# Patient Record
Sex: FEMALE | Race: WHITE | NOT HISPANIC OR LATINO | Employment: OTHER | URBAN - METROPOLITAN AREA
[De-identification: names, ages, dates, MRNs, and addresses within clinical notes are randomized per-mention and may not be internally consistent; named-entity substitution may affect disease eponyms.]

---

## 2017-02-02 ENCOUNTER — ALLSCRIPTS OFFICE VISIT (OUTPATIENT)
Dept: OTHER | Facility: OTHER | Age: 77
End: 2017-02-02

## 2017-02-02 DIAGNOSIS — I87.2 VENOUS INSUFFICIENCY (CHRONIC) (PERIPHERAL): ICD-10-CM

## 2017-02-08 ENCOUNTER — GENERIC CONVERSION - ENCOUNTER (OUTPATIENT)
Dept: OTHER | Facility: OTHER | Age: 77
End: 2017-02-08

## 2017-02-14 ENCOUNTER — APPOINTMENT (OUTPATIENT)
Dept: LAB | Facility: CLINIC | Age: 77
End: 2017-02-14
Payer: MEDICARE

## 2017-02-14 ENCOUNTER — GENERIC CONVERSION - ENCOUNTER (OUTPATIENT)
Dept: OTHER | Facility: OTHER | Age: 77
End: 2017-02-14

## 2017-02-14 ENCOUNTER — TRANSCRIBE ORDERS (OUTPATIENT)
Dept: LAB | Facility: CLINIC | Age: 77
End: 2017-02-14

## 2017-02-14 ENCOUNTER — OFFICE VISIT (OUTPATIENT)
Dept: LAB | Facility: CLINIC | Age: 77
End: 2017-02-14
Payer: MEDICARE

## 2017-02-14 ENCOUNTER — ALLSCRIPTS OFFICE VISIT (OUTPATIENT)
Dept: OTHER | Facility: OTHER | Age: 77
End: 2017-02-14

## 2017-02-14 ENCOUNTER — HOSPITAL ENCOUNTER (OUTPATIENT)
Dept: RADIOLOGY | Facility: CLINIC | Age: 77
Discharge: HOME | End: 2017-02-14
Payer: MEDICARE

## 2017-02-14 DIAGNOSIS — L03.116 CELLULITIS OF LEFT FOOT: ICD-10-CM

## 2017-02-14 DIAGNOSIS — L03.116 CELLULITIS OF LEFT FOOT: Primary | ICD-10-CM

## 2017-02-14 LAB
ANION GAP SERPL CALCULATED.3IONS-SCNC: 6 MMOL/L (ref 4–13)
BASOPHILS # BLD AUTO: 0.03 THOUSANDS/ΜL (ref 0–0.1)
BASOPHILS NFR BLD AUTO: 0 % (ref 0–1)
BUN SERPL-MCNC: 37 MG/DL (ref 5–25)
CALCIUM SERPL-MCNC: 9.2 MG/DL (ref 8.3–10.1)
CHLORIDE SERPL-SCNC: 104 MMOL/L (ref 100–108)
CO2 SERPL-SCNC: 30 MMOL/L (ref 21–32)
CREAT SERPL-MCNC: 1.27 MG/DL (ref 0.6–1.3)
EOSINOPHIL # BLD AUTO: 0.17 THOUSAND/ΜL (ref 0–0.61)
EOSINOPHIL NFR BLD AUTO: 2 % (ref 0–6)
ERYTHROCYTE [DISTWIDTH] IN BLOOD BY AUTOMATED COUNT: 13.6 % (ref 11.6–15.1)
GFR SERPL CREATININE-BSD FRML MDRD: 41 ML/MIN/1.73SQ M
GLUCOSE SERPL-MCNC: 187 MG/DL (ref 65–140)
HCT VFR BLD AUTO: 36.5 % (ref 34.8–46.1)
HGB BLD-MCNC: 11.1 G/DL (ref 11.5–15.4)
INR PPP: 1.21 (ref 0.86–1.16)
LYMPHOCYTES # BLD AUTO: 2.06 THOUSANDS/ΜL (ref 0.6–4.47)
LYMPHOCYTES NFR BLD AUTO: 28 % (ref 14–44)
MCH RBC QN AUTO: 29.3 PG (ref 26.8–34.3)
MCHC RBC AUTO-ENTMCNC: 30.4 G/DL (ref 31.4–37.4)
MCV RBC AUTO: 96 FL (ref 82–98)
MONOCYTES # BLD AUTO: 0.63 THOUSAND/ΜL (ref 0.17–1.22)
MONOCYTES NFR BLD AUTO: 9 % (ref 4–12)
NEUTROPHILS # BLD AUTO: 4.41 THOUSANDS/ΜL (ref 1.85–7.62)
NEUTS SEG NFR BLD AUTO: 61 % (ref 43–75)
PLATELET # BLD AUTO: 214 THOUSANDS/UL (ref 149–390)
PMV BLD AUTO: 10.3 FL (ref 8.9–12.7)
POTASSIUM SERPL-SCNC: 4.3 MMOL/L (ref 3.5–5.3)
PROTHROMBIN TIME: 15 SECONDS (ref 12–14.3)
RBC # BLD AUTO: 3.79 MILLION/UL (ref 3.81–5.12)
SODIUM SERPL-SCNC: 140 MMOL/L (ref 136–145)
WBC # BLD AUTO: 7.3 THOUSAND/UL (ref 4.31–10.16)

## 2017-02-14 PROCEDURE — 71020 HB CHEST X-RAY 2VW FRONTAL&LATL: CPT

## 2017-02-14 PROCEDURE — 85610 PROTHROMBIN TIME: CPT

## 2017-02-14 PROCEDURE — 80048 BASIC METABOLIC PNL TOTAL CA: CPT

## 2017-02-14 PROCEDURE — 85025 COMPLETE CBC W/AUTO DIFF WBC: CPT

## 2017-02-14 PROCEDURE — 93005 ELECTROCARDIOGRAM TRACING: CPT

## 2017-02-14 PROCEDURE — 36415 COLL VENOUS BLD VENIPUNCTURE: CPT

## 2017-02-17 ENCOUNTER — HOSPITAL ENCOUNTER (OUTPATIENT)
Facility: HOSPITAL | Age: 77
Setting detail: OUTPATIENT SURGERY
Discharge: HOME | End: 2017-02-17
Attending: SURGERY | Admitting: SURGERY
Payer: MEDICARE

## 2017-02-17 ENCOUNTER — HOSPITAL ENCOUNTER (OUTPATIENT)
Dept: ULTRASOUND IMAGING | Facility: HOSPITAL | Age: 77
Discharge: HOME | End: 2017-02-17
Attending: SURGERY
Payer: MEDICARE

## 2017-02-17 ENCOUNTER — ANESTHESIA (OUTPATIENT)
Dept: PERIOP | Facility: HOSPITAL | Age: 77
End: 2017-02-17
Payer: MEDICARE

## 2017-02-17 ENCOUNTER — ANESTHESIA EVENT (OUTPATIENT)
Dept: PERIOP | Facility: HOSPITAL | Age: 77
End: 2017-02-17
Payer: MEDICARE

## 2017-02-17 VITALS
OXYGEN SATURATION: 97 % | RESPIRATION RATE: 18 BRPM | TEMPERATURE: 97.9 F | DIASTOLIC BLOOD PRESSURE: 70 MMHG | HEART RATE: 60 BPM | SYSTOLIC BLOOD PRESSURE: 157 MMHG

## 2017-02-17 DIAGNOSIS — I87.9 VEIN DISORDER: ICD-10-CM

## 2017-02-17 PROCEDURE — 93971 EXTREMITY STUDY: CPT

## 2017-02-17 PROCEDURE — C1894 INTRO/SHEATH, NON-LASER: HCPCS | Performed by: SURGERY

## 2017-02-17 RX ORDER — LIDOCAINE HYDROCHLORIDE 10 MG/ML
INJECTION, SOLUTION INFILTRATION; PERINEURAL AS NEEDED
Status: DISCONTINUED | OUTPATIENT
Start: 2017-02-17 | End: 2017-02-17 | Stop reason: SURG

## 2017-02-17 RX ORDER — PROPOFOL 10 MG/ML
INJECTION, EMULSION INTRAVENOUS AS NEEDED
Status: DISCONTINUED | OUTPATIENT
Start: 2017-02-17 | End: 2017-02-17 | Stop reason: SURG

## 2017-02-17 RX ORDER — LISINOPRIL 20 MG/1
20 TABLET ORAL DAILY
COMMUNITY
End: 2018-01-31 | Stop reason: SDUPTHER

## 2017-02-17 RX ORDER — SODIUM CHLORIDE, SODIUM LACTATE, POTASSIUM CHLORIDE, CALCIUM CHLORIDE 600; 310; 30; 20 MG/100ML; MG/100ML; MG/100ML; MG/100ML
INJECTION, SOLUTION INTRAVENOUS CONTINUOUS PRN
Status: DISCONTINUED | OUTPATIENT
Start: 2017-02-17 | End: 2017-02-17 | Stop reason: SURG

## 2017-02-17 RX ORDER — ONDANSETRON 2 MG/ML
INJECTION INTRAMUSCULAR; INTRAVENOUS AS NEEDED
Status: DISCONTINUED | OUTPATIENT
Start: 2017-02-17 | End: 2017-02-17 | Stop reason: SURG

## 2017-02-17 RX ORDER — OXYCODONE HYDROCHLORIDE AND ACETAMINOPHEN 5; 325 MG/1; MG/1
1 TABLET ORAL EVERY 4 HOURS PRN
Qty: 20 TABLET | Refills: 0 | Status: SHIPPED | OUTPATIENT
Start: 2017-02-17 | End: 2017-02-27

## 2017-02-17 RX ORDER — CLINDAMYCIN PHOSPHATE 900 MG/50ML
900 INJECTION INTRAVENOUS
Status: COMPLETED | OUTPATIENT
Start: 2017-02-17 | End: 2017-02-17

## 2017-02-17 RX ORDER — FENTANYL CITRATE 50 UG/ML
INJECTION, SOLUTION INTRAMUSCULAR; INTRAVENOUS AS NEEDED
Status: DISCONTINUED | OUTPATIENT
Start: 2017-02-17 | End: 2017-02-17 | Stop reason: SURG

## 2017-02-17 RX ORDER — ONDANSETRON 2 MG/ML
4 INJECTION INTRAMUSCULAR; INTRAVENOUS ONCE
Status: DISCONTINUED | OUTPATIENT
Start: 2017-02-17 | End: 2017-02-17 | Stop reason: HOSPADM

## 2017-02-17 RX ORDER — FENTANYL CITRATE/PF 50 MCG/ML
25 SYRINGE (ML) INJECTION
Status: DISCONTINUED | OUTPATIENT
Start: 2017-02-17 | End: 2017-02-17 | Stop reason: HOSPADM

## 2017-02-17 RX ADMIN — CLINDAMYCIN PHOSPHATE 900 MG: 18 INJECTION, SOLUTION INTRAMUSCULAR; INTRAVENOUS at 11:58

## 2017-02-17 RX ADMIN — ONDANSETRON 4 MG: 2 INJECTION INTRAMUSCULAR; INTRAVENOUS at 12:06

## 2017-02-17 RX ADMIN — PROPOFOL 150 MG: 10 INJECTION, EMULSION INTRAVENOUS at 12:00

## 2017-02-17 RX ADMIN — LIDOCAINE HYDROCHLORIDE 50 MG: 10 INJECTION, SOLUTION INFILTRATION; PERINEURAL at 12:00

## 2017-02-17 RX ADMIN — FENTANYL CITRATE 50 MCG: 50 INJECTION INTRAMUSCULAR; INTRAVENOUS at 12:10

## 2017-02-17 RX ADMIN — FENTANYL CITRATE 50 MCG: 50 INJECTION INTRAMUSCULAR; INTRAVENOUS at 12:35

## 2017-02-17 RX ADMIN — SODIUM CHLORIDE, SODIUM LACTATE, POTASSIUM CHLORIDE, AND CALCIUM CHLORIDE: .6; .31; .03; .02 INJECTION, SOLUTION INTRAVENOUS at 11:58

## 2017-02-20 LAB
ATRIAL RATE: 64 BPM
QRS AXIS: -77 DEGREES
QRSD INTERVAL: 180 MS
QT INTERVAL: 468 MS
QTC INTERVAL: 482 MS
T WAVE AXIS: 79 DEGREES
VENTRICULAR RATE: 64 BPM

## 2017-02-21 ENCOUNTER — ALLSCRIPTS OFFICE VISIT (OUTPATIENT)
Dept: OTHER | Facility: OTHER | Age: 77
End: 2017-02-21

## 2017-02-22 ENCOUNTER — HOSPITAL ENCOUNTER (OUTPATIENT)
Dept: NON INVASIVE DIAGNOSTICS | Facility: CLINIC | Age: 77
Discharge: HOME | End: 2017-02-22
Payer: MEDICARE

## 2017-02-22 DIAGNOSIS — I87.2 VENOUS INSUFFICIENCY (CHRONIC) (PERIPHERAL): ICD-10-CM

## 2017-02-22 PROCEDURE — 93971 EXTREMITY STUDY: CPT

## 2017-03-15 ENCOUNTER — GENERIC CONVERSION - ENCOUNTER (OUTPATIENT)
Dept: OTHER | Facility: OTHER | Age: 77
End: 2017-03-15

## 2017-04-11 ENCOUNTER — ALLSCRIPTS OFFICE VISIT (OUTPATIENT)
Dept: OTHER | Facility: OTHER | Age: 77
End: 2017-04-11

## 2017-05-03 ENCOUNTER — ALLSCRIPTS OFFICE VISIT (OUTPATIENT)
Dept: OTHER | Facility: OTHER | Age: 77
End: 2017-05-03

## 2017-05-03 DIAGNOSIS — R31.9 HEMATURIA: ICD-10-CM

## 2017-05-03 LAB
BILIRUB UR QL STRIP: NORMAL
CLARITY UR: NORMAL
COLOR UR: NORMAL
GLUCOSE (HISTORICAL): NORMAL
HGB UR QL STRIP.AUTO: NORMAL
KETONES UR STRIP-MCNC: NORMAL MG/DL
LEUKOCYTE ESTERASE UR QL STRIP: NORMAL
NITRITE UR QL STRIP: NORMAL
PH UR STRIP.AUTO: 5 [PH]
PROT UR STRIP-MCNC: NORMAL MG/DL
SP GR UR STRIP.AUTO: 1.01
UROBILINOGEN UR QL STRIP.AUTO: 0.2

## 2017-05-16 ENCOUNTER — ALLSCRIPTS OFFICE VISIT (OUTPATIENT)
Dept: OTHER | Facility: OTHER | Age: 77
End: 2017-05-16

## 2017-05-17 ENCOUNTER — ALLSCRIPTS OFFICE VISIT (OUTPATIENT)
Dept: OTHER | Facility: OTHER | Age: 77
End: 2017-05-17

## 2017-05-25 ENCOUNTER — GENERIC CONVERSION - ENCOUNTER (OUTPATIENT)
Dept: OTHER | Facility: OTHER | Age: 77
End: 2017-05-25

## 2017-06-01 DIAGNOSIS — N18.30 CHRONIC KIDNEY DISEASE, STAGE III (MODERATE) (HCC): ICD-10-CM

## 2017-06-05 ENCOUNTER — ALLSCRIPTS OFFICE VISIT (OUTPATIENT)
Dept: OTHER | Facility: OTHER | Age: 77
End: 2017-06-05

## 2017-06-15 ENCOUNTER — GENERIC CONVERSION - ENCOUNTER (OUTPATIENT)
Dept: OTHER | Facility: OTHER | Age: 77
End: 2017-06-15

## 2017-06-19 ENCOUNTER — APPOINTMENT (OUTPATIENT)
Dept: LAB | Facility: CLINIC | Age: 77
End: 2017-06-19
Payer: MEDICARE

## 2017-06-19 ENCOUNTER — TRANSCRIBE ORDERS (OUTPATIENT)
Dept: LAB | Facility: CLINIC | Age: 77
End: 2017-06-19

## 2017-06-19 DIAGNOSIS — N18.30 CHRONIC KIDNEY DISEASE, STAGE III (MODERATE) (HCC): ICD-10-CM

## 2017-06-19 DIAGNOSIS — N18.30 CHRONIC KIDNEY DISEASE, STAGE III (MODERATE) (HCC): Primary | ICD-10-CM

## 2017-06-19 LAB
ANION GAP SERPL CALCULATED.3IONS-SCNC: 7 MMOL/L (ref 4–13)
BUN SERPL-MCNC: 49 MG/DL (ref 5–25)
CALCIUM SERPL-MCNC: 9.2 MG/DL (ref 8.3–10.1)
CHLORIDE SERPL-SCNC: 103 MMOL/L (ref 100–108)
CO2 SERPL-SCNC: 29 MMOL/L (ref 21–32)
CREAT SERPL-MCNC: 1.37 MG/DL (ref 0.6–1.3)
CREAT UR-MCNC: 46.1 MG/DL
GFR SERPL CREATININE-BSD FRML MDRD: 37.6 ML/MIN/1.73SQ M
GLUCOSE SERPL-MCNC: 214 MG/DL (ref 65–140)
MICROALBUMIN UR-MCNC: 52.2 MG/L (ref 0–20)
MICROALBUMIN/CREAT 24H UR: 113 MG/G CREATININE (ref 0–30)
PHOSPHATE SERPL-MCNC: 3.7 MG/DL (ref 2.3–4.1)
POTASSIUM SERPL-SCNC: 4.2 MMOL/L (ref 3.5–5.3)
SODIUM SERPL-SCNC: 139 MMOL/L (ref 136–145)

## 2017-06-19 PROCEDURE — 80048 BASIC METABOLIC PNL TOTAL CA: CPT

## 2017-06-19 PROCEDURE — 82043 UR ALBUMIN QUANTITATIVE: CPT

## 2017-06-19 PROCEDURE — 82570 ASSAY OF URINE CREATININE: CPT

## 2017-06-19 PROCEDURE — 84100 ASSAY OF PHOSPHORUS: CPT

## 2017-06-19 PROCEDURE — 36415 COLL VENOUS BLD VENIPUNCTURE: CPT

## 2017-06-21 ENCOUNTER — ALLSCRIPTS OFFICE VISIT (OUTPATIENT)
Dept: OTHER | Facility: OTHER | Age: 77
End: 2017-06-21

## 2017-06-27 ENCOUNTER — GENERIC CONVERSION - ENCOUNTER (OUTPATIENT)
Dept: OTHER | Facility: OTHER | Age: 77
End: 2017-06-27

## 2017-06-29 ENCOUNTER — GENERIC CONVERSION - ENCOUNTER (OUTPATIENT)
Dept: OTHER | Facility: OTHER | Age: 77
End: 2017-06-29

## 2017-06-30 ENCOUNTER — GENERIC CONVERSION - ENCOUNTER (OUTPATIENT)
Dept: OTHER | Facility: OTHER | Age: 77
End: 2017-06-30

## 2017-07-03 ENCOUNTER — ALLSCRIPTS OFFICE VISIT (OUTPATIENT)
Dept: OTHER | Facility: OTHER | Age: 77
End: 2017-07-03

## 2017-07-03 DIAGNOSIS — D49.2 NEOPLASM OF UNSPECIFIED BEHAVIOR OF BONE, SOFT TISSUE, AND SKIN: ICD-10-CM

## 2017-07-03 PROCEDURE — 88305 TISSUE EXAM BY PATHOLOGIST: CPT | Performed by: FAMILY MEDICINE

## 2017-07-05 ENCOUNTER — LAB REQUISITION (OUTPATIENT)
Dept: LAB | Facility: HOSPITAL | Age: 77
End: 2017-07-05
Payer: MEDICARE

## 2017-07-05 DIAGNOSIS — D49.2 NEOPLASM OF UNSPECIFIED BEHAVIOR OF BONE, SOFT TISSUE, AND SKIN: ICD-10-CM

## 2017-07-06 ENCOUNTER — GENERIC CONVERSION - ENCOUNTER (OUTPATIENT)
Dept: OTHER | Facility: OTHER | Age: 77
End: 2017-07-06

## 2017-07-11 ENCOUNTER — GENERIC CONVERSION - ENCOUNTER (OUTPATIENT)
Dept: OTHER | Facility: OTHER | Age: 77
End: 2017-07-11

## 2017-07-12 ENCOUNTER — ALLSCRIPTS OFFICE VISIT (OUTPATIENT)
Dept: OTHER | Facility: OTHER | Age: 77
End: 2017-07-12

## 2017-07-17 DIAGNOSIS — E55.9 VITAMIN D DEFICIENCY: ICD-10-CM

## 2017-07-17 DIAGNOSIS — E66.9 OBESITY: ICD-10-CM

## 2017-07-17 DIAGNOSIS — E03.9 HYPOTHYROIDISM: ICD-10-CM

## 2017-07-17 DIAGNOSIS — R31.9 HEMATURIA: ICD-10-CM

## 2017-07-17 DIAGNOSIS — E11.65 TYPE 2 DIABETES MELLITUS WITH HYPERGLYCEMIA (HCC): ICD-10-CM

## 2017-07-18 ENCOUNTER — APPOINTMENT (OUTPATIENT)
Dept: LAB | Facility: CLINIC | Age: 77
End: 2017-07-18
Payer: MEDICARE

## 2017-07-18 DIAGNOSIS — E55.9 VITAMIN D DEFICIENCY: ICD-10-CM

## 2017-07-18 DIAGNOSIS — E11.65 TYPE 2 DIABETES MELLITUS WITH HYPERGLYCEMIA (HCC): ICD-10-CM

## 2017-07-18 DIAGNOSIS — E66.9 OBESITY: ICD-10-CM

## 2017-07-18 DIAGNOSIS — E03.9 HYPOTHYROIDISM: ICD-10-CM

## 2017-07-18 LAB
25(OH)D3 SERPL-MCNC: 33.5 NG/ML (ref 30–100)
ALBUMIN SERPL BCP-MCNC: 3.5 G/DL (ref 3.5–5)
ANION GAP SERPL CALCULATED.3IONS-SCNC: 10 MMOL/L (ref 4–13)
BUN SERPL-MCNC: 46 MG/DL (ref 5–25)
CALCIUM SERPL-MCNC: 9.2 MG/DL (ref 8.3–10.1)
CHLORIDE SERPL-SCNC: 105 MMOL/L (ref 100–108)
CHOLEST SERPL-MCNC: 134 MG/DL (ref 50–200)
CO2 SERPL-SCNC: 26 MMOL/L (ref 21–32)
CREAT SERPL-MCNC: 1.39 MG/DL (ref 0.6–1.3)
CREAT UR-MCNC: 81.9 MG/DL
EST. AVERAGE GLUCOSE BLD GHB EST-MCNC: 192 MG/DL
GFR SERPL CREATININE-BSD FRML MDRD: 37 ML/MIN/1.73SQ M
GLUCOSE P FAST SERPL-MCNC: 209 MG/DL (ref 65–99)
HBA1C MFR BLD: 8.3 % (ref 4.2–6.3)
HDLC SERPL-MCNC: 57 MG/DL (ref 40–60)
LDLC SERPL CALC-MCNC: 60 MG/DL (ref 0–100)
MICROALBUMIN UR-MCNC: 285 MG/L (ref 0–20)
MICROALBUMIN/CREAT 24H UR: 348 MG/G CREATININE (ref 0–30)
PHOSPHATE SERPL-MCNC: 3.4 MG/DL (ref 2.3–4.1)
POTASSIUM SERPL-SCNC: 4.4 MMOL/L (ref 3.5–5.3)
PTH-INTACT SERPL-MCNC: 77.6 PG/ML (ref 14–72)
SODIUM SERPL-SCNC: 141 MMOL/L (ref 136–145)
T4 FREE SERPL-MCNC: 1.31 NG/DL (ref 0.76–1.46)
TRIGL SERPL-MCNC: 87 MG/DL
TSH SERPL DL<=0.05 MIU/L-ACNC: 0.52 UIU/ML (ref 0.36–3.74)

## 2017-07-18 PROCEDURE — 82306 VITAMIN D 25 HYDROXY: CPT

## 2017-07-18 PROCEDURE — 83036 HEMOGLOBIN GLYCOSYLATED A1C: CPT

## 2017-07-18 PROCEDURE — 84439 ASSAY OF FREE THYROXINE: CPT

## 2017-07-18 PROCEDURE — 82043 UR ALBUMIN QUANTITATIVE: CPT

## 2017-07-18 PROCEDURE — 82570 ASSAY OF URINE CREATININE: CPT

## 2017-07-18 PROCEDURE — 84443 ASSAY THYROID STIM HORMONE: CPT

## 2017-07-18 PROCEDURE — 83970 ASSAY OF PARATHORMONE: CPT

## 2017-07-18 PROCEDURE — 36415 COLL VENOUS BLD VENIPUNCTURE: CPT

## 2017-07-18 PROCEDURE — 80069 RENAL FUNCTION PANEL: CPT

## 2017-07-18 PROCEDURE — 80061 LIPID PANEL: CPT

## 2017-07-25 ENCOUNTER — GENERIC CONVERSION - ENCOUNTER (OUTPATIENT)
Dept: OTHER | Facility: OTHER | Age: 77
End: 2017-07-25

## 2017-07-26 ENCOUNTER — ALLSCRIPTS OFFICE VISIT (OUTPATIENT)
Dept: OTHER | Facility: OTHER | Age: 77
End: 2017-07-26

## 2017-08-16 ENCOUNTER — ALLSCRIPTS OFFICE VISIT (OUTPATIENT)
Dept: OTHER | Facility: OTHER | Age: 77
End: 2017-08-16

## 2017-09-05 ENCOUNTER — ALLSCRIPTS OFFICE VISIT (OUTPATIENT)
Dept: OTHER | Facility: OTHER | Age: 77
End: 2017-09-05

## 2017-09-19 ENCOUNTER — HOSPITAL ENCOUNTER (OUTPATIENT)
Dept: CT IMAGING | Facility: HOSPITAL | Age: 77
Discharge: HOME | End: 2017-09-19
Attending: UROLOGY
Payer: MEDICARE

## 2017-09-19 DIAGNOSIS — R31.9 HEMATURIA: ICD-10-CM

## 2017-09-19 PROCEDURE — 74176 CT ABD & PELVIS W/O CONTRAST: CPT

## 2017-09-25 DIAGNOSIS — Z12.31 ENCOUNTER FOR SCREENING MAMMOGRAM FOR MALIGNANT NEOPLASM OF BREAST: ICD-10-CM

## 2017-09-26 ENCOUNTER — ALLSCRIPTS OFFICE VISIT (OUTPATIENT)
Dept: OTHER | Facility: OTHER | Age: 77
End: 2017-09-26

## 2017-10-02 ENCOUNTER — APPOINTMENT (OUTPATIENT)
Dept: LAB | Facility: CLINIC | Age: 77
End: 2017-10-02
Payer: MEDICARE

## 2017-10-02 DIAGNOSIS — I12.9 HYPERTENSIVE CHRONIC KIDNEY DISEASE WITH STAGE 1 THROUGH STAGE 4 CHRONIC KIDNEY DISEASE, OR UNSPECIFIED CHRONIC KIDNEY DISEASE: ICD-10-CM

## 2017-10-02 DIAGNOSIS — R80.9 PROTEINURIA: ICD-10-CM

## 2017-10-02 DIAGNOSIS — N39.0 URINARY TRACT INFECTION: ICD-10-CM

## 2017-10-02 DIAGNOSIS — D64.9 ANEMIA: ICD-10-CM

## 2017-10-02 DIAGNOSIS — N39.41 URGE INCONTINENCE: ICD-10-CM

## 2017-10-02 DIAGNOSIS — N18.30 CHRONIC KIDNEY DISEASE, STAGE III (MODERATE) (HCC): ICD-10-CM

## 2017-10-02 DIAGNOSIS — E11.65 TYPE 2 DIABETES MELLITUS WITH HYPERGLYCEMIA (HCC): ICD-10-CM

## 2017-10-02 DIAGNOSIS — E03.9 HYPOTHYROIDISM: ICD-10-CM

## 2017-10-02 LAB
ANION GAP SERPL CALCULATED.3IONS-SCNC: 7 MMOL/L (ref 4–13)
BACTERIA UR QL AUTO: ABNORMAL /HPF
BILIRUB UR QL STRIP: NEGATIVE
BUN SERPL-MCNC: 38 MG/DL (ref 5–25)
CALCIUM SERPL-MCNC: 9.4 MG/DL (ref 8.3–10.1)
CHLORIDE SERPL-SCNC: 106 MMOL/L (ref 100–108)
CLARITY UR: ABNORMAL
CO2 SERPL-SCNC: 28 MMOL/L (ref 21–32)
COLOR UR: ABNORMAL
CREAT SERPL-MCNC: 1.29 MG/DL (ref 0.6–1.3)
CREAT UR-MCNC: 83.7 MG/DL
ERYTHROCYTE [DISTWIDTH] IN BLOOD BY AUTOMATED COUNT: 13.8 % (ref 11.6–15.1)
FERRITIN SERPL-MCNC: 25 NG/ML (ref 8–388)
GFR SERPL CREATININE-BSD FRML MDRD: 41 ML/MIN/1.73SQ M
GLUCOSE SERPL-MCNC: 135 MG/DL (ref 65–140)
GLUCOSE UR STRIP-MCNC: NEGATIVE MG/DL
HCT VFR BLD AUTO: 38.4 % (ref 34.8–46.1)
HGB BLD-MCNC: 11.8 G/DL (ref 11.5–15.4)
HGB UR QL STRIP.AUTO: ABNORMAL
HYALINE CASTS #/AREA URNS LPF: ABNORMAL /LPF
IRON SATN MFR SERPL: 16 %
IRON SERPL-MCNC: 61 UG/DL (ref 50–170)
KETONES UR STRIP-MCNC: NEGATIVE MG/DL
LEUKOCYTE ESTERASE UR QL STRIP: ABNORMAL
MCH RBC QN AUTO: 29.5 PG (ref 26.8–34.3)
MCHC RBC AUTO-ENTMCNC: 30.7 G/DL (ref 31.4–37.4)
MCV RBC AUTO: 96 FL (ref 82–98)
NITRITE UR QL STRIP: POSITIVE
NON-SQ EPI CELLS URNS QL MICRO: ABNORMAL /HPF
PH UR STRIP.AUTO: 6 [PH] (ref 4.5–8)
PLATELET # BLD AUTO: 240 THOUSANDS/UL (ref 149–390)
PMV BLD AUTO: 10.7 FL (ref 8.9–12.7)
POTASSIUM SERPL-SCNC: 4.2 MMOL/L (ref 3.5–5.3)
PROT UR STRIP-MCNC: ABNORMAL MG/DL
PROT UR-MCNC: 52 MG/DL
PROT/CREAT UR: 0.62 MG/G{CREAT} (ref 0–0.1)
RBC # BLD AUTO: 4 MILLION/UL (ref 3.81–5.12)
RBC #/AREA URNS AUTO: ABNORMAL /HPF
SODIUM SERPL-SCNC: 141 MMOL/L (ref 136–145)
SP GR UR STRIP.AUTO: 1.02 (ref 1–1.03)
TIBC SERPL-MCNC: 390 UG/DL (ref 250–450)
UROBILINOGEN UR QL STRIP.AUTO: 0.2 E.U./DL
WBC # BLD AUTO: 8.44 THOUSAND/UL (ref 4.31–10.16)
WBC #/AREA URNS AUTO: ABNORMAL /HPF

## 2017-10-02 PROCEDURE — 82570 ASSAY OF URINE CREATININE: CPT

## 2017-10-02 PROCEDURE — 36415 COLL VENOUS BLD VENIPUNCTURE: CPT

## 2017-10-02 PROCEDURE — 84156 ASSAY OF PROTEIN URINE: CPT

## 2017-10-02 PROCEDURE — 85027 COMPLETE CBC AUTOMATED: CPT

## 2017-10-02 PROCEDURE — 82728 ASSAY OF FERRITIN: CPT

## 2017-10-02 PROCEDURE — 80048 BASIC METABOLIC PNL TOTAL CA: CPT

## 2017-10-02 PROCEDURE — 83540 ASSAY OF IRON: CPT

## 2017-10-02 PROCEDURE — 81001 URINALYSIS AUTO W/SCOPE: CPT

## 2017-10-02 PROCEDURE — 83550 IRON BINDING TEST: CPT

## 2017-10-06 ENCOUNTER — ALLSCRIPTS OFFICE VISIT (OUTPATIENT)
Dept: OTHER | Facility: OTHER | Age: 77
End: 2017-10-06

## 2017-10-06 LAB
BILIRUB UR QL STRIP: NORMAL
CLARITY UR: NORMAL
COLOR UR: YELLOW
GLUCOSE (HISTORICAL): NORMAL
HGB UR QL STRIP.AUTO: NORMAL
KETONES UR STRIP-MCNC: NORMAL MG/DL
LEUKOCYTE ESTERASE UR QL STRIP: NORMAL
NITRITE UR QL STRIP: NORMAL
PH UR STRIP.AUTO: 5 [PH]
PROT UR STRIP-MCNC: NORMAL MG/DL
SP GR UR STRIP.AUTO: 1
UROBILINOGEN UR QL STRIP.AUTO: 0.2

## 2017-10-12 ENCOUNTER — ALLSCRIPTS OFFICE VISIT (OUTPATIENT)
Dept: OTHER | Facility: OTHER | Age: 77
End: 2017-10-12

## 2017-10-13 NOTE — PROGRESS NOTES
Assessment  1  Benign hypertension with chronic kidney disease, stage III (403 10,585 3) (I12 9,N18 3)   2  Chronic kidney disease, stage 3 (585 3) (N18 3)   3  Edema (782 3) (R60 9)   4  Microalbuminuria (791 0) (R80 9)   5  Iron deficiency anemia (280 9) (D50 9)    Plan  Chronic kidney disease, stage 3    · (1) BASIC METABOLIC PROFILE; Status:Active; Requested XPO:[id number]; Perform:[facility]; SSX:47IQC1429;NJMSRNI; For:Chronic kidney disease, stage 3; Ordered By:Joseph Esparza;   · (1) CBC/ PLT (NO DIFF); Status:Active; Requested CUT:[id number]; Perform:[facility]; NSE:63ZVE2964;OJBMYLZ; For:Chronic kidney disease, stage 3; Ordered By:Joseph Esparza;   · (1) PHOSPHORUS; Status:Active; Requested GFF:[id number]; Perform:[facility]; KFS:32OGN4687;KXIMUEL; For:Chronic kidney disease, stage 3; Ordered By:Joseph Esparza;   · (1) PTH N-TERMINAL (INTACT); Status:Active; Requested [id number]; Perform:[facility]; LXK:41YCN1906;RXJVXEN; For:Chronic kidney disease, stage 3; Ordered By:Joseph Esparza;   · (1) URINE PROTEIN CREATININE RATIO; Status:Active; Requested EWI:[id number]; Perform:[facility]; GNQ:66BFV2282;MLUTXIR; For:Chronic kidney disease, stage 3; Ordered By:Joseph Esparza;  Iron deficiency anemia    · (1) FERRITIN; Status:Active; Requested MYI:[id number]; Perform:[facility]; JFD:34CCJ1986;HZRGOMT; For:Iron deficiency anemia; Ordered By:Joseph Esparza;   · (1) IRON SATURATION %, TIBC; Status:Active; Requested CZQ:[id number]; Perform:[facility]; NIV:14WZR7303;WWFEGVQ; For:Iron deficiency anemia; Ordered By:Joseph Esparza;    Discussion/Summary    CK D stage III with baseline serum creatinine 1 1-1 3Her serum creatinine 1 2 in 2017 at baseline and stable   Avoid NSAIDs or nephrotoxinsCK D likely secondary to long-term hypertensive nephrosclerosis/diabetic nephropathyCheck BMP, phosphorus, UPCR before next visit  last UPC ration 627 mg which has significantly worsened from previous value of UACR 113 mg  This last UPC ratio is from UTI specimen  Will need repeat UPC ratio before next visit  Continue lisinopril 20 mg by mouth daily  Blood pressure is controlled in the office todayContinue current antihypertensive regimen with lisinopril, metoprolol and Lasix  She does not check blood pressure at home despite having blood pressure machine  Strongly advised to check blood pressure at home on a regular basis and call back if blood pressure remains persistently greater than 150/90  She has not brought BP machine to the office today  Advised to bring BP machine during next visit  bilateral lower extremity edema likely secondary to chronic venous stasisMuch improved and resolved after endovascular laser treatment in left lower extremity  Currently she does not have any significant edema in both lower extremity  She denies any dyspnea  Continue low-salt diet  deficiency anemia, last iron saturation 16% in October 2017  Patient could not tolerate by mouth iron supplement before due to significant side effect issues with constipation  We will schedule IV Feraheme total 2 does in November 2017  Repeat iron saturation, CBC before next visit  Hemoglobin overall stable  Rule out GI bleed  The patient, patient's family was counseled regarding instructions for management,-risk factor reductions,-importance of compliance with treatment  Patient is able to Self-Care  Patient agrees and allows to involve family/caregiver in development of care plan:   Possible side effects of new medications were reviewed with the patient/guardian today  The treatment plan was reviewed with the patient/guardian   The patient/guardian understands and agrees with the treatment plan      Reason For Visit  Regular follow-up of CK D      History of Present Illness  Patient is 17-year-old female with significant past medical history of hypertension for at least 40-50 years, and diabetes for 30 years, diabetic retinopathy, stress incontinence, chronic leg swelling with chronic venous stasis, status post endovascular laser treatment in the left lower extremity, chronic kidney disease stage III with baseline serum creatinine 1 1-1 3, comes regular follow up of renal failure  Her last serum creatinine 1 2 at baseline  She remains on lisinopril 20 mg by mouth daily  Recently she was having dysuria, lower abdominal pain, cloudy urine, foul smell in the urine and was diagnosed with UTI and currently she remains on ciprofloxacin  Her symptoms are overall improving with antibiotic  She remains on 40 mg Lasix by mouth daily  She is slowly losing weight  She tries to follow low salt diet  No recent NSAID exposure  lightheadedness or dizziness  Denies any dyspnea  She also has followed with urology and being managed for overactive bladder  Review of Systems    Constitutional: recent 5 lb weight loss, but-no fever,-no chills,-no anorexia-and-no fatigue  Integumentary: no rashes  Gastrointestinal: no abdominal pain,-no nausea,-no diarrhea-and-no vomiting  Respiratory: no shortness of breath  Cardiovascular: no chest pain,-no palpitations-and-no lower extremity edema  Musculoskeletal: no joint pain  Neurological: no headache,-no lightheadedness-and-no dizziness  Genitourinary: dysuria,-hematuria-and-Stress incontinence, but-no nocturia,-no change in urinary frequency-and-no incomplete emptying of bladder  Eyes: no dryness of the eyes  ENT: no nasal discharge  Psychiatric: no anxiety-and-no depression  Active Problems  1  Abnormal electrocardiogram (794 31) (R94 31)   2  Acute diastolic heart failure (508 67) (I50 31)   3  Anemia (285 9) (D64 9)   4  Anxiety disorder (300 00) (F41 9)   5  Benign hypertension with chronic kidney disease, stage III (403 10,585 3) (I12 9,N18 3)   6  Biceps tendinitis on right (726 12) (M75 21)   7   Bladder Incontinence   8  Bradycardia (427 89) (R00 1)   9  Cellulitis of leg, left (682 6) (L03 116)   10  Chronic diastolic congestive heart failure (428 32,428 0) (I50 32)   11  Chronic kidney disease, stage 3 (585 3) (N18 3)   12  Chronic obstructive pulmonary disease (496) (J44 9)   13  Chronic Tear Of Right Rotator Cuff Tendon (726 19)   14  Chronic venous insufficiency (459 81) (I87 2)   15  Complete heart block (426 0) (I44 2)   16  Complete tear of right rotator cuff (727 61) (M75 121)   17  Cough (786 2) (R05)   18  De Quervain's tenosynovitis (727 04) (M65 4)   19  Deep vein thrombosis of left femoral vein (453 41) (I82 412)   20  Diabetic hypoglycemia (250 80) (E11 649)   21  Diabetic retinopathy (250 50,362 01) (E11 319)   22  DM type 2, not at goal (250 00) (E11 9)   23  Drug toxicity (796 0) (R89 2)   24  Eczematous dermatitis (692 9) (L30 9)   25  Edema (782 3) (R60 9)   26  Encounter for screening mammogram for malignant neoplasm of breast (V76 12)    (Z12 31)   27  Glaucoma Screening   28  Hematuria (599 70) (R31 9)   29  Herpes zoster (053 9) (B02 9)   30  History of deep venous thrombosis (V12 51) (Z86 718)   31  History of DVT of lower extremity (V12 51) (Z86 718)   32  Hypercholesterolemia (272 0) (E78 00)   33  Hypothyroidism (244 9) (E03 9)   34  Immunization due (V05 9) (Z23)   35  Impacted cerumen, unspecified laterality (380 4) (H61 20)   36  Medicare annual wellness visit, subsequent (V70 0) (Z00 00)   37  Memory Lapses Or Loss (780 93)   38  Microalbuminuria (791 0) (R80 9)   39  Need for prophylactic vaccination and inoculation against influenza (V04 81) (Z23)   40  Neoplasm of skin (239 2) (D49 2)   41  OAB (overactive bladder) (596 51) (N32 81)   42  Obesity (BMI 30-39 9) (278 00) (E66 9)   43  Osteoarthritis of knee (715 36) (M17 10)   44  Pacemaker Permanent Placement Dual-Chamber   45  PAF (paroxysmal atrial fibrillation) (427 31) (I48 0)   46   Pain in wrist, unspecified laterality (719 43) (M25 539)   47  Postoperative state (V45 89) (Z98 890)   48  Preoperative cardiovascular examination (V72 81) (Z01 810)   49  Rotator cuff tear, right (840 4) (M75 101)   50  Rotator cuff tendinitis, unspecified laterality (726 10) (M75 80)   51  Sciatica (724 3) (M54 30)   52  Screening for colorectal cancer (V76 51) (Z12 11,Z12 12)   53  Screening for genitourinary condition (V81 6) (Z13 89)   54  Screening for glaucoma (V80 1) (Z13 5)   55  Screening for neurological condition (V80 09) (Z13 89)   56  Sleep apnea (780 57) (G47 30)   57  Tenosynovitis, wrist (727 05) (M65 9)   58  Type 2 diabetes mellitus with hyperglycemia, unspecified long term insulin use status    (250 00) (E11 65)   59  Urge incontinence of urine (788 31) (N39 41)   60  UTI (urinary tract infection) (599 0) (N39 0)   61  Visit for screening mammogram (V76 12) (Z12 31)   62  Vitamin D deficiency (268 9) (E55 9)    Past Medical History  1  History of Arthritis (V13 4)   2  History of Diabetes Mellitus (250 00)   3  History of hypertension (V12 59) (Z86 79)   4  History of thyroid disease (V12 29) (Z86 39)   5  History of uterine leiomyoma (V13 29) (Z86 018)   6  Need for prophylactic vaccination and inoculation against influenza (V04 81) (Z23)    The active problems and past medical history were reviewed and updated today  Surgical History  1  History of Cataract Surgery   2  History of Eye Surgery   3  History of Hysterectomy   4  History of Knee Replacement   5  History of Pacemaker Permanent Placement   6  History of Reported Hx Of Breast Surgery For Biopsy   7  History of Total Abdominal Hysterectomy    The surgical history was reviewed and updated today  Family History  Mother    1  Family history of Coronary Artery Disease (V17 49)   2  Family history of hypertension (V17 49) (Z82 49)   3  Family history of Heart Disease (V17 49)   4  Family history of Hypertension (V17 49)  Father    5   Family history of Diabetes Mellitus (V18 0)   6  Family history of hypertension (V17 49) (Z82 49)  Brother    7  Family history of Diabetes Mellitus (V18 0)   8  Family history of Diabetes Mellitus (V18 0)  Maternal Aunt    9  Family history of Breast Cancer (V16 3)   10  Family history of Carcinoma Of The Pancreas    The family history was reviewed and updated today  Social History   · Denied: History of Alcohol Use (History)   · Daily Coffee Consumption (2  Cups/Day)   · Daily Cola Consumption (___ Cans/Day)   · Daily Tea Consumption (___ Cups/Day)   · Denied: History of Drug Use   · Former smoker (V15 82) (D73 584)   · Has 3 children   · Marital History - Currently    ·    · Non-smoker (V49 89) (Z78 9)   · Denied: History of Sexually active   · Denied: History of Tobacco Use  The social history was reviewed and updated today  The social history was reviewed and is unchanged  Current Meds   1  Atorvastatin Calcium 80 MG Oral Tablet; TAKE 1 TABLET AT BEDTIME; Therapy: 12Apr2016 to (Evaluate:13Apr2018)  Requested for: 18Apr2017; Last   Rx:18Apr2017 Ordered   2  Ciprofloxacin HCl - 250 MG Oral Tablet; Take 1 tablet twice daily; Therapy: 01YKM9716 to (Complete:16Oct2017)  Requested for: 03ANA8005; Last   Rx:06Oct2017 Ordered   3  Citalopram Hydrobromide 20 MG Oral Tablet; TAKE ONE TABLET EVERY DAY; Therapy: 59KYT1471 to (Evaluate:03Mar2018)  Requested for: 44PTB7673; Last   Rx:08Mar2017 Ordered   4  Eliquis 5 MG Oral Tablet; Take 1 tablet twice daily; Therapy: 89NZY1081 to (IMGPURNO:96HUJ0574)  Requested for: 43GWS0746; Last   Rx:16Jan2017 Ordered   5  Fish Oil 1200 MG Oral Capsule; 1 tid Recorded   6  Furosemide 40 MG Oral Tablet; Take 1 tablet daily; Therapy: 56ONI6958 to  Requested for: 30Jun2017 Recorded   7  HealthWise Short Pen Needles 31G X 8 MM Miscellaneous; Therapy: 17LVD3776 to (Last Rx:30Mar2011)  Requested for: 38WPX2424 Ordered   8   Levothyroxine Sodium 125 MCG Oral Tablet; TAKE 1 TABLET DAILY; Therapy: 36DKD5998 to (Yahaira Pa)  Requested for: 63VZL4290; Last   Rx:10Mar2017 Ordered   9  Lisinopril 20 MG Oral Tablet; Take 1 tablet daily; Therapy: 94QLH1752 to (Chris Brown)  Requested for: 37CJD3386; Last   Rx:05Qou2587 Ordered   10  Metoprolol Tartrate 25 MG Oral Tablet; TAKE 1/2 TABLET EVERY 12 HOURS; Therapy: 16VPJ4541 to (Evaluate:59Ugy2918)  Requested for: 35Xnl1288; Last    Rx:20Lkt1530 Ordered   11  NovoLOG FlexPen 100 UNIT/ML Subcutaneous Solution Pen-injector; take 10 units  with    breakfast, 10 units with lunch and with dinner; Therapy: (Harl Sinks) to  Requested for: 21Jun2017 Recorded   12  Ocuvite Extra Oral Tablet; Take 1 tablet daily Recorded   13  Osteo Bi-Flex Adv Triple St TABS; take 2 tablet daily; Therapy: (Harl Sinks) to Recorded   14  Oxybutynin Chloride ER 15 MG Oral Tablet Extended Release 24 Hour; Take 1 tablet    daily; Therapy: 11ZZS5591 to (Evaluate:34Hbb9736)  Requested for: 50CYU6410; Last    Rx:57Eno7608 Ordered   15  Toujeo SoloStar 300 UNIT/ML Subcutaneous Solution Pen-injector; Take 30 units in the    evening; Therapy: (Recorded:12Oct2017) to Recorded   16  Vitamin C 500 MG Oral Capsule; TAKE 1 CAPSULE DAILY; Last Rx:06Ayf3676 Ordered    The medication list was reviewed and updated today  Allergies  1  Ampicillin CAPS    Vitals  Vital Signs    Recorded: 25CBN1857 54:08EE   Systolic 436   Diastolic 64   Height 5 ft 1 in   Weight 228 lb 3 2 oz   BMI Calculated 43 12   BSA Calculated 2     Physical Exam    Constitutional: General appearance: No acute distress, well appearing and well nourished  ENT: External ears and nose appear normal      Eyes: Anicteric sclerae  JVD:  No JVD present  Pulmonary: Respiratory effort: No increased work of breathing or signs of respiratory distress  -Auscultation of lungs: Clear to auscultation  no rales or crackles were heard bilaterally  no rhonchi  no wheezing   no diminished breath sounds  Cardiovascular: Auscultation of heart: Normal rate and rhythm, normal S1 and S2, without murmurs  -Pacemaker present  Abdomen: Non-tender, no masses  Extremities: No cyanosis, clubbing or edema -leg edema much improved and stable  Rash: No rash present  Neurologic: Non Focal      Psychiatric: Orientation to person, place, and time: Normal  -and-Mood and affect: Normal        Results/Data  Urine Dip Non-Automated- POC 71QDZ2084 02:20PM Lexi Quintero     Test Name Result Flag Reference   Color Yellow     Clarity Cloudy     Leukocytes 3+++     Nitrite pos     Blood 2++     Bilirubin neg     Urobilinogen 0 2     Protein 1+     Ph 5 0     Specific Gravity 1 000     Ketone neg     Glucose neg       (1) CBC/ PLT (NO DIFF) 02Oct2017 12:42PM Merary [place] Order Number: [id number]     Test Name Result Flag Reference   HEMATOCRIT 38 4 %  34 8-46 1   HEMOGLOBIN 11 8 g/dL  11 5-15 4   MCHC 30 7 g/dL L 31 4-37 4   MCH 29 5 pg  26 8-34 3   MCV 96 fL  82-98   PLATELET COUNT 245 Thousands/uL  149-390   RBC COUNT 4 00 Million/uL  3 81-5 12   RDW 13 8 %  11 6-15 1   WBC COUNT 8 44 Thousand/uL  4 31-10 16   MPV 10 7 fL  8 9-12 7     (1) BASIC METABOLIC PROFILE 32ZYI1849 12:42PM Merary [place] Order Number: [id number]     Test Name Result Flag Reference   GLUCOSE,RANDM 135 mg/dL     If the patient is fasting, the ADA then defines impaired fasting glucose as > 100 mg/dL and diabetes as > or equal to 123 mg/dL  Specimen collection should occur prior to Sulfasalazine administration due to the potential for falsely depressed results  Specimen collection should occur prior to Sulfapyridine administration due to the potential for falsely elevated results     SODIUM 141 mmol/L  136-145   POTASSIUM 4 2 mmol/L  3 5-5 3   CHLORIDE 106 mmol/L  100-108   CARBON DIOXIDE 28 mmol/L  21-32   ANION GAP (CALC) 7 mmol/L  4-13   BLOOD UREA NITROGEN 38 mg/dL H 5-25   CREATININE 1 29 mg/dL  0 60-1 30   Standardized to IDMS reference method   CALCIUM 9 4 mg/dL  8 3-10 1   eGFR 41 ml/min/1 73sq m     National Kidney Disease Education Program recommendations are as follows:  GFR calculation is accurate only with a steady state creatinine  Chronic Kidney disease less than 60 ml/min/1 73 sq  meters  Kidney failure less than 15 ml/min/1 73 sq  meters  (1) URINALYSIS (will reflex a microscopy if leukocytes, occult blood, protein or nitrites are not within normal limits) 02Oct2017 12:42PM [place] [facility] Order Number: [id number]     Test Name Result Flag Reference   COLOR Dk Yellow     CLARITY Turbid     SPECIFIC GRAVITY UA 1 017  1 003-1 030   PH UA 6 0  4 5-8 0   LEUKOCYTE ESTERASE UA Large A Negative   NITRITE UA Positive A Negative   PROTEIN UA 30 (1+) mg/dl A Negative   GLUCOSE UA Negative mg/dl  Negative   KETONES UA Negative mg/dl  Negative   UROBILINOGEN UA 0 2 E U /dl  0 2, 1 0 E U /dl   BILIRUBIN UA Negative  Negative   BLOOD UA Large A Negative   BACTERIA Innumerable /hpf A None Seen, Occasional   EPITHELIAL CELLS None Seen /hpf  None Seen, Occasional   HYALINE CASTS None Seen /lpf  None Seen   RBC UA Innumerable /hpf A None Seen, 0-5   WBC UA Innumerable /hpf A None Seen, 0-5, 5-55, 5-65     (1) URINE PROTEIN CREATININE RATIO 02Oct2017 12:42PM [place] [facility] Order Number: [id number]     Test Name Result Flag Reference   CREATININE URINE 83 7 mg/dL     URINE PROTEIN:CREATININE RATIO 0 62 H 0 00-0 10   URINE PROTEIN 52 mg/dL       (1) IRON SATURATION %, TIBC 02Oct2017 12:42PM [place] [facility] Order Number: [id number]     Test Name Result Flag Reference   IRON SATURATION 16 %     TOTAL IRON BINDING CAPACITY 390 ug/dL  250-450   IRON 61 ug/dL     Patients treated with metal-binding drugs (ie  Deferoxamine) may have depressed iron values       (1) José Miguel Combe 44ROJ5963 12:42PM [place] [facility] Order Number: [id number]     Test Name Result Flag Reference   FERRITIN 25 ng/mL  8-388       Health Management  Screening for colorectal cancer   COLONOSCOPY; every 1 year; Next Due: 21Apr2016; Overdue    Future Appointments    Date/Time Provider Specialty Site   10/12/2017 03:40 PM MANJEET Dugan  Cardiology [facility] CARDIOLOGY [place]   11/20/2017 01:00 PM Cardiology, Device Remote   [address]   02/21/2018 08:30 AM Cardiology, Device Remote  [address]   12/27/2017 10:20 AM MANJEET Tomlinson  Endocrinology [facility] ENDOCRINOLOGY [place]   11/15/2017 10:30 AM Lanny Taylor MD Urology [address]   11/28/2017 09:15 AM MANJEET [place]   [address] [place] [address]     Signatures   Electronically signed by : MANJEET Raygoza ; Oct 12 2017 12:13PM EST                       (Author)

## 2017-10-14 NOTE — PROGRESS NOTES
Assessment  Assessed    1  Chronic diastolic congestive heart failure (428 32,428 0) (I50 32)   2  Complete heart block (426 0) (I44 2)   3  PAF (paroxysmal atrial fibrillation) (427 31) (I48 0)    Plan  Bradycardia    · EKG/ECG- POC; Status:Complete;   Done: 17KGB1081   Perform: In Office; Due:10Iix6126; Last Updated Gonzalez Hess; 10/12/2017 4:15:39 PM;Ordered; For:Bradycardia; Ordered By:Ta Mendoza;  PAF (paroxysmal atrial fibrillation)    · Follow-up visit in 6 months Evaluation and Treatment  Follow-up  Status: Complete   Done: 70OUZ5676   Ordered; For: PAF (paroxysmal atrial fibrillation); Ordered By: Kranthi Fox Performed:  Due: 98FTK9423; Last Updated By: Db Almonte; 10/12/2017 4:47:42 PM    Discussion/Summary  Cardiology Discussion Summary Free Text Note Form [facility]:   1  Complete heart block s/p PPM implant  Stable  Predominately in sinus rhythm on pacer checks  Overall doing well  SHe did have PAF on her prior PPM check  COntinue anticoagulation that was started for DVT  Chronic Diastolic CHF: Volume status stable  Continue lasix  Hypertension: BP stable  Check BP at home  PAF: Mostly in sinus rhythm  COntinue anticoagulation with Eliquis  Counseling Documentation With Imm: The patient was counseled regarding diagnostic results,-- instructions for management,-- risk factor reductions,-- impressions  total time of encounter was 25 minutes-- and-- 15 minutes was spent counseling  Chief Complaint  Chief Complaint Free Text Note Form: Patient is here for a follow up  Patient complaints of dizziness and edema  History of Present Illness  Cardiology HPI Free Text Note Form [facility]: Followup for CHF  okay  She has had recurrent UTIs  Edema is controlled  No palpitaitons  She has had hematuria with the UTIs  Atrial Fibrillation (Follow-Up): The patient presents with paroxysmal atrial fibrillation  She is status post cardioversion   She states her atrial fibrillation has been stable since the last visit  Symptoms: denies palpitations,-- denies chest pain,-- stable exercise intolerance,-- denies dyspnea on exertion-- and-- denies dizziness  Associated symptoms include no syncope,-- no focal neurologic deficit,-- no tendency for easy bleeding-- and-- no tendency for easy bruising  Medications: the patient is adherent with her medication regimen  Review of Systems  Cardiology Female ROS:     Cardiac: has swelling in the     Skin: No complaints of nonhealing sores or skin rash  Genitourinary: No complaints of recurrent urinary tract infections, frequent urination at night, difficult urination, blood in urine, kidney stones, loss of bladder control, kidney problems, denies any birth control or hormone replacement, is not post menopausal, not currently pregnant  Psychological: No complaints of feeling depressed, anxiety, panic attacks, or difficulty concentrating  General: No complaints of trouble sleeping, lack of energy, fatigue, appetite changes, weight changes, fever, frequent infections, or night sweats  Respiratory: shortness of breath  HEENT: No complaints of serious problems, hearing problems, nose problems, throat problems, or snoring  Gastrointestinal: No complaints of liver problems, nausea, vomiting, heartburn, constipation, bloody stools, diarrhea, problems swallowing, adbominal pain, or rectal bleeding  Hematologic: No complaints of bleeding disorders, anemia, blood clots, or excessive brusing  Neurological: No complaints of numbness, tingling, dizziness, weakness, seizures, headaches, syncope or fainting, AM fatigue, daytime sleepiness, no witnessed apnea episodes  Musculoskeletal: No complaints of arthritis, back pain, or painfull swelling  ROS Reviewed:   ROS reviewed  Active Problems  Problems    1  Abnormal electrocardiogram (794 31) (R94 31)   2  Acute diastolic heart failure (187 58) (I50 31)   3  Anemia (285 9) (D64 9)   4   Anxiety disorder (300 00) (F41 9)   5  Benign hypertension with chronic kidney disease, stage III (403 10,585 3) (I12 9,N18 3)   6  Biceps tendinitis on right (726 12) (M75 21)   7  Bladder Incontinence   8  Bradycardia (427 89) (R00 1)   9  Cellulitis of leg, left (682 6) (L03 116)   10  Chronic diastolic congestive heart failure (428 32,428 0) (I50 32)   11  Chronic kidney disease, stage 3 (585 3) (N18 3)   12  Chronic obstructive pulmonary disease (496) (J44 9)   13  Chronic Tear Of Right Rotator Cuff Tendon (726 19)   14  Chronic venous insufficiency (459 81) (I87 2)   15  Complete heart block (426 0) (I44 2)   16  Complete tear of right rotator cuff (727 61) (M75 121)   17  Cough (786 2) (R05)   18  De Quervain's tenosynovitis (727 04) (M65 4)   19  Deep vein thrombosis of left femoral vein (453 41) (I82 412)   20  Diabetic hypoglycemia (250 80) (E11 649)   21  Diabetic retinopathy (250 50,362 01) (E11 319)   22  DM type 2, not at goal (250 00) (E11 9)   23  Drug toxicity (796 0) (R89 2)   24  Eczematous dermatitis (692 9) (L30 9)   25  Edema (782 3) (R60 9)   26  Encounter for screening mammogram for malignant neoplasm of breast (V76 12)    (Z12 31)   27  Glaucoma Screening   28  Hematuria (599 70) (R31 9)   29  Herpes zoster (053 9) (B02 9)   30  History of deep venous thrombosis (V12 51) (Z86 718)   31  History of DVT of lower extremity (V12 51) (Z86 718)   32  Hypercholesterolemia (272 0) (E78 00)   33  Hypothyroidism (244 9) (E03 9)   34  Immunization due (V05 9) (Z23)   35  Impacted cerumen, unspecified laterality (380 4) (H61 20)   36  Iron deficiency anemia (280 9) (D50 9)   37  Medicare annual wellness visit, subsequent (V70 0) (Z00 00)   38  Memory Lapses Or Loss (780 93)   39  Microalbuminuria (791 0) (R80 9)   40  Need for prophylactic vaccination and inoculation against influenza (V04 81) (Z23)   41  Neoplasm of skin (239 2) (D49 2)   42  OAB (overactive bladder) (596 51) (N32 81)   43  Obesity (BMI 30-39  9) (278 00) (E66 9)   44  Osteoarthritis of knee (715 36) (M17 10)   45  Pacemaker Permanent Placement Dual-Chamber   46  PAF (paroxysmal atrial fibrillation) (427 31) (I48 0)   47  Pain in wrist, unspecified laterality (719 43) (M25 539)   48  Postoperative state (V45 89) (Z98 890)   49  Preoperative cardiovascular examination (V72 81) (Z01 810)   50  Rotator cuff tear, right (840 4) (M75 101)   51  Rotator cuff tendinitis, unspecified laterality (726 10) (M75 80)   52  Sciatica (724 3) (M54 30)   53  Screening for colorectal cancer (V76 51) (Z12 11,Z12 12)   54  Screening for genitourinary condition (V81 6) (Z13 89)   55  Screening for glaucoma (V80 1) (Z13 5)   56  Screening for neurological condition (V80 09) (Z13 89)   57  Sleep apnea (780 57) (G47 30)   58  Tenosynovitis, wrist (727 05) (M65 9)   59  Type 2 diabetes mellitus with hyperglycemia, unspecified long term insulin use status    (250 00) (E11 65)   60  Urge incontinence of urine (788 31) (N39 41)   61  UTI (urinary tract infection) (599 0) (N39 0)   62  Visit for screening mammogram (V76 12) (Z12 31)   63  Vitamin D deficiency (268 9) (E55 9)    Past Medical History  Problems    1  History of Arthritis (V13 4)   2  History of Diabetes Mellitus (250 00)   3  History of hypertension (V12 59) (Z86 79)   4  History of thyroid disease (V12 29) (Z86 39)   5  History of uterine leiomyoma (V13 29) (Z86 018)   6  Need for prophylactic vaccination and inoculation against influenza (V04 81) (Z23)  Active Problems And Past Medical History Reviewed: The active problems and past medical history were reviewed and updated today  Surgical History  Problems    1  History of Cataract Surgery   2  History of Eye Surgery   3  History of Hysterectomy   4  History of Knee Replacement   5  History of Pacemaker Permanent Placement   6  History of Reported Hx Of Breast Surgery For Biopsy   7  History of Total Abdominal Hysterectomy  Surgical History Reviewed:    The surgical history was reviewed and updated today  Family History  Mother    1  Family history of Coronary Artery Disease (V17 49)   2  Family history of hypertension (V17 49) (Z82 49)   3  Family history of Heart Disease (V17 49)   4  Family history of Hypertension (V17 49)  Father    5  Family history of Diabetes Mellitus (V18 0)   6  Family history of hypertension (V17 49) (Z82 49)  Brother    7  Family history of Diabetes Mellitus (V18 0)   8  Family history of Diabetes Mellitus (V18 0)  Maternal Aunt    9  Family history of Breast Cancer (V16 3)   10  Family history of Carcinoma Of The Pancreas  Family History Reviewed: The family history was reviewed and updated today  Social History  Problems    · Denied: History of Alcohol Use (History)   · Daily Coffee Consumption (2  Cups/Day)   · Daily Cola Consumption (___ Cans/Day)   · Daily Tea Consumption (___ Cups/Day)   · Denied: History of Drug Use   · Former smoker (V15 82) (Y89 067)   · Has 3 children   · Marital History - Currently    ·    · Non-smoker (V49 89) (Z78 9)   · Denied: History of Sexually active   · Denied: History of Tobacco Use  Social History Reviewed: The social history was reviewed and updated today  Current Meds   1  Atorvastatin Calcium 80 MG Oral Tablet; TAKE 1 TABLET AT BEDTIME; Therapy: 95Rhs5098 to (Evaluate:13Apr2018)  Requested for: 28Emi9679; Last   Rx:18Apr2017 Ordered   2  Ciprofloxacin HCl - 250 MG Oral Tablet; Take 1 tablet twice daily; Therapy: 56TZU2510 to (Complete:16Oct2017)  Requested for: 44KPX2518; Last   Rx:06Oct2017 Ordered   3  Citalopram Hydrobromide 20 MG Oral Tablet; TAKE ONE TABLET EVERY DAY; Therapy: 31DQI8138 to (Evaluate:03Mar2018)  Requested for: 73OSE6927; Last   Rx:08Mar2017 Ordered   4  Eliquis 5 MG Oral Tablet; Take 1 tablet twice daily; Therapy: 55RYG5361 to ([facility]:76EGF4503)  Requested for: 55RBK5222; Last   Rx:16Jan2017 Ordered   5   Fish Oil 1200 MG Oral Capsule; 1 tid Recorded   6  Furosemide 40 MG Oral Tablet; Take 1 tablet daily; Therapy: 54KIE4326 to  Requested for: 30Jun2017 Recorded   7  HealthWise Short Pen Needles 31G X 8 MM Miscellaneous; Therapy: 43HBZ2460 to (Last Rx:30Mar2011)  Requested for: 73GUU9436 Ordered   8  Levothyroxine Sodium 125 MCG Oral Tablet; TAKE 1 TABLET DAILY; Therapy: 65VRN3166 to (Milton Torres)  Requested for: 48CPG7095; Last   Rx:10Mar2017 Ordered   9  Lisinopril 20 MG Oral Tablet; Take 1 tablet daily; Therapy: 72SGF3207 to (Yojana Glassclyde)  Requested for: 41JSG2544; Last   Rx:30Nov2016 Ordered   10  Metoprolol Tartrate 25 MG Oral Tablet; TAKE 1/2 TABLET EVERY 12 HOURS; Therapy: 94VXD3772 to (Evaluate:08Ahl8906)  Requested for: 34Rzl0709; Last    Rx:09Tft0001 Ordered   11  NovoLOG FlexPen 100 UNIT/ML Subcutaneous Solution Pen-injector; take 10 units  with    breakfast, 10 units with lunch and with dinner; Therapy: (Alexander Betancur) to  Requested for: 21Jun2017 Recorded   12  Ocuvite Extra Oral Tablet; Take 1 tablet daily Recorded   13  Osteo Bi-Flex Adv Triple St TABS; take 2 tablet daily; Therapy: (Alexander Betancur) to Recorded   14  Oxybutynin Chloride ER 15 MG Oral Tablet Extended Release 24 Hour; Take 1 tablet    daily; Therapy: 10VGP0415 to (Evaluate:42Rck1993)  Requested for: 67SIM5277; Last    Rx:46Fpz5264 Ordered   15  Toujeo SoloStar 300 UNIT/ML Subcutaneous Solution Pen-injector; Take 30 units in the    evening; Therapy: (Recorded:12Oct2017) to Recorded   16  Vitamin C 500 MG Oral Capsule; TAKE 1 CAPSULE DAILY; Last Rx:26Yga8368 Ordered  Medication List Reviewed: The medication list was reviewed and updated today  Allergies  Medication    1   Ampicillin CAPS    Vitals  Vital Signs    Recorded: 74CKS1672 04:15PM   Heart Rate 69   Systolic 527, RUE, Sitting   Diastolic 64, RUE, Sitting   BP CUFF SIZE Large   Height 5 ft 1 in   Weight 228 lb    BMI Calculated 43 08   BSA Calculated 2 Physical Exam    Constitutional   General appearance: No acute distress, well appearing and well nourished  Eyes   Conjunctiva and Sclera examination: Conjunctiva pink, sclera anicteric  Ears, Nose, Mouth, and Throat - Oropharynx: Clear, nares are clear, mucous membranes are moist    Neck   Neck and thyroid: Normal, supple, trachea midline, no thyromegaly  Pulmonary   Respiratory effort: No increased work of breathing or signs of respiratory distress  Auscultation of lungs: Clear to auscultation, no rales, no rhonchi, no wheezing, good air movement  Cardiovascular   Auscultation of heart: Normal rate and rhythm, normal S1 and S2, no murmurs  Carotid pulses: Normal, 2+ bilaterally  Peripheral vascular exam: Normal pulses throughout, no tenderness, erythema or swelling  Pedal pulses: Normal, 2+ bilaterally  Examination of extremities for edema and/or varicosities: Normal     Abdomen   Abdomen: Non-tender and no distention  Liver and spleen: No hepatomegaly or splenomegaly  Musculoskeletal Gait and station: Normal gait  -- Digits and nails: Normal without clubbing or cyanosis  -- Inspection/palpation of joints, bones, and muscles: Normal, ROM normal     Skin - Skin and subcutaneous tissue: Normal without rashes or lesions  Skin is warm and well perfused, normal turgor  Neurologic - Cranial nerves: II - XII intact  -- Speech: Normal     Psychiatric - Orientation to person, place, and time: Normal -- Mood and affect: Normal       Results/Data  ECG Report:   Rhythm and rate:  ventricular rate is 69 beats per minute  -- normal sinus rhythm  QRS: left bundle branch block      Health Management  Screening for colorectal cancer   COLONOSCOPY; every 1 year; Next Due: 92Kki0354;  Overdue    Future Appointments    Date/Time Provider Specialty Site   11/20/2017 01:00 PM Cardiology, Device Remote  ST [address] [place] [address]   02/21/2018 08:30 AM Cardiology, Device Remote  ST [facility] [address]    12/27/2017 10:20 AM MANJEET Sales  Endocrinology [facility]   11/15/2017 10:30 AM Megan Royal MD Urology [address]   11/28/2017 09:15 AM Doris Seip, M D   [address] [facility]     Signatures   Electronically signed by : MANJEET Haile ; Oct 12 2017  5:34PM EST                       (Author)

## 2017-10-16 ENCOUNTER — GENERIC CONVERSION - ENCOUNTER (OUTPATIENT)
Dept: OTHER | Facility: OTHER | Age: 77
End: 2017-10-16

## 2017-10-18 ENCOUNTER — APPOINTMENT (OUTPATIENT)
Dept: LAB | Facility: CLINIC | Age: 77
End: 2017-10-18
Payer: MEDICARE

## 2017-10-18 DIAGNOSIS — N39.41 URGE INCONTINENCE: ICD-10-CM

## 2017-10-18 DIAGNOSIS — N39.0 URINARY TRACT INFECTION: ICD-10-CM

## 2017-10-18 LAB
BACTERIA UR QL AUTO: ABNORMAL /HPF
BILIRUB UR QL STRIP: NEGATIVE
CLARITY UR: ABNORMAL
COLOR UR: ABNORMAL
GLUCOSE UR STRIP-MCNC: NEGATIVE MG/DL
HGB UR QL STRIP.AUTO: ABNORMAL
KETONES UR STRIP-MCNC: NEGATIVE MG/DL
LEUKOCYTE ESTERASE UR QL STRIP: ABNORMAL
NITRITE UR QL STRIP: NEGATIVE
NON-SQ EPI CELLS URNS QL MICRO: ABNORMAL /HPF
PH UR STRIP.AUTO: 5.5 [PH] (ref 4.5–8)
PROT UR STRIP-MCNC: ABNORMAL MG/DL
RBC #/AREA URNS AUTO: ABNORMAL /HPF
SP GR UR STRIP.AUTO: 1.02 (ref 1–1.03)
UROBILINOGEN UR QL STRIP.AUTO: 0.2 E.U./DL
WBC #/AREA URNS AUTO: ABNORMAL /HPF

## 2017-10-18 PROCEDURE — 88112 CYTOPATH CELL ENHANCE TECH: CPT

## 2017-10-18 PROCEDURE — 81001 URINALYSIS AUTO W/SCOPE: CPT

## 2017-11-01 ENCOUNTER — APPOINTMENT (OUTPATIENT)
Dept: LAB | Facility: CLINIC | Age: 77
End: 2017-11-01
Payer: MEDICARE

## 2017-11-01 DIAGNOSIS — E11.65 TYPE 2 DIABETES MELLITUS WITH HYPERGLYCEMIA (HCC): ICD-10-CM

## 2017-11-01 DIAGNOSIS — R80.9 PROTEINURIA: ICD-10-CM

## 2017-11-01 DIAGNOSIS — I12.9 HYPERTENSIVE CHRONIC KIDNEY DISEASE WITH STAGE 1 THROUGH STAGE 4 CHRONIC KIDNEY DISEASE, OR UNSPECIFIED CHRONIC KIDNEY DISEASE: ICD-10-CM

## 2017-11-01 DIAGNOSIS — E03.9 HYPOTHYROIDISM: ICD-10-CM

## 2017-11-01 LAB
ALBUMIN SERPL BCP-MCNC: 3.7 G/DL (ref 3.5–5)
ANION GAP SERPL CALCULATED.3IONS-SCNC: 9 MMOL/L (ref 4–13)
BUN SERPL-MCNC: 43 MG/DL (ref 5–25)
CALCIUM SERPL-MCNC: 9.1 MG/DL (ref 8.3–10.1)
CHLORIDE SERPL-SCNC: 105 MMOL/L (ref 100–108)
CO2 SERPL-SCNC: 25 MMOL/L (ref 21–32)
CREAT SERPL-MCNC: 1.25 MG/DL (ref 0.6–1.3)
CREAT UR-MCNC: 27.9 MG/DL
EST. AVERAGE GLUCOSE BLD GHB EST-MCNC: 163 MG/DL
GFR SERPL CREATININE-BSD FRML MDRD: 42 ML/MIN/1.73SQ M
GLUCOSE SERPL-MCNC: 111 MG/DL (ref 65–140)
HBA1C MFR BLD: 7.3 % (ref 4.2–6.3)
MICROALBUMIN UR-MCNC: 94.6 MG/L (ref 0–20)
MICROALBUMIN/CREAT 24H UR: 339 MG/G CREATININE (ref 0–30)
PHOSPHATE SERPL-MCNC: 3.8 MG/DL (ref 2.3–4.1)
POTASSIUM SERPL-SCNC: 4.1 MMOL/L (ref 3.5–5.3)
SODIUM SERPL-SCNC: 139 MMOL/L (ref 136–145)
T4 FREE SERPL-MCNC: 1.33 NG/DL (ref 0.76–1.46)
TSH SERPL DL<=0.05 MIU/L-ACNC: 0.14 UIU/ML (ref 0.36–3.74)

## 2017-11-01 PROCEDURE — 83036 HEMOGLOBIN GLYCOSYLATED A1C: CPT

## 2017-11-01 PROCEDURE — 82570 ASSAY OF URINE CREATININE: CPT

## 2017-11-01 PROCEDURE — 84439 ASSAY OF FREE THYROXINE: CPT

## 2017-11-01 PROCEDURE — 80069 RENAL FUNCTION PANEL: CPT

## 2017-11-01 PROCEDURE — 82043 UR ALBUMIN QUANTITATIVE: CPT

## 2017-11-01 PROCEDURE — 84443 ASSAY THYROID STIM HORMONE: CPT

## 2017-11-01 PROCEDURE — 36415 COLL VENOUS BLD VENIPUNCTURE: CPT

## 2017-11-06 ENCOUNTER — GENERIC CONVERSION - ENCOUNTER (OUTPATIENT)
Dept: OTHER | Facility: OTHER | Age: 77
End: 2017-11-06

## 2017-11-06 RX ORDER — SODIUM CHLORIDE 9 MG/ML
20 INJECTION, SOLUTION INTRAVENOUS CONTINUOUS
Status: DISCONTINUED | OUTPATIENT
Start: 2017-11-07 | End: 2017-11-10 | Stop reason: HOSPADM

## 2017-11-07 ENCOUNTER — HOSPITAL ENCOUNTER (OUTPATIENT)
Dept: INFUSION CENTER | Facility: CLINIC | Age: 77
Discharge: HOME | End: 2017-11-07
Payer: MEDICARE

## 2017-11-07 VITALS
DIASTOLIC BLOOD PRESSURE: 66 MMHG | HEART RATE: 65 BPM | SYSTOLIC BLOOD PRESSURE: 150 MMHG | TEMPERATURE: 98 F | RESPIRATION RATE: 18 BRPM | OXYGEN SATURATION: 95 %

## 2017-11-07 PROCEDURE — 96365 THER/PROPH/DIAG IV INF INIT: CPT

## 2017-11-07 RX ORDER — MELATONIN
2000 DAILY
COMMUNITY

## 2017-11-07 RX ADMIN — FERUMOXYTOL 510 MG: 510 INJECTION INTRAVENOUS at 12:07

## 2017-11-07 RX ADMIN — SODIUM CHLORIDE 20 ML/HR: 0.9 INJECTION, SOLUTION INTRAVENOUS at 12:00

## 2017-11-07 NOTE — PROGRESS NOTES
Pt completed infusion  Will observe for 30 minutes as ordered and recheck vitals  Pt verbalizes understanding  No complaints at this time

## 2017-11-07 NOTE — PROGRESS NOTES
Pt with no s/s of adverse reaction  Vitals stable  IV removed  No bleeding noted to site  Aware of future apppointments    Declined AVS

## 2017-11-08 ENCOUNTER — ALLSCRIPTS OFFICE VISIT (OUTPATIENT)
Dept: OTHER | Facility: OTHER | Age: 77
End: 2017-11-08

## 2017-11-10 RX ORDER — SODIUM CHLORIDE 9 MG/ML
20 INJECTION, SOLUTION INTRAVENOUS CONTINUOUS
Status: DISCONTINUED | OUTPATIENT
Start: 2017-11-14 | End: 2017-11-17 | Stop reason: HOSPADM

## 2017-11-14 ENCOUNTER — HOSPITAL ENCOUNTER (OUTPATIENT)
Dept: INFUSION CENTER | Facility: CLINIC | Age: 77
Discharge: HOME | End: 2017-11-14
Payer: MEDICARE

## 2017-11-14 VITALS
DIASTOLIC BLOOD PRESSURE: 69 MMHG | HEART RATE: 59 BPM | RESPIRATION RATE: 16 BRPM | SYSTOLIC BLOOD PRESSURE: 147 MMHG | TEMPERATURE: 97.5 F

## 2017-11-14 PROCEDURE — 96365 THER/PROPH/DIAG IV INF INIT: CPT

## 2017-11-14 RX ADMIN — FERUMOXYTOL 510 MG: 510 INJECTION INTRAVENOUS at 15:18

## 2017-11-14 RX ADMIN — SODIUM CHLORIDE 20 ML/HR: 0.9 INJECTION, SOLUTION INTRAVENOUS at 15:00

## 2017-11-14 NOTE — PROGRESS NOTES
Patient here for feraheme and is doing well, no c/o offered  First IV attempt with blood return but site infiltrated with flush  Site d/c and dry dsg applied  2nd attempt with success  Patient marli well

## 2017-11-20 ENCOUNTER — ALLSCRIPTS OFFICE VISIT (OUTPATIENT)
Dept: OTHER | Facility: OTHER | Age: 77
End: 2017-11-20

## 2017-11-27 ENCOUNTER — HOSPITAL ENCOUNTER (OUTPATIENT)
Dept: MAMMOGRAPHY | Facility: HOSPITAL | Age: 77
Discharge: HOME | End: 2017-11-27
Payer: MEDICARE

## 2017-11-27 ENCOUNTER — GENERIC CONVERSION - ENCOUNTER (OUTPATIENT)
Dept: OTHER | Facility: OTHER | Age: 77
End: 2017-11-27

## 2017-11-27 DIAGNOSIS — Z12.31 ENCOUNTER FOR SCREENING MAMMOGRAM FOR MALIGNANT NEOPLASM OF BREAST: ICD-10-CM

## 2017-11-27 PROCEDURE — G0202 SCR MAMMO BI INCL CAD: HCPCS

## 2017-11-28 ENCOUNTER — ALLSCRIPTS OFFICE VISIT (OUTPATIENT)
Dept: OTHER | Facility: OTHER | Age: 77
End: 2017-11-28

## 2017-12-03 ENCOUNTER — GENERIC CONVERSION - ENCOUNTER (OUTPATIENT)
Dept: UROLOGY | Facility: CLINIC | Age: 77
End: 2017-12-03

## 2017-12-05 ENCOUNTER — GENERIC CONVERSION - ENCOUNTER (OUTPATIENT)
Dept: UROLOGY | Facility: CLINIC | Age: 77
End: 2017-12-05

## 2017-12-06 ENCOUNTER — GENERIC CONVERSION - ENCOUNTER (OUTPATIENT)
Dept: UROLOGY | Facility: CLINIC | Age: 77
End: 2017-12-06

## 2017-12-18 DIAGNOSIS — E03.9 HYPOTHYROIDISM: ICD-10-CM

## 2017-12-26 ENCOUNTER — APPOINTMENT (OUTPATIENT)
Dept: LAB | Facility: CLINIC | Age: 77
End: 2017-12-26
Payer: MEDICARE

## 2017-12-26 DIAGNOSIS — E03.9 HYPOTHYROIDISM: ICD-10-CM

## 2017-12-26 LAB
T4 FREE SERPL-MCNC: 1.14 NG/DL (ref 0.76–1.46)
TSH SERPL DL<=0.05 MIU/L-ACNC: 1.15 UIU/ML (ref 0.36–3.74)

## 2017-12-26 PROCEDURE — 84443 ASSAY THYROID STIM HORMONE: CPT

## 2017-12-26 PROCEDURE — 84439 ASSAY OF FREE THYROXINE: CPT

## 2017-12-26 PROCEDURE — 36415 COLL VENOUS BLD VENIPUNCTURE: CPT

## 2017-12-27 ENCOUNTER — ALLSCRIPTS OFFICE VISIT (OUTPATIENT)
Dept: OTHER | Facility: OTHER | Age: 77
End: 2017-12-27

## 2017-12-28 NOTE — PROGRESS NOTES
Assessment   1  Type 2 diabetes mellitus with hyperglycemia, unspecified long term insulin use status     (250 00) (E11 65)   2  Chronic kidney disease, stage 3 (585 3) (N18 3)   3  Hypercholesterolemia (272 0) (E78 00)   4  Microalbuminuria (791 0) (R80 9)   5  Hypothyroidism (244 9) (E03 9)    Plan   Hypothyroidism    · (1) T4, FREE; Status:Active; Requested for:27Mar2018; Perform:[facility]; Due:27Mar2019;Ordered;For:Hypothyroidism; Ordered By:Octavio Rico;   · (1) TSH; Status:Active; Requested for:27Mar2018; Perform:[facility]; Due:27Mar2019;Ordered;For:Hypothyroidism; Ordered By:Octavio Rico;   · Follow-up visit in 3 months Evaluation and Treatment  Follow-up  Status: Complete     Done: 53QKI6078   Ordered; For: Hypothyroidism; Ordered By: Casey Adam Performed:  Due: 22DQB7033; Last Updated By: Jose Lnig; 12/27/2017 11:44:03 AM  Type 2 diabetes mellitus with hyperglycemia, unspecified long term insulin use status    · From  Toujeo SoloStar 300 UNIT/ML Subcutaneous Solution Pen-injector Take    26 units SC at bedtime To Toujeo SoloStar 300 UNIT/ML Subcutaneous Solution    Pen-injector Take 30 units SC at bedtime   Rx By: Casey Adam; Dispense: 0 Days ; #:1 X 1 5 ML Pen (3 Pens); Refill: 0;For: Type 2 diabetes mellitus with hyperglycemia, unspecified long term insulin use status; RODOLFO = N; Record; Last Updated By: Tomás Mckeon; 12/27/2017 10:34:46 AM   · (1) HEMOGLOBIN A1C; Status:Active; Requested for:27Mar2018; Perform:[facility]; TSL:52SLP4457;LAWTVHX; For:Type 2 diabetes mellitus with hyperglycemia, unspecified long term insulin use status; Ordered By:Octavio Rico;   · (1) RENAL FUNCTION PANEL; Status:Active; Requested for:27Mar2018; Perform:[facility]; NEM:[id number];HOHUFJV; For:Type 2 diabetes mellitus with hyperglycemia, unspecified long term insulin use status;  Ordered By:Trisha Octavio;    Discussion/Summary   Discussion Summary:    1  Type 2 diabetes with hyperglycemia- A1c 7 3%, improved from 8 3%  stated that her blood sugars are usually in 100-160 range at home, no hypoglycemia current management for diabetes microalbumin improved, eye exam and foot exam up-to-date blood sugars with meals and at bedtime, call if blood sugar less than 70 or more than 200 more than twice a week  in 3 months   CKD stage 3-managed by Nephrology, creatinine stable  Hypercholesteremia- LDL at goal, continue statins   Microalbuminuria- improved, continue ACE-inhibitor   Hypothyroidism- TSH improved, continue levothyroxine 125 mcg from Monday through Saturday, no tablet on Sunday to take levothyroxine on empty stomach 4 hours apart from calcium and vitamin-D supplementation thyroid blood work in 3 months   in 3 months  Chief Complaint   Chief Complaint Free Text Note Form: Follow up      History of Present Illness   Diabetes: The patient is being seen for Diabetes Mellitus 2  The HbA1c was 7 3%% performed on nOV 2017   Current treatment includes Basal Insulin,-- Bolus Insulin-- and-- ACE inhibitor  See Medication List for current medication(s)  See Medication List for dosage(s)  Source of information reported by the patient and indicates that the patient checks her blood sugar four times per day  Fasting blood sugars: generally 120-150  Pre-prandial blood sugars: generally 120-150  Post-prandial blood sugars: generally 120-150  Bedtime blood sugars: generally 120-150  Small Glycemic Excursions:  By report, there is good compliance with treatment,-- good tolerance of treatment-- and-- good symptom control  Current pertinent lifestyle factors include obesity-- and-- inactivity, but-- no past or present history of a sedentary lifestyle   Symptoms reported by the patient include no extremity pain,-- no polydipsia,-- no extremity numbness,-- no polyuria,-- no extremity paresthesias,-- no fatigue,-- no frequent infections,-- no polyphagia,-- no edema,-- no dyspnea-- and-- no chest pain  Disease Course and Complications:  there have been no previous episodes of diabetic ketoacidosis  -- there have been no previous hospitalizations  Kidney Disease, Chronic (Brief): The patient is being seen for a routine clinic follow-up of chronic kidney disease  The etiology is diabetic nephropathy and hypertensive nephropathy  This is classified as stage 3  The patient is currently asymptomatic  No associated symptoms are reported  Current treatment includes ACE inhibitor  By report, there is good compliance with treatment, good tolerance of treatment and good symptom control  Hypothyroidism (Follow-Up): The patient is being seen for follow-up of hypothyroidism of undetermined etiology  The patient reports doing well  She has had no significant interval events  The patient is currently asymptomatic  Medications include levothyroxine  Medications:  the patient is adherent to her medication regimen, but-- she denies medication side effects  Disease management:  the patient is doing well with her goals  Hyperlipidemia (Follow-Up): The patient states her hyperlipidemia has been stable since the last visit  Comorbid Illnesses: diabetes mellitus-- and-- hypertension  She has no significant interval events  Symptoms: The patient is currently asymptomatic  Medications: Medication(s): a statin  The patient is doing well with her hyperlipidemia goals  the patient's LDL goal is <100 mg/dL  -- the patient's last LDL was 60 mg/dL  Review of Systems   ROS Reviewed:    ROS reviewed  Endo Adult ROS Female Established v2 Update - [facility]:      Constitutional/General: no recent weight gain,-- recent weight loss,-- no poor energy/fatigue,-- increased energy level,-- no insomnia/sleep problems,-- no fever-- and-- no feeling weak  Breasts: no nipple discharge        Heart: high blood pressure, but-- no chest pain/tightness,-- no rapid/racing heart rate-- and-- no palpitations  Genitourinary - Urinary: frequent urination-- and-- urinating during the night, but-- no excess urination  Eyes: blurred vision-- and-- gritty/scratchy eyes, but-- no double vision,-- no bulging eyes-- and-- no excessive tearing  Mouth / Throat: hoarseness, but-- no difficulty swallowing  Neck: no lumps,-- no swollen glands,-- no neck pain,-- no neck stiffness-- and-- no enlarged thyroid  Respiratory: no wheezing,-- no asthma-- and-- no persistent cough  Musculoskeletal: muscle aches/pain,-- joint aches/pain-- and-- muscle weakness  Skin & Hair: dry skin,-- no acne,-- the hair texture was not oily,-- hair loss-- and-- no excessive hair growth  Gastrointestinal: no constipation,-- no diarrhea,-- no waking at night to drink-- and-- no stomach ache  Neurological: no blackouts,-- no weakness-- and-- no tremors  Reproductive:  frequency of period is not applicable  -- duration of period is not applicable  -- Date of last menstruation is not applicable  -- regular periods are not applicable  -- discomfort with periods is not applicable  -- excessive bleeding during period is not applicable  -- mood swings are not applicable  Endocrine: no feeling hot frequently,-- no feeling cold frequently,-- shifts between feeling hot and cold,-- cold hands or feet,-- no excessive sweating,-- thyroid problems,-- blood sugar problems,-- no excessive thirst,-- no excessive hunger,-- change in shoe size,-- no nausea or vomiting-- and-- no shaky hands  Active Problems   1  Abnormal electrocardiogram (794 31) (R94 31)   2  Acute diastolic heart failure (837 80) (I50 31)   3  Anemia (285 9) (D64 9)   4  Anxiety disorder (300 00) (F41 9)   5  Benign hypertension with chronic kidney disease, stage III (403 10,585 3) (I12 9,N18 3)   6  Biceps tendinitis on right (726 12) (M75 21)   7  Bladder Incontinence   8  Bradycardia (427 89) (R00 1)   9  Cellulitis of leg, left (682 6) (L03 116)   10  Chronic diastolic congestive heart failure (428 32,428 0) (I50 32)   11  Chronic kidney disease, stage 3 (585 3) (N18 3)   12  Chronic obstructive pulmonary disease (496) (J44 9)   13  Chronic Tear Of Right Rotator Cuff Tendon (726 19)   14  Chronic venous insufficiency (459 81) (I87 2)   15  Complete heart block (426 0) (I44 2)   16  Complete tear of right rotator cuff (727 61) (M75 121)   17  Cough (786 2) (R05)   18  De Quervain's tenosynovitis (727 04) (M65 4)   19  Deep vein thrombosis of left femoral vein (453 41) (I82 412)   20  Diabetic hypoglycemia (250 80) (E11 649)   21  Diabetic retinopathy (250 50,362 01) (E11 319)   22  DM type 2, not at goal (250 00) (E11 9)   23  Drug toxicity (796 0) (R89 2)   24  Eczematous dermatitis (692 9) (L30 9)   25  Edema (782 3) (R60 9)   26  Encounter for screening mammogram for malignant neoplasm of breast (V76 12)      (Z12 31)   27  Glaucoma Screening   28  Hematuria (599 70) (R31 9)   29  Herpes zoster (053 9) (B02 9)   30  History of deep venous thrombosis (V12 51) (Z86 718)   31  History of DVT of lower extremity (V12 51) (Z86 718)   32  Hypercholesterolemia (272 0) (E78 00)   33  Hypothyroidism (244 9) (E03 9)   34  Immunization due (V05 9) (Z23)   35  Impacted cerumen, unspecified laterality (380 4) (H61 20)   36  Iron deficiency anemia (280 9) (D50 9)   37  Medicare annual wellness visit, subsequent (V70 0) (Z00 00)   38  Memory Lapses Or Loss (780 93)   39  Microalbuminuria (791 0) (R80 9)   40  Need for prophylactic vaccination and inoculation against influenza (V04 81) (Z23)   41  Neoplasm of skin (239 2) (D49 2)   42  OAB (overactive bladder) (596 51) (N32 81)   43  Obesity (BMI 30-39 9) (278 00) (E66 9)   44  Osteoarthritis of knee (715 36) (M17 10)   45  Pacemaker Permanent Placement Dual-Chamber   46  PAF (paroxysmal atrial fibrillation) (427 31) (I48 0)   47   Pain in wrist, unspecified laterality (719 43) (M25 539)   48  Postoperative state (V45 89) (Z98 890)   49  Preoperative cardiovascular examination (V72 81) (Z01 810)   50  Rotator cuff tear, right (840 4) (M75 101)   51  Rotator cuff tendinitis, unspecified laterality (726 10) (M75 80)   52  Sciatica (724 3) (M54 30)   53  Screening for colorectal cancer (V76 51) (Z12 11,Z12 12)   54  Screening for genitourinary condition (V81 6) (Z13 89)   55  Screening for glaucoma (V80 1) (Z13 5)   56  Screening for neurological condition (V80 09) (Z13 89)   57  Sleep apnea (780 57) (G47 30)   58  Tenosynovitis, wrist (727 05) (M65 9)   59  Type 2 diabetes mellitus with hyperglycemia, unspecified long term insulin use status      (250 00) (E11 65)   60  Urge incontinence of urine (788 31) (N39 41)   61  UTI (urinary tract infection) (599 0) (N39 0)   62  Visit for screening mammogram (V76 12) (Z12 31)   63  Vitamin D deficiency (268 9) (E55 9)    Past Medical History   1  History of Arthritis (V13 4)   2  History of Diabetes Mellitus (250 00)   3  History of hypertension (V12 59) (Z86 79)   4  History of thyroid disease (V12 29) (Z86 39)   5  History of uterine leiomyoma (V13 29) (Z86 018)   6  Need for prophylactic vaccination and inoculation against influenza (V04 81) (Z23)  Active Problems And Past Medical History Reviewed: The active problems and past medical history were reviewed and updated today  Surgical History   1  History of Cataract Surgery   2  History of Diagnostic Cystoscopy   3  History of Eye Surgery   4  History of Hysterectomy   5  History of Knee Replacement   6  History of Pacemaker Permanent Placement   7  History of Reported Hx Of Breast Surgery For Biopsy   8  History of Total Abdominal Hysterectomy  Surgical History Reviewed: The surgical history was reviewed and updated today  Family History   Mother    1  Family history of Coronary Artery Disease (V17 49)   2   Family history of hypertension (V17 49) (Z82 49)   3  Family history of Heart Disease (V17 49)   4  Family history of Hypertension (V17 49)  Father    5  Family history of Diabetes Mellitus (V18 0)   6  Family history of hypertension (V17 49) (Z82 49)  Brother    7  Family history of Diabetes Mellitus (V18 0)   8  Family history of Diabetes Mellitus (V18 0)  Maternal Aunt    9  Family history of Breast Cancer (V16 3)   10  Family history of Carcinoma Of The Pancreas  Family History Reviewed: The family history was reviewed and updated today  Social History    · Denied: History of Alcohol Use (History)   · Daily Coffee Consumption (2  Cups/Day)   · Daily Cola Consumption (___ Cans/Day)   · Daily Tea Consumption (___ Cups/Day)   · Denied: History of Drug Use   · Former smoker (V15 82) (W69 401)   · Has 3 children   · Marital History - Currently    ·    · Non-smoker (V49 89) (Z78 9)   · Denied: History of Sexually active   · Denied: History of Tobacco Use  Social History Reviewed: The social history was reviewed and updated today  Current Meds    1  Atorvastatin Calcium 80 MG Oral Tablet; TAKE 1 TABLET AT BEDTIME; Therapy: 12Apr2016 to (Evaluate:13Apr2018)  Requested for: 18Apr2017; Last     Rx:18Apr2017 Ordered   2  Citalopram Hydrobromide 20 MG Oral Tablet; TAKE ONE TABLET EVERY DAY; Therapy: 41HZO4028 to (Evaluate:03Mar2018)  Requested for: 89KGQ5265; Last     Rx:08Mar2017 Ordered   3  Eliquis 5 MG Oral Tablet; Take 1 tablet twice daily; Therapy: 31ULP3317 to (QLMUPOHE:86JCT2678)  Requested for: 09KNL5805; Last     Rx:16Jan2017 Ordered   4  Fish Oil 1200 MG Oral Capsule; 1 tid Recorded   5  Furosemide 40 MG Oral Tablet; Take 1 tablet daily; Therapy: 17PAM3755 to  Requested for: 30Jun2017 Recorded   6  [employer]Wise Short Pen Needles 31G X 8 MM Miscellaneous; Therapy: 61POY5169 to (Last Rx:30Mar2011)  Requested for: 96AJF4189 Ordered   7   Levothyroxine Sodium 125 MCG Oral Tablet; TAKE 1 TABLET DAILY MONDAY THRU SATURDAY; DO NOT TAKE ANY ON SUNDAYS; Therapy: 89NGL2966 to (Evaluate:85Uwz5834)  Requested for: 57QMT8498; Last     Rx:21Nov2017 Ordered   8  Lisinopril 20 MG Oral Tablet; Take 1 tablet daily; Therapy: 83GFR4990 to (Pawan Sandhu)  Requested for: 42KAW8454; Last     Rx:30Nov2016 Ordered   9  Metoprolol Tartrate 25 MG Oral Tablet; TAKE 1/2 TABLET EVERY 12 HOURS; Therapy: 12FLJ3651 to (Evaluate:88Fim0263)  Requested for: 42Akz7296; Last     Rx:31Wlw9846 Ordered   10  NovoLOG FlexPen 100 UNIT/ML Subcutaneous Solution Pen-injector; take 8 units with      breakfast, 10 units with lunch and 8 units with dinner  Requested      for: 89ZCX2560; Last Rx:16Nov2017 Ordered   11  Ocuvite Extra Oral Tablet; Take 1 tablet daily Recorded   12  Osteo Bi-Flex Adv Triple St TABS; take 2 tablet daily; Therapy: (Mathew Guadalupe) to Recorded   13  Oxybutynin Chloride ER 15 MG Oral Tablet Extended Release 24 Hour; Take 1 tablet      daily; Therapy: 80VPJ5207 to (Evaluate:26Ovh8707)  Requested for: 84BGM4441; Last      Rx:37Ezm6831 Ordered   14  Toujeo SoloStar 300 UNIT/ML Subcutaneous Solution Pen-injector; Take 26 units SC at      bedtime; Last Rx:16Nov2017 Ordered   15  Vitamin C 500 MG Oral Capsule; TAKE 1 CAPSULE DAILY; Last Rx:17Ivo3565 Ordered    Allergies   1  Ampicillin CAPS    Vitals   Vital Signs    Recorded: 74GBV1671 10:36AM   Heart Rate 68   Systolic 432   Diastolic 66   Height 5 ft 1 in   Weight 224 lb 6 oz   BMI Calculated 42 4   BSA Calculated 1 98     Results/Data   (1) TSH 50HCN2905 12:40PM Marilu NAPOLES Order Number: [id number]      Test Name Result Flag Reference   TSH 1 150 uIU/mL  0 358-3 740   Patients undergoing fluorescein dye angiography may retain small amounts of fluorescein in the body for 48-72 hours post procedure  Samples containing fluorescein can produce falsely depressed TSH values   If the patient had this procedure,a specimen should be resubmitted post fluorescein clearance  The recommended reference ranges for TSH during pregnancy are as follows:     First trimester 0 1 to 2 5 uIU/mL     Second trimester  0 2 to 3 0 uIU/mL     Third trimester 0 3 to 3 0 uIU/m      (1) T4, FREE 85Goz0384 12:40PM [employer]   TW Order Number: [id number]      Test Name Result Flag Reference   T4,FREE 1 14 ng/dL  0 76-1 46   Specimen collection should occur prior to Sulfasalazine administration due to the potential for falsely elevated results  (1) TSH 54LCN7168 12:40PM [employer]   TW Order Number: [id number]      Test Name Result Flag Reference   TSH 1 150 uIU/mL  0 358-3 740   Patients undergoing fluorescein dye angiography may retain small amounts of fluorescein in the body for 48-72 hours post procedure  Samples containing fluorescein can produce falsely depressed TSH values  If the patient had this procedure,a specimen should be resubmitted post fluorescein clearance  The recommended reference ranges for TSH during pregnancy are as follows:     First trimester 0 1 to 2 5 uIU/mL     Second trimester  0 2 to 3 0 uIU/mL     Third trimester 0 3 to 3 0 uIU/m      (1) T4, FREE 37Gsz2978 12:40PM Food Reporter   TW Order Number: [id number]      Test Name Result Flag Reference   T4,FREE 1 14 ng/dL  0 76-1 46   Specimen collection should occur prior to Sulfasalazine administration due to the potential for falsely elevated results  [place] Foot Exam 90UFW1860 12:00AM Rachel Going      Test Name Result Flag Reference   FOOT EXAM normal - see form        (1) HEMOGLOBIN A1C 57CIE5695 12:19PM Food Reporter   TW Order Number: [id number]      Test Name Result Flag Reference   HEMOGLOBIN A1C 7 3 % H 4 2-6 3   EST  AVG   GLUCOSE 163 mg/dl        (1) TSH 62PJV3682 12:19PM Food Reporter   TW Order Number: [id number]      Test Name Result Flag Reference   TSH 0 144 uIU/mL L 0 358-3 740 Patients undergoing fluorescein dye angiography may retain small amounts of fluorescein in the body for 48-72 hours post procedure  Samples containing fluorescein can produce falsely depressed TSH values  If the patient had this procedure,a specimen should be resubmitted post fluorescein clearance  The recommended reference ranges for TSH during pregnancy are as follows:     First trimester 0 1 to 2 5 uIU/mL     Second trimester  0 2 to 3 0 uIU/mL     Third trimester 0 3 to 3 0 uIU/m      (1) MICROALBUMIN CREATININE RATIO, RANDOM URINE 84GRN9695 12:19PM [facility]    Order Number: [id number]      Test Name Result Flag Reference   MICROALBUMIN/ CREAT R 339 mg/g creatinine H 0-30   MICROALBUMIN,URINE 94 6 mg/L H 0 0-20 0   CREATININE URINE 27 9 mg/dL        (1) RENAL FUNCTION PANEL 09QXG1310 12:19PM [facility]    Order Number: [id number]      Test Name Result Flag Reference   ANION GAP (CALC) 9 mmol/L  4-13   ALBUMIN 3 7 g/dL  3 5-5 0   BLOOD UREA NITROGEN 43 mg/dL H 5-25   CALCIUM 9 1 mg/dL  8 3-10 1   CHLORIDE 105 mmol/L  100-108   CARBON DIOXIDE 25 mmol/L  21-32   CREATININE 1 25 mg/dL  0 60-1 30   Standardized to IDMS reference method   GLUCOSE,RANDM 111 mg/dL     Specimen collection should occur prior to Sulfasalazine administration due to the potential for falsely depressed results  Specimen collection should occur prior to Sulfapyridine administration due to the potential for falsely elevated results  Specimen collection should occur prior to Sulfasalazine administration due to the potential for falsely depressed results  Specimen collection should occur prior to Sulfapyridine administration due to the potential for falsely elevated results     POTASSIUM 4 1 mmol/L  3 5-5 3   SODIUM 139 mmol/L  136-145   PHOSPHORUS 3 8 mg/dL  2 3-4 1   eGFR 42 ml/min/1 73sq m     National Kidney Disease Education Program recommendations are as follows:     GFR calculation is accurate only with a steady state creatinine     Chronic Kidney disease less than 60 ml/min/1 73 sq  meters     Kidney failure less than 15 ml/min/1 73 sq  meters  Health Management   Screening for colorectal cancer   COLONOSCOPY; every 1 year; Next Due: 21Apr2016; Overdue    Future Appointments      Date/Time Provider Specialty Site   02/21/2018 08:30 AM Cardiology, Device Remote   [facility]   05/16/2018 01:00 PM Hood Keller MD Urology ST [address] [place] [address]   06/05/2018 10:45 AM MANJEET Diaz   [address]     Signatures    Electronically signed by : MANJEET Heard ; Dec 27 2017 11:50AM EST                       (Author)

## 2018-01-09 NOTE — RESULT NOTES
Verified Results  (1) PT WITH INR 81HJL0596 12:30PM Beatrice Castellon     Test Name Result Flag Reference   INR 2 27 H 0 86-1 16   PT 23 2 seconds H 11 8-14 1

## 2018-01-10 NOTE — MISCELLANEOUS
Message  Pt called and she said that she got two bills from [facility] for the thrombo panel we ordered on Sept 6, 2016  The 1st invoice # is [id number] for $126 76 and the 2nd one is [id number] for $291 95  I called [facility] [phone] and s/w Yan  he said Medicare deemed it not medically necessary due to the codes that were used  We could not use "h/o " I gave them two more DVT codes to use   I82 412 and I82 409  It has to be processed again and sent to Medicare  May take a few weeks to know if it was approved  Pt should get another invoice after it is processed  I called the pt and notified her of this  I told her that if she gets a another bill to call me  Active Problems    1  Abnormal electrocardiogram (794 31) (R94 31)   2  Acute deep vein thrombosis (453 40) (I82 409)   3  Acute diastolic heart failure (580 14) (I50 31)   4  Anemia (285 9) (D64 9)   5  Anxiety disorder (300 00) (F41 9)   6  Biceps tendinitis on right (726 12) (M75 21)   7  Bradycardia (427 89) (R00 1)   8  Cellulitis of leg, left (682 6) (L03 116)   9  Chronic diastolic congestive heart failure (428 32,428 0) (I50 32)   10  Chronic kidney disease, stage 3 (585 3) (N18 3)   11  Chronic obstructive pulmonary disease (496) (J44 9)   12  Chronic Tear Of Right Rotator Cuff Tendon (726 19)   13  Chronic venous insufficiency (459 81) (I87 2)   14  Complete heart block (426 0) (I44 2)   15  Complete tear of right rotator cuff (727 61) (M75 121)   16  De Quervain's tenosynovitis (727 04) (M65 4)   17  Deep vein thrombosis of left femoral vein (453 41) (I82 412)   18  Diabetic hypoglycemia (250 80) (E11 649)   19  Diabetic retinopathy (250 50,362 01) (E11 319)   20  DM type 2, not at goal (250 00) (E11 9)   21  Drug toxicity (796 0) (R89 2)   22  Eczematous dermatitis (692 9) (L30 9)   23  Edema (782 3) (R60 9)   24  Encounter for screening mammogram for malignant neoplasm of breast (V76 12)    (Z12 31)   25  Glaucoma Screening   26   Herpes zoster (053  9) (B02 9)   27  History of deep venous thrombosis (V12 51) (Z86 718)   28  History of DVT of lower extremity (V12 51) (Z86 718)   29  Hypercholesterolemia (272 0) (E78 00)   30  Hypertension (401 9) (I10)   31  Hypothyroidism (244 9) (E03 9)   32  Immunization due (V05 9) (Z23)   33  Impacted cerumen, unspecified laterality (380 4) (H61 20)   34  Medicare annual wellness visit, subsequent (V70 0) (Z00 00)   35  Memory Lapses Or Loss (780 93)   36  Need for prophylactic vaccination and inoculation against influenza (V04 81) (Z23)   37  Neoplasm of skin (239 2) (D49 2)   38  Osteoarthritis of knee (715 36) (M17 9)   39  PAF (paroxysmal atrial fibrillation) (427 31) (I48 0)   40  Pain in wrist, unspecified laterality (719 43) (M25 539)   41  Permanent Pacemaker Type Dual-Chamber   42  Postoperative state (V45 89) (Z98 890)   43  Preoperative cardiovascular examination (V72 81) (Z01 810)   44  Rotator cuff tear, right (840 4) (M75 101)   45  Rotator cuff tendinitis, unspecified laterality (726 10) (M75 80)   46  Sciatica (724 3) (M54 30)   47  Screening for colorectal cancer (V76 51) (Z12 11,Z12 12)   48  Screening for genitourinary condition (V81 6) (Z13 89)   49  Screening for glaucoma (V80 1) (Z13 5)   50  Screening for neurological condition (V80 09) (Z13 89)   51  Sleep apnea (780 57) (G47 30)   52  Tenosynovitis, wrist (727 05) (M65 9)   53  Urge incontinence of urine (788 31) (N39 41)    Current Meds   1  Atorvastatin Calcium 80 MG Oral Tablet; TAKE 1 TABLET AT BEDTIME; Therapy: 12Apr2016 to Recorded   2  Citalopram Hydrobromide 20 MG Oral Tablet; TAKE ONE TABLET EVERY DAY; Therapy: 02JQQ7450 to (Evaluate:03Mar2018)  Requested for: 99EUP7602; Last   Rx:08Mar2017 Ordered   3  Eliquis 5 MG Oral Tablet; Take 1 tablet twice daily; Therapy: 38UFL0883 to (QFXDRYAS:05SRL8939)  Requested for: 61VLD6656; Last   Rx:16Jan2017 Ordered   4  Fish Oil 1200 MG Oral Capsule; 1 tid Recorded   5   Furosemide 40 MG Oral Tablet; Take 1 5 tablets daily  Requested for: 59Ymk6668; Last   Rx:92Yrq5765 Ordered   6  HealthWise Short Pen Needles 31G X 8 MM Miscellaneous; Therapy: 39PAA0157 to (Last Rx:30Mar2011)  Requested for: 81TZF7815 Ordered   7  Levothyroxine Sodium 125 MCG Oral Tablet; TAKE 1 TABLET DAILY; Therapy: 35TBA2901 to (Erin Fruit)  Requested for: 24ZXZ0864; Last   Rx:10Mar2017 Ordered   8  Lisinopril 20 MG Oral Tablet; Take 1 tablet daily; Therapy: 20XPE4325 to (Geraldine Ballard)  Requested for: 16EPS5745; Last   Rx:30Nov2016 Ordered   9  Metoprolol Tartrate 25 MG Oral Tablet; TAKE 1/2 TABLET EVERY 12 HOURS; Therapy: 96VDI6226 to (05 12 73 93 30)  Requested for: 82KAM3207; Last   Rx:92Noy5063 Ordered   10  NovoLOG FlexPen 100 UNIT/ML Subcutaneous Solution Pen-injector; 5u with breakfast,    8u with lunch and 8u with dinner  Requested for: 45Gow1844; Last    Rx:14Wpt7885 Ordered   11  Ocuvite Extra Oral Tablet; Take 1 tablet daily Recorded   12  Osteo Bi-Flex Adv Triple St TABS; 1 TAB BID Recorded   13  Oxybutynin Chloride 5 MG Oral Tablet; Take 1 tablet twice daily; Therapy: 15FFS1940 to (Evaluate:29Tqz7196)  Requested for: 37GOO4949; Last    Rx:94Lmt0872 Ordered   14  Toujeo SoloStar 300 UNIT/ML Subcutaneous Solution Pen-injector; 30 units in am and    20 units in pm  Requested for: 37NSI1842; Last Rx:12Jan2017 Ordered   15  Vitamin C 500 MG Oral Capsule; TAKE 1 CAPSULE DAILY; Last Rx:50Lio6474 Ordered    Allergies    1   Ampicillin CAPS    Signatures   Electronically signed by : Vijay Arzate, ; Mar 15 2017  2:06PM EST                       (Author)

## 2018-01-11 NOTE — PROGRESS NOTES
History of Present Illness  Care Coordination Encounter Information:   Type of Encounter: Telephonic   Contact: Initial Contact   Last Office Visit: 2/25/16   Spoke to Patient   Outreached patient to see how she is doing post hospitalization on 2/22/16 for deep vein thrombosis  Patient states she is doing good  Denies chest pain and shortness of breath  Visiting nurses finished with her yesterday  No questions about her medications  The visiting nurses reviewed that  Occupational and physical therapy signed off on her also  We reviewed her next appointment with Dr Roland Tapia as 4/26/16 not 3/22/16  She will call the office with any questions or concerns  Active Problems    1  Abnormal electrocardiogram (794 31) (R94 31)   2  Abnormal serum creatinine level (790 99) (R79 9)   3  Acute deep vein thrombosis (453 40) (I82 409)   4  Acute diastolic heart failure (492 46) (I50 31)   5  Acute kidney injury (584 9) (N17 9)   6  Anemia (285 9) (D64 9)   7  Anxiety disorder (300 00) (F41 9)   8  Biceps tendinitis on right (726 12) (M75 21)   9  Cellulitis of leg, left (682 6) (L03 116)   10  Chronic kidney disease, stage 3 (585 3) (N18 3)   11  Chronic obstructive pulmonary disease (496) (J44 9)   12  Chronic Tear Of Right Rotator Cuff Tendon (726 19)   13  Chronic venous insufficiency (459 81) (I87 2)   14  Complete heart block (426 0) (I44 2)   15  Complete tear of right rotator cuff (727 61) (M75 121)   16  De Quervain's tenosynovitis (727 04) (M65 4)   17  Deep vein thrombosis, left   18  Diabetic retinopathy (250 50,362 01) (E11 319)   19  DM type 2, not at goal (250 00) (E11 9)   20  Drug toxicity (796 0) (R89 2)   21  Eczematous dermatitis (692 9) (L30 9)   22  Edema (782 3) (R60 9)   23  Encounter for screening mammogram for malignant neoplasm of breast (V76 12)    (Z12 31)   24  Glaucoma Screening   25  Herpes zoster (053 9) (B02 9)   26  Hypercholesterolemia (272 0) (E78 0)   27   Hypertension (401 9) (I10)   28  Hypothyroidism (244 9) (E03 9)   29  Immunization due (V05 9) (Z23)   30  Impacted cerumen, unspecified laterality (380 4) (H61 20)   31  Medicare annual wellness visit, subsequent (V70 0) (Z00 00)   32  Memory Lapses Or Loss (780 93)   33  Need for prophylactic vaccination and inoculation against influenza (V04 81) (Z23)   34  Neoplasm of skin (239 2) (D49 2)   35  Osteoarthritis of knee (715 36) (M17 9)   36  Pain in wrist, unspecified laterality (719 43) (M25 539)   37  Permanent Pacemaker Type Dual-Chamber   38  Preoperative cardiovascular examination (V72 81) (Z01 810)   39  Rotator cuff tear, right (840 4) (M75 101)   40  Rotator cuff tendinitis, unspecified laterality (726 10) (M75 80)   41  Sciatica (724 3) (M54 30)   42  Screening for genitourinary condition (V81 6) (Z13 89)   43  Screening for glaucoma (V80 1) (Z13 5)   44  Screening for neurological condition (V80 09) (Z13 89)   45  Sleep apnea (780 57) (G47 30)   46  Tenosynovitis, wrist (727 05) (M65 9)   47  Urge incontinence of urine (788 31) (N39 41)    Past Medical History    1  History of Arthritis (V13 4)   2  History of Diabetes Mellitus (250 00)   3  History of hypertension (V12 59) (Z86 79)   4  History of thyroid disease (V12 29) (Z86 39)   5  History of uterine leiomyoma (V13 29) (Z86 018)   6  Need for prophylactic vaccination and inoculation against influenza (V04 81) (Z23)    Surgical History    1  History of Cataract Surgery   2  History of Eye Surgery   3  History of Hysterectomy   4  History of Knee Replacement   5  History of Pacemaker Permanent Placement   6  History of Reported Hx Of Breast Surgery For Biopsy   7  History of Total Abdominal Hysterectomy    Family History    1  Family history of Coronary Artery Disease (V17 49)   2  Family history of Heart Disease (V17 49)   3  Family history of Hypertension (V17 49)    4  Family history of Diabetes Mellitus (V18 0)    5  Family history of Diabetes Mellitus (V18 0)   6  Family history of Diabetes Mellitus (V18 0)    7  Family history of Breast Cancer (V16 3)   8  Family history of Carcinoma Of The Pancreas    Social History    · Denied: History of Alcohol Use (History)   · Daily Coffee Consumption (2  Cups/Day)   · Daily Cola Consumption (___ Cans/Day)   · Daily Tea Consumption (___ Cups/Day)   · Denied: History of Drug Use   · Marital History - Currently    · Never A Smoker   · Denied: History of Tobacco Use    Current Meds    1  Levemir FlexTouch 100 UNIT/ML Subcutaneous Solution Pen-injector; 30uqam, 20u qhs   USE AS DIRECTED; Therapy: 50BUD3792- Recorded    2  Citalopram Hydrobromide 20 MG Oral Tablet; TAKE ONE TABLET EVERY DAY; Therapy: 20TXI4737 to (Evaluate:43Eum1977)  Requested for: 44Sop7329; Last   Rx:22Feb2016 Ordered    3  Warfarin Sodium 2 MG Oral Tablet; take 2 tablet daily; Therapy: 73VTA2744 to (Evaluate:01Jun2016)  Requested for: 45ODM4981; Last   Rx:03Mar2016 Ordered    4  NovoLOG FlexPen 100 UNIT/ML Subcutaneous Solution Pen-injector; 8 u with breakfast,   5 u with lunch and 8 u with dinner; Therapy: (Recorded:83Hzc4593) to Recorded    5  Vitamin C 500 MG Oral Capsule; TAKE 1 CAPSULE DAILY; Last Rx:52Pdo3471 Ordered    6  Pravastatin Sodium 40 MG Oral Tablet; take 1 tablet every day; Therapy: 20LQW9148 to (Evaluate:20Gow0868)  Requested for: 64Aox5878; Last   Rx:59Qqa8597 Ordered    7  Lisinopril 20 MG Oral Tablet; Take 1 tablet daily; Therapy: 34UOJ3398 to (Last Rx:18Feb2016)  Requested for: 21ULE9325 Ordered   8  Metoprolol Tartrate 25 MG Oral Tablet; TAKE ONE-HALF TABLET BY MOUTH EVERY 12   HOURS; Therapy: 55LQB4416-(ODAUZLTX:46RYE3661)  Requested for: 08XWL4474 Ordered    9  Oxybutynin Chloride 5 MG Oral Tablet; Take 1 tablet twice daily; Therapy: 39OSE3054 to (Evaluate:45Rdx6385)  Requested for: 78WHU7461; Last   Rx:18Feb2016 Ordered    10  Acetaminophen 325 MG Oral Tablet; TAKE 1 TO 2 TABLETS EVERY 6 HOURS AS    NEEDED;     Therapy: (Recorded:01Mar2016) to Recorded   11  Aspirin 81 MG Oral Tablet Delayed Release; Take 1 daily Recorded   12  [employer] Stool Softener CAPS Recorded   13  Fish Oil 1200 MG Oral Capsule; 1 tid Recorded   14  Furosemide 40 MG Oral Tablet; 1 5 qd Recorded   15  HealthWise Short Pen Needles 31G X 8 MM Miscellaneous; Therapy: 24LRL7926 to (Last Rx:30Mar2011)  Requested for: 59GDT7457 Ordered   16  Hydrocodone-Acetaminophen 5-325 MG Oral Tablet; TAKE TABLET  PRN; Therapy: 08ESE3680 to Recorded   17  Levothyroxine Sodium 137 MCG Oral Tablet; TAKE 1 TABLET DAILY; Therapy: (Recorded:01Mar2016) to Recorded   18  Ocuvite Extra Oral Tablet; Take 1 tablet daily Recorded   19  Osteo Bi-Flex Adv Triple St TABS; 1 TAB BID Recorded   20  Warfarin Sodium 5 MG Oral Tablet; Therapy: 22Feb2016 to (Evaluate:26Mar2016) Recorded    Allergies    1  Ampicillin CAPS    End of Encounter Meds    1  Levemir FlexTouch 100 UNIT/ML Subcutaneous Solution Pen-injector; 30uqam, 20u qhs   USE AS DIRECTED; Therapy: 42YAC5507- Recorded    2  Citalopram Hydrobromide 20 MG Oral Tablet; TAKE ONE TABLET EVERY DAY; Therapy: 86QOX7699 to (Evaluate:16Feb2017)  Requested for: 10Ghj6133; Last   Rx:90Mzy7700 Ordered    3  Warfarin Sodium 2 MG Oral Tablet; take 2 tablet daily; Therapy: 22MSO4312 to (Evaluate:01Jun2016)  Requested for: 02IYW3455; Last   Rx:03Mar2016 Ordered    4  NovoLOG FlexPen 100 UNIT/ML Subcutaneous Solution Pen-injector; 8 u with breakfast,   5 u with lunch and 8 u with dinner; Therapy: (Recorded:05Hln4383) to Recorded    5  Vitamin C 500 MG Oral Capsule; TAKE 1 CAPSULE DAILY; Last Rx:66Vmr3782 Ordered    6  Pravastatin Sodium 40 MG Oral Tablet; take 1 tablet every day; Therapy: 01BZC5770 to (Evaluate:41Ues4333)  Requested for: 69Sbi5055; Last   Rx:37Ruw4367 Ordered    7  Lisinopril 20 MG Oral Tablet; Take 1 tablet daily; Therapy: 81DIJ3444 to (Last Rx:18Feb2016)  Requested for: 29ENJ4825 Ordered   8   Metoprolol Tartrate 25 MG Oral Tablet; TAKE ONE-HALF TABLET BY MOUTH EVERY 12   HOURS; Therapy: 91UWL2809-(BKPEVR:65RRG4784)  Requested for: 45POD9497 Ordered    9  Oxybutynin Chloride 5 MG Oral Tablet; Take 1 tablet twice daily; Therapy: 51XOU4725 to (Evaluate:38Ubg1209)  Requested for: 24ZHO1097; Last   Rx:06Qmb1866 Ordered    10  Acetaminophen 325 MG Oral Tablet; TAKE 1 TO 2 TABLETS EVERY 6 HOURS AS    NEEDED; Therapy: (Recorded:01Mar2016) to Recorded   11  Aspirin 81 MG Oral Tablet Delayed Release; Take 1 daily Recorded   12  [employer] Stool Softener CAPS Recorded   13  Fish Oil 1200 MG Oral Capsule; 1 tid Recorded   14  Furosemide 40 MG Oral Tablet; 1 5 qd Recorded   15  HealthWise Short Pen Needles 31G X 8 MM Miscellaneous; Therapy: 29IKB3245 to (Last Rx:30Mar2011)  Requested for: 97TAV1191 Ordered   16  Hydrocodone-Acetaminophen 5-325 MG Oral Tablet; TAKE TABLET  PRN; Therapy: 45EZB7697 to Recorded   17  Levothyroxine Sodium 137 MCG Oral Tablet; TAKE 1 TABLET DAILY; Therapy: (Recorded:01Mar2016) to Recorded   18  Ocuvite Extra Oral Tablet; Take 1 tablet daily Recorded   19  Osteo Bi-Flex Adv Triple St TABS; 1 TAB BID Recorded   20  Warfarin Sodium 5 MG Oral Tablet; Therapy: 34NAP3290 to (Evaluate:26Mar2016) Recorded    Future Appointments    Date/Time Provider Specialty Site   04/21/2016 09:40 AM MANJEET Mortensen  Cardiology [facility] CARDIOLOGY [facility]   05/10/2016 08:30 AM [facility]   08/11/2016 08:00 AM Cardiology, Device Remote   [facility]   05/24/2016 10:15 AM Danielle Fierro DO Vascular Surgery [facility]  [place]   04/26/2016 10:00 AM MANJEET Jimenez , MD, error [address] [place] [address]   04/12/2016 10:40 AM MANJEET Lubin   Nephrology [address] [place] [address]     Patient Care Team    Care Team Member Role Specialty Office Number   Romel Age MD Specialist Ophthalmology [phone]   Taylor DELGADO  Specialist Orthopedic Surgery [phone]   Thea DELGADO  Specialist Orthopedic Surgery [phone]   Katelyn Lopez MD Specialist General Surgery  G DPM Referring Podiatry [phone]   [facility]  Nurse Practitioner [id number], [facility] Specialist Vascular Surgery [phone]   [facility] Specialist Endocrinology [phone]   Elizabeth Sánchez MD Attending Family Medicine [phone]   Kimberlyn Guadarrama MD Specialist Ophthalmology [phone]   Darinel Glover MD Specialist Cardiology [phone]   [facility]    Cardiology [phone]   Electronically signed by : Brii Dee RN; Mar 10 2016  1:50PM EST                       (Author)

## 2018-01-11 NOTE — PROGRESS NOTES
Plan    1  DSMT/MNT Time Record; Status:Complete;   Done: 42QMQ7668 09:45AM    Discussion/Summary    PATIENT EDUCATION RECORD   Indication for Services: hypertension, type 2 Diabetes Mellitus, Kidney problems, Retinopathy and obesity  She is ready to learn  She has visual barriers to learning  Healthy Eating:   Discussed importance of meal timing/consistency: Method: Instruction, Handout and Demonstration  Response: Verbalizes Understanding   Discussed nutrient types ( Cho/Fat/Protein): Method: Instruction and Handout  Response: Verbalizes Understanding   Discussed portion sizes: Method: Instruction, Handout and Demonstration  Response: Verbalizes Understanding   Discussed sodium intake: Method: Instruction  Response: Verbalizes Understanding   Discussed Eating Out: Method: Instruction  Response: Verbalizes Understanding   Discussed food label reading: Method: Instruction and Handout  Response: Verbalizes Understanding  Provided food diary and instructions on use: Method: Instruction and HandoutResponse: Verbalizes Understanding   Provided meal planning: Method: Instruction, Handout and Demonstration  Response: Verbalizes Understanding Her current weight is 229 4  Her keal needs are 1285  Her CHO's per meal are 96 g/day 30% carbs 2,2,2  He/She was provided a meal plan for: fixed carbohydrates and weight loss  Discussed weight management/weight loss: Method: Instruction  Response: Verbalizes Understanding   Her current BMI 43  Discussed basic carbohydrate counting: Method: Instruction, Handout and Demonstration  Response: Verbalizes Understanding   Taking Medication:   Discussed onset, peak, and duration of insulin  Method: Instruction  Response: [facility]  Discussed basal-bolus concept  Method: Instruction  Response: [facility]  Healthy Coping Class:   Identified lifestyle behaviors that need to change: Method: Instruction   Response: Verbalizes Understanding   Discussed motivation to change: Method: Instruction  Response: Verbalizes Understanding   Identified goals for behavior change: Method: Instruction  Response: Verbalizes Understanding   Discussed strategies for change: Method: Instruction  Response: Verbalizes Understanding   She participated in goal setting  She was given the following educational materials: portion book, Personal Meal Plan 1285 calories, Planning Healthy Meals and Calorie and Carbohydrate Tracking Books/Websites/Phone Apps  Low Carb Snacks  Chief Complaint  Patient is here today for Medical Nutrition Therapy for T2DM      History of Present Illness  Patient is a 79 y/o female with uncontrolled T2DM, CKD III, CHF, HTN, Retinopathy, Morbid Obesity  A1c 8 3, BMI 44  She is taking her BG as advised  BG not at goal but improving  Patient admits to taking more bolus insulin when dietary indiscretions  Patient decided to increase bolus dose at lunch by 2 units stating that she felt she needed more  No hypoglycemia from this action  Current dosage 10-10-12  Patient also stated that she gives herself extra bolus insulin when she overeats  Gave correction scale to avoid inaccurate dosage (1U per 50 >150)  Patient stated she has reduced portion sizes and carb intake  Weight loss of 4 lbs since 7/18  She also has been walking for exercise  She was educated on carb counting/meal planning and given a low carb (30%) personal meal plan  encouraged to eat non carb foods in between meals to prevent hyperglycemia when insulin is waning  This is her initial assessment    Present at session: patient  and spouse    She has no special learning needs  Her caloric needs are 1285  Recent weight change: -4 lbs  Patient  and spouse  shops for food  Patient  cooks the food     Exercise routine:  Walks daily   She eats breakfast at  10 AM 1 Estonian toast, strawberries, whipped cream, coffee with SF creamer    She eats lunch at  1-2 PM 6" [employer]: roast chicken, cheese, veggies, water She eats dinner at  PM Shrimp in tomato sauce, 1 c  pasta, water   She snacks at at bedtime Sometimes yogurt     NUTRITION DIAGNOSES   Overweight Obesity   Overweight obesity related to: Food and nutrition related knowledge deficit  As evidenced by: BMI more than normative standard for age and sex (obesity-grade III 40+)  Medical Nutrition Therapy Intervention: Carbohydrate counting, Meal planning, Individualized meal plan, Strategies to monitor portion control, Label reading, Meal timing, Behavior modification strategies and Weight/BMI Goals  Her comprehension was good   Her motivation was good   Her compliance was good   Goals:  1  Follow meal plan/count carbs  2  Eat only non carb snacks in between meals to avoid hyperglycemia when insulin is waning      Active Problems    1  Abnormal electrocardiogram (794 31) (R94 31)   2  Acute diastolic heart failure (433 72) (I50 31)   3  Anemia (285 9) (D64 9)   4  Anxiety disorder (300 00) (F41 9)   5  Benign hypertension with chronic kidney disease, stage III (403 10,585 3) (I12 9,N18 3)   6  Biceps tendinitis on right (726 12) (M75 21)   7  Bladder Incontinence   8  Bradycardia (427 89) (R00 1)   9  Cellulitis of leg, left (682 6) (L03 116)   10  Chronic diastolic congestive heart failure (428 32,428 0) (I50 32)   11  Chronic kidney disease, stage 3 (585 3) (N18 3)   12  Chronic obstructive pulmonary disease (496) (J44 9)   13  Chronic Tear Of Right Rotator Cuff Tendon (726 19)   14  Chronic venous insufficiency (459 81) (I87 2)   15  Complete heart block (426 0) (I44 2)   16  Complete tear of right rotator cuff (727 61) (M75 121)   17  Cough (786 2) (R05)   18  De Quervain's tenosynovitis (727 04) (M65 4)   19  Deep vein thrombosis of left femoral vein (453 41) (I82 412)   20  Diabetic hypoglycemia (250 80) (E11 649)   21  Diabetic retinopathy (250 50,362 01) (E11 319)   22  DM type 2, not at goal (250 00) (E11 9)   23  Drug toxicity (796 0) (R89 2)   24  Eczematous dermatitis (692 9) (L30 9)   25  Edema (782 3) (R60 9)   26  Encounter for screening mammogram for malignant neoplasm of breast (V76 12)    (Z12 31)   27  Glaucoma Screening   28  Hematuria (599 70) (R31 9)   29  Herpes zoster (053 9) (B02 9)   30  History of deep venous thrombosis (V12 51) (Z86 718)   31  History of DVT of lower extremity (V12 51) (Z86 718)   32  Hypercholesterolemia (272 0) (E78 00)   33  Hypothyroidism (244 9) (E03 9)   34  Immunization due (V05 9) (Z23)   35  Impacted cerumen, unspecified laterality (380 4) (H61 20)   36  Medicare annual wellness visit, subsequent (V70 0) (Z00 00)   37  Memory Lapses Or Loss (780 93)   38  Microalbuminuria (791 0) (R80 9)   39  Need for prophylactic vaccination and inoculation against influenza (V04 81) (Z23)   40  Neoplasm of skin (239 2) (D49 2)   41  OAB (overactive bladder) (596 51) (N32 81)   42  Obesity (BMI 30-39 9) (278 00) (E66 9)   43  Osteoarthritis of knee (715 36) (M17 10)   44  PAF (paroxysmal atrial fibrillation) (427 31) (I48 0)   45  Pain in wrist, unspecified laterality (719 43) (M25 539)   46  Permanent Pacemaker Type Dual-Chamber   47  Postoperative state (V45 89) (Z98 890)   48  Preoperative cardiovascular examination (V72 81) (Z01 810)   49  Rotator cuff tear, right (840 4) (M75 101)   50  Rotator cuff tendinitis, unspecified laterality (726 10) (M75 80)   51  Sciatica (724 3) (M54 30)   52  Screening for colorectal cancer (V76 51) (Z12 11,Z12 12)   53  Screening for genitourinary condition (V81 6) (Z13 89)   54  Screening for glaucoma (V80 1) (Z13 5)   55  Screening for neurological condition (V80 09) (Z13 89)   56  Sleep apnea (780 57) (G47 30)   57  Tenosynovitis, wrist (727 05) (M65 9)   58  Type 2 diabetes mellitus with hyperglycemia, unspecified long term insulin use status    (250 00) (E11 65)   59  Urge incontinence of urine (788 31) (N39 41)   60  Vitamin D deficiency (268 9) (E55 9)    Past Medical History    1   History of Arthritis (V13 4)   2  History of Diabetes Mellitus (250 00)   3  History of hypertension (V12 59) (Z86 79)   4  History of thyroid disease (V12 29) (Z86 39)   5  History of uterine leiomyoma (V13 29) (Z86 018)   6  Need for prophylactic vaccination and inoculation against influenza (V04 81) (Z23)    Surgical History    1  History of Cataract Surgery   2  History of Eye Surgery   3  History of Hysterectomy   4  History of Knee Replacement   5  History of Pacemaker Permanent Placement   6  History of Reported Hx Of Breast Surgery For Biopsy   7  History of Total Abdominal Hysterectomy    Family History  Mother    1  Family history of Coronary Artery Disease (V17 49)   2  Family history of hypertension (V17 49) (Z82 49)   3  Family history of Heart Disease (V17 49)   4  Family history of Hypertension (V17 49)  Father    5  Family history of Diabetes Mellitus (V18 0)   6  Family history of hypertension (V17 49) (Z82 49)  Brother    7  Family history of Diabetes Mellitus (V18 0)   8  Family history of Diabetes Mellitus (V18 0)  Maternal Aunt    9  Family history of Breast Cancer (V16 3)   10  Family history of Carcinoma Of The Pancreas    Social History    · Denied: History of Alcohol Use (History)   · Daily Coffee Consumption (2  Cups/Day)   · Daily Cola Consumption (___ Cans/Day)   · Daily Tea Consumption (___ Cups/Day)   · Denied: History of Drug Use   · Former smoker (V15 82) (W52 800)   · Has 3 children   · Marital History - Currently    ·    · Non-smoker (V49 89) (Z78 9)   · Denied: History of Sexually active   · Denied: History of Tobacco Use    Current Meds   1  Atorvastatin Calcium 80 MG Oral Tablet; TAKE 1 TABLET AT BEDTIME; Therapy: 22Arh0451 to (Evaluate:13Apr2018)  Requested for: 18Apr2017; Last   Rx:18Apr2017 Ordered   2  Citalopram Hydrobromide 20 MG Oral Tablet; TAKE ONE TABLET EVERY DAY;    Therapy: 27LPW5431 to (Julissa Karimi)  Requested for: 67ZMO3232; Last   Rx:08Mar2017 Ordered   3  Eliquis 5 MG Oral Tablet; Take 1 tablet twice daily; Therapy: 13LSR5785 to ([facility]:84LZM0198)  Requested for: 92NPX0092; Last   Rx:16Jan2017 Ordered   4  Fish Oil 1200 MG Oral Capsule; 1 tid Recorded   5  Furosemide 40 MG Oral Tablet; Take 1 tablet daily; Therapy: 06TGA2896 to  Requested for: 30Jun2017 Recorded   6  [employer]Wise Short Pen Needles 31G X 8 MM Miscellaneous; Therapy: 37JGW3717 to (Last Rx:30Mar2011)  Requested for: 46PKM6398 Ordered   7  Levothyroxine Sodium 125 MCG Oral Tablet; TAKE 1 TABLET DAILY; Therapy: 40IGI2573 to (Surinder Mckeon)  Requested for: 33BGZ3184; Last   Rx:10Mar2017 Ordered   8  Lisinopril 20 MG Oral Tablet; Take 1 tablet daily; Therapy: 77PLE8651 to (Uzma Philip)  Requested for: 41FJU6964; Last   Rx:30Nov2016 Ordered   9  Metoprolol Tartrate 25 MG Oral Tablet; TAKE 1/2 TABLET EVERY 12 HOURS; Therapy: 66CMY3374 to (Evaluate:33Nyt0949)  Requested for: 84Xxq6466; Last   Rx:53Qpa5474 Ordered   10  NovoLOG FlexPen 100 UNIT/ML Subcutaneous Solution Pen-injector; take 10 units  with    breakfast, 10 units with lunch and with dinner; Therapy: ([facility]) to  Requested for: 21Jun2017 Recorded   11  Ocuvite Extra Oral Tablet; Take 1 tablet daily Recorded   12  Osteo Bi-Flex Adv Triple St TABS; take 2 tablet daily; Therapy: ([facility]) to Recorded   13  Oxybutynin Chloride ER 15 MG Oral Tablet Extended Release 24 Hour; Take 1 tablet    daily; Therapy: 46UJC2450 to (Evaluate:47Nax6257)  Requested for: 23BJT9473; Last    Rx:12Vcf9900 Ordered   14  Promethazine-DM 6 25-15 MG/5ML Oral Syrup; 1 tsp qhs;    Therapy: 83XIJ7548 to (Last Rx:88Ypi2228)  Requested for: 68WCQ8550; Status: ACTIVE -    Transmit to [facility] Ordered   15  Toujeo SoloStar 300 UNIT/ML Subcutaneous Solution Pen-injector; take 28 units in the    evening  Requested for: 59Egj4349; Last Rx:60Zhb9206 Ordered   16   Vitamin C 500 MG Oral Capsule; TAKE 1 CAPSULE DAILY; Last Rx:98Wau0954 Ordered    Allergies    1  Ampicillin CAPS    Vitals  Signs   Recorded: 39URQ9984 09:46AM   Weight: 229 lb 4 oz  BMI Calculated: 43 32  BSA Calculated: 2 00    Future Appointments    Date/Time Provider Specialty Site   10/12/2017 03:40 PM MANJEET Herzog  Cardiology [facility] CARDIOLOGY [place]   11/20/2017 01:00 PM Cardiology, Device Remote   [address]   02/21/2018 08:30 AM Cardiology, Device Remote   [address]   09/26/2017 09:45 AM García Alexander, [address] Endocrinology [facility] ENDOCRINOLOGY [place] [facility]   10/02/2017 09:30 AM Hilton Cantu MD Urology [address]   11/28/2017 09:15 AM MANJEET Gonzalez  [address] [place] [address]   10/12/2017 11:10 AM MANJEET Duran   Nephrology [address] [facility]     Signatures   Electronically signed by : Virginia Salomon RD; Sep  6 2017 10:05AM EST                       (Author)    Electronically signed by : MANJEET Fry ; Sep  6 2017  1:22PM EST

## 2018-01-12 VITALS
DIASTOLIC BLOOD PRESSURE: 68 MMHG | WEIGHT: 232.38 LBS | BODY MASS INDEX: 43.88 KG/M2 | SYSTOLIC BLOOD PRESSURE: 122 MMHG | HEIGHT: 61 IN | HEART RATE: 84 BPM | TEMPERATURE: 97.2 F

## 2018-01-12 VITALS
TEMPERATURE: 97.2 F | WEIGHT: 234 LBS | BODY MASS INDEX: 44.18 KG/M2 | DIASTOLIC BLOOD PRESSURE: 78 MMHG | HEART RATE: 80 BPM | HEIGHT: 61 IN | SYSTOLIC BLOOD PRESSURE: 124 MMHG

## 2018-01-12 NOTE — RESULT NOTES
Discussion/Summary   A1c 7 3 improved  TSH too suppressed, decrease Levothyroxine 125 mg by not taking a tab every Sunday, continue with 1 tab Monday thru Saturday, repeat TSH/free T4 in 6 weeks  Urine shows protein, continue Lisinopril and with improving blood sugars  Verified Results  (1) HEMOGLOBIN A1C 04TPI7134 12:19PM [employer] [facility]   TW Order Number: [id number]     Test Name Result Flag Reference   HEMOGLOBIN A1C 7 3 % H 4 2-6 3   EST  AVG  GLUCOSE 163 mg/dl       (1) T4, FREE 03WDY8369 12:19PM [employer] [facility]   TW Order Number: [id number]     Test Name Result Flag Reference   T4,FREE 1 33 ng/dL  0 76-1 46   Specimen collection should occur prior to Sulfasalazine administration due to the potential for falsely elevated results  (1) TSH 62VBW6145 12:19PM [employer] [facility]   TW Order Number: [id number]     Test Name Result Flag Reference   TSH 0 144 uIU/mL L 0 358-3 740   Patients undergoing fluorescein dye angiography may retain small amounts of fluorescein in the body for 48-72 hours post procedure  Samples containing fluorescein can produce falsely depressed TSH values  If the patient had this procedure,a specimen should be resubmitted post fluorescein clearance            The recommended reference ranges for TSH during pregnancy are as follows:  First trimester 0 1 to 2 5 uIU/mL  Second trimester  0 2 to 3 0 uIU/mL  Third trimester 0 3 to 3 0 uIU/m     (1) MICROALBUMIN CREATININE RATIO, RANDOM URINE 94RDD3837 12:19PM [employer] [facility]   TW Order Number: [id number]     Test Name Result Flag Reference   MICROALBUMIN/ CREAT R 339 mg/g creatinine H 0-30   MICROALBUMIN,URINE 94 6 mg/L H 0 0-20 0   CREATININE URINE 27 9 mg/dL       (1) RENAL FUNCTION PANEL 07GAQ8677 12:19PM [employer] [facility]   TW Order Number: [id number]     Test Name Result Flag Reference   ANION GAP (CALC) 9 mmol/L  4-13   ALBUMIN 3 7 g/dL  3 5-5 0   BLOOD UREA NITROGEN 43 mg/dL H 5-25   CALCIUM 9 1 mg/dL  8 3-10 1 CHLORIDE 105 mmol/L  100-108   CARBON DIOXIDE 25 mmol/L  21-32   CREATININE 1 25 mg/dL  0 60-1 30   Standardized to IDMS reference method   GLUCOSE,RANDM 111 mg/dL     Specimen collection should occur prior to Sulfasalazine administration due to the potential for falsely depressed results  Specimen collection should occur prior to Sulfapyridine administration due to the potential for falsely elevated results  Specimen collection should occur prior to Sulfasalazine administration due to the potential for falsely depressed results  Specimen collection should occur prior to Sulfapyridine administration due to the potential for falsely elevated results  POTASSIUM 4 1 mmol/L  3 5-5 3   SODIUM 139 mmol/L  136-145   PHOSPHORUS 3 8 mg/dL  2 3-4 1   eGFR 42 ml/min/1 73sq m     National Kidney Disease Education Program recommendations are as follows:  GFR calculation is accurate only with a steady state creatinine  Chronic Kidney disease less than 60 ml/min/1 73 sq  meters  Kidney failure less than 15 ml/min/1 73 sq  meters

## 2018-01-12 NOTE — MISCELLANEOUS
History of Present Illness  TCM Communication [facility]: The patient is being contacted for follow-up after hospitalization and NO APPOINT AT [address]  She was hospitalized [facility]  The date of admission: 1-22-16, date of discharge: PT STILL  [address]  Diagnosis: PACEMAKER PLACEMENT  She was discharged to a rehabilitation center  She did not schedule a follow up appointment  Counseling was provided to patient's family  PT'S  WILL TRY TO REMEMBER TO CALL WHEN PT IS DISCHARGED TO SCHEDULE STEVE AT THAT TIME  Communication performed and completed by MANJEET ZIMMER LPN      Active Problems    1  Abnormal electrocardiogram (794 31) (R94 31)   2  Abnormal serum creatinine level (790 99) (R79 9)   3  Acute kidney injury (584 9) (N17 9)   4  Anxiety disorder (300 00) (F41 9)   5  Biceps tendinitis on right (726 12) (M75 21)   6  Cellulitis of leg, left (682 6) (L03 116)   7  Chronic kidney disease, stage 3 (585 3) (N18 3)   8  Chronic obstructive pulmonary disease (496) (J44 9)   9  Chronic Tear Of Right Rotator Cuff Tendon (726 19)   10  Chronic venous insufficiency (459 81) (I87 2)   11  Complete tear of right rotator cuff (727 61) (M75 121)   12  De Quervain's tenosynovitis (727 04) (M65 4)   13  Diabetic retinopathy (250 50,362 01) (E11 319)   14  DM type 2, not at goal (250 00) (E11 9)   15  Drug toxicity (796 0) (R89 2)   16  Eczematous dermatitis (692 9) (L30 9)   17  Edema (782 3) (R60 9)   18  Encounter for screening mammogram for malignant neoplasm of breast (V76 12)    (Z12 31)   19  Glaucoma Screening   20  Herpes zoster (053 9) (B02 9)   21  Hypercholesterolemia (272 0) (E78 0)   22  Hypertension (401 9) (I10)   23  Hypothyroidism (244 9) (E03 9)   24  Immunization due (V05 9) (Z23)   25  Impacted cerumen, unspecified laterality (380 4) (H61 20)   26  Medicare annual wellness visit, subsequent (V70 0) (Z00 00)   27  Memory Lapses Or Loss (780 93)   28   Need for prophylactic vaccination and inoculation against influenza (V04 81) (Z23)   29  Neoplasm of skin (239 2) (D49 2)   30  Osteoarthritis of knee (715 36) (M17 9)   31  Pain in wrist, unspecified laterality (719 43) (M25 539)   32  Rotator cuff tear, right (840 4) (M75 101)   33  Rotator cuff tendinitis, unspecified laterality (726 10) (M75 80)   34  Sciatica (724 3) (M54 30)   35  Screening for genitourinary condition (V81 6) (Z13 89)   36  Screening for glaucoma (V80 1) (Z13 5)   37  Screening for neurological condition (V80 09) (Z13 89)   38  Sleep apnea (780 57) (G47 30)   39  Tenosynovitis, wrist (727 05) (M65 9)   40  Urge incontinence of urine (788 31) (N39 41)    Past Medical History    1  History of Arthritis (V13 4)   2  History of Diabetes Mellitus (250 00)   3  History of hypertension (V12 59) (Z86 79)   4  History of thyroid disease (V12 29) (Z86 39)   5  History of uterine leiomyoma (V13 29) (Z86 018)   6  Need for prophylactic vaccination and inoculation against influenza (V04 81) (Z23)    Surgical History    1  History of Cataract Surgery   2  History of Eye Surgery   3  History of Hysterectomy   4  History of Knee Replacement   5  History of Reported Hx Of Breast Surgery For Biopsy   6  History of Total Abdominal Hysterectomy    Family History    1  Family history of Coronary Artery Disease (V17 49)   2  Family history of Heart Disease (V17 49)   3  Family history of Hypertension (V17 49)    4  Family history of Diabetes Mellitus (V18 0)    5  Family history of Diabetes Mellitus (V18 0)   6  Family history of Diabetes Mellitus (V18 0)    7  Family history of Breast Cancer (V16 3)   8   Family history of Carcinoma Of The Pancreas    Social History    · Denied: History of Alcohol Use (History)   · Daily Coffee Consumption (2  Cups/Day)   · Daily Cola Consumption (___ Cans/Day)   · Daily Tea Consumption (___ Cups/Day)   · Denied: History of Drug Use   · Marital History - Currently    · Never A Smoker   · Denied: History of Tobacco Use    Current Meds   1  Aspirin 81 MG Oral Tablet Delayed Release; Take 1 daily Recorded   2  Calcium 8430-4511 MG-UNIT CHEW; 1 Tab daily Recorded   3  Centrum Silver Ultra Womens TABS Recorded   4  Citalopram Hydrobromide 20 MG Oral Tablet; 1QD - TAKE ONE TABLET BY MOUTH   EVERY DAY; Therapy: 15WJT2268 to (Evaluate:18Jan2016)  Requested for: 90UQE6099; Last   Rx:23Jan2015 Ordered   5  [employer] Stool Softener CAPS Recorded   6  Fish Oil OIL; tid; Therapy: (Recorded:93Jxd0269) to Recorded   7  Flax Seed Oil 1000 MG Oral Capsule; Therapy: (Recorded:36Pdr1945) to Recorded   8  Fluocinolone Acetonide 0 025 % External Cream; APPLY SPARINGLY TO AFFECTED   AREA(S) TWICE DAILY; Therapy: 40BQM7391 to (Last Rx:09Jun2015) Ordered   9  Furosemide 40 MG Oral Tablet; Take 1 tablet daily; Therapy: 11YHX0248 to (Evaluate:92Wib2181)  Requested for: 29Kfg2397; Last   Rx:28Dec2015 Ordered   10  GlyBURIDE 5 MG Oral Tablet; TAKE 1 TABLET TWICE DAILY BEFORE MEALS; Therapy: (Recorded:15Oct2015) to Recorded   11  [employer] Short Pen Needles 31G X 8 MM Miscellaneous; Therapy: 91QOF8924 to (Last Rx:30Mar2011)  Requested for: 18QUR6031 Ordered   12  Lantus SoloStar 100 UNIT/ML Subcutaneous Solution Pen-injector; inject 30 units    subcutaneously twice daily; Therapy: 66KAZ4005 to (Evaluate:14Oct2016)  Requested for: 87IFE0377 Recorded   13  Levothyroxine Sodium 137 MCG Oral Tablet; TAKE 1 TABLET DAILY  Requested for:    08NEE0819; Last Rx:15Jun2015 Ordered   14  Lisinopril 10 MG Oral Tablet; TAKE 1 TABLET DAILY FOR BLOOD PRESSURE; Therapy: 97CBT1338 to (Mitzy Narvaez)  Requested for: 80EXB7907; Last    Rx:06Jan2016 Ordered   15  Metoprolol Tartrate 25 MG Oral Tablet; TAKE ONE-HALF TABLET BY MOUTH EVERY 12    HOURS; Therapy: 77RRM2019 to (Evaluate:07Jun2016)  Requested for: 81JXQ2096; Last    Rx:03Nov2015 Ordered   16  Ocuvite Extra Oral Tablet; Take 1 tablet daily Recorded   17   Osteo Bi-Flex Adv Triple St TABS; 1 TAB BID Recorded   18  Oxybutynin Chloride 5 MG Oral Tablet; take 1 tablet by mouth twice a day; Therapy: 27DEI9125 to (Evaluate:04Cdt1754)  Requested for: 60EXV4392; Last    Rx:10Rvm0248 Ordered   19  Pravastatin Sodium 40 MG Oral Tablet; TAKE 1 TABLET BY MOUTH EVERY DAY; Therapy: 21IWU8725 to (eLidaalejandro Izabel)  Requested for: 49OIU8766; Last    Rx:03Mar2015 Ordered   20  Triamcinolone Acetonide 0 025 % External Ointment; APPLY 2-3 TIMES DAILY TO    AFFECTED AREA(S); Therapy: 62BPF9070 to (Last Rx:03Mar2015)  Requested for: 51VVG1296 Ordered   21  Verapamil HCl  MG Oral Capsule Extended Release 24 Hour; Take 1 capsule    twice daily; Therapy: 64SWQ0205 to (Jose Alberto Webb)  Requested for: 0343 [id number]; Last    Rx:13Apr2015 Ordered   22  Vitamin C CAPS; Therapy: (Recorded:09Jun2015) to Recorded    Allergies    1  Ampicillin CAPS    Future Appointments    Date/Time Provider Specialty Site   02/03/2016 11:00 AM Cardiology, [address]   02/03/2016 12:00 PM MANJEET Madsen   Cardiology [facility] CARDIOLOGY [facility]   04/26/2016 10:00 AM Doris Seip, M D ,  [place]     Signatures   Electronically signed by : GILBERT Palacio ; Jan 26 2016 12:53PM EST                       (Author)

## 2018-01-12 NOTE — MISCELLANEOUS
Message  pt called  Since Lasix decreased from 60 mg  to 20 mg  QD, she has gained 5 lbs  in one week and her legs and ankles are swollen  Dr  Per Dr Kalin Levine, 60 mg  QD x3days, then 40 mg  QD  Pt has repeat labs in couple of weeks thru Dr Kwasi Crabtree including a renal function panel /lr      Active Problems    1  Abnormal electrocardiogram (794 31) (R94 31)   2  Acute diastolic heart failure (366 46) (I50 31)   3  Anemia (285 9) (D64 9)   4  Anxiety disorder (300 00) (F41 9)   5  Benign hypertension with chronic kidney disease, stage III (403 10,585 3) (I12 9,N18 3)   6  Biceps tendinitis on right (726 12) (M75 21)   7  Bradycardia (427 89) (R00 1)   8  Cellulitis of leg, left (682 6) (L03 116)   9  Chronic diastolic congestive heart failure (428 32,428 0) (I50 32)   10  Chronic kidney disease, stage 3 (585 3) (N18 3)   11  Chronic obstructive pulmonary disease (496) (J44 9)   12  Chronic Tear Of Right Rotator Cuff Tendon (726 19)   13  Chronic venous insufficiency (459 81) (I87 2)   14  Complete heart block (426 0) (I44 2)   15  Complete tear of right rotator cuff (727 61) (M75 121)   16  Cough (786 2) (R05)   17  De Quervain's tenosynovitis (727 04) (M65 4)   18  Deep vein thrombosis of left femoral vein (453 41) (I82 412)   19  Diabetic hypoglycemia (250 80) (E11 649)   20  Diabetic retinopathy (250 50,362 01) (E11 319)   21  DM type 2, not at goal (250 00) (E11 9)   22  Drug toxicity (796 0) (R89 2)   23  Eczematous dermatitis (692 9) (L30 9)   24  Edema (782 3) (R60 9)   25  Encounter for screening mammogram for malignant neoplasm of breast (V76 12)    (Z12 31)   26  Glaucoma Screening   27  Hematuria (599 70) (R31 9)   28  Herpes zoster (053 9) (B02 9)   29  History of deep venous thrombosis (V12 51) (Z86 718)   30  History of DVT of lower extremity (V12 51) (Z86 718)   31  Hypercholesterolemia (272 0) (E78 00)   32  Hypothyroidism (244 9) (E03 9)   33  Immunization due (V05 9) (Z23)   34   Impacted cerumen, unspecified laterality (380 4) (H61 20)   35  Medicare annual wellness visit, subsequent (V70 0) (Z00 00)   36  Memory Lapses Or Loss (780 93)   37  Microalbuminuria (791 0) (R80 9)   38  Need for prophylactic vaccination and inoculation against influenza (V04 81) (Z23)   39  Neoplasm of skin (239 2) (D49 2)   40  Obesity (BMI 30-39 9) (278 00) (E66 9)   41  Osteoarthritis of knee (715 36) (M17 10)   42  PAF (paroxysmal atrial fibrillation) (427 31) (I48 0)   43  Pain in wrist, unspecified laterality (719 43) (M25 539)   44  Permanent Pacemaker Type Dual-Chamber   45  Postoperative state (V45 89) (Z98 890)   46  Preoperative cardiovascular examination (V72 81) (Z01 810)   47  Rotator cuff tear, right (840 4) (M75 101)   48  Rotator cuff tendinitis, unspecified laterality (726 10) (M75 80)   49  Sciatica (724 3) (M54 30)   50  Screening for colorectal cancer (V76 51) (Z12 11,Z12 12)   51  Screening for genitourinary condition (V81 6) (Z13 89)   52  Screening for glaucoma (V80 1) (Z13 5)   53  Screening for neurological condition (V80 09) (Z13 89)   54  Sleep apnea (780 57) (G47 30)   55  Tenosynovitis, wrist (727 05) (M65 9)   56  Type 2 diabetes mellitus with hyperglycemia, unspecified long term insulin use status    (250 00) (E11 65)   57  Urge incontinence of urine (788 31) (N39 41)   58  Vitamin D deficiency (268 9) (E55 9)    Current Meds   1  Atorvastatin Calcium 80 MG Oral Tablet; TAKE 1 TABLET AT BEDTIME; Therapy: 23Dbm3686 to (Evaluate:13Apr2018)  Requested for: 18Apr2017; Last   Rx:18Apr2017 Ordered   2  Citalopram Hydrobromide 20 MG Oral Tablet; TAKE ONE TABLET EVERY DAY; Therapy: 90JVB7323 to (Evaluate:03Mar2018)  Requested for: 99FNH6118; Last   Rx:08Mar2017 Ordered   3  Eliquis 5 MG Oral Tablet; Take 1 tablet twice daily; Therapy: 56DWM6380 to (YCTSWAFF:14UWH0137)  Requested for: 46LVN7605; Last   Rx:16Jan2017 Ordered   4  Fish Oil 1200 MG Oral Capsule; 1 tid Recorded   5   Furosemide 40 MG Oral Tablet; Take 1 tablet daily; Therapy: 27XXZ8459 to  Requested for: 30Jun2017 Recorded   6  HealthWise Short Pen Needles 31G X 8 MM Miscellaneous; Therapy: 23ZOM9256 to (Last Rx:30Mar2011)  Requested for: 93UPN9458 Ordered   7  Levothyroxine Sodium 125 MCG Oral Tablet; TAKE 1 TABLET DAILY; Therapy: 37KKT8399 to (05 10 54 32 82)  Requested for: 72PZK5813; Last   Rx:10Mar2017 Ordered   8  Lisinopril 20 MG Oral Tablet; Take 1 tablet daily; Therapy: 23AHX4593 to (Rogerio Quiros)  Requested for: 28VGD9192; Last   Rx:30Nov2016 Ordered   9  Metoprolol Tartrate 25 MG Oral Tablet; TAKE 1/2 TABLET EVERY 12 HOURS; Therapy: 18SOM3719 to [phone])  Requested for: 53PJW9047; Last   Rx:22Oct2016 Ordered   10  NovoLOG FlexPen 100 UNIT/ML Subcutaneous Solution Pen-injector; take 10 units  with    breakfast, 10 units with lunch and with dinner; Therapy: (Theone Cele) to  Requested for: 21Jun2017 Recorded   11  Ocuvite Extra Oral Tablet; Take 1 tablet daily Recorded   12  Osteo Bi-Flex Adv Triple St TABS; take 2 tablet daily; Therapy: (Theone Covington) to Recorded   13  Oxybutynin Chloride 5 MG Oral Tablet; Take 1 tablet twice daily; Therapy: 29HPE2605 to (Evaluate:58Gbn8567)  Requested for: 86HZY5241; Last    Rx:75Dmv9305 Ordered   14  Promethazine-DM 6 25-15 MG/5ML Oral Syrup; 1 tsp qhs;    Therapy: 20THJ6104 to (Last Rx:05Jun2017)  Requested for: 61DER8883 Ordered   15  Toujeo SoloStar 300 UNIT/ML Subcutaneous Solution Pen-injector; take 30 units in am;    Therapy: (Recorded:21Jun2017) to  Requested for: 21Jun2017 Recorded   16  Vitamin C 500 MG Oral Capsule; TAKE 1 CAPSULE DAILY; Last Rx:28Kqc9367 Ordered    Allergies    1   Ampicillin CAPS    Signatures   Electronically signed by : MANJEET Nieto ; Jul 13 2017  2:15PM EST                       (Author)

## 2018-01-13 VITALS — WEIGHT: 229.25 LBS | BODY MASS INDEX: 43.32 KG/M2

## 2018-01-13 VITALS
DIASTOLIC BLOOD PRESSURE: 68 MMHG | HEIGHT: 61 IN | BODY MASS INDEX: 42.86 KG/M2 | HEART RATE: 72 BPM | SYSTOLIC BLOOD PRESSURE: 112 MMHG | WEIGHT: 227 LBS

## 2018-01-13 VITALS
HEART RATE: 69 BPM | HEIGHT: 61 IN | BODY MASS INDEX: 43.05 KG/M2 | WEIGHT: 228 LBS | SYSTOLIC BLOOD PRESSURE: 110 MMHG | DIASTOLIC BLOOD PRESSURE: 64 MMHG

## 2018-01-13 VITALS
HEIGHT: 61 IN | BODY MASS INDEX: 44.18 KG/M2 | DIASTOLIC BLOOD PRESSURE: 72 MMHG | SYSTOLIC BLOOD PRESSURE: 140 MMHG | WEIGHT: 234 LBS | HEART RATE: 82 BPM

## 2018-01-13 VITALS — DIASTOLIC BLOOD PRESSURE: 62 MMHG | SYSTOLIC BLOOD PRESSURE: 118 MMHG

## 2018-01-13 NOTE — MISCELLANEOUS
Message  pt called, she's sched for evlt Friday  She just read pre op instructions and it told her to hold vitamins/supplements one week prior  She takes vit c, occuvite, osteobiflex and fish oil  She will begin holding today and is ? if that's ok since surgery is Friday  rev w/ Dr Tyler Calderon and he said that is ok  pt notified of same  Active Problems    1  Abnormal electrocardiogram (794 31) (R94 31)   2  Acute deep vein thrombosis (453 40) (I82 409)   3  Acute diastolic heart failure (800 04) (I50 31)   4  Anemia (285 9) (D64 9)   5  Anxiety disorder (300 00) (F41 9)   6  Biceps tendinitis on right (726 12) (M75 21)   7  Bradycardia (427 89) (R00 1)   8  Cellulitis of leg, left (682 6) (L03 116)   9  Chronic diastolic congestive heart failure (428 32,428 0) (I50 32)   10  Chronic kidney disease, stage 3 (585 3) (N18 3)   11  Chronic obstructive pulmonary disease (496) (J44 9)   12  Chronic Tear Of Right Rotator Cuff Tendon (726 19)   13  Chronic venous insufficiency (459 81) (I87 2)   14  Complete heart block (426 0) (I44 2)   15  Complete tear of right rotator cuff (727 61) (M75 121)   16  De Quervain's tenosynovitis (727 04) (M65 4)   17  Deep vein thrombosis of left femoral vein (453 41) (I82 412)   18  Diabetic hypoglycemia (250 80) (E11 649)   19  Diabetic retinopathy (250 50,362 01) (E11 319)   20  DM type 2, not at goal (250 00) (E11 9)   21  Drug toxicity (796 0) (R89 2)   22  Eczematous dermatitis (692 9) (L30 9)   23  Edema (782 3) (R60 9)   24  Encounter for screening mammogram for malignant neoplasm of breast (V76 12)    (Z12 31)   25  Glaucoma Screening   26  Herpes zoster (053 9) (B02 9)   27  History of deep venous thrombosis (V12 51) (Z86 718)   28  History of DVT of lower extremity (V12 51) (Z86 718)   29  Hypercholesterolemia (272 0) (E78 00)   30  Hypertension (401 9) (I10)   31  Hypothyroidism (244 9) (E03 9)   32  Immunization due (V05 9) (Z23)   33   Impacted cerumen, unspecified laterality (380 4) (H61 20)   34  Medicare annual wellness visit, subsequent (V70 0) (Z00 00)   35  Memory Lapses Or Loss (780 93)   36  Need for prophylactic vaccination and inoculation against influenza (V04 81) (Z23)   37  Neoplasm of skin (239 2) (D49 2)   38  Osteoarthritis of knee (715 36) (M17 9)   39  PAF (paroxysmal atrial fibrillation) (427 31) (I48 0)   40  Pain in wrist, unspecified laterality (719 43) (M25 539)   41  Permanent Pacemaker Type Dual-Chamber   42  Preoperative cardiovascular examination (V72 81) (Z01 810)   43  Rotator cuff tear, right (840 4) (M75 101)   44  Rotator cuff tendinitis, unspecified laterality (726 10) (M75 80)   45  Sciatica (724 3) (M54 30)   46  Screening for colorectal cancer (V76 51) (Z12 11,Z12 12)   47  Screening for genitourinary condition (V81 6) (Z13 89)   48  Screening for glaucoma (V80 1) (Z13 5)   49  Screening for neurological condition (V80 09) (Z13 89)   50  Sleep apnea (780 57) (G47 30)   51  Tenosynovitis, wrist (727 05) (M65 9)   52  Urge incontinence of urine (788 31) (N39 41)    Current Meds   1  Atorvastatin Calcium 80 MG Oral Tablet; TAKE 1 TABLET AT BEDTIME; Therapy: 12Apr2016 to Recorded   2  Citalopram Hydrobromide 20 MG Oral Tablet; TAKE ONE TABLET EVERY DAY; Therapy: 46GBG9464 to (Evaluate:73Mrk5812)  Requested for: 27Zos1260; Last   Rx:56Cfq6750 Ordered   3  Eliquis 5 MG Oral Tablet; Take 1 tablet twice daily; Therapy: 38FGY1626 to (SJKJJAFO:38FYW1333)  Requested for: 22CQA9204; Last   Rx:16Jan2017 Ordered   4  Fish Oil 1200 MG Oral Capsule; 1 tid Recorded   5  Furosemide 40 MG Oral Tablet; Take 1 5 tablets daily  Requested for: 12Apr2016; Last   Rx:12Apr2016 Ordered   6  HealthWise Short Pen Needles 31G X 8 MM Miscellaneous; Therapy: 81CZB0041 to (Last Rx:30Mar2011)  Requested for: 44WEW0675 Ordered   7  Levothyroxine Sodium 125 MCG Oral Tablet; TAKE 1 TABLET DAILY  Requested for:   69VKL6924; Last Rx:39Qrn8180 Ordered   8   Lisinopril 20 MG Oral Tablet; Take 1 tablet daily; Therapy: 47WMK7331 to (Cyndee Stephens)  Requested for: 25MYT6356; Last   Rx:30Nov2016 Ordered   9  Metoprolol Tartrate 25 MG Oral Tablet; TAKE 1/2 TABLET EVERY 12 HOURS; Therapy: 11IRU0896 to [phone])  Requested for: 87ZBO1163; Last   Rx:90Rkb4448 Ordered   10  NovoLOG FlexPen 100 UNIT/ML Subcutaneous Solution Pen-injector; 5u with breakfast,    8u with lunch and 8u with dinner  Requested for: 32Ego4109; Last    Rx:58Gli2705 Ordered   11  Ocuvite Extra Oral Tablet; Take 1 tablet daily Recorded   12  Osteo Bi-Flex Adv Triple St TABS; 1 TAB BID Recorded   13  Oxybutynin Chloride 5 MG Oral Tablet; Take 1 tablet twice daily; Therapy: 68CXN7424 to (Evaluate:27Trz4456)  Requested for: 98DOS8112; Last    Rx:72Rlt2211 Ordered   14  Toujeo SoloStar 300 UNIT/ML Subcutaneous Solution Pen-injector; 30 units in am and    20 units in pm  Requested for: 01FDV2634; Last Rx:12Jan2017 Ordered   15  Vitamin C 500 MG Oral Capsule; TAKE 1 CAPSULE DAILY; Last Rx:25Jre0085 Ordered    Allergies    1   Ampicillin CAPS    Signatures   Electronically signed by : Felix Guerrero, ; Feb 14 2017  5:38PM EST                       (Author)

## 2018-01-13 NOTE — MISCELLANEOUS
Signatures   Electronically signed by : Lazaro Day MD; Nov 8 2017 11:43AM EST                       (Author)

## 2018-01-13 NOTE — PROGRESS NOTES
Assessment    1  DM type 2, not at goal (250 00) (E11 9)   2  Deep vein thrombosis, left (453 40) (I82 402)    Plan  Hypertension    · Metoprolol Tartrate 25 MG Oral Tablet; TAKE ONE-HALF TABLET BY MOUTH EVERY  12 HOURS    Discussion/Summary    #1 L common femoral vein DVT - I reviewed with pt and   INR = 2 0 today  I will d/c the LMWH and cont coumadin at present dose  Recheck INR in 1 week  If stable, then change INR to monthly  Repeat venous duplex in May prior to stoppping coumadin  Pt to f/ with Vasc surgery then as well  #2 DMII - Labile BGs  It does not appear that sulfonurea is effective  I will d/c Glybuide  COnt present dse of Levemir  Increase morning and dinner Novolog to 8u - cont 5 u at lunch  Recheck 2m  #3 HM - f/u as above  I reviewed Warfarin diet with pt and   Pt to call for probems or concerns  Possible side effects of new medications were reviewed with the patient/guardian today  The treatment plan was reviewed with the patient/guardian  The patient/guardian understands and agrees with the treatment plan   The patient, patient's family was counseled regarding diagnostic results, instructions for management, risk factor reductions, prognosis, patient and family education, impressions, risks and benefits of treatment options, importance of compliance with treatment  Chief Complaint  CK UP AFTER D/C FROM HOSP      History of Present Illness  as above  - pt recently had vein study that showed L common femoral vein DVT (at near the junction with the greater saphenous vein)  Pt was hospitalized 2/19->2/22 at [facility]  Pt discharged on Coumadin and LMWH  Presently taking 5mg of coumadin a day INR done this morning - results pending   - BGs are somewhat labile at present  Pt notes that her mornings are good, but her lunchtime BGs are high  Pt may also be high after her evening meal I reviewed present meds  Pt is trying to watch diet        Review of Systems    Constitutional: as noted in HPI  Eyes: No complaints of eye pain, no red eyes, no eyesight problems, no discharge, no dry eyes, no itching of eyes  ENT: no complaints of earache, no loss of hearing, no nose bleeds, no nasal discharge, no sore throat, no hoarseness  Cardiovascular: No complaints of slow heart rate, no fast heart rate, no chest pain, no palpitations, no leg claudication, no lower extremity edema  Respiratory: No complaints of shortness of breath, no wheezing, no cough, no SOB on exertion, no orthopnea, no PND  Gastrointestinal: No complaints of abdominal pain, no constipation, no nausea or vomiting, no diarrhea, no bloody stools  Musculoskeletal: as noted in HPI  Integumentary: No complaints of skin rash or lesions, no itching, no skin wounds, no breast pain or lump  Neurological: as noted in HPI  Endocrine: No complaints of proptosis, no hot flashes, no muscle weakness, no deepening of the voice, no feelings of weakness  Hematologic/Lymphatic: No complaints of swollen glands, no swollen glands in the neck, does not bleed easily, does not bruise easily  Active Problems    1  Abnormal electrocardiogram (794 31) (R94 31)   2  Abnormal serum creatinine level (790 99) (R79 9)   3  Acute diastolic heart failure (440 49) (I50 31)   4  Acute kidney injury (584 9) (N17 9)   5  Anemia (285 9) (D64 9)   6  Anxiety disorder (300 00) (F41 9)   7  Biceps tendinitis on right (726 12) (M75 21)   8  Cellulitis of leg, left (682 6) (L03 116)   9  Chronic kidney disease, stage 3 (585 3) (N18 3)   10  Chronic obstructive pulmonary disease (496) (J44 9)   11  Chronic Tear Of Right Rotator Cuff Tendon (726 19)   12  Chronic venous insufficiency (459 81) (I87 2)   13  Complete heart block (426 0) (I44 2)   14  Complete tear of right rotator cuff (727 61) (M75 121)   15  De Quervain's tenosynovitis (727 04) (M65 4)   16  Deep vein thrombosis, left (453 40) (I82 402)   17   Diabetic retinopathy (250 50,362 01) (E11 319)   18  DM type 2, not at goal (250 00) (E11 9)   19  Drug toxicity (796 0) (R89 2)   20  Eczematous dermatitis (692 9) (L30 9)   21  Edema (782 3) (R60 9)   22  Encounter for screening mammogram for malignant neoplasm of breast (V76 12)    (Z12 31)   23  Glaucoma Screening   24  Herpes zoster (053 9) (B02 9)   25  Hypercholesterolemia (272 0) (E78 0)   26  Hypertension (401 9) (I10)   27  Hypothyroidism (244 9) (E03 9)   28  Immunization due (V05 9) (Z23)   29  Impacted cerumen, unspecified laterality (380 4) (H61 20)   30  Medicare annual wellness visit, subsequent (V70 0) (Z00 00)   31  Memory Lapses Or Loss (780 93)   32  Need for prophylactic vaccination and inoculation against influenza (V04 81) (Z23)   33  Neoplasm of skin (239 2) (D49 2)   34  Osteoarthritis of knee (715 36) (M17 9)   35  Pain in wrist, unspecified laterality (719 43) (M25 539)   36  Permanent Pacemaker Type Dual-Chamber   37  Preoperative cardiovascular examination (V72 81) (Z01 810)   38  Rotator cuff tear, right (840 4) (M75 101)   39  Rotator cuff tendinitis, unspecified laterality (726 10) (M75 80)   40  Sciatica (724 3) (M54 30)   41  Screening for genitourinary condition (V81 6) (Z13 89)   42  Screening for glaucoma (V80 1) (Z13 5)   43  Screening for neurological condition (V80 09) (Z13 89)   44  Sleep apnea (780 57) (G47 30)   45  Tenosynovitis, wrist (727 05) (M65 9)   46  Urge incontinence of urine (788 31) (N39 41)    Past Medical History    1  History of Arthritis (V13 4)   2  History of Diabetes Mellitus (250 00)   3  History of hypertension (V12 59) (Z86 79)   4  History of thyroid disease (V12 29) (Z86 39)   5  History of uterine leiomyoma (V13 29) (Z86 018)   6  Need for prophylactic vaccination and inoculation against influenza (V04 81) (Z23)    The active problems and past medical history were reviewed and updated today  Surgical History    1  History of Cataract Surgery   2  History of Eye Surgery   3  History of Hysterectomy   4  History of Knee Replacement   5  History of Pacemaker Permanent Placement   6  History of Reported Hx Of Breast Surgery For Biopsy   7  History of Total Abdominal Hysterectomy    The surgical history was reviewed and updated today  Family History    1  Family history of Coronary Artery Disease (V17 49)   2  Family history of Heart Disease (V17 49)   3  Family history of Hypertension (V17 49)    4  Family history of Diabetes Mellitus (V18 0)    5  Family history of Diabetes Mellitus (V18 0)   6  Family history of Diabetes Mellitus (V18 0)    7  Family history of Breast Cancer (V16 3)   8  Family history of Carcinoma Of The Pancreas    Social History    · Denied: History of Alcohol Use (History)   · Daily Coffee Consumption (2  Cups/Day)   · Daily Cola Consumption (___ Cans/Day)   · Daily Tea Consumption (___ Cups/Day)   · Denied: History of Drug Use   · Marital History - Currently    · Never A Smoker   · Denied: History of Tobacco Use  The social history was reviewed and updated today  Current Meds   1  Aspirin 81 MG Oral Tablet Delayed Release; Take 1 daily Recorded   2  Citalopram Hydrobromide 20 MG Oral Tablet; TAKE ONE TABLET EVERY DAY; Therapy: 21KQB4578 to (Evaluate:56Gvn6447)  Requested for: 96Qlv5888; Last   Rx:00Nrr7800 Ordered   3  [employer] Stool Softener CAPS Recorded   4  Fish Oil 1200 MG Oral Capsule; 1 tid Recorded   5  Furosemide 40 MG Oral Tablet; 1 5 qd Recorded   6  Fileforce Short Pen Needles 31G X 8 MM Miscellaneous; Therapy: 30HZE9915 to (Last Rx:30Mar2011)  Requested for: 16PVP2619 Ordered   7  Hydrocodone-Acetaminophen 5-325 MG Oral Tablet; TAKE TABLET  PRN; Therapy: 21TGC5381 to Recorded   8  Levemir FlexTouch 100 UNIT/ML Subcutaneous Solution Pen-injector; 30uqam, 20u qhs   USE AS DIRECTED; Therapy: 86DAU2629- Recorded   9  Levothyroxine Sodium 125 MCG Oral Tablet; TAKE 1 TABLET DAILY AS DIRECTED; Therapy: 60LKZ4405 to Recorded   10  Lisinopril 20 MG Oral Tablet; Take 1 tablet daily; Therapy: 65DAT0058 to (Last Rx:18Feb2016)  Requested for: 25YLD1960 Ordered   11  NovoLOG FlexPen 100 UNIT/ML Subcutaneous Solution Pen-injector; 8 u with breakfast,    5 u with lunch and 8 u with dinner; Therapy: (Recorded:76Exj2701) to Recorded   12  Ocuvite Extra Oral Tablet; Take 1 tablet daily Recorded   13  Osteo Bi-Flex Adv Triple St TABS; 1 TAB BID Recorded   14  Oxybutynin Chloride 5 MG Oral Tablet; Take 1 tablet twice daily; Therapy: 65NJF5961 to (Evaluate:48Lqz1637)  Requested for: 07DCC3657; Last    Rx:57Sxb8421 Ordered   15  Pravastatin Sodium 40 MG Oral Tablet; take 1 tablet every day; Therapy: 26XPF3045 to (Evaluate:82Seo9193)  Requested for: 77Czh5240; Last    Rx:38Brq2121 Ordered   16  Vitamin C 500 MG Oral Capsule; TAKE 1 CAPSULE DAILY; Last Rx:95Dyo8595 Ordered   17  Warfarin Sodium 5 MG Oral Tablet; Therapy: 47Ksp0801 to (Evaluate:26Mar2016) Recorded    The medication list was reviewed and updated today  Allergies    1  Ampicillin CAPS    Vitals  Vital Signs [Data Includes: Current Encounter]    Recorded: 25Feb2016 02:18PM   Temperature 97 6 F   Heart Rate 60   Systolic 216   Diastolic 68   Height 5 ft 1 in   Weight 227 lb    BMI Calculated 42 89   BSA Calculated 1 99     Physical Exam    Constitutional   General appearance: No acute distress, well appearing and well nourished  Head and Face   Head and face: Normal     Eyes   Conjunctiva and lids: No swelling, erythema or discharge  Pupils and irises: Equal, round, reactive to light  Ophthalmoscopic examination: Abnormal   fundi difficult to appreciate  Ears, Nose, Mouth, and Throat   External inspection of ears and nose: Normal     Otoscopic examination: Tympanic membranes translucent with normal light reflex  Canals patent without erythema  Nasal mucosa, septum, and turbinates: Normal without edema or erythema  Lips, teeth, and gums: Normal, good dentition  Oropharynx: Normal with no erythema, edema, exudate or lesions  Neck   Neck: Supple, symmetric, trachea midline, no masses  Thyroid: Normal, no thyromegaly  Pulmonary   Respiratory effort: No increased work of breathing or signs of respiratory distress  Auscultation of lungs: Clear to auscultation  Cardiovascular   Auscultation of heart: Normal rate and rhythm, normal S1 and S2, no murmurs  No S3    Carotid pulses: 2+ bilaterally  Abdominal aorta: Normal     Pedal pulses: 2+ bilaterally  Peripheral vascular exam: Normal     Examination of extremities for edema and/or varicosities: Abnormal   trace - 1+ edema bilat  Abdomen   Abdomen: Non-tender, no masses  Liver and spleen: No hepatomegaly or splenomegaly  Lymphatic   Palpation of lymph nodes in neck: No lymphadenopathy  Palpation of lymph nodes in other areas: No lymphadenopathy  Musculoskeletal   Gait and station: Abnormal   antalgic gait  changes  Digits and nails: Normal without clubbing or cyanosis  Joints, bones, and muscles: Abnormal   Knees with sl OA  Edema as aboe  Neg Surekha sign No calf tenderness  Skin   Skin and subcutaneous tissue: Normal without rashes or lesions  Neurologic   Cranial nerves: Cranial nerves II-XII intact  Cortical function: Normal mental status  Sensation: No sensory loss         Results/Data  Encounter Results   (1) PT WITH INR [id number] 03:01PM Alma Smiling     Test Name Result Flag Reference   INR 2 0       Coumadin Flow Sheet [id number] 03:01PM      Test Name Result Flag Reference   Diagnosis I82 402     Managing Provider INR Goal Range 2 0-3 0     Recheck INR [id number]     Current Dose 5 mg daily     New Dose same no change     Patient Notified yes per dr Salvatore Cruz    Date/Time Provider Specialty Site   05/10/2016 08:30 AM Krishna Brown    08/11/2016 08:00 AM Cardiology, Device [facility] [address]   05/24/2016 10:15 AM Danielle Fierro, DO Vascular [facility]  [place]   04/26/2016 10:00 AM MANJEET Madrid MD, [address]   03/01/2016 10:00 AM Jeannette Velasquez, [facility] C[facility]IOLOGY [facility]     Signatures   Electronically signed by : GILBERT Rick ,MD; Feb 26 2016 12:02PM EST                       (Author)

## 2018-01-14 VITALS
WEIGHT: 228.2 LBS | BODY MASS INDEX: 43.08 KG/M2 | SYSTOLIC BLOOD PRESSURE: 130 MMHG | DIASTOLIC BLOOD PRESSURE: 64 MMHG | HEIGHT: 61 IN

## 2018-01-14 VITALS
HEART RATE: 78 BPM | HEIGHT: 61 IN | WEIGHT: 223 LBS | BODY MASS INDEX: 42.1 KG/M2 | TEMPERATURE: 97.5 F | DIASTOLIC BLOOD PRESSURE: 70 MMHG | SYSTOLIC BLOOD PRESSURE: 118 MMHG

## 2018-01-14 VITALS
HEART RATE: 68 BPM | DIASTOLIC BLOOD PRESSURE: 78 MMHG | WEIGHT: 234 LBS | BODY MASS INDEX: 44.18 KG/M2 | SYSTOLIC BLOOD PRESSURE: 136 MMHG | HEIGHT: 61 IN

## 2018-01-14 VITALS
DIASTOLIC BLOOD PRESSURE: 62 MMHG | BODY MASS INDEX: 44.46 KG/M2 | HEIGHT: 61 IN | WEIGHT: 235.5 LBS | HEART RATE: 68 BPM | SYSTOLIC BLOOD PRESSURE: 128 MMHG

## 2018-01-14 VITALS
TEMPERATURE: 98 F | RESPIRATION RATE: 16 BRPM | SYSTOLIC BLOOD PRESSURE: 136 MMHG | WEIGHT: 230.25 LBS | BODY MASS INDEX: 43.47 KG/M2 | HEIGHT: 61 IN | HEART RATE: 100 BPM | DIASTOLIC BLOOD PRESSURE: 64 MMHG

## 2018-01-14 VITALS
DIASTOLIC BLOOD PRESSURE: 68 MMHG | WEIGHT: 234.44 LBS | HEIGHT: 61 IN | HEART RATE: 69 BPM | SYSTOLIC BLOOD PRESSURE: 126 MMHG | BODY MASS INDEX: 44.26 KG/M2

## 2018-01-14 NOTE — MISCELLANEOUS
Message  pt called, her evlt was cx 2/19 because at the time of the procedure she was found to have acute dvt LLE  she is on lovenox and coumadin and states her pcp is managing, she is to f/u in office (transf to [place] to sched apt)  she is ? if she is to cont to use her lymphedema pump  Per Alexis Benz PA-C she confirmed w/ Dr Nilson Alexander pt should note use w/ dx of acute dvt pt notified of same  Active Problems    1  Abnormal electrocardiogram (794 31) (R94 31)   2  Abnormal serum creatinine level (790 99) (R79 9)   3  Acute diastolic heart failure (684 73) (I50 31)   4  Acute kidney injury (584 9) (N17 9)   5  Anemia (285 9) (D64 9)   6  Anxiety disorder (300 00) (F41 9)   7  Biceps tendinitis on right (726 12) (M75 21)   8  Cellulitis of leg, left (682 6) (L03 116)   9  Chronic kidney disease, stage 3 (585 3) (N18 3)   10  Chronic obstructive pulmonary disease (496) (J44 9)   11  Chronic Tear Of Right Rotator Cuff Tendon (726 19)   12  Chronic venous insufficiency (459 81) (I87 2)   13  Complete heart block (426 0) (I44 2)   14  Complete tear of right rotator cuff (727 61) (M75 121)   15  De Quervain's tenosynovitis (727 04) (M65 4)   16  Deep vein thrombosis, left (453 40) (I82 402)   17  Diabetic retinopathy (250 50,362 01) (E11 319)   18  DM type 2, not at goal (250 00) (E11 9)   19  Drug toxicity (796 0) (R89 2)   20  Eczematous dermatitis (692 9) (L30 9)   21  Edema (782 3) (R60 9)   22  Encounter for screening mammogram for malignant neoplasm of breast (V76 12)    (Z12 31)   23  Glaucoma Screening   24  Herpes zoster (053 9) (B02 9)   25  Hypercholesterolemia (272 0) (E78 0)   26  Hypertension (401 9) (I10)   27  Hypothyroidism (244 9) (E03 9)   28  Immunization due (V05 9) (Z23)   29  Impacted cerumen, unspecified laterality (380 4) (H61 20)   30  Medicare annual wellness visit, subsequent (V70 0) (Z00 00)   31  Memory Lapses Or Loss (672 93)   32   Need for prophylactic vaccination and inoculation against influenza (V04 81) (Z23)   33  Neoplasm of skin (239 2) (D49 2)   34  Osteoarthritis of knee (715 36) (M17 9)   35  Pain in wrist, unspecified laterality (719 43) (M25 539)   36  Permanent Pacemaker Type Dual-Chamber   37  Preoperative cardiovascular examination (V72 81) (Z01 810)   38  Rotator cuff tear, right (840 4) (M75 101)   39  Rotator cuff tendinitis, unspecified laterality (726 10) (M75 80)   40  Sciatica (724 3) (M54 30)   41  Screening for genitourinary condition (V81 6) (Z13 89)   42  Screening for glaucoma (V80 1) (Z13 5)   43  Screening for neurological condition (V80 09) (Z13 89)   44  Sleep apnea (780 57) (G47 30)   45  Tenosynovitis, wrist (727 05) (M65 9)   46  Urge incontinence of urine (788 31) (N39 41)    Current Meds   1  Aspirin 81 MG Oral Tablet Delayed Release; Take 1 daily Recorded   2  Citalopram Hydrobromide 20 MG Oral Tablet; TAKE ONE TABLET EVERY DAY; Therapy: 66JMR4142 to (Evaluate:94Tgu7653)  Requested for: 05Deq0003; Last   Rx:43Jdu7442 Ordered   3  [employer] Stool Softener CAPS Recorded   4  Ferrous Sulfate 325 (65 Fe) MG Oral Tablet; TAKE 1 TABLET DAILY AS DIRECTED; Therapy: 88RYC8437 to Recorded   5  Fish Oil 1200 MG Oral Capsule; 1 tid Recorded   6  Furosemide 40 MG Oral Tablet; Take 1 tablet daily; Therapy: 11LTG2997 to (Evaluate:20Vwo0194)  Requested for: 34Uvh1978; Last   Rx:39Eik9279 Ordered   7  GlyBURIDE 5 MG Oral Tablet; TAKE 1 TABLET TWICE DAILY BEFORE MEALS; Therapy: (Recorded:15Oct2015) to Recorded   8  [employer] Short Pen Needles 31G X 8 MM Miscellaneous; Therapy: 36HNB1631 to (Last Rx:30Mar2011)  Requested for: 58CPU8980 Ordered   9  Hydrocodone-Acetaminophen 5-325 MG Oral Tablet; TAKE TABLET  PRN; Therapy: 17MAC7851 to Recorded   10  Levemir FlexTouch 100 UNIT/ML Subcutaneous Solution Pen-injector; USE AS    DIRECTED; Therapy: 93PFT7337 to Recorded   11  Levothyroxine Sodium 125 MCG Oral Tablet; TAKE 1 TABLET DAILY AS DIRECTED;     Therapy: 54LCR0304 to Recorded   12  Lisinopril 20 MG Oral Tablet; Take 1 tablet daily; Therapy: 06NVU5488 to (Last Rx:18Feb2016)  Requested for: 80FFF2816 Ordered   13  Metoprolol Tartrate 25 MG Oral Tablet; TAKE ONE-HALF TABLET BY MOUTH EVERY 12    HOURS; Therapy: 81VCM6675 to (Evaluate:07Jun2016)  Requested for: 55WAB4185; Last    Rx:03Nov2015 Ordered   14  NovoLOG FlexPen 100 UNIT/ML Subcutaneous Solution Pen-injector; INJECT 5 UNIT    Before meals and at bedtime; Therapy: 19AGT9475 to (Last Rx:05Vef6056) Ordered   15  Ocuvite Extra Oral Tablet; Take 1 tablet daily Recorded   16  Osteo Bi-Flex Adv Triple St TABS; 1 TAB BID Recorded   17  Oxybutynin Chloride 5 MG Oral Tablet; Take 1 tablet twice daily; Therapy: 68NVZ2162 to (Evaluate:34Rsu6508)  Requested for: 55WJI8815; Last    Rx:18Feb2016 Ordered   18  Pravastatin Sodium 40 MG Oral Tablet; take 1 tablet every day; Therapy: 20FVI8700 to (Evaluate:80Kgr9642)  Requested for: 14Zzy7806; Last    Rx:11Urt6431 Ordered   19  Simvastatin 40 MG Oral Tablet; take 1 tablet by mouth once daily; Therapy: 66LRH1800 to (Evaluate:28Jan2017) Recorded   20  Vitamin C 500 MG Oral Capsule; TAKE 1 CAPSULE DAILY; Last Rx:84Myw9505 Ordered    Allergies    1   Ampicillin CAPS    Signatures   Electronically signed by : Erasmo Marley, ; Feb 23 2016  4:06PM EST                       (Author)

## 2018-01-14 NOTE — RESULT NOTES
Verified Results  * MAMMO SCREENING BILATERAL W CAD [id number] 12:34PM Oren Rees Order Number: [id number]    - Patient Instructions: To schedule this appointment, please contact Central Scheduling at [phone]  Do not wear any perfume, powder, lotion or deodorant on breast or underarm area  Please bring your doctors order, referral (if needed) and insurance information with you on the day of the test  Failure to bring this information may result in this test being rescheduled  Arrive 15 minutes prior to your appointment time to register  On the day of your test, please bring any prior mammogram or breast studies with you that were not performed at a [facility]  Failure to bring prior exams may result in your test needing to be rescheduled  Test Name Result Flag Reference   MAMMO SCREENING BILATERAL W CAD (Report)     Patient History:   Patient is postmenopausal    Family history of breast cancer in maternal aunt, colorectal    cancer at age 45 in maternal cousin, breast cancer at age 35 in    paternal aunt, breast cancer in paternal cousin, breast cancer at   age 64 in paternal cousin  WB Stereo Brst Biopsy of the right breast, December 31, 2012  Patient has never smoked  Patient's BMI is 43 6  Reason for exam: screening, asymptomatic  Mammo Screening Bilateral W CAD: November 27, 2017 - Check In #:    [de-identified]   Bilateral CC and MLO view(s) were taken  Technologist: ANUPAMA Franklin (R)(M)   Prior study comparison: October 5, 2016, mammo screening    bilateral W CAD performed at [place] [address]  July 13, 2015, bilateral digital screening mammogram    performed at [facility]  May 28,    2014, bilateral WB digitl bilat yue, performed at [address] [place] [address]  May 20, 2013, bilateral WB digitl bilat    yue, performed at [address] [place] [address]   June 1, 2012, digital bilateral mammogram, performed at [facility]     April 27, 2011, digital bilateral mammogram, performed at [facility]      The breast tissue is almost entirely fat  No new dominant soft    tissue mass, architectural distortion or suspicious    calcifications are noted  The skin and nipple structures are    within normal limits  Benign appearing calcifications are noted  Stable nodule present in the left breast        No mammographic evidence of malignancy  No    significant changes when compared with prior studies  ACR BI-RADSï¾® Assessments: BiRad:2 - Benign     Recommendation:   Routine screening mammogram of both breasts in 1 year  Analyzed by CAD     The patient is scheduled in a reminder system for screening    mammography  8-10% of cancers will be missed on mammography  Management of a    palpable abnormality must be based on clinical grounds  Patients   will be notified of their results via letter from our facility  Accredited by [employer] of Radiology and FDA  Transcription Location: ANUPAMA Rocha 98: [id number]     Risk Value(s):   Tyrer-Cuzick 10 Year: 3 700%, Tyrer-Cuzick Lifetime: 3 700%,    Myriad Table: 2 6%, NARENDRA 5 Year: 1 9%, NCI Lifetime: 4 0%, MRS    : Based on personal and/or family history,    consideration of hereditary risk assessment may be warranted     Signed by:   Gurpreet Barrett MD   11/27/17

## 2018-01-15 VITALS
SYSTOLIC BLOOD PRESSURE: 130 MMHG | DIASTOLIC BLOOD PRESSURE: 74 MMHG | HEART RATE: 88 BPM | BODY MASS INDEX: 43.33 KG/M2 | HEIGHT: 61 IN | WEIGHT: 229.5 LBS

## 2018-01-15 VITALS
HEIGHT: 61 IN | DIASTOLIC BLOOD PRESSURE: 74 MMHG | HEART RATE: 78 BPM | BODY MASS INDEX: 43.99 KG/M2 | WEIGHT: 233 LBS | SYSTOLIC BLOOD PRESSURE: 136 MMHG

## 2018-01-15 NOTE — MISCELLANEOUS
Message  pt called, she is sched for evlt Friday  She just read pre op instructions and it says to hold vitamins/supplements one week prior  She takes vit c, occuvite, osteobiflex and fish oil  She will begin holding today and is ? if that's ok since surgery is Friday? emailed Dr Romeo Guillen re: above  Active Problems    1  Abnormal electrocardiogram (794 31) (R94 31)   2  Acute deep vein thrombosis (453 40) (I82 409)   3  Acute diastolic heart failure (286 40) (I50 31)   4  Anemia (285 9) (D64 9)   5  Anxiety disorder (300 00) (F41 9)   6  Biceps tendinitis on right (726 12) (M75 21)   7  Bradycardia (427 89) (R00 1)   8  Cellulitis of leg, left (682 6) (L03 116)   9  Chronic diastolic congestive heart failure (428 32,428 0) (I50 32)   10  Chronic kidney disease, stage 3 (585 3) (N18 3)   11  Chronic obstructive pulmonary disease (496) (J44 9)   12  Chronic Tear Of Right Rotator Cuff Tendon (726 19)   13  Chronic venous insufficiency (459 81) (I87 2)   14  Complete heart block (426 0) (I44 2)   15  Complete tear of right rotator cuff (727 61) (M75 121)   16  De Quervain's tenosynovitis (727 04) (M65 4)   17  Deep vein thrombosis of left femoral vein (453 41) (I82 412)   18  Diabetic hypoglycemia (250 80) (E11 649)   19  Diabetic retinopathy (250 50,362 01) (E11 319)   20  DM type 2, not at goal (250 00) (E11 9)   21  Drug toxicity (796 0) (R89 2)   22  Eczematous dermatitis (692 9) (L30 9)   23  Edema (782 3) (R60 9)   24  Encounter for screening mammogram for malignant neoplasm of breast (V76 12)    (Z12 31)   25  Glaucoma Screening   26  Herpes zoster (053 9) (B02 9)   27  History of deep venous thrombosis (V12 51) (Z86 718)   28  History of DVT of lower extremity (V12 51) (Z86 718)   29  Hypercholesterolemia (272 0) (E78 00)   30  Hypertension (401 9) (I10)   31  Hypothyroidism (244 9) (E03 9)   32  Immunization due (V05 9) (Z23)   33  Impacted cerumen, unspecified laterality (380 4) (H61 20)   34  Medicare annual wellness visit, subsequent (V70 0) (Z00 00)   35  Memory Lapses Or Loss (780 93)   36  Need for prophylactic vaccination and inoculation against influenza (V04 81) (Z23)   37  Neoplasm of skin (239 2) (D49 2)   38  Osteoarthritis of knee (715 36) (M17 9)   39  PAF (paroxysmal atrial fibrillation) (427 31) (I48 0)   40  Pain in wrist, unspecified laterality (719 43) (M25 539)   41  Permanent Pacemaker Type Dual-Chamber   42  Preoperative cardiovascular examination (V72 81) (Z01 810)   43  Rotator cuff tear, right (840 4) (M75 101)   44  Rotator cuff tendinitis, unspecified laterality (726 10) (M75 80)   45  Sciatica (724 3) (M54 30)   46  Screening for colorectal cancer (V76 51) (Z12 11,Z12 12)   47  Screening for genitourinary condition (V81 6) (Z13 89)   48  Screening for glaucoma (V80 1) (Z13 5)   49  Screening for neurological condition (V80 09) (Z13 89)   50  Sleep apnea (780 57) (G47 30)   51  Tenosynovitis, wrist (727 05) (M65 9)   52  Urge incontinence of urine (788 31) (N39 41)    Current Meds   1  Atorvastatin Calcium 80 MG Oral Tablet; TAKE 1 TABLET AT BEDTIME; Therapy: 12Apr2016 to Recorded   2  Citalopram Hydrobromide 20 MG Oral Tablet; TAKE ONE TABLET EVERY DAY; Therapy: 78TQD0636 to (Evaluate:68Chq6809)  Requested for: 26Kbe2273; Last   Rx:70Evt6006 Ordered   3  Eliquis 5 MG Oral Tablet; Take 1 tablet twice daily; Therapy: 43DSK7574 to (NXMXJTQ:61KVO5698)  Requested for: 79KHF0876; Last   Rx:16Jan2017 Ordered   4  Fish Oil 1200 MG Oral Capsule; 1 tid Recorded   5  Furosemide 40 MG Oral Tablet; Take 1 5 tablets daily  Requested for: 12Apr2016; Last   Rx:12Apr2016 Ordered   6  [employer] Short Pen Needles 31G X 8 MM Miscellaneous; Therapy: 31YBX3481 to (Last Rx:30Mar2011)  Requested for: 27SOX1130 Ordered   7  Levothyroxine Sodium 125 MCG Oral Tablet; TAKE 1 TABLET DAILY  Requested for:   84UQU0799; Last Rx:70Ysk9748 Ordered   8  Lisinopril 20 MG Oral Tablet;  Take 1 tablet daily; Therapy: 70XZL2691 to (Bi Close)  Requested for: 67AKE6885; Last   Rx:49Pwf0457 Ordered   9  Metoprolol Tartrate 25 MG Oral Tablet; TAKE 1/2 TABLET EVERY 12 HOURS; Therapy: 70ZKX5929 to (03 17 74 30 53)  Requested for: 54YED6136; Last   Rx:01Cvr0530 Ordered   10  NovoLOG FlexPen 100 UNIT/ML Subcutaneous Solution Pen-injector; 5u with breakfast,    8u with lunch and 8u with dinner  Requested for: 48Evp2949; Last    Rx:28Fnq1031 Ordered   11  Ocuvite Extra Oral Tablet; Take 1 tablet daily Recorded   12  Osteo Bi-Flex Adv Triple St TABS; 1 TAB BID Recorded   13  Oxybutynin Chloride 5 MG Oral Tablet; Take 1 tablet twice daily; Therapy: 76QHO1036 to (Evaluate:23Tfq7200)  Requested for: 25CAI1719; Last    Rx:99Wof6631 Ordered   14  Toujeo SoloStar 300 UNIT/ML Subcutaneous Solution Pen-injector; 30 units in am and    20 units in pm  Requested for: 53NLS5416; Last Rx:12Jan2017 Ordered   15  Vitamin C 500 MG Oral Capsule; TAKE 1 CAPSULE DAILY; Last Rx:78Dwq7618 Ordered    Allergies    1   Ampicillin CAPS    Signatures   Electronically signed by : Sanam Chow, ; Feb 14 2017  5:20PM EST                       (Author)

## 2018-01-16 NOTE — PROGRESS NOTES
Plan    1  DSMT/MNT Time Record; Status:Complete;   Done: 26KDL7302 04:06PM    Discussion/Summary    PATIENT EDUCATION RECORD   Indication for Services: hypertension, type 2 Diabetes Mellitus, Kidney problems, hyperlipidemia and obesity  She is ready to learn  She has no barriers to learning  Diabetes Disease Process:   She understands the pathophysiology of diabetes: Method: Instruction  Response: Verbalizes Understanding   Discussed patient's type of diabetes: Method: Instruction  Response: Verbalizes Understanding   Discussed diagnosis criteria: Method: Instruction  Response: Verbalizes Understanding   Discussed treatment goals: Method: Instruction  Response: Verbalizes Understanding   Discussed benefits of control: Method: Instruction  Response: Verbalizes Understanding   Discussed treatment options: Method: Instruction  Response: Verbalizes Understanding   Healthy Eating:   Discussed general nutrition topics: Method: Instruction and Handout  Response: Verbalizes Understanding   Discussed food label reading: Method: Instruction and Demonstration  Response: Verbalizes Understanding   Being Active:   Stated the benefits of exercise: Method: Instruction  Response: Verbalizes Understanding   Discussed importance of managing body weight: Method: Instruction  Response: Verbalizes Understanding   Her blood glucose targets are: Pre-meal target  and Post-meal target <180  Monitoring:   Discussed target blood glucose ranges: Method: Instruction and Handout  Response: Verbalizes Understanding  Discussed target hemoglobin A1c: Method: Instruction  Response: Verbalizes Understanding  Discussed reporting of readings to M D : Method: Instruction  Response: Verbalizes Understanding  She is currently using a True Metrix meter  Discussed Insulin Types  Method: Instruction  Taking Medication: Response: Verbalizes Instruction  She is taking   She is taking insulin Toujeo 30 U hs, Novolog 10-8-10     Discussed onset, peak, and duration of insulin  Method: Instruction  Discussed side effects and precautions of insulin  Method: Instruction  Response: [email or web address]  Discussed basal-bolus concept  Method: Instruction  Response: [email or web address]  She was given Fast 15 List and Hypoglycemia Tear Sheet   Problem Solving: She is on medications that cause hypoglycemia, She is able to state the symptoms, prevention, and treatment of hypoglycemia   Hypoglycemia: Stated definition and causes: Method: Instruction and Handout  Response: Verbalizes Understanding   Described signs and symptoms: Method: Instruction and Handout  Response: Verbalizes Understanding   Discussed prevention: Method: Instruction and Handout  Response: Verbalizes Instruction   Discussed treatment: Method: Instruction and Handout  Response: Verbalizes Instruction   Hyperglycemia: Stated definition and causes: Method: Instruction and Handout  Response: Verbalizes Understanding   Described signs and symptoms: Method: Instruction and Handout  Response: Verbalizes Instruction   Reducing Risk:   Discussed long term complications- prevention, assessment, and monitoring  Method: Instruction  Response: [email or web address]  Healthy Coping Class:   Identified lifestyle behaviors that need to change: Method: Instruction  Response: Verbalizes Understanding   Discussed motivation to change: Method: Instruction  Response: Verbalizes Understanding   Identified goals for behavior change: Method: Instruction  Response: Verbalizes Understanding   Discussed strategies for change: Method: Instruction  Response: Verbalizes Understanding   Education Plan/Path:  She needs an individual consultation  She needs the Living Well Class  Recommended patient see: RD   She was given the following educational materials: The 15/15 Rule for Treating Low Blood Sugar, Blood Glucose Tracker, Diabetes Guidelines and Hyperglycemia/Hypoglycemia         Chief Complaint  Patient is here today for initial assessment for diabetes self management education for T2DM      History of Present Illness  Patient is a 77 y/o female with uncontrolled T2DM, CKD III, CHF, Retinopathy, HTN, MOrbid Obesity  She is taking her medications as prescribed and testing her BG fasting, before meals and bed  Readings are highly variable () which appears due to dietary indiscretions and eating high carb foods when insulin is waning  She was educated on basal/bolus insulin therapy, action, peak and duration of insulins and effect of food intake on BG and insulin coverage  She was given an estimate of 45 grams carb per meal until his MNT appointment  Active Problems    1  Abnormal electrocardiogram (794 31) (R94 31)   2  Acute diastolic heart failure (985 94) (I50 31)   3  Anemia (285 9) (D64 9)   4  Anxiety disorder (300 00) (F41 9)   5  Benign hypertension with chronic kidney disease, stage III (403 10,585 3) (I12 9,N18 3)   6  Biceps tendinitis on right (726 12) (M75 21)   7  Bradycardia (427 89) (R00 1)   8  Cellulitis of leg, left (682 6) (L03 116)   9  Chronic diastolic congestive heart failure (428 32,428 0) (I50 32)   10  Chronic kidney disease, stage 3 (585 3) (N18 3)   11  Chronic obstructive pulmonary disease (496) (J44 9)   12  Chronic Tear Of Right Rotator Cuff Tendon (726 19)   13  Chronic venous insufficiency (459 81) (I87 2)   14  Complete heart block (426 0) (I44 2)   15  Complete tear of right rotator cuff (727 61) (M75 121)   16  Cough (786 2) (R05)   17  De Quervain's tenosynovitis (727 04) (M65 4)   18  Deep vein thrombosis of left femoral vein (453 41) (I82 412)   19  Diabetic hypoglycemia (250 80) (E11 649)   20  Diabetic retinopathy (250 50,362 01) (E11 319)   21  DM type 2, not at goal (250 00) (E11 9)   22  Drug toxicity (796 0) (R89 2)   23  Eczematous dermatitis (692 9) (L30 9)   24  Edema (782 3) (R60 9)   25   Encounter for screening mammogram for malignant neoplasm of breast (V76 12) (Z12 31)   26  Glaucoma Screening   27  Hematuria (599 70) (R31 9)   28  Herpes zoster (053 9) (B02 9)   29  History of deep venous thrombosis (V12 51) (Z86 718)   30  History of DVT of lower extremity (V12 51) (Z86 718)   31  Hypercholesterolemia (272 0) (E78 00)   32  Hypothyroidism (244 9) (E03 9)   33  Immunization due (V05 9) (Z23)   34  Impacted cerumen, unspecified laterality (380 4) (H61 20)   35  Medicare annual wellness visit, subsequent (V70 0) (Z00 00)   36  Memory Lapses Or Loss (780 93)   37  Microalbuminuria (791 0) (R80 9)   38  Need for prophylactic vaccination and inoculation against influenza (V04 81) (Z23)   39  Neoplasm of skin (239 2) (D49 2)   40  Obesity (BMI 30-39 9) (278 00) (E66 9)   41  Osteoarthritis of knee (715 36) (M17 10)   42  PAF (paroxysmal atrial fibrillation) (427 31) (I48 0)   43  Pain in wrist, unspecified laterality (719 43) (M25 539)   44  Permanent Pacemaker Type Dual-Chamber   45  Postoperative state (V45 89) (Z98 890)   46  Preoperative cardiovascular examination (V72 81) (Z01 810)   47  Rotator cuff tear, right (840 4) (M75 101)   48  Rotator cuff tendinitis, unspecified laterality (726 10) (M75 80)   49  Sciatica (724 3) (M54 30)   50  Screening for colorectal cancer (V76 51) (Z12 11,Z12 12)   51  Screening for genitourinary condition (V81 6) (Z13 89)   52  Screening for glaucoma (V80 1) (Z13 5)   53  Screening for neurological condition (V80 09) (Z13 89)   54  Sleep apnea (780 57) (G47 30)   55  Tenosynovitis, wrist (727 05) (M65 9)   56  Type 2 diabetes mellitus with hyperglycemia, unspecified long term insulin use status    (250 00) (E11 65)   57  Urge incontinence of urine (788 31) (N39 41)   58  Vitamin D deficiency (268 9) (E55 9)    Past Medical History    1  History of Arthritis (V13 4)   2  History of Diabetes Mellitus (250 00)   3  History of hypertension (V12 59) (Z86 79)   4  History of thyroid disease (V12 29) (Z86 39)   5   History of uterine leiomyoma (V13 29) (Z86 018)   6  Need for prophylactic vaccination and inoculation against influenza (V04 81) (Z23)    Surgical History    1  History of Cataract Surgery   2  History of Eye Surgery   3  History of Hysterectomy   4  History of Knee Replacement   5  History of Pacemaker Permanent Placement   6  History of Reported Hx Of Breast Surgery For Biopsy   7  History of Total Abdominal Hysterectomy    Family History  Mother    1  Family history of Coronary Artery Disease (V17 49)   2  Family history of hypertension (V17 49) (Z82 49)   3  Family history of Heart Disease (V17 49)   4  Family history of Hypertension (V17 49)  Father    5  Family history of Diabetes Mellitus (V18 0)   6  Family history of hypertension (V17 49) (Z82 49)  Brother    7  Family history of Diabetes Mellitus (V18 0)   8  Family history of Diabetes Mellitus (V18 0)  Maternal Aunt    9  Family history of Breast Cancer (V16 3)   10  Family history of Carcinoma Of The Pancreas    Social History    · Denied: History of Alcohol Use (History)   · Daily Coffee Consumption (2  Cups/Day)   · Daily Cola Consumption (___ Cans/Day)   · Daily Tea Consumption (___ Cups/Day)   · Denied: History of Drug Use   · Former smoker (V15 82) (O17 464)   · Has 3 children   · Marital History - Currently    ·    · Non-smoker (V49 89) (Z78 9)   · Denied: History of Sexually active   · Denied: History of Tobacco Use    Current Meds   1  Atorvastatin Calcium 80 MG Oral Tablet; TAKE 1 TABLET AT BEDTIME; Therapy: 12Apr2016 to (Evaluate:13Apr2018)  Requested for: 18Apr2017; Last   Rx:18Apr2017 Ordered   2  Citalopram Hydrobromide 20 MG Oral Tablet; TAKE ONE TABLET EVERY DAY; Therapy: 70ICK5489 to (Evaluate:03Mar2018)  Requested for: 66HHL0032; Last   Rx:08Mar2017 Ordered   3  Eliquis 5 MG Oral Tablet; Take 1 tablet twice daily; Therapy: 62NNB4773 to (DGDAXEEE:96GRN4693)  Requested for: 92AIX8802; Last   Rx:16Jan2017 Ordered   4   Fish Oil 1200 MG Oral Capsule; 1 tid Recorded   5  Furosemide 40 MG Oral Tablet; Take 1 tablet daily; Therapy: 89YMY5022 to  Requested for: 30Jun2017 Recorded   6  [employer] Short Pen Needles 31G X 8 MM Miscellaneous; Therapy: 91UUF9042 to (Last Rx:30Mar2011)  Requested for: 06HPB9822 Ordered   7  Levothyroxine Sodium 125 MCG Oral Tablet; TAKE 1 TABLET DAILY; Therapy: 49OBC9880 to (Lorenzo Hale)  Requested for: 30NUR9117; Last   Rx:10Mar2017 Ordered   8  Lisinopril 20 MG Oral Tablet; Take 1 tablet daily; Therapy: 30PPO1999 to (Hernan Garcia)  Requested for: 88IPK8908; Last   Rx:30Nov2016 Ordered   9  Metoprolol Tartrate 25 MG Oral Tablet; TAKE 1/2 TABLET EVERY 12 HOURS; Therapy: 76VLI8845 to (96 697062)  Requested for: 96MMT8419; Last   Rx:22Oct2016 Ordered   10  NovoLOG FlexPen 100 UNIT/ML Subcutaneous Solution Pen-injector; take 10 units  with    breakfast, 10 units with lunch and with dinner; Therapy: (Burke Jose) to  Requested for: 21Jun2017 Recorded   11  Ocuvite Extra Oral Tablet; Take 1 tablet daily Recorded   12  Osteo Bi-Flex Adv Triple St TABS; take 2 tablet daily; Therapy: (Burke Jose) to Recorded   13  Oxybutynin Chloride 5 MG Oral Tablet; Take 1 tablet twice daily; Therapy: 70OZF5101 to (Evaluate:76Hvt1783)  Requested for: 44SCD2633; Last    Rx:06Feb2017 Ordered   14  Promethazine-DM 6 25-15 MG/5ML Oral Syrup; 1 tsp qhs;    Therapy: 40UFG9812 to (Last Rx:91Bns6480)  Requested for: 40YJK0501; Status: ACTIVE -    [facility]   15  Toujeo SoloStar 300 UNIT/ML Subcutaneous Solution Pen-injector; take 30 units in am;    Therapy: (Recorded:21Jun2017) to  Requested for: 21Jun2017 Recorded   16  Vitamin C 500 MG Oral Capsule; TAKE 1 CAPSULE DAILY; Last Rx:24Fpu4217 Ordered    Allergies    1  Ampicillin CAPS    End of Encounter Meds    1  Citalopram Hydrobromide 20 MG Oral Tablet; TAKE ONE TABLET EVERY DAY;    Therapy: 40RQY9458 to (MNWQEQKQ:74BYV3807)  Requested for: 29VXI1001; Last   Rx:08Mar2017 Ordered    2  Promethazine-DM 6 25-15 MG/5ML Oral Syrup; 1 tsp qhs;   Therapy: 39OSA2694 to (Last Rx:12Zga3580)  Requested for: 10ZGK6465; Status: ACTIVE -   Transmit to Pharmacy - Awaiting Verification Ordered    3  NovoLOG FlexPen 100 UNIT/ML Subcutaneous Solution Pen-injector; take 10 units  with   breakfast, 10 units with lunch and with dinner; Therapy: (Delgado Mccoy) to  Requested for: 21Jun2017 Recorded   4  Toujeo SoloStar 300 UNIT/ML Subcutaneous Solution Pen-injector; take 30 units in am;   Therapy: (Recorded:21Jun2017) to  Requested for: 21Jun2017 Recorded    5  Furosemide 40 MG Oral Tablet; Take 1 tablet daily; Therapy: 28ZXO6555 to  Requested for: 30Jun2017 Recorded    6  Vitamin C 500 MG Oral Capsule; TAKE 1 CAPSULE DAILY; Last Rx:26Qmz4513 Ordered    7  Atorvastatin Calcium 80 MG Oral Tablet; TAKE 1 TABLET AT BEDTIME; Therapy: 66Etu3769 to (Evaluate:13Apr2018)  Requested for: 96Atf8940; Last   Rx:95Hyr7259 Ordered    8  Levothyroxine Sodium 125 MCG Oral Tablet; TAKE 1 TABLET DAILY; Therapy: 26MXS3305 to (Adore Ervin)  Requested for: 94YKG4482; Last   Rx:10Mar2017 Ordered    9  Eliquis 5 MG Oral Tablet; Take 1 tablet twice daily; Therapy: 54UYZ7930 to (MHTAAMUN:31BKB7446)  Requested for: 88HME1494; Last   Rx:16Jan2017 Ordered    10  Lisinopril 20 MG Oral Tablet; Take 1 tablet daily; Therapy: 41WFT2870 to (Kathrin Baldwin)  Requested for: 63OSW7269; Last    Rx:30Nov2016 Ordered   11  Metoprolol Tartrate 25 MG Oral Tablet; TAKE 1/2 TABLET EVERY 12 HOURS; Therapy: 78VJV2896 to (03 17 74 30 53)  Requested for: 63JJV5154; Last    Rx:67Rit5051 Ordered    12  Oxybutynin Chloride 5 MG Oral Tablet; Take 1 tablet twice daily; Therapy: 80DRW9439 to (Evaluate:36Eod8749)  Requested for: 05LFM9383; Last    Rx:25Vtf2562 Ordered    13  Fish Oil 1200 MG Oral Capsule; 1 tid Recorded   14   [facility] Short Pen Needles 31G X 8 MM Miscellaneous; Therapy: 28NHE6401 to (Last Rx:30Mar2011)  Requested for: 52MQY5951 Ordered   15  Ocuvite Extra Oral Tablet; Take 1 tablet daily Recorded   16  Osteo Bi-Flex Adv Triple St TABS; take 2 tablet daily; Therapy: (Delanna Linker) to Recorded    Future Appointments    Date/Time Provider Specialty Site   08/16/2017 01:30 PM Cardiology, Device Remote   [address]   11/20/2017 01:00 PM Cardiology, [address] [place] [address]   02/21/2018 08:30 AM Cardiology, Device Remote   [address]   09/26/2017 09:45 AM Gina Du, [address] Endocrinology [facility] ENDOCRINOLOGY [facility]   08/01/2017 01:45 PM Arti Chandler, [address] [place] [address] Diabetes Educator [facility] ENDOCRINOLOGY [facility]   07/26/2017 02:15 PM Kelsey Conteh MD Urology [address]   11/28/2017 09:15 AM MANJEET Daly   [address] [place] [address]     Signatures   Electronically signed by : Daniel Brown RD; Jul 12 2017  4:21PM EST                       (Author)    Electronically signed by : MANJEET Kerns ; Jul 13 2017  9:10AM EST

## 2018-01-16 NOTE — MISCELLANEOUS
Message  Message Free Text Note Form: i spoke with patient on 05/20/2016  recurrent nose bleeds  on Warfarin  asymptomatic at present  I recommended daily saline nasal spray  vaseline to septum at   ER evaluation if symptoms continue   pro time 05/23/2016      Signatures   Electronically signed by : MANJEET Dutta ; May 22 2016  8:36PM EST                       (Author)

## 2018-01-16 NOTE — PROCEDURES
Procedures by Crispin Jarrell MD at  2016  3:19 PM      Author:  Crispin Jarrell MD Service:  Cardiology Author Type:  Physician     Filed:  2016  3:27 PM Date of Service:  2016  3:19 PM Status:  Signed     :  Crispin Jarrell MD (Physician)         Pre-procedure Diagnoses:       1  AV block, 3rd degree [I44 2]       2  NSTEMI (non-ST elevated myocardial infarction) [I21 4]                Post-procedure Diagnoses:       1  AV block, 3rd degree [I44 2]       2  NSTEMI (non-ST elevated myocardial infarction) [I21 4]                Procedures:       1  TEMPORARY PACEMAKER SETTINGS [LZH84165]       2  CARDIAC CATHETERIZATION [CATH01 (Custom)]                   Temp pacer via R subclavian using fluoroscopic guidance  5 fr pacer  Good capture at RV apex  Cath  1  No obst CAD   LAD diffuse moderate atherosclerosis   30 prox CIRC calcified diffuse disease   RCA moderate atherosclerotic disease    2  EF 65   3  /18    Recommend: NSTEMI Type 2 likely secondary to 3rd degree aV block and syncope the previous day  DC Heperain and plavix  Check P CXR post pacer  Will need Kayla Odor M D    2016  3:28PM Lavinia Natal Standard Time

## 2018-01-16 NOTE — PROGRESS NOTES
Chief Complaint  Pt is here for a U/A dipstick due to urine frequency & Dysuria  U/A dipstick is positive for Leukocytes, Blood & Nitrate  Per Dr Lomeli rx sent to the patient's pharm for Cipro 250mg  Pt was instructed to call us if abx is not effective after 72hrs and/or if sx return upon completion of abx  Active Problems    1  Abnormal electrocardiogram (794 31) (R94 31)   2  Acute diastolic heart failure (910 08) (I50 31)   3  Anemia (285 9) (D64 9)   4  Anxiety disorder (300 00) (F41 9)   5  Benign hypertension with chronic kidney disease, stage III (403 10,585 3) (I12 9,N18 3)   6  Biceps tendinitis on right (726 12) (M75 21)   7  Bladder Incontinence   8  Bradycardia (427 89) (R00 1)   9  Cellulitis of leg, left (682 6) (L03 116)   10  Chronic diastolic congestive heart failure (428 32,428 0) (I50 32)   11  Chronic kidney disease, stage 3 (585 3) (N18 3)   12  Chronic obstructive pulmonary disease (496) (J44 9)   13  Chronic Tear Of Right Rotator Cuff Tendon (726 19)   14  Chronic venous insufficiency (459 81) (I87 2)   15  Complete heart block (426 0) (I44 2)   16  Complete tear of right rotator cuff (727 61) (M75 121)   17  Cough (786 2) (R05)   18  De Quervain's tenosynovitis (727 04) (M65 4)   19  Deep vein thrombosis of left femoral vein (453 41) (I82 412)   20  Diabetic hypoglycemia (250 80) (E11 649)   21  Diabetic retinopathy (250 50,362 01) (E11 319)   22  DM type 2, not at goal (250 00) (E11 9)   23  Drug toxicity (796 0) (R89 2)   24  Eczematous dermatitis (692 9) (L30 9)   25  Edema (782 3) (R60 9)   26  Encounter for screening mammogram for malignant neoplasm of breast (V76 12)    (Z12 31)   27  Glaucoma Screening   28  Hematuria (599 70) (R31 9)   29  Herpes zoster (053 9) (B02 9)   30  History of deep venous thrombosis (V12 51) (Z86 718)   31  History of DVT of lower extremity (V12 51) (Z86 718)   32  Hypercholesterolemia (272 0) (E78 00)   33  Hypothyroidism (244 9) (E03 9)   34  Immunization due (V05 9) (Z23)   35  Impacted cerumen, unspecified laterality (380 4) (H61 20)   36  Medicare annual wellness visit, subsequent (V70 0) (Z00 00)   37  Memory Lapses Or Loss (780 93)   38  Microalbuminuria (791 0) (R80 9)   39  Need for prophylactic vaccination and inoculation against influenza (V04 81) (Z23)   40  Neoplasm of skin (239 2) (D49 2)   41  OAB (overactive bladder) (596 51) (N32 81)   42  Obesity (BMI 30-39 9) (278 00) (E66 9)   43  Osteoarthritis of knee (715 36) (M17 10)   44  Pacemaker Permanent Placement Dual-Chamber   45  PAF (paroxysmal atrial fibrillation) (427 31) (I48 0)   46  Pain in wrist, unspecified laterality (719 43) (M25 539)   47  Postoperative state (V45 89) (Z98 890)   48  Preoperative cardiovascular examination (V72 81) (Z01 810)   49  Rotator cuff tear, right (840 4) (M75 101)   50  Rotator cuff tendinitis, unspecified laterality (726 10) (M75 80)   51  Sciatica (724 3) (M54 30)   52  Screening for colorectal cancer (V76 51) (Z12 11,Z12 12)   53  Screening for genitourinary condition (V81 6) (Z13 89)   54  Screening for glaucoma (V80 1) (Z13 5)   55  Screening for neurological condition (V80 09) (Z13 89)   56  Sleep apnea (780 57) (G47 30)   57  Tenosynovitis, wrist (727 05) (M65 9)   58  Type 2 diabetes mellitus with hyperglycemia, unspecified long term insulin use status    (250 00) (E11 65)   59  Urge incontinence of urine (788 31) (N39 41)   60  Visit for screening mammogram (V76 12) (Z12 31)   61  Vitamin D deficiency (268 9) (E55 9)    Current Meds   1  Atorvastatin Calcium 80 MG Oral Tablet; TAKE 1 TABLET AT BEDTIME; Therapy: 97Fzo8687 to (Evaluate:75Hyw4622)  Requested for: 18Apr2017; Last   Rx:18Apr2017 Ordered   2  Citalopram Hydrobromide 20 MG Oral Tablet; TAKE ONE TABLET EVERY DAY; Therapy: 89AJF5231 to (Evaluate:03Mar2018)  Requested for: 97RAO3345; Last   Rx:08Mar2017 Ordered   3  Eliquis 5 MG Oral Tablet; Take 1 tablet twice daily;    Therapy: 84MZH4668 to (VYZYKBIW:82RWE2278)  Requested for: 04ARB2221; Last   Rx:16Jan2017 Ordered   4  Fish Oil 1200 MG Oral Capsule; 1 tid Recorded   5  Furosemide 40 MG Oral Tablet; Take 1 tablet daily; Therapy: 35XRM7809 to  Requested for: 30Jun2017 Recorded   6  [employer] Short Pen Needles 31G X 8 MM Miscellaneous; Therapy: 98ESJ1611 to (Last Rx:30Mar2011)  Requested for: 02JCU6054 Ordered   7  Levothyroxine Sodium 125 MCG Oral Tablet; TAKE 1 TABLET DAILY; Therapy: 38EHJ5249 to (Kavon Dewey)  Requested for: 84GHB6678; Last   Rx:10Mar2017 Ordered   8  Lisinopril 20 MG Oral Tablet; Take 1 tablet daily; Therapy: 53TKF0081 to (Myra Purpura)  Requested for: 18SCA3817; Last   Rx:30Nov2016 Ordered   9  Metoprolol Tartrate 25 MG Oral Tablet; TAKE 1/2 TABLET EVERY 12 HOURS; Therapy: 21XFR6951 to (Evaluate:92Tzr1372)  Requested for: 54Uig8395; Last   Rx:72Aka9622 Ordered   10  NovoLOG FlexPen 100 UNIT/ML Subcutaneous Solution Pen-injector; take 10 units  with    breakfast, 10 units with lunch and with dinner; Therapy: (Sangeetha Eddy) to  Requested for: 21Jun2017 Recorded   11  Ocuvite Extra Oral Tablet; Take 1 tablet daily Recorded   12  Osteo Bi-Flex Adv Triple St TABS; take 2 tablet daily; Therapy: (Sangeetha Eddy) to Recorded   13  Oxybutynin Chloride ER 15 MG Oral Tablet Extended Release 24 Hour; Take 1 tablet    daily; Therapy: 31KOU4131 to (Evaluate:34Zmm2976)  Requested for: 95AMH6741; Last    Rx:99Bmv4867 Ordered   14  Toujeo SoloStar 300 UNIT/ML Subcutaneous Solution Pen-injector; take 28 units in the    evening  Requested for: 75Ypx4494; Last Rx:57Qwy4806 Ordered   15  Vitamin C 500 MG Oral Capsule; TAKE 1 CAPSULE DAILY; Last Rx:85Ovv1604 Ordered    Allergies    1   Ampicillin CAPS    Results/Data  Urine Dip Non-Automated- POC 94AID4309 02:20WILSON Man     Test Name Result Flag Reference   Color Yellow     Clarity Cloudy     Leukocytes 3+++     Nitrite pos     Blood 2++ Bilirubin neg     Urobilinogen 0 2     Protein 1+     Ph 5 0     Specific Gravity 1 000     Ketone neg     Glucose neg         Plan  UTI (urinary tract infection)    · Ciprofloxacin HCl - 250 MG Oral Tablet (Cipro); Take 1 tablet twice daily   · Urine Dip Non-Automated- POC; Status:Complete;   Done: 85XEA0433 02:20PM    Future Appointments    Date/Time Provider Specialty Site   10/12/2017 03:40 PM MANJEET Caceres  Cardiology [facility] CARDIOLOGY [address] [facility]   11/20/2017 01:00 PM Cardiology, Device Remote   [address]   02/21/2018 08:30 AM Cardiology, Device Remote  [address]   12/27/2017 10:20 AM MANJEET Lal  Endocrinology [facility] ENDOCRINOLOGY [facility]   11/15/2017 10:30 AM Yolande Gabriel MD Urology [address]   11/28/2017 09:15 AM MANJEET Freitas  [address] [place] [address]   10/12/2017 11:10 AM MANJEET Reyes  Nephrology  [address] [facility]     Signatures   Electronically signed by :  Gloria Austin), ; Oct  6 2017  2:32PM EST                       (Author)    Electronically signed by : Stuart Bianchi DO; Oct  6 2017  2:38PM EST                       (Author)

## 2018-01-17 NOTE — RESULT NOTES
Verified Results  (1) TISSUE EXAM [id number] 03:38PM Graciela Castellon     Test Name Result Flag Reference   LAB AP CASE REPORT (Report)     Surgical Pathology Report             Case: [id number]                   Authorizing Provider: Monique Orta,  Collected:      07/03/2017                       MD                                       Pathologist:      Peyton Waite MD        Received:      07/05/2017 0850        Specimen:  Skin, Other, Forehead   LAB AP FINAL DIAGNOSIS      A  Skin, Forehead, shave biopsy:  - Verrucoid keratosis  Interpretation performed at [facility], [address] [place] [address], [address]        Electronically signed by Peyton Waite MD on 7/10/2017 at 3:38 PM   LAB AP SURGICAL ADDITIONAL INFORMATION (Report)     These tests were developed and their performance characteristics   determined by [facility] or [facility]  They may not be cleared or approved by the [place]  Food and   Drug Administration  The FDA has determined that such clearance or   approval is not necessary  These tests are used for clinical purposes  They should not be regarded as investigational or for research  This   laboratory has been approved by [facility], designated as a high-complexity   laboratory and is qualified to perform these tests  LAB AP GROSS DESCRIPTION (Report)     A  The specimen is received in formalin, labeled with the patient's name   and hospital number, and is designated forehead shave  The specimen   consists of one tan nonhairbearing keratotic skin papule which measures   0 5 x 0 5 x 0 3 cm  The margin of resection is inked green, and the   surface tips are inked red  Entirely submitted  One cassette, bisected  Collection time given; fixation time not greater than 60 5 hours    MAS

## 2018-01-17 NOTE — PROGRESS NOTES
Assessment    1  Chronic venous insufficiency (459 81) (I87 2)   2  Postoperative state (V45 89) (Z98 890)    Plan    1  Follow-up PRN Evaluation and Treatment  Follow-up  Status: Complete  Done:   46Vaq5036    Discussion/Summary  Discussion Summary:   Ms Gbariel Gray Is status post left greater saphenous vein EV LT  She reports leg feels fine with the exception of some mild bruising along the medial Knee crease from the dressing site  She is scheduled for her post intervention surveillance tomorrow 2/22  I have encouraged her to ambulate as frequently as possible  She should wear some light compression stockings  She may resume her usual activities  She may shower  Follow-up on an as-needed basis  Chief Complaint  Chief Complaint Free Text Note Form: " I am here for my post op visit "         Active Problems    1  Abnormal electrocardiogram (794 31) (R94 31)   2  Acute deep vein thrombosis (453 40) (I82 409)   3  Acute diastolic heart failure (480 10) (I50 31)   4  Anemia (285 9) (D64 9)   5  Anxiety disorder (300 00) (F41 9)   6  Biceps tendinitis on right (726 12) (M75 21)   7  Bradycardia (427 89) (R00 1)   8  Cellulitis of leg, left (682 6) (L03 116)   9  Chronic diastolic congestive heart failure (428 32,428 0) (I50 32)   10  Chronic kidney disease, stage 3 (585 3) (N18 3)   11  Chronic obstructive pulmonary disease (496) (J44 9)   12  Chronic Tear Of Right Rotator Cuff Tendon (726 19)   13  Chronic venous insufficiency (459 81) (I87 2)   14  Complete heart block (426 0) (I44 2)   15  Complete tear of right rotator cuff (727 61) (M75 121)   16  De Quervain's tenosynovitis (727 04) (M65 4)   17  Deep vein thrombosis of left femoral vein (453 41) (I82 412)   18  Diabetic hypoglycemia (250 80) (E11 649)   19  Diabetic retinopathy (250 50,362 01) (E11 319)   20  DM type 2, not at goal (250 00) (E11 9)   21  Drug toxicity (796 0) (R89 2)   22  Eczematous dermatitis (692 9) (L30 9)   23   Edema (782 3) (R60 9)   24  Encounter for screening mammogram for malignant neoplasm of breast (V76 12)    (Z12 31)   25  Glaucoma Screening   26  Herpes zoster (053 9) (B02 9)   27  History of deep venous thrombosis (V12 51) (Z86 718)   28  History of DVT of lower extremity (V12 51) (Z86 718)   29  Hypercholesterolemia (272 0) (E78 00)   30  Hypertension (401 9) (I10)   31  Hypothyroidism (244 9) (E03 9)   32  Immunization due (V05 9) (Z23)   33  Impacted cerumen, unspecified laterality (380 4) (H61 20)   34  Medicare annual wellness visit, subsequent (V70 0) (Z00 00)   35  Memory Lapses Or Loss (780 93)   36  Need for prophylactic vaccination and inoculation against influenza (V04 81) (Z23)   37  Neoplasm of skin (239 2) (D49 2)   38  Osteoarthritis of knee (715 36) (M17 9)   39  PAF (paroxysmal atrial fibrillation) (427 31) (I48 0)   40  Pain in wrist, unspecified laterality (719 43) (M25 539)   41  Permanent Pacemaker Type Dual-Chamber   42  Preoperative cardiovascular examination (V72 81) (Z01 810)   43  Rotator cuff tear, right (840 4) (M75 101)   44  Rotator cuff tendinitis, unspecified laterality (726 10) (M75 80)   45  Sciatica (724 3) (M54 30)   46  Screening for colorectal cancer (V76 51) (Z12 11,Z12 12)   47  Screening for genitourinary condition (V81 6) (Z13 89)   48  Screening for glaucoma (V80 1) (Z13 5)   49  Screening for neurological condition (V80 09) (Z13 89)   50  Sleep apnea (780 57) (G47 30)   51  Tenosynovitis, wrist (727 05) (M65 9)   52  Urge incontinence of urine (788 31) (N39 41)    Current Meds   1  Atorvastatin Calcium 80 MG Oral Tablet; TAKE 1 TABLET AT BEDTIME; Therapy: 92Lhs8429 to Recorded   2  Citalopram Hydrobromide 20 MG Oral Tablet; TAKE ONE TABLET EVERY DAY; Therapy: 92URO4885 to (Evaluate:51Rpq7136)  Requested for: 69Hxa4480; Last   Rx:92Nji1755 Ordered   3  Eliquis 5 MG Oral Tablet; Take 1 tablet twice daily;    Therapy: 87MSV7319 to (UYPGGPDO:90GMJ1046)  Requested for: 63PAU4563; Last   Rx:16Jan2017 Ordered   4  Fish Oil 1200 MG Oral Capsule; 1 tid Recorded   5  Furosemide 40 MG Oral Tablet; Take 1 5 tablets daily  Requested for: 46Oms0060; Last   Rx:12Apr2016 Ordered   6  HealthWise Short Pen Needles 31G X 8 MM Miscellaneous; Therapy: 90WST4062 to (Last Rx:30Mar2011)  Requested for: 31TQQ5087 Ordered   7  Levothyroxine Sodium 125 MCG Oral Tablet; TAKE 1 TABLET DAILY  Requested for:   61GYA8434; Last Rx:68Dve9650 Ordered   8  Lisinopril 20 MG Oral Tablet; Take 1 tablet daily; Therapy: 48ISY3748 to (Anup Casillas)  Requested for: 33TYR4748; Last   Rx:30Nov2016 Ordered   9  Metoprolol Tartrate 25 MG Oral Tablet; TAKE 1/2 TABLET EVERY 12 HOURS; Therapy: 17BNM6513 to ([phone])  Requested for: 22ZCP3050; Last   Rx:44Mss8400 Ordered   10  NovoLOG FlexPen 100 UNIT/ML Subcutaneous Solution Pen-injector; 5u with breakfast,    8u with lunch and 8u with dinner  Requested for: 28Dgy6945; Last    Rx:02Zfe6639 Ordered   11  Ocuvite Extra Oral Tablet; Take 1 tablet daily Recorded   12  Osteo Bi-Flex Adv Triple St TABS; 1 TAB BID Recorded   13  Oxybutynin Chloride 5 MG Oral Tablet; Take 1 tablet twice daily; Therapy: 95VCB9985 to (Evaluate:87Gtq8772)  Requested for: 17ODB4866; Last    Rx:32Spa2688 Ordered   14  Toujeo SoloStar 300 UNIT/ML Subcutaneous Solution Pen-injector; 30 units in am and    20 units in pm  Requested for: 62XVR1141; Last Rx:12Jan2017 Ordered   15  Vitamin C 500 MG Oral Capsule; TAKE 1 CAPSULE DAILY; Last Rx:50Oxe1692 Ordered    Allergies    1  Ampicillin CAPS    Vitals  Signs   Recorded: 21Feb2017 09:15AM   Heart Rate: 74  Respiration: 16  Systolic: 482, RUE, Sitting  Diastolic: 78, RUE, Sitting  Height: 5 ft 1 in  Weight: 237 lb 8 oz  BMI Calculated: 44 88  BSA Calculated: 2 03    Post Op  Post Op [facility]: Ms Sameera Velasquez is here today status post L GSV EVLT on 02/17/2017 by Dr Giron Sports  Pt c/o pain in her left leg but states it is due to the bandage   Pt has very little bruising and swelling  Pt denies any pain when walking  Pt denies any fever or chills  Pt does not offer any other concerns at this time  Vascular Surgery Post-OP Vein [facility]: This patient is post-op endovenous laser ablation of the left greater saphenous vein  This surgery was performed by Dr Melinda Arias on 02/17/2017  HPI: Post-Operative Symptoms: Mild bruising along the knee crease from the dressing  Steri-Strips in place over the puncture site  PE:      Future Appointments    Date/Time Provider Specialty Site   05/16/2017 08:30 AM Cardiology [place] [id number]   08/16/2017 01:30 PM Cardiology, [address] [place] [address]   05/16/2017 10:30 AM MANJEET Fox Ra   [address] [place] [address]     Signatures   Electronically signed by : Flaquito Navarro DO; Feb 21 2017  9:22AM EST                       (Author)

## 2018-01-17 NOTE — RESULT NOTES
Discussion/Summary   A1c has increased to 8 3, recently had MNT, continue sending in logs for adjustments  Elevated PTH with normal calcium and D level, increase OTC D3 by 1000 iu daily  LDL at goal, continue statin  TSH and free T4 normal, if having symptoms of hyper let me know to decrease dose  Protein in urine, please make sure patient is taking Lisinopril and work on improving glucose readings  Ask patient to stay well hydrated  Verified Results  (1) HEMOGLOBIN A1C [id number] 09:01AM Radha Barr   TW Order Number: [id number]     Test Name Result Flag Reference   HEMOGLOBIN A1C 8 3 % H 4 2-6 3   EST  AVG   GLUCOSE 192 mg/dl

## 2018-01-18 NOTE — CONSULTS
Chief Complaint  Patient is here for a follow up  Patient complaints of dizziness and edema  History of Present Illness  Followup for CHF    Doing okay  She has had recurrent UTIs  Edema is controlled  No palpitaitons  She has had hematuria with the UTIs  The patient presents with paroxysmal atrial fibrillation  She is status post cardioversion  She states her atrial fibrillation has been stable since the last visit  Symptoms: denies palpitations, denies chest pain, stable exercise intolerance, denies dyspnea on exertion and denies dizziness  Associated symptoms include no syncope, no focal neurologic deficit, no tendency for easy bleeding and no tendency for easy bruising  Medications: the patient is adherent with her medication regimen  Review of Systems      Cardiac: has swelling in the     Skin: No complaints of nonhealing sores or skin rash  Genitourinary: No complaints of recurrent urinary tract infections, frequent urination at night, difficult urination, blood in urine, kidney stones, loss of bladder control, kidney problems, denies any birth control or hormone replacement, is not post menopausal, not currently pregnant  Psychological: No complaints of feeling depressed, anxiety, panic attacks, or difficulty concentrating  General: No complaints of trouble sleeping, lack of energy, fatigue, appetite changes, weight changes, fever, frequent infections, or night sweats  Respiratory: shortness of breath  HEENT: No complaints of serious problems, hearing problems, nose problems, throat problems, or snoring  Gastrointestinal: No complaints of liver problems, nausea, vomiting, heartburn, constipation, bloody stools, diarrhea, problems swallowing, adbominal pain, or rectal bleeding  Hematologic: No complaints of bleeding disorders, anemia, blood clots, or excessive brusing     Neurological: No complaints of numbness, tingling, dizziness, weakness, seizures, headaches, syncope or fainting, AM fatigue, daytime sleepiness, no witnessed apnea episodes  Musculoskeletal: No complaints of arthritis, back pain, or painfull swelling  ROS reviewed  Active Problems    1  Abnormal electrocardiogram (794 31) (R94 31)   2  Acute diastolic heart failure (654 53) (I50 31)   3  Anemia (285 9) (D64 9)   4  Anxiety disorder (300 00) (F41 9)   5  Benign hypertension with chronic kidney disease, stage III (403 10,585 3) (I12 9,N18 3)   6  Biceps tendinitis on right (726 12) (M75 21)   7  Bladder Incontinence   8  Bradycardia (427 89) (R00 1)   9  Cellulitis of leg, left (682 6) (L03 116)   10  Chronic diastolic congestive heart failure (428 32,428 0) (I50 32)   11  Chronic kidney disease, stage 3 (585 3) (N18 3)   12  Chronic obstructive pulmonary disease (496) (J44 9)   13  Chronic Tear Of Right Rotator Cuff Tendon (726 19)   14  Chronic venous insufficiency (459 81) (I87 2)   15  Complete heart block (426 0) (I44 2)   16  Complete tear of right rotator cuff (727 61) (M75 121)   17  Cough (786 2) (R05)   18  De Quervain's tenosynovitis (727 04) (M65 4)   19  Deep vein thrombosis of left femoral vein (453 41) (I82 412)   20  Diabetic hypoglycemia (250 80) (E11 649)   21  Diabetic retinopathy (250 50,362 01) (E11 319)   22  DM type 2, not at goal (250 00) (E11 9)   23  Drug toxicity (796 0) (R89 2)   24  Eczematous dermatitis (692 9) (L30 9)   25  Edema (782 3) (R60 9)   26  Encounter for screening mammogram for malignant neoplasm of breast (V76 12)    (Z12 31)   27  Glaucoma Screening   28  Hematuria (599 70) (R31 9)   29  Herpes zoster (053 9) (B02 9)   30  History of deep venous thrombosis (V12 51) (Z86 718)   31  History of DVT of lower extremity (V12 51) (Z86 718)   32  Hypercholesterolemia (272 0) (E78 00)   33  Hypothyroidism (244 9) (E03 9)   34  Immunization due (V05 9) (Z23)   35  Impacted cerumen, unspecified laterality (380 4) (H61 20)   36  Iron deficiency anemia (280 9) (D50 9)   37  Medicare annual wellness visit, subsequent (V70 0) (Z00 00)   38  Memory Lapses Or Loss (780 93)   39  Microalbuminuria (791 0) (R80 9)   40  Need for prophylactic vaccination and inoculation against influenza (V04 81) (Z23)   41  Neoplasm of skin (239 2) (D49 2)   42  OAB (overactive bladder) (596 51) (N32 81)   43  Obesity (BMI 30-39 9) (278 00) (E66 9)   44  Osteoarthritis of knee (715 36) (M17 10)   45  Pacemaker Permanent Placement Dual-Chamber   46  PAF (paroxysmal atrial fibrillation) (427 31) (I48 0)   47  Pain in wrist, unspecified laterality (719 43) (M25 539)   48  Postoperative state (V45 89) (Z98 890)   49  Preoperative cardiovascular examination (V72 81) (Z01 810)   50  Rotator cuff tear, right (840 4) (M75 101)   51  Rotator cuff tendinitis, unspecified laterality (726 10) (M75 80)   52  Sciatica (724 3) (M54 30)   53  Screening for colorectal cancer (V76 51) (Z12 11,Z12 12)   54  Screening for genitourinary condition (V81 6) (Z13 89)   55  Screening for glaucoma (V80 1) (Z13 5)   56  Screening for neurological condition (V80 09) (Z13 89)   57  Sleep apnea (780 57) (G47 30)   58  Tenosynovitis, wrist (727 05) (M65 9)   59  Type 2 diabetes mellitus with hyperglycemia, unspecified long term insulin use status    (250 00) (E11 65)   60  Urge incontinence of urine (788 31) (N39 41)   61  UTI (urinary tract infection) (599 0) (N39 0)   62  Visit for screening mammogram (V76 12) (Z12 31)   63  Vitamin D deficiency (268 9) (E55 9)    Past Medical History    · History of Arthritis (V13 4)   · History of Diabetes Mellitus (250 00)   · History of hypertension (V12 59) (Z86 79)   · History of thyroid disease (V12 29) (Z86 39)   · History of uterine leiomyoma (V13 29) (Z86 018)   · Need for prophylactic vaccination and inoculation against influenza (V04 81) (Z23)    The active problems and past medical history were reviewed and updated today        Surgical History    · History of Cataract Surgery   · History of Eye Surgery   · History of Hysterectomy   · History of Knee Replacement   · History of Pacemaker Permanent Placement   · History of Reported Hx Of Breast Surgery For Biopsy   · History of Total Abdominal Hysterectomy    The surgical history was reviewed and updated today  Family History    · Family history of Coronary Artery Disease (V17 49)   · Family history of hypertension (V17 49) (Z82 49)   · Family history of Heart Disease (V17 49)   · Family history of Hypertension (V17 49)    · Family history of Diabetes Mellitus (V18 0)   · Family history of hypertension (V17 49) (Z82 49)    · Family history of Diabetes Mellitus (V18 0)   · Family history of Diabetes Mellitus (V18 0)    · Family history of Breast Cancer (V16 3)   · Family history of Carcinoma Of The Pancreas    The family history was reviewed and updated today  Social History    · Denied: History of Alcohol Use (History)   · Daily Coffee Consumption (2  Cups/Day)   · Daily Cola Consumption (___ Cans/Day)   · Daily Tea Consumption (___ Cups/Day)   · Denied: History of Drug Use   · Former smoker (V15 82) (S09 180)   · Has 3 children   · Marital History - Currently    ·    · Non-smoker (V49 89) (Z78 9)   · Denied: History of Sexually active   · Denied: History of Tobacco Use  The social history was reviewed and updated today  Current Meds   1  Atorvastatin Calcium 80 MG Oral Tablet; TAKE 1 TABLET AT BEDTIME; Therapy: 46Ycv9241 to (Evaluate:13Apr2018)  Requested for: 36Dim3682; Last   Rx:18Apr2017 Ordered   2  Ciprofloxacin HCl - 250 MG Oral Tablet; Take 1 tablet twice daily; Therapy: 07LBN4221 to (Complete:16Oct2017)  Requested for: 50VSG9897; Last   Rx:06Oct2017 Ordered   3  Citalopram Hydrobromide 20 MG Oral Tablet; TAKE ONE TABLET EVERY DAY; Therapy: 11HML5547 to (Evaluate:03Mar2018)  Requested for: 48YJE0928; Last   Rx:08Mar2017 Ordered   4  Eliquis 5 MG Oral Tablet; Take 1 tablet twice daily;    Therapy: 88OQU7798 to (HOAUYZCU:01XQA5330)  Requested for: 74YEO6617; Last   Rx:16Jan2017 Ordered   5  Fish Oil 1200 MG Oral Capsule; 1 tid Recorded   6  Furosemide 40 MG Oral Tablet; Take 1 tablet daily; Therapy: 56AWR5466 to  Requested for: 30Jun2017 Recorded   7  HealthWise Short Pen Needles 31G X 8 MM Miscellaneous; Therapy: 01QJU1644 to (Last Rx:30Mar2011)  Requested for: 55PWX4862 Ordered   8  Levothyroxine Sodium 125 MCG Oral Tablet; TAKE 1 TABLET DAILY; Therapy: 81KYX0496 to (Berl Pollen)  Requested for: 43CBI7621; Last   Rx:10Mar2017 Ordered   9  Lisinopril 20 MG Oral Tablet; Take 1 tablet daily; Therapy: 45MDM3650 to (Wava Soto)  Requested for: 83BKC7286; Last   Rx:30Nov2016 Ordered   10  Metoprolol Tartrate 25 MG Oral Tablet; TAKE 1/2 TABLET EVERY 12 HOURS; Therapy: 05GPP3891 to (Evaluate:68Cgc6049)  Requested for: 79Mlh2681; Last    Rx:77Yfl0686 Ordered   11  NovoLOG FlexPen 100 UNIT/ML Subcutaneous Solution Pen-injector; take 10 units  with    breakfast, 10 units with lunch and with dinner; Therapy: (Ariel Danas) to  Requested for: 21Jun2017 Recorded   12  Ocuvite Extra Oral Tablet; Take 1 tablet daily Recorded   13  Osteo Bi-Flex Adv Triple St TABS; take 2 tablet daily; Therapy: (Ariel Danas) to Recorded   14  Oxybutynin Chloride ER 15 MG Oral Tablet Extended Release 24 Hour; Take 1 tablet    daily; Therapy: 40OVE2067 to (Evaluate:98Raz6400)  Requested for: 14YKW5987; Last    Rx:64Sls7836 Ordered   15  Toujeo SoloStar 300 UNIT/ML Subcutaneous Solution Pen-injector; Take 30 units in the    evening; Therapy: (Recorded:12Oct2017) to Recorded   16  Vitamin C 500 MG Oral Capsule; TAKE 1 CAPSULE DAILY; Last Rx:43Pug2237 Ordered    The medication list was reviewed and updated today  Allergies    1   Ampicillin CAPS    Vitals   Recorded: 95TWX2931 04:15PM   Heart Rate 69   Systolic 407, RUE, Sitting   Diastolic 64, RUE, Sitting   BP CUFF SIZE Large   Height 5 ft 1 in Weight 228 lb    BMI Calculated 43 08   BSA Calculated 2     Physical Exam    Constitutional   General appearance: No acute distress, well appearing and well nourished  Eyes   Conjunctiva and Sclera examination: Conjunctiva pink, sclera anicteric  Ears, Nose, Mouth, and Throat - Oropharynx: Clear, nares are clear, mucous membranes are moist    Neck   Neck and thyroid: Normal, supple, trachea midline, no thyromegaly  Pulmonary   Respiratory effort: No increased work of breathing or signs of respiratory distress  Auscultation of lungs: Clear to auscultation, no rales, no rhonchi, no wheezing, good air movement  Cardiovascular   Auscultation of heart: Normal rate and rhythm, normal S1 and S2, no murmurs  Carotid pulses: Normal, 2+ bilaterally  Peripheral vascular exam: Normal pulses throughout, no tenderness, erythema or swelling  Pedal pulses: Normal, 2+ bilaterally  Examination of extremities for edema and/or varicosities: Normal     Abdomen   Abdomen: Non-tender and no distention  Liver and spleen: No hepatomegaly or splenomegaly  Musculoskeletal Gait and station: Normal gait  Digits and nails: Normal without clubbing or cyanosis  Inspection/palpation of joints, bones, and muscles: Normal, ROM normal     Skin - Skin and subcutaneous tissue: Normal without rashes or lesions  Skin is warm and well perfused, normal turgor  Neurologic - Cranial nerves: II - XII intact  Speech: Normal     Psychiatric - Orientation to person, place, and time: Normal  Mood and affect: Normal       Results/Data    Rhythm and rate:  ventricular rate is 69 beats per minute  normal sinus rhythm  QRS: left bundle branch block      Assessment    1  Chronic diastolic congestive heart failure (428 32,428 0) (I50 32)   2  Complete heart block (426 0) (I44 2)   3  PAF (paroxysmal atrial fibrillation) (427 31) (I48 0)    Plan  Bradycardia    · EKG/ECG- POC; Status:Complete;   Done: 23FRJ6962   Perform: In Office; Due:32Sfg2776; Last Updated Eloina Nova; 10/12/2017 4:15:39 PM;Ordered; For:Bradycardia; Ordered By:Ta Mendoza;  PAF (paroxysmal atrial fibrillation)    · Follow-up visit in 6 months Evaluation and Treatment  Follow-up  Status: Complete   Done: 87NYV1615   Ordered; For: PAF (paroxysmal atrial fibrillation); Ordered By: Enid Arango Performed:  Due: 02EQW4666; Last Updated By: Re Dial; 10/12/2017 4:47:42 PM    Discussion/Summary    1  Complete heart block s/p PPM implant  Stable  Predominately in sinus rhythm on pacer checks  Overall doing well  SHe did have PAF on her prior PPM check  COntinue anticoagulation that was started for DVT  2  Chronic Diastolic CHF: Volume status stable  Continue lasix  3  Hypertension: BP stable  Check BP at home  4  PAF: Mostly in sinus rhythm  COntinue anticoagulation with Eliquis  The patient was counseled regarding diagnostic results, instructions for management, risk factor reductions, impressions  total time of encounter was 25 minutes and 15 minutes was spent counseling        Future Appointments    Signatures   Electronically signed by : MANJEET Quinones ; Oct 12 2017  5:34PM EST                       (Author)

## 2018-01-18 NOTE — RESULT NOTES
Verified Results  (1) PT WITH INR 23Mar2016 11:15AM Kennedy Castellon   REPORT COMMENT:  FASTING:NO     Test Name Result Flag Reference   INR 2 3 H    Reference Range                     0 9-1 1  Moderate-intensity Warfarin Therapy 2 0-3 0  Higher-intensity Warfarin Therapy   3 0-4 0   PT 23 9 sec H 9 0-11 5   For more information on this test, go to:  [email or web address]

## 2018-01-18 NOTE — PROGRESS NOTES
History of Present Illness  Care Coordination Encounter Information:   Type of Encounter: Telephonic    Spoke to  3/8/2016 Left my contact information for patient to call back  Active Problems    1  Abnormal electrocardiogram (794 31) (R94 31)   2  Abnormal serum creatinine level (790 99) (R79 9)   3  Acute deep vein thrombosis (453 40) (I82 409)   4  Acute diastolic heart failure (923 65) (I50 31)   5  Acute kidney injury (584 9) (N17 9)   6  Anemia (285 9) (D64 9)   7  Anxiety disorder (300 00) (F41 9)   8  Biceps tendinitis on right (726 12) (M75 21)   9  Cellulitis of leg, left (682 6) (L03 116)   10  Chronic kidney disease, stage 3 (585 3) (N18 3)   11  Chronic obstructive pulmonary disease (496) (J44 9)   12  Chronic Tear Of Right Rotator Cuff Tendon (726 19)   13  Chronic venous insufficiency (459 81) (I87 2)   14  Complete heart block (426 0) (I44 2)   15  Complete tear of right rotator cuff (727 61) (M75 121)   16  De Quervain's tenosynovitis (727 04) (M65 4)   17  Deep vein thrombosis, left   18  Diabetic retinopathy (250 50,362 01) (E11 319)   19  DM type 2, not at goal (250 00) (E11 9)   20  Drug toxicity (796 0) (R89 2)   21  Eczematous dermatitis (692 9) (L30 9)   22  Edema (782 3) (R60 9)   23  Encounter for screening mammogram for malignant neoplasm of breast (V76 12)    (Z12 31)   24  Glaucoma Screening   25  Herpes zoster (053 9) (B02 9)   26  Hypercholesterolemia (272 0) (E78 0)   27  Hypertension (401 9) (I10)   28  Hypothyroidism (244 9) (E03 9)   29  Immunization due (V05 9) (Z23)   30  Impacted cerumen, unspecified laterality (380 4) (H61 20)   31  Medicare annual wellness visit, subsequent (V70 0) (Z00 00)   32  Memory Lapses Or Loss (780 93)   33  Need for prophylactic vaccination and inoculation against influenza (V04 81) (Z23)   34  Neoplasm of skin (239 2) (D49 2)   35  Osteoarthritis of knee (285 36) (M17 9)   36  Pain in wrist, unspecified laterality (739 43) (Q36 329)   37  Permanent Pacemaker Type Dual-Chamber   38  Preoperative cardiovascular examination (V72 81) (Z01 810)   39  Rotator cuff tear, right (840 4) (M75 101)   40  Rotator cuff tendinitis, unspecified laterality (726 10) (M75 80)   41  Sciatica (724 3) (M54 30)   42  Screening for genitourinary condition (V81 6) (Z13 89)   43  Screening for glaucoma (V80 1) (Z13 5)   44  Screening for neurological condition (V80 09) (Z13 89)   45  Sleep apnea (780 57) (G47 30)   46  Tenosynovitis, wrist (727 05) (M65 9)   47  Urge incontinence of urine (788 31) (N39 41)    Past Medical History    1  History of Arthritis (V13 4)   2  History of Diabetes Mellitus (250 00)   3  History of hypertension (V12 59) (Z86 79)   4  History of thyroid disease (V12 29) (Z86 39)   5  History of uterine leiomyoma (V13 29) (Z86 018)   6  Need for prophylactic vaccination and inoculation against influenza (V04 81) (Z23)    Surgical History    1  History of Cataract Surgery   2  History of Eye Surgery   3  History of Hysterectomy   4  History of Knee Replacement   5  History of Pacemaker Permanent Placement   6  History of Reported Hx Of Breast Surgery For Biopsy   7  History of Total Abdominal Hysterectomy    Family History    1  Family history of Coronary Artery Disease (V17 49)   2  Family history of Heart Disease (V17 49)   3  Family history of Hypertension (V17 49)    4  Family history of Diabetes Mellitus (V18 0)    5  Family history of Diabetes Mellitus (V18 0)   6  Family history of Diabetes Mellitus (V18 0)    7  Family history of Breast Cancer (V16 3)   8  Family history of Carcinoma Of The Pancreas    Social History    · Denied: History of Alcohol Use (History)   · Daily Coffee Consumption (2  Cups/Day)   · Daily Cola Consumption (___ Cans/Day)   · Daily Tea Consumption (___ Cups/Day)   · Denied: History of Drug Use   · Marital History - Currently    · Never A Smoker   · Denied: History of Tobacco Use    Current Meds    1   Levemir FlexTouch 100 UNIT/ML Subcutaneous Solution Pen-injector; 30uqam, 20u qhs   USE AS DIRECTED; Therapy: 99KLH1308- Recorded    2  Citalopram Hydrobromide 20 MG Oral Tablet; TAKE ONE TABLET EVERY DAY; Therapy: 43BLP9077 to (Evaluate:19Jht6102)  Requested for: 19Rbx4405; Last   Rx:10Hey9387 Ordered    3  Warfarin Sodium 2 MG Oral Tablet; take 2 tablet daily; Therapy: 66RUN5220 to (Evaluate:01Jun2016)  Requested for: 32SOH7482; Last   Rx:03Mar2016 Ordered    4  NovoLOG FlexPen 100 UNIT/ML Subcutaneous Solution Pen-injector; 8 u with breakfast,   5 u with lunch and 8 u with dinner; Therapy: (Recorded:15Ehv1928) to Recorded    5  Vitamin C 500 MG Oral Capsule; TAKE 1 CAPSULE DAILY; Last Rx:23Gen3664 Ordered    6  Pravastatin Sodium 40 MG Oral Tablet; take 1 tablet every day; Therapy: 38UAI9588 to (Evaluate:82Usq9348)  Requested for: 13Fdo7569; Last   Rx:22Feb2016 Ordered    7  Lisinopril 20 MG Oral Tablet; Take 1 tablet daily; Therapy: 73MTO5371 to (Last Rx:18Feb2016)  Requested for: 12DDF7651 Ordered   8  Metoprolol Tartrate 25 MG Oral Tablet; TAKE ONE-HALF TABLET BY MOUTH EVERY 12   HOURS; Therapy: 54DVJ5274[facility]80WCD0354)  Requested for: 54RTJ7432 Ordered    9  Oxybutynin Chloride 5 MG Oral Tablet; Take 1 tablet twice daily; Therapy: 75BCU9789 to (Evaluate:12Feb2017)  Requested for: 74ROZ8850; Last   Rx:18Feb2016 Ordered    10  Acetaminophen 325 MG Oral Tablet; TAKE 1 TO 2 TABLETS EVERY 6 HOURS AS    NEEDED; Therapy: (Recorded:01Mar2016) to Recorded   11  Aspirin 81 MG Oral Tablet Delayed Release; Take 1 daily Recorded   12  [employer] Stool Softener CAPS Recorded   13  Fish Oil 1200 MG Oral Capsule; 1 tid Recorded   14  Furosemide 40 MG Oral Tablet; 1 5 qd Recorded   15  [facility] Short Pen Needles 31G X 8 MM Miscellaneous; Therapy: 78LGP2820 to (Last Rx:31Rdm7251)  Requested for: 39SGT5748 Ordered   16  Hydrocodone-Acetaminophen 5-325 MG Oral Tablet; TAKE TABLET  PRN;     Therapy: 36DVQ4678 to Recorded   17  Levothyroxine Sodium 137 MCG Oral Tablet; TAKE 1 TABLET DAILY; Therapy: (Recorded:01Mar2016) to Recorded   18  Ocuvite Extra Oral Tablet; Take 1 tablet daily Recorded   19  Osteo Bi-Flex Adv Triple St TABS; 1 TAB BID Recorded   20  Warfarin Sodium 5 MG Oral Tablet; Therapy: 56Uec2750 to (Evaluate:26Mar2016) Recorded    Allergies    1  Ampicillin CAPS    End of Encounter Meds    1  Levemir FlexTouch 100 UNIT/ML Subcutaneous Solution Pen-injector; 30uqam, 20u qhs   USE AS DIRECTED; Therapy: 30DQO9765- Recorded    2  Citalopram Hydrobromide 20 MG Oral Tablet; TAKE ONE TABLET EVERY DAY; Therapy: 24JKZ5955 to (Evaluate:16Feb2017)  Requested for: 93Znw3824; Last   Rx:98Hwx5430 Ordered    3  Warfarin Sodium 2 MG Oral Tablet; take 2 tablet daily; Therapy: 36DMN3180 to (Evaluate:01Jun2016)  Requested for: 00XYA3711; Last   Rx:03Mar2016 Ordered    4  NovoLOG FlexPen 100 UNIT/ML Subcutaneous Solution Pen-injector; 8 u with breakfast,   5 u with lunch and 8 u with dinner; Therapy: (Recorded:25Feb2016) to Recorded    5  Vitamin C 500 MG Oral Capsule; TAKE 1 CAPSULE DAILY; Last Rx:10Feb2016 Ordered    6  Pravastatin Sodium 40 MG Oral Tablet; take 1 tablet every day; Therapy: 78EUC7219 to (Evaluate:16Feb2017)  Requested for: 59Uyi8912; Last   Rx:31Nza3109 Ordered    7  Lisinopril 20 MG Oral Tablet; Take 1 tablet daily; Therapy: 60KHV8776 to (Last Rx:18Feb2016)  Requested for: 82IGY4328 Ordered   8  Metoprolol Tartrate 25 MG Oral Tablet; TAKE ONE-HALF TABLET BY MOUTH EVERY 12   HOURS; Therapy: 63UIT3020-(IJTWAVXM:42OAT6425)  Requested for: 54CKY5932 Ordered    9  Oxybutynin Chloride 5 MG Oral Tablet; Take 1 tablet twice daily; Therapy: 34NTC8744 to (Evaluate:12Feb2017)  Requested for: 92SLM4434; Last   Rx:18Feb2016 Ordered    10  Acetaminophen 325 MG Oral Tablet; TAKE 1 TO 2 TABLETS EVERY 6 HOURS AS    NEEDED; Therapy: (Recorded:01Mar2016) to Recorded   11   Aspirin 81 MG Oral Tablet Delayed Release; Take 1 daily Recorded   12  [employer] Stool Softener CAPS Recorded   13  Fish Oil 1200 MG Oral Capsule; 1 tid Recorded   14  Furosemide 40 MG Oral Tablet; 1 5 qd Recorded   15  [employer] Short Pen Needles 31G X 8 MM Miscellaneous; Therapy: 64QGJ5270 to (Last Rx:30Mar2011)  Requested for: 42ITS8866 Ordered   16  Hydrocodone-Acetaminophen 5-325 MG Oral Tablet; TAKE TABLET  PRN; Therapy: 23ZMT1518 to Recorded   17  Levothyroxine Sodium 137 MCG Oral Tablet; TAKE 1 TABLET DAILY; Therapy: (Recorded:01Mar2016) to Recorded   18  Ocuvite Extra Oral Tablet; Take 1 tablet daily Recorded   19  Osteo Bi-Flex Adv Triple St TABS; 1 TAB BID Recorded   20  Warfarin Sodium 5 MG Oral Tablet; Therapy: 09JMJ9451 to (Evaluate:26Mar2016) Recorded    Future Appointments    Date/Time Provider Specialty Site   04/21/2016 09:40 AM MANJEET Peralta Cera  Cardiology [facility]   05/10/2016 08:30 AM [facility]iology[facility]   08/11/2016 08:00 AM Cardiology, Device Remote   [address]   05/24/2016 10:15 AM Danielle Fierro DO Vascular Surgery [facility]  [place]   04/26/2016 10:00 AM Lyn Galeazzi, M D , MD, error [address] [place] [address]   04/12/2016 10:40 AM MANJEET Payne  Nephrology [address] [facility]     Patient Care Team    Care Team Member Role Specialty Office Number   Ana Luara Tobar MD Specialist Ophthalmology [phone]   Maximiliano DELGADO  Specialist Orthopedic Surgery [phone]   Armando DELGADO   Specialist Orthopedic Surgery [phone]   Naldo Ramos MD Specialist General Surgery  G DPM Referring Podiatry [phone]   [facility]  [facility]  Nurse Practitioner [phone]   Myra Liy, Matthew [facility] Specialist Vascular Surgery [phone]   [place] Specialist Endocrinology [phone]   Spring Geiger MD Attending Family Medicine [phone]   Yuli Van MD Specialist Ophthalmology [phone]   Doc Gamal CULVER Specialist Cardiology [phone]   Cardiology, Kortney DELGADO    Cardiology [phone]   Electronically signed by : Ander Carrasco RN; Mar  8 2016  1:45PM EST                       (Author)

## 2018-01-22 VITALS
DIASTOLIC BLOOD PRESSURE: 78 MMHG | BODY MASS INDEX: 44.84 KG/M2 | HEART RATE: 74 BPM | HEIGHT: 61 IN | RESPIRATION RATE: 16 BRPM | SYSTOLIC BLOOD PRESSURE: 124 MMHG | WEIGHT: 237.5 LBS

## 2018-01-22 VITALS
BODY MASS INDEX: 42.36 KG/M2 | DIASTOLIC BLOOD PRESSURE: 66 MMHG | HEIGHT: 61 IN | WEIGHT: 224.38 LBS | HEART RATE: 68 BPM | SYSTOLIC BLOOD PRESSURE: 128 MMHG

## 2018-01-23 NOTE — RESULT NOTES
Discussion/Summary   Normal results, will discuss during follow-up appointment   Normal results, will discuss during follow-up appointment     Verified Results  (1) TSH 39Cmb2920 12:40PM Marga Lugo   TW Order Number: [id number]     Test Name Result Flag Reference   TSH 1 150 uIU/mL  0 358-3 740   Patients undergoing fluorescein dye angiography may retain small amounts of fluorescein in the body for 48-72 hours post procedure  Samples containing fluorescein can produce falsely depressed TSH values  If the patient had this procedure,a specimen should be resubmitted post fluorescein clearance  The recommended reference ranges for TSH during pregnancy are as follows:  First trimester 0 1 to 2 5 uIU/mL  Second trimester  0 2 to 3 0 uIU/mL  Third trimester 0 3 to 3 0 uIU/m     (1) T4, FREE 96Pbe1490 12:40PM Marga Lugo   TW Order Number: [id number]     Test Name Result Flag Reference   T4,FREE 1 14 ng/dL  0 76-1 46   Specimen collection should occur prior to Sulfasalazine administration due to the potential for falsely elevated results

## 2018-01-31 DIAGNOSIS — I10 ESSENTIAL HYPERTENSION: Primary | ICD-10-CM

## 2018-01-31 RX ORDER — LISINOPRIL 20 MG/1
20 TABLET ORAL DAILY
Qty: 90 TABLET | Refills: 3 | Status: SHIPPED | OUTPATIENT
Start: 2018-01-31 | End: 2018-04-30 | Stop reason: SDUPTHER

## 2018-02-06 ENCOUNTER — TELEPHONE (OUTPATIENT)
Dept: CARDIOLOGY CLINIC | Facility: CLINIC | Age: 78
End: 2018-02-06

## 2018-02-06 NOTE — TELEPHONE ENCOUNTER
After being instructed to hold Elqiuis for hematuria for 2 weeks, pt restarted 12/18/18  Pt still exhibiting hematuria but only a small amount  Before pt refills bottle   she would like to clarify that she is to continue medication?

## 2018-02-07 DIAGNOSIS — I48.91 ATRIAL FIBRILLATION, UNSPECIFIED TYPE (HCC): Primary | ICD-10-CM

## 2018-02-07 NOTE — TELEPHONE ENCOUNTER
I would like her to stay on it  I would also like her to followup with the urologist Dr Jennifer Coronado about this as well  Thanks

## 2018-02-15 ENCOUNTER — TELEPHONE (OUTPATIENT)
Dept: CARDIOLOGY CLINIC | Facility: CLINIC | Age: 78
End: 2018-02-15

## 2018-02-15 NOTE — TELEPHONE ENCOUNTER
Kimberlee Dias will be having a tooth extracted on 2/22  The oral surgeon has asked for a hold on Eliquis  Ok to hold, and for how long?   Thanks

## 2018-02-16 DIAGNOSIS — N32.81 OAB (OVERACTIVE BLADDER): Primary | ICD-10-CM

## 2018-02-19 RX ORDER — OXYBUTYNIN CHLORIDE 5 MG/1
TABLET ORAL
Qty: 180 TABLET | Refills: 3 | Status: SHIPPED | OUTPATIENT
Start: 2018-02-19 | End: 2019-07-08

## 2018-02-20 DIAGNOSIS — E03.9 HYPOTHYROIDISM, UNSPECIFIED TYPE: Primary | ICD-10-CM

## 2018-02-20 RX ORDER — LEVOTHYROXINE SODIUM 125 UG/1
CAPSULE ORAL
Qty: 90 CAPSULE | Refills: 1 | Status: SHIPPED | OUTPATIENT
Start: 2018-02-20 | End: 2018-02-21 | Stop reason: SDUPTHER

## 2018-02-21 DIAGNOSIS — E03.9 HYPOTHYROIDISM, UNSPECIFIED TYPE: ICD-10-CM

## 2018-02-21 RX ORDER — LEVOTHYROXINE SODIUM 125 UG/1
CAPSULE ORAL
Qty: 90 CAPSULE | Refills: 1 | Status: SHIPPED | OUTPATIENT
Start: 2018-02-21 | End: 2018-02-22 | Stop reason: CLARIF

## 2018-02-22 ENCOUNTER — CLINICAL SUPPORT (OUTPATIENT)
Dept: CARDIOLOGY CLINIC | Facility: CLINIC | Age: 78
End: 2018-02-22
Payer: MEDICARE

## 2018-02-22 DIAGNOSIS — I48.0 PAROXYSMAL ATRIAL FIBRILLATION (HCC): ICD-10-CM

## 2018-02-22 DIAGNOSIS — I44.2 AV BLOCK, COMPLETE (HCC): Primary | ICD-10-CM

## 2018-02-22 DIAGNOSIS — E03.9 HYPOTHYROIDISM, UNSPECIFIED TYPE: Primary | ICD-10-CM

## 2018-02-22 DIAGNOSIS — Z95.0 CARDIAC PACEMAKER IN SITU: ICD-10-CM

## 2018-02-22 PROCEDURE — 93296 REM INTERROG EVL PM/IDS: CPT | Performed by: INTERNAL MEDICINE

## 2018-02-22 PROCEDURE — 93294 REM INTERROG EVL PM/LDLS PM: CPT | Performed by: INTERNAL MEDICINE

## 2018-02-22 RX ORDER — LEVOTHYROXINE SODIUM 0.12 MG/1
125 TABLET ORAL DAILY
Qty: 78 TABLET | Refills: 1 | Status: SHIPPED | OUTPATIENT
Start: 2018-02-22 | End: 2018-08-27 | Stop reason: SDUPTHER

## 2018-02-22 NOTE — PROGRESS NOTES
PM CARELINK TRANSMISSION: BATTERY VOLTAGE ADEQUATE (7 5 YRS)  AP-63%, >99% (>40% MVP OFF/CHB/DDDR @ 60 PPM)  ALL AVAILABLE LEAD PARAMETERS WITHIN NORMAL LIMITS  1 AFLUTTER EPISODE LASTING 1 25 MINS  HX: PAF & ON ELIQUIS & METOPROLOL  AF BURDEN<0 1%  NORMAL DEVICE FUNCTION   GV     Current Outpatient Prescriptions:     acetaminophen (TYLENOL) 325 mg tablet, Take 650 mg by mouth , Disp: , Rfl:     apixaban (ELIQUIS) 5 mg, Take 1 tablet (5 mg total) by mouth 2 (two) times a day, Disp: 56 tablet, Rfl: 0    Ascorbic Acid (VITAMIN C) 1000 MG tablet, Take 1,000 mg by mouth , Disp: , Rfl:     atorvastatin (LIPITOR) 40 mg tablet, Take 60 mg by mouth daily With dinner, Disp: , Rfl:     cholecalciferol (VITAMIN D3) 1,000 units tablet, Take 1,000 Units by mouth daily, Disp: , Rfl:     citalopram (CeleXA) 20 mg tablet, Take 20 mg by mouth , Disp: , Rfl:     furosemide (LASIX) 40 mg tablet, Take 60 mg by mouth 2 (two) times a day, Disp: , Rfl:     Glucosamine-Chondroitin (OSTEO BI-FLEX REGULAR STRENGTH PO), Take 1 tablet by mouth , Disp: , Rfl:     insulin aspart (NovoLOG) 100 units/mL injection, Inject 8 Units under the skin 3 (three) times a day before meals Pt states taking 8 UNITS in AM, 5 UNITS at lunch, and 8 UNITS with dinner, Disp: , Rfl:     Insulin Glargine (TOUJEO SOLOSTAR) 300 UNIT/ML SOPN, Inject 20 Units under the skin 2 (two) times a day Pt reports she takes 30 units in AM and 20 units HS, Disp: , Rfl:     Levothyroxine Sodium 125 MCG CAPS, Take 1 tablet by mouth Monday through Saturday, Disp: 90 capsule, Rfl: 1    lisinopril (ZESTRIL) 20 mg tablet, Take 1 tablet (20 mg total) by mouth daily, Disp: 90 tablet, Rfl: 3    metoprolol tartrate (LOPRESSOR) 25 mg tablet, Take 25 mg by mouth Pt reports taking 1/2 pill in am and 1/2 at dinner , Disp: , Rfl:     Multiple Vitamins-Minerals (OCUVITE ADULT FORMULA PO), Take by mouth, Disp: , Rfl:     Omega-3 Fatty Acids (FISH OIL) 1200 MG CAPS, Take 1,200 mg by mouth 3 (three) times a day  , Disp: , Rfl:     oxybutynin (DITROPAN) 5 mg tablet, TAKE 1 TABLET TWICE DAILY, Disp: 180 tablet, Rfl: 3

## 2018-02-23 ENCOUNTER — TELEPHONE (OUTPATIENT)
Dept: CARDIOLOGY CLINIC | Facility: CLINIC | Age: 78
End: 2018-02-23

## 2018-02-23 NOTE — TELEPHONE ENCOUNTER
S/w Lizzie, extraction completed yesterday  Dentist did not advise when she can restart  I advised her as long as she is not having any bleeding ok to restart but should check w/ her dentist-- verbally understood

## 2018-02-28 DIAGNOSIS — F32.A DEPRESSION, UNSPECIFIED DEPRESSION TYPE: Primary | ICD-10-CM

## 2018-02-28 DIAGNOSIS — E78.00 HYPERCHOLESTEROLEMIA: ICD-10-CM

## 2018-02-28 RX ORDER — CITALOPRAM 20 MG/1
TABLET ORAL
Qty: 90 TABLET | Refills: 3 | Status: SHIPPED | OUTPATIENT
Start: 2018-02-28 | End: 2018-03-13 | Stop reason: SDUPTHER

## 2018-02-28 RX ORDER — ATORVASTATIN CALCIUM 80 MG/1
TABLET, FILM COATED ORAL
Qty: 90 TABLET | Refills: 3 | Status: SHIPPED | OUTPATIENT
Start: 2018-02-28 | End: 2019-05-11 | Stop reason: SDUPTHER

## 2018-03-01 DIAGNOSIS — D50.9 IRON DEFICIENCY ANEMIA: ICD-10-CM

## 2018-03-01 DIAGNOSIS — N18.30 CHRONIC KIDNEY DISEASE, STAGE III (MODERATE) (HCC): ICD-10-CM

## 2018-03-01 DIAGNOSIS — E11.65 TYPE 2 DIABETES MELLITUS WITH HYPERGLYCEMIA (HCC): ICD-10-CM

## 2018-03-01 DIAGNOSIS — E03.9 HYPOTHYROIDISM: ICD-10-CM

## 2018-03-06 ENCOUNTER — APPOINTMENT (OUTPATIENT)
Dept: LAB | Facility: CLINIC | Age: 78
End: 2018-03-06
Payer: MEDICARE

## 2018-03-06 DIAGNOSIS — E11.9 TYPE 2 DIABETES MELLITUS WITHOUT COMPLICATIONS (HCC): ICD-10-CM

## 2018-03-06 DIAGNOSIS — I50.32 CHRONIC DIASTOLIC HEART FAILURE (HCC): ICD-10-CM

## 2018-03-06 DIAGNOSIS — N18.30 CHRONIC KIDNEY DISEASE, STAGE III (MODERATE) (HCC): ICD-10-CM

## 2018-03-06 DIAGNOSIS — I10 ESSENTIAL (PRIMARY) HYPERTENSION: ICD-10-CM

## 2018-03-06 DIAGNOSIS — D50.9 IRON DEFICIENCY ANEMIA: ICD-10-CM

## 2018-03-06 DIAGNOSIS — E03.9 HYPOTHYROIDISM: ICD-10-CM

## 2018-03-06 LAB
ANION GAP SERPL CALCULATED.3IONS-SCNC: 8 MMOL/L (ref 4–13)
BUN SERPL-MCNC: 37 MG/DL (ref 5–25)
CALCIUM SERPL-MCNC: 9.1 MG/DL (ref 8.3–10.1)
CHLORIDE SERPL-SCNC: 104 MMOL/L (ref 100–108)
CO2 SERPL-SCNC: 28 MMOL/L (ref 21–32)
CREAT SERPL-MCNC: 1.44 MG/DL (ref 0.6–1.3)
CREAT UR-MCNC: 65.6 MG/DL
CREAT UR-MCNC: 65.6 MG/DL
ERYTHROCYTE [DISTWIDTH] IN BLOOD BY AUTOMATED COUNT: 13.4 % (ref 11.6–15.1)
FERRITIN SERPL-MCNC: 163 NG/ML (ref 8–388)
GFR SERPL CREATININE-BSD FRML MDRD: 35 ML/MIN/1.73SQ M
GLUCOSE SERPL-MCNC: 99 MG/DL (ref 65–140)
HCT VFR BLD AUTO: 38 % (ref 34.8–46.1)
HGB BLD-MCNC: 12 G/DL (ref 11.5–15.4)
IRON SATN MFR SERPL: 22 %
IRON SERPL-MCNC: 70 UG/DL (ref 50–170)
MCH RBC QN AUTO: 30.8 PG (ref 26.8–34.3)
MCHC RBC AUTO-ENTMCNC: 31.6 G/DL (ref 31.4–37.4)
MCV RBC AUTO: 98 FL (ref 82–98)
MICROALBUMIN UR-MCNC: 62.6 MG/L (ref 0–20)
MICROALBUMIN/CREAT 24H UR: 95 MG/G CREATININE (ref 0–30)
PHOSPHATE SERPL-MCNC: 3.7 MG/DL (ref 2.3–4.1)
PLATELET # BLD AUTO: 231 THOUSANDS/UL (ref 149–390)
PMV BLD AUTO: 10.2 FL (ref 8.9–12.7)
POTASSIUM SERPL-SCNC: 4.1 MMOL/L (ref 3.5–5.3)
PROT UR-MCNC: 18 MG/DL
PROT/CREAT UR: 0.27 MG/G{CREAT} (ref 0–0.1)
PTH-INTACT SERPL-MCNC: 92.4 PG/ML (ref 14–72)
RBC # BLD AUTO: 3.89 MILLION/UL (ref 3.81–5.12)
SODIUM SERPL-SCNC: 140 MMOL/L (ref 136–145)
TIBC SERPL-MCNC: 313 UG/DL (ref 250–450)
WBC # BLD AUTO: 7.93 THOUSAND/UL (ref 4.31–10.16)

## 2018-03-06 PROCEDURE — 84156 ASSAY OF PROTEIN URINE: CPT

## 2018-03-06 PROCEDURE — 82570 ASSAY OF URINE CREATININE: CPT

## 2018-03-06 PROCEDURE — 80048 BASIC METABOLIC PNL TOTAL CA: CPT

## 2018-03-06 PROCEDURE — 85027 COMPLETE CBC AUTOMATED: CPT

## 2018-03-06 PROCEDURE — 83550 IRON BINDING TEST: CPT

## 2018-03-06 PROCEDURE — 84100 ASSAY OF PHOSPHORUS: CPT

## 2018-03-06 PROCEDURE — 83970 ASSAY OF PARATHORMONE: CPT

## 2018-03-06 PROCEDURE — 82728 ASSAY OF FERRITIN: CPT

## 2018-03-06 PROCEDURE — 36415 COLL VENOUS BLD VENIPUNCTURE: CPT

## 2018-03-06 PROCEDURE — 82043 UR ALBUMIN QUANTITATIVE: CPT

## 2018-03-06 PROCEDURE — 83540 ASSAY OF IRON: CPT

## 2018-03-07 NOTE — PROGRESS NOTES
"  Discussion/Summary  Normal device function     Please bring patient in for re-programming   Take MVP off  try DDDR with AV delay 300 ms to see if intrinsic V comes in  Results/Data  Cardiac Device Remote [id number] 05:01AM Fran Smaller     Test Name Result Flag Reference   MISCELLANEOUS COMMENT (Report)     CARELINK TRANSMISSION: BATTERY VOLTAGE ADEQUATE ( 9 5 YRS); AP = 26% ,  = 89% (>40%/MVP ON - INCREASED SINCE IMPLANT  = <1%); CONSIDER CLINIC EVAL TO EVAL  MVP ALGORITHM - SEE PRESENTING EGRM; NO SIGNIFICANT HIGH RATE EPISODES; ALL AVAILABLE LEAD PARAMETERS APPEAR WITHIN NORMAL LIMITS; CHRONIC/STABLE LOW RWAVE SENSING THRESHOLD IN BIPOLAR CONFIGURATION; NORMAL DEVICE FUNCTION AS PROGRAMMED w/MVP ON  eb   Cardiac Electrophysiology Report      [id number] Fear Valley Medical Center_[facility]_[id number]_[id number]  pdf   DEVICE TYPE Pacemaker       Cardiac Electrophysiology Report [id number] 05:01AM Fran Smaller     Test Name Result Flag Reference   Cardiac Electrophysiology Report      tinuk[id number] pdf     Signatures   Electronically signed by : Anastasiya Peters, ; Nov 15 2016  8:54AM EST                       (Author)    Electronically signed by : MANJEET Pichardo ; Nov 24 2016  1:07PM EST                       (Author)    "

## 2018-03-07 NOTE — PROGRESS NOTES
"  Discussion/Summary  Normal device function     Dependent    Brief A flutter  brief NSVT  Results/Data  Cardiac Device Remote [id number] 04:05PM Isabella Pickett     Test Name Result Flag Reference   MISCELLANEOUS COMMENT (Report)     CARELINK TRANSMISSION: BATTERY VOLTAGE ADEQUATE (8 5 YRS)  AP-60%, -100% (>40% MVP OFF/CHB/DDDR @ 60 PPM)  1 NSVT EPISODE FOR 5 BEATS, AVG CL~340MS  EF-60% (ECHO 1/17/16)  1 PAFL EPISODE LASTING 33 SEC  HX: PAF & ON ELIQUIS & METOPROLOL  AF BURDEN<0 1%  ALL AVAILABLE LEAD PARAMETERS WITHIN NORMAL LIMITS  NORMAL DEVICE FUNCTION  GV   Cardiac Electrophysiology Report      [id number] [place] HospitalDEEP_[facility]_[id number]_[id number]  pdf   DEVICE TYPE Pacemaker       Cardiac Electrophysiology Report [id number] 04:05PM Isabella Pickett     Test Name Result Flag Reference   Cardiac Electrophysiology Report      xjrrruo[id number]  pdf     Signatures   Electronically signed by : Tyler Baxter RN; Feb 14 2017  3:43PM EST                       (Author)    Electronically signed by : MANJEET Crowley ; Feb 15 2017 12:11PM EST                       (Author)    "

## 2018-03-07 NOTE — PROGRESS NOTES
"  Discussion/Summary  Normal device function     AT, AF  on coumadin  continue  Results/Data  Cardiac Device Remote 10Aug2016 06:59PM Ina Tavares     Test Name Result Flag Reference   MISCELLANEOUS COMMENT      CARELINK TRANSMISSION: BATTERY VOLTAGE ADEQUATE (10 5 YRS)  AP-18%, -72% (>40% MVP ON/ DDD @ 50 PPM)  1 AFL EPISODE LASTING 65 SEC  AF BURDEN<0 1%  HX: PAF & ON WARFARIN  ALL AVAILABLE LEAD PARAMETERS WITHIN NORMAL LIMITS  NORMAL DEVICE FUNCTION  GV   Cardiac Electrophysiology Report      [id number]}  pdf   DEVICE TYPE Pacemaker       Cardiac Electrophysiology Report 10Aug2016 06:59PM Ina Tavares     Test Name Result Flag Reference   Cardiac Electrophysiology Report      [id number]  pdf     Signatures   Electronically signed by : John Oneal RN; Aug 12 2016  2:43PM EST                       (Author)    Electronically signed by : Severiano Santee, M D ; Aug 21 2016  5:57PM EST                       (Author)    "

## 2018-03-07 NOTE — PROGRESS NOTES
"  Discussion/Summary  Normal device function     Short V-V intervals  follow NSVT, impedance  Results/Data  Results   Cardiac Device In Clinic [id number] 04:08PM Sari Leyden     Test Name Result Flag Reference   MISCELLANEOUS COMMENT (Report)     DEVICE INTERROGATED IN THE [place] OFFICE: BATTERY VOLTAGE ADEQUATE   0 9% AP 2 3%  NO SIGNIFICANT HIGH RATE EPISODES  ALL AVAILABLE LEAD PARAMETERS WITHIN NORMAL LIMITS  44 SHORT V-V INTERVALS DETECTED  NORMAL DEVICE FUNCTION  Yamilet Lees CHECK: INCISION CLEAN AND DRY WITH EDGES APPROXIMATED; WOUND CARE AND RESTRICTIONS REVIEWED WITH PATIENT  NC   Cardiac Electrophysiology Report      [id number]_[id number]_Session Report_02_03_16_1  pdf   DEVICE TYPE Pacemaker       Cardiac Electrophysiology Report [id number] 04:08PM Sari Leyden     Test Name Result Flag Reference   Cardiac Electrophysiology Report      levb[id number]  pdf     Signatures   Electronically signed by : Rebeca Barron, ; Feb 10 2016 10:56AM EST                       (Author)    Electronically signed by : MANJEET Orellana ; Feb 22 2016 10:25PM EST                       (Author)    "

## 2018-03-07 NOTE — PROGRESS NOTES
"  Discussion/Summary  Normal device function      Results/Data  Cardiac Device Remote [id number] 06:01PM Tj Gey     Test Name Result Flag Reference   MISCELLANEOUS COMMENT      CARELINK TRANSMISSION: BATTERY VOLTAGE ADEQUATE (7 5 YRS)  AP-62%, >99% (>40% MVP OFF/CHB/DDDR @ 60 PPM)  ALL AVAILABLE LEAD PARAMETERS WITHIN NORMAL LIMITS  NO SIGNIFICANT HIGH RATE EPISODES  NORMAL DEVICE FUNCTION  [facility]   Cardiac Electrophysiology Report      [id number]  pdf   DEVICE TYPE Pacemaker       Cardiac Electrophysiology Report [id number] 06:01PM Tj Gewyatt     Test Name Result Flag Reference   Cardiac Electrophysiology Report      Zulygabrielle Kirkpatricklorraine  pdf     Signatures   Electronically signed by : Tim Diaz RN; Nov 21 2017 10:31AM EST                       (Author)    Electronically signed by : MANJEET Gordon ; Nov 21 2017  2:36PM EST                       (Author)    "

## 2018-03-07 NOTE — PROGRESS NOTES
"  Discussion/Summary  Normal device function     PAF and on anticoag     Results/Data  Results   Cardiac Device Remote 15Apr2016 02:22PM Arty Cutter     Test Name Result Flag Reference   MISCELLANEOUS COMMENT (Report)     [address] 2,212 SHORT V-V INTERVALS  AP-14%, <1%  NO VHR EPISODES  LEAD IMPEDANCES STABLE  PER MDT REP (Maxine Yanez), DECREASE SENSITIVITY @ NEXT OFFICE INTERRO (5/10/16)  X0A3 AF EPISODES LONGEST 1 3 MINS  HX:PAF & ON WARFARIN  GV   Cardiac Electrophysiology Report      iu[id number]}  pdf   DEVICE TYPE Pacemaker       Cardiac Electrophysiology Report 15Apr2016 02:22PM Arty Cutter     Test Name Result Flag Reference   Cardiac Electrophysiology Report      [id number]  pdf     Signatures   Electronically signed by : Ela Ceballos RN; Apr 15 2016  2:48PM EST                       (Author)    Electronically signed by : MANJEET Licea ; Apr 17 2016  8:32AM EST                       (Author)    "

## 2018-03-07 NOTE — PROGRESS NOTES
"  Discussion/Summary  Normal device function and Abnormal Device Function     PAF and on warfarin      3429 SHORT VV INTERVALS   (RV SENSE - UNIPOLAR)  RV UNI R 10 6MV  / RV BI R 3 5 MV  PER MDT RV SENSE PROG TO 2 8MV  rest of device working well  Results/Data  Results   Cardiac Device In Clinic [id number] 11:27AM [place] [employer]     Test Name Result Flag Reference   MISCELLANEOUS COMMENT (Report)     DEVICE INTERROGATED IN THE [facility] OFFICE: BATTERY VOLTAGE ADEQUATE (11 5 YRS)  AP 15%  0 8%  ALL LEAD PARAMETERS WITHIN NORMAL LIMITS  NO SIGNIFICANT HIGH RATE EPISODES  (AT/AF EPISODES PREV ADDRESSED)  80 SHORT VV INTERVALS (RV SENSE - UNIPOLAR)  RV UNI RWVS 10 6MV  RV BI RWVS 3 5 MV  PER MDT RV SENSE PROG TO 2 8MV  DECREASE MADE TO RA AND RV AMPLITUDE TO PROMOTE DEVICE LONGEVITY WHILE MAINTAINING AN APPROPRIATE SAFETY MARGIN  PACEMAKER FUNCTIONING APPROPRIATELY  CP   Cardiac Electrophysiology Report      je[id number] Maryann_[id number]_Session [id number]_1  pdf   DEVICE TYPE Pacemaker       Cardiac Electrophysiology Report [id number] 11:27AM [place] [employer]     Test Name Result Flag Reference   Cardiac Electrophysiology Report      [id number]  pdf     Signatures   Electronically signed by : Marquita Moulton, ; May 10 2016 12:45PM EST                       (Author)    Electronically signed by : MANJEET Aquino ; May 10 2016  8:53PM EST                       (Author)    "

## 2018-03-07 NOTE — PROGRESS NOTES
"  Discussion/Summary  Normal device function      Results/Data  Cardiac Device Remote [id number] 03:07PM Evelin Seen     Test Name Result Flag Reference   MISCELLANEOUS COMMENT      CARELINK TRANSMISSION: BATTERY VOLTAGE ADEQUATE (8 YRS)  AP-61%, >99% (>40% MVP OFF/CHB/DDDR @ 60 PPM)  ALL AVAILABLE LEAD PARAMETERS WITHIN NORMAL LIMITS  NO SIGNIFICANT HIGH RATE EPISODES  NORMAL DEVICE FUNCTION  GV   Cardiac Electrophysiology Report      [id number]  pdf   DEVICE TYPE Pacemaker       Cardiac Electrophysiology Report [id number] 03:07PM Evelin Seen     Test Name Result Flag Reference   Cardiac Electrophysiology Report      [id number]  pdf     Signatures   Electronically signed by : Sarah Nuñez RN; Aug 17 2017  3:16PM EST                       (Author)    Electronically signed by : MANJEET Handy ; Aug 17 2017  4:58PM EST                       (Author)    "

## 2018-03-07 NOTE — PROGRESS NOTES
"  Discussion/Summary  Normal device function      Results/Data  Cardiac Device In Clinic [id number] 11:45AM [facility]     Test Name Result Flag Reference   MISCELLANEOUS COMMENT (Report)     DEVICE INTERROGATED IN THE [address] [place] [address] OFFICE: BATTERY VOLTAGE ADEQUATE (8 YR)  AP 60 6%  100%( >40%,DDDR 60/AVB)  1 AT/AF EPISODE WITH EGM SHOWING 38 SEC  OF AF  PT TAKES ELIQUIS AND METOPROLOL TART    LOW (3 0 MV) BIPOLAR R WAVE AMPLITUDE NOTED  UNIPOLAR R WAVE = 8 3 MV  V SENSE CHANGED FROM BIPOLAR @ 2 8 MV TO UNIPOLAR @ 4 MV  ALL OTHER LEAD PARAMETERS WITHIN NORMAL LIMITS  NO OTHER PROGRAMMING CHANGES MADE TO DEVICE PARAMETERS  PACEMAKER FUNCTIONING APPROPRIATELY  RG/EB   Cardiac Electrophysiology Report      [id number] [id number]  pdf   DEVICE TYPE Pacemaker       Cardiac Electrophysiology Report [id number] 11:45AM [email or web address]     Test Name Result Flag Reference   Cardiac Electrophysiology Report      [id number] pdf     Signatures   Electronically signed by : Christiano Banerjee, ; May 16 2017 10:28AM EST                       (Author)    Electronically signed by : MANJEET Esparza ; May 16 2017 10:48AM EST                       (Author)    "

## 2018-03-13 ENCOUNTER — OFFICE VISIT (OUTPATIENT)
Dept: NEPHROLOGY | Facility: CLINIC | Age: 78
End: 2018-03-13
Payer: MEDICARE

## 2018-03-13 VITALS
SYSTOLIC BLOOD PRESSURE: 132 MMHG | HEIGHT: 61 IN | DIASTOLIC BLOOD PRESSURE: 70 MMHG | WEIGHT: 225 LBS | BODY MASS INDEX: 42.48 KG/M2

## 2018-03-13 DIAGNOSIS — R60.0 BILATERAL LEG EDEMA: ICD-10-CM

## 2018-03-13 DIAGNOSIS — R80.9 MICROALBUMINURIA: ICD-10-CM

## 2018-03-13 DIAGNOSIS — D50.9 IRON DEFICIENCY ANEMIA, UNSPECIFIED IRON DEFICIENCY ANEMIA TYPE: ICD-10-CM

## 2018-03-13 DIAGNOSIS — I12.9 BENIGN HYPERTENSION WITH CHRONIC KIDNEY DISEASE, STAGE III (HCC): ICD-10-CM

## 2018-03-13 DIAGNOSIS — N18.30 BENIGN HYPERTENSION WITH CHRONIC KIDNEY DISEASE, STAGE III (HCC): ICD-10-CM

## 2018-03-13 DIAGNOSIS — N18.30 CHRONIC KIDNEY DISEASE, STAGE 3 (HCC): Primary | ICD-10-CM

## 2018-03-13 PROCEDURE — 99214 OFFICE O/P EST MOD 30 MIN: CPT | Performed by: INTERNAL MEDICINE

## 2018-03-13 RX ORDER — ATORVASTATIN CALCIUM 80 MG/1
1 TABLET, FILM COATED ORAL DAILY
COMMUNITY
Start: 2016-04-12 | End: 2018-03-13 | Stop reason: SDUPTHER

## 2018-03-13 RX ORDER — FUROSEMIDE 40 MG/1
0.5 TABLET ORAL DAILY
COMMUNITY
Start: 2017-05-11 | End: 2018-10-03 | Stop reason: SDUPTHER

## 2018-03-13 RX ORDER — AMOXICILLIN 500 MG
1 CAPSULE ORAL 3 TIMES DAILY
COMMUNITY
End: 2018-03-13 | Stop reason: SDUPTHER

## 2018-03-13 RX ORDER — CITALOPRAM 20 MG/1
1 TABLET ORAL DAILY
COMMUNITY
Start: 2012-01-04 | End: 2019-03-13 | Stop reason: SDUPTHER

## 2018-03-13 RX ORDER — MULTIVIT WITH MINERALS/LUTEIN
1 TABLET ORAL DAILY
COMMUNITY
Start: 2011-03-11 | End: 2018-03-13 | Stop reason: SDUPTHER

## 2018-03-13 RX ORDER — OXYBUTYNIN CHLORIDE 15 MG/1
1 TABLET, EXTENDED RELEASE ORAL DAILY
COMMUNITY
Start: 2017-07-26 | End: 2018-03-13 | Stop reason: SDUPTHER

## 2018-03-13 RX ORDER — LEVOTHYROXINE SODIUM 0.12 MG/1
TABLET ORAL
COMMUNITY
Start: 2017-03-10 | End: 2018-03-13 | Stop reason: SDUPTHER

## 2018-03-13 RX ORDER — LISINOPRIL 20 MG/1
1 TABLET ORAL DAILY
COMMUNITY
Start: 2016-01-06 | End: 2018-03-13 | Stop reason: SDUPTHER

## 2018-03-13 NOTE — PROGRESS NOTES
NEPHROLOGY OUTPATIENT PROGRESS NOTE   Jake Esteban 70 y o  female MRN: [id number]  DATE: 3/13/2018  Reason for visit:   Chief Complaint   Patient presents with    Follow-up    Chronic Kidney Disease     ASSESSMENT and PLAN:     CK D stage III with baseline serum creatinine 1 2-1 3  - Her serum creatinine 1 4 in March 2018 slightly elevated from baseline  I suspect elevated creatinine could be prerenal in nature as patient has lost weight   - Reduce Lasix from 40 mg to 20 mg p o  daily   - Avoid NSAIDs or nephrotoxins  - CKD likely secondary to long-term hypertensive nephrosclerosis/diabetic nephropathy  - Check BMP, phosphorus, UPCR before next visit     Proteinuria  - last UPC ratio 270 mg  repeat UPC ratio before next visit  Continue lisinopril 20 mg by mouth daily   - Proteinuria likely secondary to long-term diabetes and hypertension    Hypertension  - Blood pressure is controlled in the office today  - Continue current antihypertensive regimen with lisinopril, metoprolol and Lasix  Reduce Lasix as above  - Strongly advised to check blood pressure at home on a regular basis and call back if blood pressure remains persistently greater than 140/90  Advised to bring BP machine during next visit  bilateral lower extremity edema likely secondary to chronic venous stasis  - This is resolved since endovascular laser treatment in left lower extremity  Currently she does not have any edema in both lower extremity  She denies any dyspnea  - Continue low-salt diet  Iron deficiency anemia, last iron saturation 22% in March 2018 has improved from previous value 16% after IV Feraheme trial    -hemoglobin overall stable at goal   Continue to closely monitor  Diagnoses and all orders for this visit:    Chronic kidney disease, stage 3  -     Basic metabolic panel; Future  -     CBC; Future  -     Protein / creatinine ratio, urine; Future  -     Phosphorus; Future  -     PTH, intact;  Future    Benign hypertension with chronic kidney disease, stage III    Iron deficiency anemia, unspecified iron deficiency anemia type    Microalbuminuria    Bilateral leg edema    Other orders  -     Discontinue: Ascorbic Acid (VITAMIN C) 1000 MG tablet; Take 1 tablet by mouth daily  -     Discontinue: Omega-3 Fatty Acids (FISH OIL) 1200 MG CAPS; Take 1 capsule by mouth 3 (three) times a day  -     GLUCOSAMINE CHONDROITIN COMPLX PO; Take 1 tablet by mouth 2 (two) times a day  -     Discontinue: atorvastatin (LIPITOR) 80 mg tablet; Take 1 tablet by mouth daily  -     Discontinue: apixaban (ELIQUIS) 5 mg; Take 1 tablet by mouth 2 (two) times a day  -     furosemide (LASIX) 40 mg tablet; Take 0 5 tablets by mouth daily   -     Discontinue: levothyroxine 125 mcg tablet; Take by mouth  -     Discontinue: lisinopril (ZESTRIL) 20 mg tablet; Take 1 tablet by mouth daily  -     metoprolol tartrate (LOPRESSOR) 25 mg tablet; Take 0 5 tablets by mouth 2 (two) times a day  -     Discontinue: insulin aspart (NOVOLOG FLEXPEN) 100 Units/mL SOPN; Inject under the skin  -     Discontinue: oxybutynin (DITROPAN XL) 15 MG 24 hr tablet; Take 1 tablet by mouth daily  -     Discontinue: Insulin Glargine (TOUJEO SOLOSTAR) injection pen 300 units/mL; Inject under the skin  -     citalopram (CeleXA) 20 mg tablet; Take 1 tablet by mouth daily          SUBJECTIVE / HPI:  Patient is 81-year-old female with significant past medical history of hypertension for at least 40-50 years, and diabetes for 30 years, diabetic retinopathy, stress incontinence, chronic leg swelling with chronic venous stasis, status post endovascular laser treatment in the left lower extremity, chronic kidney disease stage III with baseline serum creatinine 1 2-1 3, comes regular follow up of renal failure  Her last serum creatinine 1 4 slightly elevated from baseline  She remains on lisinopril 20 mg by mouth daily and 40 mg Lasix by mouth daily  She is slowly losing weight   She tries to follow low salt diet  No recent NSAID exposure  Denies lightheadedness or dizziness  Denies any dyspnea  She also follows with Urology for overactive bladder  REVIEW OF SYSTEMS:    Review of Systems   Constitutional: Negative for chills, fatigue and fever  HENT: Negative for congestion, ear pain and postnasal drip  Eyes: Negative for visual disturbance  Respiratory: Negative for cough and shortness of breath  Cardiovascular: Negative for chest pain and leg swelling  Gastrointestinal: Negative for abdominal pain, diarrhea, nausea and vomiting  Endocrine: Negative for polyuria  Genitourinary: Negative for decreased urine volume, difficulty urinating, dysuria, flank pain, frequency, hematuria and urgency  Musculoskeletal: Negative for arthralgias and back pain  Skin: Negative for rash  Neurological: Negative for dizziness, light-headedness and headaches  Hematological: Does not bruise/bleed easily  Psychiatric/Behavioral: Negative for behavioral problems and confusion  The patient is not nervous/anxious  More than 10 point review of systems were obtained and discussed in length with the patient  Complete review of systems were negative / unremarkable except mentioned above  PHYSICAL EXAM:  Vitals:    03/13/18 1506   Weight: 102 kg (225 lb)   Height: 5' 1" (1 549 m)     Body mass index is 42 51 kg/m²  Physical Exam   Constitutional: She is oriented to person, place, and time  She appears well-developed and well-nourished  HENT:   Head: Normocephalic and atraumatic  Right Ear: External ear normal    Left Ear: External ear normal    Eyes: Conjunctivae and EOM are normal    Neck: Neck supple  No JVD present  Cardiovascular: Normal rate and normal heart sounds  Pulmonary/Chest: Effort normal and breath sounds normal  She has no wheezes  She has no rales  Abdominal: Soft  Bowel sounds are normal  She exhibits no distension  There is no tenderness     Musculoskeletal: She exhibits no edema or tenderness  Neurological: She is alert and oriented to person, place, and time  Skin: Skin is warm and dry  No rash noted  Psychiatric: She has a normal mood and affect  Her behavior is normal    Vitals reviewed        PAST MEDICAL HISTORY:  Past Medical History:   Diagnosis Date    Anxiety     Arthritis     [facility]     Chronic venous insufficiency     COPD [facility]ch[facility] obstructive pulmonary disease) [facility]     Coronary artery disease     Diabetes mellitus ([address] )     type II    Disease of thyroid gland     hypothyroidism    DVT (deep venous thrombosis) [facility])     Hyperlipidemia     Hypertension     Irregular heart beat     Paroxysmal a-fib    Pacemaker     Renal disorder     stage 3    Urinary incontinence     Uterine leiomyoma        PAST SURGICAL HISTORY:  Past Surgical History:   Procedure Laterality Date    CARDIAC PACEMAKER PLACEMENT      CARDIAC PACEMAKER PLACEMENT      CARDIAC SURGERY      CARPAL TUNNEL RELEASE Bilateral     CATARACT EXTRACTION W/ INTRAOCULAR LENS  IMPLANT, BILATERAL      ECTOPIC PREGNANCY SURGERY      EYE SURGERY      cataracts    HYSTERECTOMY      JOINT REPLACEMENT Right     TKR    KY ENDOVENOUS LASER, 1ST VEIN Left 2/17/2017    Procedure: GREATER SAPHENOUS VEIN ENDOVASCULAR LASER THERAPY ;  Surgeon: Kaylin Almanza DO;  [facility];  Service: Vascular    TONSILLECTOMY         SOCIAL HISTORY:  History   Alcohol Use    Yes     Comment: rarely     History   Drug Use No     History   Smoking Status    Never Smoker   Smokeless Tobacco    Never Used       FAMILY HISTORY:  Family History   Problem Relation Age of Onset    Hypertension Father        MEDICATIONS:    Current Outpatient Prescriptions:     citalopram (CeleXA) 20 mg tablet, Take 1 tablet by mouth daily, Disp: , Rfl:     furosemide (LASIX) 40 mg tablet, Take 0 5 tablets by mouth daily , Disp: , Rfl:     GLUCOSAMINE CHONDROITIN COMPLX PO, Take 1 tablet by mouth 2 (two) times a day, Disp: , Rfl:     metoprolol tartrate (LOPRESSOR) 25 mg tablet, Take 0 5 tablets by mouth 2 (two) times a day, Disp: , Rfl:     acetaminophen (TYLENOL) 325 mg tablet, Take 650 mg by mouth , Disp: , Rfl:     apixaban (ELIQUIS) 5 mg, Take 1 tablet (5 mg total) by mouth 2 (two) times a day, Disp: 56 tablet, Rfl: 0    Ascorbic Acid (VITAMIN C) 1000 MG tablet, Take 1,000 mg by mouth , Disp: , Rfl:     atorvastatin (LIPITOR) 80 mg tablet, TAKE 1 TABLET AT BEDTIME, Disp: 90 tablet, Rfl: 3    cholecalciferol (VITAMIN D3) 1,000 units tablet, Take 1,000 Units by mouth daily, Disp: , Rfl:     insulin aspart (NovoLOG) 100 units/mL injection, Inject 8 Units under the skin 3 (three) times a day before meals Pt states taking 8 UNITS in AM, 5 UNITS at lunch, and 8 UNITS with dinner, Disp: , Rfl:     Insulin Glargine (TOUJEO SOLOSTAR) 300 UNIT/ML SOPN, Inject 20 Units under the skin 2 (two) times a day Pt reports she takes 30 units in AM and 20 units HS, Disp: , Rfl:     levothyroxine 125 mcg tablet, Take 1 tablet (125 mcg total) by mouth daily Monday thru Saturday; do not take on Sunday  , Disp: 78 tablet, Rfl: 1    lisinopril (ZESTRIL) 20 mg tablet, Take 1 tablet (20 mg total) by mouth daily, Disp: 90 tablet, Rfl: 3    Multiple Vitamins-Minerals (OCUVITE ADULT FORMULA PO), Take by mouth, Disp: , Rfl:     Omega-3 Fatty Acids (FISH OIL) 1200 MG CAPS, Take 1,200 mg by mouth 3 (three) times a day  , Disp: , Rfl:     oxybutynin (DITROPAN) 5 mg tablet, TAKE 1 TABLET TWICE DAILY, Disp: 180 tablet, Rfl: 3    Lab Results:   Results for orders placed or performed in visit on 03/06/18   Microalbumin / creatinine urine ratio   Result Value Ref Range    Creatinine, Ur 65 6 mg/dL    Microalbum  ,U,Random 62 6 (H) 0 0 - 20 0 mg/L    Microalb Creat Ratio 95 (H) 0 - 30 mg/g creatinine   Basic metabolic panel   Result Value Ref Range    Sodium 140 136 - 145 mmol/L    Potassium 4 1 3 5 - 5 3 mmol/L    Chloride 104 100 - 108 mmol/L    CO2 28 21 - 32 mmol/L    Anion Gap 8 4 - 13 mmol/L    BUN 37 (H) 5 - 25 mg/dL    Creatinine 1 44 (H) 0 60 - 1 30 mg/dL    Glucose 99 65 - 140 mg/dL    Calcium 9 1 8 3 - 10 1 mg/dL    eGFR 35 ml/min/1 73sq m   CBC   Result Value Ref Range    WBC 7 93 4 31 - 10 16 Thousand/uL    RBC 3 89 3 81 - 5 12 Million/uL    Hemoglobin 12 0 11 5 - 15 4 g/dL    Hematocrit 38 0 34 8 - 46 1 %    MCV 98 82 - 98 fL    MCH 30 8 26 8 - 34 3 pg    MCHC 31 6 31 4 - 37 4 g/dL    RDW 13 4 11 6 - 15 1 %    Platelets 445 214 - 088 Thousands/uL    MPV 10 2 8 9 - 12 7 fL   Phosphorus   Result Value Ref Range    Phosphorus 3 7 2 3 - 4 1 mg/dL   PTH, intact   Result Value Ref Range    PTH 92 4 (H) 14 0 - 72 0 pg/mL   Protein / creatinine ratio, urine   Result Value Ref Range    Creatinine, Ur 65 6 mg/dL    Protein Urine Random 18 mg/dL    Prot/Creat Ratio, Ur 0 27 (H) 0 00 - 0 10   Iron Saturation %   Result Value Ref Range    Iron Saturation 22 %    TIBC 313 250 - 450 ug/dL    Iron 70 50 - 170 ug/dL   Ferritin   Result Value Ref Range    Ferritin 163 8 - 388 ng/mL

## 2018-03-13 NOTE — LETTER
March 13, 2018     Lorene Kennedy MD  [address]    Patient: Joseline Gonzalez   YOB: 1940   Date of Visit: 3/13/2018       Dear Dr Katarzyna Mohan:    Thank you for referring Luciana Powell to me for evaluation  Below are my notes for this consultation  If you have questions, please do not hesitate to call me  I look forward to following your patient along with you  Sincerely,        So Miranda MD        CC: No Recipients  So Miranda MD  3/13/2018  4:11 PM  Sign at close encounter  Felicia Whitten 70 y o  female MRN: [id number]  DATE: 3/13/2018  Reason for visit:   Chief Complaint   Patient presents with    Follow-up    Chronic Kidney Disease     ASSESSMENT and PLAN:     CK D stage III with baseline serum creatinine 1 2-1 3  - Her serum creatinine 1 4 in March 2018 slightly elevated from baseline  I suspect elevated creatinine could be prerenal in nature as patient has lost weight   - Reduce Lasix from 40 mg to 20 mg p o  daily   - Avoid NSAIDs or nephrotoxins  - CKD likely secondary to long-term hypertensive nephrosclerosis/diabetic nephropathy  - Check BMP, phosphorus, UPCR before next visit     Proteinuria  - last UPC ratio 270 mg  repeat UPC ratio before next visit  Continue lisinopril 20 mg by mouth daily   - Proteinuria likely secondary to long-term diabetes and hypertension    Hypertension  - Blood pressure is controlled in the office today  - Continue current antihypertensive regimen with lisinopril, metoprolol and Lasix  Reduce Lasix as above  - Strongly advised to check blood pressure at home on a regular basis and call back if blood pressure remains persistently greater than 140/90  Advised to bring BP machine during next visit       bilateral lower extremity edema likely secondary to chronic venous stasis  - This is resolved since endovascular laser treatment in left lower extremity  Currently she does not have any edema in both lower extremity  She denies any dyspnea  - Continue low-salt diet  Iron deficiency anemia, last iron saturation 22% in March 2018 has improved from previous value 16% after IV Feraheme trial    -hemoglobin overall stable at goal   Continue to closely monitor  Diagnoses and all orders for this visit:    Chronic kidney disease, stage 3  -     Basic metabolic panel; Future  -     CBC; Future  -     Protein / creatinine ratio, urine; Future  -     Phosphorus; Future  -     PTH, intact; Future    Benign hypertension with chronic kidney disease, stage III    Iron deficiency anemia, unspecified iron deficiency anemia type    Microalbuminuria    Bilateral leg edema    Other orders  -     Discontinue: Ascorbic Acid (VITAMIN C) 1000 MG tablet; Take 1 tablet by mouth daily  -     Discontinue: Omega-3 Fatty Acids (FISH OIL) 1200 MG CAPS; Take 1 capsule by mouth 3 (three) times a day  -     GLUCOSAMINE CHONDROITIN COMPLX PO; Take 1 tablet by mouth 2 (two) times a day  -     Discontinue: atorvastatin (LIPITOR) 80 mg tablet; Take 1 tablet by mouth daily  -     Discontinue: apixaban (ELIQUIS) 5 mg; Take 1 tablet by mouth 2 (two) times a day  -     furosemide (LASIX) 40 mg tablet; Take 0 5 tablets by mouth daily   -     Discontinue: levothyroxine 125 mcg tablet; Take by mouth  -     Discontinue: lisinopril (ZESTRIL) 20 mg tablet; Take 1 tablet by mouth daily  -     metoprolol tartrate (LOPRESSOR) 25 mg tablet; Take 0 5 tablets by mouth 2 (two) times a day  -     Discontinue: insulin aspart (NOVOLOG FLEXPEN) 100 Units/mL SOPN; Inject under the skin  -     Discontinue: oxybutynin (DITROPAN XL) 15 MG 24 hr tablet; Take 1 tablet by mouth daily  -     Discontinue: Insulin Glargine (TOUJEO SOLOSTAR) injection pen 300 units/mL; Inject under the skin  -     citalopram (CeleXA) 20 mg tablet;  Take 1 tablet by mouth daily          SUBJECTIVE / HPI:  Patient is 65-year-old female with significant past medical history of hypertension for at least 40-50 years, and diabetes for 30 years, diabetic retinopathy, stress incontinence, chronic leg swelling with chronic venous stasis, status post endovascular laser treatment in the left lower extremity, chronic kidney disease stage III with baseline serum creatinine 1 2-1 3, comes regular follow up of renal failure  Her last serum creatinine 1 4 slightly elevated from baseline  She remains on lisinopril 20 mg by mouth daily and 40 mg Lasix by mouth daily  She is slowly losing weight  She tries to follow low salt diet  No recent NSAID exposure  Denies lightheadedness or dizziness  Denies any dyspnea  She also follows with Urology for overactive bladder  REVIEW OF SYSTEMS:    Review of Systems   Constitutional: Negative for chills, fatigue and fever  HENT: Negative for congestion, ear pain and postnasal drip  Eyes: Negative for visual disturbance  Respiratory: Negative for cough and shortness of breath  Cardiovascular: Negative for chest pain and leg swelling  Gastrointestinal: Negative for abdominal pain, diarrhea, nausea and vomiting  Endocrine: Negative for polyuria  Genitourinary: Negative for decreased urine volume, difficulty urinating, dysuria, flank pain, frequency, hematuria and urgency  Musculoskeletal: Negative for arthralgias and back pain  Skin: Negative for rash  Neurological: Negative for dizziness, light-headedness and headaches  Hematological: Does not bruise/bleed easily  Psychiatric/Behavioral: Negative for behavioral problems and confusion  The patient is not nervous/anxious  More than 10 point review of systems were obtained and discussed in length with the patient  Complete review of systems were negative / unremarkable except mentioned above  PHYSICAL EXAM:  Vitals:    03/13/18 1506   Weight: 102 kg (225 lb)   Height: 5' 1" (1 549 m)     Body mass index is 42 51 kg/m²      Physical Exam Constitutional: She is oriented to person, place, and time  She appears well-developed and well-nourished  HENT:   Head: Normocephalic and atraumatic  Right Ear: External ear normal    Left Ear: External ear normal    Eyes: Conjunctivae and EOM are normal    Neck: Neck supple  No JVD present  Cardiovascular: Normal rate and normal heart sounds  Pulmonary/Chest: Effort normal and breath sounds normal  She has no wheezes  She has no rales  Abdominal: Soft  Bowel sounds are normal  She exhibits no distension  There is no tenderness  Musculoskeletal: She exhibits no edema or tenderness  Neurological: She is alert and oriented to person, place, and time  Skin: Skin is warm and dry  No rash noted  Psychiatric: She has a normal mood and affect  Her behavior is normal    Vitals reviewed        PAST MEDICAL HISTORY:  Past Medical History:   Diagnosis Date    Anxiety     Arthritis     [facility]     Chronic venous insufficiency     COPD [facility]chronic obstructive pulmonary disease) [facility]     Coronary artery disease     Diabetes mellitus ([address] )     type II    Disease of thyroid gland     hypothyroidism    DVT (deep venous thrombosis) ([facility])     Hyperlipidemia     Hypertension     Irregular heart beat     Paroxysmal a-fib    Pacemaker     Renal disorder     stage 3    Urinary incontinence     Uterine leiomyoma        PAST SURGICAL HISTORY:  Past Surgical History:   Procedure Laterality Date    CARDIAC PACEMAKER PLACEMENT      CARDIAC PACEMAKER PLACEMENT      CARDIAC SURGERY      CARPAL TUNNEL RELEASE Bilateral     CATARACT EXTRACTION W/ INTRAOCULAR LENS  IMPLANT, BILATERAL      ECTOPIC PREGNANCY SURGERY      EYE SURGERY      cataracts    HYSTERECTOMY      JOINT REPLACEMENT Right     TKR    IA ENDOVENOUS LASER, 1ST VEIN Left 2/17/2017    Procedure: GREATER SAPHENOUS VEIN ENDOVASCULAR LASER THERAPY ;  Surgeon: Irvin Lanier DO;  [facility];  Service: Vascular    TONSILLECTOMY         SOCIAL HISTORY:  History   Alcohol Use    Yes     Comment: rarely     History   Drug Use No     History   Smoking Status    Never Smoker   Smokeless Tobacco    Never Used       FAMILY HISTORY:  Family History   Problem Relation Age of Onset    Hypertension Father        MEDICATIONS:    Current Outpatient Prescriptions:     citalopram (CeleXA) 20 mg tablet, Take 1 tablet by mouth daily, Disp: , Rfl:     furosemide (LASIX) 40 mg tablet, Take 0 5 tablets by mouth daily , Disp: , Rfl:     GLUCOSAMINE CHONDROITIN COMPLX PO, Take 1 tablet by mouth 2 (two) times a day, Disp: , Rfl:     metoprolol tartrate (LOPRESSOR) 25 mg tablet, Take 0 5 tablets by mouth 2 (two) times a day, Disp: , Rfl:     acetaminophen (TYLENOL) 325 mg tablet, Take 650 mg by mouth , Disp: , Rfl:     apixaban (ELIQUIS) 5 mg, Take 1 tablet (5 mg total) by mouth 2 (two) times a day, Disp: 56 tablet, Rfl: 0    Ascorbic Acid (VITAMIN C) 1000 MG tablet, Take 1,000 mg by mouth , Disp: , Rfl:     atorvastatin (LIPITOR) 80 mg tablet, TAKE 1 TABLET AT BEDTIME, Disp: 90 tablet, Rfl: 3    cholecalciferol (VITAMIN D3) 1,000 units tablet, Take 1,000 Units by mouth daily, Disp: , Rfl:     insulin aspart (NovoLOG) 100 units/mL injection, Inject 8 Units under the skin 3 (three) times a day before meals Pt states taking 8 UNITS in AM, 5 UNITS at lunch, and 8 UNITS with dinner, Disp: , Rfl:     Insulin Glargine (TOUJEO SOLOSTAR) 300 UNIT/ML SOPN, Inject 20 Units under the skin 2 (two) times a day Pt reports she takes 30 units in AM and 20 units HS, Disp: , Rfl:     levothyroxine 125 mcg tablet, Take 1 tablet (125 mcg total) by mouth daily Monday thru Saturday; do not take on Sunday  , Disp: 78 tablet, Rfl: 1    lisinopril (ZESTRIL) 20 mg tablet, Take 1 tablet (20 mg total) by mouth daily, Disp: 90 tablet, Rfl: 3    Multiple Vitamins-Minerals (OCUVITE ADULT FORMULA PO), Take by mouth, Disp: , Rfl:     Omega-3 Fatty Acids (FISH OIL) 1200 MG CAPS, Take 1,200 mg by mouth 3 (three) times a day  , Disp: , Rfl:     oxybutynin (DITROPAN) 5 mg tablet, TAKE 1 TABLET TWICE DAILY, Disp: 180 tablet, Rfl: 3    Lab Results:   Results for orders placed or performed in visit on 03/06/18   Microalbumin / creatinine urine ratio   Result Value Ref Range    Creatinine, Ur 65 6 mg/dL    Microalbum  ,U,Random 62 6 (H) 0 0 - 20 0 mg/L    Microalb Creat Ratio 95 (H) 0 - 30 mg/g creatinine   Basic metabolic panel   Result Value Ref Range    Sodium 140 136 - 145 mmol/L    Potassium 4 1 3 5 - 5 3 mmol/L    Chloride 104 100 - 108 mmol/L    CO2 28 21 - 32 mmol/L    Anion Gap 8 4 - 13 mmol/L    BUN 37 (H) 5 - 25 mg/dL    Creatinine 1 44 (H) 0 60 - 1 30 mg/dL    Glucose 99 65 - 140 mg/dL    Calcium 9 1 8 3 - 10 1 mg/dL    eGFR 35 ml/min/1 73sq m   CBC   Result Value Ref Range    WBC 7 93 4 31 - 10 16 Thousand/uL    RBC 3 89 3 81 - 5 12 Million/uL    Hemoglobin 12 0 11 5 - 15 4 g/dL    Hematocrit 38 0 34 8 - 46 1 %    MCV 98 82 - 98 fL    MCH 30 8 26 8 - 34 3 pg    MCHC 31 6 31 4 - 37 4 g/dL    RDW 13 4 11 6 - 15 1 %    Platelets 122 522 - 346 Thousands/uL    MPV 10 2 8 9 - 12 7 fL   Phosphorus   Result Value Ref Range    Phosphorus 3 7 2 3 - 4 1 mg/dL   PTH, intact   Result Value Ref Range    PTH 92 4 (H) 14 0 - 72 0 pg/mL   Protein / creatinine ratio, urine   Result Value Ref Range    Creatinine, Ur 65 6 mg/dL    Protein Urine Random 18 mg/dL    Prot/Creat Ratio, Ur 0 27 (H) 0 00 - 0 10   Iron Saturation %   Result Value Ref Range    Iron Saturation 22 %    TIBC 313 250 - 450 ug/dL    Iron 70 50 - 170 ug/dL   Ferritin   Result Value Ref Range    Ferritin 163 8 - 388 ng/mL

## 2018-03-19 ENCOUNTER — TELEPHONE (OUTPATIENT)
Dept: NEPHROLOGY | Facility: CLINIC | Age: 78
End: 2018-03-19

## 2018-03-27 ENCOUNTER — LAB (OUTPATIENT)
Dept: LAB | Facility: CLINIC | Age: 78
End: 2018-03-27
Payer: MEDICARE

## 2018-03-27 DIAGNOSIS — N18.30 CHRONIC KIDNEY DISEASE, STAGE 3 (HCC): ICD-10-CM

## 2018-03-27 DIAGNOSIS — N18.30 CHRONIC KIDNEY DISEASE, STAGE III (MODERATE) (HCC): ICD-10-CM

## 2018-03-27 DIAGNOSIS — E11.65 TYPE 2 DIABETES MELLITUS WITH HYPERGLYCEMIA (HCC): ICD-10-CM

## 2018-03-27 DIAGNOSIS — E03.9 HYPOTHYROIDISM: ICD-10-CM

## 2018-03-27 LAB
ALBUMIN SERPL BCP-MCNC: 3.5 G/DL (ref 3.5–5)
ANION GAP SERPL CALCULATED.3IONS-SCNC: 7 MMOL/L (ref 4–13)
BUN SERPL-MCNC: 39 MG/DL (ref 5–25)
CALCIUM SERPL-MCNC: 9.1 MG/DL (ref 8.3–10.1)
CHLORIDE SERPL-SCNC: 107 MMOL/L (ref 100–108)
CO2 SERPL-SCNC: 28 MMOL/L (ref 21–32)
CREAT SERPL-MCNC: 1.24 MG/DL (ref 0.6–1.3)
CREAT UR-MCNC: 103 MG/DL
CREAT UR-MCNC: 105 MG/DL
ERYTHROCYTE [DISTWIDTH] IN BLOOD BY AUTOMATED COUNT: 13.4 % (ref 11.6–15.1)
EST. AVERAGE GLUCOSE BLD GHB EST-MCNC: 169 MG/DL
GFR SERPL CREATININE-BSD FRML MDRD: 42 ML/MIN/1.73SQ M
GLUCOSE P FAST SERPL-MCNC: 95 MG/DL (ref 65–99)
HBA1C MFR BLD: 7.5 % (ref 4.2–6.3)
HCT VFR BLD AUTO: 35.4 % (ref 34.8–46.1)
HGB BLD-MCNC: 11.1 G/DL (ref 11.5–15.4)
MCH RBC QN AUTO: 30.9 PG (ref 26.8–34.3)
MCHC RBC AUTO-ENTMCNC: 31.4 G/DL (ref 31.4–37.4)
MCV RBC AUTO: 99 FL (ref 82–98)
MICROALBUMIN UR-MCNC: 109 MG/L (ref 0–20)
MICROALBUMIN/CREAT 24H UR: 104 MG/G CREATININE (ref 0–30)
PHOSPHATE SERPL-MCNC: 4 MG/DL (ref 2.3–4.1)
PLATELET # BLD AUTO: 192 THOUSANDS/UL (ref 149–390)
PMV BLD AUTO: 10.3 FL (ref 8.9–12.7)
POTASSIUM SERPL-SCNC: 4.5 MMOL/L (ref 3.5–5.3)
PROT UR-MCNC: 34 MG/DL
PROT/CREAT UR: 0.33 MG/G{CREAT} (ref 0–0.1)
PTH-INTACT SERPL-MCNC: 142.2 PG/ML (ref 18.4–80.1)
RBC # BLD AUTO: 3.59 MILLION/UL (ref 3.81–5.12)
SODIUM SERPL-SCNC: 142 MMOL/L (ref 136–145)
T4 FREE SERPL-MCNC: 1.13 NG/DL (ref 0.76–1.46)
TSH SERPL DL<=0.05 MIU/L-ACNC: 2.65 UIU/ML (ref 0.36–3.74)
WBC # BLD AUTO: 7.19 THOUSAND/UL (ref 4.31–10.16)

## 2018-03-27 PROCEDURE — 82043 UR ALBUMIN QUANTITATIVE: CPT

## 2018-03-27 PROCEDURE — 83036 HEMOGLOBIN GLYCOSYLATED A1C: CPT

## 2018-03-27 PROCEDURE — 84439 ASSAY OF FREE THYROXINE: CPT

## 2018-03-27 PROCEDURE — 83970 ASSAY OF PARATHORMONE: CPT

## 2018-03-27 PROCEDURE — 84156 ASSAY OF PROTEIN URINE: CPT

## 2018-03-27 PROCEDURE — 82570 ASSAY OF URINE CREATININE: CPT

## 2018-03-27 PROCEDURE — 36415 COLL VENOUS BLD VENIPUNCTURE: CPT

## 2018-03-27 PROCEDURE — 84443 ASSAY THYROID STIM HORMONE: CPT

## 2018-03-27 PROCEDURE — 85027 COMPLETE CBC AUTOMATED: CPT

## 2018-03-27 PROCEDURE — 80069 RENAL FUNCTION PANEL: CPT

## 2018-04-03 ENCOUNTER — OFFICE VISIT (OUTPATIENT)
Dept: ENDOCRINOLOGY | Facility: CLINIC | Age: 78
End: 2018-04-03
Payer: MEDICARE

## 2018-04-03 VITALS
HEIGHT: 61 IN | DIASTOLIC BLOOD PRESSURE: 74 MMHG | SYSTOLIC BLOOD PRESSURE: 152 MMHG | WEIGHT: 226 LBS | BODY MASS INDEX: 42.67 KG/M2

## 2018-04-03 DIAGNOSIS — E55.9 VITAMIN D DEFICIENCY: ICD-10-CM

## 2018-04-03 DIAGNOSIS — Z79.4 TYPE 2 DIABETES MELLITUS WITH COMPLICATION, WITH LONG-TERM CURRENT USE OF INSULIN (HCC): Primary | ICD-10-CM

## 2018-04-03 DIAGNOSIS — E11.65 TYPE 2 DIABETES MELLITUS WITH HYPERGLYCEMIA, UNSPECIFIED LONG TERM INSULIN USE STATUS: Primary | ICD-10-CM

## 2018-04-03 DIAGNOSIS — E03.9 HYPOTHYROIDISM, UNSPECIFIED TYPE: ICD-10-CM

## 2018-04-03 DIAGNOSIS — R79.89 ELEVATED PTHRP LEVEL: ICD-10-CM

## 2018-04-03 DIAGNOSIS — E11.8 TYPE 2 DIABETES MELLITUS WITH COMPLICATION, WITH LONG-TERM CURRENT USE OF INSULIN (HCC): Primary | ICD-10-CM

## 2018-04-03 PROCEDURE — 99213 OFFICE O/P EST LOW 20 MIN: CPT | Performed by: NURSE PRACTITIONER

## 2018-04-03 NOTE — PROGRESS NOTES
Follow-up Progress Note      chief complaint on file:  Type 2 diabetes     Referring Provider  [place]issom [place]  [address] [facility], [address] [facility]     History of Present Illness:   Ilene Quintana is a 70 y o  female with a history of type 2 diabetes with long term use of insulin  Diabetes course has been stable  Reports complications of diabetic nephropathy, and hypertensive nephropathy  Denies recent illness or hospitalizations  Denies recent severe hypoglycemic or severe hyperglycemic episodes  Denies any issues with her current regimen  home glucose monitoring: are performed regularly    Home blood glucose readings:   Before breakfast:   Before lunch:   Before dinner:   Bedtime:     Current regimen: tujeo 35 units at bedtime, NovoLog sliding scale 8 units at breakfast 10 units at lunch and 8 units at dinner plus a sliding scale  compliant most of the timedenies any side effects from medications  Injects in:  Abdomen Rotates sites: Yes  Hypoglycemic episodes: Yes infrequent  H/o of hypoglycemia causing hospitalization or Intervention such as glucagon injection  or ambulance call :  No  Hypoglycemia symptoms: jitteriness and sweating  Treatment of hypoglycemia:  Glucose tablets, eating food     Medic alert tag: recommended: Yes    Diabetes education: Yes  Diet:  3 meals per day, 1 snacks per day  Timing of meals is predictable  Yes  diabetic diet compliance:  compliant most of the time  Activity: Daily activity is predictable Yes The patient engages in little, if any physical activity            further diabetic ROS: no polyuria or polydipsia, no chest pain, dyspnea or TIAs, no numbness, tingling or pain in extremities          Opthamology: sees yearly;  retinopathy, macular edema  Podiatry:  Every 6 months  Infuenza vaccine:  Yes    Has hypertension: followed by PCP; on the lisinopril 20 mg daily, metoprolol 25 mg half tablet oral 2 times per day, compliant most of the time  Has hyperlipidemia: followed by PCP; on atorvastatin - tolerating well, no myalgias  compliant most of the time  denies any side effects from medications    Thyroid disorders:  Hypothyroidism is on levothyroxine 125 mcg 6 days a week      Patient Active Problem List   Diagnosis    [facility]    Type 2 diabetes mellitus with hyperglycemia ([facility] )    Essential hypertension    Morbid obesity due to excess calories ([facility])    Anemia    S/P cardiac pacemaker procedure    Hypothyroidism    Acute on chronic diastolic CHF (congestive heart failure) ([facility])    Chronic venous insufficiency    Dvt femoral (deep venous thrombosis) ([facility])    Benign hypertension with chronic kidney disease, stage III    Chronic kidney disease, stage 3    Iron deficiency anemia    Microalbuminuria    Bilateral leg edema    Vitamin D deficiency      Past Medical History:   Diagnosis Date    Anxiety     Arthritis     CHF (congestive heart failure) ([facility])     Chronic venous insufficiency     COPD (chronic obstructive pulmonary disease) ([facility])     Coronary artery disease     Diabetes mellitus [facility] )     type II    Disease of thyroid gland     hypothyroidism    DVT (deep venous thrombosis) ([facility])     Hyperlipidemia     Hypertension     Irregular heart beat     Paroxysmal a-fib    Pacemaker     Renal disorder     stage 3    Urinary incontinence     Uterine leiomyoma       Past Surgical History:   Procedure Laterality Date    CARDIAC PACEMAKER PLACEMENT      CARDIAC PACEMAKER PLACEMENT      CARDIAC SURGERY      CARPAL TUNNEL RELEASE Bilateral     CATARACT EXTRACTION W/ INTRAOCULAR LENS  IMPLANT, BILATERAL      ECTOPIC PREGNANCY SURGERY      EYE SURGERY      cataracts    HYSTERECTOMY      JOINT REPLACEMENT Right     TKR    ME ENDOVENOUS LASER, 1ST VEIN Left 2/17/2017    Procedure: GREATER SAPHENOUS VEIN ENDOVASCULAR LASER THERAPY ;  Surgeon: Snehal Bettencourt DO [place];  [facility];  Service: Vascular    TONSILLECTOMY        Family History   Problem Relation Age of Onset    Hypertension Father      Social History   Substance Use Topics    Smoking status: Never Smoker    Smokeless tobacco: Never Used    Alcohol use Yes      Comment: rarely     Allergies   Allergen Reactions    Ampicillin Hives         Current Outpatient Prescriptions:     acetaminophen (TYLENOL) 325 mg tablet, Take 650 mg by mouth , Disp: , Rfl:     apixaban (ELIQUIS) 5 mg, Take 1 tablet (5 mg total) by mouth 2 (two) times a day, Disp: 56 tablet, Rfl: 0    Ascorbic Acid (VITAMIN C) 1000 MG tablet, Take 1,000 mg by mouth , Disp: , Rfl:     atorvastatin (LIPITOR) 80 mg tablet, TAKE 1 TABLET AT BEDTIME, Disp: 90 tablet, Rfl: 3    cholecalciferol (VITAMIN D3) 1,000 units tablet, Take 1,000 Units by mouth daily, Disp: , Rfl:     citalopram (CeleXA) 20 mg tablet, Take 1 tablet by mouth daily, Disp: , Rfl:     furosemide (LASIX) 40 mg tablet, Take 0 5 tablets by mouth daily , Disp: , Rfl:     GLUCOSAMINE CHONDROITIN COMPLX PO, Take 1 tablet by mouth 2 (two) times a day, Disp: , Rfl:     insulin aspart (NovoLOG) 100 units/mL injection, Pt states taking 8 UNITS in AM, 10 UNITS at lunch, and 8 UNITS with dinner , Disp: , Rfl:     Insulin Glargine (TOUJEO SOLOSTAR) 300 UNIT/ML SOPN, Inject 35 Units under the skin daily at bedtime  , Disp: , Rfl:     levothyroxine 125 mcg tablet, Take 1 tablet (125 mcg total) by mouth daily Monday thru Saturday; do not take on Sunday  , Disp: 78 tablet, Rfl: 1    lisinopril (ZESTRIL) 20 mg tablet, Take 1 tablet (20 mg total) by mouth daily, Disp: 90 tablet, Rfl: 3    metoprolol tartrate (LOPRESSOR) 25 mg tablet, Take 0 5 tablets by mouth 2 (two) times a day, Disp: , Rfl:     Multiple Vitamins-Minerals (OCUVITE ADULT FORMULA PO), Take by mouth, Disp: , Rfl:     Omega-3 Fatty Acids (FISH OIL) 1200 MG CAPS, Take 1,200 mg by mouth 3 (three) times a day  , Disp: , Rfl:     oxybutynin (DITROPAN) 5 mg tablet, TAKE 1 TABLET TWICE DAILY, Disp: 180 tablet, Rfl: 3  Review of Systems   Endocrine: Negative  All other systems reviewed and are negative  Physical Exam:  Body mass index is 42 7 kg/m²  /74   Ht 5' 1" (1 549 m)   Wt 103 kg (226 lb)   BMI 42 70 kg/m²    Wt Readings from Last 3 Encounters:   04/03/18 103 kg (226 lb)   03/13/18 102 kg (225 lb)   12/27/17 102 kg (224 lb 6 oz)       Physical Exam   Constitutional: She appears well-developed  HENT:   Head: Normocephalic  Eyes: Pupils are equal, round, and reactive to light  Neck: Neck supple  Cardiovascular: Normal rate  Pulmonary/Chest: Effort normal    Neurological: She is alert  Skin: Skin is warm and dry  Patient's shoes and socks were not removed        Labs:   Component      Latest Ref Rng & Units 3/27/2018 3/27/2018          10:48 AM 10:48 AM   EXT Creatinine Urine      mg/dL 103 0 105 0   MICROALBUM ,U,RANDOM      0 0 - 20 0 mg/L  109 0 (H)   MICROALBUMIN/CREATININE RATIO      0 - 30 mg/g creatinine  104 (H)   Protein Urine Random      mg/dL 34    Prot/Creat Ratio, Ur      0 00 - 0 10 0 33 (H)    Hemoglobin A1C      4 2 - 6 3 % 7 5 (H)    EAG      mg/dl 169    TSH 3RD GENERATON      0 358 - 3 740 uIU/mL 2 650    Free T4      0 76 - 1 46 ng/dL 1 13    PTH      18 4 - 80 1 pg/mL 142 2 (H)          Lab Results   Component Value Date    CREATININE 1 24 03/27/2018    CREATININE 1 44 (H) 03/06/2018    CREATININE 1 25 11/01/2017    BUN 39 (H) 03/27/2018     03/27/2018    K 4 5 03/27/2018     03/27/2018    CO2 28 03/27/2018     eGFR   Date Value Ref Range Status   03/27/2018 42 ml/min/1 73sq m Final     No components found for: [facility]    Lab Results   Component Value Date    CHOL 134 07/18/2017    HDL 57 07/18/2017    TRIG 87 07/18/2017       Lab Results   Component Value Date    ALT 21 09/06/2016    AST 18 09/06/2016    ALKPHOS 86 09/06/2016    BILITOT 0 6 09/06/2016 Lab Results   Component Value Date    2018       Impression:  1  Type 2 diabetes mellitus with complication, with long-term current use of insulin ([facility] )    2  Hypothyroidism, unspecified type    3  Vitamin D deficiency    4  Elevated PTHrP level ([facility] )           Plan:    Diagnoses and all orders for this visit:    1  Type 2 diabetes mellitus with complication, with long-term current use of insulin ([facility])  -microalbumin is improved, eye exam, foot exams are up to date  Check blood sugars with meals and at bedtime so 4 times a day and call if blood sugars less than 70 or greater than 200 more than twice a week  Please see changes to medications plus sliding scale below  Goal for Hba1c is   <7   , Hba1c at goal -no  7 5   Blood sugars are running higher around dinner time  Check blood sugars 4 Times daily, keep log, send sugar log if blood sugars >300 or < 70 mg/dl consistently ( more than twice a week)  Changes made to regimen - yes, increased her lunchtime Humalog from 10 units at lunch to 12 units at lunch plus sliding scale  Please see below for sliding scale      Call office if blood sugar < 60 or >400 mg/dl   Goal for blood sugars  mg/dl   Discussed hypoglycemia symptoms and treatment   See diabetes instructions   counseled about the long term complications of uncontrolled diabetes, including, Nephropathy, Neuropathy, CVD, Retinopathy and importance  of adherence to diet, treatment plan and life style modifications   Follow up with Opthalmology and podiatry       INSULIN DOSAGE INSTRUCTIONS      Name: Joaquin Ordonez                        : 1940  MRN #: [id number]    Your Current Insulin  and dose is: Before Breakfast Before Lunch Before Evening Meal Bedtime     Novolog Insulin   8   12 8    Regular, Apidra, Humalog orNovolog Sliding Scale:   <80              151-200 + 1 +1 1    201-250 +2 +2 +2 +   251-300 +3 +3 +3 +   301-350 +4 +4 +4 +   >350 +5 +5 +5 + tojeo    35 units                     Additional Instructions:   Please test your blood sugar:  _4_ Times per day  X_ Before Breakfast                _ Alternate Testing  X_ Before Lunch                _ 2 Hours After  Meal  X_ Before Evening Meal               _ 3 a m   x_ Before Bedtime Snack     Target Blood sugar range _70_to _140__  Call if your blood sugar is less than _60_ or greater than _400__  Today's Date: 4/3/2018    2  Hypothyroidism, unspecified type  -TSH and free T4 normal, continue levothyroxine 125 mcg 6 days a week on an empty stomach 4 hours apart from calcium and vitamin-D supplementation  Vitamin D deficiency  -     Vitamin D 25 hydroxy Lab Collect; Future    3  Elevated PTHrP level ([address] )  -of unknown etiology with a normal calcium 9 1, and vitamin-D which is on the lower end of normal 33 5  Asked patient to increase her OTC D3 from a 1000 International Units daily to 5000 International Units daily  Patient has no history of fractures, and no history of kidney stones  Ordered 24 hour urine calcium to rule out hyperparathyroidism      -24 urine calcium; Future   -PTH; Future      Discussed with the patient and all questioned fully answered  She will call me if any problems arise      Counseled patient on diagnostic results, prognosis, risk and benefit of treatment options, instruction for management, importance of treatment compliance, Risk  factor reduction and impressions      DALILA Skaggs

## 2018-04-04 ENCOUNTER — TELEPHONE (OUTPATIENT)
Dept: ENDOCRINOLOGY | Facility: CLINIC | Age: 78
End: 2018-04-04

## 2018-04-04 DIAGNOSIS — E11.65 UNCONTROLLED TYPE 2 DIABETES MELLITUS WITH COMPLICATION, UNSPECIFIED LONG TERM INSULIN USE STATUS: Primary | ICD-10-CM

## 2018-04-04 DIAGNOSIS — E11.8 UNCONTROLLED TYPE 2 DIABETES MELLITUS WITH COMPLICATION, UNSPECIFIED LONG TERM INSULIN USE STATUS: Primary | ICD-10-CM

## 2018-04-04 NOTE — TELEPHONE ENCOUNTER
Per patient, [facility]  Pt is testing 4 times day   [employer] test strips added to med list

## 2018-04-08 ENCOUNTER — TELEPHONE (OUTPATIENT)
Dept: NEPHROLOGY | Facility: CLINIC | Age: 78
End: 2018-04-08

## 2018-04-08 DIAGNOSIS — N25.81 SECONDARY HYPERPARATHYROIDISM OF RENAL ORIGIN (HCC): Primary | ICD-10-CM

## 2018-04-08 NOTE — TELEPHONE ENCOUNTER
Can you please let patient know that Cr remains stable  Her PTH level is high  Can you have her start calcitriol 0 25 mcg po three times a week? Will discuss further during next office visit

## 2018-04-10 ENCOUNTER — TELEPHONE (OUTPATIENT)
Dept: FAMILY MEDICINE CLINIC | Facility: CLINIC | Age: 78
End: 2018-04-10

## 2018-04-10 DIAGNOSIS — R05.9 COUGH: Primary | ICD-10-CM

## 2018-04-10 RX ORDER — DEXTROMETHORPHAN HYDROBROMIDE AND PROMETHAZINE HYDROCHLORIDE 15; 6.25 MG/5ML; MG/5ML
5 SYRUP ORAL 4 TIMES DAILY PRN
Qty: 118 ML | Refills: 1 | Status: SHIPPED | OUTPATIENT
Start: 2018-04-10 | End: 2018-08-16 | Stop reason: SDUPTHER

## 2018-04-10 NOTE — TELEPHONE ENCOUNTER
Pt called refill line stating she is coughing a lot and bringing up a lot of mucus, wants to know if she can get promethazine dm syrup     [facility]

## 2018-04-10 NOTE — TELEPHONE ENCOUNTER
Spoke with the patient and she is aware of her lab results and she is aware to start Calcitriol 0 25mcg three times a week  Can you please send in Calcitriol for 90 day supply to [facility] for her?

## 2018-04-11 ENCOUNTER — OFFICE VISIT (OUTPATIENT)
Dept: CARDIOLOGY CLINIC | Facility: CLINIC | Age: 78
End: 2018-04-11
Payer: MEDICARE

## 2018-04-11 VITALS
HEART RATE: 62 BPM | SYSTOLIC BLOOD PRESSURE: 110 MMHG | HEIGHT: 61 IN | DIASTOLIC BLOOD PRESSURE: 64 MMHG | OXYGEN SATURATION: 95 % | BODY MASS INDEX: 43.05 KG/M2 | WEIGHT: 228 LBS

## 2018-04-11 DIAGNOSIS — I48.0 PAROXYSMAL ATRIAL FIBRILLATION (HCC): Primary | ICD-10-CM

## 2018-04-11 PROCEDURE — 99214 OFFICE O/P EST MOD 30 MIN: CPT | Performed by: INTERNAL MEDICINE

## 2018-04-11 RX ORDER — CALCITRIOL 0.25 UG/1
0.25 CAPSULE, LIQUID FILLED ORAL 3 TIMES WEEKLY
Qty: 45 CAPSULE | Refills: 2 | Status: SHIPPED | OUTPATIENT
Start: 2018-04-11 | End: 2018-08-08

## 2018-04-11 NOTE — PROGRESS NOTES
Cardiology Follow Up    [facility]  1940  [id number]  HEART & VASCULAR [facility] [place]  [address] [place] [address] [address]    No diagnosis found  Interval History: Katerina Wahl for PAF and PPM    She has had an increase in hematuria  She has followup with Dr Jonathon Lackey  She has no dyspnea, no palpitations, no orthopnea, no PND and no edema  Problem List     NSTEMI (non-ST elevated myocardial infarction) ([facility] )    Type 2 diabetes mellitus with hyperglycemia ([place] [address] )    Essential hypertension    Morbid obesity due to excess calories ([facility])    Anemia    S/P cardiac pacemaker procedure    Hypothyroidism (Chronic)    Acute on chronic diastolic CHF (congestive heart failure) ([facility])    Chronic venous insufficiency    Dvt femoral (deep venous thrombosis) ([facility])    Benign hypertension with chronic kidney disease, stage III    Chronic kidney disease, stage 3    Iron deficiency anemia    Microalbuminuria    Bilateral leg edema    Vitamin D deficiency        Past Medical History:   Diagnosis Date    Anxiety     Arthritis     CHF (congestive heart failure) ([facility])     Chronic venous insufficiency     COPD (chronic obstructive pulmonary disease) ([place] [address] )     Coronary artery disease     Diabetes mellitus ([place] [address] )     type II    Disease of thyroid gland     hypothyroidism    DVT (deep venous thrombosis) ([place] [address] )     Hyperlipidemia     Hypertension     Irregular heart beat     Paroxysmal a-fib    Pacemaker     Renal disorder     stage 3    Urinary incontinence     Uterine leiomyoma      Social History     Social History    Marital status: /Civil Union     Spouse name: N/A    Number of children: N/A    Years of education: N/A     Occupational History    Not on file       Social History Main Topics    Smoking status: Never Smoker    Smokeless tobacco: Never Used    Alcohol use Yes      Comment: rarely    Drug use: No    Sexual activity: No     Other Topics Concern    Not on file     Social History Narrative    No narrative on file      Family History   Problem Relation Age of Onset    Hypertension Father      Past Surgical History:   Procedure Laterality Date    CARDIAC PACEMAKER PLACEMENT      CARDIAC PACEMAKER PLACEMENT      CARDIAC SURGERY      CARPAL TUNNEL RELEASE Bilateral     CATARACT EXTRACTION W/ INTRAOCULAR LENS  IMPLANT, BILATERAL      ECTOPIC PREGNANCY SURGERY      EYE SURGERY      cataracts    HYSTERECTOMY      JOINT REPLACEMENT Right     TKR    [place] ENDOVENOUS LASER, 1ST VEIN Left 2/17/2017    Procedure: GREATER SAPHENOUS VEIN ENDOVASCULAR LASER THERAPY ;  Surgeon: Ольга Limon DO;  Location: [facility];  Service: Vascular    TONSILLECTOMY         Current Outpatient Prescriptions:     acetaminophen (TYLENOL) 325 mg tablet, Take 650 mg by mouth , Disp: , Rfl:     apixaban (ELIQUIS) 5 mg, Take 1 tablet (5 mg total) by mouth 2 (two) times a day, Disp: 56 tablet, Rfl: 0    Ascorbic Acid (VITAMIN C) 1000 MG tablet, Take 1,000 mg by mouth , Disp: , Rfl:     atorvastatin (LIPITOR) 80 mg tablet, TAKE 1 TABLET AT BEDTIME, Disp: 90 tablet, Rfl: 3    cholecalciferol (VITAMIN D3) 1,000 units tablet, Take 1,000 Units by mouth daily, Disp: , Rfl:     citalopram (CeleXA) 20 mg tablet, Take 1 tablet by mouth daily, Disp: , Rfl:     furosemide (LASIX) 40 mg tablet, Take 0 5 tablets by mouth daily , Disp: , Rfl:     GLUCOSAMINE CHONDROITIN COMPLX PO, Take 1 tablet by mouth 2 (two) times a day, Disp: , Rfl:     glucose blood (TRUE METRIX BLOOD GLUCOSE TEST) test strip, Test 4 times plus daily, as instructed, Disp: 400 each, Rfl: 1    insulin aspart (NovoLOG) 100 units/mL injection, Pt states taking 8 UNITS in AM, 10 UNITS at lunch, and 8 UNITS with dinner , Disp: , Rfl:     Insulin Glargine (TOUJEO SOLOSTAR) injection pen 300 units/mL, Inject 35 Units under the skin daily at bedtime, Disp: 3 pen, Rfl: 0   levothyroxine 125 mcg tablet, Take 1 tablet (125 mcg total) by mouth daily Monday thru Saturday; do not take on Sunday  , Disp: 78 tablet, Rfl: 1    lisinopril (ZESTRIL) 20 mg tablet, Take 1 tablet (20 mg total) by mouth daily, Disp: 90 tablet, Rfl: 3    metoprolol tartrate (LOPRESSOR) 25 mg tablet, Take 0 5 tablets by mouth 2 (two) times a day, Disp: , Rfl:     Multiple Vitamins-Minerals (OCUVITE ADULT FORMULA PO), Take by mouth, Disp: , Rfl:     Omega-3 Fatty Acids (FISH OIL) 1200 MG CAPS, Take 1,200 mg by mouth 3 (three) times a day  , Disp: , Rfl:     oxybutynin (DITROPAN) 5 mg tablet, TAKE 1 TABLET TWICE DAILY, Disp: 180 tablet, Rfl: 3    promethazine-dextromethorphan (PHENERGAN-DM) 6 25-15 mg/5 mL oral syrup, Take 5 mL by mouth 4 (four) times a day as needed for cough, Disp: 118 mL, Rfl: 1  Allergies   Allergen Reactions    Ampicillin Hives       Labs:     Chemistry        Component Value Date/Time     03/27/2018 1048     12/06/2016 0939    K 4 5 03/27/2018 1048    K 4 2 12/06/2016 0939     03/27/2018 1048     12/06/2016 0939    CO2 28 03/27/2018 1048    CO2 28 12/06/2016 0939    BUN 39 (H) 03/27/2018 1048    BUN 41 (H) 12/06/2016 0939    CREATININE 1 24 03/27/2018 1048    CREATININE 1 23 (H) 12/06/2016 0939        Component Value Date/Time    CALCIUM 9 1 03/27/2018 1048    CALCIUM 9 2 12/06/2016 0939    ALKPHOS 86 09/06/2016 0907    AST 18 09/06/2016 0907    ALT 21 09/06/2016 0907    BILITOT 0 6 09/06/2016 0907            Lab Results   Component Value Date    CHOL 134 07/18/2017    CHOL 130 09/06/2016     Lab Results   Component Value Date    HDL 57 07/18/2017    HDL 56 09/06/2016     Lab Results   Component Value Date    LDLCALC 60 07/18/2017     Lab Results   Component Value Date    TRIG 87 07/18/2017    TRIG 61 09/06/2016     No components found for: CHOLHDL    Imaging: No results found  Review of Systems   Constitution: Negative  HENT: Negative  Eyes: Negative  Cardiovascular: Negative  Respiratory: Negative  Endocrine: Negative  Hematologic/Lymphatic: Negative  Skin: Negative  Musculoskeletal: Negative  Gastrointestinal: Negative  Genitourinary: Positive for hematuria  Neurological: Negative  Psychiatric/Behavioral: Negative  Allergic/Immunologic: Negative  Vitals:    04/11/18 1331   BP: 110/64   Pulse: 62   SpO2: 95%           Physical Exam   Constitutional: She is oriented to person, place, and time  No distress  HENT:   Mouth/Throat: No oropharyngeal exudate  Eyes: No scleral icterus  Neck: No JVD present  Cardiovascular: Normal rate and regular rhythm  No murmur heard  Pulmonary/Chest: Effort normal and breath sounds normal  No respiratory distress  She has no wheezes  She has no rales  Abdominal: Soft  Bowel sounds are normal  She exhibits no distension  There is no tenderness  There is no rebound  Musculoskeletal: She exhibits no edema  Neurological: She is alert and oriented to person, place, and time  Skin: Skin is warm and dry  She is not diaphoretic  Psychiatric: She has a normal mood and affect  Her behavior is normal        Discussion/Summary:    Complete Heart Block s/p PPM implant  Pacer checks look okay  One very short episode of PAF on her last pacer check  PAF: She has recurrent hematuria  Will temporarily decrease eliquis to 2 5 mg twice a day until she sees Dr Brittany Be, but ultimately I would like her back to 5mg twice a day  The patient was counseled regarding diagnostic results, instructions for management, risk factor reductions, impressions  total time of encounter was 25 minutes and 15 minutes was spent counseling

## 2018-04-30 DIAGNOSIS — I10 ESSENTIAL HYPERTENSION: ICD-10-CM

## 2018-04-30 RX ORDER — LISINOPRIL 20 MG/1
20 TABLET ORAL DAILY
Qty: 90 TABLET | Refills: 1 | Status: SHIPPED | OUTPATIENT
Start: 2018-04-30 | End: 2018-08-08 | Stop reason: SDUPTHER

## 2018-05-02 DIAGNOSIS — I10 ESSENTIAL HYPERTENSION: Primary | ICD-10-CM

## 2018-05-16 ENCOUNTER — PROCEDURE VISIT (OUTPATIENT)
Dept: UROLOGY | Facility: CLINIC | Age: 78
End: 2018-05-16
Payer: MEDICARE

## 2018-05-16 VITALS
WEIGHT: 231 LBS | HEIGHT: 61 IN | SYSTOLIC BLOOD PRESSURE: 140 MMHG | DIASTOLIC BLOOD PRESSURE: 80 MMHG | BODY MASS INDEX: 43.61 KG/M2

## 2018-05-16 DIAGNOSIS — R31.0 GROSS HEMATURIA: Primary | ICD-10-CM

## 2018-05-16 PROCEDURE — 88112 CYTOPATH CELL ENHANCE TECH: CPT | Performed by: PATHOLOGY

## 2018-05-16 NOTE — PROGRESS NOTES
Office Cystoscopy Procedure Note    Indication:    Hematuria    Informed consent   The risks, benefits, complications, treatment options, and expected outcomes were discussed with the patient  The patient concurred with the proposed plan and provided informed consent  Anesthesia  Lidocaine jelly 2%    Antibiotic prophylaxis   None    Procedure  The patient was placed in the supineposition, was prepped and draped in the usual manner using sterile technique, and 2% lidocaine jelly instilled into the urethra  A 17 F flexible cystoscope was then inserted into the urethra and the urethra and bladder carefully examined  The following findings were noted:    Findings:  Urethra:  Normal  Prostate:  Normal  Bladder:  Grade 1 trabeculations  Ureteral orifices:  Normal  Other findings:  None     Specimens: None                 Complications:    None; patient tolerated the procedure well           Disposition: To home after 30 minute observation  Condition: Stable    Plan:   - I was happy to review the results of her negative cystoscopy  -there was complete resolution of the nonspecific inflammatory features seen on prior cystoscopy    In addition her hematuria has improved significantly since her dose of Eliquis was lowered  -at this point reassurance was provided   -will notify Dr Sandy Pulido that things look stable from my perspective for continued Eliquis therapy  -we will send another baseline urinary cytology today and she will return in 6 months with a voided cytology in urinalysis prior to the visit

## 2018-05-21 ENCOUNTER — TELEPHONE (OUTPATIENT)
Dept: CARDIOLOGY CLINIC | Facility: CLINIC | Age: 78
End: 2018-05-21

## 2018-05-21 NOTE — TELEPHONE ENCOUNTER
Alan Castillo called,     Scheduled for a tooth extraction on 5/23 and wants to know if it is ok to hold Eliquis x3 days? Already started hold this morning  Saw Dr Hernan Montero- note in [facility]  Wants to know what dose of Eliquis should she be on? Out of 2 5 mg tabs and needs 5 mg tabs  Please advise     c/b # [phone]

## 2018-05-22 ENCOUNTER — DOCUMENTATION (OUTPATIENT)
Dept: CARDIOLOGY CLINIC | Facility: CLINIC | Age: 78
End: 2018-05-22

## 2018-05-22 ENCOUNTER — TELEPHONE (OUTPATIENT)
Dept: CARDIOLOGY CLINIC | Facility: CLINIC | Age: 78
End: 2018-05-22

## 2018-05-22 NOTE — TELEPHONE ENCOUNTER
s/w Lizzie advised-- verbally understood       Pt requesting samples of Nicolasa Taylor looking into cost

## 2018-05-22 NOTE — PROGRESS NOTES
Patient has concerns about what dose of Eliquis she should be on? Cathy Fisher Your last note has 2 5 BID

## 2018-05-24 ENCOUNTER — TELEPHONE (OUTPATIENT)
Dept: CARDIOLOGY CLINIC | Facility: CLINIC | Age: 78
End: 2018-05-24

## 2018-05-24 NOTE — TELEPHONE ENCOUNTER
Dr Sofia Friedman:!!!       Patient was told she has a 3,770 RX Deductible  And she is unable to afford this at this time  spoke with her again & she is ok with coumadin

## 2018-06-04 NOTE — TELEPHONE ENCOUNTER
If she is agreeable will need to do coumadin  When we would start, would do coumadin 4 mg daily after stopping eliquis for 2 days  And routine INRs with the coumadin nurses

## 2018-06-05 ENCOUNTER — OFFICE VISIT (OUTPATIENT)
Dept: FAMILY MEDICINE CLINIC | Facility: CLINIC | Age: 78
End: 2018-06-05
Payer: MEDICARE

## 2018-06-05 VITALS
TEMPERATURE: 97.9 F | HEIGHT: 61 IN | SYSTOLIC BLOOD PRESSURE: 108 MMHG | WEIGHT: 226 LBS | DIASTOLIC BLOOD PRESSURE: 70 MMHG | BODY MASS INDEX: 42.67 KG/M2 | HEART RATE: 60 BPM

## 2018-06-05 DIAGNOSIS — Z13.820 SCREENING FOR OSTEOPOROSIS: ICD-10-CM

## 2018-06-05 DIAGNOSIS — J44.9 CHRONIC OBSTRUCTIVE PULMONARY DISEASE, UNSPECIFIED COPD TYPE (HCC): ICD-10-CM

## 2018-06-05 DIAGNOSIS — I12.9 BENIGN HYPERTENSION WITH CHRONIC KIDNEY DISEASE, STAGE III (HCC): ICD-10-CM

## 2018-06-05 DIAGNOSIS — E78.2 MIXED HYPERLIPIDEMIA: ICD-10-CM

## 2018-06-05 DIAGNOSIS — N32.81 OAB (OVERACTIVE BLADDER): ICD-10-CM

## 2018-06-05 DIAGNOSIS — I10 ESSENTIAL HYPERTENSION: ICD-10-CM

## 2018-06-05 DIAGNOSIS — E11.65 TYPE 2 DIABETES MELLITUS WITH HYPERGLYCEMIA, UNSPECIFIED WHETHER LONG TERM INSULIN USE (HCC): Primary | ICD-10-CM

## 2018-06-05 DIAGNOSIS — N18.30 BENIGN HYPERTENSION WITH CHRONIC KIDNEY DISEASE, STAGE III (HCC): ICD-10-CM

## 2018-06-05 PROCEDURE — 99214 OFFICE O/P EST MOD 30 MIN: CPT | Performed by: FAMILY MEDICINE

## 2018-06-05 NOTE — PROGRESS NOTES
Assessment/Plan:    Type 2 diabetes mellitus with hyperglycemia (HCC)  Urged diet compliance  Cont present care  f/u with Endo  Recheck 6m    Chronic obstructive pulmonary disease (HCC)  Stable Cont to monitor    Benign hypertension with chronic kidney disease, stage III  Cont present care  Recheck 6m    Essential hypertension  At goal  Cont present care    Mixed hyperlipidemia  Cont to monitor  Recheck 6m    Intraocular pressure increase  Unclear if meds playing a role  Decrease ditropan to qd  Cont f/u with ophth - cont to monitor carefully  Recheck if urinary symptoms worsen    OAB (overactive bladder)  Decrease ditropan as directed due to increased eye pressures  Pt to call in 2-3 weeks with f/u       Diagnoses and all orders for this visit:    Type 2 diabetes mellitus with hyperglycemia, unspecified whether long term insulin use ([facility] )  -     Basic metabolic panel; Future  -     HEMOGLOBIN A1C W/ EAG ESTIMATION; Future    Essential hypertension    Chronic obstructive pulmonary disease, unspecified COPD type ([facility] )    Benign hypertension with chronic kidney disease, stage III    Screening for osteoporosis    Mixed hyperlipidemia    OAB (overactive bladder)    Other orders  -     Cancel: DXA bone density spine hip and pelvis; Future          Subjective:      Patient ID: Leigha Hein is a 70 y o  female  f/u multiple med issues  - having slowly worsening vision  Up to date with ophth and is seeing a retinal specialist  Can only read print with magnifying glass  Pressure has been increasing - recently >40?  - no CP  Up to date with Cardio  Feels much better since she had her pacemaker placement  - no GI  Complaints  Still with some OAB/urge incontinence issues  Not sure if ditropanis helping    - BGs have been stable  Increased a month ago when she had oral surgery  Now better  Last A1C in March was 7 5  - some bilat kneee pain - worse with changes in weather    Some occasional R shoulder surgery (has hx of RC tear - better with PT)  The following portions of the patient's history were reviewed and updated as appropriate:   She  has a past medical history of Anxiety; Arthritis; CHF (congestive heart failure) ([facility] ); Chronic venous insufficiency; COPD (chronic obstructive pulmonary disease) ([facility] ); Coronary artery disease; Diabetes mellitus ([facility] ); Disease of thyroid gland; DVT (deep venous thrombosis) ([facility] ); Hyperlipidemia; Hypertension; Irregular heart beat; Pacemaker; Renal disorder; Thyroid disease; Urinary incontinence; and Uterine leiomyoma    She   Patient Active Problem List    Diagnosis Date Noted    Intraocular pressure increase 06/06/2018    Vitamin D deficiency 04/03/2018    Bilateral leg edema 03/13/2018    Iron deficiency anemia 10/12/2017    Urinary bladder incontinence 07/26/2017    OAB (overactive bladder) 07/26/2017    Benign hypertension with chronic kidney disease, stage III 06/21/2017    Microalbuminuria 06/21/2017    PAF (paroxysmal atrial fibrillation) ([facility] ) 10/31/2016    Chronic venous insufficiency 02/19/2016    Dvt femoral (deep venous thrombosis) ([facility] ) 02/19/2016    Presence of permanent cardiac pacemaker 01/28/2016    Chronic diastolic congestive heart failure ([facility] ) 01/21/2016    Anemia 01/19/2016    S/P cardiac pacemaker procedure 01/19/2016    Hypothyroidism 01/19/2016    Complete heart block ([facility] ) 01/16/2016    NSTEMI (non-ST elevated myocardial infarction) ([facility] ) 01/15/2016    Type 2 diabetes mellitus with hyperglycemia ([facility] ) 01/15/2016    Essential hypertension 01/15/2016    Morbid obesity due to excess calories ([facility] ) 01/15/2016    Chronic kidney disease, stage 3 10/21/2015    Eczematous dermatitis 03/03/2015    Anxiety disorder 06/26/2013    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 neurological manifestations, uncontrolled(250 62) 10/02/2012    Chronic obstructive pulmonary disease ([facility] ) 09/24/2012    DM type 2, not at goal [facility]) 09/24/2012    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, uncontrolled(250 52) 09/22/2011    Mixed hyperlipidemia 03/14/2011     She  has a past surgical history that includes Hysterectomy; Joint replacement (Right); Tonsillectomy; Cataract extraction w/ intraocular lens  implant, bilateral; Ectopic pregnancy surgery; Cardiac surgery; Cardiac pacemaker placement; Carpal tunnel release (Bilateral); pr endovenous laser, 1st vein (Left, 2/17/2017); Cystoscopy; Cataract extraction; and Breast surgery  She  reports that she has never smoked  She has never used smokeless tobacco  She reports that she drinks alcohol  She reports that she does not use drugs  Current Outpatient Prescriptions   Medication Sig Dispense Refill    acetaminophen (TYLENOL) 325 mg tablet Take 650 mg by mouth   apixaban (ELIQUIS) 5 mg Take 1 tablet (5 mg total) by mouth 2 (two) times a day (Patient taking differently: Take 2 5 mg by mouth 2 (two) times a day  ) 56 tablet 0    Ascorbic Acid (VITAMIN C) 1000 MG tablet Take 1,000 mg by mouth        atorvastatin (LIPITOR) 80 mg tablet TAKE 1 TABLET AT BEDTIME 90 tablet 3    calcitriol (ROCALTROL) 0 25 mcg capsule Take 1 capsule (0 25 mcg total) by mouth 3 (three) times a week 45 capsule 2    cholecalciferol (VITAMIN D3) 1,000 units tablet Take 1,000 Units by mouth daily      citalopram (CeleXA) 20 mg tablet Take 1 tablet by mouth daily      furosemide (LASIX) 40 mg tablet Take 0 5 tablets by mouth daily       GLUCOSAMINE CHONDROITIN COMPLX PO Take 1 tablet by mouth 2 (two) times a day      glucose blood (TRUE METRIX BLOOD GLUCOSE TEST) test strip Test 4 times plus daily, as instructed 400 each 1    insulin aspart (NovoLOG) 100 units/mL injection Pt states taking 8 UNITS in AM, 10 UNITS at lunch, and 8 UNITS with dinner       Insulin Glargine (TOUJEO SOLOSTAR) injection pen 300 units/mL Inject 35 Units under the skin daily at bedtime 3 pen 0    levothyroxine 125 mcg tablet Take 1 tablet (125 mcg total) by mouth daily Monday thru Saturday; do not take on Sunday  78 tablet 1    lisinopril (ZESTRIL) 20 mg tablet Take 1 tablet (20 mg total) by mouth daily 90 tablet 1    metoprolol tartrate (LOPRESSOR) 25 mg tablet TAKE 1/2 TABLET EVERY 12 HOURS 90 tablet 3    Multiple Vitamins-Minerals (OCUVITE ADULT FORMULA PO) Take by mouth      Omega-3 Fatty Acids (FISH OIL) 1200 MG CAPS Take 1,200 mg by mouth 3 (three) times a day        oxybutynin (DITROPAN) 5 mg tablet TAKE 1 TABLET TWICE DAILY 180 tablet 3    promethazine-dextromethorphan (PHENERGAN-DM) 6 25-15 mg/5 mL oral syrup Take 5 mL by mouth 4 (four) times a day as needed for cough 118 mL 1     No current facility-administered medications for this visit  She is allergic to ampicillin       Review of Systems   Constitutional: Negative for activity change, chills, diaphoresis, fatigue and fever  HENT: Negative  Eyes: Positive for visual disturbance  Negative for pain, discharge and itching  Respiratory: Negative  Cardiovascular: Negative  Gastrointestinal: Negative  Endocrine: Negative  Genitourinary: Positive for urgency  Negative for difficulty urinating, dysuria, flank pain, frequency and hematuria  Musculoskeletal: Positive for arthralgias and gait problem  Negative for joint swelling and myalgias  Skin: Negative  Allergic/Immunologic: Negative  Neurological: Negative for dizziness, weakness, light-headedness, numbness and headaches  Hematological: Negative  Psychiatric/Behavioral: Negative for dysphoric mood and suicidal ideas  The patient is not nervous/anxious  Objective:      /70   Pulse 60   Temp 97 9 °F (36 6 °C)   Ht 5' 1" (1 549 m)   Wt 103 kg (226 lb)   BMI 42 70 kg/m²          Physical Exam   Constitutional: She is oriented to person, place, and time  She appears well-developed and well-nourished  HENT:   Head: Normocephalic and atraumatic     Right Ear: External ear normal    Left Ear: External ear normal    Nose: Nose normal    Mouth/Throat: Oropharynx is clear and moist  No oropharyngeal exudate  Eyes: Conjunctivae and EOM are normal  Pupils are equal, round, and reactive to light  Neck: Normal range of motion  Neck supple  No JVD present  No thyromegaly present  Cardiovascular: Normal rate, regular rhythm and intact distal pulses  Exam reveals no gallop  Pulmonary/Chest: Effort normal and breath sounds normal  She has no wheezes  She has no rales  Abdominal: Soft  She exhibits no distension and no mass  There is no tenderness  Musculoskeletal:   Mild OA changes of the knees  Sl TTP over the lower lumbar spine   Lymphadenopathy:     She has no cervical adenopathy  Neurological: She is alert and oriented to person, place, and time  She has normal reflexes  She exhibits normal muscle tone  Coordination normal    Skin: Skin is warm and dry  She is not diaphoretic  Psychiatric: She has a normal mood and affect     PHQ-2=0

## 2018-06-06 PROBLEM — R32 URINARY BLADDER INCONTINENCE: Status: ACTIVE | Noted: 2017-07-26

## 2018-06-06 PROBLEM — H40.059 INTRAOCULAR PRESSURE INCREASE: Status: ACTIVE | Noted: 2018-06-06

## 2018-06-06 PROBLEM — N32.81 OAB (OVERACTIVE BLADDER): Status: ACTIVE | Noted: 2017-07-26

## 2018-06-07 NOTE — ASSESSMENT & PLAN NOTE
Unclear if meds playing a role  Decrease ditropan to qd  Cont f/u with ophth - cont to monitor carefully    Recheck if urinary symptoms worsen

## 2018-06-12 ENCOUNTER — TELEPHONE (OUTPATIENT)
Dept: FAMILY MEDICINE CLINIC | Facility: CLINIC | Age: 78
End: 2018-06-12

## 2018-06-12 DIAGNOSIS — Z79.4 TYPE 2 DIABETES MELLITUS WITHOUT COMPLICATION, WITH LONG-TERM CURRENT USE OF INSULIN (HCC): Primary | ICD-10-CM

## 2018-06-12 DIAGNOSIS — E11.9 TYPE 2 DIABETES MELLITUS WITHOUT COMPLICATION, WITH LONG-TERM CURRENT USE OF INSULIN (HCC): Primary | ICD-10-CM

## 2018-06-12 NOTE — TELEPHONE ENCOUNTER
Pt states she currently takes- 10, 12, 10 (32u qd) but pt would like more units so that it can last her longer as rx is very expensive for her

## 2018-06-12 NOTE — TELEPHONE ENCOUNTER
She needs refill Novalog flexpens to [facility]" mail order, she takes 32 units daily but states you give her extra in case her dose is increased

## 2018-06-13 DIAGNOSIS — Z79.4 TYPE 2 DIABETES MELLITUS WITHOUT COMPLICATION, WITH LONG-TERM CURRENT USE OF INSULIN (HCC): Primary | ICD-10-CM

## 2018-06-13 DIAGNOSIS — E11.9 TYPE 2 DIABETES MELLITUS WITHOUT COMPLICATION, WITH LONG-TERM CURRENT USE OF INSULIN (HCC): Primary | ICD-10-CM

## 2018-06-14 ENCOUNTER — IN-CLINIC DEVICE VISIT (OUTPATIENT)
Dept: CARDIOLOGY CLINIC | Facility: CLINIC | Age: 78
End: 2018-06-14
Payer: MEDICARE

## 2018-06-14 DIAGNOSIS — I44.2 COMPLETE ATRIOVENTRICULAR BLOCK (HCC): Primary | ICD-10-CM

## 2018-06-14 DIAGNOSIS — Z95.0 PRESENCE OF PERMANENT CARDIAC PACEMAKER: ICD-10-CM

## 2018-06-14 DIAGNOSIS — I48.0 PAROXYSMAL ATRIAL FIBRILLATION (HCC): ICD-10-CM

## 2018-06-14 DIAGNOSIS — I48.91 ATRIAL FIBRILLATION, UNSPECIFIED TYPE (HCC): ICD-10-CM

## 2018-06-14 PROCEDURE — 93280 PM DEVICE PROGR EVAL DUAL: CPT | Performed by: INTERNAL MEDICINE

## 2018-06-14 NOTE — PROGRESS NOTES
DEVICE INTERROGATED IN THE [place] OFFICE  BATTERY VOLTAGE ADEQUATE  (7 5 YRS)  AP 62%  100%  ALL LEAD PARAMETERS WITHIN NORMAL LIMITS  NO SIGNIFICANT HIGH RATE EPISODES  NORMAL DEVICE FUNCTION  ---PANDA

## 2018-06-18 DIAGNOSIS — Z79.4 TYPE 2 DIABETES MELLITUS WITHOUT COMPLICATION, WITH LONG-TERM CURRENT USE OF INSULIN (HCC): ICD-10-CM

## 2018-06-18 DIAGNOSIS — E11.9 TYPE 2 DIABETES MELLITUS WITHOUT COMPLICATION, WITH LONG-TERM CURRENT USE OF INSULIN (HCC): ICD-10-CM

## 2018-07-10 ENCOUNTER — APPOINTMENT (OUTPATIENT)
Dept: LAB | Facility: CLINIC | Age: 78
End: 2018-07-10
Payer: MEDICARE

## 2018-07-10 ENCOUNTER — TRANSCRIBE ORDERS (OUTPATIENT)
Dept: LAB | Facility: CLINIC | Age: 78
End: 2018-07-10

## 2018-07-10 DIAGNOSIS — E11.65 TYPE 2 DIABETES MELLITUS WITH HYPERGLYCEMIA, UNSPECIFIED WHETHER LONG TERM INSULIN USE (HCC): ICD-10-CM

## 2018-07-10 DIAGNOSIS — N18.30 CHRONIC KIDNEY DISEASE, STAGE III (MODERATE) (HCC): Primary | ICD-10-CM

## 2018-07-10 LAB
ANION GAP SERPL CALCULATED.3IONS-SCNC: 5 MMOL/L (ref 4–13)
BUN SERPL-MCNC: 36 MG/DL (ref 5–25)
CALCIUM SERPL-MCNC: 9.2 MG/DL (ref 8.3–10.1)
CHLORIDE SERPL-SCNC: 107 MMOL/L (ref 100–108)
CO2 SERPL-SCNC: 27 MMOL/L (ref 21–32)
CREAT SERPL-MCNC: 1.2 MG/DL (ref 0.6–1.3)
CREAT UR-MCNC: 84.5 MG/DL
ERYTHROCYTE [DISTWIDTH] IN BLOOD BY AUTOMATED COUNT: 12.9 % (ref 11.6–15.1)
EST. AVERAGE GLUCOSE BLD GHB EST-MCNC: 157 MG/DL
GFR SERPL CREATININE-BSD FRML MDRD: 44 ML/MIN/1.73SQ M
GLUCOSE P FAST SERPL-MCNC: 144 MG/DL (ref 65–99)
HBA1C MFR BLD: 7.1 % (ref 4.2–6.3)
HCT VFR BLD AUTO: 39.7 % (ref 34.8–46.1)
HGB BLD-MCNC: 12 G/DL (ref 11.5–15.4)
MCH RBC QN AUTO: 29.9 PG (ref 26.8–34.3)
MCHC RBC AUTO-ENTMCNC: 30.2 G/DL (ref 31.4–37.4)
MCV RBC AUTO: 99 FL (ref 82–98)
PHOSPHATE SERPL-MCNC: 4 MG/DL (ref 2.3–4.1)
PMV BLD AUTO: 11.6 FL (ref 8.9–12.7)
POTASSIUM SERPL-SCNC: 4.3 MMOL/L (ref 3.5–5.3)
PROT UR-MCNC: 16 MG/DL
PROT/CREAT UR: 0.19 MG/G{CREAT} (ref 0–0.1)
PTH-INTACT SERPL-MCNC: 55.4 PG/ML (ref 18.4–80.1)
RBC # BLD AUTO: 4.02 MILLION/UL (ref 3.81–5.12)
SODIUM SERPL-SCNC: 139 MMOL/L (ref 136–145)
WBC # BLD AUTO: 6.29 THOUSAND/UL (ref 4.31–10.16)

## 2018-07-10 PROCEDURE — 84156 ASSAY OF PROTEIN URINE: CPT

## 2018-07-10 PROCEDURE — 85027 COMPLETE CBC AUTOMATED: CPT

## 2018-07-10 PROCEDURE — 36415 COLL VENOUS BLD VENIPUNCTURE: CPT

## 2018-07-10 PROCEDURE — 83970 ASSAY OF PARATHORMONE: CPT

## 2018-07-10 PROCEDURE — 82570 ASSAY OF URINE CREATININE: CPT

## 2018-07-10 PROCEDURE — 84100 ASSAY OF PHOSPHORUS: CPT

## 2018-07-10 PROCEDURE — 80048 BASIC METABOLIC PNL TOTAL CA: CPT

## 2018-07-10 PROCEDURE — 83036 HEMOGLOBIN GLYCOSYLATED A1C: CPT

## 2018-07-19 ENCOUNTER — TELEPHONE (OUTPATIENT)
Dept: CARDIOLOGY CLINIC | Facility: CLINIC | Age: 78
End: 2018-07-19

## 2018-07-19 DIAGNOSIS — I48.91 ATRIAL FIBRILLATION, UNSPECIFIED TYPE (HCC): ICD-10-CM

## 2018-07-19 NOTE — TELEPHONE ENCOUNTER
Pt did not want to come in, just wanted to talk with Dr Caitie Shell on the phone  I advised her that she would need to discuss her rx issues with Dr Caitie Shell during an office visit but she refused and asked if she could get some samples of Eliquis  Transferred her back to rx refill line

## 2018-07-19 NOTE — TELEPHONE ENCOUNTER
Patient is asking to  Eliquis Sample at [facility] today  Please either call patient or let me know if she can stop in for them  She is debating coumadin but would like to see him to discuss  Will have appointment set up for her

## 2018-07-31 ENCOUNTER — OFFICE VISIT (OUTPATIENT)
Dept: ENDOCRINOLOGY | Facility: CLINIC | Age: 78
End: 2018-07-31
Payer: MEDICARE

## 2018-07-31 VITALS
SYSTOLIC BLOOD PRESSURE: 162 MMHG | BODY MASS INDEX: 42.86 KG/M2 | WEIGHT: 227 LBS | HEIGHT: 61 IN | DIASTOLIC BLOOD PRESSURE: 100 MMHG

## 2018-07-31 DIAGNOSIS — Z79.4 TYPE 2 DIABETES MELLITUS WITH HYPERGLYCEMIA, WITH LONG-TERM CURRENT USE OF INSULIN (HCC): Primary | ICD-10-CM

## 2018-07-31 DIAGNOSIS — E03.9 HYPOTHYROIDISM, UNSPECIFIED TYPE: ICD-10-CM

## 2018-07-31 DIAGNOSIS — E11.65 TYPE 2 DIABETES MELLITUS WITH HYPERGLYCEMIA, WITH LONG-TERM CURRENT USE OF INSULIN (HCC): Primary | ICD-10-CM

## 2018-07-31 DIAGNOSIS — E21.3 HYPERPARATHYROIDISM (HCC): ICD-10-CM

## 2018-07-31 DIAGNOSIS — E55.9 VITAMIN D DEFICIENCY: ICD-10-CM

## 2018-07-31 PROCEDURE — 99214 OFFICE O/P EST MOD 30 MIN: CPT | Performed by: INTERNAL MEDICINE

## 2018-07-31 NOTE — PATIENT INSTRUCTIONS
INSULIN DOSAGE INSTRUCTIONS      Name: My Johnson                        : 1940  MRN #: [id number]    Your Current Insulin  and dose is: Before Breakfast Before Lunch Before Evening Meal Bedtime     Novolog Insulin  8 unit  10 units  12 units     Regular, Apidra, Humalog orNovolog Sliding Scale:   <80              151-200 + 1 +1 1    201-250 +2 +2 +2 +   251-300 +3 +3 +3 +   301-350 +4 +4 +4 +   >350 +5 +5 +5 +     tojeo    35 units     Additional Instructions:   Please test your blood sugar:  _4_ Times per day  X_ Before Breakfast                _ Alternate Testing  X_ Before Lunch                _ 2 Hours After  Meal  X_ Before Evening Meal               _ 3 a m   x_ Before Bedtime Snack     Target Blood sugar range _80- 180 mg/dl __  Call if your blood sugar is less than _70  or greater than _400__  Today's Date: 2018       Hypoglycemia instructions   Lizzie Whitten  2018  [id number]    Low Blood Sugar    Steps to treat low blood sugar  1  Test blood sugar if you have symptoms of low blood sugar:   Low Blood Sugar Symptoms:  o Sweaty  o Dizzy  o Rapid heartbeat  o Shaky    o Bad mood  o Hungry      2  Treat blood sugar less than 70 with 15 grams of fast-acting carbohydrate:   Examples of 15 grams Fast-Acting Carbohydrate:  o 4 oz juice  o 4 oz regular soda  o 3-4 glucose tablets (chew)  o 3-4 hard candies (chew)              3    Wait 15 minutes and test your blood sugar again           4   If blood sugar is less than 100, repeat steps 2-3       5  When your blood sugar is 100 or more, eat a snack if it will be longer than one hour until your next meal  The snack should be 15 grams of carbohydrate and a protein:   Examples of snacks:  o ½ sandwich  o 6 crackers with cheese  o Piece of fruit with cheese or peanut butter  o 6 crackers with peanut butter

## 2018-07-31 NOTE — PROGRESS NOTES
Follow-up Progress Note      chief complaint on file:  Type 2 diabetes     Referring Provider  Marie Joshua Md  [address] [facility], [address] [facility]     History of Present Illness:   Daniel Kennedy is a 70 y o  female with a history of type 2 diabetes with long term use of insulin for 30 yrs  Diabetes course has been stable  Reports complications of diabetic nephropathy, followed by Nephrology and retinopathy     Denies recent illness or hospitalizations  Denies recent severe hypoglycemic or severe hyperglycemic episodes  Denies any issues with her current regimen  home glucose monitoring: are performed regularly  Lab Results   Component Value Date    CREATININE 1 20 07/10/2018     Lab Results   Component Value Date    HGBA1C 7 1 (H) 07/10/2018         Home blood glucose readings:   Before breakfast:   mg/dl   Before lunch: 114-180 mg/dl   Before dinner:  mg/dl   Bedtime:  mg/dl     Current regimen: tujeo 35 units at bedtime, NovoLog sliding scale + 8 units at breakfast 10 units at lunch and 12 units at dinner plus a sliding scale  compliant most of the timedenies any side effects from medications  Injects in:  Abdomen Rotates sites: Yes  Hypoglycemic episodes: Yes infrequent  H/o of hypoglycemia causing hospitalization or Intervention such as glucagon injection  or ambulance call :  No  Hypoglycemia symptoms: jitteriness and sweating  Treatment of hypoglycemia:  Glucose tablets, orange juice     Medic alert tag: recommended: Yes    Diabetes education: Yes  Diet:  3 meals per day, 1 snacks per day  Timing of meals is predictable  Yes  diabetic diet compliance:  compliant most of the time  Activity: Daily activity is predictable Yes The patient engages in little, if any physical activity          Opthamology: sees every 6 months;  positive retinopathy, macular edema  Podiatry:  Every 6 months  Infuenza vaccine:  Yes, up-to-date    Has hypertension: followed by PCP; on the lisinopril 20 mg daily, metoprolol 25 mg half tablet oral 2 times per day, compliant most of the time  Has hyperlipidemia: followed by PCP; on atorvastatin - tolerating well, no myalgias  compliant most of the time  denies any side effects from medications  Thyroid disorders:  Hypothyroidism is on levothyroxine 125 mcg 6 days a week  Results for Michael Hobbs (MRN [id number]) as of 7/31/2018 13:47   Ref   Range 7/10/2018 09:08   PTH Latest Ref Range: 18 4 - 80 1 pg/mL 55 4     Wt Readings from Last 3 Encounters:   07/31/18 103 kg (227 lb)   06/05/18 103 kg (226 lb)   05/16/18 105 kg (231 lb)     Component      Latest Ref Rng & Units 3/27/2018 7/10/2018   Sodium      136 - 145 mmol/L  139   Potassium      3 5 - 5 3 mmol/L  4 3   Chloride      100 - 108 mmol/L  107   CO2      21 - 32 mmol/L  27   Anion Gap      4 - 13 mmol/L  5   BUN      5 - 25 mg/dL  36 (H)   Creatinine      0 60 - 1 30 mg/dL  1 20   GLUCOSE FASTING      65 - 99 mg/dL  144 (H)   Calcium      8 3 - 10 1 mg/dL  9 2   eGFR      ml/min/1 73sq m  44   WBC      4 31 - 10 16 Thousand/uL  6 29   RBC      3 81 - 5 12 Million/uL  4 02   Hemoglobin      11 5 - 15 4 g/dL  12 0   Hematocrit      34 8 - 46 1 %  39 7   MCV      82 - 98 fL  99 (H)   MCH      26 8 - 34 3 pg  29 9   MCHC      31 4 - 37 4 g/dL  30 2 (L)   RDW      11 6 - 15 1 %  12 9   Platelets      713 - 390 Thousands/uL     MPV      8 9 - 12 7 fL  11 6   EXT Creatinine Urine      mg/dL  84 5   Protein Urine Random      mg/dL  16   Prot/Creat Ratio, Ur      0 00 - 0 10  0 19 (H)   Hemoglobin A1C      4 2 - 6 3 %  7 1 (H)   EAG      mg/dl  157   TSH 3RD GENERATON      0 358 - 3 740 uIU/mL 2 650    Free T4      0 76 - 1 46 ng/dL 1 13    Phosphorus      2 3 - 4 1 mg/dL  4 0   PTH      18 4 - 80 1 pg/mL  55 4     Patient Active Problem List   Diagnosis    NSTEMI (non-ST elevated myocardial infarction) (HCC)    Type 2 diabetes mellitus with hyperglycemia (HCC)    Essential hypertension  Morbid obesity due to excess calories ([facility])    Complete heart block ([facility])    Anemia    S/P cardiac pacemaker procedure    Hypothyroidism    Chronic diastolic congestive heart failure (HCC)    Chronic venous insufficiency    Dvt femoral (deep venous thrombosis) ([facility])    Benign hypertension with chronic kidney disease, stage III    Chronic kidney disease, stage 3    Iron deficiency anemia    Microalbuminuria    Bilateral leg edema    Vitamin D deficiency    Anxiety disorder    Urinary bladder incontinence    Chronic obstructive pulmonary disease ([facility])    DM type 2, not at goal ([facility] )    Eczematous dermatitis    Mixed hyperlipidemia    OAB (overactive bladder)    Presence of permanent cardiac pacemaker    PAF (paroxysmal atrial fibrillation) ([facility])    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 neurological manifestations, uncontrolled(250 62)    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, uncontrolled(250 52)    Intraocular pressure increase      Past Medical History:   Diagnosis Date    Anxiety     Arthritis     CHF (congestive heart failure) ([facility])     Chronic venous insufficiency     [facility]     Coronary artery disease     Diabetes mellitus ([facility] )     type II    Disease of thyroid gland     hypothyroidism    DVT (deep venous thrombosis) ([facility])     Hyperlipidemia     Hypertension     Irregular heart beat     Paroxysmal a-fib    Pacemaker     Renal disorder     stage 3    Thyroid disease     Urinary incontinence     Uterine leiomyoma       Past Surgical History:   Procedure Laterality Date    BREAST SURGERY      For Biopsy    CARDIAC PACEMAKER PLACEMENT      Permanent  Last assessed: 2/19/16    CARDIAC SURGERY      CARPAL TUNNEL RELEASE Bilateral     CATARACT EXTRACTION      CATARACT EXTRACTION W/ INTRAOCULAR LENS  IMPLANT, BILATERAL      CYSTOSCOPY      Diagnostic   Last assessed: 11/8/17    ECTOPIC PREGNANCY SURGERY      HYSTERECTOMY      Total Abdomonal  Resolved: 1982    JOINT REPLACEMENT Right     TKR    AK ENDOVENOUS LASER, 1ST VEIN Left 2/17/2017    Procedure: GREATER SAPHENOUS VEIN ENDOVASCULAR LASER THERAPY ;  Surgeon: Michael Amaro DO;  [facility];  Service: Vascular    TONSILLECTOMY        Family History   Problem Relation Age of Onset    Hypertension Father     Diabetes Father         Mellitus    Coronary artery disease Mother     Hypertension Mother     Heart disease Mother     Diabetes Brother         Mellitus    Breast cancer Maternal Aunt     Pancreatic cancer Maternal Aunt      Social History   Substance Use Topics    Smoking status: Never Smoker    Smokeless tobacco: Never Used      Comment: Per [employer]: Former smoker    Alcohol use Yes      Comment: rarely   Per [employer]: Denied history of alcohol use     Allergies   Allergen Reactions    Ampicillin Hives         Current Outpatient Prescriptions:     acetaminophen (TYLENOL) 325 mg tablet, Take 650 mg by mouth , Disp: , Rfl:     apixaban (ELIQUIS) 5 mg, Take 1 tablet (5 mg total) by mouth 2 (two) times a day, Disp: 48 tablet, Rfl: 0    Ascorbic Acid (VITAMIN C) 1000 MG tablet, Take 1,000 mg by mouth , Disp: , Rfl:     atorvastatin (LIPITOR) 80 mg tablet, TAKE 1 TABLET AT BEDTIME, Disp: 90 tablet, Rfl: 3    calcitriol (ROCALTROL) 0 25 mcg capsule, Take 1 capsule (0 25 mcg total) by mouth 3 (three) times a week, Disp: 45 capsule, Rfl: 2    cholecalciferol (VITAMIN D3) 1,000 units tablet, Take 4,000 Units by mouth daily  , Disp: , Rfl:     citalopram (CeleXA) 20 mg tablet, Take 1 tablet by mouth daily, Disp: , Rfl:     furosemide (LASIX) 40 mg tablet, Take 0 5 tablets by mouth daily , Disp: , Rfl:     GLUCOSAMINE CHONDROITIN COMPLX PO, Take 1 tablet by mouth 2 (two) times a day, Disp: , Rfl:     glucose blood (TRUE METRIX BLOOD GLUCOSE TEST) test strip, Test 4 times plus daily, as instructed, Disp: 400 each, Rfl: 1    insulin aspart (NOVOLOG FLEXPEN) 100 Units/mL injection pen, 10u with breakfast, 12u with lunch, 10u with dinner, Disp: 15 pen, Rfl: 0    Insulin Glargine (TOUJEO SOLOSTAR) injection pen 300 units/mL, Inject 35 Units under the skin daily at bedtime, Disp: 3 pen, Rfl: 0    levothyroxine 125 mcg tablet, Take 1 tablet (125 mcg total) by mouth daily Monday thru Saturday; do not take on Sunday  , Disp: 78 tablet, Rfl: 1    lisinopril (ZESTRIL) 20 mg tablet, Take 1 tablet (20 mg total) by mouth daily, Disp: 90 tablet, Rfl: 1    metoprolol tartrate (LOPRESSOR) 25 mg tablet, TAKE 1/2 TABLET EVERY 12 HOURS, Disp: 90 tablet, Rfl: 3    Omega-3 Fatty Acids (FISH OIL) 1200 MG CAPS, Take 1,200 mg by mouth 3 (three) times a day  , Disp: , Rfl:     oxybutynin (DITROPAN) 5 mg tablet, TAKE 1 TABLET TWICE DAILY, Disp: 180 tablet, Rfl: 3    promethazine-dextromethorphan (PHENERGAN-DM) 6 25-15 mg/5 mL oral syrup, Take 5 mL by mouth 4 (four) times a day as needed for cough, Disp: 118 mL, Rfl: 1    Multiple Vitamins-Minerals (OCUVITE ADULT FORMULA PO), Take by mouth, Disp: , Rfl:   Review of Systems   Endocrine: Negative  All other systems reviewed and are negative  Physical Exam:  Body mass index is 42 89 kg/m²  /100   Ht 5' 1" (1 549 m)   Wt 103 kg (227 lb)   BMI 42 89 kg/m²    Wt Readings from Last 3 Encounters:   07/31/18 103 kg (227 lb)   06/05/18 103 kg (226 lb)   05/16/18 105 kg (231 lb)       Physical Exam   Constitutional: She is oriented to person, place, and time  She appears well-developed and well-nourished  Obese   HENT:   Head: Normocephalic  Eyes: Conjunctivae and EOM are normal  Pupils are equal, round, and reactive to light  Neck: Neck supple  No thyromegaly present  Cardiovascular: Normal rate and regular rhythm  Pulmonary/Chest: Effort normal and breath sounds normal  No respiratory distress  Musculoskeletal: She exhibits edema and deformity  Neurological: She is alert and oriented to person, place, and time  Skin: Skin is warm and dry  No erythema  Patient's shoes and socks were not removed  Labs:   Component      Latest Ref Rng & Units 3/27/2018 3/27/2018          10:48 AM 10:48 AM   EXT Creatinine Urine      mg/dL 103 0 105 0   MICROALBUM ,U,RANDOM      0 0 - 20 0 mg/L  109 0 (H)   MICROALBUMIN/CREATININE RATIO      0 - 30 mg/g creatinine  104 (H)   Protein Urine Random      mg/dL 34    Prot/Creat Ratio, Ur      0 00 - 0 10 0 33 (H)    Hemoglobin A1C      4 2 - 6 3 % 7 5 (H)    EAG      mg/dl 169    TSH 3RD GENERATON      0 358 - 3 740 uIU/mL 2 650    Free T4      0 76 - 1 46 ng/dL 1 13    PTH      18 4 - 80 1 pg/mL 142 2 (H)          Lab Results   Component Value Date    CREATININE 1 20 07/10/2018    CREATININE 1 24 03/27/2018    CREATININE 1 44 (H) 03/06/2018    BUN 36 (H) 07/10/2018     07/10/2018    K 4 3 07/10/2018     07/10/2018    CO2 27 07/10/2018     eGFR   Date Value Ref Range Status   07/10/2018 44 ml/min/1 73sq m Final     No components found for: [facility]    Lab Results   Component Value Date    CHOL 134 07/18/2017    HDL 57 07/18/2017    TRIG 87 07/18/2017       Lab Results   Component Value Date    ALT 21 09/06/2016    AST 18 09/06/2016    ALKPHOS 86 09/06/2016    BILITOT 0 6 09/06/2016       Lab Results   Component Value Date    FREET4 1 13 03/27/2018       Impression:  1  Type 2 diabetes mellitus with hyperglycemia, with long-term current use of insulin ([address] )    2  Hypothyroidism, unspecified type    3  Vitamin D deficiency    4  Hyperparathyroidism ([facility] )           Plan:    Diagnoses and all orders for this visit:    1   Type 2 diabetes mellitus with complication, with long-term current use of insulin ([facility])  -  Lab Results   Component Value Date    HGBA1C 7 1 (H) 07/10/2018    Blood sugars desirable with A1c at goal  Continue current regimen  Check blood sugars 4 Times daily, keep log, send sugar log if blood sugars >300 or < 70 mg/dl consistently ( more than twice a week)  Call office if blood sugar < 60 or >400 mg/dl   Goal for blood sugars  mg/dl   Discussed hypoglycemia symptoms and treatment   See diabetes instructions   Follow up with Opthalmology and podiatry       INSULIN DOSAGE INSTRUCTIONS      Name: Alysha Mercer                        : 1940  MRN #: [id number]    Your Current Insulin  and dose is: Before Breakfast Before Lunch Before Evening Meal Bedtime     Novolog Insulin 8   10  12    Regular, Apidra, Humalog orNovolog Sliding Scale:   <80              151-200 + 1 +1 1    201-250 +2 +2 +2 +   251-300 +3 +3 +3 +   301-350 +4 +4 +4 +   >350 +5 +5 +5 +     tojeo    35 units                     2  Hypothyroidism, unspecified type  -TSH and free T4 normal,   -continue levothyroxine 125 mcg 6 days a week on an empty stomach 4 hours apart from calcium and vitamin-D supplementation  3  Vitamin D deficiency  Currently  taking any vitamin-D, continue vitamin D3 4000 International Units daily  Will repeat vitamin-D now  -     Vitamin D 25 hydroxy Lab Collect; Future    3  Hyperparathyroidism  Resolved, as recent PTH is normal after correction of vitamin-D  PTH was elevated up to 142 in 2018, however she was started on vitamin-D supplementation 4000  International Units daily, repeat PTH is normal 55 4 suggestive of secondary hyperparathyroidism  Continue vitamin-D supplementation at current dose    Discussed with the patient and all questioned fully answered  She will call me if any problems arise      Counseled patient on diagnostic results, prognosis, risk and benefit of treatment options, instruction for management, importance of treatment compliance, Risk  factor reduction and impressions      Merly Deluna MD

## 2018-08-08 ENCOUNTER — OFFICE VISIT (OUTPATIENT)
Dept: NEPHROLOGY | Facility: CLINIC | Age: 78
End: 2018-08-08
Payer: MEDICARE

## 2018-08-08 VITALS
SYSTOLIC BLOOD PRESSURE: 152 MMHG | HEIGHT: 61 IN | DIASTOLIC BLOOD PRESSURE: 82 MMHG | WEIGHT: 234 LBS | BODY MASS INDEX: 44.18 KG/M2

## 2018-08-08 DIAGNOSIS — H04.129 DRY EYE: Primary | ICD-10-CM

## 2018-08-08 DIAGNOSIS — N18.30 BENIGN HYPERTENSION WITH CHRONIC KIDNEY DISEASE, STAGE III (HCC): ICD-10-CM

## 2018-08-08 DIAGNOSIS — R60.0 BILATERAL LEG EDEMA: ICD-10-CM

## 2018-08-08 DIAGNOSIS — I10 ESSENTIAL HYPERTENSION: ICD-10-CM

## 2018-08-08 DIAGNOSIS — R80.9 MICROALBUMINURIA: ICD-10-CM

## 2018-08-08 DIAGNOSIS — N18.30 CHRONIC KIDNEY DISEASE, STAGE 3 (HCC): Primary | ICD-10-CM

## 2018-08-08 DIAGNOSIS — I12.9 BENIGN HYPERTENSION WITH CHRONIC KIDNEY DISEASE, STAGE III (HCC): ICD-10-CM

## 2018-08-08 PROCEDURE — 99214 OFFICE O/P EST MOD 30 MIN: CPT | Performed by: INTERNAL MEDICINE

## 2018-08-08 RX ORDER — LISINOPRIL 20 MG/1
TABLET ORAL
Qty: 90 TABLET | Refills: 0 | Status: SHIPPED | OUTPATIENT
Start: 2018-08-08 | End: 2018-10-03 | Stop reason: SDUPTHER

## 2018-08-08 RX ORDER — TRAVOPROST OPHTHALMIC SOLUTION 0.04 MG/ML
1 SOLUTION OPHTHALMIC
Refills: 0
Start: 2018-08-08 | End: 2020-01-14

## 2018-08-08 NOTE — PATIENT INSTRUCTIONS
CHECK BLOOD PRESSURE DAILY AT HOME AND IF >140/90, PLEASE CALL BACK TO OFFICE  SALT RESTRICTED DIET    Low-Sodium Diet   WHAT YOU NEED TO KNOW:   A low-sodium diet limits foods that are high in sodium (salt)  You will need to follow a low-sodium diet if you have high blood pressure, kidney disease, or heart failure  You may also need to follow this diet if you have a condition that is causing your body to retain (hold) extra fluid  You may need to limit the amount of sodium you eat to 1,500 mg  Ask your healthcare provider how much sodium you can have each day  DISCHARGE INSTRUCTIONS:   How to use food labels to choose foods that are low in sodium:  Read food labels to find the amount of sodium they contain  The amount of sodium is listed in milligrams (mg)  The % Daily Value (DV) column tells you how much of your daily needs are met by 1 serving of the food for each nutrient listed  Choose foods that have less than 5% of the DV of sodium  These foods are considered low in sodium  Foods that have 20% or more of the DV of sodium are considered high in sodium  Some food labels may also list any of the following terms that tell you about the sodium content in the food:  · Sodium-free:  Less than 5 mg in each serving    · Very low sodium:  35 mg of sodium or less in each serving    · Low sodium:  140 mg of sodium or less in each serving    · Reduced sodium: At least 25% less sodium in each serving than the regular type    · Light in sodium:  50% less sodium in each serving    · Unsalted or no added salt:  No extra salt is added during processing (the food may still contain sodium)  Foods to avoid:  Salty foods are high in sodium   You should avoid the following:  · Processed foods:      ¨ Mixes for cornbread, biscuits, cake, and pudding     ¨ Instant foods, such as potatoes, cereals, noodles, and rice     ¨ Packaged foods, such as bread stuffing, rice and pasta mixes, snack dip mixes, and macaroni and cheese ¨ Canned foods, such as canned vegetables, soups, broths, sauces, and vegetable or tomato juice    ¨ Snack foods, such as salted chips, popcorn, pretzels, pork rinds, salted crackers, and salted nuts    ¨ Frozen foods, such as dinners, entrees, vegetables with sauces, and breaded meats    ¨ Sauerkraut, pickled vegetables, and other foods prepared in brine    · Meats and cheeses:      ¨ Smoked or cured meat, such as corned beef, blackman, ham, hot dogs, and sausage    ¨ Canned meats or spreads, such as potted meats, sardines, anchovies, and imitation seafood    ¨ Deli or lunch meats, such as bologna, ham, turkey, and roast beef    ¨ Processed cheese, such as American cheese and cheese spreads    · Condiments, sauces, and seasonings:      ¨ Salt (¼ teaspoon of salt contains 575 mg of sodium)    ¨ Seasonings made with salt, such as garlic salt, celery salt, onion salt, and seasoned salt    ¨ Regular soy sauce, barbecue sauce, teriyaki sauce, steak sauce, Worcestershire sauce, and most flavored vinegars    ¨ Canned gravy and mixes     ¨ Regular condiments, such as mustard, ketchup, and salad dressings    ¨ Pickles and olives    ¨ Meat tenderizers and monosodium glutamate (MSG)  Foods to include:  Read the food label to find the amount of sodium in each serving  · Bread and cereal:  Try to choose breads with less than 80 mg of sodium per serving  ¨ Bread, roll, grayson, tortilla, or unsalted crackers  ¨ Ready-to-eat cereals with less than 5% DV of sodium (examples include shredded wheat and puffed rice)    ¨ Pasta    · Vegetables and fruits:      ¨ Unsalted fresh, frozen, or canned vegetables    ¨ Fresh, frozen, or canned fruits    ¨ Fruit juice    · Dairy:  One serving has about 150 mg of sodium  ¨ Milk, all types    ¨ Yogurt    ¨ Hard cheese, such as cheddar, Swiss, [employer], or mozzarella    · Meat and other protein foods:  Some raw meats may have added sodium       ¨ Plain meats, fish, and poultry ¨ Egg    · Other foods:      ¨ Homemade pudding    ¨ Unsalted nuts, popcorn, or pretzels    ¨ Unsalted butter or margarine  Ways to decrease sodium:   · Add spices and herbs to foods instead of salt during cooking  Use salt-free seasonings to add flavor to foods  Examples include onion powder, garlic powder, basil, correa powder, paprika, and parsley  Try lemon or lime juice or vinegar to give foods a tart flavor  Use hot peppers, pepper, or cayenne pepper to add a spicy flavor to foods  · Do not keep a salt shaker at your kitchen table  This may help keep you from adding salt to food at the table  It may take time to get used to enjoying the natural flavor of food instead of adding salt  Talk to your healthcare provider before you use salt substitutes  Some salt substitutes have a high amount of potassium and need to be avoided if you have kidney disease  · Choose low-sodium foods at restaurants  Meals from restaurants are often high in sodium  Some restaurants have nutrition information on the menu that tells you the amount of sodium in their foods  If possible, ask for your food to be prepared with less, or no salt  · Shop for unsalted or low-sodium foods and snacks at the grocery store  Examples include unsalted or low-sodium broths, soups, and canned vegetables  Choose fresh or frozen vegetables instead  Choose unsalted nuts or seeds or fresh fruits or vegetables as snacks  Read food labels and choose salt-free, very low-sodium, or low-sodium foods  © 2017 [address]  Information is for End User's use only and may not be sold, redistributed or otherwise used for commercial purposes  All illustrations and images included in CareNotes® are the copyrighted property of [employer]  or Rafael Alcala  The above information is an  only  It is not intended as medical advice for individual conditions or treatments   Talk to your doctor, nurse or pharmacist before following any medical regimen to see if it is safe and effective for you

## 2018-08-08 NOTE — LETTER
August 8, 2018     Norma Atkins MD  [address]    Patient: John Baker   YOB: 1940   Date of Visit: 8/8/2018       Dear Dr Marquis Mccarthy:    Thank you for referring Caren Ritter to me for evaluation  Below are my notes for this consultation  If you have questions, please do not hesitate to call me  I look forward to following your patient along with you  Sincerely,        Jose Tyson MD        CC: No Recipients  Jose Tyson MD  8/8/2018  2:07 PM  Sign at close encounter  [address] [facility] 70 y o  female MRN: [id number]  DATE: 8/8/2018  Reason for visit:   Chief Complaint   Patient presents with    Follow-up    Chronic Kidney Disease     ASSESSMENT and PLAN:  CK D stage III with baseline serum creatinine 1 2-1 3  - Her serum creatinine 1 2 in July 2018 overall stable at baseline    - Avoid NSAIDs or nephrotoxins  - CKD likely secondary to long-term hypertensive nephrosclerosis/diabetic nephropathy  - Check BMP, phosphorus, UPCR before next visit     Proteinuria  - last UPC ratio 190 mg in July 2018 overall improving from previously 330 mg  repeat UPC ratio before next visit  Continue lisinopril 20 mg by mouth daily   - Proteinuria likely secondary to long-term diabetes and hypertension  -last hemoglobin A1c 7 1 in July 2018     Hypertension  - Blood pressure is above goal and uncontrolled in the office today  -increase lisinopril from 20 mg p o  daily to 20 mg in a m  and 10 mg in p m     -she has been having lot of stress at home with family members health, also her close friend recently passed away  She has been having issues with intra-ocular hypertension and closely getting followed with Ophthalmology  All this issues are giving her a lot of stress  This could be certainly contributing factor to elevated blood pressure as well  - Continue metoprolol and Lasix      currently remains on Lasix 20 mg p o  daily and continue same   - Strongly advised to check blood pressure at home on a regular basis and call back if blood pressure remains persistently greater than 140/90  Advised to bring BP machine during next visit       bilateral lower extremity edema likely secondary to chronic venous stasis  - This is resolved since endovascular laser treatment in left lower extremity  Currently she does not have any pitting edema in both lower extremity  She denies any dyspnea  - Continue low-salt diet   -continue current maintenance dose of Lasix 20 mg p o  daily     Iron deficiency anemia, last iron saturation 22% in March 2018 has improved from previous value 16% after IV Feraheme trial    -hemoglobin overall stable at goal   Continue to closely monitor  Diagnoses and all orders for this visit:    Chronic kidney disease, stage 3  -     Basic metabolic panel; Future  -     Phosphorus; Future  -     PTH, intact; Future  -     Protein / creatinine ratio, urine; Future  -     Magnesium; Future  -     CBC; Future    Benign hypertension with chronic kidney disease, stage III  -     lisinopril (ZESTRIL) 20 mg tablet; Take one tablet (20 mg) in AM and half tablet (10 mg) in PM    Microalbuminuria    Bilateral leg edema    Essential hypertension  -     lisinopril (ZESTRIL) 20 mg tablet; Take one tablet (20 mg) in AM and half tablet (10 mg) in PM          SUBJECTIVE / HPI:  Patient is 65-year-old female with significant past medical history of hypertension for at least 40-50 years, and diabetes for 30 years, diabetic retinopathy, stress incontinence, chronic leg swelling with chronic venous stasis, status post endovascular laser treatment in the left lower extremity, chronic kidney disease stage III with baseline serum creatinine 1 2-1 3, comes regular follow up of renal failure  Her last serum creatinine 1 2 overall stable at baseline    She remains on lisinopril 20 mg p o  daily and maintenance dose of Lasix 20 mg p o  daily  Denies any worsening leg edema  Denies any worsening dyspnea  She recently has been having lot of stress due to multiple family issues including health issues  Her close friend recently passed away  She has been having increased intra-ocular pressure requiring close follow-up with Ophthalmology  She is scheduled for laser treatment later this month as per patient  Brett Espinosa Her weight is overall stable at 227 lb at home  Her weight was 227 lb with doctor's office in July 2018  Her weight is significantly elevated today in our office ? Accurate  She tries to follow low salt diet  No recent NSAID exposure  Denies lightheadedness or dizziness  Denies any dyspnea  REVIEW OF SYSTEMS:  More than 10 point review of systems were obtained and discussed in length with the patient  Complete review of systems were negative / unremarkable except mentioned above  PHYSICAL EXAM:  Vitals:    08/08/18 1110 08/08/18 1138   BP: 148/78 152/82   BP Location: Right arm    Patient Position: Sitting    Cuff Size: Large    Weight: 106 kg (234 lb)    Height: 5' 1" (1 549 m)      Body mass index is 44 21 kg/m²  Physical Exam   Constitutional: She is oriented to person, place, and time  She appears well-developed and well-nourished  HENT:   Head: Normocephalic and atraumatic  Right Ear: External ear normal    Left Ear: External ear normal    Eyes: Conjunctivae and EOM are normal  Pupils are equal, round, and reactive to light  Neck: Neck supple  No JVD present  Cardiovascular: Normal rate and normal heart sounds  Pulmonary/Chest: Effort normal and breath sounds normal  She has no wheezes  She has no rales  Abdominal: Soft  Bowel sounds are normal  She exhibits no distension  There is no tenderness  Musculoskeletal: She exhibits no edema or tenderness  Neurological: She is alert and oriented to person, place, and time  Skin: Skin is warm and dry  No rash noted     Psychiatric: She has a normal mood and affect  Her behavior is normal    Vitals reviewed  PAST MEDICAL HISTORY:  Past Medical History:   Diagnosis Date    Anxiety     Arthritis     [facility]     Chronic venous insufficiency     [facility]     Coronary artery disease     Diabetes mellitus ([facility] )     type II    Disease of thyroid gland     hypothyroidism    [facility]deep venous thrombosis) ([facility])     Hyperlipidemia     Hypertension     Irregular heart beat     Paroxysmal a-fib    Pacemaker     Renal disorder     stage 3    Thyroid disease     Urinary incontinence     Uterine leiomyoma        PAST SURGICAL HISTORY:  Past Surgical History:   Procedure Laterality Date    BREAST SURGERY      For Biopsy    CARDIAC PACEMAKER PLACEMENT      Permanent  Last assessed: 2/19/16    CARDIAC SURGERY      CARPAL TUNNEL RELEASE Bilateral     CATARACT EXTRACTION      CATARACT EXTRACTION W/ INTRAOCULAR LENS  IMPLANT, BILATERAL      CYSTOSCOPY      Diagnostic  Last assessed: 11/8/17    ECTOPIC PREGNANCY SURGERY      HYSTERECTOMY      Total Abdomonal  Resolved: 1982    JOINT REPLACEMENT Right     TKR    [place] ENDOVENOUS LASER, 1ST VEIN Left 2/17/2017    Procedure: GREATER SAPHENOUS VEIN ENDOVASCULAR LASER THERAPY ;  Surgeon: Jl Mcgee DO;  [facility];  Service: Vascular    TONSILLECTOMY         SOCIAL HISTORY:  History   Alcohol Use    Yes     Comment: rarely   Per [employer]: Denied history of alcohol use     History   Drug Use No     History   Smoking Status    Never Smoker   Smokeless Tobacco    Never Used     Comment: Per [employer]: Former smoker       FAMILY HISTORY:  Family History   Problem Relation Age of Onset    Hypertension Father     Diabetes Father         Mellitus    Coronary artery disease Mother     Hypertension Mother     Heart disease Mother     Diabetes Brother         Mellitus    Breast cancer Maternal Aunt     Pancreatic cancer Maternal Aunt        MEDICATIONS:    Current Outpatient Prescriptions:     acetaminophen (TYLENOL) 325 mg tablet, Take 650 mg by mouth , Disp: , Rfl:     apixaban (ELIQUIS) 5 mg, Take 1 tablet (5 mg total) by mouth 2 (two) times a day, Disp: 48 tablet, Rfl: 0    atorvastatin (LIPITOR) 80 mg tablet, TAKE 1 TABLET AT BEDTIME, Disp: 90 tablet, Rfl: 3    cholecalciferol (VITAMIN D3) 1,000 units tablet, Take 4,000 Units by mouth daily  , Disp: , Rfl:     citalopram (CeleXA) 20 mg tablet, Take 1 tablet by mouth daily, Disp: , Rfl:     furosemide (LASIX) 40 mg tablet, Take 0 5 tablets by mouth daily , Disp: , Rfl:     GLUCOSAMINE CHONDROITIN COMPLX PO, Take 1 tablet by mouth 2 (two) times a day, Disp: , Rfl:     glucose blood (TRUE METRIX BLOOD GLUCOSE TEST) test strip, Test 4 times plus daily, as instructed, Disp: 400 each, Rfl: 1    insulin aspart (NOVOLOG FLEXPEN) 100 Units/mL injection pen, 10u with breakfast, 12u with lunch, 10u with dinner, Disp: 15 pen, Rfl: 0    Insulin Glargine (TOUJEO SOLOSTAR) injection pen 300 units/mL, Inject 35 Units under the skin daily at bedtime, Disp: 3 pen, Rfl: 0    levothyroxine 125 mcg tablet, Take 1 tablet (125 mcg total) by mouth daily Monday thru Saturday; do not take on Sunday  , Disp: 78 tablet, Rfl: 1    lisinopril (ZESTRIL) 20 mg tablet, Take one tablet (20 mg) in AM and half tablet (10 mg) in PM, Disp: 90 tablet, Rfl: 0    metoprolol tartrate (LOPRESSOR) 25 mg tablet, TAKE 1/2 TABLET EVERY 12 HOURS, Disp: 90 tablet, Rfl: 3    Multiple Vitamins-Minerals (OCUVITE ADULT FORMULA PO), Take by mouth, Disp: , Rfl:     Omega-3 Fatty Acids (FISH OIL) 1200 MG CAPS, Take 1,200 mg by mouth 3 (three) times a day  , Disp: , Rfl:     oxybutynin (DITROPAN) 5 mg tablet, TAKE 1 TABLET TWICE DAILY, Disp: 180 tablet, Rfl: 3    promethazine-dextromethorphan (PHENERGAN-DM) 6 25-15 mg/5 mL oral syrup, Take 5 mL by mouth 4 (four) times a day as needed for cough, Disp: 118 mL, Rfl: 1    Ascorbic Acid (VITAMIN C) 1000 MG tablet, Take 1,000 mg by mouth , Disp: , Rfl:     Lab Results:   Results for orders placed or performed in visit on 07/10/18   CBC   Result Value Ref Range    WBC 6 29 4 31 - 10 16 Thousand/uL    RBC 4 02 3 81 - 5 12 Million/uL    Hemoglobin 12 0 11 5 - 15 4 g/dL    Hematocrit 39 7 34 8 - 46 1 %    MCV 99 (H) 82 - 98 fL    MCH 29 9 26 8 - 34 3 pg    MCHC 30 2 (L) 31 4 - 37 4 g/dL    RDW 12 9 11 6 - 15 1 %    Platelets  820 - 352 Thousands/uL    MPV 11 6 8 9 - 12 7 fL   Protein / creatinine ratio, urine   Result Value Ref Range    Creatinine, Ur 84 5 mg/dL    Protein Urine Random 16 mg/dL    Prot/Creat Ratio, Ur 0 19 (H) 0 00 - 0 10   Phosphorus   Result Value Ref Range    Phosphorus 4 0 2 3 - 4 1 mg/dL   PTH, intact   Result Value Ref Range    PTH 55 4 18 4 - 80 1 pg/mL

## 2018-08-08 NOTE — PROGRESS NOTES
NEPHROLOGY OUTPATIENT PROGRESS NOTE   Janina Laura 70 y o  female MRN: [id number]  DATE: 8/8/2018  Reason for visit:   Chief Complaint   Patient presents with    Follow-up    Chronic Kidney Disease     ASSESSMENT and PLAN:  CK D stage III with baseline serum creatinine 1 2-1 3  - Her serum creatinine 1 2 in July 2018 overall stable at baseline    - Avoid NSAIDs or nephrotoxins  - CKD likely secondary to long-term hypertensive nephrosclerosis/diabetic nephropathy  - Check BMP, phosphorus, UPCR before next visit     Proteinuria  - last UPC ratio 190 mg in July 2018 overall improving from previously 330 mg  repeat UPC ratio before next visit  Continue lisinopril 20 mg by mouth daily   - Proteinuria likely secondary to long-term diabetes and hypertension  -last hemoglobin A1c 7 1 in July 2018     Hypertension  - Blood pressure is above goal and uncontrolled in the office today  -increase lisinopril from 20 mg p o  daily to 20 mg in a m  and 10 mg in p m     -she has been having lot of stress at home with family members health, also her close friend recently passed away  She has been having issues with intra-ocular hypertension and closely getting followed with Ophthalmology  All this issues are giving her a lot of stress  This could be certainly contributing factor to elevated blood pressure as well  - Continue metoprolol and Lasix  currently remains on Lasix 20 mg p o  daily and continue same   - Strongly advised to check blood pressure at home on a regular basis and call back if blood pressure remains persistently greater than 140/90  Advised to bring BP machine during next visit       bilateral lower extremity edema likely secondary to chronic venous stasis  - This is resolved since endovascular laser treatment in left lower extremity  Currently she does not have any pitting edema in both lower extremity  She denies any dyspnea    - Continue low-salt diet   -continue current maintenance dose of Lasix 20 mg p o  daily     Iron deficiency anemia, last iron saturation 22% in March 2018 has improved from previous value 16% after IV Feraheme trial    -hemoglobin overall stable at goal   Continue to closely monitor  Diagnoses and all orders for this visit:    Chronic kidney disease, stage 3  -     Basic metabolic panel; Future  -     Phosphorus; Future  -     PTH, intact; Future  -     Protein / creatinine ratio, urine; Future  -     Magnesium; Future  -     CBC; Future    Benign hypertension with chronic kidney disease, stage III  -     lisinopril (ZESTRIL) 20 mg tablet; Take one tablet (20 mg) in AM and half tablet (10 mg) in PM    Microalbuminuria    Bilateral leg edema    Essential hypertension  -     lisinopril (ZESTRIL) 20 mg tablet; Take one tablet (20 mg) in AM and half tablet (10 mg) in PM          SUBJECTIVE / HPI:  Patient is 79-year-old female with significant past medical history of hypertension for at least 40-50 years, and diabetes for 30 years, diabetic retinopathy, stress incontinence, chronic leg swelling with chronic venous stasis, status post endovascular laser treatment in the left lower extremity, chronic kidney disease stage III with baseline serum creatinine 1 2-1 3, comes regular follow up of renal failure  Her last serum creatinine 1 2 overall stable at baseline  She remains on lisinopril 20 mg p o  daily and maintenance dose of Lasix 20 mg p o  daily  Denies any worsening leg edema  Denies any worsening dyspnea  She recently has been having lot of stress due to multiple family issues including health issues  Her close friend recently passed away  She has been having increased intra-ocular pressure requiring close follow-up with Ophthalmology  She is scheduled for laser treatment later this month as per patient  Sherre Soulier Her weight is overall stable at 227 lb at home  Her weight was 227 lb with doctor's office in July 2018  Her weight is significantly elevated today in our office ? Accurate  She tries to follow low salt diet  No recent NSAID exposure  Denies lightheadedness or dizziness  Denies any dyspnea  REVIEW OF SYSTEMS:  More than 10 point review of systems were obtained and discussed in length with the patient  Complete review of systems were negative / unremarkable except mentioned above  PHYSICAL EXAM:  Vitals:    08/08/18 1110 08/08/18 1138   BP: 148/78 152/82   BP Location: Right arm    Patient Position: Sitting    Cuff Size: Large    Weight: 106 kg (234 lb)    Height: 5' 1" (1 549 m)      Body mass index is 44 21 kg/m²  Physical Exam   Constitutional: She is oriented to person, place, and time  She appears well-developed and well-nourished  HENT:   Head: Normocephalic and atraumatic  Right Ear: External ear normal    Left Ear: External ear normal    Eyes: Conjunctivae and EOM are normal  Pupils are equal, round, and reactive to light  Neck: Neck supple  No JVD present  Cardiovascular: Normal rate and normal heart sounds  Pulmonary/Chest: Effort normal and breath sounds normal  She has no wheezes  She has no rales  Abdominal: Soft  Bowel sounds are normal  She exhibits no distension  There is no tenderness  Musculoskeletal: She exhibits no edema or tenderness  Neurological: She is alert and oriented to person, place, and time  Skin: Skin is warm and dry  No rash noted  Psychiatric: She has a normal mood and affect  Her behavior is normal    Vitals reviewed        PAST MEDICAL HISTORY:  Past Medical History:   Diagnosis Date    Anxiety     Arthritis     [facility]     Chronic venous insufficiency     [facility] disease) [facility])     Coronary artery disease     [facility]     type II    Disease of thyroid gland     hypothyroidism    DVT (deep venous thrombosis) ([facility])     Hyperlipidemia     Hypertension     Irregular heart beat     Paroxysmal a-fib    Pacemaker     Renal disorder stage 3    Thyroid disease     Urinary incontinence     Uterine leiomyoma        PAST SURGICAL HISTORY:  Past Surgical History:   Procedure Laterality Date    BREAST SURGERY      For Biopsy    CARDIAC PACEMAKER PLACEMENT      Permanent  Last assessed: 2/19/16    CARDIAC SURGERY      CARPAL TUNNEL RELEASE Bilateral     CATARACT EXTRACTION      CATARACT EXTRACTION W/ INTRAOCULAR LENS  IMPLANT, BILATERAL      CYSTOSCOPY      Diagnostic  Last assessed: 11/8/17    ECTOPIC PREGNANCY SURGERY      HYSTERECTOMY      Total Abdomonal  Resolved: 1982    JOINT REPLACEMENT Right     TKR    [place] ENDOVENOUS LASER, 1ST VEIN Left 2/17/2017    Procedure: GREATER SAPHENOUS VEIN ENDOVASCULAR LASER THERAPY ;  Surgeon: Sher Martinez DO;  [facility];  Service: Vascular    TONSILLECTOMY         SOCIAL HISTORY:  History   Alcohol Use    Yes     Comment: rarely   Per [employer]: Denied history of alcohol use     History   Drug Use No     History   Smoking Status    Never Smoker   Smokeless Tobacco    Never Used     Comment: Per [employer]: Former smoker       FAMILY HISTORY:  Family History   Problem Relation Age of Onset    Hypertension Father     Diabetes Father         Mellitus    Coronary artery disease Mother     Hypertension Mother     Heart disease Mother     Diabetes Brother         Mellitus    Breast cancer Maternal Aunt     Pancreatic cancer Maternal Aunt        MEDICATIONS:    Current Outpatient Prescriptions:     acetaminophen (TYLENOL) 325 mg tablet, Take 650 mg by mouth , Disp: , Rfl:     apixaban (ELIQUIS) 5 mg, Take 1 tablet (5 mg total) by mouth 2 (two) times a day, Disp: 48 tablet, Rfl: 0    atorvastatin (LIPITOR) 80 mg tablet, TAKE 1 TABLET AT BEDTIME, Disp: 90 tablet, Rfl: 3    cholecalciferol (VITAMIN D3) 1,000 units tablet, Take 4,000 Units by mouth daily  , Disp: , Rfl:     citalopram (CeleXA) 20 mg tablet, Take 1 tablet by mouth daily, Disp: , Rfl:     furosemide (LASIX) 40 mg tablet, Take 0 5 tablets by mouth daily , Disp: , Rfl:     GLUCOSAMINE CHONDROITIN COMPLX PO, Take 1 tablet by mouth 2 (two) times a day, Disp: , Rfl:     glucose blood (TRUE METRIX BLOOD GLUCOSE TEST) test strip, Test 4 times plus daily, as instructed, Disp: 400 each, Rfl: 1    insulin aspart (NOVOLOG FLEXPEN) 100 Units/mL injection pen, 10u with breakfast, 12u with lunch, 10u with dinner, Disp: 15 pen, Rfl: 0    Insulin Glargine (TOUJEO SOLOSTAR) injection pen 300 units/mL, Inject 35 Units under the skin daily at bedtime, Disp: 3 pen, Rfl: 0    levothyroxine 125 mcg tablet, Take 1 tablet (125 mcg total) by mouth daily Monday thru Saturday; do not take on Sunday  , Disp: 78 tablet, Rfl: 1    lisinopril (ZESTRIL) 20 mg tablet, Take one tablet (20 mg) in AM and half tablet (10 mg) in PM, Disp: 90 tablet, Rfl: 0    metoprolol tartrate (LOPRESSOR) 25 mg tablet, TAKE 1/2 TABLET EVERY 12 HOURS, Disp: 90 tablet, Rfl: 3    Multiple Vitamins-Minerals (OCUVITE ADULT FORMULA PO), Take by mouth, Disp: , Rfl:     Omega-3 Fatty Acids (FISH OIL) 1200 MG CAPS, Take 1,200 mg by mouth 3 (three) times a day  , Disp: , Rfl:     oxybutynin (DITROPAN) 5 mg tablet, TAKE 1 TABLET TWICE DAILY, Disp: 180 tablet, Rfl: 3    promethazine-dextromethorphan (PHENERGAN-DM) 6 25-15 mg/5 mL oral syrup, Take 5 mL by mouth 4 (four) times a day as needed for cough, Disp: 118 mL, Rfl: 1    Ascorbic Acid (VITAMIN C) 1000 MG tablet, Take 1,000 mg by mouth , Disp: , Rfl:     Lab Results:   Results for orders placed or performed in visit on 07/10/18   CBC   Result Value Ref Range    WBC 6 29 4 31 - 10 16 Thousand/uL    RBC 4 02 3 81 - 5 12 Million/uL    Hemoglobin 12 0 11 5 - 15 4 g/dL    Hematocrit 39 7 34 8 - 46 1 %    MCV 99 (H) 82 - 98 fL    MCH 29 9 26 8 - 34 3 pg    MCHC 30 2 (L) 31 4 - 37 4 g/dL    RDW 12 9 11 6 - 15 1 %    Platelets  519 - 752 Thousands/uL    MPV 11 6 8 9 - 12 7 fL   Protein / creatinine ratio, urine   Result Value Ref Range    Creatinine, Ur 84 5 mg/dL    Protein Urine Random 16 mg/dL    Prot/Creat Ratio, Ur 0 19 (H) 0 00 - 0 10   Phosphorus   Result Value Ref Range    Phosphorus 4 0 2 3 - 4 1 mg/dL   PTH, intact   Result Value Ref Range    PTH 55 4 18 4 - 80 1 pg/mL

## 2018-08-09 ENCOUNTER — APPOINTMENT (OUTPATIENT)
Dept: LAB | Facility: CLINIC | Age: 78
End: 2018-08-09
Payer: MEDICARE

## 2018-08-09 DIAGNOSIS — E55.9 VITAMIN D DEFICIENCY: ICD-10-CM

## 2018-08-09 LAB — 25(OH)D3 SERPL-MCNC: 41 NG/ML (ref 30–100)

## 2018-08-09 PROCEDURE — 82306 VITAMIN D 25 HYDROXY: CPT

## 2018-08-09 PROCEDURE — 36415 COLL VENOUS BLD VENIPUNCTURE: CPT

## 2018-08-10 ENCOUNTER — TELEPHONE (OUTPATIENT)
Dept: ENDOCRINOLOGY | Facility: CLINIC | Age: 78
End: 2018-08-10

## 2018-08-10 NOTE — TELEPHONE ENCOUNTER
----- Message from Cinthya Sanchez DO sent at 8/10/2018  7:50 AM EDT -----  Vitamin-D level is on target

## 2018-08-14 ENCOUNTER — OFFICE VISIT (OUTPATIENT)
Dept: FAMILY MEDICINE CLINIC | Facility: CLINIC | Age: 78
End: 2018-08-14
Payer: MEDICARE

## 2018-08-14 VITALS
WEIGHT: 233.4 LBS | BODY MASS INDEX: 44.07 KG/M2 | TEMPERATURE: 98.2 F | DIASTOLIC BLOOD PRESSURE: 64 MMHG | HEIGHT: 61 IN | SYSTOLIC BLOOD PRESSURE: 102 MMHG | HEART RATE: 68 BPM

## 2018-08-14 DIAGNOSIS — E11.65 TYPE 2 DIABETES MELLITUS WITH HYPERGLYCEMIA, WITH LONG-TERM CURRENT USE OF INSULIN (HCC): ICD-10-CM

## 2018-08-14 DIAGNOSIS — R05.9 COUGH: ICD-10-CM

## 2018-08-14 DIAGNOSIS — Z01.818 PREOP EXAMINATION: Primary | ICD-10-CM

## 2018-08-14 DIAGNOSIS — J44.9 CHRONIC OBSTRUCTIVE PULMONARY DISEASE, UNSPECIFIED COPD TYPE (HCC): ICD-10-CM

## 2018-08-14 DIAGNOSIS — H40.9 GLAUCOMA OF RIGHT EYE, UNSPECIFIED GLAUCOMA TYPE: ICD-10-CM

## 2018-08-14 DIAGNOSIS — I44.2 COMPLETE HEART BLOCK (HCC): ICD-10-CM

## 2018-08-14 DIAGNOSIS — R06.2 WHEEZE: ICD-10-CM

## 2018-08-14 DIAGNOSIS — Z79.4 TYPE 2 DIABETES MELLITUS WITH HYPERGLYCEMIA, WITH LONG-TERM CURRENT USE OF INSULIN (HCC): ICD-10-CM

## 2018-08-14 DIAGNOSIS — I10 ESSENTIAL HYPERTENSION: ICD-10-CM

## 2018-08-14 PROCEDURE — 99214 OFFICE O/P EST MOD 30 MIN: CPT | Performed by: FAMILY MEDICINE

## 2018-08-14 NOTE — PROGRESS NOTES
Patient ID: Jamee Hernandez is a 70 y o  female  HPI: 70 y  o female is being seen for a preoperative visit R eye glaucoma surgery  Surgical Risk Assessment:    Prior anesthesia: Adverse Reaction to : Epidural n    General n   Spinal n  Family history of adverse reactions to anesthesia? no    Pertinent Past Medical History:  DMII, HTN, CKD stage III, hypothyroid, CAD, HTN, hyperlipidemia    Exercise Capacity:     Able to walk 4 blocks w/o Sx       y       Able to walk 2 flights of steps w/o Sx   y    Lifestyle Factors: Tobacco Use:  no      Pack years  Alcohol Use:  No  Illicit Drug Use:  no      Personal history of venous thromboembolic disease:    History of Steroid use for >2 weeks within last year? Family History   Problem Relation Age of Onset    Hypertension Father     Diabetes Father         Mellitus    Coronary artery disease Mother     Hypertension Mother     Heart disease Mother     Diabetes Brother         Mellitus    Breast cancer Maternal Aunt     Pancreatic cancer Maternal Aunt      Social History     Social History    Marital status: /Civil Union     Spouse name: N/A    Number of children: 3    Years of education: N/A     Occupational History    Not on file  Social History Main Topics    Smoking status: Never Smoker    Smokeless tobacco: Never Used      Comment: Per Allsc[employer]ipts: Former smoker    Alcohol use Yes      Comment: rarely   Per Allscripts: Denied history of alcohol use    Drug use: No    Sexual activity: No     Other Topics Concern    Not on file     Social History Narrative    Daily coffee consumption: 2 cups/day     Past Medical History:   Diagnosis Date    Anxiety     Arthritis     [facility]congest[facility]     Chronic venous insufficiency     COPD (chronic obstructive pulmonary disease) ([facility])     Coronary artery disease     [facility] mellitus [facility] )     type II    Disease of thyroid gland     hypothyroidism    DVT (deep venous thrombosis) ([facility]     Hyperlipidemia     Hypertension     Irregular heart beat     Paroxysmal a-fib    Pacemaker     Renal disorder     stage 3    Thyroid disease     Urinary incontinence     Uterine leiomyoma      Past Surgical History:   Procedure Laterality Date    BREAST SURGERY      For Biopsy    CARDIAC PACEMAKER PLACEMENT      Permanent  Last assessed: 2/19/16    CARDIAC SURGERY      CARPAL TUNNEL RELEASE Bilateral     CATARACT EXTRACTION      CATARACT EXTRACTION W/ INTRAOCULAR LENS  IMPLANT, BILATERAL      CYSTOSCOPY      Diagnostic   Last assessed: 11/8/17    ECTOPIC PREGNANCY SURGERY      HYSTERECTOMY      Total Abdomonal  Resolved: 1982    JOINT REPLACEMENT Right     TKR    DE ENDOVENOUS LASER, 1ST VEIN Left 2/17/2017    Procedure: GREATER SAPHENOUS VEIN ENDOVASCULAR LASER THERAPY ;  Surgeon: Sherri Foy DO;  [facility];  Service: Vascular    TONSILLECTOMY       Allergies   Allergen Reactions    Ampicillin Hives       Current Outpatient Prescriptions:     acetaminophen (TYLENOL) 325 mg tablet, Take 650 mg by mouth , Disp: , Rfl:     apixaban (ELIQUIS) 5 mg, Take 1 tablet (5 mg total) by mouth 2 (two) times a day, Disp: 48 tablet, Rfl: 0    Ascorbic Acid (VITAMIN C) 1000 MG tablet, Take 1,000 mg by mouth , Disp: , Rfl:     atorvastatin (LIPITOR) 80 mg tablet, TAKE 1 TABLET AT BEDTIME, Disp: 90 tablet, Rfl: 3    cholecalciferol (VITAMIN D3) 1,000 units tablet, Take 4,000 Units by mouth daily  , Disp: , Rfl:     citalopram (CeleXA) 20 mg tablet, Take 1 tablet by mouth daily, Disp: , Rfl:     furosemide (LASIX) 40 mg tablet, Take 0 5 tablets by mouth daily , Disp: , Rfl:     GLUCOSAMINE CHONDROITIN COMPLX PO, Take 1 tablet by mouth 2 (two) times a day, Disp: , Rfl:     glucose blood (TRUE METRIX BLOOD GLUCOSE TEST) test strip, Test 4 times plus daily, as instructed, Disp: 400 each, Rfl: 1    insulin aspart (NOVOLOG FLEXPEN) 100 Units/mL injection pen, 10u with breakfast, 12u with lunch, 10u with dinner, Disp: 15 pen, Rfl: 0    Insulin Glargine (TOUJEO SOLOSTAR) injection pen 300 units/mL, Inject 35 Units under the skin daily at bedtime, Disp: 3 pen, Rfl: 0    levothyroxine 125 mcg tablet, Take 1 tablet (125 mcg total) by mouth daily Monday thru Saturday; do not take on Sunday  , Disp: 78 tablet, Rfl: 1    lisinopril (ZESTRIL) 20 mg tablet, Take one tablet (20 mg) in AM and half tablet (10 mg) in PM, Disp: 90 tablet, Rfl: 0    metoprolol tartrate (LOPRESSOR) 25 mg tablet, TAKE 1/2 TABLET EVERY 12 HOURS, Disp: 90 tablet, Rfl: 3    Multiple Vitamins-Minerals (OCUVITE ADULT FORMULA PO), Take by mouth, Disp: , Rfl:     Omega-3 Fatty Acids (FISH OIL) 1200 MG CAPS, Take 1,200 mg by mouth 3 (three) times a day  , Disp: , Rfl:     oxybutynin (DITROPAN) 5 mg tablet, TAKE 1 TABLET TWICE DAILY (Patient taking differently: take half TABLET TWICE DAILY), Disp: 180 tablet, Rfl: 3    promethazine-dextromethorphan (PHENERGAN-DM) 6 25-15 mg/5 mL oral syrup, Take 5 mL by mouth 4 (four) times a day as needed for cough, Disp: 118 mL, Rfl: 1    travoprost (TRAVATAN-Z) 0 004 % ophthalmic solution, Administer 1 drop to both eyes daily at bedtime, Disp: , Rfl: 0    Review of Systems   Constitutional:  No fever or chills, feels well, no tiredness, no recent weight gain or loss  Eyes:  No complaints of eye pain, no red eyes, no discharge from eyes, no itchy eyes  ENT:  No complaints of earache, no hearing loss, nose bleeds, no nasal discharge, no sore throat, no hoarseness  Pulmonary:  No complaints of shortness of breath, no wheezing, no cough, no shortness of breath on exertion, no orthopnea or postnasal drip  Cardiovascular:  No complaints of slow heart rate, no fast heart rate, no chest pain, no palpitations, no leg claudication, no lower extremity swelling  Gastrointestinal:  No complaints of abdominal pain, no constipation, no nausea or vomiting, no diarrhea or bloody stools  Gentiourinary:  No complaints of dysuria, no incontinence, no hesitancy, no nocturia  Musculoskeletal:  No complaints of worsening arthralgia, myalgia, no joint swelling or stiffness, no limb pain or swelling  Integumentary:  No complaints of skin rash or skin lesions, no itching, no skin wounds, no dry skin  Neurological:  No complaints of headache, no confusion, no convulsions, no numbness or tingling, no dizziness or fainting, no limb weakness, no difficulty walking  Psychiatric:  Is not suicidal, no sleep disturbances, no anxiety or depression, no change in personality, no emotional problems  Hematologic/Lymphatic:  No complaints of swollen glands, no swollen glands in the neck, does not bleed easily, no easy bruising     Physical Exam:    /64   Pulse 68   Temp 98 2 °F (36 8 °C)   Ht 5' 1" (1 549 m)   Wt 106 kg (233 lb 6 4 oz)   BMI 44 10 kg/m²     Constitutional:   NAD, Pleasant 70year old female well appearing and well nourished in no acute distress     ENT:   Conjunctiva and lids: no injection, edema, or discharge    Pupils and iris: mildly asymmetric pupils  External inspection of ears and nose: normal without deformities or discharge  Otoscopic exam: Canals patent without erythema  Nasal mucosa, septum and turbinates: Normal or edema or discharge         Oropharynx:  Moist mucosa, normal tongue and tonsils without lesions  No erythema        Pulmonary:Respiratory effort normal rate and rhythm, no increased work of breathing  Auscultation of lungs:  Scattered faint wheeze with sl increased expiratory phase  Cardiovascular: regular rate and rhythm, S1 and S2  Trace-1+ edema   Abdomen: Soft and non-distended     Positive bowel sounds      No heptomegaly or splenomegaly      Lymphatic:  No anterior or posterior cervical lymphadenopathy         Musculoskeletal:  Gait and station: mildly antalgic gait     Digits and nails normal without clubbing or cyanosis       Inspection/palpation of joints, bones, and muscles:  No joint tenderness, swelling, full active and passive range of motion       Skin: Normal skin turgor and no rashes      Neuro:    Normal  CN 2-12      Psych:   alert and oriented to person, place and time     normal mood and affect       DATA:  Laboratory Results:     Lab Results   Component Value Date    ALT 21 09/06/2016    AST 18 09/06/2016    BUN 36 (H) 07/10/2018    CALCIUM 9 2 07/10/2018     07/10/2018    CHOL 134 07/18/2017    CO2 27 07/10/2018    CREATININE 1 20 07/10/2018    HDL 57 07/18/2017    HCT 39 7 07/10/2018    HGB 12 0 07/10/2018    HGBA1C 7 1 (H) 07/10/2018    MG 2 1 03/02/2016    PHOS 4 0 07/10/2018    PLT  07/10/2018      Comment:      Unable to perform due to clumped platelets    K 4 3 80/95/8709     07/10/2018    TRIG 87 07/18/2017    WBC 6 29 07/10/2018           Current medications which may produce withdrawal symptoms if withheld perioperatively:    Pre-op Evaluation Plan  1  Further preoperative work-up as follows: none  2  Medication Management/Recommendations: no change in meds  No meds need to be held  3  Prophylaxis for cardiac events with perioperative beta-blockers:     Clearance:  Patient is CLEARED for surgery without any additional cardiac testing      Assessment/Plan:  Diagnoses and all orders for this visit:    Preop examination  Comments:  pt cleared for surgery    Cough  Comments:  COPD exacerbation? Trial of Breo 1 inhalation qd (sample given)  Check CXR  Recheck 1 week if not improved  Orders:  -     XR chest pa & lateral; Future    Wheeze  Comments:  as above  Orders:  -     XR chest pa & lateral; Future    Glaucoma of right eye, unspecified glaucoma type    Type 2 diabetes mellitus with hyperglycemia, with long-term current use of insulin (HCC)    Chronic obstructive pulmonary disease, unspecified COPD type ([address] )  Comments:  with mild exacerbation   Treat as above    Complete heart block ([address] )    Essential hypertension

## 2018-08-15 ENCOUNTER — TRANSCRIBE ORDERS (OUTPATIENT)
Dept: LAB | Facility: CLINIC | Age: 78
End: 2018-08-15

## 2018-08-15 ENCOUNTER — APPOINTMENT (OUTPATIENT)
Dept: LAB | Facility: CLINIC | Age: 78
End: 2018-08-15
Payer: MEDICARE

## 2018-08-15 DIAGNOSIS — H40.053 BILATERAL OCULAR HYPERTENSION: ICD-10-CM

## 2018-08-15 DIAGNOSIS — H40.053 BILATERAL OCULAR HYPERTENSION: Primary | ICD-10-CM

## 2018-08-15 LAB
ANION GAP SERPL CALCULATED.3IONS-SCNC: 7 MMOL/L (ref 4–13)
BUN SERPL-MCNC: 32 MG/DL (ref 5–25)
CALCIUM SERPL-MCNC: 9 MG/DL (ref 8.3–10.1)
CHLORIDE SERPL-SCNC: 106 MMOL/L (ref 100–108)
CO2 SERPL-SCNC: 26 MMOL/L (ref 21–32)
CREAT SERPL-MCNC: 1.17 MG/DL (ref 0.6–1.3)
GFR SERPL CREATININE-BSD FRML MDRD: 45 ML/MIN/1.73SQ M
GLUCOSE P FAST SERPL-MCNC: 140 MG/DL (ref 65–99)
POTASSIUM SERPL-SCNC: 4.3 MMOL/L (ref 3.5–5.3)
SODIUM SERPL-SCNC: 139 MMOL/L (ref 136–145)

## 2018-08-15 PROCEDURE — 80048 BASIC METABOLIC PNL TOTAL CA: CPT

## 2018-08-15 PROCEDURE — 36415 COLL VENOUS BLD VENIPUNCTURE: CPT

## 2018-08-16 ENCOUNTER — HOSPITAL ENCOUNTER (OUTPATIENT)
Dept: RADIOLOGY | Facility: HOSPITAL | Age: 78
Discharge: HOME | End: 2018-08-16
Payer: MEDICARE

## 2018-08-16 DIAGNOSIS — R06.2 WHEEZE: ICD-10-CM

## 2018-08-16 DIAGNOSIS — R05.9 COUGH: ICD-10-CM

## 2018-08-16 PROCEDURE — 71046 X-RAY EXAM CHEST 2 VIEWS: CPT

## 2018-08-16 RX ORDER — DEXTROMETHORPHAN HYDROBROMIDE AND PROMETHAZINE HYDROCHLORIDE 15; 6.25 MG/5ML; MG/5ML
5 SYRUP ORAL 4 TIMES DAILY PRN
Qty: 118 ML | Refills: 0 | Status: SHIPPED | OUTPATIENT
Start: 2018-08-16 | End: 2018-11-06 | Stop reason: SDUPTHER

## 2018-08-21 DIAGNOSIS — I48.91 ATRIAL FIBRILLATION, UNSPECIFIED TYPE (HCC): ICD-10-CM

## 2018-08-22 DIAGNOSIS — I48.91 ATRIAL FIBRILLATION, UNSPECIFIED TYPE (HCC): ICD-10-CM

## 2018-08-23 ENCOUNTER — TELEPHONE (OUTPATIENT)
Dept: CARDIOLOGY CLINIC | Facility: CLINIC | Age: 78
End: 2018-08-23

## 2018-08-23 NOTE — TELEPHONE ENCOUNTER
RX plan needs a note stating patient is only able to take Tiara Gobble is unable to take other Coumadin-& Padaxa

## 2018-08-27 DIAGNOSIS — E03.9 HYPOTHYROIDISM, UNSPECIFIED TYPE: ICD-10-CM

## 2018-08-28 RX ORDER — LEVOTHYROXINE SODIUM 0.12 MG/1
TABLET ORAL
Qty: 78 TABLET | Refills: 1 | Status: SHIPPED | OUTPATIENT
Start: 2018-08-28 | End: 2019-06-05 | Stop reason: SDUPTHER

## 2018-09-14 ENCOUNTER — TELEPHONE (OUTPATIENT)
Dept: CARDIOLOGY CLINIC | Facility: CLINIC | Age: 78
End: 2018-09-14

## 2018-09-14 NOTE — TELEPHONE ENCOUNTER
Ubaldo Mars called she is scheduled to have a dental implant on 9/20 and surgeon would like Pt to hold Eliquis 3 days prior      Please advise if this is ok    C/b # [phone]

## 2018-09-17 NOTE — TELEPHONE ENCOUNTER
Pt called stating her dental surgery has been pushed back to 10/1 but she still needs to know if it will be ok to hold

## 2018-09-17 NOTE — TELEPHONE ENCOUNTER
Kendal Snyder made aware-- verbally understood  Advised to take medication since procedure rescheduled

## 2018-09-21 ENCOUNTER — TELEPHONE (OUTPATIENT)
Dept: ENDOCRINOLOGY | Facility: CLINIC | Age: 78
End: 2018-09-21

## 2018-09-21 NOTE — TELEPHONE ENCOUNTER
is calling as patient is taking Novolog and Toujeo; wants to know if she can be switched to Novolin R and Novolin N which patient can get cheaper at [facility]  Please advise

## 2018-09-28 ENCOUNTER — REMOTE DEVICE CLINIC VISIT (OUTPATIENT)
Dept: CARDIOLOGY CLINIC | Facility: CLINIC | Age: 78
End: 2018-09-28
Payer: MEDICARE

## 2018-09-28 DIAGNOSIS — Z95.0 PRESENCE OF CARDIAC PACEMAKER: ICD-10-CM

## 2018-09-28 DIAGNOSIS — I49.5 SSS (SICK SINUS SYNDROME) (HCC): ICD-10-CM

## 2018-09-28 DIAGNOSIS — I48.0 PAROXYSMAL ATRIAL FIBRILLATION (HCC): Primary | ICD-10-CM

## 2018-09-28 PROCEDURE — 93294 REM INTERROG EVL PM/LDLS PM: CPT | Performed by: INTERNAL MEDICINE

## 2018-09-28 PROCEDURE — 93296 REM INTERROG EVL PM/IDS: CPT | Performed by: INTERNAL MEDICINE

## 2018-09-28 NOTE — PROGRESS NOTES
Results for orders placed or performed in visit on 09/28/18   Cardiac EP device report    Narrative    MDT DUAL CHAMBER PM  CARELINK TRANSMISSION: BATTERY VOLTAGE ADEQUATE (6 5 YRS)  AP: 61%  : (100% >40%AVB)  ALL AVAILABLE LEAD PARAMETERS WITHIN NORMAL LIMITS  1 AT/AF EPISODE W/ EGRAM SHOWING AF, DURATION 75 SECS  PT TAKES ELIQUIS, METOPROLOL TART  EF: 60% (ECHO 01/17/16)  NO OTHER SIGNIFICANT HIGH RATE EPISODES  PACEMAKER FUNCTIONING APPROPRIATELY    [facility]

## 2018-10-03 ENCOUNTER — OFFICE VISIT (OUTPATIENT)
Dept: CARDIOLOGY CLINIC | Facility: CLINIC | Age: 78
End: 2018-10-03
Payer: MEDICARE

## 2018-10-03 VITALS
HEART RATE: 81 BPM | OXYGEN SATURATION: 94 % | SYSTOLIC BLOOD PRESSURE: 118 MMHG | WEIGHT: 232 LBS | DIASTOLIC BLOOD PRESSURE: 58 MMHG | BODY MASS INDEX: 43.8 KG/M2 | HEIGHT: 61 IN

## 2018-10-03 DIAGNOSIS — R60.0 LOCALIZED EDEMA: Primary | ICD-10-CM

## 2018-10-03 DIAGNOSIS — N18.30 BENIGN HYPERTENSION WITH CHRONIC KIDNEY DISEASE, STAGE III (HCC): ICD-10-CM

## 2018-10-03 DIAGNOSIS — I10 ESSENTIAL HYPERTENSION: ICD-10-CM

## 2018-10-03 DIAGNOSIS — I12.9 BENIGN HYPERTENSION WITH CHRONIC KIDNEY DISEASE, STAGE III (HCC): ICD-10-CM

## 2018-10-03 PROCEDURE — 99214 OFFICE O/P EST MOD 30 MIN: CPT | Performed by: INTERNAL MEDICINE

## 2018-10-03 RX ORDER — LISINOPRIL 20 MG/1
30 TABLET ORAL DAILY
Qty: 135 TABLET | Refills: 3 | Status: SHIPPED | OUTPATIENT
Start: 2018-10-03 | End: 2019-01-11 | Stop reason: SDUPTHER

## 2018-10-03 RX ORDER — FUROSEMIDE 20 MG/1
20 TABLET ORAL DAILY
Qty: 90 TABLET | Refills: 3 | Status: SHIPPED | OUTPATIENT
Start: 2018-10-03 | End: 2019-10-01 | Stop reason: SDUPTHER

## 2018-10-03 NOTE — PROGRESS NOTES
Cardiology Follow Up    Tami Prince  1940  [id number]  [address] [facility] [address] [place] [address]  [phone]    No diagnosis found  Interval History: Followup PAF and PPM    No chest pain, no dyspnea and no palpitations  Problem List     NSTEMI (non-ST elevated myocardial infarction) ([facility] )    Type 2 diabetes mellitus with hyperglycemia ([facility])    Lab Results   Component Value Date    HGBA1C 7 1 (H) 07/10/2018       No results for input(s): POCGLU in the last 72 hours  Blood Sugar Average: Last 72 hrs:          Essential hypertension    Morbid obesity due to excess calories ([facility])    Complete heart block ([facility])    Anemia    S/P cardiac pacemaker procedure    Hypothyroidism (Chronic)    Overview Signed 6/6/2018  9:04 PM by Deirdre Doyle MD     Last Assessment & Plan:   Continue  med         Chronic diastolic congestive heart failure ([facility] )    Chronic venous insufficiency    Dvt femoral (deep venous thrombosis) ([facility])    Benign hypertension with chronic kidney disease, stage III ([facility])    Chronic kidney disease, stage 3 ([facility])    Iron deficiency anemia    Microalbuminuria    Bilateral leg edema    Vitamin D deficiency    Anxiety disorder    Urinary bladder incontinence    Chronic obstructive pulmonary disease ([facility] )    DM type 2, not at goal [facility])    Lab Results   Component Value Date    HGBA1C 7 1 (H) 07/10/2018       No results for input(s): POCGLU in the last 72 hours      Blood Sugar Average: Last 72 hrs:          Eczematous dermatitis    Mixed hyperlipidemia    Overview Signed 6/6/2018  9:04 PM by Deirdre Doyle MD     Last Assessment & Plan:   Continue med         OAB (overactive bladder)    Presence of permanent cardiac pacemaker    PAF (paroxysmal atrial fibrillation) ([facility])    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     Chronic venous insufficiency     COPD (chronic obstructive pulmonary disease) ([facility] )     Coronary artery disease     Diabetes mellitus ([place] [address] )     type II    Disease of thyroid gland     hypothyroidism    DVT (deep venous thrombosis) ([facility] )     Hyperlipidemia     Hypertension     Irregular heart beat     Paroxysmal a-fib    Pacemaker     Renal disorder     stage 3    Thyroid disease     Urinary incontinence     Uterine leiomyoma      Social History     Social History    Marital status: /Civil Union     Spouse name: N/A    Number of children: 3    Years of education: N/A     Occupational History    Not on file  Social History Main Topics    Smoking status: Never Smoker    Smokeless tobacco: Never Used      Comment: Per Allscri[employer]s: Former smoker    Alcohol use Yes      Comment: rarely  Per Allscripts: Denied history of alcohol use    Drug use: No    Sexual activity: No     Other Topics Concern    Not on file     Social History Narrative    Daily coffee consumption: 2 cups/day      Family History   Problem Relation Age of Onset    Hypertension Father     Diabetes Father         Mellitus    Coronary artery disease Mother     Hypertension Mother     Heart disease Mother     Diabetes Brother         Mellitus    Breast cancer Maternal Aunt     Pancreatic cancer Maternal Aunt      Past Surgical History:   Procedure Laterality Date    BREAST SURGERY      For Biopsy    CARDIAC PACEMAKER PLACEMENT      Permanent   Last assessed: 2/19/16   Yamilet Arnold CARDIAC SURGERY      CARPAL TUNNEL RELEASE Bilateral     CATARACT EXTRACTION      CATARACT EXTRACTION W/ INTRAOCULAR LENS  IMPLANT, BILATERAL      CYSTOSCOPY      Diagnostic  Last assessed: 11/8/17    ECTOPIC PREGNANCY SURGERY      HYSTERECTOMY      Total Abdomonal  Resolved: 1982    JOINT REPLACEMENT Right     TKR    OK ENDOVENOUS LASER, 1ST VEIN Left 2/17/2017    Procedure: GREATER SAPHENOUS VEIN ENDOVASCULAR LASER THERAPY ;  Surgeon: Alfred Strong DO;  [facility];  Service: Vascular    TONSILLECTOMY         Current Outpatient Prescriptions:     acetaminophen (TYLENOL) 325 mg tablet, Take 650 mg by mouth , Disp: , Rfl:     apixaban (ELIQUIS) 5 mg, Take 1 tablet (5 mg total) by mouth 2 (two) times a day, Disp: 180 tablet, Rfl: 3    Ascorbic Acid (VITAMIN C) 1000 MG tablet, Take 1,000 mg by mouth , Disp: , Rfl:     atorvastatin (LIPITOR) 80 mg tablet, TAKE 1 TABLET AT BEDTIME, Disp: 90 tablet, Rfl: 3    cholecalciferol (VITAMIN D3) 1,000 units tablet, Take 4,000 Units by mouth daily  , Disp: , Rfl:     citalopram (CeleXA) 20 mg tablet, Take 1 tablet by mouth daily, Disp: , Rfl:     furosemide (LASIX) 40 mg tablet, Take 0 5 tablets by mouth daily , Disp: , Rfl:     glucose blood (TRUE METRIX BLOOD GLUCOSE TEST) test strip, Test 4 times plus daily, as instructed, Disp: 400 each, Rfl: 1    insulin aspart (NOVOLOG FLEXPEN) 100 Units/mL injection pen, 10u with breakfast, 12u with lunch, 10u with dinner, Disp: 15 pen, Rfl: 0    Insulin Glargine (TOUJEO SOLOSTAR) injection pen 300 units/mL, Inject 35 Units under the skin daily at bedtime, Disp: 3 pen, Rfl: 0    levothyroxine 125 mcg tablet, TAKE 1 TABLET EVERY DAY  MONDAY  THROUGH  SATURDAY   TAKE NONE ON SUNDAY, Disp: 78 tablet, Rfl: 1    lisinopril (ZESTRIL) 20 mg tablet, Take one tablet (20 mg) in AM and half tablet (10 mg) in PM, Disp: 90 tablet, Rfl: 0    metoprolol tartrate (LOPRESSOR) 25 mg tablet, TAKE 1/2 TABLET EVERY 12 HOURS, Disp: 90 tablet, Rfl: 3    Omega-3 Fatty Acids (FISH OIL) 1200 MG CAPS, Take 1,200 mg by mouth 3 (three) times a day  , Disp: , Rfl:     oxybutynin (DITROPAN) 5 mg tablet, TAKE 1 TABLET TWICE DAILY (Patient taking differently: take half TABLET TWICE DAILY), Disp: 180 tablet, Rfl: 3    promethazine-dextromethorphan (PHENERGAN-DM) 6 25-15 mg/5 mL oral syrup, Take 5 mL by mouth 4 (four) times a day as needed for cough, Disp: 118 mL, Rfl: 0    travoprost (TRAVATAN-Z) 0 004 % ophthalmic solution, Administer 1 drop to both eyes daily at bedtime, Disp: , Rfl: 0    GLUCOSAMINE CHONDROITIN COMPLX PO, Take 1 tablet by mouth 2 (two) times a day, Disp: , Rfl:     Multiple Vitamins-Minerals (OCUVITE ADULT FORMULA PO), Take by mouth, Disp: , Rfl:   Allergies   Allergen Reactions    Ampicillin Hives       Labs:     Chemistry        Component Value Date/Time     08/15/2018 1233     12/06/2016 0939    K 4 3 08/15/2018 1233    K 4 2 12/06/2016 0939     08/15/2018 1233     12/06/2016 0939    CO2 26 08/15/2018 1233    CO2 28 12/06/2016 0939    BUN 32 (H) 08/15/2018 1233    BUN 41 (H) 12/06/2016 0939    CREATININE 1 17 08/15/2018 1233    CREATININE 1 23 (H) 12/06/2016 0939        Component Value Date/Time    CALCIUM 9 0 08/15/2018 1233    CALCIUM 9 2 12/06/2016 0939    ALKPHOS 86 09/06/2016 0907    AST 18 09/06/2016 0907    ALT 21 09/06/2016 0907    BILITOT 0 6 09/06/2016 0907            Lab Results   Component Value Date    CHOL 130 09/06/2016     Lab Results   Component Value Date    HDL 57 07/18/2017    HDL 56 09/06/2016     Lab Results   Component Value Date    LDLCALC 60 07/18/2017     Lab Results   Component Value Date    TRIG 87 07/18/2017    TRIG 61 09/06/2016     No components found for: CHOLHDL    Imaging: No results found  Review of Systems   Constitution: Negative  HENT: Negative  Eyes: Negative  Cardiovascular: Negative  Respiratory: Negative  Hematologic/Lymphatic: Negative  Skin: Negative  Musculoskeletal: Negative  Gastrointestinal: Negative  Genitourinary: Negative  Neurological: Negative  Psychiatric/Behavioral: Negative  Allergic/Immunologic: Negative  Vitals:    10/03/18 1313   BP: 118/58   Pulse: 81   SpO2: 94%           Physical Exam   Constitutional: She is oriented to person, place, and time  No distress  HENT:   Mouth/Throat: No oropharyngeal exudate  Eyes: No scleral icterus  Neck: No JVD present  Cardiovascular: Normal rate and regular rhythm  No murmur heard  Pulmonary/Chest: Effort normal and breath sounds normal  No respiratory distress  She has no wheezes  She has no rales  Abdominal: Soft  Bowel sounds are normal  She exhibits no distension  There is no tenderness  There is no rebound  Musculoskeletal: She exhibits no edema  Neurological: She is alert and oriented to person, place, and time  Skin: Skin is warm  She is not diaphoretic  Psychiatric: She has a normal mood and affect  Her behavior is normal        Discussion/Summary:    Complete Heart Block s/p PPM implant  Pacer checks look okay  One very short episode of PAF on her last pacer check  Continue current medical therapy       PAF: She has remained asymptomatic  The hematuria resolved  Continue Eliquis  The patient was counseled regarding diagnostic results, instructions for management, risk factor reductions, impressions  total time of encounter was 25 minutes and 15 minutes was spent counseling

## 2018-10-08 DIAGNOSIS — I10 ESSENTIAL HYPERTENSION: ICD-10-CM

## 2018-10-23 ENCOUNTER — IMMUNIZATION (OUTPATIENT)
Dept: FAMILY MEDICINE CLINIC | Facility: CLINIC | Age: 78
End: 2018-10-23
Payer: MEDICARE

## 2018-10-23 DIAGNOSIS — Z23 ENCOUNTER FOR IMMUNIZATION: ICD-10-CM

## 2018-10-23 PROCEDURE — 90662 IIV NO PRSV INCREASED AG IM: CPT | Performed by: FAMILY MEDICINE

## 2018-10-23 PROCEDURE — G0008 ADMIN INFLUENZA VIRUS VAC: HCPCS | Performed by: FAMILY MEDICINE

## 2018-10-29 ENCOUNTER — TELEPHONE (OUTPATIENT)
Dept: FAMILY MEDICINE CLINIC | Facility: CLINIC | Age: 78
End: 2018-10-29

## 2018-10-29 ENCOUNTER — TRANSCRIBE ORDERS (OUTPATIENT)
Dept: ADMINISTRATIVE | Facility: HOSPITAL | Age: 78
End: 2018-10-29

## 2018-10-29 DIAGNOSIS — Z12.39 SCREENING BREAST EXAMINATION: Primary | ICD-10-CM

## 2018-10-30 DIAGNOSIS — Z12.39 BREAST CANCER SCREENING: Primary | ICD-10-CM

## 2018-11-06 DIAGNOSIS — R05.9 COUGH: ICD-10-CM

## 2018-11-06 RX ORDER — DEXTROMETHORPHAN HYDROBROMIDE AND PROMETHAZINE HYDROCHLORIDE 15; 6.25 MG/5ML; MG/5ML
5 SYRUP ORAL 4 TIMES DAILY PRN
Qty: 118 ML | Refills: 1 | Status: SHIPPED | OUTPATIENT
Start: 2018-11-06 | End: 2019-02-08 | Stop reason: SDUPTHER

## 2018-11-28 LAB
LEFT EYE DIABETIC RETINOPATHY: NORMAL
RIGHT EYE DIABETIC RETINOPATHY: NORMAL
SEVERITY (EYE EXAM): NORMAL

## 2018-12-06 ENCOUNTER — TELEPHONE (OUTPATIENT)
Dept: NEPHROLOGY | Facility: CLINIC | Age: 78
End: 2018-12-06

## 2018-12-07 ENCOUNTER — APPOINTMENT (OUTPATIENT)
Dept: LAB | Facility: CLINIC | Age: 78
End: 2018-12-07
Payer: MEDICARE

## 2018-12-07 DIAGNOSIS — R31.0 GROSS HEMATURIA: ICD-10-CM

## 2018-12-07 PROCEDURE — 88112 CYTOPATH CELL ENHANCE TECH: CPT | Performed by: PATHOLOGY

## 2018-12-11 ENCOUNTER — OFFICE VISIT (OUTPATIENT)
Dept: UROLOGY | Facility: CLINIC | Age: 78
End: 2018-12-11
Payer: MEDICARE

## 2018-12-11 VITALS
DIASTOLIC BLOOD PRESSURE: 80 MMHG | BODY MASS INDEX: 43.39 KG/M2 | WEIGHT: 235.8 LBS | HEART RATE: 68 BPM | SYSTOLIC BLOOD PRESSURE: 144 MMHG | HEIGHT: 62 IN

## 2018-12-11 DIAGNOSIS — N32.81 OVERACTIVE BLADDER: Primary | ICD-10-CM

## 2018-12-11 DIAGNOSIS — R31.0 GROSS HEMATURIA: ICD-10-CM

## 2018-12-11 LAB — POST-VOID RESIDUAL VOLUME, ML POC: 40 ML

## 2018-12-11 PROCEDURE — 99213 OFFICE O/P EST LOW 20 MIN: CPT | Performed by: PHYSICIAN ASSISTANT

## 2018-12-11 PROCEDURE — 51798 US URINE CAPACITY MEASURE: CPT | Performed by: PHYSICIAN ASSISTANT

## 2018-12-11 NOTE — PROGRESS NOTES
1  Overactive bladder  POCT Measure PVR   2  Gross hematuria  Cytology, urine         Assessment and plan:       1  History of gross hematuria - managed by Dr Martha Salas   - status post CT of the abdomen and pelvis and cystoscopy 11/2017, 05/2018  - urine cytology negative for abnormal cells  - follow-up 1 year urine cytology prior to visit for continued surveillance    2  Overactive bladder  - patient is not a candidate for tibial nerve stimulation given her pacemaker   - patient is not a candidate for beta 3 agonist given her relatively uncontrolled hypertension  - we discussed keeping her on oxybutynin 5 mg, half tablet twice daily  Would not recommend increasing her dose given her glaucoma  - patient will follow up in 1 year with a PVR for symptom reassessment  López Tran PA-C      Chief Complaint     Chief Complaint   Patient presents with    overactive bladder       History of Present Illness     Kaylen Garcias is a 70 y o  female patient of Dr Martha Salas with a history of hematuria, overactive bladder, urinary incontinence presenting for follow-up  Patient was having intermittent gross hematuria  A noncontrast CT was negative for any upper tract abnormalities  She is unable to undergo a contrasted imaging given her renal function was unable to undergo MRI given compatible hardware  She underwent a cystoscopy 11/08/2017 which did reveal 1 area of hyperemia within the bladder dome  Dr Martha Salas had wish to repeat a cystoscopy approximately 4 months thereafter for continued surveillance  Repeat cystoscopy was performed 05/16/2018 which was negative for any abnormality  Patient had a recent urine cytology (12/07/2018) was negative for any high-grade urothelial carcinoma  Patient's hematuria was felt to be given her anticoagulation  When patient was titrated to a lower dose, her hematuria significantly improved thereafter  Patient is on a daily diuretic    She takes oxybutynin 5 mg, half tablet twice daily  She denies any significant constipation or dry mouth with this medication  She states that she is taking the lower dose given her glaucoma  Patient has previously tried tibial nerve stimulation, however this was unable to be performed given her pacemaker  Patient feels like she has a fair stream, feels empty after urination, has nocturia 1-2 times nightly  Continues to have urinary urgency and urge urinary incontinence requiring approximately 2-3 pads daily  She denies any dysuria, gross hematuria, suprapubic pressure, flank pain, fevers, or chills  Postvoid residual in the office today reveals 40 mL    Laboratory     Lab Results   Component Value Date    CREATININE 1 17 08/15/2018       Review of Systems     Review of Systems   Constitutional: Negative for activity change, appetite change, chills, diaphoresis, fatigue, fever and unexpected weight change  Respiratory: Negative for chest tightness and shortness of breath  Cardiovascular: Negative for chest pain, palpitations and leg swelling  Gastrointestinal: Negative for abdominal distention, abdominal pain, constipation, diarrhea, nausea and vomiting  Genitourinary: Negative for decreased urine volume, difficulty urinating, dysuria, enuresis, flank pain, frequency, genital sores, hematuria and urgency  Musculoskeletal: Negative for back pain, gait problem and myalgias  Skin: Negative for color change, pallor, rash and wound  Psychiatric/Behavioral: Negative for behavioral problems  The patient is not nervous/anxious  Allergies     Allergies   Allergen Reactions    Ampicillin Hives       Physical Exam     Physical Exam   Constitutional: She is oriented to person, place, and time  She appears well-developed and well-nourished  No distress  Overweight   HENT:   Head: Normocephalic and atraumatic  Eyes: Conjunctivae are normal    Neck: Normal range of motion  No tracheal deviation present  Pulmonary/Chest: Effort normal    Musculoskeletal: She exhibits edema  She exhibits no deformity  Ambulates with cane assistance   Neurological: She is alert and oriented to person, place, and time  Skin: Skin is warm and dry  She is not diaphoretic  No erythema  No pallor  Psychiatric: She has a normal mood and affect  Her behavior is normal          Vital Signs     Vitals:    12/11/18 1414   BP: 144/80   BP Location: Left arm   Patient Position: Sitting   Cuff Size: Adult   Pulse: 68   Weight: 107 kg (235 lb 12 8 oz)   Height: 5' 1 5" (1 562 m)         Current Medications       Current Outpatient Prescriptions:     acetaminophen (TYLENOL) 325 mg tablet, Take 650 mg by mouth , Disp: , Rfl:     apixaban (ELIQUIS) 5 mg, Take 1 tablet (5 mg total) by mouth 2 (two) times a day, Disp: 180 tablet, Rfl: 3    Ascorbic Acid (VITAMIN C) 1000 MG tablet, Take 1,000 mg by mouth , Disp: , Rfl:     atorvastatin (LIPITOR) 80 mg tablet, TAKE 1 TABLET AT BEDTIME, Disp: 90 tablet, Rfl: 3    cholecalciferol (VITAMIN D3) 1,000 units tablet, Take 4,000 Units by mouth daily  , Disp: , Rfl:     citalopram (CeleXA) 20 mg tablet, Take 1 tablet by mouth daily, Disp: , Rfl:     furosemide (LASIX) 20 mg tablet, Take 1 tablet (20 mg total) by mouth daily, Disp: 90 tablet, Rfl: 3    GLUCOSAMINE CHONDROITIN COMPLX PO, Take 1 tablet by mouth 2 (two) times a day, Disp: , Rfl:     glucose blood (TRUE METRIX BLOOD GLUCOSE TEST) test strip, Test 4 times plus daily, as instructed, Disp: 400 each, Rfl: 1    insulin aspart (NOVOLOG FLEXPEN) 100 Units/mL injection pen, 10u with breakfast, 12u with lunch, 10u with dinner, Disp: 15 pen, Rfl: 0    Insulin Glargine (TOUJEO SOLOSTAR) injection pen 300 units/mL, Inject 35 Units under the skin daily at bedtime, Disp: 3 pen, Rfl: 0    levothyroxine 125 mcg tablet, TAKE 1 TABLET EVERY DAY  MONDAY  THROUGH  SATURDAY   TAKE NONE ON SUNDAY, Disp: 78 tablet, Rfl: 1    lisinopril (ZESTRIL) 20 mg tablet, Take 1 5 tablets (30 mg total) by mouth daily Take one tablet (20 mg) in AM and half tablet (10 mg) in PM, Disp: 135 tablet, Rfl: 3    metoprolol tartrate (LOPRESSOR) 25 mg tablet, Take 1 tablet (25 mg total) by mouth every 12 (twelve) hours, Disp: 180 tablet, Rfl: 2    Multiple Vitamins-Minerals (OCUVITE ADULT FORMULA PO), Take by mouth, Disp: , Rfl:     Omega-3 Fatty Acids (FISH OIL) 1200 MG CAPS, Take 1,200 mg by mouth 3 (three) times a day  , Disp: , Rfl:     oxybutynin (DITROPAN) 5 mg tablet, TAKE 1 TABLET TWICE DAILY (Patient taking differently: take half TABLET TWICE DAILY), Disp: 180 tablet, Rfl: 3    promethazine-dextromethorphan (PHENERGAN-DM) 6 25-15 mg/5 mL oral syrup, Take 5 mL by mouth 4 (four) times a day as needed for cough, Disp: 118 mL, Rfl: 1    travoprost (TRAVATAN-Z) 0 004 % ophthalmic solution, Administer 1 drop to both eyes daily at bedtime, Disp: , Rfl: 0      Active Problems     Patient Active Problem List   Diagnosis    [facility]non-[facility] elevated [facility] infarction) [facility]    Type 2 diabetes mellitus with hyperglycemia ([facility]    Essential hypertension    Morbid obesity due to excess calories ([facility])    Complete heart block ([facility])    Anemia    S/P cardiac pacemaker procedure    Hypothyroidism    Chronic diastolic congestive heart failure ([facility])    Chronic venous insufficiency    Dvt femoral (deep venous thrombosis) ([facility]    Benign hypertension with chronic kidney disease, stage III ([facility])    Chronic kidney disease, stage 3 ([facility])    Iron deficiency anemia    Microalbuminuria    Bilateral leg edema    Vitamin D deficiency    Anxiety disorder    Urinary bladder incontinence    [facility] obstructive [facility] disease [facility]    DM type 2, not at goal ([facility]    Eczematous dermatitis    Mixed hyperlipidemia    OAB (overactive bladder)    Presence of permanent cardiac pacemaker    PAF (paroxysmal atrial fibrillation) ([facility]    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 neurological manifestations, uncontrolled(250 62)    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, uncontrolled(250 52)    Intraocular pressure increase       Past Medical History     Past Medical History:   Diagnosis Date    Anxiety     Arthritis     [facility]est[facility]) [facility]     Chronic venous insufficiency     COPD (chronic obstructive pulmonary disease) ([facility]     Coronary artery disease     Diabetes mellitus ([facility]     type II    Disease of thyroid gland     hypothyroidism    DVT (deep venous thrombosis) ([facility]     Hyperlipidemia     Hypertension     Irregular heart beat     Paroxysmal a-fib    Pacemaker     Renal disorder     stage 3    Thyroid disease     Urinary incontinence     Uterine leiomyoma        Surgical History     Past Surgical History:   Procedure Laterality Date    BREAST SURGERY      For Biopsy    CARDIAC PACEMAKER PLACEMENT      Permanent  Last assessed: 2/19/16    CARDIAC SURGERY      CARPAL TUNNEL RELEASE Bilateral     CATARACT EXTRACTION      CATARACT EXTRACTION W/ INTRAOCULAR LENS  IMPLANT, BILATERAL      CYSTOSCOPY      Diagnostic   Last assessed: 11/8/17    ECTOPIC PREGNANCY SURGERY      HYSTERECTOMY      Total Abdomonal  Resolved: 1982    JOINT REPLACEMENT Right     TKR    [place] ENDOVENOUS LASER, 1ST VEIN Left 2/17/2017    Procedure: GREATER SAPHENOUS VEIN ENDOVASCULAR LASER THERAPY ;  Surgeon: DO Candice;  [facility];  Service: Vascular    TONSILLECTOMY       Family History     Family History   Problem Relation Age of Onset    Hypertension Father     Diabetes Father         Mellitus    Coronary artery disease Mother     Hypertension Mother     Heart disease Mother     Diabetes Brother         Mellitus    Breast cancer Maternal Aunt     Pancreatic cancer Maternal Aunt      Social History     Social History     Radiology

## 2018-12-19 ENCOUNTER — OFFICE VISIT (OUTPATIENT)
Dept: FAMILY MEDICINE CLINIC | Facility: CLINIC | Age: 78
End: 2018-12-19
Payer: MEDICARE

## 2018-12-19 VITALS
HEART RATE: 76 BPM | HEIGHT: 62 IN | BODY MASS INDEX: 43.39 KG/M2 | WEIGHT: 235.8 LBS | TEMPERATURE: 97.3 F | DIASTOLIC BLOOD PRESSURE: 82 MMHG | SYSTOLIC BLOOD PRESSURE: 150 MMHG

## 2018-12-19 DIAGNOSIS — Z79.4 TYPE 2 DIABETES MELLITUS WITH HYPERGLYCEMIA, WITH LONG-TERM CURRENT USE OF INSULIN (HCC): ICD-10-CM

## 2018-12-19 DIAGNOSIS — E03.9 HYPOTHYROIDISM, UNSPECIFIED TYPE: Chronic | ICD-10-CM

## 2018-12-19 DIAGNOSIS — I12.9 BENIGN HYPERTENSION WITH CHRONIC KIDNEY DISEASE, STAGE III (HCC): ICD-10-CM

## 2018-12-19 DIAGNOSIS — Z00.00 MEDICARE ANNUAL WELLNESS VISIT, SUBSEQUENT: Primary | ICD-10-CM

## 2018-12-19 DIAGNOSIS — G47.33 OBSTRUCTIVE SLEEP APNEA SYNDROME: ICD-10-CM

## 2018-12-19 DIAGNOSIS — I10 ESSENTIAL HYPERTENSION: ICD-10-CM

## 2018-12-19 DIAGNOSIS — N18.30 BENIGN HYPERTENSION WITH CHRONIC KIDNEY DISEASE, STAGE III (HCC): ICD-10-CM

## 2018-12-19 DIAGNOSIS — E11.3393 MODERATE NONPROLIFERATIVE DIABETIC RETINOPATHY OF BOTH EYES ASSOCIATED WITH TYPE 2 DIABETES MELLITUS, MACULAR EDEMA PRESENCE UNSPECIFIED (HCC): ICD-10-CM

## 2018-12-19 DIAGNOSIS — E11.65 TYPE 2 DIABETES MELLITUS WITH HYPERGLYCEMIA, WITH LONG-TERM CURRENT USE OF INSULIN (HCC): ICD-10-CM

## 2018-12-19 DIAGNOSIS — I48.0 PAF (PAROXYSMAL ATRIAL FIBRILLATION) (HCC): ICD-10-CM

## 2018-12-19 PROCEDURE — 99214 OFFICE O/P EST MOD 30 MIN: CPT | Performed by: FAMILY MEDICINE

## 2018-12-19 PROCEDURE — G0439 PPPS, SUBSEQ VISIT: HCPCS | Performed by: FAMILY MEDICINE

## 2018-12-19 NOTE — PROGRESS NOTES
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 ([facility] )     Lab Results   Component Value Date    HGBA1C 7 1 (H) 07/10/2018     BGs are improving  Eyesight uncahnged according to pt  Cont f/u with ophth  Recheck 6m         Type 2 diabetes mellitus with hyperglycemia (HCC)     Lab Results   Component Value Date    HGBA1C 7 1 (H) 07/10/2018     Improving  Cont f/u with Endo and present care  Recheck 6m            Cardiovascular and Mediastinum    Benign hypertension with chronic kidney disease, stage III ([facility] )     Check labs  BP elevated today  F/u 3 weeks for BP check          Essential hypertension     Poor control  Recheck 3 weeks  Adjust meds if still elevated         PAF (paroxysmal atrial fibrillation) (HCC)     In a-fib at present  Cont present care  F/u with Cardio  Recheck 6m           Other Visit Diagnoses     Medicare annual wellness visit, subsequent    -  Primary        Health Maintenance Due   Topic Date Due    DXA SCAN  1940    CRC Screening: Colonoscopy  1940    DTaP,Tdap,and Td Vaccines (1 - Tdap) 07/05/2008    DM Eye Exam  06/29/2018    Diabetic Foot Exam  11/28/2018         HPI:  Garcia Callejas is a 70 y o  female here for her Subsequent Wellness Visit      Patient Active Problem List   Diagnosis    NSTEMI (non-ST elevated myocardial infarction) ([facility]    Type 2 diabetes mellitus with hyperglycemia ([facility]    Essential hypertension    Morbid obesity with body mass index (BMI) of 40 0 to 44 9 in adult [facility])    Complete heart block (HCC)    Anemia    S/P cardiac pacemaker procedure    Hypothyroidism    Chronic diastolic congestive heart failure (HCC)    Chronic venous insufficiency    Dvt femoral (deep venous thrombosis) (HCC)    Benign hypertension with chronic kidney disease, stage III (HCC)    Chronic kidney disease, stage 3 (HCC)    Iron deficiency anemia    Microalbuminuria    Bilateral leg edema    Vitamin D deficiency    Anxiety disorder    Urinary bladder incontinence    Chronic obstructive pulmonary disease (HCC)    Eczematous dermatitis    Mixed hyperlipidemia    OAB (overactive bladder)    Presence of permanent cardiac pacemaker    [facility] fibrillation) [facility]    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 neurological manifestations, uncontrolled(250 62)    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     Coronary artery disease     Diabetes mellitus ([facility] )     type II    Disease of thyroid gland     hypothyroidism    DVT (deep venous thrombosis) ([facility]     Hyperlipidemia     Hypertension     Irregular heart beat     Paroxysmal a-fib    Pacemaker     Renal disorder     stage 3    Thyroid disease     Urinary incontinence     Uterine leiomyoma      Past Surgical History:   Procedure Laterality Date    BREAST SURGERY      For Biopsy    CARDIAC PACEMAKER PLACEMENT      Permanent  Last assessed: 2/19/16    CARDIAC SURGERY      CARPAL TUNNEL RELEASE Bilateral     CATARACT EXTRACTION      CATARACT EXTRACTION W/ INTRAOCULAR LENS  IMPLANT, BILATERAL      CYSTOSCOPY      Diagnostic   Last assessed: 11/8/17    ECTOPIC PREGNANCY SURGERY      HYSTERECTOMY      Total Abdomonal  Resolved: 1982    JOINT REPLACEMENT Right     TKR    [place] ENDOVENOUS LASER, 1ST VEIN Left 2/17/2017    Procedure: GREATER SAPHENOUS VEIN ENDOVASCULAR LASER THERAPY ;  Surgeon: Danielle Phan DO;  [facility];  Service: Vascular    TONSILLECTOMY       Family History   Problem Relation Age of Onset    Hypertension Father     Diabetes Father         Mellitus    Coronary artery disease Mother     Hypertension Mother     Heart disease Mother     Diabetes Brother         Mellitus    Breast cancer Maternal Aunt     Pancreatic cancer Maternal Aunt      History   Smoking Status    Never Smoker   Smokeless Tobacco    Never Used     Comment: Per [employer]: Former smoker     History   Alcohol Use    Yes     Comment: rarely  Per [employer]: Denied history of alcohol use      History   Drug Use No       Current Outpatient Prescriptions   Medication Sig Dispense Refill    acetaminophen (TYLENOL) 325 mg tablet Take 650 mg by mouth   apixaban (ELIQUIS) 5 mg Take 1 tablet (5 mg total) by mouth 2 (two) times a day 180 tablet 3    Ascorbic Acid (VITAMIN C) 1000 MG tablet Take 1,000 mg by mouth   atorvastatin (LIPITOR) 80 mg tablet TAKE 1 TABLET AT BEDTIME 90 tablet 3    cholecalciferol (VITAMIN D3) 1,000 units tablet Take 4,000 Units by mouth daily        citalopram (CeleXA) 20 mg tablet Take 1 tablet by mouth daily      furosemide (LASIX) 20 mg tablet Take 1 tablet (20 mg total) by mouth daily 90 tablet 3    GLUCOSAMINE CHONDROITIN COMPLX PO Take 1 tablet by mouth 2 (two) times a day      glucose blood (TRUE METRIX BLOOD GLUCOSE TEST) test strip Test 4 times plus daily, as instructed 400 each 1    insulin aspart (NOVOLOG FLEXPEN) 100 Units/mL injection pen 10u with breakfast, 12u with lunch, 10u with dinner 15 pen 0    Insulin Glargine (TOUJEO SOLOSTAR) injection pen 300 units/mL Inject 35 Units under the skin daily at bedtime (Patient taking differently: Inject 40 Units under the skin daily at bedtime  ) 3 pen 0    levothyroxine 125 mcg tablet TAKE 1 TABLET EVERY DAY  MONDAY  THROUGH  SATURDAY   TAKE NONE ON SUNDAY 78 tablet 1    lisinopril (ZESTRIL) 20 mg tablet Take 1 5 tablets (30 mg total) by mouth daily Take one tablet (20 mg) in AM and half tablet (10 mg) in  tablet 3    metoprolol tartrate (LOPRESSOR) 25 mg tablet Take 1 tablet (25 mg total) by mouth every 12 (twelve) hours 180 tablet 2    Multiple Vitamins-Minerals (OCUVITE ADULT FORMULA PO) Take by mouth      Omega-3 Fatty Acids (FISH OIL) 1200 MG CAPS Take 1,200 mg by mouth 3 (three) times a day        oxybutynin (DITROPAN) 5 mg tablet TAKE 1 TABLET TWICE DAILY (Patient taking differently: take half TABLET TWICE DAILY) 180 tablet 3    promethazine-dextromethorphan (PHENERGAN-DM) 6 25-15 mg/5 mL oral syrup Take 5 mL by mouth 4 (four) times a day as needed for cough 118 mL 1    travoprost (TRAVATAN-Z) 0 004 % ophthalmic solution Administer 1 drop to both eyes daily at bedtime (Patient not taking: Reported on 12/19/2018 )  0     No current facility-administered medications for this visit        Allergies   Allergen Reactions    Ampicillin Hives     Immunization History   Administered Date(s) Administered    H1N1, All Formulations 01/12/2010, 01/12/2010    Influenza Split High Dose Preservative Free IM 10/18/2012, 10/23/2013, 10/14/2014, 10/20/2015, 11/02/2016, 10/04/2017    Influenza TIV (IM) 11/12/2009, 01/12/2010    Influenza, high dose seasonal 0 5 mL 10/23/2018    Pneumococcal Conjugate 13-Valent 10/21/2015    Pneumococcal Polysaccharide PPV23 12/11/2000, 12/11/2010    Td (adult), adsorbed 07/04/2008    Tetanus, adsorbed 09/04/1998    Zoster 07/22/2016       Patient Care Team:  Kit Carranza MD as PCP - General  John Andrea, MD Erick Powell, MD Loren Dunbar, MD Danielle Phan, Kaelyn Snider, MD Angelina Hu, MD Almedia Abraham, MD Tommi Boas, MD Hale Sailor, MD Liborio Vale as Nurse Practitioner (Endocrinology)    Medicare Screening Tests and Risk Assessments:  Karlene Posey is here for her Subsequent Wellness visit  Health Risk Assessment:  Patient rates overall health as good  Patient feels that their physical health rating is Slightly worse  Eyesight was rated as Much worse  Hearing was rated as Same  Patient feels that their emotional and mental health rating is Same  Pain experienced by patient in the last 7 days has been None  Patient states that she has experienced no weight loss or gain in last 6 months  Emotional/Mental Health:  Patient has been feeling nervous/anxious  PHQ-9 Depression Screening:    Frequency of the following problems over the past two weeks:      1  Little interest or pleasure in doing things: 0 - not at all      2  Feeling down, depressed, or hopeless: 0 - not at all  PHQ-2 Score: 0          Broken Bones/Falls: Fall Risk Assessment:    In the past year, patient has experienced: No history of falling in past year          Bladder/Bowel:  Patient has leaked urine accidently in the last six months  Patient reports no loss of bowel control  Immunizations:  Patient has had a flu vaccination within the last year  Patient has received a pneumonia shot  Patient has not received a shingles shot  Patient has not received tetanus/diphtheria shot  Home Safety:  Patient does not have trouble with stairs inside or outside of their home  Patient currently reports that there are safety hazards present in home , working smoke alarms, working carbon monoxide detectors  Preventative Screenings:   Breast cancer screening performed, no colon cancer screen completed, cholesterol screen completed, glaucoma eye exam completed,     Nutrition:  Current diet: Limited junk food and Regular with servings of the following:    Medications:  Patient is currently taking over-the-counter supplements  List of OTC medications includes: fish, vitamin D & C     Lifestyle Choices:  Patient reports no tobacco use    Patient has smoked or used tobacco in the past   Patient has not stopped tobacco use  Patient reports no alcohol use  Patient does not drive a vehicle  Patient wears seat belt  Current level of exercise of physical activity described by patient as: walk   Activities of Daily Living:  Can get out of bed by his or her self, able to dress self, able to make own meals, able to do own shopping, able to bathe self, can do own laundry/housekeeping, can manage own money, pay bills and track expenses    Previous Hospitalizations:  No hospitalization or ED visit in past 12 months        Advanced Directives:  Patient has decided on a power of   Patient has spoken to designated power of   Patient has completed advanced directive  Preventative Screening/Counseling:      Cardiovascular:      General: Risks and Benefits Discussed and Screening Current      Counseling: Healthy Diet, Healthy Weight, Improve Cholesterol and Improve Blood Pressure     Due for Labs/Analytes/Optional EKG: Lipid Panel          Diabetes:      General: Screening Not Indicated      Counseling: Healthy Diet, Healthy Weight and Improve Physical Activity      Comments: Pt is diabeiic        Colorectal Cancer:      General: Risks and Benefits Discussed      Due for studies: Fecal Occult Blood          Breast Cancer:      General: Risks and Benefits Discussed and Screening Current          Cervical Cancer:      General: Patient Declines          Osteoporosis:      General: Risks and Benefits Discussed          AAA:      General: Screening Not Indicated          Glaucoma:      General: Risks and Benefits Discussed and Screening Current          HIV:      General: Screening Not Indicated          Hepatitis C:      General: Risks and Benefits Discussed        Advanced Directives:   Patient has living will for healthcare, has durable POA for healthcare, patient has an advanced directive       Immunizations:      Influenza: Risks & Benefits Discussed and Influenza UTD This Year Pneumococcal: Risks & Benefits Discussed and Lifetime Vaccine Completed      Shingrix: Patient Declines      Zostavax: Risks & Benefits Discussed and Zostavax Vaccine UTD      Other Preventative Counseling (Non-Medicare):   Fall Prevention, Nutrition Counseling and Weight reduction discussed

## 2018-12-19 NOTE — PROGRESS NOTES
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 ([address] )  Lab Results   Component Value Date    HGBA1C 7 1 (H) 07/10/2018     BGs are improving  Eyesight uncahnged according to pt  Cont f/u with ophth  Recheck 6m    Type 2 diabetes mellitus with hyperglycemia (HCC)  Lab Results   Component Value Date    HGBA1C 7 1 (H) 07/10/2018     Improving  Cont f/u with Endo and present care  Recheck 6m    Essential hypertension  Poor control  Recheck 3 weeks  Adjust meds if still elevated    PAF (paroxysmal atrial fibrillation) (HCC)  In a-fib at present  Cont present care  F/u with Cardio  Recheck 6m    Sleep apnea  Pt compliant with CPAP  Uses nightly  Needs new parts - mask/tubing worn  Referred back to her med-supply company  Recheck 6m       Diagnoses and all orders for this visit:    Medicare annual wellness visit, subsequent    Type 2 diabetes mellitus with hyperglycemia, with long-term current use of insulin (HCC)    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, macular edema presence unspecified (HCC)    Hypothyroidism, unspecified type    Essential hypertension    PAF (paroxysmal atrial fibrillation) (HCC)    Benign hypertension with chronic kidney disease, stage III (HCC)    Obstructive sleep apnea syndrome          Subjective:      Patient ID: Ana Tracey is a 70 y o  female  f/u multiple med issues and for AWV  - continues to f/u with ophth  Followed by Dr Ibeth Chakraborty  Not sure if treatments are helping  Still can only read print with magnifying glass    - pt denies CP, palpitations, lightheadedness or other CV symptoms with or without exertion  Up to date with Cardio  - no GI complaints  Does not want any further colonoscopies but may be willing to do a stool study  - still with OAB symptoms  We decreased the dose of there meds (due to elevated eye pressures) and finds that symptoms may be a little worse  Saw Uro - bladder scan was unremarkable  - BGs have been stable  Repeat A1C in July was 7 1  Due for repeat in January  Up to date with Endo  - no other complaints  - AWV done        The following portions of the patient's history were reviewed and updated as appropriate:   She  has a past medical history of Anxiety, Arthritis, CHF (congestive heart failure) ([facility] ), Chronic venous insufficiency, COPD (chronic obstructive pulmonary disease) ([place] [address] ), Coronary artery disease, Diabetes mellitus ([place] [address] ), Disease of thyroid gland, DVT (deep venous thrombosis) ([facility] ), Hyperlipidemia, Hypertension, Irregular heart beat, Pacemaker, Renal disorder, Thyroid disease, Urinary incontinence, and Uterine leiomyoma    She   Patient Active Problem List    Diagnosis Date Noted    Sleep apnea 05/17/2019    Intraocular pressure increase 06/06/2018    Vitamin D deficiency 04/03/2018    Bilateral leg edema 03/13/2018    Iron deficiency anemia 10/12/2017    Urinary bladder incontinence 07/26/2017    OAB (overactive bladder) 07/26/2017    Benign hypertension with chronic kidney disease, stage III ([place] [address] ) 06/21/2017    Microalbuminuria 06/21/2017    PAF (paroxysmal atrial fibrillation) ([place] [address] ) 10/31/2016    Chronic venous insufficiency 02/19/2016    Dvt femoral (deep venous thrombosis) ([place] [address] ) 02/19/2016    Presence of permanent cardiac pacemaker 01/28/2016    Chronic diastolic congestive heart failure ([facility] ) 01/21/2016    Anemia 01/19/2016    S/P cardiac pacemaker procedure 01/19/2016    Hypothyroidism 01/19/2016    Complete heart block ([facility] ) 01/16/2016    NSTEMI (non-ST elevated myocardial infarction) ([place] [address] ) 01/15/2016    Type 2 diabetes mellitus with hyperglycemia ([facility] ) 01/15/2016    Morbid obesity with body mass index (BMI) of 40 0 to 44 9 in adult [facility]) 01/15/2016    Chronic kidney disease, stage 3 ([facility] ) 10/21/2015    Eczematous dermatitis 03/03/2015    Anxiety disorder 06/26/2013    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 neurological manifestations, uncontrolled([address]) 10/02/2012    Chronic obstructive pulmonary disease ([place] [address] ) 09/24/2012    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 ([place] [address] ) 09/22/2011    Mixed hyperlipidemia 03/14/2011     She  has a past surgical history that includes Hysterectomy; Joint replacement (Right); Tonsillectomy; Cataract extraction w/ intraocular lens  implant, bilateral; Ectopic pregnancy surgery; Cardiac surgery; Cardiac pacemaker placement; Carpal tunnel release (Bilateral); pr endovenous laser, 1st vein (Left, 2/17/2017); Cystoscopy; Cataract extraction; Breast surgery; and Oophorectomy  She  reports that she has never smoked  She has never used smokeless tobacco  She reports that she drinks alcohol  She reports that she does not use drugs  Current Outpatient Medications   Medication Sig Dispense Refill    acetaminophen (TYLENOL) 325 mg tablet Take 650 mg by mouth   Ascorbic Acid (VITAMIN C) 1000 MG tablet Take 1,000 mg by mouth   cholecalciferol (VITAMIN D3) 1,000 units tablet Take 2,000 Units by mouth daily       furosemide (LASIX) 20 mg tablet Take 1 tablet (20 mg total) by mouth daily 90 tablet 3    GLUCOSAMINE CHONDROITIN COMPLX PO Take 1 tablet by mouth 2 (two) times a day      glucose blood (TRUE METRIX BLOOD GLUCOSE TEST) test strip Test 4 times plus daily, as instructed 400 each 1    insulin aspart (NOVOLOG FLEXPEN) 100 Units/mL injection pen 10u with breakfast, 12u with lunch, 10u with dinner 15 pen 0    levothyroxine 125 mcg tablet TAKE 1 TABLET EVERY DAY  MONDAY  THROUGH  SATURDAY   TAKE NONE ON SUNDAY 78 tablet 1    Multiple Vitamins-Minerals (OCUVITE ADULT FORMULA PO) Take by mouth      Omega-3 Fatty Acids (FISH OIL) 1200 MG CAPS Take 1,200 mg by mouth 3 (three) times a day        oxybutynin (DITROPAN) 5 mg tablet TAKE 1 TABLET TWICE DAILY (Patient taking differently: take half TABLET TWICE DAILY) 180 tablet 3    apixaban (ELIQUIS) 5 mg Take 1 tablet (5 mg total) by mouth 2 (two) times a day 180 tablet 3    atorvastatin (LIPITOR) 80 mg tablet TAKE 1 TABLET AT BEDTIME 90 tablet 3    citalopram (CeleXA) 20 mg tablet TAKE 1 TABLET EVERY DAY 90 tablet 3    Insulin Glargine (TOUJEO SOLOSTAR) 300 units/mL CONCETRATED U-300 injection pen Inject 40 Units under the skin daily at bedtime 9 pen 0    Insulin Pen Needle (PEN NEEDLES) 32G X 6 MM MISC Use 4 times a day 400 each 1    lisinopril (ZESTRIL) 20 mg tablet Take 1 tablet (20 mg total) by mouth 2 (two) times a day 180 tablet 3    metoprolol tartrate (LOPRESSOR) 50 mg tablet Take 0 5 tablets (25 mg total) by mouth every 12 (twelve) hours 90 tablet 3    promethazine-dextromethorphan (PHENERGAN-DM) 6 25-15 mg/5 mL oral syrup Take 5 mL by mouth 4 (four) times a day as needed for cough 180 mL 1    travoprost (TRAVATAN-Z) 0 004 % ophthalmic solution Administer 1 drop to both eyes daily at bedtime  0     No current facility-administered medications for this visit  She is allergic to ampicillin       Review of Systems   Constitutional: Negative for activity change, chills, diaphoresis, fatigue and fever  HENT: Negative  Eyes: Positive for visual disturbance  Negative for pain, discharge and itching  Respiratory: Negative  Cardiovascular: Negative  Gastrointestinal: Negative  Endocrine: Negative  Genitourinary: Positive for urgency  Negative for difficulty urinating, dysuria, flank pain, frequency and hematuria  Musculoskeletal: Positive for arthralgias and gait problem  Negative for joint swelling and myalgias  Skin: Negative      Allergic/Immunologic: Negative  Neurological: Negative for dizziness, weakness, light-headedness, numbness and headaches  Hematological: Negative  Psychiatric/Behavioral: Negative for dysphoric mood and suicidal ideas  The patient is not nervous/anxious  Objective:      /82   Pulse 76   Temp (!) 97 3 °F (36 3 °C)   Ht 5' 1 5" (1 562 m)   Wt 107 kg (235 lb 12 8 oz)   BMI 43 83 kg/m²          Physical Exam   Constitutional: She is oriented to person, place, and time  She appears well-developed and well-nourished  HENT:   Head: Normocephalic and atraumatic  Right Ear: External ear normal    Left Ear: External ear normal    Nose: Nose normal    Mouth/Throat: Oropharynx is clear and moist  No oropharyngeal exudate  Eyes: Pupils are equal, round, and reactive to light  Conjunctivae and EOM are normal    Neck: Normal range of motion  Neck supple  No JVD present  No thyromegaly present  Cardiovascular: Normal rate and intact distal pulses  An irregularly irregular rhythm present  Exam reveals no gallop  Pulses are no weak pulses  Pulses:       Dorsalis pedis pulses are 2+ on the right side, and 2+ on the left side  Pulmonary/Chest: Effort normal and breath sounds normal  She has no wheezes  She has no rales  Abdominal: Soft  She exhibits no distension and no mass  There is no tenderness  Musculoskeletal: She exhibits edema (trace edema)  Feet:   Right Foot:   Skin Integrity: Negative for ulcer, skin breakdown, erythema, warmth, callus or dry skin  Left Foot:   Skin Integrity: Negative for ulcer, skin breakdown, erythema, warmth, callus or dry skin  Lymphadenopathy:     She has no cervical adenopathy  Neurological: She is alert and oriented to person, place, and time  She has normal reflexes  She exhibits normal muscle tone  Coordination normal    Skin: Skin is warm and dry  She is not diaphoretic  Psychiatric: She has a normal mood and affect     PHQ-2=0       Patient's shoes and socks removed  Right Foot/Ankle   Right Foot Inspection  Skin Exam: skin normal and skin intact no dry skin, no warmth, no callus, no erythema, no maceration, no abnormal color, no pre-ulcer, no ulcer and no callus                          Toe Exam: ROM and strength within normal limits  Sensory   Vibration: intact    Monofilament testing: intact  Vascular  Capillary refills: < 3 seconds  The right DP pulse is 2+  Left Foot/Ankle  Left Foot Inspection  Skin Exam: skin normal and skin intactno dry skin, no warmth, no erythema, no maceration, normal color, no pre-ulcer, no ulcer and no callus                         Toe Exam: ROM and strength within normal limits                   Sensory   Vibration: intact    Monofilament: intact  Vascular  Capillary refills: < 3 seconds  The left DP pulse is 2+  Assign Risk Category:  No deformity present; No loss of protective sensation;  No weak pulses       Risk: 0

## 2018-12-20 NOTE — ASSESSMENT & PLAN NOTE
Lab Results   Component Value Date    HGBA1C 7 1 (H) 07/10/2018     Improving  Cont f/u with Endo and present care   Recheck 6m

## 2018-12-20 NOTE — ASSESSMENT & PLAN NOTE
Lab Results   Component Value Date    HGBA1C 7 1 (H) 07/10/2018     BGs are improving  Eyesight uncahnged according to pt  Cont f/u with ophth   Recheck 6m

## 2018-12-20 NOTE — ASSESSMENT & PLAN NOTE
Appears to be stable clinically  Check TSH  Cotn f/u with Endo   Adjust meds if TSH is not at goal  Recheck 6m

## 2019-01-04 ENCOUNTER — HOSPITAL ENCOUNTER (OUTPATIENT)
Dept: MAMMOGRAPHY | Facility: HOSPITAL | Age: 79
Discharge: HOME | End: 2019-01-04
Payer: MEDICARE

## 2019-01-04 VITALS — BODY MASS INDEX: 43.24 KG/M2 | WEIGHT: 235 LBS | HEIGHT: 62 IN

## 2019-01-04 DIAGNOSIS — Z12.39 SCREENING BREAST EXAMINATION: ICD-10-CM

## 2019-01-04 PROCEDURE — 77067 SCR MAMMO BI INCL CAD: CPT

## 2019-01-08 ENCOUNTER — LAB (OUTPATIENT)
Dept: LAB | Facility: CLINIC | Age: 79
End: 2019-01-08
Payer: MEDICARE

## 2019-01-08 DIAGNOSIS — E11.8 TYPE 2 DIABETES MELLITUS WITH COMPLICATION, WITH LONG-TERM CURRENT USE OF INSULIN (HCC): ICD-10-CM

## 2019-01-08 DIAGNOSIS — R79.89 ELEVATED PTHRP LEVEL: ICD-10-CM

## 2019-01-08 DIAGNOSIS — E55.9 VITAMIN D DEFICIENCY: ICD-10-CM

## 2019-01-08 DIAGNOSIS — E11.65 TYPE 2 DIABETES MELLITUS WITH HYPERGLYCEMIA, WITH LONG-TERM CURRENT USE OF INSULIN (HCC): ICD-10-CM

## 2019-01-08 DIAGNOSIS — Z79.4 TYPE 2 DIABETES MELLITUS WITH HYPERGLYCEMIA, WITH LONG-TERM CURRENT USE OF INSULIN (HCC): ICD-10-CM

## 2019-01-08 DIAGNOSIS — N18.30 CHRONIC KIDNEY DISEASE, STAGE 3 (HCC): ICD-10-CM

## 2019-01-08 DIAGNOSIS — E03.9 HYPOTHYROIDISM, UNSPECIFIED TYPE: ICD-10-CM

## 2019-01-08 DIAGNOSIS — Z79.4 TYPE 2 DIABETES MELLITUS WITH COMPLICATION, WITH LONG-TERM CURRENT USE OF INSULIN (HCC): ICD-10-CM

## 2019-01-08 LAB
25(OH)D3 SERPL-MCNC: 46 NG/ML (ref 30–100)
ANION GAP SERPL CALCULATED.3IONS-SCNC: 8 MMOL/L (ref 4–13)
BUN SERPL-MCNC: 32 MG/DL (ref 5–25)
CALCIUM SERPL-MCNC: 9.3 MG/DL (ref 8.3–10.1)
CHLORIDE SERPL-SCNC: 104 MMOL/L (ref 100–108)
CO2 SERPL-SCNC: 28 MMOL/L (ref 21–32)
CREAT SERPL-MCNC: 1.24 MG/DL (ref 0.6–1.3)
CREAT UR-MCNC: 138 MG/DL
CREAT UR-MCNC: 140 MG/DL
ERYTHROCYTE [DISTWIDTH] IN BLOOD BY AUTOMATED COUNT: 12.6 % (ref 11.6–15.1)
EST. AVERAGE GLUCOSE BLD GHB EST-MCNC: 180 MG/DL
GFR SERPL CREATININE-BSD FRML MDRD: 42 ML/MIN/1.73SQ M
GLUCOSE P FAST SERPL-MCNC: 114 MG/DL (ref 65–99)
HBA1C MFR BLD: 7.9 % (ref 4.2–6.3)
HCT VFR BLD AUTO: 40.4 % (ref 34.8–46.1)
HGB BLD-MCNC: 12.2 G/DL (ref 11.5–15.4)
MAGNESIUM SERPL-MCNC: 1.9 MG/DL (ref 1.6–2.6)
MCH RBC QN AUTO: 30.5 PG (ref 26.8–34.3)
MCHC RBC AUTO-ENTMCNC: 30.2 G/DL (ref 31.4–37.4)
MCV RBC AUTO: 101 FL (ref 82–98)
MICROALBUMIN UR-MCNC: 269 MG/L (ref 0–20)
MICROALBUMIN/CREAT 24H UR: 192 MG/G CREATININE (ref 0–30)
PHOSPHATE SERPL-MCNC: 4.1 MG/DL (ref 2.3–4.1)
PLATELET # BLD AUTO: 135 THOUSANDS/UL (ref 149–390)
PMV BLD AUTO: 11.1 FL (ref 8.9–12.7)
POTASSIUM SERPL-SCNC: 4.1 MMOL/L (ref 3.5–5.3)
PROT UR-MCNC: 60 MG/DL
PROT/CREAT UR: 0.43 MG/G{CREAT} (ref 0–0.1)
PTH-INTACT SERPL-MCNC: 72.3 PG/ML (ref 18.4–80.1)
RBC # BLD AUTO: 4 MILLION/UL (ref 3.81–5.12)
SODIUM SERPL-SCNC: 140 MMOL/L (ref 136–145)
T4 FREE SERPL-MCNC: 1.06 NG/DL (ref 0.76–1.46)
TSH SERPL DL<=0.05 MIU/L-ACNC: 2.41 UIU/ML (ref 0.36–3.74)
WBC # BLD AUTO: 7.2 THOUSAND/UL (ref 4.31–10.16)

## 2019-01-08 PROCEDURE — 80048 BASIC METABOLIC PNL TOTAL CA: CPT

## 2019-01-08 PROCEDURE — 83970 ASSAY OF PARATHORMONE: CPT

## 2019-01-08 PROCEDURE — 85027 COMPLETE CBC AUTOMATED: CPT

## 2019-01-08 PROCEDURE — 84156 ASSAY OF PROTEIN URINE: CPT

## 2019-01-08 PROCEDURE — 82570 ASSAY OF URINE CREATININE: CPT

## 2019-01-08 PROCEDURE — 84439 ASSAY OF FREE THYROXINE: CPT

## 2019-01-08 PROCEDURE — 83735 ASSAY OF MAGNESIUM: CPT

## 2019-01-08 PROCEDURE — 83036 HEMOGLOBIN GLYCOSYLATED A1C: CPT

## 2019-01-08 PROCEDURE — 84100 ASSAY OF PHOSPHORUS: CPT

## 2019-01-08 PROCEDURE — 82306 VITAMIN D 25 HYDROXY: CPT

## 2019-01-08 PROCEDURE — 84443 ASSAY THYROID STIM HORMONE: CPT

## 2019-01-08 PROCEDURE — 36415 COLL VENOUS BLD VENIPUNCTURE: CPT

## 2019-01-08 PROCEDURE — 82043 UR ALBUMIN QUANTITATIVE: CPT

## 2019-01-09 ENCOUNTER — TELEPHONE (OUTPATIENT)
Dept: ENDOCRINOLOGY | Facility: CLINIC | Age: 79
End: 2019-01-09

## 2019-01-09 NOTE — PROGRESS NOTES
Please inform patient that thyroid blood work is normal, continue current dose of levothyroxine  She should follow up for her appt with us, urine protein is elevated  It is important to control blood sugars and follow diet

## 2019-01-09 NOTE — TELEPHONE ENCOUNTER
----- Message from Celestine Fields MD sent at 1/9/2019  8:16 AM EST -----  Please inform patient that thyroid blood work is normal, continue current dose of levothyroxine  She should follow up for her appt with us, urine protein is elevated  It is important to control blood sugars and follow diet

## 2019-01-11 ENCOUNTER — OFFICE VISIT (OUTPATIENT)
Dept: NEPHROLOGY | Facility: CLINIC | Age: 79
End: 2019-01-11
Payer: MEDICARE

## 2019-01-11 VITALS
DIASTOLIC BLOOD PRESSURE: 80 MMHG | HEART RATE: 80 BPM | WEIGHT: 239 LBS | BODY MASS INDEX: 43.98 KG/M2 | SYSTOLIC BLOOD PRESSURE: 142 MMHG | HEIGHT: 62 IN

## 2019-01-11 DIAGNOSIS — E55.9 VITAMIN D DEFICIENCY: ICD-10-CM

## 2019-01-11 DIAGNOSIS — N18.30 CHRONIC KIDNEY DISEASE, STAGE 3 (HCC): Primary | ICD-10-CM

## 2019-01-11 DIAGNOSIS — N18.30 BENIGN HYPERTENSION WITH CHRONIC KIDNEY DISEASE, STAGE III (HCC): ICD-10-CM

## 2019-01-11 DIAGNOSIS — R60.0 BILATERAL LEG EDEMA: ICD-10-CM

## 2019-01-11 DIAGNOSIS — I10 ESSENTIAL HYPERTENSION: ICD-10-CM

## 2019-01-11 DIAGNOSIS — I12.9 BENIGN HYPERTENSION WITH CHRONIC KIDNEY DISEASE, STAGE III (HCC): ICD-10-CM

## 2019-01-11 DIAGNOSIS — R80.9 MICROALBUMINURIA: ICD-10-CM

## 2019-01-11 PROCEDURE — 99214 OFFICE O/P EST MOD 30 MIN: CPT | Performed by: INTERNAL MEDICINE

## 2019-01-11 RX ORDER — LISINOPRIL 20 MG/1
20 TABLET ORAL 2 TIMES DAILY
Qty: 180 TABLET | Refills: 3 | Status: SHIPPED | OUTPATIENT
Start: 2019-01-11 | End: 2019-06-05 | Stop reason: SDUPTHER

## 2019-01-11 NOTE — LETTER
January 11, 2019     Lorene Kennedy MD  [address]    Patient: Joseline Gonzalez   YOB: 1940   Date of Visit: 1/11/2019       Dear Dr Katarzyna Mohan:    Thank you for referring Luciana Torresr to me for evaluation  Below are my notes for this consultation  If you have questions, please do not hesitate to call me  I look forward to following your patient along with you  Sincerely,        So Miranda MD        CC: No Recipients  So Miranda MD  1/11/2019  1:47 PM  Sign at close encounter  Lashay Whitten 70 y o  female MRN: [id number]  DATE: 1/11/2019  Reason for visit:   Chief Complaint   Patient presents with    Follow-up    Chronic Kidney Disease     ASSESSMENT and PLAN:  CK D stage III with baseline serum creatinine 1 2-1 3  - Her serum creatinine 1 2 in  January 2019 overall stable at baseline    - Avoid NSAIDs or nephrotoxins  - CKD likely secondary to long-term hypertensive nephrosclerosis/diabetic nephropathy  - Check BMP, phosphorus, UPCR before next visit     Proteinuria  - last UPC ratio 430 mg in January 2019, urine microalbumin/creatinine ratio 192 mg  repeat UPC ratio before next visit  -increase lisinopril to 40 mg p o  Daily  - Proteinuria likely secondary to long-term diabetes and hypertension  -last hemoglobin A1c 7 9 in January 2019 slightly worsened      Hypertension  -Blood pressure is above goal in the office today  -increase lisinopril as above   -Continue metoprolol and Lasix    currently remains on Lasix 20 mg p o  daily and continue same   - Strongly advised to check blood pressure at home on a regular basis and call back if blood pressure remains persistently greater than 140/90  Advised to bring BP machine during next visit    She has not brought blood pressure machine this visit   -she does not check blood pressure at home   -salt restricted diet  -consider changing metoprolol to carvedilol blood pressure remains elevated  -regular exercise if possible      bilateral lower extremity edema likely secondary to chronic venous stasis  - This is resolved since endovascular laser treatment in left lower extremity  Currently she does not have any pitting edema in both lower extremity  She denies any dyspnea  - Continue low-salt diet   -continue current maintenance dose of Lasix 20 mg p o  Daily    Prior vitamin-D deficiency, last vitamin-D level improved at 46 in January 2019  Will reduce vitamin-D supplement from 4000 units to 2000 units p o  Daily      Anemia in CKD, hemoglobin overall stable at goal   Previous iron saturation 22% in March 2018  Diagnoses and all orders for this visit:    Chronic kidney disease, stage 3 ([facility] )  -     Basic metabolic panel; Future  -     CBC; Future  -     Phosphorus; Future  -     PTH, intact; Future  -     Protein / creatinine ratio, urine; Future  -     Magnesium; Future    Benign hypertension with chronic kidney disease, stage III ([facility])  -     lisinopril (ZESTRIL) 20 mg tablet; Take 1 tablet (20 mg total) by mouth 2 (two) times a day    Microalbuminuria  -     Protein / creatinine ratio, urine; Future    Bilateral leg edema    Vitamin D deficiency    Essential hypertension  -     lisinopril (ZESTRIL) 20 mg tablet; Take 1 tablet (20 mg total) by mouth 2 (two) times a day          SUBJECTIVE / HPI:  Patient is 33-year-old female with significant past medical history of hypertension for at least 40-50 years, and diabetes for 30 years, diabetic retinopathy, stress incontinence, chronic leg swelling with chronic venous stasis, status post endovascular laser treatment in the left lower extremity, chronic kidney disease stage III with baseline serum creatinine 1 2-1 3, comes regular follow up of renal failure  Her last serum creatinine 1 2 overall stable at baseline  She claims to be compliant with her medications    She does not check blood pressure at home despite having blood pressure machine and she has not brought blood pressure machine to the office visit today despite recommending last time  Denies any worsening leg edema  Denies any worsening dyspnea    She has gained weight about 5 lb since last visit which she attributes due to recent holidays  She tries to follow low salt diet  No recent NSAID exposure  Denies lightheadedness or dizziness  Denies any dyspnea  REVIEW OF SYSTEMS:  More than 10 point review of systems were obtained and discussed in length with the patient  Complete review of systems were negative / unremarkable except mentioned above  PHYSICAL EXAM:  Vitals:    01/11/19 1307   BP: 142/80   BP Location: Right arm   Patient Position: Sitting   Cuff Size: Large   Pulse: 80   Weight: 108 kg (239 lb)   Height: 5' 1 5" (1 562 m)     Body mass index is 44 43 kg/m²  Physical Exam   Constitutional: She is oriented to person, place, and time  She appears well-developed and well-nourished  HENT:   Head: Normocephalic and atraumatic  Right Ear: External ear normal    Left Ear: External ear normal    Eyes: Pupils are equal, round, and reactive to light  Conjunctivae and EOM are normal    Neck: Neck supple  No JVD present  Cardiovascular: Normal rate and normal heart sounds  Pulmonary/Chest: Effort normal and breath sounds normal  She has no wheezes  She has no rales  Abdominal: Soft  Bowel sounds are normal  She exhibits no distension  There is no tenderness  Musculoskeletal: She exhibits edema (Nonpitting edema in legs)  She exhibits no tenderness  Neurological: She is alert and oriented to person, place, and time  Skin: Skin is warm and dry  No rash noted  Psychiatric: She has a normal mood and affect  Her behavior is normal    Vitals reviewed        PAST MEDICAL HISTORY:  Past Medical History:   Diagnosis Date    Anxiety     Arthritis     CHF (congestive heart failure) (HCC)     Chronic venous insufficiency     COPD (chronic obstructive pulmonary disease) (HCC)     Coronary artery disease     Diabetes mellitus ([facility] )     type II    Disease of thyroid gland     hypothyroidism    [facility]     Hyperlipidemia     Hypertension     Irregular heart beat     Paroxysmal a-fib    Pacemaker     Renal disorder     stage 3    Thyroid disease     Urinary incontinence     Uterine leiomyoma        PAST SURGICAL HISTORY:  Past Surgical History:   Procedure Laterality Date    BREAST SURGERY      For Biopsy    CARDIAC PACEMAKER PLACEMENT      Permanent  Last assessed: 2/19/16    CARDIAC SURGERY      CARPAL TUNNEL RELEASE Bilateral     CATARACT EXTRACTION      CATARACT EXTRACTION W/ INTRAOCULAR LENS  IMPLANT, BILATERAL      CYSTOSCOPY      Diagnostic  Last assessed: 11/8/17    ECTOPIC PREGNANCY SURGERY      HYSTERECTOMY      Total Abdomonal  Resolved: 1982    JOINT REPLACEMENT Right     TKR    OOPHORECTOMY      VA ENDOVENOUS LASER, 1ST VEIN Left 2/17/2017    Procedure: GREATER SAPHENOUS VEIN ENDOVASCULAR LASER THERAPY ;  Surgeon: Anna Torres DO;  Location: [facility];  Service: Vascular    TONSILLECTOMY         SOCIAL HISTORY:  History   Alcohol Use    Yes     Comment: rarely   Per [employer]: Denied history of alcohol use     History   Drug Use No     History   Smoking Status    Never Smoker   Smokeless Tobacco    Never Used     Comment: Per [employer]: Former smoker       FAMILY HISTORY:  Family History   Problem Relation Age of Onset    Hypertension Father     Diabetes Father         Mellitus    Coronary artery disease Mother     Hypertension Mother     Heart disease Mother     Diabetes Brother         Mellitus    Breast cancer Maternal Aunt     Pancreatic cancer Maternal Aunt     Breast cancer Cousin     Breast cancer Cousin        MEDICATIONS:    Current Outpatient Prescriptions:     acetaminophen (TYLENOL) 325 mg tablet, Take 650 mg by mouth , Disp: , Rfl:     apixaban (ELIQUIS) 5 mg, Take 1 tablet (5 mg total) by mouth 2 (two) times a day, Disp: 180 tablet, Rfl: 3    Ascorbic Acid (VITAMIN C) 1000 MG tablet, Take 1,000 mg by mouth , Disp: , Rfl:     atorvastatin (LIPITOR) 80 mg tablet, TAKE 1 TABLET AT BEDTIME, Disp: 90 tablet, Rfl: 3    cholecalciferol (VITAMIN D3) 1,000 units tablet, Take 2,000 Units by mouth daily , Disp: , Rfl:     citalopram (CeleXA) 20 mg tablet, Take 1 tablet by mouth daily, Disp: , Rfl:     furosemide (LASIX) 20 mg tablet, Take 1 tablet (20 mg total) by mouth daily, Disp: 90 tablet, Rfl: 3    GLUCOSAMINE CHONDROITIN COMPLX PO, Take 1 tablet by mouth 2 (two) times a day, Disp: , Rfl:     glucose blood (TRUE METRIX BLOOD GLUCOSE TEST) test strip, Test 4 times plus daily, as instructed, Disp: 400 each, Rfl: 1    insulin aspart (NOVOLOG FLEXPEN) 100 Units/mL injection pen, 10u with breakfast, 12u with lunch, 10u with dinner, Disp: 15 pen, Rfl: 0    Insulin Glargine (TOUJEO SOLOSTAR) injection pen 300 units/mL, Inject 35 Units under the skin daily at bedtime (Patient taking differently: Inject 40 Units under the skin daily at bedtime  ), Disp: 3 pen, Rfl: 0    levothyroxine 125 mcg tablet, TAKE 1 TABLET EVERY DAY  MONDAY  THROUGH  SATURDAY   TAKE NONE ON SUNDAY, Disp: 78 tablet, Rfl: 1    lisinopril (ZESTRIL) 20 mg tablet, Take 1 tablet (20 mg total) by mouth 2 (two) times a day, Disp: 180 tablet, Rfl: 3    metoprolol tartrate (LOPRESSOR) 25 mg tablet, Take 1 tablet (25 mg total) by mouth every 12 (twelve) hours, Disp: 180 tablet, Rfl: 2    Multiple Vitamins-Minerals (OCUVITE ADULT FORMULA PO), Take by mouth, Disp: , Rfl:     Omega-3 Fatty Acids (FISH OIL) 1200 MG CAPS, Take 1,200 mg by mouth 3 (three) times a day  , Disp: , Rfl:     oxybutynin (DITROPAN) 5 mg tablet, TAKE 1 TABLET TWICE DAILY (Patient taking differently: take half TABLET TWICE DAILY), Disp: 180 tablet, Rfl: 3    promethazine-dextromethorphan (PHENERGAN-DM) 6 25-15 mg/5 mL oral syrup, Take 5 mL by mouth 4 (four) times a day as needed for cough, Disp: 118 mL, Rfl: 1    travoprost (TRAVATAN-Z) 0 004 % ophthalmic solution, Administer 1 drop to both eyes daily at bedtime (Patient not taking: Reported on 12/19/2018 ), Disp: , Rfl: 0    Lab Results:   Results for orders placed or performed in visit on 75/53/97   Basic metabolic panel Lab Collect   Result Value Ref Range    Sodium 140 136 - 145 mmol/L    Potassium 4 1 3 5 - 5 3 mmol/L    Chloride 104 100 - 108 mmol/L    CO2 28 21 - 32 mmol/L    ANION GAP 8 4 - 13 mmol/L    BUN 32 (H) 5 - 25 mg/dL    Creatinine 1 24 0 60 - 1 30 mg/dL    Glucose, Fasting 114 (H) 65 - 99 mg/dL    Calcium 9 3 8 3 - 10 1 mg/dL    eGFR 42 ml/min/1 73sq m   HEMOGLOBIN A1C W/ EAG ESTIMATION Lab Collect   Result Value Ref Range    Hemoglobin A1C 7 9 (H) 4 2 - 6 3 %     mg/dl   PTH, intact Lab Collect Lab Collect   Result Value Ref Range    PTH 72 3 18 4 - 80 1 pg/mL   Microalbumin / creatinine urine ratio Lab Collect   Result Value Ref Range    Creatinine, Ur 140 0 mg/dL    Microalbum  ,U,Random 269 0 (H) 0 0 - 20 0 mg/L    Microalb Creat Ratio 192 (H) 0 - 30 mg/g creatinine   T4, free Lab Collect   Result Value Ref Range    Free T4 1 06 0 76 - 1 46 ng/dL   TSH, 3rd generation Lab Collect   Result Value Ref Range    TSH 3RD GENERATON 2 410 0 358 - 3 740 uIU/mL   Phosphorus   Result Value Ref Range    Phosphorus 4 1 2 3 - 4 1 mg/dL   Protein / creatinine ratio, urine   Result Value Ref Range    Creatinine, Ur 138 0 mg/dL    Protein Urine Random 60 mg/dL    Prot/Creat Ratio, Ur 0 43 (H) 0 00 - 0 10   Magnesium   Result Value Ref Range    Magnesium 1 9 1 6 - 2 6 mg/dL   CBC   Result Value Ref Range    WBC 7 20 4  31 - 10 16 Thousand/uL    RBC 4 00 3 81 - 5 12 Million/uL    Hemoglobin 12 2 11 5 - 15 4 g/dL    Hematocrit 40 4 34 8 - 46 1 %     (H) 82 - 98 fL    MCH 30 5 26 8 - 34 3 pg    MCHC 30 2 (L) 31 4 - 37 4 g/dL    RDW 12 6 11 6 - 15 1 %    Platelets 032 (L) 165 - 390 Thousands/uL    MPV 11 1 8 9 - 12 7 fL   Vitamin D 25 hydroxy Lab Collect   Result Value Ref Range    Vit D, 25-Hydroxy 46 0 30 0 - 100 0 ng/mL

## 2019-01-11 NOTE — PROGRESS NOTES
NEPHROLOGY OUTPATIENT PROGRESS NOTE   Lynne Kelley 70 y o  female MRN: [id number]  DATE: 1/11/2019  Reason for visit:   Chief Complaint   Patient presents with    Follow-up    Chronic Kidney Disease     ASSESSMENT and PLAN:  CK D stage III with baseline serum creatinine 1 2-1 3  - Her serum creatinine 1 2 in January 2019 overall stable at baseline    - Avoid NSAIDs or nephrotoxins  - CKD likely secondary to long-term hypertensive nephrosclerosis/diabetic nephropathy  - Check BMP, phosphorus, UPCR before next visit     Proteinuria  - last UPC ratio 430 mg in January 2019, urine microalbumin/creatinine ratio 192 mg  repeat UPC ratio before next visit  -increase lisinopril to 40 mg p o  Daily  - Proteinuria likely secondary to long-term diabetes and hypertension  -last hemoglobin A1c 7 9 in January 2019 slightly worsened      Hypertension  -Blood pressure is above goal in the office today  -increase lisinopril as above   -Continue metoprolol and Lasix    currently remains on Lasix 20 mg p o  daily and continue same   - Strongly advised to check blood pressure at home on a regular basis and call back if blood pressure remains persistently greater than 140/90  Advised to bring BP machine during next visit  She has not brought blood pressure machine this visit   -she does not check blood pressure at home   -salt restricted diet  -consider changing metoprolol to carvedilol blood pressure remains elevated  -regular exercise if possible      bilateral lower extremity edema likely secondary to chronic venous stasis  - This is resolved since endovascular laser treatment in left lower extremity  Currently she does not have any pitting edema in both lower extremity  She denies any dyspnea  - Continue low-salt diet   -continue current maintenance dose of Lasix 20 mg p o  Daily    Prior vitamin-D deficiency, last vitamin-D level improved at 46 in January 2019    Will reduce vitamin-D supplement from 4000 units to 2000 units p o  Daily      Anemia in CKD, hemoglobin overall stable at goal   Previous iron saturation 22% in March 2018  Diagnoses and all orders for this visit:    Chronic kidney disease, stage 3 ([address] )  -     Basic metabolic panel; Future  -     CBC; Future  -     Phosphorus; Future  -     PTH, intact; Future  -     Protein / creatinine ratio, urine; Future  -     Magnesium; Future    Benign hypertension with chronic kidney disease, stage III ([facility])  -     lisinopril (ZESTRIL) 20 mg tablet; Take 1 tablet (20 mg total) by mouth 2 (two) times a day    Microalbuminuria  -     Protein / creatinine ratio, urine; Future    Bilateral leg edema    Vitamin D deficiency    Essential hypertension  -     lisinopril (ZESTRIL) 20 mg tablet; Take 1 tablet (20 mg total) by mouth 2 (two) times a day          SUBJECTIVE / HPI:  Patient is 65-year-old female with significant past medical history of hypertension for at least 40-50 years, and diabetes for 30 years, diabetic retinopathy, stress incontinence, chronic leg swelling with chronic venous stasis, status post endovascular laser treatment in the left lower extremity, chronic kidney disease stage III with baseline serum creatinine 1 2-1 3, comes regular follow up of renal failure  Her last serum creatinine 1 2 overall stable at baseline  She claims to be compliant with her medications  She does not check blood pressure at home despite having blood pressure machine and she has not brought blood pressure machine to the office visit today despite recommending last time  Denies any worsening leg edema  Denies any worsening dyspnea    She has gained weight about 5 lb since last visit which she attributes due to recent holidays  She tries to follow low salt diet  No recent NSAID exposure  Denies lightheadedness or dizziness  Denies any dyspnea  REVIEW OF SYSTEMS:  More than 10 point review of systems were obtained and discussed in length with the patient   Complete review of systems were negative / unremarkable except mentioned above  PHYSICAL EXAM:  Vitals:    01/11/19 1307   BP: 142/80   BP Location: Right arm   Patient Position: Sitting   Cuff Size: Large   Pulse: 80   Weight: 108 kg (239 lb)   Height: 5' 1 5" (1 562 m)     Body mass index is 44 43 kg/m²  Physical Exam   Constitutional: She is oriented to person, place, and time  She appears well-developed and well-nourished  HENT:   Head: Normocephalic and atraumatic  Right Ear: External ear normal    Left Ear: External ear normal    Eyes: Pupils are equal, round, and reactive to light  Conjunctivae and EOM are normal    Neck: Neck supple  No JVD present  Cardiovascular: Normal rate and normal heart sounds  Pulmonary/Chest: Effort normal and breath sounds normal  She has no wheezes  She has no rales  Abdominal: Soft  Bowel sounds are normal  She exhibits no distension  There is no tenderness  Musculoskeletal: She exhibits edema (Nonpitting edema in legs)  She exhibits no tenderness  Neurological: She is alert and oriented to person, place, and time  Skin: Skin is warm and dry  No rash noted  Psychiatric: She has a normal mood and affect  Her behavior is normal    Vitals reviewed        PAST MEDICAL HISTORY:  Past Medical History:   Diagnosis Date    Anxiety     Arthritis     [facility]congestive [facility] failure[facility]     Chronic venous insufficiency     [facility]     Coronary artery disease     Diabetes mellitus ([facility] )     type II    Disease of thyroid gland     hypothyroidism    DVT (deep venous thrombosis) ([facility])     Hyperlipidemia     Hypertension     Irregular heart beat     Paroxysmal a-fib    Pacemaker     Renal disorder     stage 3    Thyroid disease     Urinary incontinence     Uterine leiomyoma        PAST SURGICAL HISTORY:  Past Surgical History:   Procedure Laterality Date    BREAST SURGERY      For Biopsy    CARDIAC PACEMAKER PLACEMENT Permanent  Last assessed: 2/19/16    CARDIAC SURGERY      CARPAL TUNNEL RELEASE Bilateral     CATARACT EXTRACTION      CATARACT EXTRACTION W/ INTRAOCULAR LENS  IMPLANT, BILATERAL      CYSTOSCOPY      Diagnostic  Last assessed: 11/8/17    ECTOPIC PREGNANCY SURGERY      HYSTERECTOMY      Total Abdomonal  Resolved: 1982    JOINT REPLACEMENT Right     TKR    OOPHORECTOMY      AK ENDOVENOUS LASER, 1ST VEIN Left 2/17/2017    Procedure: GREATER SAPHENOUS VEIN ENDOVASCULAR LASER THERAPY ;  Surgeon: Vanesa Capellan DO;  Location: [facility];  Service: Vascular    TONSILLECTOMY         SOCIAL HISTORY:  History   Alcohol Use    Yes     Comment: rarely   Per [employer]: Denied history of alcohol use     History   Drug Use No     History   Smoking Status    Never Smoker   Smokeless Tobacco    Never Used     Comment: Per [employer]: Former smoker       FAMILY HISTORY:  Family History   Problem Relation Age of Onset    Hypertension Father     Diabetes Father         Mellitus    Coronary artery disease Mother     Hypertension Mother     Heart disease Mother     Diabetes Brother         Mellitus    Breast cancer Maternal Aunt     Pancreatic cancer Maternal Aunt     Breast cancer Cousin     Breast cancer Cousin        MEDICATIONS:    Current Outpatient Prescriptions:     acetaminophen (TYLENOL) 325 mg tablet, Take 650 mg by mouth , Disp: , Rfl:     apixaban (ELIQUIS) 5 mg, Take 1 tablet (5 mg total) by mouth 2 (two) times a day, Disp: 180 tablet, Rfl: 3    Ascorbic Acid (VITAMIN C) 1000 MG tablet, Take 1,000 mg by mouth , Disp: , Rfl:     atorvastatin (LIPITOR) 80 mg tablet, TAKE 1 TABLET AT BEDTIME, Disp: 90 tablet, Rfl: 3    cholecalciferol (VITAMIN D3) 1,000 units tablet, Take 2,000 Units by mouth daily , Disp: , Rfl:     citalopram (CeleXA) 20 mg tablet, Take 1 tablet by mouth daily, Disp: , Rfl:     furosemide (LASIX) 20 mg tablet, Take 1 tablet (20 mg total) by mouth daily, Disp: 90 tablet, Rfl: 3    GLUCOSAMINE CHONDROITIN COMPLX PO, Take 1 tablet by mouth 2 (two) times a day, Disp: , Rfl:     glucose blood (TRUE METRIX BLOOD GLUCOSE TEST) test strip, Test 4 times plus daily, as instructed, Disp: 400 each, Rfl: 1    insulin aspart (NOVOLOG FLEXPEN) 100 Units/mL injection pen, 10u with breakfast, 12u with lunch, 10u with dinner, Disp: 15 pen, Rfl: 0    Insulin Glargine (TOUJEO SOLOSTAR) injection pen 300 units/mL, Inject 35 Units under the skin daily at bedtime (Patient taking differently: Inject 40 Units under the skin daily at bedtime  ), Disp: 3 pen, Rfl: 0    levothyroxine 125 mcg tablet, TAKE 1 TABLET EVERY DAY  MONDAY  THROUGH  SATURDAY   TAKE NONE ON SUNDAY, Disp: 78 tablet, Rfl: 1    lisinopril (ZESTRIL) 20 mg tablet, Take 1 tablet (20 mg total) by mouth 2 (two) times a day, Disp: 180 tablet, Rfl: 3    metoprolol tartrate (LOPRESSOR) 25 mg tablet, Take 1 tablet (25 mg total) by mouth every 12 (twelve) hours, Disp: 180 tablet, Rfl: 2    Multiple Vitamins-Minerals (OCUVITE ADULT FORMULA PO), Take by mouth, Disp: , Rfl:     Omega-3 Fatty Acids (FISH OIL) 1200 MG CAPS, Take 1,200 mg by mouth 3 (three) times a day  , Disp: , Rfl:     oxybutynin (DITROPAN) 5 mg tablet, TAKE 1 TABLET TWICE DAILY (Patient taking differently: take half TABLET TWICE DAILY), Disp: 180 tablet, Rfl: 3    promethazine-dextromethorphan (PHENERGAN-DM) 6 25-15 mg/5 mL oral syrup, Take 5 mL by mouth 4 (four) times a day as needed for cough, Disp: 118 mL, Rfl: 1    travoprost (TRAVATAN-Z) 0 004 % ophthalmic solution, Administer 1 drop to both eyes daily at bedtime (Patient not taking: Reported on 12/19/2018 ), Disp: , Rfl: 0    Lab Results:   Results for orders placed or performed in visit on 99/22/56   Basic metabolic panel Lab Collect   Result Value Ref Range    Sodium 140 136 - 145 mmol/L    Potassium 4 1 3 5 - 5 3 mmol/L    Chloride 104 100 - 108 mmol/L    CO2 28 21 - 32 mmol/L    ANION GAP 8 4 - 13 mmol/L    BUN 32 (H) 5 - 25 mg/dL    Creatinine 1 24 0 60 - 1 30 mg/dL    Glucose, Fasting 114 (H) 65 - 99 mg/dL    Calcium 9 3 8 3 - 10 1 mg/dL    eGFR 42 ml/min/1 73sq m   HEMOGLOBIN A1C W/ EAG ESTIMATION Lab Collect   Result Value Ref Range    Hemoglobin A1C 7 9 (H) 4 2 - 6 3 %     mg/dl   PTH, intact Lab Collect Lab Collect   Result Value Ref Range    PTH 72 3 18 4 - 80 1 pg/mL   Microalbumin / creatinine urine ratio Lab Collect   Result Value Ref Range    Creatinine, Ur 140 0 mg/dL    Microalbum  ,U,Random 269 0 (H) 0 0 - 20 0 mg/L    Microalb Creat Ratio 192 (H) 0 - 30 mg/g creatinine   T4, free Lab Collect   Result Value Ref Range    Free T4 1 06 0 76 - 1 46 ng/dL   TSH, 3rd generation Lab Collect   Result Value Ref Range    TSH 3RD GENERATON 2 410 0 358 - 3 740 uIU/mL   Phosphorus   Result Value Ref Range    Phosphorus 4 1 2 3 - 4 1 mg/dL   Protein / creatinine ratio, urine   Result Value Ref Range    Creatinine, Ur 138 0 mg/dL    Protein Urine Random 60 mg/dL    Prot/Creat Ratio, Ur 0 43 (H) 0 00 - 0 10   Magnesium   Result Value Ref Range    Magnesium 1 9 1 6 - 2 6 mg/dL   CBC   Result Value Ref Range    WBC 7 20 4  31 - 10 16 Thousand/uL    RBC 4 00 3 81 - 5 12 Million/uL    Hemoglobin 12 2 11 5 - 15 4 g/dL    Hematocrit 40 4 34 8 - 46 1 %     (H) 82 - 98 fL    MCH 30 5 26 8 - 34 3 pg    MCHC 30 2 (L) 31 4 - 37 4 g/dL    RDW 12 6 11 6 - 15 1 %    Platelets 880 (L) 264 - 390 Thousands/uL    MPV 11 1 8 9 - 12 7 fL   Vitamin D 25 hydroxy Lab Collect   Result Value Ref Range    Vit D, 25-Hydroxy 46 0 30 0 - 100 0 ng/mL

## 2019-01-14 DIAGNOSIS — I10 ESSENTIAL HYPERTENSION: ICD-10-CM

## 2019-01-17 DIAGNOSIS — I10 ESSENTIAL HYPERTENSION: ICD-10-CM

## 2019-01-17 RX ORDER — METOPROLOL TARTRATE 50 MG/1
25 TABLET, FILM COATED ORAL EVERY 12 HOURS SCHEDULED
Qty: 90 TABLET | Refills: 3 | Status: SHIPPED | OUTPATIENT
Start: 2019-01-17 | End: 2020-01-14

## 2019-02-08 DIAGNOSIS — R05.9 COUGH: ICD-10-CM

## 2019-02-08 RX ORDER — DEXTROMETHORPHAN HYDROBROMIDE AND PROMETHAZINE HYDROCHLORIDE 15; 6.25 MG/5ML; MG/5ML
5 SYRUP ORAL 4 TIMES DAILY PRN
Qty: 118 ML | Refills: 1 | Status: SHIPPED | OUTPATIENT
Start: 2019-02-08 | End: 2019-03-29 | Stop reason: SDUPTHER

## 2019-02-22 ENCOUNTER — TELEPHONE (OUTPATIENT)
Dept: ENDOCRINOLOGY | Facility: CLINIC | Age: 79
End: 2019-02-22

## 2019-02-22 NOTE — TELEPHONE ENCOUNTER
Patient stopped in this afternoon stating she will be running out of her insulin  Sunday and would like a sample to hold her over until her next appt which is 3/5/19 with Erik Gao  I asked if she would like us to call in a script to the pharmacy but patient would like to talk about price of insulin  Advised her to call insurance to find out if there is a cheaper alternative for what she is talking  Can we dispense sample on Monday of Novolog and Toujeo  ?

## 2019-02-25 NOTE — TELEPHONE ENCOUNTER
Yes, we can give her samples, she should find out what is the the alternative   Thanks    Chino Montoya MD

## 2019-02-26 ENCOUNTER — DOCUMENTATION (OUTPATIENT)
Dept: ENDOCRINOLOGY | Facility: CLINIC | Age: 79
End: 2019-02-26

## 2019-03-05 ENCOUNTER — OFFICE VISIT (OUTPATIENT)
Dept: ENDOCRINOLOGY | Facility: CLINIC | Age: 79
End: 2019-03-05
Payer: MEDICARE

## 2019-03-05 VITALS
SYSTOLIC BLOOD PRESSURE: 138 MMHG | WEIGHT: 238 LBS | BODY MASS INDEX: 44.93 KG/M2 | HEIGHT: 61 IN | DIASTOLIC BLOOD PRESSURE: 78 MMHG

## 2019-03-05 DIAGNOSIS — E55.9 VITAMIN D DEFICIENCY: ICD-10-CM

## 2019-03-05 DIAGNOSIS — E11.65 TYPE 2 DIABETES MELLITUS WITH HYPERGLYCEMIA, WITH LONG-TERM CURRENT USE OF INSULIN (HCC): Primary | ICD-10-CM

## 2019-03-05 DIAGNOSIS — E11.3393 MODERATE NONPROLIFERATIVE DIABETIC RETINOPATHY OF BOTH EYES ASSOCIATED WITH TYPE 2 DIABETES MELLITUS, MACULAR EDEMA PRESENCE UNSPECIFIED (HCC): ICD-10-CM

## 2019-03-05 DIAGNOSIS — Z79.4 TYPE 2 DIABETES MELLITUS WITH HYPERGLYCEMIA, WITH LONG-TERM CURRENT USE OF INSULIN (HCC): Primary | ICD-10-CM

## 2019-03-05 DIAGNOSIS — E03.9 ACQUIRED HYPOTHYROIDISM: ICD-10-CM

## 2019-03-05 DIAGNOSIS — E78.2 MIXED HYPERLIPIDEMIA: ICD-10-CM

## 2019-03-05 PROCEDURE — 99214 OFFICE O/P EST MOD 30 MIN: CPT | Performed by: PHYSICIAN ASSISTANT

## 2019-03-05 RX ORDER — BLOOD SUGAR DIAGNOSTIC
STRIP MISCELLANEOUS
Qty: 400 EACH | Refills: 1 | Status: SHIPPED | OUTPATIENT
Start: 2019-03-05 | End: 2022-02-17 | Stop reason: SDUPTHER

## 2019-03-05 NOTE — PROGRESS NOTES
Patient Progress Note      CC: DM2, hypothyroidism      Referring Provider  No referring provider defined for this encounter  History of Present Illness:   Jonita Homans is a 70 y o  female with a history of type 2 diabetes with long term use of insulin  Diabetes course has been stable  Complications of DM:  Retinopathy, CAD, nephropathy  Denies recent illness or hospitalizations  Denies recent severe hypoglycemic or severe hyperglycemic episodes  Denies any issues with her current regimen  Home glucose monitoring: are performed regularly    Home blood glucose readings:   Before breakfast:  mg/dl  Before lunch:  mg/dl  Before dinner: 109-305 mg/dl  Bedtime:  mg/dl    Current regimen: Toujeo 40 units QHS, NovoLog 10-12-10 units TID with meals plus scale  compliant most of the time, denies any side effects from medications  Injects in: abdomen, thighs  Rotates sites: Yes  Hypoglycemic episodes: Yes, infrequent  H/o of hypoglycemia causing hospitalization or Intervention such as glucagon injection  or ambulance call :  No  Hypoglycemia symptoms: jitteriness  Treatment of hypoglycemia: orange juice    Medic alert tag: recommended: Yes    Diabetes education:  2017  Diet: 2-3 meals per day, 1 snack per day  Timing of meals is predictable  Diabetic diet compliance:  noncompliant some of the time  Activity: Daily activity is predictable: Yes  Is active around the house  Further diabetic ROS: no polyuria or polydipsia, no chest pain, dyspnea or TIAs, no numbness, tingling or pain in extremities        Ophthamology: sees routinely; retinopathy  Podiatry: sees routinely    Has hypertension: on lisinopril, metoprolol, furosemide, compliant most of the time  Has hyperlipidemia: on atorvastatin- tolerating well, no myalgias  compliant most of the time, denies any side effects from medications  Thyroid disorders:  Hypothyroidism    Patient is taking levothyroxine 125 mcg Monday through Saturday except no tablet on Sunday  Patient is taking medication on an empty stomach 1 hour before breakfast at least 4 hours apart from supplements  Patient is tolerating medication well  Most recent thyroid blood work within normal range, TSH 2 410 and free T4 1 06    History of pancreatitis: No    Vitamin-D deficiency:  Patient taking vitamin D3 2000 International Units daily    Patient Active Problem List   Diagnosis    NSTEMI (non-ST elevated myocardial infarction) ([facility] )    Type 2 diabetes mellitus with hyperglycemia ([place] [address] )    Morbid obesity with body mass index (BMI) of 40 0 to 44 9 in adult [facility])    Complete heart block (HCC)    Anemia    S/P cardiac pacemaker procedure    Hypothyroidism    Chronic diastolic congestive heart failure (HCC)    Chronic venous insufficiency    Dvt femoral [facility]deep venous thrombosis) [facility]    Benign hypertension with chronic kidney disease, stage III (HCC)    Chronic kidney disease, stage 3 (HCC)    Iron deficiency anemia    Microalbuminuria    Bilateral leg edema    Vitamin D deficiency    Anxiety disorder    Urinary bladder incontinence    Chronic obstructive pulmonary disease ([facility])    Eczematous dermatitis    Mixed hyperlipidemia    OAB (overactive bladder)    Presence of permanent cardiac pacemaker    PAF (paroxysmal atrial fibrillation) ([facility])    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 neurological manifestations, uncontrolled(250 62)    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 ([facility])    Intraocular pressure increase      Past Medical History:   Diagnosis Date    Anxiety     Arthritis     CHF (congestive heart failure) ([facility])     Chronic venous insufficiency     COPD (chronic obstructive pulmonary disease) ([facility] )     Coronary artery disease     Diabetes mellitus ([place] [address] )     type II    Disease of thyroid gland     hypothyroidism    DVT (deep venous thrombosis) ([facility] )     Hyperlipidemia     Hypertension     Irregular heart beat     Paroxysmal a-fib    Pacemaker     Renal disorder     stage 3    Thyroid disease     Urinary incontinence     Uterine leiomyoma       Past Surgical History:   Procedure Laterality Date    BREAST SURGERY      For Biopsy    CARDIAC PACEMAKER PLACEMENT      Permanent  Last assessed: 2/19/16    CARDIAC SURGERY      CARPAL TUNNEL RELEASE Bilateral     CATARACT EXTRACTION      CATARACT EXTRACTION W/ INTRAOCULAR LENS  IMPLANT, BILATERAL      CYSTOSCOPY      Diagnostic  Last assessed: 11/8/17    ECTOPIC PREGNANCY SURGERY      HYSTERECTOMY      Total Abdomonal  Resolved: 1982    JOINT REPLACEMENT Right     TKR    OOPHORECTOMY      CO ENDOVENOUS LASER, 1ST VEIN Left 2/17/2017    Procedure: GREATER SAPHENOUS VEIN ENDOVASCULAR LASER THERAPY ;  Surgeon: Carmelina Pepe DO;  [facility];  Service: Vascular    TONSILLECTOMY        Family History   Problem Relation Age of Onset    Hypertension Father     Diabetes Father         Mellitus    Coronary artery disease Mother     Hypertension Mother     Heart disease Mother     Diabetes Brother         Mellitus    Breast cancer Maternal Aunt     Pancreatic cancer Maternal Aunt     Breast cancer Cousin     Breast cancer Cousin      Social History     Tobacco Use    Smoking status: Never Smoker    Smokeless tobacco: Never Used    Tobacco comment: Per [employer]: Former smoker   Substance Use Topics    Alcohol use: Yes     Comment: rarely   Per [employer]: Denied history of alcohol use     Allergies   Allergen Reactions    Ampicillin Hives         Current Outpatient Medications:     acetaminophen (TYLENOL) 325 mg tablet, Take 650 mg by mouth , Disp: , Rfl:     Ascorbic Acid (VITAMIN C) 1000 MG tablet, Take 1,000 mg by mouth , Disp: , Rfl:     atorvastatin (LIPITOR) 80 mg tablet, TAKE 1 TABLET AT BEDTIME, Disp: 90 tablet, Rfl: 3    cholecalciferol (VITAMIN D3) 1,000 units tablet, Take 2,000 Units by mouth daily , Disp: , Rfl:     citalopram (CeleXA) 20 mg tablet, Take 1 tablet by mouth daily, Disp: , Rfl:     furosemide (LASIX) 20 mg tablet, Take 1 tablet (20 mg total) by mouth daily, Disp: 90 tablet, Rfl: 3    GLUCOSAMINE CHONDROITIN COMPLX PO, Take 1 tablet by mouth 2 (two) times a day, Disp: , Rfl:     glucose blood (TRUE METRIX BLOOD GLUCOSE TEST) test strip, Test 4 times plus daily, as instructed, Disp: 400 each, Rfl: 1    insulin aspart (NOVOLOG FLEXPEN) 100 Units/mL injection pen, 10u with breakfast, 12u with lunch, 10u with dinner, Disp: 15 pen, Rfl: 0    Insulin Glargine (TOUJEO SOLOSTAR) 300 units/mL CONCETRATED U-300 injection pen, Inject 40 Units under the skin daily at bedtime, Disp: 9 pen, Rfl: 0    levothyroxine 125 mcg tablet, TAKE 1 TABLET EVERY DAY  MONDAY  THROUGH  SATURDAY   TAKE NONE ON SUNDAY, Disp: 78 tablet, Rfl: 1    lisinopril (ZESTRIL) 20 mg tablet, Take 1 tablet (20 mg total) by mouth 2 (two) times a day, Disp: 180 tablet, Rfl: 3    metoprolol tartrate (LOPRESSOR) 50 mg tablet, Take 0 5 tablets (25 mg total) by mouth every 12 (twelve) hours, Disp: 90 tablet, Rfl: 3    Multiple Vitamins-Minerals (OCUVITE ADULT FORMULA PO), Take by mouth, Disp: , Rfl:     Omega-3 Fatty Acids (FISH OIL) 1200 MG CAPS, Take 1,200 mg by mouth 3 (three) times a day  , Disp: , Rfl:     oxybutynin (DITROPAN) 5 mg tablet, TAKE 1 TABLET TWICE DAILY (Patient taking differently: take half TABLET TWICE DAILY), Disp: 180 tablet, Rfl: 3    promethazine-dextromethorphan (PHENERGAN-DM) 6 25-15 mg/5 mL oral syrup, Take 5 mL by mouth 4 (four) times a day as needed for cough, Disp: 118 mL, Rfl: 1    travoprost (TRAVATAN-Z) 0 004 % ophthalmic solution, Administer 1 drop to both eyes daily at bedtime, Disp: , Rfl: 0    apixaban (ELIQUIS) 5 mg, Take 1 tablet (5 mg total) by mouth 2 (two) times a day, Disp: 180 tablet, Rfl: 3    Insulin Pen Needle (PEN NEEDLES) 32G X 6 MM MISC, Use 4 times a day, Disp: 400 each, Rfl: 1  Review of Systems   Constitutional: Negative for activity change, appetite change, fatigue and unexpected weight change  HENT: Negative for trouble swallowing  Eyes: Positive for visual disturbance  Respiratory: Negative for shortness of breath  Cardiovascular: Negative for chest pain and palpitations  Gastrointestinal: Negative for constipation and diarrhea  Endocrine: Positive for cold intolerance  Negative for heat intolerance, polydipsia and polyuria  Musculoskeletal: Positive for arthralgias  Skin: Negative for wound  Neurological: Negative for numbness  Psychiatric/Behavioral: Negative  Physical Exam:  Body mass index is 44 97 kg/m²  /78   Ht 5' 1" (1 549 m)   Wt 108 kg (238 lb)   BMI 44 97 kg/m²    Wt Readings from Last 3 Encounters:   03/05/19 108 kg (238 lb)   01/11/19 108 kg (239 lb)   01/04/19 107 kg (235 lb)       Physical Exam   Constitutional: She appears well-developed and well-nourished  HENT:   Head: Normocephalic  Eyes: Pupils are equal, round, and reactive to light  EOM are normal  No scleral icterus  Neck: Neck supple  No thyromegaly present  Cardiovascular: Normal rate and regular rhythm  No murmur heard  Pulses:       Radial pulses are 2+ on the right side, and 2+ on the left side  Pulmonary/Chest: Effort normal and breath sounds normal  No respiratory distress  She has no wheezes  Neurological: She is alert  She has normal reflexes  Skin: Skin is warm and dry  Psychiatric: She has a normal mood and affect  Nursing note and vitals reviewed  Diabetic Foot Exam    Labs:      Ref   Range 1/8/2019 12:41   Sodium Latest Ref Range: 136 - 145 mmol/L 140   Potassium Latest Ref Range: 3 5 - 5 3 mmol/L 4 1   Chloride Latest Ref Range: 100 - 108 mmol/L 104   CO2 Latest Ref Range: 21 - 32 mmol/L 28   Anion Gap Latest Ref Range: 4 - 13 mmol/L 8   BUN Latest Ref Range: 5 - 25 mg/dL 32 (H)   Creatinine Latest Ref Range: 0 60 - 1 30 mg/dL 1 24   GLUCOSE FASTING Latest Ref Range: 65 - 99 mg/dL 114 (H)   Calcium Latest Ref Range: 8 3 - 10 1 mg/dL 9 3   eGFR Latest Units: ml/min/1 73sq m 42   Phosphorus Latest Ref Range: 2 3 - 4 1 mg/dL 4 1   Magnesium Latest Ref Range: 1 6 - 2 6 mg/dL 1 9   Vit D, 25-Hydroxy Latest Ref Range: 30 0 - 100 0 ng/mL 46 0      Ref  Range 7/18/2017 09:01   Cholesterol Latest Ref Range: 50 - 200 mg/dL 134   Triglycerides Latest Ref Range: <=150 mg/dL 87   HDL Latest Ref Range: 40 - 60 mg/dL 57   LDL Direct Latest Ref Range: 0 - 100 mg/dL 60      Ref  Range 3/27/2018 10:48 7/10/2018 09:08 1/8/2019 12:41   Hemoglobin A1C Latest Ref Range: 4 2 - 6 3 % 7 5 (H) 7 1 (H) 7 9 (H)   EAG Latest Units: mg/dl 169 157 180      Ref  Range 3/27/2018 10:48 1/8/2019 12:41   TSH 3RD GENERATON Latest Ref Range: 0 358 - 3 740 uIU/mL 2 650 2 410   Free T4 Latest Ref Range: 0 76 - 1 46 ng/dL 1 13 1 06      Ref  Range 1/8/2019 12:41   EXT Creatinine Urine Latest Units: mg/dL 140 0   MICROALBUMIN/CREATININE RATIO Latest Ref Range: 0 - 30 mg/g creatinine 192 (H)   MICROALBUM ,U,RANDOM Latest Ref Range: 0 0 - 20 0 mg/L 269 0 (H)     Plan:    Diagnoses and all orders for this visit:    Type 2 diabetes mellitus with hyperglycemia, with long-term current use of insulin ([facility])  HGA1C 7 9%  Worsened  Morning blood sugars are well controlled however blood sugars are variable at mealtimes  Treatment regimen:  Discussed variability in blood glucose at mealtimes could be due to inconsistent carb intake  For now continue current treatment and keep carbohydrates consistent to limit blood glucose fluctuations  Discussed intensive insulin regimen does increase risk for hypoglycemia  Episodes of hypoglycemia can lead to permanent disability and death  Discussed risks/complications associated with uncontrolled diabetes  Advised to adhere to diabetic diet, and recommended staying active/exercising routinely as tolerated     Advised to call if blood sugars less than 70 mg/dl or over 300 mg/dl  Check blood glucose 4 times a day  Discussed symptoms and treatment of hypoglycemia  Recommended routine follow-up with podiatry and ophthalmology  Send log in 1-2 weeks  Ordered blood work to complete prior to next visit  -     Hemoglobin A1C; Future  -     Basic metabolic panel; Future  -     Insulin Pen Needle (PEN NEEDLES) 32G X 6 MM MISC; Use 4 times a day  -     Insulin Glargine (TOUJEO SOLOSTAR) 300 units/mL CONCETRATED U-300 injection pen; Inject 40 Units under the skin daily at bedtime    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, macular edema presence unspecified ([facility] )  Continue follow-up with ophthalmology    Acquired hypothyroidism  Thyroid function tests within normal range, TSH 2 410 and free T4 1 06  Continue current dose of levothyroxine  Repeat blood work prior to next visit  -     T4, free; Future  -     TSH, 3rd generation; Future    Vitamin D deficiency  Vitamin-D normal range at 46  Continue current supplementation of vitamin D3    Mixed hyperlipidemia  LDL previously 60, at goal  Continue statin therapy  Recheck lipid panel  -     Lipid panel; Future      Discussed with the patient diagnosis and treatment and all questions fully answered  She will call me if any problems arise  Counseled patient on diagnostic results, prognosis, risk and benefit of treatment options, instruction for management, importance of treatment compliance, risk factor reduction and impressions        Vicki Guevara PA-C

## 2019-03-06 ENCOUNTER — DOCUMENTATION (OUTPATIENT)
Dept: ENDOCRINOLOGY | Facility: CLINIC | Age: 79
End: 2019-03-06

## 2019-03-07 NOTE — PATIENT INSTRUCTIONS
Hypoglycemia instructions   Lizzie TOURE Maria Dolores  3/7/2019  [id number]    Low Blood Sugar    Steps to treat low blood sugar  1  Test blood sugar if you have symptoms of low blood sugar:   Low Blood Sugar Symptoms:  o Sweaty  o Dizzy  o Rapid heartbeat  o Shaky  o Bad mood  o Hungry      2  Treat blood sugar less than 70 with 15 grams of fast-acting carbohydrate:   Examples of 15 grams Fast-Acting Carbohydrate:  o 4 oz juice  o 4 oz regular soda  o 3-4 glucose tablets (chew)  o 3-4 hard candies (chew)          3  Wait 15 minutes and test your blood sugar again     4   If blood sugar is less than 100, repeat steps 2-3     5  When your blood sugar is 100 or more, eat a snack if it will be longer than one hour until your next meal  The snack should be 15 grams of carbohydrate and a protein:   Examples of snacks:  o ½ sandwich  o 6 crackers with cheese  o Piece of fruit with cheese or peanut butter  o 6 crackers with peanut butter

## 2019-03-11 ENCOUNTER — TELEPHONE (OUTPATIENT)
Dept: CARDIOLOGY CLINIC | Facility: CLINIC | Age: 79
End: 2019-03-11

## 2019-03-11 NOTE — TELEPHONE ENCOUNTER
Pt called stating she cannot afford elilquis  Pt states she used a program through the , which she only paid $30  Pt states when she tried to refill her script she was told the  program was discontinued and she has to pay $600  Pt states she only has 4 days left of pills  Informed pt I will check with you(russel) to see if there was any programs

## 2019-03-12 DIAGNOSIS — I48.91 ATRIAL FIBRILLATION, UNSPECIFIED TYPE (HCC): ICD-10-CM

## 2019-03-12 NOTE — TELEPHONE ENCOUNTER
S/W pt  Advised   Pt states she will come in this week for samples and will wait for assistance application

## 2019-03-12 NOTE — TELEPHONE ENCOUNTER
M-L for patient to call for sample she could  to stop in whatever  office is close to her home  Also application is mailed to her home for her 2019 assistance program

## 2019-03-13 DIAGNOSIS — F41.9 ANXIETY: Primary | ICD-10-CM

## 2019-03-13 RX ORDER — CITALOPRAM 20 MG/1
TABLET ORAL
Qty: 90 TABLET | Refills: 3 | Status: SHIPPED | OUTPATIENT
Start: 2019-03-13 | End: 2020-02-17

## 2019-03-29 DIAGNOSIS — R05.9 COUGH: ICD-10-CM

## 2019-03-29 RX ORDER — DEXTROMETHORPHAN HYDROBROMIDE AND PROMETHAZINE HYDROCHLORIDE 15; 6.25 MG/5ML; MG/5ML
5 SYRUP ORAL 4 TIMES DAILY PRN
Qty: 180 ML | Refills: 1 | Status: SHIPPED | OUTPATIENT
Start: 2019-03-29 | End: 2019-04-02 | Stop reason: SDUPTHER

## 2019-04-02 DIAGNOSIS — R05.9 COUGH: ICD-10-CM

## 2019-04-02 RX ORDER — DEXTROMETHORPHAN HYDROBROMIDE AND PROMETHAZINE HYDROCHLORIDE 15; 6.25 MG/5ML; MG/5ML
5 SYRUP ORAL 4 TIMES DAILY PRN
Qty: 180 ML | Refills: 1 | Status: SHIPPED | OUTPATIENT
Start: 2019-04-02 | End: 2019-06-17

## 2019-04-10 ENCOUNTER — REMOTE DEVICE CLINIC VISIT (OUTPATIENT)
Dept: CARDIOLOGY CLINIC | Facility: CLINIC | Age: 79
End: 2019-04-10
Payer: MEDICARE

## 2019-04-10 DIAGNOSIS — I44.2 AV BLOCK, COMPLETE (HCC): Primary | ICD-10-CM

## 2019-04-10 DIAGNOSIS — I48.0 PAROXYSMAL ATRIAL FIBRILLATION (HCC): ICD-10-CM

## 2019-04-10 DIAGNOSIS — Z95.0 CARDIAC PACEMAKER: ICD-10-CM

## 2019-04-10 PROCEDURE — 93296 REM INTERROG EVL PM/IDS: CPT | Performed by: INTERNAL MEDICINE

## 2019-04-10 PROCEDURE — 93294 REM INTERROG EVL PM/LDLS PM: CPT | Performed by: INTERNAL MEDICINE

## 2019-04-11 ENCOUNTER — TELEPHONE (OUTPATIENT)
Dept: ENDOCRINOLOGY | Facility: CLINIC | Age: 79
End: 2019-04-11

## 2019-04-12 ENCOUNTER — TELEPHONE (OUTPATIENT)
Dept: ENDOCRINOLOGY | Facility: CLINIC | Age: 79
End: 2019-04-12

## 2019-04-12 DIAGNOSIS — E11.65 TYPE 2 DIABETES MELLITUS WITH HYPERGLYCEMIA, WITH LONG-TERM CURRENT USE OF INSULIN (HCC): Primary | ICD-10-CM

## 2019-04-12 DIAGNOSIS — Z79.4 TYPE 2 DIABETES MELLITUS WITH HYPERGLYCEMIA, WITH LONG-TERM CURRENT USE OF INSULIN (HCC): Primary | ICD-10-CM

## 2019-04-18 ENCOUNTER — TELEPHONE (OUTPATIENT)
Dept: CARDIOLOGY CLINIC | Facility: CLINIC | Age: 79
End: 2019-04-18

## 2019-04-22 NOTE — PATIENT INSTRUCTIONS
-- Message is from the [facility]--    Reason for Call: Would need a referral for [employer]  to receive a breast screening. Please have it faxed over thank you. Please return call once it's done     Caller Information       Type Contact Phone    04/22/2019 08:36 AM Phone (Incoming) Kathy De La Cruz ([facility]) [phone] (H)          Alternative phone number:     Turnaround time given to caller:   \"This message will be sent to [state Provider's name]. The clinical team will fulfill your request as soon as they review your message.\"     Due to blood sugars at dinner time being on the higher and we will increase her NovoLog at lunch time from 10 units to 12 units  Blood wok in 3 months  Continue sliding scale      INSULIN DOSAGE INSTRUCTIONS      Name: Sam Nascimento                        : 1940  MRN #: [id number]    Your Current Insulin  and dose is: Before Breakfast Before Lunch Before Evening Meal Bedtime     Novolog Insulin   8   12 8    Regular, Apidra, Humalog orNovolog Sliding Scale:   <80              151-200 + 1 +1 1    201-250 +2 +2 +2 +   251-300 +3 +3 +3 +   301-350 +4 +4 +4 +   >350 +5 +5 +5 +     tojeo    35 units                     Additional Instructions:   Please test your blood sugar:  _4_ Times per day  X_ Before Breakfast                _ Alternate Testing  X_ Before Lunch                _ 2 Hours After  Meal  X_ Before Evening Meal               _ 3 a m   x_ Before Bedtime Snack     Target Blood sugar range _70_to _140__  Call if your blood sugar is less than _60_ or greater than _400__      Today's Date: 4/3/2018

## 2019-04-24 ENCOUNTER — TELEPHONE (OUTPATIENT)
Dept: ENDOCRINOLOGY | Facility: CLINIC | Age: 79
End: 2019-04-24

## 2019-04-24 ENCOUNTER — DOCUMENTATION (OUTPATIENT)
Dept: ENDOCRINOLOGY | Facility: CLINIC | Age: 79
End: 2019-04-24

## 2019-04-29 LAB
LEFT EYE DIABETIC RETINOPATHY: NORMAL
RIGHT EYE DIABETIC RETINOPATHY: NORMAL

## 2019-05-11 DIAGNOSIS — E78.00 HYPERCHOLESTEROLEMIA: ICD-10-CM

## 2019-05-12 RX ORDER — ATORVASTATIN CALCIUM 80 MG/1
TABLET, FILM COATED ORAL
Qty: 90 TABLET | Refills: 3 | Status: SHIPPED | OUTPATIENT
Start: 2019-05-12 | End: 2020-03-30

## 2019-05-15 ENCOUNTER — TELEPHONE (OUTPATIENT)
Dept: FAMILY MEDICINE CLINIC | Facility: CLINIC | Age: 79
End: 2019-05-15

## 2019-05-15 PROCEDURE — 3045F PR MOST RECENT HEMOGLOBIN A1C LEVEL 7.0-9.0%: CPT | Performed by: FAMILY MEDICINE

## 2019-05-15 PROCEDURE — 3066F NEPHROPATHY DOC TX: CPT | Performed by: FAMILY MEDICINE

## 2019-05-15 PROCEDURE — 3060F POS MICROALBUMINURIA REV: CPT | Performed by: FAMILY MEDICINE

## 2019-05-17 PROBLEM — G47.30 SLEEP APNEA: Status: ACTIVE | Noted: 2019-05-17

## 2019-05-17 NOTE — ASSESSMENT & PLAN NOTE
Pt compliant with CPAP  Uses nightly  Needs new parts - mask/tubing worn  Referred back to her med-supply company   Recheck 6m

## 2019-06-05 DIAGNOSIS — I12.9 BENIGN HYPERTENSION WITH CHRONIC KIDNEY DISEASE, STAGE III (HCC): ICD-10-CM

## 2019-06-05 DIAGNOSIS — N18.30 BENIGN HYPERTENSION WITH CHRONIC KIDNEY DISEASE, STAGE III (HCC): ICD-10-CM

## 2019-06-05 DIAGNOSIS — E03.9 HYPOTHYROIDISM, UNSPECIFIED TYPE: ICD-10-CM

## 2019-06-05 DIAGNOSIS — I10 ESSENTIAL HYPERTENSION: ICD-10-CM

## 2019-06-05 RX ORDER — LEVOTHYROXINE SODIUM 0.12 MG/1
125 TABLET ORAL DAILY
Qty: 90 TABLET | Refills: 1 | Status: SHIPPED | OUTPATIENT
Start: 2019-06-05 | End: 2019-10-22 | Stop reason: SDUPTHER

## 2019-06-05 RX ORDER — LISINOPRIL 20 MG/1
20 TABLET ORAL 2 TIMES DAILY
Qty: 180 TABLET | Refills: 3 | Status: SHIPPED | OUTPATIENT
Start: 2019-06-05 | End: 2020-03-04 | Stop reason: SDUPTHER

## 2019-06-12 ENCOUNTER — DOCUMENTATION (OUTPATIENT)
Dept: ENDOCRINOLOGY | Facility: CLINIC | Age: 79
End: 2019-06-12

## 2019-06-17 DIAGNOSIS — R05.9 COUGH: ICD-10-CM

## 2019-06-17 RX ORDER — DEXTROMETHORPHAN HYDROBROMIDE AND PROMETHAZINE HYDROCHLORIDE 15; 6.25 MG/5ML; MG/5ML
5 SYRUP ORAL 4 TIMES DAILY PRN
Qty: 180 ML | Refills: 3 | Status: SHIPPED | OUTPATIENT
Start: 2019-06-17 | End: 2020-07-27 | Stop reason: SDUPTHER

## 2019-06-18 ENCOUNTER — IN-CLINIC DEVICE VISIT (OUTPATIENT)
Dept: CARDIOLOGY CLINIC | Facility: CLINIC | Age: 79
End: 2019-06-18
Payer: MEDICARE

## 2019-06-18 ENCOUNTER — LAB (OUTPATIENT)
Dept: LAB | Facility: CLINIC | Age: 79
End: 2019-06-18
Payer: MEDICARE

## 2019-06-18 ENCOUNTER — TRANSCRIBE ORDERS (OUTPATIENT)
Dept: LAB | Facility: CLINIC | Age: 79
End: 2019-06-18

## 2019-06-18 DIAGNOSIS — N18.30 CHRONIC KIDNEY DISEASE, STAGE 3 (HCC): ICD-10-CM

## 2019-06-18 DIAGNOSIS — Z79.4 TYPE 2 DIABETES MELLITUS WITH HYPERGLYCEMIA, WITH LONG-TERM CURRENT USE OF INSULIN (HCC): ICD-10-CM

## 2019-06-18 DIAGNOSIS — E03.9 ACQUIRED HYPOTHYROIDISM: ICD-10-CM

## 2019-06-18 DIAGNOSIS — Z95.0 CARDIAC PACEMAKER IN SITU: Primary | ICD-10-CM

## 2019-06-18 DIAGNOSIS — R80.9 MICROALBUMINURIA: ICD-10-CM

## 2019-06-18 DIAGNOSIS — E78.2 MIXED HYPERLIPIDEMIA: ICD-10-CM

## 2019-06-18 DIAGNOSIS — E11.65 TYPE 2 DIABETES MELLITUS WITH HYPERGLYCEMIA, WITH LONG-TERM CURRENT USE OF INSULIN (HCC): ICD-10-CM

## 2019-06-18 LAB
ANION GAP SERPL CALCULATED.3IONS-SCNC: 4 MMOL/L (ref 4–13)
BUN SERPL-MCNC: 37 MG/DL (ref 5–25)
CALCIUM SERPL-MCNC: 9.3 MG/DL (ref 8.3–10.1)
CHLORIDE SERPL-SCNC: 104 MMOL/L (ref 100–108)
CHOLEST SERPL-MCNC: 138 MG/DL (ref 50–200)
CO2 SERPL-SCNC: 29 MMOL/L (ref 21–32)
CREAT SERPL-MCNC: 1.32 MG/DL (ref 0.6–1.3)
CREAT UR-MCNC: 94.5 MG/DL
ERYTHROCYTE [DISTWIDTH] IN BLOOD BY AUTOMATED COUNT: 12.9 % (ref 11.6–15.1)
EST. AVERAGE GLUCOSE BLD GHB EST-MCNC: 151 MG/DL
GFR SERPL CREATININE-BSD FRML MDRD: 39 ML/MIN/1.73SQ M
GLUCOSE P FAST SERPL-MCNC: 103 MG/DL (ref 65–99)
HBA1C MFR BLD: 6.9 % (ref 4.2–6.3)
HCT VFR BLD AUTO: 40.7 % (ref 34.8–46.1)
HDLC SERPL-MCNC: 60 MG/DL (ref 40–60)
HGB BLD-MCNC: 12.6 G/DL (ref 11.5–15.4)
LDLC SERPL CALC-MCNC: 65 MG/DL (ref 0–100)
MAGNESIUM SERPL-MCNC: 2.2 MG/DL (ref 1.6–2.6)
MCH RBC QN AUTO: 30.4 PG (ref 26.8–34.3)
MCHC RBC AUTO-ENTMCNC: 31 G/DL (ref 31.4–37.4)
MCV RBC AUTO: 98 FL (ref 82–98)
NONHDLC SERPL-MCNC: 78 MG/DL
PHOSPHATE SERPL-MCNC: 3.6 MG/DL (ref 2.3–4.1)
PLATELET # BLD AUTO: 161 THOUSANDS/UL (ref 149–390)
PMV BLD AUTO: 10.8 FL (ref 8.9–12.7)
POTASSIUM SERPL-SCNC: 4.4 MMOL/L (ref 3.5–5.3)
PROT UR-MCNC: 38 MG/DL
PROT/CREAT UR: 0.4 MG/G{CREAT} (ref 0–0.1)
PTH-INTACT SERPL-MCNC: 78.9 PG/ML (ref 18.4–80.1)
RBC # BLD AUTO: 4.15 MILLION/UL (ref 3.81–5.12)
SODIUM SERPL-SCNC: 137 MMOL/L (ref 136–145)
T4 FREE SERPL-MCNC: 1.23 NG/DL (ref 0.76–1.46)
TRIGL SERPL-MCNC: 66 MG/DL
TSH SERPL DL<=0.05 MIU/L-ACNC: 3.85 UIU/ML (ref 0.36–3.74)
WBC # BLD AUTO: 7.81 THOUSAND/UL (ref 4.31–10.16)

## 2019-06-18 PROCEDURE — 82570 ASSAY OF URINE CREATININE: CPT

## 2019-06-18 PROCEDURE — 36415 COLL VENOUS BLD VENIPUNCTURE: CPT

## 2019-06-18 PROCEDURE — 80048 BASIC METABOLIC PNL TOTAL CA: CPT

## 2019-06-18 PROCEDURE — 85027 COMPLETE CBC AUTOMATED: CPT

## 2019-06-18 PROCEDURE — 93280 PM DEVICE PROGR EVAL DUAL: CPT | Performed by: INTERNAL MEDICINE

## 2019-06-18 PROCEDURE — 84443 ASSAY THYROID STIM HORMONE: CPT

## 2019-06-18 PROCEDURE — 83036 HEMOGLOBIN GLYCOSYLATED A1C: CPT

## 2019-06-18 PROCEDURE — 80061 LIPID PANEL: CPT

## 2019-06-18 PROCEDURE — 84439 ASSAY OF FREE THYROXINE: CPT

## 2019-06-18 PROCEDURE — 83970 ASSAY OF PARATHORMONE: CPT

## 2019-06-18 PROCEDURE — 83735 ASSAY OF MAGNESIUM: CPT

## 2019-06-18 PROCEDURE — 84156 ASSAY OF PROTEIN URINE: CPT

## 2019-06-18 PROCEDURE — 84100 ASSAY OF PHOSPHORUS: CPT

## 2019-06-19 ENCOUNTER — OFFICE VISIT (OUTPATIENT)
Dept: ENDOCRINOLOGY | Facility: CLINIC | Age: 79
End: 2019-06-19
Payer: MEDICARE

## 2019-06-19 ENCOUNTER — DOCUMENTATION (OUTPATIENT)
Dept: ENDOCRINOLOGY | Facility: CLINIC | Age: 79
End: 2019-06-19

## 2019-06-19 VITALS
HEIGHT: 61 IN | WEIGHT: 237 LBS | BODY MASS INDEX: 44.75 KG/M2 | SYSTOLIC BLOOD PRESSURE: 138 MMHG | DIASTOLIC BLOOD PRESSURE: 88 MMHG

## 2019-06-19 DIAGNOSIS — Z79.4 TYPE 2 DIABETES MELLITUS WITH HYPERGLYCEMIA, WITH LONG-TERM CURRENT USE OF INSULIN (HCC): Primary | ICD-10-CM

## 2019-06-19 DIAGNOSIS — Z79.4 TYPE 2 DIABETES MELLITUS WITH STAGE 3 CHRONIC KIDNEY DISEASE, WITH LONG-TERM CURRENT USE OF INSULIN (HCC): ICD-10-CM

## 2019-06-19 DIAGNOSIS — E78.2 MIXED HYPERLIPIDEMIA: ICD-10-CM

## 2019-06-19 DIAGNOSIS — E03.9 ACQUIRED HYPOTHYROIDISM: Chronic | ICD-10-CM

## 2019-06-19 DIAGNOSIS — E11.65 TYPE 2 DIABETES MELLITUS WITH HYPERGLYCEMIA, WITH LONG-TERM CURRENT USE OF INSULIN (HCC): Primary | ICD-10-CM

## 2019-06-19 DIAGNOSIS — N18.30 TYPE 2 DIABETES MELLITUS WITH STAGE 3 CHRONIC KIDNEY DISEASE, WITH LONG-TERM CURRENT USE OF INSULIN (HCC): ICD-10-CM

## 2019-06-19 DIAGNOSIS — E55.9 VITAMIN D DEFICIENCY: ICD-10-CM

## 2019-06-19 DIAGNOSIS — E66.01 MORBID OBESITY WITH BODY MASS INDEX (BMI) OF 40.0 TO 44.9 IN ADULT (HCC): ICD-10-CM

## 2019-06-19 DIAGNOSIS — E11.22 TYPE 2 DIABETES MELLITUS WITH STAGE 3 CHRONIC KIDNEY DISEASE, WITH LONG-TERM CURRENT USE OF INSULIN (HCC): ICD-10-CM

## 2019-06-19 PROCEDURE — 99214 OFFICE O/P EST MOD 30 MIN: CPT | Performed by: INTERNAL MEDICINE

## 2019-06-24 ENCOUNTER — OFFICE VISIT (OUTPATIENT)
Dept: CARDIOLOGY CLINIC | Facility: CLINIC | Age: 79
End: 2019-06-24
Payer: MEDICARE

## 2019-06-24 VITALS
HEART RATE: 82 BPM | SYSTOLIC BLOOD PRESSURE: 114 MMHG | DIASTOLIC BLOOD PRESSURE: 64 MMHG | OXYGEN SATURATION: 94 % | WEIGHT: 238 LBS | HEIGHT: 61 IN | BODY MASS INDEX: 44.93 KG/M2

## 2019-06-24 DIAGNOSIS — I44.2 COMPLETE HEART BLOCK (HCC): Primary | ICD-10-CM

## 2019-06-24 DIAGNOSIS — K21.9 GASTROESOPHAGEAL REFLUX DISEASE WITHOUT ESOPHAGITIS: ICD-10-CM

## 2019-06-24 PROCEDURE — 99214 OFFICE O/P EST MOD 30 MIN: CPT | Performed by: INTERNAL MEDICINE

## 2019-06-24 PROCEDURE — 93000 ELECTROCARDIOGRAM COMPLETE: CPT | Performed by: INTERNAL MEDICINE

## 2019-06-24 RX ORDER — OMEPRAZOLE 20 MG/1
20 CAPSULE, DELAYED RELEASE ORAL DAILY
Qty: 30 CAPSULE | Refills: 2 | Status: SHIPPED | OUTPATIENT
Start: 2019-06-24 | End: 2020-01-14

## 2019-07-02 DIAGNOSIS — N32.81 OAB (OVERACTIVE BLADDER): ICD-10-CM

## 2019-07-02 RX ORDER — OXYBUTYNIN CHLORIDE 5 MG/1
5 TABLET ORAL 2 TIMES DAILY
Qty: 180 TABLET | Refills: 3 | Status: CANCELLED | OUTPATIENT
Start: 2019-07-02

## 2019-07-08 DIAGNOSIS — N32.81 OAB (OVERACTIVE BLADDER): ICD-10-CM

## 2019-07-08 RX ORDER — OXYBUTYNIN CHLORIDE 5 MG/1
5 TABLET ORAL 2 TIMES DAILY
Qty: 180 TABLET | Refills: 3
Start: 2019-07-08 | End: 2019-07-12 | Stop reason: SDUPTHER

## 2019-07-09 ENCOUNTER — OFFICE VISIT (OUTPATIENT)
Dept: FAMILY MEDICINE CLINIC | Facility: CLINIC | Age: 79
End: 2019-07-09
Payer: MEDICARE

## 2019-07-09 VITALS
SYSTOLIC BLOOD PRESSURE: 120 MMHG | BODY MASS INDEX: 45.99 KG/M2 | HEART RATE: 80 BPM | TEMPERATURE: 97.9 F | DIASTOLIC BLOOD PRESSURE: 76 MMHG | WEIGHT: 243.6 LBS | HEIGHT: 61 IN

## 2019-07-09 DIAGNOSIS — N18.30 BENIGN HYPERTENSION WITH CHRONIC KIDNEY DISEASE, STAGE III (HCC): ICD-10-CM

## 2019-07-09 DIAGNOSIS — Z79.4 TYPE 2 DIABETES MELLITUS WITH HYPERGLYCEMIA, WITH LONG-TERM CURRENT USE OF INSULIN (HCC): Primary | ICD-10-CM

## 2019-07-09 DIAGNOSIS — E11.65 TYPE 2 DIABETES MELLITUS WITH HYPERGLYCEMIA, WITH LONG-TERM CURRENT USE OF INSULIN (HCC): Primary | ICD-10-CM

## 2019-07-09 DIAGNOSIS — I12.9 BENIGN HYPERTENSION WITH CHRONIC KIDNEY DISEASE, STAGE III (HCC): ICD-10-CM

## 2019-07-09 DIAGNOSIS — E11.3393 MODERATE NONPROLIFERATIVE DIABETIC RETINOPATHY OF BOTH EYES ASSOCIATED WITH TYPE 2 DIABETES MELLITUS, MACULAR EDEMA PRESENCE UNSPECIFIED (HCC): ICD-10-CM

## 2019-07-09 DIAGNOSIS — Z12.11 SCREEN FOR COLON CANCER: ICD-10-CM

## 2019-07-09 DIAGNOSIS — E03.9 HYPOTHYROIDISM, UNSPECIFIED TYPE: Chronic | ICD-10-CM

## 2019-07-09 DIAGNOSIS — N32.81 OAB (OVERACTIVE BLADDER): ICD-10-CM

## 2019-07-09 DIAGNOSIS — G47.33 OBSTRUCTIVE SLEEP APNEA SYNDROME: ICD-10-CM

## 2019-07-09 DIAGNOSIS — I48.0 PAF (PAROXYSMAL ATRIAL FIBRILLATION) (HCC): ICD-10-CM

## 2019-07-09 PROCEDURE — 99214 OFFICE O/P EST MOD 30 MIN: CPT | Performed by: FAMILY MEDICINE

## 2019-07-09 NOTE — PROGRESS NOTES
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 ([address] )  Lab Results   Component Value Date    HGBA1C 6 9 (H) 06/18/2019     Up to date with ophth  BGs improved  Cont present care  F/u with ophth    Type 2 diabetes mellitus with hyperglycemia (HCC)  Lab Results   Component Value Date    HGBA1C 6 9 (H) 06/18/2019     Improved control  Cont present meds  Cont to monitor  Recheck 6m    Sleep apnea  Pt compliant with CPAP  Cont present care  Recheck 6m    Benign hypertension with chronic kidney disease, stage III  Creat stable  Cont present meds  Monitor microalbumen  Recheck 6m    PAF (paroxysmal atrial fibrillation) ([facility])  Rate controlled  Pt has been asymptomatic  Cont f/u with Cardio  Recheck 6m    OAB (overactive bladder)  Improved with ditropan  Pt would like to try to increase the dose a little  Dose increased to 5mg qd  She will f/u with ophth re; monitoring eye pressures  Recheck 6m       Diagnoses and all orders for this visit:    Type 2 diabetes mellitus with hyperglycemia, with long-term current use of insulin ([facility] [address] )    Obstructive sleep apnea syndrome    Benign hypertension with chronic kidney disease, stage III (HCC)    PAF (paroxysmal atrial fibrillation) (HCC)    OAB (overactive bladder)    Screen for colon cancer  -     Cologuard; Future    Hypothyroidism, unspecified type    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)          Subjective:      Patient ID: Preston Patiño is a 70 y o  female  f/u multiple med issues   - pt with increased R neck pain with radiation to R posterior scalp over the last 2 month  Some cracking in her neck  No radiation to R arm  No change in vision  - pt with some bitter taste issues over the last few months   PT was place on omeprazole by  Reji (Cardio) - pt notes symptoms have improved, as did her cough  - some persistent L mid/upper back pain, just below the scapula  Pain is intermittent and improved with "aspercream"  - continues to f/u with ophth for retinopathy  Followed by Dr Diania Nageotte  Pt missed her last injection  Able to read print better  - pt denies CP, palpitations, lightheadedness or other CV symptoms with or without exertion  Seen by Cardio a few weeks ago  - no GI complaints  Does not want any further colonoscopies but may be willing to do a stool study  - OAB symptoms better with oxybutinin   - BGs have been stable  Continues to work on diet  Weight has gone up but BGs have improved  Recent A1C  = 6 9  Does not exercise much due to vision issues and fear of falling  - no other complaints        The following portions of the patient's history were reviewed and updated as appropriate: She  has a past medical history of Anxiety, Arthritis, CHF (congestive heart failure) ([facility] ), Chronic venous insufficiency, COPD (chronic obstructive pulmonary disease) ([facility] ), Coronary artery disease, Diabetes mellitus ([facility] ), Disease of thyroid gland, DVT (deep venous thrombosis) ([facility] ), Hyperlipidemia, Hypertension, Irregular heart beat, Pacemaker, Renal disorder, Thyroid disease, Urinary incontinence, and Uterine leiomyoma    She   Patient Active Problem List    Diagnosis Date Noted    Sleep apnea 05/17/2019    Intraocular pressure increase 06/06/2018    Vitamin D deficiency 04/03/2018    Bilateral leg edema 03/13/2018    Iron deficiency anemia 10/12/2017    Urinary bladder incontinence 07/26/2017    OAB (overactive bladder) 07/26/2017    Benign hypertension with chronic kidney disease, stage III ([address] ) 06/21/2017    Microalbuminuria 06/21/2017    PAF (paroxysmal atrial fibrillation) ([facility] ) 10/31/2016    Chronic venous insufficiency 02/19/2016    Dvt femoral (deep venous thrombosis) ([facility] ) 02/19/2016    Presence of permanent cardiac pacemaker 01/28/2016    Chronic diastolic congestive heart failure ([facility] ) 01/21/2016    Anemia 01/19/2016    S/P cardiac pacemaker procedure 01/19/2016    Hypothyroidism 01/19/2016    Complete heart block ([place] [address] ) 01/16/2016    NSTEMI (non-ST elevated myocardial infarction) ([place] [address] ) 01/15/2016    Type 2 diabetes mellitus with hyperglycemia ([place] [address] ) 01/15/2016    Morbid obesity with body mass index (BMI) of 40 0 to 44 9 in adult [facility]) 01/15/2016    Chronic kidney disease, stage 3 ([place] [address] ) 10/21/2015    Eczematous dermatitis 03/03/2015    Anxiety disorder 06/26/2013    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 neurological manifestations, uncontrolled([address]) 10/02/2012    Chronic obstructive pulmonary disease ([place] [address] ) 09/24/2012    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 ([place] [address] ) 09/22/2011    Mixed hyperlipidemia 03/14/2011     She  has a past surgical history that includes Hysterectomy; Joint replacement (Right); Tonsillectomy; Cataract extraction w/ intraocular lens  implant, bilateral; Ectopic pregnancy surgery; Cardiac surgery; Cardiac pacemaker placement; Carpal tunnel release (Bilateral); pr endovenous laser, 1st vein (Left, 2/17/2017); Cystoscopy; Cataract extraction; Breast surgery; and Oophorectomy  She  reports that she has never smoked  She has never used smokeless tobacco  She reports that she drinks alcohol  She reports that she does not use drugs  Current Outpatient Medications   Medication Sig Dispense Refill    acetaminophen (TYLENOL) 325 mg tablet Take 650 mg by mouth   apixaban (ELIQUIS) 5 mg Take 1 tablet (5 mg total) by mouth 2 (two) times a day 180 tablet 3    Ascorbic Acid (VITAMIN C) 1000 MG tablet Take 1,000 mg by mouth        atorvastatin (LIPITOR) 80 mg tablet TAKE 1 TABLET AT BEDTIME 90 tablet 3    cholecalciferol (VITAMIN D3) 1,000 units tablet Take 2,000 Units by mouth daily       citalopram (CeleXA) 20 mg tablet TAKE 1 TABLET EVERY DAY 90 tablet 3    furosemide (LASIX) 20 mg tablet Take 1 tablet (20 mg total) by mouth daily 90 tablet 3    GLUCOSAMINE CHONDROITIN COMPLX PO Take 1 tablet by mouth 2 (two) times a day      glucose blood (TRUE METRIX BLOOD GLUCOSE TEST) test strip Test 4 times plus daily, as instructed 400 each 1    insulin aspart (NOVOLOG FLEXPEN) 100 Units/mL injection pen 10u with breakfast, 12u with lunch, 10u with dinner 15 pen 0    Insulin Glargine (TOUJEO SOLOSTAR) 300 units/mL CONCETRATED U-300 injection pen Inject 40 Units under the skin daily at bedtime 9 pen 0    Insulin Pen Needle (PEN NEEDLES) 32G X 6 MM MISC Use 4 times a day 400 each 1    levothyroxine 125 mcg tablet Take 1 tablet (125 mcg total) by mouth daily (Patient taking differently: Take 125 mcg by mouth daily Take Monday-Saturday, none on sunday) 90 tablet 1    lisinopril (ZESTRIL) 20 mg tablet Take 1 tablet (20 mg total) by mouth 2 (two) times a day 180 tablet 3    metoprolol tartrate (LOPRESSOR) 50 mg tablet Take 0 5 tablets (25 mg total) by mouth every 12 (twelve) hours 90 tablet 3    Omega-3 Fatty Acids (FISH OIL) 1200 MG CAPS Take 1,200 mg by mouth 3 (three) times a day        omeprazole (PriLOSEC) 20 mg delayed release capsule Take 1 capsule (20 mg total) by mouth daily 30 capsule 2    oxybutynin (DITROPAN) 5 mg tablet Take 1 tablet (5 mg total) by mouth 2 (two) times a day 180 tablet 3    promethazine-dextromethorphan (PHENERGAN-DM) 6 25-15 mg/5 mL oral syrup Take 5 mL by mouth 4 (four) times a day as needed for cough 180 mL 3    travoprost (TRAVATAN-Z) 0 004 % ophthalmic solution Administer 1 drop to both eyes daily at bedtime  0     No current facility-administered medications for this visit  She is allergic to ampicillin       Review of Systems   Constitutional: Negative for activity change, chills, diaphoresis, fatigue and fever  HENT: Negative  Eyes: Positive for visual disturbance   Negative for pain, discharge and itching  Respiratory: Negative  Cardiovascular: Negative  Gastrointestinal: Negative  Endocrine: Negative  Genitourinary: Negative for difficulty urinating, dysuria, flank pain, frequency, hematuria and urgency  Musculoskeletal: Positive for arthralgias, gait problem and neck pain  Negative for joint swelling, myalgias and neck stiffness  Skin: Negative  Allergic/Immunologic: Negative  Neurological: Positive for headaches  Negative for dizziness, weakness, light-headedness and numbness  Hematological: Negative  Psychiatric/Behavioral: Negative  Negative for dysphoric mood and suicidal ideas  The patient is not nervous/anxious  Objective:      /76 (BP Location: Right arm, Patient Position: Sitting, Cuff Size: Standard)   Pulse 80   Temp 97 9 °F (36 6 °C)   Ht 5' 1" (1 549 m)   Wt 110 kg (243 lb 9 6 oz)   BMI 46 03 kg/m²          Physical Exam   Constitutional: She is oriented to person, place, and time  She appears well-developed and well-nourished  HENT:   Head: Normocephalic and atraumatic  Right Ear: External ear normal    Left Ear: External ear normal    Nose: Nose normal    Mouth/Throat: Oropharynx is clear and moist  No oropharyngeal exudate  Eyes: Pupils are equal, round, and reactive to light  Conjunctivae and EOM are normal    Neck: Normal range of motion  Neck supple  No JVD present  No thyromegaly present  Sl TTP over the R posterior paracervical area  No spasm appreciated  Cardiovascular: Normal rate and intact distal pulses  An irregularly irregular rhythm present  Exam reveals no gallop  Pulses are no weak pulses  Pulses:       Dorsalis pedis pulses are 2+ on the right side, and 2+ on the left side  Pulmonary/Chest: Effort normal and breath sounds normal  She has no wheezes  She has no rales  Abdominal: Soft  She exhibits no distension and no mass  There is no tenderness     Musculoskeletal: She exhibits edema (trace edema)  Feet:   Right Foot:   Skin Integrity: Negative for ulcer, skin breakdown, erythema, warmth, callus or dry skin  Left Foot:   Skin Integrity: Negative for ulcer, skin breakdown, erythema, warmth, callus or dry skin  Lymphadenopathy:     She has no cervical adenopathy  Neurological: She is alert and oriented to person, place, and time  She has normal reflexes  She exhibits normal muscle tone  Coordination normal    Skin: Skin is warm and dry  She is not diaphoretic  Psychiatric: She has a normal mood and affect  PHQ-2=0   Vitals reviewed  Patient's shoes and socks removed  Right Foot/Ankle   Right Foot Inspection  Skin Exam: skin normal and skin intact no dry skin, no warmth, no callus, no erythema, no maceration, no abnormal color, no pre-ulcer, no ulcer and no callus                          Toe Exam: ROM and strength within normal limits  Sensory   Vibration: intact    Monofilament testing: intact  Vascular  Capillary refills: < 3 seconds  The right DP pulse is 2+  Left Foot/Ankle  Left Foot Inspection  Skin Exam: skin normal and skin intactno dry skin, no warmth, no erythema, no maceration, normal color, no pre-ulcer, no ulcer and no callus                         Toe Exam: ROM and strength within normal limits                   Sensory   Vibration: intact    Monofilament: intact  Vascular  Capillary refills: < 3 seconds  The left DP pulse is 2+  Assign Risk Category:  No deformity present; No loss of protective sensation;  No weak pulses       Risk: 0

## 2019-07-11 ENCOUNTER — OFFICE VISIT (OUTPATIENT)
Dept: NEPHROLOGY | Facility: CLINIC | Age: 79
End: 2019-07-11
Payer: MEDICARE

## 2019-07-11 VITALS
HEIGHT: 61 IN | DIASTOLIC BLOOD PRESSURE: 76 MMHG | WEIGHT: 242.2 LBS | BODY MASS INDEX: 45.73 KG/M2 | HEART RATE: 78 BPM | SYSTOLIC BLOOD PRESSURE: 138 MMHG

## 2019-07-11 DIAGNOSIS — N18.30 BENIGN HYPERTENSION WITH CHRONIC KIDNEY DISEASE, STAGE III (HCC): ICD-10-CM

## 2019-07-11 DIAGNOSIS — N18.30 CHRONIC KIDNEY DISEASE, STAGE 3 (HCC): Primary | ICD-10-CM

## 2019-07-11 DIAGNOSIS — N25.81 SECONDARY HYPERPARATHYROIDISM OF RENAL ORIGIN (HCC): ICD-10-CM

## 2019-07-11 DIAGNOSIS — R60.0 BILATERAL LEG EDEMA: ICD-10-CM

## 2019-07-11 DIAGNOSIS — I12.9 BENIGN HYPERTENSION WITH CHRONIC KIDNEY DISEASE, STAGE III (HCC): ICD-10-CM

## 2019-07-11 DIAGNOSIS — R80.1 PERSISTENT PROTEINURIA: ICD-10-CM

## 2019-07-11 PROCEDURE — 99214 OFFICE O/P EST MOD 30 MIN: CPT | Performed by: INTERNAL MEDICINE

## 2019-07-11 NOTE — PROGRESS NOTES
NEPHROLOGY OUTPATIENT PROGRESS NOTE   Preston Patiño 70 y o  female MRN: [id number]  DATE: 7/11/2019  Reason for visit:   Chief Complaint   Patient presents with    Chronic Kidney Disease     ASSESSMENT and PLAN:  CK D stage III with baseline serum creatinine 1 2-1 3  - Her serum creatinine 1 3 in June 2019 overall stable at baseline    - Avoid NSAIDs or nephrotoxins  - CKD likely secondary to long-term hypertensive nephrosclerosis/diabetic nephropathy  - Check BMP, phosphorus, UPCR before next visit     Proteinuria  - last UPC ratio  400 mg in June 2019  repeat UPC ratio before next visit    -continue lisinopril to 40 mg p o  Daily  - Proteinuria likely secondary to long-term diabetes and hypertension  -last hemoglobin A1c improving to 6 9 in June 2019      Hypertension  -Blood pressure is slightly above goal in the office today   -continue current lisinopril, metoprolol, Lasix  Patient does not check blood pressure at home  - Strongly advised to check blood pressure at home on a regular basis and call back if blood pressure remains persistently greater than 140/90  Advised to bring BP machine during next visit  She has not brought blood pressure machine this visit despite recommending last time   -salt restricted diet  -consider changing metoprolol to carvedilol blood pressure remains elevated although her blood pressure was better controlled couple days ago at PCP office and will not change regimen today   -regular exercise if possible      bilateral lower extremity edema likely secondary to chronic venous stasis  - This has resolved since endovascular laser treatment in left lower extremity  She denies any dyspnea  - Continue low-salt diet   -continue current maintenance dose of Lasix 20 mg p o  Daily     Prior vitamin-D deficiency, last vitamin-D level improved at 46 in January 2019  continue vitamin-D 2000 units p o   Daily      Anemia in CKD, hemoglobin overall stable at goal   Previous iron saturation 22% in March 2018  Diagnoses and all orders for this visit:    Bilateral leg edema    Chronic kidney disease, stage 3 (HCC)  -     Basic metabolic panel; Future  -     CBC; Future  -     Phosphorus; Future  -     PTH, intact; Future  -     Vitamin D 25 hydroxy; Future    Benign hypertension with chronic kidney disease, stage III (HCC)    Secondary hyperparathyroidism of renal origin ([facility] [address] )    Persistent proteinuria  -     Protein / creatinine ratio, urine; Future    Other orders  -     Tetrahydroz-Polyvinyl Al-Povid (MURINE TEARS PLUS OP); Apply to eye 3 (three) times a day        SUBJECTIVE / HPI:  Patient is 72-year-old female with significant past medical history of hypertension for at least 40-50 years, and diabetes for 30 years, diabetic retinopathy, stress incontinence, chronic leg swelling with chronic venous stasis, status post endovascular laser treatment in the left lower extremity, chronic kidney disease stage III with baseline serum creatinine 1 2-1 3, complete heart block, status post ppm, paroxysmal AFib, comes regular follow up of renal failure  Her last serum creatinine 1 3 in June 2019 overall stable at baseline  She claims to be compliant with her medications  She does not check blood pressure at home despite having blood pressure machine and she has not brought blood pressure machine to the office visit today despite recommending last time  Denies any worsening leg edema   Denies any worsening dyspnea    She has gained 3 lb since last time  She tries to follow low salt diet  No recent NSAID exposure  Denies lightheadedness or dizziness  Denies any dyspnea    REVIEW OF SYSTEMS:  More than 10 point review of systems were obtained and discussed in length with the patient  Complete review of systems were negative / unremarkable except mentioned above       PHYSICAL EXAM:  Vitals:    07/11/19 1302 07/11/19 1325   BP: 128/90 138/76   BP Location: Right arm    Patient Position: Sitting Cuff Size: Large    Pulse: 78    Weight: 110 kg (242 lb 3 2 oz)    Height: 5' 1" (1 549 m)      Body mass index is 45 76 kg/m²  Physical Exam   Constitutional: She is oriented to person, place, and time  She appears well-developed and well-nourished  HENT:   Head: Normocephalic and atraumatic  Right Ear: External ear normal    Left Ear: External ear normal    Eyes: Pupils are equal, round, and reactive to light  Conjunctivae and EOM are normal    Neck: Neck supple  No JVD present  Cardiovascular: Normal rate and normal heart sounds  Pulmonary/Chest: Effort normal and breath sounds normal  She has no wheezes  She has no rales  Abdominal: Soft  Bowel sounds are normal  She exhibits no distension  There is no tenderness  Musculoskeletal: She exhibits no edema (trace in both ankles) or tenderness  Neurological: She is alert and oriented to person, place, and time  Skin: Skin is warm and dry  No rash noted  Psychiatric: She has a normal mood and affect  Her behavior is normal    Vitals reviewed  PAST MEDICAL HISTORY:  Past Medical History:   Diagnosis Date    Anxiety     Arthritis     [facility]est[facility]) [facility]     Chronic venous insufficiency     COPD (chronic obstructive pulmonary disease) ([facility] )     Coronary artery disease     Diabetes mellitus [facility] )     type II    Disease of thyroid gland     hypothyroidism    DVT (deep venous thrombosis) ([facility])     Hyperlipidemia     Hypertension     Irregular heart beat     Paroxysmal a-fib    Pacemaker     Renal disorder     stage 3    Thyroid disease     Urinary incontinence     Uterine leiomyoma        PAST SURGICAL HISTORY:  Past Surgical History:   Procedure Laterality Date    BREAST SURGERY      For Biopsy    CARDIAC PACEMAKER PLACEMENT      Permanent   Last assessed: 2/19/16    CARDIAC SURGERY      CARPAL TUNNEL RELEASE Bilateral     CATARACT EXTRACTION      CATARACT EXTRACTION W/ INTRAOCULAR LENS  IMPLANT, BILATERAL  CYSTOSCOPY      Diagnostic  Last assessed: 11/8/17    ECTOPIC PREGNANCY SURGERY      HYSTERECTOMY      Total Abdomonal  Resolved: 1982    JOINT REPLACEMENT Right     TKR    OOPHORECTOMY      MT ENDOVENOUS LASER, 1ST VEIN Left 2/17/2017    Procedure: GREATER SAPHENOUS VEIN ENDOVASCULAR LASER THERAPY ;  Surgeon: Prince David DO;  Location: [facility];  Service: Vascular    TONSILLECTOMY         SOCIAL HISTORY:  Social History     Substance and Sexual Activity   Alcohol Use Yes    Comment: rarely   Per [employer]: Denied history of alcohol use     Social History     Substance and Sexual Activity   Drug Use No     Social History     Tobacco Use   Smoking Status Never Smoker   Smokeless Tobacco Never Used   Tobacco Comment    Per [employer]: Former smoker       FAMILY HISTORY:  Family History   Problem Relation Age of Onset    Hypertension Father     Diabetes Father         Mellitus    Coronary artery disease Mother     Hypertension Mother     Heart disease Mother     Diabetes Brother         Mellitus    Breast cancer Maternal Aunt     Pancreatic cancer Maternal Aunt     Breast cancer Cousin     Breast cancer Cousin        MEDICATIONS:    Current Outpatient Medications:     acetaminophen (TYLENOL) 325 mg tablet, Take 650 mg by mouth , Disp: , Rfl:     apixaban (ELIQUIS) 5 mg, Take 1 tablet (5 mg total) by mouth 2 (two) times a day, Disp: 180 tablet, Rfl: 3    Ascorbic Acid (VITAMIN C) 1000 MG tablet, Take 1,000 mg by mouth , Disp: , Rfl:     atorvastatin (LIPITOR) 80 mg tablet, TAKE 1 TABLET AT BEDTIME, Disp: 90 tablet, Rfl: 3    cholecalciferol (VITAMIN D3) 1,000 units tablet, Take 2,000 Units by mouth daily , Disp: , Rfl:     citalopram (CeleXA) 20 mg tablet, TAKE 1 TABLET EVERY DAY, Disp: 90 tablet, Rfl: 3    furosemide (LASIX) 20 mg tablet, Take 1 tablet (20 mg total) by mouth daily, Disp: 90 tablet, Rfl: 3    GLUCOSAMINE CHONDROITIN COMPLX PO, Take 1 tablet by mouth 2 (two) times a day, Disp: , Rfl:     glucose blood (TRUE METRIX BLOOD GLUCOSE TEST) test strip, Test 4 times plus daily, as instructed, Disp: 400 each, Rfl: 1    insulin aspart (NOVOLOG FLEXPEN) 100 Units/mL injection pen, 10u with breakfast, 12u with lunch, 10u with dinner, Disp: 15 pen, Rfl: 0    Insulin Glargine (TOUJEO SOLOSTAR) 300 units/mL CONCETRATED U-300 injection pen, Inject 40 Units under the skin daily at bedtime, Disp: 9 pen, Rfl: 0    Insulin Pen Needle (PEN NEEDLES) 32G X 6 MM MISC, Use 4 times a day, Disp: 400 each, Rfl: 1    levothyroxine 125 mcg tablet, Take 1 tablet (125 mcg total) by mouth daily (Patient taking differently: Take 125 mcg by mouth daily Take Monday-Saturday, none on sunday), Disp: 90 tablet, Rfl: 1    lisinopril (ZESTRIL) 20 mg tablet, Take 1 tablet (20 mg total) by mouth 2 (two) times a day, Disp: 180 tablet, Rfl: 3    metoprolol tartrate (LOPRESSOR) 50 mg tablet, Take 0 5 tablets (25 mg total) by mouth every 12 (twelve) hours (Patient taking differently: Take 50 mg by mouth daily ), Disp: 90 tablet, Rfl: 3    Omega-3 Fatty Acids (FISH OIL) 1200 MG CAPS, Take 1,200 mg by mouth 3 (three) times a day  , Disp: , Rfl:     omeprazole (PriLOSEC) 20 mg delayed release capsule, Take 1 capsule (20 mg total) by mouth daily, Disp: 30 capsule, Rfl: 2    oxybutynin (DITROPAN) 5 mg tablet, Take 1 tablet (5 mg total) by mouth 2 (two) times a day, Disp: 180 tablet, Rfl: 3    promethazine-dextromethorphan (PHENERGAN-DM) 6 25-15 mg/5 mL oral syrup, Take 5 mL by mouth 4 (four) times a day as needed for cough, Disp: 180 mL, Rfl: 3    Tetrahydroz-Polyvinyl Al-Povid (MURINE TEARS PLUS OP), Apply to eye 3 (three) times a day, Disp: , Rfl:     travoprost (TRAVATAN-Z) 0 004 % ophthalmic solution, Administer 1 drop to both eyes daily at bedtime (Patient not taking: Reported on 7/11/2019), Disp: , Rfl: 0    Lab Results:   Results for orders placed or performed in visit on 35/97/38   Basic metabolic panel Result Value Ref Range    Sodium 137 136 - 145 mmol/L    Potassium 4 4 3 5 - 5 3 mmol/L    Chloride 104 100 - 108 mmol/L    CO2 29 21 - 32 mmol/L    ANION GAP 4 4 - 13 mmol/L    BUN 37 (H) 5 - 25 mg/dL    Creatinine 1 32 (H) 0 60 - 1 30 mg/dL    Glucose, Fasting 103 (H) 65 - 99 mg/dL    Calcium 9 3 8 3 - 10 1 mg/dL    eGFR 39 ml/min/1 73sq m   CBC   Result Value Ref Range    WBC 7 81 4 31 - 10 16 Thousand/uL    RBC 4 15 3 81 - 5 12 Million/uL    Hemoglobin 12 6 11 5 - 15 4 g/dL    Hematocrit 40 7 34 8 - 46 1 %    MCV 98 82 - 98 fL    MCH 30 4 26 8 - 34 3 pg    MCHC 31 0 (L) 31 4 - 37 4 g/dL    RDW 12 9 11 6 - 15 1 %    Platelets 282 532 - 005 Thousands/uL    MPV 10 8 8 9 - 12 7 fL   Phosphorus   Result Value Ref Range    Phosphorus 3 6 2 3 - 4 1 mg/dL   PTH, intact   Result Value Ref Range    PTH 78 9 18 4 - 80 1 pg/mL   Protein / creatinine ratio, urine   Result Value Ref Range    Creatinine, Ur 94 5 mg/dL    Protein Urine Random 38 mg/dL    Prot/Creat Ratio, Ur 0 40 (H) 0 00 - 0 10   Magnesium   Result Value Ref Range    Magnesium 2 2 1 6 - 2 6 mg/dL   Hemoglobin A1C   Result Value Ref Range    Hemoglobin A1C 6 9 (H) 4 2 - 6 3 %     mg/dl   Lipid panel   Result Value Ref Range    Cholesterol 138 50 - 200 mg/dL    Triglycerides 66 <=150 mg/dL    HDL, Direct 60 40 - 60 mg/dL    LDL Calculated 65 0 - 100 mg/dL    Non-HDL-Chol (CHOL-HDL) 78 mg/dl   T4, free   Result Value Ref Range    Free T4 1 23 0 76 - 1 46 ng/dL   TSH, 3rd generation   Result Value Ref Range    TSH 3RD GENERATON 3 850 (H) 0 358 - 3 740 uIU/mL

## 2019-07-11 NOTE — ASSESSMENT & PLAN NOTE
Lab Results   Component Value Date    HGBA1C 6 9 (H) 06/18/2019     Improved control  Cont present meds  Cont to monitor   Recheck 6m

## 2019-07-11 NOTE — ASSESSMENT & PLAN NOTE
Lab Results   Component Value Date    HGBA1C 6 9 (H) 06/18/2019     Up to date with ophth  BGs improved  Cont present care   F/u with oph

## 2019-07-11 NOTE — PATIENT INSTRUCTIONS
-check blood pressure at home on a regular basis and call back if blood pressure remains persistently greater than 140/90    Please bring blood pressure machine during next office visit  -if you significant weight gain greater than 5 lb in 2 to 3 days, worsening leg swelling or shortness of Breath, please call back

## 2019-07-11 NOTE — ASSESSMENT & PLAN NOTE
Improved with ditropan  Pt would like to try to increase the dose a little  Dose increased to 5mg qd  She will f/u with ophth re; monitoring eye pressures   Recheck 6m

## 2019-07-12 DIAGNOSIS — N32.81 OAB (OVERACTIVE BLADDER): ICD-10-CM

## 2019-07-12 RX ORDER — OXYBUTYNIN CHLORIDE 5 MG/1
5 TABLET ORAL 2 TIMES DAILY
Qty: 20 TABLET | Refills: 0 | Status: SHIPPED | OUTPATIENT
Start: 2019-07-12 | End: 2019-12-13 | Stop reason: SDUPTHER

## 2019-07-31 ENCOUNTER — DOCUMENTATION (OUTPATIENT)
Dept: ENDOCRINOLOGY | Facility: CLINIC | Age: 79
End: 2019-07-31

## 2019-07-31 ENCOUNTER — TELEPHONE (OUTPATIENT)
Dept: ENDOCRINOLOGY | Facility: CLINIC | Age: 79
End: 2019-07-31

## 2019-07-31 ENCOUNTER — TELEPHONE (OUTPATIENT)
Dept: CARDIOLOGY CLINIC | Facility: CLINIC | Age: 79
End: 2019-07-31

## 2019-07-31 DIAGNOSIS — I48.91 ATRIAL FIBRILLATION, UNSPECIFIED TYPE (HCC): ICD-10-CM

## 2019-07-31 NOTE — TELEPHONE ENCOUNTER
Spoke with pt and gave her Boneta Cherie  # [phone]  To try and call them to help with cost  States she can not take coumadin, had to many problems with it before

## 2019-07-31 NOTE — TELEPHONE ENCOUNTER
P/C unable to get any help for the $600/ mth cost of Eliquis  Spoke with Danielle Matta and she said there is nothing else we can do  Will give samples for now  Did you want to change her medication        Please advise

## 2019-07-31 NOTE — TELEPHONE ENCOUNTER
Called patient & explained she needs to show   $[phone] 82- that she has paid out for 2019-    She will be faxing the info she explained    Drug Eliquis

## 2019-08-01 ENCOUNTER — TELEPHONE (OUTPATIENT)
Dept: CARDIOLOGY CLINIC | Facility: CLINIC | Age: 79
End: 2019-08-01

## 2019-08-02 ENCOUNTER — TELEPHONE (OUTPATIENT)
Dept: ENDOCRINOLOGY | Facility: CLINIC | Age: 79
End: 2019-08-02

## 2019-08-21 ENCOUNTER — TELEPHONE (OUTPATIENT)
Dept: CARDIOLOGY CLINIC | Facility: CLINIC | Age: 79
End: 2019-08-21

## 2019-08-27 ENCOUNTER — TELEPHONE (OUTPATIENT)
Dept: FAMILY MEDICINE CLINIC | Facility: CLINIC | Age: 79
End: 2019-08-27

## 2019-08-27 NOTE — TELEPHONE ENCOUNTER
Shanita Boston called her and told her needs to come see you before 09/13/19 about her CPAP machine or Medicare wont pay for it anymore  Can you see her before then or can Melissa?    Please advise

## 2019-08-28 NOTE — TELEPHONE ENCOUNTER
Spoke with Iraj at [facility] [place] [place] stated that face to face was done too soon  In order for insurance to cover CPAP face to face needs to be done between 7/14/19 and 9/12/19  Office note needs to state that usage of machine was explained to patient and how patient benefits from machine usage

## 2019-08-29 ENCOUNTER — DOCUMENTATION (OUTPATIENT)
Dept: ENDOCRINOLOGY | Facility: CLINIC | Age: 79
End: 2019-08-29

## 2019-08-29 NOTE — TELEPHONE ENCOUNTER
Patient called back  , patient has another appt already for 3p   Can we do earlier in the day or after 4:15p?

## 2019-08-31 DIAGNOSIS — I48.91 ATRIAL FIBRILLATION, UNSPECIFIED TYPE (HCC): ICD-10-CM

## 2019-09-03 ENCOUNTER — OFFICE VISIT (OUTPATIENT)
Dept: FAMILY MEDICINE CLINIC | Facility: CLINIC | Age: 79
End: 2019-09-03
Payer: MEDICARE

## 2019-09-03 VITALS
DIASTOLIC BLOOD PRESSURE: 84 MMHG | RESPIRATION RATE: 16 BRPM | WEIGHT: 241.8 LBS | SYSTOLIC BLOOD PRESSURE: 132 MMHG | BODY MASS INDEX: 45.65 KG/M2 | HEIGHT: 61 IN | HEART RATE: 76 BPM | TEMPERATURE: 97.7 F

## 2019-09-03 DIAGNOSIS — K21.9 GASTROESOPHAGEAL REFLUX DISEASE, ESOPHAGITIS PRESENCE NOT SPECIFIED: ICD-10-CM

## 2019-09-03 DIAGNOSIS — G47.33 OBSTRUCTIVE SLEEP APNEA SYNDROME: Primary | ICD-10-CM

## 2019-09-03 PROCEDURE — 99213 OFFICE O/P EST LOW 20 MIN: CPT | Performed by: FAMILY MEDICINE

## 2019-09-03 RX ORDER — APIXABAN 5 MG/1
TABLET, FILM COATED ORAL
Qty: 180 TABLET | Refills: 2 | Status: SHIPPED | OUTPATIENT
Start: 2019-09-03 | End: 2019-11-13 | Stop reason: SDUPTHER

## 2019-09-03 RX ORDER — FAMOTIDINE 40 MG/1
40 TABLET, FILM COATED ORAL
Qty: 30 TABLET | Refills: 0 | Status: SHIPPED | OUTPATIENT
Start: 2019-09-03 | End: 2020-01-14

## 2019-09-03 NOTE — PROGRESS NOTES
BMI Counseling: Body mass index is 45 69 kg/m²  Discussed the patient's BMI with her  The BMI is above average  BMI counseling and education was provided to the patient  Nutrition recommendations include reducing portion sizes, moderation in carbohydrate intake and increasing intake of lean protein

## 2019-09-03 NOTE — PROGRESS NOTES
Assessment/Plan:    Sleep apnea  Well controlled on CPAP  Compliant with therapy  Recheck 3-4 m       Diagnoses and all orders for this visit:    Obstructive sleep apnea syndrome    Gastroesophageal reflux disease, esophagitis presence not specified  Comments:  resolved  Pt off PPI  Cont to monitor  Recheck 3-4m - earlier if worse  Orders:  -     famotidine (PEPCID) 40 MG tablet; Take 1 tablet (40 mg total) by mouth daily at bedtime as needed for indigestion or heartburn    Other orders  -     Cancel: TDAP VACCINE GREATER THAN OR EQUAL TO 6YO IM  -     Cancel: HEPATITIS B VACCINE ADULT IM          Subjective:      Patient ID: Ruma Nagy is a 70 y o  female  Here for MITCH review  - Pt states that she uses her CPAP nightly  Pt finds that her sleep is "great" when she uses it  She feels refreshed on awakening    - pt no longer taking omeprazole  Does not note any reflux symptoms since stopping the med  - no other concerns at present      The following portions of the patient's history were reviewed and updated as appropriate: She  has a past medical history of Anxiety, Arthritis, CHF (congestive heart failure) ([facility] ), Chronic venous insufficiency, COPD (chronic obstructive pulmonary disease) ([facility] ), Coronary artery disease, Diabetes mellitus ([facility] ), Disease of thyroid gland, DVT (deep venous thrombosis) ([facility] ), Hyperlipidemia, Hypertension, Irregular heart beat, Pacemaker, Renal disorder, Thyroid disease, Urinary incontinence, and Uterine leiomyoma    She   Patient Active Problem List    Diagnosis Date Noted    Sleep apnea 05/17/2019    Intraocular pressure increase 06/06/2018    Vitamin D deficiency 04/03/2018    Bilateral leg edema 03/13/2018    Iron deficiency anemia 10/12/2017    Urinary bladder incontinence 07/26/2017    OAB (overactive bladder) 07/26/2017    Benign hypertension with chronic kidney disease, stage III ([address] ) 06/21/2017    Microalbuminuria 06/21/2017    PAF (paroxysmal atrial fibrillation) ([facility] ) 10/31/2016    Chronic venous insufficiency 02/19/2016    Dvt femoral (deep venous thrombosis) ([place] [address] ) 02/19/2016    Presence of permanent cardiac pacemaker 01/28/2016    Chronic diastolic congestive heart failure ([facility] ) 01/21/2016    Anemia 01/19/2016    S/P cardiac pacemaker procedure 01/19/2016    Hypothyroidism 01/19/2016    Complete heart block ([facility] ) 01/16/2016    NSTEMI (non-ST elevated myocardial infarction) ([place] [address] ) 01/15/2016    Type 2 diabetes mellitus with hyperglycemia ([place] [address] ) 01/15/2016    Morbid obesity with body mass index (BMI) of 40 0 to 44 9 in [facility]) 01/15/2016    Chronic kidney disease, stage 3 ([place] [address] ) 10/21/2015    Eczematous dermatitis 03/03/2015    Anxiety disorder 06/26/2013    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 neurological manifestations, uncontrolled([address] 62) 10/02/2012    Chronic obstructive pulmonary disease ([facility] ) 09/24/2012    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 ([place] [address] ) 09/22/2011    Mixed hyperlipidemia 03/14/2011     She  has a past surgical history that includes Hysterectomy; Joint replacement (Right); Tonsillectomy; Cataract extraction w/ intraocular lens  implant, bilateral; Ectopic pregnancy surgery; Cardiac surgery; Cardiac pacemaker placement; Carpal tunnel release (Bilateral); pr endovenous laser, 1st vein (Left, 2/17/2017); Cystoscopy; Cataract extraction; Breast surgery; and Oophorectomy  She  reports that she has never smoked  She has never used smokeless tobacco  She reports that she drinks alcohol  She reports that she does not use drugs  Current Outpatient Medications   Medication Sig Dispense Refill    acetaminophen (TYLENOL) 325 mg tablet Take 650 mg by mouth   Ascorbic Acid (VITAMIN C) 1000 MG tablet Take 1,000 mg by mouth        atorvastatin (LIPITOR) 80 mg tablet TAKE 1 TABLET AT BEDTIME 90 tablet 3    cholecalciferol (VITAMIN D3) 1,000 units tablet Take 2,000 Units by mouth daily       citalopram (CeleXA) 20 mg tablet TAKE 1 TABLET EVERY DAY 90 tablet 3    ELIQUIS 5 MG TAKE ONE TABLET BY MOUTH TWICE DAILY 180 tablet 2    furosemide (LASIX) 20 mg tablet Take 1 tablet (20 mg total) by mouth daily 90 tablet 3    GLUCOSAMINE CHONDROITIN COMPLX PO Take 1 tablet by mouth 2 (two) times a day      glucose blood (TRUE METRIX BLOOD GLUCOSE TEST) test strip Test 4 times plus daily, as instructed 400 each 1    insulin aspart (NOVOLOG FLEXPEN) 100 Units/mL injection pen 10u with breakfast, 12u with lunch, 10u with dinner 15 pen 0    Insulin Glargine (TOUJEO SOLOSTAR) 300 units/mL CONCETRATED U-300 injection pen Inject 40 Units under the skin daily at bedtime 9 pen 0    Insulin Pen Needle (PEN NEEDLES) 32G X 6 MM MISC Use 4 times a day 400 each 1    levothyroxine 125 mcg tablet Take 1 tablet (125 mcg total) by mouth daily (Patient taking differently: Take 125 mcg by mouth daily Take Monday-Saturday, none on sunday) 90 tablet 1    lisinopril (ZESTRIL) 20 mg tablet Take 1 tablet (20 mg total) by mouth 2 (two) times a day 180 tablet 3    metoprolol tartrate (LOPRESSOR) 50 mg tablet Take 0 5 tablets (25 mg total) by mouth every 12 (twelve) hours (Patient taking differently: Take 50 mg by mouth daily ) 90 tablet 3    Omega-3 Fatty Acids (FISH OIL) 1200 MG CAPS Take 1,200 mg by mouth 3 (three) times a day        oxybutynin (DITROPAN) 5 mg tablet Take 1 tablet (5 mg total) by mouth 2 (two) times a day 20 tablet 0    promethazine-dextromethorphan (PHENERGAN-DM) 6 25-15 mg/5 mL oral syrup Take 5 mL by mouth 4 (four) times a day as needed for cough 180 mL 3    Tetrahydroz-Polyvinyl Al-Povid (MURINE TEARS PLUS OP) Apply to eye 3 (three) times a day      travoprost (TRAVATAN-Z) 0 004 % ophthalmic solution Administer 1 drop to both eyes daily at bedtime  0    famotidine (PEPCID) 40 MG tablet Take 1 tablet (40 mg total) by mouth daily at bedtime as needed for indigestion or heartburn 30 tablet 0    omeprazole (PriLOSEC) 20 mg delayed release capsule Take 1 capsule (20 mg total) by mouth daily (Patient not taking: Reported on 9/3/2019) 30 capsule 2     No current facility-administered medications for this visit  She is allergic to ampicillin       Review of Systems   Constitutional: Negative for activity change, chills, diaphoresis, fatigue and fever  HENT: Negative  Eyes: Positive for visual disturbance (chronic)  Negative for pain, discharge and itching  Respiratory: Negative  Cardiovascular: Negative  Gastrointestinal: Negative  Endocrine: Negative  Allergic/Immunologic: Negative  Objective:      /84   Pulse 76   Temp 97 7 °F (36 5 °C)   Resp 16   Ht 5' 1" (1 549 m)   Wt 110 kg (241 lb 12 8 oz)   BMI 45 69 kg/m²          Physical Exam   Constitutional: She appears well-developed and well-nourished  HENT:   Nose: Nose normal    Mouth/Throat: Oropharynx is clear and moist    Eyes: Conjunctivae are normal    Neck: Normal range of motion  Cardiovascular: Normal rate and regular rhythm  Pulmonary/Chest: Effort normal and breath sounds normal    Lymphadenopathy:     She has no cervical adenopathy

## 2019-09-18 ENCOUNTER — REMOTE DEVICE CLINIC VISIT (OUTPATIENT)
Dept: CARDIOLOGY CLINIC | Facility: CLINIC | Age: 79
End: 2019-09-18
Payer: MEDICARE

## 2019-09-18 DIAGNOSIS — Z95.0 PACEMAKER: Primary | ICD-10-CM

## 2019-09-18 PROCEDURE — 93294 REM INTERROG EVL PM/LDLS PM: CPT | Performed by: INTERNAL MEDICINE

## 2019-09-18 PROCEDURE — 93296 REM INTERROG EVL PM/IDS: CPT | Performed by: INTERNAL MEDICINE

## 2019-09-18 NOTE — PROGRESS NOTES
Results for orders placed or performed in visit on 09/18/19   Cardiac EP device report    Narrative    MDT-DUAL CHAMBER PPM (DDDR MODE)  CARELINK TRANSMISSION: BATTERY ADEQUATE (6 YRS)  AP 63%;  100% (CHB/DDDR 60)  ALL LEAD PARAMETERS WITHIN NORMAL LIMITS  NO EPISODES  NORMAL DEVICE FUNCTION   PL

## 2019-10-01 DIAGNOSIS — I10 ESSENTIAL HYPERTENSION: ICD-10-CM

## 2019-10-01 DIAGNOSIS — R60.0 LOCALIZED EDEMA: ICD-10-CM

## 2019-10-01 RX ORDER — FUROSEMIDE 20 MG/1
20 TABLET ORAL DAILY
Qty: 90 TABLET | Refills: 3 | Status: SHIPPED | OUTPATIENT
Start: 2019-10-01 | End: 2020-07-08

## 2019-10-07 ENCOUNTER — TELEPHONE (OUTPATIENT)
Dept: FAMILY MEDICINE CLINIC | Facility: CLINIC | Age: 79
End: 2019-10-07

## 2019-10-09 ENCOUNTER — IMMUNIZATIONS (OUTPATIENT)
Dept: FAMILY MEDICINE CLINIC | Facility: CLINIC | Age: 79
End: 2019-10-09
Payer: MEDICARE

## 2019-10-09 DIAGNOSIS — Z23 ENCOUNTER FOR IMMUNIZATION: ICD-10-CM

## 2019-10-09 PROCEDURE — G0008 ADMIN INFLUENZA VIRUS VAC: HCPCS | Performed by: FAMILY MEDICINE

## 2019-10-09 PROCEDURE — 90662 IIV NO PRSV INCREASED AG IM: CPT | Performed by: FAMILY MEDICINE

## 2019-10-16 ENCOUNTER — DOCUMENTATION (OUTPATIENT)
Dept: ENDOCRINOLOGY | Facility: CLINIC | Age: 79
End: 2019-10-16

## 2019-10-18 ENCOUNTER — LAB (OUTPATIENT)
Dept: LAB | Facility: CLINIC | Age: 79
End: 2019-10-18
Payer: MEDICARE

## 2019-10-18 ENCOUNTER — TRANSCRIBE ORDERS (OUTPATIENT)
Dept: LAB | Facility: CLINIC | Age: 79
End: 2019-10-18

## 2019-10-18 DIAGNOSIS — E55.9 VITAMIN D DEFICIENCY: ICD-10-CM

## 2019-10-18 DIAGNOSIS — E03.9 ACQUIRED HYPOTHYROIDISM: Chronic | ICD-10-CM

## 2019-10-18 DIAGNOSIS — E11.65 TYPE 2 DIABETES MELLITUS WITH HYPERGLYCEMIA, WITH LONG-TERM CURRENT USE OF INSULIN (HCC): ICD-10-CM

## 2019-10-18 DIAGNOSIS — Z79.4 TYPE 2 DIABETES MELLITUS WITH HYPERGLYCEMIA, WITH LONG-TERM CURRENT USE OF INSULIN (HCC): ICD-10-CM

## 2019-10-18 LAB
25(OH)D3 SERPL-MCNC: 47.5 NG/ML (ref 30–100)
ANION GAP SERPL CALCULATED.3IONS-SCNC: 10 MMOL/L (ref 4–13)
BUN SERPL-MCNC: 39 MG/DL (ref 5–25)
CALCIUM SERPL-MCNC: 9.7 MG/DL (ref 8.3–10.1)
CHLORIDE SERPL-SCNC: 107 MMOL/L (ref 100–108)
CO2 SERPL-SCNC: 26 MMOL/L (ref 21–32)
CREAT SERPL-MCNC: 1.3 MG/DL (ref 0.6–1.3)
CREAT UR-MCNC: 54.4 MG/DL
EST. AVERAGE GLUCOSE BLD GHB EST-MCNC: 148 MG/DL
GFR SERPL CREATININE-BSD FRML MDRD: 40 ML/MIN/1.73SQ M
GLUCOSE P FAST SERPL-MCNC: 157 MG/DL (ref 65–99)
HBA1C MFR BLD: 6.8 % (ref 4.2–6.3)
MICROALBUMIN UR-MCNC: 80 MG/L (ref 0–20)
MICROALBUMIN/CREAT 24H UR: 147 MG/G CREATININE (ref 0–30)
POTASSIUM SERPL-SCNC: 4.2 MMOL/L (ref 3.5–5.3)
SODIUM SERPL-SCNC: 143 MMOL/L (ref 136–145)
T4 FREE SERPL-MCNC: 1.25 NG/DL (ref 0.76–1.46)
TSH SERPL DL<=0.05 MIU/L-ACNC: 3.44 UIU/ML (ref 0.36–3.74)

## 2019-10-18 PROCEDURE — 36415 COLL VENOUS BLD VENIPUNCTURE: CPT

## 2019-10-18 PROCEDURE — 84443 ASSAY THYROID STIM HORMONE: CPT

## 2019-10-18 PROCEDURE — 80048 BASIC METABOLIC PNL TOTAL CA: CPT

## 2019-10-18 PROCEDURE — 82306 VITAMIN D 25 HYDROXY: CPT

## 2019-10-18 PROCEDURE — 82043 UR ALBUMIN QUANTITATIVE: CPT

## 2019-10-18 PROCEDURE — 82570 ASSAY OF URINE CREATININE: CPT

## 2019-10-18 PROCEDURE — 84439 ASSAY OF FREE THYROXINE: CPT

## 2019-10-18 PROCEDURE — 83036 HEMOGLOBIN GLYCOSYLATED A1C: CPT

## 2019-10-22 ENCOUNTER — OFFICE VISIT (OUTPATIENT)
Dept: ENDOCRINOLOGY | Facility: CLINIC | Age: 79
End: 2019-10-22
Payer: MEDICARE

## 2019-10-22 VITALS
SYSTOLIC BLOOD PRESSURE: 132 MMHG | HEIGHT: 61 IN | WEIGHT: 235 LBS | BODY MASS INDEX: 44.37 KG/M2 | DIASTOLIC BLOOD PRESSURE: 80 MMHG

## 2019-10-22 DIAGNOSIS — E11.9 TYPE 2 DIABETES MELLITUS WITHOUT COMPLICATION, WITH LONG-TERM CURRENT USE OF INSULIN (HCC): ICD-10-CM

## 2019-10-22 DIAGNOSIS — E03.9 ACQUIRED HYPOTHYROIDISM: ICD-10-CM

## 2019-10-22 DIAGNOSIS — Z79.4 TYPE 2 DIABETES MELLITUS WITHOUT COMPLICATION, WITH LONG-TERM CURRENT USE OF INSULIN (HCC): ICD-10-CM

## 2019-10-22 DIAGNOSIS — E11.65 TYPE 2 DIABETES MELLITUS WITH HYPERGLYCEMIA, WITH LONG-TERM CURRENT USE OF INSULIN (HCC): Primary | ICD-10-CM

## 2019-10-22 DIAGNOSIS — Z79.4 TYPE 2 DIABETES MELLITUS WITH HYPERGLYCEMIA, WITH LONG-TERM CURRENT USE OF INSULIN (HCC): Primary | ICD-10-CM

## 2019-10-22 DIAGNOSIS — E78.2 MIXED HYPERLIPIDEMIA: ICD-10-CM

## 2019-10-22 DIAGNOSIS — E03.9 HYPOTHYROIDISM, UNSPECIFIED TYPE: ICD-10-CM

## 2019-10-22 PROCEDURE — 99214 OFFICE O/P EST MOD 30 MIN: CPT | Performed by: PHYSICIAN ASSISTANT

## 2019-10-22 RX ORDER — LEVOTHYROXINE SODIUM 0.12 MG/1
TABLET ORAL
Start: 2019-10-22 | End: 2020-01-06

## 2019-10-22 NOTE — PROGRESS NOTES
Patient Progress Note      CC: DM2, hypothyroidism      Referring Provider  Sven Gray Md  [address] [facility], [address] [facility]     History of Present Illness:   Ortega Figueroa is a 70 y o  female with a history of type 2 diabetes with long term use of insulin  Diabetes course has been stable  Complications of DM: CAD, CKD, retinopathy  Denies recent illness or hospitalizations  Denies recent severe hypoglycemic or severe hyperglycemic episodes  Denies any issues with her current regimen  Home glucose monitoring: are performed regularly    Home blood glucose readings:   Before breakfast: 98-140s mg/dl, rarely higher  Before lunch: 90-160s mg/dl, occasionally higher  Before dinner: 73-190s mg/dl, variable  Bedtime: 72-190s mg/dl, variable    Current regimen: Toujeo 40 units QHS, NovoLog 10-12-10 units (patient using 10-10-12) 3 times a day with meals  She usually take 12 units with her biggest meal which is typically dinner, but sometimes lunch  compliant most of the time, denies any side effects from medications  Injects in: abdomen, thigh  Rotates sites: Yes  Hypoglycemic episodes: No, rare  H/o of hypoglycemia causing hospitalization or Intervention such as glucagon injection  or ambulance call :  No  Hypoglycemia symptoms: jitteriness  Treatment of hypoglycemia: orange    Medic alert tag: recommended: Yes    Diet: 3 meals per day, 1-2 snacks per day  Timing of meals is predictable  Diabetic diet compliance:  noncompliant some of the time  Activity: Daily activity is predictable: Yes  The patient engages in little, if any physical activity  Further diabetic ROS: no polyuria or polydipsia, no chest pain, dyspnea, no numbness, tingling or pain in extremities        Ophthamology: eye exam UTD, November 2018  Due next month     Podiatry: foot exam UTD, September 2019    Has hypertension: on ACE inhibitor/ARB, compliant most of the time  Has hyperlipidemia: on statin - tolerating well, no myalgias  compliant most of the time, denies any side effects from medications  Thyroid disorders:  Hypothyroidism  Patient is taking levothyroxine 125 mcg 1 tablet daily Monday through Saturday and no tablet on Sunday  Patient is taking medication on an empty stomach 1 hour before breakfast and at least 4 hours apart from supplements  Patient is tolerating medication well    History of pancreatitis: No    Patient Active Problem List   Diagnosis    NSTEMI (non-ST elevated myocardial infarction) ([facility] )    Type 2 diabetes mellitus with hyperglycemia ([place] [address] )    Morbid obesity with body mass index (BMI) of 40 0 to 44 9 in adult [facility])    Complete heart block ([facility])    Anemia    S/P cardiac pacemaker procedure    Hypothyroidism    Chronic diastolic congestive heart failure ([facility])    Chronic venous insufficiency    Dvt femoral (deep venous thrombosis) ([facility])    Benign hypertension with chronic kidney disease, stage III ([facility])    Chronic kidney disease, stage 3 ([facility])    Iron deficiency anemia    Microalbuminuria    Bilateral leg edema    Vitamin D deficiency    Anxiety disorder    Urinary bladder incontinence    Chronic obstructive pulmonary disease ([facility])    Eczematous dermatitis    Mixed hyperlipidemia    OAB (overactive bladder)    Presence of permanent cardiac pacemaker    PAF (paroxysmal atrial fibrillation) ([facility])    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 neurological manifestations, uncontrolled([address])    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 ([facility] )    Intraocular pressure increase    Sleep apnea      Past Medical History:   Diagnosis Date    Anxiety     Arthritis     CHF (congestive heart failure) ([facility])     Chronic venous insufficiency     COPD (chronic obstructive pulmonary disease) ([facility] )     Coronary artery disease     Diabetes mellitus ([place] [address] )     type II    Disease of thyroid gland hypothyroidism    DVT (deep venous thrombosis) ([facility])     Hyperlipidemia     Hypertension     Irregular heart beat     Paroxysmal a-fib    Pacemaker     Renal disorder     stage 3    Thyroid disease     Urinary incontinence     Uterine leiomyoma       Past Surgical History:   Procedure Laterality Date    BREAST SURGERY      For Biopsy    CARDIAC PACEMAKER PLACEMENT      Permanent  Last assessed: 2/19/16    CARDIAC SURGERY      CARPAL TUNNEL RELEASE Bilateral     CATARACT EXTRACTION      CATARACT EXTRACTION W/ INTRAOCULAR LENS  IMPLANT, BILATERAL      CYSTOSCOPY      Diagnostic  Last assessed: 11/8/17    ECTOPIC PREGNANCY SURGERY      HYSTERECTOMY      Total Abdomonal  Resolved: 1982    JOINT REPLACEMENT Right     TKR    OOPHORECTOMY      NV ENDOVENOUS LASER, 1ST VEIN Left 2/17/2017    Procedure: GREATER SAPHENOUS VEIN ENDOVASCULAR LASER THERAPY ;  Surgeon: Fallon Granados DO;  [facility]: [facility];  Service: Vascular    TONSILLECTOMY        Family History   Problem Relation Age of Onset    Hypertension Father     Diabetes Father         Mellitus    Coronary artery disease Mother     Hypertension Mother     Heart disease Mother     Diabetes Brother         Mellitus    Breast cancer Maternal Aunt     Pancreatic cancer Maternal Aunt     Breast cancer Cousin     Breast cancer Cousin      Social History     Tobacco Use    Smoking status: Never Smoker    Smokeless tobacco: Never Used    Tobacco comment: Per [employer]: Former smoker   Substance Use Topics    Alcohol use: Yes     Comment: rarely   Per [employer]: Denied history of alcohol use     Allergies   Allergen Reactions    Ampicillin Hives         Current Outpatient Medications:     acetaminophen (TYLENOL) 325 mg tablet, Take 650 mg by mouth , Disp: , Rfl:     Ascorbic Acid (VITAMIN C) 1000 MG tablet, Take 1,000 mg by mouth , Disp: , Rfl:     atorvastatin (LIPITOR) 80 mg tablet, TAKE 1 TABLET AT BEDTIME, Disp: 90 tablet, Rfl: 3    cholecalciferol (VITAMIN D3) 1,000 units tablet, Take 2,000 Units by mouth daily , Disp: , Rfl:     citalopram (CeleXA) 20 mg tablet, TAKE 1 TABLET EVERY DAY, Disp: 90 tablet, Rfl: 3    ELIQUIS 5 MG, TAKE ONE TABLET BY MOUTH TWICE DAILY, Disp: 180 tablet, Rfl: 2    furosemide (LASIX) 20 mg tablet, Take 1 tablet (20 mg total) by mouth daily, Disp: 90 tablet, Rfl: 3    GLUCOSAMINE CHONDROITIN COMPLX PO, Take 1 tablet by mouth 2 (two) times a day, Disp: , Rfl:     glucose blood (TRUE METRIX BLOOD GLUCOSE TEST) test strip, Test 4 times plus daily, as instructed, Disp: 400 each, Rfl: 1    insulin aspart (NOVOLOG FLEXPEN) 100 Units/mL injection pen, 10u with breakfast, 10u with lunch, 10u with dinner, Disp: 15 pen, Rfl: 0    Insulin Glargine (TOUJEO SOLOSTAR) 300 units/mL CONCETRATED U-300 injection pen, Inject 40 Units under the skin daily at bedtime, Disp: 9 pen, Rfl: 0    Insulin Pen Needle (PEN NEEDLES) 32G X 6 MM MISC, Use 4 times a day, Disp: 400 each, Rfl: 1    levothyroxine 125 mcg tablet, Take 1 tablet Mon-Sat and no tablet on Sunday, Disp: , Rfl:     lisinopril (ZESTRIL) 20 mg tablet, Take 1 tablet (20 mg total) by mouth 2 (two) times a day, Disp: 180 tablet, Rfl: 3    metoprolol tartrate (LOPRESSOR) 25 mg tablet, Take 1 tablet (25 mg total) by mouth every 12 (twelve) hours, Disp: 180 tablet, Rfl: 3    Omega-3 Fatty Acids (FISH OIL) 1200 MG CAPS, Take 1,200 mg by mouth 3 (three) times a day  , Disp: , Rfl:     oxybutynin (DITROPAN) 5 mg tablet, Take 1 tablet (5 mg total) by mouth 2 (two) times a day, Disp: 20 tablet, Rfl: 0    promethazine-dextromethorphan (PHENERGAN-DM) 6 25-15 mg/5 mL oral syrup, Take 5 mL by mouth 4 (four) times a day as needed for cough, Disp: 180 mL, Rfl: 3    famotidine (PEPCID) 40 MG tablet, Take 1 tablet (40 mg total) by mouth daily at bedtime as needed for indigestion or heartburn (Patient not taking: Reported on 10/22/2019), Disp: 30 tablet, Rfl: 0   metoprolol tartrate (LOPRESSOR) 50 mg tablet, Take 0 5 tablets (25 mg total) by mouth every 12 (twelve) hours (Patient not taking: Reported on 10/22/2019), Disp: 90 tablet, Rfl: 3    omeprazole (PriLOSEC) 20 mg delayed release capsule, Take 1 capsule (20 mg total) by mouth daily (Patient not taking: Reported on 9/3/2019), Disp: 30 capsule, Rfl: 2    Tetrahydroz-Polyvinyl Al-Povid (MURINE TEARS PLUS OP), Apply to eye 3 (three) times a day, Disp: , Rfl:     travoprost (TRAVATAN-Z) 0 004 % ophthalmic solution, Administer 1 drop to both eyes daily at bedtime (Patient not taking: Reported on 10/22/2019), Disp: , Rfl: 0  Review of Systems   Constitutional: Positive for fatigue  Negative for activity change, appetite change and unexpected weight change  HENT: Negative for trouble swallowing  Eyes: Negative for visual disturbance  Respiratory: Negative for shortness of breath  Cardiovascular: Negative for chest pain and palpitations  Gastrointestinal: Negative for constipation and diarrhea  Endocrine: Negative for polydipsia and polyuria  Musculoskeletal: Positive for arthralgias  Skin: Negative for wound  Neurological: Negative for numbness  Psychiatric/Behavioral: Negative  Physical Exam:  Body mass index is 44 4 kg/m²  /80   Ht 5' 1" (1 549 m)   Wt 107 kg (235 lb)   BMI 44 40 kg/m²    Wt Readings from Last 3 Encounters:   10/22/19 107 kg (235 lb)   09/03/19 110 kg (241 lb 12 8 oz)   07/11/19 110 kg (242 lb 3 2 oz)       Physical Exam   Constitutional: She appears well-developed and well-nourished  HENT:   Head: Normocephalic  Eyes: Pupils are equal, round, and reactive to light  EOM are normal  No scleral icterus  Neck: Neck supple  No thyromegaly present  Cardiovascular: Normal rate and regular rhythm  No murmur heard  Pulses:       Radial pulses are 2+ on the right side, and 2+ on the left side     Pulmonary/Chest: Effort normal and breath sounds normal  No respiratory distress  She has no wheezes  Neurological: She is alert  Skin: Skin is warm and dry  Psychiatric: She has a normal mood and affect  Nursing note and vitals reviewed  Patient's shoes and socks were not removed  Labs:   Component      Latest Ref Rng & Units 6/18/2019 10/18/2019   Sodium      136 - 145 mmol/L  143   Potassium      3 5 - 5 3 mmol/L  4 2   Chloride      100 - 108 mmol/L  107   CO2      21 - 32 mmol/L  26   Anion Gap      4 - 13 mmol/L  10   BUN      5 - 25 mg/dL  39 (H)   Creatinine      0 60 - 1 30 mg/dL  1 30   GLUCOSE FASTING      65 - 99 mg/dL  157 (H)   Calcium      8 3 - 10 1 mg/dL  9 7   eGFR      ml/min/1 73sq m  40   Cholesterol      50 - 200 mg/dL 138    Triglycerides      <=150 mg/dL 66    HDL      40 - 60 mg/dL 60    LDL Direct      0 - 100 mg/dL 65    Non-HDL Cholesterol      mg/dl 78    EXT Creatinine Urine      mg/dL 94 5 54 4   Protein Urine Random      mg/dL 38    Prot/Creat Ratio, Ur      0 00 - 0 10 0 40 (H)    MICROALBUM ,U,RANDOM      0 0 - 20 0 mg/L  80 0 (H)   MICROALBUMIN/CREATININE RATIO      0 - 30 mg/g creatinine  147 (H)   Hemoglobin A1C      4 2 - 6 3 % 6 9 (H) 6 8 (H)   EAG      mg/dl 151 148   Free T4      0 76 - 1 46 ng/dL 1 23 1 25   TSH 3RD GENERATON      0 358 - 3 740 uIU/mL 3 850 (H) 3 440   Vit D, 25-Hydroxy      30 0 - 100 0 ng/mL  47 5     Plan:    Diagnoses and all orders for this visit:    Type 2 diabetes mellitus with hyperglycemia, with long-term current use of insulin ([facility])  HGA1C 6 8%  Improved  Treatment regimen:  Decrease NovoLog dose at dinner time to 10 units due to occasional in blood sugars in the 70s at bedtime  Discussed intensive insulin regimen does increase risk for hypoglycemia  Episodes of hypoglycemia can lead to permanent disability and death  Discussed risks/complications associated with uncontrolled diabetes  Advised to adhere to diabetic diet    Keep carbohydrates consistent to limit blood glucose fluctuations  Advised to call if blood sugars less than 70 mg/dl or over 300 mg/dl  Check blood glucose 4 times a day  Discussed symptoms and treatment of hypoglycemia  Recommended routine follow-up with podiatry and ophthalmology  Send log in 2 weeks  Ordered blood work to complete prior to next visit  -     Hemoglobin A1C; Future  -     Basic metabolic panel; Future  -     insulin aspart (NOVOLOG FLEXPEN) 100 Units/mL injection pen; 10u with breakfast, 10u with lunch, 10u with dinner    Mixed hyperlipidemia  LDL previously 65, at goal  Continue statin therapy    Hypothyroidism  Thyroid function tests within normal range, TSH 3 440 and free T4 1 25  Continue current dose of levothyroxine   -     levothyroxine 125 mcg tablet; Take 1 tablet Mon-Sat and no tablet on Sunday      Discussed with the patient diagnosis and treatment and all questions fully answered  She will call me if any problems arise  Counseled patient on diagnostic results, prognosis, risk and benefit of treatment options, instruction for management, importance of treatment compliance, risk factor reduction and impressions        Vicki Guevara PA-C

## 2019-10-22 NOTE — PATIENT INSTRUCTIONS
INSULIN DOSAGE INSTRUCTIONS      Name: Ruperto Atkinson                        : 1940  MRN #: [id number]    Your Current Insulin  and dose is: Before Breakfast Before Lunch Before Evening Meal Bedtime     Novolog Insulin      10  unit    10 units    10 units     Regular, Apidra, Humalog orNovolog Sliding Scale:   <80              151-200 + 1 +1 1    201-250 +2 +2 +2 +   251-300 +3 +3 +3 +   301-350 +4 +4 +4 +   >350 +5 +5 +5 +     Toujeo    40 units     Additional Instructions:   Please test your blood sugar:  _4_ Times per day  X_ Before Breakfast                _ Alternate Testing  X_ Before Lunch                _ 2 Hours After  Meal  X_ Before Evening Meal               _ 3 a m   x_ Before Bedtime Snack     Target Blood sugar range _80- 180 mg/dl __  Call if your blood sugar is less than _70  or greater than _400__  Today's Date: 10/22/2019       Hypoglycemia instructions   Lizzie Whitten  10/22/2019  [id number]    Low Blood Sugar    Steps to treat low blood sugar  1  Test blood sugar if you have symptoms of low blood sugar:   Low Blood Sugar Symptoms:  o Sweaty  o Dizzy  o Rapid heartbeat  o Shaky  o Bad mood  o Hungry      2  Treat blood sugar less than 70 with 15 grams of fast-acting carbohydrate:   Examples of 15 grams Fast-Acting Carbohydrate:  o 4 oz juice  o 4 oz regular soda  o 3-4 glucose tablets (chew)  o 3-4 hard candies (chew)          3  Wait 15 minutes and test your blood sugar again     4   If blood sugar is less than 100, repeat steps 2-3     5  When your blood sugar is 100 or more, eat a snack if it will be longer than one hour until your next meal  The snack should be 15 grams of carbohydrate and a protein:   Examples of snacks:  o ½ sandwich  o 6 crackers with cheese  o Piece of fruit with cheese or peanut butter  o 6 crackers with peanut butter

## 2019-10-23 ENCOUNTER — DOCUMENTATION (OUTPATIENT)
Dept: ENDOCRINOLOGY | Facility: CLINIC | Age: 79
End: 2019-10-23

## 2019-10-30 ENCOUNTER — DOCUMENTATION (OUTPATIENT)
Dept: ENDOCRINOLOGY | Facility: CLINIC | Age: 79
End: 2019-10-30

## 2019-11-13 DIAGNOSIS — I48.91 ATRIAL FIBRILLATION, UNSPECIFIED TYPE (HCC): ICD-10-CM

## 2019-12-09 ENCOUNTER — TELEPHONE (OUTPATIENT)
Dept: NEPHROLOGY | Facility: CLINIC | Age: 79
End: 2019-12-09

## 2019-12-13 ENCOUNTER — DOCUMENTATION (OUTPATIENT)
Dept: ENDOCRINOLOGY | Facility: CLINIC | Age: 79
End: 2019-12-13

## 2019-12-13 DIAGNOSIS — N32.81 OAB (OVERACTIVE BLADDER): ICD-10-CM

## 2019-12-13 DIAGNOSIS — Z79.4 TYPE 2 DIABETES MELLITUS WITH HYPERGLYCEMIA, WITH LONG-TERM CURRENT USE OF INSULIN (HCC): Primary | ICD-10-CM

## 2019-12-13 DIAGNOSIS — E11.65 TYPE 2 DIABETES MELLITUS WITH HYPERGLYCEMIA, WITH LONG-TERM CURRENT USE OF INSULIN (HCC): Primary | ICD-10-CM

## 2019-12-13 RX ORDER — OXYBUTYNIN CHLORIDE 5 MG/1
TABLET ORAL
Qty: 180 TABLET | Refills: 0 | Status: SHIPPED | OUTPATIENT
Start: 2019-12-13 | End: 2020-02-17

## 2019-12-14 RX ORDER — FLASH GLUCOSE SENSOR
KIT MISCELLANEOUS
Qty: 6 EACH | Refills: 3 | Status: SHIPPED | OUTPATIENT
Start: 2019-12-14

## 2019-12-14 RX ORDER — FLASH GLUCOSE SCANNING READER
EACH MISCELLANEOUS
Qty: 1 DEVICE | Refills: 0 | Status: SHIPPED | OUTPATIENT
Start: 2019-12-14

## 2019-12-16 ENCOUNTER — DOCUMENTATION (OUTPATIENT)
Dept: ENDOCRINOLOGY | Facility: CLINIC | Age: 79
End: 2019-12-16

## 2019-12-17 ENCOUNTER — TELEPHONE (OUTPATIENT)
Dept: UROLOGY | Facility: AMBULATORY SURGERY CENTER | Age: 79
End: 2019-12-17

## 2019-12-17 DIAGNOSIS — R31.0 GROSS HEMATURIA: Primary | ICD-10-CM

## 2019-12-17 NOTE — TELEPHONE ENCOUNTER
Patient managed by Millie Miller canceled her appointment today due to illness  Labs for cytology urine are   Needs updated script added to Epic

## 2019-12-18 ENCOUNTER — REMOTE DEVICE CLINIC VISIT (OUTPATIENT)
Dept: CARDIOLOGY CLINIC | Facility: CLINIC | Age: 79
End: 2019-12-18
Payer: MEDICARE

## 2019-12-18 DIAGNOSIS — Z95.0 CARDIAC PACEMAKER IN SITU: Primary | ICD-10-CM

## 2019-12-18 PROCEDURE — 93294 REM INTERROG EVL PM/LDLS PM: CPT | Performed by: INTERNAL MEDICINE

## 2019-12-18 PROCEDURE — 93296 REM INTERROG EVL PM/IDS: CPT | Performed by: INTERNAL MEDICINE

## 2019-12-18 NOTE — PROGRESS NOTES
Results for orders placed or performed in visit on 12/18/19   Cardiac EP device report    Narrative    MDT-DUAL CHAMBER PPM (DDDR MODE)  CARELINK TRANSMISSION: BATTERY VOLTAGE ADEQUATE (5 YRS)  AP-70%, -100% (>40% MVP OFF/CHB/DDDR @ 60 PPM)  ALL AVAILABLE LEAD PARAMETERS WITHIN NORMAL LIMITS  1 AFLUTTER EPISODE LASTING 33 SEC  HX: PAF & ON ELIQUIS & METOPROLOL  AF BURDEN<0 1%  NORMAL DEVICE FUNCTION   GV

## 2020-01-02 ENCOUNTER — DOCUMENTATION (OUTPATIENT)
Dept: ENDOCRINOLOGY | Facility: CLINIC | Age: 80
End: 2020-01-02

## 2020-01-03 DIAGNOSIS — E03.9 HYPOTHYROIDISM, UNSPECIFIED TYPE: ICD-10-CM

## 2020-01-06 ENCOUNTER — TRANSCRIBE ORDERS (OUTPATIENT)
Dept: ADMINISTRATIVE | Facility: HOSPITAL | Age: 80
End: 2020-01-06

## 2020-01-06 DIAGNOSIS — Z12.31 ENCOUNTER FOR SCREENING MAMMOGRAM FOR MALIGNANT NEOPLASM OF BREAST: Primary | ICD-10-CM

## 2020-01-06 DIAGNOSIS — E11.65 TYPE 2 DIABETES MELLITUS WITH HYPERGLYCEMIA, WITH LONG-TERM CURRENT USE OF INSULIN (HCC): ICD-10-CM

## 2020-01-06 DIAGNOSIS — Z79.4 TYPE 2 DIABETES MELLITUS WITH HYPERGLYCEMIA, WITH LONG-TERM CURRENT USE OF INSULIN (HCC): ICD-10-CM

## 2020-01-06 RX ORDER — LEVOTHYROXINE SODIUM 0.12 MG/1
TABLET ORAL
Qty: 90 TABLET | Refills: 0 | Status: SHIPPED | OUTPATIENT
Start: 2020-01-06 | End: 2020-01-27 | Stop reason: SDUPTHER

## 2020-01-14 ENCOUNTER — OFFICE VISIT (OUTPATIENT)
Dept: FAMILY MEDICINE CLINIC | Facility: CLINIC | Age: 80
End: 2020-01-14
Payer: MEDICARE

## 2020-01-14 VITALS
BODY MASS INDEX: 43.71 KG/M2 | SYSTOLIC BLOOD PRESSURE: 114 MMHG | HEART RATE: 71 BPM | DIASTOLIC BLOOD PRESSURE: 70 MMHG | TEMPERATURE: 96.9 F | HEIGHT: 61 IN | WEIGHT: 231.5 LBS | OXYGEN SATURATION: 94 %

## 2020-01-14 DIAGNOSIS — E11.65 TYPE 2 DIABETES MELLITUS WITH HYPERGLYCEMIA, WITH LONG-TERM CURRENT USE OF INSULIN (HCC): ICD-10-CM

## 2020-01-14 DIAGNOSIS — Z79.4 TYPE 2 DIABETES MELLITUS WITH HYPERGLYCEMIA, WITH LONG-TERM CURRENT USE OF INSULIN (HCC): ICD-10-CM

## 2020-01-14 DIAGNOSIS — Z78.0 POST-MENOPAUSAL: ICD-10-CM

## 2020-01-14 DIAGNOSIS — H61.23 BILATERAL HEARING LOSS DUE TO CERUMEN IMPACTION: ICD-10-CM

## 2020-01-14 DIAGNOSIS — G47.33 OBSTRUCTIVE SLEEP APNEA SYNDROME: ICD-10-CM

## 2020-01-14 DIAGNOSIS — E11.3393 MODERATE NONPROLIFERATIVE DIABETIC RETINOPATHY OF BOTH EYES ASSOCIATED WITH TYPE 2 DIABETES MELLITUS, MACULAR EDEMA PRESENCE UNSPECIFIED (HCC): ICD-10-CM

## 2020-01-14 DIAGNOSIS — Z78.0 ASYMPTOMATIC POSTMENOPAUSAL STATE: ICD-10-CM

## 2020-01-14 DIAGNOSIS — I48.0 PAF (PAROXYSMAL ATRIAL FIBRILLATION) (HCC): ICD-10-CM

## 2020-01-14 DIAGNOSIS — Z00.00 MEDICARE ANNUAL WELLNESS VISIT, SUBSEQUENT: Primary | ICD-10-CM

## 2020-01-14 DIAGNOSIS — E03.9 HYPOTHYROIDISM, UNSPECIFIED TYPE: Chronic | ICD-10-CM

## 2020-01-14 DIAGNOSIS — N18.30 CHRONIC KIDNEY DISEASE, STAGE 3 (HCC): ICD-10-CM

## 2020-01-14 DIAGNOSIS — Z13.820 OSTEOPOROSIS SCREENING: ICD-10-CM

## 2020-01-14 DIAGNOSIS — Z13.6 SCREENING FOR CARDIOVASCULAR CONDITION: ICD-10-CM

## 2020-01-14 DIAGNOSIS — Z11.59 NEED FOR HEPATITIS C SCREENING TEST: ICD-10-CM

## 2020-01-14 PROCEDURE — 99214 OFFICE O/P EST MOD 30 MIN: CPT | Performed by: FAMILY MEDICINE

## 2020-01-14 PROCEDURE — G0438 PPPS, INITIAL VISIT: HCPCS | Performed by: FAMILY MEDICINE

## 2020-01-14 PROCEDURE — 1123F ACP DISCUSS/DSCN MKR DOCD: CPT | Performed by: FAMILY MEDICINE

## 2020-01-14 PROCEDURE — 69210 REMOVE IMPACTED EAR WAX UNI: CPT | Performed by: FAMILY MEDICINE

## 2020-01-14 NOTE — ASSESSMENT & PLAN NOTE
Lab Results   Component Value Date    HGBA1C 6 8 (H) 10/18/2019     Up to date with ophth - cont present care  Monitor BGs   Recheck 6m

## 2020-01-14 NOTE — PROGRESS NOTES
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 ([address] )    Lab Results   Component Value Date    HGBA1C 6 8 (H) 10/18/2019     Up to date with ophth - cont present care  Monitor BGs  Recheck 6m    Type 2 diabetes mellitus with hyperglycemia (HCC)    Lab Results   Component Value Date    HGBA1C 6 8 (H) 10/18/2019     Improved control  Cont to work on diet  Encouraged weight loss  F/u with Endo  Recheck 6m    Sleep apnea  Compliant with CPAP  Cont present care  Recheck 6m    PAF (paroxysmal atrial fibrillation) (HCC)  NSR clinically at present  Cont to monitor    Chronic kidney disease, stage 3  Check labs  Monitor urine  Cont present care  Recheck 6m       Diagnoses and all orders for this visit:    Medicare annual wellness visit, subsequent    Osteoporosis screening  -     DXA bone density spine hip and pelvis; Future    Post-menopausal  -     DXA bone density spine hip and pelvis; Future    Screening for cardiovascular condition  -     Lipid panel; Future    Need for hepatitis C screening test  -     Hepatitis C antibody; Future    Asymptomatic postmenopausal state  -     DXA bone density spine hip and pelvis; Future    Hypothyroidism, unspecified type    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, macular edema presence unspecified (HCC)    Type 2 diabetes mellitus with hyperglycemia, with long-term current use of insulin (HCC)    Obstructive sleep apnea syndrome    PAF (paroxysmal atrial fibrillation) (HCC)    Chronic kidney disease, stage 3 (HCC)    Bilateral hearing loss due to cerumen impaction    Other orders  -     Ear cerumen removal          Subjective:      Patient ID: Kar Santana is a 70 y o  female  f/u multiple med issues  - pt with worsening L knee pain over the last 2m  Has appt with Dr Brittany Simmons (ortho) on Thurs  Using a lot of tylenol at present  - BGs labile recently (over the holidays)  Tries to be compliant with diet and meds  Fasting BGs remain around 100-135  Last A1C in Oct ws 6 8  - due to see Uro  No new urinary complaints - still struggles with incontinence  - pt denies CP, palp, lightheadedness or other CV symptoms with or without exertion  Pt due to see Dr Qi Philip this month or next  - no new Gi issues  - up to date with ophth  Was supposed to have injection yesterday  - pt with decreased hearing  Recent audiology eval suggested that she had bilat cerumen impaction  She would like it removed  - AWV done        The following portions of the patient's history were reviewed and updated as appropriate:   She  has a past medical history of Anxiety, Arthritis, CHF (congestive heart failure) ([facility] ), Chronic venous insufficiency, COPD (chronic obstructive pulmonary disease) ([facility] ), Coronary artery disease, Diabetes mellitus ([facility] ), Disease of thyroid gland, DVT (deep venous thrombosis) ([facility] ), Hyperlipidemia, Hypertension, Irregular heart beat, Pacemaker, Renal disorder, Thyroid disease, Urinary incontinence, and Uterine leiomyoma    She   Patient Active Problem List    Diagnosis Date Noted    Sleep apnea 05/17/2019    Intraocular pressure increase 06/06/2018    Vitamin D deficiency 04/03/2018    Bilateral leg edema 03/13/2018    Iron deficiency anemia 10/12/2017    Urinary bladder incontinence 07/26/2017    OAB (overactive bladder) 07/26/2017    Benign hypertension with chronic kidney disease, stage III ([address] ) 06/21/2017    Microalbuminuria 06/21/2017    PAF (paroxysmal atrial fibrillation) ([facility] ) 10/31/2016    Chronic venous insufficiency 02/19/2016    Dvt femoral [facility] thrombosis) ([facility] ) 02/19/2016    Presence of permanent cardiac pacemaker 01/28/2016    Chronic diastolic congestive heart failure ([facility] ) 01/21/2016    Anemia 01/19/2016    S/P cardiac pacemaker procedure 01/19/2016    Hypothyroidism 01/19/2016    Complete heart block ([facility] ) 01/16/2016    NSTEMI (non-ST elevated myocardial infarction) ([facility] ) 01/15/2016    Type 2 diabetes mellitus with hyperglycemia ([place] [address] ) 01/15/2016    Morbid obesity with body mass index (BMI) of 40 0 to 44 9 in adult [facility]) 01/15/2016    Chronic kidney disease, stage 3 ([place] [address] ) 10/21/2015    Eczematous dermatitis 03/03/2015    Anxiety disorder 06/26/2013    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 neurological manifestations, uncontrolled([address]) 10/02/2012    Chronic obstructive pulmonary disease ([place] [address] ) 09/24/2012    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 ([place] [address] ) 09/22/2011    Mixed hyperlipidemia 03/14/2011     She  has a past surgical history that includes Hysterectomy; Joint replacement (Right); Tonsillectomy; Cataract extraction w/ intraocular lens  implant, bilateral; Ectopic pregnancy surgery; Cardiac surgery; Cardiac pacemaker placement; Carpal tunnel release (Bilateral); pr endovenous laser, 1st vein (Left, 2/17/2017); Cystoscopy; Cataract extraction; Breast surgery; and Oophorectomy  She  reports that she has never smoked  She has never used smokeless tobacco  She reports that she drinks alcohol  She reports that she does not use drugs  Current Outpatient Medications   Medication Sig Dispense Refill    acetaminophen (TYLENOL) 325 mg tablet Take 650 mg by mouth   apixaban (ELIQUIS) 5 mg Take 1 tablet (5 mg total) by mouth 2 (two) times a day 180 tablet 3    Ascorbic Acid (VITAMIN C) 1000 MG tablet Take 1,000 mg by mouth        atorvastatin (LIPITOR) 80 mg tablet TAKE 1 TABLET AT BEDTIME 90 tablet 3    cholecalciferol (VITAMIN D3) 1,000 units tablet Take 2,000 Units by mouth daily       citalopram (CeleXA) 20 mg tablet TAKE 1 TABLET EVERY DAY 90 tablet 3    Continuous Blood Gluc  (FREESTYLE RONNI 14 DAY READER) GARRY Use to check BG levels 4+times daily and as needed 1 Device 0    Continuous Blood Gluc Sensor (FREESTYLE RONNI 14 DAY SENSOR) MISC Change sensor every 14 days to check BG levels 4+ times daily 6 each 3    furosemide (LASIX) 20 mg tablet Take 1 tablet (20 mg total) by mouth daily 90 tablet 3    GLUCOSAMINE CHONDROITIN COMPLX PO Take 1 tablet by mouth 2 (two) times a day      glucose blood (TRUE METRIX BLOOD GLUCOSE TEST) test strip Test 4 times plus daily, as instructed 400 each 1    insulin aspart (NOVOLOG FLEXPEN) 100 Units/mL injection pen 10u with breakfast, 10u with lunch, 10u with dinner 15 pen 0    insulin glargine (TOUJEO) 300 units/mL CONCETRATED U-300 injection pen (1-unit dial) Inject 40 Units under the skin daily at bedtime 9 pen 1    Insulin Pen Needle (PEN NEEDLES) 32G X 6 MM MISC Use 4 times a day 400 each 1    levothyroxine 125 mcg tablet TAKE 1 TABLET BY MOUTH ONCE DAILY 90 tablet 0    lisinopril (ZESTRIL) 20 mg tablet Take 1 tablet (20 mg total) by mouth 2 (two) times a day 180 tablet 3    metoprolol tartrate (LOPRESSOR) 25 mg tablet Take 1 tablet (25 mg total) by mouth every 12 (twelve) hours 180 tablet 3    Omega-3 Fatty Acids (FISH OIL) 1200 MG CAPS Take 1,200 mg by mouth 3 (three) times a day        oxybutynin (DITROPAN) 5 mg tablet TAKE 1 TABLET TWICE DAILY 180 tablet 0    promethazine-dextromethorphan (PHENERGAN-DM) 6 25-15 mg/5 mL oral syrup Take 5 mL by mouth 4 (four) times a day as needed for cough 180 mL 3    Tetrahydroz-Polyvinyl Al-Povid (MURINE TEARS PLUS OP) Apply to eye 3 (three) times a day       No current facility-administered medications for this visit  She is allergic to ampicillin       Review of Systems   Constitutional: Negative for diaphoresis and fever  HENT: Positive for hearing loss (decreased hearing bilat)  Eyes: Positive for visual disturbance (chronic - ? sl worse)  Negative for pain, discharge and itching  Respiratory: Negative  Cardiovascular: Negative  Gastrointestinal: Negative  Endocrine: Negative  Genitourinary: Positive for frequency and urgency  Chronic urge incontinence   Musculoskeletal: Positive for arthralgias, gait problem and joint swelling  Skin: Negative  Allergic/Immunologic: Negative  Neurological: Negative for dizziness, weakness, light-headedness and numbness  Hematological: Negative  Psychiatric/Behavioral: Negative  Objective:      /70 (BP Location: Right arm, Patient Position: Sitting, Cuff Size: Large)   Pulse 71   Temp (!) 96 9 °F (36 1 °C) (Tympanic)   Ht 5' 1" (1 549 m)   Wt 105 kg (231 lb 8 oz)   SpO2 94%   BMI 43 74 kg/m²          Physical Exam   Constitutional: She is oriented to person, place, and time  She appears well-developed and well-nourished  HENT:   Head: Normocephalic and atraumatic  Nose: Nose normal    Mouth/Throat: Oropharynx is clear and moist    bilat cerumen impaction   Eyes: Conjunctivae are normal    Mild ptosis   Neck: Normal range of motion  No thyromegaly present  Cardiovascular: Normal rate, regular rhythm and intact distal pulses  Pulses are no weak pulses  Pulses:       Dorsalis pedis pulses are 2+ on the right side, and 2+ on the left side  Pulmonary/Chest: Effort normal and breath sounds normal    Abdominal: Soft  Bowel sounds are normal  She exhibits no distension and no mass  There is no tenderness  Musculoskeletal: She exhibits edema (trace)  L knee with trace effusion   Feet:   Right Foot:   Skin Integrity: Negative for ulcer, skin breakdown, erythema, warmth, callus or dry skin  Left Foot:   Skin Integrity: Negative for ulcer, skin breakdown, erythema, warmth, callus or dry skin  Lymphadenopathy:     She has no cervical adenopathy  Neurological: She is alert and oriented to person, place, and time  A cranial nerve deficit (decreased hearing) is present  No sensory deficit  Skin: Skin is warm  Capillary refill takes less than 2 seconds  Psychiatric: She has a normal mood and affect  Her behavior is normal  Judgment and thought content normal          Patient's shoes and socks removed  Right Foot/Ankle   Right Foot Inspection  Skin Exam: skin normal and skin intact no dry skin, no warmth, no callus, no erythema, no maceration, no abnormal color, no pre-ulcer, no ulcer and no callus                          Toe Exam: ROM and strength within normal limits  Sensory   Vibration: intact    Monofilament testing: intact  Vascular  Capillary refills: < 3 seconds  The right DP pulse is 2+  Left Foot/Ankle  Left Foot Inspection  Skin Exam: skin normal and skin intactno dry skin, no warmth, no erythema, no maceration, normal color, no pre-ulcer, no ulcer and no callus                         Toe Exam: ROM and strength within normal limits                   Sensory   Vibration: intact    Monofilament: intact  Vascular  Capillary refills: < 3 seconds  The left DP pulse is 2+  Assign Risk Category:  No deformity present; No loss of protective sensation;  No weak pulses       Risk: 0    Ear cerumen removal  Date/Time: 1/14/2020 9:30 AM  Performed by: Lubna Demarco MD  Authorized by: Lubna Demarco MD     Patient location:  Clinic  Consent:     Consent obtained:  Verbal    Consent given by:  Patient    Risks discussed:  Bleeding, dizziness, incomplete removal, infection, pain and TM perforation    Alternatives discussed:  No treatment and observation  Universal protocol:     Procedure explained and questions answered to patient or proxy's satisfaction: yes      Patient identity confirmed:  Verbally with patient  Procedure details:     Local anesthetic:  None    Location:  L ear, R ear and external auditory canal    Procedure type: curette      Approach:  Natural orifice    Visualization (free text):  TMs normal after removal of cerumen    Equipment used:  R - darlyn with irrigation  L marielos with irrigation and alligator forceps  Post-procedure details:     Complication:  None    Hearing quality:  Improved    Patient tolerance of procedure:   Tolerated well, no immediate complications

## 2020-01-14 NOTE — ASSESSMENT & PLAN NOTE
Lab Results   Component Value Date    HGBA1C 6 8 (H) 10/18/2019     Improved control  Cont to work on diet  Encouraged weight loss  F/u with Endo   Recheck 6m

## 2020-01-14 NOTE — PROGRESS NOTES
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 ([address] )       Lab Results   Component Value Date    HGBA1C 6 8 (H) 10/18/2019     Up to date with ophth - cont present care  Monitor BGs  Recheck 6m         Type 2 diabetes mellitus with hyperglycemia (HCC)       Lab Results   Component Value Date    HGBA1C 6 8 (H) 10/18/2019     Improved control  Cont to work on diet  Encouraged weight loss  F/u with Endo  Recheck 6m            Respiratory    Sleep apnea     Compliant with CPAP  Cont present care  Recheck 6m            Cardiovascular and Mediastinum    PAF (paroxysmal atrial fibrillation) (HCC)     NSR clinically at present  Cont to monitor            Genitourinary    Chronic kidney disease, stage 3 (HCC)     Check labs  Monitor urine  Cont present care  Recheck 6m           Other Visit Diagnoses     Medicare annual wellness visit, subsequent    -  Primary    Osteoporosis screening        Post-menopausal        Screening for cardiovascular condition        Relevant Orders    Lipid panel    Need for hepatitis C screening test        Relevant Orders    Hepatitis C antibody    Asymptomatic postmenopausal state        Relevant Orders    DXA bone density spine hip and pelvis    Bilateral hearing loss due to cerumen impaction        Relevant Orders    Ear cerumen removal           Preventive health issues were discussed with patient, and age appropriate screening tests were ordered as noted in patient's After Visit Summary  Personalized health advice and appropriate referrals for health education or preventive services given if needed, as noted in patient's After Visit Summary       History of Present Illness:     Patient presents for Medicare Annual Wellness visit    Patient Care Team:  Grant Negron MD as PCP - MD Katie Servin MD Marcelina Riffle, MD Calogero Dimaggio, Laci Mercado, MD Fede Allison MD Eulas Katz, MD Ferdie Graff, MD Claretha Smiles, MD Elester Clifton as Nurse Practitioner (Endocrinology)  Yonathan Parish MD as Consulting Physician (Nephrology)     Problem List:     Patient Active Problem List   Diagnosis    NSTEMI (non-ST elevated myocardial infarction) [facility])    Type 2 diabetes mellitus with hyperglycemia ([place] [address] )    Morbid obesity with body mass index (BMI) of 40 0 to 44 9 in adult [facility])    Complete heart block ([place] [address] )    Anemia    S/P cardiac pacemaker procedure    Hypothyroidism    Chronic diastolic congestive heart failure ([place] [address] )    Chronic venous insufficiency    Dvt femoral (deep venous thrombosis) ([place] [address] )    Benign hypertension with chronic kidney disease, stage III ([place] [address] )    Chronic kidney disease, stage 3 ([place] [address] )    Iron deficiency anemia    Microalbuminuria    Bilateral leg edema    Vitamin D deficiency    Anxiety disorder    Urinary bladder incontinence    Chronic obstructive pulmonary disease ([facility] )    Eczematous dermatitis    Mixed hyperlipidemia    OAB (overactive bladder)    Presence of permanent cardiac pacemaker    PAF (paroxysmal atrial fibrillation) ([facility] )    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 neurological manifestations, uncontrolled([address])    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 ([facility] )    Intraocular pressure increase    Sleep apnea      Past Medical and Surgical History:     Past Medical History:   Diagnosis Date    Anxiety     Arthritis     CHF (congestive heart failure) ([facility] )     Chronic venous insufficiency     COPD (chronic obstructive pulmonary disease) ([facility] )     Coronary artery disease     Diabetes mellitus ([place] [address] )     type II  Disease of thyroid gland     hypothyroidism    DVT (deep venous thrombosis) (HCC)     Hyperlipidemia     Hypertension     Irregular heart beat     Paroxysmal a-fib    Pacemaker     Renal disorder     stage 3    Thyroid disease     Urinary incontinence     Uterine leiomyoma      Past Surgical History:   Procedure Laterality Date    BREAST SURGERY      For Biopsy    CARDIAC PACEMAKER PLACEMENT      Permanent  Last assessed: 2/19/16    CARDIAC SURGERY      CARPAL TUNNEL RELEASE Bilateral     CATARACT EXTRACTION      CATARACT EXTRACTION W/ INTRAOCULAR LENS  IMPLANT, BILATERAL      CYSTOSCOPY      Diagnostic   Last assessed: 11/8/17    ECTOPIC PREGNANCY SURGERY      HYSTERECTOMY      Total Abdomonal  Resolved: 1982    JOINT REPLACEMENT Right     TKR    OOPHORECTOMY      AR ENDOVENOUS LASER, 1ST VEIN Left 2/17/2017    Procedure: GREATER SAPHENOUS VEIN ENDOVASCULAR LASER THERAPY ;  Surgeon: Elvia Cruz DO;  [facility]: [facility];  Service: Vascular    TONSILLECTOMY        Family History:     Family History   Problem Relation Age of Onset    Hypertension Father     Diabetes Father         Mellitus    Coronary artery disease Mother     Hypertension Mother     Heart disease Mother     Diabetes Brother         Mellitus    Breast cancer Maternal Aunt     Pancreatic cancer Maternal Aunt     Breast cancer Cousin     Breast cancer Cousin       Social History:     Social History     Socioeconomic History    Marital status: /Civil Union     Spouse name: None    Number of children: 3    Years of education: None    Highest education level: None   Occupational History    None   Social Needs    Financial resource strain: None    Food insecurity:     Worry: None     Inability: None    Transportation needs:     Medical: None     Non-medical: None   Tobacco Use    Smoking status: Never Smoker    Smokeless tobacco: Never Used    Tobacco comment: Per [employer]: Former smoker Substance and Sexual Activity    Alcohol use: Yes     Comment: rarely  Per [employer]: Denied history of alcohol use    Drug use: No    Sexual activity: Never   Lifestyle    Physical activity:     Days per week: None     Minutes per session: None    Stress: None   Relationships    Social connections:     Talks on phone: None     Gets together: None     Attends Protestant service: None     Active member of club or organization: None     Attends meetings of clubs or organizations: None     Relationship status: None    Intimate partner violence:     Fear of current or ex partner: None     Emotionally abused: None     Physically abused: None     Forced sexual activity: None   Other Topics Concern    None   Social History Narrative    Daily coffee consumption: 2 cups/day       Medications and Allergies:     Current Outpatient Medications   Medication Sig Dispense Refill    acetaminophen (TYLENOL) 325 mg tablet Take 650 mg by mouth   apixaban (ELIQUIS) 5 mg Take 1 tablet (5 mg total) by mouth 2 (two) times a day 180 tablet 3    Ascorbic Acid (VITAMIN C) 1000 MG tablet Take 1,000 mg by mouth        atorvastatin (LIPITOR) 80 mg tablet TAKE 1 TABLET AT BEDTIME 90 tablet 3    cholecalciferol (VITAMIN D3) 1,000 units tablet Take 2,000 Units by mouth daily       citalopram (CeleXA) 20 mg tablet TAKE 1 TABLET EVERY DAY 90 tablet 3    Continuous Blood Gluc  (FREESTYLE RONNI 14 DAY READER) GARRY Use to check BG levels 4+times daily and as needed 1 Device 0    Continuous Blood Gluc Sensor (FREESTYLE RONNI 14 DAY SENSOR) MISC Change sensor every 14 days to check BG levels 4+ times daily 6 each 3    furosemide (LASIX) 20 mg tablet Take 1 tablet (20 mg total) by mouth daily 90 tablet 3    GLUCOSAMINE CHONDROITIN COMPLX PO Take 1 tablet by mouth 2 (two) times a day      glucose blood (TRUE METRIX BLOOD GLUCOSE TEST) test strip Test 4 times plus daily, as instructed 400 each 1    insulin aspart (NOVOLOG FLEXPEN) 100 Units/mL injection pen 10u with breakfast, 10u with lunch, 10u with dinner 15 pen 0    insulin glargine (TOUJEO) 300 units/mL CONCETRATED U-300 injection pen (1-unit dial) Inject 40 Units under the skin daily at bedtime 9 pen 1    Insulin Pen Needle (PEN NEEDLES) 32G X 6 MM MISC Use 4 times a day 400 each 1    levothyroxine 125 mcg tablet TAKE 1 TABLET BY MOUTH ONCE DAILY 90 tablet 0    lisinopril (ZESTRIL) 20 mg tablet Take 1 tablet (20 mg total) by mouth 2 (two) times a day 180 tablet 3    metoprolol tartrate (LOPRESSOR) 25 mg tablet Take 1 tablet (25 mg total) by mouth every 12 (twelve) hours 180 tablet 3    Omega-3 Fatty Acids (FISH OIL) 1200 MG CAPS Take 1,200 mg by mouth 3 (three) times a day        oxybutynin (DITROPAN) 5 mg tablet TAKE 1 TABLET TWICE DAILY 180 tablet 0    promethazine-dextromethorphan (PHENERGAN-DM) 6 25-15 mg/5 mL oral syrup Take 5 mL by mouth 4 (four) times a day as needed for cough 180 mL 3    Tetrahydroz-Polyvinyl Al-Povid (MURINE TEARS PLUS OP) Apply to eye 3 (three) times a day       No current facility-administered medications for this visit        Allergies   Allergen Reactions    Ampicillin Hives      Immunizations:     Immunization History   Administered Date(s) Administered    H1N1, All Formulations 01/12/2010, 01/12/2010    INFLUENZA 11/04/2005    Influenza Split High Dose Preservative Free IM 10/18/2012, 10/23/2013, 10/14/2014, 10/20/2015, 11/02/2016, 10/04/2017    Influenza TIV (IM) 11/12/2009, 01/12/2010    Influenza, high dose seasonal 0 5 mL 10/23/2018, 10/09/2019    Pneumococcal Conjugate 13-Valent 10/21/2015    Pneumococcal Polysaccharide PPV23 12/11/2000, 12/11/2010    Td (adult), adsorbed 07/04/2008    Tetanus, adsorbed 09/04/1998    Zoster 07/22/2016      Health Maintenance:         Topic Date Due    DXA SCAN  1940         Topic Date Due    DTaP,Tdap,and Td Vaccines (1 - Tdap) 01/24/1951      Medicare Health Risk Assessment: /70 (BP Location: Right arm, Patient Position: Sitting, Cuff Size: Large)   Pulse 71   Temp (!) 96 9 °F (36 1 °C) (Tympanic)   Ht 5' 1" (1 549 m)   Wt 105 kg (231 lb 8 oz)   SpO2 94%   BMI 43 74 kg/m²      Jessica Orosco is here for her Subsequent Wellness visit  Last Medicare Wellness visit information reviewed, patient interviewed and updates made to the record today  Health Risk Assessment:   Patient rates overall health as fair  Patient feels that their physical health rating is slightly worse  Eyesight was rated as slightly worse  Hearing was rated as slightly worse  Patient feels that their emotional and mental health rating is same  Pain experienced in the last 7 days has been a lot  Patient's pain rating has been 7/10  Patient states that she has experienced no weight loss or gain in last 6 months  Pain due to L knee issue    Depression Screening:   PHQ-2 Score: 0      Fall Risk Screening: In the past year, patient has experienced: history of falling in past year    Number of falls: 1  Injured during fall?: No    Feels unsteady when standing or walking?: Yes    Worried about falling?: No      Urinary Incontinence Screening:   Patient has leaked urine accidently in the last six months  Chronic urge incontinence - followed by [facility]    Home Safety:  Patient has trouble with stairs inside or outside of their home  Patient has working smoke alarms and has working carbon monoxide detector  Home safety hazards include: none  Nutrition:   Current diet is Diabetic, Low Carb and Limited junk food  Medications:   Patient is currently taking over-the-counter supplements  OTC medications include: see medication list  Patient is able to manage medications  Activities of Daily Living (ADLs)/Instrumental Activities of Daily Living (IADLs):   Walk and transfer into and out of bed and chair?: Yes  Dress and groom yourself?: Yes    Bathe or shower yourself?: Yes    Feed yourself?  Yes  Do your laundry/housekeeping?: Yes  Manage your money, pay your bills and track your expenses?: Yes  Make your own meals?: Yes    Do your own shopping?: Yes    Previous Hospitalizations:   Any hospitalizations or ED visits within the last 12 months?: No      Advance Care Planning:   Living will: Yes    Durable POA for healthcare: Yes    Advanced directive: Yes    Advanced directive counseling given: Yes      Cognitive Screening:   Provider or family/friend/caregiver concerned regarding cognition?: No    PREVENTIVE SCREENINGS      Cardiovascular Screening:    General: Screening Not Indicated and History Lipid Disorder      Diabetes Screening:     General: Screening Not Indicated and History Diabetes      Colorectal Cancer Screening:     General: Screening Not Indicated      Breast Cancer Screening:     General: Screening Current      Cervical Cancer Screening:    General: Screening Not Indicated      Osteoporosis Screening:    General: Risks and Benefits Discussed    Due for: DXA Appendicular      Abdominal Aortic Aneurysm (AAA) Screening:        General: Screening Not Indicated      Lung Cancer Screening:     General: Screening Not Indicated      Hepatitis C Screening:    General: Risks and Benefits Discussed    Hep C Screening Accepted: Yes      Other Counseling Topics:   Regular weightbearing exercise         Angelique Becerril MD

## 2020-01-14 NOTE — PATIENT INSTRUCTIONS
Medicare Preventive Visit Patient Instructions  Thank you for completing your Welcome to Medicare Visit or Medicare Annual Wellness Visit today  Your next wellness visit will be due in one year (1/14/2021)  The screening/preventive services that you may require over the next 5-10 years are detailed below  Some tests may not apply to you based off risk factors and/or age  Screening tests ordered at today's visit but not completed yet may show as past due  Also, please note that scanned in results may not display below  Preventive Screenings:  Service Recommendations Previous Testing/Comments   Colorectal Cancer Screening  * Colonoscopy    * Fecal Occult Blood Test (FOBT)/Fecal Immunochemical Test (FIT)  * Fecal DNA/Cologuard Test  * Flexible Sigmoidoscopy Age: 54-65 years old   Colonoscopy: every 10 years (may be performed more frequently if at higher risk)  OR  FOBT/FIT: every 1 year  OR  Cologuard: every 3 years  OR  Sigmoidoscopy: every 5 years  Screening may be recommended earlier than age 48 if at higher risk for colorectal cancer  Also, an individualized decision between you and your healthcare provider will decide whether screening between the ages of 74-80 would be appropriate  Colonoscopy: Not on file  FOBT/FIT: Not on file  Cologuard: Not on file  Sigmoidoscopy: Not on file         Breast Cancer Screening Age: 36 years old  Frequency: every 1-2 years  Not required if history of left and right mastectomy Mammogram: 01/04/2019    Screening Current   Cervical Cancer Screening Between the ages of 21-29, pap smear recommended once every 3 years  Between the ages of 33-67, can perform pap smear with HPV co-testing every 5 years     Recommendations may differ for women with a history of total hysterectomy, cervical cancer, or abnormal pap smears in past  Pap Smear: Not on file    Screening Not Indicated   Hepatitis C Screening Once for adults born between 1945 and 1965  More frequently in patients at high risk for Hepatitis C Hep C Antibody: Not on file       Diabetes Screening 1-2 times per year if you're at risk for diabetes or have pre-diabetes Fasting glucose: 157 mg/dL   A1C: 6 8 %    Screening Not Indicated  History Diabetes   Cholesterol Screening Once every 5 years if you don't have a lipid disorder  May order more often based on risk factors  Lipid panel: 06/18/2019    Screening Not Indicated  History Lipid Disorder     Other Preventive Screenings Covered by Medicare:  1  Abdominal Aortic Aneurysm (AAA) Screening: covered once if your at risk  You're considered to be at risk if you have a family history of AAA  2  Lung Cancer Screening: covers low dose CT scan once per year if you meet all of the following conditions: (1) Age 50-69; (2) No signs or symptoms of lung cancer; (3) Current smoker or have quit smoking within the last 15 years; (4) You have a tobacco smoking history of at least 30 pack years (packs per day multiplied by number of years you smoked); (5) You get a written order from a healthcare provider  3  Glaucoma Screening: covered annually if you're considered high risk: (1) You have diabetes OR (2) Family history of glaucoma OR (3)  aged 48 and older OR (3)  American aged 72 and older  3  Osteoporosis Screening: covered every 2 years if you meet one of the following conditions: (1) You're estrogen deficient and at risk for osteoporosis based off medical history and other findings; (2) Have a vertebral abnormality; (3) On glucocorticoid therapy for more than 3 months; (4) Have primary hyperparathyroidism; (5) On osteoporosis medications and need to assess response to drug therapy  · Last bone density test (DXA Scan): Not on file  5  HIV Screening: covered annually if you're between the age of 12-76  Also covered annually if you are younger than 13 and older than 72 with risk factors for HIV infection   For pregnant patients, it is covered up to 3 times per pregnancy  Immunizations:  Immunization Recommendations   Influenza Vaccine Annual influenza vaccination during flu season is recommended for all persons aged >= 6 months who do not have contraindications   Pneumococcal Vaccine (Prevnar and Pneumovax)  * Prevnar = PCV13  * Pneumovax = PPSV23   Adults 25-60 years old: 1-3 doses may be recommended based on certain risk factors  Adults 72 years old: Prevnar (PCV13) vaccine recommended followed by Pneumovax (PPSV23) vaccine  If already received PPSV23 since turning 65, then PCV13 recommended at least one year after PPSV23 dose  Hepatitis B Vaccine 3 dose series if at intermediate or high risk (ex: diabetes, end stage renal disease, liver disease)   Tetanus (Td) Vaccine - COST NOT COVERED BY MEDICARE PART B Following completion of primary series, a booster dose should be given every 10 years to maintain immunity against tetanus  Td may also be given as tetanus wound prophylaxis  Tdap Vaccine - COST NOT COVERED BY MEDICARE PART B Recommended at least once for all adults  For pregnant patients, recommended with each pregnancy  Shingles Vaccine (Shingrix) - COST NOT COVERED BY MEDICARE PART B  2 shot series recommended in those aged 48 and above     Health Maintenance Due:      Topic Date Due    DXA SCAN  1940     Immunizations Due:      Topic Date Due    DTaP,Tdap,and Td Vaccines (1 - Tdap) 01/24/1951     Advance Directives   What are advance directives? Advance directives are legal documents that state your wishes and plans for medical care  These plans are made ahead of time in case you lose your ability to make decisions for yourself  Advance directives can apply to any medical decision, such as the treatments you want, and if you want to donate organs  What are the types of advance directives? There are many types of advance directives, and each state has rules about how to use them   You may choose a combination of any of the following:  · Living will:  This is a written record of the treatment you want  You can also choose which treatments you do not want, which to limit, and which to stop at a certain time  This includes surgery, medicine, IV fluid, and tube feedings  · Durable power of  for healthcare [place] [facility]): This is a written record that states who you want to make healthcare choices for you when you are unable to make them for yourself  This person, called a proxy, is usually a family member or a friend  You may choose more than 1 proxy  · Do not resuscitate (DNR) order:  A DNR order is used in case your heart stops beating or you stop breathing  It is a request not to have certain forms of treatment, such as CPR  A DNR order may be included in other types of advance directives  · Medical directive: This covers the care that you want if you are in a coma, near death, or unable to make decisions for yourself  You can list the treatments you want for each condition  Treatment may include pain medicine, surgery, blood transfusions, dialysis, IV or tube feedings, and a ventilator (breathing machine)  · Values history: This document has questions about your views, beliefs, and how you feel and think about life  This information can help others choose the care that you would choose  Why are advance directives important? An advance directive helps you control your care  Although spoken wishes may be used, it is better to have your wishes written down  Spoken wishes can be misunderstood, or not followed  Treatments may be given even if you do not want them  An advance directive may make it easier for your family to make difficult choices about your care  Fall Prevention    Fall prevention  includes ways to make your home and other areas safer  It also includes ways you can move more carefully to prevent a fall   Health conditions that cause changes in your blood pressure, vision, or muscle strength and coordination may increase your risk for falls  Medicines may also increase your risk for falls if they make you dizzy, weak, or sleepy  Fall prevention tips:   · Stand or sit up slowly  · Use assistive devices as directed  · Wear shoes that fit well and have soles that   · Wear a personal alarm  · Stay active  · Manage your medical conditions  Home Safety Tips:  · Add items to prevent falls in the bathroom  · Keep paths clear  · Install bright lights in your home  · Keep items you use often on shelves within reach  · Paint or place reflective tape on the edges of your stairs  Urinary Incontinence   Urinary incontinence (UI)  is when you lose control of your bladder  UI develops because your bladder cannot store or empty urine properly  The 3 most common types of UI are stress incontinence, urge incontinence, or both  Medicines:   · May be given to help strengthen your bladder control  Report any side effects of medication to your healthcare provider  Do pelvic muscle exercises often:  Your pelvic muscles help you stop urinating  Squeeze these muscles tight for 5 seconds, then relax for 5 seconds  Gradually work up to squeezing for 10 seconds  Do 3 sets of 15 repetitions a day, or as directed  This will help strengthen your pelvic muscles and improve bladder control  Train your bladder:  Go to the bathroom at set times, such as every 2 hours, even if you do not feel the urge to go  You can also try to hold your urine when you feel the urge to go  For example, hold your urine for 5 minutes when you feel the urge to go  As that becomes easier, hold your urine for 10 minutes  Self-care:   · Keep a UI record  Write down how often you leak urine and how much you leak  Make a note of what you were doing when you leaked urine  · Drink liquids as directed  You may need to limit the amount of liquid you drink to help control your urine leakage  Do not drink any liquid right before you go to bed   Limit or do not have drinks that contain caffeine or alcohol  · Prevent constipation  Eat a variety of high-fiber foods  Good examples are high-fiber cereals, beans, vegetables, and whole-grain breads  Walking is the best way to trigger your intestines to have a bowel movement  · Exercise regularly and maintain a healthy weight  Weight loss and exercise will decrease pressure on your bladder and help you control your leakage  · Use a catheter as directed  to help empty your bladder  A catheter is a tiny, plastic tube that is put into your bladder to drain your urine  · Go to behavior therapy as directed  Behavior therapy may be used to help you learn to control your urge to urinate  Weight Management   Why it is important to manage your weight:  Being overweight increases your risk of health conditions such as heart disease, high blood pressure, type 2 diabetes, and certain types of cancer  It can also increase your risk for osteoarthritis, sleep apnea, and other respiratory problems  Aim for a slow, steady weight loss  Even a small amount of weight loss can lower your risk of health problems  How to lose weight safely:  A safe and healthy way to lose weight is to eat fewer calories and get regular exercise  You can lose up about 1 pound a week by decreasing the number of calories you eat by 500 calories each day  Healthy meal plan for weight management:  A healthy meal plan includes a variety of foods, contains fewer calories, and helps you stay healthy  A healthy meal plan includes the following:  · Eat whole-grain foods more often  A healthy meal plan should contain fiber  Fiber is the part of grains, fruits, and vegetables that is not broken down by your body  Whole-grain foods are healthy and provide extra fiber in your diet  Some examples of whole-grain foods are whole-wheat breads and pastas, oatmeal, brown rice, and bulgur  · Eat a variety of vegetables every day    Include dark, leafy greens such as spinach, kale, ras greens, and mustard greens  Eat yellow and orange vegetables such as carrots, sweet potatoes, and winter squash  · Eat a variety of fruits every day  Choose fresh or canned fruit (canned in its own juice or light syrup) instead of juice  Fruit juice has very little or no fiber  · Eat low-fat dairy foods  Drink fat-free (skim) milk or 1% milk  Eat fat-free yogurt and low-fat cottage cheese  Try low-fat cheeses such as mozzarella and other reduced-fat cheeses  · Choose meat and other protein foods that are low in fat  Choose beans or other legumes such as split peas or lentils  Choose fish, skinless poultry (chicken or turkey), or lean cuts of red meat (beef or pork)  Before you cook meat or poultry, cut off any visible fat  · Use less fat and oil  Try baking foods instead of frying them  Add less fat, such as margarine, sour cream, regular salad dressing and mayonnaise to foods  Eat fewer high-fat foods  Some examples of high-fat foods include french fries, doughnuts, ice cream, and cakes  · Eat fewer sweets  Limit foods and drinks that are high in sugar  This includes candy, cookies, regular soda, and sweetened drinks  Exercise:  Exercise at least 30 minutes per day on most days of the week  Some examples of exercise include walking, biking, dancing, and swimming  You can also fit in more physical activity by taking the stairs instead of the elevator or parking farther away from stores  Ask your healthcare provider about the best exercise plan for you  © Copyright [employer] 2018 Information is for End User's use only and may not be sold, redistributed or otherwise used for commercial purposes  All illustrations and images included in CareNotes® are the copyrighted property of [employer]  or [facility] Preventive Visit Patient Instructions  Thank you for completing your Welcome to Medicare Visit or Medicare Annual Wellness Visit today   Your next wellness visit will be due in one year (1/14/2021)  The screening/preventive services that you may require over the next 5-10 years are detailed below  Some tests may not apply to you based off risk factors and/or age  Screening tests ordered at today's visit but not completed yet may show as past due  Also, please note that scanned in results may not display below  Preventive Screenings:  Service Recommendations Previous Testing/Comments   Colorectal Cancer Screening  * Colonoscopy    * Fecal Occult Blood Test (FOBT)/Fecal Immunochemical Test (FIT)  * Fecal DNA/Cologuard Test  * Flexible Sigmoidoscopy Age: 54-65 years old   Colonoscopy: every 10 years (may be performed more frequently if at higher risk)  OR  FOBT/FIT: every 1 year  OR  Cologuard: every 3 years  OR  Sigmoidoscopy: every 5 years  Screening may be recommended earlier than age 48 if at higher risk for colorectal cancer  Also, an individualized decision between you and your healthcare provider will decide whether screening between the ages of 74-80 would be appropriate  Colonoscopy: Not on file  FOBT/FIT: Not on file  Cologuard: Not on file  Sigmoidoscopy: Not on file         Breast Cancer Screening Age: 36 years old  Frequency: every 1-2 years  Not required if history of left and right mastectomy Mammogram: 01/04/2019    Screening Current   Cervical Cancer Screening Between the ages of 21-29, pap smear recommended once every 3 years  Between the ages of 33-67, can perform pap smear with HPV co-testing every 5 years     Recommendations may differ for women with a history of total hysterectomy, cervical cancer, or abnormal pap smears in past  Pap Smear: Not on file    Screening Not Indicated   Hepatitis C Screening Once for adults born between [facility]  More frequently in patients at high risk for Hepatitis C Hep C Antibody: Not on file       Diabetes Screening 1-2 times per year if you're at risk for diabetes or have pre-diabetes Fasting glucose: 157 mg/dL   A1C: 6 8 %    Screening Not Indicated  History Diabetes   Cholesterol Screening Once every 5 years if you don't have a lipid disorder  May order more often based on risk factors  Lipid panel: 06/18/2019    Screening Not Indicated  History Lipid Disorder     Other Preventive Screenings Covered by Medicare:  6  Abdominal Aortic Aneurysm (AAA) Screening: covered once if your at risk  You're considered to be at risk if you have a family history of AAA  7  Lung Cancer Screening: covers low dose CT scan once per year if you meet all of the following conditions: (1) Age 50-69; (2) No signs or symptoms of lung cancer; (3) Current smoker or have quit smoking within the last 15 years; (4) You have a tobacco smoking history of at least 30 pack years (packs per day multiplied by number of years you smoked); (5) You get a written order from a healthcare provider  8  Glaucoma Screening: covered annually if you're considered high risk: (1) You have diabetes OR (2) Family history of glaucoma OR (3)  aged 48 and older OR (3)  American aged 72 and older  5  Osteoporosis Screening: covered every 2 years if you meet one of the following conditions: (1) You're estrogen deficient and at risk for osteoporosis based off medical history and other findings; (2) Have a vertebral abnormality; (3) On glucocorticoid therapy for more than 3 months; (4) Have primary hyperparathyroidism; (5) On osteoporosis medications and need to assess response to drug therapy  · Last bone density test (DXA Scan): Not on file  10  HIV Screening: covered annually if you're between the age of 12-76  Also covered annually if you are younger than 13 and older than 72 with risk factors for HIV infection  For pregnant patients, it is covered up to 3 times per pregnancy      Immunizations:  Immunization Recommendations   Influenza Vaccine Annual influenza vaccination during flu season is recommended for all persons aged >= 6 months who do not have contraindications   Pneumococcal Vaccine (Prevnar and Pneumovax)  * Prevnar = PCV13  * Pneumovax = PPSV23   Adults 25-60 years old: 1-3 doses may be recommended based on certain risk factors  Adults 72 years old: Prevnar (PCV13) vaccine recommended followed by Pneumovax (PPSV23) vaccine  If already received PPSV23 since turning 65, then PCV13 recommended at least one year after PPSV23 dose  Hepatitis B Vaccine 3 dose series if at intermediate or high risk (ex: diabetes, end stage renal disease, liver disease)   Tetanus (Td) Vaccine - COST NOT COVERED BY MEDICARE PART B Following completion of primary series, a booster dose should be given every 10 years to maintain immunity against tetanus  Td may also be given as tetanus wound prophylaxis  Tdap Vaccine - COST NOT COVERED BY MEDICARE PART B Recommended at least once for all adults  For pregnant patients, recommended with each pregnancy  Shingles Vaccine (Shingrix) - COST NOT COVERED BY MEDICARE PART B  2 shot series recommended in those aged 48 and above     Health Maintenance Due:      Topic Date Due    DXA SCAN  1940     Immunizations Due:      Topic Date Due    DTaP,Tdap,and Td Vaccines (1 - Tdap) 01/24/1951     Advance Directives   What are advance directives? Advance directives are legal documents that state your wishes and plans for medical care  These plans are made ahead of time in case you lose your ability to make decisions for yourself  Advance directives can apply to any medical decision, such as the treatments you want, and if you want to donate organs  What are the types of advance directives? There are many types of advance directives, and each state has rules about how to use them  You may choose a combination of any of the following:  · Living will: This is a written record of the treatment you want  You can also choose which treatments you do not want, which to limit, and which to stop at a certain time   This includes surgery, medicine, IV fluid, and tube feedings  · Durable power of  for healthcare [place] [facility]): This is a written record that states who you want to make healthcare choices for you when you are unable to make them for yourself  This person, called a proxy, is usually a family member or a friend  You may choose more than 1 proxy  · Do not resuscitate (DNR) order:  A DNR order is used in case your heart stops beating or you stop breathing  It is a request not to have certain forms of treatment, such as CPR  A DNR order may be included in other types of advance directives  · Medical directive: This covers the care that you want if you are in a coma, near death, or unable to make decisions for yourself  You can list the treatments you want for each condition  Treatment may include pain medicine, surgery, blood transfusions, dialysis, IV or tube feedings, and a ventilator (breathing machine)  · Values history: This document has questions about your views, beliefs, and how you feel and think about life  This information can help others choose the care that you would choose  Why are advance directives important? An advance directive helps you control your care  Although spoken wishes may be used, it is better to have your wishes written down  Spoken wishes can be misunderstood, or not followed  Treatments may be given even if you do not want them  An advance directive may make it easier for your family to make difficult choices about your care  Fall Prevention    Fall prevention  includes ways to make your home and other areas safer  It also includes ways you can move more carefully to prevent a fall  Health conditions that cause changes in your blood pressure, vision, or muscle strength and coordination may increase your risk for falls  Medicines may also increase your risk for falls if they make you dizzy, weak, or sleepy  Fall prevention tips:   · Stand or sit up slowly      · Use assistive devices as directed  · Wear shoes that fit well and have soles that   · Wear a personal alarm  · Stay active  · Manage your medical conditions  Home Safety Tips:  · Add items to prevent falls in the bathroom  · Keep paths clear  · Install bright lights in your home  · Keep items you use often on shelves within reach  · Paint or place reflective tape on the edges of your stairs  Urinary Incontinence   Urinary incontinence (UI)  is when you lose control of your bladder  UI develops because your bladder cannot store or empty urine properly  The 3 most common types of UI are stress incontinence, urge incontinence, or both  Medicines:   · May be given to help strengthen your bladder control  Report any side effects of medication to your healthcare provider  Do pelvic muscle exercises often:  Your pelvic muscles help you stop urinating  Squeeze these muscles tight for 5 seconds, then relax for 5 seconds  Gradually work up to squeezing for 10 seconds  Do 3 sets of 15 repetitions a day, or as directed  This will help strengthen your pelvic muscles and improve bladder control  Train your bladder:  Go to the bathroom at set times, such as every 2 hours, even if you do not feel the urge to go  You can also try to hold your urine when you feel the urge to go  For example, hold your urine for 5 minutes when you feel the urge to go  As that becomes easier, hold your urine for 10 minutes  Self-care:   · Keep a UI record  Write down how often you leak urine and how much you leak  Make a note of what you were doing when you leaked urine  · Drink liquids as directed  You may need to limit the amount of liquid you drink to help control your urine leakage  Do not drink any liquid right before you go to bed  Limit or do not have drinks that contain caffeine or alcohol  · Prevent constipation  Eat a variety of high-fiber foods   Good examples are high-fiber cereals, beans, vegetables, and whole-grain breads  Walking is the best way to trigger your intestines to have a bowel movement  · Exercise regularly and maintain a healthy weight  Weight loss and exercise will decrease pressure on your bladder and help you control your leakage  · Use a catheter as directed  to help empty your bladder  A catheter is a tiny, plastic tube that is put into your bladder to drain your urine  · Go to behavior therapy as directed  Behavior therapy may be used to help you learn to control your urge to urinate  Weight Management   Why it is important to manage your weight:  Being overweight increases your risk of health conditions such as heart disease, high blood pressure, type 2 diabetes, and certain types of cancer  It can also increase your risk for osteoarthritis, sleep apnea, and other respiratory problems  Aim for a slow, steady weight loss  Even a small amount of weight loss can lower your risk of health problems  How to lose weight safely:  A safe and healthy way to lose weight is to eat fewer calories and get regular exercise  You can lose up about 1 pound a week by decreasing the number of calories you eat by 500 calories each day  Healthy meal plan for weight management:  A healthy meal plan includes a variety of foods, contains fewer calories, and helps you stay healthy  A healthy meal plan includes the following:  · Eat whole-grain foods more often  A healthy meal plan should contain fiber  Fiber is the part of grains, fruits, and vegetables that is not broken down by your body  Whole-grain foods are healthy and provide extra fiber in your diet  Some examples of whole-grain foods are whole-wheat breads and pastas, oatmeal, brown rice, and bulgur  · Eat a variety of vegetables every day  Include dark, leafy greens such as spinach, kale, ras greens, and mustard greens  Eat yellow and orange vegetables such as carrots, sweet potatoes, and winter squash  · Eat a variety of fruits every day  Choose fresh or canned fruit (canned in its own juice or light syrup) instead of juice  Fruit juice has very little or no fiber  · Eat low-fat dairy foods  Drink fat-free (skim) milk or 1% milk  Eat fat-free yogurt and low-fat cottage cheese  Try low-fat cheeses such as mozzarella and other reduced-fat cheeses  · Choose meat and other protein foods that are low in fat  Choose beans or other legumes such as split peas or lentils  Choose fish, skinless poultry (chicken or turkey), or lean cuts of red meat (beef or pork)  Before you cook meat or poultry, cut off any visible fat  · Use less fat and oil  Try baking foods instead of frying them  Add less fat, such as margarine, sour cream, regular salad dressing and mayonnaise to foods  Eat fewer high-fat foods  Some examples of high-fat foods include french fries, doughnuts, ice cream, and cakes  · Eat fewer sweets  Limit foods and drinks that are high in sugar  This includes candy, cookies, regular soda, and sweetened drinks  Exercise:  Exercise at least 30 minutes per day on most days of the week  Some examples of exercise include walking, biking, dancing, and swimming  You can also fit in more physical activity by taking the stairs instead of the elevator or parking farther away from stores  Ask your healthcare provider about the best exercise plan for you  © Copyright [employer] 2018 Information is for End User's use only and may not be sold, redistributed or otherwise used for commercial purposes   All illustrations and images included in CareNotes® are the copyrighted property of [employer]  or [address] [place] [address]

## 2020-01-15 ENCOUNTER — TELEPHONE (OUTPATIENT)
Dept: CARDIOLOGY CLINIC | Facility: CLINIC | Age: 80
End: 2020-01-15

## 2020-01-15 NOTE — TELEPHONE ENCOUNTER
Patient called and states she is having oral surgery 1/20/2020 and the surgeon would like to know how long she should hold her apixaban (ELIQUIS) 5 mg    Please advise     Thank you

## 2020-01-17 ENCOUNTER — OFFICE VISIT (OUTPATIENT)
Dept: OBGYN CLINIC | Facility: CLINIC | Age: 80
End: 2020-01-17
Payer: MEDICARE

## 2020-01-17 VITALS
HEIGHT: 60 IN | BODY MASS INDEX: 45.35 KG/M2 | DIASTOLIC BLOOD PRESSURE: 78 MMHG | WEIGHT: 231 LBS | SYSTOLIC BLOOD PRESSURE: 144 MMHG | HEART RATE: 88 BPM

## 2020-01-17 DIAGNOSIS — M17.12 PRIMARY OSTEOARTHRITIS OF LEFT KNEE: Primary | ICD-10-CM

## 2020-01-17 PROCEDURE — 99203 OFFICE O/P NEW LOW 30 MIN: CPT | Performed by: ORTHOPAEDIC SURGERY

## 2020-01-17 NOTE — PROGRESS NOTES
Patient Name:  Katheran Kocher  MRN:  [id number]    Assessment & Plan     Left Knee DJD    1  Patient was provided with a cortisone injection into her left knee today  This is documented appropriately below  2  Continue use of Tylenol and ice p r n  for pain relief  3  May perform activities tolerated  Avoid painful maneuvers  4  Instructed patient that if after 4-5 days she does not experience appreciable relief to call office to schedule follow-up visit for x rays and have visco injections pre certed  5  Follow-up on an as-needed basis  Chief Complaint     Left Knee Pain    History of the Present Illness     Katheran Kocher is a 70 y o  female presents today for an initial visit for her left knee  Patient states that she has been experiencing pain about her left knee ongoing for about 2 months without any certain mechanism of injury  Patient localizes his symptoms diffusely about the knee  Her symptoms are worse with ambulating and weight bearing activities  She has been taking Tylenol p r n  for pain relief with minimal benefit  She does have a history of a right total knee replacement about 5 years ago  Denies numbness and tingling, fever or chills  Physical Exam     /78   Pulse 88   Ht 5' (1 524 m)   Wt 105 kg (231 lb)   BMI 45 11 kg/m²     Left  knee:  Soft tissue swelling: None  No erythema  Effusion:  Trace  Tenderness to palpation: Diffusely about the knee  Range of motion:  Extension: 0  Flexion: 110  Lachman test: Stable  Valgus stress: Stable  Varus stress: Stable  Posterior drawer test: Stable  Rachel's test: Negative  Patellar grind test: Negative    Eyes: No scleral icterus  Neck: Supple  Lungs: Normal respiratory effort  Cardiovascular: Capillary refill is less than 2 seconds  Skin: Intact without erythema  Neurologic: Sensation intact to light touch  Psychiatric: Mood and affect are appropriate      Past Medical History:   Diagnosis Date    Anxiety     Arthritis     CHF (congestive heart failure) (HCC)     Chronic venous insufficiency     COPD (chronic obstructive pulmonary disease) (HCC)     Coronary artery disease     Diabetes mellitus ([facility] [address] )     type II    Disease of thyroid gland     hypothyroidism    [facility]     Hyperlipidemia     Hypertension     Irregular heart beat     Paroxysmal a-fib    Pacemaker     Renal disorder     stage 3    Thyroid disease     Urinary incontinence     Uterine leiomyoma        Past Surgical History:   Procedure Laterality Date    BREAST SURGERY      For Biopsy    CARDIAC PACEMAKER PLACEMENT      Permanent  Last assessed: 2/19/16    CARDIAC SURGERY      CARPAL TUNNEL RELEASE Bilateral     CATARACT EXTRACTION      CATARACT EXTRACTION W/ INTRAOCULAR LENS  IMPLANT, BILATERAL      CYSTOSCOPY      Diagnostic  Last assessed: 11/8/17    ECTOPIC PREGNANCY SURGERY      HYSTERECTOMY      Total Abdomonal  Resolved: 1982    JOINT REPLACEMENT Right     TKR    OOPHORECTOMY      KY ENDOVENOUS LASER, 1ST VEIN Left 2/17/2017    Procedure: GREATER SAPHENOUS VEIN ENDOVASCULAR LASER THERAPY ;  Surgeon: Ponce Ontiveros DO;  [facility];  Service: Vascular    TONSILLECTOMY         Allergies   Allergen Reactions    Ampicillin Hives       Current Outpatient Medications on File Prior to Visit   Medication Sig Dispense Refill    acetaminophen (TYLENOL) 325 mg tablet Take 650 mg by mouth   apixaban (ELIQUIS) 5 mg Take 1 tablet (5 mg total) by mouth 2 (two) times a day 180 tablet 3    Ascorbic Acid (VITAMIN C) 1000 MG tablet Take 1,000 mg by mouth        atorvastatin (LIPITOR) 80 mg tablet TAKE 1 TABLET AT BEDTIME 90 tablet 3    cholecalciferol (VITAMIN D3) 1,000 units tablet Take 2,000 Units by mouth daily       citalopram (CeleXA) 20 mg tablet TAKE 1 TABLET EVERY DAY 90 tablet 3    Continuous Blood Gluc  (BehavioE 14 DAY READER) GARRY Use to check BG levels 4+times daily and as needed 1 Device 0    Continuous Blood Gluc Sensor (FREESTYLE RONNI 14 DAY SENSOR) MISC Change sensor every 14 days to check BG levels 4+ times daily 6 each 3    furosemide (LASIX) 20 mg tablet Take 1 tablet (20 mg total) by mouth daily 90 tablet 3    GLUCOSAMINE CHONDROITIN COMPLX PO Take 1 tablet by mouth 2 (two) times a day      glucose blood (TRUE METRIX BLOOD GLUCOSE TEST) test strip Test 4 times plus daily, as instructed 400 each 1    insulin aspart (NOVOLOG FLEXPEN) 100 Units/mL injection pen 10u with breakfast, 10u with lunch, 10u with dinner 15 pen 0    insulin glargine (TOUJEO) 300 units/mL CONCETRATED U-300 injection pen (1-unit dial) Inject 40 Units under the skin daily at bedtime 9 pen 1    Insulin Pen Needle (PEN NEEDLES) 32G X 6 MM MISC Use 4 times a day 400 each 1    levothyroxine 125 mcg tablet TAKE 1 TABLET BY MOUTH ONCE DAILY 90 tablet 0    lisinopril (ZESTRIL) 20 mg tablet Take 1 tablet (20 mg total) by mouth 2 (two) times a day 180 tablet 3    metoprolol tartrate (LOPRESSOR) 25 mg tablet Take 1 tablet (25 mg total) by mouth every 12 (twelve) hours 180 tablet 3    Omega-3 Fatty Acids (FISH OIL) 1200 MG CAPS Take 1,200 mg by mouth 3 (three) times a day        oxybutynin (DITROPAN) 5 mg tablet TAKE 1 TABLET TWICE DAILY 180 tablet 0    promethazine-dextromethorphan (PHENERGAN-DM) 6 25-15 mg/5 mL oral syrup Take 5 mL by mouth 4 (four) times a day as needed for cough 180 mL 3    Tetrahydroz-Polyvinyl Al-Povid (MURINE TEARS PLUS OP) Apply to eye 3 (three) times a day       No current facility-administered medications on file prior to visit  Social History     Tobacco Use    Smoking status: Never Smoker    Smokeless tobacco: Never Used    Tobacco comment: Per [employer]: Former smoker   Substance Use Topics    Alcohol use: Yes     Comment: rarely   Per [employer]: Denied history of alcohol use    Drug use: No       Family History   Problem Relation Age of Onset    Hypertension Father     Diabetes Father         Mellitus    Coronary artery disease Mother     Hypertension Mother     Heart disease Mother     Diabetes Brother         Mellitus    Breast cancer Maternal Aunt     Pancreatic cancer Maternal Aunt     Breast cancer Cousin     Breast cancer Cousin        Review of Systems     As stated in the HPI  All other systems were reviewed and are negative        Scribe Attestation    I,:   Manan Marie am acting as a scribe while in the presence of the attending physician :        I,:   Ruthie Bumpers, MD personally performed the services described in this documentation    as scribed in my presence :

## 2020-01-23 ENCOUNTER — LAB (OUTPATIENT)
Dept: LAB | Facility: CLINIC | Age: 80
End: 2020-01-23
Payer: MEDICARE

## 2020-01-23 DIAGNOSIS — Z79.4 TYPE 2 DIABETES MELLITUS WITH HYPERGLYCEMIA, WITH LONG-TERM CURRENT USE OF INSULIN (HCC): ICD-10-CM

## 2020-01-23 DIAGNOSIS — R80.1 PERSISTENT PROTEINURIA: ICD-10-CM

## 2020-01-23 DIAGNOSIS — N18.30 CHRONIC KIDNEY DISEASE, STAGE 3 (HCC): ICD-10-CM

## 2020-01-23 DIAGNOSIS — Z13.6 SCREENING FOR CARDIOVASCULAR CONDITION: ICD-10-CM

## 2020-01-23 DIAGNOSIS — R31.0 GROSS HEMATURIA: ICD-10-CM

## 2020-01-23 DIAGNOSIS — Z11.59 NEED FOR HEPATITIS C SCREENING TEST: ICD-10-CM

## 2020-01-23 DIAGNOSIS — E11.65 TYPE 2 DIABETES MELLITUS WITH HYPERGLYCEMIA, WITH LONG-TERM CURRENT USE OF INSULIN (HCC): ICD-10-CM

## 2020-01-23 LAB
25(OH)D3 SERPL-MCNC: 37.8 NG/ML (ref 30–100)
ANION GAP SERPL CALCULATED.3IONS-SCNC: 3 MMOL/L (ref 4–13)
BUN SERPL-MCNC: 33 MG/DL (ref 5–25)
CALCIUM SERPL-MCNC: 9.4 MG/DL (ref 8.3–10.1)
CHLORIDE SERPL-SCNC: 108 MMOL/L (ref 100–108)
CHOLEST SERPL-MCNC: 135 MG/DL (ref 50–200)
CO2 SERPL-SCNC: 28 MMOL/L (ref 21–32)
CREAT SERPL-MCNC: 1.18 MG/DL (ref 0.6–1.3)
CREAT UR-MCNC: 54 MG/DL
ERYTHROCYTE [DISTWIDTH] IN BLOOD BY AUTOMATED COUNT: 13 % (ref 11.6–15.1)
EST. AVERAGE GLUCOSE BLD GHB EST-MCNC: 154 MG/DL
GFR SERPL CREATININE-BSD FRML MDRD: 45 ML/MIN/1.73SQ M
GLUCOSE SERPL-MCNC: 131 MG/DL (ref 65–140)
HBA1C MFR BLD: 7 % (ref 4.2–6.3)
HCT VFR BLD AUTO: 39.2 % (ref 34.8–46.1)
HCV AB SER QL: NORMAL
HDLC SERPL-MCNC: 64 MG/DL
HGB BLD-MCNC: 11.9 G/DL (ref 11.5–15.4)
LDLC SERPL CALC-MCNC: 53 MG/DL (ref 0–100)
MCH RBC QN AUTO: 29.5 PG (ref 26.8–34.3)
MCHC RBC AUTO-ENTMCNC: 30.4 G/DL (ref 31.4–37.4)
MCV RBC AUTO: 97 FL (ref 82–98)
NONHDLC SERPL-MCNC: 71 MG/DL
PHOSPHATE SERPL-MCNC: 3.8 MG/DL (ref 2.3–4.1)
PMV BLD AUTO: 11.7 FL (ref 8.9–12.7)
POTASSIUM SERPL-SCNC: 4.5 MMOL/L (ref 3.5–5.3)
PROT UR-MCNC: 15 MG/DL
PROT/CREAT UR: 0.28 MG/G{CREAT} (ref 0–0.1)
PTH-INTACT SERPL-MCNC: 81.3 PG/ML (ref 18.4–80.1)
RBC # BLD AUTO: 4.03 MILLION/UL (ref 3.81–5.12)
SODIUM SERPL-SCNC: 139 MMOL/L (ref 136–145)
TRIGL SERPL-MCNC: 90 MG/DL
WBC # BLD AUTO: 6.35 THOUSAND/UL (ref 4.31–10.16)

## 2020-01-23 PROCEDURE — 88112 CYTOPATH CELL ENHANCE TECH: CPT | Performed by: PATHOLOGY

## 2020-01-23 PROCEDURE — 82570 ASSAY OF URINE CREATININE: CPT

## 2020-01-23 PROCEDURE — 83970 ASSAY OF PARATHORMONE: CPT

## 2020-01-23 PROCEDURE — 86803 HEPATITIS C AB TEST: CPT

## 2020-01-23 PROCEDURE — 83036 HEMOGLOBIN GLYCOSYLATED A1C: CPT

## 2020-01-23 PROCEDURE — 84156 ASSAY OF PROTEIN URINE: CPT

## 2020-01-23 PROCEDURE — 82306 VITAMIN D 25 HYDROXY: CPT

## 2020-01-23 PROCEDURE — 36415 COLL VENOUS BLD VENIPUNCTURE: CPT

## 2020-01-23 PROCEDURE — 80061 LIPID PANEL: CPT

## 2020-01-23 PROCEDURE — 80048 BASIC METABOLIC PNL TOTAL CA: CPT

## 2020-01-23 PROCEDURE — 85027 COMPLETE CBC AUTOMATED: CPT

## 2020-01-23 PROCEDURE — 84100 ASSAY OF PHOSPHORUS: CPT

## 2020-01-27 ENCOUNTER — DOCUMENTATION (OUTPATIENT)
Dept: ENDOCRINOLOGY | Facility: CLINIC | Age: 80
End: 2020-01-27

## 2020-01-27 ENCOUNTER — OFFICE VISIT (OUTPATIENT)
Dept: ENDOCRINOLOGY | Facility: CLINIC | Age: 80
End: 2020-01-27
Payer: MEDICARE

## 2020-01-27 VITALS
DIASTOLIC BLOOD PRESSURE: 82 MMHG | HEART RATE: 62 BPM | SYSTOLIC BLOOD PRESSURE: 130 MMHG | BODY MASS INDEX: 45.11 KG/M2 | HEIGHT: 60 IN

## 2020-01-27 DIAGNOSIS — E66.01 MORBID OBESITY WITH BODY MASS INDEX (BMI) OF 40.0 TO 44.9 IN ADULT (HCC): ICD-10-CM

## 2020-01-27 DIAGNOSIS — E21.3 HYPERPARATHYROIDISM (HCC): ICD-10-CM

## 2020-01-27 DIAGNOSIS — E78.2 MIXED HYPERLIPIDEMIA: ICD-10-CM

## 2020-01-27 DIAGNOSIS — Z79.4 TYPE 2 DIABETES MELLITUS WITH HYPERGLYCEMIA, WITH LONG-TERM CURRENT USE OF INSULIN (HCC): Primary | ICD-10-CM

## 2020-01-27 DIAGNOSIS — E11.65 TYPE 2 DIABETES MELLITUS WITH HYPERGLYCEMIA, WITH LONG-TERM CURRENT USE OF INSULIN (HCC): Primary | ICD-10-CM

## 2020-01-27 DIAGNOSIS — E11.3393 MODERATE NONPROLIFERATIVE DIABETIC RETINOPATHY OF BOTH EYES ASSOCIATED WITH TYPE 2 DIABETES MELLITUS, MACULAR EDEMA PRESENCE UNSPECIFIED (HCC): ICD-10-CM

## 2020-01-27 DIAGNOSIS — E03.9 HYPOTHYROIDISM, UNSPECIFIED TYPE: ICD-10-CM

## 2020-01-27 PROCEDURE — 99214 OFFICE O/P EST MOD 30 MIN: CPT | Performed by: INTERNAL MEDICINE

## 2020-01-27 RX ORDER — LEVOTHYROXINE SODIUM 0.12 MG/1
TABLET ORAL
Qty: 90 TABLET | Refills: 0
Start: 2020-01-27 | End: 2020-04-08 | Stop reason: SDUPTHER

## 2020-01-27 NOTE — PATIENT INSTRUCTIONS
Continue sliding scale      INSULIN DOSAGE INSTRUCTIONS      Name: Jessie Miranda                        : 1940  MRN #: [id number]    Your Current Insulin  and dose is: Before Breakfast Before Lunch Before Evening Meal Bedtime     Novolog Insulin 10 units  10 units  10 units     Regular, Apidra, Humalog orNovolog Sliding Scale:   <80              151-200 + 1 +1 1    201-250 +2 +2 +2 +   251-300 +3 +3 +3 +   301-350 +4 +4 +4 +   >350 +5 +5 +5 +     tojeo    40 units     Additional Instructions:   Please test your blood sugar:  _4_ Times per day  X_ Before Breakfast                _ Alternate Testing  X_ Before Lunch                _ 2 Hours After  Meal  X_ Before Evening Meal               _ 3 a m   x_ Before Bedtime Snack     Target Blood sugar range _70_to _140__  Call if your blood sugar is less than _60_ or greater than _400__  Today's Date: 2020       Hypoglycemia instructions   Jessie Miranda  2020  [id number]    Low Blood Sugar    Steps to treat low blood sugar  1  Test blood sugar if you have symptoms of low blood sugar:   Low Blood Sugar Symptoms:  o Sweaty  o Dizzy  o Rapid heartbeat  o Shaky    o Bad mood  o Hungry      2  Treat blood sugar less than 70 with 15 grams of fast-acting carbohydrate:   Examples of 15 grams Fast-Acting Carbohydrate:  o 4 oz juice  o 4 oz regular soda  o 3-4 glucose tablets (chew)  o 3-4 hard candies (chew)              3    Wait 15 minutes and test your blood sugar again           4   If blood sugar is less than 100, repeat steps 2-3       5  When your blood sugar is 100 or more, eat a snack if it will be longer than one hour until your next meal  The snack should be 15 grams of carbohydrate and a protein:   Examples of snacks:  o ½ sandwich  o 6 crackers with cheese  o Piece of fruit with cheese or peanut butter  o 6 crackers with peanut butter

## 2020-01-27 NOTE — PROGRESS NOTES
Irma Small 68 y o  female MRN: [id number]    Encounter: [id number]      Assessment/Plan     Assessment: This is a 68y o -year-old female with hypothyroidism  Plan:    Diagnoses and all orders for this visit:    Type 2 diabetes mellitus with hyperglycemia, with long-term current use of insulin ([facility] )    Lab Results   Component Value Date    HGBA1C 7 0 (H) 01/23/2020    A1c improved to 7%  Blood sugars are in optimal range most of the time  Discussed with patient to continue fingerstick monitoring 4 times daily as she is on 4 injections daily  Continue insulin glargine 40 units at bedtime and NovoLog 10 units with meals plus scale  Discussed to keep carbohydrate consistent with meals  Educated about risk of hypoglycemia with intensive insulin regimen and symptoms as well as treatment  Discussed to follow up with Podiatry and Ophthalmology    -     insulin glargine (TOUJEO) 300 units/mL CONCETRATED U-300 injection pen (1-unit dial); Inject 40 Units under the skin daily at bedtime  -     Hemoglobin A1C; Future  -     Basic metabolic panel; Future    Hypothyroidism, unspecified type  Patient is clinically and biochemically euthyroid  Continue levothyroxine 125 mcg 1 tablet daily except on Sunday no tablet  Follow-up TFT before next appointment  -     levothyroxine 125 mcg tablet; TAKE 1 TABLET BY MOUTH ONCE DAILY except on Sunday no tablet  -     TSH, 3rd generation; Future  -     T4, free; Future    Mixed hyperlipidemia  Continue statins    Morbid obesity with body mass index (BMI) of 40 0 to 44 9 in adult (HCC)    Wt Readings from Last 3 Encounters:   01/17/20 105 kg (231 lb)   01/14/20 105 kg (231 lb 8 oz)   10/22/19 107 kg (235 lb)    Educated about dietary modifications, patient is not able to ambulate much      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, macular edema presence unspecified ([facility] )  Discussed to follow up with Ophthalmology for diabetic retinopathy    Hyperparathyroidism ([address] )  Her most recent vitamin-D is within normal limits   Her calcium is always within normal range   PTH is slightly elevated  No history of kidney stones, no history of fracture bones  Continue to follow up   She is going to schedule DEXA scan scan will follow-up ( which was ordered by PCP )_     Davin Bradshaw MD      CC: Diabetes    History of Present Illness     HPI:  79-year-old woman with past medical history of type 2 diabetes, hyperlipidemia, morbid obesity, hypothyroidism is here for follow-up appointment    For diabetes she is currently taking toujeo 40 units at bedtime , NovoLog 10 units with breakfast, lunch and dinner plus scale  She tries to keep her carbohydrate consistent with meals  Diabetes is unstable over the last few months  She is checking blood sugar 4 times daily  Her blood sugars are usually in 90-1 80 mg/dL range average  Denies hypoglycemia less than 70 or more than 300 mg per dL  Denies recent hospitalizations for hyperglycemia or hypoglycemia    She has history of hyperlipidemia currently taking Lipitor 80 mg daily    For hypothyroidism she is currently taking levothyroxine 125 mcg, 1 tablet once daily except on Sunday no tablet  She denies missing any doses  She takes it on empty stomach 1 hour before breakfast    She also has vitamin-D deficiency currently taking vitamin D3, 2000 International Units daily  Her most recent vitamin-D is within normal range however PTH is elevated  No history of kidney stones, no history of fracture bones  She has slip from her primary care physician to get DEXA scan done  Component      Latest Ref Rng & Units 1/23/2020   Sodium      136 - 145 mmol/L 139   Potassium      3 5 - 5 3 mmol/L 4 5   Chloride      100 - 108 mmol/L 108   CO2      21 - 32 mmol/L 28   Anion Gap      4 - 13 mmol/L 3 (L)   BUN      5 - 25 mg/dL 33 (H)   Creatinine      0 60 - 1 30 mg/dL 1 18   Glucose, Random      65 - 140 mg/dL 131 Calcium      8 3 - 10 1 mg/dL 9 4   eGFR      ml/min/1 73sq m 45   WBC      4 31 - 10 16 Thousand/uL 6 35   Red Blood Cell Count      3 81 - 5 12 Million/uL 4 03   Hemoglobin      11 5 - 15 4 g/dL 11 9   HCT      34 8 - 46 1 % 39 2   MCV      82 - 98 fL 97   MCH      26 8 - 34 3 pg 29 5   MCHC      31 4 - 37 4 g/dL 30 4 (L)   RDW      11 6 - 15 1 % 13 0   Platelet Count          MPV      8 9 - 12 7 fL 11 7   Cholesterol      50 - 200 mg/dL 135   Triglycerides      <=150 mg/dL 90   HDL      >=40 mg/dL 64   LDL Direct      0 - 100 mg/dL 53   Non-HDL Cholesterol      mg/dl 71   EXT Creatinine Urine      mg/dL 54 0   Protein Urine Random      mg/dL 15   Prot/Creat Ratio, Ur      0 00 - 0 10 0 28 (H)   Hemoglobin A1C      4 2 - 6 3 % 7 0 (H)   EAG      mg/dl 154   Phosphorus      2 3 - 4 1 mg/dL 3 8   PARATHYROID HORMONE      18 4 - 80 1 pg/mL 81 3 (H)   Vit D, 25-Hydroxy      30 0 - 100 0 ng/mL 37 8   HEPATITIS C ANTIBODY      Non-reactive Non-reactive     Lab Results   Component Value Date    HGBA1C 7 0 (H) 01/23/2020            Review of Systems   Constitutional: Positive for activity change  Negative for diaphoresis, fatigue, fever and unexpected weight change  HENT: Negative  Eyes: Negative for visual disturbance  Respiratory: Negative for cough, chest tightness and shortness of breath  Cardiovascular: Negative for chest pain, palpitations and leg swelling  Gastrointestinal: Negative for abdominal pain, diarrhea, nausea and vomiting  Endocrine: Negative for cold intolerance, heat intolerance, polydipsia, polyphagia and polyuria  Genitourinary: Negative for dysuria, enuresis, frequency and urgency  Musculoskeletal: Positive for gait problem  Negative for arthralgias and myalgias  Skin: Negative for pallor, rash and wound  Allergic/Immunologic: Negative  Neurological: Negative for dizziness, tremors, weakness and numbness  Hematological: Negative  Psychiatric/Behavioral: Negative  Historical Information   Past Medical History:   Diagnosis Date    Anxiety     Arthritis     CHF (congestive heart failure) (HCC)     Chronic venous insufficiency     [facility]     Coronary artery disease     Diabetes mellitus ([facility] )     type II    Disease of thyroid gland     hypothyroidism    DVT (deep venous thrombosis) ([facility] )     Hyperlipidemia     Hypertension     Irregular heart beat     Paroxysmal a-fib    Pacemaker     Renal disorder     stage 3    Thyroid disease     Urinary incontinence     Uterine leiomyoma      Past Surgical History:   Procedure Laterality Date    BREAST SURGERY      For Biopsy    CARDIAC PACEMAKER PLACEMENT      Permanent  Last assessed: 2/19/16    CARDIAC SURGERY      CARPAL TUNNEL RELEASE Bilateral     CATARACT EXTRACTION      CATARACT EXTRACTION W/ INTRAOCULAR LENS  IMPLANT, BILATERAL      CYSTOSCOPY      Diagnostic  Last assessed: 11/8/17    ECTOPIC PREGNANCY SURGERY      HYSTERECTOMY      Total Abdomonal  Resolved: 1982    JOINT REPLACEMENT Right     TKR    OOPHORECTOMY      MS ENDOVENOUS LASER, 1ST VEIN Left 2/17/2017    Procedure: GREATER SAPHENOUS VEIN ENDOVASCULAR LASER THERAPY ;  Surgeon: Angel Quiroz DO;  Location: [facility];  Service: Vascular    TONSILLECTOMY       Social History   Social History     Substance and Sexual Activity   Alcohol Use Yes    Comment: rarely   Per [employer]: Denied history of alcohol use     Social History     Substance and Sexual Activity   Drug Use No     Social History     Tobacco Use   Smoking Status Never Smoker   Smokeless Tobacco Never Used   Tobacco Comment    Per [employer]: Former smoker     Family History:   Family History   Problem Relation Age of Onset    Hypertension Father     Diabetes Father         Mellitus    Coronary artery disease Mother     Hypertension Mother     Heart disease Mother     Diabetes Brother         Mellitus    Breast cancer Maternal Aunt     Pancreatic cancer Maternal Aunt     Breast cancer Cousin     Breast cancer Cousin        Meds/Allergies   Current Outpatient Medications   Medication Sig Dispense Refill    acetaminophen (TYLENOL) 325 mg tablet Take 650 mg by mouth   apixaban (ELIQUIS) 5 mg Take 1 tablet (5 mg total) by mouth 2 (two) times a day 180 tablet 3    Ascorbic Acid (VITAMIN C) 1000 MG tablet Take 1,000 mg by mouth        atorvastatin (LIPITOR) 80 mg tablet TAKE 1 TABLET AT BEDTIME 90 tablet 3    cholecalciferol (VITAMIN D3) 1,000 units tablet Take 2,000 Units by mouth daily       citalopram (CeleXA) 20 mg tablet TAKE 1 TABLET EVERY DAY 90 tablet 3    Continuous Blood Gluc  (FREESTYLE RONNI 14 DAY READER) GARRY Use to check BG levels 4+times daily and as needed 1 Device 0    Continuous Blood Gluc Sensor (FREESTYLE RONNI 14 DAY SENSOR) MISC Change sensor every 14 days to check BG levels 4+ times daily 6 each 3    furosemide (LASIX) 20 mg tablet Take 1 tablet (20 mg total) by mouth daily 90 tablet 3    GLUCOSAMINE CHONDROITIN COMPLX PO Take 1 tablet by mouth 2 (two) times a day      glucose blood (TRUE METRIX BLOOD GLUCOSE TEST) test strip Test 4 times plus daily, as instructed 400 each 1    insulin aspart (NOVOLOG FLEXPEN) 100 Units/mL injection pen 10u with breakfast, 10u with lunch, 10u with dinner 15 pen 0    insulin glargine (TOUJEO) 300 units/mL CONCETRATED U-300 injection pen (1-unit dial) Inject 40 Units under the skin daily at bedtime 9 pen 1    Insulin Pen Needle (PEN NEEDLES) 32G X 6 MM MISC Use 4 times a day 400 each 1    levothyroxine 125 mcg tablet TAKE 1 TABLET BY MOUTH ONCE DAILY except on Sunday no tablet 90 tablet 0    lisinopril (ZESTRIL) 20 mg tablet Take 1 tablet (20 mg total) by mouth 2 (two) times a day 180 tablet 3    metoprolol tartrate (LOPRESSOR) 25 mg tablet Take 1 tablet (25 mg total) by mouth every 12 (twelve) hours 180 tablet 3    Omega-3 Fatty Acids (FISH OIL) 1200 MG CAPS Take 1,200 mg by mouth 3 (three) times a day        oxybutynin (DITROPAN) 5 mg tablet TAKE 1 TABLET TWICE DAILY 180 tablet 0    promethazine-dextromethorphan (PHENERGAN-DM) 6 25-15 mg/5 mL oral syrup Take 5 mL by mouth 4 (four) times a day as needed for cough 180 mL 3    Tetrahydroz-Polyvinyl Al-Povid (MURINE TEARS PLUS OP) Apply to eye 3 (three) times a day       No current facility-administered medications for this visit  Allergies   Allergen Reactions    Ampicillin Hives       Objective   Vitals: Blood pressure 130/82, pulse 62, height 5' (1 524 m)  Physical Exam   Constitutional: She is oriented to person, place, and time  She appears well-developed and well-nourished  No distress  HENT:   Head: Normocephalic and atraumatic  Eyes: Conjunctivae and EOM are normal  Right eye exhibits no discharge  Left eye exhibits no discharge  Neck: Normal range of motion  Neck supple  No thyromegaly present  Cardiovascular: Normal rate, regular rhythm and normal heart sounds  No murmur heard  Pulmonary/Chest: Effort normal and breath sounds normal  No respiratory distress  She has no wheezes  Abdominal: Soft  Bowel sounds are normal    Musculoskeletal: Normal range of motion  She exhibits no edema, tenderness or deformity  Neurological: She is alert and oriented to person, place, and time  She has normal reflexes  She displays normal reflexes ()  Skin: Skin is warm and dry  No rash noted  She is not diaphoretic  No erythema  Psychiatric: She has a normal mood and affect  Her behavior is normal    Vitals reviewed  The history was obtained from the review of the chart, patient      Lab Results:   Lab Results   Component Value Date/Time    Hemoglobin A1C 7 0 (H) 01/23/2020 10:05 AM    Hemoglobin A1C 6 8 (H) 10/18/2019 10:05 AM    Hemoglobin A1C 6 9 (H) 06/18/2019 09:38 AM    WBC 6 35 01/23/2020 10:05 AM    WBC 7 81 06/18/2019 09:38 AM    Hemoglobin 11 9 01/23/2020 10:05 AM Hemoglobin 12 6 06/18/2019 09:38 AM    Hematocrit 39 2 01/23/2020 10:05 AM    Hematocrit 40 7 06/18/2019 09:38 AM    MCV 97 01/23/2020 10:05 AM    MCV 98 06/18/2019 09:38 AM    Platelets 904 06/70/9282 09:38 AM    BUN 33 (H) 01/23/2020 10:05 AM    BUN 39 (H) 10/18/2019 10:05 AM    BUN 37 (H) 06/18/2019 09:38 AM    Potassium 4 5 01/23/2020 10:05 AM    Potassium 4 2 10/18/2019 10:05 AM    Potassium 4 4 06/18/2019 09:38 AM    Chloride 108 01/23/2020 10:05 AM    Chloride 107 10/18/2019 10:05 AM    Chloride 104 06/18/2019 09:38 AM    CO2 28 01/23/2020 10:05 AM    CO2 26 10/18/2019 10:05 AM    CO2 29 06/18/2019 09:38 AM    Creatinine 1 18 01/23/2020 10:05 AM    Creatinine 1 30 10/18/2019 10:05 AM    Creatinine 1 32 (H) 06/18/2019 09:38 AM    HDL, Direct 64 01/23/2020 10:05 AM    HDL, Direct 60 06/18/2019 09:38 AM    Triglycerides 90 01/23/2020 10:05 AM    Triglycerides 66 06/18/2019 09:38 AM           Imaging Studies: I have personally reviewed pertinent reports  Portions of the record may have been created with voice recognition software  Occasional wrong word or "sound a like" substitutions may have occurred due to the inherent limitations of voice recognition software  Read the chart carefully and recognize, using context, where substitutions have occurred

## 2020-01-29 ENCOUNTER — OFFICE VISIT (OUTPATIENT)
Dept: NEPHROLOGY | Facility: CLINIC | Age: 80
End: 2020-01-29
Payer: MEDICARE

## 2020-01-29 VITALS
BODY MASS INDEX: 45.55 KG/M2 | DIASTOLIC BLOOD PRESSURE: 82 MMHG | WEIGHT: 232 LBS | SYSTOLIC BLOOD PRESSURE: 140 MMHG | HEIGHT: 60 IN

## 2020-01-29 DIAGNOSIS — R80.1 PERSISTENT PROTEINURIA: ICD-10-CM

## 2020-01-29 DIAGNOSIS — I12.9 BENIGN HYPERTENSION WITH CHRONIC KIDNEY DISEASE, STAGE III (HCC): Primary | ICD-10-CM

## 2020-01-29 DIAGNOSIS — N18.30 BENIGN HYPERTENSION WITH CHRONIC KIDNEY DISEASE, STAGE III (HCC): Primary | ICD-10-CM

## 2020-01-29 DIAGNOSIS — N18.30 CHRONIC KIDNEY DISEASE, STAGE 3 (HCC): ICD-10-CM

## 2020-01-29 DIAGNOSIS — N25.81 SECONDARY HYPERPARATHYROIDISM OF RENAL ORIGIN (HCC): ICD-10-CM

## 2020-01-29 DIAGNOSIS — R60.0 BILATERAL LEG EDEMA: ICD-10-CM

## 2020-01-29 DIAGNOSIS — E55.9 VITAMIN D DEFICIENCY: ICD-10-CM

## 2020-01-29 PROCEDURE — 99214 OFFICE O/P EST MOD 30 MIN: CPT | Performed by: INTERNAL MEDICINE

## 2020-01-29 NOTE — PROGRESS NOTES
NEPHROLOGY OUTPATIENT PROGRESS NOTE   Heavenly Kelley 68 y o  female MRN: [id number]  DATE: 1/29/2020  Reason for visit:   Chief Complaint   Patient presents with    Follow-up    Chronic Kidney Disease     ASSESSMENT and PLAN:  CK D stage III with baseline serum creatinine 1 2-1 3  - Her serum creatinine 1 1 in January 2020 overall stable at baseline    - Avoid NSAIDs or nephrotoxins  - CKD likely secondary to long-term hypertensive nephrosclerosis/diabetic nephropathy  - Check BMP, phosphorus, UPCR before next visit     Proteinuria  - last UPC ratio improving to 280 mg in January 2020  repeat UPC ratio before next visit    -continue lisinopril to 40 mg p o  Daily  -Proteinuria likely secondary to long-term diabetes and hypertension  -last hemoglobin A1c 7 0 in January 2020      Hypertension  -Blood pressure is  overall acceptable although slightly above goal   -continue current lisinopril, metoprolol, Lasix  She brought her home BP machine although could not be started due to batteries were dead  -recommended to monitor blood pressure after replacing batteries at home and call back if blood pressure remains persistently greater than 130/85  Advised to bring BP machine during next visit   She was explained and demonstrated how to use upper arm BP machine   -salt restricted diet  -consider changing metoprolol to carvedilol blood pressure remains elevated  -regular exercise if possible      bilateral lower extremity edema likely secondary to chronic venous stasis  - This has  much improved and stable since endovascular laser treatment in left lower extremity  She denies any dyspnea  - Continue low-salt diet   -continue current maintenance dose of Lasix 20 mg p o  Daily     Prior vitamin-D deficiency, last vitamin-D level 37 in January 2020   continue vitamin-D 2000 units p o  Daily  Secondary hyperparathyroidism, last PTH 81 3, continue to monitor    Repeat level before next visit      Anemia in CKD, hemoglobin overall stable at goal    Morbid obesity, recommended weight loss program   Also recommended to consider bariatric Medicine referral although she would like to wait for now  Diagnoses and all orders for this visit:    Benign hypertension with chronic kidney disease, stage III ([facility] )    Chronic kidney disease, stage 3 ([facility] )  -     Basic metabolic panel; Future  -     CBC; Future  -     Magnesium; Future    Bilateral leg edema    Secondary hyperparathyroidism of renal origin ([facility] )  -     Phosphorus; Future  -     PTH, intact; Future  -     Vitamin D 25 hydroxy; Future    Persistent proteinuria  -     Protein / creatinine ratio, urine; Future    Vitamin D deficiency        SUBJECTIVE / HPI:  Patient is 53-year-old female with significant past medical history of hypertension for at least 40-50 years, and diabetes for 30 years, diabetic retinopathy, stress incontinence, chronic leg swelling with chronic venous stasis, status post endovascular laser treatment in the left lower extremity, chronic kidney disease stage III with baseline serum creatinine 1 2-1 3, complete heart block, status post ppm, paroxysmal AFib, comes regular follow up of renal failure  Her last serum creatinine 1 1 in January 2020 overall stable at baseline  She claims to be compliant with her medications   She does not check blood pressure at home despite having blood pressure machine as she does not know how to operate this  She has brought BP saúl to the office although batteries are dead  Denies any worsening leg edema   Denies any worsening dyspnea    Weight seems to be overall stable  She tries to follow low salt diet  No recent NSAID exposure  Denies lightheadedness or dizziness  Denies any dyspnea    REVIEW OF SYSTEMS:  More than 10 point review of systems were obtained and discussed in length with the patient  Complete review of systems were negative / unremarkable except mentioned above       PHYSICAL EXAM:  Vitals: 01/29/20 1416 01/29/20 1438   BP:  140/82   Weight: 105 kg (232 lb)    Height: 5' (1 524 m)      Body mass index is 45 31 kg/m²  Physical Exam   Constitutional: She is oriented to person, place, and time  She appears well-developed and well-nourished  HENT:   Head: Normocephalic and atraumatic  Right Ear: External ear normal    Left Ear: External ear normal    Eyes: Pupils are equal, round, and reactive to light  Conjunctivae and EOM are normal    Neck: Neck supple  No JVD present  Cardiovascular: Normal rate and normal heart sounds  Pulmonary/Chest: Effort normal and breath sounds normal  She has no wheezes  She has no rales  Abdominal: Soft  Bowel sounds are normal  She exhibits no distension  There is no tenderness  Musculoskeletal: She exhibits edema (Trace in both legs)  She exhibits no tenderness  Neurological: She is alert and oriented to person, place, and time  Skin: Skin is warm and dry  No rash noted  Psychiatric: She has a normal mood and affect  Her behavior is normal    Vitals reviewed  PAST MEDICAL HISTORY:  Past Medical History:   Diagnosis Date    Anxiety     Arthritis     [facility]congestive [facility] failure) ([facility])     Chronic venous insufficiency     COPD [facility]ch[facility] obstructive [facility] disease) [facility]     Coronary artery disease     Diabetes mellitus [facility] )     type II    Disease of thyroid gland     hypothyroidism    DVT (deep venous thrombosis) ([facility])     Hyperlipidemia     Hypertension     Irregular heart beat     Paroxysmal a-fib    Pacemaker     Renal disorder     stage 3    Thyroid disease     Urinary incontinence     Uterine leiomyoma        PAST SURGICAL HISTORY:  Past Surgical History:   Procedure Laterality Date    BREAST SURGERY      For Biopsy    CARDIAC PACEMAKER PLACEMENT      Permanent   Last assessed: 2/19/16    CARDIAC SURGERY      CARPAL TUNNEL RELEASE Bilateral     CATARACT EXTRACTION      CATARACT EXTRACTION W/ INTRAOCULAR LENS  IMPLANT, BILATERAL      CYSTOSCOPY      Diagnostic  Last assessed: 11/8/17    ECTOPIC PREGNANCY SURGERY      HYSTERECTOMY      Total Abdomonal  Resolved: 1982    JOINT REPLACEMENT Right     TKR    OOPHORECTOMY      [place] ENDOVENOUS LASER, 1ST VEIN Left 2/17/2017    Procedure: GREATER SAPHENOUS VEIN ENDOVASCULAR LASER THERAPY ;  Surgeon: Amy Rodriguez DO;  [facility];  Service: Vascular    TONSILLECTOMY         SOCIAL HISTORY:  Social History     Substance and Sexual Activity   Alcohol Use Yes    Comment: rarely   Per [employer]: Denied history of alcohol use     Social History     Substance and Sexual Activity   Drug Use No     Social History     Tobacco Use   Smoking Status Never Smoker   Smokeless Tobacco Never Used   Tobacco Comment    Per [employer]: Former smoker       FAMILY HISTORY:  Family History   Problem Relation Age of Onset    Hypertension Father     Diabetes Father         Mellitus    Coronary artery disease Mother     Hypertension Mother     Heart disease Mother     Diabetes Brother         Mellitus    Breast cancer Maternal Aunt     Pancreatic cancer Maternal Aunt     Breast cancer Cousin     Breast cancer Cousin        MEDICATIONS:    Current Outpatient Medications:     acetaminophen (TYLENOL) 325 mg tablet, Take 650 mg by mouth , Disp: , Rfl:     apixaban (ELIQUIS) 5 mg, Take 1 tablet (5 mg total) by mouth 2 (two) times a day, Disp: 180 tablet, Rfl: 3    Ascorbic Acid (VITAMIN C) 1000 MG tablet, Take 1,000 mg by mouth , Disp: , Rfl:     atorvastatin (LIPITOR) 80 mg tablet, TAKE 1 TABLET AT BEDTIME, Disp: 90 tablet, Rfl: 3    cholecalciferol (VITAMIN D3) 1,000 units tablet, Take 2,000 Units by mouth daily , Disp: , Rfl:     citalopram (CeleXA) 20 mg tablet, TAKE 1 TABLET EVERY DAY, Disp: 90 tablet, Rfl: 3    Continuous Blood Gluc  (FREESTYLE RONNI 14 DAY READER) GARRY, Use to check BG levels 4+times daily and as needed, Disp: 1 Device, Rfl: 0    Continuous Blood Gluc Sensor (FREESTYLE RONNI 14 DAY SENSOR) MISC, Change sensor every 14 days to check BG levels 4+ times daily, Disp: 6 each, Rfl: 3    furosemide (LASIX) 20 mg tablet, Take 1 tablet (20 mg total) by mouth daily, Disp: 90 tablet, Rfl: 3    GLUCOSAMINE CHONDROITIN COMPLX PO, Take 1 tablet by mouth 2 (two) times a day, Disp: , Rfl:     glucose blood (TRUE METRIX BLOOD GLUCOSE TEST) test strip, Test 4 times plus daily, as instructed, Disp: 400 each, Rfl: 1    insulin aspart (NOVOLOG FLEXPEN) 100 Units/mL injection pen, 10u with breakfast, 10u with lunch, 10u with dinner, Disp: 15 pen, Rfl: 0    insulin glargine (TOUJEO) 300 units/mL CONCETRATED U-300 injection pen (1-unit dial), Inject 40 Units under the skin daily at bedtime, Disp: 9 pen, Rfl: 1    Insulin Pen Needle (PEN NEEDLES) 32G X 6 MM MISC, Use 4 times a day, Disp: 400 each, Rfl: 1    levothyroxine 125 mcg tablet, TAKE 1 TABLET BY MOUTH ONCE DAILY except on Sunday no tablet, Disp: 90 tablet, Rfl: 0    lisinopril (ZESTRIL) 20 mg tablet, Take 1 tablet (20 mg total) by mouth 2 (two) times a day, Disp: 180 tablet, Rfl: 3    metoprolol tartrate (LOPRESSOR) 25 mg tablet, Take 1 tablet (25 mg total) by mouth every 12 (twelve) hours, Disp: 180 tablet, Rfl: 3    Omega-3 Fatty Acids (FISH OIL) 1200 MG CAPS, Take 1,200 mg by mouth 3 (three) times a day  , Disp: , Rfl:     oxybutynin (DITROPAN) 5 mg tablet, TAKE 1 TABLET TWICE DAILY, Disp: 180 tablet, Rfl: 0    promethazine-dextromethorphan (PHENERGAN-DM) 6 25-15 mg/5 mL oral syrup, Take 5 mL by mouth 4 (four) times a day as needed for cough, Disp: 180 mL, Rfl: 3    Tetrahydroz-Polyvinyl Al-Povid (MURINE TEARS PLUS OP), Apply to eye 3 (three) times a day, Disp: , Rfl:     Lab Results:   Results for orders placed or performed in visit on 56/90/30   Basic metabolic panel   Result Value Ref Range    Sodium 139 136 - 145 mmol/L    Potassium 4 5 3 5 - 5 3 mmol/L    Chloride 108 100 - 108 mmol/L    CO2 28 21 - 32 mmol/L    ANION GAP 3 (L) 4 - 13 mmol/L    BUN 33 (H) 5 - 25 mg/dL    Creatinine 1 18 0 60 - 1 30 mg/dL    Glucose 131 65 - 140 mg/dL    Calcium 9 4 8 3 - 10 1 mg/dL    eGFR 45 ml/min/1 73sq m   CBC   Result Value Ref Range    WBC 6 35 4 31 - 10 16 Thousand/uL    RBC 4 03 3 81 - 5 12 Million/uL    Hemoglobin 11 9 11 5 - 15 4 g/dL    Hematocrit 39 2 34 8 - 46 1 %    MCV 97 82 - 98 fL    MCH 29 5 26 8 - 34 3 pg    MCHC 30 4 (L) 31 4 - 37 4 g/dL    RDW 13 0 11 6 - 15 1 %    Platelets      MPV 92 6 8 9 - 12 7 fL   Phosphorus   Result Value Ref Range    Phosphorus 3 8 2 3 - 4 1 mg/dL   PTH, intact   Result Value Ref Range    PTH 81 3 (H) 18 4 - 80 1 pg/mL   Protein / creatinine ratio, urine   Result Value Ref Range    Creatinine, Ur 54 0 mg/dL    Protein Urine Random 15 mg/dL    Prot/Creat Ratio, Ur 0 28 (H) 0 00 - 0 10   Vitamin D 25 hydroxy   Result Value Ref Range    Vit D, 25-Hydroxy 37 8 30 0 - 100 0 ng/mL   Hemoglobin A1C   Result Value Ref Range    Hemoglobin A1C 7 0 (H) 4 2 - 6 3 %     mg/dl   Lipid panel   Result Value Ref Range    Cholesterol 135 50 - 200 mg/dL    Triglycerides 90 <=150 mg/dL    HDL, Direct 64 >=40 mg/dL    LDL Calculated 53 0 - 100 mg/dL    Non-HDL-Chol (CHOL-HDL) 71 mg/dl   Hepatitis C antibody   Result Value Ref Range    Hepatitis C Ab Non-reactive Non-reactive

## 2020-02-01 NOTE — TELEPHONE ENCOUNTER
I spoke with the patient and she is aware of increase lasix to 40 mg daily for now  Once leg swelling is better or weight is stable she can reduce back to 20 mg daily  Please have her call back at weights in 1 weeks  I spoke with the patient and she is aware 
She can go back to lasix 40 mg daily for now  Once leg swelling is better or weight is stable, she can reduce back to 20 mg daily  Please have her call back with weights in 1 week 
continue current management and present  medications

## 2020-02-11 ENCOUNTER — OFFICE VISIT (OUTPATIENT)
Dept: UROLOGY | Facility: CLINIC | Age: 80
End: 2020-02-11
Payer: MEDICARE

## 2020-02-11 VITALS
SYSTOLIC BLOOD PRESSURE: 140 MMHG | WEIGHT: 232 LBS | HEART RATE: 75 BPM | DIASTOLIC BLOOD PRESSURE: 86 MMHG | BODY MASS INDEX: 45.55 KG/M2 | HEIGHT: 60 IN

## 2020-02-11 DIAGNOSIS — N32.81 OAB (OVERACTIVE BLADDER): Primary | ICD-10-CM

## 2020-02-11 LAB — POST-VOID RESIDUAL VOLUME, ML POC: 0 ML

## 2020-02-11 PROCEDURE — 1036F TOBACCO NON-USER: CPT | Performed by: PHYSICIAN ASSISTANT

## 2020-02-11 PROCEDURE — 3077F SYST BP >= 140 MM HG: CPT | Performed by: PHYSICIAN ASSISTANT

## 2020-02-11 PROCEDURE — 99213 OFFICE O/P EST LOW 20 MIN: CPT | Performed by: PHYSICIAN ASSISTANT

## 2020-02-11 PROCEDURE — 1160F RVW MEDS BY RX/DR IN RCRD: CPT | Performed by: PHYSICIAN ASSISTANT

## 2020-02-11 PROCEDURE — 3008F BODY MASS INDEX DOCD: CPT | Performed by: PHYSICIAN ASSISTANT

## 2020-02-11 PROCEDURE — 4040F PNEUMOC VAC/ADMIN/RCVD: CPT | Performed by: PHYSICIAN ASSISTANT

## 2020-02-11 PROCEDURE — 1170F FXNL STATUS ASSESSED: CPT | Performed by: PHYSICIAN ASSISTANT

## 2020-02-11 PROCEDURE — 3079F DIAST BP 80-89 MM HG: CPT | Performed by: PHYSICIAN ASSISTANT

## 2020-02-11 PROCEDURE — 3066F NEPHROPATHY DOC TX: CPT | Performed by: PHYSICIAN ASSISTANT

## 2020-02-11 PROCEDURE — 51798 US URINE CAPACITY MEASURE: CPT | Performed by: PHYSICIAN ASSISTANT

## 2020-02-11 NOTE — PROGRESS NOTES
1  OAB (overactive bladder)  POCT Measure PVR       Assessment and plan:       1  History of gross hematuria - managed by Dr Mikel Gaviria   - status post CT of the abdomen and pelvis and cystoscopy 11/2017, 05/2018  - urine cytology negative for abnormal cells    2  Overactive bladder  - patient is not a candidate for tibial nerve stimulation given her pacemaker   - patient is not a candidate for beta 3 agonist given her relatively uncontrolled hypertension  - we discussed keeping her on oxybutynin 5 mg, half tablet twice daily  Would not recommend increasing her dose given her glaucoma  Patient has been stable over the course of the past 2 years without any further issues  No further routine surveillance needed  Patient will follow with Urology on as-needed basis however current to contact us in the future should she have any recurrent episodes of hematuria or urinary concerns  Vinnie Rodrigues PA-C      Chief Complaint     Chief Complaint   Patient presents with    OAB       History of Present Illness     Zahida Hayes is a 68 y o  female patient of Dr Mikel Gaviria with a history of hematuria, overactive bladder, urinary incontinence presenting for follow-up  Patient was having intermittent gross hematuria  A noncontrast CT was negative for any upper tract abnormalities  She is unable to undergo a contrasted imaging given her renal function was unable to undergo MRI given compatible hardware  She underwent a cystoscopy 11/08/2017 which did reveal 1 area of hyperemia within the bladder dome  Dr Mikel Gaviria had wish to repeat a cystoscopy approximately 4 months thereafter for continued surveillance  Repeat cystoscopy was performed 05/16/2018 which was negative for any abnormality  Patient's hematuria was felt to be given her anticoagulation  When patient was titrated to a lower dose, her hematuria significantly improved thereafter      Patient had a recent urine cytology (1/23/2020) was negative for any high-grade urothelial carcinoma  Patient is on a daily diuretic  She takes oxybutynin 5 mg, half tablet twice daily  She denies any significant constipation or dry mouth with this medication  She states that she is taking the lower dose given her glaucoma  Patient has previously tried tibial nerve stimulation, however this was unable to be performed given her pacemaker  Patient feels like she has a fair stream, feels empty after urination, has nocturia 1-2 times nightly  Continues to have urinary urgency and urge urinary incontinence requiring approximately 2-3 pads daily  She denies any dysuria, gross hematuria, suprapubic pressure, flank pain, fevers, or chills  PVR 0mL  Urinary Incontinence Screening      Most Recent Value   Urinary Incontinence   Urinary Incontinence? Yes [1-2x]   Incomplete emptying? No   Urinary frequency? Yes [when she takes her water pill]   Urinary urgency? No   Urinary hesitancy? No   Dysuria (painful difficult urination)? No   Nocturia (waking up to use the bathroom)? No   Straining (having to push to go)? No   Weak stream?  No   Intermittent stream?  No   Post void dribbling? No          Laboratory     Lab Results   Component Value Date    CREATININE 1 18 01/23/2020       Review of Systems     Review of Systems   Constitutional: Negative for activity change, appetite change, chills, diaphoresis, fatigue, fever and unexpected weight change  Respiratory: Negative for chest tightness and shortness of breath  Cardiovascular: Negative for chest pain, palpitations and leg swelling  Gastrointestinal: Negative for abdominal distention, abdominal pain, constipation, diarrhea, nausea and vomiting  Genitourinary: Negative for decreased urine volume, difficulty urinating, dysuria, enuresis, flank pain, frequency, genital sores, hematuria and urgency  Musculoskeletal: Negative for back pain, gait problem and myalgias     Skin: Negative for color change, pallor, rash and wound  Psychiatric/Behavioral: Negative for behavioral problems  The patient is not nervous/anxious  Allergies     Allergies   Allergen Reactions    Ampicillin Hives       Physical Exam     Physical Exam   Constitutional: She is oriented to person, place, and time  She appears well-developed and well-nourished  No distress  Overweight   HENT:   Head: Normocephalic and atraumatic  Eyes: Conjunctivae are normal    Neck: Normal range of motion  No tracheal deviation present  Pulmonary/Chest: Effort normal    Abdominal:   Overweight   Musculoskeletal: She exhibits edema  She exhibits no deformity  Ambulates with cane assistance   Neurological: She is alert and oriented to person, place, and time  Skin: Skin is warm and dry  She is not diaphoretic  No erythema  No pallor  Psychiatric: She has a normal mood and affect   Her behavior is normal          Vital Signs     Vitals:    02/11/20 1356   BP: 140/86   Pulse: 75   Weight: 105 kg (232 lb)   Height: 5' (1 524 m)         Current Medications       Current Outpatient Medications:     acetaminophen (TYLENOL) 325 mg tablet, Take 650 mg by mouth , Disp: , Rfl:     apixaban (ELIQUIS) 5 mg, Take 1 tablet (5 mg total) by mouth 2 (two) times a day, Disp: 180 tablet, Rfl: 3    Ascorbic Acid (VITAMIN C) 1000 MG tablet, Take 1,000 mg by mouth , Disp: , Rfl:     atorvastatin (LIPITOR) 80 mg tablet, TAKE 1 TABLET AT BEDTIME, Disp: 90 tablet, Rfl: 3    cholecalciferol (VITAMIN D3) 1,000 units tablet, Take 2,000 Units by mouth daily , Disp: , Rfl:     citalopram (CeleXA) 20 mg tablet, TAKE 1 TABLET EVERY DAY, Disp: 90 tablet, Rfl: 3    Continuous Blood Gluc  (FREESTYLE RONNI 14 DAY READER) GARRY, Use to check BG levels 4+times daily and as needed, Disp: 1 Device, Rfl: 0    Continuous Blood Gluc Sensor (FREESTYLE RONNI 14 DAY SENSOR) MISC, Change sensor every 14 days to check BG levels 4+ times daily, Disp: 6 each, Rfl: 3    furosemide (LASIX) 20 mg tablet, Take 1 tablet (20 mg total) by mouth daily, Disp: 90 tablet, Rfl: 3    GLUCOSAMINE CHONDROITIN COMPLX PO, Take 1 tablet by mouth 2 (two) times a day, Disp: , Rfl:     glucose blood (TRUE METRIX BLOOD GLUCOSE TEST) test strip, Test 4 times plus daily, as instructed, Disp: 400 each, Rfl: 1    insulin aspart (NOVOLOG FLEXPEN) 100 Units/mL injection pen, 10u with breakfast, 10u with lunch, 10u with dinner, Disp: 15 pen, Rfl: 0    insulin glargine (TOUJEO) 300 units/mL CONCETRATED U-300 injection pen (1-unit dial), Inject 40 Units under the skin daily at bedtime, Disp: 9 pen, Rfl: 1    Insulin Pen Needle (PEN NEEDLES) 32G X 6 MM MISC, Use 4 times a day, Disp: 400 each, Rfl: 1    levothyroxine 125 mcg tablet, TAKE 1 TABLET BY MOUTH ONCE DAILY except on Sunday no tablet, Disp: 90 tablet, Rfl: 0    lisinopril (ZESTRIL) 20 mg tablet, Take 1 tablet (20 mg total) by mouth 2 (two) times a day, Disp: 180 tablet, Rfl: 3    metoprolol tartrate (LOPRESSOR) 25 mg tablet, Take 1 tablet (25 mg total) by mouth every 12 (twelve) hours, Disp: 180 tablet, Rfl: 3    Omega-3 Fatty Acids (FISH OIL) 1200 MG CAPS, Take 1,200 mg by mouth 3 (three) times a day  , Disp: , Rfl:     oxybutynin (DITROPAN) 5 mg tablet, TAKE 1 TABLET TWICE DAILY, Disp: 180 tablet, Rfl: 0    promethazine-dextromethorphan (PHENERGAN-DM) 6 25-15 mg/5 mL oral syrup, Take 5 mL by mouth 4 (four) times a day as needed for cough, Disp: 180 mL, Rfl: 3    Tetrahydroz-Polyvinyl Al-Povid (MURINE TEARS PLUS OP), Apply to eye 3 (three) times a day, Disp: , Rfl:       Active Problems     Patient Active Problem List   Diagnosis    [facility] elevated [facility]    Type 2 diabetes mellitus with hyperglycemia (HCC)    Morbid obesity with body mass index (BMI) of 40 0 to 44 9 in adult [facility])    Complete heart block (HCC)    Anemia    S/P cardiac pacemaker procedure    Hypothyroidism    Chronic diastolic congestive heart failure (HCC)    Chronic venous insufficiency    Dvt femoral (deep venous thrombosis) (HCC)    Benign hypertension with chronic kidney disease, stage III (HCC)    Chronic kidney disease, stage 3 (HCC)    Iron deficiency anemia    Microalbuminuria    Bilateral leg edema    Vitamin D deficiency    Anxiety disorder    Urinary bladder incontinence    Chronic obstructive pulmonary disease (HCC)    Eczematous dermatitis    Mixed hyperlipidemia    OAB (overactive bladder)    Presence of permanent cardiac pacemaker    [facility]oxysmal atrial fibrillation) ([facility])    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 neurological manifestations, uncontrolled([address] 62)    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 ([address] )    Intraocular pressure increase    Sleep apnea       Past Medical History     Past Medical History:   Diagnosis Date    Anxiety     Arthritis     CHF (congestive heart failure) ([facility])     Chronic venous insufficiency     COPD (chronic obstructive pulmonary disease) ([address] )     Coronary artery disease     Diabetes mellitus ([address] )     type II    Disease of thyroid gland     hypothyroidism    DVT (deep venous thrombosis) ([facility])     Hyperlipidemia     Hypertension     Irregular heart beat     Paroxysmal a-fib    Pacemaker     Renal disorder     stage 3    Thyroid disease     Urinary incontinence     Uterine leiomyoma        Surgical History     Past Surgical History:   Procedure Laterality Date    BREAST SURGERY      For Biopsy    CARDIAC PACEMAKER PLACEMENT      Permanent  Last assessed: 2/19/16    CARDIAC SURGERY      CARPAL TUNNEL RELEASE Bilateral     CATARACT EXTRACTION      CATARACT EXTRACTION W/ INTRAOCULAR LENS  IMPLANT, BILATERAL      CYSTOSCOPY      Diagnostic   Last assessed: 11/8/17    ECTOPIC PREGNANCY SURGERY      HYSTERECTOMY      Total Abdomonal  Resolved: 1982    JOINT REPLACEMENT Right     TKR    OOPHORECTOMY      [place] ENDOVENOUS LASER, 1ST VEIN Left 2/17/2017    Procedure: GREATER SAPHENOUS VEIN ENDOVASCULAR LASER THERAPY ;  Surgeon: Juan Copeland DO;  [facility];  Service: Vascular    TONSILLECTOMY       Family History     Family History   Problem Relation Age of Onset    Hypertension Father     Diabetes Father         Mellitus    Coronary artery disease Mother     Hypertension Mother     Heart disease Mother     Diabetes Brother         Mellitus    Breast cancer Maternal Aunt     Pancreatic cancer Maternal Aunt     Breast cancer Cousin     Breast cancer Cousin      Social History     Social History     Radiology

## 2020-02-17 DIAGNOSIS — N32.81 OAB (OVERACTIVE BLADDER): ICD-10-CM

## 2020-02-17 DIAGNOSIS — F41.9 ANXIETY: ICD-10-CM

## 2020-02-17 RX ORDER — CITALOPRAM 20 MG/1
TABLET ORAL
Qty: 90 TABLET | Refills: 3 | Status: SHIPPED | OUTPATIENT
Start: 2020-02-17 | End: 2020-11-28

## 2020-02-17 RX ORDER — OXYBUTYNIN CHLORIDE 5 MG/1
TABLET ORAL
Qty: 180 TABLET | Refills: 0 | Status: SHIPPED | OUTPATIENT
Start: 2020-02-17 | End: 2020-04-20

## 2020-02-18 ENCOUNTER — DOCUMENTATION (OUTPATIENT)
Dept: ENDOCRINOLOGY | Facility: CLINIC | Age: 80
End: 2020-02-18

## 2020-02-18 DIAGNOSIS — E11.9 TYPE 2 DIABETES MELLITUS WITHOUT COMPLICATION, WITH LONG-TERM CURRENT USE OF INSULIN (HCC): ICD-10-CM

## 2020-02-18 DIAGNOSIS — Z79.4 TYPE 2 DIABETES MELLITUS WITHOUT COMPLICATION, WITH LONG-TERM CURRENT USE OF INSULIN (HCC): ICD-10-CM

## 2020-03-03 ENCOUNTER — TELEPHONE (OUTPATIENT)
Dept: FAMILY MEDICINE CLINIC | Facility: CLINIC | Age: 80
End: 2020-03-03

## 2020-03-03 NOTE — TELEPHONE ENCOUNTER
Lm for patient to call back  She requested a refill of lisinopril be sent to [facility] pharmacy  [facility]  She has 1 refill remaining at [address]  She should contact [facility] to transfer the prescription  Also, she now has several pharmacies in her chart  Can we please check w/ her about which ones can be deleted?

## 2020-03-04 DIAGNOSIS — I10 ESSENTIAL HYPERTENSION: ICD-10-CM

## 2020-03-04 DIAGNOSIS — N18.30 BENIGN HYPERTENSION WITH CHRONIC KIDNEY DISEASE, STAGE III (HCC): ICD-10-CM

## 2020-03-04 DIAGNOSIS — I12.9 BENIGN HYPERTENSION WITH CHRONIC KIDNEY DISEASE, STAGE III (HCC): ICD-10-CM

## 2020-03-04 RX ORDER — LISINOPRIL 20 MG/1
20 TABLET ORAL 2 TIMES DAILY
Qty: 180 TABLET | Refills: 3 | Status: SHIPPED | OUTPATIENT
Start: 2020-03-04 | End: 2020-12-09

## 2020-03-18 ENCOUNTER — REMOTE DEVICE CLINIC VISIT (OUTPATIENT)
Dept: CARDIOLOGY CLINIC | Facility: CLINIC | Age: 80
End: 2020-03-18
Payer: MEDICARE

## 2020-03-18 DIAGNOSIS — Z95.0 CARDIAC PACEMAKER IN SITU: Primary | ICD-10-CM

## 2020-03-18 PROCEDURE — 93294 REM INTERROG EVL PM/LDLS PM: CPT | Performed by: INTERNAL MEDICINE

## 2020-03-18 PROCEDURE — 93296 REM INTERROG EVL PM/IDS: CPT | Performed by: INTERNAL MEDICINE

## 2020-03-18 NOTE — PROGRESS NOTES
Results for orders placed or performed in visit on 03/18/20   Cardiac EP device report    Narrative    MDT-DUAL CHAMBER PPM (DDDR MODE)  CARELINK TRANSMISSION: BATTERY VOLTAGE ADEQUATE (5 YRS)  AP 68 6%  100% (>40%/CHB - DEPENDENT/DDDR @ 60 PPM)  ALL AVAILABLE LEAD PARAMETERS WITHIN NORMAL LIMITS  5 AT/AF EPISODE (ALL SAME DAY) W/TOTAL DURATION ~ 39 MINS  AF BURDEN<0 1%  HX: PAF & PT ON ELIQUIS, METOPROLOL TART  NORMAL DEVICE FUNCTION     EB

## 2020-03-20 DIAGNOSIS — E11.65 TYPE 2 DIABETES MELLITUS WITH HYPERGLYCEMIA, WITH LONG-TERM CURRENT USE OF INSULIN (HCC): ICD-10-CM

## 2020-03-20 DIAGNOSIS — Z79.4 TYPE 2 DIABETES MELLITUS WITH HYPERGLYCEMIA, WITH LONG-TERM CURRENT USE OF INSULIN (HCC): ICD-10-CM

## 2020-03-20 NOTE — TELEPHONE ENCOUNTER
Pt called she needs a 90 day rx of tuojeo called in using 40 units once daily at night  Send to [facility]

## 2020-03-30 DIAGNOSIS — E78.00 HYPERCHOLESTEROLEMIA: ICD-10-CM

## 2020-03-30 RX ORDER — ATORVASTATIN CALCIUM 80 MG/1
TABLET, FILM COATED ORAL
Qty: 90 TABLET | Refills: 3 | Status: SHIPPED | OUTPATIENT
Start: 2020-03-30 | End: 2021-03-20

## 2020-03-31 ENCOUNTER — HOSPITAL ENCOUNTER (OUTPATIENT)
Dept: RADIOLOGY | Facility: MEDICAL CENTER | Age: 80
Discharge: HOME | End: 2020-03-31

## 2020-04-08 DIAGNOSIS — E03.9 HYPOTHYROIDISM, UNSPECIFIED TYPE: ICD-10-CM

## 2020-04-08 RX ORDER — LEVOTHYROXINE SODIUM 0.12 MG/1
TABLET ORAL
Qty: 90 TABLET | Refills: 0 | Status: SHIPPED | OUTPATIENT
Start: 2020-04-08 | End: 2020-06-01

## 2020-04-20 DIAGNOSIS — N32.81 OAB (OVERACTIVE BLADDER): ICD-10-CM

## 2020-04-20 RX ORDER — OXYBUTYNIN CHLORIDE 5 MG/1
TABLET ORAL
Qty: 180 TABLET | Refills: 1 | Status: SHIPPED | OUTPATIENT
Start: 2020-04-20 | End: 2020-08-26

## 2020-05-19 ENCOUNTER — TELEPHONE (OUTPATIENT)
Dept: OBGYN CLINIC | Facility: CLINIC | Age: 80
End: 2020-05-19

## 2020-05-30 DIAGNOSIS — E03.9 HYPOTHYROIDISM, UNSPECIFIED TYPE: ICD-10-CM

## 2020-06-01 RX ORDER — LEVOTHYROXINE SODIUM 0.12 MG/1
TABLET ORAL
Qty: 78 TABLET | Refills: 1 | Status: SHIPPED | OUTPATIENT
Start: 2020-06-01 | End: 2020-11-13

## 2020-06-16 ENCOUNTER — OFFICE VISIT (OUTPATIENT)
Dept: CARDIOLOGY CLINIC | Facility: CLINIC | Age: 80
End: 2020-06-16
Payer: MEDICARE

## 2020-06-16 VITALS
SYSTOLIC BLOOD PRESSURE: 142 MMHG | HEART RATE: 72 BPM | HEIGHT: 60 IN | RESPIRATION RATE: 95 BRPM | BODY MASS INDEX: 45.59 KG/M2 | DIASTOLIC BLOOD PRESSURE: 72 MMHG | WEIGHT: 232.2 LBS

## 2020-06-16 DIAGNOSIS — I48.91 ATRIAL FIBRILLATION, UNSPECIFIED TYPE (HCC): Primary | ICD-10-CM

## 2020-06-16 PROCEDURE — 93000 ELECTROCARDIOGRAM COMPLETE: CPT | Performed by: INTERNAL MEDICINE

## 2020-06-16 PROCEDURE — 3077F SYST BP >= 140 MM HG: CPT | Performed by: INTERNAL MEDICINE

## 2020-06-16 PROCEDURE — 3066F NEPHROPATHY DOC TX: CPT | Performed by: INTERNAL MEDICINE

## 2020-06-16 PROCEDURE — 1160F RVW MEDS BY RX/DR IN RCRD: CPT | Performed by: INTERNAL MEDICINE

## 2020-06-16 PROCEDURE — 3008F BODY MASS INDEX DOCD: CPT | Performed by: INTERNAL MEDICINE

## 2020-06-16 PROCEDURE — 3078F DIAST BP <80 MM HG: CPT | Performed by: INTERNAL MEDICINE

## 2020-06-16 PROCEDURE — 1036F TOBACCO NON-USER: CPT | Performed by: INTERNAL MEDICINE

## 2020-06-16 PROCEDURE — 99214 OFFICE O/P EST MOD 30 MIN: CPT | Performed by: INTERNAL MEDICINE

## 2020-06-16 PROCEDURE — 4040F PNEUMOC VAC/ADMIN/RCVD: CPT | Performed by: INTERNAL MEDICINE

## 2020-06-16 PROCEDURE — 3051F HG A1C>EQUAL 7.0%<8.0%: CPT | Performed by: INTERNAL MEDICINE

## 2020-06-24 ENCOUNTER — IN-CLINIC DEVICE VISIT (OUTPATIENT)
Dept: CARDIOLOGY CLINIC | Facility: CLINIC | Age: 80
End: 2020-06-24
Payer: MEDICARE

## 2020-06-24 DIAGNOSIS — Z95.0 CARDIAC PACEMAKER IN SITU: Primary | ICD-10-CM

## 2020-06-24 PROCEDURE — 93280 PM DEVICE PROGR EVAL DUAL: CPT | Performed by: INTERNAL MEDICINE

## 2020-06-30 DIAGNOSIS — I48.91 ATRIAL FIBRILLATION, UNSPECIFIED TYPE (HCC): ICD-10-CM

## 2020-07-02 DIAGNOSIS — Z79.4 TYPE 2 DIABETES MELLITUS WITH HYPERGLYCEMIA, WITH LONG-TERM CURRENT USE OF INSULIN (HCC): ICD-10-CM

## 2020-07-02 DIAGNOSIS — E11.65 TYPE 2 DIABETES MELLITUS WITH HYPERGLYCEMIA, WITH LONG-TERM CURRENT USE OF INSULIN (HCC): ICD-10-CM

## 2020-07-06 ENCOUNTER — TELEPHONE (OUTPATIENT)
Dept: CARDIOLOGY CLINIC | Facility: CLINIC | Age: 80
End: 2020-07-06

## 2020-07-07 ENCOUNTER — HOSPITAL ENCOUNTER (OUTPATIENT)
Dept: RADIOLOGY | Facility: MEDICAL CENTER | Age: 80
Discharge: HOME | End: 2020-07-07
Payer: MEDICARE

## 2020-07-07 VITALS — HEIGHT: 60 IN | WEIGHT: 231 LBS | BODY MASS INDEX: 45.35 KG/M2

## 2020-07-07 DIAGNOSIS — Z13.820 OSTEOPOROSIS SCREENING: ICD-10-CM

## 2020-07-07 DIAGNOSIS — Z78.0 POST-MENOPAUSAL: ICD-10-CM

## 2020-07-07 DIAGNOSIS — Z12.31 ENCOUNTER FOR SCREENING MAMMOGRAM FOR MALIGNANT NEOPLASM OF BREAST: ICD-10-CM

## 2020-07-07 PROCEDURE — 77067 SCR MAMMO BI INCL CAD: CPT

## 2020-07-07 PROCEDURE — 77063 BREAST TOMOSYNTHESIS BI: CPT

## 2020-07-07 PROCEDURE — 77080 DXA BONE DENSITY AXIAL: CPT

## 2020-07-07 NOTE — TELEPHONE ENCOUNTER
Left a message for patient regarding Dr Mariela Mcfarland stating when  Eliquis samples comes in to [place] office to call patient

## 2020-07-08 DIAGNOSIS — R60.0 LOCALIZED EDEMA: ICD-10-CM

## 2020-07-08 DIAGNOSIS — I10 ESSENTIAL HYPERTENSION: ICD-10-CM

## 2020-07-08 RX ORDER — FUROSEMIDE 20 MG/1
TABLET ORAL
Qty: 90 TABLET | Refills: 3 | Status: SHIPPED | OUTPATIENT
Start: 2020-07-08 | End: 2021-07-01

## 2020-07-20 ENCOUNTER — TELEPHONE (OUTPATIENT)
Dept: FAMILY MEDICINE CLINIC | Facility: CLINIC | Age: 80
End: 2020-07-20

## 2020-07-20 NOTE — TELEPHONE ENCOUNTER
Patient had mammo and dexa at same time  Her mammo needed extra views and her dexa results were not reviewed   Please advise

## 2020-07-22 ENCOUNTER — HOSPITAL ENCOUNTER (OUTPATIENT)
Dept: MAMMOGRAPHY | Facility: CLINIC | Age: 80
Discharge: HOME | End: 2020-07-22
Payer: MEDICARE

## 2020-07-22 ENCOUNTER — HOSPITAL ENCOUNTER (OUTPATIENT)
Dept: ULTRASOUND IMAGING | Facility: CLINIC | Age: 80
Discharge: HOME | End: 2020-07-22
Payer: MEDICARE

## 2020-07-22 VITALS — WEIGHT: 231 LBS | HEIGHT: 60 IN | BODY MASS INDEX: 45.35 KG/M2 | TEMPERATURE: 96.7 F

## 2020-07-22 DIAGNOSIS — R92.8 ABNORMAL MAMMOGRAM: ICD-10-CM

## 2020-07-22 PROCEDURE — 76642 ULTRASOUND BREAST LIMITED: CPT

## 2020-07-22 PROCEDURE — G0279 TOMOSYNTHESIS, MAMMO: HCPCS

## 2020-07-22 PROCEDURE — 77065 DX MAMMO INCL CAD UNI: CPT

## 2020-07-27 ENCOUNTER — OFFICE VISIT (OUTPATIENT)
Dept: FAMILY MEDICINE CLINIC | Facility: CLINIC | Age: 80
End: 2020-07-27
Payer: MEDICARE

## 2020-07-27 VITALS
HEIGHT: 60 IN | HEART RATE: 76 BPM | WEIGHT: 232 LBS | SYSTOLIC BLOOD PRESSURE: 124 MMHG | TEMPERATURE: 98.7 F | BODY MASS INDEX: 45.55 KG/M2 | DIASTOLIC BLOOD PRESSURE: 82 MMHG

## 2020-07-27 DIAGNOSIS — Z79.4 TYPE 2 DIABETES MELLITUS WITH HYPERGLYCEMIA, WITH LONG-TERM CURRENT USE OF INSULIN (HCC): Primary | ICD-10-CM

## 2020-07-27 DIAGNOSIS — I21.4 NSTEMI (NON-ST ELEVATED MYOCARDIAL INFARCTION) (HCC): ICD-10-CM

## 2020-07-27 DIAGNOSIS — N18.30 BENIGN HYPERTENSION WITH CHRONIC KIDNEY DISEASE, STAGE III (HCC): ICD-10-CM

## 2020-07-27 DIAGNOSIS — E78.2 MIXED HYPERLIPIDEMIA: ICD-10-CM

## 2020-07-27 DIAGNOSIS — I48.0 PAF (PAROXYSMAL ATRIAL FIBRILLATION) (HCC): ICD-10-CM

## 2020-07-27 DIAGNOSIS — R05.9 COUGH: ICD-10-CM

## 2020-07-27 DIAGNOSIS — E11.65 TYPE 2 DIABETES MELLITUS WITH HYPERGLYCEMIA, WITH LONG-TERM CURRENT USE OF INSULIN (HCC): Primary | ICD-10-CM

## 2020-07-27 DIAGNOSIS — E03.9 HYPOTHYROIDISM, UNSPECIFIED TYPE: Chronic | ICD-10-CM

## 2020-07-27 DIAGNOSIS — J44.9 CHRONIC OBSTRUCTIVE PULMONARY DISEASE, UNSPECIFIED COPD TYPE (HCC): ICD-10-CM

## 2020-07-27 DIAGNOSIS — E11.3393 MODERATE NONPROLIFERATIVE DIABETIC RETINOPATHY OF BOTH EYES ASSOCIATED WITH TYPE 2 DIABETES MELLITUS, MACULAR EDEMA PRESENCE UNSPECIFIED (HCC): ICD-10-CM

## 2020-07-27 DIAGNOSIS — I12.9 BENIGN HYPERTENSION WITH CHRONIC KIDNEY DISEASE, STAGE III (HCC): ICD-10-CM

## 2020-07-27 DIAGNOSIS — I50.32 CHRONIC DIASTOLIC CONGESTIVE HEART FAILURE (HCC): ICD-10-CM

## 2020-07-27 DIAGNOSIS — L30.9 DERMATITIS: ICD-10-CM

## 2020-07-27 LAB — SL AMB POCT HEMOGLOBIN AIC: 6.8 (ref ?–6.5)

## 2020-07-27 PROCEDURE — 1160F RVW MEDS BY RX/DR IN RCRD: CPT | Performed by: FAMILY MEDICINE

## 2020-07-27 PROCEDURE — 3066F NEPHROPATHY DOC TX: CPT | Performed by: FAMILY MEDICINE

## 2020-07-27 PROCEDURE — 3079F DIAST BP 80-89 MM HG: CPT | Performed by: FAMILY MEDICINE

## 2020-07-27 PROCEDURE — 99214 OFFICE O/P EST MOD 30 MIN: CPT | Performed by: FAMILY MEDICINE

## 2020-07-27 PROCEDURE — 3074F SYST BP LT 130 MM HG: CPT | Performed by: FAMILY MEDICINE

## 2020-07-27 PROCEDURE — 1036F TOBACCO NON-USER: CPT | Performed by: FAMILY MEDICINE

## 2020-07-27 PROCEDURE — 4040F PNEUMOC VAC/ADMIN/RCVD: CPT | Performed by: FAMILY MEDICINE

## 2020-07-27 PROCEDURE — 83036 HEMOGLOBIN GLYCOSYLATED A1C: CPT | Performed by: FAMILY MEDICINE

## 2020-07-27 PROCEDURE — 3044F HG A1C LEVEL LT 7.0%: CPT | Performed by: FAMILY MEDICINE

## 2020-07-27 PROCEDURE — 3008F BODY MASS INDEX DOCD: CPT | Performed by: FAMILY MEDICINE

## 2020-07-27 RX ORDER — DEXTROMETHORPHAN HYDROBROMIDE AND PROMETHAZINE HYDROCHLORIDE 15; 6.25 MG/5ML; MG/5ML
5 SYRUP ORAL 4 TIMES DAILY PRN
Qty: 180 ML | Refills: 3 | Status: SHIPPED | OUTPATIENT
Start: 2020-07-27 | End: 2020-09-08

## 2020-07-27 RX ORDER — TRIAMCINOLONE ACETONIDE 1 MG/ML
LOTION TOPICAL 3 TIMES DAILY
Qty: 60 ML | Refills: 1 | Status: SHIPPED | OUTPATIENT
Start: 2020-07-27 | End: 2020-08-25 | Stop reason: SDUPTHER

## 2020-07-27 RX ORDER — LORATADINE 10 MG/1
10 TABLET ORAL DAILY
Qty: 30 TABLET | Refills: 2 | Status: SHIPPED | OUTPATIENT
Start: 2020-07-27 | End: 2020-09-15 | Stop reason: SDUPTHER

## 2020-07-28 ENCOUNTER — LAB (OUTPATIENT)
Dept: LAB | Facility: CLINIC | Age: 80
End: 2020-07-28
Payer: MEDICARE

## 2020-07-28 DIAGNOSIS — Z79.4 TYPE 2 DIABETES MELLITUS WITH HYPERGLYCEMIA, WITH LONG-TERM CURRENT USE OF INSULIN (HCC): ICD-10-CM

## 2020-07-28 DIAGNOSIS — I21.4 NSTEMI (NON-ST ELEVATED MYOCARDIAL INFARCTION) (HCC): ICD-10-CM

## 2020-07-28 DIAGNOSIS — J44.9 CHRONIC OBSTRUCTIVE PULMONARY DISEASE, UNSPECIFIED COPD TYPE (HCC): ICD-10-CM

## 2020-07-28 DIAGNOSIS — N18.30 CHRONIC KIDNEY DISEASE, STAGE 3 (HCC): ICD-10-CM

## 2020-07-28 DIAGNOSIS — I48.0 PAF (PAROXYSMAL ATRIAL FIBRILLATION) (HCC): ICD-10-CM

## 2020-07-28 DIAGNOSIS — N25.81 SECONDARY HYPERPARATHYROIDISM OF RENAL ORIGIN (HCC): ICD-10-CM

## 2020-07-28 DIAGNOSIS — E11.65 TYPE 2 DIABETES MELLITUS WITH HYPERGLYCEMIA, WITH LONG-TERM CURRENT USE OF INSULIN (HCC): ICD-10-CM

## 2020-07-28 DIAGNOSIS — R80.1 PERSISTENT PROTEINURIA: ICD-10-CM

## 2020-07-28 DIAGNOSIS — E03.9 HYPOTHYROIDISM, UNSPECIFIED TYPE: ICD-10-CM

## 2020-07-28 LAB
25(OH)D3 SERPL-MCNC: 39.4 NG/ML (ref 30–100)
ALBUMIN SERPL BCP-MCNC: 3.3 G/DL (ref 3.5–5)
ALP SERPL-CCNC: 103 U/L (ref 46–116)
ALT SERPL W P-5'-P-CCNC: 28 U/L (ref 12–78)
ANION GAP SERPL CALCULATED.3IONS-SCNC: 7 MMOL/L (ref 4–13)
AST SERPL W P-5'-P-CCNC: 17 U/L (ref 5–45)
BASOPHILS # BLD AUTO: 0.05 THOUSANDS/ΜL (ref 0–0.1)
BASOPHILS NFR BLD AUTO: 1 % (ref 0–1)
BILIRUB SERPL-MCNC: 0.74 MG/DL (ref 0.2–1)
BUN SERPL-MCNC: 29 MG/DL (ref 5–25)
CALCIUM SERPL-MCNC: 9.2 MG/DL (ref 8.3–10.1)
CHLORIDE SERPL-SCNC: 106 MMOL/L (ref 100–108)
CHOLEST SERPL-MCNC: 129 MG/DL (ref 50–200)
CO2 SERPL-SCNC: 28 MMOL/L (ref 21–32)
CREAT SERPL-MCNC: 1.12 MG/DL (ref 0.6–1.3)
CREAT UR-MCNC: 77.1 MG/DL
EOSINOPHIL # BLD AUTO: 0.24 THOUSAND/ΜL (ref 0–0.61)
EOSINOPHIL NFR BLD AUTO: 4 % (ref 0–6)
ERYTHROCYTE [DISTWIDTH] IN BLOOD BY AUTOMATED COUNT: 13 % (ref 11.6–15.1)
EST. AVERAGE GLUCOSE BLD GHB EST-MCNC: 154 MG/DL
GFR SERPL CREATININE-BSD FRML MDRD: 47 ML/MIN/1.73SQ M
GLUCOSE P FAST SERPL-MCNC: 137 MG/DL (ref 65–99)
HBA1C MFR BLD: 7 %
HCT VFR BLD AUTO: 39.5 % (ref 34.8–46.1)
HDLC SERPL-MCNC: 54 MG/DL
HGB BLD-MCNC: 12.3 G/DL (ref 11.5–15.4)
IMM GRANULOCYTES # BLD AUTO: 0.02 THOUSAND/UL (ref 0–0.2)
IMM GRANULOCYTES NFR BLD AUTO: 0 % (ref 0–2)
LDLC SERPL CALC-MCNC: 61 MG/DL (ref 0–100)
LYMPHOCYTES # BLD AUTO: 1.59 THOUSANDS/ΜL (ref 0.6–4.47)
LYMPHOCYTES NFR BLD AUTO: 26 % (ref 14–44)
MAGNESIUM SERPL-MCNC: 2.2 MG/DL (ref 1.6–2.6)
MCH RBC QN AUTO: 30.1 PG (ref 26.8–34.3)
MCHC RBC AUTO-ENTMCNC: 31.1 G/DL (ref 31.4–37.4)
MCV RBC AUTO: 97 FL (ref 82–98)
MONOCYTES # BLD AUTO: 0.62 THOUSAND/ΜL (ref 0.17–1.22)
MONOCYTES NFR BLD AUTO: 10 % (ref 4–12)
NEUTROPHILS # BLD AUTO: 3.53 THOUSANDS/ΜL (ref 1.85–7.62)
NEUTS SEG NFR BLD AUTO: 59 % (ref 43–75)
NONHDLC SERPL-MCNC: 75 MG/DL
NRBC BLD AUTO-RTO: 0 /100 WBCS
PHOSPHATE SERPL-MCNC: 3.4 MG/DL (ref 2.3–4.1)
PMV BLD AUTO: 10.9 FL (ref 8.9–12.7)
POTASSIUM SERPL-SCNC: 4.5 MMOL/L (ref 3.5–5.3)
PROT SERPL-MCNC: 7.3 G/DL (ref 6.4–8.2)
PROT UR-MCNC: 35 MG/DL
PROT/CREAT UR: 0.45 MG/G{CREAT} (ref 0–0.1)
PTH-INTACT SERPL-MCNC: 75.7 PG/ML (ref 18.4–80.1)
RBC # BLD AUTO: 4.08 MILLION/UL (ref 3.81–5.12)
SODIUM SERPL-SCNC: 141 MMOL/L (ref 136–145)
T4 FREE SERPL-MCNC: 1.27 NG/DL (ref 0.76–1.46)
TRIGL SERPL-MCNC: 72 MG/DL
TSH SERPL DL<=0.05 MIU/L-ACNC: 2.6 UIU/ML (ref 0.36–3.74)
WBC # BLD AUTO: 6.05 THOUSAND/UL (ref 4.31–10.16)

## 2020-07-28 PROCEDURE — 84100 ASSAY OF PHOSPHORUS: CPT

## 2020-07-28 PROCEDURE — 83970 ASSAY OF PARATHORMONE: CPT

## 2020-07-28 PROCEDURE — 80061 LIPID PANEL: CPT

## 2020-07-28 PROCEDURE — 83735 ASSAY OF MAGNESIUM: CPT

## 2020-07-28 PROCEDURE — 84443 ASSAY THYROID STIM HORMONE: CPT

## 2020-07-28 PROCEDURE — 84439 ASSAY OF FREE THYROXINE: CPT

## 2020-07-28 PROCEDURE — 82570 ASSAY OF URINE CREATININE: CPT

## 2020-07-28 PROCEDURE — 83036 HEMOGLOBIN GLYCOSYLATED A1C: CPT

## 2020-07-28 PROCEDURE — 82306 VITAMIN D 25 HYDROXY: CPT

## 2020-07-28 PROCEDURE — 85025 COMPLETE CBC W/AUTO DIFF WBC: CPT

## 2020-07-28 PROCEDURE — 80053 COMPREHEN METABOLIC PANEL: CPT

## 2020-07-28 PROCEDURE — 84156 ASSAY OF PROTEIN URINE: CPT

## 2020-07-28 PROCEDURE — 36415 COLL VENOUS BLD VENIPUNCTURE: CPT

## 2020-07-28 NOTE — ASSESSMENT & PLAN NOTE
Patient presently without anginal symptoms  Continue present medications  Monitor lipids  Follow-up with Cardiology    Recheck 6 months

## 2020-07-28 NOTE — ASSESSMENT & PLAN NOTE
Lab Results   Component Value Date    HGBA1C 6 8 (A) 07/27/2020   Up-to-date with ophthalmology  Continue present treatment    Recheck 6 months

## 2020-07-28 NOTE — ASSESSMENT & PLAN NOTE
Lab Results   Component Value Date    HGBA1C 6 8 (A) 07/27/2020     A1c improved since starting continuous blood sugar monitor  Continue present treatment  Discussed weight loss with patient and   BMI Counseling: Body mass index is 45 31 kg/m²  The BMI is above normal  Nutrition recommendations include reducing portion sizes, consuming healthier snacks, moderation in carbohydrate intake and increasing intake of lean protein  Follow-up with endocrinology later this week    Recheck 6 months

## 2020-07-28 NOTE — ASSESSMENT & PLAN NOTE
Lungs are clear to auscultation  I refilled patient's cough medicine  Start loratadine for possible postnasal drip related cough    Recheck 2 weeks if not improved

## 2020-07-28 NOTE — ASSESSMENT & PLAN NOTE
Wt Readings from Last 3 Encounters:   07/27/20 105 kg (232 lb)   07/22/20 105 kg (231 lb)   07/07/20 105 kg (231 lb)       Weight has remained stable  Continue to monitor renal function    Recheck 6 months

## 2020-07-31 ENCOUNTER — OFFICE VISIT (OUTPATIENT)
Dept: ENDOCRINOLOGY | Facility: CLINIC | Age: 80
End: 2020-07-31
Payer: MEDICARE

## 2020-07-31 VITALS
BODY MASS INDEX: 45.16 KG/M2 | SYSTOLIC BLOOD PRESSURE: 140 MMHG | WEIGHT: 230 LBS | HEART RATE: 72 BPM | HEIGHT: 60 IN | DIASTOLIC BLOOD PRESSURE: 90 MMHG | TEMPERATURE: 98.5 F

## 2020-07-31 DIAGNOSIS — E78.2 MIXED HYPERLIPIDEMIA: ICD-10-CM

## 2020-07-31 DIAGNOSIS — E21.3 HYPERPARATHYROIDISM (HCC): ICD-10-CM

## 2020-07-31 DIAGNOSIS — E11.59 TYPE 2 DIABETES MELLITUS WITH OTHER CIRCULATORY COMPLICATION, WITH LONG-TERM CURRENT USE OF INSULIN (HCC): ICD-10-CM

## 2020-07-31 DIAGNOSIS — E03.9 ACQUIRED HYPOTHYROIDISM: ICD-10-CM

## 2020-07-31 DIAGNOSIS — E11.65 TYPE 2 DIABETES MELLITUS WITH HYPERGLYCEMIA, WITH LONG-TERM CURRENT USE OF INSULIN (HCC): Primary | ICD-10-CM

## 2020-07-31 DIAGNOSIS — Z79.4 TYPE 2 DIABETES MELLITUS WITH HYPERGLYCEMIA, WITH LONG-TERM CURRENT USE OF INSULIN (HCC): Primary | ICD-10-CM

## 2020-07-31 DIAGNOSIS — E11.3393 MODERATE NONPROLIFERATIVE DIABETIC RETINOPATHY OF BOTH EYES ASSOCIATED WITH TYPE 2 DIABETES MELLITUS, MACULAR EDEMA PRESENCE UNSPECIFIED (HCC): ICD-10-CM

## 2020-07-31 DIAGNOSIS — Z79.4 TYPE 2 DIABETES MELLITUS WITH OTHER CIRCULATORY COMPLICATION, WITH LONG-TERM CURRENT USE OF INSULIN (HCC): ICD-10-CM

## 2020-07-31 PROCEDURE — 1160F RVW MEDS BY RX/DR IN RCRD: CPT | Performed by: INTERNAL MEDICINE

## 2020-07-31 PROCEDURE — 95251 CONT GLUC MNTR ANALYSIS I&R: CPT | Performed by: INTERNAL MEDICINE

## 2020-07-31 PROCEDURE — 3080F DIAST BP >= 90 MM HG: CPT | Performed by: INTERNAL MEDICINE

## 2020-07-31 PROCEDURE — 3051F HG A1C>EQUAL 7.0%<8.0%: CPT | Performed by: INTERNAL MEDICINE

## 2020-07-31 PROCEDURE — 99214 OFFICE O/P EST MOD 30 MIN: CPT | Performed by: INTERNAL MEDICINE

## 2020-07-31 PROCEDURE — 3077F SYST BP >= 140 MM HG: CPT | Performed by: INTERNAL MEDICINE

## 2020-07-31 PROCEDURE — 3066F NEPHROPATHY DOC TX: CPT | Performed by: INTERNAL MEDICINE

## 2020-07-31 PROCEDURE — 1036F TOBACCO NON-USER: CPT | Performed by: INTERNAL MEDICINE

## 2020-07-31 PROCEDURE — 4040F PNEUMOC VAC/ADMIN/RCVD: CPT | Performed by: INTERNAL MEDICINE

## 2020-07-31 NOTE — PROGRESS NOTES
Bettina Villarreal 68 y o  female MRN: [id number]    Encounter: [id number]      Assessment/Plan     Assessment: This is a 68y o -year-old female with diabetes with hyperglycemia  Plan:    Diagnoses and all orders for this visit:    Type 2 diabetes mellitus with hyperglycemia, with long-term current use of insulin ([facility] )    Lab Results   Component Value Date    HGBA1C 7 0 (H) 07/28/2020    A1c is 7%  Improved  Blood sugars are in  mg/dl range most of the time  She has a pattern of low blood sugar from 12:00 a m  to 6:00 a m  Continue NovoLog 12 units with breakfast, 10 units with lunch and 10 units with dinner  Plus scale  Reduce Lantus 35 units at bedtime  Discussed to check blood sugar 3-4 times daily and send log 2 weeks  Follow-up in 3 months  Discussed symptoms of hypoglycemia and treatment    -     insulin glargine (TOUJEO) 300 units/mL CONCETRATED U-300 injection pen (1-unit dial); Inject 35 Units under the skin daily at bedtime  -     Basic metabolic panel; Future  -     Hemoglobin A1C; Future  -     Microalbumin / creatinine urine ratio; Future    Type 2 diabetes mellitus with other circulatory complication, with long-term current use of insulin ([facility])  -     insulin aspart (NovoLOG) 100 Units/mL injection pen; 12u with breakfast, 10u with lunch, 10u with dinner +scale (Use up to 40 units daily)    Mixed hyperlipidemia    Lab Results   Component Value Date    LDLCALC 61 07/28/2020    Continue statins    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, macular edema presence unspecified ([facility] )  Continue to follow up with Ophthalmology  Explained the importance of diabetes control to goal of 7%    Acquired hypothyroidism  Continue current dose of levothyroxine   -     T4, free; Future  -     TSH, 3rd generation;  Future    Hyperparathyroidism [facility])  Lab Results   Component Value Date    CREATININE 1 12 07/28/2020     Lab Results   Component Value Date    PTH 75 7 07/28/2020    CALCIUM 9 2 07/28/2020    PHOS 3 4 07/28/2020    Most recent PTH is normal  Previously calcium is always within normal range  No history of kidney stones or history of fracture bones  She is due for DEXA scan which was ordered by her PCP, DEXA scan showed osteopenia  Discussed importance of weight-bearing exercises, fall precautions  Continue vitamin-D supplementation and calcium as recommended        Other orders  -     Calcium Carbonate-Vit D-Min (CALCIUM 1200 PO); Take by mouth        CC: Diabetes    History of Present Illness     HPI:    Kaylen Garcias is 81-year-old woman with medical history of type 2 diabetes, hyperlipidemia, morbid obesity, hypothyroidism is here for follow-up  She is currently taking toujeo 40 units, at bedtime NovoLog 12 units with breakfast and 10 units with lunch and dinner  He checks blood sugars at home 6  times daily  Blood sugars are usually in  range    She denies any recent hospitalization for hyperglycemia or hypoglycemia/    For hypothyroidism she takes levothyroxine 125 mcg daily except on Sunday she does not take any tablet  She denies missing doses  She also has vitamin-D deficiency currently taking vitamin D3, 2000 International Units daily  She has history of slightly elevated PTH however most recent PTH is normal  No history of kidney stones or fracture bones  Reviewed DEXA scan results which showed lumbar spine BMD, T-score 0 9, left total hip T-score -0 6 and left femoral neck T-score -2 0 which is suggestive of osteopenia  Ten year risk for hip fracture is 4 3% and 10 year risk for major osteoporotic fracture is 16%  She will need monitoring of DEXA scan in 2 years      She uses continues glucose monitor sensor  Reviewed glucose does does take sent targets from July 18 to July 31, 2020  Patient is using continues glucose monitor 91% times  Average glucose 125 mg/dL  Glucose variability 40%  Target blood sugars 70-19209% times  Low blood sugar 10% times  High blood sugars 12%    She has a pattern of low blood sugar specially in between 12:00 a m  to 6:00 a m  Lab Results   Component Value Date    VOA9IAGDGPUC 2 600 07/28/2020     Lab Results   Component Value Date    CREATININE 1 12 07/28/2020       Review of Systems   Constitutional: Positive for activity change  Negative for diaphoresis, fatigue, fever and unexpected weight change  HENT: Negative  Eyes: Negative for visual disturbance  Respiratory: Negative for cough, chest tightness and shortness of breath  Cardiovascular: Negative for chest pain, palpitations and leg swelling  Gastrointestinal: Negative for abdominal pain, blood in stool, constipation, diarrhea, nausea and vomiting  Endocrine: Negative for cold intolerance, heat intolerance, polydipsia, polyphagia and polyuria  Genitourinary: Negative for dysuria, enuresis, frequency and urgency  Musculoskeletal: Negative for arthralgias and myalgias  Skin: Negative for pallor, rash and wound  Allergic/Immunologic: Negative  Neurological: Negative for dizziness, tremors, weakness and numbness  Hematological: Negative  Psychiatric/Behavioral: Negative  Historical Information   Past Medical History:   Diagnosis Date    Anxiety     Arthritis     CHF (congestive heart failure) ([facility])     Chronic venous insufficiency     COPD (chronic obstructive pulmonary disease) (formerly [facility])     Coronary artery disease     Diabetes mellitus ([facility] )     type II    Disease of thyroid gland     hypothyroidism    DVT (deep venous thrombosis) ([facility] )     Hyperlipidemia     Hypertension     Irregular heart beat     Paroxysmal a-fib    Pacemaker     Renal disorder     stage 3    Thyroid disease     Urinary incontinence     Uterine leiomyoma      Past Surgical History:   Procedure Laterality Date    BREAST SURGERY      For Biopsy    CARDIAC PACEMAKER PLACEMENT      Permanent   Last assessed: 2/19/16   Lloyd Betts CARDIAC SURGERY      CARPAL TUNNEL RELEASE Bilateral     CATARACT EXTRACTION      CATARACT EXTRACTION W/ INTRAOCULAR LENS  IMPLANT, BILATERAL      CYSTOSCOPY      Diagnostic  Last assessed: 11/8/17    ECTOPIC PREGNANCY SURGERY      HYSTERECTOMY      Total Abdomonal  Resolved: 1982    JOINT REPLACEMENT Right     TKR    OOPHORECTOMY      LA ENDOVENOUS LASER, 1ST VEIN Left 2/17/2017    Procedure: GREATER SAPHENOUS VEIN ENDOVASCULAR LASER THERAPY ;  Surgeon: Leon Gates DO;  Location: [facility];  Service: Vascular    TONSILLECTOMY       Social History   Social History     Substance and Sexual Activity   Alcohol Use Yes    Comment: rarely  Per [employer]: Denied history of alcohol use     Social History     Substance and Sexual Activity   Drug Use No     Social History     Tobacco Use   Smoking Status Never Smoker   Smokeless Tobacco Never Used   Tobacco Comment    Per [employer]: Former smoker     Family History:   Family History   Problem Relation Age of Onset    Hypertension Father     Diabetes Father         Mellitus    Coronary artery disease Mother     Hypertension Mother     Heart disease Mother     Diabetes Brother         Mellitus    Breast cancer Maternal Aunt     Pancreatic cancer Maternal Aunt     Breast cancer Cousin     Breast cancer Cousin     No Known Problems Sister     No Known Problems Daughter     No Known Problems Maternal Grandmother     No Known Problems Maternal Grandfather     No Known Problems Paternal Grandmother     No Known Problems Paternal Grandfather     No Known Problems Daughter     No Known Problems Son        Meds/Allergies   Current Outpatient Medications   Medication Sig Dispense Refill    acetaminophen (TYLENOL) 325 mg tablet Take 650 mg by mouth   apixaban (Eliquis) 5 mg Take 1 tablet (5 mg total) by mouth 2 (two) times a day Lot #[id number]  Exp 9/2022 56 tablet 0    Ascorbic Acid (VITAMIN C) 1000 MG tablet Take 1,000 mg by mouth        atorvastatin (LIPITOR) 80 mg tablet TAKE 1 TABLET AT BEDTIME 90 tablet 3    Calcium Carbonate-Vit D-Min (CALCIUM 1200 PO) Take by mouth      cholecalciferol (VITAMIN D3) 1,000 units tablet Take 2,000 Units by mouth daily       citalopram (CeleXA) 20 mg tablet TAKE 1 TABLET EVERY DAY 90 tablet 3    Continuous Blood Gluc  (FREESTYLE RONNI 14 DAY READER) GARRY Use to check BG levels 4+times daily and as needed 1 Device 0    Continuous Blood Gluc Sensor (FREESTYLE RONNI 14 DAY SENSOR) MISC Change sensor every 14 days to check BG levels 4+ times daily 6 each 3    furosemide (LASIX) 20 mg tablet TAKE 1 TABLET EVERY DAY 90 tablet 3    GLUCOSAMINE CHONDROITIN COMPLX PO Take 1 tablet by mouth 2 (two) times a day      glucose blood (TRUE METRIX BLOOD GLUCOSE TEST) test strip Test 4 times plus daily, as instructed 400 each 1    insulin aspart (NovoLOG) 100 Units/mL injection pen 12u with breakfast, 10u with lunch, 10u with dinner +scale (Use up to 40 units daily) 15 pen 1    insulin glargine (TOUJEO) 300 units/mL CONCETRATED U-300 injection pen (1-unit dial) Inject 35 Units under the skin daily at bedtime 9 pen 1    Insulin Pen Needle (PEN NEEDLES) 32G X 6 MM MISC Use 4 times a day 400 each 1    levothyroxine 125 mcg tablet TAKE 1 TABLET BY MOUTH ONCE DAILY EXCEPT ON SUNDAY NO TABLET 78 tablet 1    lisinopril (ZESTRIL) 20 mg tablet Take 1 tablet (20 mg total) by mouth 2 (two) times a day 180 tablet 3    loratadine (CLARITIN) 10 mg tablet Take 1 tablet (10 mg total) by mouth daily 30 tablet 2    metoprolol tartrate (LOPRESSOR) 25 mg tablet TAKE 1 TABLET EVERY 12 HOURS 180 tablet 3    Omega-3 Fatty Acids (FISH OIL) 1200 MG CAPS Take 1,200 mg by mouth 3 (three) times a day        oxybutynin (DITROPAN) 5 mg tablet TAKE 1 TABLET TWICE DAILY 180 tablet 1    promethazine-dextromethorphan (PHENERGAN-DM) 6 25-15 mg/5 mL oral syrup Take 5 mL by mouth 4 (four) times a day as needed for cough 180 mL 3    Tetrahydroz-Polyvinyl Al-Povid (MURINE TEARS PLUS OP) Apply to eye 3 (three) times a day      triamcinolone (KENALOG) 0 1 % lotion Apply topically 3 (three) times a day for 14 days 60 mL 1     No current facility-administered medications for this visit  Allergies   Allergen Reactions    Ampicillin Hives       Objective   Vitals: Blood pressure 140/90, pulse 72, temperature 98 5 °F (36 9 °C), height 5', weight 104 kg (230 lb)  Physical Exam   Constitutional: She is oriented to person, place, and time  She appears well-developed and well-nourished  No distress  HENT:   Head: Normocephalic and atraumatic  Eyes: EOM are normal  Right eye exhibits no discharge  Left eye exhibits no discharge  No scleral icterus  Neck: Normal range of motion  No thyromegaly present  Cardiovascular: Normal rate and regular rhythm  No murmur heard  Pulmonary/Chest: Effort normal and breath sounds normal  No respiratory distress  Musculoskeletal: She exhibits no edema or deformity  Neurological: She is alert and oriented to person, place, and time  Skin: No rash noted  Psychiatric: She has a normal mood and affect  Her behavior is normal    Vitals reviewed  The history was obtained from the review of the chart, patient      Lab Results:   Lab Results   Component Value Date/Time    Hemoglobin A1C 7 0 (H) 07/28/2020 10:51 AM    Hemoglobin A1C 6 8 (A) 07/27/2020 01:30 PM    Hemoglobin A1C 7 0 (H) 01/23/2020 10:05 AM    Hemoglobin A1C 6 8 (H) 10/18/2019 10:05 AM    WBC 6 05 07/28/2020 10:51 AM    WBC 6 35 01/23/2020 10:05 AM    Hemoglobin 12 3 07/28/2020 10:51 AM    Hemoglobin 11 9 01/23/2020 10:05 AM    Hematocrit 39 5 07/28/2020 10:51 AM    Hematocrit 39 2 01/23/2020 10:05 AM    MCV 97 07/28/2020 10:51 AM    MCV 97 01/23/2020 10:05 AM    BUN 29 (H) 07/28/2020 10:51 AM    BUN 33 (H) 01/23/2020 10:05 AM    BUN 39 (H) 10/18/2019 10:05 AM    Potassium 4 5 07/28/2020 10:51 AM    Potassium 4 5 01/23/2020 10:05 AM Potassium 4 2 10/18/2019 10:05 AM    Chloride 106 07/28/2020 10:51 AM    Chloride 108 01/23/2020 10:05 AM    Chloride 107 10/18/2019 10:05 AM    CO2 28 07/28/2020 10:51 AM    CO2 28 01/23/2020 10:05 AM    CO2 26 10/18/2019 10:05 AM    Creatinine 1 12 07/28/2020 10:51 AM    Creatinine 1 18 01/23/2020 10:05 AM    Creatinine 1 30 10/18/2019 10:05 AM    AST 17 07/28/2020 10:51 AM    ALT 28 07/28/2020 10:51 AM    Albumin 3 3 (L) 07/28/2020 10:51 AM    HDL, Direct 54 07/28/2020 10:51 AM    HDL, Direct 64 01/23/2020 10:05 AM    Triglycerides 72 07/28/2020 10:51 AM    Triglycerides 90 01/23/2020 10:05 AM           Imaging Studies: I have personally reviewed pertinent reports  Portions of the record may have been created with voice recognition software  Occasional wrong word or "sound a like" substitutions may have occurred due to the inherent limitations of voice recognition software  Read the chart carefully and recognize, using context, where substitutions have occurred

## 2020-08-06 ENCOUNTER — TELEPHONE (OUTPATIENT)
Dept: NEPHROLOGY | Facility: CLINIC | Age: 80
End: 2020-08-06

## 2020-08-06 ENCOUNTER — OFFICE VISIT (OUTPATIENT)
Dept: NEPHROLOGY | Facility: CLINIC | Age: 80
End: 2020-08-06
Payer: MEDICARE

## 2020-08-06 VITALS
WEIGHT: 233.8 LBS | DIASTOLIC BLOOD PRESSURE: 70 MMHG | BODY MASS INDEX: 45.9 KG/M2 | TEMPERATURE: 98.1 F | HEIGHT: 60 IN | SYSTOLIC BLOOD PRESSURE: 136 MMHG

## 2020-08-06 DIAGNOSIS — R80.9 MICROALBUMINURIA: ICD-10-CM

## 2020-08-06 DIAGNOSIS — N18.30 BENIGN HYPERTENSION WITH CHRONIC KIDNEY DISEASE, STAGE III (HCC): Primary | ICD-10-CM

## 2020-08-06 DIAGNOSIS — R60.0 BILATERAL LEG EDEMA: ICD-10-CM

## 2020-08-06 DIAGNOSIS — E55.9 VITAMIN D DEFICIENCY: ICD-10-CM

## 2020-08-06 DIAGNOSIS — I12.9 BENIGN HYPERTENSION WITH CHRONIC KIDNEY DISEASE, STAGE III (HCC): Primary | ICD-10-CM

## 2020-08-06 DIAGNOSIS — N18.30 CHRONIC KIDNEY DISEASE, STAGE 3 (HCC): ICD-10-CM

## 2020-08-06 PROCEDURE — 4040F PNEUMOC VAC/ADMIN/RCVD: CPT | Performed by: INTERNAL MEDICINE

## 2020-08-06 PROCEDURE — 3008F BODY MASS INDEX DOCD: CPT | Performed by: INTERNAL MEDICINE

## 2020-08-06 PROCEDURE — 1160F RVW MEDS BY RX/DR IN RCRD: CPT | Performed by: INTERNAL MEDICINE

## 2020-08-06 PROCEDURE — 3066F NEPHROPATHY DOC TX: CPT | Performed by: INTERNAL MEDICINE

## 2020-08-06 PROCEDURE — 1036F TOBACCO NON-USER: CPT | Performed by: INTERNAL MEDICINE

## 2020-08-06 PROCEDURE — 3078F DIAST BP <80 MM HG: CPT | Performed by: INTERNAL MEDICINE

## 2020-08-06 PROCEDURE — 99214 OFFICE O/P EST MOD 30 MIN: CPT | Performed by: INTERNAL MEDICINE

## 2020-08-06 PROCEDURE — 3075F SYST BP GE 130 - 139MM HG: CPT | Performed by: INTERNAL MEDICINE

## 2020-08-06 PROCEDURE — 3051F HG A1C>EQUAL 7.0%<8.0%: CPT | Performed by: INTERNAL MEDICINE

## 2020-08-06 NOTE — PROGRESS NOTES
NEPHROLOGY OUTPATIENT PROGRESS NOTE   Artie Lobato 68 y o  female MRN: [id number]  DATE: 8/6/2020  Reason for visit:   Chief Complaint   Patient presents with    Follow-up    Chronic Kidney Disease     ASSESSMENT and PLAN:  CK D stage III with baseline serum creatinine 1 2-1 3  - Her serum creatinine 1 1 in July 2020 overall stable at baseline    - Avoid NSAIDs or nephrotoxins  - CKD likely secondary to long-term hypertensive nephrosclerosis/diabetic nephropathy  - Check BMP, phosphorus, UPCR before next visit     Proteinuria  - last UPC ratio 450 mg slightly increased from prior value 280 mg  repeat UPC ratio before next visit    -continue lisinopril to 40 mg p o  Daily  -Proteinuria likely secondary to long-term diabetes and hypertension, obesity related secondary FSGS contributing?  -last hemoglobin A1c 7 0 in July 2020    -also has moderate DM retinopathy in 4/2019     Hypertension  -Blood pressure is  overall acceptable  -continue current lisinopril, metoprolol, Lasix    -she has not been checking BP at home  Recommended to bring BP machine during next office visit   -salt restricted diet  -consider changing metoprolol to carvedilol blood pressure remains elevated  -regular exercise if possible      bilateral lower extremity edema likely secondary to chronic venous stasis  - This has improved and stable since endovascular laser treatment in left lower extremity  She denies any dyspnea  - Continue low-salt diet   -continue current maintenance dose of Lasix 20 mg p o  Daily     Prior vitamin-D deficiency, improved  last vitamin-D level 39 in July 2020  continue vitamin-D 2000 units p o  Daily      Secondary hyperparathyroidism, last PTH 75  continue to monitor    Repeat level before next visit      Anemia in CKD, hemoglobin overall stable at goal    Morbid obesity, recommended weight loss program       Diagnoses and all orders for this visit:    Benign hypertension with chronic kidney disease, stage III ([facility] )    Chronic kidney disease, stage 3 (HCC)  -     CBC; Future  -     Basic metabolic panel; Future  -     Phosphorus; Future  -     PTH, intact; Future  -     Protein / creatinine ratio, urine; Future  -     Magnesium; Future  -     UA (URINE) with reflex to Scope; Future    Microalbuminuria  -     Protein / creatinine ratio, urine; Future  -     UA (URINE) with reflex to Scope; Future    Vitamin D deficiency  -     Vitamin D 25 hydroxy; Future    Bilateral leg edema        SUBJECTIVE / HPI:  Patient is 79-year-old female with significant past medical history of hypertension for at least 40-50 years, and diabetes for 30 years, diabetic retinopathy, stress incontinence, chronic leg swelling with chronic venous stasis, status post endovascular laser treatment in the left lower extremity, chronic kidney disease stage III with baseline serum creatinine 1 2-1 3, complete heart block, status post ppm, paroxysmal AFib, comes regular follow up of renal failure  Her last serum creatinine 1 1 overall stable at baseline  She claims to be compliant with her medications  She does not check blood pressure at home despite having blood pressure machine  Denies any worsening leg edema   Denies any worsening dyspnea    She tries to follow low salt diet  No recent NSAID exposure  Denies lightheadedness or dizziness  Denies any dyspnea    REVIEW OF SYSTEMS:  More than 10 point review of systems were obtained and discussed in length with the patient  Complete review of systems were negative / unremarkable except mentioned above  PHYSICAL EXAM:  Vitals:    08/06/20 1114 08/06/20 1127   BP:  136/70   Temp: 98 1 °F (36 7 °C)    TempSrc: Temporal    Weight: 106 kg (233 lb 12 8 oz)    Height: 5' (1 524 m)      Body mass index is 45 66 kg/m²  Physical Exam   Constitutional: She is oriented to person, place, and time  She appears well-developed  HENT:   Head: Normocephalic and atraumatic     Right Ear: External ear normal  Left Ear: External ear normal    Eyes: Pupils are equal, round, and reactive to light  Conjunctivae are normal    Neck: Neck supple  No JVD present  Cardiovascular: Normal rate and normal heart sounds  Pulmonary/Chest: Effort normal and breath sounds normal  She has no wheezes  She has no rales  Abdominal: Soft  Bowel sounds are normal  She exhibits no distension  There is no abdominal tenderness  Musculoskeletal:         General: No tenderness  Left lower leg: Edema (mild non pitting component) present  Neurological: She is alert and oriented to person, place, and time  Skin: Skin is warm and dry  No rash noted  Psychiatric: Her behavior is normal    Vitals reviewed  PAST MEDICAL HISTORY:  Past Medical History:   Diagnosis Date    Anxiety     Arthritis     [facility]     Chronic venous insufficiency     COPD (chronic obstructive pulmonary disease) ([facility] )     Coronary artery disease     Diabetes mellitus ([facility] )     type II    Disease of thyroid gland     hypothyroidism    DVT (deep venous thrombosis) ([facility])     Hyperlipidemia     Hypertension     Irregular heart beat     Paroxysmal a-fib    Pacemaker     Renal disorder     stage 3    Thyroid disease     Urinary incontinence     Uterine leiomyoma        PAST SURGICAL HISTORY:  Past Surgical History:   Procedure Laterality Date    BREAST SURGERY      For Biopsy    CARDIAC PACEMAKER PLACEMENT      Permanent  Last assessed: 2/19/16    CARDIAC SURGERY      CARPAL TUNNEL RELEASE Bilateral     CATARACT EXTRACTION      CATARACT EXTRACTION W/ INTRAOCULAR LENS  IMPLANT, BILATERAL      CYSTOSCOPY      Diagnostic   Last assessed: 11/8/17    ECTOPIC PREGNANCY SURGERY      HYSTERECTOMY      Total Abdomonal  Resolved: 1982    JOINT REPLACEMENT Right     TKR    OOPHORECTOMY      UT ENDOVENOUS LASER, 1ST VEIN Left 2/17/2017    Procedure: GREATER SAPHENOUS VEIN ENDOVASCULAR LASER THERAPY ;  Surgeon: Yohana Mack DO [place];  Location: [facility];  Service: Vascular    TONSILLECTOMY         SOCIAL HISTORY:  Social History     Substance and Sexual Activity   Alcohol Use Yes    Comment: rarely   Per [employer]: Denied history of alcohol use     Social History     Substance and Sexual Activity   Drug Use No     Social History     Tobacco Use   Smoking Status Never Smoker   Smokeless Tobacco Never Used   Tobacco Comment    Per [employer]: Former smoker       FAMILY HISTORY:  Family History   Problem Relation Age of Onset    Hypertension Father     Diabetes Father         Mellitus    Coronary artery disease Mother     Hypertension Mother     Heart disease Mother     Diabetes Brother         Mellitus    Breast cancer Maternal Aunt     Pancreatic cancer Maternal Aunt     Breast cancer Cousin     Breast cancer Cousin     No Known Problems Sister     No Known Problems Daughter     No Known Problems Maternal Grandmother     No Known Problems Maternal Grandfather     No Known Problems Paternal Grandmother     No Known Problems Paternal Grandfather     No Known Problems Daughter     No Known Problems Son        MEDICATIONS:    Current Outpatient Medications:     acetaminophen (TYLENOL) 325 mg tablet, Take 650 mg by mouth , Disp: , Rfl:     apixaban (Eliquis) 5 mg, Take 1 tablet (5 mg total) by mouth 2 (two) times a day Lot #[id number] Exp 9/2022, Disp: 56 tablet, Rfl: 0    Ascorbic Acid (VITAMIN C) 1000 MG tablet, Take 1,000 mg by mouth , Disp: , Rfl:     atorvastatin (LIPITOR) 80 mg tablet, TAKE 1 TABLET AT BEDTIME, Disp: 90 tablet, Rfl: 3    Calcium Carbonate-Vit D-Min (CALCIUM 1200 PO), Take by mouth, Disp: , Rfl:     cholecalciferol (VITAMIN D3) 1,000 units tablet, Take 2,000 Units by mouth daily , Disp: , Rfl:     citalopram (CeleXA) 20 mg tablet, TAKE 1 TABLET EVERY DAY, Disp: 90 tablet, Rfl: 3    Continuous Blood Gluc  (FREESTYLE RONNI 14 DAY READER) GARRY, Use to check BG levels 4+times daily and as needed, Disp: 1 Device, Rfl: 0    Continuous Blood Gluc Sensor (FREESTYLE RONNI 14 DAY SENSOR) MISC, Change sensor every 14 days to check BG levels 4+ times daily, Disp: 6 each, Rfl: 3    furosemide (LASIX) 20 mg tablet, TAKE 1 TABLET EVERY DAY, Disp: 90 tablet, Rfl: 3    GLUCOSAMINE CHONDROITIN COMPLX PO, Take 1 tablet by mouth 2 (two) times a day, Disp: , Rfl:     glucose blood (TRUE METRIX BLOOD GLUCOSE TEST) test strip, Test 4 times plus daily, as instructed, Disp: 400 each, Rfl: 1    insulin aspart (NovoLOG) 100 Units/mL injection pen, 12u with breakfast, 10u with lunch, 10u with dinner +scale (Use up to 40 units daily), Disp: 15 pen, Rfl: 1    insulin glargine (TOUJEO) 300 units/mL CONCETRATED U-300 injection pen (1-unit dial), Inject 35 Units under the skin daily at bedtime, Disp: 9 pen, Rfl: 1    Insulin Pen Needle (PEN NEEDLES) 32G X 6 MM MISC, Use 4 times a day, Disp: 400 each, Rfl: 1    levothyroxine 125 mcg tablet, TAKE 1 TABLET BY MOUTH ONCE DAILY EXCEPT ON SUNDAY NO TABLET, Disp: 78 tablet, Rfl: 1    lisinopril (ZESTRIL) 20 mg tablet, Take 1 tablet (20 mg total) by mouth 2 (two) times a day, Disp: 180 tablet, Rfl: 3    loratadine (CLARITIN) 10 mg tablet, Take 1 tablet (10 mg total) by mouth daily, Disp: 30 tablet, Rfl: 2    metoprolol tartrate (LOPRESSOR) 25 mg tablet, TAKE 1 TABLET EVERY 12 HOURS, Disp: 180 tablet, Rfl: 3    Omega-3 Fatty Acids (FISH OIL) 1200 MG CAPS, Take 1,200 mg by mouth 2 (two) times a day , Disp: , Rfl:     oxybutynin (DITROPAN) 5 mg tablet, TAKE 1 TABLET TWICE DAILY, Disp: 180 tablet, Rfl: 1    promethazine-dextromethorphan (PHENERGAN-DM) 6 25-15 mg/5 mL oral syrup, Take 5 mL by mouth 4 (four) times a day as needed for cough, Disp: 180 mL, Rfl: 3    Tetrahydroz-Polyvinyl Al-Povid (MURINE TEARS PLUS OP), Apply to eye 3 (three) times a day, Disp: , Rfl:     triamcinolone (KENALOG) 0 1 % lotion, Apply topically 3 (three) times a day for 14 days, Disp: 60 mL, Rfl: 1    Lab Results:   Results for orders placed or performed in visit on 07/28/20   Hemoglobin A1C   Result Value Ref Range    Hemoglobin A1C 7 0 (H) Normal 3 8-5 6%; PreDiabetic 5 7-6 4%;  Diabetic >=6 5%; Glycemic control for adults with diabetes <7 0% %     mg/dl   TSH, 3rd generation   Result Value Ref Range    TSH 3RD GENERATON 2 600 0 358 - 3 740 uIU/mL   T4, free   Result Value Ref Range    Free T4 1 27 0 76 - 1 46 ng/dL   Magnesium   Result Value Ref Range    Magnesium 2 2 1 6 - 2 6 mg/dL   Phosphorus   Result Value Ref Range    Phosphorus 3 4 2 3 - 4 1 mg/dL   PTH, intact   Result Value Ref Range    PTH 75 7 18 4 - 80 1 pg/mL   Protein / creatinine ratio, urine   Result Value Ref Range    Creatinine, Ur 77 1 mg/dL    Protein Urine Random 35 mg/dL    Prot/Creat Ratio, Ur 0 45 (H) 0 00 - 0 10   Vitamin D 25 hydroxy   Result Value Ref Range    Vit D, 25-Hydroxy 39 4 30 0 - 100 0 ng/mL   CBC and differential   Result Value Ref Range    WBC 6 05 4 31 - 10 16 Thousand/uL    RBC 4 08 3 81 - 5 12 Million/uL    Hemoglobin 12 3 11 5 - 15 4 g/dL    Hematocrit 39 5 34 8 - 46 1 %    MCV 97 82 - 98 fL    MCH 30 1 26 8 - 34 3 pg    MCHC 31 1 (L) 31 4 - 37 4 g/dL    RDW 13 0 11 6 - 15 1 %    MPV 10 9 8 9 - 12 7 fL    Platelets      nRBC 0 /100 WBCs    Neutrophils Relative 59 43 - 75 %    Immat GRANS % 0 0 - 2 %    Lymphocytes Relative 26 14 - 44 %    Monocytes Relative 10 4 - 12 %    Eosinophils Relative 4 0 - 6 %    Basophils Relative 1 0 - 1 %    Neutrophils Absolute 3 53 1 85 - 7 62 Thousands/µL    Immature Grans Absolute 0 02 0 00 - 0 20 Thousand/uL    Lymphocytes Absolute 1 59 0 60 - 4 47 Thousands/µL    Monocytes Absolute 0 62 0 17 - 1 22 Thousand/µL    Eosinophils Absolute 0 24 0 00 - 0 61 Thousand/µL    Basophils Absolute 0 05 0 00 - 0 10 Thousands/µL   Comprehensive metabolic panel   Result Value Ref Range    Sodium 141 136 - 145 mmol/L    Potassium 4 5 3 5 - 5 3 mmol/L    Chloride 106 100 - 108 mmol/L CO2 28 21 - 32 mmol/L    ANION GAP 7 4 - 13 mmol/L    BUN 29 (H) 5 - 25 mg/dL    Creatinine 1 12 0 60 - 1 30 mg/dL    Glucose, Fasting 137 (H) 65 - 99 mg/dL    Calcium 9 2 8 3 - 10 1 mg/dL    AST 17 5 - 45 U/L    ALT 28 12 - 78 U/L    Alkaline Phosphatase 103 46 - 116 U/L    Total Protein 7 3 6 4 - 8 2 g/dL    Albumin 3 3 (L) 3 5 - 5 0 g/dL    Total Bilirubin 0 74 0 20 - 1 00 mg/dL    eGFR 47 ml/min/1 73sq m   Lipid panel   Result Value Ref Range    Cholesterol 129 50 - 200 mg/dL    Triglycerides 72 <=150 mg/dL    HDL, Direct 54 >=40 mg/dL    LDL Calculated 61 0 - 100 mg/dL    Non-HDL-Chol (CHOL-HDL) 75 mg/dl

## 2020-08-11 ENCOUNTER — TELEPHONE (OUTPATIENT)
Dept: FAMILY MEDICINE CLINIC | Facility: CLINIC | Age: 80
End: 2020-08-11

## 2020-08-11 DIAGNOSIS — E11.59 TYPE 2 DIABETES MELLITUS WITH OTHER CIRCULATORY COMPLICATION, WITH LONG-TERM CURRENT USE OF INSULIN (HCC): ICD-10-CM

## 2020-08-11 DIAGNOSIS — Z79.4 TYPE 2 DIABETES MELLITUS WITH OTHER CIRCULATORY COMPLICATION, WITH LONG-TERM CURRENT USE OF INSULIN (HCC): ICD-10-CM

## 2020-08-11 NOTE — TELEPHONE ENCOUNTER
When patient saw you 07/27/20, you gave her triamcinolone cream for dermatitis  She was using it and ran out before the 14 days was up  She then had to wait 4 days for the pharmacy to get it in again before she could resume using it again  She states the rash is clearing up, but she feels she should continue for a few more days  She wants to know if this is ok

## 2020-08-25 DIAGNOSIS — L30.9 DERMATITIS: ICD-10-CM

## 2020-08-25 RX ORDER — TRIAMCINOLONE ACETONIDE 1 MG/ML
LOTION TOPICAL 3 TIMES DAILY
Qty: 60 ML | Refills: 0 | Status: SHIPPED | OUTPATIENT
Start: 2020-08-25 | End: 2020-09-28

## 2020-08-25 NOTE — TELEPHONE ENCOUNTER
It has not been 28 days but she is almost out of the cream,  Her leg is better but not 100%  She wants a refill on it,  Send to [facility],  She will ck with pharm if no cb

## 2020-08-26 DIAGNOSIS — N32.81 OAB (OVERACTIVE BLADDER): ICD-10-CM

## 2020-08-26 RX ORDER — OXYBUTYNIN CHLORIDE 5 MG/1
TABLET ORAL
Qty: 180 TABLET | Refills: 1 | Status: SHIPPED | OUTPATIENT
Start: 2020-08-26 | End: 2021-04-13

## 2020-09-08 ENCOUNTER — OFFICE VISIT (OUTPATIENT)
Dept: FAMILY MEDICINE CLINIC | Facility: CLINIC | Age: 80
End: 2020-09-08
Payer: MEDICARE

## 2020-09-08 VITALS
SYSTOLIC BLOOD PRESSURE: 122 MMHG | BODY MASS INDEX: 44.76 KG/M2 | WEIGHT: 228 LBS | HEIGHT: 60 IN | TEMPERATURE: 98.8 F | DIASTOLIC BLOOD PRESSURE: 72 MMHG | HEART RATE: 76 BPM

## 2020-09-08 DIAGNOSIS — L30.9 DERMATITIS: Primary | ICD-10-CM

## 2020-09-08 PROCEDURE — 99213 OFFICE O/P EST LOW 20 MIN: CPT | Performed by: FAMILY MEDICINE

## 2020-09-08 NOTE — PROGRESS NOTES
Assessment/Plan:    No problem-specific Assessment & Plan notes found for this encounter  Diagnoses and all orders for this visit:    Dermatitis  Comments:  ?insect bite vs contact  Start TAC to individual lesions bid x 2 weeks  Cover areas when outside  Recheck 1-2 weeks if not improving - earlier if worse          Subjective:      Patient ID: Petra Elliott is a 68 y o  female  69 yo female presents with a 4 or 5 day history of welt like lesions on the arms  Lesions are extremely pruritic  No new exposures are noted  No new medications  Patient was on triamcinolone for a lower leg rash  Arm lesion started approximately 2 days after she finished the triamcinolone course  Has been sitting outside      The following portions of the patient's history were reviewed and updated as appropriate: She  has a past medical history of Anxiety, Arthritis, CHF (congestive heart failure) ([place] [address] ), Chronic venous insufficiency, COPD (chronic obstructive pulmonary disease) ([place] [address] ), Coronary artery disease, Diabetes mellitus ([facility] ), Disease of thyroid gland, DVT (deep venous thrombosis) ([facility] ), Hyperlipidemia, Hypertension, Irregular heart beat, Pacemaker, Renal disorder, Thyroid disease, Urinary incontinence, and Uterine leiomyoma    She   Patient Active Problem List    Diagnosis Date Noted    Sleep apnea 05/17/2019    Intraocular pressure increase 06/06/2018    Vitamin D deficiency 04/03/2018    Bilateral leg edema 03/13/2018    Iron deficiency anemia 10/12/2017    Urinary bladder incontinence 07/26/2017    OAB (overactive bladder) 07/26/2017    Benign hypertension with chronic kidney disease, stage III ([facility] ) 06/21/2017    Microalbuminuria 06/21/2017    PAF (paroxysmal atrial fibrillation) ([facility] ) 10/31/2016    Chronic venous insufficiency 02/19/2016    Dvt femoral (deep venous thrombosis) ([place] [address] ) 02/19/2016    Presence of permanent cardiac pacemaker 01/28/2016    Chronic diastolic congestive heart failure ([place] [address] ) 01/21/2016    Anemia 01/19/2016    S/P cardiac pacemaker procedure 01/19/2016    Hypothyroidism 01/19/2016    Complete heart block ([place] [address] ) 01/16/2016    NSTEMI (non-ST elevated myocardial infarction) ([place] [address] ) 01/15/2016    Type 2 diabetes mellitus with hyperglycemia ([place] [address] ) 01/15/2016    Morbid obesity with body mass index (BMI) of 40 0 to 44 9 in adult [facility]) 01/15/2016    Chronic kidney disease, stage 3 ([place] [address] ) 10/21/2015    Eczematous dermatitis 03/03/2015    Anxiety disorder 06/26/2013    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 neurological manifestations, uncontrolled([address] 62) 10/02/2012    Chronic obstructive pulmonary disease ([place] [address] ) 09/24/2012    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 ([place] [address] ) 09/22/2011    Mixed hyperlipidemia 03/14/2011     She  has a past surgical history that includes Hysterectomy; Joint replacement (Right); Tonsillectomy; Cataract extraction w/ intraocular lens  implant, bilateral; Ectopic pregnancy surgery; Cardiac surgery; Cardiac pacemaker placement; Carpal tunnel release (Bilateral); pr endovenous laser, 1st vein (Left, 2/17/2017); Cystoscopy; Cataract extraction; Breast surgery; and Oophorectomy  She  reports that she has never smoked  She has never used smokeless tobacco  She reports current alcohol use  She reports that she does not use drugs  Current Outpatient Medications   Medication Sig Dispense Refill    acetaminophen (TYLENOL) 325 mg tablet Take 650 mg by mouth   apixaban (Eliquis) 5 mg Take 1 tablet (5 mg total) by mouth 2 (two) times a day Lot #[id number]  Exp 9/2022 56 tablet 0    Ascorbic Acid (VITAMIN C) 1000 MG tablet Take 1,000 mg by mouth        atorvastatin (LIPITOR) 80 mg tablet TAKE 1 TABLET AT BEDTIME 90 tablet 3    Calcium Carbonate-Vit D-Min (CALCIUM 1200 PO) Take by mouth      cholecalciferol (VITAMIN D3) 1,000 units tablet Take 2,000 Units by mouth daily       citalopram (CeleXA) 20 mg tablet TAKE 1 TABLET EVERY DAY 90 tablet 3    Continuous Blood Gluc  (FREESTYLE RONNI 14 DAY READER) GARRY Use to check BG levels 4+times daily and as needed 1 Device 0    Continuous Blood Gluc Sensor (FREESTYLE RONNI 14 DAY SENSOR) MISC Change sensor every 14 days to check BG levels 4+ times daily 6 each 3    furosemide (LASIX) 20 mg tablet TAKE 1 TABLET EVERY DAY 90 tablet 3    GLUCOSAMINE CHONDROITIN COMPLX PO Take 1 tablet by mouth 2 (two) times a day      glucose blood (TRUE METRIX BLOOD GLUCOSE TEST) test strip Test 4 times plus daily, as instructed 400 each 1    insulin aspart (NovoLOG) 100 Units/mL injection pen 12u with breakfast, 10u with lunch, 10u with dinner +scale (Use up to 40 units daily) 15 pen 1    insulin glargine (TOUJEO) 300 units/mL CONCETRATED U-300 injection pen (1-unit dial) Inject 35 Units under the skin daily at bedtime 9 pen 1    Insulin Pen Needle (PEN NEEDLES) 32G X 6 MM MISC Use 4 times a day 400 each 1    levothyroxine 125 mcg tablet TAKE 1 TABLET BY MOUTH ONCE DAILY EXCEPT ON SUNDAY NO TABLET 78 tablet 1    lisinopril (ZESTRIL) 20 mg tablet Take 1 tablet (20 mg total) by mouth 2 (two) times a day 180 tablet 3    loratadine (CLARITIN) 10 mg tablet Take 1 tablet (10 mg total) by mouth daily 30 tablet 2    metoprolol tartrate (LOPRESSOR) 25 mg tablet TAKE 1 TABLET EVERY 12 HOURS 180 tablet 3    Omega-3 Fatty Acids (FISH OIL) 1200 MG CAPS Take 1,200 mg by mouth 2 (two) times a day       oxybutynin (DITROPAN) 5 mg tablet TAKE 1 TABLET TWICE DAILY 180 tablet 1    Tetrahydroz-Polyvinyl Al-Povid (MURINE TEARS PLUS OP) Apply to eye 3 (three) times a day      triamcinolone (KENALOG) 0 1 % lotion Apply topically 3 (three) times a day for 14 days 60 mL 0     No current facility-administered medications for this visit  She is allergic to ampicillin       Review of Systems   Constitutional: Negative  Musculoskeletal: Negative for myalgias     Skin: Positive for rash  Neurological: Negative for weakness and numbness  Objective:      /72   Pulse 76   Temp 98 8 °F (37 1 °C)   Ht 5' (1 524 m)   Wt 103 kg (228 lb)   BMI 44 53 kg/m²          Physical Exam  Vitals signs reviewed  Constitutional:       Appearance: Normal appearance  Cardiovascular:      Pulses: Normal pulses  Skin:     Comments: Scattered erythematous sl raised lesions on lower arms and elbows bilat  ?each with central defect (bite?)  No lesions appreciated on any other surface   Neurological:      Mental Status: She is alert

## 2020-09-11 DIAGNOSIS — I48.91 ATRIAL FIBRILLATION, UNSPECIFIED TYPE (HCC): ICD-10-CM

## 2020-09-14 ENCOUNTER — TELEPHONE (OUTPATIENT)
Dept: FAMILY MEDICINE CLINIC | Facility: CLINIC | Age: 80
End: 2020-09-14

## 2020-09-14 NOTE — TELEPHONE ENCOUNTER
Patient called  She wanted you to take another look at her rash  She has more spots and they are very itchy  She doesn't think these are insect bites  I did offer Dr Jyoti Cárdenas or Rubio Rosa, but she prefers to see you if possible  Please advise

## 2020-09-15 ENCOUNTER — OFFICE VISIT (OUTPATIENT)
Dept: FAMILY MEDICINE CLINIC | Facility: CLINIC | Age: 80
End: 2020-09-15
Payer: MEDICARE

## 2020-09-15 ENCOUNTER — TELEPHONE (OUTPATIENT)
Dept: FAMILY MEDICINE CLINIC | Facility: CLINIC | Age: 80
End: 2020-09-15

## 2020-09-15 VITALS
TEMPERATURE: 98.1 F | SYSTOLIC BLOOD PRESSURE: 128 MMHG | HEART RATE: 76 BPM | HEIGHT: 64 IN | BODY MASS INDEX: 39.09 KG/M2 | WEIGHT: 229 LBS | DIASTOLIC BLOOD PRESSURE: 78 MMHG

## 2020-09-15 DIAGNOSIS — L30.9 DERMATITIS: Primary | ICD-10-CM

## 2020-09-15 DIAGNOSIS — S80.822A BLISTER OF LEFT LOWER LEG, INITIAL ENCOUNTER: ICD-10-CM

## 2020-09-15 DIAGNOSIS — R05.9 COUGH: ICD-10-CM

## 2020-09-15 PROCEDURE — 99213 OFFICE O/P EST LOW 20 MIN: CPT | Performed by: FAMILY MEDICINE

## 2020-09-15 RX ORDER — HYDROXYZINE HYDROCHLORIDE 25 MG/1
25 TABLET, FILM COATED ORAL
Qty: 30 TABLET | Refills: 0 | Status: SHIPPED | OUTPATIENT
Start: 2020-09-15 | End: 2020-10-15 | Stop reason: SDUPTHER

## 2020-09-15 RX ORDER — LORATADINE 10 MG/1
10 TABLET ORAL DAILY
Qty: 30 TABLET | Refills: 2 | Status: SHIPPED | OUTPATIENT
Start: 2020-09-15 | End: 2020-11-11

## 2020-09-15 NOTE — PROGRESS NOTES
Assessment/Plan:      Diagnoses and all orders for this visit:    Dermatitis  Comments:  Given failure of treatment, I will refer pt to derm for second opinion  Hydroxyzine 25mg qHS for itch  I reviewed side effects of meds  Recheck 2w if not improv  Orders:  -     Ambulatory referral to Dermatology; Future  -     hydrOXYzine HCL (ATARAX) 25 mg tablet; Take 1 tablet (25 mg total) by mouth daily at bedtime    Blister of left lower leg, initial encounter  Comments:  Reviewed wound care  Start compression wrap  Recheck 1 week if not improved - earlier if worse    Cough  -     loratadine (CLARITIN) 10 mg tablet; Take 1 tablet (10 mg total) by mouth daily          Subjective:     Patient ID: You Monreal is a 68 y o  female  Pt here for persistent dermatitis  - pt with 10-11 day hx of extremely pruritic lesions on the inner aspects of bilat arms, lower >upper  Pt seen 9/8 and felt to have contact vs insect bite dermatitis, primarily due to distribution  Pt was started on triamcinolone lotion  Since then, pt states that she has developed a couple of new lesions, all of which are in the same areas as previous lesions  No trunk, neck, face or leg involvement  Triamcinolone did not help with symptoms  Still with significant pruritis, especially noticeable at bedtime  Pt denies any new skin products, detergents, foods or medication exposures  She has not been outside since last visit, but still has new lesions   does not have any lesions  - pt also developed a shallow bulla on the L lower anterior leg this morning that broke   removed the superficial tissue and dressed the area  Pt would like area checked  Pt with sl increased edema but no worsening SOB, cough, CP or other symptoms  Weight stable      Review of Systems   Constitutional: Negative  HENT: Negative  Respiratory: Negative  Negative for shortness of breath and wheezing      Cardiovascular: Positive for leg swelling (sl increased)  Negative for chest pain and palpitations  Skin: Positive for rash (bilat inner arm "welt" like rash  L lower leg with shallow wound anteriorly)  Neurological: Negative for weakness and numbness  Objective:     Physical Exam  Vitals signs reviewed  Constitutional:       Appearance: Normal appearance  Cardiovascular:      Pulses: Normal pulses  Pulmonary:      Effort: Pulmonary effort is normal       Breath sounds: Normal breath sounds  No wheezing or rales  Musculoskeletal:      Right lower leg: Edema (trace) present  Left lower leg: Edema (trace - 1+) present  Skin:     General: Skin is warm  Capillary Refill: Capillary refill takes less than 2 seconds  Findings: Rash (raised, sl erythematous "welts" on the inner arms bilat  ?tiny areas of crust/scab noted on several lesions  No tracking or linearity  Neg dermatographism  ) present  Comments: L lower leg with shallow wound with scant serous drainage   Neurological:      Mental Status: She is alert

## 2020-09-15 NOTE — ASSESSMENT & PLAN NOTE
Pruritic and ?worsening  No response to topical steroid lotion  No new exposures  Given failure of treatment, I will refer pt to derm for second opinion  Hydroxyzine 25mg qHS for itch  I reviewed side effects of meds   Recheck 2w if not improved - earlier if worse

## 2020-09-24 ENCOUNTER — REMOTE DEVICE CLINIC VISIT (OUTPATIENT)
Dept: CARDIOLOGY CLINIC | Facility: CLINIC | Age: 80
End: 2020-09-24
Payer: MEDICARE

## 2020-09-24 DIAGNOSIS — Z95.0 CARDIAC PACEMAKER IN SITU: Primary | ICD-10-CM

## 2020-09-24 PROCEDURE — 93296 REM INTERROG EVL PM/IDS: CPT | Performed by: INTERNAL MEDICINE

## 2020-09-24 PROCEDURE — 93294 REM INTERROG EVL PM/LDLS PM: CPT | Performed by: INTERNAL MEDICINE

## 2020-09-24 NOTE — PROGRESS NOTES
Results for orders placed or performed in visit on 09/24/20   Cardiac EP device report    Narrative    MDT-DUAL CHAMBER PPM (DDDR MODE)/ ACTIVE SYSTEM IS MRI CONDITIONAL  CARELINK TRANSMISSION: BATTERY VOLTAGE ADEQUATE (4 5 YRS)  AP-63%, -100% (>40% MVP OFF/CHB/DDDR @ 60 PPM)  ALL AVAILABLE LEAD PARAMETERS WITHIN NORMAL LIMITS  1 AFLUTTER EPISODE LASTING 6 3 MINS  HX: PAF & ON ELIQUIS & METOPROLOL  NORMAL DEVICE FUNCTION   GV

## 2020-09-28 ENCOUNTER — CONSULT (OUTPATIENT)
Dept: DERMATOLOGY | Facility: CLINIC | Age: 80
End: 2020-09-28
Payer: MEDICARE

## 2020-09-28 VITALS — TEMPERATURE: 98.5 F | HEIGHT: 66 IN | WEIGHT: 234 LBS | BODY MASS INDEX: 37.61 KG/M2

## 2020-09-28 DIAGNOSIS — L30.9 DERMATITIS: ICD-10-CM

## 2020-09-28 DIAGNOSIS — D48.9 NEOPLASM OF UNCERTAIN BEHAVIOR: Primary | ICD-10-CM

## 2020-09-28 PROCEDURE — 99204 OFFICE O/P NEW MOD 45 MIN: CPT | Performed by: STUDENT IN AN ORGANIZED HEALTH CARE EDUCATION/TRAINING PROGRAM

## 2020-09-28 PROCEDURE — 88341 IMHCHEM/IMCYTCHM EA ADD ANTB: CPT | Performed by: STUDENT IN AN ORGANIZED HEALTH CARE EDUCATION/TRAINING PROGRAM

## 2020-09-28 PROCEDURE — 11102 TANGNTL BX SKIN SINGLE LES: CPT | Performed by: STUDENT IN AN ORGANIZED HEALTH CARE EDUCATION/TRAINING PROGRAM

## 2020-09-28 PROCEDURE — 88305 TISSUE EXAM BY PATHOLOGIST: CPT | Performed by: STUDENT IN AN ORGANIZED HEALTH CARE EDUCATION/TRAINING PROGRAM

## 2020-09-28 PROCEDURE — 88342 IMHCHEM/IMCYTCHM 1ST ANTB: CPT | Performed by: STUDENT IN AN ORGANIZED HEALTH CARE EDUCATION/TRAINING PROGRAM

## 2020-09-28 RX ORDER — PREDNISONE 10 MG/1
TABLET ORAL
Qty: 30 TABLET | Refills: 0 | OUTPATIENT
Start: 2020-09-28 | End: 2020-10-10

## 2020-09-28 NOTE — PATIENT INSTRUCTIONS
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    Assessment and Plan:  Based on a thorough discussion of this condition and the management approach to it (including a comprehensive discussion of the known risks, side effects and potential benefits of treatment), the patient (family) agrees to implement the following specific plan:   Start gentle skin care:   o Use Dove soap in shower only  o Use daily moisturizers  While flared, use plain greasy Vaseline all over  - Start Triamcinolone 0 1% ointment  Best to use this using WET WRAP INSTRUCTIONS as the medication will absorb better on the skin  See below  In between treatments, moisturize with Vaseline   You can keep the medicines in the fridge to help "cool" down the skin when using them  This often helps with itch   Continue taking Hydroxyzine 25 mg at bedtime as needed for itching   Begin taking heraclio the counter Zyrtec 10 mg twice daily    Take Prednisone as followed: Take 40 mg (4 tablets) for 3 days    Take 30 mg (3 tablets)for 3 days    Take 20 mg (2 tablets) for 3 days    Take 10 mg (1 tablet)  3 days     THEN STOP  We will CC this note to your PCP and Endocrinologist to make them aware that we are starting the prednisone  Advised of likely spiking in sugar with this medication- pt is on a sliding scale  WET WRAP THERAPY    What is wet wrap therapy? Wet wrap therapy is a useful tool in the treatment of  dermatitis  Wet wraps can help:  · Put moisture into the skin  · Increase absorbtion of topical steroids into the skin, so more medicine is delivered to the affected areas  · Act as a barrier to keep your child from scratching, which can help your child sleep better    When are they used? Wet wraps are usually for severe dermatitis flare-ups  They are most often used for only a few days at a time, but can be used for longer as instructed by your healthcare provider   Wet wraps can also be used without topical steroids to help moisturizers work better on areas that are very dry  What supplies do I need? Before you do a wet wrap, gather these supplies:  · Topical steroid ointment prescribed by your childs healthcare provider  · Moisturizer (emollient)- we often suggest using a clear, greasy ointment like Vaseline  · Two layers of a wrap of your choice: one for a wet layer and the other for a dry layer  What kind of wrap do I choose? Chose the wrap that works best for the part of the body you are covering  Here are some examples of materials that can work well:  · Gauze: This is great for any body area, but can be expensive  · Fitted cotton pajamas: This is great for more widespread rash  · 100% cotton socks: This can be helpful for stubborn eczema on the hands or feet  · 100% cotton adult socks: If you cut a small hole in the toe of an adult sock, you will have a great wrap that will fit easily over an arm or a leg that can be moved up or down as needed  How do I apply the wet wrap? It is best to apply a wet wrap after a bath  You can also wet skin with moist towels on the skin for 10-15 minutes  STEPS FOR APPLYING THE WET WRAPS:  1  Soak in tub for 10- 15 minutes  2  Apply the steroid ointment to your  inflamed skin as directed  3  Can follow this by generous layer of moisturizing ointment like VASELINE to all affected skin  4  Soak one layer of wrap in warm water  5  Wring out excess water until it is slightly damp  6  Wrap the affected area with this wet layer, making sure that it is not too tight  7  Immediately put the dry layer on over the wet layer  8  Try to stay in a warm environment or cover yourself with a blanket so they dont get cold  9  Wet wraps are sometimes left in place overnight, but you can leave it on for 1 or 2 hours  As always, follow the specific advice of your provider for frequency and duration of wet wrap therapy    10  If you have any questions or concerns, contact your provider  NEOPLASM OF UNCERTAIN BEHAVIOR OF SKIN      Assessment and Plan:   I have discussed with the patient that a sample of skin via a "skin biopsy would be potentially helpful to further make a specific diagnosis under the microscope   Based on a thorough discussion of this condition and the management approach to it (including a comprehensive discussion of the known risks, side effects and potential benefits of treatment), the patient (family) agrees to implement the following specific plan:    o Procedure:  Skin Biopsy  After a thorough discussion of treatment options and risk/benefits/alternatives (including but not limited to local pain, scarring, dyspigmentation, blistering, possible superinfection, and inability to confirm a diagnosis via histopathology), verbal and written consent were obtained and portion of the rash was biopsied for tissue sample  See below for consent that was obtained from patient and subsequent Procedure Note  PROCEDURE SHAVE BIOPSY NOTE:        INFORMED CONSENT DISCUSSION AND POST-OPERATIVE INSTRUCTIONS FOR PATIENT    I   RATIONALE FOR PROCEDURE  I understand that a skin biopsy allows the Dermatologist to test a lesion or rash under the microscope to obtain a diagnosis  It usually involves numbing the area with numbing medication and removing a small piece of skin; sometimes the area will be closed with sutures  In this specific procedure, sutures are not usually needed  If any sutures are placed, then they are usually need to be removed in 2 weeks or less  I understand that my Dermatologist recommends that a skin "shave" biopsy be performed today  A local anesthetic, similar to the kind that a dentist uses when filling a cavity, will be injected with a very small needle into the skin area to be sampled  The injected skin and tissue underneath "will go to sleep and become numb so no pain should be felt afterwards    An instrument shaped like a tiny "razor blade" (shave biopsy instrument) will be used to cut a small piece of tissue and skin from the area so that a sample of tissue can be taken and examined more closely under the microscope  A slight amount of bleeding will occur, but it will be stopped with direct pressure and a pressure bandage and any other appropriate methods  I understands that a scar will form where the wound was created  Surgical ointment will be applied to help protect the wound  Sutures are not usually needed  II   RISKS AND POTENTIAL COMPLICATIONS   I understand the risks and potential complications of a skin biopsy include but are not limited to the following:   Bleeding   Infection   Pain   Scar/keloid   Skin discoloration   Incomplete Removal   Recurrence   Nerve Damage/Numbness/Loss of Function   Allergic Reaction to Anesthesia   Biopsies are diagnostic procedures and based on findings additional treatment or evaluation may be required   Loss or destruction of specimen resulting in no additional findings    My Dermatologist has explained to me the nature of the condition, the nature of the procedure, and the benefits to be reasonably expected compared with alternative approaches  My Dermatologist has discussed the likelihood of major risks or complications of this procedure including the specific risks listed above, such as bleeding, infection, and scarring/keloid  I understand that a scar is expected after this procedure  I understand that my physician cannot predict if the scar will form a "keloid," which extends beyond the borders of the wound that is created  A keloid is a thick, painful, and bumpy scar  A keloid can be difficult to treat, as it does not always respond well to therapy, which includes injecting cortisone directly into the keloid every few weeks  While this usually reduces the pain and size of the scar, it does not eliminate it        I understand that photographs may be taken before and after the procedure  These will be maintained as part of the medical providers confidential records and may not be made available to me  I further authorize the medical provider to use the photographs for teaching purposes or to illustrate scientific papers, books, or lectures if in his/her judgment, medical research, education, or science may benefit from its use  I have had an opportunity to fully inquire about the risks and benefits of this procedure and its alternatives  I have been given ample time and opportunity to ask questions and to seek a second opinion if I wished to do so  I acknowledge that there have specifically been no guarantees as to the cosmetic results from the procedure  I am aware that with any procedure there is always the possibility of an unexpected complication  III  POST-PROCEDURAL CARE (WHAT YOU WILL NEED TO DO "AFTER THE BIOPSY" TO OPTIMIZE HEALING)     Keep the area clean and dry  Try NOT to remove the bandage or get it wet for the first 24 hours   Gently clean the area and apply surgical ointment (such as Vaseline petrolatum ointment, which is available "over the counter" and not a prescription) to the biopsy site for up to 2 weeks straight  This acts to protect the wound from the outside world   Sutures are not usually placed in this procedure  If any sutures were placed, return for suture removal as instructed (generally 1 week for the face, 2 weeks for the body)   Take Acetaminophen (Tylenol) for discomfort, if no contraindications  Ibuprofen or aspirin could make bleeding worse   Call our office immediately for signs of infection: fever, chills, increased redness, warmth, tenderness, discomfort/pain, or pus or foul smell coming from the wound  WHAT TO DO IF THERE IS ANY BLEEDING? If a small amount of bleeding is noticed, place a clean cloth over the area and apply firm pressure for ten minutes    Check the wound after 10 minutes of direct pressure  If bleeding persists, try one more time for an additional 10 minutes of direct pressure on the area  If the bleeding becomes heavier or does not stop after the second attempt, or if you have any other questions about this procedure, then please call your [facility] Dermatologist by calling [phone] (SKIN)  I hereby acknowledge that I have reviewed and verified the site with my Dermatologist and have requested and authorized my Dermatologist to proceed with the procedure

## 2020-09-28 NOTE — PROGRESS NOTES
[facility] Note     Patient Name: Bettina Villarreal  Encounter Date: 9 28 2020     Have you been cared for by a [facility] in the last 3 years and, if so, which one? No    · Have you traveled outside of the [address] [place] [address] in the past 3 months or outside of the [facility] area in the last 2 weeks? No     May we call your Preferred Phone number to discuss your specific medical information? Yes     May we leave a detailed message that includes your specific medical information? Yes      Today's Chief Concerns:   Concern #1:  rash   Concern #2:      Past Medical History:  Have you personally ever had or currently have any of the following? · Skin cancer (such as Melanoma, Basal Cell Carcinoma, Squamous Cell Carcinoma? (If Yes, please provide more detail)- No  · Eczema: No  · Psoriasis: No  · HIV/AIDS: No  · Hepatitis B or C: No  · Tuberculosis: No  · Systemic Immunosuppression such as Diabetes, Biologic or Immunotherapy, Chemotherapy, Organ Transplantation, Bone Marrow Transplantation (If YES, please provide more detail): YES, diabetic  · Radiation Treatment (If YES, please provide more detail): No  · Any other major medical conditions/concerns? (If Yes, which types)- YES, Hypertension, diabetes, venous insufficiency, pace maker, sleep apnea, blood thinners (eliquis),hyperlipidemia  hypothyroidism     Social History:     What is/was your primary occupation? retired     What are your hobbies/past-times? Housework, cooking    Family History:  Have any of your "first degree relatives" (parent, brother, sister, or child) had any of the following       · Skin cancer such as Melanoma or Merkel Cell Carcinoma or Pancreatic Cancer? No  · Eczema, Asthma, Hay Fever or Seasonal Allergies: YES, father had psoriasis,  · Psoriasis or Psoriatic Arthritis: No  · Do any other medical conditions seem to run in your family?   If Yes, what condition and which relatives?   YES, mother had unknown heart condition, father and brother diabetes    Current Medications:   (please update all dermatological medications before printing patient's AVS!)      Current Outpatient Medications:     acetaminophen (TYLENOL) 325 mg tablet, Take 650 mg by mouth , Disp: , Rfl:     apixaban (Eliquis) 5 mg, Take 1 tablet (5 mg total) by mouth 2 (two) times a day Lot #[id number] Exp 9/2022, Disp: 56 tablet, Rfl: 0    Ascorbic Acid (VITAMIN C) 1000 MG tablet, Take 1,000 mg by mouth , Disp: , Rfl:     atorvastatin (LIPITOR) 80 mg tablet, TAKE 1 TABLET AT BEDTIME, Disp: 90 tablet, Rfl: 3    cholecalciferol (VITAMIN D3) 1,000 units tablet, Take 2,000 Units by mouth daily , Disp: , Rfl:     citalopram (CeleXA) 20 mg tablet, TAKE 1 TABLET EVERY DAY, Disp: 90 tablet, Rfl: 3    Continuous Blood Gluc  (FREESTYLE RONNI 14 DAY READER) GARRY, Use to check BG levels 4+times daily and as needed, Disp: 1 Device, Rfl: 0    Continuous Blood Gluc Sensor (FREESTYLE RONNI 14 DAY SENSOR) MISC, Change sensor every 14 days to check BG levels 4+ times daily, Disp: 6 each, Rfl: 3    GLUCOSAMINE CHONDROITIN COMPLX PO, Take 1 tablet by mouth 2 (two) times a day, Disp: , Rfl:     glucose blood (TRUE METRIX BLOOD GLUCOSE TEST) test strip, Test 4 times plus daily, as instructed, Disp: 400 each, Rfl: 1    hydrOXYzine HCL (ATARAX) 25 mg tablet, Take 1 tablet (25 mg total) by mouth daily at bedtime, Disp: 30 tablet, Rfl: 0    insulin aspart (NovoLOG) 100 Units/mL injection pen, 12u with breakfast, 10u with lunch, 10u with dinner +scale (Use up to 40 units daily), Disp: 15 pen, Rfl: 1    insulin glargine (TOUJEO) 300 units/mL CONCETRATED U-300 injection pen (1-unit dial), Inject 35 Units under the skin daily at bedtime, Disp: 9 pen, Rfl: 1    Insulin Pen Needle (PEN NEEDLES) 32G X 6 MM MISC, Use 4 times a day, Disp: 400 each, Rfl: 1    levothyroxine 125 mcg tablet, TAKE 1 TABLET BY MOUTH ONCE DAILY EXCEPT ON SUNDAY NO TABLET, Disp: 78 tablet, Rfl: 1    lisinopril (ZESTRIL) 20 mg tablet, Take 1 tablet (20 mg total) by mouth 2 (two) times a day, Disp: 180 tablet, Rfl: 3    loratadine (CLARITIN) 10 mg tablet, Take 1 tablet (10 mg total) by mouth daily, Disp: 30 tablet, Rfl: 2    metoprolol tartrate (LOPRESSOR) 25 mg tablet, TAKE 1 TABLET EVERY 12 HOURS, Disp: 180 tablet, Rfl: 3    Omega-3 Fatty Acids (FISH OIL) 1200 MG CAPS, Take 1,200 mg by mouth 2 (two) times a day , Disp: , Rfl:     oxybutynin (DITROPAN) 5 mg tablet, TAKE 1 TABLET TWICE DAILY, Disp: 180 tablet, Rfl: 1    Tetrahydroz-Polyvinyl Al-Povid (MURINE TEARS PLUS OP), Apply to eye 3 (three) times a day, Disp: , Rfl:     triamcinolone (KENALOG) 0 1 % lotion, Apply topically 3 (three) times a day for 14 days, Disp: 60 mL, Rfl: 0    Calcium Carbonate-Vit D-Min (CALCIUM 1200 PO), Take by mouth, Disp: , Rfl:     furosemide (LASIX) 20 mg tablet, TAKE 1 TABLET EVERY DAY, Disp: 90 tablet, Rfl: 3      Review of Systems:  Have you recently had or currently have any of the following? If YES, what are you doing for the problem? · Fever, chills or unintended weight loss: No  · Sudden loss or change in your vision: No  · Nausea, vomiting or blood in your stool: No  · Painful or swollen joints: No  · Wheezing or cough: No  · Changing mole or non-healing wound: No  · Nosebleeds: No  · Excessive sweating: No  · Easy or prolonged bleeding? YES, prescribed eliquis  · Over the last 2 weeks, how often have you been bothered by the following problems? · Taking little interest or pleasure in doing things: 1 - Not at All  · Feeling down, depressed, or hopeless: 1 - Not at All  · Rapid heartbeat with epinephrine:  No    · FEMALES ONLY:    · Are you pregnant or planning to become pregnant? No  · Are you currently or planning to be nursing or breast feeding? No    · Any known allergies?       Allergies   Allergen Reactions    Ampicillin Hives   ·       Physical Exam:     Was a chaperone (Derm Clinical Assistant) present throughout the entire Physical Exam? Yes     Did the Dermatology Team specifically  the patient on the importance of a Full Skin Exam to be sure that nothing is missed clinically?  Yes}  o Did the patient ultimately request or accept a Full Skin Exam?  Yes  o Did the patient specifically refuse to have the areas "under-the-bra" examined by the Dermatologist? No  o Did the patient specifically refuse to have the areas "under-the-underwear" examined by the Dermatologist? No    CONSTITUTIONAL:   Vitals:    09/28/20 1307   Temp: 98 5 °F (36 9 °C)   TempSrc: Temporal   Weight: 106 kg (234 lb)   Height: 5' 6" (1 676 m)           PSYCH: Normal mood and affect  EYES: Normal conjunctiva  ENT: Normal lips and oral mucosa  CARDIOVASCULAR: No edema  RESPIRATORY: Normal respirations  HEME/LYMPH/IMMUNO:  No regional lymphadenopathy except as noted below in "ASSESSMENT AND PLAN BY DIAGNOSIS"    SKIN:  FULL ORGAN SYSTEM EXAM    Hair, Scalp, Ears, Face Normal except as noted below in Assessment   Neck, Cervical Chain Nodes Normal except as noted below in Assessment   Right Arm/Hand/Fingers Normal except as noted below in Assessment   Left Arm/Hand/Fingers Normal except as noted below in Assessment   Chest/Breasts/Axillae Viewed areas Normal except as noted below in Assessment   Abdomen, Umbilicus Normal except as noted below in Assessment   Back/Spine Normal except as noted below in Assessment   Groin/Genitalia/Buttocks Normal except as noted below in Assessment   Right Leg, Foot, Toes Normal except as noted below in Assessment   Left Leg, Foot, Toes Normal except as noted below in Assessment        Assessment and Plan by Diagnosis:    History of Present Condition:     Duration:  How long has this been an issue for you?    o  a month   Location Affected:  Where on the body is this affecting you?    o  extremitites   Quality:  Is there any bleeding, pain, itch, burning/irritation, or redness associated with the skin lesion? o  itching, inflammation/redness of skin   Severity:  Describe any bleeding, pain, itch, burning/irritation, or redness on a scale of 1 to 10 (with 10 being the worst)  o  8   Timing:  Does this condition seem to be there pretty constantly or do you notice it more at specific times throughout the day? o  constant   Context:  Have you ever noticed that this condition seems to be associated with specific activities you do?    o  denies   Modifying Factors:    o Anything that seems to make the condition worse?    -  denies  o What have you tried to do to make the condition better?    -  OTC Hydrocorisone, Triamcinolone   Associated Signs and Symptoms:  Does this skin lesion seem to be associated with any of the following:  o  SL AMB DERM SIGNS AND SYMPTOMS: Itching and Scratching         FAVOR STASIS DERMATITIS WITH "ID" REACTION    Physical Exam:   (Anatomic Location); (Size and Morphological Description); (Differential Diagnosis):  o Pink scaling plaque circumferential on left lower leg with pink edematous excoriated thin plaques on b/l arms, lower back  Sparing upper back   Pertinent Positives:   Pertinent Negatives: Additional History of Present Condition:  Patient reports a rash started a month ago on backand extremities  Symptoms include inflammation and intense itching  Has treated with over the counter hydrocortisone cream and Triamcinolone 0 1% lotion occasionally with minimal improvement  Has hx of stasis dermatitis and uses a venous pressure sock on left leg  Recently noted rashing up on this leg as well       Assessment and Plan:  Based on a thorough discussion of this condition and the management approach to it (including a comprehensive discussion of the known risks, side effects and potential benefits of treatment), the patient (family) agrees to implement the following specific plan:   Start gentle skin care:   o Use Dove soap in shower only  o Use daily moisturizers  While flared, use plain greasy Vaseline all over  - Start Triamcinolone 0 1% ointment  Best to use this using WET WRAP INSTRUCTIONS as the medication will absorb better on the skin  See below  In between treatments, moisturize with Vaseline   You can keep the medicines in the fridge to help "cool" down the skin when using them  This often helps with itch   Continue taking Hydroxyzine 25 mg at bedtime as needed for itching   Begin taking heraclio the counter Zyrtec 10 mg twice daily    Take Prednisone as followed: Take 40 mg (4 tablets) for 3 days    Take 30 mg (3 tablets)for 3 days    Take 20 mg (2 tablets) for 3 days    Take 10 mg (1 tablet)  3 days     THEN STOP    We will CC this note to your PCP and Endocrinologist to make them aware that we are starting the prednisone  Advised of likely spiking in sugar with this medication- pt is on a sliding scale  FOLLOW UP 4-5 weeks    WET WRAP THERAPY    What is wet wrap therapy? Wet wrap therapy is a useful tool in the treatment of  dermatitis  Wet wraps can help:  · Put moisture into the skin  · Increase absorbtion of topical steroids into the skin, so more medicine is delivered to the affected areas  · Act as a barrier to keep your child from scratching, which can help your child sleep better    When are they used? Wet wraps are usually for severe dermatitis flare-ups  They are most often used for only a few days at a time, but can be used for longer as instructed by your healthcare provider  Wet wraps can also be used without topical steroids to help moisturizers work better on areas that are very dry  What supplies do I need?   Before you do a wet wrap, gather these supplies:  · Topical steroid ointment prescribed by your childs healthcare provider  · Moisturizer (emollient)- we often suggest using a clear, greasy ointment like Vaseline  · Two layers of a wrap of your choice: one for a wet layer and the other for a dry layer  What kind of wrap do I choose? Chose the wrap that works best for the part of the body you are covering  Here are some examples of materials that can work well:  · Gauze: This is great for any body area, but can be expensive  · Fitted cotton pajamas: This is great for more widespread rash  · 100% cotton socks: This can be helpful for stubborn eczema on the hands or feet  · 100% cotton adult socks: If you cut a small hole in the toe of an adult sock, you will have a great wrap that will fit easily over an arm or a leg that can be moved up or down as needed  How do I apply the wet wrap? It is best to apply a wet wrap after a bath  You can also wet skin with moist towels on the skin for 10-15 minutes  STEPS FOR APPLYING THE WET WRAPS:  1  Soak in tub for 10- 15 minutes  2  Apply the steroid ointment to your  inflamed skin as directed  3  Can follow this by generous layer of moisturizing ointment like VASELINE to all affected skin  4  Soak one layer of wrap in warm water  5  Wring out excess water until it is slightly damp  6  Wrap the affected area with this wet layer, making sure that it is not too tight  7  Immediately put the dry layer on over the wet layer  8  Try to stay in a warm environment or cover yourself with a blanket so they dont get cold  9  Wet wraps are sometimes left in place overnight, but you can leave it on for 1 or 2 hours  As always, follow the specific advice of your provider for frequency and duration of wet wrap therapy  10  If you have any questions or concerns, contact your provider        NEOPLASM OF UNCERTAIN BEHAVIOR OF SKIN    Physical Exam:   (Anatomic Location); (Size and Morphological Description); (Differential Diagnosis):  o A: Right cheek; skin; shave biopsy; 68year old female with 2 8 X 3 5cm tan patch with darker patch of pigment within lesion with perifollicular pigment accentuation on dermoscopy- r/o lentigo maligna melanoma  Pertinent Positives:   Pertinent Negatives: Additional History of Present Condition:      Assessment and Plan:   I have discussed with the patient that a sample of skin via a "skin biopsy would be potentially helpful to further make a specific diagnosis under the microscope   Based on a thorough discussion of this condition and the management approach to it (including a comprehensive discussion of the known risks, side effects and potential benefits of treatment), the patient (family) agrees to implement the following specific plan:    o Procedure:  Skin Biopsy  After a thorough discussion of treatment options and risk/benefits/alternatives (including but not limited to local pain, scarring, dyspigmentation, blistering, possible superinfection, and inability to confirm a diagnosis via histopathology), verbal and written consent were obtained and portion of the rash was biopsied for tissue sample  See below for consent that was obtained from patient and subsequent Procedure Note  PROCEDURE SHAVE BIOPSY NOTE:     Performing Physician: Cheryl Rojas Anatomic Location; Clinical Description with size (cm); Pre-Op Diagnosis:   o A: Right cheek; 2 8 X 3 5cm tan patch with darker patch of pigment within lesion with perifollicular pigment accentuation on dermoscopy- r/o lentigo maligna melanoma    Post-op diagnosis: Same      Local anesthesia: 1% xylocaine with epi       Topical anesthesia: None     Hemostasis: Electrocautery       After obtaining informed consent  at which time there was a discussion about the purpose of biopsy  and low risks of infection and bleeding  The area was prepped and draped in the usual fashion  Anesthesia was obtained with 1% lidocaine with epinephrine  A shave biopsy to an appropriate sampling depth was obtained with a sterile blade (such as a 15-blade or DermaBlade)  The resulting wound was covered with surgical ointment and bandaged appropriately       The patient tolerated the procedure well without complications and was without signs of functional compromise  Specimen has been sent for review by Dermatopathology  Standard post-procedure care has been explained and has been included in written form within the patient's copy of Informed Consent  INFORMED CONSENT DISCUSSION AND POST-OPERATIVE INSTRUCTIONS FOR PATIENT    I   RATIONALE FOR PROCEDURE  I understand that a skin biopsy allows the Dermatologist to test a lesion or rash under the microscope to obtain a diagnosis  It usually involves numbing the area with numbing medication and removing a small piece of skin; sometimes the area will be closed with sutures  In this specific procedure, sutures are not usually needed  If any sutures are placed, then they are usually need to be removed in 2 weeks or less  I understand that my Dermatologist recommends that a skin "shave" biopsy be performed today  A local anesthetic, similar to the kind that a dentist uses when filling a cavity, will be injected with a very small needle into the skin area to be sampled  The injected skin and tissue underneath "will go to sleep and become numb so no pain should be felt afterwards  An instrument shaped like a tiny "razor blade" (shave biopsy instrument) will be used to cut a small piece of tissue and skin from the area so that a sample of tissue can be taken and examined more closely under the microscope  A slight amount of bleeding will occur, but it will be stopped with direct pressure and a pressure bandage and any other appropriate methods  I understands that a scar will form where the wound was created  Surgical ointment will be applied to help protect the wound  Sutures are not usually needed      II   RISKS AND POTENTIAL COMPLICATIONS   I understand the risks and potential complications of a skin biopsy include but are not limited to the following:   Bleeding   Infection   Pain   Scar/keloid   Skin discoloration   Incomplete Removal   Recurrence   Nerve Damage/Numbness/Loss of Function   Allergic Reaction to Anesthesia   Biopsies are diagnostic procedures and based on findings additional treatment or evaluation may be required   Loss or destruction of specimen resulting in no additional findings    My Dermatologist has explained to me the nature of the condition, the nature of the procedure, and the benefits to be reasonably expected compared with alternative approaches  My Dermatologist has discussed the likelihood of major risks or complications of this procedure including the specific risks listed above, such as bleeding, infection, and scarring/keloid  I understand that a scar is expected after this procedure  I understand that my physician cannot predict if the scar will form a "keloid," which extends beyond the borders of the wound that is created  A keloid is a thick, painful, and bumpy scar  A keloid can be difficult to treat, as it does not always respond well to therapy, which includes injecting cortisone directly into the keloid every few weeks  While this usually reduces the pain and size of the scar, it does not eliminate it  I understand that photographs may be taken before and after the procedure  These will be maintained as part of the medical providers confidential records and may not be made available to me  I further authorize the medical provider to use the photographs for teaching purposes or to illustrate scientific papers, books, or lectures if in his/her judgment, medical research, education, or science may benefit from its use  I have had an opportunity to fully inquire about the risks and benefits of this procedure and its alternatives  I have been given ample time and opportunity to ask questions and to seek a second opinion if I wished to do so  I acknowledge that there have specifically been no guarantees as to the cosmetic results from the procedure    I am aware that with any procedure there is always the possibility of an unexpected complication  III  POST-PROCEDURAL CARE (WHAT YOU WILL NEED TO DO "AFTER THE BIOPSY" TO OPTIMIZE HEALING)     Keep the area clean and dry  Try NOT to remove the bandage or get it wet for the first 24 hours   Gently clean the area and apply surgical ointment (such as Vaseline petrolatum ointment, which is available "over the counter" and not a prescription) to the biopsy site for up to 2 weeks straight  This acts to protect the wound from the outside world   Sutures are not usually placed in this procedure  If any sutures were placed, return for suture removal as instructed (generally 1 week for the face, 2 weeks for the body)   Take Acetaminophen (Tylenol) for discomfort, if no contraindications  Ibuprofen or aspirin could make bleeding worse   Call our office immediately for signs of infection: fever, chills, increased redness, warmth, tenderness, discomfort/pain, or pus or foul smell coming from the wound  WHAT TO DO IF THERE IS ANY BLEEDING? If a small amount of bleeding is noticed, place a clean cloth over the area and apply firm pressure for ten minutes  Check the wound after 10 minutes of direct pressure  If bleeding persists, try one more time for an additional 10 minutes of direct pressure on the area  If the bleeding becomes heavier or does not stop after the second attempt, or if you have any other questions about this procedure, then please call your [address] [place] [address]'s Dermatologist by calling [phone] (SKIN)  I hereby acknowledge that I have reviewed and verified the site with my Dermatologist and have requested and authorized my Dermatologist to proceed with the procedure        Scribe Attestation    I,:   Manjit Daly am acting as a scribe while in the presence of the attending physician :        I,:   Douglas Rinaldi MD personally performed the services described in this documentation as scribed in my presence :

## 2020-09-29 ENCOUNTER — TELEPHONE (OUTPATIENT)
Dept: DERMATOLOGY | Facility: CLINIC | Age: 80
End: 2020-09-29

## 2020-09-29 NOTE — TELEPHONE ENCOUNTER
Attempted to call back to answer any questions but unable to reach, Will attempt to call back at a later time

## 2020-09-29 NOTE — TELEPHONE ENCOUNTER
Patient called in asking for a call she has questions for Dr Naida Church about medications   She does have an appointment and will be back around 4pm

## 2020-09-30 NOTE — TELEPHONE ENCOUNTER
Patient called stating she did not get a call from the Dr Marie Best  She was told a call was made  She is asking for a call back

## 2020-09-30 NOTE — TELEPHONE ENCOUNTER
Called patient back to discuss her questions  Apologized we were unable to reach her yesterday  Discussed wet wrap instructions again at length and dose of cetirizine (10mg BID)  Also advised to keep bx site covered with Vaseline for 10-14 days until skin heals over  All questions answered  Pt to call back if any other issues

## 2020-10-02 NOTE — RESULT ENCOUNTER NOTE
Called patient and discussed benign pathology consistent with a lentigo  No further intervention needed  Will continue to watch this lesion clinically  All questions answered

## 2020-10-15 ENCOUNTER — TELEPHONE (OUTPATIENT)
Dept: FAMILY MEDICINE CLINIC | Facility: CLINIC | Age: 80
End: 2020-10-15

## 2020-10-15 DIAGNOSIS — L30.9 DERMATITIS: ICD-10-CM

## 2020-10-15 RX ORDER — HYDROXYZINE HYDROCHLORIDE 25 MG/1
25 TABLET, FILM COATED ORAL
Qty: 30 TABLET | Refills: 2 | Status: SHIPPED | OUTPATIENT
Start: 2020-10-15

## 2020-10-20 ENCOUNTER — OFFICE VISIT (OUTPATIENT)
Dept: DERMATOLOGY | Facility: CLINIC | Age: 80
End: 2020-10-20
Payer: MEDICARE

## 2020-10-20 ENCOUNTER — IMMUNIZATIONS (OUTPATIENT)
Dept: FAMILY MEDICINE CLINIC | Facility: CLINIC | Age: 80
End: 2020-10-20
Payer: MEDICARE

## 2020-10-20 VITALS — TEMPERATURE: 97.3 F | HEIGHT: 66 IN | WEIGHT: 227 LBS | BODY MASS INDEX: 36.48 KG/M2

## 2020-10-20 DIAGNOSIS — I87.2 VENOUS STASIS DERMATITIS, UNSPECIFIED LATERALITY: Primary | ICD-10-CM

## 2020-10-20 DIAGNOSIS — Z23 ENCOUNTER FOR IMMUNIZATION: ICD-10-CM

## 2020-10-20 PROCEDURE — G0008 ADMIN INFLUENZA VIRUS VAC: HCPCS | Performed by: FAMILY MEDICINE

## 2020-10-20 PROCEDURE — 99213 OFFICE O/P EST LOW 20 MIN: CPT | Performed by: DERMATOLOGY

## 2020-10-20 PROCEDURE — 90662 IIV NO PRSV INCREASED AG IM: CPT | Performed by: FAMILY MEDICINE

## 2020-10-20 RX ORDER — CLOBETASOL PROPIONATE 0.5 MG/G
CREAM TOPICAL 2 TIMES DAILY
Qty: 30 G | Refills: 0 | Status: SHIPPED | OUTPATIENT
Start: 2020-10-20 | End: 2020-10-28 | Stop reason: SDUPTHER

## 2020-10-21 DIAGNOSIS — I48.91 ATRIAL FIBRILLATION, UNSPECIFIED TYPE (HCC): ICD-10-CM

## 2020-10-27 ENCOUNTER — TELEPHONE (OUTPATIENT)
Dept: DERMATOLOGY | Facility: CLINIC | Age: 80
End: 2020-10-27

## 2020-10-28 DIAGNOSIS — I87.2 VENOUS STASIS DERMATITIS, UNSPECIFIED LATERALITY: ICD-10-CM

## 2020-10-28 RX ORDER — CLOBETASOL PROPIONATE 0.5 MG/G
CREAM TOPICAL 2 TIMES DAILY
Qty: 60 G | Refills: 5 | Status: SHIPPED | OUTPATIENT
Start: 2020-10-28

## 2020-10-28 RX ORDER — CLOBETASOL PROPIONATE 0.5 MG/G
CREAM TOPICAL 2 TIMES DAILY
Qty: 60 G | Refills: 5 | Status: SHIPPED | OUTPATIENT
Start: 2020-10-28 | End: 2020-10-28 | Stop reason: SDUPTHER

## 2020-10-30 ENCOUNTER — TELEPHONE (OUTPATIENT)
Dept: ENDOCRINOLOGY | Facility: CLINIC | Age: 80
End: 2020-10-30

## 2020-10-30 ENCOUNTER — LAB (OUTPATIENT)
Dept: LAB | Facility: CLINIC | Age: 80
End: 2020-10-30
Payer: MEDICARE

## 2020-10-30 DIAGNOSIS — Z79.4 TYPE 2 DIABETES MELLITUS WITH HYPERGLYCEMIA, WITH LONG-TERM CURRENT USE OF INSULIN (HCC): ICD-10-CM

## 2020-10-30 DIAGNOSIS — E03.9 ACQUIRED HYPOTHYROIDISM: ICD-10-CM

## 2020-10-30 DIAGNOSIS — E11.65 TYPE 2 DIABETES MELLITUS WITH HYPERGLYCEMIA, WITH LONG-TERM CURRENT USE OF INSULIN (HCC): ICD-10-CM

## 2020-10-30 LAB
ANION GAP SERPL CALCULATED.3IONS-SCNC: 7 MMOL/L (ref 4–13)
BUN SERPL-MCNC: 45 MG/DL (ref 5–25)
CALCIUM SERPL-MCNC: 9.5 MG/DL (ref 8.3–10.1)
CHLORIDE SERPL-SCNC: 103 MMOL/L (ref 100–108)
CO2 SERPL-SCNC: 31 MMOL/L (ref 21–32)
CREAT SERPL-MCNC: 1.73 MG/DL (ref 0.6–1.3)
CREAT UR-MCNC: 37.2 MG/DL
EST. AVERAGE GLUCOSE BLD GHB EST-MCNC: 146 MG/DL
GFR SERPL CREATININE-BSD FRML MDRD: 28 ML/MIN/1.73SQ M
GLUCOSE SERPL-MCNC: 94 MG/DL (ref 65–140)
HBA1C MFR BLD: 6.7 %
MICROALBUMIN UR-MCNC: 28.7 MG/L (ref 0–20)
MICROALBUMIN/CREAT 24H UR: 77 MG/G CREATININE (ref 0–30)
POTASSIUM SERPL-SCNC: 4.7 MMOL/L (ref 3.5–5.3)
SODIUM SERPL-SCNC: 141 MMOL/L (ref 136–145)
T4 FREE SERPL-MCNC: 1.22 NG/DL (ref 0.76–1.46)
TSH SERPL DL<=0.05 MIU/L-ACNC: 7.15 UIU/ML (ref 0.36–3.74)

## 2020-10-30 PROCEDURE — 80048 BASIC METABOLIC PNL TOTAL CA: CPT

## 2020-10-30 PROCEDURE — 84439 ASSAY OF FREE THYROXINE: CPT

## 2020-10-30 PROCEDURE — 82043 UR ALBUMIN QUANTITATIVE: CPT

## 2020-10-30 PROCEDURE — 82570 ASSAY OF URINE CREATININE: CPT

## 2020-10-30 PROCEDURE — 84443 ASSAY THYROID STIM HORMONE: CPT

## 2020-10-30 PROCEDURE — 83036 HEMOGLOBIN GLYCOSYLATED A1C: CPT

## 2020-10-30 PROCEDURE — 36415 COLL VENOUS BLD VENIPUNCTURE: CPT

## 2020-11-03 ENCOUNTER — OFFICE VISIT (OUTPATIENT)
Dept: ENDOCRINOLOGY | Facility: CLINIC | Age: 80
End: 2020-11-03
Payer: MEDICARE

## 2020-11-03 VITALS
SYSTOLIC BLOOD PRESSURE: 126 MMHG | TEMPERATURE: 97.1 F | WEIGHT: 225 LBS | HEART RATE: 63 BPM | BODY MASS INDEX: 36.32 KG/M2 | DIASTOLIC BLOOD PRESSURE: 76 MMHG

## 2020-11-03 DIAGNOSIS — Z79.4 TYPE 2 DIABETES MELLITUS WITH OTHER CIRCULATORY COMPLICATION, WITH LONG-TERM CURRENT USE OF INSULIN (HCC): ICD-10-CM

## 2020-11-03 DIAGNOSIS — E11.59 TYPE 2 DIABETES MELLITUS WITH OTHER CIRCULATORY COMPLICATION, WITH LONG-TERM CURRENT USE OF INSULIN (HCC): ICD-10-CM

## 2020-11-03 DIAGNOSIS — E78.2 MIXED HYPERLIPIDEMIA: ICD-10-CM

## 2020-11-03 DIAGNOSIS — E66.01 MORBID OBESITY WITH BODY MASS INDEX (BMI) OF 40.0 TO 44.9 IN ADULT (HCC): ICD-10-CM

## 2020-11-03 DIAGNOSIS — E03.9 ACQUIRED HYPOTHYROIDISM: ICD-10-CM

## 2020-11-03 DIAGNOSIS — E55.9 VITAMIN D DEFICIENCY: ICD-10-CM

## 2020-11-03 DIAGNOSIS — Z79.4 TYPE 2 DIABETES MELLITUS WITH HYPERGLYCEMIA, WITH LONG-TERM CURRENT USE OF INSULIN (HCC): Primary | ICD-10-CM

## 2020-11-03 DIAGNOSIS — E11.65 TYPE 2 DIABETES MELLITUS WITH HYPERGLYCEMIA, WITH LONG-TERM CURRENT USE OF INSULIN (HCC): Primary | ICD-10-CM

## 2020-11-03 PROCEDURE — 99214 OFFICE O/P EST MOD 30 MIN: CPT | Performed by: INTERNAL MEDICINE

## 2020-11-03 PROCEDURE — 95251 CONT GLUC MNTR ANALYSIS I&R: CPT | Performed by: INTERNAL MEDICINE

## 2020-11-03 RX ORDER — PHENOL 1.4 %
600 AEROSOL, SPRAY (ML) MUCOUS MEMBRANE 2 TIMES DAILY WITH MEALS
COMMUNITY
End: 2020-11-11

## 2020-11-11 ENCOUNTER — OFFICE VISIT (OUTPATIENT)
Dept: NEPHROLOGY | Facility: CLINIC | Age: 80
End: 2020-11-11
Payer: MEDICARE

## 2020-11-11 VITALS
WEIGHT: 230 LBS | HEART RATE: 60 BPM | DIASTOLIC BLOOD PRESSURE: 78 MMHG | TEMPERATURE: 97.1 F | BODY MASS INDEX: 36.96 KG/M2 | HEIGHT: 66 IN | SYSTOLIC BLOOD PRESSURE: 130 MMHG

## 2020-11-11 DIAGNOSIS — N17.9 ACUTE KIDNEY INJURY (HCC): ICD-10-CM

## 2020-11-11 DIAGNOSIS — N18.30 BENIGN HYPERTENSION WITH CHRONIC KIDNEY DISEASE, STAGE III (HCC): ICD-10-CM

## 2020-11-11 DIAGNOSIS — N18.31 STAGE 3A CHRONIC KIDNEY DISEASE (HCC): Primary | ICD-10-CM

## 2020-11-11 DIAGNOSIS — I12.9 BENIGN HYPERTENSION WITH CHRONIC KIDNEY DISEASE, STAGE III (HCC): ICD-10-CM

## 2020-11-11 DIAGNOSIS — R60.0 BILATERAL LEG EDEMA: ICD-10-CM

## 2020-11-11 DIAGNOSIS — R80.9 MICROALBUMINURIA: ICD-10-CM

## 2020-11-11 PROCEDURE — 99214 OFFICE O/P EST MOD 30 MIN: CPT | Performed by: PHYSICIAN ASSISTANT

## 2020-11-13 DIAGNOSIS — E03.9 HYPOTHYROIDISM, UNSPECIFIED TYPE: ICD-10-CM

## 2020-11-13 RX ORDER — LEVOTHYROXINE SODIUM 0.12 MG/1
TABLET ORAL
Qty: 78 TABLET | Refills: 1 | Status: SHIPPED | OUTPATIENT
Start: 2020-11-13 | End: 2021-03-31

## 2020-11-15 ENCOUNTER — HOSPITAL ENCOUNTER (OUTPATIENT)
Dept: ULTRASOUND IMAGING | Facility: HOSPITAL | Age: 80
Discharge: HOME | End: 2020-11-15
Payer: MEDICARE

## 2020-11-15 DIAGNOSIS — N17.9 ACUTE KIDNEY INJURY (HCC): ICD-10-CM

## 2020-11-15 PROCEDURE — 51798 US URINE CAPACITY MEASURE: CPT

## 2020-11-16 LAB
LEFT EYE DIABETIC RETINOPATHY: NORMAL
RIGHT EYE DIABETIC RETINOPATHY: NORMAL

## 2020-11-18 ENCOUNTER — TELEPHONE (OUTPATIENT)
Dept: NEPHROLOGY | Facility: CLINIC | Age: 80
End: 2020-11-18

## 2020-11-27 DIAGNOSIS — F41.9 ANXIETY: ICD-10-CM

## 2020-11-28 RX ORDER — CITALOPRAM 20 MG/1
TABLET ORAL
Qty: 90 TABLET | Refills: 3 | Status: SHIPPED | OUTPATIENT
Start: 2020-11-28 | End: 2021-09-07

## 2020-12-09 DIAGNOSIS — I10 ESSENTIAL HYPERTENSION: ICD-10-CM

## 2020-12-09 DIAGNOSIS — I12.9 BENIGN HYPERTENSION WITH CHRONIC KIDNEY DISEASE, STAGE III (HCC): ICD-10-CM

## 2020-12-09 DIAGNOSIS — N18.30 BENIGN HYPERTENSION WITH CHRONIC KIDNEY DISEASE, STAGE III (HCC): ICD-10-CM

## 2020-12-09 RX ORDER — LISINOPRIL 20 MG/1
TABLET ORAL
Qty: 180 TABLET | Refills: 3 | Status: SHIPPED | OUTPATIENT
Start: 2020-12-09 | End: 2021-09-15

## 2020-12-11 ENCOUNTER — TELEPHONE (OUTPATIENT)
Dept: FAMILY MEDICINE CLINIC | Facility: CLINIC | Age: 80
End: 2020-12-11

## 2020-12-11 DIAGNOSIS — Z79.4 TYPE 2 DIABETES MELLITUS WITH HYPERGLYCEMIA, WITH LONG-TERM CURRENT USE OF INSULIN (HCC): ICD-10-CM

## 2020-12-11 DIAGNOSIS — E11.65 TYPE 2 DIABETES MELLITUS WITH HYPERGLYCEMIA, WITH LONG-TERM CURRENT USE OF INSULIN (HCC): ICD-10-CM

## 2020-12-18 ENCOUNTER — TELEPHONE (OUTPATIENT)
Dept: ENDOCRINOLOGY | Facility: CLINIC | Age: 80
End: 2020-12-18

## 2021-01-06 ENCOUNTER — TELEPHONE (OUTPATIENT)
Dept: NEPHROLOGY | Facility: CLINIC | Age: 81
End: 2021-01-06

## 2021-01-11 ENCOUNTER — LAB (OUTPATIENT)
Dept: LAB | Facility: CLINIC | Age: 81
End: 2021-01-11
Payer: MEDICARE

## 2021-01-11 DIAGNOSIS — E55.9 VITAMIN D DEFICIENCY: ICD-10-CM

## 2021-01-11 DIAGNOSIS — N18.30 CHRONIC KIDNEY DISEASE, STAGE 3 (HCC): ICD-10-CM

## 2021-01-11 DIAGNOSIS — R80.9 MICROALBUMINURIA: ICD-10-CM

## 2021-01-11 LAB
25(OH)D3 SERPL-MCNC: 38.1 NG/ML (ref 30–100)
ANION GAP SERPL CALCULATED.3IONS-SCNC: 2 MMOL/L (ref 4–13)
BACTERIA UR QL AUTO: ABNORMAL /HPF
BILIRUB UR QL STRIP: NEGATIVE
BUN SERPL-MCNC: 29 MG/DL (ref 5–25)
CALCIUM SERPL-MCNC: 9.9 MG/DL (ref 8.3–10.1)
CHLORIDE SERPL-SCNC: 109 MMOL/L (ref 100–108)
CLARITY UR: ABNORMAL
CO2 SERPL-SCNC: 30 MMOL/L (ref 21–32)
COLOR UR: YELLOW
CREAT SERPL-MCNC: 1.09 MG/DL (ref 0.6–1.3)
CREAT UR-MCNC: 14.7 MG/DL
ERYTHROCYTE [DISTWIDTH] IN BLOOD BY AUTOMATED COUNT: 12.9 % (ref 11.6–15.1)
GFR SERPL CREATININE-BSD FRML MDRD: 49 ML/MIN/1.73SQ M
GLUCOSE SERPL-MCNC: 128 MG/DL (ref 65–140)
GLUCOSE UR STRIP-MCNC: NEGATIVE MG/DL
HCT VFR BLD AUTO: 42.1 % (ref 34.8–46.1)
HGB BLD-MCNC: 12.9 G/DL (ref 11.5–15.4)
HGB UR QL STRIP.AUTO: NEGATIVE
HYALINE CASTS #/AREA URNS LPF: ABNORMAL /LPF
KETONES UR STRIP-MCNC: NEGATIVE MG/DL
LEUKOCYTE ESTERASE UR QL STRIP: ABNORMAL
MAGNESIUM SERPL-MCNC: 1.9 MG/DL (ref 1.6–2.6)
MCH RBC QN AUTO: 30.2 PG (ref 26.8–34.3)
MCHC RBC AUTO-ENTMCNC: 30.6 G/DL (ref 31.4–37.4)
MCV RBC AUTO: 99 FL (ref 82–98)
NITRITE UR QL STRIP: NEGATIVE
NON-SQ EPI CELLS URNS QL MICRO: ABNORMAL /HPF
PH UR STRIP.AUTO: 6.5 [PH]
PHOSPHATE SERPL-MCNC: 4.1 MG/DL (ref 2.3–4.1)
PLATELET # BLD AUTO: 150 THOUSANDS/UL (ref 149–390)
PMV BLD AUTO: 10.6 FL (ref 8.9–12.7)
POTASSIUM SERPL-SCNC: 4.3 MMOL/L (ref 3.5–5.3)
PROT UR STRIP-MCNC: NEGATIVE MG/DL
PROT UR-MCNC: 10 MG/DL
PROT/CREAT UR: 0.68 MG/G{CREAT} (ref 0–0.1)
PTH-INTACT SERPL-MCNC: 65.9 PG/ML (ref 18.4–80.1)
RBC # BLD AUTO: 4.27 MILLION/UL (ref 3.81–5.12)
RBC #/AREA URNS AUTO: ABNORMAL /HPF
SODIUM SERPL-SCNC: 141 MMOL/L (ref 136–145)
SP GR UR STRIP.AUTO: 1.01 (ref 1–1.03)
UROBILINOGEN UR QL STRIP.AUTO: 0.2 E.U./DL
WBC # BLD AUTO: 7.52 THOUSAND/UL (ref 4.31–10.16)
WBC #/AREA URNS AUTO: ABNORMAL /HPF

## 2021-01-11 PROCEDURE — 36415 COLL VENOUS BLD VENIPUNCTURE: CPT

## 2021-01-11 PROCEDURE — 82306 VITAMIN D 25 HYDROXY: CPT

## 2021-01-11 PROCEDURE — 83735 ASSAY OF MAGNESIUM: CPT

## 2021-01-11 PROCEDURE — 84100 ASSAY OF PHOSPHORUS: CPT

## 2021-01-11 PROCEDURE — 81001 URINALYSIS AUTO W/SCOPE: CPT

## 2021-01-11 PROCEDURE — 80048 BASIC METABOLIC PNL TOTAL CA: CPT

## 2021-01-11 PROCEDURE — 83970 ASSAY OF PARATHORMONE: CPT

## 2021-01-11 PROCEDURE — 85027 COMPLETE CBC AUTOMATED: CPT

## 2021-01-11 PROCEDURE — 84156 ASSAY OF PROTEIN URINE: CPT

## 2021-01-11 PROCEDURE — 82570 ASSAY OF URINE CREATININE: CPT

## 2021-01-12 ENCOUNTER — OFFICE VISIT (OUTPATIENT)
Dept: NEPHROLOGY | Facility: CLINIC | Age: 81
End: 2021-01-12
Payer: MEDICARE

## 2021-01-12 VITALS
SYSTOLIC BLOOD PRESSURE: 142 MMHG | HEIGHT: 66 IN | WEIGHT: 230 LBS | BODY MASS INDEX: 36.96 KG/M2 | DIASTOLIC BLOOD PRESSURE: 76 MMHG

## 2021-01-12 DIAGNOSIS — E55.9 VITAMIN D DEFICIENCY: ICD-10-CM

## 2021-01-12 DIAGNOSIS — R80.9 MICROALBUMINURIA: ICD-10-CM

## 2021-01-12 DIAGNOSIS — I12.9 BENIGN HYPERTENSION WITH CHRONIC KIDNEY DISEASE, STAGE III (HCC): Primary | ICD-10-CM

## 2021-01-12 DIAGNOSIS — N18.31 STAGE 3A CHRONIC KIDNEY DISEASE (HCC): ICD-10-CM

## 2021-01-12 DIAGNOSIS — N18.30 BENIGN HYPERTENSION WITH CHRONIC KIDNEY DISEASE, STAGE III (HCC): Primary | ICD-10-CM

## 2021-01-12 PROCEDURE — 99214 OFFICE O/P EST MOD 30 MIN: CPT | Performed by: INTERNAL MEDICINE

## 2021-01-12 NOTE — PROGRESS NOTES
NEPHROLOGY OUTPATIENT PROGRESS NOTE   Daniela Hogan 68 y o  female MRN: [id number]  DATE: 1/12/2021  Reason for visit:   Chief Complaint   Patient presents with    Follow-up    Chronic Kidney Disease     ASSESSMENT and PLAN:  CK D stage III with baseline serum creatinine 1 2-1 3  - Serum creatinine 1 0 in January 2021 stable at baseline    - Avoid NSAIDs or nephrotoxins  - CKD likely secondary to long-term hypertensive nephrosclerosis/diabetic nephropathy  - Check BMP, phosphorus, UPCR before next visit  -  UA in January 2021 shows no RBCs, no proteinuria, 30 to 50 WBCs with innumerable bacteria  Patient denies any urinary complaint  Likely to represent asymptomatic bacteriuria  Advised her to call back if has any worsening urinary symptoms      Proteinuria  - last UPC ratio  680 mg in January 2021  repeat UPC ratio before next visit    -continue lisinopril to 40 mg p o  Daily  -Proteinuria likely secondary to long-term diabetes and hypertension, obesity related secondary FSGS contributing?  -last hemoglobin A1c 6 7    -also has moderate DM retinopathy in 4/2019     Hypertension  -Blood pressure   Acceptable but slightly above goal today  Patient brought BP machine from home although does not have batteries so could not compare   -continue current lisinopril, metoprolol, Lasix    -  Advised to bring BP machine again with batteries next time   -salt restricted diet  -consider changing metoprolol to carvedilol blood pressure remains elevated  -regular exercise if possible      bilateral lower extremity edema likely secondary to chronic venous stasis  - This has improved and stable since endovascular laser treatment in left lower extremity  She denies any dyspnea  - Continue low-salt diet   -continue current maintenance dose of Lasix 20 mg p o  Daily   She sometimes skips Lasix dose usually two to 3 times a week if does not have worsened leg swelling      Prior vitamin-D deficiency, improved    last vitamin-D level 38 in January 2021  continue vitamin-D 2000 units p o  Daily      Secondary hyperparathyroidism, last PTH 65 9  continue to monitor   Repeat level before next visit      Anemia in CKD, hemoglobin overall stable at goal    Morbid obesity, recommended weight loss program       Diagnoses and all orders for this visit:    Benign hypertension with chronic kidney disease, stage III  -     Basic metabolic panel; Future  -     CBC; Future  -     UA (URINE) with reflex to Scope; Future  -     Protein / creatinine ratio, urine; Future  -     Phosphorus; Future  -     PTH, intact; Future    Stage 3a chronic kidney disease  -     Basic metabolic panel; Future  -     CBC; Future  -     UA (URINE) with reflex to Scope; Future  -     Protein / creatinine ratio, urine; Future  -     Vitamin D 25 hydroxy; Future  -     Phosphorus; Future  -     PTH, intact; Future    Microalbuminuria    Vitamin D deficiency  -     Vitamin D 25 hydroxy; Future          SUBJECTIVE / HPI:  Patient is 63-year-old female with significant past medical history of hypertension for at least 40-50 years, and diabetes for 30 years, diabetic retinopathy, stress incontinence, chronic leg swelling with chronic venous stasis, status post endovascular laser treatment in the left lower extremity, chronic kidney disease stage III with baseline serum creatinine 1 2-1 3, complete heart block, status post ppm, paroxysmal AFib, comes regular follow up of renal failure  Her last serum creatinine overall stable at baseline  She claims to be compliant with her medications  She does not check blood pressure at home  Denies any worsening leg edema   Denies any worsening dyspnea    She tries to follow low salt diet  No recent NSAID exposure  Denies lightheadedness or dizziness  Denies any dyspnea    REVIEW OF SYSTEMS:  More than 10 point review of systems were obtained and discussed in length with the patient   Complete review of systems were negative / unremarkable except mentioned above  PHYSICAL EXAM:  Vitals:    01/12/21 1316 01/12/21 1337   BP:  142/76   Weight: 104 kg (230 lb)    Height: 5' 6" (1 676 m)      Body mass index is 37 12 kg/m²  Physical Exam  Vitals signs reviewed  Constitutional:       Appearance: She is well-developed  HENT:      Head: Normocephalic and atraumatic  Right Ear: External ear normal       Left Ear: External ear normal    Eyes:      Conjunctiva/sclera: Conjunctivae normal       Pupils: Pupils are equal, round, and reactive to light  Neck:      Musculoskeletal: Neck supple  Cardiovascular:      Comments:   S1, S2 present  Pulmonary:      Effort: Pulmonary effort is normal       Breath sounds: Normal breath sounds  No wheezing or rales  Abdominal:      General: Bowel sounds are normal  There is no distension  Palpations: Abdomen is soft  Tenderness: There is no abdominal tenderness  Musculoskeletal:         General: No tenderness  Skin:     General: Skin is warm and dry  Findings: No rash  Neurological:      Mental Status: She is alert and oriented to person, place, and time  Psychiatric:         Behavior: Behavior normal          PAST MEDICAL HISTORY:  Past Medical History:   Diagnosis Date    Anxiety     Arthritis     [facility]     Chronic venous insufficiency     COPD [facility] disease) [facility] [address] [facility]     Coronary artery disease     [facility]     type II    Disease of thyroid gland     hypothyroidism    [facility])     Hyperlipidemia     Hypertension     Irregular heart beat     Paroxysmal a-fib    Pacemaker     Renal disorder     stage 3    Thyroid disease     Urinary incontinence     Uterine leiomyoma        PAST SURGICAL HISTORY:  Past Surgical History:   Procedure Laterality Date    BREAST SURGERY      For Biopsy    CARDIAC PACEMAKER PLACEMENT      Permanent   Last assessed: 2/19/16   Alberto Patiño CARDIAC SURGERY      CARPAL TUNNEL RELEASE Bilateral     CATARACT EXTRACTION      CATARACT EXTRACTION W/ INTRAOCULAR LENS  IMPLANT, BILATERAL      CYSTOSCOPY      Diagnostic  Last assessed: 11/8/17    ECTOPIC PREGNANCY SURGERY      HYSTERECTOMY      Total Abdomonal  Resolved: 1982    JOINT REPLACEMENT Right     TKR    OOPHORECTOMY      SC ENDOVENOUS LASER, 1ST VEIN Left 2/17/2017    Procedure: GREATER SAPHENOUS VEIN ENDOVASCULAR LASER THERAPY ;  Surgeon: Mary Herring DO;  [facility];  Service: Vascular    TONSILLECTOMY         SOCIAL HISTORY:  Social History     Substance and Sexual Activity   Alcohol Use Yes    Comment: rarely   Per [employer]: Denied history of alcohol use     Social History     Substance and Sexual Activity   Drug Use No     Social History     Tobacco Use   Smoking Status Never Smoker   Smokeless Tobacco Never Used   Tobacco Comment    Per [employer]: Former smoker       FAMILY HISTORY:  Family History   Problem Relation Age of Onset    Hypertension Father     Diabetes Father         Mellitus    Coronary artery disease Mother     Hypertension Mother     Heart disease Mother     Diabetes Brother         Mellitus    Breast cancer Maternal Aunt     Pancreatic cancer Maternal Aunt     Breast cancer Cousin     Breast cancer Cousin     No Known Problems Sister     No Known Problems Daughter     No Known Problems Maternal Grandmother     No Known Problems Maternal Grandfather     No Known Problems Paternal Grandmother     No Known Problems Paternal Grandfather     No Known Problems Daughter     No Known Problems Son        MEDICATIONS:    Current Outpatient Medications:     acetaminophen (TYLENOL) 325 mg tablet, Take 650 mg by mouth , Disp: , Rfl:     apixaban (Eliquis) 5 mg, Take 1 tablet (5 mg total) by mouth 2 (two) times a day Lot #[id number] Exp 9/2022, Disp: 56 tablet, Rfl: 0    Ascorbic Acid (VITAMIN C) 1000 MG tablet, Take 1,000 mg by mouth , Disp: , Rfl:     atorvastatin (LIPITOR) 80 mg tablet, TAKE 1 TABLET AT BEDTIME, Disp: 90 tablet, Rfl: 3    Calcium Carbonate-Vit D-Min (CALCIUM 1200 PO), Take by mouth, Disp: , Rfl:     cholecalciferol (VITAMIN D3) 1,000 units tablet, Take 2,000 Units by mouth daily , Disp: , Rfl:     citalopram (CeleXA) 20 mg tablet, TAKE 1 TABLET EVERY DAY, Disp: 90 tablet, Rfl: 3    clobetasol (TEMOVATE) 0 05 % cream, Apply topically 2 (two) times a day To leg, Disp: 60 g, Rfl: 5    Continuous Blood Gluc  (FREESTYLE RONNI 14 DAY READER) GARRY, Use to check BG levels 4+times daily and as needed, Disp: 1 Device, Rfl: 0    Continuous Blood Gluc Sensor (FREESTYLE RONNI 14 DAY SENSOR) MIS, Change sensor every 14 days to check BG levels 4+ times daily, Disp: 6 each, Rfl: 3    furosemide (LASIX) 20 mg tablet, TAKE 1 TABLET EVERY DAY, Disp: 90 tablet, Rfl: 3    GLUCOSAMINE CHONDROITIN COMPLX PO, Take 1 tablet by mouth 2 (two) times a day, Disp: , Rfl:     glucose blood (TRUE METRIX BLOOD GLUCOSE TEST) test strip, Test 4 times plus daily, as instructed, Disp: 400 each, Rfl: 1    insulin aspart (NovoLOG) 100 Units/mL injection pen, 8u with breakfast, 10u with lunch, 10u with dinner +scale (Use up to 40 units daily), Disp: 15 pen, Rfl: 1    insulin glargine (TOUJEO) 300 units/mL CONCETRATED U-300 injection pen (1-unit dial), Inject 35 Units under the skin daily at bedtime, Disp: 2 pen, Rfl: 1    Insulin Pen Needle (PEN NEEDLES) 32G X 6 MM MISC, Use 4 times a day, Disp: 400 each, Rfl: 1    levothyroxine 125 mcg tablet, TAKE 1 TABLET ONE TIME DAILY EXCEPT ON SUNDAY NO TABLET, Disp: 78 tablet, Rfl: 1    lisinopril (ZESTRIL) 20 mg tablet, TAKE 1 TABLET TWICE DAILY, Disp: 180 tablet, Rfl: 3    metoprolol tartrate (LOPRESSOR) 25 mg tablet, TAKE 1 TABLET EVERY 12 HOURS, Disp: 180 tablet, Rfl: 3    Omega-3 Fatty Acids (FISH OIL) 1200 MG CAPS, Take 1,200 mg by mouth 2 (two) times a day , Disp: , Rfl:     oxybutynin (DITROPAN) 5 mg tablet, TAKE 1 TABLET TWICE DAILY, Disp: 180 tablet, Rfl: 1    Tetrahydroz-Polyvinyl Al-Povid (MURINE TEARS PLUS OP), Apply to eye 3 (three) times a day, Disp: , Rfl:     hydrOXYzine HCL (ATARAX) 25 mg tablet, Take 1 tablet (25 mg total) by mouth daily at bedtime (Patient not taking: Reported on 1/12/2021), Disp: 30 tablet, Rfl: 2    triamcinolone (KENALOG) 0 1 % ointment, Apply topically 2 (two) times a day Use as instructed with wet wrap instructions (Patient not taking: Reported on 11/3/2020), Disp: 453 6 g, Rfl: 2    Lab Results:   Results for orders placed or performed in visit on 01/11/21   CBC   Result Value Ref Range    WBC 7 52 4 31 - 10 16 Thousand/uL    RBC 4 27 3 81 - 5 12 Million/uL    Hemoglobin 12 9 11 5 - 15 4 g/dL    Hematocrit 42 1 34 8 - 46 1 %    MCV 99 (H) 82 - 98 fL    MCH 30 2 26 8 - 34 3 pg    MCHC 30 6 (L) 31 4 - 37 4 g/dL    RDW 12 9 11 6 - 15 1 %    Platelets 678 744 - 737 Thousands/uL    MPV 10 6 8 9 - 12 7 fL   Basic metabolic panel   Result Value Ref Range    Sodium 141 136 - 145 mmol/L    Potassium 4 3 3 5 - 5 3 mmol/L    Chloride 109 (H) 100 - 108 mmol/L    CO2 30 21 - 32 mmol/L    ANION GAP 2 (L) 4 - 13 mmol/L    BUN 29 (H) 5 - 25 mg/dL    Creatinine 1 09 0 60 - 1 30 mg/dL    Glucose 128 65 - 140 mg/dL    Calcium 9 9 8 3 - 10 1 mg/dL    eGFR 49 ml/min/1 73sq m   Phosphorus   Result Value Ref Range    Phosphorus 4 1 2 3 - 4 1 mg/dL   PTH, intact   Result Value Ref Range    PTH 65 9 18 4 - 80 1 pg/mL   Protein / creatinine ratio, urine   Result Value Ref Range    Creatinine, Ur 14 7 mg/dL    Protein Urine Random 10 mg/dL    Prot/Creat Ratio, Ur 0 68 (H) 0 00 - 0 10   Vitamin D 25 hydroxy   Result Value Ref Range    Vit D, 25-Hydroxy 38 1 30 0 - 100 0 ng/mL   Magnesium   Result Value Ref Range    Magnesium 1 9 1 6 - 2 6 mg/dL   UA (URINE) with reflex to Scope   Result Value Ref Range    Color, UA Yellow     Clarity, UA Cloudy     Specific Holmen, UA 1 009 1 003 - 1 030    pH, UA 6 5 4  5, 5 0, 5 5, 6 0, 6 5, 7 0, 7 5, 8 0    Leukocytes, UA Moderate (A) Negative    Nitrite, UA Negative Negative    Protein, UA Negative Negative mg/dl    Glucose, UA Negative Negative mg/dl    Ketones, UA Negative Negative mg/dl    Urobilinogen, UA 0 2 0 2, 1 0 E U /dl E U /dl    Bilirubin, UA Negative Negative    Blood, UA Negative Negative   Urine Microscopic   Result Value Ref Range    RBC, UA None Seen None Seen, 2-4 /hpf    WBC, UA 30-50 (A) None Seen, 2-4 /hpf    Epithelial Cells None Seen None Seen, Occasional /hpf    Bacteria, UA Innumerable (A) None Seen, Occasional /hpf    Hyaline Casts, UA None Seen None Seen /lpf

## 2021-01-20 ENCOUNTER — IMMUNIZATIONS (OUTPATIENT)
Dept: FAMILY MEDICINE CLINIC | Facility: HOSPITAL | Age: 81
End: 2021-01-20

## 2021-01-20 ENCOUNTER — REMOTE DEVICE CLINIC VISIT (OUTPATIENT)
Dept: CARDIOLOGY CLINIC | Facility: CLINIC | Age: 81
End: 2021-01-20
Payer: MEDICARE

## 2021-01-20 DIAGNOSIS — Z23 ENCOUNTER FOR IMMUNIZATION: Primary | ICD-10-CM

## 2021-01-20 DIAGNOSIS — Z95.0 CARDIAC PACEMAKER IN SITU: Primary | ICD-10-CM

## 2021-01-20 PROCEDURE — 93296 REM INTERROG EVL PM/IDS: CPT | Performed by: INTERNAL MEDICINE

## 2021-01-20 PROCEDURE — 93294 REM INTERROG EVL PM/LDLS PM: CPT | Performed by: INTERNAL MEDICINE

## 2021-01-20 PROCEDURE — 91300 SARS-COV-2 / COVID-19 MRNA VACCINE (PFIZER-BIONTECH) 30 MCG: CPT

## 2021-01-20 PROCEDURE — 0001A SARS-COV-2 / COVID-19 MRNA VACCINE (PFIZER-BIONTECH) 30 MCG: CPT

## 2021-01-20 NOTE — PROGRESS NOTES
MDT-DUAL CHAMBER PPM (DDDR MODE)/ ACTIVE SYSTEM IS MRI CONDITIONAL   CARELINK TRANSMISSION: BATTERY VOLTAGE ADEQUATE (4 YRS)  AP 65 1%  >99% (CHB/DDDR 60)  ALL AVAILABLE LEAD PARAMETERS WITHIN NORMAL LIMITS  9 AT/AF EPISODES, MAX EPISODE DURATION 36:56 MINS W/ALL AVAILABLE EGRMS FOR PAF/PAFL  AF BURDEN <1%  HX: PAF/PAFL & PT TAKES ELIQUIS, METOPROLOL TART   NORMAL DEVICE FUNCTION   EBS

## 2021-01-28 DIAGNOSIS — E11.65 TYPE 2 DIABETES MELLITUS WITH HYPERGLYCEMIA, WITH LONG-TERM CURRENT USE OF INSULIN (HCC): ICD-10-CM

## 2021-01-28 DIAGNOSIS — Z79.4 TYPE 2 DIABETES MELLITUS WITH HYPERGLYCEMIA, WITH LONG-TERM CURRENT USE OF INSULIN (HCC): ICD-10-CM

## 2021-01-31 DIAGNOSIS — Z79.4 TYPE 2 DIABETES MELLITUS WITH HYPERGLYCEMIA, WITH LONG-TERM CURRENT USE OF INSULIN (HCC): ICD-10-CM

## 2021-01-31 DIAGNOSIS — E11.65 TYPE 2 DIABETES MELLITUS WITH HYPERGLYCEMIA, WITH LONG-TERM CURRENT USE OF INSULIN (HCC): ICD-10-CM

## 2021-02-04 DIAGNOSIS — Z79.4 TYPE 2 DIABETES MELLITUS WITH OTHER CIRCULATORY COMPLICATION, WITH LONG-TERM CURRENT USE OF INSULIN (HCC): ICD-10-CM

## 2021-02-04 DIAGNOSIS — E11.59 TYPE 2 DIABETES MELLITUS WITH OTHER CIRCULATORY COMPLICATION, WITH LONG-TERM CURRENT USE OF INSULIN (HCC): ICD-10-CM

## 2021-02-04 RX ORDER — INSULIN ASPART 100 [IU]/ML
INJECTION, SOLUTION INTRAVENOUS; SUBCUTANEOUS
Qty: 15 ML | Refills: 0 | Status: SHIPPED | OUTPATIENT
Start: 2021-02-04 | End: 2021-05-20 | Stop reason: SDUPTHER

## 2021-02-09 ENCOUNTER — IMMUNIZATIONS (OUTPATIENT)
Dept: FAMILY MEDICINE CLINIC | Facility: HOSPITAL | Age: 81
End: 2021-02-09

## 2021-02-09 ENCOUNTER — TELEPHONE (OUTPATIENT)
Dept: ENDOCRINOLOGY | Facility: CLINIC | Age: 81
End: 2021-02-09

## 2021-02-09 DIAGNOSIS — Z23 ENCOUNTER FOR IMMUNIZATION: Primary | ICD-10-CM

## 2021-02-09 PROCEDURE — 91300 SARS-COV-2 / COVID-19 MRNA VACCINE (PFIZER-BIONTECH) 30 MCG: CPT

## 2021-02-09 PROCEDURE — 0002A SARS-COV-2 / COVID-19 MRNA VACCINE (PFIZER-BIONTECH) 30 MCG: CPT

## 2021-02-12 ENCOUNTER — LAB (OUTPATIENT)
Dept: LAB | Facility: CLINIC | Age: 81
End: 2021-02-12
Payer: MEDICARE

## 2021-02-12 DIAGNOSIS — Z79.4 TYPE 2 DIABETES MELLITUS WITH HYPERGLYCEMIA, WITH LONG-TERM CURRENT USE OF INSULIN (HCC): ICD-10-CM

## 2021-02-12 DIAGNOSIS — E11.65 TYPE 2 DIABETES MELLITUS WITH HYPERGLYCEMIA, WITH LONG-TERM CURRENT USE OF INSULIN (HCC): ICD-10-CM

## 2021-02-12 DIAGNOSIS — E55.9 VITAMIN D DEFICIENCY: ICD-10-CM

## 2021-02-12 DIAGNOSIS — E03.9 ACQUIRED HYPOTHYROIDISM: ICD-10-CM

## 2021-02-12 LAB
25(OH)D3 SERPL-MCNC: 46.1 NG/ML (ref 30–100)
ANION GAP SERPL CALCULATED.3IONS-SCNC: 4 MMOL/L (ref 4–13)
BUN SERPL-MCNC: 38 MG/DL (ref 5–25)
CALCIUM SERPL-MCNC: 9.6 MG/DL (ref 8.3–10.1)
CHLORIDE SERPL-SCNC: 106 MMOL/L (ref 100–108)
CO2 SERPL-SCNC: 31 MMOL/L (ref 21–32)
CREAT SERPL-MCNC: 1.17 MG/DL (ref 0.6–1.3)
CREAT UR-MCNC: 85.5 MG/DL
EST. AVERAGE GLUCOSE BLD GHB EST-MCNC: 148 MG/DL
GFR SERPL CREATININE-BSD FRML MDRD: 44 ML/MIN/1.73SQ M
GLUCOSE P FAST SERPL-MCNC: 119 MG/DL (ref 65–99)
HBA1C MFR BLD: 6.8 %
MICROALBUMIN UR-MCNC: 83.4 MG/L (ref 0–20)
MICROALBUMIN/CREAT 24H UR: 98 MG/G CREATININE (ref 0–30)
POTASSIUM SERPL-SCNC: 4.2 MMOL/L (ref 3.5–5.3)
SODIUM SERPL-SCNC: 141 MMOL/L (ref 136–145)
T4 FREE SERPL-MCNC: 1.17 NG/DL (ref 0.76–1.46)
TSH SERPL DL<=0.05 MIU/L-ACNC: 4.91 UIU/ML (ref 0.36–3.74)

## 2021-02-12 PROCEDURE — 84439 ASSAY OF FREE THYROXINE: CPT

## 2021-02-12 PROCEDURE — 80048 BASIC METABOLIC PNL TOTAL CA: CPT

## 2021-02-12 PROCEDURE — 84443 ASSAY THYROID STIM HORMONE: CPT

## 2021-02-12 PROCEDURE — 36415 COLL VENOUS BLD VENIPUNCTURE: CPT

## 2021-02-12 PROCEDURE — 83036 HEMOGLOBIN GLYCOSYLATED A1C: CPT

## 2021-02-12 PROCEDURE — 82306 VITAMIN D 25 HYDROXY: CPT

## 2021-02-12 PROCEDURE — 82570 ASSAY OF URINE CREATININE: CPT

## 2021-02-12 PROCEDURE — 82043 UR ALBUMIN QUANTITATIVE: CPT

## 2021-02-16 ENCOUNTER — TELEMEDICINE (OUTPATIENT)
Dept: ENDOCRINOLOGY | Facility: CLINIC | Age: 81
End: 2021-02-16
Payer: MEDICARE

## 2021-02-16 DIAGNOSIS — E11.65 TYPE 2 DIABETES MELLITUS WITH HYPERGLYCEMIA, WITH LONG-TERM CURRENT USE OF INSULIN (HCC): ICD-10-CM

## 2021-02-16 DIAGNOSIS — E03.9 ACQUIRED HYPOTHYROIDISM: Primary | ICD-10-CM

## 2021-02-16 DIAGNOSIS — E78.2 MIXED HYPERLIPIDEMIA: ICD-10-CM

## 2021-02-16 DIAGNOSIS — I10 ESSENTIAL HYPERTENSION: ICD-10-CM

## 2021-02-16 DIAGNOSIS — Z79.4 TYPE 2 DIABETES MELLITUS WITH HYPERGLYCEMIA, WITH LONG-TERM CURRENT USE OF INSULIN (HCC): ICD-10-CM

## 2021-02-16 PROCEDURE — 99214 OFFICE O/P EST MOD 30 MIN: CPT | Performed by: PHYSICIAN ASSISTANT

## 2021-02-16 NOTE — PROGRESS NOTES
Virtual Regular Visit      Assessment/Plan:    Problem List Items Addressed This Visit     None               Reason for visit is DM   Chief Complaint   Patient presents with    Virtual Regular Visit        Encounter provider Beatriz Salgado PA-C    Provider located at [address] [id number]      Recent Visits  Date Type Provider Dept   02/09/21 Telephone [facility]   Showing recent visits within past 7 days and meeting all other requirements     Future Appointments  No visits were found meeting these conditions  Showing future appointments within next 150 days and meeting all other requirements        The patient was identified by name and date of birth  Isabel Hernández was informed that this is a telemedicine visit and that the visit is being conducted through telephone  My office door was closed  No one else was in the room  She acknowledged consent and understanding of privacy and security of the video platform  The patient has agreed to participate and understands they can discontinue the visit at any time  Patient is aware this is a billable service  Subjective  Isabel Hernández is a 80 y o  female with a history of type 2 diabetes with long term use of insulin  Diabetes course has been stable  Complications of DM: CAD, CKD, retinopathy  Denies recent illness or hospitalizations  Denies recent severe hypoglycemic or severe hyperglycemic episodes  Denies any issues with her current regimen  Home glucose monitoring: are performed regularly, [address] [place] [address] is not linked; unable to download  Home blood glucose readings: most readings range between 100-200 mg/dl, occasionally over 200 mg/dl  Current regimen:  Toujeo 40 units QHS (using 35 units), NovoLog 8-10-10 units 3 times a day with meals plus scale  compliant most of the time, denies any side effects from medications  Injects in: abdomen, thigh  Rotates sites: Yes  Hypoglycemic episodes: No, rare  H/o of hypoglycemia causing hospitalization or Intervention such as glucagon injection  or ambulance call :  No  Hypoglycemia symptoms: jitteriness  Treatment of hypoglycemia: orange     Medic alert tag: recommended: Yes     Diet: 3 meals per day, 1 snack per day  Timing of meals is predictable  Diabetic diet compliance:  noncompliant some of the time  Activity: Daily activity is predictable: Yes  The patient engages in little, if any physical activity  Ophthamology: sees retina specialist every 6-8 weeks  Podiatry: foot exam UTD, December 2020      Has hypertension: on ACE inhibitor/ARB, compliant most of the time  Has hyperlipidemia: on statin - tolerating well, no myalgias  compliant most of the time, denies any side effects from medications  Thyroid disorders:  Hypothyroidism  Patient is taking levothyroxine 125 mcg 1 tablet daily Monday through Saturday and no tablet on Sunday  Patient is taking medication on an empty stomach 1 hour before breakfast and at least 4 hours apart from supplements  Patient is tolerating medication well    History of pancreatitis: No    Component      Latest Ref Rng & Units 10/30/2020 2/12/2021   Sodium      136 - 145 mmol/L 141 141   Potassium      3 5 - 5 3 mmol/L 4 7 4 2   Chloride      100 - 108 mmol/L 103 106   CO2      21 - 32 mmol/L 31 31   Anion Gap      4 - 13 mmol/L 7 4   BUN      5 - 25 mg/dL 45 (H) 38 (H)   Creatinine      0 60 - 1 30 mg/dL 1 73 (H) 1 17   Glucose, Random      65 - 140 mg/dL 94    Calcium      8 3 - 10 1 mg/dL 9 5 9 6   eGFR      ml/min/1 73sq m 28 44   GLUCOSE FASTING      65 - 99 mg/dL  119 (H)   EXT Creatinine Urine      mg/dL 37 2 85 5   MICROALBUM ,U,RANDOM      0 0 - 20 0 mg/L 28 7 (H) 83 4 (H)   MICROALBUMIN/CREATININE RATIO      0 - 30 mg/g creatinine 77 (H) 98 (H)   Hemoglobin A1C      Normal 3 8-5 6%; PreDiabetic 5 7-6 4%; Diabetic >=6 5%; Glycemic control for adults with diabetes <7 0% % 6 7 (H) 6 8 (H)   eAG, EST AVG Glucose      mg/dl 146 148   Free T4      0 76 - 1 46 ng/dL 1 22 1 17   TSH 3RD GENERATON      0 358 - 3 740 uIU/mL 7 150 (H) 4 910 (H)   Vit D, 25-Hydroxy      30 0 - 100 0 ng/mL  46 1       Past Medical History:   Diagnosis Date    Anxiety     Arthritis     [facility]     Chronic venous insufficiency     [facility]) [facility] [address] [facility]     Coronary artery disease     Diabetes [facility] )     type II    Disease of thyroid gland     hypothyroidism    DVT [facility]deep venous thrombosis) ([facility])     Hyperlipidemia     Hypertension     Irregular heart beat     Paroxysmal a-fib    Pacemaker     Renal disorder     stage 3    Thyroid disease     Urinary incontinence     Uterine leiomyoma        Past Surgical History:   Procedure Laterality Date    BREAST SURGERY      For Biopsy    CARDIAC PACEMAKER PLACEMENT      Permanent  Last assessed: 2/19/16    CARDIAC SURGERY      CARPAL TUNNEL RELEASE Bilateral     CATARACT EXTRACTION      CATARACT EXTRACTION W/ INTRAOCULAR LENS  IMPLANT, BILATERAL      CYSTOSCOPY      Diagnostic  Last assessed: 11/8/17    ECTOPIC PREGNANCY SURGERY      HYSTERECTOMY      Total Abdomonal  Resolved: 1982    JOINT REPLACEMENT Right     TKR    OOPHORECTOMY      RI ENDOVENOUS LASER, 1ST VEIN Left 2/17/2017    Procedure: GREATER SAPHENOUS VEIN ENDOVASCULAR LASER THERAPY ;  Surgeon: Trevin Daniels DO;  [facility];  Service: Vascular    TONSILLECTOMY         Current Outpatient Medications   Medication Sig Dispense Refill    acetaminophen (TYLENOL) 325 mg tablet Take 650 mg by mouth   apixaban (Eliquis) 5 mg Take 1 tablet (5 mg total) by mouth 2 (two) times a day Lot #[id number]  Exp 9/2022 56 tablet 0    Ascorbic Acid (VITAMIN C) 1000 MG tablet Take 1,000 mg by mouth        atorvastatin (LIPITOR) 80 mg tablet TAKE 1 TABLET AT BEDTIME 90 tablet 3    Calcium Carbonate-Vit D-Min (CALCIUM 1200 PO) Take by mouth      cholecalciferol (VITAMIN D3) 1,000 units tablet Take 2,000 Units by mouth daily       citalopram (CeleXA) 20 mg tablet TAKE 1 TABLET EVERY DAY 90 tablet 3    clobetasol (TEMOVATE) 0 05 % cream Apply topically 2 (two) times a day To leg 60 g 5    Continuous Blood Gluc  (FREESTYLE RONNI 14 DAY READER) GARRY Use to check BG levels 4+times daily and as needed 1 Device 0    Continuous Blood Gluc Sensor (FREESTYLE RONNI 14 DAY SENSOR) MISC Change sensor every 14 days to check BG levels 4+ times daily 6 each 3    furosemide (LASIX) 20 mg tablet TAKE 1 TABLET EVERY DAY 90 tablet 3    GLUCOSAMINE CHONDROITIN COMPLX PO Take 1 tablet by mouth 2 (two) times a day      glucose blood (TRUE METRIX BLOOD GLUCOSE TEST) test strip Test 4 times plus daily, as instructed 400 each 1    hydrOXYzine HCL (ATARAX) 25 mg tablet Take 1 tablet (25 mg total) by mouth daily at bedtime (Patient not taking: Reported on 1/12/2021) 30 tablet 2    insulin aspart (NovoLOG FlexPen) 100 UNIT/ML injection pen INJECT 8 UNITS SUBCUTANEOUSLY WITH BREAKFAST, THEN 10 UNITS WITH LUNCH, AND THEN 10 UNITS WITH DINNER 15 mL 0    insulin glargine (TOUJEO) 300 units/mL CONCETRATED U-300 injection pen (1-unit dial) Inject 40 Units under the skin daily at bedtime 10 pen 0    Insulin Pen Needle (PEN NEEDLES) 32G X 6 MM MISC Use 4 times a day 400 each 1    levothyroxine 125 mcg tablet TAKE 1 TABLET ONE TIME DAILY EXCEPT ON SUNDAY NO TABLET 78 tablet 1    lisinopril (ZESTRIL) 20 mg tablet TAKE 1 TABLET TWICE DAILY 180 tablet 3    metoprolol tartrate (LOPRESSOR) 25 mg tablet TAKE 1 TABLET EVERY 12 HOURS 180 tablet 3    Omega-3 Fatty Acids (FISH OIL) 1200 MG CAPS Take 1,200 mg by mouth 2 (two) times a day       oxybutynin (DITROPAN) 5 mg tablet TAKE 1 TABLET TWICE DAILY 180 tablet 1    Tetrahydroz-Polyvinyl Al-Povid (MURINE TEARS PLUS OP) Apply to eye 3 (three) times a day      triamcinolone (KENALOG) 0 1 % ointment Apply topically 2 (two) times a day Use as instructed with wet wrap instructions (Patient not taking: Reported on 11/3/2020) 453 6 g 2     No current facility-administered medications for this visit  Allergies   Allergen Reactions    Ampicillin Hives       Review of Systems   Constitutional: Negative for activity change, appetite change, fatigue and unexpected weight change  HENT: Negative for trouble swallowing  Eyes: Positive for visual disturbance  Respiratory: Negative for shortness of breath  Cardiovascular: Negative for chest pain and palpitations  Gastrointestinal: Negative for constipation and diarrhea  Endocrine: Negative for polydipsia and polyuria  Musculoskeletal: Positive for arthralgias  Skin: Negative for wound  Neurological: Negative for numbness  Psychiatric/Behavioral: Negative  Video Exam    There were no vitals filed for this visit  Physical Exam - no video    PLAN  Type 2 DM  HGA1C 6 8%  Worsened  Treatment regimen: continue current treatment  Patient will stop by with reader to download CGM report; will make changes at that time if needed  Discussed intensive insulin regimen does increase risk for hypoglycemia  Episodes of hypoglycemia can lead to permanent disability and death  Discussed risks/complications associated with uncontrolled diabetes  Advised to adhere to diabetic diet, and recommended staying active/exercising routinely as tolerated  Keep carbohydrates consistent to limit blood glucose fluctuations  Advised to call if blood sugars less than 70 mg/dl or over 300 mg/dl  Check blood glucose 3+ times a day  Discussed symptoms and treatment of hypoglycemia  Discussed use of CGM to collect additional blood glucose data to reveal trends and patterns that can be used to optimize treatment plan     Recommended routine follow-up with podiatry and ophthalmology  Send log in 1-2 weeks  Ordered blood work to complete prior to next visit  HTN  Continue current treatment  Hyperlipidemia  LDL 61  Continue statin therapy   Hypothyroidism  TFTs abnormal but in acceptable range considering her age, TSH 2 12 and free T4 1 17  Continue current dose of levothyroxine  Repeat labs prior to next visit       I spent 25 minutes directly with the patient during this visit   It was my intent to perform this visit via video technology but the patient was not able to do a video connection so the visit was completed via audio telephone only  Vicki Guevara PA-C        VIRTUAL VISIT DISCLAIMER    Lisa Becerra acknowledges that she has consented to an online visit or consultation  She understands that the online visit is based solely on information provided by her, and that, in the absence of a face-to-face physical evaluation by the physician, the diagnosis she receives is both limited and provisional in terms of accuracy and completeness  This is not intended to replace a full medical face-to-face evaluation by the physician  Lisa Becerra understands and accepts these terms

## 2021-02-16 NOTE — PATIENT INSTRUCTIONS
Hypoglycemia instructions   Lizzie L Maria Dolores  2/16/2021  [id number]    Low Blood Sugar    Steps to treat low blood sugar  1  Test blood sugar if you have symptoms of low blood sugar:   Low Blood Sugar Symptoms:  o Sweaty  o Dizzy  o Rapid heartbeat  o Shaky  o Bad mood  o Hungry      2  Treat blood sugar less than 70 with 15 grams of fast-acting carbohydrate:   Examples of 15 grams Fast-Acting Carbohydrate:  o 4 oz juice  o 4 oz regular soda  o 3-4 glucose tablets (chew)  o 3-4 hard candies (chew)          3  Wait 15 minutes and test your blood sugar again     4   If blood sugar is less than 100, repeat steps 2-3     5  When your blood sugar is 100 or more, eat a snack if it will be longer than one hour until your next meal  The snack should be 15 grams of carbohydrate and a protein:   Examples of snacks:  o ½ sandwich  o 6 crackers with cheese  o Piece of fruit with cheese or peanut butter  o 6 crackers with peanut butter

## 2021-02-17 NOTE — PROGRESS NOTES
BMI Counseling: Body mass index is 39 31 kg/m²  The BMI is above normal  Nutrition recommendations include 3-5 servings of fruits/vegetables daily

## 2021-02-23 ENCOUNTER — TELEPHONE (OUTPATIENT)
Dept: ENDOCRINOLOGY | Facility: CLINIC | Age: 81
End: 2021-02-23

## 2021-02-24 ENCOUNTER — TELEPHONE (OUTPATIENT)
Dept: ENDOCRINOLOGY | Facility: CLINIC | Age: 81
End: 2021-02-24

## 2021-03-01 ENCOUNTER — TELEPHONE (OUTPATIENT)
Dept: CARDIOLOGY CLINIC | Facility: CLINIC | Age: 81
End: 2021-03-01

## 2021-03-01 NOTE — TELEPHONE ENCOUNTER
Patient called picks samples up at [facility]  please call pt for   Eliquis 5mg      Patient needs to fill a [employer] application also

## 2021-03-04 ENCOUNTER — OFFICE VISIT (OUTPATIENT)
Dept: DERMATOLOGY | Facility: CLINIC | Age: 81
End: 2021-03-04
Payer: MEDICARE

## 2021-03-04 ENCOUNTER — OFFICE VISIT (OUTPATIENT)
Dept: FAMILY MEDICINE CLINIC | Facility: CLINIC | Age: 81
End: 2021-03-04
Payer: MEDICARE

## 2021-03-04 VITALS
BODY MASS INDEX: 37.61 KG/M2 | HEART RATE: 63 BPM | OXYGEN SATURATION: 93 % | TEMPERATURE: 97.7 F | WEIGHT: 234 LBS | HEIGHT: 66 IN | SYSTOLIC BLOOD PRESSURE: 132 MMHG | DIASTOLIC BLOOD PRESSURE: 70 MMHG

## 2021-03-04 VITALS — TEMPERATURE: 98.2 F | HEIGHT: 66 IN | WEIGHT: 226 LBS | BODY MASS INDEX: 36.32 KG/M2

## 2021-03-04 DIAGNOSIS — N18.30 BENIGN HYPERTENSION WITH CHRONIC KIDNEY DISEASE, STAGE III (HCC): ICD-10-CM

## 2021-03-04 DIAGNOSIS — I50.32 CHRONIC DIASTOLIC CONGESTIVE HEART FAILURE (HCC): ICD-10-CM

## 2021-03-04 DIAGNOSIS — Z00.00 MEDICARE ANNUAL WELLNESS VISIT, SUBSEQUENT: ICD-10-CM

## 2021-03-04 DIAGNOSIS — E03.9 HYPOTHYROIDISM, UNSPECIFIED TYPE: Primary | Chronic | ICD-10-CM

## 2021-03-04 DIAGNOSIS — N18.31 STAGE 3A CHRONIC KIDNEY DISEASE (HCC): ICD-10-CM

## 2021-03-04 DIAGNOSIS — I12.9 BENIGN HYPERTENSION WITH CHRONIC KIDNEY DISEASE, STAGE III (HCC): ICD-10-CM

## 2021-03-04 DIAGNOSIS — E66.01 CLASS 2 SEVERE OBESITY WITH SERIOUS COMORBIDITY AND BODY MASS INDEX (BMI) OF 37.0 TO 37.9 IN ADULT, UNSPECIFIED OBESITY TYPE (HCC): ICD-10-CM

## 2021-03-04 DIAGNOSIS — E78.2 MIXED HYPERLIPIDEMIA: ICD-10-CM

## 2021-03-04 DIAGNOSIS — Z79.4 TYPE 2 DIABETES MELLITUS WITH HYPERGLYCEMIA, WITH LONG-TERM CURRENT USE OF INSULIN (HCC): ICD-10-CM

## 2021-03-04 DIAGNOSIS — I87.2 VENOUS STASIS DERMATITIS OF BOTH LOWER EXTREMITIES: ICD-10-CM

## 2021-03-04 DIAGNOSIS — D48.9 NEOPLASM OF UNCERTAIN BEHAVIOR: ICD-10-CM

## 2021-03-04 DIAGNOSIS — I10 ESSENTIAL HYPERTENSION: ICD-10-CM

## 2021-03-04 DIAGNOSIS — E11.65 TYPE 2 DIABETES MELLITUS WITH HYPERGLYCEMIA, WITH LONG-TERM CURRENT USE OF INSULIN (HCC): ICD-10-CM

## 2021-03-04 DIAGNOSIS — F41.1 GENERALIZED ANXIETY DISORDER: ICD-10-CM

## 2021-03-04 DIAGNOSIS — L30.9 ECZEMA, UNSPECIFIED TYPE: Primary | ICD-10-CM

## 2021-03-04 PROCEDURE — 17110 DESTRUCTION B9 LES UP TO 14: CPT | Performed by: STUDENT IN AN ORGANIZED HEALTH CARE EDUCATION/TRAINING PROGRAM

## 2021-03-04 PROCEDURE — 99214 OFFICE O/P EST MOD 30 MIN: CPT | Performed by: FAMILY MEDICINE

## 2021-03-04 PROCEDURE — 1123F ACP DISCUSS/DSCN MKR DOCD: CPT | Performed by: FAMILY MEDICINE

## 2021-03-04 PROCEDURE — G0439 PPPS, SUBSEQ VISIT: HCPCS | Performed by: FAMILY MEDICINE

## 2021-03-04 PROCEDURE — 99214 OFFICE O/P EST MOD 30 MIN: CPT | Performed by: STUDENT IN AN ORGANIZED HEALTH CARE EDUCATION/TRAINING PROGRAM

## 2021-03-04 NOTE — PATIENT INSTRUCTIONS
Medicare Preventive Visit Patient Instructions  Thank you for completing your Welcome to Medicare Visit or Medicare Annual Wellness Visit today  Your next wellness visit will be due in one year (3/4/2022)  The screening/preventive services that you may require over the next 5-10 years are detailed below  Some tests may not apply to you based off risk factors and/or age  Screening tests ordered at today's visit but not completed yet may show as past due  Also, please note that scanned in results may not display below  Preventive Screenings:  Service Recommendations Previous Testing/Comments   Colorectal Cancer Screening  * Colonoscopy    * Fecal Occult Blood Test (FOBT)/Fecal Immunochemical Test (FIT)  * Fecal DNA/Cologuard Test  * Flexible Sigmoidoscopy Age: 54-65 years old   Colonoscopy: every 10 years (may be performed more frequently if at higher risk)  OR  FOBT/FIT: every 1 year  OR  Cologuard: every 3 years  OR  Sigmoidoscopy: every 5 years  Screening may be recommended earlier than age 48 if at higher risk for colorectal cancer  Also, an individualized decision between you and your healthcare provider will decide whether screening between the ages of 74-80 would be appropriate  Colonoscopy: Not on file  FOBT/FIT: Not on file  Cologuard: Not on file  Sigmoidoscopy: Not on file         Breast Cancer Screening Age: 36 years old  Frequency: every 1-2 years  Not required if history of left and right mastectomy Mammogram: 07/22/2020       Cervical Cancer Screening Between the ages of 21-29, pap smear recommended once every 3 years  Between the ages of 33-67, can perform pap smear with HPV co-testing every 5 years     Recommendations may differ for women with a history of total hysterectomy, cervical cancer, or abnormal pap smears in past  Pap Smear: Not on file       Hepatitis C Screening Once for adults born between [facility]  More frequently in patients at high risk for Hepatitis C Hep C Antibody: 01/23/2020       Diabetes Screening 1-2 times per year if you're at risk for diabetes or have pre-diabetes Fasting glucose: 119 mg/dL   A1C: 6 8 %       Cholesterol Screening Once every 5 years if you don't have a lipid disorder  May order more often based on risk factors  Lipid panel: 07/28/2020         Other Preventive Screenings Covered by Medicare:  1  Abdominal Aortic Aneurysm (AAA) Screening: covered once if your at risk  You're considered to be at risk if you have a family history of AAA  2  Lung Cancer Screening: covers low dose CT scan once per year if you meet all of the following conditions: (1) Age 50-69; (2) No signs or symptoms of lung cancer; (3) Current smoker or have quit smoking within the last 15 years; (4) You have a tobacco smoking history of at least 30 pack years (packs per day multiplied by number of years you smoked); (5) You get a written order from a healthcare provider  3  Glaucoma Screening: covered annually if you're considered high risk: (1) You have diabetes OR (2) Family history of glaucoma OR (3)  aged 48 and older OR (3)  American aged 72 and older  3  Osteoporosis Screening: covered every 2 years if you meet one of the following conditions: (1) You're estrogen deficient and at risk for osteoporosis based off medical history and other findings; (2) Have a vertebral abnormality; (3) On glucocorticoid therapy for more than 3 months; (4) Have primary hyperparathyroidism; (5) On osteoporosis medications and need to assess response to drug therapy  · Last bone density test (DXA Scan): 07/07/2020   5  HIV Screening: covered annually if you're between the age of 15-65  Also covered annually if you are younger than 13 and older than 72 with risk factors for HIV infection  For pregnant patients, it is covered up to 3 times per pregnancy      Immunizations:  Immunization Recommendations   Influenza Vaccine Annual influenza vaccination during flu season is recommended for all persons aged >= 6 months who do not have contraindications   Pneumococcal Vaccine (Prevnar and Pneumovax)  * Prevnar = PCV13  * Pneumovax = PPSV23   Adults 25-60 years old: 1-3 doses may be recommended based on certain risk factors  Adults 72 years old: Prevnar (PCV13) vaccine recommended followed by Pneumovax (PPSV23) vaccine  If already received PPSV23 since turning 65, then PCV13 recommended at least one year after PPSV23 dose  Hepatitis B Vaccine 3 dose series if at intermediate or high risk (ex: diabetes, end stage renal disease, liver disease)   Tetanus (Td) Vaccine - COST NOT COVERED BY MEDICARE PART B Following completion of primary series, a booster dose should be given every 10 years to maintain immunity against tetanus  Td may also be given as tetanus wound prophylaxis  Tdap Vaccine - COST NOT COVERED BY MEDICARE PART B Recommended at least once for all adults  For pregnant patients, recommended with each pregnancy  Shingles Vaccine (Shingrix) - COST NOT COVERED BY MEDICARE PART B  2 shot series recommended in those aged 48 and above     Health Maintenance Due:      Topic Date Due    DXA SCAN  07/07/2023    Hepatitis C Screening  Completed     Immunizations Due:      Topic Date Due    DTaP,Tdap,and Td Vaccines (1 - Tdap) 01/24/1961     Advance Directives   What are advance directives? Advance directives are legal documents that state your wishes and plans for medical care  These plans are made ahead of time in case you lose your ability to make decisions for yourself  Advance directives can apply to any medical decision, such as the treatments you want, and if you want to donate organs  What are the types of advance directives? There are many types of advance directives, and each state has rules about how to use them  You may choose a combination of any of the following:  · Living will: This is a written record of the treatment you want   You can also choose which treatments you do not want, which to limit, and which to stop at a certain time  This includes surgery, medicine, IV fluid, and tube feedings  · Durable power of  for healthcare [facility]): This is a written record that states who you want to make healthcare choices for you when you are unable to make them for yourself  This person, called a proxy, is usually a family member or a friend  You may choose more than 1 proxy  · Do not resuscitate (DNR) order:  A DNR order is used in case your heart stops beating or you stop breathing  It is a request not to have certain forms of treatment, such as CPR  A DNR order may be included in other types of advance directives  · Medical directive: This covers the care that you want if you are in a coma, near death, or unable to make decisions for yourself  You can list the treatments you want for each condition  Treatment may include pain medicine, surgery, blood transfusions, dialysis, IV or tube feedings, and a ventilator (breathing machine)  · Values history: This document has questions about your views, beliefs, and how you feel and think about life  This information can help others choose the care that you would choose  Why are advance directives important? An advance directive helps you control your care  Although spoken wishes may be used, it is better to have your wishes written down  Spoken wishes can be misunderstood, or not followed  Treatments may be given even if you do not want them  An advance directive may make it easier for your family to make difficult choices about your care  Weight Management   Why it is important to manage your weight:  Being overweight increases your risk of health conditions such as heart disease, high blood pressure, type 2 diabetes, and certain types of cancer  It can also increase your risk for osteoarthritis, sleep apnea, and other respiratory problems  Aim for a slow, steady weight loss   Even a small amount of weight loss can lower your risk of health problems  How to lose weight safely:  A safe and healthy way to lose weight is to eat fewer calories and get regular exercise  You can lose up about 1 pound a week by decreasing the number of calories you eat by 500 calories each day  Healthy meal plan for weight management:  A healthy meal plan includes a variety of foods, contains fewer calories, and helps you stay healthy  A healthy meal plan includes the following:  · Eat whole-grain foods more often  A healthy meal plan should contain fiber  Fiber is the part of grains, fruits, and vegetables that is not broken down by your body  Whole-grain foods are healthy and provide extra fiber in your diet  Some examples of whole-grain foods are whole-wheat breads and pastas, oatmeal, brown rice, and bulgur  · Eat a variety of vegetables every day  Include dark, leafy greens such as spinach, kale, ras greens, and mustard greens  Eat yellow and orange vegetables such as carrots, sweet potatoes, and winter squash  · Eat a variety of fruits every day  Choose fresh or canned fruit (canned in its own juice or light syrup) instead of juice  Fruit juice has very little or no fiber  · Eat low-fat dairy foods  Drink fat-free (skim) milk or 1% milk  Eat fat-free yogurt and low-fat cottage cheese  Try low-fat cheeses such as mozzarella and other reduced-fat cheeses  · Choose meat and other protein foods that are low in fat  Choose beans or other legumes such as split peas or lentils  Choose fish, skinless poultry (chicken or turkey), or lean cuts of red meat (beef or pork)  Before you cook meat or poultry, cut off any visible fat  · Use less fat and oil  Try baking foods instead of frying them  Add less fat, such as margarine, sour cream, regular salad dressing and mayonnaise to foods  Eat fewer high-fat foods  Some examples of high-fat foods include french fries, doughnuts, ice cream, and cakes  · Eat fewer sweets    Limit foods and drinks that are high in sugar  This includes candy, cookies, regular soda, and sweetened drinks  Exercise:  Exercise at least 30 minutes per day on most days of the week  Some examples of exercise include walking, biking, dancing, and swimming  You can also fit in more physical activity by taking the stairs instead of the elevator or parking farther away from stores  Ask your healthcare provider about the best exercise plan for you  © Copyright [employer] 2018 Information is for End User's use only and may not be sold, redistributed or otherwise used for commercial purposes  All illustrations and images included in CareNotes® are the copyrighted property of [employer]  or [facility] Preventive Visit Patient Instructions  Thank you for completing your Welcome to Medicare Visit or Medicare Annual Wellness Visit today  Your next wellness visit will be due in one year (3/6/2022)  The screening/preventive services that you may require over the next 5-10 years are detailed below  Some tests may not apply to you based off risk factors and/or age  Screening tests ordered at today's visit but not completed yet may show as past due  Also, please note that scanned in results may not display below  Preventive Screenings:  Service Recommendations Previous Testing/Comments   Colorectal Cancer Screening  * Colonoscopy    * Fecal Occult Blood Test (FOBT)/Fecal Immunochemical Test (FIT)  * Fecal DNA/Cologuard Test  * Flexible Sigmoidoscopy Age: 54-65 years old   Colonoscopy: every 10 years (may be performed more frequently if at higher risk)  OR  FOBT/FIT: every 1 year  OR  Cologuard: every 3 years  OR  Sigmoidoscopy: every 5 years  Screening may be recommended earlier than age 48 if at higher risk for colorectal cancer  Also, an individualized decision between you and your healthcare provider will decide whether screening between the ages of 74-80 would be appropriate   Colonoscopy: Not on file  FOBT/FIT: Not on file  Cologuard: Not on file  Sigmoidoscopy: Not on file    Screening Not Indicated     Breast Cancer Screening Age: 36 years old  Frequency: every 1-2 years  Not required if history of left and right mastectomy Mammogram: 07/22/2020    Screening Current   Cervical Cancer Screening Between the ages of 21-29, pap smear recommended once every 3 years  Between the ages of 33-67, can perform pap smear with HPV co-testing every 5 years  Recommendations may differ for women with a history of total hysterectomy, cervical cancer, or abnormal pap smears in past  Pap Smear: Not on file    Screening Not Indicated   Hepatitis C Screening Once for adults born between 1945 and 1965  More frequently in patients at high risk for Hepatitis C Hep C Antibody: 01/23/2020    Screening Current   Diabetes Screening 1-2 times per year if you're at risk for diabetes or have pre-diabetes Fasting glucose: 119 mg/dL   A1C: 6 8 %    Screening Not Indicated  History Diabetes   Cholesterol Screening Once every 5 years if you don't have a lipid disorder  May order more often based on risk factors  Lipid panel: 07/28/2020    Screening Not Indicated  History Lipid Disorder     Other Preventive Screenings Covered by Medicare:  6  Abdominal Aortic Aneurysm (AAA) Screening: covered once if your at risk  You're considered to be at risk if you have a family history of AAA  7  Lung Cancer Screening: covers low dose CT scan once per year if you meet all of the following conditions: (1) Age 50-69; (2) No signs or symptoms of lung cancer; (3) Current smoker or have quit smoking within the last 15 years; (4) You have a tobacco smoking history of at least 30 pack years (packs per day multiplied by number of years you smoked); (5) You get a written order from a healthcare provider    8  Glaucoma Screening: covered annually if you're considered high risk: (1) You have diabetes OR (2) Family history of glaucoma OR (3)  American aged 48 and older OR (3)  American aged 72 and older  5  Osteoporosis Screening: covered every 2 years if you meet one of the following conditions: (1) You're estrogen deficient and at risk for osteoporosis based off medical history and other findings; (2) Have a vertebral abnormality; (3) On glucocorticoid therapy for more than 3 months; (4) Have primary hyperparathyroidism; (5) On osteoporosis medications and need to assess response to drug therapy  · Last bone density test (DXA Scan): 07/07/2020  10  HIV Screening: covered annually if you're between the age of 12-76  Also covered annually if you are younger than 13 and older than 72 with risk factors for HIV infection  For pregnant patients, it is covered up to 3 times per pregnancy  Immunizations:  Immunization Recommendations   Influenza Vaccine Annual influenza vaccination during flu season is recommended for all persons aged >= 6 months who do not have contraindications   Pneumococcal Vaccine (Prevnar and Pneumovax)  * Prevnar = PCV13  * Pneumovax = PPSV23   Adults 25-60 years old: 1-3 doses may be recommended based on certain risk factors  Adults 72 years old: Prevnar (PCV13) vaccine recommended followed by Pneumovax (PPSV23) vaccine  If already received PPSV23 since turning 65, then PCV13 recommended at least one year after PPSV23 dose  Hepatitis B Vaccine 3 dose series if at intermediate or high risk (ex: diabetes, end stage renal disease, liver disease)   Tetanus (Td) Vaccine - COST NOT COVERED BY MEDICARE PART B Following completion of primary series, a booster dose should be given every 10 years to maintain immunity against tetanus  Td may also be given as tetanus wound prophylaxis  Tdap Vaccine - COST NOT COVERED BY MEDICARE PART B Recommended at least once for all adults  For pregnant patients, recommended with each pregnancy     Shingles Vaccine (Shingrix) - COST NOT COVERED BY MEDICARE PART B  2 shot series recommended in those aged 48 and above     Health Maintenance Due:      Topic Date Due    DXA SCAN  07/07/2023    Hepatitis C Screening  Completed     Immunizations Due:      Topic Date Due    DTaP,Tdap,and Td Vaccines (1 - Tdap) 01/24/1961     Advance Directives   What are advance directives? Advance directives are legal documents that state your wishes and plans for medical care  These plans are made ahead of time in case you lose your ability to make decisions for yourself  Advance directives can apply to any medical decision, such as the treatments you want, and if you want to donate organs  What are the types of advance directives? There are many types of advance directives, and each state has rules about how to use them  You may choose a combination of any of the following:  · Living will: This is a written record of the treatment you want  You can also choose which treatments you do not want, which to limit, and which to stop at a certain time  This includes surgery, medicine, IV fluid, and tube feedings  · Durable power of  for healthcare [facility]): This is a written record that states who you want to make healthcare choices for you when you are unable to make them for yourself  This person, called a proxy, is usually a family member or a friend  You may choose more than 1 proxy  · Do not resuscitate (DNR) order:  A DNR order is used in case your heart stops beating or you stop breathing  It is a request not to have certain forms of treatment, such as CPR  A DNR order may be included in other types of advance directives  · Medical directive: This covers the care that you want if you are in a coma, near death, or unable to make decisions for yourself  You can list the treatments you want for each condition  Treatment may include pain medicine, surgery, blood transfusions, dialysis, IV or tube feedings, and a ventilator (breathing machine)  · Values history:   This document has questions about your views, beliefs, and how you feel and think about life  This information can help others choose the care that you would choose  Why are advance directives important? An advance directive helps you control your care  Although spoken wishes may be used, it is better to have your wishes written down  Spoken wishes can be misunderstood, or not followed  Treatments may be given even if you do not want them  An advance directive may make it easier for your family to make difficult choices about your care  Urinary Incontinence   Urinary incontinence (UI)  is when you lose control of your bladder  UI develops because your bladder cannot store or empty urine properly  The 3 most common types of UI are stress incontinence, urge incontinence, or both  Medicines:   · May be given to help strengthen your bladder control  Report any side effects of medication to your healthcare provider  Do pelvic muscle exercises often:  Your pelvic muscles help you stop urinating  Squeeze these muscles tight for 5 seconds, then relax for 5 seconds  Gradually work up to squeezing for 10 seconds  Do 3 sets of 15 repetitions a day, or as directed  This will help strengthen your pelvic muscles and improve bladder control  Train your bladder:  Go to the bathroom at set times, such as every 2 hours, even if you do not feel the urge to go  You can also try to hold your urine when you feel the urge to go  For example, hold your urine for 5 minutes when you feel the urge to go  As that becomes easier, hold your urine for 10 minutes  Self-care:   · Keep a UI record  Write down how often you leak urine and how much you leak  Make a note of what you were doing when you leaked urine  · Drink liquids as directed  You may need to limit the amount of liquid you drink to help control your urine leakage  Do not drink any liquid right before you go to bed  Limit or do not have drinks that contain caffeine or alcohol  · Prevent constipation    Eat a variety of high-fiber foods  Good examples are high-fiber cereals, beans, vegetables, and whole-grain breads  Walking is the best way to trigger your intestines to have a bowel movement  · Exercise regularly and maintain a healthy weight  Weight loss and exercise will decrease pressure on your bladder and help you control your leakage  · Use a catheter as directed  to help empty your bladder  A catheter is a tiny, plastic tube that is put into your bladder to drain your urine  · Go to behavior therapy as directed  Behavior therapy may be used to help you learn to control your urge to urinate  Weight Management   Why it is important to manage your weight:  Being overweight increases your risk of health conditions such as heart disease, high blood pressure, type 2 diabetes, and certain types of cancer  It can also increase your risk for osteoarthritis, sleep apnea, and other respiratory problems  Aim for a slow, steady weight loss  Even a small amount of weight loss can lower your risk of health problems  How to lose weight safely:  A safe and healthy way to lose weight is to eat fewer calories and get regular exercise  You can lose up about 1 pound a week by decreasing the number of calories you eat by 500 calories each day  Healthy meal plan for weight management:  A healthy meal plan includes a variety of foods, contains fewer calories, and helps you stay healthy  A healthy meal plan includes the following:  · Eat whole-grain foods more often  A healthy meal plan should contain fiber  Fiber is the part of grains, fruits, and vegetables that is not broken down by your body  Whole-grain foods are healthy and provide extra fiber in your diet  Some examples of whole-grain foods are whole-wheat breads and pastas, oatmeal, brown rice, and bulgur  · Eat a variety of vegetables every day  Include dark, leafy greens such as spinach, kale, ras greens, and mustard greens   Eat yellow and orange vegetables such as carrots, sweet potatoes, and winter squash  · Eat a variety of fruits every day  Choose fresh or canned fruit (canned in its own juice or light syrup) instead of juice  Fruit juice has very little or no fiber  · Eat low-fat dairy foods  Drink fat-free (skim) milk or 1% milk  Eat fat-free yogurt and low-fat cottage cheese  Try low-fat cheeses such as mozzarella and other reduced-fat cheeses  · Choose meat and other protein foods that are low in fat  Choose beans or other legumes such as split peas or lentils  Choose fish, skinless poultry (chicken or turkey), or lean cuts of red meat (beef or pork)  Before you cook meat or poultry, cut off any visible fat  · Use less fat and oil  Try baking foods instead of frying them  Add less fat, such as margarine, sour cream, regular salad dressing and mayonnaise to foods  Eat fewer high-fat foods  Some examples of high-fat foods include french fries, doughnuts, ice cream, and cakes  · Eat fewer sweets  Limit foods and drinks that are high in sugar  This includes candy, cookies, regular soda, and sweetened drinks  Exercise:  Exercise at least 30 minutes per day on most days of the week  Some examples of exercise include walking, biking, dancing, and swimming  You can also fit in more physical activity by taking the stairs instead of the elevator or parking farther away from stores  Ask your healthcare provider about the best exercise plan for you  © Copyright [employer] 2018 Information is for End User's use only and may not be sold, redistributed or otherwise used for commercial purposes   All illustrations and images included in CareNotes® are the copyrighted property of [employer]  or [address] [place] [address] [employer]

## 2021-03-04 NOTE — PROGRESS NOTES
Assessment/Plan:    Hypothyroidism    Patient appears clinically stable  Monitor TSH  Follow-up with endocrinology  Recheck 6 months    Type 2 diabetes mellitus with hyperglycemia (HCC)    Lab Results   Component Value Date    HGBA1C 6 8 (H) 02/12/2021      doing much better since starting continuous blood sugar monitoring  Now at goal   Continue present treatment  Follow up with endocrinology  Recheck 6 months    Essential hypertension   Well controlled  Continue follow-up with Cardiology  Recheck 6 months    Chronic diastolic congestive heart failure (HCC)  Wt Readings from Last 3 Encounters:   03/04/21 106 kg (234 lb)   03/04/21 103 kg (226 lb)   01/12/21 104 kg (230 lb)      patient euvolemic  Continue to monitor  Continue to watch weights carefully  Follow-up with Cardiology  Recheck 6 months        Coronary artery disease involving native coronary artery of native heart   With history of NSTEMI  Patient is asymptomatic  Continue present treatment  Follow-up with Cardiology  Recheck 6 months    Stage 3a chronic kidney disease  Lab Results   Component Value Date    EGFR 44 02/12/2021    EGFR 49 01/11/2021    EGFR 28 10/30/2020    CREATININE 1 17 02/12/2021    CREATININE 1 09 01/11/2021    CREATININE 1 73 (H) 10/30/2020      creatinine has improved and is now stable  Continue to monitor  Continue present treatment  Recheck 6 months    Class 2 severe obesity with serious comorbidity in adult [facility])   Weights have improved  Patient is doing better with diet hand with her diabetic control  BMI Counseling: Body mass index is 37 77 kg/m²  The BMI is above normal  Nutrition recommendations include moderation in carbohydrate intake, increasing intake of lean protein, reducing intake of saturated fat and trans fat and reducing intake of cholesterol  Recheck 6m      Mixed hyperlipidemia    Continue to monitor  Continue present treatment  Recheck 6 months    Anxiety disorder   No signs of depression  Continue present treatment  Recheck 6 months       Diagnoses and all orders for this visit:    Hypothyroidism, unspecified type    Medicare annual wellness visit, subsequent    Type 2 diabetes mellitus with hyperglycemia, with long-term current use of insulin ([facility] [address] )    Essential hypertension    Chronic diastolic congestive heart failure (HCC)    Benign hypertension with chronic kidney disease, stage III    Stage 3a chronic kidney disease    Mixed hyperlipidemia    Class 2 severe obesity with serious comorbidity and body mass index (BMI) of 37 0 to 37 9 in adult, unspecified obesity type (HCC)    Generalized anxiety disorder          Subjective:      Patient ID: Smiley Salinas is a 80 y o  female  f/u multiple med issues and AWV  - pt is doing well  - some increased stress (family)  Denies feeling depressed  - BGs a little higher over the last 2 weeks (fell off her diet)  Recent A1C   = 6 1  Continuous BG monitor really helping her understand how her BGs respond to diet  - pt is up to date with Cardio  Pt denies CP, palp, lightheadedness or other CV symptoms with or without exertion  - still with intermittent knee pain  "I am not ready for another shot yet"  See Dr Reina Hopson  - up to date with derm  Dry pruritic skin is better but has not resolved  - vision is still poor but not worse  Still gets eye injections q6-8w      - still with some incontinence issues but was released by [facility]    - no new Gi issues   - AWV done          The following portions of the patient's history were reviewed and updated as appropriate:   She  has a past medical history of Anxiety, Arthritis, CHF (congestive heart failure) ([address] ), Chronic venous insufficiency, COPD (chronic obstructive pulmonary disease) ([facility] [address] ), Coronary artery disease, Diabetes mellitus ([facility] [address] ), Disease of thyroid gland, DVT (deep venous thrombosis) ([facility] [address] ), Hyperlipidemia, Hypertension, Irregular heart beat, Pacemaker, Renal disorder, Thyroid disease, Urinary incontinence, and Uterine leiomyoma  She   Patient Active Problem List    Diagnosis Date Noted    Coronary artery disease involving native coronary artery of native heart 03/06/2021    Dermatitis 09/15/2020    Sleep apnea 05/17/2019    Intraocular pressure increase 06/06/2018    Vitamin D deficiency 04/03/2018    Bilateral leg edema 03/13/2018    Iron deficiency anemia 10/12/2017    Urinary bladder incontinence 07/26/2017    OAB (overactive bladder) 07/26/2017    Benign hypertension with chronic kidney disease, stage III 06/21/2017    Microalbuminuria 06/21/2017    PAF (paroxysmal atrial fibrillation) ([facility] [address] ) 10/31/2016    Chronic venous insufficiency 02/19/2016    D[facility]deep [facility] thrombosis) [facility] ) 02/19/2016    Presence of permanent cardiac pacemaker 01/28/2016    Chronic diastolic congestive heart failure ([facility] ) 01/21/2016    Anemia 01/19/2016    S/P cardiac pacemaker procedure 01/19/2016    Hypothyroidism 01/19/2016    Complete heart block ([facility] ) 01/16/2016    Type 2 diabetes mellitus with hyperglycemia ([facility] ) 01/15/2016    Essential hypertension 01/15/2016    Class 2 severe obesity with serious comorbidity in adult ([facility] [address] ) 01/15/2016    Stage 3a chronic kidney disease 10/21/2015    Eczematous dermatitis 03/03/2015    Anxiety disorder 06/26/2013    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 neurological manifestations, uncontrolled([address]) 10/02/2012    Chronic obstructive pulmonary disease ([facility] [address] ) 09/24/2012    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 ([facility] ) 09/22/2011    Mixed hyperlipidemia 03/14/2011     She  has a past surgical history that includes Hysterectomy; Joint replacement (Right); Tonsillectomy;  Cataract extraction w/ intraocular lens  implant, bilateral; Ectopic pregnancy surgery; Cardiac surgery; Cardiac pacemaker placement; Carpal tunnel release (Bilateral); pr endovenous laser, 1st vein (Left, 2/17/2017); Cystoscopy; Cataract extraction; Breast surgery; and Oophorectomy  She  reports that she has never smoked  She has never used smokeless tobacco  She reports current alcohol use  She reports that she does not use drugs  Current Outpatient Medications   Medication Sig Dispense Refill    acetaminophen (TYLENOL) 325 mg tablet Take 650 mg by mouth   apixaban (Eliquis) 5 mg Take 1 tablet (5 mg total) by mouth 2 (two) times a day Lot #[id number]  Exp 9/2022 56 tablet 0    Ascorbic Acid (VITAMIN C) 1000 MG tablet Take 1,000 mg by mouth        atorvastatin (LIPITOR) 80 mg tablet TAKE 1 TABLET AT BEDTIME 90 tablet 3    Calcium Carbonate-Vit D-Min (CALCIUM 1200 PO) Take by mouth      cholecalciferol (VITAMIN D3) 1,000 units tablet Take 2,000 Units by mouth daily       citalopram (CeleXA) 20 mg tablet TAKE 1 TABLET EVERY DAY 90 tablet 3    clobetasol (TEMOVATE) 0 05 % cream Apply topically 2 (two) times a day To leg 60 g 5    Continuous Blood Gluc  (FREESTYLE RONNI 14 DAY READER) GARRY Use to check BG levels 4+times daily and as needed 1 Device 0    Continuous Blood Gluc Sensor (FREESTYLE RONNI 14 DAY SENSOR) MISC Change sensor every 14 days to check BG levels 4+ times daily 6 each 3    furosemide (LASIX) 20 mg tablet TAKE 1 TABLET EVERY DAY 90 tablet 3    GLUCOSAMINE CHONDROITIN COMPLX PO Take 1 tablet by mouth 2 (two) times a day      glucose blood (TRUE METRIX BLOOD GLUCOSE TEST) test strip Test 4 times plus daily, as instructed 400 each 1    hydrOXYzine HCL (ATARAX) 25 mg tablet Take 1 tablet (25 mg total) by mouth daily at bedtime 30 tablet 2    insulin aspart (NovoLOG FlexPen) 100 UNIT/ML injection pen INJECT 8 UNITS SUBCUTANEOUSLY WITH BREAKFAST, THEN 10 UNITS WITH LUNCH, AND THEN 10 UNITS WITH DINNER 15 mL 0    insulin glargine (TOUJEO) 300 units/mL CONCETRATED U-300 injection pen (1-unit dial) Inject 35 Units under the skin daily at bedtime 10 pen 0    Insulin Pen Needle (PEN NEEDLES) 32G X 6 MM MISC Use 4 times a day 400 each 1    levothyroxine 125 mcg tablet TAKE 1 TABLET ONE TIME DAILY EXCEPT ON SUNDAY NO TABLET 78 tablet 1    lisinopril (ZESTRIL) 20 mg tablet TAKE 1 TABLET TWICE DAILY 180 tablet 3    metoprolol tartrate (LOPRESSOR) 25 mg tablet TAKE 1 TABLET EVERY 12 HOURS 180 tablet 3    Omega-3 Fatty Acids (FISH OIL) 1200 MG CAPS Take 1,200 mg by mouth 2 (two) times a day       oxybutynin (DITROPAN) 5 mg tablet TAKE 1 TABLET TWICE DAILY 180 tablet 1    Tetrahydroz-Polyvinyl Al-Povid (MURINE TEARS PLUS OP) Apply to eye 3 (three) times a day      triamcinolone (KENALOG) 0 1 % ointment Apply 2x/day to itchy, red areas as needed  Overuse can thin skin 453 6 g 3    Promethazine-DM (PHENERGAN-DM) 6 25-15 mg/5 mL oral syrup Take 5 mL by mouth 4 (four) times a day as needed for cough 150 mL 0     No current facility-administered medications for this visit  She is allergic to ampicillin       Review of Systems   Constitutional: Negative  HENT: Negative  Eyes: Negative  Respiratory: Negative  Cardiovascular: Negative  Gastrointestinal: Negative  Endocrine: Negative  Genitourinary: Negative  Incontinence - worse at night   Musculoskeletal: Positive for arthralgias and gait problem (Ambulates with cane)  Negative for back pain and joint swelling  Skin: Positive for rash (Scattered on arms as well as left lower leg  Pruritic)  Negative for wound  Allergic/Immunologic: Negative  Neurological: Negative for dizziness, weakness, light-headedness and numbness  Hematological: Negative  Psychiatric/Behavioral: Negative  Objective:      /70   Pulse 63   Temp 97 7 °F (36 5 °C)   Ht 5' 6" (1 676 m)   Wt 106 kg (234 lb)   SpO2 93%   BMI 37 77 kg/m²          Physical Exam  Vitals signs reviewed  Constitutional:       Appearance: She is well-developed  She is not diaphoretic  HENT:      Head: Normocephalic and atraumatic  Right Ear: Tympanic membrane, ear canal and external ear normal       Left Ear: Tympanic membrane, ear canal and external ear normal    Eyes:      Conjunctiva/sclera: Conjunctivae normal       Pupils: Pupils are equal, round, and reactive to light  Neck:      Musculoskeletal: Normal range of motion and neck supple  No muscular tenderness  Thyroid: No thyromegaly  Vascular: No JVD  Cardiovascular:      Rate and Rhythm: Normal rate and regular rhythm  Heart sounds: No murmur  Pulmonary:      Effort: Pulmonary effort is normal       Breath sounds: Normal breath sounds  Abdominal:      General: There is no distension  Palpations: Abdomen is soft  There is no mass  Tenderness: There is no abdominal tenderness  Musculoskeletal: Normal range of motion  General: Tenderness present  No swelling  Right lower leg: No edema  Left lower leg: No edema  Lymphadenopathy:      Cervical: No cervical adenopathy  Skin:     General: Skin is warm and dry  Capillary Refill: Capillary refill takes less than 2 seconds  Findings: Rash (few areas of mild erythema and xerosis  ) present  Neurological:      Mental Status: She is alert and oriented to person, place, and time  Cranial Nerves: No cranial nerve deficit  Sensory: No sensory deficit  Motor: No weakness or abnormal muscle tone  Gait: Gait abnormal       Deep Tendon Reflexes: Reflexes are normal and symmetric  Reflexes normal       Comments: minicog 5/5   Psychiatric:         Mood and Affect: Mood normal          Behavior: Behavior normal          Thought Content:  Thought content normal          Judgment: Judgment normal       Comments: PHQ-9 Depression Screening    PHQ-9:   Frequency of the following problems over the past two weeks:      Little interest or pleasure in doing things: 0 - not at all  Feeling down, depressed, or hopeless: 1 - several days  PHQ-2 Score: 1

## 2021-03-04 NOTE — PROGRESS NOTES
Assessment and Plan:     Problem List Items Addressed This Visit        Endocrine    Hypothyroidism - Primary (Chronic)       Patient appears clinically stable  Monitor TSH  Follow-up with endocrinology  Recheck 6 months         Type 2 diabetes mellitus with hyperglycemia (HCC)       Lab Results   Component Value Date    HGBA1C 6 8 (H) 02/12/2021      doing much better since starting continuous blood sugar monitoring  Now at goal   Continue present treatment  Follow up with endocrinology  Recheck 6 months            Cardiovascular and Mediastinum    Benign hypertension with chronic kidney disease, stage III    Chronic diastolic congestive heart failure (HCC)     Wt Readings from Last 3 Encounters:   03/04/21 106 kg (234 lb)   03/04/21 103 kg (226 lb)   01/12/21 104 kg (230 lb)      patient euvolemic  Continue to monitor  Continue to watch weights carefully  Follow-up with Cardiology  Recheck 6 months             Essential hypertension      Well controlled  Continue follow-up with Cardiology  Recheck 6 months            Genitourinary    Stage 3a chronic kidney disease     Lab Results   Component Value Date    EGFR 44 02/12/2021    EGFR 49 01/11/2021    EGFR 28 10/30/2020    CREATININE 1 17 02/12/2021    CREATININE 1 09 01/11/2021    CREATININE 1 73 (H) 10/30/2020      creatinine has improved and is now stable  Continue to monitor  Continue present treatment  Recheck 6 months            Other    Anxiety disorder      No signs of depression  Continue present treatment  Recheck 6 months         Class 2 severe obesity with serious comorbidity in adult [facility])      Weights have improved  Patient is doing better with diet hand with her diabetic control  BMI Counseling: Body mass index is 37 77 kg/m²   The BMI is above normal  Nutrition recommendations include moderation in carbohydrate intake, increasing intake of lean protein, reducing intake of saturated fat and trans fat and reducing intake of cholesterol  Recheck 6m           Mixed hyperlipidemia       Continue to monitor  Continue present treatment  Recheck 6 months           Other Visit Diagnoses     Medicare annual wellness visit, subsequent               Preventive health issues were discussed with patient, and age appropriate screening tests were ordered as noted in patient's After Visit Summary  Personalized health advice and appropriate referrals for health education or preventive services given if needed, as noted in patient's After Visit Summary       History of Present Illness:     Patient presents for Medicare Annual Wellness visit    Patient Care Team:  Grisel Ruiz MD as PCP - Allie Shell, MD Debra Benítez MD Calogero Dimaggio, Lisseth Kenny, MD Geovanny Hawkins MD Maple Cotton, MD Nikolas Miller, MD Clair Mejia, MD Anali Deluna as Nurse Practitioner (Endocrinology)  Apoorva Hernandez MD as Consulting Physician (Nephrology)     Problem List:     Patient Active Problem List   Diagnosis    Type 2 diabetes mellitus with hyperglycemia ([facility] )    Essential hypertension    Class 2 severe obesity with serious comorbidity in adult [facility])    Complete heart block ([facility] )    Anemia    S/P cardiac pacemaker procedure    Hypothyroidism    Chronic diastolic congestive heart failure ([facility] )    Chronic venous insufficiency    Dvt femoral (deep venous thrombosis) ([facility] )    Benign hypertension with chronic kidney disease, stage III    Stage 3a chronic kidney disease    Iron deficiency anemia    Microalbuminuria    Bilateral leg edema    Vitamin D deficiency    Anxiety disorder    Urinary bladder incontinence    Chronic obstructive pulmonary disease ([facility] )    Eczematous dermatitis    Mixed hyperlipidemia    OAB (overactive bladder)    Presence of permanent cardiac pacemaker    PAF (paroxysmal atrial fibrillation) ([facility] )    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 neurological manifestations, uncontrolled([address] 62)    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 (HCC)    Intraocular pressure increase    Sleep apnea    Dermatitis    Coronary artery disease involving native coronary artery of native heart      Past Medical and Surgical History:     Past Medical History:   Diagnosis Date    Anxiety     Arthritis     CH[facility]congestive [facility] failure) [facility])     Chronic venous insufficiency     COPD (chronic obstructive pulmonary disease) ([facility] )     Coronary artery disease     Diabetes mellitus ([facility] )     type II    Disease of thyroid gland     hypothyroidism    DVT (deep venous thrombosis) ([facility])     Hyperlipidemia     Hypertension     Irregular heart beat     Paroxysmal a-fib    Pacemaker     Renal disorder     stage 3    Thyroid disease     Urinary incontinence     Uterine leiomyoma      Past Surgical History:   Procedure Laterality Date    BREAST SURGERY      For Biopsy    CARDIAC PACEMAKER PLACEMENT      Permanent  Last assessed: 2/19/16    CARDIAC SURGERY      CARPAL TUNNEL RELEASE Bilateral     CATARACT EXTRACTION      CATARACT EXTRACTION W/ INTRAOCULAR LENS  IMPLANT, BILATERAL      CYSTOSCOPY      Diagnostic   Last assessed: 11/8/17    ECTOPIC PREGNANCY SURGERY      HYSTERECTOMY      Total Abdomonal  Resolved: 1982    JOINT REPLACEMENT Right     TKR    OOPHORECTOMY      WI ENDOVENOUS LASER, 1ST VEIN Left 2/17/2017    Procedure: GREATER SAPHENOUS VEIN ENDOVASCULAR LASER THERAPY ;  Surgeon: Ponce Ontiveros DO;  [facility];  Service: Vascular    TONSILLECTOMY        Family History:     Family History   Problem Relation Age of Onset    Hypertension Father     Diabetes Father         Mellitus    Coronary artery disease Mother     Hypertension Mother     Heart disease Mother     Diabetes Brother         Mellitus    Breast cancer Maternal Aunt     Pancreatic cancer Maternal Aunt     Breast cancer Cousin     Breast cancer Cousin     No Known Problems Sister     No Known Problems Daughter     No Known Problems Maternal Grandmother     No Known Problems Maternal Grandfather     No Known Problems Paternal Grandmother     No Known Problems Paternal Grandfather     No Known Problems Daughter     No Known Problems Son       Social History:        Social History     Socioeconomic History    Marital status: /Civil Union     Spouse name: None    Number of children: 3    Years of education: None    Highest education level: None   Occupational History    Occupation: RETIRED   Social Needs    Financial resource strain: None    Food insecurity     Worry: None     Inability: None    Transportation needs     Medical: None     Non-medical: None   Tobacco Use    Smoking status: Never Smoker    Smokeless tobacco: Never Used    Tobacco comment: Per [employer]: Former smoker   Substance and Sexual Activity    Alcohol use: Yes     Comment: rarely  Per Allscripts: Denied history of alcohol use    Drug use: No    Sexual activity: Never   Lifestyle    Physical activity     Days per week: None     Minutes per session: None    Stress: None   Relationships    Social connections     Talks on phone: None     Gets together: None     Attends [employer] service: None     Active member of club or organization: None     Attends meetings of clubs or organizations: None     Relationship status: None    Intimate partner violence     Fear of current or ex partner: None     Emotionally abused: None     Physically abused: None     Forced sexual activity: None   Other Topics Concern    None   Social History Narrative    Daily coffee consumption: 2 cups/day      Medications and Allergies:     Current Outpatient Medications   Medication Sig Dispense Refill    acetaminophen (TYLENOL) 325 mg tablet Take 650 mg by mouth        apixaban (Eliquis) 5 mg Take 1 tablet (5 mg total) by mouth 2 (two) times a day Lot #[id number]  Exp 9/2022 56 tablet 0    Ascorbic Acid (VITAMIN C) 1000 MG tablet Take 1,000 mg by mouth        atorvastatin (LIPITOR) 80 mg tablet TAKE 1 TABLET AT BEDTIME 90 tablet 3    Calcium Carbonate-Vit D-Min (CALCIUM 1200 PO) Take by mouth      cholecalciferol (VITAMIN D3) 1,000 units tablet Take 2,000 Units by mouth daily       citalopram (CeleXA) 20 mg tablet TAKE 1 TABLET EVERY DAY 90 tablet 3    clobetasol (TEMOVATE) 0 05 % cream Apply topically 2 (two) times a day To leg 60 g 5    Continuous Blood Gluc  (FREESTYLE RONNI 14 DAY READER) GARRY Use to check BG levels 4+times daily and as needed 1 Device 0    Continuous Blood Gluc Sensor (FREESTYLE RONNI 14 DAY SENSOR) MISC Change sensor every 14 days to check BG levels 4+ times daily 6 each 3    furosemide (LASIX) 20 mg tablet TAKE 1 TABLET EVERY DAY 90 tablet 3    GLUCOSAMINE CHONDROITIN COMPLX PO Take 1 tablet by mouth 2 (two) times a day      glucose blood (TRUE METRIX BLOOD GLUCOSE TEST) test strip Test 4 times plus daily, as instructed 400 each 1    hydrOXYzine HCL (ATARAX) 25 mg tablet Take 1 tablet (25 mg total) by mouth daily at bedtime 30 tablet 2    insulin aspart (NovoLOG FlexPen) 100 UNIT/ML injection pen INJECT 8 UNITS SUBCUTANEOUSLY WITH BREAKFAST, THEN 10 UNITS WITH LUNCH, AND THEN 10 UNITS WITH DINNER 15 mL 0    insulin glargine (TOUJEO) 300 units/mL CONCETRATED U-300 injection pen (1-unit dial) Inject 35 Units under the skin daily at bedtime 10 pen 0    Insulin Pen Needle (PEN NEEDLES) 32G X 6 MM MISC Use 4 times a day 400 each 1    levothyroxine 125 mcg tablet TAKE 1 TABLET ONE TIME DAILY EXCEPT ON SUNDAY NO TABLET 78 tablet 1    lisinopril (ZESTRIL) 20 mg tablet TAKE 1 TABLET TWICE DAILY 180 tablet 3    metoprolol tartrate (LOPRESSOR) 25 mg tablet TAKE 1 TABLET EVERY 12 HOURS 180 tablet 3    Omega-3 Fatty Acids (FISH OIL) 1200 MG CAPS Take 1,200 mg by mouth 2 (two) times a day       oxybutynin (DITROPAN) 5 mg tablet TAKE 1 TABLET TWICE DAILY 180 tablet 1    Tetrahydroz-Polyvinyl Al-Povid (MURINE TEARS PLUS OP) Apply to eye 3 (three) times a day      triamcinolone (KENALOG) 0 1 % ointment Apply 2x/day to itchy, red areas as needed  Overuse can thin skin 453 6 g 3    Promethazine-DM (PHENERGAN-DM) 6 25-15 mg/5 mL oral syrup Take 5 mL by mouth 4 (four) times a day as needed for cough 150 mL 0     No current facility-administered medications for this visit  Allergies   Allergen Reactions    Ampicillin Hives      Immunizations:     Immunization History   Administered Date(s) Administered    H1N1, All Formulations 01/12/2010, 01/12/2010    INFLUENZA 11/04/2005    Influenza Split High Dose Preservative Free IM 10/18/2012, 10/23/2013, 10/14/2014, 10/20/2015, 11/02/2016, 10/04/2017    Influenza, high dose seasonal 0 7 mL 10/23/2018, 10/09/2019, 10/20/2020    Influenza, seasonal, injectable 11/12/2009, 01/12/2010    Pneumococcal Conjugate 13-Valent 10/21/2015    Pneumococcal Polysaccharide PPV23 12/11/2000, 12/11/2010    SARS-CoV-2 / COVID-19 mRNA IM (Pfizer-BioNTech) 01/20/2021, 02/09/2021    Td (adult), adsorbed 07/04/2008    Tetanus, adsorbed 09/04/1998    Zoster 07/22/2016      Health Maintenance:         Topic Date Due    DXA SCAN  07/07/2023    Hepatitis C Screening  Completed         Topic Date Due    DTaP,Tdap,and Td Vaccines (1 - Tdap) 01/24/1961      Medicare Health Risk Assessment:     /70   Pulse 63   Temp 97 7 °F (36 5 °C)   Ht 5' 6" (1 676 m)   Wt 106 kg (234 lb)   SpO2 93%   BMI 37 77 kg/m²          Health Risk Assessment:   Patient rates overall health as good  Patient feels that their physical health rating is same  Eyesight was rated as slightly worse  Hearing was rated as slightly worse  Patient feels that their emotional and mental health rating is same  Pain experienced in the last 7 days has been none   Patient states that she has experienced no weight loss or gain in last 6 months  Depression Screening:   PHQ-2 Score: 1      Fall Risk Screening: In the past year, patient has experienced: no history of falling in past year      Urinary Incontinence Screening:   Patient has leaked urine accidently in the last six months  Home Safety:  Patient does not have trouble with stairs inside or outside of their home  Patient has working smoke alarms and has no working carbon monoxide detector  Home safety hazards include: none  Nutrition:   Current diet is Regular  Medications:   Patient is currently taking over-the-counter supplements  OTC medications include: see medication list  Patient is able to manage medications  Activities of Daily Living (ADLs)/Instrumental Activities of Daily Living (IADLs):   Walk and transfer into and out of bed and chair?: Yes  Dress and groom yourself?: Yes    Bathe or shower yourself?: Yes    Feed yourself? Yes  Do your laundry/housekeeping?: Yes  Manage your money, pay your bills and track your expenses?: Yes  Make your own meals?: Yes    Do your own shopping?: Yes    Previous Hospitalizations:   Any hospitalizations or ED visits within the last 12 months?: No      Advance Care Planning:   Living will: Yes    Durable POA for healthcare:  Yes    Advanced directive: Yes    Five wishes given: Yes      Cognitive Screening:   Provider or family/friend/caregiver concerned regarding cognition?: No    PREVENTIVE SCREENINGS      Cardiovascular Screening:    General: Screening Not Indicated and History Lipid Disorder      Diabetes Screening:     General: Screening Not Indicated and History Diabetes      Colorectal Cancer Screening:     General: Screening Not Indicated      Breast Cancer Screening:     General: Screening Current      Cervical Cancer Screening:    General: Screening Not Indicated      Osteoporosis Screening:    General: Screening Current      Abdominal Aortic Aneurysm (AAA) Screening: General: Screening Not Indicated      Lung Cancer Screening:     General: Screening Not Indicated      Hepatitis C Screening:    General: Screening Current    Other Counseling Topics:   Calcium and vitamin D intake and regular weightbearing exercise         Sven Gray MD

## 2021-03-04 NOTE — PROGRESS NOTES
[facility] Follow Up Note    Patient Name: Isabel Hernández  Encounter Date: 03/04/2021    Today's Chief Concerns:   Concern #1:  Dermatitis       Current Medications:    Current Outpatient Medications:     acetaminophen (TYLENOL) 325 mg tablet, Take 650 mg by mouth , Disp: , Rfl:     apixaban (Eliquis) 5 mg, Take 1 tablet (5 mg total) by mouth 2 (two) times a day Lot #[id number] Exp 9/2022, Disp: 56 tablet, Rfl: 0    Ascorbic Acid (VITAMIN C) 1000 MG tablet, Take 1,000 mg by mouth , Disp: , Rfl:     atorvastatin (LIPITOR) 80 mg tablet, TAKE 1 TABLET AT BEDTIME, Disp: 90 tablet, Rfl: 3    Calcium Carbonate-Vit D-Min (CALCIUM 1200 PO), Take by mouth, Disp: , Rfl:     cholecalciferol (VITAMIN D3) 1,000 units tablet, Take 2,000 Units by mouth daily , Disp: , Rfl:     citalopram (CeleXA) 20 mg tablet, TAKE 1 TABLET EVERY DAY, Disp: 90 tablet, Rfl: 3    Continuous Blood Gluc  (FREESTYLE RONNI 14 DAY READER) GARRY, Use to check BG levels 4+times daily and as needed, Disp: 1 Device, Rfl: 0    Continuous Blood Gluc Sensor (FREESTYLE RONNI 14 DAY SENSOR) MISC, Change sensor every 14 days to check BG levels 4+ times daily, Disp: 6 each, Rfl: 3    furosemide (LASIX) 20 mg tablet, TAKE 1 TABLET EVERY DAY, Disp: 90 tablet, Rfl: 3    GLUCOSAMINE CHONDROITIN COMPLX PO, Take 1 tablet by mouth 2 (two) times a day, Disp: , Rfl:     glucose blood (TRUE METRIX BLOOD GLUCOSE TEST) test strip, Test 4 times plus daily, as instructed, Disp: 400 each, Rfl: 1    insulin aspart (NovoLOG FlexPen) 100 UNIT/ML injection pen, INJECT 8 UNITS SUBCUTANEOUSLY WITH BREAKFAST, THEN 10 UNITS WITH LUNCH, AND THEN 10 UNITS WITH DINNER, Disp: 15 mL, Rfl: 0    insulin glargine (TOUJEO) 300 units/mL CONCETRATED U-300 injection pen (1-unit dial), Inject 35 Units under the skin daily at bedtime, Disp: 10 pen, Rfl: 0    Insulin Pen Needle (PEN NEEDLES) 32G X 6 MM MISC, Use 4 times a day, Disp: 400 each, Rfl: 1   levothyroxine 125 mcg tablet, TAKE 1 TABLET ONE TIME DAILY EXCEPT ON SUNDAY NO TABLET, Disp: 78 tablet, Rfl: 1    lisinopril (ZESTRIL) 20 mg tablet, TAKE 1 TABLET TWICE DAILY, Disp: 180 tablet, Rfl: 3    metoprolol tartrate (LOPRESSOR) 25 mg tablet, TAKE 1 TABLET EVERY 12 HOURS, Disp: 180 tablet, Rfl: 3    Omega-3 Fatty Acids (FISH OIL) 1200 MG CAPS, Take 1,200 mg by mouth 2 (two) times a day , Disp: , Rfl:     oxybutynin (DITROPAN) 5 mg tablet, TAKE 1 TABLET TWICE DAILY, Disp: 180 tablet, Rfl: 1    clobetasol (TEMOVATE) 0 05 % cream, Apply topically 2 (two) times a day To leg (Patient not taking: Reported on 3/4/2021), Disp: 60 g, Rfl: 5    hydrOXYzine HCL (ATARAX) 25 mg tablet, Take 1 tablet (25 mg total) by mouth daily at bedtime (Patient not taking: Reported on 1/12/2021), Disp: 30 tablet, Rfl: 2    Tetrahydroz-Polyvinyl Al-Povid (MURINE TEARS PLUS OP), Apply to eye 3 (three) times a day, Disp: , Rfl:     triamcinolone (KENALOG) 0 1 % ointment, Apply topically 2 (two) times a day Use as instructed with wet wrap instructions (Patient not taking: Reported on 11/3/2020), Disp: 453 6 g, Rfl: 2    CONSTITUTIONAL:   Vitals:    03/04/21 1120   Temp: 98 2 °F (36 8 °C)   Weight: 103 kg (226 lb)   Height: 5' 6" (1 676 m)           Specific Alerts:    Have you been seen by a [facility]atologist in the last 3 years? YES    Are you pregnant or planning to become pregnant? No    Are you currently or planning to be nursing or breast feeding? No    Allergies   Allergen Reactions    Ampicillin Hives       May we call your Preferred Phone number to discuss your specific medical information? YES    May we leave a detailed message that includes your specific medical information? YES    Have you traveled outside of the [facility] in the past 3 months? No    Do you currently have a pacemaker or defibrillator?  YES    Do you have any artificial heart valves, joints, plates, screws, rods, stents, pins, etc? YES   - If Yes, were any placed within the last 2 years? No       Do you require any medications prior to a surgical procedure? No   - If Yes, for which procedure? n/a   - If Yes, what medications to you require? N/a     Are you taking any medications that cause you to bleed more easily ("blood thinners") No    Have you ever experienced a rapid heartbeat with epinephrine? No    Have you ever been treated with "gold" (gold sodium thiomalate) therapy? No    [facility] can help with wrinkles, "laugh lines," facial volume loss, "double chin," "love handles," age spots, and more  Are you interested in learning today about some of the skin enhancement procedures that we offer? (If Yes, please provide more detail) No    Review of Systems:  Have you recently had or currently have any of the following?     · Fever or chills: No  · Night Sweats: No  · Headaches: No  · Weight Gain: No  · Weight Loss: No  · Blurry Vision: YES  · Nausea: No  · Vomiting: No  · Diarrhea: No  · Blood in Stool: No  · Abdominal Pain: No  · Itchy Skin: YES  · Painful Joints: No  · Swollen Joints: No  · Muscle Pain: YES  · Irregular Mole: YES  · Sun Burn: No  · Dry Skin: YES  · Skin Color Changes: No  · Scar or Keloid: No  · Cold Sores/Fever Blisters: No  · Bacterial Infections/MRSA: No  · Anxiety: YES  · Depression: No  · Suicidal or Homicidal Thoughts: No      PSYCH: Normal mood and affect  EYES: Normal conjunctiva  ENT: Normal lips and oral mucosa  CARDIOVASCULAR: No edema  RESPIRATORY: Normal respirations  HEME/LYMPH/IMMUNO:  No regional lymphadenopathy except as noted below in ASSESSMENT AND PLAN BY DIAGNOSIS    FULL ORGAN SYSTEM SKIN EXAM (SKIN)    Face Normal except as noted below in Assessment   Neck, Cervical Chain Nodes    Right Arm/Hand/Fingers Normal except as noted below in Assessment   Left Arm/Hand/Fingers Normal except as noted below in Assessment   Chest/Breasts/Axillae    Abdomen, Umbilicus    Back/Spine Groin/Genitalia/Buttocks    Right Leg, Normal except as noted below in Assessment   Left Leg,  Normal except as noted below in Assessment       STATIS DERMATITIS   Physical Exam:   Anatomic Location Affected: Lower bilateral legs    Morphological Description:  Red brown plaques ( much improved not itchy anymore)   Pertinent Positives:   Pertinent Negatives: Additional History of Present Condition:  Rash much improved see previous notes     Assessment and Plan:  Based on a thorough discussion of this condition and the management approach to it (including a comprehensive discussion of the known risks, side effects and potential benefits of treatment), the patient (family) agrees to implement the following specific plan:   Continue compression boots as advised by other doctor   Use Vaseline once a day on legs    Use triamcinolone when you feel itchy on legs or red     Eczematous dermatitis    Physical Exam:   (Anatomic Location); (Size and Morphological Description); (Differential Diagnosis):  o Right forarm with red plaques    Pertinent Positives:   Pertinent Negatives: Additional History of Present Condition:  Itchy     Assessment and Plan:  Based on a thorough discussion of this condition and the management approach to it (including a comprehensive discussion of the known risks, side effects and potential benefits of treatment), the patient (family) agrees to implem nt the following specific plan:   Start Triamcinolone 0 1% ointment apply topically twice a day for 2 weeks  Overuse can thin skin    NEOPLASM OF UNCERTAIN BEHAVIOR OF SKIN    Physical Exam:   (Anatomic Location); (Size and Morphological Description); (Differential Diagnosis):  o Right cheek with 0 5 x 0 5cm scaly papule diffdx; Seborrheic keratosis versus wart    Pertinent Positives:   Pertinent Negatives: Additional History of Present Condition: Patient states she notice lesion while she looks down, gets in her way of view  Assessment and Plan:    - Cryotherapy       -Discussed risk, benefits and wound care, wash with water and dove soap   - If does reoccur come back for a shave biopsy (appt made in 6 weeks)        PROCEDURE:  DESTRUCTION OF BENIGN LESIONS  After a thorough discussion of treatment options and risk/benefits/alternatives (including but not limited to local pain, scarring, dyspigmentation, blistering, and possible superinfection), verbal and written consent were obtained and the aforementioned lesions were treated on with cryotherapy using liquid nitrogen x 1 cycle for 5-10 seconds   TOTAL NUMBER of 1 lesions were treated today on the ANATOMIC LOCATION: Right cheek  The patient tolerated the procedure well, and after-care instructions were provided        Scribe Attestation    I,:  Geni Delgado MA am acting as a scribe while in the presence of the attending physician :       I,:  Divina Dyson MD personally performed the services described in this documentation    as scribed in my presence :

## 2021-03-04 NOTE — PATIENT INSTRUCTIONS
Assessment and Plan:  Based on a thorough discussion of this condition and the management approach to it (including a comprehensive discussion of the known risks, side effects and potential benefits of treatment), the patient (family) agrees to implement the following specific plan:   Continue compression boots as advised by other doctor   Use Vaseline once a day on legs    Use triamcinolone when you feel itchy on legs or red     Assessment and Plan:  Based on a thorough discussion of this condition and the management approach to it (including a comprehensive discussion of the known risks, side effects and potential benefits of treatment), the patient (family) agrees to implem nt the following specific plan:   Start Triamcinolone 0 1% ointment apply topically twice a day for 2 weeks     Assessment and Plan:    - Cryotherapy       -Discussed risk, benefits and wound care, wash with water and dove soap   - If does reoccur come back for a shave biopsy

## 2021-03-05 ENCOUNTER — TELEPHONE (OUTPATIENT)
Dept: FAMILY MEDICINE CLINIC | Facility: CLINIC | Age: 81
End: 2021-03-05

## 2021-03-05 ENCOUNTER — TELEPHONE (OUTPATIENT)
Dept: ADMINISTRATIVE | Facility: OTHER | Age: 81
End: 2021-03-05

## 2021-03-05 DIAGNOSIS — R05.9 COUGH: Primary | ICD-10-CM

## 2021-03-05 RX ORDER — DEXTROMETHORPHAN HYDROBROMIDE AND PROMETHAZINE HYDROCHLORIDE 15; 6.25 MG/5ML; MG/5ML
5 SOLUTION ORAL 4 TIMES DAILY PRN
Qty: 150 ML | Refills: 0 | Status: SHIPPED | OUTPATIENT
Start: 2021-03-05 | End: 2021-04-27 | Stop reason: SDUPTHER

## 2021-03-05 NOTE — TELEPHONE ENCOUNTER
Upon review of the In Basket request and the patient's chart, initial outreach has been made via fax, please see Contacts section for details       Thank you  Mitzy Perez MA

## 2021-03-05 NOTE — LETTER
Diabetic Eye Exam Form    Date Requested: 21  Patient: Fannie Oliveira  Patient : 1940   Referring Provider: Matthew Kelley MD    Dilated Retinal Exam, Optomap-Iris Exam, or Fundus Photography Done         Yes (Ak Chin one above)         No     Date of Diabetic Eye Exam ______________________________  Left Eye      Exam did show retinopathy    Exam did not show retinopathy         Mild       Moderate       None       Proliferative       Severe     Right Eye     Exam did show retinopathy    Exam did not show retinopathy         Mild       Moderate       None       Proliferative       Severe     Comments __________________________________________________________    Practice Providing Exam ______________________________________________    Exam Performed By (print name) _______________________________________      Provider Signature ___________________________________________________      These reports are needed for  compliance    Please fax this completed form and a copy of the Diabetic Eye Exam report to our office located at [place] [address] as soon as possible to [phone] attention Brandie: Phone [phone]    We thank you for your assistance in treating our mutual patient     (sent to [facility])

## 2021-03-05 NOTE — TELEPHONE ENCOUNTER
----- Message from Noel Galvan sent at 3/4/2021  1:55 PM EST -----  Regarding: eye exam  03/04/21 1:56 PM    Hello, our patient Ossie Skiff has had Diabetic Eye Exam completed/performed  Please assist in updating the patient chart by making an External outreach to dr Razia Borden facility located in [place]  The date of service is every 6 weeks      Thank you,  Martin Sams MA  [facility]

## 2021-03-06 PROBLEM — I25.10 CORONARY ARTERY DISEASE INVOLVING NATIVE CORONARY ARTERY OF NATIVE HEART: Status: ACTIVE | Noted: 2021-03-06

## 2021-03-06 NOTE — ASSESSMENT & PLAN NOTE
Lab Results   Component Value Date    HGBA1C 6 8 (H) 02/12/2021      doing much better since starting continuous blood sugar monitoring  Now at goal   Continue present treatment  Follow up with endocrinology    Recheck 6 months

## 2021-03-06 NOTE — ASSESSMENT & PLAN NOTE
With history of NSTEMI  Patient is asymptomatic  Continue present treatment  Follow-up with Cardiology    Recheck 6 months

## 2021-03-06 NOTE — ASSESSMENT & PLAN NOTE
Wt Readings from Last 3 Encounters:   03/04/21 106 kg (234 lb)   03/04/21 103 kg (226 lb)   01/12/21 104 kg (230 lb)      patient euvolemic  Continue to monitor  Continue to watch weights carefully  Follow-up with Cardiology  Recheck 6 months

## 2021-03-06 NOTE — ASSESSMENT & PLAN NOTE
Weights have improved  Patient is doing better with diet hand with her diabetic control  BMI Counseling: Body mass index is 37 77 kg/m²  The BMI is above normal  Nutrition recommendations include moderation in carbohydrate intake, increasing intake of lean protein, reducing intake of saturated fat and trans fat and reducing intake of cholesterol    Recheck 6m

## 2021-03-08 NOTE — TELEPHONE ENCOUNTER
Upon review of the In Basket request we were able to locate, review, and update the patient chart as requested for Diabetic Eye Exam     Any additional questions or concerns should be emailed to the Practice Liaisons via [email or web address] email, please do not reply via In Basket      Thank you  Yue Mendes MA

## 2021-03-16 ENCOUNTER — OFFICE VISIT (OUTPATIENT)
Dept: CARDIOLOGY CLINIC | Facility: CLINIC | Age: 81
End: 2021-03-16
Payer: MEDICARE

## 2021-03-16 VITALS
OXYGEN SATURATION: 96 % | HEIGHT: 66 IN | DIASTOLIC BLOOD PRESSURE: 68 MMHG | SYSTOLIC BLOOD PRESSURE: 142 MMHG | WEIGHT: 233.2 LBS | HEART RATE: 81 BPM | BODY MASS INDEX: 37.48 KG/M2

## 2021-03-16 DIAGNOSIS — I48.0 PAF (PAROXYSMAL ATRIAL FIBRILLATION) (HCC): Primary | ICD-10-CM

## 2021-03-16 PROCEDURE — 93000 ELECTROCARDIOGRAM COMPLETE: CPT | Performed by: INTERNAL MEDICINE

## 2021-03-16 PROCEDURE — 99214 OFFICE O/P EST MOD 30 MIN: CPT | Performed by: INTERNAL MEDICINE

## 2021-03-16 NOTE — PROGRESS NOTES
Cardiology Follow Up    Ruben Whitten  1940  [id number]  [facility] [place]  [address]  [address]  [place] [id number] [phone]    1  PAF (paroxysmal atrial fibrillation) (HCC)  POCT ECG       Interval History: Followup PPM and PAF    She is doing well  She has no significant dyspnea, chest pain or palpitations       Problem List     Type 2 diabetes mellitus with hyperglycemia (HCC)      Lab Results   Component Value Date    HGBA1C 6 8 (H) 02/12/2021         Essential hypertension    Class 2 severe obesity with serious comorbidity in adult [facility])    Overview Signed 12/14/2018  3:43 PM by Heber Dickinson RN     Updated according to CMS/ICD-10 guidelines         Complete heart block ([facility] )    Anemia    S/P cardiac pacemaker procedure    Hypothyroidism (Chronic)    Overview Signed 6/6/2018  9:04 PM by Matthew Kelley MD     Last Assessment & Plan:   Continue  med         Chronic diastolic congestive heart failure ([facility] )    Wt Readings from Last 3 Encounters:   03/16/21 106 kg (233 lb 3 2 oz)   03/04/21 106 kg (234 lb)   03/04/21 103 kg (226 lb)                 Chronic venous insufficiency    Dvt femoral (deep venous thrombosis) (HCC)    Benign hypertension with chronic kidney disease, stage III    Lab Results   Component Value Date    EGFR 44 02/12/2021    EGFR 49 01/11/2021    EGFR 28 10/30/2020    CREATININE 1 17 02/12/2021    CREATININE 1 09 01/11/2021    CREATININE 1 73 (H) 10/30/2020         Stage 3a chronic kidney disease    Lab Results   Component Value Date    EGFR 44 02/12/2021    EGFR 49 01/11/2021    EGFR 28 10/30/2020    CREATININE 1 17 02/12/2021    CREATININE 1 09 01/11/2021    CREATININE 1 73 (H) 10/30/2020         Iron deficiency anemia    Microalbuminuria    Bilateral leg edema    Vitamin D deficiency    Anxiety disorder    Urinary bladder incontinence    Chronic obstructive pulmonary disease ([address] ) Eczematous dermatitis    Mixed hyperlipidemia    Overview Signed 6/6/2018  9:04 PM by Juana Sasm MD     Last Assessment & Plan:   Continue med         OAB (overactive bladder)    Presence of permanent cardiac pacemaker    PAF (paroxysmal atrial fibrillation) ([facility] )    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 neurological manifestations, uncontrolled([address])    Overview Signed 6/6/2018  9:04 PM by Juana Sams MD     Last Assessment & Plan:   [facility] will be done  Decrease PM lantus to 25  Otherwise weight down 25 pounds         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 ([facility] [address] )    Overview Signed 12/14/2018  3:41 PM by Ladonna Lakhani RN     Updated according to CMS/ICD-10 guidelines           Lab Results   Component Value Date    HGBA1C 6 8 (H) 02/12/2021         Intraocular pressure increase    Sleep apnea    Dermatitis    Coronary artery disease involving native coronary artery of native heart        Past Medical History:   Diagnosis Date    Anxiety     Arthritis     [facility]est[facility]     Chronic venous insufficiency     COPD (chronic obstructive pulmonary disease) ([facility] [address] [facility] )     Coronary artery disease     Diabetes mellitus ([address] )     type II    Disease of thyroid gland     hypothyroidism    DVT (deep venous thrombosis) ([address] )     Hyperlipidemia     Hypertension     Irregular heart beat     Paroxysmal a-fib    Pacemaker     Renal disorder     stage 3    Thyroid disease     Urinary incontinence     Uterine leiomyoma      Social History     Socioeconomic History    Marital status: /Civil Union     Spouse name: Not on file    Number of children: 3    Years of education: Not on file    Highest education level: Not on file   Occupational History    Occupation: RETIRED   Social Needs    Financial resource strain: Not on file    Food insecurity     Worry: Not on file     Inability: Not on file   [employer] needs     Medical: Not on file     Non-medical: Not on file   Tobacco Use    Smoking status: Never Smoker    Smokeless tobacco: Never Used    Tobacco comment: Per Allsc[employer]s: Former smoker   Substance and Sexual Activity    Alcohol use: Yes     Comment: rarely  Per Allscripts: Denied history of alcohol use    Drug use: No    Sexual activity: Never   Lifestyle    Physical activity     Days per week: Not on file     Minutes per session: Not on file    Stress: Not on file   Relationships    Social connections     Talks on phone: Not on file     Gets together: Not on file     Attends Taoist service: Not on file     Active member of club or organization: Not on file     Attends meetings of clubs or organizations: Not on file     Relationship status: Not on file    Intimate partner violence     Fear of current or ex partner: Not on file     Emotionally abused: Not on file     Physically abused: Not on file     Forced sexual activity: Not on file   Other Topics Concern    Not on file   Social History Narrative    Daily coffee consumption: 2 cups/day      Family History   Problem Relation Age of Onset    Hypertension Father     Diabetes Father         Mellitus    Coronary artery disease Mother     Hypertension Mother     Heart disease Mother     Diabetes Brother         Mellitus    Breast cancer Maternal Aunt     Pancreatic cancer Maternal Aunt     Breast cancer Cousin     Breast cancer Cousin     No Known Problems Sister     No Known Problems Daughter     No Known Problems Maternal Grandmother     No Known Problems Maternal Grandfather     No Known Problems Paternal Grandmother     No Known Problems Paternal Grandfather     No Known Problems Daughter     No Known Problems Son      Past Surgical History:   Procedure Laterality Date    BREAST SURGERY      For Biopsy    CARDIAC PACEMAKER PLACEMENT      Permanent   Last assessed: 2/19/16   Crisostomo CARDIAC SURGERY      CARPAL TUNNEL RELEASE Bilateral     CATARACT EXTRACTION      CATARACT EXTRACTION W/ INTRAOCULAR LENS  IMPLANT, BILATERAL      CYSTOSCOPY      Diagnostic   Last assessed: 11/8/17    ECTOPIC PREGNANCY SURGERY      HYSTERECTOMY      Total Abdomonal  Resolved: 1982    JOINT REPLACEMENT Right     TKR    OOPHORECTOMY      MD ENDOVENOUS LASER, 1ST VEIN Left 2/17/2017    Procedure: GREATER SAPHENOUS VEIN ENDOVASCULAR LASER THERAPY ;  Surgeon: Zully Paula DO;  Location: [facility];  Service: Vascular    TONSILLECTOMY         Current Outpatient Medications:     acetaminophen (TYLENOL) 325 mg tablet, Take 650 mg by mouth , Disp: , Rfl:     apixaban (Eliquis) 5 mg, Take 1 tablet (5 mg total) by mouth 2 (two) times a day Lot #[id number] Exp 9/2022, Disp: 56 tablet, Rfl: 0    Ascorbic Acid (VITAMIN C) 1000 MG tablet, Take 1,000 mg by mouth , Disp: , Rfl:     atorvastatin (LIPITOR) 80 mg tablet, TAKE 1 TABLET AT BEDTIME, Disp: 90 tablet, Rfl: 3    Calcium Carbonate-Vit D-Min (CALCIUM 1200 PO), Take by mouth, Disp: , Rfl:     cholecalciferol (VITAMIN D3) 1,000 units tablet, Take 2,000 Units by mouth daily , Disp: , Rfl:     citalopram (CeleXA) 20 mg tablet, TAKE 1 TABLET EVERY DAY, Disp: 90 tablet, Rfl: 3    clobetasol (TEMOVATE) 0 05 % cream, Apply topically 2 (two) times a day To leg, Disp: 60 g, Rfl: 5    Continuous Blood Gluc  (FREESTYLE RONNI 14 DAY READER) GARRY, Use to check BG levels 4+times daily and as needed, Disp: 1 Device, Rfl: 0    Continuous Blood Gluc Sensor (FREESTYLE RONNI 14 DAY SENSOR) MISC, Change sensor every 14 days to check BG levels 4+ times daily, Disp: 6 each, Rfl: 3    furosemide (LASIX) 20 mg tablet, TAKE 1 TABLET EVERY DAY, Disp: 90 tablet, Rfl: 3    GLUCOSAMINE CHONDROITIN COMPLX PO, Take 1 tablet by mouth 2 (two) times a day, Disp: , Rfl:     glucose blood (TRUE METRIX BLOOD GLUCOSE TEST) test strip, Test 4 times plus daily, as instructed, Disp: 400 each, Rfl: 1    insulin aspart (NovoLOG FlexPen) 100 UNIT/ML injection pen, INJECT 8 UNITS SUBCUTANEOUSLY WITH BREAKFAST, THEN 10 UNITS WITH LUNCH, AND THEN 10 UNITS WITH DINNER, Disp: 15 mL, Rfl: 0    insulin glargine (TOUJEO) 300 units/mL CONCETRATED U-300 injection pen (1-unit dial), Inject 35 Units under the skin daily at bedtime, Disp: 10 pen, Rfl: 0    Insulin Pen Needle (PEN NEEDLES) 32G X 6 MM MISC, Use 4 times a day, Disp: 400 each, Rfl: 1    levothyroxine 125 mcg tablet, TAKE 1 TABLET ONE TIME DAILY EXCEPT ON SUNDAY NO TABLET, Disp: 78 tablet, Rfl: 1    lisinopril (ZESTRIL) 20 mg tablet, TAKE 1 TABLET TWICE DAILY, Disp: 180 tablet, Rfl: 3    metoprolol tartrate (LOPRESSOR) 25 mg tablet, TAKE 1 TABLET EVERY 12 HOURS, Disp: 180 tablet, Rfl: 3    Omega-3 Fatty Acids (FISH OIL) 1200 MG CAPS, Take 1,200 mg by mouth 2 (two) times a day , Disp: , Rfl:     oxybutynin (DITROPAN) 5 mg tablet, TAKE 1 TABLET TWICE DAILY, Disp: 180 tablet, Rfl: 1    Promethazine-DM (PHENERGAN-DM) 6 25-15 mg/5 mL oral syrup, Take 5 mL by mouth 4 (four) times a day as needed for cough, Disp: 150 mL, Rfl: 0    Tetrahydroz-Polyvinyl Al-Povid (MURINE TEARS PLUS OP), Apply to eye 3 (three) times a day, Disp: , Rfl:     triamcinolone (KENALOG) 0 1 % ointment, Apply 2x/day to itchy, red areas as needed   Overuse can thin skin, Disp: 453 6 g, Rfl: 3    hydrOXYzine HCL (ATARAX) 25 mg tablet, Take 1 tablet (25 mg total) by mouth daily at bedtime, Disp: 30 tablet, Rfl: 2  Allergies   Allergen Reactions    Ampicillin Hives       Labs:     Chemistry        Component Value Date/Time     12/06/2016 0939    K 4 2 02/12/2021 1033    K 4 2 12/06/2016 0939     02/12/2021 1033     12/06/2016 0939    CO2 31 02/12/2021 1033    CO2 28 12/06/2016 0939    BUN 38 (H) 02/12/2021 1033    BUN 41 (H) 12/06/2016 0939    CREATININE 1 17 02/12/2021 1033    CREATININE 1 23 (H) 12/06/2016 0939        Component Value Date/Time    CALCIUM 9 6 02/12/2021 1033 CALCIUM 9 2 12/06/2016 0939    ALKPHOS 103 07/28/2020 1051    ALKPHOS 86 09/06/2016 0907    AST 17 07/28/2020 1051    AST 18 09/06/2016 0907    ALT 28 07/28/2020 1051    ALT 21 09/06/2016 0907    BILITOT 0 6 09/06/2016 0907            Lab Results   Component Value Date    CHOL 130 09/06/2016     Lab Results   Component Value Date    HDL 54 07/28/2020    HDL 64 01/23/2020    HDL 60 06/18/2019     Lab Results   Component Value Date    LDLCALC 61 07/28/2020    LDLCALC 53 01/23/2020    LDLCALC 65 06/18/2019     Lab Results   Component Value Date    TRIG 72 07/28/2020    TRIG 90 01/23/2020    TRIG 66 06/18/2019     No results found for: CHOLHDL    Imaging: No results found  EKG: AV seq  Pacing     Review of Systems   Constitution: Negative  Negative for malaise/fatigue  HENT: Negative  Eyes: Negative  Cardiovascular: Negative  Respiratory: Negative  Endocrine: Negative  Hematologic/Lymphatic: Negative  Skin: Negative  Musculoskeletal: Negative  Gastrointestinal: Negative  Genitourinary: Negative  Neurological: Negative  Psychiatric/Behavioral: Negative  Allergic/Immunologic: Negative  Vitals:    03/16/21 1308   BP: 142/68   Pulse: 81   SpO2: 96%           Physical Exam  Vitals signs and nursing note reviewed  Constitutional:       Appearance: Normal appearance  HENT:      Head: Normocephalic  Nose: Nose normal       Mouth/Throat:      Mouth: Mucous membranes are moist    Eyes:      General: No scleral icterus  Conjunctiva/sclera: Conjunctivae normal    Neck:      Musculoskeletal: Normal range of motion and neck supple  Cardiovascular:      Rate and Rhythm: Normal rate and regular rhythm  Heart sounds: No murmur  No gallop  Pulmonary:      Effort: Pulmonary effort is normal  No respiratory distress  Breath sounds: Normal breath sounds  No wheezing or rales  Abdominal:      General: Abdomen is flat   Bowel sounds are normal  There is no distension  Palpations: Abdomen is soft  Tenderness: There is no abdominal tenderness  There is no guarding  Musculoskeletal:      Right lower leg: No edema  Left lower leg: No edema  Skin:     General: Skin is warm and dry  Neurological:      General: No focal deficit present  Mental Status: She is alert and oriented to person, place, and time  Psychiatric:         Mood and Affect: Mood normal          Behavior: Behavior normal          Discussion/Summary:       Complete Heart Block s/p PPM implant  Pacer checks look okay  She has had PAF on prior pacer checks  Continue current medical therapy       PAF: She has remained asymptomatic  The hematuria resolved  Continue Eliquis           The patient was counseled regarding diagnostic results, instructions for management, risk factor reductions, impressions  total time of encounter was 25 minutes and 15 minutes was spent counseling

## 2021-03-20 DIAGNOSIS — E78.00 HYPERCHOLESTEROLEMIA: ICD-10-CM

## 2021-03-20 RX ORDER — ATORVASTATIN CALCIUM 80 MG/1
TABLET, FILM COATED ORAL
Qty: 90 TABLET | Refills: 3 | Status: SHIPPED | OUTPATIENT
Start: 2021-03-20 | End: 2021-12-22

## 2021-03-31 DIAGNOSIS — E03.9 HYPOTHYROIDISM, UNSPECIFIED TYPE: ICD-10-CM

## 2021-03-31 RX ORDER — LEVOTHYROXINE SODIUM 0.12 MG/1
TABLET ORAL
Qty: 78 TABLET | Refills: 1 | Status: SHIPPED | OUTPATIENT
Start: 2021-03-31 | End: 2021-08-17

## 2021-04-13 DIAGNOSIS — N32.81 OAB (OVERACTIVE BLADDER): ICD-10-CM

## 2021-04-13 RX ORDER — OXYBUTYNIN CHLORIDE 5 MG/1
TABLET ORAL
Qty: 180 TABLET | Refills: 1 | Status: SHIPPED | OUTPATIENT
Start: 2021-04-13 | End: 2021-08-17

## 2021-04-13 NOTE — PROGRESS NOTES
Assessment/Plan:    Type 2 diabetes mellitus with hyperglycemia (HCC)    Lab Results   Component Value Date    HGBA1C 6 8 (A) 07/27/2020     A1c improved since starting continuous blood sugar monitor  Continue present treatment  Discussed weight loss with patient and   BMI Counseling: Body mass index is 45 31 kg/m²  The BMI is above normal  Nutrition recommendations include reducing portion sizes, consuming healthier snacks, moderation in carbohydrate intake and increasing intake of lean protein  Follow-up with endocrinology later this week  Recheck 6 months    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 ([facility] [address] )    Lab Results   Component Value Date    HGBA1C 6 8 (A) 07/27/2020   Up-to-date with ophthalmology  Continue present treatment  Recheck 6 months    Hypothyroidism  Monitor TSH  Recheck 6 months    Chronic obstructive pulmonary disease (HCC)  Lungs are clear to auscultation  I refilled patient's cough medicine  Start loratadine for possible postnasal drip related cough  Recheck 2 weeks if not improved    PAF (paroxysmal atrial fibrillation) ([facility])  Clinically in sinus rhythm  Follow-up with Cardiology  Recheck 6 months    NSTEMI (non-ST elevated myocardial infarction)  Patient presently without anginal symptoms  Continue present medications  Monitor lipids  Follow-up with Cardiology  Recheck 6 months    Chronic diastolic congestive heart failure (HCC)  Wt Readings from Last 3 Encounters:   07/27/20 105 kg (232 lb)   07/22/20 105 kg (231 lb)   07/07/20 105 kg (231 lb)       Weight has remained stable  Continue to monitor renal function  Recheck 6 months      Benign hypertension with chronic kidney disease, stage III  Check labs  Continue present medication  Recheck 6 months    Mixed hyperlipidemia  Check labs  Continue atorvastatin    Recheck 6 months       Diagnoses and all orders for this visit:    Type 2 diabetes mellitus with hyperglycemia, with long-term current use of insulin ([facility])  -     POCT hemoglobin A1c  -     Comprehensive metabolic panel; Future  -     Lipid panel; Future    Cough  -     loratadine (CLARITIN) 10 mg tablet; Take 1 tablet (10 mg total) by mouth daily  -     promethazine-dextromethorphan (PHENERGAN-DM) 6 25-15 mg/5 mL oral syrup; Take 5 mL by mouth 4 (four) times a day as needed for cough    Dermatitis  Comments:  ?  Allergy related  Start loratadine as directed  Triamcinolone to lower leg rash  Recheck 2 weeks if not improved-earlier if worse  Orders:  -     triamcinolone (KENALOG) 0 1 % lotion; Apply topically 3 (three) times a day for 14 days    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, macular edema presence unspecified ([facility])    Hypothyroidism, unspecified type  -     TSH, 3rd generation; Future    Chronic obstructive pulmonary disease, unspecified COPD type ([facility])  -     CBC and differential; Future    PAF (paroxysmal atrial fibrillation) ([facility])  -     CBC and differential; Future    NSTEMI (non-ST elevated myocardial infarction) ([facility])  -     CBC and differential; Future    Chronic diastolic congestive heart failure ([facility])    Benign hypertension with chronic kidney disease, stage III ([facility])    Mixed hyperlipidemia          Subjective:      Patient ID: Jake Esteban is a 68 y o  female  f/u multiple med issues  - pt is doing"ok"  - pt now on a continuous BG monitor per Endo  Last 7d average = 123  Thinks that the monitor helps her with her diet  - L knee seems to have worsened over the last few days  Last injection was late last year  - pt is up to date with Cardio  Pt denies CP, palp, lightheadedness or other CV symptoms with or without exertion  - still with some incontinence issues but was released by Uro    - no new Gi issues  - still gets eye injections q6-8w    Eyes are no improving but do not seem to be worsening either  - has some issues with dry pruritic skin  - occ cough that is occasionally productive  Was given prometh DM in the past with good effect        The following portions of the patient's history were reviewed and updated as appropriate:   She  has a past medical history of Anxiety, Arthritis, CHF (congestive heart failure) ([facility] ), Chronic venous insufficiency, COPD (chronic obstructive pulmonary disease) ([facility] ), Coronary artery disease, Diabetes mellitus ([facility] ), Disease of thyroid gland, DVT (deep venous thrombosis) ([facility] ), Hyperlipidemia, Hypertension, Irregular heart beat, Pacemaker, Renal disorder, Thyroid disease, Urinary incontinence, and Uterine leiomyoma    She   Patient Active Problem List    Diagnosis Date Noted    Sleep apnea 05/17/2019    Intraocular pressure increase 06/06/2018    Vitamin D deficiency 04/03/2018    Bilateral leg edema 03/13/2018    Iron deficiency anemia 10/12/2017    Urinary bladder incontinence 07/26/2017    OAB (overactive bladder) 07/26/2017    Benign hypertension with chronic kidney disease, stage III ([facility] ) 06/21/2017    Microalbuminuria 06/21/2017    PAF (paroxysmal atrial fibrillation) ([facility] ) 10/31/2016    Chronic venous insufficiency 02/19/2016    Dvt femoral (deep venous thrombosis) ([facility] ) 02/19/2016    Presence of permanent cardiac pacemaker 01/28/2016    Chronic diastolic congestive heart failure ([facility] ) 01/21/2016    Anemia 01/19/2016    S/P cardiac pacemaker procedure 01/19/2016    Hypothyroidism 01/19/2016    Complete heart block ([facility] ) 01/16/2016    NSTEMI (non-ST elevated myocardial infarction) ([place] [address] ) 01/15/2016    Type 2 diabetes mellitus with hyperglycemia ([facility] ) 01/15/2016    Morbid obesity with body mass index (BMI) of 40 0 to 44 9 in adult [facility]) 01/15/2016    Chronic kidney disease, stage 3 ([facility] ) 10/21/2015    Eczematous dermatitis 03/03/2015    Anxiety disorder 06/26/2013    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 neurological manifestations, uncontrolled([address]) 10/02/2012    Chronic obstructive pulmonary disease ([facility] [address] ) 09/24/2012    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 ([facility] ) 09/22/2011    Mixed hyperlipidemia 03/14/2011     She  has a past surgical history that includes Hysterectomy; Joint replacement (Right); Tonsillectomy; Cataract extraction w/ intraocular lens  implant, bilateral; Ectopic pregnancy surgery; Cardiac surgery; Cardiac pacemaker placement; Carpal tunnel release (Bilateral); pr endovenous laser, 1st vein (Left, 2/17/2017); Cystoscopy; Cataract extraction; Breast surgery; and Oophorectomy  She  reports that she has never smoked  She has never used smokeless tobacco  She reports that she drinks alcohol  She reports that she does not use drugs  Current Outpatient Medications   Medication Sig Dispense Refill    acetaminophen (TYLENOL) 325 mg tablet Take 650 mg by mouth   apixaban (Eliquis) 5 mg Take 1 tablet (5 mg total) by mouth 2 (two) times a day Lot #[id number]  Exp 9/2022 56 tablet 0    Ascorbic Acid (VITAMIN C) 1000 MG tablet Take 1,000 mg by mouth        atorvastatin (LIPITOR) 80 mg tablet TAKE 1 TABLET AT BEDTIME 90 tablet 3    cholecalciferol (VITAMIN D3) 1,000 units tablet Take 2,000 Units by mouth daily       citalopram (CeleXA) 20 mg tablet TAKE 1 TABLET EVERY DAY 90 tablet 3    Continuous Blood Gluc  (FREESTYLE RONNI 14 DAY READER) GARRY Use to check BG levels 4+times daily and as needed 1 Device 0    Continuous Blood Gluc Sensor (FREESTYLE RONNI 14 DAY SENSOR) MISC Change sensor every 14 days to check BG levels 4+ times daily 6 each 3    furosemide (LASIX) 20 mg tablet TAKE 1 TABLET EVERY DAY 90 tablet 3    GLUCOSAMINE CHONDROITIN COMPLX PO Take 1 tablet by mouth 2 (two) times a day      glucose blood (TRUE METRIX BLOOD GLUCOSE TEST) test strip Test 4 times plus daily, as instructed 400 each 1    insulin aspart (NovoLOG) 100 Units/mL injection pen 10u with breakfast, 10u with lunch, 10u with dinner +scale (Use up to 40 units daily) 15 pen 1    insulin glargine (TOUJEO) 300 units/mL CONCETRATED U-300 injection pen (1-unit dial) Inject 40 Units under the skin daily at bedtime 9 pen 1    Insulin Pen Needle (PEN NEEDLES) 32G X 6 MM MISC Use 4 times a day 400 each 1    levothyroxine 125 mcg tablet TAKE 1 TABLET BY MOUTH ONCE DAILY EXCEPT ON SUNDAY NO TABLET 78 tablet 1    lisinopril (ZESTRIL) 20 mg tablet Take 1 tablet (20 mg total) by mouth 2 (two) times a day 180 tablet 3    loratadine (CLARITIN) 10 mg tablet Take 1 tablet (10 mg total) by mouth daily 30 tablet 2    metoprolol tartrate (LOPRESSOR) 25 mg tablet TAKE 1 TABLET EVERY 12 HOURS 180 tablet 3    Omega-3 Fatty Acids (FISH OIL) 1200 MG CAPS Take 1,200 mg by mouth 3 (three) times a day        oxybutynin (DITROPAN) 5 mg tablet TAKE 1 TABLET TWICE DAILY 180 tablet 1    promethazine-dextromethorphan (PHENERGAN-DM) 6 25-15 mg/5 mL oral syrup Take 5 mL by mouth 4 (four) times a day as needed for cough 180 mL 3    Tetrahydroz-Polyvinyl Al-Povid (MURINE TEARS PLUS OP) Apply to eye 3 (three) times a day      triamcinolone (KENALOG) 0 1 % lotion Apply topically 3 (three) times a day for 14 days 60 mL 1     No current facility-administered medications for this visit  She is allergic to ampicillin       Review of Systems   Constitutional: Negative  HENT: Negative  Eyes: Negative  Respiratory: Negative  Cardiovascular: Negative  Gastrointestinal: Negative  Endocrine: Negative  Genitourinary: Negative  Musculoskeletal: Positive for arthralgias and gait problem (Ambulates with cane)  Negative for back pain and joint swelling  Skin: Positive for rash (Scattered on arms as well as left lower leg  Pruritic)  Negative for wound  Allergic/Immunologic: Negative  Neurological: Negative for dizziness, weakness, light-headedness, numbness and headaches  Hematological: Negative  Psychiatric/Behavioral: Negative  Objective:      /82   Pulse 76   Temp 98 7 °F (37 1 °C)   Ht 5' (1 524 m)   Wt 105 kg (232 lb)   BMI 45 31 kg/m²          Physical Exam   Constitutional: She is oriented to person, place, and time  She appears well-developed and well-nourished  HENT:   Head: Normocephalic and atraumatic  Right Ear: External ear normal    Left Ear: External ear normal    Mouth/Throat: No oropharyngeal exudate  Patient masked   Eyes: Pupils are equal, round, and reactive to light  Conjunctivae and EOM are normal    Neck: Normal range of motion  Neck supple  Cardiovascular: Normal rate, regular rhythm, normal heart sounds and intact distal pulses  No murmur heard  Pulmonary/Chest: Effort normal and breath sounds normal  She has no wheezes  She has no rales  Abdominal: Soft  She exhibits no distension and no mass  There is no tenderness  Musculoskeletal: Normal range of motion  She exhibits deformity (Mild OA changes of the hands bilaterally)  She exhibits no edema  Lymphadenopathy:     She has no cervical adenopathy  Neurological: She is alert and oriented to person, place, and time  She has normal reflexes  No cranial nerve deficit or sensory deficit  Coordination (Mild imbalance-ambulates with cane) abnormal    Skin: Skin is warm and dry  Rash (Mild erythematous plaques without scale on forearms and left lower leg ) noted  She is not diaphoretic  Psychiatric: She has a normal mood and affect  Vitals reviewed  show

## 2021-04-15 ENCOUNTER — OFFICE VISIT (OUTPATIENT)
Dept: DERMATOLOGY | Facility: CLINIC | Age: 81
End: 2021-04-15
Payer: MEDICARE

## 2021-04-15 VITALS — HEIGHT: 61 IN | TEMPERATURE: 98 F | WEIGHT: 233 LBS | BODY MASS INDEX: 43.99 KG/M2

## 2021-04-15 DIAGNOSIS — I87.2 VENOUS STASIS DERMATITIS OF BOTH LOWER EXTREMITIES: Primary | ICD-10-CM

## 2021-04-15 DIAGNOSIS — D48.5 NEOPLASM OF UNCERTAIN BEHAVIOR OF SKIN: ICD-10-CM

## 2021-04-15 DIAGNOSIS — L30.9 ECZEMA, UNSPECIFIED TYPE: ICD-10-CM

## 2021-04-15 DIAGNOSIS — D23.9 HIDROCYSTOMA: ICD-10-CM

## 2021-04-15 PROCEDURE — 99213 OFFICE O/P EST LOW 20 MIN: CPT | Performed by: STUDENT IN AN ORGANIZED HEALTH CARE EDUCATION/TRAINING PROGRAM

## 2021-04-15 NOTE — PATIENT INSTRUCTIONS
Assessment and Plan:  Based on a thorough discussion of this condition and the management approach to it (including a comprehensive discussion of the known risks, side effects and potential benefits of treatment), the patient (family) agrees to implement the following specific plan: Shower with lukewarm water less than 10 minutes   Use Dove unscented soap to groin and armpits and neck  Pat dry after shower  Do not harshly rub  Immediately moisturize with heavy emollient   BEST - OINTMENTS, such as Vaseline, but she does not like the texture  You can use Cetaphil, Aquaphor, Cerave healing ointment      BETTER - CREAMS, such as Cerave, Cetaphil, VaniCREAM, Aveeno, Eucerin  AVOID LOTIONS, too thin, most things in pump  Moisturize twice a day   Follow as needed  ECZEMATOUS DERMATITIS       Assessment and Plan:  Based on a thorough discussion of this condition and the management approach to it (including a comprehensive discussion of the known risks, side effects and potential benefits of treatment), the patient (family) agrees to implement the following specific plan:   May use the Triamcinolone 0 1% ointment on itchy or rash areas  May use Cetaphil cream     Follow up as needed  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

## 2021-04-15 NOTE — PROGRESS NOTES
[address] [facility] Follow Up Note    Patient Name: Maeve Avery  Encounter Date: 4/15/2021    Today's Chief Concerns:   Concern #1:  Statis Dermatitis and Eczematous dermatitis, follow up       Current Medications:    Current Outpatient Medications:     acetaminophen (TYLENOL) 325 mg tablet, Take 650 mg by mouth , Disp: , Rfl:     apixaban (Eliquis) 5 mg, Take 1 tablet (5 mg total) by mouth 2 (two) times a day Lot #[id number] Exp 9/2022, Disp: 56 tablet, Rfl: 0    Ascorbic Acid (VITAMIN C) 1000 MG tablet, Take 1,000 mg by mouth , Disp: , Rfl:     atorvastatin (LIPITOR) 80 mg tablet, TAKE 1 TABLET AT BEDTIME, Disp: 90 tablet, Rfl: 3    Calcium Carbonate-Vit D-Min (CALCIUM 1200 PO), Take by mouth, Disp: , Rfl:     cholecalciferol (VITAMIN D3) 1,000 units tablet, Take 2,000 Units by mouth daily , Disp: , Rfl:     citalopram (CeleXA) 20 mg tablet, TAKE 1 TABLET EVERY DAY, Disp: 90 tablet, Rfl: 3    Continuous Blood Gluc  (FREESTYLE RONNI 14 DAY READER) GARRY, Use to check BG levels 4+times daily and as needed, Disp: 1 Device, Rfl: 0    Continuous Blood Gluc Sensor (FREESTYLE RONNI 14 DAY SENSOR) MISC, Change sensor every 14 days to check BG levels 4+ times daily, Disp: 6 each, Rfl: 3    furosemide (LASIX) 20 mg tablet, TAKE 1 TABLET EVERY DAY, Disp: 90 tablet, Rfl: 3    GLUCOSAMINE CHONDROITIN COMPLX PO, Take 1 tablet by mouth 2 (two) times a day, Disp: , Rfl:     glucose blood (TRUE METRIX BLOOD GLUCOSE TEST) test strip, Test 4 times plus daily, as instructed, Disp: 400 each, Rfl: 1    insulin aspart (NovoLOG FlexPen) 100 UNIT/ML injection pen, INJECT 8 UNITS SUBCUTANEOUSLY WITH BREAKFAST, THEN 10 UNITS WITH LUNCH, AND THEN 10 UNITS WITH DINNER, Disp: 15 mL, Rfl: 0    insulin glargine (TOUJEO) 300 units/mL CONCETRATED U-300 injection pen (1-unit dial), Inject 35 Units under the skin daily at bedtime, Disp: 10 pen, Rfl: 0    Insulin Pen Needle (PEN NEEDLES) 32G X 6 MM MISC, Use 4 times a day, Disp: 400 each, Rfl: 1    levothyroxine 125 mcg tablet, TAKE 1 TABLET ONE TIME DAILY EXCEPT ON SUNDAY NO TABLET, Disp: 78 tablet, Rfl: 1    lisinopril (ZESTRIL) 20 mg tablet, TAKE 1 TABLET TWICE DAILY, Disp: 180 tablet, Rfl: 3    metoprolol tartrate (LOPRESSOR) 25 mg tablet, TAKE 1 TABLET EVERY 12 HOURS, Disp: 180 tablet, Rfl: 3    Omega-3 Fatty Acids (FISH OIL) 1200 MG CAPS, Take 1,200 mg by mouth 2 (two) times a day , Disp: , Rfl:     oxybutynin (DITROPAN) 5 mg tablet, TAKE 1 TABLET TWICE DAILY, Disp: 180 tablet, Rfl: 1    Promethazine-DM (PHENERGAN-DM) 6 25-15 mg/5 mL oral syrup, Take 5 mL by mouth 4 (four) times a day as needed for cough, Disp: 150 mL, Rfl: 0    Tetrahydroz-Polyvinyl Al-Povid (MURINE TEARS PLUS OP), Apply to eye 3 (three) times a day, Disp: , Rfl:     triamcinolone (KENALOG) 0 1 % ointment, Apply 2x/day to itchy, red areas as needed  Overuse can thin skin, Disp: 453 6 g, Rfl: 3    clobetasol (TEMOVATE) 0 05 % cream, Apply topically 2 (two) times a day To leg (Patient not taking: Reported on 4/15/2021), Disp: 60 g, Rfl: 5    hydrOXYzine HCL (ATARAX) 25 mg tablet, Take 1 tablet (25 mg total) by mouth daily at bedtime (Patient not taking: Reported on 4/15/2021), Disp: 30 tablet, Rfl: 2    CONSTITUTIONAL:   Vitals:    04/15/21 1127   Temp: 98 °F (36 7 °C)   TempSrc: Temporal   Weight: 106 kg (233 lb)   Height: 5' 0 5" (1 537 m)       Specific Alerts:    Have you been seen by a [facility]atologist in the last 3 years? YES    Are you pregnant or planning to become pregnant? No    Are you currently or planning to be nursing or breast feeding? No    Allergies   Allergen Reactions    Ampicillin Hives       May we call your Preferred Phone number to discuss your specific medical information? YES    May we leave a detailed message that includes your specific medical information? YES    Have you traveled outside of the [facility] in the past 3 months?  No    Do you currently have a pacemaker or defibrillator? YES, pacemaker    Do you have any artificial heart valves, joints, plates, screws, rods, stents, pins, etc? YES, Left Knee replacement in 2010       Do you require any medications prior to a surgical procedure? YES, antibiotic before dental procedure  Unsure what antibiotic she needs       Are you taking any medications that cause you to bleed more easily ("blood thinners") YES, Elliquis    Have you ever experienced a rapid heartbeat with epinephrine? No    Have you ever been treated with "gold" (gold sodium thiomalate) therapy? No    [facility] can help with wrinkles, "laugh lines," facial volume loss, "double chin," "love handles," age spots, and more  Are you interested in learning today about some of the skin enhancement procedures that we offer? (If Yes, please provide more detail) No    Review of Systems:  Have you recently had or currently have any of the following?     · Fever or chills: No  · Night Sweats: No  · Headaches: No  · Weight Gain: No  · Weight Loss: No  · Blurry Vision: No  · Nausea: No  · Vomiting: No  · Diarrhea: No  · Blood in Stool: No  · Abdominal Pain: No  · Itchy Skin: YES, says not nearest as bad as how it was  · Painful Joints: YES  · Swollen Joints: No  · Muscle Pain: No  · Irregular Mole: No  · Sun Burn: No  · Dry Skin: No  · Skin Color Changes: No  · Scar or Keloid: No  · Cold Sores/Fever Blisters: No  · Bacterial Infections/MRSA: No  · Anxiety: No  · Depression: No  · Suicidal or Homicidal Thoughts: No      PSYCH: Normal mood and affect  EYES: Normal conjunctiva  ENT: Normal lips and oral mucosa  CARDIOVASCULAR: No edema  RESPIRATORY: Normal respirations  HEME/LYMPH/IMMUNO:  No ostensible subQ swelling except as noted below in ASSESSMENT AND PLAN BY DIAGNOSIS    FOCUSED ORGAN SYSTEM SKIN EXAM (SKIN)      Face Normal except as noted below in Assessment   Neck Normal except as noted below in Assessment   Right Arm Normal except as noted below in Assessment   Left Arm Normal except as noted below in Assessment                   Right Leg Normal except as noted below in Assessment   Left Leg Normal except as noted below in Assessment       STATIS DERMATITIS--> IMPROVED/ RESOLVED    Physical Exam:   Anatomic Location Affected:  Bilateral lower legs   Morphological Description:  Much improved not itchy anymore   Pertinent Positives:   Pertinent Negatives: Additional History of Present Condition:  Prescribed Triamcinolone 0 1% ointment to apply it on bilateral lower legs as needed  She feels her legs are not itching anymore  She feels the issue has resolved  Assessment and Plan:  Based on a thorough discussion of this condition and the management approach to it (including a comprehensive discussion of the known risks, side effects and potential benefits of treatment), the patient (family) agrees to implement the following specific plan: Shower with lukewarm water less than 10 minutes   Use Dove unscented soap to groin and armpits and neck  Pat dry after shower  Do not harshly rub  Immediately moisturize with heavy emollient   BEST - OINTMENTS, such as Vaseline, but she does not like the texture  You can use Cetaphil, Aquaphor, Cerave healing ointment      BETTER - CREAMS, such as Cerave, Cetaphil, VaniCREAM, Aveeno, Eucerin  AVOID LOTIONS, too thin, most things in pump  Moisturize twice a day  If rash develops, apply triamcinolone ointment    Follow as needed  ECZEMATOUS DERMATITIS--> MUCH IMPROVED    Physical Exam:   Anatomic Location Affected:  Right and left forearm   Morphological Description:  No rash today   Pertinent Positives:   Pertinent Negatives: Additional History of Present Condition:  She has been using Triamcinolone 0 1% ointment as needed       Assessment and Plan:  Based on a thorough discussion of this condition and the management approach to it (including a comprehensive discussion of the known risks, side effects and potential benefits of treatment), the patient (family) agrees to implement the following specific plan:   May use the Triamcinolone 0 1% ointment on itchy or rash areas as needed (overuse leads to thin skin)  May use Cetaphil cream daily   Follow up as needed  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    Physical Exam:   (Anatomic Location); (Size and Morphological Description); (Differential Diagnosis):  o Right lateral cantus with 0 2 x 0 2 cm brown translucent papule--> likely hidrocystoma   Pertinent Positives:   Pertinent Negatives: Additional History of Present Condition:  She noticed it in the morning when her eye is crusted  Assessment and Plan:    She will speak with her opthomologist if she needs a biopsy  Patient says the seborrheic keratosis on right cheek resolved with cryotherapy treatment from last visit       Scribe Attestation    I,:  Zeinab Banuelos am acting as a scribe while in the presence of the attending physician :       I,:  Fady Beal MD personally performed the services described in this documentation    as scribed in my presence :

## 2021-04-26 ENCOUNTER — REMOTE DEVICE CLINIC VISIT (OUTPATIENT)
Dept: CARDIOLOGY CLINIC | Facility: CLINIC | Age: 81
End: 2021-04-26
Payer: MEDICARE

## 2021-04-26 DIAGNOSIS — Z95.0 CARDIAC PACEMAKER IN SITU: Primary | ICD-10-CM

## 2021-04-26 PROCEDURE — 93296 REM INTERROG EVL PM/IDS: CPT | Performed by: INTERNAL MEDICINE

## 2021-04-26 PROCEDURE — 93294 REM INTERROG EVL PM/LDLS PM: CPT | Performed by: INTERNAL MEDICINE

## 2021-04-26 NOTE — PROGRESS NOTES
MDT-DUAL CHAMBER PPM (DDDR MODE)/ ACTIVE SYSTEM IS MRI CONDITIONAL   CARELINK TRANSMISSION: BATTERY VOLTAGE ADEQUATE (3 5 YRS)  AP 59 4%  >99% (CHB/DDDR 60)  ALL AVAILABLE LEAD PARAMETERS WITHIN NORMAL LIMITS  NO SIGNIFICANT HIGH RATE EPISODES  HX: PAF/PAFL & PT TAKES ELIQUIS, METOPROLOL TART   NORMAL DEVICE FUNCTION   EBS

## 2021-04-27 DIAGNOSIS — R05.9 COUGH: ICD-10-CM

## 2021-04-27 RX ORDER — DEXTROMETHORPHAN HYDROBROMIDE AND PROMETHAZINE HYDROCHLORIDE 15; 6.25 MG/5ML; MG/5ML
5 SOLUTION ORAL 4 TIMES DAILY PRN
Qty: 150 ML | Refills: 0 | Status: SHIPPED | OUTPATIENT
Start: 2021-04-27 | End: 2021-07-07 | Stop reason: SDUPTHER

## 2021-05-17 ENCOUNTER — TELEPHONE (OUTPATIENT)
Dept: NEPHROLOGY | Facility: CLINIC | Age: 81
End: 2021-05-17

## 2021-05-19 ENCOUNTER — TELEPHONE (OUTPATIENT)
Dept: FAMILY MEDICINE CLINIC | Facility: CLINIC | Age: 81
End: 2021-05-19

## 2021-05-19 DIAGNOSIS — E11.59 TYPE 2 DIABETES MELLITUS WITH OTHER CIRCULATORY COMPLICATION, WITH LONG-TERM CURRENT USE OF INSULIN (HCC): ICD-10-CM

## 2021-05-19 DIAGNOSIS — Z79.4 TYPE 2 DIABETES MELLITUS WITH OTHER CIRCULATORY COMPLICATION, WITH LONG-TERM CURRENT USE OF INSULIN (HCC): ICD-10-CM

## 2021-05-19 RX ORDER — INSULIN ASPART 100 [IU]/ML
INJECTION, SOLUTION INTRAVENOUS; SUBCUTANEOUS
Qty: 15 ML | Refills: 0 | Status: CANCELLED | OUTPATIENT
Start: 2021-05-19

## 2021-05-19 NOTE — TELEPHONE ENCOUNTER
She had shingles before she got the shingles vaccine 5 yrs ago,   Should she be getting another shingles Vacc?   Pl adv

## 2021-05-20 ENCOUNTER — TELEPHONE (OUTPATIENT)
Dept: ENDOCRINOLOGY | Facility: CLINIC | Age: 81
End: 2021-05-20

## 2021-05-20 DIAGNOSIS — E11.59 TYPE 2 DIABETES MELLITUS WITH OTHER CIRCULATORY COMPLICATION, WITH LONG-TERM CURRENT USE OF INSULIN (HCC): ICD-10-CM

## 2021-05-20 DIAGNOSIS — Z79.4 TYPE 2 DIABETES MELLITUS WITH OTHER CIRCULATORY COMPLICATION, WITH LONG-TERM CURRENT USE OF INSULIN (HCC): ICD-10-CM

## 2021-05-20 RX ORDER — INSULIN ASPART 100 [IU]/ML
INJECTION, SOLUTION INTRAVENOUS; SUBCUTANEOUS
Qty: 15 ML | Refills: 0 | Status: SHIPPED | OUTPATIENT
Start: 2021-05-20 | End: 2021-05-24 | Stop reason: SDUPTHER

## 2021-05-20 NOTE — TELEPHONE ENCOUNTER
Pt thought she could stretch her novolog out until her appointment next week  Only has enough maybe to get her through until tomorrow afternoon  Pt will stop by either today or tomorrow to pick some up  novolog was donated

## 2021-05-24 ENCOUNTER — LAB (OUTPATIENT)
Dept: LAB | Facility: CLINIC | Age: 81
End: 2021-05-24
Payer: MEDICARE

## 2021-05-24 DIAGNOSIS — E11.65 TYPE 2 DIABETES MELLITUS WITH HYPERGLYCEMIA, WITH LONG-TERM CURRENT USE OF INSULIN (HCC): ICD-10-CM

## 2021-05-24 DIAGNOSIS — Z79.4 TYPE 2 DIABETES MELLITUS WITH HYPERGLYCEMIA, WITH LONG-TERM CURRENT USE OF INSULIN (HCC): ICD-10-CM

## 2021-05-24 DIAGNOSIS — E11.59 TYPE 2 DIABETES MELLITUS WITH OTHER CIRCULATORY COMPLICATION, WITH LONG-TERM CURRENT USE OF INSULIN (HCC): ICD-10-CM

## 2021-05-24 DIAGNOSIS — Z79.4 TYPE 2 DIABETES MELLITUS WITH OTHER CIRCULATORY COMPLICATION, WITH LONG-TERM CURRENT USE OF INSULIN (HCC): ICD-10-CM

## 2021-05-24 DIAGNOSIS — E03.9 ACQUIRED HYPOTHYROIDISM: ICD-10-CM

## 2021-05-24 LAB
ANION GAP SERPL CALCULATED.3IONS-SCNC: 5 MMOL/L (ref 4–13)
BUN SERPL-MCNC: 32 MG/DL (ref 5–25)
CALCIUM SERPL-MCNC: 9.7 MG/DL (ref 8.3–10.1)
CHLORIDE SERPL-SCNC: 107 MMOL/L (ref 100–108)
CO2 SERPL-SCNC: 29 MMOL/L (ref 21–32)
CREAT SERPL-MCNC: 1.08 MG/DL (ref 0.6–1.3)
EST. AVERAGE GLUCOSE BLD GHB EST-MCNC: 151 MG/DL
GFR SERPL CREATININE-BSD FRML MDRD: 49 ML/MIN/1.73SQ M
GLUCOSE P FAST SERPL-MCNC: 106 MG/DL (ref 65–99)
HBA1C MFR BLD: 6.9 %
POTASSIUM SERPL-SCNC: 4.5 MMOL/L (ref 3.5–5.3)
SODIUM SERPL-SCNC: 141 MMOL/L (ref 136–145)
T4 FREE SERPL-MCNC: 1.14 NG/DL (ref 0.76–1.46)
TSH SERPL DL<=0.05 MIU/L-ACNC: 4.19 UIU/ML (ref 0.36–3.74)

## 2021-05-24 PROCEDURE — 36415 COLL VENOUS BLD VENIPUNCTURE: CPT

## 2021-05-24 PROCEDURE — 84439 ASSAY OF FREE THYROXINE: CPT

## 2021-05-24 PROCEDURE — 84443 ASSAY THYROID STIM HORMONE: CPT

## 2021-05-24 PROCEDURE — 83036 HEMOGLOBIN GLYCOSYLATED A1C: CPT

## 2021-05-24 PROCEDURE — 80048 BASIC METABOLIC PNL TOTAL CA: CPT

## 2021-05-24 RX ORDER — INSULIN ASPART 100 [IU]/ML
INJECTION, SOLUTION INTRAVENOUS; SUBCUTANEOUS
Qty: 15 ML | Refills: 3 | Status: SHIPPED | OUTPATIENT
Start: 2021-05-24 | End: 2021-05-28 | Stop reason: SDUPTHER

## 2021-05-27 ENCOUNTER — OFFICE VISIT (OUTPATIENT)
Dept: ENDOCRINOLOGY | Facility: CLINIC | Age: 81
End: 2021-05-27
Payer: MEDICARE

## 2021-05-27 VITALS
WEIGHT: 235 LBS | DIASTOLIC BLOOD PRESSURE: 90 MMHG | BODY MASS INDEX: 39.15 KG/M2 | SYSTOLIC BLOOD PRESSURE: 124 MMHG | TEMPERATURE: 98.3 F | HEIGHT: 65 IN | HEART RATE: 72 BPM

## 2021-05-27 DIAGNOSIS — E11.65 TYPE 2 DIABETES MELLITUS WITH HYPERGLYCEMIA, WITH LONG-TERM CURRENT USE OF INSULIN (HCC): Primary | ICD-10-CM

## 2021-05-27 DIAGNOSIS — Z79.4 TYPE 2 DIABETES MELLITUS WITH HYPERGLYCEMIA, WITH LONG-TERM CURRENT USE OF INSULIN (HCC): Primary | ICD-10-CM

## 2021-05-27 DIAGNOSIS — E78.2 MIXED HYPERLIPIDEMIA: ICD-10-CM

## 2021-05-27 DIAGNOSIS — I10 ESSENTIAL HYPERTENSION: ICD-10-CM

## 2021-05-27 DIAGNOSIS — E03.9 ACQUIRED HYPOTHYROIDISM: ICD-10-CM

## 2021-05-27 PROCEDURE — 99214 OFFICE O/P EST MOD 30 MIN: CPT | Performed by: PHYSICIAN ASSISTANT

## 2021-05-27 NOTE — PROGRESS NOTES
Patient Progress Note      CC: DM      Referring Provider  No referring provider defined for this encounter  History of Present Illness:   Ifeomaoscarlorena Gunn is a 80 y o  female with a history of type 2 diabetes with long term use of insulin  Diabetes course has been stable  Complications of DM: CAD, CKD, retinopathy  Denies recent illness or hospitalizations  Denies recent severe hypoglycemic or severe hyperglycemic episodes  Denies any issues with her current regimen  Home glucose monitoring: are performed regularly, Freestyle Tamia     Average glucose 150 mg/dl, glucose variability 28 4%  In target range 72%, above range 26%, below range 2%     Current regimen: Toujeo 35 units QHS, NovoLog 8-10-10 units 3 times a day with meals plus scale  compliant most of the time, denies any side effects from medications  Injects in: abdomen, thigh  Rotates sites: Yes  Hypoglycemic episodes: No, rare  H/o of hypoglycemia causing hospitalization or Intervention such as glucagon injection  or ambulance call :  No  Hypoglycemia symptoms: jitteriness  Treatment of hypoglycemia: orange juice     Medic alert tag: recommended: Yes     Diet: 3 meals per day, 1 snack per day  Timing of meals is predictable  Diabetic diet compliance:  noncompliant some of the time  Activity: Daily activity is predictable: Yes  The patient engages in little, if any physical activity  Ophthamology: sees retina specialist every 6-8 weeks  Podiatry: foot exam UTD, April 2021      Has hypertension: on ACE inhibitor/ARB, compliant most of the time  Has hyperlipidemia: on statin - tolerating well, no myalgias  compliant most of the time, denies any side effects from medications  Thyroid disorders:  Hypothyroidism  Patient is taking levothyroxine 125 mcg 1 tablet daily Monday through Saturday and no tablet on Sunday  Patient is taking medication on an empty stomach 1 hour before breakfast and at least 4 hours apart from supplements  Patient is tolerating medication well    History of pancreatitis: No    Patient Active Problem List   Diagnosis    Type 2 diabetes mellitus with hyperglycemia ([facility] [facility] )    Essential hypertension    Class 2 severe obesity with serious comorbidity in adult [facility])    Complete heart block (HCC)    Anemia    S/P cardiac pacemaker procedure    Hypothyroidism    Chronic diastolic congestive heart failure (HCC)    Chronic venous insufficiency    Dvt femoral (deep venous thrombosis) (HCC)    Benign hypertension with chronic kidney disease, stage III (HCC)    Stage 3a chronic kidney disease (HCC)    Iron deficiency anemia    Microalbuminuria    Bilateral leg edema    Vitamin D deficiency    Anxiety disorder    Urinary bladder incontinence    Chronic obstructive pulmonary disease (HCC)    Eczematous dermatitis    Mixed hyperlipidemia    OAB (overactive bladder)    Presence of permanent cardiac pacemaker    PA[facility]oxysmal atrial fibrillation) [facility]    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 neurological manifestations, uncontrolled(250 62)    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 ([facility])    Intraocular pressure increase    Sleep apnea    Dermatitis    Coronary artery disease involving native coronary artery of native heart      Past Medical History:   Diagnosis Date    Anxiety     Arthritis     CHF (congestive heart failure) ([facility])     Chronic venous insufficiency     COPD (chronic obstructive pulmonary disease) ([facility] [address] )     Coronary artery disease     [facility]     type II    Disease of thyroid gland     hypothyroidism    DVT (deep venous thrombosis) ([facility])     Hyperlipidemia     Hypertension     Irregular heart beat     Paroxysmal a-fib    Pacemaker     Renal disorder     stage 3    Thyroid disease     Urinary incontinence     Uterine leiomyoma       Past Surgical History:   Procedure Laterality Date    BREAST SURGERY      For Biopsy    CARDIAC PACEMAKER PLACEMENT      Permanent  Last assessed: 2/19/16    CARDIAC SURGERY      CARPAL TUNNEL RELEASE Bilateral     CATARACT EXTRACTION      CATARACT EXTRACTION W/ INTRAOCULAR LENS  IMPLANT, BILATERAL      CYSTOSCOPY      Diagnostic  Last assessed: 11/8/17    ECTOPIC PREGNANCY SURGERY      HYSTERECTOMY      Total Abdomonal  Resolved: 1982    JOINT REPLACEMENT Right     TKR    OOPHORECTOMY      NE ENDOVENOUS LASER, 1ST VEIN Left 2/17/2017    Procedure: GREATER SAPHENOUS VEIN ENDOVASCULAR LASER THERAPY ;  Surgeon: Mich Dempsey DO;  [facility];  Service: Vascular    TONSILLECTOMY        Family History   Problem Relation Age of Onset    Hypertension Father     Diabetes Father         Mellitus    Coronary artery disease Mother     Hypertension Mother     Heart disease Mother     Diabetes Brother         Mellitus    Breast cancer Maternal Aunt     Pancreatic cancer Maternal Aunt     Breast cancer Cousin     Breast cancer Cousin     No Known Problems Sister     No Known Problems Daughter     No Known Problems Maternal Grandmother     No Known Problems Maternal Grandfather     No Known Problems Paternal Grandmother     No Known Problems Paternal Grandfather     No Known Problems Daughter     No Known Problems Son      Social History     Tobacco Use    Smoking status: Never Smoker    Smokeless tobacco: Never Used    Tobacco comment: Per [employer]: Former smoker   Substance Use Topics    Alcohol use: Yes     Comment: rarely   Per [employer]: Denied history of alcohol use     Allergies   Allergen Reactions    Ampicillin Hives         Current Outpatient Medications:     apixaban (Eliquis) 5 mg, Take 1 tablet (5 mg total) by mouth 2 (two) times a day Lot #[id number] Exp 9/2022, Disp: 56 tablet, Rfl: 0    Ascorbic Acid (VITAMIN C) 1000 MG tablet, Take 1,000 mg by mouth , Disp: , Rfl:     atorvastatin (LIPITOR) 80 mg tablet, TAKE 1 TABLET AT BEDTIME, Disp: 90 tablet, Rfl: 3    Calcium Carbonate-Vit D-Min (CALCIUM 1200 PO), Take by mouth, Disp: , Rfl:     cholecalciferol (VITAMIN D3) 1,000 units tablet, Take 2,000 Units by mouth daily , Disp: , Rfl:     citalopram (CeleXA) 20 mg tablet, TAKE 1 TABLET EVERY DAY, Disp: 90 tablet, Rfl: 3    clobetasol (TEMOVATE) 0 05 % cream, Apply topically 2 (two) times a day To leg, Disp: 60 g, Rfl: 5    Continuous Blood Gluc  (FREESTYLE RONNI 14 DAY READER) GARRY, Use to check BG levels 4+times daily and as needed, Disp: 1 Device, Rfl: 0    Continuous Blood Gluc Sensor (FREESTYLE RONNI 14 DAY SENSOR) MISC, Change sensor every 14 days to check BG levels 4+ times daily, Disp: 6 each, Rfl: 3    furosemide (LASIX) 20 mg tablet, TAKE 1 TABLET EVERY DAY, Disp: 90 tablet, Rfl: 3    GLUCOSAMINE CHONDROITIN COMPLX PO, Take 1 tablet by mouth 2 (two) times a day, Disp: , Rfl:     glucose blood (TRUE METRIX BLOOD GLUCOSE TEST) test strip, Test 4 times plus daily, as instructed, Disp: 400 each, Rfl: 1    insulin aspart (NovoLOG FlexPen) 100 UNIT/ML injection pen, INJECT 8 UNITS SUBCUTANEOUSLY WITH BREAKFAST, THEN 10 UNITS WITH LUNCH, AND THEN 10 UNITS WITH DINNER, Disp: 15 mL, Rfl: 3    insulin glargine (TOUJEO) 300 units/mL CONCETRATED U-300 injection pen (1-unit dial), Inject 35 Units under the skin daily at bedtime, Disp: 10 pen, Rfl: 0    Insulin Pen Needle (PEN NEEDLES) 32G X 6 MM MISC, Use 4 times a day, Disp: 400 each, Rfl: 1    levothyroxine 125 mcg tablet, TAKE 1 TABLET ONE TIME DAILY EXCEPT ON SUNDAY NO TABLET, Disp: 78 tablet, Rfl: 1    lisinopril (ZESTRIL) 20 mg tablet, TAKE 1 TABLET TWICE DAILY, Disp: 180 tablet, Rfl: 3    metoprolol tartrate (LOPRESSOR) 25 mg tablet, TAKE 1 TABLET EVERY 12 HOURS, Disp: 180 tablet, Rfl: 3    Omega-3 Fatty Acids (FISH OIL) 1200 MG CAPS, Take 1,200 mg by mouth 2 (two) times a day , Disp: , Rfl:     oxybutynin (DITROPAN) 5 mg tablet, TAKE 1 TABLET TWICE DAILY, Disp: 180 tablet, Rfl: 1    Promethazine-DM (PHENERGAN-DM) 6 25-15 mg/5 mL oral syrup, Take 5 mL by mouth 4 (four) times a day as needed for cough, Disp: 150 mL, Rfl: 0    Tetrahydroz-Polyvinyl Al-Povid (MURINE TEARS PLUS OP), Apply to eye 3 (three) times a day, Disp: , Rfl:     triamcinolone (KENALOG) 0 1 % ointment, Apply 2x/day to itchy, red areas as needed  Overuse can thin skin, Disp: 453 6 g, Rfl: 3    acetaminophen (TYLENOL) 325 mg tablet, Take 650 mg by mouth , Disp: , Rfl:     hydrOXYzine HCL (ATARAX) 25 mg tablet, Take 1 tablet (25 mg total) by mouth daily at bedtime (Patient not taking: Reported on 4/15/2021), Disp: 30 tablet, Rfl: 2  Review of Systems   Constitutional: Negative for activity change, appetite change, fatigue and unexpected weight change  HENT: Positive for trouble swallowing (occasional)  Eyes: Positive for visual disturbance  Respiratory: Negative for shortness of breath  Cardiovascular: Negative for chest pain and palpitations  Gastrointestinal: Negative for constipation and diarrhea  Endocrine: Negative for polydipsia and polyuria  Musculoskeletal: Positive for arthralgias (arthritis)  Skin: Negative for wound  Neurological: Positive for numbness  Psychiatric/Behavioral: Negative  Physical Exam:  Body mass index is 39 11 kg/m²  /90   Pulse 72   Temp 98 3 °F (36 8 °C) (Tympanic)   Ht 5' 5" (1 651 m)   Wt 107 kg (235 lb)   BMI 39 11 kg/m²    Wt Readings from Last 3 Encounters:   05/27/21 107 kg (235 lb)   04/15/21 106 kg (233 lb)   03/16/21 106 kg (233 lb 3 2 oz)       Physical Exam  Vitals signs and nursing note reviewed  Constitutional:       Appearance: She is well-developed  HENT:      Head: Normocephalic  Eyes:      General: No scleral icterus  Pupils: Pupils are equal, round, and reactive to light  Neck:      Musculoskeletal: Neck supple  Thyroid: No thyromegaly     Cardiovascular:      Rate and Rhythm: Normal rate and regular rhythm  Heart sounds: No murmur  Pulmonary:      Effort: Pulmonary effort is normal  No respiratory distress  Breath sounds: Normal breath sounds  No wheezing  Skin:     General: Skin is warm and dry  Neurological:      Mental Status: She is alert  Patient's shoes and socks were not removed  Labs:   Component      Latest Ref Rng & Units 2/12/2021 5/24/2021   Sodium      136 - 145 mmol/L 141 141   Potassium      3 5 - 5 3 mmol/L 4 2 4 5   Chloride      100 - 108 mmol/L 106 107   CO2      21 - 32 mmol/L 31 29   Anion Gap      4 - 13 mmol/L 4 5   BUN      5 - 25 mg/dL 38 (H) 32 (H)   Creatinine      0 60 - 1 30 mg/dL 1 17 1 08   GLUCOSE FASTING      65 - 99 mg/dL 119 (H) 106 (H)   Calcium      8 3 - 10 1 mg/dL 9 6 9 7   eGFR      ml/min/1 73sq m 44 49   EXT Creatinine Urine      mg/dL 85 5    MICROALBUM ,U,RANDOM      0 0 - 20 0 mg/L 83 4 (H)    MICROALBUMIN/CREATININE RATIO      0 - 30 mg/g creatinine 98 (H)    Hemoglobin A1C      Normal 3 8-5 6%; PreDiabetic 5 7-6 4%; Diabetic >=6 5%; Glycemic control for adults with diabetes <7 0% % 6 8 (H) 6 9 (H)   eAG, EST AVG Glucose      mg/dl 148 151   Vit D, 25-Hydroxy      30 0 - 100 0 ng/mL 46 1    Free T4      0 76 - 1 46 ng/dL 1 17 1 14   TSH 3RD GENERATON      0 358 - 3 740 uIU/mL 4 910 (H) 4 190 (H)       Plan:    Diagnoses and all orders for this visit:    Type 2 diabetes mellitus with hyperglycemia, with long-term current use of insulin (HCC)  HGA1C 6 9%  Worsened  Treatment regimen: continue current treatment  Discussed intensive insulin regimen does increase risk for hypoglycemia  Episodes of hypoglycemia can lead to permanent disability and death  Discussed risks/complications associated with uncontrolled diabetes  Advised to adhere to diabetic diet, and recommended staying active/exercising routinely as tolerated  Keep carbohydrates consistent to limit blood glucose fluctuations    Advised to call if blood sugars less than 70 mg/dl or over 300 mg/dl  Check blood glucose 3+ times a day  Discussed symptoms and treatment of hypoglycemia  Discussed use of CGM to collect additional blood glucose data to reveal trends and patterns that can be used to optimize treatment plan  Recommended routine follow-up with podiatry and ophthalmology  Ordered blood work to complete prior to next visit  -     Hemoglobin A1C; Future  -     Basic metabolic panel; Future    Acquired hypothyroidism  TFTs abnormal but in acceptable range considering patients age, TSH 4 190 and free T4 1 14  Continue current dose of levothyroxine   Repeat TFTs prior to next visit   -     T4, free; Future  -     TSH, 3rd generation; Future    Mixed hyperlipidemia  LDL previously 61  Continue statin therapy   Managed by PCP    Essential hypertension  Blood pressure elevated  Advised to monitor Bp at home and follow-up with PCP if remaining elevated  For now continue current treatment   BP Readings from Last 3 Encounters:   05/27/21 124/90   03/16/21 142/68   03/04/21 132/70   -     Basic metabolic panel; Future      Discussed with the patient diagnosis and treatment and all questions fully answered  She will call me if any problems arise  Counseled patient on diagnostic results, prognosis, risk and benefit of treatment options, instruction for management, importance of treatment compliance, risk factor reduction and impressions        Vicki Guevara PA-C

## 2021-05-27 NOTE — PATIENT INSTRUCTIONS
Hypoglycemia instructions   Lizzie TOURE Maria Dolores  5/27/2021  [id number]    Low Blood Sugar    Steps to treat low blood sugar  1  Test blood sugar if you have symptoms of low blood sugar:   Low Blood Sugar Symptoms:  o Sweaty  o Dizzy  o Rapid heartbeat  o Shaky  o Bad mood  o Hungry      2  Treat blood sugar less than 70 with 15 grams of fast-acting carbohydrate:   Examples of 15 grams Fast-Acting Carbohydrate:  o 4 oz juice  o 4 oz regular soda  o 3-4 glucose tablets (chew)  o 3-4 hard candies (chew)          3  Wait 15 minutes and test your blood sugar again     4   If blood sugar is less than 100, repeat steps 2-3     5  When your blood sugar is 100 or more, eat a snack if it will be longer than one hour until your next meal  The snack should be 15 grams of carbohydrate and a protein:   Examples of snacks:  o ½ sandwich  o 6 crackers with cheese  o Piece of fruit with cheese or peanut butter  o 6 crackers with peanut butter

## 2021-05-28 DIAGNOSIS — E11.59 TYPE 2 DIABETES MELLITUS WITH OTHER CIRCULATORY COMPLICATION, WITH LONG-TERM CURRENT USE OF INSULIN (HCC): ICD-10-CM

## 2021-05-28 DIAGNOSIS — Z79.4 TYPE 2 DIABETES MELLITUS WITH OTHER CIRCULATORY COMPLICATION, WITH LONG-TERM CURRENT USE OF INSULIN (HCC): ICD-10-CM

## 2021-05-28 RX ORDER — INSULIN ASPART 100 [IU]/ML
INJECTION, SOLUTION INTRAVENOUS; SUBCUTANEOUS
Qty: 15 ML | Refills: 3 | Status: SHIPPED | OUTPATIENT
Start: 2021-05-28 | End: 2021-08-24 | Stop reason: SDUPTHER

## 2021-06-10 DIAGNOSIS — Z79.4 TYPE 2 DIABETES MELLITUS WITH HYPERGLYCEMIA, WITH LONG-TERM CURRENT USE OF INSULIN (HCC): ICD-10-CM

## 2021-06-10 DIAGNOSIS — E11.65 TYPE 2 DIABETES MELLITUS WITH HYPERGLYCEMIA, WITH LONG-TERM CURRENT USE OF INSULIN (HCC): ICD-10-CM

## 2021-06-11 RX ORDER — INSULIN GLARGINE 300 U/ML
INJECTION, SOLUTION SUBCUTANEOUS
Qty: 13.5 ML | Refills: 3 | Status: SHIPPED | OUTPATIENT
Start: 2021-06-11 | End: 2022-01-12 | Stop reason: SDUPTHER

## 2021-06-28 ENCOUNTER — APPOINTMENT (OUTPATIENT)
Dept: LAB | Facility: CLINIC | Age: 81
End: 2021-06-28
Payer: MEDICARE

## 2021-06-28 DIAGNOSIS — N18.30 BENIGN HYPERTENSION WITH CHRONIC KIDNEY DISEASE, STAGE III (HCC): ICD-10-CM

## 2021-06-28 DIAGNOSIS — I12.9 BENIGN HYPERTENSION WITH CHRONIC KIDNEY DISEASE, STAGE III (HCC): ICD-10-CM

## 2021-06-28 DIAGNOSIS — N18.31 STAGE 3A CHRONIC KIDNEY DISEASE (HCC): ICD-10-CM

## 2021-06-28 DIAGNOSIS — E55.9 VITAMIN D DEFICIENCY: ICD-10-CM

## 2021-06-28 LAB
25(OH)D3 SERPL-MCNC: 56.8 NG/ML (ref 30–100)
ANION GAP SERPL CALCULATED.3IONS-SCNC: 5 MMOL/L (ref 4–13)
BACTERIA UR QL AUTO: ABNORMAL /HPF
BILIRUB UR QL STRIP: NEGATIVE
BUN SERPL-MCNC: 31 MG/DL (ref 5–25)
CALCIUM SERPL-MCNC: 9.2 MG/DL (ref 8.3–10.1)
CHLORIDE SERPL-SCNC: 105 MMOL/L (ref 100–108)
CLARITY UR: ABNORMAL
CO2 SERPL-SCNC: 30 MMOL/L (ref 21–32)
COLOR UR: YELLOW
CREAT SERPL-MCNC: 1.06 MG/DL (ref 0.6–1.3)
CREAT UR-MCNC: 73.7 MG/DL
ERYTHROCYTE [DISTWIDTH] IN BLOOD BY AUTOMATED COUNT: 12.9 % (ref 11.6–15.1)
GFR SERPL CREATININE-BSD FRML MDRD: 50 ML/MIN/1.73SQ M
GLUCOSE P FAST SERPL-MCNC: 108 MG/DL (ref 65–99)
GLUCOSE UR STRIP-MCNC: NEGATIVE MG/DL
HCT VFR BLD AUTO: 40.1 % (ref 34.8–46.1)
HGB BLD-MCNC: 12.4 G/DL (ref 11.5–15.4)
HGB UR QL STRIP.AUTO: ABNORMAL
HYALINE CASTS #/AREA URNS LPF: ABNORMAL /LPF
KETONES UR STRIP-MCNC: NEGATIVE MG/DL
LEUKOCYTE ESTERASE UR QL STRIP: ABNORMAL
MCH RBC QN AUTO: 30.6 PG (ref 26.8–34.3)
MCHC RBC AUTO-ENTMCNC: 30.9 G/DL (ref 31.4–37.4)
MCV RBC AUTO: 99 FL (ref 82–98)
NITRITE UR QL STRIP: POSITIVE
NON-SQ EPI CELLS URNS QL MICRO: ABNORMAL /HPF
PH UR STRIP.AUTO: 6.5 [PH]
PHOSPHATE SERPL-MCNC: 4.1 MG/DL (ref 2.3–4.1)
PLATELET # BLD AUTO: 136 THOUSANDS/UL (ref 149–390)
PMV BLD AUTO: 10.7 FL (ref 8.9–12.7)
POTASSIUM SERPL-SCNC: 4.4 MMOL/L (ref 3.5–5.3)
PROT UR STRIP-MCNC: ABNORMAL MG/DL
PROT UR-MCNC: 28 MG/DL
PROT/CREAT UR: 0.38 MG/G{CREAT} (ref 0–0.1)
PTH-INTACT SERPL-MCNC: 44.4 PG/ML (ref 18.4–80.1)
RBC # BLD AUTO: 4.05 MILLION/UL (ref 3.81–5.12)
RBC #/AREA URNS AUTO: ABNORMAL /HPF
SODIUM SERPL-SCNC: 140 MMOL/L (ref 136–145)
SP GR UR STRIP.AUTO: 1.01 (ref 1–1.03)
UROBILINOGEN UR QL STRIP.AUTO: 0.2 E.U./DL
WBC # BLD AUTO: 5.97 THOUSAND/UL (ref 4.31–10.16)
WBC #/AREA URNS AUTO: ABNORMAL /HPF

## 2021-06-28 PROCEDURE — 82306 VITAMIN D 25 HYDROXY: CPT

## 2021-06-28 PROCEDURE — 36415 COLL VENOUS BLD VENIPUNCTURE: CPT

## 2021-06-28 PROCEDURE — 81001 URINALYSIS AUTO W/SCOPE: CPT

## 2021-06-28 PROCEDURE — 82570 ASSAY OF URINE CREATININE: CPT

## 2021-06-28 PROCEDURE — 84100 ASSAY OF PHOSPHORUS: CPT

## 2021-06-28 PROCEDURE — 83970 ASSAY OF PARATHORMONE: CPT

## 2021-06-28 PROCEDURE — 85027 COMPLETE CBC AUTOMATED: CPT

## 2021-06-28 PROCEDURE — 84156 ASSAY OF PROTEIN URINE: CPT

## 2021-06-28 PROCEDURE — 80048 BASIC METABOLIC PNL TOTAL CA: CPT

## 2021-06-29 ENCOUNTER — IN-CLINIC DEVICE VISIT (OUTPATIENT)
Dept: CARDIOLOGY CLINIC | Facility: CLINIC | Age: 81
End: 2021-06-29
Payer: MEDICARE

## 2021-06-29 DIAGNOSIS — Z95.0 PACEMAKER: Primary | ICD-10-CM

## 2021-06-29 PROCEDURE — 93280 PM DEVICE PROGR EVAL DUAL: CPT | Performed by: INTERNAL MEDICINE

## 2021-06-29 NOTE — PROGRESS NOTES
Results for orders placed or performed in visit on 06/29/21   Cardiac EP device report    Narrative    MDT-DUAL CHAMBER PPM (DDDR MODE)/ ACTIVE SYSTEM IS MRI CONDITIONAL  DEVICE INTERROGATED IN THE [place] OFFICE/YEARLY  BATTERY ADEQUATE (2 5-3 5 YRS)  AP 65%:  100% (CHB/DDDR 60)  ALL LEAD PARAMETERS WITHIN NORMAL LIMITS  2 NEW AF EPISODES (UP TO 24 MNTS)  NO VHR EPISODES  PT  TAKES ELIQUIS & METOPROLOL TART  NO PROGRAMMING CHANGES MADE TO DEVICE PARAMETERS  NORMAL DEVICE FUNCTION   PL

## 2021-07-01 ENCOUNTER — OFFICE VISIT (OUTPATIENT)
Dept: NEPHROLOGY | Facility: CLINIC | Age: 81
End: 2021-07-01
Payer: MEDICARE

## 2021-07-01 VITALS
DIASTOLIC BLOOD PRESSURE: 70 MMHG | HEART RATE: 65 BPM | BODY MASS INDEX: 39.65 KG/M2 | SYSTOLIC BLOOD PRESSURE: 136 MMHG | WEIGHT: 238 LBS | HEIGHT: 65 IN

## 2021-07-01 DIAGNOSIS — R80.9 MICROALBUMINURIA: ICD-10-CM

## 2021-07-01 DIAGNOSIS — E55.9 VITAMIN D DEFICIENCY: ICD-10-CM

## 2021-07-01 DIAGNOSIS — R60.0 LOCALIZED EDEMA: ICD-10-CM

## 2021-07-01 DIAGNOSIS — I12.9 BENIGN HYPERTENSION WITH CHRONIC KIDNEY DISEASE, STAGE III (HCC): Primary | ICD-10-CM

## 2021-07-01 DIAGNOSIS — N18.30 BENIGN HYPERTENSION WITH CHRONIC KIDNEY DISEASE, STAGE III (HCC): Primary | ICD-10-CM

## 2021-07-01 DIAGNOSIS — N18.31 STAGE 3A CHRONIC KIDNEY DISEASE (HCC): ICD-10-CM

## 2021-07-01 DIAGNOSIS — R60.0 BILATERAL LEG EDEMA: ICD-10-CM

## 2021-07-01 PROCEDURE — 99214 OFFICE O/P EST MOD 30 MIN: CPT | Performed by: INTERNAL MEDICINE

## 2021-07-01 RX ORDER — FUROSEMIDE 20 MG/1
20 TABLET ORAL AS NEEDED
Qty: 30 TABLET | Refills: 2 | Status: SHIPPED | OUTPATIENT
Start: 2021-07-01

## 2021-07-01 NOTE — PROGRESS NOTES
NEPHROLOGY OUTPATIENT PROGRESS NOTE   Jaylin Garcia 80 y o  female MRN: [id number]  DATE: 7/1/2021  Reason for visit:   Chief Complaint   Patient presents with    Follow-up    Chronic Kidney Disease     ASSESSMENT and PLAN:  CK D stage III with baseline serum creatinine 1 2-1 3  - Serum creatinine 1 0 in June 2021 stable at baseline    - Avoid NSAIDs or nephrotoxins  - CKD likely secondary to long-term hypertensive nephrosclerosis/diabetic nephropathy  - Check BMP, phosphorus, UPCR before next visit  - UA in June 2021 shows positive nitrate, 1+ proteinuria, 2 to 4 RBCs, innumerable WBCs/bacteria  Patient denies any urinary complaint  Likely to represent asymptomatic bacteriuria  Advised her to call back if has any worsening urinary symptoms      Proteinuria  - last UPC ratio improving 380 mg in June 2021  repeat UPC ratio before next visit    -continue lisinopril to 40 mg p o  Daily  -Proteinuria likely secondary to long-term diabetes and hypertension, obesity related secondary FSGS contributing?  -last hemoglobin A1c 6 9    -also has moderate DM retinopathy in November 2020     Hypertension  -repeat blood pressure improved in the office and acceptable  Her home BP machine compared with our office readings which are fairly identical    -continue current lisinopril, metoprolol, Lasix p r n      -salt restricted diet  -consider changing metoprolol to carvedilol blood pressure remains elevated  -regular exercise if possible      bilateral lower extremity edema likely secondary to chronic venous stasis  - This has improved and stable since endovascular laser treatment in left lower extremity  She denies any dyspnea  - Continue low-salt diet   -continue current maintenance dose of Lasix 20 mg p o  as needed       Prior vitamin-D deficiency, improved   last vitamin-D level normal in June 2021  Currently on vitamin-D 2000 units p o  Daily      Secondary hyperparathyroidism, last PTH 44 4 in June 2021   continue to monitor   Repeat level before next visit      Anemia in CKD, hemoglobin overall stable at goal      Diagnoses and all orders for this visit:    Benign hypertension with chronic kidney disease, stage III ([address] )  -     Basic metabolic panel; Future  -     CBC; Future  -     Phosphorus; Future  -     PTH, intact; Future  -     Vitamin D 25 hydroxy; Future  -     Magnesium; Future  -     Microalbumin / creatinine urine ratio; Future    Stage 3a chronic kidney disease (HCC)  -     Basic metabolic panel; Future  -     CBC; Future  -     Phosphorus; Future  -     PTH, intact; Future  -     Vitamin D 25 hydroxy; Future  -     Magnesium; Future  -     Microalbumin / creatinine urine ratio; Future    Microalbuminuria  -     Microalbumin / creatinine urine ratio; Future    Bilateral leg edema    Vitamin D deficiency  -     Vitamin D 25 hydroxy; Future    Localized edema  -     furosemide (LASIX) 20 mg tablet; Take 1 tablet (20 mg total) by mouth as needed (as needed for leg swelling)          SUBJECTIVE / HPI:  Patient is 81-year-old female with significant past medical history of hypertension for at least 40-50 years, and diabetes for 30 years, diabetic retinopathy, stress incontinence, chronic leg swelling with chronic venous stasis, status post endovascular laser treatment in the left lower extremity, chronic kidney disease stage III with baseline serum creatinine 1 2-1 3, complete heart block, status post ppm, paroxysmal AFib, comes regular follow up of renal failure  Her last serum creatinine overall stable at baseline  She claims to be compliant with her medications  She does not check blood pressure at home  Denies any worsening leg edema   Denies any worsening dyspnea    She tries to follow low salt diet  No recent NSAID exposure  Denies lightheadedness or dizziness  Denies any dyspnea    REVIEW OF SYSTEMS:  More than 10 point review of systems were obtained and discussed in length with the patient   Complete review of systems were negative / unremarkable except mentioned above  PHYSICAL EXAM:  Vitals:    07/01/21 1339 07/01/21 1400   BP: 142/70 136/70   BP Location: Left arm    Patient Position: Sitting    Cuff Size: Large    Pulse: 65    Weight: 108 kg (238 lb)    Height: 5' 5" (1 651 m)      Body mass index is 39 61 kg/m²  Physical Exam  Vitals reviewed  Constitutional:       Appearance: She is well-developed  HENT:      Head: Normocephalic and atraumatic  Right Ear: External ear normal       Left Ear: External ear normal    Eyes:      Conjunctiva/sclera: Conjunctivae normal    Cardiovascular:      Comments: S1, S2 present  Pulmonary:      Effort: Pulmonary effort is normal       Breath sounds: Normal breath sounds  No wheezing or rales  Abdominal:      General: Bowel sounds are normal  There is no distension  Palpations: Abdomen is soft  Tenderness: There is no abdominal tenderness  Musculoskeletal:         General: No tenderness  Right lower leg: Edema present  Left lower leg: Edema present  Comments: Trace edema in legs, stable   Lymphadenopathy:      Cervical: No cervical adenopathy  Skin:     Findings: No rash  Neurological:      Mental Status: She is alert and oriented to person, place, and time     Psychiatric:         Behavior: Behavior normal          PAST MEDICAL HISTORY:  Past Medical History:   Diagnosis Date    Anxiety     Arthritis     CH[facility]congestive heart failure) ([facility])     Chronic venous insufficiency     COPD (chronic obstructive pulmonary disease) ([facility])     Coronary artery disease     Diabetes mellitus ([facility] [address] )     type II    Disease of thyroid gland     hypothyroidism    DVT (deep venous thrombosis) ([facility])     Hyperlipidemia     Hypertension     Irregular heart beat     Paroxysmal a-fib    Pacemaker     Renal disorder     stage 3    Thyroid disease     Urinary incontinence     Uterine leiomyoma        PAST SURGICAL HISTORY:  Past Surgical History:   Procedure Laterality Date    BREAST SURGERY      For Biopsy    CARDIAC PACEMAKER PLACEMENT      Permanent  Last assessed: 2/19/16    CARDIAC SURGERY      CARPAL TUNNEL RELEASE Bilateral     CATARACT EXTRACTION      CATARACT EXTRACTION W/ INTRAOCULAR LENS  IMPLANT, BILATERAL      CYSTOSCOPY      Diagnostic  Last assessed: 11/8/17    ECTOPIC PREGNANCY SURGERY      HYSTERECTOMY      Total Abdomonal  Resolved: 1982    JOINT REPLACEMENT Right     TKR    OOPHORECTOMY      MD ENDOVENOUS LASER, 1ST VEIN Left 2/17/2017    Procedure: GREATER SAPHENOUS VEIN ENDOVASCULAR LASER THERAPY ;  Surgeon: Ponce Rivera DO;  Location: [facility];  Service: Vascular    TONSILLECTOMY         SOCIAL HISTORY:  Social History     Substance and Sexual Activity   Alcohol Use Yes    Comment: rarely   Per [employer]: Denied history of alcohol use     Social History     Substance and Sexual Activity   Drug Use No     Social History     Tobacco Use   Smoking Status Never Smoker   Smokeless Tobacco Never Used   Tobacco Comment    Per [employer]: Former smoker       FAMILY HISTORY:  Family History   Problem Relation Age of Onset    Hypertension Father     Diabetes Father         Mellitus    Coronary artery disease Mother     Hypertension Mother     Heart disease Mother     Diabetes Brother         Mellitus    Breast cancer Maternal Aunt     Pancreatic cancer Maternal Aunt     Breast cancer Cousin     Breast cancer Cousin     No Known Problems Sister     No Known Problems Daughter     No Known Problems Maternal Grandmother     No Known Problems Maternal Grandfather     No Known Problems Paternal Grandmother     No Known Problems Paternal Grandfather     No Known Problems Daughter     No Known Problems Son        MEDICATIONS:    Current Outpatient Medications:     acetaminophen (TYLENOL) 325 mg tablet, Take 650 mg by mouth , Disp: , Rfl:     apixaban (Eliquis) 5 mg, Take 1 tablet (5 mg total) by mouth 2 (two) times a day Lot #[id number] Exp 9/2022, Disp: 56 tablet, Rfl: 0    Ascorbic Acid (VITAMIN C) 1000 MG tablet, Take 1,000 mg by mouth , Disp: , Rfl:     atorvastatin (LIPITOR) 80 mg tablet, TAKE 1 TABLET AT BEDTIME, Disp: 90 tablet, Rfl: 3    Calcium Carbonate-Vit D-Min (CALCIUM 1200 PO), Take by mouth, Disp: , Rfl:     cholecalciferol (VITAMIN D3) 1,000 units tablet, Take 2,000 Units by mouth daily , Disp: , Rfl:     citalopram (CeleXA) 20 mg tablet, TAKE 1 TABLET EVERY DAY, Disp: 90 tablet, Rfl: 3    Continuous Blood Gluc  (FREESTYLE RONNI 14 DAY READER) GARRY, Use to check BG levels 4+times daily and as needed, Disp: 1 Device, Rfl: 0    Continuous Blood Gluc Sensor (FREESTYLE RONNI 14 DAY SENSOR) MISC, Change sensor every 14 days to check BG levels 4+ times daily, Disp: 6 each, Rfl: 3    furosemide (LASIX) 20 mg tablet, Take 1 tablet (20 mg total) by mouth as needed (as needed for leg swelling), Disp: 30 tablet, Rfl: 2    GLUCOSAMINE CHONDROITIN COMPLX PO, Take 1 tablet by mouth 2 (two) times a day, Disp: , Rfl:     insulin aspart (NovoLOG FlexPen) 100 UNIT/ML injection pen, INJECT 8 UNITS SUBCUTANEOUSLY WITH BREAKFAST, THEN 10 UNITS WITH LUNCH, AND THEN 10 UNITS WITH DINNER, Disp: 15 mL, Rfl: 3    Insulin Pen Needle (PEN NEEDLES) 32G X 6 MM MISC, Use 4 times a day, Disp: 400 each, Rfl: 1    levothyroxine 125 mcg tablet, TAKE 1 TABLET ONE TIME DAILY EXCEPT ON SUNDAY NO TABLET, Disp: 78 tablet, Rfl: 1    lisinopril (ZESTRIL) 20 mg tablet, TAKE 1 TABLET TWICE DAILY, Disp: 180 tablet, Rfl: 3    metoprolol tartrate (LOPRESSOR) 25 mg tablet, TAKE 1 TABLET EVERY 12 HOURS, Disp: 180 tablet, Rfl: 3    Omega-3 Fatty Acids (FISH OIL) 1200 MG CAPS, Take 1,200 mg by mouth 2 (two) times a day , Disp: , Rfl:     oxybutynin (DITROPAN) 5 mg tablet, TAKE 1 TABLET TWICE DAILY, Disp: 180 tablet, Rfl: 1    Promethazine-DM (PHENERGAN-DM) 6 25-15 mg/5 mL oral syrup, Take 5 mL by mouth 4 (four) times a day as needed for cough, Disp: 150 mL, Rfl: 0    Tetrahydroz-Polyvinyl Al-Povid (MURINE TEARS PLUS OP), Apply to eye 3 (three) times a day, Disp: , Rfl:     Toujeo SoloStar 300 units/mL CONCENTRATED U-300 injection pen (1-unit dial), INJECT 40 UNITS UNDER THE SKIN DAILY AT BEDTIME, Disp: 13 5 mL, Rfl: 3    triamcinolone (KENALOG) 0 1 % ointment, Apply 2x/day to itchy, red areas as needed   Overuse can thin skin, Disp: 453 6 g, Rfl: 3    clobetasol (TEMOVATE) 0 05 % cream, Apply topically 2 (two) times a day To leg (Patient not taking: Reported on 7/1/2021), Disp: 60 g, Rfl: 5    glucose blood (TRUE METRIX BLOOD GLUCOSE TEST) test strip, Test 4 times plus daily, as instructed (Patient not taking: Reported on 7/1/2021), Disp: 400 each, Rfl: 1    hydrOXYzine HCL (ATARAX) 25 mg tablet, Take 1 tablet (25 mg total) by mouth daily at bedtime (Patient not taking: Reported on 4/15/2021), Disp: 30 tablet, Rfl: 2    Lab Results:   Results for orders placed or performed in visit on 03/73/67   Basic metabolic panel   Result Value Ref Range    Sodium 140 136 - 145 mmol/L    Potassium 4 4 3 5 - 5 3 mmol/L    Chloride 105 100 - 108 mmol/L    CO2 30 21 - 32 mmol/L    ANION GAP 5 4 - 13 mmol/L    BUN 31 (H) 5 - 25 mg/dL    Creatinine 1 06 0 60 - 1 30 mg/dL    Glucose, Fasting 108 (H) 65 - 99 mg/dL    Calcium 9 2 8 3 - 10 1 mg/dL    eGFR 50 ml/min/1 73sq m   CBC   Result Value Ref Range    WBC 5 97 4 31 - 10 16 Thousand/uL    RBC 4 05 3 81 - 5 12 Million/uL    Hemoglobin 12 4 11 5 - 15 4 g/dL    Hematocrit 40 1 34 8 - 46 1 %    MCV 99 (H) 82 - 98 fL    MCH 30 6 26 8 - 34 3 pg    MCHC 30 9 (L) 31 4 - 37 4 g/dL    RDW 12 9 11 6 - 15 1 %    Platelets 848 (L) 928 - 390 Thousands/uL    MPV 10 7 8 9 - 12 7 fL   UA (URINE) with reflex to Scope   Result Value Ref Range    Color, UA Yellow     Clarity, UA Cloudy     Specific [place], UA 1 015 1 003 - 1 030    pH, UA 6 5 4 5, 5 0, 5 5, 6 0, 6 5, 7 0, 7 5, 8 0    Leukocytes, UA Large (A) Negative Nitrite, UA Positive (A) Negative    Protein, UA 30 (1+) (A) Negative mg/dl    Glucose, UA Negative Negative mg/dl    Ketones, UA Negative Negative mg/dl    Urobilinogen, UA 0 2 0 2, 1 0 E U /dl E U /dl    Bilirubin, UA Negative Negative    Blood, UA Trace (A) Negative   Protein / creatinine ratio, urine   Result Value Ref Range    Creatinine, Ur 73 7 mg/dL    Protein Urine Random 28 mg/dL    Prot/Creat Ratio, Ur 0 38 (H) 0 00 - 0 10   Vitamin D 25 hydroxy   Result Value Ref Range    Vit D, 25-Hydroxy 56 8 30 0 - 100 0 ng/mL   Phosphorus   Result Value Ref Range    Phosphorus 4 1 2 3 - 4 1 mg/dL   PTH, intact   Result Value Ref Range    PTH 44 4 18 4 - 80 1 pg/mL   Urine Microscopic   Result Value Ref Range    RBC, UA 2-4 None Seen, 2-4 /hpf    WBC, UA Innumerable (A) None Seen, 2-4 /hpf    Epithelial Cells None Seen None Seen, Occasional /hpf    Bacteria, UA Innumerable (A) None Seen, Occasional /hpf    Hyaline Casts, UA None Seen None Seen /lpf

## 2021-07-07 ENCOUNTER — TELEPHONE (OUTPATIENT)
Dept: FAMILY MEDICINE CLINIC | Facility: CLINIC | Age: 81
End: 2021-07-07

## 2021-07-07 NOTE — TELEPHONE ENCOUNTER
1) She is due for a mammo, she doesn't know if it is a diagnostic or screening, can you put order in  She will wait until Friday or next week to [facility]  2) She needs a refill on the cough medicine you order for her  Phenergan DM, she says you usually order it when she calls    Vaishali pope, she will ck with pharm if no cb

## 2021-07-08 DIAGNOSIS — Z12.31 ENCOUNTER FOR SCREENING MAMMOGRAM FOR BREAST CANCER: Primary | ICD-10-CM

## 2021-07-29 DIAGNOSIS — I10 ESSENTIAL HYPERTENSION: ICD-10-CM

## 2021-08-16 DIAGNOSIS — N32.81 OAB (OVERACTIVE BLADDER): ICD-10-CM

## 2021-08-16 DIAGNOSIS — E03.9 HYPOTHYROIDISM, UNSPECIFIED TYPE: ICD-10-CM

## 2021-08-17 RX ORDER — LEVOTHYROXINE SODIUM 0.12 MG/1
TABLET ORAL
Qty: 78 TABLET | Refills: 1 | Status: SHIPPED | OUTPATIENT
Start: 2021-08-17 | End: 2022-01-10

## 2021-08-17 RX ORDER — OXYBUTYNIN CHLORIDE 5 MG/1
TABLET ORAL
Qty: 180 TABLET | Refills: 1 | Status: SHIPPED | OUTPATIENT
Start: 2021-08-17 | End: 2022-01-10

## 2021-08-23 DIAGNOSIS — E11.59 TYPE 2 DIABETES MELLITUS WITH OTHER CIRCULATORY COMPLICATION, WITH LONG-TERM CURRENT USE OF INSULIN (HCC): ICD-10-CM

## 2021-08-23 DIAGNOSIS — Z79.4 TYPE 2 DIABETES MELLITUS WITH OTHER CIRCULATORY COMPLICATION, WITH LONG-TERM CURRENT USE OF INSULIN (HCC): ICD-10-CM

## 2021-08-23 NOTE — TELEPHONE ENCOUNTER
Pt wants samples of novolog  We do not have any pens but we have vials from a pt that dropped them off  Would it be ok to give her those?

## 2021-08-24 RX ORDER — INSULIN ASPART 100 [IU]/ML
INJECTION, SOLUTION INTRAVENOUS; SUBCUTANEOUS
Qty: 30 ML | Refills: 1 | Status: SHIPPED | OUTPATIENT
Start: 2021-08-24 | End: 2022-02-17 | Stop reason: SDUPTHER

## 2021-09-06 DIAGNOSIS — F41.9 ANXIETY: ICD-10-CM

## 2021-09-07 RX ORDER — CITALOPRAM 20 MG/1
TABLET ORAL
Qty: 90 TABLET | Refills: 3 | Status: SHIPPED | OUTPATIENT
Start: 2021-09-07 | End: 2022-07-18

## 2021-09-15 DIAGNOSIS — I10 ESSENTIAL HYPERTENSION: ICD-10-CM

## 2021-09-15 DIAGNOSIS — N18.30 BENIGN HYPERTENSION WITH CHRONIC KIDNEY DISEASE, STAGE III (HCC): ICD-10-CM

## 2021-09-15 DIAGNOSIS — I12.9 BENIGN HYPERTENSION WITH CHRONIC KIDNEY DISEASE, STAGE III (HCC): ICD-10-CM

## 2021-09-15 RX ORDER — LISINOPRIL 20 MG/1
TABLET ORAL
Qty: 180 TABLET | Refills: 3 | Status: SHIPPED | OUTPATIENT
Start: 2021-09-15 | End: 2022-06-06 | Stop reason: SDUPTHER

## 2021-09-28 ENCOUNTER — REMOTE DEVICE CLINIC VISIT (OUTPATIENT)
Dept: CARDIOLOGY CLINIC | Facility: CLINIC | Age: 81
End: 2021-09-28
Payer: MEDICARE

## 2021-09-28 DIAGNOSIS — Z95.0 CARDIAC PACEMAKER IN SITU: Primary | ICD-10-CM

## 2021-09-28 PROCEDURE — 93294 REM INTERROG EVL PM/LDLS PM: CPT | Performed by: INTERNAL MEDICINE

## 2021-09-28 PROCEDURE — 93296 REM INTERROG EVL PM/IDS: CPT | Performed by: INTERNAL MEDICINE

## 2021-09-28 NOTE — PROGRESS NOTES
Results for orders placed or performed in visit on 09/28/21   Cardiac EP device report    Narrative    MDT-DUAL CHAMBER PPM (DDDR MODE)/ ACTIVE SYSTEM IS MRI CONDITIONAL  CARELINK TRANSMISSION: BATTERY STATUS "3 YRS " AP 61%  100%  ALL AVAILABLE LEAD PARAMETERS WITHIN NORMAL LIMITS  1 NSVT NOTED; APPROX 7 BEATS @ 226 BPM  DR LINDSEY MADE AWARE  3 AT/AF NOTED; 0% BURDEN  PT ON ELIQUIS & METO TART  EF 60% (2016)  NORMAL DEVICE FUNCTION   NC         Current Outpatient Medications:     acetaminophen (TYLENOL) 325 mg tablet, Take 650 mg by mouth , Disp: , Rfl:     apixaban (Eliquis) 5 mg, Take 1 tablet (5 mg total) by mouth 2 (two) times a day Lot #[id number] Exp 9/2022, Disp: 56 tablet, Rfl: 0    Ascorbic Acid (VITAMIN C) 1000 MG tablet, Take 1,000 mg by mouth , Disp: , Rfl:     atorvastatin (LIPITOR) 80 mg tablet, TAKE 1 TABLET AT BEDTIME, Disp: 90 tablet, Rfl: 3    Calcium Carbonate-Vit D-Min (CALCIUM 1200 PO), Take by mouth, Disp: , Rfl:     cholecalciferol (VITAMIN D3) 1,000 units tablet, Take 2,000 Units by mouth daily , Disp: , Rfl:     citalopram (CeleXA) 20 mg tablet, TAKE 1 TABLET EVERY DAY, Disp: 90 tablet, Rfl: 3    clobetasol (TEMOVATE) 0 05 % cream, Apply topically 2 (two) times a day To leg (Patient not taking: Reported on 7/1/2021), Disp: 60 g, Rfl: 5    Continuous Blood Gluc  (FREESTYLE RONNI 14 DAY READER) GARRY, Use to check BG levels 4+times daily and as needed, Disp: 1 Device, Rfl: 0    Continuous Blood Gluc Sensor (FREESTYLE RONNI 14 DAY SENSOR) MISC, Change sensor every 14 days to check BG levels 4+ times daily, Disp: 6 each, Rfl: 3    furosemide (LASIX) 20 mg tablet, Take 1 tablet (20 mg total) by mouth as needed (as needed for leg swelling), Disp: 30 tablet, Rfl: 2    GLUCOSAMINE CHONDROITIN COMPLX PO, Take 1 tablet by mouth 2 (two) times a day, Disp: , Rfl:     glucose blood (TRUE METRIX BLOOD GLUCOSE TEST) test strip, Test 4 times plus daily, as instructed (Patient not taking: Reported on 7/1/2021), Disp: 400 each, Rfl: 1    hydrOXYzine HCL (ATARAX) 25 mg tablet, Take 1 tablet (25 mg total) by mouth daily at bedtime (Patient not taking: Reported on 4/15/2021), Disp: 30 tablet, Rfl: 2    insulin aspart (NovoLOG FlexPen) 100 UNIT/ML injection pen, INJECT 8 UNITS SUBCUTANEOUSLY WITH BREAKFAST, THEN 10 UNITS WITH LUNCH, AND THEN 10 UNITS WITH DINNER, Disp: 30 mL, Rfl: 1    Insulin Pen Needle (PEN NEEDLES) 32G X 6 MM MISC, Use 4 times a day, Disp: 400 each, Rfl: 1    levothyroxine 125 mcg tablet, TAKE 1 TABLET ONE TIME DAILY EXCEPT ON SUNDAY NO TABLET, Disp: 78 tablet, Rfl: 1    lisinopril (ZESTRIL) 20 mg tablet, TAKE 1 TABLET TWICE DAILY, Disp: 180 tablet, Rfl: 3    metoprolol tartrate (LOPRESSOR) 25 mg tablet, TAKE 1 TABLET EVERY 12 HOURS, Disp: 180 tablet, Rfl: 3    Omega-3 Fatty Acids (FISH OIL) 1200 MG CAPS, Take 1,200 mg by mouth 2 (two) times a day , Disp: , Rfl:     oxybutynin (DITROPAN) 5 mg tablet, TAKE 1 TABLET TWICE DAILY, Disp: 180 tablet, Rfl: 1    Promethazine-DM (PHENERGAN-DM) 6 25-15 mg/5 mL oral syrup, Take 5 mL by mouth 4 (four) times a day as needed for cough, Disp: 150 mL, Rfl: 0    Tetrahydroz-Polyvinyl Al-Povid (MURINE TEARS PLUS OP), Apply to eye 3 (three) times a day, Disp: , Rfl:     Toujeo SoloStar 300 units/mL CONCENTRATED U-300 injection pen (1-unit dial), INJECT 40 UNITS UNDER THE SKIN DAILY AT BEDTIME, Disp: 13 5 mL, Rfl: 3    triamcinolone (KENALOG) 0 1 % ointment, Apply 2x/day to itchy, red areas as needed   Overuse can thin skin, Disp: 453 6 g, Rfl: 3

## 2021-10-04 ENCOUNTER — LAB (OUTPATIENT)
Dept: LAB | Facility: CLINIC | Age: 81
End: 2021-10-04
Payer: MEDICARE

## 2021-10-04 DIAGNOSIS — N18.30 BENIGN HYPERTENSION WITH CHRONIC KIDNEY DISEASE, STAGE III (HCC): ICD-10-CM

## 2021-10-04 DIAGNOSIS — R80.9 MICROALBUMINURIA: ICD-10-CM

## 2021-10-04 DIAGNOSIS — E03.9 ACQUIRED HYPOTHYROIDISM: ICD-10-CM

## 2021-10-04 DIAGNOSIS — I12.9 BENIGN HYPERTENSION WITH CHRONIC KIDNEY DISEASE, STAGE III (HCC): ICD-10-CM

## 2021-10-04 DIAGNOSIS — Z79.4 TYPE 2 DIABETES MELLITUS WITH HYPERGLYCEMIA, WITH LONG-TERM CURRENT USE OF INSULIN (HCC): ICD-10-CM

## 2021-10-04 DIAGNOSIS — N18.31 STAGE 3A CHRONIC KIDNEY DISEASE (HCC): ICD-10-CM

## 2021-10-04 DIAGNOSIS — E11.65 TYPE 2 DIABETES MELLITUS WITH HYPERGLYCEMIA, WITH LONG-TERM CURRENT USE OF INSULIN (HCC): ICD-10-CM

## 2021-10-04 DIAGNOSIS — I10 ESSENTIAL HYPERTENSION: ICD-10-CM

## 2021-10-04 LAB
ANION GAP SERPL CALCULATED.3IONS-SCNC: 3 MMOL/L (ref 4–13)
BUN SERPL-MCNC: 31 MG/DL (ref 5–25)
CALCIUM SERPL-MCNC: 9.6 MG/DL (ref 8.3–10.1)
CHLORIDE SERPL-SCNC: 106 MMOL/L (ref 100–108)
CO2 SERPL-SCNC: 29 MMOL/L (ref 21–32)
CREAT SERPL-MCNC: 1 MG/DL (ref 0.6–1.3)
EST. AVERAGE GLUCOSE BLD GHB EST-MCNC: 154 MG/DL
GFR SERPL CREATININE-BSD FRML MDRD: 54 ML/MIN/1.73SQ M
GLUCOSE SERPL-MCNC: 129 MG/DL (ref 65–140)
HBA1C MFR BLD: 7 %
POTASSIUM SERPL-SCNC: 4.4 MMOL/L (ref 3.5–5.3)
SODIUM SERPL-SCNC: 138 MMOL/L (ref 136–145)
T4 FREE SERPL-MCNC: 1.2 NG/DL (ref 0.76–1.46)
TSH SERPL DL<=0.05 MIU/L-ACNC: 7.08 UIU/ML (ref 0.36–3.74)

## 2021-10-04 PROCEDURE — 84439 ASSAY OF FREE THYROXINE: CPT

## 2021-10-04 PROCEDURE — 84443 ASSAY THYROID STIM HORMONE: CPT

## 2021-10-04 PROCEDURE — 80048 BASIC METABOLIC PNL TOTAL CA: CPT

## 2021-10-04 PROCEDURE — 83036 HEMOGLOBIN GLYCOSYLATED A1C: CPT

## 2021-10-04 PROCEDURE — 36415 COLL VENOUS BLD VENIPUNCTURE: CPT

## 2021-10-07 ENCOUNTER — OFFICE VISIT (OUTPATIENT)
Dept: ENDOCRINOLOGY | Facility: CLINIC | Age: 81
End: 2021-10-07
Payer: MEDICARE

## 2021-10-07 VITALS
WEIGHT: 231 LBS | TEMPERATURE: 97.5 F | BODY MASS INDEX: 38.44 KG/M2 | DIASTOLIC BLOOD PRESSURE: 62 MMHG | SYSTOLIC BLOOD PRESSURE: 116 MMHG | HEART RATE: 63 BPM

## 2021-10-07 DIAGNOSIS — E11.65 TYPE 2 DIABETES MELLITUS WITH HYPERGLYCEMIA, WITH LONG-TERM CURRENT USE OF INSULIN (HCC): Primary | ICD-10-CM

## 2021-10-07 DIAGNOSIS — N18.31 STAGE 3A CHRONIC KIDNEY DISEASE (HCC): ICD-10-CM

## 2021-10-07 DIAGNOSIS — E66.01 MORBID OBESITY WITH BODY MASS INDEX (BMI) OF 40.0 TO 44.9 IN ADULT (HCC): ICD-10-CM

## 2021-10-07 DIAGNOSIS — E03.9 HYPOTHYROIDISM, UNSPECIFIED TYPE: Chronic | ICD-10-CM

## 2021-10-07 DIAGNOSIS — I10 ESSENTIAL HYPERTENSION: ICD-10-CM

## 2021-10-07 DIAGNOSIS — Z79.4 TYPE 2 DIABETES MELLITUS WITH HYPERGLYCEMIA, WITH LONG-TERM CURRENT USE OF INSULIN (HCC): Primary | ICD-10-CM

## 2021-10-07 PROCEDURE — 99214 OFFICE O/P EST MOD 30 MIN: CPT | Performed by: INTERNAL MEDICINE

## 2021-10-07 PROCEDURE — 95251 CONT GLUC MNTR ANALYSIS I&R: CPT | Performed by: INTERNAL MEDICINE

## 2021-10-08 ENCOUNTER — OFFICE VISIT (OUTPATIENT)
Dept: FAMILY MEDICINE CLINIC | Facility: CLINIC | Age: 81
End: 2021-10-08
Payer: MEDICARE

## 2021-10-08 VITALS
TEMPERATURE: 98.1 F | SYSTOLIC BLOOD PRESSURE: 128 MMHG | HEIGHT: 65 IN | WEIGHT: 233 LBS | DIASTOLIC BLOOD PRESSURE: 80 MMHG | BODY MASS INDEX: 38.82 KG/M2 | HEART RATE: 62 BPM

## 2021-10-08 DIAGNOSIS — I12.9 BENIGN HYPERTENSION WITH CHRONIC KIDNEY DISEASE, STAGE III (HCC): Primary | ICD-10-CM

## 2021-10-08 DIAGNOSIS — E78.2 MIXED HYPERLIPIDEMIA: ICD-10-CM

## 2021-10-08 DIAGNOSIS — Z79.4 TYPE 2 DIABETES MELLITUS WITH HYPERGLYCEMIA, WITH LONG-TERM CURRENT USE OF INSULIN (HCC): ICD-10-CM

## 2021-10-08 DIAGNOSIS — I25.10 CORONARY ARTERY DISEASE INVOLVING NATIVE CORONARY ARTERY OF NATIVE HEART WITHOUT ANGINA PECTORIS: ICD-10-CM

## 2021-10-08 DIAGNOSIS — E11.65 TYPE 2 DIABETES MELLITUS WITH HYPERGLYCEMIA, WITH LONG-TERM CURRENT USE OF INSULIN (HCC): ICD-10-CM

## 2021-10-08 DIAGNOSIS — I48.0 PAF (PAROXYSMAL ATRIAL FIBRILLATION) (HCC): ICD-10-CM

## 2021-10-08 DIAGNOSIS — R05.9 COUGH: ICD-10-CM

## 2021-10-08 DIAGNOSIS — H91.93 BILATERAL HEARING LOSS, UNSPECIFIED HEARING LOSS TYPE: ICD-10-CM

## 2021-10-08 DIAGNOSIS — N18.30 BENIGN HYPERTENSION WITH CHRONIC KIDNEY DISEASE, STAGE III (HCC): Primary | ICD-10-CM

## 2021-10-08 DIAGNOSIS — I10 ESSENTIAL HYPERTENSION: ICD-10-CM

## 2021-10-08 DIAGNOSIS — E03.9 HYPOTHYROIDISM, UNSPECIFIED TYPE: Chronic | ICD-10-CM

## 2021-10-08 PROCEDURE — 99214 OFFICE O/P EST MOD 30 MIN: CPT | Performed by: FAMILY MEDICINE

## 2021-10-08 RX ORDER — DEXTROMETHORPHAN HYDROBROMIDE AND PROMETHAZINE HYDROCHLORIDE 15; 6.25 MG/5ML; MG/5ML
5 SOLUTION ORAL 4 TIMES DAILY PRN
Qty: 300 ML | Refills: 2 | Status: SHIPPED | OUTPATIENT
Start: 2021-10-08 | End: 2022-04-01 | Stop reason: SDUPTHER

## 2021-10-28 ENCOUNTER — OFFICE VISIT (OUTPATIENT)
Dept: CARDIOLOGY CLINIC | Facility: CLINIC | Age: 81
End: 2021-10-28
Payer: MEDICARE

## 2021-10-28 VITALS
DIASTOLIC BLOOD PRESSURE: 82 MMHG | WEIGHT: 229.8 LBS | OXYGEN SATURATION: 94 % | BODY MASS INDEX: 38.29 KG/M2 | HEIGHT: 65 IN | SYSTOLIC BLOOD PRESSURE: 136 MMHG | HEART RATE: 61 BPM

## 2021-10-28 DIAGNOSIS — I48.19 PERSISTENT ATRIAL FIBRILLATION (HCC): Primary | ICD-10-CM

## 2021-10-28 PROCEDURE — 99214 OFFICE O/P EST MOD 30 MIN: CPT | Performed by: INTERNAL MEDICINE

## 2021-10-28 PROCEDURE — 93000 ELECTROCARDIOGRAM COMPLETE: CPT | Performed by: INTERNAL MEDICINE

## 2021-12-20 ENCOUNTER — HOSPITAL ENCOUNTER (OUTPATIENT)
Dept: RADIOLOGY | Age: 81
Discharge: HOME | End: 2021-12-20
Payer: MEDICARE

## 2021-12-20 VITALS — BODY MASS INDEX: 41.54 KG/M2 | HEIGHT: 61 IN | WEIGHT: 220 LBS

## 2021-12-20 DIAGNOSIS — Z12.31 ENCOUNTER FOR SCREENING MAMMOGRAM FOR BREAST CANCER: ICD-10-CM

## 2021-12-20 PROCEDURE — 77063 BREAST TOMOSYNTHESIS BI: CPT

## 2021-12-20 PROCEDURE — 77067 SCR MAMMO BI INCL CAD: CPT

## 2021-12-21 DIAGNOSIS — E78.00 HYPERCHOLESTEROLEMIA: ICD-10-CM

## 2021-12-22 RX ORDER — ATORVASTATIN CALCIUM 80 MG/1
TABLET, FILM COATED ORAL
Qty: 90 TABLET | Refills: 3 | Status: SHIPPED | OUTPATIENT
Start: 2021-12-22

## 2021-12-28 ENCOUNTER — REMOTE DEVICE CLINIC VISIT (OUTPATIENT)
Dept: CARDIOLOGY CLINIC | Facility: CLINIC | Age: 81
End: 2021-12-28
Payer: MEDICARE

## 2021-12-28 DIAGNOSIS — Z95.0 PRESENCE OF PERMANENT CARDIAC PACEMAKER: Primary | ICD-10-CM

## 2021-12-28 PROCEDURE — 93294 REM INTERROG EVL PM/LDLS PM: CPT | Performed by: INTERNAL MEDICINE

## 2021-12-28 PROCEDURE — 93296 REM INTERROG EVL PM/IDS: CPT | Performed by: INTERNAL MEDICINE

## 2022-01-10 DIAGNOSIS — E03.9 HYPOTHYROIDISM, UNSPECIFIED TYPE: ICD-10-CM

## 2022-01-10 DIAGNOSIS — N32.81 OAB (OVERACTIVE BLADDER): ICD-10-CM

## 2022-01-10 RX ORDER — LEVOTHYROXINE SODIUM 0.12 MG/1
TABLET ORAL
Qty: 78 TABLET | Refills: 1 | Status: SHIPPED | OUTPATIENT
Start: 2022-01-10 | End: 2022-06-08

## 2022-01-10 RX ORDER — OXYBUTYNIN CHLORIDE 5 MG/1
TABLET ORAL
Qty: 180 TABLET | Refills: 1 | Status: SHIPPED | OUTPATIENT
Start: 2022-01-10 | End: 2022-06-08

## 2022-01-12 DIAGNOSIS — Z79.4 TYPE 2 DIABETES MELLITUS WITH HYPERGLYCEMIA, WITH LONG-TERM CURRENT USE OF INSULIN (HCC): ICD-10-CM

## 2022-01-12 DIAGNOSIS — E11.65 TYPE 2 DIABETES MELLITUS WITH HYPERGLYCEMIA, WITH LONG-TERM CURRENT USE OF INSULIN (HCC): ICD-10-CM

## 2022-01-12 RX ORDER — INSULIN GLARGINE 300 U/ML
40 INJECTION, SOLUTION SUBCUTANEOUS
Qty: 13.5 ML | Refills: 1 | Status: SHIPPED | OUTPATIENT
Start: 2022-01-12 | End: 2022-01-13 | Stop reason: SDUPTHER

## 2022-01-13 DIAGNOSIS — Z79.4 TYPE 2 DIABETES MELLITUS WITH HYPERGLYCEMIA, WITH LONG-TERM CURRENT USE OF INSULIN (HCC): ICD-10-CM

## 2022-01-13 DIAGNOSIS — E11.65 TYPE 2 DIABETES MELLITUS WITH HYPERGLYCEMIA, WITH LONG-TERM CURRENT USE OF INSULIN (HCC): ICD-10-CM

## 2022-01-13 RX ORDER — INSULIN GLARGINE 300 U/ML
40 INJECTION, SOLUTION SUBCUTANEOUS
Qty: 4.5 ML | Refills: 0 | Status: SHIPPED | OUTPATIENT
Start: 2022-01-13 | End: 2022-02-17

## 2022-01-14 ENCOUNTER — TELEPHONE (OUTPATIENT)
Dept: CARDIOLOGY CLINIC | Facility: CLINIC | Age: 82
End: 2022-01-14

## 2022-02-16 ENCOUNTER — LAB (OUTPATIENT)
Dept: LAB | Facility: CLINIC | Age: 82
End: 2022-02-16
Payer: MEDICARE

## 2022-02-16 DIAGNOSIS — E03.9 HYPOTHYROIDISM, UNSPECIFIED TYPE: Chronic | ICD-10-CM

## 2022-02-16 DIAGNOSIS — N18.31 STAGE 3A CHRONIC KIDNEY DISEASE (HCC): ICD-10-CM

## 2022-02-16 DIAGNOSIS — N18.30 BENIGN HYPERTENSION WITH CHRONIC KIDNEY DISEASE, STAGE III (HCC): ICD-10-CM

## 2022-02-16 DIAGNOSIS — E55.9 VITAMIN D DEFICIENCY: ICD-10-CM

## 2022-02-16 DIAGNOSIS — I12.9 BENIGN HYPERTENSION WITH CHRONIC KIDNEY DISEASE, STAGE III (HCC): ICD-10-CM

## 2022-02-16 DIAGNOSIS — Z79.4 TYPE 2 DIABETES MELLITUS WITH HYPERGLYCEMIA, WITH LONG-TERM CURRENT USE OF INSULIN (HCC): ICD-10-CM

## 2022-02-16 DIAGNOSIS — E11.65 TYPE 2 DIABETES MELLITUS WITH HYPERGLYCEMIA, WITH LONG-TERM CURRENT USE OF INSULIN (HCC): ICD-10-CM

## 2022-02-16 LAB
25(OH)D3 SERPL-MCNC: 46.3 NG/ML (ref 30–100)
ANION GAP SERPL CALCULATED.3IONS-SCNC: 4 MMOL/L (ref 4–13)
BUN SERPL-MCNC: 30 MG/DL (ref 5–25)
CALCIUM SERPL-MCNC: 9.7 MG/DL (ref 8.3–10.1)
CHLORIDE SERPL-SCNC: 107 MMOL/L (ref 100–108)
CO2 SERPL-SCNC: 30 MMOL/L (ref 21–32)
CREAT SERPL-MCNC: 0.98 MG/DL (ref 0.6–1.3)
CREAT UR-MCNC: 50.8 MG/DL
ERYTHROCYTE [DISTWIDTH] IN BLOOD BY AUTOMATED COUNT: 13.1 % (ref 11.6–15.1)
EST. AVERAGE GLUCOSE BLD GHB EST-MCNC: 157 MG/DL
GFR SERPL CREATININE-BSD FRML MDRD: 54 ML/MIN/1.73SQ M
GLUCOSE P FAST SERPL-MCNC: 84 MG/DL (ref 65–99)
HBA1C MFR BLD: 7.1 %
HCT VFR BLD AUTO: 39.9 % (ref 34.8–46.1)
HGB BLD-MCNC: 12.3 G/DL (ref 11.5–15.4)
MAGNESIUM SERPL-MCNC: 1.8 MG/DL (ref 1.6–2.6)
MCH RBC QN AUTO: 31.1 PG (ref 26.8–34.3)
MCHC RBC AUTO-ENTMCNC: 30.8 G/DL (ref 31.4–37.4)
MCV RBC AUTO: 101 FL (ref 82–98)
MICROALBUMIN UR-MCNC: 396 MG/L (ref 0–20)
MICROALBUMIN/CREAT 24H UR: 780 MG/G CREATININE (ref 0–30)
PHOSPHATE SERPL-MCNC: 4.2 MG/DL (ref 2.3–4.1)
PLATELET # BLD AUTO: 145 THOUSANDS/UL (ref 149–390)
PMV BLD AUTO: 10.3 FL (ref 8.9–12.7)
POTASSIUM SERPL-SCNC: 4.3 MMOL/L (ref 3.5–5.3)
PTH-INTACT SERPL-MCNC: 56.9 PG/ML (ref 18.4–80.1)
RBC # BLD AUTO: 3.96 MILLION/UL (ref 3.81–5.12)
SODIUM SERPL-SCNC: 141 MMOL/L (ref 136–145)
T4 FREE SERPL-MCNC: 1.06 NG/DL (ref 0.76–1.46)
TSH SERPL DL<=0.05 MIU/L-ACNC: 15.1 UIU/ML (ref 0.36–3.74)
WBC # BLD AUTO: 5.19 THOUSAND/UL (ref 4.31–10.16)

## 2022-02-16 PROCEDURE — 85027 COMPLETE CBC AUTOMATED: CPT

## 2022-02-16 PROCEDURE — 84443 ASSAY THYROID STIM HORMONE: CPT

## 2022-02-16 PROCEDURE — 82043 UR ALBUMIN QUANTITATIVE: CPT

## 2022-02-16 PROCEDURE — 84100 ASSAY OF PHOSPHORUS: CPT

## 2022-02-16 PROCEDURE — 83036 HEMOGLOBIN GLYCOSYLATED A1C: CPT

## 2022-02-16 PROCEDURE — 83970 ASSAY OF PARATHORMONE: CPT

## 2022-02-16 PROCEDURE — 36415 COLL VENOUS BLD VENIPUNCTURE: CPT

## 2022-02-16 PROCEDURE — 83735 ASSAY OF MAGNESIUM: CPT

## 2022-02-16 PROCEDURE — 82306 VITAMIN D 25 HYDROXY: CPT

## 2022-02-16 PROCEDURE — 80048 BASIC METABOLIC PNL TOTAL CA: CPT

## 2022-02-16 PROCEDURE — 82570 ASSAY OF URINE CREATININE: CPT

## 2022-02-16 PROCEDURE — 84439 ASSAY OF FREE THYROXINE: CPT

## 2022-02-17 ENCOUNTER — OFFICE VISIT (OUTPATIENT)
Dept: ENDOCRINOLOGY | Facility: CLINIC | Age: 82
End: 2022-02-17
Payer: MEDICARE

## 2022-02-17 VITALS
WEIGHT: 223 LBS | HEIGHT: 61 IN | TEMPERATURE: 97.5 F | BODY MASS INDEX: 42.1 KG/M2 | HEART RATE: 64 BPM | DIASTOLIC BLOOD PRESSURE: 90 MMHG | SYSTOLIC BLOOD PRESSURE: 140 MMHG

## 2022-02-17 DIAGNOSIS — I10 ESSENTIAL HYPERTENSION: ICD-10-CM

## 2022-02-17 DIAGNOSIS — E11.65 TYPE 2 DIABETES MELLITUS WITH HYPERGLYCEMIA, WITH LONG-TERM CURRENT USE OF INSULIN (HCC): Primary | ICD-10-CM

## 2022-02-17 DIAGNOSIS — Z79.4 TYPE 2 DIABETES MELLITUS WITH HYPERGLYCEMIA, WITH LONG-TERM CURRENT USE OF INSULIN (HCC): Primary | ICD-10-CM

## 2022-02-17 DIAGNOSIS — E03.9 ACQUIRED HYPOTHYROIDISM: Chronic | ICD-10-CM

## 2022-02-17 DIAGNOSIS — E11.59 TYPE 2 DIABETES MELLITUS WITH OTHER CIRCULATORY COMPLICATION, WITH LONG-TERM CURRENT USE OF INSULIN (HCC): ICD-10-CM

## 2022-02-17 DIAGNOSIS — E78.2 MIXED HYPERLIPIDEMIA: ICD-10-CM

## 2022-02-17 DIAGNOSIS — Z79.4 TYPE 2 DIABETES MELLITUS WITH OTHER CIRCULATORY COMPLICATION, WITH LONG-TERM CURRENT USE OF INSULIN (HCC): ICD-10-CM

## 2022-02-17 PROCEDURE — 99214 OFFICE O/P EST MOD 30 MIN: CPT | Performed by: PHYSICIAN ASSISTANT

## 2022-02-17 PROCEDURE — 95251 CONT GLUC MNTR ANALYSIS I&R: CPT | Performed by: PHYSICIAN ASSISTANT

## 2022-02-17 RX ORDER — LANCETS
EACH MISCELLANEOUS
Qty: 400 EACH | Refills: 0 | Status: SHIPPED | OUTPATIENT
Start: 2022-02-17

## 2022-02-17 RX ORDER — BLOOD-GLUCOSE METER
EACH MISCELLANEOUS
Qty: 1 KIT | Refills: 0 | Status: SHIPPED | OUTPATIENT
Start: 2022-02-17

## 2022-02-17 RX ORDER — BLOOD SUGAR DIAGNOSTIC
STRIP MISCELLANEOUS
Qty: 400 EACH | Refills: 1 | Status: SHIPPED | OUTPATIENT
Start: 2022-02-17

## 2022-02-17 RX ORDER — INSULIN ASPART 100 [IU]/ML
INJECTION, SOLUTION INTRAVENOUS; SUBCUTANEOUS
Qty: 45 ML | Refills: 1 | Status: SHIPPED | OUTPATIENT
Start: 2022-02-17

## 2022-02-17 RX ORDER — BLOOD SUGAR DIAGNOSTIC
STRIP MISCELLANEOUS
Qty: 400 STRIP | Refills: 1 | Status: SHIPPED | OUTPATIENT
Start: 2022-02-17

## 2022-02-17 RX ORDER — INSULIN GLARGINE 300 U/ML
36 INJECTION, SOLUTION SUBCUTANEOUS
Qty: 12 ML | Refills: 1 | Status: SHIPPED | OUTPATIENT
Start: 2022-02-17

## 2022-02-17 RX ORDER — INSULIN GLARGINE 300 U/ML
36 INJECTION, SOLUTION SUBCUTANEOUS
Qty: 4.5 ML | Refills: 0
Start: 2022-02-17 | End: 2022-02-17 | Stop reason: SDUPTHER

## 2022-02-17 NOTE — PROGRESS NOTES
Patient Progress Note      CC; DM      Referring Provider  Nilson Ponce Md  [address] [facility], [place],  [address] [facility]     History of Present Illness:   You Monreal is a [de-identified] y o  female with a history of type 2 diabetes with long term use of insulin  Diabetes course has been stable  Complications of DM: CAD, CKD, retinopathy  Denies recent illness or hospitalizations  Denies recent severe hypoglycemic or severe hyperglycemic episodes  Denies any issues with her current regimen  Home glucose monitoring: are performed regularly, Freestyle Tamia     Average glucose 152 mg/dl, glucose variability 33 9%  In target range 68%, above range 27%, below range 5%  Lows tend to occur in the early morning hours  Current regimen: Toujeo 40 units QHS, NovoLog 8-10-10 units 3 times a day with meals plus scale  compliant most of the time, denies any side effects from medications  Injects in: abdomen, thigh  Rotates sites: Yes  Hypoglycemic episodes: No, rare  H/o of hypoglycemia causing hospitalization or Intervention such as glucagon injection  or ambulance call :  No  Hypoglycemia symptoms: jitteriness, weakness  Treatment of hypoglycemia: orange juice     Medic alert tag: recommended: Yes     Diet: 3 meals per day, 1 snack per day  Timing of meals is predictable  Diabetic diet compliance:  noncompliant some of the time  Activity: Daily activity is predictable: Yes  Is active around the house, but no other exercise  Ophthamology: sees retina specialist every 6-8 weeks  Podiatry: foot exam UTD, June 2021      Has hypertension: on ACE inhibitor/ARB, compliant most of the time  Has hyperlipidemia: on statin - tolerating well, no myalgias  compliant most of the time, denies any side effects from medications  Thyroid disorders:  Hypothyroidism  Patient is taking levothyroxine 125 mcg 1 tablet daily Monday through Saturday and no tablet on Sunday    Patient is taking medication on an empty stomach 1 hour before breakfast and at least 4 hours apart from supplements  Patient is tolerating medication well    History of pancreatitis: No       Patient Active Problem List   Diagnosis    [facility]    Essential hypertension    Class 2 severe obesity with serious comorbidity in adult [facility])    Complete heart block (HCC)    Anemia    S/P cardiac pacemaker procedure    Hypothyroidism    Chronic diastolic congestive heart failure (HCC)    Chronic venous insufficiency    Benign hypertension with chronic kidney disease, stage III (HCC)    Stage 3a chronic kidney disease ([facility])    Iron deficiency anemia    Microalbuminuria    Bilateral leg edema    Vitamin D deficiency    Anxiety disorder    Urinary bladder incontinence    Chronic obstructive pulmonary disease ([facility])    Eczematous dermatitis    Mixed hyperlipidemia    OAB (overactive bladder)    Presence of permanent cardiac pacemaker    PAF (paroxysmal atrial fibrillation) ([facility])    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 neurological manifestations, uncontrolled(250 62)    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 ([facility])    Intraocular pressure increase    Sleep apnea    Dermatitis    Coronary artery disease involving native coronary artery of native heart      Past Medical History:   Diagnosis Date    Anxiety     Arthritis     CHF (congestive heart failure) ([facility])     Chronic venous insufficiency     COPD (chronic obstructive pulmonary disease) ([facility]     Coronary artery disease     Diabetes mellitus [facility]     type II    Disease of thyroid gland     hypothyroidism    DVT (deep venous thrombosis) ([facility])     HL (hearing loss)     Hyperlipidemia     Hypertension     Irregular heart beat     Paroxysmal a-fib    Pacemaker     Renal disorder     stage 3    Sleep apnea     Sleep difficulties     Thyroid disease     Urinary incontinence     Uterine leiomyoma       Past Surgical History:   Procedure Laterality Date    BREAST SURGERY      For Biopsy    CARDIAC PACEMAKER PLACEMENT      Permanent  Last assessed: 2/19/16    CARDIAC SURGERY      CARPAL TUNNEL RELEASE Bilateral     CATARACT EXTRACTION      CATARACT EXTRACTION W/ INTRAOCULAR LENS  IMPLANT, BILATERAL      CYSTOSCOPY      Diagnostic  Last assessed: 11/8/17    ECTOPIC PREGNANCY SURGERY      HYSTERECTOMY      Total Abdomonal  Resolved: 1982    JOINT REPLACEMENT Right     TKR    OOPHORECTOMY      WY ENDOVENOUS LASER, 1ST VEIN Left 2/17/2017    Procedure: GREATER SAPHENOUS VEIN ENDOVASCULAR LASER THERAPY ;  Surgeon: Latonya Kelly DO;  [facility];  Service: Vascular    TONSILLECTOMY        Family History   Problem Relation Age of Onset    Hypertension Father     Diabetes Father         Mellitus    Coronary artery disease Mother     Hypertension Mother     Heart disease Mother     Diabetes Brother         Mellitus    Breast cancer Maternal Aunt     Pancreatic cancer Maternal Aunt     Breast cancer Cousin     Breast cancer Cousin     No Known Problems Sister     No Known Problems Daughter     No Known Problems Maternal Grandmother     No Known Problems Maternal Grandfather     No Known Problems Paternal Grandmother     No Known Problems Paternal Grandfather     No Known Problems Daughter     No Known Problems Son      Social History     Tobacco Use    Smoking status: Never Smoker    Smokeless tobacco: Never Used    Tobacco comment: Per [employer]: Former smoker   Substance Use Topics    Alcohol use: Yes     Comment: rarely   Per [employer]: Denied history of alcohol use     Allergies   Allergen Reactions    Ampicillin Hives         Current Outpatient Medications:     acetaminophen (TYLENOL) 325 mg tablet, Take 650 mg by mouth , Disp: , Rfl:     apixaban (Eliquis) 5 mg, Take 1 tablet (5 mg total) by mouth 2 (two) times a day Lot #[id number] Exp 9/2022, Disp: 56 tablet, Rfl: 0    Ascorbic Acid (VITAMIN C) 1000 MG tablet, Take 500 mg by mouth , Disp: , Rfl:     atorvastatin (LIPITOR) 80 mg tablet, TAKE 1 TABLET AT BEDTIME, Disp: 90 tablet, Rfl: 3    Calcium Carbonate-Vit D-Min (CALCIUM 1200 PO), Take by mouth, Disp: , Rfl:     cholecalciferol (VITAMIN D3) 1,000 units tablet, Take 2,000 Units by mouth daily , Disp: , Rfl:     citalopram (CeleXA) 20 mg tablet, TAKE 1 TABLET EVERY DAY, Disp: 90 tablet, Rfl: 3    Continuous Blood Gluc  (FREESTYLE RONNI 14 DAY READER) GARRY, Use to check BG levels 4+times daily and as needed, Disp: 1 Device, Rfl: 0    Continuous Blood Gluc Sensor (FREESTYLE RONNI 14 DAY SENSOR) MISC, Change sensor every 14 days to check BG levels 4+ times daily, Disp: 6 each, Rfl: 3    furosemide (LASIX) 20 mg tablet, Take 1 tablet (20 mg total) by mouth as needed (as needed for leg swelling), Disp: 30 tablet, Rfl: 2    GLUCOSAMINE CHONDROITIN COMPLX PO, Take 1 tablet by mouth 2 (two) times a day, Disp: , Rfl:     glucose blood (TRUE METRIX BLOOD GLUCOSE TEST) test strip, Test 4 times plus daily, as instructed, Disp: 400 each, Rfl: 1    insulin aspart (NovoLOG FlexPen) 100 UNIT/ML injection pen, INJECT 8 UNITS SUBCUTANEOUSLY WITH BREAKFAST, THEN 10 UNITS WITH LUNCH, AND THEN 10 UNITS WITH DINNER, Disp: 45 mL, Rfl: 1    insulin glargine (Toujeo SoloStar) 300 units/mL CONCENTRATED U-300 injection pen (1-unit dial), Inject 36 Units under the skin daily at bedtime, Disp: 12 mL, Rfl: 1    Insulin Pen Needle (Pen Needles) 32G X 6 MM MISC, Use 4 times a day, Disp: 400 each, Rfl: 1    levothyroxine 125 mcg tablet, TAKE 1 TABLET ONE TIME DAILY EXCEPT ON SUNDAY NO TABLET, Disp: 78 tablet, Rfl: 1    lisinopril (ZESTRIL) 20 mg tablet, TAKE 1 TABLET TWICE DAILY, Disp: 180 tablet, Rfl: 3    metoprolol tartrate (LOPRESSOR) 25 mg tablet, TAKE 1 TABLET EVERY 12 HOURS, Disp: 180 tablet, Rfl: 3    Omega-3 Fatty Acids (FISH OIL) 1200 MG CAPS, Take 1,200 mg by mouth 2 (two) times a day , Disp: , Rfl:     oxybutynin (DITROPAN) 5 mg tablet, TAKE 1 TABLET TWICE DAILY, Disp: 180 tablet, Rfl: 1    Tetrahydroz-Polyvinyl Al-Povid (MURINE TEARS PLUS OP), Apply to eye 3 (three) times a day, Disp: , Rfl:     triamcinolone (KENALOG) 0 1 % ointment, Apply 2x/day to itchy, red areas as needed  Overuse can thin skin, Disp: 453 6 g, Rfl: 3    clobetasol (TEMOVATE) 0 05 % cream, Apply topically 2 (two) times a day To leg (Patient not taking: Reported on 10/28/2021), Disp: 60 g, Rfl: 5    hydrOXYzine HCL (ATARAX) 25 mg tablet, Take 1 tablet (25 mg total) by mouth daily at bedtime, Disp: 30 tablet, Rfl: 2    Promethazine-DM (PHENERGAN-DM) 6 25-15 mg/5 mL oral syrup, Take 5 mL by mouth 4 (four) times a day as needed for cough, Disp: 300 mL, Rfl: 2  Review of Systems   Constitutional: Negative for activity change, appetite change, fatigue and unexpected weight change  HENT: Negative for trouble swallowing  Eyes: Positive for visual disturbance  Respiratory: Negative for shortness of breath  Cardiovascular: Negative for chest pain and palpitations  Gastrointestinal: Negative for constipation and diarrhea  Endocrine: Negative for polydipsia and polyuria  Musculoskeletal: Positive for arthralgias  Skin: Negative for wound  Neurological: Negative for numbness  Psychiatric/Behavioral: Negative  Physical Exam:  Body mass index is 42 14 kg/m²  /90   Pulse 64   Temp 97 5 °F (36 4 °C) (Tympanic)   Ht 5' 1" (1 549 m)   Wt 101 kg (223 lb)   BMI 42 14 kg/m²    Wt Readings from Last 3 Encounters:   02/17/22 101 kg (223 lb)   12/20/21 99 8 kg (220 lb)   10/28/21 104 kg (229 lb 12 8 oz)       Physical Exam  Vitals and nursing note reviewed  Constitutional:       Appearance: She is well-developed  HENT:      Head: Normocephalic  Eyes:      General: No scleral icterus       Pupils: Pupils are equal, round, and reactive to light    Neck:      Thyroid: No thyromegaly  Cardiovascular:      Rate and Rhythm: Normal rate and regular rhythm  Pulses:           Radial pulses are 2+ on the right side and 2+ on the left side  Heart sounds: No murmur heard  Pulmonary:      Effort: Pulmonary effort is normal  No respiratory distress  Breath sounds: Normal breath sounds  No wheezing  Musculoskeletal:      Cervical back: Neck supple  Skin:     General: Skin is warm and dry  Neurological:      Mental Status: She is alert  Patient's shoes and socks were not removed  Labs:   Component      Latest Ref Rng & Units 10/4/2021 2/16/2022   Sodium      136 - 145 mmol/L 138 141   Potassium      3 5 - 5 3 mmol/L 4 4 4 3   Chloride      100 - 108 mmol/L 106 107   CO2      21 - 32 mmol/L 29 30   Anion Gap      4 - 13 mmol/L 3 (L) 4   BUN      5 - 25 mg/dL 31 (H) 30 (H)   Creatinine      0 60 - 1 30 mg/dL 1 00 0 98   Glucose, Random      65 - 140 mg/dL 129    Calcium      8 3 - 10 1 mg/dL 9 6 9 7   eGFR      ml/min/1 73sq m 54 54   GLUCOSE FASTING      65 - 99 mg/dL  84   EXT Creatinine Urine      mg/dL  50 8   MICROALBUM ,U,RANDOM      0 0 - 20 0 mg/L  396 0 (H)   MICROALBUMIN/CREATININE RATIO      0 - 30 mg/g creatinine  780 (H)   Hemoglobin A1C      Normal 3 8-5 6%; PreDiabetic 5 7-6 4%; Diabetic >=6 5%; Glycemic control for adults with diabetes <7 0% % 7 0 (H) 7 1 (H)   eAG, EST AVG Glucose      mg/dl 154 157   Free T4      0 76 - 1 46 ng/dL 1 20 1 06   TSH 3RD GENERATON      0 358 - 3 740 uIU/mL 7 080 (H) 15 100 (H)     Plan:    Diagnoses and all orders for this visit:    Type 2 diabetes mellitus with hyperglycemia, with long-term current use of insulin (HCC)  HGA1C 7 1%  Worsened  Treatment regimen: decrease Toujeo to 36 units due to occasional low BG in the mornings  Discussed intensive insulin regimen does increase risk for hypoglycemia   Episodes of hypoglycemia can lead to permanent disability and death   Discussed risks/complications associated with uncontrolled diabetes  Advised to adhere to diabetic diet, and recommended staying active/exercising routinely as tolerated  Keep carbohydrates consistent to limit blood glucose fluctuations  Advised to call if blood sugars less than 70 mg/dl or over 300 mg/dl  Check blood glucose 3+ times a day  Discussed symptoms and treatment of hypoglycemia  Discussed use of CGM to collect additional blood glucose data to reveal trends and patterns that can be used to optimize treatment plan  Referred to diabetes/nutrition education  Recommended routine follow-up with podiatry and ophthalmology  Send log in 1-2 weeks  Ordered blood work to complete prior to next visit  -     Hemoglobin A1C; Future  -     Basic metabolic panel; Future  -     Discontinue: insulin glargine (Toujeo SoloStar) 300 units/mL CONCENTRATED U-300 injection pen (1-unit dial); Inject 36 Units under the skin daily at bedtime  -     insulin glargine (Toujeo SoloStar) 300 units/mL CONCENTRATED U-300 injection pen (1-unit dial); Inject 36 Units under the skin daily at bedtime  -     Insulin Pen Needle (Pen Needles) 32G X 6 MM MISC; Use 4 times a day  -     insulin aspart (NovoLOG FlexPen) 100 UNIT/ML injection pen; INJECT 8 UNITS SUBCUTANEOUSLY WITH BREAKFAST, THEN 10 UNITS WITH LUNCH, AND THEN 10 UNITS WITH DINNER    Acquired hypothyroidism  TSH elevated at 15 100 and free T4 normal at 1 06  She did miss a dose of levothyroxine  Take medication as instructed and repeat TFTs in 4 weeks  -     T4, free; Future  -     TSH, 3rd generation; Future    Essential hypertension  Blood pressure elevated  Advised to monitor BP at home and follow-up with PCP if remaining elevated  For now continue current treatment  -     Basic metabolic panel;  Future    Mixed hyperlipidemia  LDL previously 60  Continue statin therapy  Managed by PCP        Discussed with the patient diagnosis and treatment and all questions fully answered  She will call me if any problems arise  Counseled patient on diagnostic results, prognosis, risk and benefit of treatment options, instruction for management, importance of treatment compliance, risk factor reduction and impressions        Vicki Guevara PA-C

## 2022-02-17 NOTE — PATIENT INSTRUCTIONS
Hypoglycemia instructions   Lizzie TOURE Maria Dolores  2/17/2022  [id number]    Low Blood Sugar    Steps to treat low blood sugar  1  Test blood sugar if you have symptoms of low blood sugar:   Low Blood Sugar Symptoms:  o Sweaty  o Dizzy  o Rapid heartbeat  o Shaky  o Bad mood  o Hungry      2  Treat blood sugar less than 70 with 15 grams of fast-acting carbohydrate:   Examples of 15 grams Fast-Acting Carbohydrate:  o 4 oz juice  o 4 oz regular soda  o 3-4 glucose tablets (chew)  o 3-4 hard candies (chew)          3  Wait 15 minutes and test your blood sugar again     4   If blood sugar is less than 100, repeat steps 2-3     5  When your blood sugar is 100 or more, eat a snack if it will be longer than one hour until your next meal  The snack should be 15 grams of carbohydrate and a protein:   Examples of snacks:  o ½ sandwich  o 6 crackers with cheese  o Piece of fruit with cheese or peanut butter  o 6 crackers with peanut butter

## 2022-02-22 ENCOUNTER — OFFICE VISIT (OUTPATIENT)
Dept: NEPHROLOGY | Facility: CLINIC | Age: 82
End: 2022-02-22
Payer: MEDICARE

## 2022-02-22 VITALS
HEIGHT: 61 IN | SYSTOLIC BLOOD PRESSURE: 142 MMHG | HEART RATE: 76 BPM | WEIGHT: 228 LBS | DIASTOLIC BLOOD PRESSURE: 76 MMHG | BODY MASS INDEX: 43.05 KG/M2

## 2022-02-22 DIAGNOSIS — N18.30 BENIGN HYPERTENSION WITH CHRONIC KIDNEY DISEASE, STAGE III (HCC): Primary | ICD-10-CM

## 2022-02-22 DIAGNOSIS — I12.9 BENIGN HYPERTENSION WITH CHRONIC KIDNEY DISEASE, STAGE III (HCC): Primary | ICD-10-CM

## 2022-02-22 DIAGNOSIS — N18.31 STAGE 3A CHRONIC KIDNEY DISEASE (HCC): ICD-10-CM

## 2022-02-22 DIAGNOSIS — R60.0 BILATERAL LEG EDEMA: ICD-10-CM

## 2022-02-22 DIAGNOSIS — R80.9 MICROALBUMINURIA: ICD-10-CM

## 2022-02-22 DIAGNOSIS — E55.9 VITAMIN D DEFICIENCY: ICD-10-CM

## 2022-02-22 PROCEDURE — 99214 OFFICE O/P EST MOD 30 MIN: CPT | Performed by: INTERNAL MEDICINE

## 2022-02-22 NOTE — PROGRESS NOTES
NEPHROLOGY OUTPATIENT PROGRESS NOTE   Kaylen Garcias [de-identified] y o  female MRN: [id number]  DATE: 2/22/2022  Reason for visit:   Chief Complaint   Patient presents with    Follow-up     CKD 3     ASSESSMENT and PLAN:  CK D stage III with baseline serum creatinine 1 2-1 3  - Serum creatinine stable 0 9 in February 2022    - Avoid NSAIDs or nephrotoxins  - CKD likely secondary to long-term hypertensive nephrosclerosis/diabetic nephropathy  - Check BMP, phosphorus, UPCR before next visit  - UA in June 2021 shows 1+ proteinuria, 2 to 4 RBCs      Proteinuria  - last urine microalbumin/creatinine ratio worsening 780 mg in February 2022   -continue lisinopril to 40 mg p o  Daily  -Proteinuria likely secondary to long-term diabetes and hypertension, obesity related secondary FSGS contributing?  -last hemoglobin A1c 7 1   -also has moderate DM retinopathy in November 2020  -if continued to worsen further, may consider adding spironolactone      Hypertension  -BP improved on repeat check in the office today although slightly above goal   She has not been checking BP at home lately  -recommend to check BP on daily basis and call back if remains greater than 135/85  Her home BP machine compared with our office readings prior were fairly identical    -continue current lisinopril, metoprolol, Lasix p r n      -salt restricted diet  -consider changing metoprolol to carvedilol blood pressure remains elevated     bilateral lower extremity edema likely secondary to chronic venous stasis  - This has improved and stable since endovascular laser treatment in left lower extremity  She denies any dyspnea  - Continue low-salt diet   -continue current maintenance dose of Lasix 20 mg p o  as needed  she has taken 3 to 4 times in last one month      Prior vitamin-D deficiency, improved   last vitamin-D level normal in February 2022  Currently on vitamin-D 2000 units p o   Daily      Secondary hyperparathyroidism, last PTH  56 in February 2022  continue to monitor   Repeat level before next visit      Anemia in CKD, hemoglobin overall stable at goal      Diagnoses and all orders for this visit:    Benign hypertension with chronic kidney disease, stage III ([address] )  -     Basic metabolic panel; Future  -     CBC; Future  -     Microalbumin / creatinine urine ratio; Future  -     PTH, intact; Future  -     Phosphorus; Future  -     Vitamin D 25 hydroxy; Future  -     Magnesium; Future    Stage 3a chronic kidney disease (HCC)  -     Basic metabolic panel; Future  -     CBC; Future  -     Microalbumin / creatinine urine ratio; Future  -     PTH, intact; Future  -     Phosphorus; Future  -     Vitamin D 25 hydroxy; Future  -     Magnesium; Future    Vitamin D deficiency  -     Vitamin D 25 hydroxy; Future    Microalbuminuria  -     Microalbumin / creatinine urine ratio; Future    Bilateral leg edema        SUBJECTIVE / HPI:  Patient is 61-year-old female with significant past medical history of hypertension for at least 40-50 years, and diabetes for 30 years, diabetic retinopathy, stress incontinence, chronic leg swelling with chronic venous stasis, status post endovascular laser treatment in the left lower extremity, chronic kidney disease stage III with baseline serum creatinine 1 2-1 3, complete heart block, status post ppm, paroxysmal AFib, comes regular follow up of renal failure  Her last serum creatinine overall stable at baseline  She claims to be compliant with her medications  She does not check blood pressure at home lately  Denies any worsening leg edema   Denies any worsening dyspnea   She has lost about 10 lb since last visit  She tries to follow low salt diet  No recent NSAID exposure  Denies lightheadedness or dizziness  Denies any dyspnea    REVIEW OF SYSTEMS:  More than 10 point review of systems were obtained and discussed in length with the patient  Complete review of systems were negative / unremarkable except mentioned above  PHYSICAL EXAM:  Vitals:    02/22/22 1159 02/22/22 1219   BP: 162/86 142/76   BP Location: Right arm    Patient Position: Sitting    Cuff Size: Large    Pulse: 76    Weight: 103 kg (228 lb)    Height: 5' 1" (1 549 m)      Body mass index is 43 08 kg/m²  Physical Exam  Vitals reviewed  Constitutional:       Appearance: She is well-developed  HENT:      Head: Normocephalic and atraumatic  Right Ear: External ear normal       Left Ear: External ear normal    Eyes:      Conjunctiva/sclera: Conjunctivae normal    Cardiovascular:      Comments: S1, S2 present  Pulmonary:      Effort: Pulmonary effort is normal       Breath sounds: Normal breath sounds  No wheezing or rales  Abdominal:      General: Bowel sounds are normal  There is no distension  Palpations: Abdomen is soft  Tenderness: There is no abdominal tenderness  Musculoskeletal:         General: No deformity  Right lower leg: No edema  Left lower leg: No edema  Comments: Stable nonpitting component of leg edema in both legs   Lymphadenopathy:      Cervical: No cervical adenopathy  Skin:     Findings: No rash  Neurological:      Mental Status: She is alert and oriented to person, place, and time     Psychiatric:         Behavior: Behavior normal          PAST MEDICAL HISTORY:  Past Medical History:   Diagnosis Date    Anxiety     Arthritis     CH[facility]congestive [facility] failure) ([facility])     Chronic venous insufficiency     COPD (chronic obstructive pulmonary disease) ([address] )     Coronary artery disease     Diabetes mellitus ([address] )     type II    Disease of thyroid gland     hypothyroidism    DVT (deep venous thrombosis) ([facility])     HL (hearing loss)     Hyperlipidemia     Hypertension     Irregular heart beat     Paroxysmal a-fib    Pacemaker     Renal disorder     stage 3    Sleep apnea     Sleep difficulties     Thyroid disease     Urinary incontinence     Uterine leiomyoma        PAST SURGICAL HISTORY:  Past Surgical History:   Procedure Laterality Date    BREAST SURGERY      For Biopsy    CARDIAC PACEMAKER PLACEMENT      Permanent  Last assessed: 2/19/16    CARDIAC SURGERY      CARPAL TUNNEL RELEASE Bilateral     CATARACT EXTRACTION      CATARACT EXTRACTION W/ INTRAOCULAR LENS  IMPLANT, BILATERAL      CYSTOSCOPY      Diagnostic  Last assessed: 11/8/17    ECTOPIC PREGNANCY SURGERY      HYSTERECTOMY      Total Abdomonal  Resolved: 1982    JOINT REPLACEMENT Right     TKR    OOPHORECTOMY      SD ENDOVENOUS LASER, 1ST VEIN Left 2/17/2017    Procedure: GREATER SAPHENOUS VEIN ENDOVASCULAR LASER THERAPY ;  Surgeon: Latonya Kelly DO;  Location: [facility];  Service: Vascular    TONSILLECTOMY         SOCIAL HISTORY:  Social History     Substance and Sexual Activity   Alcohol Use Yes    Comment: rarely   Per [employer]: Denied history of alcohol use     Social History     Substance and Sexual Activity   Drug Use No     Social History     Tobacco Use   Smoking Status Never Smoker   Smokeless Tobacco Never Used   Tobacco Comment    Per [employer]: Former smoker       FAMILY HISTORY:  Family History   Problem Relation Age of Onset    Hypertension Father     Diabetes Father         Mellitus    Coronary artery disease Mother     Hypertension Mother     Heart disease Mother     Diabetes Brother         Mellitus    Breast cancer Maternal Aunt     Pancreatic cancer Maternal Aunt     Breast cancer Cousin     Breast cancer Cousin     No Known Problems Sister     No Known Problems Daughter     No Known Problems Maternal Grandmother     No Known Problems Maternal Grandfather     No Known Problems Paternal Grandmother     No Known Problems Paternal Grandfather     No Known Problems Daughter     No Known Problems Son        MEDICATIONS:    Current Outpatient Medications:     acetaminophen (TYLENOL) 325 mg tablet, Take 650 mg by mouth , Disp: , Rfl:     apixaban (Eliquis) 5 mg, Take 1 tablet (5 mg total) by mouth 2 (two) times a day Lot #[id number] Exp 9/2022, Disp: 56 tablet, Rfl: 0    Ascorbic Acid (VITAMIN C) 1000 MG tablet, Take 500 mg by mouth , Disp: , Rfl:     atorvastatin (LIPITOR) 80 mg tablet, TAKE 1 TABLET AT BEDTIME, Disp: 90 tablet, Rfl: 3    Blood Glucose Monitoring Suppl (OneTouch Verio) w/Device KIT, Use as directed, Disp: 1 kit, Rfl: 0    Calcium Carbonate-Vit D-Min (CALCIUM 1200 PO), Take by mouth, Disp: , Rfl:     cholecalciferol (VITAMIN D3) 1,000 units tablet, Take 2,000 Units by mouth daily , Disp: , Rfl:     citalopram (CeleXA) 20 mg tablet, TAKE 1 TABLET EVERY DAY, Disp: 90 tablet, Rfl: 3    Continuous Blood Gluc  (FREESTYLE RONNI 14 DAY READER) GARRY, Use to check BG levels 4+times daily and as needed, Disp: 1 Device, Rfl: 0    Continuous Blood Gluc Sensor (FREESTYLE RONNI 14 DAY SENSOR) MISC, Change sensor every 14 days to check BG levels 4+ times daily, Disp: 6 each, Rfl: 3    furosemide (LASIX) 20 mg tablet, Take 1 tablet (20 mg total) by mouth as needed (as needed for leg swelling), Disp: 30 tablet, Rfl: 2    GLUCOSAMINE CHONDROITIN COMPLX PO, Take 1 tablet by mouth 2 (two) times a day, Disp: , Rfl:     glucose blood (OneTouch Verio) test strip, Use 4 times a day, Disp: 400 strip, Rfl: 1    glucose blood (TRUE METRIX BLOOD GLUCOSE TEST) test strip, Test 4 times plus daily, as instructed, Disp: 400 each, Rfl: 1    hydrOXYzine HCL (ATARAX) 25 mg tablet, Take 1 tablet (25 mg total) by mouth daily at bedtime, Disp: 30 tablet, Rfl: 2    insulin aspart (NovoLOG FlexPen) 100 UNIT/ML injection pen, INJECT 8 UNITS SUBCUTANEOUSLY WITH BREAKFAST, THEN 10 UNITS WITH LUNCH, AND THEN 10 UNITS WITH DINNER, Disp: 45 mL, Rfl: 1    insulin glargine (Toujeo SoloStar) 300 units/mL CONCENTRATED U-300 injection pen (1-unit dial), Inject 36 Units under the skin daily at bedtime, Disp: 12 mL, Rfl: 1    Insulin Pen Needle (Pen Needles) 32G X 6 MM MISC, Use 4 times a day, Disp: 400 each, Rfl: 1    Lancets (onetouch ultrasoft) lancets, Use 4 times a day, Disp: 400 each, Rfl: 0    levothyroxine 125 mcg tablet, TAKE 1 TABLET ONE TIME DAILY EXCEPT ON SUNDAY NO TABLET, Disp: 78 tablet, Rfl: 1    lisinopril (ZESTRIL) 20 mg tablet, TAKE 1 TABLET TWICE DAILY, Disp: 180 tablet, Rfl: 3    metoprolol tartrate (LOPRESSOR) 25 mg tablet, TAKE 1 TABLET EVERY 12 HOURS, Disp: 180 tablet, Rfl: 3    Omega-3 Fatty Acids (FISH OIL) 1200 MG CAPS, Take 1,200 mg by mouth 2 (two) times a day , Disp: , Rfl:     oxybutynin (DITROPAN) 5 mg tablet, TAKE 1 TABLET TWICE DAILY, Disp: 180 tablet, Rfl: 1    Promethazine-DM (PHENERGAN-DM) 6 25-15 mg/5 mL oral syrup, Take 5 mL by mouth 4 (four) times a day as needed for cough, Disp: 300 mL, Rfl: 2    Tetrahydroz-Polyvinyl Al-Povid (MURINE TEARS PLUS OP), Apply to eye 3 (three) times a day, Disp: , Rfl:     triamcinolone (KENALOG) 0 1 % ointment, Apply 2x/day to itchy, red areas as needed   Overuse can thin skin, Disp: 453 6 g, Rfl: 3    clobetasol (TEMOVATE) 0 05 % cream, Apply topically 2 (two) times a day To leg (Patient not taking: Reported on 10/28/2021), Disp: 60 g, Rfl: 5    Lab Results:   Results for orders placed or performed in visit on 70/46/84   Basic metabolic panel   Result Value Ref Range    Sodium 141 136 - 145 mmol/L    Potassium 4 3 3 5 - 5 3 mmol/L    Chloride 107 100 - 108 mmol/L    CO2 30 21 - 32 mmol/L    ANION GAP 4 4 - 13 mmol/L    BUN 30 (H) 5 - 25 mg/dL    Creatinine 0 98 0 60 - 1 30 mg/dL    Glucose, Fasting 84 65 - 99 mg/dL    Calcium 9 7 8 3 - 10 1 mg/dL    eGFR 54 ml/min/1 73sq m   CBC   Result Value Ref Range    WBC 5 19 4 31 - 10 16 Thousand/uL    RBC 3 96 3 81 - 5 12 Million/uL    Hemoglobin 12 3 11 5 - 15 4 g/dL    Hematocrit 39 9 34 8 - 46 1 %     (H) 82 - 98 fL    MCH 31 1 26 8 - 34 3 pg    MCHC 30 8 (L) 31 4 - 37 4 g/dL    RDW 13 1 11 6 - 15 1 %    Platelets 483 (L) 061 - 390 Thousands/uL    MPV 10 3 8 9 - 12 7 fL   Phosphorus   Result Value Ref Range    Phosphorus 4 2 (H) 2 3 - 4 1 mg/dL   PTH, intact   Result Value Ref Range    PTH 56 9 18 4 - 80 1 pg/mL   Vitamin D 25 hydroxy   Result Value Ref Range    Vit D, 25-Hydroxy 46 3 30 0 - 100 0 ng/mL   Magnesium   Result Value Ref Range    Magnesium 1 8 1 6 - 2 6 mg/dL   T4, free   Result Value Ref Range    Free T4 1 06 0 76 - 1 46 ng/dL   TSH, 3rd generation   Result Value Ref Range    TSH 3RD GENERATON 15 100 (H) 0 358 - 3 740 uIU/mL   Hemoglobin A1C   Result Value Ref Range    Hemoglobin A1C 7 1 (H) Normal 3 8-5 6%; PreDiabetic 5 7-6 4%;  Diabetic >=6 5%; Glycemic control for adults with diabetes <7 0% %     mg/dl

## 2022-02-22 NOTE — PATIENT INSTRUCTIONS
-CONTINUE TO CHECK BLOOD PRESSURE DAILY AT HOME NEXT 7 TO 10 DAYS AND CALL BACK IF REMAINS PERSISTENTLY GREATER THAN 135/85  IF BLOOD PRESSURE REMAINS ELEVATED,  WE MAY CONSIDER ADDING SPIRONOLACTONE (ALDECTONE) IN THE FUTURE

## 2022-03-11 ENCOUNTER — APPOINTMENT (OUTPATIENT)
Dept: LAB | Facility: CLINIC | Age: 82
End: 2022-03-11
Payer: MEDICARE

## 2022-03-11 DIAGNOSIS — E03.9 ACQUIRED HYPOTHYROIDISM: Chronic | ICD-10-CM

## 2022-03-11 DIAGNOSIS — I12.9 BENIGN HYPERTENSION WITH CHRONIC KIDNEY DISEASE, STAGE III (HCC): ICD-10-CM

## 2022-03-11 DIAGNOSIS — N18.30 BENIGN HYPERTENSION WITH CHRONIC KIDNEY DISEASE, STAGE III (HCC): ICD-10-CM

## 2022-03-11 DIAGNOSIS — I10 ESSENTIAL HYPERTENSION: ICD-10-CM

## 2022-03-11 LAB
ALBUMIN SERPL BCP-MCNC: 3.5 G/DL (ref 3.5–5)
ALP SERPL-CCNC: 88 U/L (ref 46–116)
ALT SERPL W P-5'-P-CCNC: 29 U/L (ref 12–78)
ANION GAP SERPL CALCULATED.3IONS-SCNC: 4 MMOL/L (ref 4–13)
AST SERPL W P-5'-P-CCNC: 22 U/L (ref 5–45)
BASOPHILS # BLD AUTO: 0.09 THOUSANDS/ΜL (ref 0–0.1)
BASOPHILS NFR BLD AUTO: 1 % (ref 0–1)
BILIRUB SERPL-MCNC: 0.75 MG/DL (ref 0.2–1)
BUN SERPL-MCNC: 31 MG/DL (ref 5–25)
CALCIUM SERPL-MCNC: 9.4 MG/DL (ref 8.3–10.1)
CHLORIDE SERPL-SCNC: 106 MMOL/L (ref 100–108)
CHOLEST SERPL-MCNC: 140 MG/DL
CO2 SERPL-SCNC: 30 MMOL/L (ref 21–32)
CREAT SERPL-MCNC: 1.11 MG/DL (ref 0.6–1.3)
EOSINOPHIL # BLD AUTO: 0.28 THOUSAND/ΜL (ref 0–0.61)
EOSINOPHIL NFR BLD AUTO: 5 % (ref 0–6)
ERYTHROCYTE [DISTWIDTH] IN BLOOD BY AUTOMATED COUNT: 12.9 % (ref 11.6–15.1)
GFR SERPL CREATININE-BSD FRML MDRD: 47 ML/MIN/1.73SQ M
GLUCOSE P FAST SERPL-MCNC: 81 MG/DL (ref 65–99)
HCT VFR BLD AUTO: 40.5 % (ref 34.8–46.1)
HDLC SERPL-MCNC: 67 MG/DL
HGB BLD-MCNC: 12.4 G/DL (ref 11.5–15.4)
IMM GRANULOCYTES # BLD AUTO: 0.01 THOUSAND/UL (ref 0–0.2)
IMM GRANULOCYTES NFR BLD AUTO: 0 % (ref 0–2)
LDLC SERPL CALC-MCNC: 62 MG/DL (ref 0–100)
LYMPHOCYTES # BLD AUTO: 1.7 THOUSANDS/ΜL (ref 0.6–4.47)
LYMPHOCYTES NFR BLD AUTO: 27 % (ref 14–44)
MCH RBC QN AUTO: 30.8 PG (ref 26.8–34.3)
MCHC RBC AUTO-ENTMCNC: 30.6 G/DL (ref 31.4–37.4)
MCV RBC AUTO: 101 FL (ref 82–98)
MONOCYTES # BLD AUTO: 0.7 THOUSAND/ΜL (ref 0.17–1.22)
MONOCYTES NFR BLD AUTO: 11 % (ref 4–12)
NEUTROPHILS # BLD AUTO: 3.49 THOUSANDS/ΜL (ref 1.85–7.62)
NEUTS SEG NFR BLD AUTO: 56 % (ref 43–75)
NONHDLC SERPL-MCNC: 73 MG/DL
NRBC BLD AUTO-RTO: 0 /100 WBCS
PLATELET # BLD AUTO: 162 THOUSANDS/UL (ref 149–390)
PMV BLD AUTO: 10.4 FL (ref 8.9–12.7)
POTASSIUM SERPL-SCNC: 4.3 MMOL/L (ref 3.5–5.3)
PROT SERPL-MCNC: 7.3 G/DL (ref 6.4–8.2)
RBC # BLD AUTO: 4.03 MILLION/UL (ref 3.81–5.12)
SODIUM SERPL-SCNC: 140 MMOL/L (ref 136–145)
T4 FREE SERPL-MCNC: 0.97 NG/DL (ref 0.76–1.46)
TRIGL SERPL-MCNC: 56 MG/DL
TSH SERPL DL<=0.05 MIU/L-ACNC: 11.2 UIU/ML (ref 0.36–3.74)
WBC # BLD AUTO: 6.27 THOUSAND/UL (ref 4.31–10.16)

## 2022-03-11 PROCEDURE — 36415 COLL VENOUS BLD VENIPUNCTURE: CPT

## 2022-03-11 PROCEDURE — 84443 ASSAY THYROID STIM HORMONE: CPT

## 2022-03-11 PROCEDURE — 84439 ASSAY OF FREE THYROXINE: CPT

## 2022-03-11 PROCEDURE — 85025 COMPLETE CBC W/AUTO DIFF WBC: CPT

## 2022-03-11 PROCEDURE — 80053 COMPREHEN METABOLIC PANEL: CPT

## 2022-03-11 PROCEDURE — 80061 LIPID PANEL: CPT

## 2022-03-14 ENCOUNTER — TELEPHONE (OUTPATIENT)
Dept: ENDOCRINOLOGY | Facility: CLINIC | Age: 82
End: 2022-03-14

## 2022-03-14 NOTE — TELEPHONE ENCOUNTER
----- Message from Elvira Rodriguez PA-C sent at 3/14/2022 11:21 AM EDT -----  Please call the patient regarding her abnormal result  TSH remains elevated at 11 200 but improved from 15 100  Free T4 is normal    Considering her history of atrial fibrillation, will continue current dose of levothyroxine as free T4 is normal    Advised to take medication without missing doses on an empty stomach at least 1 hour before breakfast and at least 4 hours apart from supplements

## 2022-03-28 ENCOUNTER — TELEPHONE (OUTPATIENT)
Dept: CARDIOLOGY CLINIC | Facility: CLINIC | Age: 82
End: 2022-03-28

## 2022-03-29 ENCOUNTER — REMOTE DEVICE CLINIC VISIT (OUTPATIENT)
Dept: CARDIOLOGY CLINIC | Facility: CLINIC | Age: 82
End: 2022-03-29
Payer: MEDICARE

## 2022-03-29 DIAGNOSIS — Z95.0 PRESENCE OF PERMANENT CARDIAC PACEMAKER: Primary | ICD-10-CM

## 2022-03-29 PROCEDURE — 93294 REM INTERROG EVL PM/LDLS PM: CPT | Performed by: INTERNAL MEDICINE

## 2022-03-29 PROCEDURE — 93296 REM INTERROG EVL PM/IDS: CPT | Performed by: INTERNAL MEDICINE

## 2022-03-29 NOTE — PROGRESS NOTES
MDT-DUAL CHAMBER PPM (DDDR MODE)/ ACTIVE SYSTEM IS MRI CONDITIONAL   CARELINK TRANSMISSION:  BATTERY VOLTAGE ADEQUATE (2 5 YR)    AP 58 0%  100% (>40%/CHB)   ALL LEAD PARAMETERS WITHIN NORMAL LIMITS   5 AF EPISODES WITH LONGEST 48 MIN   PT TAKES ELIQUIS   NORMAL DEVICE FUNCTION   RG

## 2022-04-01 DIAGNOSIS — R05.9 COUGH: ICD-10-CM

## 2022-04-01 RX ORDER — DEXTROMETHORPHAN HYDROBROMIDE AND PROMETHAZINE HYDROCHLORIDE 15; 6.25 MG/5ML; MG/5ML
5 SOLUTION ORAL 4 TIMES DAILY PRN
Qty: 300 ML | Refills: 2 | Status: SHIPPED | OUTPATIENT
Start: 2022-04-01

## 2022-04-06 ENCOUNTER — RA CDI HCC (OUTPATIENT)
Dept: OTHER | Facility: HOSPITAL | Age: 82
End: 2022-04-06

## 2022-04-06 NOTE — PROGRESS NOTES
[facility]  coding opportunities     E11 [id number], I13 [id number] and [id number]     Chart Reviewed number of suggestions sent to Provider: 3     Patients Insurance     Medicare Insurance: Medicare

## 2022-04-12 ENCOUNTER — OFFICE VISIT (OUTPATIENT)
Dept: FAMILY MEDICINE CLINIC | Facility: CLINIC | Age: 82
End: 2022-04-12
Payer: MEDICARE

## 2022-04-12 VITALS
TEMPERATURE: 97.9 F | BODY MASS INDEX: 43.99 KG/M2 | HEART RATE: 74 BPM | DIASTOLIC BLOOD PRESSURE: 80 MMHG | HEIGHT: 61 IN | WEIGHT: 233 LBS | SYSTOLIC BLOOD PRESSURE: 122 MMHG

## 2022-04-12 DIAGNOSIS — Z00.00 MEDICARE ANNUAL WELLNESS VISIT, SUBSEQUENT: Primary | ICD-10-CM

## 2022-04-12 DIAGNOSIS — I48.0 PAF (PAROXYSMAL ATRIAL FIBRILLATION) (HCC): ICD-10-CM

## 2022-04-12 DIAGNOSIS — N18.31 STAGE 3A CHRONIC KIDNEY DISEASE (HCC): ICD-10-CM

## 2022-04-12 DIAGNOSIS — R06.2 WHEEZE: ICD-10-CM

## 2022-04-12 DIAGNOSIS — J44.9 CHRONIC OBSTRUCTIVE PULMONARY DISEASE, UNSPECIFIED COPD TYPE (HCC): ICD-10-CM

## 2022-04-12 DIAGNOSIS — I50.32 CHRONIC DIASTOLIC CONGESTIVE HEART FAILURE (HCC): ICD-10-CM

## 2022-04-12 DIAGNOSIS — E11.3393 MODERATE NONPROLIFERATIVE DIABETIC RETINOPATHY OF BOTH EYES ASSOCIATED WITH TYPE 2 DIABETES MELLITUS, MACULAR EDEMA PRESENCE UNSPECIFIED (HCC): ICD-10-CM

## 2022-04-12 PROCEDURE — 99214 OFFICE O/P EST MOD 30 MIN: CPT | Performed by: FAMILY MEDICINE

## 2022-04-12 PROCEDURE — G0439 PPPS, SUBSEQ VISIT: HCPCS | Performed by: FAMILY MEDICINE

## 2022-04-12 PROCEDURE — 1123F ACP DISCUSS/DSCN MKR DOCD: CPT | Performed by: FAMILY MEDICINE

## 2022-04-12 RX ORDER — ALBUTEROL SULFATE 90 UG/1
2 AEROSOL, METERED RESPIRATORY (INHALATION) EVERY 6 HOURS PRN
Qty: 8 G | Refills: 5 | Status: SHIPPED | OUTPATIENT
Start: 2022-04-12

## 2022-04-12 NOTE — PATIENT INSTRUCTIONS
Medicare Preventive Visit Patient Instructions  Thank you for completing your Welcome to Medicare Visit or Medicare Annual Wellness Visit today  Your next wellness visit will be due in one year (4/13/2023)  The screening/preventive services that you may require over the next 5-10 years are detailed below  Some tests may not apply to you based off risk factors and/or age  Screening tests ordered at today's visit but not completed yet may show as past due  Also, please note that scanned in results may not display below  Preventive Screenings:  Service Recommendations Previous Testing/Comments   Colorectal Cancer Screening  * Colonoscopy    * Fecal Occult Blood Test (FOBT)/Fecal Immunochemical Test (FIT)  * Fecal DNA/Cologuard Test  * Flexible Sigmoidoscopy Age: 54-65 years old   Colonoscopy: every 10 years (may be performed more frequently if at higher risk)  OR  FOBT/FIT: every 1 year  OR  Cologuard: every 3 years  OR  Sigmoidoscopy: every 5 years  Screening may be recommended earlier than age 48 if at higher risk for colorectal cancer  Also, an individualized decision between you and your healthcare provider will decide whether screening between the ages of 74-80 would be appropriate  Colonoscopy: Not on file  FOBT/FIT: Not on file  Cologuard: Not on file  Sigmoidoscopy: Not on file          Breast Cancer Screening Age: 36 years old  Frequency: every 1-2 years  Not required if history of left and right mastectomy Mammogram: 12/20/2021    Screening Current   Cervical Cancer Screening Between the ages of 21-29, pap smear recommended once every 3 years  Between the ages of 33-67, can perform pap smear with HPV co-testing every 5 years     Recommendations may differ for women with a history of total hysterectomy, cervical cancer, or abnormal pap smears in past  Pap Smear: Not on file    Screening Not Indicated   Hepatitis C Screening Once for adults born between 1945 and 1965  More frequently in patients at high risk for Hepatitis C Hep C Antibody: 01/23/2020    Screening Current   Diabetes Screening 1-2 times per year if you're at risk for diabetes or have pre-diabetes Fasting glucose: 81 mg/dL   A1C: 7 1 %    Screening Not Indicated  History Diabetes   Cholesterol Screening Once every 5 years if you don't have a lipid disorder  May order more often based on risk factors  Lipid panel: 03/11/2022    Screening Not Indicated  History Lipid Disorder     Other Preventive Screenings Covered by Medicare:  1  Abdominal Aortic Aneurysm (AAA) Screening: covered once if your at risk  You're considered to be at risk if you have a family history of AAA  2  Lung Cancer Screening: covers low dose CT scan once per year if you meet all of the following conditions: (1) Age 50-69; (2) No signs or symptoms of lung cancer; (3) Current smoker or have quit smoking within the last 15 years; (4) You have a tobacco smoking history of at least 30 pack years (packs per day multiplied by number of years you smoked); (5) You get a written order from a healthcare provider  3  Glaucoma Screening: covered annually if you're considered high risk: (1) You have diabetes OR (2) Family history of glaucoma OR (3)  aged 48 and older OR (3)  American aged 72 and older  3  Osteoporosis Screening: covered every 2 years if you meet one of the following conditions: (1) You're estrogen deficient and at risk for osteoporosis based off medical history and other findings; (2) Have a vertebral abnormality; (3) On glucocorticoid therapy for more than 3 months; (4) Have primary hyperparathyroidism; (5) On osteoporosis medications and need to assess response to drug therapy  · Last bone density test (DXA Scan): 07/07/2020   5  HIV Screening: covered annually if you're between the age of 15-65  Also covered annually if you are younger than 13 and older than 72 with risk factors for HIV infection   For pregnant patients, it is covered up to 3 times per pregnancy  Immunizations:  Immunization Recommendations   Influenza Vaccine Annual influenza vaccination during flu season is recommended for all persons aged >= 6 months who do not have contraindications   Pneumococcal Vaccine (Prevnar and Pneumovax)  * Prevnar = PCV13  * Pneumovax = PPSV23   Adults 25-60 years old: 1-3 doses may be recommended based on certain risk factors  Adults 72 years old: Prevnar (PCV13) vaccine recommended followed by Pneumovax (PPSV23) vaccine  If already received PPSV23 since turning 65, then PCV13 recommended at least one year after PPSV23 dose  Hepatitis B Vaccine 3 dose series if at intermediate or high risk (ex: diabetes, end stage renal disease, liver disease)   Tetanus (Td) Vaccine - COST NOT COVERED BY MEDICARE PART B Following completion of primary series, a booster dose should be given every 10 years to maintain immunity against tetanus  Td may also be given as tetanus wound prophylaxis  Tdap Vaccine - COST NOT COVERED BY MEDICARE PART B Recommended at least once for all adults  For pregnant patients, recommended with each pregnancy  Shingles Vaccine (Shingrix) - COST NOT COVERED BY MEDICARE PART B  2 shot series recommended in those aged 48 and above     Health Maintenance Due:      Topic Date Due    Breast Cancer Screening: Mammogram  12/20/2022    DXA SCAN  07/07/2023    Hepatitis C Screening  Completed     Immunizations Due:      Topic Date Due    DTaP,Tdap,and Td Vaccines (1 - Tdap) 07/05/2008    COVID-19 Vaccine (3 - Booster for Pfizer series) 07/09/2021    Influenza Vaccine (1) 09/01/2021     Advance Directives   What are advance directives? Advance directives are legal documents that state your wishes and plans for medical care  These plans are made ahead of time in case you lose your ability to make decisions for yourself  Advance directives can apply to any medical decision, such as the treatments you want, and if you want to donate organs     What are the types of advance directives? There are many types of advance directives, and each state has rules about how to use them  You may choose a combination of any of the following:  · Living will: This is a written record of the treatment you want  You can also choose which treatments you do not want, which to limit, and which to stop at a certain time  This includes surgery, medicine, IV fluid, and tube feedings  · Durable power of  for healthcare [facility]): This is a written record that states who you want to make healthcare choices for you when you are unable to make them for yourself  This person, called a proxy, is usually a family member or a friend  You may choose more than 1 proxy  · Do not resuscitate (DNR) order:  A DNR order is used in case your heart stops beating or you stop breathing  It is a request not to have certain forms of treatment, such as CPR  A DNR order may be included in other types of advance directives  · Medical directive: This covers the care that you want if you are in a coma, near death, or unable to make decisions for yourself  You can list the treatments you want for each condition  Treatment may include pain medicine, surgery, blood transfusions, dialysis, IV or tube feedings, and a ventilator (breathing machine)  · Values history: This document has questions about your views, beliefs, and how you feel and think about life  This information can help others choose the care that you would choose  Why are advance directives important? An advance directive helps you control your care  Although spoken wishes may be used, it is better to have your wishes written down  Spoken wishes can be misunderstood, or not followed  Treatments may be given even if you do not want them  An advance directive may make it easier for your family to make difficult choices about your care  Urinary Incontinence   Urinary incontinence (UI)  is when you lose control of your bladder   UI develops because your bladder cannot store or empty urine properly  The 3 most common types of UI are stress incontinence, urge incontinence, or both  Medicines:   · May be given to help strengthen your bladder control  Report any side effects of medication to your healthcare provider  Do pelvic muscle exercises often:  Your pelvic muscles help you stop urinating  Squeeze these muscles tight for 5 seconds, then relax for 5 seconds  Gradually work up to squeezing for 10 seconds  Do 3 sets of 15 repetitions a day, or as directed  This will help strengthen your pelvic muscles and improve bladder control  Train your bladder:  Go to the bathroom at set times, such as every 2 hours, even if you do not feel the urge to go  You can also try to hold your urine when you feel the urge to go  For example, hold your urine for 5 minutes when you feel the urge to go  As that becomes easier, hold your urine for 10 minutes  Self-care:   · Keep a UI record  Write down how often you leak urine and how much you leak  Make a note of what you were doing when you leaked urine  · Drink liquids as directed  You may need to limit the amount of liquid you drink to help control your urine leakage  Do not drink any liquid right before you go to bed  Limit or do not have drinks that contain caffeine or alcohol  · Prevent constipation  Eat a variety of high-fiber foods  Good examples are high-fiber cereals, beans, vegetables, and whole-grain breads  Walking is the best way to trigger your intestines to have a bowel movement  · Exercise regularly and maintain a healthy weight  Weight loss and exercise will decrease pressure on your bladder and help you control your leakage  · Use a catheter as directed  to help empty your bladder  A catheter is a tiny, plastic tube that is put into your bladder to drain your urine  · Go to behavior therapy as directed    Behavior therapy may be used to help you learn to control your urge to urinate  Weight Management   Why it is important to manage your weight:  Being overweight increases your risk of health conditions such as heart disease, high blood pressure, type 2 diabetes, and certain types of cancer  It can also increase your risk for osteoarthritis, sleep apnea, and other respiratory problems  Aim for a slow, steady weight loss  Even a small amount of weight loss can lower your risk of health problems  How to lose weight safely:  A safe and healthy way to lose weight is to eat fewer calories and get regular exercise  You can lose up about 1 pound a week by decreasing the number of calories you eat by 500 calories each day  Healthy meal plan for weight management:  A healthy meal plan includes a variety of foods, contains fewer calories, and helps you stay healthy  A healthy meal plan includes the following:  · Eat whole-grain foods more often  A healthy meal plan should contain fiber  Fiber is the part of grains, fruits, and vegetables that is not broken down by your body  Whole-grain foods are healthy and provide extra fiber in your diet  Some examples of whole-grain foods are whole-wheat breads and pastas, oatmeal, brown rice, and bulgur  · Eat a variety of vegetables every day  Include dark, leafy greens such as spinach, kale, ras greens, and mustard greens  Eat yellow and orange vegetables such as carrots, sweet potatoes, and winter squash  · Eat a variety of fruits every day  Choose fresh or canned fruit (canned in its own juice or light syrup) instead of juice  Fruit juice has very little or no fiber  · Eat low-fat dairy foods  Drink fat-free (skim) milk or 1% milk  Eat fat-free yogurt and low-fat cottage cheese  Try low-fat cheeses such as mozzarella and other reduced-fat cheeses  · Choose meat and other protein foods that are low in fat  Choose beans or other legumes such as split peas or lentils   Choose fish, skinless poultry (chicken or turkey), or lean cuts of red meat (beef or pork)  Before you cook meat or poultry, cut off any visible fat  · Use less fat and oil  Try baking foods instead of frying them  Add less fat, such as margarine, sour cream, regular salad dressing and mayonnaise to foods  Eat fewer high-fat foods  Some examples of high-fat foods include french fries, doughnuts, ice cream, and cakes  · Eat fewer sweets  Limit foods and drinks that are high in sugar  This includes candy, cookies, regular soda, and sweetened drinks  Exercise:  Exercise at least 30 minutes per day on most days of the week  Some examples of exercise include walking, biking, dancing, and swimming  You can also fit in more physical activity by taking the stairs instead of the elevator or parking farther away from stores  Ask your healthcare provider about the best exercise plan for you  Narcotic (Opioid) Safety    Use narcotics safely:  · Take prescribed narcotics exactly as directed  · Do not give narcotics to others or take narcotics that belong to someone else  · Do not mix narcotics without medicines or alcohol  · Do not drive or operate heavy machinery after you take the narcotic  · Monitor for side effects and notify your healthcare provider if you experienced side effects such as nausea, sleepiness, itching, or trouble thinking clearly  Manage constipation:    Constipation is the most common side effect of narcotic medicine  Constipation is when you have hard, dry bowel movements, or you go longer than usual between bowel movements  Tell your healthcare provider about all changes in your bowel movements while you are taking narcotics  He or she may recommend laxative medicine to help you have a bowel movement  He or she may also change the kind of narcotic you are taking, or change when you take it  The following are more ways you can prevent or relieve constipation:    · Drink liquids as directed  You may need to drink extra liquids to help soften and move your bowels  Ask how much liquid to drink each day and which liquids are best for you  · Eat high-fiber foods  This may help decrease constipation by adding bulk to your bowel movements  High-fiber foods include fruits, vegetables, whole-grain breads and cereals, and beans  Your healthcare provider or dietitian can help you create a high-fiber meal plan  Your provider may also recommend a fiber supplement if you cannot get enough fiber from food  · Exercise regularly  Regular physical activity can help stimulate your intestines  Walking is a good exercise to prevent or relieve constipation  Ask which exercises are best for you  · Schedule a time each day to have a bowel movement  This may help train your body to have regular bowel movements  Bend forward while you are on the toilet to help move the bowel movement out  Sit on the toilet for at least 10 minutes, even if you do not have a bowel movement  Store narcotics safely:   · Store narcotics where others cannot easily get them  Keep them in a locked cabinet or secure area  Do not  keep them in a purse or other bag you carry with you  A person may be looking for something else and find the narcotics  · Make sure narcotics are stored out of the reach of children  A child can easily overdose on narcotics  Narcotics may look like candy to a small child  The best way to dispose of narcotics: The laws vary by country and area  In the [place], the best way is to return the narcotics through a take-back program  This program is offered by the [employer]  The following are options for using the program:  · Take the narcotics to a TYRESE collection site  The site is often a law enforcement center  Call your local law enforcement center for scheduled take-back days in your area  You will be given information on where to go if the collection site is in a different location    · Take the narcotics to an approved pharmacy or hospital   A pharmacy or hospital may be set up as a collection site  You will need to ask if it is a TYRESE collection site if you were not directed there  A pharmacy or doctor's office may not be able to take back narcotics unless it is a TYRESE site  · Use a mail-back system  This means you are given containers to put the narcotics into  You will then mail them in the containers  · Use a take-back drop box  This is a place to leave the narcotics at any time  People and animals will not be able to get into the box  Your local law enforcement agency can tell you where to find a drop box in your area  Other ways to manage pain:   · Ask your healthcare provider about non-narcotic medicines to control pain  Nonprescription medicines include NSAIDs (such as ibuprofen) and acetaminophen  Prescription medicines include muscle relaxers, antidepressants, and steroids  · Pain may be managed without any medicines  Some ways to relieve pain include massage, aromatherapy, or meditation  Physical or occupational therapy may also help  For more information:   · Drug Enforcement Administration  [address] [place] [address] [address]  Phone: 8- [phone] - [phone]  Web Address: [email or web address]    · [address]  and Drug Administration  [facility] [address] [facility]  Phone: 1- 962 - [phone]  Web Address: http://[employer]/     © Copyright [employer] 2018 Information is for End User's use only and may not be sold, redistributed or otherwise used for commercial purposes  All illustrations and images included in CareNotes® are the copyrighted property of [employer]  or [facility] Preventive Visit Patient Instructions  Thank you for completing your Welcome to Medicare Visit or Medicare Annual Wellness Visit today  Your next wellness visit will be due in one year (4/13/2023)    The screening/preventive services that you may require over the next 5-10 years are detailed below  Some tests may not apply to you based off risk factors and/or age  Screening tests ordered at today's visit but not completed yet may show as past due  Also, please note that scanned in results may not display below  Preventive Screenings:  Service Recommendations Previous Testing/Comments   Colorectal Cancer Screening  * Colonoscopy    * Fecal Occult Blood Test (FOBT)/Fecal Immunochemical Test (FIT)  * Fecal DNA/Cologuard Test  * Flexible Sigmoidoscopy Age: 54-65 years old   Colonoscopy: every 10 years (may be performed more frequently if at higher risk)  OR  FOBT/FIT: every 1 year  OR  Cologuard: every 3 years  OR  Sigmoidoscopy: every 5 years  Screening may be recommended earlier than age 48 if at higher risk for colorectal cancer  Also, an individualized decision between you and your healthcare provider will decide whether screening between the ages of 74-80 would be appropriate  Colonoscopy: Not on file  FOBT/FIT: Not on file  Cologuard: Not on file  Sigmoidoscopy: Not on file    Screening Not Indicated     Breast Cancer Screening Age: 36 years old  Frequency: every 1-2 years  Not required if history of left and right mastectomy Mammogram: 12/20/2021    Screening Current   Cervical Cancer Screening Between the ages of 21-29, pap smear recommended once every 3 years  Between the ages of 33-67, can perform pap smear with HPV co-testing every 5 years     Recommendations may differ for women with a history of total hysterectomy, cervical cancer, or abnormal pap smears in past  Pap Smear: Not on file    Screening Not Indicated   Hepatitis C Screening Once for adults born between 1945 and 1965  More frequently in patients at high risk for Hepatitis C Hep C Antibody: 01/23/2020    Screening Current   Diabetes Screening 1-2 times per year if you're at risk for diabetes or have pre-diabetes Fasting glucose: 81 mg/dL   A1C: 7 1 %    Screening Not Indicated  History Diabetes   Cholesterol Screening Once every 5 years if you don't have a lipid disorder  May order more often based on risk factors  Lipid panel: 03/11/2022    Screening Not Indicated  History Lipid Disorder     Other Preventive Screenings Covered by Medicare:  6  Abdominal Aortic Aneurysm (AAA) Screening: covered once if your at risk  You're considered to be at risk if you have a family history of AAA  7  Lung Cancer Screening: covers low dose CT scan once per year if you meet all of the following conditions: (1) Age 50-69; (2) No signs or symptoms of lung cancer; (3) Current smoker or have quit smoking within the last 15 years; (4) You have a tobacco smoking history of at least 30 pack years (packs per day multiplied by number of years you smoked); (5) You get a written order from a healthcare provider  8  Glaucoma Screening: covered annually if you're considered high risk: (1) You have diabetes OR (2) Family history of glaucoma OR (3)  aged 48 and older OR (3)  American aged 72 and older  5  Osteoporosis Screening: covered every 2 years if you meet one of the following conditions: (1) You're estrogen deficient and at risk for osteoporosis based off medical history and other findings; (2) Have a vertebral abnormality; (3) On glucocorticoid therapy for more than 3 months; (4) Have primary hyperparathyroidism; (5) On osteoporosis medications and need to assess response to drug therapy  · Last bone density test (DXA Scan): 07/07/2020  10  HIV Screening: covered annually if you're between the age of 12-76  Also covered annually if you are younger than 13 and older than 72 with risk factors for HIV infection  For pregnant patients, it is covered up to 3 times per pregnancy      Immunizations:  Immunization Recommendations   Influenza Vaccine Annual influenza vaccination during flu season is recommended for all persons aged >= 6 months who do not have contraindications   Pneumococcal Vaccine (Prevnar and Pneumovax)  * Prevnar = PCV13  * Pneumovax = PPSV23   Adults 25-60 years old: 1-3 doses may be recommended based on certain risk factors  Adults 72 years old: Prevnar (PCV13) vaccine recommended followed by Pneumovax (PPSV23) vaccine  If already received PPSV23 since turning 65, then PCV13 recommended at least one year after PPSV23 dose  Hepatitis B Vaccine 3 dose series if at intermediate or high risk (ex: diabetes, end stage renal disease, liver disease)   Tetanus (Td) Vaccine - COST NOT COVERED BY MEDICARE PART B Following completion of primary series, a booster dose should be given every 10 years to maintain immunity against tetanus  Td may also be given as tetanus wound prophylaxis  Tdap Vaccine - COST NOT COVERED BY MEDICARE PART B Recommended at least once for all adults  For pregnant patients, recommended with each pregnancy  Shingles Vaccine (Shingrix) - COST NOT COVERED BY MEDICARE PART B  2 shot series recommended in those aged 48 and above     Health Maintenance Due:      Topic Date Due    Breast Cancer Screening: Mammogram  12/20/2022    DXA SCAN  07/07/2023    Hepatitis C Screening  Completed     Immunizations Due:      Topic Date Due    DTaP,Tdap,and Td Vaccines (1 - Tdap) 07/05/2008    COVID-19 Vaccine (3 - Booster for Pfizer series) 07/09/2021    Influenza Vaccine (1) 09/01/2021     Advance Directives   What are advance directives? Advance directives are legal documents that state your wishes and plans for medical care  These plans are made ahead of time in case you lose your ability to make decisions for yourself  Advance directives can apply to any medical decision, such as the treatments you want, and if you want to donate organs  What are the types of advance directives? There are many types of advance directives, and each state has rules about how to use them  You may choose a combination of any of the following:  · Living will: This is a written record of the treatment you want   You can also choose which treatments you do not want, which to limit, and which to stop at a certain time  This includes surgery, medicine, IV fluid, and tube feedings  · Durable power of  for healthcare [facility]): This is a written record that states who you want to make healthcare choices for you when you are unable to make them for yourself  This person, called a proxy, is usually a family member or a friend  You may choose more than 1 proxy  · Do not resuscitate (DNR) order:  A DNR order is used in case your heart stops beating or you stop breathing  It is a request not to have certain forms of treatment, such as CPR  A DNR order may be included in other types of advance directives  · Medical directive: This covers the care that you want if you are in a coma, near death, or unable to make decisions for yourself  You can list the treatments you want for each condition  Treatment may include pain medicine, surgery, blood transfusions, dialysis, IV or tube feedings, and a ventilator (breathing machine)  · Values history: This document has questions about your views, beliefs, and how you feel and think about life  This information can help others choose the care that you would choose  Why are advance directives important? An advance directive helps you control your care  Although spoken wishes may be used, it is better to have your wishes written down  Spoken wishes can be misunderstood, or not followed  Treatments may be given even if you do not want them  An advance directive may make it easier for your family to make difficult choices about your care  Urinary Incontinence   Urinary incontinence (UI)  is when you lose control of your bladder  UI develops because your bladder cannot store or empty urine properly  The 3 most common types of UI are stress incontinence, urge incontinence, or both  Medicines:   · May be given to help strengthen your bladder control   Report any side effects of medication to your healthcare provider  Do pelvic muscle exercises often:  Your pelvic muscles help you stop urinating  Squeeze these muscles tight for 5 seconds, then relax for 5 seconds  Gradually work up to squeezing for 10 seconds  Do 3 sets of 15 repetitions a day, or as directed  This will help strengthen your pelvic muscles and improve bladder control  Train your bladder:  Go to the bathroom at set times, such as every 2 hours, even if you do not feel the urge to go  You can also try to hold your urine when you feel the urge to go  For example, hold your urine for 5 minutes when you feel the urge to go  As that becomes easier, hold your urine for 10 minutes  Self-care:   · Keep a UI record  Write down how often you leak urine and how much you leak  Make a note of what you were doing when you leaked urine  · Drink liquids as directed  You may need to limit the amount of liquid you drink to help control your urine leakage  Do not drink any liquid right before you go to bed  Limit or do not have drinks that contain caffeine or alcohol  · Prevent constipation  Eat a variety of high-fiber foods  Good examples are high-fiber cereals, beans, vegetables, and whole-grain breads  Walking is the best way to trigger your intestines to have a bowel movement  · Exercise regularly and maintain a healthy weight  Weight loss and exercise will decrease pressure on your bladder and help you control your leakage  · Use a catheter as directed  to help empty your bladder  A catheter is a tiny, plastic tube that is put into your bladder to drain your urine  · Go to behavior therapy as directed  Behavior therapy may be used to help you learn to control your urge to urinate  Weight Management   Why it is important to manage your weight:  Being overweight increases your risk of health conditions such as heart disease, high blood pressure, type 2 diabetes, and certain types of cancer   It can also increase your risk for osteoarthritis, sleep apnea, and other respiratory problems  Aim for a slow, steady weight loss  Even a small amount of weight loss can lower your risk of health problems  How to lose weight safely:  A safe and healthy way to lose weight is to eat fewer calories and get regular exercise  You can lose up about 1 pound a week by decreasing the number of calories you eat by 500 calories each day  Healthy meal plan for weight management:  A healthy meal plan includes a variety of foods, contains fewer calories, and helps you stay healthy  A healthy meal plan includes the following:  · Eat whole-grain foods more often  A healthy meal plan should contain fiber  Fiber is the part of grains, fruits, and vegetables that is not broken down by your body  Whole-grain foods are healthy and provide extra fiber in your diet  Some examples of whole-grain foods are whole-wheat breads and pastas, oatmeal, brown rice, and bulgur  · Eat a variety of vegetables every day  Include dark, leafy greens such as spinach, kale, ras greens, and mustard greens  Eat yellow and orange vegetables such as carrots, sweet potatoes, and winter squash  · Eat a variety of fruits every day  Choose fresh or canned fruit (canned in its own juice or light syrup) instead of juice  Fruit juice has very little or no fiber  · Eat low-fat dairy foods  Drink fat-free (skim) milk or 1% milk  Eat fat-free yogurt and low-fat cottage cheese  Try low-fat cheeses such as mozzarella and other reduced-fat cheeses  · Choose meat and other protein foods that are low in fat  Choose beans or other legumes such as split peas or lentils  Choose fish, skinless poultry (chicken or turkey), or lean cuts of red meat (beef or pork)  Before you cook meat or poultry, cut off any visible fat  · Use less fat and oil  Try baking foods instead of frying them  Add less fat, such as margarine, sour cream, regular salad dressing and mayonnaise to foods  Eat fewer high-fat foods   Some examples of high-fat foods include french fries, doughnuts, ice cream, and cakes  · Eat fewer sweets  Limit foods and drinks that are high in sugar  This includes candy, cookies, regular soda, and sweetened drinks  Exercise:  Exercise at least 30 minutes per day on most days of the week  Some examples of exercise include walking, biking, dancing, and swimming  You can also fit in more physical activity by taking the stairs instead of the elevator or parking farther away from stores  Ask your healthcare provider about the best exercise plan for you  © Copyright [employer] 2018 Information is for End User's use only and may not be sold, redistributed or otherwise used for commercial purposes   All illustrations and images included in CareNotes® are the copyrighted property of A D A M , [employer]  or [address] [place], [place] [address]christie

## 2022-04-12 NOTE — PROGRESS NOTES
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 ([facility] )    Lab Results   Component Value Date    HGBA1C 7 1 (H) 02/16/2022     Patient continues to follow-up with retinal specialist patient undergoing treatment  Continue follow-up with retinal specialist   Recheck 6 months    Chronic obstructive pulmonary disease ([place] [address] )  Patient with some scattered wheezing on exam   No fever chills  Unclear if this is related to allergies and asthmatic leg response  Restart Zyrtec 10 mg q h s -samples given  Patient also given prescription for albuterol HFA to be used 2 puffs Q 4-6 hours p r n  For cough and wheeze  Recheck 2 weeks if not improved-earlier if worse    PAF (paroxysmal atrial fibrillation) ([place] [address] )  Patient with regular rhythm clinically  Follow-up with cardiology tomorrow  Continue present medications  Recheck 6 months    Coronary artery disease involving native coronary artery of native heart  Patient without anginal symptoms  Follow-up with Cardiology tomorrow  Monitor labs  Recheck 6 months    Chronic diastolic congestive heart failure (HCC)  Wt Readings from Last 3 Encounters:   04/12/22 106 kg (233 lb)   02/22/22 103 kg (228 lb)   02/17/22 101 kg (223 lb)   Patient with wheeze but appears to be euvolemic  No JVD appreciated on exam   Weight is slightly increased and patient has some wheeze but no rales     Follow-up with cardiology tomorrow    Recheck 6 months-earlier if worse          Stage 3a chronic kidney disease [facility])  Lab Results   Component Value Date    EGFR 47 03/11/2022    EGFR 54 02/16/2022    EGFR 54 10/04/2021    CREATININE 1 11 03/11/2022    CREATININE 0 98 02/16/2022    CREATININE 1 00 10/04/2021   Creatinine slightly higher  Continue present medications  Continue to monitor creatinine  Recheck 6 months       Diagnoses and all orders for this visit:    Medicare annual wellness visit, subsequent    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, macular edema presence unspecified (HCC)    Chronic obstructive pulmonary disease, unspecified COPD type ([place] [address] )    PAF (paroxysmal atrial fibrillation) ([facility])    Chronic diastolic congestive heart failure ([facility])    Stage 3a chronic kidney disease ([place] [address] )    Wheeze  -     albuterol (ProAir HFA) 90 mcg/act inhaler; Inhale 2 puffs every 6 (six) hours as needed for wheezing          Subjective:      Patient ID: Severo Miu is a [de-identified] y o  female  f/u multiple med issues and AWV  - pt is doing well  - pt has a new hearing aid which is helping  - vision is still an issue  She is due to see ophth later this month  Continues to get OD injections every 6-8w  - BGs have been a little erratic over the last week  Had a couple of low readings  She is up to date with Endo and is on a Dexcom monitor  A1C in Feb = 7 1  - pt denies CP, palp, lightheadedness or other CV symptoms with or without exertion  Pt to se Cardio tomorrow  - L knee pain persists  Also gets some finger pains  Worse with certain movements      - up to date with derm      - AWV done      The following portions of the patient's history were reviewed and updated as appropriate:   She  has a past medical history of Anxiety, Arthritis, CHF (congestive heart failure) ([facility] ), Chronic venous insufficiency, COPD (chronic obstructive pulmonary disease) ([facility] ), Coronary artery disease, Diabetes mellitus ([facility] ), Disease of thyroid gland, DVT (deep venous thrombosis) ([facility] ), HL (hearing loss), Hyperlipidemia, Hypertension, Irregular heart beat, Pacemaker, Renal disorder, Sleep apnea, Sleep difficulties, Thyroid disease, Urinary incontinence, and Uterine leiomyoma  She   Patient Active Problem List    Diagnosis Date Noted    Coronary artery disease involving native coronary artery of native heart 03/06/2021    Dermatitis 09/15/2020    Sleep apnea 05/17/2019    Intraocular pressure increase 06/06/2018    Vitamin D deficiency 04/03/2018    Bilateral leg edema 03/13/2018    Iron deficiency anemia 10/12/2017    Urinary bladder incontinence 07/26/2017    OAB (overactive bladder) 07/26/2017    Benign hypertension with chronic kidney disease, stage III ([facility] ) 06/21/2017    Microalbuminuria 06/21/2017    PAF (paroxysmal atrial fibrillation) ([facility] ) 10/31/2016    Chronic venous insufficiency 02/19/2016    Presence of permanent cardiac pacemaker 01/28/2016    Chronic diastolic congestive heart failure ([facility] ) 01/21/2016    Anemia 01/19/2016    S/P cardiac pacemaker procedure 01/19/2016    Hypothyroidism 01/19/2016    Complete heart block ([facility] ) 01/16/2016    Type 2 diabetes mellitus with hyperglycemia ([facility] ) 01/15/2016    Class 2 severe obesity with serious comorbidity in adult ([facility] ) 01/15/2016    Stage 3a chronic kidney disease ([facility] ) 10/21/2015    Eczematous dermatitis 03/03/2015    Anxiety disorder 06/26/2013    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 neurological manifestations, uncontrolled([address] 62) 10/02/2012    Chronic obstructive pulmonary disease ([facility] ) 09/24/2012    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 ([facility] ) 09/22/2011    Mixed hyperlipidemia 03/14/2011     She  has a past surgical history that includes Hysterectomy; Joint replacement (Right); Tonsillectomy; Cataract extraction w/ intraocular lens  implant, bilateral; Ectopic pregnancy surgery; Cardiac surgery; Cardiac pacemaker placement; Carpal tunnel release (Bilateral); pr endovenous laser, 1st vein (Left, 2/17/2017); Cystoscopy; Cataract extraction; Breast surgery; and Oophorectomy    She reports that she has never smoked  She has never used smokeless tobacco  She reports current alcohol use  She reports that she does not use drugs  Current Outpatient Medications   Medication Sig Dispense Refill    acetaminophen (TYLENOL) 325 mg tablet Take 650 mg by mouth        apixaban (Eliquis) 5 mg Take 1 tablet (5 mg total) by mouth 2 (two) times a day Lot #[id number]  Exp 9/2022 56 tablet 0    Ascorbic Acid (VITAMIN C) 1000 MG tablet Take 500 mg by mouth       atorvastatin (LIPITOR) 80 mg tablet TAKE 1 TABLET AT BEDTIME 90 tablet 3    Blood Glucose Monitoring Suppl (OneTouch Verio) w/Device KIT Use as directed 1 kit 0    Calcium Carbonate-Vit D-Min (CALCIUM 1200 PO) Take by mouth      cholecalciferol (VITAMIN D3) 1,000 units tablet Take 2,000 Units by mouth daily       citalopram (CeleXA) 20 mg tablet TAKE 1 TABLET EVERY DAY 90 tablet 3    clobetasol (TEMOVATE) 0 05 % cream Apply topically 2 (two) times a day To leg 60 g 5    Continuous Blood Gluc  (FREESTYLE RONNI 14 DAY READER) GARRY Use to check BG levels 4+times daily and as needed 1 Device 0    Continuous Blood Gluc Sensor (TRIAXIS MEDICAL DEVICESSTYLE RONNI 14 DAY SENSOR) MISC Change sensor every 14 days to check BG levels 4+ times daily 6 each 3    furosemide (LASIX) 20 mg tablet Take 1 tablet (20 mg total) by mouth as needed (as needed for leg swelling) 30 tablet 2    GLUCOSAMINE CHONDROITIN COMPLX PO Take 1 tablet by mouth 2 (two) times a day      glucose blood (OneTouch Verio) test strip Use 4 times a day 400 strip 1    glucose blood (TRUE METRIX BLOOD GLUCOSE TEST) test strip Test 4 times plus daily, as instructed 400 each 1    hydrOXYzine HCL (ATARAX) 25 mg tablet Take 1 tablet (25 mg total) by mouth daily at bedtime 30 tablet 2    insulin aspart (NovoLOG FlexPen) 100 UNIT/ML injection pen INJECT 8 UNITS SUBCUTANEOUSLY WITH BREAKFAST, THEN 10 UNITS WITH LUNCH, AND THEN 10 UNITS WITH DINNER 45 mL 1    insulin glargine (Toujeo SoloStar) 300 units/mL CONCENTRATED U-300 injection pen (1-unit dial) Inject 36 Units under the skin daily at bedtime 12 mL 1    Insulin Pen Needle (Pen Needles) 32G X 6 MM MISC Use 4 times a day 400 each 1    Lancets (onetouch ultrasoft) lancets Use 4 times a day 400 each 0    levothyroxine 125 mcg tablet TAKE 1 TABLET ONE TIME DAILY EXCEPT ON SUNDAY NO TABLET 78 tablet 1    lisinopril (ZESTRIL) 20 mg tablet TAKE 1 TABLET TWICE DAILY 180 tablet 3    metoprolol tartrate (LOPRESSOR) 25 mg tablet TAKE 1 TABLET EVERY 12 HOURS 180 tablet 3    Omega-3 Fatty Acids (FISH OIL) 1200 MG CAPS Take 1,200 mg by mouth 2 (two) times a day       oxybutynin (DITROPAN) 5 mg tablet TAKE 1 TABLET TWICE DAILY 180 tablet 1    Promethazine-DM (PHENERGAN-DM) 6 25-15 mg/5 mL oral syrup Take 5 mL by mouth 4 (four) times a day as needed for cough 300 mL 2    Tetrahydroz-Polyvinyl Al-Povid (MURINE TEARS PLUS OP) Apply to eye 3 (three) times a day      triamcinolone (KENALOG) 0 1 % ointment Apply 2x/day to itchy, red areas as needed  Overuse can thin skin 453 6 g 3    albuterol (ProAir HFA) 90 mcg/act inhaler Inhale 2 puffs every 6 (six) hours as needed for wheezing 8 g 5     No current facility-administered medications for this visit  She is allergic to ampicillin       Review of Systems   Constitutional: Negative  HENT: Negative  Eyes: Positive for visual disturbance (slowly worsning)  Respiratory: Positive for cough (occasional) and wheezing (recently)  Cardiovascular: Negative  Gastrointestinal: Negative  Endocrine: Negative  Genitourinary: Negative  Chronic incontinence   Musculoskeletal: Positive for arthralgias (L knee), back pain and gait problem (sl worse due to back issues  Ambulates with cane)  Negative for joint swelling  Skin: Positive for rash (Scattered on arms as well as left lower leg  Pruritic)  Negative for wound  Allergic/Immunologic: Negative      Neurological: Negative for dizziness, weakness, light-headedness and numbness  Hematological: Negative  Psychiatric/Behavioral: Negative  Objective:      /80   Pulse 74   Temp 97 9 °F (36 6 °C)   Ht 5' 1" (1 549 m)   Wt 106 kg (233 lb)   BMI 44 02 kg/m²          Physical Exam  Vitals reviewed  Constitutional:       Appearance: She is well-developed  She is not diaphoretic  HENT:      Head: Normocephalic and atraumatic  Right Ear: Tympanic membrane, ear canal and external ear normal       Left Ear: Tympanic membrane, ear canal and external ear normal       Mouth/Throat:      Mouth: Mucous membranes are moist    Eyes:      Extraocular Movements: Extraocular movements intact  Conjunctiva/sclera: Conjunctivae normal       Pupils: Pupils are equal, round, and reactive to light  Neck:      Thyroid: No thyromegaly  Vascular: No JVD  Cardiovascular:      Rate and Rhythm: Normal rate and regular rhythm  Pulses: Normal pulses  Heart sounds: No murmur heard  Pulmonary:      Effort: Pulmonary effort is normal       Breath sounds: Wheezing (Scattered bilat ) present  No rhonchi or rales  Abdominal:      General: There is no distension  Palpations: Abdomen is soft  There is no mass  Tenderness: There is no abdominal tenderness  Musculoskeletal:         General: Deformity present  No swelling  Normal range of motion  Cervical back: Normal range of motion and neck supple  No muscular tenderness  Right lower leg: Edema (trace) present  Left lower leg: Edema (trace) present  Lymphadenopathy:      Cervical: No cervical adenopathy  Skin:     General: Skin is warm and dry  Capillary Refill: Capillary refill takes less than 2 seconds  Findings: No rash  Neurological:      Mental Status: She is alert and oriented to person, place, and time  Cranial Nerves: No cranial nerve deficit  Sensory: No sensory deficit  Motor: No weakness or abnormal muscle tone  Gait: Gait abnormal (Ambulates with cane  Mildly antalgic appearing gait)  Deep Tendon Reflexes: Reflexes are normal and symmetric  Reflexes normal       Comments: minicog 5/5   Psychiatric:         Mood and Affect: Mood normal          Behavior: Behavior normal          Thought Content:  Thought content normal          Judgment: Judgment normal       Comments: PHQ-2/9 Depression Screening    Little interest or pleasure in doing things: 0 - not at all  Feeling down, depressed, or hopeless: 0 - not at all  PHQ-2 Score: 0  PHQ-2 Interpretation: Negative depression screen

## 2022-04-12 NOTE — PROGRESS NOTES
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 ([facility] )       Lab Results   Component Value Date    HGBA1C 7 1 (H) 02/16/2022     Patient continues to follow-up with retinal specialist patient undergoing treatment  Continue follow-up with retinal specialist   Recheck 6 months            Respiratory    Chronic obstructive pulmonary disease ([facility] )     Patient with some scattered wheezing on exam   No fever chills  Unclear if this is related to allergies and asthmatic leg response  Restart Zyrtec 10 mg q h s -samples given  Patient also given prescription for albuterol HFA to be used 2 puffs Q 4-6 hours p r n  For cough and wheeze  Recheck 2 weeks if not improved-earlier if worse         Relevant Medications    albuterol (ProAir HFA) 90 mcg/act inhaler       Cardiovascular and Mediastinum    Chronic diastolic congestive heart failure (HCC)     Wt Readings from Last 3 Encounters:   04/12/22 106 kg (233 lb)   02/22/22 103 kg (228 lb)   02/17/22 101 kg (223 lb)   Patient with wheeze but appears to be euvolemic  No JVD appreciated on exam   Weight is slightly increased and patient has some wheeze but no rales     Follow-up with cardiology tomorrow  Recheck 6 months-earlier if worse               PAF (paroxysmal atrial fibrillation) ([facility] )     Patient with regular rhythm clinically  Follow-up with cardiology tomorrow  Continue present medications  Recheck 6 months            Genitourinary    Stage 3a chronic kidney disease [facility])     Lab Results   Component Value Date    EGFR 47 03/11/2022    EGFR 54 02/16/2022    EGFR 54 10/04/2021    CREATININE 1 11 03/11/2022    CREATININE 0 98 02/16/2022    CREATININE 1 00 10/04/2021   Creatinine slightly higher  Continue present medications  Continue to monitor creatinine    Recheck 6 months           Other Visit Diagnoses     Medicare annual wellness visit, subsequent    -  Primary    Wheeze        Relevant Medications    albuterol (ProAir HFA) 90 mcg/act inhaler           Preventive health issues were discussed with patient, and age appropriate screening tests were ordered as noted in patient's After Visit Summary  Personalized health advice and appropriate referrals for health education or preventive services given if needed, as noted in patient's After Visit Summary       History of Present Illness:     Patient presents for Medicare Annual Wellness visit    Patient Care Team:  Patricia Neal MD as PCP - MD Klever Knott MD Chester Gunning, MD Calogero Dimaggio, Manan Andersen, MD Charlie Camacho MD Bobbye Passer, MD Reeves Hoot, MD Helayne Gentles, MD Monia Cheers as Nurse Practitioner (Endocrinology)  Gurinder Olmstead MD as Consulting Physician (Nephrology)     Problem List:     Patient Active Problem List   Diagnosis    Type 2 diabetes mellitus with hyperglycemia [facility] )    Class 2 severe obesity with serious comorbidity in adult [facility])    Complete heart block ([facility] )    Anemia    S/P cardiac pacemaker procedure    Hypothyroidism    Chronic diastolic congestive heart failure ([facility] )    Chronic venous insufficiency    Benign hypertension with chronic kidney disease, stage III ([facility] )    Stage 3a chronic kidney disease ([facility] )    Iron deficiency anemia    Microalbuminuria    Bilateral leg edema    Vitamin D deficiency    Anxiety disorder    Urinary bladder incontinence    Chronic obstructive pulmonary disease ([facility] )    Eczematous dermatitis    Mixed hyperlipidemia    OAB (overactive bladder)    Presence of permanent cardiac pacemaker    PAF (paroxysmal atrial fibrillation) ([facility] )    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 neurological manifestations, uncontrolled([address] 62)    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 ([place] [address] )    Intraocular pressure increase    Sleep apnea    Dermatitis    Coronary artery disease involving native coronary artery of native heart      Past Medical and Surgical History:     Past Medical History:   Diagnosis Date    Anxiety     Arthritis     CH[facility]congest[facility] failure) [facility]     Chronic venous insufficiency     COPD (chronic obstructive pulmonary disease) ([facility] )     Coronary artery disease     Diabetes mellitus ([place] [address] )     type II    Disease of thyroid gland     hypothyroidism    DVT (deep venous thrombosis) ([facility])     HL (hearing loss)     Hyperlipidemia     Hypertension     Irregular heart beat     Paroxysmal a-fib    Pacemaker     Renal disorder     stage 3    Sleep apnea     Sleep difficulties     Thyroid disease     Urinary incontinence     Uterine leiomyoma      Past Surgical History:   Procedure Laterality Date    BREAST SURGERY      For Biopsy    CARDIAC PACEMAKER PLACEMENT      Permanent  Last assessed: 2/19/16    CARDIAC SURGERY      CARPAL TUNNEL RELEASE Bilateral     CATARACT EXTRACTION      CATARACT EXTRACTION W/ INTRAOCULAR LENS  IMPLANT, BILATERAL      CYSTOSCOPY      Diagnostic   Last assessed: 11/8/17    ECTOPIC PREGNANCY SURGERY      HYSTERECTOMY      Total Abdomonal  Resolved: 1982    JOINT REPLACEMENT Right     TKR    OOPHORECTOMY      MN ENDOVENOUS LASER, 1ST VEIN Left 2/17/2017    Procedure: GREATER SAPHENOUS VEIN ENDOVASCULAR LASER THERAPY ;  Surgeon: Halley Araya DO;  [facility]: [facility];  Service: Vascular    TONSILLECTOMY        Family History:     Family History   Problem Relation Age of Onset    Hypertension Father     Diabetes Father         Mellitus    Coronary artery disease Mother     Hypertension Mother     Heart disease Mother     Diabetes Brother         Mellitus    Breast cancer Maternal Aunt     Pancreatic cancer Maternal Aunt     Breast cancer Cousin     Breast cancer Cousin     No Known Problems Sister     No Known Problems Daughter     No Known Problems Maternal Grandmother     No Known Problems Maternal Grandfather     No Known Problems Paternal Grandmother     No Known Problems Paternal Grandfather     No Known Problems Daughter     No Known Problems Son       Social History:     Social History     Socioeconomic History    Marital status: /Civil Union     Spouse name: None    Number of children: 3    Years of education: None    Highest education level: None   Occupational History    Occupation: RETIRED   Tobacco Use    Smoking status: Never Smoker    Smokeless tobacco: Never Used    Tobacco comment: Per [employer]: Former smoker   Vaping Use    Vaping Use: Never used   Substance and Sexual Activity    Alcohol use: Yes     Comment: rarely  Per [employer]: Denied history of alcohol use    Drug use: No    Sexual activity: Never   Other Topics Concern    None   Social History Narrative    Daily coffee consumption: 2 cups/day     Social Determinants of Health     Financial Resource Strain: Not on file   Food Insecurity: Not on file   Transportation Needs: Not on file   Physical Activity: Not on file   Stress: Not on file   Social Connections: Not on file   Intimate Partner Violence: Not on file   Housing Stability: Not on file      Medications and Allergies:     Current Outpatient Medications   Medication Sig Dispense Refill    acetaminophen (TYLENOL) 325 mg tablet Take 650 mg by mouth        apixaban (Eliquis) 5 mg Take 1 tablet (5 mg total) by mouth 2 (two) times a day Lot #[id number]  Exp 9/2022 56 tablet 0    Ascorbic Acid (VITAMIN C) 1000 MG tablet Take 500 mg by mouth       atorvastatin (LIPITOR) 80 mg tablet TAKE 1 TABLET AT BEDTIME 90 tablet 3    Blood Glucose Monitoring Suppl (OneTouch Verio) w/Device KIT Use as directed 1 kit 0    Calcium Carbonate-Vit D-Min (CALCIUM 1200 PO) Take by mouth      cholecalciferol (VITAMIN D3) 1,000 units tablet Take 2,000 Units by mouth daily       citalopram (CeleXA) 20 mg tablet TAKE 1 TABLET EVERY DAY 90 tablet 3    clobetasol (TEMOVATE) 0 05 % cream Apply topically 2 (two) times a day To leg 60 g 5    Continuous Blood Gluc  (FREESTYLE RONNI 14 DAY READER) GARRY Use to check BG levels 4+times daily and as needed 1 Device 0    Continuous Blood Gluc Sensor (FREESTYLE RONNI 14 DAY SENSOR) MISC Change sensor every 14 days to check BG levels 4+ times daily 6 each 3    furosemide (LASIX) 20 mg tablet Take 1 tablet (20 mg total) by mouth as needed (as needed for leg swelling) 30 tablet 2    GLUCOSAMINE CHONDROITIN COMPLX PO Take 1 tablet by mouth 2 (two) times a day      glucose blood (OneTouch Verio) test strip Use 4 times a day 400 strip 1    glucose blood (TRUE METRIX BLOOD GLUCOSE TEST) test strip Test 4 times plus daily, as instructed 400 each 1    hydrOXYzine HCL (ATARAX) 25 mg tablet Take 1 tablet (25 mg total) by mouth daily at bedtime 30 tablet 2    insulin aspart (NovoLOG FlexPen) 100 UNIT/ML injection pen INJECT 8 UNITS SUBCUTANEOUSLY WITH BREAKFAST, THEN 10 UNITS WITH LUNCH, AND THEN 10 UNITS WITH DINNER 45 mL 1    insulin glargine (Toujeo SoloStar) 300 units/mL CONCENTRATED U-300 injection pen (1-unit dial) Inject 36 Units under the skin daily at bedtime 12 mL 1    Insulin Pen Needle (Pen Needles) 32G X 6 MM MISC Use 4 times a day 400 each 1    Lancets (onetouch ultrasoft) lancets Use 4 times a day 400 each 0    levothyroxine 125 mcg tablet TAKE 1 TABLET ONE TIME DAILY EXCEPT ON SUNDAY NO TABLET 78 tablet 1    lisinopril (ZESTRIL) 20 mg tablet TAKE 1 TABLET TWICE DAILY 180 tablet 3    metoprolol tartrate (LOPRESSOR) 25 mg tablet TAKE 1 TABLET EVERY 12 HOURS 180 tablet 3    Omega-3 Fatty Acids (FISH OIL) 1200 MG CAPS Take 1,200 mg by mouth 2 (two) times a day       oxybutynin (DITROPAN) 5 mg tablet TAKE 1 TABLET TWICE DAILY 180 tablet 1    Promethazine-DM (PHENERGAN-DM) 6 25-15 mg/5 mL oral syrup Take 5 mL by mouth 4 (four) times a day as needed for cough 300 mL 2    Tetrahydroz-Polyvinyl Al-Povid (MURINE TEARS PLUS OP) Apply to eye 3 (three) times a day      triamcinolone (KENALOG) 0 1 % ointment Apply 2x/day to itchy, red areas as needed  Overuse can thin skin 453 6 g 3    albuterol (ProAir HFA) 90 mcg/act inhaler Inhale 2 puffs every 6 (six) hours as needed for wheezing 8 g 5     No current facility-administered medications for this visit  Allergies   Allergen Reactions    Ampicillin Hives      Immunizations:     Immunization History   Administered Date(s) Administered    COVID-19 PFIZER VACCINE 0 3 ML IM 01/20/2021, 02/09/2021    H1N1, All Formulations 01/12/2010, 01/12/2010    INFLUENZA 11/04/2005    Influenza Split High Dose Preservative Free IM 10/18/2012, 10/23/2013, 10/14/2014, 10/20/2015, 11/02/2016, 10/04/2017    Influenza, high dose seasonal 0 7 mL 10/23/2018, 10/09/2019, 10/20/2020    Influenza, seasonal, injectable 11/12/2009, 01/12/2010    Pneumococcal Conjugate 13-Valent 10/21/2015    Pneumococcal Polysaccharide PPV23 12/11/2000, 12/11/2010    Td (adult), adsorbed 07/04/2008    Tetanus, adsorbed 09/04/1998    Zoster 07/22/2016      Health Maintenance:         Topic Date Due    Breast Cancer Screening: Mammogram  12/20/2022    DXA SCAN  07/07/2023    Hepatitis C Screening  Completed         Topic Date Due    DTaP,Tdap,and Td Vaccines (1 - Tdap) 07/05/2008    COVID-19 Vaccine (3 - Booster for Pfizer series) 07/09/2021    Influenza Vaccine (1) 09/01/2021      Medicare Health Risk Assessment:     /80   Pulse 74   Temp 97 9 °F (36 6 °C)   Ht 5' 1" (1 549 m)   Wt 106 kg (233 lb)   BMI 44 02 kg/m²      Juana Canchola is here for her Subsequent Wellness visit  Health Risk Assessment:   Patient rates overall health as fair  Patient feels that their physical health rating is same  Patient is very satisfied with their life   Eyesight was rated as same  Hearing was rated as same  Patient feels that their emotional and mental health rating is same  Patients states they are never, rarely angry  Patient states they are sometimes unusually tired/fatigued  Pain experienced in the last 7 days has been none  Patient states that she has experienced no weight loss or gain in last 6 months  Depression Screening:   PHQ-2 Score: 0      Fall Risk Screening: In the past year, patient has experienced: no history of falling in past year      Urinary Incontinence Screening:   Patient has leaked urine accidently in the last six months  Home Safety:  Patient has trouble with stairs inside or outside of their home  Patient has working smoke alarms and has working carbon monoxide detector  Home safety hazards include: none  Nutrition:   Current diet is Regular  Medications:   Patient is currently taking over-the-counter supplements  OTC medications include: see medication list  Patient is able to manage medications  Activities of Daily Living (ADLs)/Instrumental Activities of Daily Living (IADLs):   Walk and transfer into and out of bed and chair?: Yes  Dress and groom yourself?: Yes    Bathe or shower yourself?: Yes    Feed yourself? Yes  Do your laundry/housekeeping?: Yes  Manage your money, pay your bills and track your expenses?: Yes  Make your own meals?: Yes    Do your own shopping?: Yes    Previous Hospitalizations:   Any hospitalizations or ED visits within the last 12 months?: No      Advance Care Planning:   Living will: Yes    Durable POA for healthcare:  Yes    Advanced directive: Yes    Advanced directive counseling given: Yes      Cognitive Screening:   Provider or family/friend/caregiver concerned regarding cognition?: No    PREVENTIVE SCREENINGS      Cardiovascular Screening:    General: Screening Not Indicated and History Lipid Disorder      Diabetes Screening:     General: Screening Not Indicated and History Diabetes      Colorectal Cancer Screening:     General: Screening Not Indicated      Breast Cancer Screening:     General: Screening Current      Cervical Cancer Screening:    General: Screening Not Indicated      Osteoporosis Screening:    General: Screening Current      Abdominal Aortic Aneurysm (AAA) Screening:        General: Screening Not Indicated      Lung Cancer Screening:     General: Screening Not Indicated      Hepatitis C Screening:    General: Screening Current    Screening, Brief Intervention, and Referral to Treatment (SBIRT)    Screening    Typical number of drinks in a week: 0    AUDIT-C Screenin) How often did you have a drink containing alcohol in the past year? never  2) How many drinks did you have on a typical day when you were drinking in the past year? 0  3) How often did you have 6 or more drinks on one occasion in the past year? never    AUDIT-C Score: 0  Interpretation: Score 0-2 (female): Negative screen for alcohol misuse    Single Item Drug Screening:  How often have you used an illegal drug (including marijuana) or a prescription medication for non-medical reasons in the past year? never    Single Item Drug Screen Score: 0  Interpretation: Negative screen for possible drug use disorder    Other Counseling Topics:   Calcium and vitamin D intake and regular weightbearing exercise  BMI Counseling: Body mass index is 44 02 kg/m²  The BMI is above normal  Nutrition recommendations include moderation in carbohydrate intake, increasing intake of lean protein, reducing intake of saturated fat and trans fat and reducing intake of cholesterol      Rocio Narayan MD

## 2022-04-13 ENCOUNTER — OFFICE VISIT (OUTPATIENT)
Dept: CARDIOLOGY CLINIC | Facility: CLINIC | Age: 82
End: 2022-04-13
Payer: MEDICARE

## 2022-04-13 VITALS
HEIGHT: 61 IN | OXYGEN SATURATION: 94 % | BODY MASS INDEX: 43.9 KG/M2 | HEART RATE: 62 BPM | WEIGHT: 232.5 LBS | DIASTOLIC BLOOD PRESSURE: 80 MMHG | SYSTOLIC BLOOD PRESSURE: 128 MMHG

## 2022-04-13 DIAGNOSIS — Z95.0 PACEMAKER: ICD-10-CM

## 2022-04-13 DIAGNOSIS — I10 PRIMARY HYPERTENSION: ICD-10-CM

## 2022-04-13 DIAGNOSIS — I48.0 PAF (PAROXYSMAL ATRIAL FIBRILLATION) (HCC): Primary | ICD-10-CM

## 2022-04-13 PROCEDURE — 99214 OFFICE O/P EST MOD 30 MIN: CPT | Performed by: INTERNAL MEDICINE

## 2022-04-13 PROCEDURE — 93000 ELECTROCARDIOGRAM COMPLETE: CPT | Performed by: INTERNAL MEDICINE

## 2022-04-13 NOTE — ASSESSMENT & PLAN NOTE
Patient with regular rhythm clinically  Follow-up with cardiology tomorrow  Continue present medications    Recheck 6 months

## 2022-04-13 NOTE — ASSESSMENT & PLAN NOTE
Lab Results   Component Value Date    HGBA1C 7 1 (H) 02/16/2022     Patient continues to follow-up with retinal specialist patient undergoing treatment    Continue follow-up with retinal specialist   Recheck 6 months

## 2022-04-13 NOTE — PROGRESS NOTES
Cardiology Follow Up    Penny Whitten  1940  [id number]  [facility] [place]  [address]  [address]  [place] [address] [phone]    1  PAF (paroxysmal atrial fibrillation) (HCC)  POCT ECG   2  Pacemaker     3  Primary hypertension         Interval History: Followup  She is doing relatively well  No bleeding on Eliquis  No significant dyspnea or LE edema       Medical Problems             Problem List     Type 2 diabetes mellitus with hyperglycemia ([facility] )      Lab Results   Component Value Date    HGBA1C 7 1 (H) 02/16/2022         Class 2 severe obesity with serious comorbidity in adult [facility])    Overview Signed 12/14/2018  3:43 PM by Dariana Mascorro RN     Updated according to CMS/ICD-10 guidelines         Complete heart block ([facility] )    Anemia    S/P cardiac pacemaker procedure    Hypothyroidism (Chronic)    Overview Signed 6/6/2018  9:04 PM by Angelique Becerril MD     Last Assessment & Plan:   Continue  med         Chronic diastolic congestive heart failure ([facility] )    Wt Readings from Last 3 Encounters:   04/13/22 105 kg (232 lb 8 oz)   04/12/22 106 kg (233 lb)   02/22/22 103 kg (228 lb)                 Chronic venous insufficiency    Benign hypertension with chronic kidney disease, stage III ([facility] )    Lab Results   Component Value Date    EGFR 47 03/11/2022    EGFR 54 02/16/2022    EGFR 54 10/04/2021    CREATININE 1 11 03/11/2022    CREATININE 0 98 02/16/2022    CREATININE 1 00 10/04/2021         Stage 3a chronic kidney disease ([facility] )    Lab Results   Component Value Date    EGFR 47 03/11/2022    EGFR 54 02/16/2022    EGFR 54 10/04/2021    CREATININE 1 11 03/11/2022    CREATININE 0 98 02/16/2022    CREATININE 1 00 10/04/2021         Iron deficiency anemia    Microalbuminuria    Bilateral leg edema    Vitamin D deficiency    Anxiety disorder    Urinary bladder incontinence    Chronic obstructive pulmonary disease ([facility] ) Eczematous dermatitis    Mixed hyperlipidemia    Overview Signed 6/6/2018  9:04 PM by Paula Reddy MD     Last Assessment & Plan:   Continue med         OAB (overactive bladder)    Presence of permanent cardiac pacemaker    PAF (paroxysmal atrial fibrillation) ([facility] )    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 neurological manifestations, uncontrolled([address] 62)    Overview Signed 6/6/2018  9:04 PM by Paula Reddy MD     Last Assessment & Plan:   formerly [facility] will be done  Decrease PM lantus to 25  Otherwise weight down 25 pounds         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 ([facility] )    Overview Signed 12/14/2018  3:41 PM by Autumn Allison RN     Updated according to CMS/ICD-10 guidelines           Lab Results   Component Value Date    HGBA1C 7 1 (H) 02/16/2022         Intraocular pressure increase    Sleep apnea    Dermatitis    Coronary artery disease involving native coronary artery of native heart              Past Medical History:   Diagnosis Date    Anxiety     Arthritis     CHF (congestive heart failure) ([facility])     Chronic venous insufficiency     COPD (chronic obstructive pulmonary disease) ([facility] )     Coronary artery disease     Diabetes mellitus ([facility] )     type II    Disease of thyroid gland     hypothyroidism    DVT (deep venous thrombosis) ([facility])     HL (hearing loss)     Hyperlipidemia     Hypertension     Irregular heart beat     Paroxysmal a-fib    Pacemaker     Renal disorder     stage 3    Sleep apnea     Sleep difficulties     Thyroid disease     Urinary incontinence     Uterine leiomyoma      Social History     Socioeconomic History    Marital status: /Civil Union     Spouse name: Not on file    Number of children: 3    Years of education: Not on file    Highest education level: Not on file   Occupational History    Occupation: RETIRED   Tobacco Use    Smoking status: Never Smoker    Smokeless tobacco: Never Used    Tobacco comment: Per [employer]: Former smoker   Vaping Use    Vaping Use: Never used   Substance and Sexual Activity    Alcohol use: Yes     Comment: rarely  Per [employer]: Denied history of alcohol use    Drug use: No    Sexual activity: Never   Other Topics Concern    Not on file   Social History Narrative    Daily coffee consumption: 2 cups/day     Social Determinants of Health     Financial Resource Strain: Not on file   Food Insecurity: Not on file   Transportation Needs: Not on file   Physical Activity: Not on file   Stress: Not on file   Social Connections: Not on file   Intimate Partner Violence: Not on file   Housing Stability: Not on file      Family History   Problem Relation Age of Onset    Hypertension Father     Diabetes Father         Mellitus    Coronary artery disease Mother     Hypertension Mother     Heart disease Mother     Diabetes Brother         Mellitus    Breast cancer Maternal Aunt     Pancreatic cancer Maternal Aunt     Breast cancer Cousin     Breast cancer Cousin     No Known Problems Sister     No Known Problems Daughter     No Known Problems Maternal Grandmother     No Known Problems Maternal Grandfather     No Known Problems Paternal Grandmother     No Known Problems Paternal Grandfather     No Known Problems Daughter     No Known Problems Son      Past Surgical History:   Procedure Laterality Date    BREAST SURGERY      For Biopsy    CARDIAC PACEMAKER PLACEMENT      Permanent  Last assessed: 2/19/16    CARDIAC SURGERY      CARPAL TUNNEL RELEASE Bilateral     CATARACT EXTRACTION      CATARACT EXTRACTION W/ INTRAOCULAR LENS  IMPLANT, BILATERAL      CYSTOSCOPY      Diagnostic   Last assessed: 11/8/17    ECTOPIC PREGNANCY SURGERY      HYSTERECTOMY      Total Abdomonal  Resolved: 1982    JOINT REPLACEMENT Right     TKR    OOPHORECTOMY      TN ENDOVENOUS LASER, 1ST VEIN Left 2/17/2017    Procedure: GREATER SAPHENOUS VEIN ENDOVASCULAR LASER THERAPY ;  Surgeon: Lori Garcia DO;  Location: [facility];  Service: Vascular    TONSILLECTOMY         Current Outpatient Medications:     albuterol (ProAir HFA) 90 mcg/act inhaler, Inhale 2 puffs every 6 (six) hours as needed for wheezing, Disp: 8 g, Rfl: 5    apixaban (Eliquis) 5 mg, Take 1 tablet (5 mg total) by mouth 2 (two) times a day Lot #[id number] Exp 9/2022, Disp: 56 tablet, Rfl: 0    Ascorbic Acid (VITAMIN C) 1000 MG tablet, Take 500 mg by mouth , Disp: , Rfl:     atorvastatin (LIPITOR) 80 mg tablet, TAKE 1 TABLET AT BEDTIME, Disp: 90 tablet, Rfl: 3    Blood Glucose Monitoring Suppl (OneTouch Verio) w/Device KIT, Use as directed, Disp: 1 kit, Rfl: 0    Calcium Carbonate-Vit D-Min (CALCIUM 1200 PO), Take by mouth, Disp: , Rfl:     cholecalciferol (VITAMIN D3) 1,000 units tablet, Take 2,000 Units by mouth daily , Disp: , Rfl:     citalopram (CeleXA) 20 mg tablet, TAKE 1 TABLET EVERY DAY, Disp: 90 tablet, Rfl: 3    Continuous Blood Gluc  (FREESTYLE RONNI 14 DAY READER) GARRY, Use to check BG levels 4+times daily and as needed, Disp: 1 Device, Rfl: 0    Continuous Blood Gluc Sensor (FREESTYLE RONNI 14 DAY SENSOR) MISC, Change sensor every 14 days to check BG levels 4+ times daily, Disp: 6 each, Rfl: 3    furosemide (LASIX) 20 mg tablet, Take 1 tablet (20 mg total) by mouth as needed (as needed for leg swelling), Disp: 30 tablet, Rfl: 2    GLUCOSAMINE CHONDROITIN COMPLX PO, Take 1 tablet by mouth 2 (two) times a day, Disp: , Rfl:     glucose blood (OneTouch Verio) test strip, Use 4 times a day, Disp: 400 strip, Rfl: 1    glucose blood (TRUE METRIX BLOOD GLUCOSE TEST) test strip, Test 4 times plus daily, as instructed, Disp: 400 each, Rfl: 1    hydrOXYzine HCL (ATARAX) 25 mg tablet, Take 1 tablet (25 mg total) by mouth daily at bedtime, Disp: 30 tablet, Rfl: 2    insulin aspart (NovoLOG FlexPen) 100 UNIT/ML injection pen, INJECT 8 UNITS SUBCUTANEOUSLY WITH BREAKFAST, THEN 10 UNITS WITH LUNCH, AND THEN 10 UNITS WITH DINNER, Disp: 45 mL, Rfl: 1    insulin glargine (Toujeo SoloStar) 300 units/mL CONCENTRATED U-300 injection pen (1-unit dial), Inject 36 Units under the skin daily at bedtime, Disp: 12 mL, Rfl: 1    Insulin Pen Needle (Pen Needles) 32G X 6 MM MISC, Use 4 times a day, Disp: 400 each, Rfl: 1    Lancets (onetouch ultrasoft) lancets, Use 4 times a day, Disp: 400 each, Rfl: 0    levothyroxine 125 mcg tablet, TAKE 1 TABLET ONE TIME DAILY EXCEPT ON SUNDAY NO TABLET, Disp: 78 tablet, Rfl: 1    lisinopril (ZESTRIL) 20 mg tablet, TAKE 1 TABLET TWICE DAILY, Disp: 180 tablet, Rfl: 3    metoprolol tartrate (LOPRESSOR) 25 mg tablet, TAKE 1 TABLET EVERY 12 HOURS, Disp: 180 tablet, Rfl: 3    Omega-3 Fatty Acids (FISH OIL) 1200 MG CAPS, Take 1,200 mg by mouth 2 (two) times a day , Disp: , Rfl:     oxybutynin (DITROPAN) 5 mg tablet, TAKE 1 TABLET TWICE DAILY, Disp: 180 tablet, Rfl: 1    Promethazine-DM (PHENERGAN-DM) 6 25-15 mg/5 mL oral syrup, Take 5 mL by mouth 4 (four) times a day as needed for cough, Disp: 300 mL, Rfl: 2    Tetrahydroz-Polyvinyl Al-Povid (MURINE TEARS PLUS OP), Apply to eye 3 (three) times a day, Disp: , Rfl:     triamcinolone (KENALOG) 0 1 % ointment, Apply 2x/day to itchy, red areas as needed  Overuse can thin skin, Disp: 453 6 g, Rfl: 3    acetaminophen (TYLENOL) 325 mg tablet, Take 650 mg by mouth   (Patient not taking: Reported on 4/13/2022 ), Disp: , Rfl:     clobetasol (TEMOVATE) 0 05 % cream, Apply topically 2 (two) times a day To leg (Patient not taking: Reported on 4/13/2022 ), Disp: 60 g, Rfl: 5  Allergies   Allergen Reactions    Ampicillin Hives       Labs:     Chemistry        Component Value Date/Time     12/06/2016 0939    K 4 3 03/11/2022 0932    K 4 2 12/06/2016 0939     03/11/2022 0932     12/06/2016 0939    CO2 30 03/11/2022 0932    CO2 28 12/06/2016 0939    BUN 31 (H) 03/11/2022 0932    BUN 41 (H) 12/06/2016 0939    CREATININE 1 11 03/11/2022 0932    CREATININE 1 23 (H) 12/06/2016 0939        Component Value Date/Time    CALCIUM 9 4 03/11/2022 0932    CALCIUM 9 2 12/06/2016 0939    ALKPHOS 88 03/11/2022 0932    ALKPHOS 86 09/06/2016 0907    AST 22 03/11/2022 0932    AST 18 09/06/2016 0907    ALT 29 03/11/2022 0932    ALT 21 09/06/2016 0907    BILITOT 0 6 09/06/2016 0907            Lab Results   Component Value Date    CHOL 130 09/06/2016     Lab Results   Component Value Date    HDL 67 03/11/2022    HDL 54 07/28/2020    HDL 64 01/23/2020     Lab Results   Component Value Date    LDLCALC 62 03/11/2022    LDLCALC 61 07/28/2020    LDLCALC 53 01/23/2020     Lab Results   Component Value Date    TRIG 56 03/11/2022    TRIG 72 07/28/2020    TRIG 90 01/23/2020     No results found for: CHOLHDL    Imaging: Cardiac EP device report    Result Date: 3/29/2022  Narrative: MDT-DUAL CHAMBER PPM (DDDR MODE)/ ACTIVE SYSTEM IS MRI CONDITIONAL CARELINK TRANSMISSION:  BATTERY VOLTAGE ADEQUATE (2 5 YR)  AP 58 0%  100% (>40%/CHB)  ALL LEAD PARAMETERS WITHIN NORMAL LIMITS   5 AF EPISODES WITH LONGEST 48 MIN  PT TAKES ELIQUIS  NORMAL DEVICE FUNCTION  RG       EKG: AV seq  Pacing with PVCs  Review of Systems   Constitutional: Negative  HENT: Negative  Eyes: Negative  Cardiovascular: Negative  Respiratory: Negative  Endocrine: Negative  Hematologic/Lymphatic: Negative  Skin: Negative  Musculoskeletal: Negative  Gastrointestinal: Negative  Genitourinary: Negative  Neurological: Negative  Psychiatric/Behavioral: Negative  Allergic/Immunologic: Negative  Vitals:    04/13/22 1156   BP: 128/80   Pulse: 62   SpO2: 94%           Physical Exam  Vitals and nursing note reviewed  Constitutional:       Appearance: Normal appearance  HENT:      Head: Normocephalic        Nose: Nose normal       Mouth/Throat:      Mouth: Mucous membranes are moist    Eyes:      General: No scleral icterus  Conjunctiva/sclera: Conjunctivae normal    Cardiovascular:      Rate and Rhythm: Normal rate and regular rhythm  Heart sounds: No murmur heard  No gallop  Pulmonary:      Effort: Pulmonary effort is normal  No respiratory distress  Breath sounds: Normal breath sounds  No wheezing or rales  Abdominal:      General: Abdomen is flat  Bowel sounds are normal  There is no distension  Palpations: Abdomen is soft  Tenderness: There is no abdominal tenderness  There is no guarding  Musculoskeletal:      Cervical back: Normal range of motion and neck supple  Right lower leg: No edema  Left lower leg: No edema  Skin:     General: Skin is warm and dry  Neurological:      General: No focal deficit present  Mental Status: She is alert and oriented to person, place, and time  Psychiatric:         Mood and Affect: Mood normal          Behavior: Behavior normal          Discussion/Summary:    Complete Heart Block s/p PPM implant  Pacer checks look okay  She has had PAF on prior pacer checks  Continue current medical therapy       Persistent AF: She has remained asymptomatic  PAF on device checks  Continue Metoprolol and Eliquis     HTN: BP is stable on lisinopril  No changes  The patient was counseled regarding diagnostic results, instructions for management, risk factor reductions, impressions  total time of encounter was 25 minutes and 15 minutes was spent counseling

## 2022-04-13 NOTE — ASSESSMENT & PLAN NOTE
Lab Results   Component Value Date    EGFR 47 03/11/2022    EGFR 54 02/16/2022    EGFR 54 10/04/2021    CREATININE 1 11 03/11/2022    CREATININE 0 98 02/16/2022    CREATININE 1 00 10/04/2021   Creatinine slightly higher  Continue present medications  Continue to monitor creatinine    Recheck 6 months

## 2022-04-13 NOTE — ASSESSMENT & PLAN NOTE
Lab Results   Component Value Date    HGBA1C 7 1 (H) 02/16/2022   A1c slightly elevated  Patient will continue work on diet  Continue present medications  Follow-up with endocrinology    Recheck 6 months

## 2022-04-13 NOTE — ASSESSMENT & PLAN NOTE
Patient with some scattered wheezing on exam   No fever chills  Unclear if this is related to allergies and asthmatic leg response  Restart Zyrtec 10 mg q h s -samples given  Patient also given prescription for albuterol HFA to be used 2 puffs Q 4-6 hours p r n  For cough and wheeze    Recheck 2 weeks if not improved-earlier if worse

## 2022-04-13 NOTE — ASSESSMENT & PLAN NOTE
Wt Readings from Last 3 Encounters:   04/12/22 106 kg (233 lb)   02/22/22 103 kg (228 lb)   02/17/22 101 kg (223 lb)   Patient with wheeze but appears to be euvolemic  No JVD appreciated on exam   Weight is slightly increased and patient has some wheeze but no rales     Follow-up with cardiology tomorrow    Recheck 6 months-earlier if worse

## 2022-04-13 NOTE — ASSESSMENT & PLAN NOTE
Patient without anginal symptoms  Follow-up with Cardiology tomorrow  Monitor labs    Recheck 6 months

## 2022-05-05 DIAGNOSIS — I10 ESSENTIAL HYPERTENSION: ICD-10-CM

## 2022-06-06 DIAGNOSIS — I10 ESSENTIAL HYPERTENSION: ICD-10-CM

## 2022-06-06 DIAGNOSIS — N18.30 BENIGN HYPERTENSION WITH CHRONIC KIDNEY DISEASE, STAGE III (HCC): ICD-10-CM

## 2022-06-06 DIAGNOSIS — I12.9 BENIGN HYPERTENSION WITH CHRONIC KIDNEY DISEASE, STAGE III (HCC): ICD-10-CM

## 2022-06-06 RX ORDER — LISINOPRIL 20 MG/1
20 TABLET ORAL 2 TIMES DAILY
Qty: 180 TABLET | Refills: 3 | Status: SHIPPED | OUTPATIENT
Start: 2022-06-06

## 2022-06-06 NOTE — TELEPHONE ENCOUNTER
Patient did not realize how low she was on her lisinopril and she has none left    She is asking if a 30 day supply could be sent to [facility] in [place] so she can get it right away    She would then like a 60 day supply sent to [facility]

## 2022-06-08 DIAGNOSIS — N32.81 OAB (OVERACTIVE BLADDER): ICD-10-CM

## 2022-06-08 DIAGNOSIS — E03.9 HYPOTHYROIDISM, UNSPECIFIED TYPE: ICD-10-CM

## 2022-06-08 RX ORDER — LEVOTHYROXINE SODIUM 0.12 MG/1
TABLET ORAL
Qty: 78 TABLET | Refills: 1 | Status: SHIPPED | OUTPATIENT
Start: 2022-06-08

## 2022-06-08 RX ORDER — OXYBUTYNIN CHLORIDE 5 MG/1
TABLET ORAL
Qty: 180 TABLET | Refills: 1 | Status: SHIPPED | OUTPATIENT
Start: 2022-06-08

## 2022-06-20 ENCOUNTER — TELEPHONE (OUTPATIENT)
Dept: FAMILY MEDICINE CLINIC | Facility: CLINIC | Age: 82
End: 2022-06-20

## 2022-06-20 NOTE — TELEPHONE ENCOUNTER
Patient's  called in because he states that their children suspect that she is showing signs of dementia    He would like her to have an appointment with you as soon as possible    Please advise where to put her on your schedule

## 2022-06-21 ENCOUNTER — TELEPHONE (OUTPATIENT)
Dept: NEPHROLOGY | Facility: CLINIC | Age: 82
End: 2022-06-21

## 2022-06-29 ENCOUNTER — IN-CLINIC DEVICE VISIT (OUTPATIENT)
Dept: CARDIOLOGY CLINIC | Facility: CLINIC | Age: 82
End: 2022-06-29
Payer: MEDICARE

## 2022-06-29 ENCOUNTER — OFFICE VISIT (OUTPATIENT)
Dept: FAMILY MEDICINE CLINIC | Facility: CLINIC | Age: 82
End: 2022-06-29
Payer: MEDICARE

## 2022-06-29 VITALS
WEIGHT: 231 LBS | BODY MASS INDEX: 43.61 KG/M2 | SYSTOLIC BLOOD PRESSURE: 122 MMHG | HEART RATE: 72 BPM | TEMPERATURE: 99.6 F | DIASTOLIC BLOOD PRESSURE: 80 MMHG | HEIGHT: 61 IN

## 2022-06-29 DIAGNOSIS — Z95.0 CARDIAC PACEMAKER IN SITU: Primary | ICD-10-CM

## 2022-06-29 DIAGNOSIS — R26.89 IMBALANCE: ICD-10-CM

## 2022-06-29 DIAGNOSIS — R41.3 MEMORY DIFFICULTIES: ICD-10-CM

## 2022-06-29 DIAGNOSIS — Z79.4 TYPE 2 DIABETES MELLITUS WITH HYPERGLYCEMIA, WITH LONG-TERM CURRENT USE OF INSULIN (HCC): Primary | ICD-10-CM

## 2022-06-29 DIAGNOSIS — J44.9 CHRONIC OBSTRUCTIVE PULMONARY DISEASE, UNSPECIFIED COPD TYPE (HCC): ICD-10-CM

## 2022-06-29 DIAGNOSIS — E11.65 TYPE 2 DIABETES MELLITUS WITH HYPERGLYCEMIA, WITH LONG-TERM CURRENT USE OF INSULIN (HCC): Primary | ICD-10-CM

## 2022-06-29 LAB — SL AMB POCT HEMOGLOBIN AIC: 6.6 (ref ?–6.5)

## 2022-06-29 PROCEDURE — 99214 OFFICE O/P EST MOD 30 MIN: CPT | Performed by: FAMILY MEDICINE

## 2022-06-29 PROCEDURE — 93280 PM DEVICE PROGR EVAL DUAL: CPT | Performed by: INTERNAL MEDICINE

## 2022-06-29 PROCEDURE — 83036 HEMOGLOBIN GLYCOSYLATED A1C: CPT | Performed by: FAMILY MEDICINE

## 2022-06-29 NOTE — PROGRESS NOTES
Assessment/Plan:    Type 2 diabetes mellitus with hyperglycemia (HCC)    Lab Results   Component Value Date    HGBA1C 6 6 (A) 06/29/2022   Improved control  Continue present care  F/u with Endo  Recheck 6m    Chronic obstructive pulmonary disease ([facility] )  Still with occ wheeze  Continue present care  Recheck 6m    Memory difficulties  Pt admits to occ, mild difficulties, but MMSE = 30/30  I did not find issues with cognitive abilities  Discussed getting a second opinion with neuro or geriatrics - pt and  decided to watch  Recheck 6m - earlier if worse    Imbalance  ? Related to vision issues and decreased body proprioception? Will refer to PT for balance training  Recheck 1m if not improved - earlier if worse       Diagnoses and all orders for this visit:    Type 2 diabetes mellitus with hyperglycemia, with long-term current use of insulin ([facility] )  -     POCT hemoglobin A1c    Imbalance  -     Ambulatory Referral to Physical Therapy; Future    Chronic obstructive pulmonary disease, unspecified COPD type ([facility] )    Memory difficulties        Subjective:      Patient ID: Jessie Miranda is a [de-identified] y o  female  f/u multiple med issues  - pt' s  has noticed some changes in his wife's cognition over the last month  He notes that she has been having trouble at times with speech, communicating clearly, recognizing time, and other issues  She is occasionally forgetful, and has been slow to get to appointments, occasionally require them to be rescheduled  - pt admits that she does get a little forgetful  She does worry about falling - she notes that her vision is bad and it is having an adverse effect on her balance  She does not believe that her cognition is worsening however, and believes that her husbands anxiety may be playing a role with his concerns  She denies worsening HA, asymmetric weakness/numbness, or other neurologic symptoms  - she is compliant with her meds    A1C in office today = 6 6  - pt denies CP, palpitations, lightheadedness or other CV symptoms with or without exertion  - pt denies any other concerns        The following portions of the patient's history were reviewed and updated as appropriate:   She  has a past medical history of Anxiety, Arthritis, CHF (congestive heart failure) ([address] ), Chronic venous insufficiency, COPD (chronic obstructive pulmonary disease) ([address] ), Coronary artery disease, Diabetes mellitus ([address] ), Disease of thyroid gland, DVT (deep venous thrombosis) ([address] ), HL (hearing loss), Hyperlipidemia, Hypertension, Irregular heart beat, Pacemaker, Renal disorder, Sleep apnea, Sleep difficulties, Thyroid disease, Urinary incontinence, and Uterine leiomyoma    She   Patient Active Problem List    Diagnosis Date Noted    Memory difficulties 06/30/2022    Imbalance 06/30/2022    Coronary artery disease involving native coronary artery of native heart 03/06/2021    Dermatitis 09/15/2020    Sleep apnea 05/17/2019    Intraocular pressure increase 06/06/2018    Vitamin D deficiency 04/03/2018    Bilateral leg edema 03/13/2018    Iron deficiency anemia 10/12/2017    Urinary bladder incontinence 07/26/2017    OAB (overactive bladder) 07/26/2017    Benign hypertension with chronic kidney disease, stage III ([address] ) 06/21/2017    Microalbuminuria 06/21/2017    PAF (paroxysmal atrial fibrillation) ([facility] ) 10/31/2016    Chronic venous insufficiency 02/19/2016    Presence of permanent cardiac pacemaker 01/28/2016    Chronic diastolic congestive heart failure ([address] ) 01/21/2016    Anemia 01/19/2016    S/P cardiac pacemaker procedure 01/19/2016    Hypothyroidism 01/19/2016    Complete heart block ([address] ) 01/16/2016    Type 2 diabetes mellitus with hyperglycemia ([facility] ) 01/15/2016    Class 2 severe obesity with serious comorbidity in adult [facility]) 01/15/2016    Stage 3a chronic kidney disease ([address] ) 10/21/2015    Eczematous dermatitis 03/03/2015    Anxiety disorder 06/26/2013    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 neurological manifestations, uncontrolled([address]) 10/02/2012    Chronic obstructive pulmonary disease ([facility] ) 09/24/2012    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 ([facility] ) 09/22/2011    Mixed hyperlipidemia 03/14/2011     She  has a past surgical history that includes Hysterectomy; Joint replacement (Right); Tonsillectomy; Cataract extraction w/ intraocular lens  implant, bilateral; Ectopic pregnancy surgery; Cardiac surgery; Cardiac pacemaker placement; Carpal tunnel release (Bilateral); pr endovenous laser, 1st vein (Left, 2/17/2017); Cystoscopy; Cataract extraction; Breast surgery; and Oophorectomy  She  reports that she has never smoked  She has never used smokeless tobacco  She reports current alcohol use  She reports that she does not use drugs    Current Outpatient Medications   Medication Sig Dispense Refill    acetaminophen (TYLENOL) 325 mg tablet Take 650 mg by mouth      albuterol (ProAir HFA) 90 mcg/act inhaler Inhale 2 puffs every 6 (six) hours as needed for wheezing 8 g 5    apixaban (Eliquis) 5 mg Take 1 tablet (5 mg total) by mouth 2 (two) times a day Lot #[id number]  Exp 9/2022 56 tablet 0    Ascorbic Acid (VITAMIN C) 1000 MG tablet Take 500 mg by mouth       atorvastatin (LIPITOR) 80 mg tablet TAKE 1 TABLET AT BEDTIME 90 tablet 3    Blood Glucose Monitoring Suppl (OneTouch Verio) w/Device KIT Use as directed 1 kit 0    Calcium Carbonate-Vit D-Min (CALCIUM 1200 PO) Take by mouth      cholecalciferol (VITAMIN D3) 1,000 units tablet Take 2,000 Units by mouth daily       citalopram (CeleXA) 20 mg tablet TAKE 1 TABLET EVERY DAY 90 tablet 3    clobetasol (TEMOVATE) 0 05 % cream Apply topically 2 (two) times a day To leg 60 g 5    Continuous Blood Gluc  ([employer] 14 DAY READER) GARRY Use to check BG levels 4+times daily and as needed 1 Device 0    Continuous Blood Gluc Sensor (FREESTYLE RONNI 14 DAY SENSOR) [facility] Change sensor every 14 days to check BG levels 4+ times daily 6 each 3    furosemide (LASIX) 20 mg tablet Take 1 tablet (20 mg total) by mouth as needed (as needed for leg swelling) 30 tablet 2    GLUCOSAMINE CHONDROITIN COMPLX PO Take 1 tablet by mouth 2 (two) times a day      glucose blood (OneTouch Verio) test strip Use 4 times a day 400 strip 1    glucose blood (TRUE METRIX BLOOD GLUCOSE TEST) test strip Test 4 times plus daily, as instructed 400 each 1    hydrOXYzine HCL (ATARAX) 25 mg tablet Take 1 tablet (25 mg total) by mouth daily at bedtime 30 tablet 2    insulin aspart (NovoLOG FlexPen) 100 UNIT/ML injection pen INJECT 8 UNITS SUBCUTANEOUSLY WITH BREAKFAST, THEN 10 UNITS WITH LUNCH, AND THEN 10 UNITS WITH DINNER 45 mL 1    insulin glargine (Toujeo SoloStar) 300 units/mL CONCENTRATED U-300 injection pen (1-unit dial) Inject 36 Units under the skin daily at bedtime 12 mL 1    Insulin Pen Needle (Pen Needles) 32G X 6 MM MISC Use 4 times a day 400 each 1    Lancets (onetouch ultrasoft) lancets Use 4 times a day 400 each 0    levothyroxine 125 mcg tablet TAKE 1 TABLET ONE TIME DAILY EXCEPT ON SUNDAY NO TABLET 78 tablet 1    lisinopril (ZESTRIL) 20 mg tablet Take 1 tablet (20 mg total) by mouth 2 (two) times a day 180 tablet 3    metoprolol tartrate (LOPRESSOR) 25 mg tablet TAKE 1 TABLET EVERY 12 HOURS 180 tablet 3    Omega-3 Fatty Acids (FISH OIL) 1200 MG CAPS Take 1,200 mg by mouth 2 (two) times a day       oxybutynin (DITROPAN) 5 mg tablet TAKE 1 TABLET TWICE DAILY 180 tablet 1    Promethazine-DM (PHENERGAN-DM) 6 25-15 mg/5 mL oral syrup Take 5 mL by mouth 4 (four) times a day as needed for cough 300 mL 2    Tetrahydroz-Polyvinyl Al-Povid (MURINE TEARS PLUS OP) Apply to eye 3 (three) times a day      triamcinolone (KENALOG) 0 1 % ointment Apply 2x/day to itchy, red areas as needed   Overuse can thin skin 453 6 g 3     No current facility-administered medications for this visit  She is allergic to ampicillin       Review of Systems   Constitutional: Positive for activity change  Negative for appetite change, chills, diaphoresis, fatigue and fever  HENT: Negative  Eyes: Positive for visual disturbance (chronic, worsening)  Negative for pain, discharge, redness and itching  Respiratory: Negative  Cardiovascular: Negative  Gastrointestinal: Negative  Genitourinary: Negative  Musculoskeletal: Positive for arthralgias, back pain, gait problem and myalgias  Skin: Negative  Neurological: Negative for dizziness, tremors, syncope, speech difficulty, weakness, light-headedness, numbness and headaches  Psychiatric/Behavioral: Negative  Objective:      /80   Pulse 72   Temp 99 6 °F (37 6 °C)   Ht 5' 1" (1 549 m)   Wt 105 kg (231 lb)   BMI 43 65 kg/m²          Physical Exam  Vitals reviewed  Constitutional:       Appearance: She is well-developed  She is not diaphoretic  HENT:      Head: Normocephalic and atraumatic  Right Ear: Tympanic membrane, ear canal and external ear normal       Left Ear: Tympanic membrane, ear canal and external ear normal       Ears:      Comments: Sl decreased hearing     Mouth/Throat:      Mouth: Mucous membranes are moist    Eyes:      Extraocular Movements: Extraocular movements intact  Conjunctiva/sclera: Conjunctivae normal       Pupils: Pupils are equal, round, and reactive to light  Comments: Unable to appreciate fundi clearly   Neck:      Thyroid: No thyromegaly  Vascular: No JVD  Cardiovascular:      Rate and Rhythm: Normal rate and regular rhythm  Pulses: Normal pulses  Heart sounds: No murmur heard  Pulmonary:      Effort: Pulmonary effort is normal       Breath sounds: Wheezing (rare, scattered) present  No rhonchi or rales  Abdominal:      General: There is no distension  Palpations: Abdomen is soft  There is no mass  Tenderness:  There is no abdominal tenderness  Musculoskeletal:         General: No swelling  Normal range of motion  Cervical back: Normal range of motion and neck supple  No tenderness  No muscular tenderness  Right lower leg: Edema (trace) present  Left lower leg: Edema (trace) present  Lymphadenopathy:      Cervical: No cervical adenopathy  Skin:     General: Skin is warm and dry  Capillary Refill: Capillary refill takes less than 2 seconds  Findings: No rash  Neurological:      Mental Status: She is alert and oriented to person, place, and time  Cranial Nerves: No cranial nerve deficit  Sensory: No sensory deficit  Motor: No weakness or abnormal muscle tone  Gait: Gait abnormal (Ambulates with cane  mild imbalance and mild antalgic appearance)  Deep Tendon Reflexes: Reflexes are normal and symmetric  Comments: MMSE 30/30   Psychiatric:         Behavior: Behavior normal          Thought Content:  Thought content normal          Judgment: Judgment normal       Comments: PHQ-2/9 Depression Screening    Little interest or pleasure in doing things: 0 - not at all  Feeling down, depressed, or hopeless: 0 - not at all  PHQ-2 Score: 0  PHQ-2 Interpretation: Negative depression screen

## 2022-06-29 NOTE — PROGRESS NOTES
Results for orders placed or performed in visit on 06/29/22   Cardiac EP device report    Narrative    MDT-DUAL CHAMBER PPM (DDDR MODE)/ ACTIVE SYSTEM IS MRI CONDITIONAL  DEVICE INTERROGATED IN THE [place] OFFICE: BATTERY VOLTAGE ADEQUATE-2 5 YRS  AP 59%  100%  ALL AVAILABLE LEAD PARAMETERS WITHIN NORMAL LIMITS  1 AT/AF NOTED; 4:22 HRS; PT ON ELIQUIS  NO PROGRAMMING CHANGES MADE TO DEVICE PARAMETERS  NORMAL DEVICE FUNCTION   NC

## 2022-06-30 PROBLEM — R26.89 IMBALANCE: Status: ACTIVE | Noted: 2022-06-30

## 2022-06-30 PROBLEM — R41.3 MEMORY DIFFICULTIES: Status: ACTIVE | Noted: 2022-06-30

## 2022-06-30 NOTE — ASSESSMENT & PLAN NOTE
? Related to vision issues and decreased body proprioception? Will refer to PT for balance training   Recheck 1m if not improved - earlier if worse

## 2022-06-30 NOTE — ASSESSMENT & PLAN NOTE
Lab Results   Component Value Date    HGBA1C 6 6 (A) 06/29/2022   Improved control  Continue present care  F/u with Endo   Recheck 6m

## 2022-06-30 NOTE — ASSESSMENT & PLAN NOTE
Pt admits to occ, mild difficulties, but MMSE = 30/30  I did not find issues with cognitive abilities  Discussed getting a second opinion with neuro or geriatrics - pt and  decided to watch   Recheck 6m - earlier if worse

## 2022-07-12 ENCOUNTER — EVALUATION (OUTPATIENT)
Dept: PHYSICAL THERAPY | Facility: CLINIC | Age: 82
End: 2022-07-12
Payer: MEDICARE

## 2022-07-12 DIAGNOSIS — R26.89 IMBALANCE: Primary | ICD-10-CM

## 2022-07-12 PROCEDURE — 97110 THERAPEUTIC EXERCISES: CPT | Performed by: PHYSICAL THERAPIST

## 2022-07-12 PROCEDURE — 97161 PT EVAL LOW COMPLEX 20 MIN: CPT | Performed by: PHYSICAL THERAPIST

## 2022-07-12 NOTE — PROGRESS NOTES
PT EVALUATION    Today's date: 22  Patient name: Priyank Steele  : 1940  MRN: [id number]  Referring provider: Dante Jennings  Dx:   1  Jaylon Artist is a [de-identified] y o  female who presents with imbalance with history of 'a few falls', two this year  She was unable to complete CTSIB balance testing due to use of hands and timed up can go was 28 seconds (>14 increased fall risk)  Treatment will include general conditioning, gait training, and balance activities  This patient would benefit from skilled physical therapy to address their listed impairments and functional limitations to maximize functional outcome  Impairments:    pain with function   activity intolerance   weight bearing intolerance   abnormal gait   imbalance     Prognosis:  Good  Positive and negative prognostic indicator(s):  none    Goals:    STG Patient is independent with HEP   STG Tolerance to weight bearing activities is improved by 25% in 2 weeks  LTG Balance & endurance & gait & locomotion are improved by 50% in 4 weeks  LTG Weight bearing tolerance improved 50% in 4 weeks    Planned interventions:  home exercise program, patient education, graded activity, functional range of motion exercises, strengthening, balance and weight bearing training and gait training    Duration in visits:  4-8  Frequency: 1-2 visits per week  Duration in weeks:  4     History of Current Injury: patient reports gradual progression of imbalance for a long time  She has had 1-2 falls in   She uses a cane full-time unless she can rely on a grocery cart  She denies dizziness  The last time she fell, she was holding on to laundry and knew she was losing her balance but couldn't hold on  She usually lands backwards  She has pain in the knees but has not noticed weakness in the legs      Pain location: balance   Pain descriptors:  n/a     Aggravating factors: weight bearing activities, especially without UE assist  Easing factors: holding on to carts, using cane    Patient goals:  independence with ADLs, increased mobility and increased strength    Objective     Strength/Myotome Testing     Left Hip   Planes of Motion   Flexion: 4  Extension: 4  Abduction: 3    Right Hip   Planes of Motion   Flexion: 4  Extension: 4  Abduction: 3    Left Knee   Flexion: 4  Extension: 4+    Right Knee   Flexion: 4  Extension: 4    Left Ankle/Foot   Dorsiflexion: 5    Right Ankle/Foot   Dorsiflexion: 5    Functional Assessment        Comments  Time up and go:  28 seconds (fall risk increases > 14s)  2 minute walk test:  160 feet  Sit to stand: unable to perform sit to stand without UEs  Modified CTSIB: unable to complete due to use of hands  EO Firm:  0 89  EC Firm: DNC  EO Foam:  DNC  EC Foam:  DNC  Composite:   NA    Balance foams: Biodex foam normal stance, eyes open: 1-3 seconds            Precautions: COPD, CAD, DM      Manuals 7/12                                                   Nu step warm up?              Neuro Re-Ed             Side stepping             EO foam             EC foam             Head turns/tilts EO             Head turns/tilts EC             Hurdles, forward/lateral                                                                 Ther Ex             marching             Sit to stands             Heel raises                                                                              Ther Activity                                       Gait Training                                       Modalities

## 2022-07-13 DIAGNOSIS — I48.91 ATRIAL FIBRILLATION, UNSPECIFIED TYPE (HCC): ICD-10-CM

## 2022-07-18 ENCOUNTER — OFFICE VISIT (OUTPATIENT)
Dept: PHYSICAL THERAPY | Facility: CLINIC | Age: 82
End: 2022-07-18
Payer: MEDICARE

## 2022-07-18 DIAGNOSIS — R26.89 IMBALANCE: Primary | ICD-10-CM

## 2022-07-18 DIAGNOSIS — F41.9 ANXIETY: ICD-10-CM

## 2022-07-18 PROCEDURE — 97112 NEUROMUSCULAR REEDUCATION: CPT | Performed by: PHYSICAL THERAPIST

## 2022-07-18 PROCEDURE — 97110 THERAPEUTIC EXERCISES: CPT | Performed by: PHYSICAL THERAPIST

## 2022-07-18 RX ORDER — CITALOPRAM 20 MG/1
TABLET ORAL
Qty: 90 TABLET | Refills: 3 | Status: SHIPPED | OUTPATIENT
Start: 2022-07-18

## 2022-07-18 NOTE — PROGRESS NOTES
Daily Note     Today's date: 2022  Patient name: Barbara Banda  : 1940  MRN: [id number]  Referring provider: Brandie Armstrong*  Dx:   Encounter Diagnosis     ICD-10-CM    1  Imbalance  [id number]                   Subjective: patient reports she had a very hectic week and didn't get to the exercises much at home      Objective: See treatment diary below      Assessment: Tolerated treatment well  Patient exhibited good technique with therapeutic exercises and would benefit from continued PT  Initiated static and dynamic balance activities  She was challenged by head turns/tilts and eyes closed activities, using hands frequently to assist       Plan: Progress treatment as tolerated         Precautions: COPD, CAD, DM      Manuals                                                   Nu step warm up  7'           Neuro Re-Ed             Side stepping  2'           EO foam  :10x5           EC foam  No foam :10x5           Head turns/tilts EO  2x10           Head turns/tilts EC  2x10           Hurdles, forward/lateral  3' fwd, 2' lateral                                                               Ther Ex             marching             Sit to stands  10 use hands on way up only           Heel raises  20                                                                            Ther Activity                                       Gait Training                                       Modalities

## 2022-07-25 ENCOUNTER — APPOINTMENT (OUTPATIENT)
Dept: PHYSICAL THERAPY | Facility: CLINIC | Age: 82
End: 2022-07-25
Payer: MEDICARE

## 2022-07-25 ENCOUNTER — TELEPHONE (OUTPATIENT)
Dept: FAMILY MEDICINE CLINIC | Facility: CLINIC | Age: 82
End: 2022-07-25

## 2022-07-25 NOTE — TELEPHONE ENCOUNTER
Patient called requesting an appoint   Patient aware you are full today      She has little red pimples on arms/armpits and back that are very itchy and dry  She said she's seen derm for it in the past but she doesn't remember what they treated her with     Please advise

## 2022-07-26 ENCOUNTER — OFFICE VISIT (OUTPATIENT)
Dept: FAMILY MEDICINE CLINIC | Facility: CLINIC | Age: 82
End: 2022-07-26
Payer: MEDICARE

## 2022-07-26 VITALS
DIASTOLIC BLOOD PRESSURE: 82 MMHG | WEIGHT: 231 LBS | HEART RATE: 70 BPM | TEMPERATURE: 98 F | SYSTOLIC BLOOD PRESSURE: 122 MMHG | BODY MASS INDEX: 43.61 KG/M2 | HEIGHT: 61 IN

## 2022-07-26 DIAGNOSIS — Z23 IMMUNIZATION DUE: ICD-10-CM

## 2022-07-26 DIAGNOSIS — L30.9 DERMATITIS: Primary | ICD-10-CM

## 2022-07-26 PROCEDURE — 99213 OFFICE O/P EST LOW 20 MIN: CPT | Performed by: FAMILY MEDICINE

## 2022-07-26 NOTE — PROGRESS NOTES
Assessment/Plan:    Dermatitis  ? Exposure/contact? I reviewed with pt  Start cetirizine 10mg qHS for pruritis and continue clobetasol 1-2x a day for 7-10d  Recheck 1 week if not improved  - earlier if worse       Diagnoses and all orders for this visit:    Dermatitis    Immunization due  -     Cancel: Pneumococcal Conjugate Vaccine 20-valent (Pcv20)          Subjective:      Patient ID: Joseline Gonzalez is a [de-identified] y o  female  82-year-old female presents with a 2 week history of dry pruritic patches in the bilateral axillary areas as well as the low lumbar spine  She also had "welt-like lesions" on the flexor surfaces of her forearms bilaterally  These came up at approximately the same time as well  Patient did changed her detergent approximately 1 month ago, and recently changed back to oral detergent  She denies any rashes on her abdomen, chest, groin or legs  Patient found an old steroid cream at home when used on her forearms with good effect last night  The following portions of the patient's history were reviewed and updated as appropriate:   She  has a past medical history of Anxiety, Arthritis, CHF (congestive heart failure) ([address] ), Chronic venous insufficiency, COPD (chronic obstructive pulmonary disease) ([address] ), Coronary artery disease, Diabetes mellitus ([address] ), Disease of thyroid gland, DVT (deep venous thrombosis) ([address] ), HL (hearing loss), Hyperlipidemia, Hypertension, Irregular heart beat, Pacemaker, Renal disorder, Sleep apnea, Sleep difficulties, Thyroid disease, Urinary incontinence, and Uterine leiomyoma    She   Patient Active Problem List    Diagnosis Date Noted    Memory difficulties 06/30/2022    Imbalance 06/30/2022    Coronary artery disease involving native coronary artery of native heart 03/06/2021    Dermatitis 09/15/2020    Sleep apnea 05/17/2019    Intraocular pressure increase 06/06/2018    Vitamin D deficiency 04/03/2018    Bilateral leg edema 03/13/2018    Iron deficiency anemia 10/12/2017    Urinary bladder incontinence 07/26/2017    OAB (overactive bladder) 07/26/2017    Benign hypertension with chronic kidney disease, stage III ([facility] ) 06/21/2017    Microalbuminuria 06/21/2017    PAF (paroxysmal atrial fibrillation) ([facility] ) 10/31/2016    Chronic venous insufficiency 02/19/2016    Presence of permanent cardiac pacemaker 01/28/2016    Chronic diastolic congestive heart failure ([facility] ) 01/21/2016    Anemia 01/19/2016    S/P cardiac pacemaker procedure 01/19/2016    Hypothyroidism 01/19/2016    Complete heart block ([facility] ) 01/16/2016    Type 2 diabetes mellitus with hyperglycemia ([place] [address] ) 01/15/2016    Class 2 severe obesity with serious comorbidity in adult [facility]) 01/15/2016    Stage 3a chronic kidney disease ([facility] ) 10/21/2015    Eczematous dermatitis 03/03/2015    Anxiety disorder 06/26/2013    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 neurological manifestations, uncontrolled([address]) 10/02/2012    Chronic obstructive pulmonary disease ([facility] ) 09/24/2012    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 ([facility] ) 09/22/2011    Mixed hyperlipidemia 03/14/2011     She  has a past surgical history that includes Hysterectomy; Joint replacement (Right); Tonsillectomy; Cataract extraction w/ intraocular lens  implant, bilateral; Ectopic pregnancy surgery; Cardiac surgery; Cardiac pacemaker placement; Carpal tunnel release (Bilateral); pr endovenous laser, 1st vein (Left, 2/17/2017); Cystoscopy; Cataract extraction; Breast surgery; and Oophorectomy  She  reports that she has never smoked  She has never used smokeless tobacco  She reports current alcohol use  She reports that she does not use drugs    Current Outpatient Medications   Medication Sig Dispense Refill    acetaminophen (TYLENOL) 325 mg tablet Take 650 mg by mouth      albuterol (ProAir HFA) 90 mcg/act inhaler Inhale 2 puffs every 6 (six) hours as needed for wheezing 8 g 5    apixaban (Eliquis) 5 mg Take 1 tablet (5 mg total) by mouth 2 (two) times a day 56 tablet 0    Ascorbic Acid (VITAMIN C) 1000 MG tablet Take 500 mg by mouth       atorvastatin (LIPITOR) 80 mg tablet TAKE 1 TABLET AT BEDTIME 90 tablet 3    Blood Glucose Monitoring Suppl (OneTouch Verio) w/Device KIT Use as directed 1 kit 0    Calcium Carbonate-Vit D-Min (CALCIUM 1200 PO) Take by mouth      cholecalciferol (VITAMIN D3) 1,000 units tablet Take 2,000 Units by mouth daily       citalopram (CeleXA) 20 mg tablet TAKE 1 TABLET EVERY DAY 90 tablet 3    clobetasol (TEMOVATE) 0 05 % cream Apply topically 2 (two) times a day To leg 60 g 5    Continuous Blood Gluc  (FREESTYLE RONNI 14 DAY READER) GARRY Use to check BG levels 4+times daily and as needed 1 Device 0    Continuous Blood Gluc Sensor (FREESTYLE RONNI 14 DAY SENSOR) MISC Change sensor every 14 days to check BG levels 4+ times daily 6 each 3    furosemide (LASIX) 20 mg tablet Take 1 tablet (20 mg total) by mouth as needed (as needed for leg swelling) 30 tablet 2    GLUCOSAMINE CHONDROITIN COMPLX PO Take 1 tablet by mouth 2 (two) times a day      glucose blood (OneTouch Verio) test strip Use 4 times a day 400 strip 1    glucose blood (TRUE METRIX BLOOD GLUCOSE TEST) test strip Test 4 times plus daily, as instructed 400 each 1    hydrOXYzine HCL (ATARAX) 25 mg tablet Take 1 tablet (25 mg total) by mouth daily at bedtime 30 tablet 2    insulin aspart (NovoLOG FlexPen) 100 UNIT/ML injection pen INJECT 8 UNITS SUBCUTANEOUSLY WITH BREAKFAST, THEN 10 UNITS WITH LUNCH, AND THEN 10 UNITS WITH DINNER 45 mL 1    insulin glargine (Toujeo SoloStar) 300 units/mL CONCENTRATED U-300 injection pen (1-unit dial) Inject 36 Units under the skin daily at bedtime 12 mL 1    Insulin Pen Needle (Pen Needles) 32G X 6 MM MISC Use 4 times a day 400 each 1    Lancets (onetouch ultrasoft) lancets Use 4 times a day 400 each 0    levothyroxine 125 mcg tablet TAKE 1 TABLET ONE TIME DAILY EXCEPT ON SUNDAY NO TABLET 78 tablet 1    lisinopril (ZESTRIL) 20 mg tablet Take 1 tablet (20 mg total) by mouth 2 (two) times a day 180 tablet 3    metoprolol tartrate (LOPRESSOR) 25 mg tablet TAKE 1 TABLET EVERY 12 HOURS 180 tablet 3    Omega-3 Fatty Acids (FISH OIL) 1200 MG CAPS Take 1,200 mg by mouth 2 (two) times a day       oxybutynin (DITROPAN) 5 mg tablet TAKE 1 TABLET TWICE DAILY 180 tablet 1    Promethazine-DM (PHENERGAN-DM) 6 25-15 mg/5 mL oral syrup Take 5 mL by mouth 4 (four) times a day as needed for cough 300 mL 2    Tetrahydroz-Polyvinyl Al-Povid (MURINE TEARS PLUS OP) Apply to eye 3 (three) times a day      triamcinolone (KENALOG) 0 1 % ointment Apply 2x/day to itchy, red areas as needed  Overuse can thin skin 453 6 g 3     No current facility-administered medications for this visit  She is allergic to ampicillin       Review of Systems   Constitutional: Negative  Skin: Positive for rash  Negative for color change, pallor and wound  Objective:      /82   Pulse 70   Temp 98 °F (36 7 °C)   Ht 5' 1" (1 549 m)   Wt 105 kg (231 lb)   BMI 43 65 kg/m²          Physical Exam  Skin:     General: Skin is warm  Capillary Refill: Capillary refill takes less than 2 seconds  Findings: Rash (scattered small urticarial like lesions on the volar aspect of the forearms bilat  Scattered dry, papular areas in the upper arms  No dermatographism  , cellulitis, or other findings) present  Neurological:      Mental Status: She is alert  Sensory: No sensory deficit  Motor: No weakness

## 2022-07-27 NOTE — ASSESSMENT & PLAN NOTE
? Exposure/contact? I reviewed with pt  Start cetirizine 10mg qHS for pruritis and continue clobetasol 1-2x a day for 7-10d     Recheck 1 week if not improved  - earlier if worse

## 2022-08-01 ENCOUNTER — OFFICE VISIT (OUTPATIENT)
Dept: PHYSICAL THERAPY | Facility: CLINIC | Age: 82
End: 2022-08-01
Payer: MEDICARE

## 2022-08-01 DIAGNOSIS — R26.89 IMBALANCE: Primary | ICD-10-CM

## 2022-08-01 PROCEDURE — 97110 THERAPEUTIC EXERCISES: CPT

## 2022-08-01 PROCEDURE — 97140 MANUAL THERAPY 1/> REGIONS: CPT

## 2022-08-01 NOTE — PROGRESS NOTES
Daily Note     Today's date: 2022  Patient name: Lynne Kelley  : 1940  MRN: [id number]  Referring provider: Camille Crawford*  Dx:   Encounter Diagnosis     ICD-10-CM    1  Imbalance  R26 89                   Subjective: Patient reports no new complaints this visit  Denies any losses of balance; states legs are feeling "okay" today  Objective: See treatment diary below      Assessment: Tolerated treatment well  Patient demonstrated difficulty with dynamic balance exercise this visit, notably stepping over hurdles  HHA required from [address]  Patient did demonstrate some difficulty with balancing while completing head turns and tilts as well  Session modified this visit due to time constraints  Patient exhibited good technique with therapeutic exercises and would benefit from continued PT      Plan: Continue per plan of care        Precautions: COPD, CAD, DM      Manuals                                                  Nu step warm up  7' 7'          Neuro Re-Ed             Side stepping  2'           EO foam  :10x5 10s x 5          EC foam  No foam :10x5 No foam 10s x 5          Head turns/tilts EO  2x10 2x10          Head turns/tilts EC  2x10 2x10          Hurdles, forward/lateral  3' fwd, 2' lateral 3' fwd                                                              Ther Ex             marching             Sit to stands  10 use hands on way up only 10          Heel raises  20 20x                                                                           Ther Activity                                       Gait Training                                       Modalities

## 2022-08-08 ENCOUNTER — OFFICE VISIT (OUTPATIENT)
Dept: PHYSICAL THERAPY | Facility: CLINIC | Age: 82
End: 2022-08-08
Payer: MEDICARE

## 2022-08-08 DIAGNOSIS — R26.89 IMBALANCE: Primary | ICD-10-CM

## 2022-08-08 PROCEDURE — 97112 NEUROMUSCULAR REEDUCATION: CPT | Performed by: PHYSICAL THERAPIST

## 2022-08-08 PROCEDURE — 97110 THERAPEUTIC EXERCISES: CPT | Performed by: PHYSICAL THERAPIST

## 2022-08-08 NOTE — PROGRESS NOTES
Daily Note     Today's date: 2022  Patient name: Joseline Gonzalez  : 1940  MRN: [id number]  Referring provider: Wilma Cochran*  Dx:   Encounter Diagnosis     ICD-10-CM    1  Imbalance  R26 89                   Subjective: patient reports no issues after her last visit  She states feeling okay this morning  Objective: See treatment diary below      Assessment: Patient tolerated treatment well  Able to complete and progress program as noted below  She continues to be challenged with head turns/tilts  She requires cuing to keep toes straight during lateral stepping over hurdles  She would benefit from continuing physical therapy to improve balance and gait  Plan: Continue per plan of care        Precautions: COPD, CAD, DM      Manuals                                                 Nu step warm up  7' 7' 7'         Neuro Re-Ed             Side stepping  2'           EO foam  :10x5 10s x 5 10"x5         EC foam  No foam :10x5 No foam 10s x 5 No Foam 10"x5         Head turns/tilts EO  2x10 2x10 2x10         Head turns/tilts EC  2x10 2x10 2x10         Hurdles, forward/lateral  3' fwd, 2' lateral 3' fwd 3' each                                                             Ther Ex             marching             Sit to stands  10 use hands on way up only 10 12x         Heel raises  20 20x 20x                                                                          Ther Activity                                       Gait Training                                       Modalities

## 2022-08-15 ENCOUNTER — OFFICE VISIT (OUTPATIENT)
Dept: PHYSICAL THERAPY | Facility: CLINIC | Age: 82
End: 2022-08-15
Payer: MEDICARE

## 2022-08-15 ENCOUNTER — TELEPHONE (OUTPATIENT)
Dept: NEPHROLOGY | Facility: CLINIC | Age: 82
End: 2022-08-15

## 2022-08-15 DIAGNOSIS — R26.89 IMBALANCE: Primary | ICD-10-CM

## 2022-08-15 PROCEDURE — 97112 NEUROMUSCULAR REEDUCATION: CPT

## 2022-08-15 PROCEDURE — 97110 THERAPEUTIC EXERCISES: CPT

## 2022-08-15 NOTE — TELEPHONE ENCOUNTER
Called and spoke with Patient to complete their bloodwork prior to their appointment on 08/25/22 with Dr Mary Hoff at the [facility] [place]

## 2022-08-15 NOTE — PROGRESS NOTES
Daily Note     Today's date: 8/15/2022  Patient name: Carlota Carranza  : 1940  MRN: [id number]  Referring provider: Tom Booker*  Dx:   Encounter Diagnosis     ICD-10-CM    1  Imbalance  R26 89                   Subjective: Pt reports feeling "so-so" today  Objective: See treatment diary below      Assessment: Tolerated treatment well  Slight apprehension with foam stance and EC balance  Good effort noted throughout session, seated rest breaks taken as needed  She reports lateral hurdles are more challenging than forwards  Patient demonstrated fatigue post treatment and would benefit from continued PT      Plan: Continue per plan of care        Precautions: COPD, CAD, DM      Manuals 7/12 7/18 8/1 8/8 8/15                                               Nu step warm up  7' 7' 7' 7'        Neuro Re-Ed             Side stepping  2'   2'        EO foam  :10x5 10s x 5 10"x5 10"x5 green        EC foam  No foam :10x5 No foam 10s x 5 No Foam 10"x5 No foam 10"x5        Head turns/tilts EO  2x10 2x10 2x10 2x10        Head turns/tilts EC  2x10 2x10 2x10 2x10        Hurdles, forward/lateral  3' fwd, 2' lateral 3' fwd 3' each 3' each                                                            Ther Ex             marching     20x ea        Sit to stands  10 use hands on way up only 10 12x 12x        Heel raises  20 20x 20x 20x                                                                         Ther Activity                                       Gait Training                                       Modalities

## 2022-08-22 ENCOUNTER — OFFICE VISIT (OUTPATIENT)
Dept: PHYSICAL THERAPY | Facility: CLINIC | Age: 82
End: 2022-08-22
Payer: MEDICARE

## 2022-08-22 DIAGNOSIS — R26.89 IMBALANCE: Primary | ICD-10-CM

## 2022-08-22 PROCEDURE — 97110 THERAPEUTIC EXERCISES: CPT | Performed by: PHYSICAL THERAPIST

## 2022-08-22 PROCEDURE — 97112 NEUROMUSCULAR REEDUCATION: CPT | Performed by: PHYSICAL THERAPIST

## 2022-08-22 NOTE — PROGRESS NOTES
Daily Note     Today's date: 2022  Patient name: Artie Lobato  : 1940  MRN: [id number]  Referring provider: Estella Villanueva*  Dx:   Encounter Diagnosis     ICD-10-CM    1  Imbalance  R26 89                   Subjective: patient reports she is able to walk a block without much difficulty as long as she has her cane      Objective: See treatment diary below      Assessment: Tolerated treatment well  Patient exhibited good technique with therapeutic exercises and would benefit from continued PT  Progressed standing exercises with the Nu Step being unavailable    Plan: Progress treatment as tolerated         Precautions: COPD, CAD, DM      Manuals 7/12 7/18 8/1 8/8 8/15 8/22                                              Nu step warm up  7' 7' 7' 7'        Neuro Re-Ed             Side stepping  2'   2' 2'       EO foam  :10x5 10s x 5 10"x5 10"x5 green With 10 head turns/tilts       EC foam  No foam :10x5 No foam 10s x 5 No Foam 10"x5 No foam 10"x5 :10x5       Head turns/tilts EO  2x10 2x10 2x10 2x10 2x10       Head turns/tilts EC  2x10 2x10 2x10 2x10 2x10       Hurdles, forward/lateral  3' fwd, 2' lateral 3' fwd 3' each 3' each 2'ea                                                           Ther Ex             marching     20x ea        Sit to stands  10 use hands on way up only 10 12x 12x        Heel raises  20 20x 20x 20x Green foam 20 + toe raises       Step ups      #4 10ea       Toe taps to step      2" 10ea                                              Ther Activity                                       Gait Training                                       Modalities

## 2022-08-23 ENCOUNTER — APPOINTMENT (OUTPATIENT)
Dept: LAB | Facility: CLINIC | Age: 82
End: 2022-08-23
Payer: MEDICARE

## 2022-08-23 DIAGNOSIS — N18.31 STAGE 3A CHRONIC KIDNEY DISEASE (HCC): ICD-10-CM

## 2022-08-23 DIAGNOSIS — E55.9 VITAMIN D DEFICIENCY: ICD-10-CM

## 2022-08-23 DIAGNOSIS — N18.30 BENIGN HYPERTENSION WITH CHRONIC KIDNEY DISEASE, STAGE III (HCC): ICD-10-CM

## 2022-08-23 DIAGNOSIS — I12.9 BENIGN HYPERTENSION WITH CHRONIC KIDNEY DISEASE, STAGE III (HCC): ICD-10-CM

## 2022-08-23 LAB
25(OH)D3 SERPL-MCNC: 52.7 NG/ML (ref 30–100)
ANION GAP SERPL CALCULATED.3IONS-SCNC: 3 MMOL/L (ref 4–13)
BUN SERPL-MCNC: 25 MG/DL (ref 5–25)
CALCIUM SERPL-MCNC: 9.5 MG/DL (ref 8.3–10.1)
CHLORIDE SERPL-SCNC: 109 MMOL/L (ref 96–108)
CO2 SERPL-SCNC: 28 MMOL/L (ref 21–32)
CREAT SERPL-MCNC: 1.15 MG/DL (ref 0.6–1.3)
ERYTHROCYTE [DISTWIDTH] IN BLOOD BY AUTOMATED COUNT: 13.3 % (ref 11.6–15.1)
GFR SERPL CREATININE-BSD FRML MDRD: 45 ML/MIN/1.73SQ M
GLUCOSE SERPL-MCNC: 140 MG/DL (ref 65–140)
HCT VFR BLD AUTO: 38 % (ref 34.8–46.1)
HGB BLD-MCNC: 11.5 G/DL (ref 11.5–15.4)
MAGNESIUM SERPL-MCNC: 1.8 MG/DL (ref 1.6–2.6)
MCH RBC QN AUTO: 29.9 PG (ref 26.8–34.3)
MCHC RBC AUTO-ENTMCNC: 30.3 G/DL (ref 31.4–37.4)
MCV RBC AUTO: 99 FL (ref 82–98)
PHOSPHATE SERPL-MCNC: 3.8 MG/DL (ref 2.3–4.1)
PLATELET # BLD AUTO: 171 THOUSANDS/UL (ref 149–390)
PMV BLD AUTO: 10.7 FL (ref 8.9–12.7)
POTASSIUM SERPL-SCNC: 4.2 MMOL/L (ref 3.5–5.3)
PTH-INTACT SERPL-MCNC: 46.4 PG/ML (ref 18.4–80.1)
RBC # BLD AUTO: 3.84 MILLION/UL (ref 3.81–5.12)
SODIUM SERPL-SCNC: 140 MMOL/L (ref 135–147)
WBC # BLD AUTO: 7.11 THOUSAND/UL (ref 4.31–10.16)

## 2022-08-23 PROCEDURE — 84100 ASSAY OF PHOSPHORUS: CPT

## 2022-08-23 PROCEDURE — 83735 ASSAY OF MAGNESIUM: CPT

## 2022-08-23 PROCEDURE — 80048 BASIC METABOLIC PNL TOTAL CA: CPT

## 2022-08-23 PROCEDURE — 85027 COMPLETE CBC AUTOMATED: CPT

## 2022-08-23 PROCEDURE — 83970 ASSAY OF PARATHORMONE: CPT

## 2022-08-23 PROCEDURE — 82306 VITAMIN D 25 HYDROXY: CPT

## 2022-08-23 PROCEDURE — 36415 COLL VENOUS BLD VENIPUNCTURE: CPT

## 2022-08-24 ENCOUNTER — APPOINTMENT (OUTPATIENT)
Dept: LAB | Facility: CLINIC | Age: 82
End: 2022-08-24
Payer: MEDICARE

## 2022-08-24 DIAGNOSIS — I12.9 BENIGN HYPERTENSION WITH CHRONIC KIDNEY DISEASE, STAGE III (HCC): ICD-10-CM

## 2022-08-24 DIAGNOSIS — N18.31 STAGE 3A CHRONIC KIDNEY DISEASE (HCC): ICD-10-CM

## 2022-08-24 DIAGNOSIS — R80.9 MICROALBUMINURIA: ICD-10-CM

## 2022-08-24 DIAGNOSIS — N18.30 BENIGN HYPERTENSION WITH CHRONIC KIDNEY DISEASE, STAGE III (HCC): ICD-10-CM

## 2022-08-24 LAB
CREAT UR-MCNC: 23.8 MG/DL
MICROALBUMIN UR-MCNC: 209 MG/L (ref 0–20)
MICROALBUMIN/CREAT 24H UR: 878 MG/G CREATININE (ref 0–30)

## 2022-08-24 PROCEDURE — 82570 ASSAY OF URINE CREATININE: CPT

## 2022-08-24 PROCEDURE — 82043 UR ALBUMIN QUANTITATIVE: CPT

## 2022-08-25 ENCOUNTER — OFFICE VISIT (OUTPATIENT)
Dept: NEPHROLOGY | Facility: CLINIC | Age: 82
End: 2022-08-25
Payer: MEDICARE

## 2022-08-25 VITALS
SYSTOLIC BLOOD PRESSURE: 146 MMHG | DIASTOLIC BLOOD PRESSURE: 76 MMHG | HEIGHT: 61 IN | WEIGHT: 233.2 LBS | BODY MASS INDEX: 44.03 KG/M2 | HEART RATE: 73 BPM

## 2022-08-25 DIAGNOSIS — E66.01 MORBID OBESITY WITH BODY MASS INDEX (BMI) OF 40.0 TO 44.9 IN ADULT (HCC): ICD-10-CM

## 2022-08-25 DIAGNOSIS — E55.9 VITAMIN D DEFICIENCY: ICD-10-CM

## 2022-08-25 DIAGNOSIS — N18.30 BENIGN HYPERTENSION WITH CHRONIC KIDNEY DISEASE, STAGE III (HCC): Primary | ICD-10-CM

## 2022-08-25 DIAGNOSIS — N25.81 SECONDARY HYPERPARATHYROIDISM OF RENAL ORIGIN (HCC): ICD-10-CM

## 2022-08-25 DIAGNOSIS — I12.9 BENIGN HYPERTENSION WITH CHRONIC KIDNEY DISEASE, STAGE III (HCC): Primary | ICD-10-CM

## 2022-08-25 DIAGNOSIS — N18.31 STAGE 3A CHRONIC KIDNEY DISEASE (HCC): ICD-10-CM

## 2022-08-25 DIAGNOSIS — R80.9 MICROALBUMINURIA: ICD-10-CM

## 2022-08-25 PROCEDURE — 99214 OFFICE O/P EST MOD 30 MIN: CPT | Performed by: INTERNAL MEDICINE

## 2022-08-25 RX ORDER — SPIRONOLACTONE 25 MG/1
25 TABLET ORAL DAILY
Qty: 30 TABLET | Refills: 5 | Status: SHIPPED | OUTPATIENT
Start: 2022-08-25

## 2022-08-25 NOTE — PATIENT INSTRUCTIONS
Potassium Content of Foods List   WHAT YOU NEED TO KNOW:   What is potassium? Potassium is a mineral that is found in most foods  Potassium helps to balance fluids and minerals in your body  It also helps your body maintain a normal blood pressure  Potassium helps your muscles contract and your nerves function normally  Why do I need to change the amount of potassium I eat? You may need more potassium  if you have hypokalemia (low potassium levels) or high blood pressure  You may also need more potassium if you are taking diuretics  Diuretics and certain medicines cause your body to lose potassium  You may need less potassium  in your diet if you have hyperkalemia (high potassium levels) or kidney disease  How much potassium does fruit contain? The amount of potassium in milligrams (mg) contained in each fruit or serving of fruit is listed beside the item  High-potassium foods (more than 200 mg per serving):      1 medium banana (425)    ½ of a papaya (390)    ½ cup of prune juice (370)    ¼ cup of raisins (270)    1 medium yudi (325) or kiwi (240)    1 small orange (240) or ½ cup of orange juice (235)    ½ cup of cubed cantaloupe (215) or diced honeydew melon (200)    1 medium pear (200)    Medium-potassium foods (50 to 200 mg per serving):      1 medium peach (185)    1 small apple or ½ cup of apple juice (150)    ½ cup of peaches canned in juice (120)    ½ cup of canned pineapple (100)    ½ cup of fresh, sliced strawberries (604)    ½ cup of watermelon (85)    Low-potassium foods (less than 50 mg per serving):      ½ cup of cranberries (45) or cranberry juice cocktail (20)    ½ cup of nectar of papaya, yudi, or pear (35)    How much potassium do vegetables contain?    High-potassium foods (more than 200 mg per serving):      1 medium baked potato, with skin (925)    1 baked medium sweet potato, with skin (450)    ½ cup of tomato or vegetable juice (275), or 1 medium raw tomato (290)    ½ cup of mushrooms (280)    ½ cup of fresh brussels sprouts (250)    ½ cup of cooked zucchini (220) or winter squash (250)    ¼ of a medium avocado (245)    ½ cup of broccoli (230)    Medium-potassium foods (50 to 200 mg per serving):      ½ cup of corn (195)    ½ cup of fresh or cooked carrots (180)    ½ cup of fresh cauliflower (150)    ½ cup of asparagus (155)    ½ cup of canned peas (90)     1 cup of lettuce, all types (100)    ½ cup of fresh green beans (90)    ½ cup of frozen green beans (85)    ½ cup of cucumber (80)    How much potassium do protein foods contain? High-potassium foods (more than 200 mg per serving):      ½ cup of cooked pagan beans (400) or lentils (365)    1 cup of soy milk (300)    3 ounces of baked or broiled salmon (319)    3 ounces of roasted turkey, dark meat (250)    ¼ cup of sunflower seeds (241)    3 ounces of cooked lean beef (224)    2 tablespoons of smooth peanut butter (210)    Medium-potassium foods (50 to 200 mg per serving):      1 ounce of salted peanuts, almonds, or cashews (200)    1 large egg (60)    How much potassium do dairy foods contain? High-potassium foods (more than 200 mg per serving):      6 ounces of yogurt (260 to 435)    1 cup of nonfat, low-fat, or whole milk (350 to 380)    Medium-potassium foods (50 to 200 mg per serving):      ½ cup of ricotta cheese (154)    ½ cup of vanilla ice cream (131)    ½ cup of low-fat (2%) cottage cheese (110)    Low-potassium foods (less than 50 mg per serving):      1 ounce of cheese (20 to 30)      How much potassium do grains contain? 1 slice of white bread (30)    ½ cup of white or brown rice (50)    ½ cup of spaghetti or macaroni (30)    1 flour or corn tortilla (50)    1 four-inch waffle (50)    What other foods contain potassium? 1 tablespoon of molasses (295)    1½ ounces of chocolate (165)    Some salt substitutes may contain a high amount of potassium   Check the food label to find the amount of potassium it contains  CARE AGREEMENT:   You have the right to help plan your care  Discuss treatment options with your healthcare provider to decide what care you want to receive  You always have the right to refuse treatment  The above information is an  only  It is not intended as medical advice for individual conditions or treatments  Talk to your doctor, nurse or pharmacist before following any medical regimen to see if it is safe and effective for you  © Copyright [employer] 2022 Information is for End User's use only and may not be sold, redistributed or otherwise used for commercial purposes   All illustrations and images included in CareNotes® are the copyrighted property of [employer]  or [address] [place] [address]

## 2022-08-25 NOTE — PROGRESS NOTES
NEPHROLOGY OUTPATIENT PROGRESS NOTE   Charlee Burrell [de-identified] y o  female MRN: [id number]  DATE: 8/25/2022  Reason for visit:   Chief Complaint   Patient presents with    Follow-up    Chronic Kidney Disease     ASSESSMENT and PLAN:  CK D stage III with baseline serum creatinine 1 2-1 3  - Serum creatinine stable 1 1 in August 2022    - Avoid NSAIDs or nephrotoxins  - CKD likely secondary to long-term hypertensive nephrosclerosis/diabetic nephropathy  - Check BMP, phosphorus, UPCR before next visit  -Daisy Bursn in June 2021 shows 1+ proteinuria, 2 to 4 RBCs      Proteinuria  - last urine microalbumin/creatinine ratio 878 mg in August 2022   -continue lisinopril to 40 mg p o  Daily  will start Aldactone 25 mg daily  Repeat BMP in one month  -Proteinuria likely secondary to long-term diabetes and hypertension, obesity related secondary FSGS contributing?  -last hemoglobin A1c 6 6  -also has moderate DM retinopathy in November 2020     Hypertension  -BP slightly above goal in the office today  She is been going through stress due to family issues lately   -she has not been checking BP at home   -start Aldactone as above   -recommend to check BP on daily basis and call back if remains greater than 135/85  Her home BP machine compared with our office readings prior were fairly identical    -continue current lisinopril, metoprolol, Lasix p r n      -salt restricted diet     bilateral lower extremity edema likely secondary to chronic venous stasis  - This has improved and stable since endovascular laser treatment in left lower extremity  She denies any dyspnea  - Continue low-salt diet   -continue Lasix p r n        Prior vitamin-D deficiency, improved   last vitamin-D level normal in  August 2022   Currently on vitamin-D 2000 units p o  Daily      Secondary hyperparathyroidism, last PTH stable 46 in August 2022    continue to monitor   Repeat level before next visit      Anemia in CKD, hemoglobin overall stable at goal     Diagnoses and all orders for this visit:    Benign hypertension with chronic kidney disease, stage III (HCC)  -     spironolactone (ALDACTONE) 25 mg tablet; Take 1 tablet (25 mg total) by mouth daily  -     Basic metabolic panel; Future  -     Basic metabolic panel; Future  -     CBC; Future  -     Magnesium; Future  -     Phosphorus; Future  -     PTH, intact; Future  -     Microalbumin / creatinine urine ratio; Future  -     Vitamin D 25 hydroxy; Future    Stage 3a chronic kidney disease (HCC)  -     Basic metabolic panel; Future  -     Basic metabolic panel; Future  -     CBC; Future  -     Magnesium; Future  -     Phosphorus; Future  -     PTH, intact; Future  -     Microalbumin / creatinine urine ratio; Future  -     Vitamin D 25 hydroxy; Future    Vitamin D deficiency  -     Vitamin D 25 hydroxy; Future    Microalbuminuria  -     spironolactone (ALDACTONE) 25 mg tablet; Take 1 tablet (25 mg total) by mouth daily  -     Microalbumin / creatinine urine ratio; Future    Morbid obesity with body mass index (BMI) of 40 0 to 44 9 in [facility])    Secondary hyperparathyroidism of renal origin ([facility] [address] )  -     Phosphorus; Future  -     PTH, intact; Future          SUBJECTIVE / HPI:  Patient is 72-year-old female with significant past medical history of hypertension for at least 40-50 years, and diabetes for 30 years, diabetic retinopathy, stress incontinence, chronic leg swelling with chronic venous stasis, status post endovascular laser treatment in the left lower extremity, chronic kidney disease stage III with baseline serum creatinine 1 2-1 3, complete heart block, status post ppm, paroxysmal AFib, comes for regular follow up of CKD  Her last serum creatinine overall stable at baseline   Recently her close family member was diagnosed with cancer and has been going through lot of stress due to same  She is not checking BP lately    Denies any worsening leg edema   Denies any worsening dyspnea    She tries to follow low salt diet  No recent NSAID exposure  Denies lightheadedness or dizziness  Denies any dyspnea    REVIEW OF SYSTEMS:  More than 10 point review of systems were obtained and discussed in length with the patient  Complete review of systems were negative / unremarkable except mentioned above  PHYSICAL EXAM:  Vitals:    08/25/22 1331 08/25/22 1346   BP: 144/86 146/76   BP Location: Right arm    Patient Position: Sitting    Cuff Size: Large    Pulse: 73    Weight: 106 kg (233 lb 3 2 oz)    Height: 5' 1" (1 549 m)      Body mass index is 44 06 kg/m²  Physical Exam  Vitals reviewed  Constitutional:       Appearance: She is well-developed  HENT:      Head: Normocephalic and atraumatic  Right Ear: External ear normal       Left Ear: External ear normal    Eyes:      Conjunctiva/sclera: Conjunctivae normal    Cardiovascular:      Comments: S1, S2 present  Pulmonary:      Effort: Pulmonary effort is normal       Breath sounds: Normal breath sounds  No wheezing or rales  Abdominal:      General: Bowel sounds are normal  There is no distension  Palpations: Abdomen is soft  Tenderness: There is no abdominal tenderness  Musculoskeletal:         General: No deformity  Lymphadenopathy:      Cervical: No cervical adenopathy  Skin:     Findings: No rash  Neurological:      Mental Status: She is alert and oriented to person, place, and time     Psychiatric:         Behavior: Behavior normal          PAST MEDICAL HISTORY:  Past Medical History:   Diagnosis Date    Anxiety     Arthritis     [facility]est[facility]     Chronic venous insufficiency     COPD [facility]chronic obstructive pulmonary disease) [facility]     Coronary artery disease     Diabetes mellitus ([facility] [address] )     type II    Disease of thyroid gland     hypothyroidism    DVT (deep venous thrombosis) ([facility]     HL (hearing loss)     Hyperlipidemia     Hypertension     Irregular heart beat     Paroxysmal a-fib    Pacemaker  Renal disorder     stage 3    Sleep apnea     Sleep difficulties     Thyroid disease     Urinary incontinence     Uterine leiomyoma        PAST SURGICAL HISTORY:  Past Surgical History:   Procedure Laterality Date    BREAST SURGERY      For Biopsy    CARDIAC PACEMAKER PLACEMENT      Permanent  Last assessed: 2/19/16    CARDIAC SURGERY      CARPAL TUNNEL RELEASE Bilateral     CATARACT EXTRACTION      CATARACT EXTRACTION W/ INTRAOCULAR LENS  IMPLANT, BILATERAL      CYSTOSCOPY      Diagnostic  Last assessed: 11/8/17    ECTOPIC PREGNANCY SURGERY      HYSTERECTOMY      Total Abdomonal  Resolved: 1982    JOINT REPLACEMENT Right     TKR    OOPHORECTOMY      NV ENDOVENOUS LASER, 1ST VEIN Left 2/17/2017    Procedure: GREATER SAPHENOUS VEIN ENDOVASCULAR LASER THERAPY ;  Surgeon: Sowmya Farah DO;  [facility];  Service: Vascular    TONSILLECTOMY         SOCIAL HISTORY:  Social History     Substance and Sexual Activity   Alcohol Use Yes    Comment: rarely   Per [employer]: Denied history of alcohol use     Social History     Substance and Sexual Activity   Drug Use No     Social History     Tobacco Use   Smoking Status Never Smoker   Smokeless Tobacco Never Used   Tobacco Comment    Per [employer]: Former smoker       FAMILY HISTORY:  Family History   Problem Relation Age of Onset    Hypertension Father     Diabetes Father         Mellitus    Coronary artery disease Mother     Hypertension Mother     Heart disease Mother     Diabetes Brother         Mellitus    Breast cancer Maternal Aunt     Pancreatic cancer Maternal Aunt     Breast cancer Cousin     Breast cancer Cousin     No Known Problems Sister     No Known Problems Daughter     No Known Problems Maternal Grandmother     No Known Problems Maternal Grandfather     No Known Problems Paternal Grandmother     No Known Problems Paternal Grandfather     No Known Problems Daughter     No Known Problems Son MEDICATIONS:    Current Outpatient Medications:     acetaminophen (TYLENOL) 325 mg tablet, Take 650 mg by mouth, Disp: , Rfl:     albuterol (ProAir HFA) 90 mcg/act inhaler, Inhale 2 puffs every 6 (six) hours as needed for wheezing, Disp: 8 g, Rfl: 5    apixaban (Eliquis) 5 mg, Take 1 tablet (5 mg total) by mouth 2 (two) times a day, Disp: 56 tablet, Rfl: 0    Ascorbic Acid (VITAMIN C) 1000 MG tablet, Take 500 mg by mouth , Disp: , Rfl:     atorvastatin (LIPITOR) 80 mg tablet, TAKE 1 TABLET AT BEDTIME, Disp: 90 tablet, Rfl: 3    Blood Glucose Monitoring Suppl (OneTouch Verio) w/Device KIT, Use as directed, Disp: 1 kit, Rfl: 0    Calcium Carbonate-Vit D-Min (CALCIUM 1200 PO), Take by mouth, Disp: , Rfl:     cholecalciferol (VITAMIN D3) 1,000 units tablet, Take 2,000 Units by mouth daily , Disp: , Rfl:     citalopram (CeleXA) 20 mg tablet, TAKE 1 TABLET EVERY DAY, Disp: 90 tablet, Rfl: 3    Continuous Blood Gluc  (FREESTYLE RONNI 14 DAY READER) GARRY, Use to check BG levels 4+times daily and as needed, Disp: 1 Device, Rfl: 0    Continuous Blood Gluc Sensor (FREESTYLE RONNI 14 DAY SENSOR) MISC, Change sensor every 14 days to check BG levels 4+ times daily, Disp: 6 each, Rfl: 3    furosemide (LASIX) 20 mg tablet, Take 1 tablet (20 mg total) by mouth as needed (as needed for leg swelling), Disp: 30 tablet, Rfl: 2    GLUCOSAMINE CHONDROITIN COMPLX PO, Take 1 tablet by mouth 2 (two) times a day, Disp: , Rfl:     glucose blood (OneTouch Verio) test strip, Use 4 times a day, Disp: 400 strip, Rfl: 1    glucose blood (TRUE METRIX BLOOD GLUCOSE TEST) test strip, Test 4 times plus daily, as instructed, Disp: 400 each, Rfl: 1    insulin aspart (NovoLOG FlexPen) 100 UNIT/ML injection pen, INJECT 8 UNITS SUBCUTANEOUSLY WITH BREAKFAST, THEN 10 UNITS WITH LUNCH, AND THEN 10 UNITS WITH DINNER, Disp: 45 mL, Rfl: 1    insulin glargine (Toujeo SoloStar) 300 units/mL CONCENTRATED U-300 injection pen (1-unit dial), Inject 36 Units under the skin daily at bedtime, Disp: 12 mL, Rfl: 1    Insulin Pen Needle (Pen Needles) 32G X 6 MM MISC, Use 4 times a day, Disp: 400 each, Rfl: 1    Lancets (onetouch ultrasoft) lancets, Use 4 times a day, Disp: 400 each, Rfl: 0    levothyroxine 125 mcg tablet, TAKE 1 TABLET ONE TIME DAILY EXCEPT ON SUNDAY NO TABLET, Disp: 78 tablet, Rfl: 1    lisinopril (ZESTRIL) 20 mg tablet, Take 1 tablet (20 mg total) by mouth 2 (two) times a day, Disp: 180 tablet, Rfl: 3    metoprolol tartrate (LOPRESSOR) 25 mg tablet, TAKE 1 TABLET EVERY 12 HOURS, Disp: 180 tablet, Rfl: 3    Omega-3 Fatty Acids (FISH OIL) 1200 MG CAPS, Take 1,200 mg by mouth 2 (two) times a day , Disp: , Rfl:     oxybutynin (DITROPAN) 5 mg tablet, TAKE 1 TABLET TWICE DAILY, Disp: 180 tablet, Rfl: 1    Promethazine-DM (PHENERGAN-DM) 6 25-15 mg/5 mL oral syrup, Take 5 mL by mouth 4 (four) times a day as needed for cough, Disp: 300 mL, Rfl: 2    spironolactone (ALDACTONE) 25 mg tablet, Take 1 tablet (25 mg total) by mouth daily, Disp: 30 tablet, Rfl: 5    Tetrahydroz-Polyvinyl Al-Povid (MURINE TEARS PLUS OP), Apply to eye 3 (three) times a day, Disp: , Rfl:     clobetasol (TEMOVATE) 0 05 % cream, Apply topically 2 (two) times a day To leg (Patient not taking: Reported on 8/25/2022), Disp: 60 g, Rfl: 5    hydrOXYzine HCL (ATARAX) 25 mg tablet, Take 1 tablet (25 mg total) by mouth daily at bedtime (Patient not taking: Reported on 8/25/2022), Disp: 30 tablet, Rfl: 2    triamcinolone (KENALOG) 0 1 % ointment, Apply 2x/day to itchy, red areas as needed  Overuse can thin skin (Patient not taking: Reported on 8/25/2022), Disp: 453 6 g, Rfl: 3    Lab Results:   Results for orders placed or performed in visit on 08/24/22   Microalbumin / creatinine urine ratio   Result Value Ref Range    Creatinine, Ur 23 8 mg/dL    Microalbum  ,U,Random 209 0 (H) 0 0 - 20 0 mg/L    Microalb Creat Ratio 878 (H) 0 - 30 mg/g creatinine

## 2022-08-30 ENCOUNTER — OFFICE VISIT (OUTPATIENT)
Dept: PHYSICAL THERAPY | Facility: CLINIC | Age: 82
End: 2022-08-30
Payer: MEDICARE

## 2022-08-30 DIAGNOSIS — R26.89 IMBALANCE: Primary | ICD-10-CM

## 2022-08-30 PROCEDURE — 97112 NEUROMUSCULAR REEDUCATION: CPT | Performed by: PHYSICAL THERAPIST

## 2022-08-30 PROCEDURE — 97110 THERAPEUTIC EXERCISES: CPT | Performed by: PHYSICAL THERAPIST

## 2022-08-30 NOTE — PROGRESS NOTES
Daily Note     Today's date: 2022  Patient name: Juju Perez  : 1940  MRN: [id number]  Referring provider: Radha Foster  Dx:   Encounter Diagnosis     ICD-10-CM    1  Imbalance  R26 89               Subjective: patient reports she fell onto her buttocks on Saturday morning when trying to untangle cords  She had family members help get her up and was able to go to a party afterwards  No injuries reported      Objective: See treatment diary below      Assessment: Tolerated treatment well  Patient exhibited good technique with therapeutic exercises and would benefit from continued PT  Able to complete most exercises without significant issue  No dizziness       Plan: Progress treatment as tolerated         Precautions: COPD, CAD, DM      Manuals 7/12 7/18 8/1 8/8 8/15 8/22 8/30                                             Nu step warm up  7' 7' 7' 7'  7' lvl 3      Neuro Re-Ed             Side stepping  2'   2' 2' 2'      EO foam  :10x5 10s x 5 10"x5 10"x5 green With 10 head turns/tilts With 10 head turns/tilts      EC foam  No foam :10x5 No foam 10s x 5 No Foam 10"x5 No foam 10"x5 :10x5 :10x5      Head turns/tilts EO  2x10 2x10 2x10 2x10 2x10 2x10      Head turns/tilts EC  2x10 2x10 2x10 2x10 2x10 2x10      Hurdles, forward/lateral  3' fwd, 2' lateral 3' fwd 3' each 3' each 2'ea 2'ea                                Ther Ex             marching     20x ea  20      Sit to stands  10 use hands on way up only 10 12x 12x  10x      Heel raises  20 20x 20x 20x Green foam 20 + toe raises Green foam 20 + toe raises      Step ups      #4 10ea held      Toe taps to step      2" 10ea 4" 2x10                                             Ther Activity                                       Gait Training                                       Modalities

## 2022-09-02 ENCOUNTER — APPOINTMENT (OUTPATIENT)
Dept: RADIOLOGY | Facility: HOSPITAL | Age: 82
DRG: 392 | End: 2022-09-02
Payer: MEDICARE

## 2022-09-02 ENCOUNTER — APPOINTMENT (EMERGENCY)
Dept: CT IMAGING | Facility: HOSPITAL | Age: 82
DRG: 392 | End: 2022-09-02
Payer: MEDICARE

## 2022-09-02 ENCOUNTER — APPOINTMENT (OUTPATIENT)
Dept: ULTRASOUND IMAGING | Facility: HOSPITAL | Age: 82
DRG: 392 | End: 2022-09-02
Payer: MEDICARE

## 2022-09-02 ENCOUNTER — HOSPITAL ENCOUNTER (INPATIENT)
Facility: HOSPITAL | Age: 82
LOS: 1 days | Discharge: HOME HEALTH | DRG: 392 | End: 2022-09-04
Attending: EMERGENCY MEDICINE | Admitting: INTERNAL MEDICINE
Payer: MEDICARE

## 2022-09-02 DIAGNOSIS — M54.9 UPPER BACK PAIN ON LEFT SIDE: Primary | ICD-10-CM

## 2022-09-02 DIAGNOSIS — R11.2 NAUSEA AND VOMITING: ICD-10-CM

## 2022-09-02 DIAGNOSIS — K44.9 HIATAL HERNIA: ICD-10-CM

## 2022-09-02 DIAGNOSIS — R53.1 WEAKNESS: ICD-10-CM

## 2022-09-02 DIAGNOSIS — R26.89 IMBALANCE: ICD-10-CM
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
## 2022-09-02 PROBLEM — M25.519 SHOULDER PAIN: Status: ACTIVE | Noted: 2022-09-02

## 2022-09-02 PROBLEM — Z78.9 ON SUPPLEMENTAL OXYGEN BY NASAL CANNULA: Status: ACTIVE | Noted: 2022-09-02

## 2022-09-02 PROBLEM — R42 DIZZINESS: Status: ACTIVE | Noted: 2022-09-02

## 2022-09-02 LAB
2HR DELTA HS TROPONIN: 4 NG/L
4HR DELTA HS TROPONIN: 6 NG/L
ALBUMIN SERPL BCP-MCNC: 3.9 G/DL (ref 3.5–5)
ALP SERPL-CCNC: 82 U/L (ref 34–104)
ALT SERPL W P-5'-P-CCNC: 13 U/L (ref 7–52)
ANION GAP SERPL CALCULATED.3IONS-SCNC: 12 MMOL/L (ref 4–13)
AST SERPL W P-5'-P-CCNC: 16 U/L (ref 13–39)
ATRIAL RATE: 62 BPM
BASOPHILS # BLD AUTO: 0.03 THOUSANDS/ΜL (ref 0–0.1)
BASOPHILS NFR BLD AUTO: 0 % (ref 0–1)
BILIRUB SERPL-MCNC: 0.82 MG/DL (ref 0.2–1)
BUN SERPL-MCNC: 29 MG/DL (ref 5–25)
CALCIUM SERPL-MCNC: 9.5 MG/DL (ref 8.4–10.2)
CARDIAC TROPONIN I PNL SERPL HS: 11 NG/L
CARDIAC TROPONIN I PNL SERPL HS: 13 NG/L
CARDIAC TROPONIN I PNL SERPL HS: 7 NG/L
CHLORIDE SERPL-SCNC: 102 MMOL/L (ref 96–108)
CO2 SERPL-SCNC: 25 MMOL/L (ref 21–32)
CREAT SERPL-MCNC: 1.14 MG/DL (ref 0.6–1.3)
D DIMER PPP FEU-MCNC: 0.9 UG/ML FEU
EOSINOPHIL # BLD AUTO: 0.02 THOUSAND/ΜL (ref 0–0.61)
EOSINOPHIL NFR BLD AUTO: 0 % (ref 0–6)
ERYTHROCYTE [DISTWIDTH] IN BLOOD BY AUTOMATED COUNT: 13.1 % (ref 11.6–15.1)
GFR SERPL CREATININE-BSD FRML MDRD: 45 ML/MIN/1.73SQ M
GLUCOSE SERPL-MCNC: 164 MG/DL (ref 65–140)
GLUCOSE SERPL-MCNC: 189 MG/DL (ref 65–140)
GLUCOSE SERPL-MCNC: 197 MG/DL (ref 65–140)
GLUCOSE SERPL-MCNC: 206 MG/DL (ref 65–140)
HCT VFR BLD AUTO: 38.1 % (ref 34.8–46.1)
HGB BLD-MCNC: 11.8 G/DL (ref 11.5–15.4)
IMM GRANULOCYTES # BLD AUTO: 0.02 THOUSAND/UL (ref 0–0.2)
IMM GRANULOCYTES NFR BLD AUTO: 0 % (ref 0–2)
LIPASE SERPL-CCNC: <6 U/L (ref 11–82)
LYMPHOCYTES # BLD AUTO: 1.39 THOUSANDS/ΜL (ref 0.6–4.47)
LYMPHOCYTES NFR BLD AUTO: 20 % (ref 14–44)
MCH RBC QN AUTO: 29.6 PG (ref 26.8–34.3)
MCHC RBC AUTO-ENTMCNC: 31 G/DL (ref 31.4–37.4)
MCV RBC AUTO: 96 FL (ref 82–98)
MONOCYTES # BLD AUTO: 0.37 THOUSAND/ΜL (ref 0.17–1.22)
MONOCYTES NFR BLD AUTO: 5 % (ref 4–12)
NEUTROPHILS # BLD AUTO: 5.1 THOUSANDS/ΜL (ref 1.85–7.62)
NEUTS SEG NFR BLD AUTO: 75 % (ref 43–75)
NRBC BLD AUTO-RTO: 0 /100 WBCS
PLATELET # BLD AUTO: 166 THOUSANDS/UL (ref 149–390)
PMV BLD AUTO: 10.1 FL (ref 8.9–12.7)
POTASSIUM SERPL-SCNC: 4.2 MMOL/L (ref 3.5–5.3)
PROT SERPL-MCNC: 6.8 G/DL (ref 6.4–8.4)
QRS AXIS: -89 DEGREES
QRSD INTERVAL: 182 MS
QT INTERVAL: 492 MS
QTC INTERVAL: 492 MS
RBC # BLD AUTO: 3.98 MILLION/UL (ref 3.81–5.12)
SODIUM SERPL-SCNC: 139 MMOL/L (ref 135–147)
T WAVE AXIS: 87 DEGREES
VENTRICULAR RATE: 60 BPM
WBC # BLD AUTO: 6.93 THOUSAND/UL (ref 4.31–10.16)

## 2022-09-02 PROCEDURE — 83690 ASSAY OF LIPASE: CPT | Performed by: EMERGENCY MEDICINE

## 2022-09-02 PROCEDURE — 84484 ASSAY OF TROPONIN QUANT: CPT | Performed by: EMERGENCY MEDICINE

## 2022-09-02 PROCEDURE — 99285 EMERGENCY DEPT VISIT HI MDM: CPT

## 2022-09-02 PROCEDURE — 82948 REAGENT STRIP/BLOOD GLUCOSE: CPT

## 2022-09-02 PROCEDURE — G1004 CDSM NDSC: HCPCS

## 2022-09-02 PROCEDURE — 96375 TX/PRO/DX INJ NEW DRUG ADDON: CPT

## 2022-09-02 PROCEDURE — 74177 CT ABD & PELVIS W/CONTRAST: CPT

## 2022-09-02 PROCEDURE — 85379 FIBRIN DEGRADATION QUANT: CPT | Performed by: INTERNAL MEDICINE

## 2022-09-02 PROCEDURE — C9113 INJ PANTOPRAZOLE SODIUM, VIA: HCPCS | Performed by: INTERNAL MEDICINE

## 2022-09-02 PROCEDURE — 71260 CT THORAX DX C+: CPT

## 2022-09-02 PROCEDURE — 70450 CT HEAD/BRAIN W/O DYE: CPT

## 2022-09-02 PROCEDURE — 73030 X-RAY EXAM OF SHOULDER: CPT

## 2022-09-02 PROCEDURE — 93005 ELECTROCARDIOGRAM TRACING: CPT

## 2022-09-02 PROCEDURE — 85025 COMPLETE CBC W/AUTO DIFF WBC: CPT | Performed by: EMERGENCY MEDICINE

## 2022-09-02 PROCEDURE — 94660 CPAP INITIATION&MGMT: CPT

## 2022-09-02 PROCEDURE — 36415 COLL VENOUS BLD VENIPUNCTURE: CPT | Performed by: EMERGENCY MEDICINE

## 2022-09-02 PROCEDURE — 99285 EMERGENCY DEPT VISIT HI MDM: CPT | Performed by: EMERGENCY MEDICINE

## 2022-09-02 PROCEDURE — 80053 COMPREHEN METABOLIC PANEL: CPT | Performed by: EMERGENCY MEDICINE

## 2022-09-02 PROCEDURE — 93010 ELECTROCARDIOGRAM REPORT: CPT | Performed by: INTERNAL MEDICINE

## 2022-09-02 PROCEDURE — 73010 X-RAY EXAM OF SHOULDER BLADE: CPT

## 2022-09-02 PROCEDURE — 99220 PR INITIAL OBSERVATION CARE/DAY 70 MINUTES: CPT | Performed by: INTERNAL MEDICINE

## 2022-09-02 PROCEDURE — 76700 US EXAM ABDOM COMPLETE: CPT

## 2022-09-02 PROCEDURE — 96374 THER/PROPH/DIAG INJ IV PUSH: CPT

## 2022-09-02 RX ORDER — CITALOPRAM 20 MG/1
20 TABLET ORAL DAILY
Status: DISCONTINUED | OUTPATIENT
Start: 2022-09-02 | End: 2022-09-04 | Stop reason: HOSPADM

## 2022-09-02 RX ORDER — ATORVASTATIN CALCIUM 40 MG/1
80 TABLET, FILM COATED ORAL
Status: DISCONTINUED | OUTPATIENT
Start: 2022-09-02 | End: 2022-09-04 | Stop reason: HOSPADM

## 2022-09-02 RX ORDER — LISINOPRIL 10 MG/1
20 TABLET ORAL ONCE
Status: COMPLETED | OUTPATIENT
Start: 2022-09-02 | End: 2022-09-02

## 2022-09-02 RX ORDER — INSULIN GLARGINE 100 [IU]/ML
20 INJECTION, SOLUTION SUBCUTANEOUS
Refills: 1 | Status: DISCONTINUED | OUTPATIENT
Start: 2022-09-02 | End: 2022-09-04 | Stop reason: HOSPADM

## 2022-09-02 RX ORDER — ONDANSETRON 2 MG/ML
1 INJECTION INTRAMUSCULAR; INTRAVENOUS ONCE
Status: COMPLETED | OUTPATIENT
Start: 2022-09-02 | End: 2022-09-02

## 2022-09-02 RX ORDER — LEVOTHYROXINE SODIUM 0.12 MG/1
125 TABLET ORAL
Status: DISCONTINUED | OUTPATIENT
Start: 2022-09-03 | End: 2022-09-04 | Stop reason: HOSPADM

## 2022-09-02 RX ORDER — MELATONIN
2000 DAILY
Status: DISCONTINUED | OUTPATIENT
Start: 2022-09-02 | End: 2022-09-04 | Stop reason: HOSPADM

## 2022-09-02 RX ORDER — INSULIN LISPRO 100 [IU]/ML
1-5 INJECTION, SOLUTION INTRAVENOUS; SUBCUTANEOUS
Status: DISCONTINUED | OUTPATIENT
Start: 2022-09-02 | End: 2022-09-04 | Stop reason: HOSPADM

## 2022-09-02 RX ORDER — LIDOCAINE 50 MG/G
1 PATCH TOPICAL ONCE
Status: COMPLETED | OUTPATIENT
Start: 2022-09-02 | End: 2022-09-02

## 2022-09-02 RX ORDER — FENTANYL CITRATE 50 UG/ML
50 INJECTION, SOLUTION INTRAMUSCULAR; INTRAVENOUS ONCE
Status: COMPLETED | OUTPATIENT
Start: 2022-09-02 | End: 2022-09-02

## 2022-09-02 RX ORDER — ACETAMINOPHEN 325 MG/1
650 TABLET ORAL EVERY 6 HOURS SCHEDULED
Status: DISCONTINUED | OUTPATIENT
Start: 2022-09-02 | End: 2022-09-04 | Stop reason: HOSPADM

## 2022-09-02 RX ORDER — SPIRONOLACTONE 25 MG/1
25 TABLET ORAL DAILY
Status: DISCONTINUED | OUTPATIENT
Start: 2022-09-02 | End: 2022-09-04 | Stop reason: HOSPADM

## 2022-09-02 RX ORDER — ONDANSETRON 2 MG/ML
4 INJECTION INTRAMUSCULAR; INTRAVENOUS ONCE
Status: COMPLETED | OUTPATIENT
Start: 2022-09-02 | End: 2022-09-02

## 2022-09-02 RX ORDER — HYDRALAZINE HYDROCHLORIDE 20 MG/ML
5 INJECTION INTRAMUSCULAR; INTRAVENOUS EVERY 6 HOURS PRN
Status: DISCONTINUED | OUTPATIENT
Start: 2022-09-02 | End: 2022-09-04 | Stop reason: HOSPADM

## 2022-09-02 RX ORDER — FENTANYL CITRATE 50 UG/ML
25 INJECTION, SOLUTION INTRAMUSCULAR; INTRAVENOUS ONCE
Status: COMPLETED | OUTPATIENT
Start: 2022-09-02 | End: 2022-09-02

## 2022-09-02 RX ORDER — HEPARIN SODIUM 5000 [USP'U]/ML
5000 INJECTION, SOLUTION INTRAVENOUS; SUBCUTANEOUS EVERY 8 HOURS SCHEDULED
Status: DISCONTINUED | OUTPATIENT
Start: 2022-09-02 | End: 2022-09-02

## 2022-09-02 RX ORDER — ALBUTEROL SULFATE 90 UG/1
2 AEROSOL, METERED RESPIRATORY (INHALATION) EVERY 6 HOURS PRN
Status: DISCONTINUED | OUTPATIENT
Start: 2022-09-02 | End: 2022-09-04 | Stop reason: HOSPADM

## 2022-09-02 RX ORDER — LISINOPRIL 20 MG/1
20 TABLET ORAL 2 TIMES DAILY
Status: DISCONTINUED | OUTPATIENT
Start: 2022-09-02 | End: 2022-09-04 | Stop reason: HOSPADM

## 2022-09-02 RX ORDER — LABETALOL HYDROCHLORIDE 5 MG/ML
10 INJECTION, SOLUTION INTRAVENOUS EVERY 6 HOURS PRN
Status: DISCONTINUED | OUTPATIENT
Start: 2022-09-02 | End: 2022-09-02

## 2022-09-02 RX ORDER — ACETAMINOPHEN 325 MG/1
650 TABLET ORAL ONCE
Status: COMPLETED | OUTPATIENT
Start: 2022-09-02 | End: 2022-09-02

## 2022-09-02 RX ORDER — PANTOPRAZOLE SODIUM 40 MG/10ML
40 INJECTION, POWDER, LYOPHILIZED, FOR SOLUTION INTRAVENOUS EVERY 12 HOURS SCHEDULED
Status: DISCONTINUED | OUTPATIENT
Start: 2022-09-02 | End: 2022-09-04 | Stop reason: HOSPADM

## 2022-09-02 RX ADMIN — LISINOPRIL 20 MG: 10 TABLET ORAL at 12:42

## 2022-09-02 RX ADMIN — ACETAMINOPHEN 650 MG: 325 TABLET, FILM COATED ORAL at 12:12

## 2022-09-02 RX ADMIN — HYDRALAZINE HYDROCHLORIDE 5 MG: 20 INJECTION, SOLUTION INTRAMUSCULAR; INTRAVENOUS at 14:18

## 2022-09-02 RX ADMIN — LIDOCAINE 5% 1 PATCH: 700 PATCH TOPICAL at 09:51

## 2022-09-02 RX ADMIN — LISINOPRIL 20 MG: 20 TABLET ORAL at 17:35

## 2022-09-02 RX ADMIN — INSULIN LISPRO 1 UNITS: 100 INJECTION, SOLUTION INTRAVENOUS; SUBCUTANEOUS at 21:30

## 2022-09-02 RX ADMIN — ATORVASTATIN CALCIUM 80 MG: 40 TABLET, FILM COATED ORAL at 21:30

## 2022-09-02 RX ADMIN — CITALOPRAM HYDROBROMIDE 20 MG: 20 TABLET ORAL at 16:14

## 2022-09-02 RX ADMIN — PANTOPRAZOLE SODIUM 40 MG: 40 INJECTION, POWDER, FOR SOLUTION INTRAVENOUS at 21:29

## 2022-09-02 RX ADMIN — IOHEXOL 70 ML: 350 INJECTION, SOLUTION INTRAVENOUS at 11:45

## 2022-09-02 RX ADMIN — APIXABAN 5 MG: 5 TABLET, FILM COATED ORAL at 17:36

## 2022-09-02 RX ADMIN — ACETAMINOPHEN 650 MG: 325 TABLET ORAL at 17:35

## 2022-09-02 RX ADMIN — FENTANYL CITRATE 50 MCG: 50 INJECTION INTRAMUSCULAR; INTRAVENOUS at 09:51

## 2022-09-02 RX ADMIN — INSULIN GLARGINE 20 UNITS: 100 INJECTION, SOLUTION SUBCUTANEOUS at 21:29

## 2022-09-02 RX ADMIN — METOPROLOL TARTRATE 25 MG: 25 TABLET, FILM COATED ORAL at 16:14

## 2022-09-02 RX ADMIN — ACETAMINOPHEN 650 MG: 325 TABLET ORAL at 23:49

## 2022-09-02 RX ADMIN — Medication 2000 UNITS: at 16:14

## 2022-09-02 RX ADMIN — SPIRONOLACTONE 25 MG: 25 TABLET ORAL at 16:14

## 2022-09-02 RX ADMIN — FENTANYL CITRATE 25 MCG: 50 INJECTION INTRAMUSCULAR; INTRAVENOUS at 13:25

## 2022-09-02 RX ADMIN — ONDANSETRON 4 MG: 2 INJECTION INTRAMUSCULAR; INTRAVENOUS at 09:51

## 2022-09-02 NOTE — ED PROVIDER NOTES
History  Chief Complaint   Patient presents with    Dizziness     Pt had recent change in BP meds, started feeling N/V/dizzy with HTN last night  4mg zofran PTA     [de-identified]year old female presents to the ED for evaluation of nausea and vomiting since last pm   N/V is associated with severe pain in her left inferior scapula region, 9/10  No substernal chest pain  No shortness of breath  Patient's vomiting is nonbloody, nonbilious and is now mostly just dry heaving  Patient is also reporting dizziness described as lightheadedness  She has been having labile blood pressures  She denies associated fever or chills  She is having occasional epigastric pain  She reports normal bowel movements  Pt  Had a fall 6 days ago - was not evaluated  Does not recall if she had any injury directly to the area of severe pain in her left scapula  Patient's daughter notes that they used a blanket underneath the patient's axilla to help place her into a standing position  No reported head injury the patient does take Eliquis  Patient's  also notes the patient has been having recurrent episodes of hypoglycemia  1 week ago was started on spironolatone, no other recent medication changes  She reports improve nausea after receiving Zofran by EMS  Patient states that she is unsure why she had a fall last week but has been feeling generally weak      History provided by:  Patient, relative and spouse   used: No    Dizziness  Quality:  Lightheadedness  Severity:  Moderate  Onset quality:  Gradual  Timing:  Intermittent  Progression:  Worsening  Chronicity:  New  Context: physical activity and standing up    Relieved by:  Nothing  Worsened by:   Movement  Ineffective treatments:  None tried  Associated symptoms: nausea, vomiting and weakness    Associated symptoms: no diarrhea, no headaches and no shortness of breath    Risk factors: new medications        Prior to Admission Medications   Prescriptions Last Dose Informant Patient Reported? Taking?    Ascorbic Acid (VITAMIN C) 1000 MG tablet  Self Yes No   Sig: Take 500 mg by mouth    Blood Glucose Monitoring Suppl (OneTouch Verio) w/Device KIT  Self No No   Sig: Use as directed   Calcium Carbonate-Vit D-Min (CALCIUM 1200 PO)  Self Yes No   Sig: Take by mouth   Continuous Blood Gluc  (FREESTYLE RONNI 14 DAY READER) GARRY  Self No No   Sig: Use to check BG levels 4+times daily and as needed   Continuous Blood Gluc Sensor (FREESTYLE RONNI 14 DAY SENSOR) MISC  Self No No   Sig: Change sensor every 14 days to check BG levels 4+ times daily   GLUCOSAMINE CHONDROITIN COMPLX PO  Self Yes No   Sig: Take 1 tablet by mouth 2 (two) times a day   Insulin Pen Needle (Pen Needles) 32G X 6 MM MISC  Self No No   Sig: Use 4 times a day   Lancets (onetouch ultrasoft) lancets  Self No No   Sig: Use 4 times a day   Omega-3 Fatty Acids (FISH OIL) 1200 MG CAPS  Self Yes No   Sig: Take 1,200 mg by mouth 2 (two) times a day    Promethazine-DM (PHENERGAN-DM) 6 25-15 mg/5 mL oral syrup  Self No No   Sig: Take 5 mL by mouth 4 (four) times a day as needed for cough   Tetrahydroz-Polyvinyl Al-Povid (MURINE TEARS PLUS OP)  Self Yes No   Sig: Apply to eye 3 (three) times a day   acetaminophen (TYLENOL) 325 mg tablet  Self Yes No   Sig: Take 650 mg by mouth   albuterol (ProAir HFA) 90 mcg/act inhaler  Self No No   Sig: Inhale 2 puffs every 6 (six) hours as needed for wheezing   apixaban (Eliquis) 5 mg  Self No No   Sig: Take 1 tablet (5 mg total) by mouth 2 (two) times a day   atorvastatin (LIPITOR) 80 mg tablet  Self No No   Sig: TAKE 1 TABLET AT BEDTIME   cholecalciferol (VITAMIN D3) 1,000 units tablet  Self Yes No   Sig: Take 2,000 Units by mouth daily    citalopram (CeleXA) 20 mg tablet  Self No No   Sig: TAKE 1 TABLET EVERY DAY   clobetasol (TEMOVATE) 0 05 % cream  Self No No   Sig: Apply topically 2 (two) times a day To leg   Patient not taking: Reported on 8/25/2022   furosemide (LASIX) 20 mg tablet  Self No No   Sig: Take 1 tablet (20 mg total) by mouth as needed (as needed for leg swelling)   glucose blood (OneTouch Verio) test strip  Self No No   Sig: Use 4 times a day   glucose blood (TRUE METRIX BLOOD GLUCOSE TEST) test strip  Self No No   Sig: Test 4 times plus daily, as instructed   hydrOXYzine HCL (ATARAX) 25 mg tablet  Self No No   Sig: Take 1 tablet (25 mg total) by mouth daily at bedtime   Patient not taking: Reported on 8/25/2022   insulin aspart (NovoLOG FlexPen) 100 UNIT/ML injection pen  Self No No   Sig: INJECT 8 UNITS SUBCUTANEOUSLY WITH BREAKFAST, THEN 10 UNITS WITH LUNCH, AND THEN 10 UNITS WITH DINNER   insulin glargine (Toujeo SoloStar) 300 units/mL CONCENTRATED U-300 injection pen (1-unit dial)  Self No No   Sig: Inject 36 Units under the skin daily at bedtime   levothyroxine 125 mcg tablet  Self No No   Sig: TAKE 1 TABLET ONE TIME DAILY EXCEPT ON SUNDAY NO TABLET   lisinopril (ZESTRIL) 20 mg tablet  Self No No   Sig: Take 1 tablet (20 mg total) by mouth 2 (two) times a day   metoprolol tartrate (LOPRESSOR) 25 mg tablet  Self No No   Sig: TAKE 1 TABLET EVERY 12 HOURS   oxybutynin (DITROPAN) 5 mg tablet  Self No No   Sig: TAKE 1 TABLET TWICE DAILY   spironolactone (ALDACTONE) 25 mg tablet   No No   Sig: Take 1 tablet (25 mg total) by mouth daily   triamcinolone (KENALOG) 0 1 % ointment  Self No No   Sig: Apply 2x/day to itchy, red areas as needed   Overuse can thin skin   Patient not taking: Reported on 8/25/2022      Facility-Administered Medications: None       Past Medical History:   Diagnosis Date    Anxiety     Arthritis     [facility]est[facility]     Chronic venous insufficiency     [facility]     Coronary artery disease     Diabetes mellitus ([facility] )     type II    Disease of thyroid gland     hypothyroidism    DVT (deep venous thrombosis) ([facility])     HL (hearing loss)     Hyperlipidemia     Hypertension     Irregular heart beat     Paroxysmal a-fib    Pacemaker     Renal disorder     stage 3    Sleep apnea     Sleep difficulties     Thyroid disease     Urinary incontinence     Uterine leiomyoma        Past Surgical History:   Procedure Laterality Date    BREAST SURGERY      For Biopsy    CARDIAC PACEMAKER PLACEMENT      Permanent  Last assessed: 2/19/16    CARDIAC SURGERY      CARPAL TUNNEL RELEASE Bilateral     CATARACT EXTRACTION      CATARACT EXTRACTION W/ INTRAOCULAR LENS  IMPLANT, BILATERAL      CYSTOSCOPY      Diagnostic  Last assessed: 11/8/17    ECTOPIC PREGNANCY SURGERY      HYSTERECTOMY      Total Abdomonal  Resolved: 1982    JOINT REPLACEMENT Right     TKR    OOPHORECTOMY      NV ENDOVENOUS LASER, 1ST VEIN Left 2/17/2017    Procedure: GREATER SAPHENOUS VEIN ENDOVASCULAR LASER THERAPY ;  Surgeon: Jarrod Hi DO;  [facility];  Service: Vascular    TONSILLECTOMY         Family History   Problem Relation Age of Onset    Hypertension Father     Diabetes Father         Mellitus    Coronary artery disease Mother     Hypertension Mother     Heart disease Mother     Diabetes Brother         Mellitus    Breast cancer Maternal Aunt     Pancreatic cancer Maternal Aunt     Breast cancer Cousin     Breast cancer Cousin     No Known Problems Sister     No Known Problems Daughter     No Known Problems Maternal Grandmother     No Known Problems Maternal Grandfather     No Known Problems Paternal Grandmother     No Known Problems Paternal Grandfather     No Known Problems Daughter     No Known Problems Son      I have reviewed and agree with the history as documented      E-Cigarette/Vaping    E-Cigarette Use Never User      E-Cigarette/Vaping Substances    Nicotine No     THC No     CBD No     Flavoring No     Other No     Unknown No      Social History     Tobacco Use    Smoking status: Never Smoker    Smokeless tobacco: Never Used    Tobacco comment: Per [employer]: Former smoker   Vaping Use    Vaping Use: Never used   Substance Use Topics    Alcohol use: Yes     Comment: rarely  Per [employer]: Denied history of alcohol use    Drug use: No       Review of Systems   Constitutional: Positive for fatigue  Respiratory: Negative for choking and shortness of breath  Gastrointestinal: Positive for nausea and vomiting  Negative for abdominal pain and diarrhea  Musculoskeletal: Positive for back pain and gait problem  Skin: Negative for rash  Neurological: Positive for dizziness and weakness  Negative for numbness and headaches  All other systems reviewed and are negative  Physical Exam  Physical Exam  Vitals reviewed  Constitutional:       General: She is in acute distress  Appearance: She is well-developed  She is ill-appearing  HENT:      Head: Normocephalic  Right Ear: External ear normal       Left Ear: External ear normal       Nose: Nose normal       Mouth/Throat:      Pharynx: No oropharyngeal exudate  Eyes:      Conjunctiva/sclera: Conjunctivae normal       Pupils: Pupils are equal, round, and reactive to light  Cardiovascular:      Rate and Rhythm: Normal rate and regular rhythm  Heart sounds: Normal heart sounds  Pulmonary:      Effort: Pulmonary effort is normal       Breath sounds: Normal breath sounds  Abdominal:      General: Bowel sounds are normal  There is no distension  Palpations: Abdomen is soft  Tenderness: There is no abdominal tenderness  There is no guarding or rebound  Musculoskeletal:         General: No tenderness or deformity  Normal range of motion  Cervical back: Normal range of motion and neck supple  Back:       Right lower leg: Edema present  Left lower leg: Edema present  Comments: Chronic venous stasis changes bilateral lower extremities   Lymphadenopathy:      Cervical: No cervical adenopathy  Skin:     General: Skin is warm and dry        Findings: No rash    Neurological:      Mental Status: She is alert and oriented to person, place, and time  Cranial Nerves: No cranial nerve deficit  Sensory: No sensory deficit  Motor: No abnormal muscle tone  Coordination: Coordination normal       Gait: Gait normal       Deep Tendon Reflexes: Reflexes are normal and symmetric  Psychiatric:         Behavior: Behavior normal          Thought Content:  Thought content normal          Judgment: Judgment normal          Vital Signs  ED Triage Vitals   Temperature Pulse Respirations Blood Pressure SpO2   09/02/22 0830 09/02/22 0830 09/02/22 0830 09/02/22 0834 09/02/22 0830   98 2 °F (36 8 °C) 74 18 (!) 194/83 95 %      Temp Source Heart Rate Source Patient Position - Orthostatic VS BP Location FiO2 (%)   09/02/22 0830 09/02/22 0830 09/02/22 1201 09/02/22 0834 --   Oral Monitor Sitting Right arm       Pain Score       09/02/22 0830       9           Vitals:    09/03/22 1459 09/03/22 1926 09/03/22 1926 09/03/22 2229   BP: 160/70  151/69 136/68   Pulse: 60 62 60 60   Patient Position - Orthostatic VS:             Visual Acuity      ED Medications  Medications   hydrALAZINE (APRESOLINE) injection 5 mg (5 mg Intravenous Not Given 9/2/22 1423)   apixaban (ELIQUIS) tablet 5 mg (5 mg Oral Given 9/3/22 1801)   cholecalciferol (VITAMIN D3) tablet 2,000 Units (2,000 Units Oral Given 9/3/22 0812)   insulin glargine (LANTUS) subcutaneous injection 20 Units 0 2 mL (20 Units Subcutaneous Given 9/3/22 2139)   lisinopril (ZESTRIL) tablet 20 mg (20 mg Oral Given 9/3/22 1800)   spironolactone (ALDACTONE) tablet 25 mg (25 mg Oral Given 9/3/22 0812)   albuterol (PROVENTIL HFA,VENTOLIN HFA) inhaler 2 puff (has no administration in time range)   acetaminophen (TYLENOL) tablet 650 mg (650 mg Oral Given 9/4/22 0142)   atorvastatin (LIPITOR) tablet 80 mg (80 mg Oral Given 9/3/22 2139)   citalopram (CeleXA) tablet 20 mg (20 mg Oral Given 9/3/22 0812)   levothyroxine tablet 125 mcg (125 mcg Oral Given 9/3/22 0533)   metoprolol tartrate (LOPRESSOR) tablet 25 mg (25 mg Oral Given 9/3/22 1801)   morphine injection 2 mg (2 mg Intravenous Given 9/3/22 0418)   pantoprazole (PROTONIX) injection 40 mg (40 mg Intravenous Given 9/3/22 2139)   insulin lispro (HumaLOG) 100 units/mL subcutaneous injection 1-5 Units (1 Units Subcutaneous Given 9/3/22 1742)   insulin lispro (HumaLOG) 100 units/mL subcutaneous injection 1-5 Units (1 Units Subcutaneous Given 9/3/22 2139)   furosemide (LASIX) tablet 20 mg (has no administration in time range)   ondansetron (FOR EMS ONLY) (ZOFRAN) 4 mg/2 mL injection 4 mg (0 mg Does not apply Given to EMS 9/2/22 0921)   fentanyl citrate (PF) 100 MCG/2ML 50 mcg (50 mcg Intravenous Given 9/2/22 0951)   ondansetron (ZOFRAN) injection 4 mg (4 mg Intravenous Given 9/2/22 0951)   lidocaine (LIDODERM) 5 % patch 1 patch (1 patch Topical Patch Removed 9/2/22 2133)   acetaminophen (TYLENOL) tablet 650 mg (650 mg Oral Given 9/2/22 1212)   iohexol (OMNIPAQUE) 350 MG/ML injection (MULTI-DOSE) 70 mL (70 mL Intravenous Given 9/2/22 1145)   lisinopril (ZESTRIL) tablet 20 mg (20 mg Oral Given 9/2/22 1242)   fentanyl citrate (PF) 100 MCG/2ML 25 mcg (25 mcg Intravenous Given 9/2/22 1325)       Diagnostic Studies  Results Reviewed     Procedure Component Value Units Date/Time    HS Troponin I 4hr [[id number]]  (Normal) Collected: 09/02/22 1330    Lab Status: Final result Specimen: Blood from Arm, Left Updated: 09/02/22 1407     hs TnI 4hr 13 ng/L      Delta 4hr hsTnI 6 ng/L     HS Troponin I 2hr [[id number]]  (Normal) Collected: 09/02/22 1129    Lab Status: Final result Specimen: Blood Updated: 09/02/22 1240     hs TnI 2hr 11 ng/L      Delta 2hr hsTnI 4 ng/L     Comprehensive metabolic panel [[id number]]  (Abnormal) Collected: 09/02/22 0920    Lab Status: Final result Specimen: Blood from Arm, Left Updated: 09/02/22 1129     Sodium 139 mmol/L      Potassium 4 2 mmol/L      Chloride 102 mmol/L      CO2 25 mmol/L      ANION GAP 12 mmol/L      BUN 29 mg/dL      Creatinine 1 14 mg/dL      Glucose 189 mg/dL      Calcium 9 5 mg/dL      AST 16 U/L      ALT 13 U/L      Alkaline Phosphatase 82 U/L      Total Protein 6 8 g/dL      Albumin 3 9 g/dL      Total Bilirubin 0 82 mg/dL      eGFR 45 ml/min/1 73sq m     Narrative:      National Kid[employer] Disease Foundation guidelines for Chronic Kidney Disease (CKD):     Stage 1 with normal or high GFR (GFR > 90 mL/min/1 73 square meters)    Stage 2 Mild CKD (GFR = 60-89 mL/min/1 73 square meters)    Stage 3A Moderate CKD (GFR = 45-59 mL/min/1 73 square meters)    Stage 3B Moderate CKD (GFR = 30-44 mL/min/1 73 square meters)    Stage 4 Severe CKD (GFR = 15-29 mL/min/1 73 square meters)    Stage 5 End Stage CKD (GFR <15 mL/min/1 73 square meters)  Note: GFR calculation is accurate only with a steady state creatinine    HS Troponin 0hr (reflex protocol) [[id number]]  (Normal) Collected: 09/02/22 0920    Lab Status: Final result Specimen: Blood from Arm, Left Updated: 09/02/22 0949     hs TnI 0hr 7 ng/L     Lipase [[id number]]  (Abnormal) Collected: 09/02/22 0920    Lab Status: Final result Specimen: Blood from Arm, Left Updated: 09/02/22 0948     Lipase <6 u/L     CBC and differential [[id number]]  (Abnormal) Collected: 09/02/22 0920    Lab Status: Final result Specimen: Blood from Arm, Left Updated: 09/02/22 0925     WBC 6 93 Thousand/uL      RBC 3 98 Million/uL      Hemoglobin 11 8 g/dL      Hematocrit 38 1 %      MCV 96 fL      MCH 29 6 pg      MCHC 31 0 g/dL      RDW 13 1 %      MPV 10 1 fL      Platelets 925 Thousands/uL      nRBC 0 /100 WBCs      Neutrophils Relative 75 %      Immat GRANS % 0 %      Lymphocytes Relative 20 %      Monocytes Relative 5 %      Eosinophils Relative 0 %      Basophils Relative 0 %      Neutrophils Absolute 5 10 Thousands/µL      Immature Grans Absolute 0 02 Thousand/uL      Lymphocytes Absolute 1 39 Thousands/µL      Monocytes Absolute 0 37 Thousand/µL Eosinophils Absolute 0 02 Thousand/µL      Basophils Absolute 0 03 Thousands/µL     Fingerstick Glucose (POCT) [[id number]]  (Abnormal) Collected: 09/02/22 0831    Lab Status: Final result Updated: 09/02/22 0832     POC Glucose 206 mg/dl                  US abdomen complete   Final Result by Elgin George MD (09/02 2130)      Cholelithiasis without acute cholecystitis  Workstation performed: [id number]         XR scapula left   Final Result by Lanie Felton MD (09/02 1642)      No acute osseous abnormality  Degenerative changes as described  Workstation performed: [id number]         XR shoulder 2+ vw left   Final Result by Lanie Felton MD (09/02 1642)      No acute osseous abnormality  Degenerative changes as described  Workstation performed: [id number]         CT head without contrast   Final Result by Tim Edward MD (09/02 1201)      No acute intracranial abnormality  Extensive right-sided paranasal sinus disease  Workstation performed: [id number]         CT chest abdomen pelvis w contrast   Final Result by Tim Edward MD (09/02 1211)      No acute posttraumatic abnormality in the chest, abdomen or pelvis  Moderate hiatal hernia, with mottled debris diffusely in the esophagus which may be related to reflux or esophageal dysmotility  Cholelithiasis  Colonic diverticulosis        Workstation performed: [id number]                    Procedures  ECG 12 Lead Documentation Only    Date/Time: 9/2/2022 11:20 AM  Performed by: Valencia Louis DO  Authorized by: Valencia Louis DO     Indications / Diagnosis:  Vomiting  ECG reviewed by me, the ED Provider: yes    Patient location:  ED  Previous ECG:     Previous ECG:  Compared to current    Comparison ECG info:  2/14/2017    Similarity:  No change  Interpretation:     Interpretation: abnormal    Quality:     Tracing quality:  Limited by artifact  Rate:     ECG rate:  60    ECG rate assessment: normal    Rhythm:     Rhythm: paced    Pacing:     Capture:  Complete  Ectopy:     Ectopy: none    QRS:     QRS axis:  Left  Conduction:     Conduction: abnormal      Abnormal conduction: non-specific intraventricular conduction delay    ST segments:     ST segments:  Normal             ED Course                            Initial Sepsis Screening     Row Name 09/02/22 4436                Is the patient's history suggestive of a new or worsening infection? No  -EP        Suspected source of infection --        Are two or more of the following signs & symptoms of infection both present and new to the patient? No  -EP        Indicate SIRS criteria --        If the answer is yes to both questions, suspicion of sepsis is present --        If severe sepsis is present AND tissue hypoperfusion perists in the hour after fluid resuscitation or lactate > 4, the patient meets criteria for SEPTIC SHOCK --        Are any of the following organ dysfunction criteria present within 6 hours of suspected infection and SIRS criteria that are NOT considered to be chronic conditions?  --        Organ dysfunction --        Date of presentation of severe sepsis --        Time of presentation of severe sepsis --        Tissue hypoperfusion persists in the hour after crystalloid fluid administration, evidenced, by either: --        Was hypotension present within one hour of the conclusion of crystalloid fluid administration? --        Date of presentation of septic shock --        Time of presentation of septic shock --              User Key  (r) = Recorded By, (t) = Taken By, (c) = Cosigned By    [address] Name Provider Brian Chávez MD Physician                              MDM  Number of Diagnoses or Management Options  Nausea and vomiting: new and requires workup  Upper back pain on left side: new and requires workup  Weakness: new and requires workup     Amount and/or Complexity of Data Reviewed  Clinical lab tests: ordered and reviewed  Tests in the radiology section of CPT®: ordered and reviewed  Tests in the medicine section of CPT®: reviewed and ordered  Decide to obtain previous medical records or to obtain history from someone other than the patient: yes  Discuss the patient with other providers: yes  Independent visualization of images, tracings, or specimens: yes    Patient Progress  Patient progress: stable      Disposition  Final diagnoses:   Upper back pain on left side   Nausea and vomiting   Weakness     Time reflects when diagnosis was documented in both MDM as applicable and the Disposition within this note     Time User Action Codes Description Comment    9/2/2022  1:17 PM Precious Stacy [M54 9] Upper back pain on left side     9/2/2022  1:17 PM Precious Stacy [R11 2] Nausea and vomiting     9/2/2022  1:17 PM Precious Stacy [R53 1] Weakness       ED Disposition     ED Disposition   Admit    Condition   Stable    Date/Time   Fri Sep 2, 2022  1:16 PM    Comment   Case was discussed with Dr Lauro Sexton and the patient's admission status was agreed to be Admission Status: observation status to the service of Dr Lauro Sexton   Follow-up Information    None         Current Discharge Medication List      CONTINUE these medications which have NOT CHANGED    Details   acetaminophen (TYLENOL) 325 mg tablet Take 650 mg by mouth      albuterol (ProAir HFA) 90 mcg/act inhaler Inhale 2 puffs every 6 (six) hours as needed for wheezing  Qty: 8 g, Refills: 5    Comments: Substitution to a formulary equivalent within the same pharmaceutical class is authorized    Associated Diagnoses: Wheeze      apixaban (Eliquis) 5 mg Take 1 tablet (5 mg total) by mouth 2 (two) times a day  Qty: 56 tablet, Refills: 0    Associated Diagnoses: Atrial fibrillation, unspecified type (HCC)      Ascorbic Acid (VITAMIN C) 1000 MG tablet Take 500 mg by mouth       atorvastatin (LIPITOR) 80 mg tablet TAKE 1 TABLET AT BEDTIME  Qty: 90 tablet, Refills: 3    Associated Diagnoses: Hypercholesterolemia      Blood Glucose Monitoring Suppl (OneTouch Verio) w/Device KIT Use as directed  Qty: 1 kit, Refills: 0    Associated Diagnoses: Type 2 diabetes mellitus with hyperglycemia, with long-term current use of insulin ([facility])      Calcium Carbonate-Vit D-Min (CALCIUM 1200 PO) Take by mouth      cholecalciferol (VITAMIN D3) 1,000 units tablet Take 2,000 Units by mouth daily       citalopram (CeleXA) 20 mg tablet TAKE 1 TABLET EVERY DAY  Qty: 90 tablet, Refills: 3    Associated Diagnoses: Anxiety      clobetasol (TEMOVATE) 0 05 % cream Apply topically 2 (two) times a day To leg  Qty: 60 g, Refills: 5    Associated Diagnoses: Venous stasis dermatitis, unspecified laterality      Continuous Blood Gluc  (FREESTYLE RONNI 14 DAY READER) GARRY Use to check BG levels 4+times daily and as needed  Qty: 1 Device, Refills: 0    Comments: Dx: E11 65  Associated Diagnoses: Type 2 diabetes mellitus with hyperglycemia, with long-term current use of insulin ([facility])      Continuous Blood Gluc Sensor (FREESTYLE RONNI 14 DAY SENSOR) MISC Change sensor every 14 days to check BG levels 4+ times daily  Qty: 6 each, Refills: 3    Comments: Dx: E11 65  Associated Diagnoses: Type 2 diabetes mellitus with hyperglycemia, with long-term current use of insulin ([facility])      furosemide (LASIX) 20 mg tablet Take 1 tablet (20 mg total) by mouth as needed (as needed for leg swelling)  Qty: 30 tablet, Refills: 2    Associated Diagnoses: Localized edema      GLUCOSAMINE CHONDROITIN COMPLX PO Take 1 tablet by mouth 2 (two) times a day      !! glucose blood (OneTouch Verio) test strip Use 4 times a day  Qty: 400 strip, Refills: 1    Associated Diagnoses: Type 2 diabetes mellitus with hyperglycemia, with long-term current use of insulin ([facility])      ! ! glucose blood (TRUE METRIX BLOOD GLUCOSE TEST) test strip Test 4 times plus daily, as instructed  Qty: 400 each, Refills: 1    Associated Diagnoses: Uncontrolled type 2 diabetes mellitus with complication, unspecified long term insulin use status      hydrOXYzine HCL (ATARAX) 25 mg tablet Take 1 tablet (25 mg total) by mouth daily at bedtime  Qty: 30 tablet, Refills: 2    Associated Diagnoses: Dermatitis      insulin aspart (NovoLOG FlexPen) 100 UNIT/ML injection pen INJECT 8 UNITS SUBCUTANEOUSLY WITH BREAKFAST, THEN 10 UNITS WITH LUNCH, AND THEN 10 UNITS WITH DINNER  Qty: 45 mL, Refills: 1    Associated Diagnoses: Type 2 diabetes mellitus with other circulatory complication, with long-term current use of insulin ([facility])      insulin glargine (Toujeo [employer]) 300 units/mL CONCENTRATED U-300 injection pen (1-unit dial) Inject 36 Units under the skin daily at bedtime  Qty: 12 mL, Refills: 1    Associated Diagnoses: Type 2 diabetes mellitus with hyperglycemia, with long-term current use of insulin ([facility])      Insulin Pen Needle (Pen Needles) 32G X 6 MM MISC Use 4 times a day  Qty: 400 each, Refills: 1    Associated Diagnoses: Type 2 diabetes mellitus with hyperglycemia, with long-term current use of insulin ([facility])      Lancets (onetouch ultrasoft) lancets Use 4 times a day  Qty: 400 each, Refills: 0    Associated Diagnoses: Type 2 diabetes mellitus with hyperglycemia, with long-term current use of insulin ([facility])      levothyroxine 125 mcg tablet TAKE 1 TABLET ONE TIME DAILY EXCEPT ON SUNDAY NO TABLET  Qty: 78 tablet, Refills: 1    Associated Diagnoses: Hypothyroidism, unspecified type      lisinopril (ZESTRIL) 20 mg tablet Take 1 tablet (20 mg total) by mouth 2 (two) times a day  Qty: 180 tablet, Refills: 3    Associated Diagnoses: Essential hypertension; Benign hypertension with chronic kidney disease, stage III ([facility])      metoprolol tartrate (LOPRESSOR) 25 mg tablet TAKE 1 TABLET EVERY 12 HOURS  Qty: 180 tablet, Refills: 3    Associated Diagnoses: Essential hypertension      Omega-3 Fatty Acids (FISH OIL) 1200 MG CAPS Take 1,200 mg by mouth 2 (two) times a day oxybutynin (DITROPAN) 5 mg tablet TAKE 1 TABLET TWICE DAILY  Qty: 180 tablet, Refills: 1    Associated Diagnoses: OAB (overactive bladder)      Promethazine-DM (PHENERGAN-DM) 6 25-15 mg/5 mL oral syrup Take 5 mL by mouth 4 (four) times a day as needed for cough  Qty: 300 mL, Refills: 2    Associated Diagnoses: Cough      spironolactone (ALDACTONE) 25 mg tablet Take 1 tablet (25 mg total) by mouth daily  Qty: 30 tablet, Refills: 5    Associated Diagnoses: Benign hypertension with chronic kidney disease, stage III ([facility] [address] ); Microalbuminuria      Tetrahydroz-Polyvinyl Al-Povid (MURINE TEARS PLUS OP) Apply to eye 3 (three) times a day      triamcinolone (KENALOG) 0 1 % ointment Apply 2x/day to itchy, red areas as needed  Overuse can thin skin  Qty: 453 6 g, Refills: 3    Associated Diagnoses: Venous stasis dermatitis of both lower extremities       ! ! - Potential duplicate medications found  Please discuss with provider  No discharge procedures on file      PDMP Review     None          ED Provider  Electronically Signed by           Jacques Shepherd DO  09/04/22 5406

## 2022-09-02 NOTE — MEDICAL STUDENT
History     Chief Complaint   Patient presents with    Dizziness     Pt had recent change in BP meds, started feeling N/V/dizzy with HTN last night  4mg zofran PTA            Holly Lara is a [de-identified] yo female with PMH hypothyroidism, T2DM on insulin, CKD, paroxysmal afib on eliquis, and complete heart block s/p ppm presenting to the ED with nausea and vomiting  She said this N/V started suddenly at 8pm last night along with left infra-scapular shoulder pain  The shoulder pain has not relented since, feels like stabbing and pressure, and is a 9/10  Her  applied a topical pain cream to the shoulder last night with mild improvement in symptoms  She feels the shoulder pain may be due to muscle strain  This morning, the N/V and shoulder pain persisted and family decided to call EMS  Zofran was given en route which improved her symptoms  Lizzie's husbands states that Megan Neil has been complaining of dizziness over the past 2-3 days  Megan Neil denies any dizziness, though she says she typically struggles with balance 2/2 poor vision and falls frequently  Her most recent fall was this past Saturday when she lost balance and fell backwards unto her bottom  She did not hit her head or experience LOC  She denies any recent changes in vision  Megan Neil also states that her nephrologist started her on a new medication a week ago (likely spironolactone per chart review) and was told to avoid foods high in potassium  She did not notice any N/V or change in her baseline health after starting this medication  She denies any CP, SOB, weakness, diaphoresis, diarrhea, or fever  MDM  Number of Diagnoses or Management Options  Diagnosis management comments:   Consider ACS given N/V and L shoulder pain may be indicative of ACS  EKG negative for NSTEMI  Will order troponin and CT chest    L shoulder pain: May be related to ACS, pancreatitis, musculoskeletal pain, rib fracture   Unlikely to be dissection as patient denies CP, SOB, is not diaphoretic, and is not impressively hypertensive  Will order CT chest, troponin, BMP  N/V may be due to pancreatitis, viral gastroenteritis, food poisoning  Less likely colitis or appendicitis  Lipase and CMP to evaluate  Will consider CT AP  Given unclear history of dizziness, recent fall, and patient on blood thinners, will consider stroke  CT head  Amount and/or Complexity of Data Reviewed  Clinical lab tests: ordered  Tests in the radiology section of CPT®: ordered  Tests in the medicine section of CPT®: ordered  Discussion of test results with the performing providers: yes  Decide to obtain previous medical records or to obtain history from someone other than the patient: no  Obtain history from someone other than the patient: yes  Review and summarize past medical records: yes  Discuss the patient with other providers: yes  Independent visualization of images, tracings, or specimens: yes    Risk of Complications, Morbidity, and/or Mortality  Presenting problems: moderate  Diagnostic procedures: low  Management options: low      Past Medical History:   Diagnosis Date    Anxiety     Arthritis     [facility]     Chronic venous insufficiency     COPD [facility]chronic obstructive pulmonary disease) [facility]     Coronary artery disease     Diabetes mellitus [facility]     type II    Disease of thyroid gland     hypothyroidism    DVT (deep venous thrombosis) [facility]     HL (hearing loss)     Hyperlipidemia     Hypertension     Irregular heart beat     Paroxysmal a-fib    Pacemaker     Renal disorder     stage 3    Sleep apnea     Sleep difficulties     Thyroid disease     Urinary incontinence     Uterine leiomyoma        Past Surgical History:   Procedure Laterality Date    BREAST SURGERY      For Biopsy    CARDIAC PACEMAKER PLACEMENT      Permanent   Last assessed: 2/19/16    CARDIAC SURGERY      CARPAL TUNNEL RELEASE Bilateral     CATARACT EXTRACTION  CATARACT EXTRACTION W/ INTRAOCULAR LENS  IMPLANT, BILATERAL      CYSTOSCOPY      Diagnostic  Last assessed: 11/8/17    ECTOPIC PREGNANCY SURGERY      HYSTERECTOMY      Total Abdomonal  Resolved: 1982    JOINT REPLACEMENT Right     TKR    OOPHORECTOMY      [place] ENDOVENOUS LASER, 1ST VEIN Left 2/17/2017    Procedure: GREATER SAPHENOUS VEIN ENDOVASCULAR LASER THERAPY ;  Surgeon: Mich Dempsey DO;  [facility]: [facility];  Service: Vascular    TONSILLECTOMY         Family History   Problem Relation Age of Onset    Hypertension Father     Diabetes Father         Mellitus    Coronary artery disease Mother     Hypertension Mother     Heart disease Mother     Diabetes Brother         Mellitus    Breast cancer Maternal Aunt     Pancreatic cancer Maternal Aunt     Breast cancer Cousin     Breast cancer Cousin     No Known Problems Sister     No Known Problems Daughter     No Known Problems Maternal Grandmother     No Known Problems Maternal Grandfather     No Known Problems Paternal Grandmother     No Known Problems Paternal Grandfather     No Known Problems Daughter     No Known Problems Son        Social History     Tobacco Use    Smoking status: Never Smoker    Smokeless tobacco: Never Used    Tobacco comment: Per [employer]: Former smoker   Vaping Use    Vaping Use: Never used   Substance Use Topics    Alcohol use: Yes     Comment: rarely  Per [employer]: Denied history of alcohol use    Drug use: No       Review of Systems   Constitutional: Negative for activity change, appetite change, diaphoresis, fatigue and fever  HENT: Negative for congestion  Respiratory: Negative for apnea, cough, chest tightness and shortness of breath  Cardiovascular: Negative for chest pain and palpitations  Gastrointestinal: Positive for nausea and vomiting  Negative for abdominal distention, abdominal pain, constipation and diarrhea     Genitourinary: Negative for difficulty urinating, dysuria, flank pain, frequency and urgency  Musculoskeletal: Positive for arthralgias and back pain  Negative for joint swelling, neck pain and neck stiffness  Neurological: Negative for dizziness, syncope, weakness, light-headedness and numbness  Psychiatric/Behavioral: Negative for confusion  Physical Exam     ED Triage Vitals   Temperature Pulse Respirations Blood Pressure SpO2   09/02/22 0830 09/02/22 0830 09/02/22 0830 09/02/22 0834 09/02/22 0830   98 2 °F (36 8 °C) 74 18 (!) 194/83 95 %      Temp Source Heart Rate Source Patient Position - Orthostatic VS BP Location FiO2 (%)   09/02/22 0830 09/02/22 0830 -- 09/02/22 0834 --   Oral Monitor  Right arm       Pain Score       09/02/22 0830       9           Physical Exam  Constitutional:       General: She is not in acute distress  Appearance: Normal appearance  She is not toxic-appearing  HENT:      Head: Normocephalic and atraumatic  Nose: Nose normal  No congestion or rhinorrhea  Mouth/Throat:      Mouth: Mucous membranes are moist       Pharynx: Oropharynx is clear  Eyes:      Extraocular Movements: Extraocular movements intact  Conjunctiva/sclera: Conjunctivae normal       Pupils: Pupils are equal, round, and reactive to light  Cardiovascular:      Rate and Rhythm: Normal rate and regular rhythm  Pulses: Normal pulses  Heart sounds: Normal heart sounds  No murmur heard  No friction rub  No gallop  Pulmonary:      Effort: Pulmonary effort is normal  No respiratory distress  Breath sounds: Normal breath sounds  No wheezing or rales  Abdominal:      General: Abdomen is flat  There is no distension  Palpations: Abdomen is soft  Tenderness: There is no guarding  Comments: Mild tenderness throughout  Alyse sign negative  No epigastric tenderness  Musculoskeletal:      Cervical back: Normal range of motion  Right lower leg: No edema  Left lower leg: No edema     Skin:     General: Skin is warm and dry  Comments: Bruising along inferior aspect of left shoulder blade  Stable venous statis dermatitis bilateral LE; unchanged from baseline   Neurological:      General: No focal deficit present  Mental Status: She is alert and oriented to person, place, and time  Cranial Nerves: No cranial nerve deficit  Motor: No weakness             ED Course

## 2022-09-02 NOTE — ED NOTES
Pt transported to [address] on tele, receiving RN at bedside, call bell within reach     [facility]  09/02/22 [id number]

## 2022-09-02 NOTE — ASSESSMENT & PLAN NOTE
Mechanical fall on Saturday  Fell on to her backside  Did not strike her shoulder     Will get PT OT

## 2022-09-02 NOTE — ASSESSMENT & PLAN NOTE
No hypoxia documented   Unclear why   Will check dimer  Will try to wean off   Will follow up on procal and echo

## 2022-09-02 NOTE — ASSESSMENT & PLAN NOTE
Left sided scapular pain   Initially 10/10   Some relief with pain medication   Differential includes GI etiology referred pain - hiatal hernia seen on CT scan   Patient also complaining of nause      CT scan shows coronary calcifications however troponins are negative   Will get echo   Will get EKG and repeat in AM  If any concerning findings should consult cardiology

## 2022-09-02 NOTE — H&P
[address] [address], [de-identified] y o  female MRN: [id number]  Unit/Bed#: S -01 Encounter: [id number]  Primary Care Provider: Ana Paula Sears MD   Date and time admitted to hospital: 9/2/2022  8:25 AM    On supplemental oxygen by nasal cannula  Assessment & Plan  No hypoxia documented   Unclear why   Will check dimer  Will try to wean off   Will follow up on procal and echo    Fall  Assessment & Plan  Mechanical fall on Saturday  Fell on to her backside  Did not strike her shoulder   Will get PT OT    Dizziness  Assessment & Plan  Patient complaining of dizziness upon arrival   Currently not complaining of this CTA in ED not showing any clear cause of this   Will check orthostatics  Will get device interrogation  Shoulder pain  Assessment & Plan  Left sided scapular pain   Initially 10/10   Some relief with pain medication   Differential includes GI etiology referred pain - hiatal hernia seen on CT scan   Patient also complaining of nause      CT scan shows coronary calcifications however troponins are negative   Will get echo   Will get EKG and repeat in AM  If any concerning findings should consult cardiology    VTE Pharmacologic Prophylaxis: VTE Score: 5 Moderate Risk (Score 3-4) - Pharmacological DVT Prophylaxis Ordered: heparin  Code Status: Level 1 - Full Code   Discussion with family: Updated  ( and daughter) at bedside  Anticipated Length of Stay: Patient will be admitted on an observation basis with an anticipated length of stay of less than 2 midnights secondary to shoulder pain   Total Time for Visit, including Counseling / Coordination of Care: 45 minutes Greater than 50% of this total time spent on direct patient counseling and coordination of care      Chief Complaint: shoulder pain and nausea    History of Present Illness:  Jonita Homans is a [de-identified] y o  female with a PMH of CKD stage 3 recently started on Aldactone by her nephrologist   who presents with shoulder pain which started yesterday  She quantifies is at 10/10 and it is associated with nausea and not tolerating any food throughout the day  Patient had a fall on Saturday which is 6 days ago now and has not followed since  That fall was a mechanical fall and she fell onto her backside and did not injure her shoulder during that fall  In the ED the patient was complaining of dizziness over this is resolve during my encounter with the patient  CT scan was done in the ED with contrast chest abdomen pelvis which showed no aortic abnormalities  It did show some coronary vessel vacation and also some abnormalities in the pancreas but a lipase was negative  There was a moderate hiatal hernia with debris in the soft with gas to suggest reflux  Given that the patient is having shoulder pain with some nausea we will consult GI  Of note serial troponins were negative, but the patient has been placed on telemetry and we have ordered an     Review of Systems:  Review of Systems   Constitutional: Negative for activity change, appetite change, chills, diaphoresis, fatigue, fever and unexpected weight change  HENT: Negative  Eyes: Negative for photophobia, pain, discharge, redness, itching and visual disturbance  Respiratory: Negative for apnea, cough, choking, chest tightness, shortness of breath, wheezing and stridor  Cardiovascular: Negative  Gastrointestinal: Positive for nausea and vomiting  Negative for abdominal distention, abdominal pain, blood in stool, diarrhea and rectal pain  Endocrine: Negative  Genitourinary: Negative  Musculoskeletal: Positive for back pain (shoulder pain )  Skin: Negative  Allergic/Immunologic: Negative  Neurological: Positive for dizziness  Hematological: Negative  Psychiatric/Behavioral: Negative          Past Medical and Surgical History:   Past Medical History:   Diagnosis Date    Anxiety     Arthritis     [facility]est[facility]     Chronic venous insufficiency     COPD [facility]chronic obstructive pulmonary disease) [facility] [address] [facility]     Coronary artery disease     Diabetes mellitus ([facility] )     type II    Disease of thyroid gland     hypothyroidism    DVT (deep venous thrombosis) ([facility])     HL (hearing loss)     Hyperlipidemia     Hypertension     Irregular heart beat     Paroxysmal a-fib    Pacemaker     Renal disorder     stage 3    Sleep apnea     Sleep difficulties     Thyroid disease     Urinary incontinence     Uterine leiomyoma        Past Surgical History:   Procedure Laterality Date    BREAST SURGERY      For Biopsy    CARDIAC PACEMAKER PLACEMENT      Permanent  Last assessed: 2/19/16    CARDIAC SURGERY      CARPAL TUNNEL RELEASE Bilateral     CATARACT EXTRACTION      CATARACT EXTRACTION W/ INTRAOCULAR LENS  IMPLANT, BILATERAL      CYSTOSCOPY      Diagnostic  Last assessed: 11/8/17    ECTOPIC PREGNANCY SURGERY      HYSTERECTOMY      Total Abdomonal  Resolved: 1982    JOINT REPLACEMENT Right     TKR    OOPHORECTOMY      CA ENDOVENOUS LASER, 1ST VEIN Left 2/17/2017    Procedure: GREATER SAPHENOUS VEIN ENDOVASCULAR LASER THERAPY ;  Surgeon: Betti Essex, DO;  Location: [facility];  Service: Vascular    TONSILLECTOMY         Meds/Allergies:  Prior to Admission medications    Medication Sig Start Date End Date Taking?  Authorizing Provider   acetaminophen (TYLENOL) 325 mg tablet Take 650 mg by mouth 1/22/16   Historical Provider, MD   albuterol (ProAir HFA) 90 mcg/act inhaler Inhale 2 puffs every 6 (six) hours as needed for wheezing 4/12/22   Marcial Sánchez MD   apixaban (Eliquis) 5 mg Take 1 tablet (5 mg total) by mouth 2 (two) times a day 7/13/22   Josey Tapia MD   Ascorbic Acid (VITAMIN C) 1000 MG tablet Take 500 mg by mouth     Historical Provider, MD   atorvastatin (LIPITOR) 80 mg tablet TAKE 1 TABLET AT BEDTIME 12/22/21   Isabel Jain Zari Borjas MD   Blood Glucose Monitoring Suppl (OneTouch Verio) w/Device KIT Use as directed 2/17/22   Vicki Guevara PA-C   Calcium Carbonate-Vit D-Min (CALCIUM 1200 PO) Take by mouth    Historical Provider, MD   cholecalciferol (VITAMIN D3) 1,000 units tablet Take 2,000 Units by mouth daily     Historical Provider, MD   citalopram (CeleXA) 20 mg tablet TAKE 1 TABLET EVERY DAY 7/18/22   Ashlyn Herr MD   clobetasol (TEMOVATE) 0 05 % cream Apply topically 2 (two) times a day To leg  Patient not taking: Reported on 8/25/2022 10/28/20   Becca Giraldo MD   Continuous Blood Gluc  (FREESTYLE RONNI 14 DAY READER) GARRY Use to check BG levels 4+times daily and as needed 12/14/19   Jake Dixon MD   Continuous Blood Gluc Sensor (FREESTYLE RONNI 14 DAY SENSOR) MISC Change sensor every 14 days to check BG levels 4+ times daily 12/14/19   Jake Dixon MD   furosemide (LASIX) 20 mg tablet Take 1 tablet (20 mg total) by mouth as needed (as needed for leg swelling) 7/1/21   Leticia Ferreira MD   GLUCOSAMINE CHONDROITIN COMPLX PO Take 1 tablet by mouth 2 (two) times a day 3/11/11   Historical Provider, MD   glucose blood (OneTouch Verio) test strip Use 4 times a day 2/17/22   Vicki Guevara PA-C   glucose blood (TRUE METRIX BLOOD GLUCOSE TEST) test strip Test 4 times plus daily, as instructed 4/4/18   DALILA Webb   hydrOXYzine HCL (ATARAX) 25 mg tablet Take 1 tablet (25 mg total) by mouth daily at bedtime  Patient not taking: Reported on 8/25/2022 10/15/20   Ashlyn Herr MD   insulin aspart (NovoLOG FlexPen) 100 UNIT/ML injection pen INJECT 8 UNITS SUBCUTANEOUSLY WITH BREAKFAST, THEN 10 UNITS WITH LUNCH, AND THEN 10 UNITS WITH DINNER 2/17/22   Vicki Guevara PA-C   insulin glargine (Toujeo SoloStar) 300 units/mL CONCENTRATED U-300 injection pen (1-unit dial) Inject 36 Units under the skin daily at bedtime 2/17/22   Vicki Guevara PA-C   Insulin Pen Needle (Pen [place]) 32G X 6 MM MISC Use 4 times a day 2/17/22   Vicki Guevara PA-C   Lancets (onetouch ultrasoft) lancets Use 4 times a day 2/17/22   Vicki Guevara PA-C   levothyroxine 125 mcg tablet TAKE 1 TABLET ONE TIME DAILY EXCEPT ON SUNDAY NO TABLET 6/8/22   Jonny Dunham MD   lisinopril (ZESTRIL) 20 mg tablet Take 1 tablet (20 mg total) by mouth 2 (two) times a day 6/6/22   Jonny Dunham MD   metoprolol tartrate (LOPRESSOR) 25 mg tablet TAKE 1 TABLET EVERY 12 HOURS 5/5/22   Jonny Dunham MD   Omega-3 Fatty Acids (FISH OIL) 1200 MG CAPS Take 1,200 mg by mouth 2 (two) times a day     Historical Provider, MD   oxybutynin (DITROPAN) 5 mg tablet TAKE 1 TABLET TWICE DAILY 6/8/22   Jonny Dunham MD   Promethazine-[facility]) 6 25-15 mg/5 mL oral syrup Take 5 mL by mouth 4 (four) times a day as needed for cough 4/1/22   Jonny Dunham MD   spironolactone (ALDACTONE) 25 mg tablet Take 1 tablet (25 mg total) by mouth daily 8/25/22   Ki Hugheso, MD   Tetrahydroz-Polyvinyl Al-Povid (MURINE TEARS PLUS OP) Apply to eye 3 (three) times a day    Historical Provider, MD   triamcinolone (KENALOG) 0 1 % ointment Apply 2x/day to itchy, red areas as needed  Overuse can thin skin  Patient not taking: Reported on 8/25/2022 3/4/21   Rosina Duke MD     I have reviewed home medications with patient personally  Allergies: Allergies   Allergen Reactions    Ampicillin Hives       Social History:  Marital Status: /Civil Union   Occupation:  Retired   Patient Pre-hospital Living Situation: Home  Patient Pre-hospital Level of Mobility: walks with walker  Patient Pre-hospital Diet Restrictions:  None  Substance Use History:   Social History     Substance and Sexual Activity   Alcohol Use Yes    Comment: rarely   Per [employer]: Denied history of alcohol use     Social History     Tobacco Use   Smoking Status Never Smoker   Smokeless Tobacco Never Used   Tobacco Comment Per [employer]: Former smoker     Social History     Substance and Sexual Activity   Drug Use No       Family History:  Family History   Problem Relation Age of Onset    Hypertension Father     Diabetes Father         Mellitus    Coronary artery disease Mother     Hypertension Mother     Heart disease Mother     Diabetes Brother         Mellitus    Breast cancer Maternal Aunt     Pancreatic cancer Maternal Aunt     Breast cancer Cousin     Breast cancer Cousin     No Known Problems Sister     No Known Problems Daughter     No Known Problems Maternal Grandmother     No Known Problems Maternal Grandfather     No Known Problems Paternal Grandmother     No Known Problems Paternal Grandfather     No Known Problems Daughter     No Known Problems Son        Physical Exam:     Vitals:   Blood Pressure: 148/90 (09/02/22 1738)  Pulse: 60 (09/02/22 1503)  Temperature: 98 3 °F (36 8 °C) (09/02/22 1503)  Temp Source: Oral (09/02/22 0830)  Respirations: 16 (09/02/22 1503)  Height: 5' 1" (154 9 cm) (09/02/22 1500)  Weight - Scale: 104 kg (228 lb 13 4 oz) (09/02/22 1500)  SpO2: 95 % (09/02/22 1503)    Physical Exam  Constitutional:       General: She is not in acute distress  Appearance: She is not ill-appearing, toxic-appearing or diaphoretic  HENT:      Head: Normocephalic  Eyes:      General: No scleral icterus  Right eye: No discharge  Left eye: No discharge  Pupils: Pupils are equal, round, and reactive to light  Cardiovascular:      Rate and Rhythm: Normal rate  Heart sounds: No murmur heard  No friction rub  No gallop  Pulmonary:      Effort: No respiratory distress  Breath sounds: No stridor  No wheezing, rhonchi or rales  Chest:      Chest wall: No tenderness  Abdominal:      General: There is no distension  Palpations: There is no mass  Tenderness: There is no right CVA tenderness, guarding or rebound  Hernia: No hernia is present  Skin:     Capillary Refill: Capillary refill takes less than 2 seconds  Coloration: Skin is not jaundiced or pale  Findings: No bruising, erythema, lesion or rash  Neurological:      Mental Status: She is alert  Cranial Nerves: No cranial nerve deficit  Sensory: No sensory deficit  Motor: No weakness  Coordination: Coordination normal       Gait: Gait normal       Deep Tendon Reflexes: Reflexes normal    Psychiatric:         Mood and Affect: Mood normal           Additional Data:     Lab Results:  Results from last 7 days   Lab Units 09/02/22  0920   WBC Thousand/uL 6 93   HEMOGLOBIN g/dL 11 8   HEMATOCRIT % 38 1   PLATELETS Thousands/uL 166   NEUTROS PCT % 75   LYMPHS PCT % 20   MONOS PCT % 5   EOS PCT % 0     Results from last 7 days   Lab Units 09/02/22  0920   SODIUM mmol/L 139   POTASSIUM mmol/L 4 2   CHLORIDE mmol/L 102   CO2 mmol/L 25   BUN mg/dL 29*   CREATININE mg/dL 1 14   ANION GAP mmol/L 12   CALCIUM mg/dL 9 5   ALBUMIN g/dL 3 9   TOTAL BILIRUBIN mg/dL 0 82   ALK PHOS U/L 82   ALT U/L 13   AST U/L 16   GLUCOSE RANDOM mg/dL 189*         Results from last 7 days   Lab Units 09/02/22  1649 09/02/22  0831   POC GLUCOSE mg/dl 197* 206*               Imaging: No pertinent imaging reviewed  XR scapula left   Final Result by Demetrius Lawton MD (09/02 1642)      No acute osseous abnormality  Degenerative changes as described  Workstation performed: [id number]         XR shoulder 2+ vw left   Final Result by Demetrius Lawton MD (09/02 1642)      No acute osseous abnormality  Degenerative changes as described  Workstation performed: [id number]         CT head without contrast   Final Result by Mike Coy MD (09/02 1201)      No acute intracranial abnormality  Extensive right-sided paranasal sinus disease                    Workstation performed: [id number]         CT chest abdomen pelvis w contrast   Final Result by Mike Coy MD (09/02 1211) No acute posttraumatic abnormality in the chest, abdomen or pelvis  Moderate hiatal hernia, with mottled debris diffusely in the esophagus which may be related to reflux or esophageal dysmotility  Cholelithiasis  Colonic diverticulosis  Workstation performed: [id number]             EKG and Other Studies Reviewed on Admission:   · EKG: Paced       ** Please Note: This note has been constructed using a voice recognition system   **

## 2022-09-02 NOTE — PLAN OF CARE
Problem: PAIN - ADULT  Goal: Verbalizes/displays adequate comfort level or baseline comfort level  Description: Interventions:  - Encourage patient to monitor pain and request assistance  - Assess pain using appropriate pain scale  - Administer analgesics based on type and severity of pain and evaluate response  - Implement non-pharmacological measures as appropriate and evaluate response  - Consider cultural and social influences on pain and pain management  - Notify physician/advanced practitioner if interventions unsuccessful or patient reports new pain  Outcome: Progressing     Problem: SAFETY ADULT  Goal: Patient will remain free of falls  Description: INTERVENTIONS:  - Educate patient/family on patient safety including physical limitations  - Instruct patient to call for assistance with activity   - Consult OT/PT to assist with strengthening/mobility   - Keep Call bell within reach  - Keep bed low and locked with side rails adjusted as appropriate  - Keep care items and personal belongings within reach  - Initiate and maintain comfort rounds  - Make Fall Risk Sign visible to staff  - Apply yellow socks and bracelet for high fall risk patients  - Consider moving patient to room near nurses station  Outcome: Progressing  Goal: Maintain or return to baseline ADL function  Description: INTERVENTIONS:  -  Assess patient's ability to carry out ADLs; assess patient's baseline for ADL function and identify physical deficits which impact ability to perform ADLs (bathing, care of mouth/teeth, toileting, grooming, dressing, etc )  - Assess/evaluate cause of self-care deficits   - Assess range of motion  - Assess patient's mobility; develop plan if impaired  - Assess patient's need for assistive devices and provide as appropriate  - Encourage maximum independence but intervene and supervise when necessary  - Involve family in performance of ADLs  - Assess for home care needs following discharge   - Consider OT consult to assist with ADL evaluation and planning for discharge  - Provide patient education as appropriate  Outcome: Progressing  Goal: Maintains/Returns to pre admission functional level  Description: INTERVENTIONS:  - Perform BMAT or MOVE assessment daily    - Set and communicate daily mobility goal to care team and patient/family/caregiver     - Collaborate with rehabilitation services on mobility goals if consulted  - Out of bed for toileting  - Record patient progress and toleration of activity level   Outcome: Progressing     Problem: DISCHARGE PLANNING  Goal: Discharge to home or other facility with appropriate resources  Description: INTERVENTIONS:  - Identify barriers to discharge w/patient and caregiver  - Arrange for needed discharge resources and transportation as appropriate  - Identify discharge learning needs (meds, wound care, etc )  - Arrange for interpretive services to assist at discharge as needed  - Refer to Case Management Department for coordinating discharge planning if the patient needs post-hospital services based on physician/advanced practitioner order or complex needs related to functional status, cognitive ability, or social support system  Outcome: Progressing

## 2022-09-02 NOTE — ASSESSMENT & PLAN NOTE
Patient complaining of dizziness upon arrival   Currently not complaining of this CTA in ED not showing any clear cause of this   Will check orthostatics  Will get device interrogation

## 2022-09-03 ENCOUNTER — APPOINTMENT (OUTPATIENT)
Dept: NON INVASIVE DIAGNOSTICS | Facility: HOSPITAL | Age: 82
DRG: 392 | End: 2022-09-03
Payer: MEDICARE

## 2022-09-03 PROBLEM — I10 HYPERTENSION: Status: ACTIVE | Noted: 2022-09-03

## 2022-09-03 LAB
AORTIC ROOT: 3.2 CM
APICAL FOUR CHAMBER EJECTION FRACTION: 71 %
ASCENDING AORTA: 3.8 CM
CARDIAC TROPONIN I PNL SERPL HS: 15 NG/L
E WAVE DECELERATION TIME: 175 MS
FRACTIONAL SHORTENING: 42 % (ref 28–44)
GLUCOSE SERPL-MCNC: 154 MG/DL (ref 65–140)
GLUCOSE SERPL-MCNC: 163 MG/DL (ref 65–140)
GLUCOSE SERPL-MCNC: 203 MG/DL (ref 65–140)
GLUCOSE SERPL-MCNC: 223 MG/DL (ref 65–140)
INTERVENTRICULAR SEPTUM IN DIASTOLE (PARASTERNAL SHORT AXIS VIEW): 1.5 CM
INTERVENTRICULAR SEPTUM: 1.5 CM (ref 0.6–1.1)
LAAS-AP2: 18.7 CM2
LAAS-AP4: 19.7 CM2
LEFT ATRIUM SIZE: 4.5 CM
LEFT INTERNAL DIMENSION IN SYSTOLE: 2.5 CM (ref 2.1–4)
LEFT VENTRICULAR INTERNAL DIMENSION IN DIASTOLE: 4.3 CM (ref 3.5–6)
LEFT VENTRICULAR POSTERIOR WALL IN END DIASTOLE: 1.4 CM
LEFT VENTRICULAR STROKE VOLUME: 62 ML
LVSV (TEICH): 62 ML
MV E'TISSUE VEL-SEP: 6 CM/S
MV PEAK A VEL: 0.88 M/S
MV PEAK E VEL: 95 CM/S
MV STENOSIS PRESSURE HALF TIME: 51 MS
MV VALVE AREA P 1/2 METHOD: 4.31 CM2
RA PRESSURE ESTIMATED: 7 MMHG
RIGHT ATRIAL 2D VOLUME: 28 ML
RIGHT ATRIUM AREA SYSTOLE A4C: 13 CM2
RIGHT VENTRICLE ID DIMENSION: 3.7 CM
RV PSP: 50 MMHG
SL CV LEFT ATRIUM LENGTH A2C: 5.3 CM
SL CV LV EF: 65
SL CV PED ECHO LEFT VENTRICLE DIASTOLIC VOLUME (MOD BIPLANE) 2D: 84 ML
SL CV PED ECHO LEFT VENTRICLE SYSTOLIC VOLUME (MOD BIPLANE) 2D: 22 ML
TR MAX PG: 43 MMHG
TR PEAK VELOCITY: 3.4 M/S
TRICUSPID VALVE PEAK REGURGITATION VELOCITY: 3.43 M/S

## 2022-09-03 PROCEDURE — 99232 SBSQ HOSP IP/OBS MODERATE 35: CPT | Performed by: INTERNAL MEDICINE

## 2022-09-03 PROCEDURE — C9113 INJ PANTOPRAZOLE SODIUM, VIA: HCPCS | Performed by: INTERNAL MEDICINE

## 2022-09-03 PROCEDURE — 82948 REAGENT STRIP/BLOOD GLUCOSE: CPT

## 2022-09-03 PROCEDURE — 84484 ASSAY OF TROPONIN QUANT: CPT | Performed by: INTERNAL MEDICINE

## 2022-09-03 PROCEDURE — 93306 TTE W/DOPPLER COMPLETE: CPT | Performed by: INTERNAL MEDICINE

## 2022-09-03 PROCEDURE — 93306 TTE W/DOPPLER COMPLETE: CPT

## 2022-09-03 PROCEDURE — 99222 1ST HOSP IP/OBS MODERATE 55: CPT | Performed by: INTERNAL MEDICINE

## 2022-09-03 RX ORDER — FUROSEMIDE 20 MG/1
20 TABLET ORAL ONCE
Status: COMPLETED | OUTPATIENT
Start: 2022-09-04 | End: 2022-09-04

## 2022-09-03 RX ADMIN — Medication 2000 UNITS: at 08:12

## 2022-09-03 RX ADMIN — ATORVASTATIN CALCIUM 80 MG: 40 TABLET, FILM COATED ORAL at 21:39

## 2022-09-03 RX ADMIN — INSULIN LISPRO 1 UNITS: 100 INJECTION, SOLUTION INTRAVENOUS; SUBCUTANEOUS at 12:43

## 2022-09-03 RX ADMIN — CITALOPRAM HYDROBROMIDE 20 MG: 20 TABLET ORAL at 08:12

## 2022-09-03 RX ADMIN — LISINOPRIL 20 MG: 20 TABLET ORAL at 18:00

## 2022-09-03 RX ADMIN — ACETAMINOPHEN 650 MG: 325 TABLET ORAL at 12:43

## 2022-09-03 RX ADMIN — INSULIN LISPRO 1 UNITS: 100 INJECTION, SOLUTION INTRAVENOUS; SUBCUTANEOUS at 08:14

## 2022-09-03 RX ADMIN — ACETAMINOPHEN 650 MG: 325 TABLET ORAL at 18:00

## 2022-09-03 RX ADMIN — MORPHINE SULFATE 2 MG: 2 INJECTION, SOLUTION INTRAMUSCULAR; INTRAVENOUS at 04:18

## 2022-09-03 RX ADMIN — PANTOPRAZOLE SODIUM 40 MG: 40 INJECTION, POWDER, FOR SOLUTION INTRAVENOUS at 08:12

## 2022-09-03 RX ADMIN — LEVOTHYROXINE SODIUM 125 MCG: 125 TABLET ORAL at 05:33

## 2022-09-03 RX ADMIN — SPIRONOLACTONE 25 MG: 25 TABLET ORAL at 08:12

## 2022-09-03 RX ADMIN — INSULIN GLARGINE 20 UNITS: 100 INJECTION, SOLUTION SUBCUTANEOUS at 21:39

## 2022-09-03 RX ADMIN — INSULIN LISPRO 1 UNITS: 100 INJECTION, SOLUTION INTRAVENOUS; SUBCUTANEOUS at 17:42

## 2022-09-03 RX ADMIN — PANTOPRAZOLE SODIUM 40 MG: 40 INJECTION, POWDER, FOR SOLUTION INTRAVENOUS at 21:39

## 2022-09-03 RX ADMIN — LISINOPRIL 20 MG: 20 TABLET ORAL at 08:12

## 2022-09-03 RX ADMIN — METOPROLOL TARTRATE 25 MG: 25 TABLET, FILM COATED ORAL at 18:01

## 2022-09-03 RX ADMIN — APIXABAN 5 MG: 5 TABLET, FILM COATED ORAL at 18:01

## 2022-09-03 RX ADMIN — APIXABAN 5 MG: 5 TABLET, FILM COATED ORAL at 08:12

## 2022-09-03 RX ADMIN — METOPROLOL TARTRATE 25 MG: 25 TABLET, FILM COATED ORAL at 03:31

## 2022-09-03 RX ADMIN — INSULIN LISPRO 1 UNITS: 100 INJECTION, SOLUTION INTRAVENOUS; SUBCUTANEOUS at 21:39

## 2022-09-03 NOTE — ASSESSMENT & PLAN NOTE
Patient has a history of diastolic heart failure    Echo was obtained during this admission showing 60% EF with RV pressure of 50  On home regimen of Lasix 20 mg as needed and spironolactone 25  Given that patient is requiring oxygen of 1 L, can likely wean patient off  Will also continue patient's Lasix 20 mg tomorrow morning

## 2022-09-03 NOTE — ASSESSMENT & PLAN NOTE
Continue home medications lisinopril 20 mg b i d  Metoprolol 25 mg q 12  Lasix 20 mg p r n   Leg swelling

## 2022-09-03 NOTE — PROGRESS NOTES
[facility]  Progress Note - Garcia Callejas 1940, [de-identified] y o  female MRN: [id number]  Unit/Bed#: S -01 Encounter: [id number]  Primary Care Provider: Jacobo Puri MD   Date and time admitted to hospital: 9/2/2022  8:25 AM    * Fall  Assessment & Plan  Mechanical fall on Saturday  Fell on to her backside  Did not strike her shoulder   Will get PT OT    Shoulder pain  Assessment & Plan  Left sided scapular pain   Initially 10/10   Some relief with pain medication   Differential includes GI etiology referred pain - hiatal hernia seen on CT scan   Patient also complaining of nause      CT scan shows coronary calcifications however troponins are negative   Will get echo   Will get EKG and repeat in AM  If any concerning findings should consult cardiology    Chronic diastolic congestive heart failure [facility])  Assessment & Plan  Patient has a history of diastolic heart failure  Echo was obtained during this admission showing 60% EF with RV pressure of 50  On home regimen of Lasix 20 mg as needed and spironolactone 25  Given that patient is requiring oxygen of 1 L, can likely wean patient off  Will also continue patient's Lasix 20 mg tomorrow morning    Dizziness  Assessment & Plan  Patient complaining of dizziness upon arrival   Currently not complaining of this CTA in ED not showing any clear cause of this   Will check orthostatics  Will get device interrogation  On supplemental oxygen by nasal cannula  Assessment & Plan  Patient is on 1 L  Patient likely had episode of hypoxia while sleeping, has history of MITCH and COPD  Continue to monitor     Hypertension  Assessment & Plan  Continue home medications lisinopril 20 mg b i d  Metoprolol 25 mg q 12  Lasix 20 mg p r n  Leg swelling    Chronic obstructive pulmonary disease (HCC)  Assessment & Plan  Albuterol p r n          VTE Pharmacologic Prophylaxis:   VTE Score: 5 Continue Eliquis    Mechanical VTE Prophylaxis in Place: Yes    Patient Centered Rounds: I have performed bedside rounds with nursing staff today  Discussions with Specialists or Other Care Team Provider:     Education and Discussions with Family / Patient: Updated  (daughter) via phone  Current Length of Stay: 0 day(s)    Current Patient Status: Inpatient     Discharge Plan / Estimated Discharge Date: Anticipate discharge tomorrow to discharge location to be determined pending rehab evaluations  Code Status: Level 1 - Full Code      Subjective:   No acute overnight events  Patient's pain has resolved  Patient any chest pain, chest pressure, palpitations or difficulty breathing  Objective:     Vitals:   Temp (24hrs), Av 3 °F (36 8 °C), Min:98 °F (36 7 °C), Max:98 5 °F (36 9 °C)    Temp:  [98 °F (36 7 °C)-98 5 °F (36 9 °C)] 98 3 °F (36 8 °C)  HR:  [52-62] 60  Resp:  [16-18] 18  BP: (148-197)/(66-95) 160/70  SpO2:  [90 %-96 %] 91 %  Body mass index is 43 08 kg/m²  Input and Output Summary (last 24 hours): Intake/Output Summary (Last 24 hours) at 9/3/2022 1606  Last data filed at 2022 1915  Gross per 24 hour   Intake --   Output 300 ml   Net -300 ml       Physical Exam:     Physical Exam  Vitals and nursing note reviewed  Constitutional:       General: She is not in acute distress  Appearance: She is well-developed  HENT:      Head: Normocephalic and atraumatic  Mouth/Throat:      Mouth: Mucous membranes are moist       Pharynx: No oropharyngeal exudate  Eyes:      Extraocular Movements: Extraocular movements intact  Conjunctiva/sclera: Conjunctivae normal       Pupils: Pupils are equal, round, and reactive to light  Cardiovascular:      Rate and Rhythm: Normal rate and regular rhythm  Pulses: Normal pulses  Heart sounds: No murmur heard  Pulmonary:      Effort: Pulmonary effort is normal  No respiratory distress  Breath sounds: Normal breath sounds     Abdominal:      Palpations: Abdomen is soft       Tenderness: There is no abdominal tenderness  Musculoskeletal:         General: Normal range of motion  Cervical back: Normal range of motion and neck supple  Right lower leg: No edema  Left lower leg: No edema  Skin:     General: Skin is warm and dry  Neurological:      General: No focal deficit present  Mental Status: She is alert and oriented to person, place, and time     Psychiatric:         Mood and Affect: Mood normal          Behavior: Behavior normal           Additional Data:     Labs:  Results from last 7 days   Lab Units 09/02/22  0920   WBC Thousand/uL 6 93   HEMOGLOBIN g/dL 11 8   HEMATOCRIT % 38 1   PLATELETS Thousands/uL 166   NEUTROS PCT % 75   LYMPHS PCT % 20   MONOS PCT % 5   EOS PCT % 0     Results from last 7 days   Lab Units 09/02/22  0920   SODIUM mmol/L 139   POTASSIUM mmol/L 4 2   CHLORIDE mmol/L 102   CO2 mmol/L 25   BUN mg/dL 29*   CREATININE mg/dL 1 14   ANION GAP mmol/L 12   CALCIUM mg/dL 9 5   ALBUMIN g/dL 3 9   TOTAL BILIRUBIN mg/dL 0 82   ALK PHOS U/L 82   ALT U/L 13   AST U/L 16   GLUCOSE RANDOM mg/dL 189*         Results from last 7 days   Lab Units 09/03/22  1525 09/03/22  1020 09/03/22  0646 09/02/22  2120 09/02/22  1649 09/02/22  0831   POC GLUCOSE mg/dl 223* 163* 154* 164* 197* 206*               Imaging: Reviewed radiology reports from this admission including: chest xray, abdominal/pelvic CT scan, chest CT scan, CT head and xray(s)    Recent Cultures (last 7 days):           Lines/Drains:  Invasive Devices  Report    Peripheral Intravenous Line  Duration           Peripheral IV 09/02/22 Left Antecubital 1 day                Telemetry:   Telemetry Orders (From admission, onward)             48 Hour Telemetry Monitoring  Continuous x 48 hours        References:    Telemetry Guidelines   Question:  Reason for 48 Hour Telemetry  Answer:  Acute MI, chest pain - R/O MI, or unstable angina                    Last 24 Hours Medication List:   Current Facility-Administered Medications   Medication Dose Route Frequency Provider Last Rate    acetaminophen  650 mg Oral Q6H [address] Sarah Moore MD      albuterol  2 puff Inhalation Q6H PRN Sarah Moore MD      apixaban  5 mg Oral BID Sarah Moore MD      atorvastatin  80 mg Oral HS Sarah Moore MD      cholecalciferol  2,000 Units Oral Daily Sarah Moore MD      citalopram  20 mg Oral Daily Sarah Moore MD      [START ON 9/4/2022] furosemide  20 mg Oral Once Akilah Salazar MD      hydrALAZINE  5 mg Intravenous Q6H PRN Sarah Moore MD      insulin glargine  20 Units Subcutaneous HS Sarah Moore MD      insulin lispro  1-5 Units Subcutaneous TID Jennifer Holman MD      insulin lispro  1-5 Units Subcutaneous HS Sarah Moore MD      levothyroxine  125 mcg Oral Early Morning Sarah Moore MD      lisinopril  20 mg Oral BID Sarah Moore MD      metoprolol tartrate  25 mg Oral Q12H Sarah Moore MD      morphine injection  2 mg Intravenous Q4H PRN Sarah Moore MD      pantoprazole  40 mg Intravenous Q12H Abraham Olmstead MD      spironolactone  25 mg Oral Daily Sarah Moore MD          Today, Patient Was Seen By: Akilah Salazar MD    ** Please Note: This note has been constructed using a voice recognition system   **

## 2022-09-03 NOTE — ASSESSMENT & PLAN NOTE
Patient is on 1 L   Patient likely had episode of hypoxia while sleeping, has history of MITCH and COPD  Continue to monitor

## 2022-09-03 NOTE — CONSULTS
Consultation - [facility]  Juju Perez [de-identified] y o  female MRN: [id number]  Unit/Bed#: S -01 Encounter: [id number]        Consults    Reason for Consult / Principal Problem:  Hiatal hernia, nausea/vomiting    HPI: Juju Perez is a [de-identified]y o  year old female with history of CHF, COPD, DVT anticoagulated with Eliquis who presented to the hospital yesterday, she said that she had had a fall about 6 days earlier and had been experiencing pain in her left shoulder since that time, however she came to the hospital because yesterday she did start fairly suddenly with nausea and upper abdominal pain  She said earlier in the day she had eaten some lunch meat, salad, pork chops and corn  She denies any sensation of food stuck in the throat, she says that she had occasionally noted transient sensations of food going down the throat slowly recently but not happening with every meal or consistently  She says today she has not had any more vomiting, does not feel nauseous and denies any abdominal pain currently  She denies any history of problems with acid reflux or heartburn and does not take a PPI or H2 blocker  She says she has been able to swallow liquids today without regurgitation or vomiting  She had CT scan of the chest abdomen and pelvis done here showing evidence of a moderate-sized hiatal hernia and debris throughout her esophagus, there also appeared to be nonobstructing gallstones and pancreatic atrophy  She does not think she has ever had an EGD, she thinks she had colonoscopy sometime between 5 and 10 years ago which was normal, and she says she used to have colonoscopy is regularly  She denies any recent sick contacts or recent travel  REVIEW OF SYSTEMS:    CONSTITUTIONAL: Denies any fever, chills, or rigors  Good appetite, and no recent weight loss  HEENT: No earache or tinnitus  Denies hearing loss or visual disturbances    CARDIOVASCULAR: No chest pain or palpitations  RESPIRATORY: Denies any cough, hemoptysis, shortness of breath or dyspnea on exertion  GASTROINTESTINAL: As noted in the History of Present Illness  GENITOURINARY: No problems with urination  Denies any hematuria or dysuria  NEUROLOGIC: No dizziness or vertigo, denies headaches  MUSCULOSKELETAL: Denies any muscle or joint pain  SKIN: Denies skin rashes or itching  ENDOCRINE: Denies excessive thirst  Denies intolerance to heat or cold  PSYCHOSOCIAL: Denies depression or anxiety  Denies any recent memory loss  Historical Information   Past Medical History:   Diagnosis Date    Anxiety     Arthritis     CH[facility]est[facility]     Chronic venous insufficiency     COPD (chronic obstructive pulmonary disease) ([facility]     Coronary artery disease     Diabetes mellitus ([facility] [address] )     type II    Disease of thyroid gland     hypothyroidism    DVT (deep venous thrombosis) ([facility])     HL (hearing loss)     Hyperlipidemia     Hypertension     Irregular heart beat     Paroxysmal a-fib    Pacemaker     Renal disorder     stage 3    Sleep apnea     Sleep difficulties     Thyroid disease     Urinary incontinence     Uterine leiomyoma      Past Surgical History:   Procedure Laterality Date    BREAST SURGERY      For Biopsy    CARDIAC PACEMAKER PLACEMENT      Permanent  Last assessed: 2/19/16    CARDIAC SURGERY      CARPAL TUNNEL RELEASE Bilateral     CATARACT EXTRACTION      CATARACT EXTRACTION W/ INTRAOCULAR LENS  IMPLANT, BILATERAL      CYSTOSCOPY      Diagnostic   Last assessed: 11/8/17    ECTOPIC PREGNANCY SURGERY      HYSTERECTOMY      Total Abdomonal  Resolved: 1982    JOINT REPLACEMENT Right     TKR    OOPHORECTOMY      AL ENDOVENOUS LASER, 1ST VEIN Left 2/17/2017    Procedure: GREATER SAPHENOUS VEIN ENDOVASCULAR LASER THERAPY ;  Surgeon: Alfie Garza DO;  Location: [facility];  Service: Vascular    TONSILLECTOMY       Social History   Social History Substance and Sexual Activity   Alcohol Use Yes    Comment: rarely   Per [employer]: Denied history of alcohol use     Social History     Substance and Sexual Activity   Drug Use No     Social History     Tobacco Use   Smoking Status Never Smoker   Smokeless Tobacco Never Used   Tobacco Comment    Per [employer]: Former smoker     Family History   Problem Relation Age of Onset    Hypertension Father     Diabetes Father         Mellitus    Coronary artery disease Mother     Hypertension Mother     Heart disease Mother     Diabetes Brother         Mellitus    Breast cancer Maternal Aunt     Pancreatic cancer Maternal Aunt     Breast cancer Cousin     Breast cancer Cousin     No Known Problems Sister     No Known Problems Daughter     No Known Problems Maternal Grandmother     No Known Problems Maternal Grandfather     No Known Problems Paternal Grandmother     No Known Problems Paternal Grandfather     No Known Problems Daughter     No Known Problems Son        Meds/Allergies     Medications Prior to Admission   Medication    acetaminophen (TYLENOL) 325 mg tablet    albuterol (ProAir HFA) 90 mcg/act inhaler    apixaban (Eliquis) 5 mg    Ascorbic Acid (VITAMIN C) 1000 MG tablet    atorvastatin (LIPITOR) 80 mg tablet    Blood Glucose Monitoring Suppl (OneTouch Verio) w/Device KIT    Calcium Carbonate-Vit D-Min (CALCIUM 1200 PO)    cholecalciferol (VITAMIN D3) 1,000 units tablet    citalopram (CeleXA) 20 mg tablet    clobetasol (TEMOVATE) 0 05 % cream    Continuous Blood Gluc  (FREESTYLE RONNI 14 DAY READER) GARRY    Continuous Blood Gluc Sensor (DNN CorpSTYLE RONNI 14 DAY SENSOR) MISC    furosemide (LASIX) 20 mg tablet    GLUCOSAMINE CHONDROITIN COMPLX PO    glucose blood (OneTouch Verio) test strip    glucose blood (TRUE METRIX BLOOD GLUCOSE TEST) test strip    hydrOXYzine HCL (ATARAX) 25 mg tablet    insulin aspart (NovoLOG FlexPen) 100 UNIT/ML injection pen    insulin glargine (Toujeo SoloStar) 300 units/mL CONCENTRATED U-300 injection pen (1-unit dial)    Insulin Pen Needle (Pen Needles) 32G X 6 MM MISC    Lancets (onetouch ultrasoft) lancets    levothyroxine 125 mcg tablet    lisinopril (ZESTRIL) 20 mg tablet    metoprolol tartrate (LOPRESSOR) 25 mg tablet    Omega-3 Fatty Acids (FISH OIL) 1200 MG CAPS    oxybutynin (DITROPAN) 5 mg tablet    Promethazine-DM (PHENERGAN-DM) 6 25-15 mg/5 mL oral syrup    spironolactone (ALDACTONE) 25 mg tablet    Tetrahydroz-Polyvinyl Al-Povid (MURINE TEARS PLUS OP)    triamcinolone (KENALOG) 0 1 % ointment     Current Facility-Administered Medications   Medication Dose Route Frequency    acetaminophen (TYLENOL) tablet 650 mg  650 mg Oral Q6H [address]    albuterol (PROVENTIL HFA,VENTOLIN HFA) inhaler 2 puff  2 puff Inhalation Q6H PRN    apixaban (ELIQUIS) tablet 5 mg  5 mg Oral BID    atorvastatin (LIPITOR) tablet 80 mg  80 mg Oral HS    cholecalciferol (VITAMIN D3) tablet 2,000 Units  2,000 Units Oral Daily    citalopram (CeleXA) tablet 20 mg  20 mg Oral Daily    hydrALAZINE (APRESOLINE) injection 5 mg  5 mg Intravenous Q6H PRN    insulin glargine (LANTUS) subcutaneous injection 20 Units 0 2 mL  20 Units Subcutaneous HS    insulin lispro (HumaLOG) 100 units/mL subcutaneous injection 1-5 Units  1-5 Units Subcutaneous TID AC    insulin lispro (HumaLOG) 100 units/mL subcutaneous injection 1-5 Units  1-5 Units Subcutaneous HS    levothyroxine tablet 125 mcg  125 mcg Oral Early Morning    lisinopril (ZESTRIL) tablet 20 mg  20 mg Oral BID    metoprolol tartrate (LOPRESSOR) tablet 25 mg  25 mg Oral Q12H    morphine injection 2 mg  2 mg Intravenous Q4H PRN    pantoprazole (PROTONIX) injection 40 mg  40 mg Intravenous Q12H PRASHANT    spironolactone (ALDACTONE) tablet 25 mg  25 mg Oral Daily       Allergies   Allergen Reactions    Ampicillin Hives           Objective     Blood pressure 156/69, pulse 60, temperature 98 4 °F (36 9 °C), resp  rate 18, height 5' 1" (1 549 m), weight 103 kg (228 lb), SpO2 91 %, not currently breastfeeding  Intake/Output Summary (Last 24 hours) at 9/3/2022 1300  Last data filed at 9/2/2022 1915  Gross per 24 hour   Intake --   Output 300 ml   Net -300 ml         PHYSICAL EXAM     General Appearance:   Alert, cooperative, no distress, appears stated age    HEENT:   Normocephalic, atraumatic, anicteric      Neck:  Supple, symmetrical, trachea midline, no adenopathy;    thyroid: no enlargement/tenderness/nodules; no carotid  bruit or JVD    Lungs:   Clear to auscultation bilaterally; no rales, rhonchi or wheezing; respirations unlabored    Heart[de-identified]   S1 and S2 normal; regular rate and rhythm; no murmur, rub, or gallop     Abdomen:   Soft, non-tender, non-distended; normal bowel sounds; no masses, no organomegaly    Genitalia:   Deferred    Rectal:   Deferred    Extremities:  No cyanosis, clubbing or edema    Pulses:  2+ and symmetric all extremities    Skin:  Skin color, texture, turgor normal, no rashes or lesions    Lymph nodes:  No palpable cervical, axillary or inguinal lymphadenopathy        Lab Results:   Admission on 09/02/2022   Component Date Value    POC Glucose 09/02/2022 206 (A)    Ventricular Rate 09/02/2022 60     Atrial Rate 09/02/2022 62     QRSD Interval 09/02/2022 182     QT Interval 09/02/2022 492     QTC Interval 09/02/2022 492     QRS Axis 09/02/2022 -89     T Wave Axis 09/02/2022 87     WBC 09/02/2022 6 93     RBC 09/02/2022 3 98     Hemoglobin 09/02/2022 11 8     Hematocrit 09/02/2022 38 1     MCV 09/02/2022 96     MCH 09/02/2022 29 6     MCHC 09/02/2022 31 0 (A)    RDW 09/02/2022 13 1     MPV 09/02/2022 10 1     Platelets 33/40/5689 166     nRBC 09/02/2022 0     Neutrophils Relative 09/02/2022 75     Immat GRANS % 09/02/2022 0     Lymphocytes Relative 09/02/2022 20     Monocytes Relative 09/02/2022 5     Eosinophils Relative 09/02/2022 0     Basophils Relative 09/02/2022 0     Neutrophils Absolute 09/02/2022 5 10     Immature Grans Absolute 09/02/2022 0 02     Lymphocytes Absolute 09/02/2022 1 39     Monocytes Absolute 09/02/2022 0 37     Eosinophils Absolute 09/02/2022 0 02     Basophils Absolute 09/02/2022 0 03     Sodium 09/02/2022 139     Potassium 09/02/2022 4 2     Chloride 09/02/2022 102     CO2 09/02/2022 25     ANION GAP 09/02/2022 12     BUN 09/02/2022 29 (A)    Creatinine 09/02/2022 1 14     Glucose 09/02/2022 189 (A)    Calcium 09/02/2022 9 5     AST 09/02/2022 16     ALT 09/02/2022 13     Alkaline Phosphatase 09/02/2022 82     Total Protein 09/02/2022 6 8     Albumin 09/02/2022 3 9     Total Bilirubin 09/02/2022 0 82     eGFR 09/02/2022 45     hs TnI 0hr 09/02/2022 7     Lipase 09/02/2022 <6 (A)    hs TnI 2hr 09/02/2022 11     Delta 2hr hsTnI 09/02/2022 4     hs TnI 4hr 09/02/2022 13     Delta 4hr hsTnI 09/02/2022 6     LA size 09/03/2022 4 5     Triscuspid Valve Regurgi* 09/03/2022 47 0     Tricuspid valve peak reg* 09/03/2022 3 43     LVPWd 09/03/2022 1 40     Left Atrium Area-systoli* 09/03/2022 18 7     Left Atrium Area-systoli* 09/03/2022 19 7     MV E' Tissue Velocity Se* 09/03/2022 6     RA 2D Volume 09/03/2022 28 0     TR Peak Steve 09/03/2022 3 4     IVSd 09/03/2022 6 60     LV DIASTOLIC VOLUME (MOD* 50/78/9972 84     LEFT VENTRICLE SYSTOLIC * 12/82/1516 22     Left ventricular stroke * 09/03/2022 62 00     A4C EF 09/03/2022 71     LA length (A2C) 09/03/2022 5 30     LVIDd 09/03/2022 4 30     IVS 09/03/2022 1 5     LVIDS 09/03/2022 2 50     FS 09/03/2022 42     Asc Ao 09/03/2022 3 8     Ao root 09/03/2022 3 20     RVID d 09/03/2022 3 7     MV valve area p 1/2 meth* 09/03/2022 4 31     E wave deceleration time 09/03/2022 175     MV Peak E Steve 09/03/2022 95     MV Peak A Steve 09/03/2022 0 88     RAA A4C 09/03/2022 13     MV stenosis pressure 1/2* 09/03/2022 51     LVSV, 2D 09/03/2022 62     POC Glucose 09/02/2022 197 (A)    D-Dimer, Quant 09/02/2022 0 90 (A)    POC Glucose 09/02/2022 164 (A)    hs TnI 0hr 09/03/2022 15     POC Glucose 09/03/2022 154 (A)    POC Glucose 09/03/2022 163 (A)         Imaging Studies: I have personally reviewed pertinent reports  ABDOMEN ULTRASOUND, COMPLETE      INDICATION:   pain      COMPARISON:  Renal ultrasound 11/15/2020      TECHNIQUE:   Real-time ultrasound of the abdomen was performed with a curvilinear transducer with both volumetric sweeps and still imaging techniques      FINDINGS:     PANCREAS:  The pancreas is obscured by bowel gas      AORTA AND IVC:  Obscured by bowel gas      LIVER:  Size:  Within normal range  The liver measures 16 4 cm in the midclavicular line  Contour:  Surface contour is smooth  Parenchyma:  Echogenicity and echotexture are within normal limits  No liver mass identified, though areas of the liver are not visualized  Limited imaging of the main portal vein shows it to be patent and flowing in the normal direction      BILIARY:  The gallbladder is normal in caliber  No wall thickening or pericholecystic fluid  Wall measures 2 mm thickness  Shadowing gallstone(s) identified  No sonographic Castillo's sign  No intrahepatic biliary dilatation  CBD measures 4 0 mm  No choledocholithiasis      KIDNEY:   Right kidney measures 10 6 x 6 3 x 6 2  cm  Volume 216 4 mL  Kidney within normal limits      Left kidney measures 9 3 x 6 1 x 5 2 cm  Volume 152 4 mL  Kidney within normal limits      SPLEEN:   Measures 9 9 x 10 0 x 3 3 cm  Volume 168 9 mL  Within normal limits      ASCITES:  None      IMPRESSION:     Cholelithiasis without acute cholecystitis  ASSESSMENT/PLAN:     1  Acute onset of abdominal pain nausea and vomiting which clinically appears to be improving, question for acute viral gastroenteritis or food-borne illness, however her imaging shows evidence of a large hiatal hernia and debris-filled esophagus    Clinically shows no signs of esophageal obstruction at this time  Rule out underlying gastroesophageal malignancy, stricture, achalasia; likely also has some element of presbyesophagus    - recommend EGD for further evaluation; can be pursued nonurgently in the outpatient setting if patient is cleared for discharge medically and continues to showed no clinical signs of esophageal obstruction/impaction (i e  she continues to tolerate intake of liquids and foods)  Eliquis should be held for 48 hours prior to such procedure, she would need medical clearance for this    - advised patient to chew her food thoroughly, take small bites, pace herself with eating, exercise particular caution with breads and meats    -antiemetics as needed, symptomatic management, may continue with diet as tolerated otherwise    -continue Protonix    The patient was seen and examined by Dr Chrystal Armendariz, all key medical decisions were made with Dr Chrystal Armendariz  Thank you for allowing us to participate in the care of this pleasant patient  We will follow up with you closely

## 2022-09-04 VITALS
BODY MASS INDEX: 43.05 KG/M2 | HEART RATE: 62 BPM | TEMPERATURE: 97.3 F | SYSTOLIC BLOOD PRESSURE: 163 MMHG | OXYGEN SATURATION: 93 % | WEIGHT: 228 LBS | HEIGHT: 61 IN | RESPIRATION RATE: 18 BRPM | DIASTOLIC BLOOD PRESSURE: 79 MMHG

## 2022-09-04 PROBLEM — M25.519 SHOULDER PAIN: Status: RESOLVED | Noted: 2022-09-02 | Resolved: 2022-09-04

## 2022-09-04 PROBLEM — R42 DIZZINESS: Status: RESOLVED | Noted: 2022-09-02 | Resolved: 2022-09-04

## 2022-09-04 PROBLEM — Z78.9 ON SUPPLEMENTAL OXYGEN BY NASAL CANNULA: Status: RESOLVED | Noted: 2022-09-02 | Resolved: 2022-09-04

## 2022-09-04 LAB
ANION GAP SERPL CALCULATED.3IONS-SCNC: 3 MMOL/L (ref 4–13)
BUN SERPL-MCNC: 28 MG/DL (ref 5–25)
CALCIUM SERPL-MCNC: 8.8 MG/DL (ref 8.4–10.2)
CHLORIDE SERPL-SCNC: 104 MMOL/L (ref 96–108)
CO2 SERPL-SCNC: 32 MMOL/L (ref 21–32)
CREAT SERPL-MCNC: 1.16 MG/DL (ref 0.6–1.3)
GFR SERPL CREATININE-BSD FRML MDRD: 44 ML/MIN/1.73SQ M
GLUCOSE SERPL-MCNC: 182 MG/DL (ref 65–140)
GLUCOSE SERPL-MCNC: 80 MG/DL (ref 65–140)
GLUCOSE SERPL-MCNC: 95 MG/DL (ref 65–140)
POTASSIUM SERPL-SCNC: 4.3 MMOL/L (ref 3.5–5.3)
SODIUM SERPL-SCNC: 139 MMOL/L (ref 135–147)

## 2022-09-04 PROCEDURE — 82948 REAGENT STRIP/BLOOD GLUCOSE: CPT

## 2022-09-04 PROCEDURE — 97163 PT EVAL HIGH COMPLEX 45 MIN: CPT

## 2022-09-04 PROCEDURE — C9113 INJ PANTOPRAZOLE SODIUM, VIA: HCPCS | Performed by: INTERNAL MEDICINE

## 2022-09-04 PROCEDURE — 99232 SBSQ HOSP IP/OBS MODERATE 35: CPT | Performed by: PHYSICIAN ASSISTANT

## 2022-09-04 PROCEDURE — 80048 BASIC METABOLIC PNL TOTAL CA: CPT | Performed by: INTERNAL MEDICINE

## 2022-09-04 PROCEDURE — 99239 HOSP IP/OBS DSCHRG MGMT >30: CPT | Performed by: INTERNAL MEDICINE

## 2022-09-04 RX ORDER — OMEPRAZOLE 20 MG/1
20 TABLET, DELAYED RELEASE ORAL 2 TIMES DAILY
Qty: 60 TABLET | Refills: 0 | Status: CANCELLED | OUTPATIENT
Start: 2022-09-04 | End: 2022-10-04

## 2022-09-04 RX ORDER — OMEPRAZOLE 20 MG/1
20 TABLET, DELAYED RELEASE ORAL 2 TIMES DAILY
Qty: 60 TABLET | Refills: 0 | Status: SHIPPED | OUTPATIENT
Start: 2022-09-04 | End: 2022-10-03 | Stop reason: SDUPTHER

## 2022-09-04 RX ADMIN — ACETAMINOPHEN 650 MG: 325 TABLET ORAL at 01:42

## 2022-09-04 RX ADMIN — LEVOTHYROXINE SODIUM 125 MCG: 125 TABLET ORAL at 09:11

## 2022-09-04 RX ADMIN — Medication 2000 UNITS: at 09:10

## 2022-09-04 RX ADMIN — SPIRONOLACTONE 25 MG: 25 TABLET ORAL at 09:10

## 2022-09-04 RX ADMIN — APIXABAN 5 MG: 5 TABLET, FILM COATED ORAL at 09:10

## 2022-09-04 RX ADMIN — LISINOPRIL 20 MG: 20 TABLET ORAL at 09:10

## 2022-09-04 RX ADMIN — PANTOPRAZOLE SODIUM 40 MG: 40 INJECTION, POWDER, FOR SOLUTION INTRAVENOUS at 09:10

## 2022-09-04 RX ADMIN — FUROSEMIDE 20 MG: 20 TABLET ORAL at 09:10

## 2022-09-04 RX ADMIN — CITALOPRAM HYDROBROMIDE 20 MG: 20 TABLET ORAL at 09:10

## 2022-09-04 RX ADMIN — ACETAMINOPHEN 650 MG: 325 TABLET ORAL at 09:09

## 2022-09-04 NOTE — ASSESSMENT & PLAN NOTE
Mechanical fall on Saturday  Fell on to her backside  Did not strike her shoulder   PT consulted, input apprecicated    Mechanical fall 6 days prior to presenting  Denies LOC or Head strike  Shakira Snyder on to her backside reporting sustained left shoulder pain    - PT Consulted  - XR Shoulder - unremarkable  - XR Scapula - unremarkable  - Patient reports pain resolved upon and no longer present on day of discharge

## 2022-09-04 NOTE — DISCHARGE INSTR - AVS FIRST PAGE
Dear Kaylen Garcias,     It was our pleasure to care for you here at [facility], [place]  It is our hope that we were always able to exceed the expected standards for your care during your stay  You were hospitalized due to fall, nausea, vomiting  You were cared for on the 4th floor by Shelbie Moore DO under the service of Mely* with the [facility] who covers for your primary care physician (PCP), Neo Martinez MD, while you were hospitalized  If you have any questions or concerns related to this hospitalization, you may contact us at [phone]  For follow up as well as any medication refills, we recommend that you follow up with your primary care physician  A registered nurse will reach out to you by phone within a few days after your discharge to answer any additional questions that you may have after going home  However, at this time we provide for you here, the most important instructions / recommendations at discharge:     Notable Medication Adjustments -   Omeprazole 20 mg BID  Testing Required after Discharge -   Possible EGD   Important follow up information -   Follow up with PCP within 1 week  Follow up with Gastroenterologist for further evaluation of Hiatal Hernia and possible EGD    Please review this entire after visit summary as additional general instructions including medication list, appointments, activity, diet, any pertinent wound care, and other additional recommendations from your care team that may be provided for you        Sincerely,     Shelbie Moore DO

## 2022-09-04 NOTE — PROGRESS NOTES
Progress Note - Catrachito Bassett [de-identified] y o  female MRN: [id number]    Unit/Bed#: S -01 Encounter: [id number]        Subjective:   Patient reports she ate solid food for breakfast this morning including some fruit and an omelet, without any dysphagia or regurgitation, denies any nausea or vomiting  Denies any abdominal pain    Objective:     Vitals: Blood pressure 163/79, pulse 62, temperature (!) 97 3 °F (36 3 °C), resp  rate 18, height 5' 1" (1 549 m), weight 103 kg (228 lb), SpO2 93 %, not currently breastfeeding  ,Body mass index is 43 08 kg/m²  Intake/Output Summary (Last 24 hours) at 9/4/2022 1310  Last data filed at [address]  Gross per 24 hour   Intake 180 ml   Output 250 ml   Net -70 ml       Physical Exam:   General appearance: alert, appears stated age and cooperative  Lungs: clear to auscultation bilaterally, no labored breathing/accessory muscle use  Heart: regular rate and rhythm, S1, S2 normal, no murmur, click, rub or gallop  Abdomen: soft, non-tender; bowel sounds normal; no masses,  no organomegaly  Extremities: no edema    Invasive Devices  Report    Peripheral Intravenous Line  Duration           Peripheral IV 09/02/22 Left Antecubital 2 days                Lab, Imaging and other studies: I have personally reviewed pertinent reports      Admission on 09/02/2022   Component Date Value    POC Glucose 09/02/2022 206 (A)    Ventricular Rate 09/02/2022 60     Atrial Rate 09/02/2022 62     QRSD Interval 09/02/2022 182     QT Interval 09/02/2022 492     QTC Interval 09/02/2022 492     QRS Axis 09/02/2022 -89     T Wave Axis 09/02/2022 87     WBC 09/02/2022 6 93     RBC 09/02/2022 3 98     Hemoglobin 09/02/2022 11 8     Hematocrit 09/02/2022 38 1     MCV 09/02/2022 96     MCH 09/02/2022 29 6     MCHC 09/02/2022 31 0 (A)    RDW 09/02/2022 13 1     MPV 09/02/2022 10 1     Platelets 18/87/4230 166     nRBC 09/02/2022 0     Neutrophils Relative 09/02/2022 75     Immat GRANS % 09/02/2022 0     Lymphocytes Relative 09/02/2022 20     Monocytes Relative 09/02/2022 5     Eosinophils Relative 09/02/2022 0     Basophils Relative 09/02/2022 0     Neutrophils Absolute 09/02/2022 5 10     Immature Grans Absolute 09/02/2022 0 02     Lymphocytes Absolute 09/02/2022 1 39     Monocytes Absolute 09/02/2022 0 37     Eosinophils Absolute 09/02/2022 0 02     Basophils Absolute 09/02/2022 0 03     Sodium 09/02/2022 139     Potassium 09/02/2022 4 2     Chloride 09/02/2022 102     CO2 09/02/2022 25     ANION GAP 09/02/2022 12     BUN 09/02/2022 29 (A)    Creatinine 09/02/2022 1 14     Glucose 09/02/2022 189 (A)    Calcium 09/02/2022 9 5     AST 09/02/2022 16     ALT 09/02/2022 13     Alkaline Phosphatase 09/02/2022 82     Total Protein 09/02/2022 6 8     Albumin 09/02/2022 3 9     Total Bilirubin 09/02/2022 0 82     eGFR 09/02/2022 45     hs TnI 0hr 09/02/2022 7     Lipase 09/02/2022 <6 (A)    hs TnI 2hr 09/02/2022 11     Delta 2hr hsTnI 09/02/2022 4     hs TnI 4hr 09/02/2022 13     Delta 4hr hsTnI 09/02/2022 6     LA size 09/03/2022 4 5     Triscuspid Valve Regurgi* 09/03/2022 43 0     Tricuspid valve peak reg* 09/03/2022 3 43     LVPWd 09/03/2022 1 40     Left Atrium Area-systoli* 09/03/2022 18 7     Left Atrium Area-systoli* 09/03/2022 19 7     MV E' Tissue Velocity Se* 09/03/2022 6     RA 2D Volume 09/03/2022 28 0     TR Peak Steve 09/03/2022 3 4     IVSd 09/03/2022 2 45     LV DIASTOLIC VOLUME (MOD* 19/79/5095 84     LEFT VENTRICLE SYSTOLIC * 76/63/6758 22     Left ventricular stroke * 09/03/2022 62 00     A4C EF 09/03/2022 71     LA length (A2C) 09/03/2022 5 30     LVIDd 09/03/2022 4 30     IVS 09/03/2022 1 5     LVIDS 09/03/2022 2 50     FS 09/03/2022 42     Asc Ao 09/03/2022 3 8     Ao root 09/03/2022 3 20     RVID d 09/03/2022 3 7     MV valve area p 1/2 meth* 09/03/2022 4 31     E wave deceleration time 09/03/2022 175     MV Peak E Steve 09/03/2022 95     MV Peak A Steve 09/03/2022 0 88     RAA A4C 09/03/2022 13     MV stenosis pressure 1/2* 09/03/2022 51     LVSV, 2D 09/03/2022 62     LV EF 09/03/2022 65     Est  RA pres 09/03/2022 7 0     Right Ventricular Peak S* 09/03/2022 50 00     POC Glucose 09/02/2022 197 (A)    D-Dimer, Quant 09/02/2022 0 90 (A)    POC Glucose 09/02/2022 164 (A)    hs TnI 0hr 09/03/2022 15     POC Glucose 09/03/2022 154 (A)    POC Glucose 09/03/2022 163 (A)    POC Glucose 09/03/2022 223 (A)    POC Glucose 09/03/2022 203 (A)    Sodium 09/04/2022 139     Potassium 09/04/2022 4 3     Chloride 09/04/2022 104     CO2 09/04/2022 32     ANION GAP 09/04/2022 3 (A)    BUN 09/04/2022 28 (A)    Creatinine 09/04/2022 1 16     Glucose 09/04/2022 95     Calcium 09/04/2022 8 8     eGFR 09/04/2022 44     POC Glucose 09/04/2022 [address] Name 09/04/2022 [email or web address] [facility] Supplier Phone Number 09/04/2022 [phone]     Order Status 09/04/2022 Pending Delivery Ticket     Delivery Request Date 09/04/2022 09/04/2022     Item Description 09/04/2022 Dorothy Adams, Adult     Supplier Name 09/04/2022 [email or web address] [facility] [employer] Supp[employer] Phone Number 09/04/2022 [phone]     Order Status 09/04/2022 Confirmation In Progress     Delivery Request Date 09/04/2022 09/06/2022     Item Description 09/04/2022 [employer]   POC Glucose 09/04/2022 182 (A)             Assessment/Plan:    1  Nausea vomiting which appear resolved, CT scan showed debris filled esophagus and hiatal hernia; patient has demonstrated no evidence of esophageal impaction or obstruction at this time, having been tolerating liquids and solid foods yesterday and today without regurgitation or vomiting    Has not had EGD     - plan for outpatient EGD, can be done in the next several weeks    -continue PPI therapy in the meantime    -advised patient to chew her food thoroughly, take small bites, pace herself with eating, exercise particular caution with breads and meats; otherwise may have diet as tolerated for now    -also advised patient that she will need to get clearance from her cardiologist to hold Eliquis for 48 hours prior to her EGD

## 2022-09-04 NOTE — PLAN OF CARE
Problem: PHYSICAL THERAPY ADULT  Goal: Performs mobility at highest level of function for planned discharge setting  See evaluation for individualized goals  Description: Treatment/Interventions: Functional transfer training, LE strengthening/ROM, Therapeutic exercise, Cognitive reorientation, Bed mobility, Gait training, Equipment eval/education, Patient/family training, Spoke to nursing, Spoke to MD, Spoke to case management  Equipment Recommended: Carmella Ford       See flowsheet documentation for full assessment, interventions and recommendations  Outcome: Progressing  Note: Prognosis: Fair  Problem List: Decreased strength, Decreased endurance, Impaired balance, Decreased mobility, Decreased safety awareness, Impaired judgement, Impaired vision, Obesity, Decreased skin integrity  Assessment: Pt is a [de-identified] y o  female seen for PT evaluation s/p admit to [facility] on 9/2/2022  Pt was admitted with a primary dx of: dizziness, weakness, nasuea and vomiting, upper back pain on left side     PT now consulted for assessment of mobility and d/c needs  Pt with Up with assistance orders  Pts current comorbidities and personal factors effecting treatment include: BMI, DM II, CAD, COPD, CHF, HTN, HLD  Personal factors include use of AD at baseline, history of falls  Pts current clinical presentation is Unstable/Unpredictable (high complexity) due to Ongoing medical management for primary dx, Increased reliance on more restrictive AD compared to baseline, Decreased activity tolerance compared to baseline, Fall risk, Increased assistance needed from caregiver at current time  Prior to admission, pt was independent with use of SC  Upon evaluation, pt currently is requiring Supervision for transfers and Supervision for ambulation 50 ft w/ RW   Pt presents at PT eval functioning below baseline and currently w/ overall mobility deficits 2* to: BLE weakness, decreased ROM, impaired balance, decreased endurance, impaired coordination, gait deviations, decreased activity tolerance compared to baseline, decreased functional mobility tolerance compared to baseline, decreased safety awareness, impaired judgement, fall risk  Pt currently at a fall risk 2* to impairments listed above  Pt will continue to benefit from skilled acute PT interventions to address stated impairments; to maximize functional mobility; for ongoing pt/ family training; and DME needs  At conclusion of PT session all needs in reach, RN notified of session findings/recommendations and pt returned back in recliner chair with phone and call bell within reach  Pt denies any further questions at this time  Recommend home with HHPT upon hospital D/C  PT Discharge Recommendation: Home with home health rehabilitation    See flowsheet documentation for full assessment

## 2022-09-04 NOTE — DISCHARGE SUMMARY
[facility]  Discharge- Carlota Carranza 1940, [de-identified] y o  female MRN: [id number]  Unit/Bed#: S -[place]onel Encounter: [id number]  Primary Care Provider: Ashlyn Herr MD   Date and time admitted to hospital: 9/2/2022  8:25 AM    * Fall  Assessment & Plan  Mechanical fall on Saturday  Fell on to her backside  Did not strike her shoulder   PT consulted, input apprecicated    Mechanical fall 6 days prior to presenting  Denies LOC or Head strike  Kashif Brandt on to her backside reporting sustained left shoulder pain  - PT Consulted  - XR Shoulder - unremarkable  - XR Scapula - unremarkable  - Patient reports pain resolved upon and no longer present on day of discharge      Chronic obstructive pulmonary disease (HCC)  Assessment & Plan  Albuterol p r n  Chronic diastolic congestive heart failure [facility])  Assessment & Plan  Patient has a history of diastolic heart failure    Echo was obtained during this admission showing 60% EF with RV pressure of 50  On home regimen of Lasix 20 mg as needed and spironolactone 25    Type 2 diabetes mellitus with hyperglycemia, with long-term current use of insulin [facility])  Assessment & Plan  Lab Results   Component Value Date    HGBA1C 6 6 (A) 06/29/2022       Recent Labs     09/03/22  1525 09/03/22  2108 09/04/22  0724 09/04/22  1105   POCGLU 223* 203* 80 182*       Blood Sugar Average: Last 72 hrs:  (P) 600 [id number]  Discharging Resident Physician: Delgado Jackson DO  Attending: No att  providers found  PCP: Ashlyn Herr MD  Admission Date: 9/2/2022  Discharge Date: 09/04/22    Disposition:     Home    Reason for Admission: Dizziness, Weakness, Nausea and Vomiting     Consultations During Hospital Stay:  · Gastroenterology Consulted - stated nausea and vomiting patient experienced is likely food induced gastroenteritis as symptoms resolved, reccommended PPI therapy, and outpatient follow with GI for further evaluation of Hiatal Hernia and possible EGD given findings on CT Scan  · Physical Therapy Consulted - recommenced home discharge with HHPT     Procedures Performed:   · N/A    Significant Findings / Test Results:   · US Abdomen: Cholelithiasis without acute cholecystitis  · XR Scapula Left: No acute osseous abnormality, Degenerative changes  · XR Shoulder Left: No acute osseous abnormality, Degenerative changes  · CT Chest Abdomen Pelvis with Contrast: Moderate hiatal hernia with mottled debris diffusely in the esophagus which may be related to reflux or esophageal dysmotility  Cholelithiasis, Colonic diverticulosis  · CT Head Brain without Contrast: No acute intracranial abnormality  · ECHO: LVEF 65%, mildly dilated left atrium, ascending aorta is mildly dilated     Outpatient Tests Requested:  · Possible EGD considering CT findings, follow up with GI    Complications:  Elevated blood pressure on arrival that improved on patients home hypertensive medication regimen consisitng of Lisinopril 20 mg BID, Metoprolol 25 mg Q12, and Lasix 20 mg prn for leg swelling  Hospital Course:     Ana Tracey is a [de-identified] y o  female patient who originally presented to the hospital on 9/2/2022 due to nausea, vomiting, and abdominal pain  Symptoms began acutely on the day of presentation  Patient also complained of dizziness  CTA Head/Neck in the ED did not reveal any acute intracranial pathology  Dizziniess resolved during hospital stay  Elevated D-Dimer at admission, no presence of SOB or tachycardia, therefore low suspicion of PE  On POA patient was found to hypertensive with a blood pressure of 194/83  CT Chest, abdomen, and pelvis was performed revealing Hiatal Hernia, Cholelithiasis, and diverticulosis  GI was consulted, reported that symptoms likely due to food induced gastroenteritis  Reccommended outpatient f/u to evaluate CT findings and possible EGD, as well as PPI therapy in the interim       Patient reported fall that occurred 6 days prior to presenting in the ED  Reports since fall she has been experiencing left shoulder pain, initially 10/10 but not since resolved  Patient reported fall was mechanical and fell on her backside  Pt denied LOC, head strike, or additional injuries sustained from fall  XR of shoulder and scapula were obtained which were unremarkable  Patient was evaluated by PT on day of discharge, recommened dc home with [facility]  Condition at Discharge: good     Discharge Day Visit / Exam:     Subjective:  Patient seen and examined at bedside  No acute events over night  Reports that all symptoms are resolved  Pt denies nausea and vomiting and reports she is tolerating diet  Pt states shoulder pain has improved and is no longer present  Review of Systems   Constitutional: Negative for activity change, chills and fever  HENT: Negative for ear pain and sore throat  Eyes: Negative for pain and visual disturbance  Respiratory: Negative for cough and shortness of breath  Cardiovascular: Negative for chest pain and palpitations  Gastrointestinal: Negative for abdominal pain and vomiting  Genitourinary: Negative for dysuria and hematuria  Musculoskeletal: Negative for arthralgias, back pain and neck pain  Skin: Negative for color change and rash  Neurological: Negative for dizziness, seizures and syncope  All other systems reviewed and are negative  Vitals: Blood Pressure: 163/79 (09/04/22 0721)  Pulse: 62 (09/04/22 0721)  Temperature: (!) 97 3 °F (36 3 °C) (09/04/22 0721)  Temp Source: Oral (09/02/22 2158)  Respirations: 18 (09/04/22 0721)  Height: 5' 1" (154 9 cm) (09/03/22 0805)  Weight - Scale: 103 kg (228 lb) (09/03/22 0805)  SpO2: 93 % (09/04/22 0721)  Exam:   Physical Exam  Vitals reviewed  Constitutional:       Appearance: She is obese  HENT:      Head: Normocephalic  Eyes:      General: No scleral icterus  Cardiovascular:      Rate and Rhythm: Normal rate and regular rhythm  Heart sounds: Normal heart sounds  Pulmonary:      Effort: Pulmonary effort is normal       Breath sounds: Normal breath sounds  Abdominal:      General: Bowel sounds are normal       Palpations: Abdomen is soft  Musculoskeletal:         General: Normal range of motion  Cervical back: Normal range of motion and neck supple  Right lower leg: No edema  Left lower leg: No edema  Skin:     General: Skin is warm and dry  Capillary Refill: Capillary refill takes less than 2 seconds  Neurological:      Mental Status: She is alert and oriented to person, place, and time  Mental status is at baseline  Psychiatric:         Mood and Affect: Mood normal           Discussion with Family:  at bedside on day of discharge    Discharge instructions/Information to patient and family:   See after visit summary for information provided to patient and family  Provisions for Follow-Up Care:  See after visit summary for information related to follow-up care and any pertinent home health orders  Planned Readmission: N/A     Discharge Medications:  See after visit summary for reconciled discharge medications provided to patient and family        ** Please Note: This note has been constructed using a voice recognition system **

## 2022-09-04 NOTE — PHYSICAL THERAPY NOTE
Physical Therapy Evaluation    Patient's Name: Cristin Valles    Admitting Diagnosis  Dizziness [R42]  Weakness [R53 1]  Nausea and vomiting [R11 2]  Upper back pain on left side [M54 9]    Problem List  Patient Active Problem List   Diagnosis    Type 2 diabetes mellitus with hyperglycemia, with long-term current use of insulin ([facility])    Class 2 severe obesity with serious comorbidity in adult [facility])    Complete heart block (HCC)    Anemia    S/P cardiac pacemaker procedure    Hypothyroidism    Chronic diastolic congestive heart failure ([facility])    Chronic venous insufficiency    Benign hypertension with chronic kidney disease, stage III ([facility])    Stage 3a chronic kidney disease ([facility])    Iron deficiency anemia    Microalbuminuria    Bilateral leg edema    Vitamin D deficiency    Anxiety disorder    Urinary bladder incontinence    Chronic obstructive pulmonary disease ([facility])    Eczematous dermatitis    Mixed hyperlipidemia    OAB (overactive bladder)    Presence of permanent cardiac pacemaker    PAF (paroxysmal atrial fibrillation) ([facility])    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 neurological manifestations, uncontrolled([address])    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 ([address] )    Intraocular pressure increase    Sleep apnea    Dermatitis    Coronary artery disease involving native coronary artery of native heart    Memory difficulties    Imbalance    Secondary hyperparathyroidism of renal origin ([address] )    Shoulder pain    Dizziness    Fall    On supplemental oxygen by nasal cannula    Hypertension       Past Medical History  Past Medical History:   Diagnosis Date    Anxiety     Arthritis     CHF (congestive heart failure) ([facility])     Chronic venous insufficiency     COPD (chronic obstructive pulmonary disease) ([address] )     Coronary artery disease     Diabetes mellitus ([address] )     type II    Disease of thyroid gland     hypothyroidism    DVT (deep venous thrombosis) (HCC)     HL (hearing loss)     Hyperlipidemia     Hypertension     Irregular heart beat     Paroxysmal a-fib    Pacemaker     Renal disorder     stage 3    Sleep apnea     Sleep difficulties     Thyroid disease     Urinary incontinence     Uterine leiomyoma        Past Surgical History  Past Surgical History:   Procedure Laterality Date    BREAST SURGERY      For Biopsy    CARDIAC PACEMAKER PLACEMENT      Permanent  Last assessed: 2/19/16    CARDIAC SURGERY      CARPAL TUNNEL RELEASE Bilateral     CATARACT EXTRACTION      CATARACT EXTRACTION W/ INTRAOCULAR LENS  IMPLANT, BILATERAL      CYSTOSCOPY      Diagnostic  Last assessed: 11/8/17    ECTOPIC PREGNANCY SURGERY      HYSTERECTOMY      Total Abdomonal  Resolved: 1982    JOINT REPLACEMENT Right     TKR    OOPHORECTOMY      [place] ENDOVENOUS LASER, 1ST VEIN Left 2/17/2017    Procedure: GREATER SAPHENOUS VEIN ENDOVASCULAR LASER THERAPY ;  Surgeon: Kit Gray DO;  [facility];  Service: Vascular    TONSILLECTOMY          09/04/22 0955   PT Last Visit   PT Visit Date 09/04/22   Note Type   Note type Evaluation   Pain Assessment   Pain Assessment Tool 0-10   Pain Score No Pain   Restrictions/Precautions   Weight Bearing Precautions Per Order No   Other Precautions Chair Alarm; Bed Alarm; Fall Risk;Visual impairment  (josé miguel)   Home Living   Type of Home Apartment   Home Layout Able to live on main level with bedroom/bathroom   Bathroom Shower/Tub Tub/shower unit   Bathroom Toilet Standard   Bathroom Equipment Grab bars in shower; Shower chair;Grab bars around toilet   [facility]   Prior Function   Level of [place] Independent with ADLs and functional mobility   Lives With [place] [facility] From Family  (dtr lives and works near by)   Falls in the last 6 months (S)  1 to 4  ("just one that i remember" per chart review pt admits to 1-2 prior to this admission)   Vocational Retired   Comments pt  drives   General   Family/Caregiver Present No   Cognition   Overall Cognitive Status WFL   Arousal/Participation Cooperative   Orientation Level Oriented to person;Oriented to place;Oriented to time;Oriented to situation   Following Commands Follows one step commands with increased time or repetition   Comments pt ID by wrist band, name and    Subjective   Subjective pt standing, pericare being performed by RODGER harper and PCA as PT enters room, pt agreeable to skilled PT eval   RLE Assessment   RLE Assessment WFL  (4-/5 HF, Abd, KF/KE, 3+/5 DF/PF)   LLE Assessment   LLE Assessment WFL  (4-/5 HF, Abd, KF/KE, 3+/5 DF/PF)   Vision-Basic Assessment   Current Vision Wears glasses all the time   Five Times Sit To Stand   Time (Seconds) 70 Seconds   Bed Mobility   Additional Comments unable to assess, pt up and standing for pericare when PT enters room, in bed side recliner at end of session   Transfers   Sit to Stand 5  Supervision   Additional items Assist x 1; Increased time required   Stand to Sit 5  Supervision   Additional items Verbal cues; Assist x 1  (cues for controlled lowering)   Ambulation/Elevation   Gait pattern Narrow MARKOS; Short stride; Excessively slow   Gait Assistance 5  Supervision   Additional items Assist x 1   Assistive Device Rolling walker   Distance 50' x2  (pt declines further 2/2 fatigue)   Stair Management Assistance Not tested   Balance   Static Sitting Fair +   Dynamic Sitting Fair +   Static Standing Fair   Ambulatory Fair  (RW)   Endurance Deficit   Endurance Deficit Yes   Endurance Deficit Description reported fatigue, limits ambulatory distance   Activity Tolerance   Activity Tolerance Patient limited by fatigue   Jesica Kirkland, BARBARA [facility]   Nurse Made Aware RODGER Green pre/post   Assessment   Prognosis Fair   Problem List Decreased strength;Decreased endurance; Impaired balance;Decreased mobility; Decreased safety awareness; Impaired judgement; Impaired vision;Obesity; Decreased skin integrity   Assessment Pt is a [de-identified] y o  female seen for PT evaluation s/p admit to [facility] on 9/2/2022  Pt was admitted with a primary dx of: dizziness, weakness, nasuea and vomiting, upper back pain on left side     PT now consulted for assessment of mobility and d/c needs  Pt with Up with assistance orders  Pts current comorbidities and personal factors effecting treatment include: BMI, DM II, CAD, COPD, CHF, HTN, HLD  Personal factors include use of AD at baseline, history of falls  Pts current clinical presentation is Unstable/Unpredictable (high complexity) due to Ongoing medical management for primary dx, Increased reliance on more restrictive AD compared to baseline, Decreased activity tolerance compared to baseline, Fall risk, Increased assistance needed from caregiver at current time  Prior to admission, pt was independent with use of SC  Upon evaluation, pt currently is requiring Supervision for transfers and Supervision for ambulation 50 ft w/ RW  Pt presents at PT eval functioning below baseline and currently w/ overall mobility deficits 2* to: BLE weakness, decreased ROM, impaired balance, decreased endurance, impaired coordination, gait deviations, decreased activity tolerance compared to baseline, decreased functional mobility tolerance compared to baseline, decreased safety awareness, impaired judgement, fall risk  Pt currently at a fall risk 2* to impairments listed above  Pt will continue to benefit from skilled acute PT interventions to address stated impairments; to maximize functional mobility; for ongoing pt/ family training; and DME needs  At conclusion of PT session all needs in reach, RN notified of session findings/recommendations and pt returned back in recliner chair with phone and call bell within reach  Pt denies any further questions at this time   Recommend home with HHPT upon hospital D/C  Goals   Patient Goals to go home   STG Expiration Date 09/14/22   Short Term Goal #1 In 10 days pt will be able to: 1  Demonstrate ability to perform all aspects of bed mobility with LRAD independently to improve functional safety  2  Perform functional transfers with LRAD independently to facilitate safe return to previous living environment  3   Ambulate 150 ft with LRAD independently with stable vitals to improve safety with household distances and reduce fall risk  4  Improve LE strength grades by 1 to increase ease of functional mobility with transfers and gait  5  Pt will demonstrate improved balance by one grade in order to decrease risk of falls  6  Improve barthel index to score of tallest 70 for improved functional independence and decreased care giver burden  PT Treatment Day 0   Plan   Treatment/Interventions Functional transfer training;LE strengthening/ROM; Therapeutic exercise;Cognitive reorientation; Bed mobility;Gait training;Equipment eval/education;Patient/family training;Spoke to nursing;Spoke to MD;Spoke to case management   PT Frequency 3-5x/wk   Recommendation   PT Discharge Recommendation Home with home health rehabilitation   Equipment Recommended [place] walker   Change/add to [employer]?  No   AM-PAC Basic Mobility Inpatient   Turning in Bed Without Bedrails 2   Lying on Back to Sitting on Edge of Flat Bed 2   Moving Bed to Chair 3   Standing Up From Chair 3   Walk in Room 3   Climb 3-5 Stairs 2   Basic Mobility Inpatient Raw Score 15   Basic Mobility Standardized Score 36 97   Highest Level Of Mobility   -[facility] Goal 4: Move to chair/commode   -HL Achieved 7: Walk 25 feet or more   Barthel Index   Feeding 10   Bathing 0   Grooming Score 0   Dressing Score 5   Bladder Score 5   Bowels Score 5   Toilet Use Score 5   Transfers (Bed/Chair) Score 10   Mobility (Level Surface) Score 0   Stairs Score 5   Barthel Index Score 45   End of Consult   Patient Position at End of Consult All needs within reach; Bedside chair  (PCA in room)   The patient's AM-PAC Basic Mobility Inpatient Short Form Raw Score is 15  A Raw score of less than or equal to 16 suggests the patient may benefit from discharge to post-acute rehabilitation services  HOWEVER, Please also refer to the recommendation of the Physical Therapist for safe discharge planning              Yahir Street, PT

## 2022-09-04 NOTE — ASSESSMENT & PLAN NOTE
Lab Results   Component Value Date    HGBA1C 6 6 (A) 06/29/2022       Recent Labs     09/03/22  1525 09/03/22  2108 09/04/22  0724 09/04/22  1105   POCGLU 223* 203* 80 182*       Blood Sugar Average: Last 72 hrs:  (P) 502 [id number]

## 2022-09-04 NOTE — CASE MANAGEMENT
Case Management Discharge Planning Note    Patient name Carlota Carranza  Location S /S -01 MRN [id number]  : 1940 Date 2022       Current Admission Date: 2022  Current Admission Diagnosis:Fall   Patient Active Problem List    Diagnosis Date Noted    Hypertension 2022    Shoulder pain 2022    Dizziness 2022    Fall 2022    On supplemental oxygen by nasal cannula 2022    Secondary hyperparathyroidism of renal origin ([facility] ) 2022    Memory difficulties 2022    Imbalance 2022    Coronary artery disease involving native coronary artery of native heart 2021    Dermatitis 09/15/2020    Sleep apnea 2019    Intraocular pressure increase 2018    Vitamin D deficiency 2018    Bilateral leg edema 2018    Iron deficiency anemia 10/12/2017    Urinary bladder incontinence 2017    OAB (overactive bladder) 2017    Benign hypertension with chronic kidney disease, stage III ([facility] ) 2017    Microalbuminuria 2017    PAF (paroxysmal atrial fibrillation) ([facility] ) 10/31/2016    Chronic venous insufficiency 2016    Presence of permanent cardiac pacemaker 2016    Chronic diastolic congestive heart failure ([facility] ) 2016    Anemia 2016    S/P cardiac pacemaker procedure 2016    Hypothyroidism 2016    Complete heart block ([facility] ) 2016    Type 2 diabetes mellitus with hyperglycemia, with long-term current use of insulin ([facility] ) 01/15/2016    Class 2 severe obesity with serious comorbidity in adult ([facility] ) 01/15/2016    Stage 3a chronic kidney disease ([facility] ) 10/21/2015    Eczematous dermatitis 2015    Anxiety disorder 2013    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 neurological manifestations, uncontrolled([address]) 10/02/2012    Chronic obstructive pulmonary disease ([address] ) 2012    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 ([facility] ) 09/22/2011    Mixed hyperlipidemia 03/14/2011      LOS (days): 1  Geometric Mean LOS (GMLOS) (days):   Days to GMLOS:     OBJECTIVE:  Risk of Unplanned Readmission Score: 16 21      Current admission status: Inpatient   Preferred Pharmacy:   [facility], [address] [facility]  [place] [place] [address]  Phone: [phone] Fax: [phone]    [address] [address] [facility] (Now [address] [facility]) - [place] [address]  [address] [facility] [address]  Phone: [phone] Fax: [phone]    [address], [facility]  [address] [place] [address]  Phone: [phone] Fax: [phone]    [id number] [place], [address] [place] [address]  [address] [facility],  [address]  [address] [facility] [address]  Phone: [phone] Fax: [address], [place] - [address]  [address] [facility]  [address]  [facility]  Phone: [phone] Fax: [phone]    Primary Care Provider: Daniele Braun MD    Primary Insurance: MEDICARE  Secondary Insurance: AARP    DISCHARGE DETAILS:    Discharge planning discussed with[de-identified] patient, spouse and daughter  Freedom of Choice: Yes     CM contacted family/caregiver?: Yes  Were Treatment Team discharge recommendations reviewed with patient/caregiver?: Yes  Did patient/caregiver verbalize understanding of patient care needs?: Yes  Were patient/caregiver advised of the risks associated with not following Treatment Team discharge recommendations?: Yes    Contacts  Patient Contacts: Spouse and daughter  Relationship to Patient[de-identified] Family  Contact Method:  In Person  Reason/Outcome: Continuity of Care, Emergency Contact, Discharge [address]         Is the patient interested in Fac[place]ena [place] at discharge?: Yes  [address] requested[de-identified] Occupational Therapy, Physical [address] Name[de-identified] Other  [address] Provider[de-identified] PCP  Home Health Services Needed[de-identified] Gait/ADL Training, Evaluate Functional Status and Safety, Strengthening/Theraputic Exercises to Improve Function  Homebound Criteria Met[de-identified] Requires Medical Transportation, Requires the Assistance of Another Person for Safe Ambulation or to Leave the Home  Supporting Clincal Findings[de-identified] Limited Endurance    DME Referral Provided  Referral made for DME?: Yes  DME referral completed for the following items[de-identified] Vu Barron  DME Supplier Name[de-identified] [employer]    Other Referral/Resources/Interventions Provided:  Interventions: HHC, DME  Referral Comments: CM sent referral to [address] for RW-approved and delivered, [place] referrals sent for VNA Home PT/OT-Family accepted [facility]    Treatment Team Recommendation: [address]  Discharge Destination Plan[de-identified] Home with [place] at Discharge : Family     ETA of Transport (Date): 09/04/22     CM spoke with patient and family at the bedside  CM reviewed with patient and family per PT, the recommendation at discharge is Home PT/OT  CM discussed [place] of Choice  CM provided list of available VNA agencies to daughter at bedside to review  Patient and family requesting [facility] at discharge for Home PT/OT  Venita Slim date Wednesday 9/7  CM delivered RW to the bedside  CM sent referral for RW to [employer] via parachute  Patient's RW approved for delivery to bedside  CM provided instruction how to adjust RW and open/close  All questions/concerns answered at this time  CM faxed AVS and F2F to [facility] at [phone]

## 2022-09-04 NOTE — ASSESSMENT & PLAN NOTE
Patient has a history of diastolic heart failure    Echo was obtained during this admission showing 60% EF with RV pressure of 50  On home regimen of Lasix 20 mg as needed and spironolactone 25

## 2022-09-05 ENCOUNTER — TELEPHONE (OUTPATIENT)
Dept: OTHER | Facility: OTHER | Age: 82
End: 2022-09-05

## 2022-09-05 NOTE — TELEPHONE ENCOUNTER
Pt called in stating she was admitted to the hospital for 4 days and had to stop taking her spironolactone (ALDACTONE) 25 mg tablet [[id number]]  She was also put on a diet and she would like a call back so she'll know how to start the meds and diet again

## 2022-09-06 ENCOUNTER — TELEPHONE (OUTPATIENT)
Dept: NEPHROLOGY | Facility: CLINIC | Age: 82
End: 2022-09-06

## 2022-09-06 ENCOUNTER — TELEPHONE (OUTPATIENT)
Dept: FAMILY MEDICINE CLINIC | Facility: CLINIC | Age: 82
End: 2022-09-06

## 2022-09-06 ENCOUNTER — TRANSITIONAL CARE MANAGEMENT (OUTPATIENT)
Dept: FAMILY MEDICINE CLINIC | Facility: CLINIC | Age: 82
End: 2022-09-06

## 2022-09-06 DIAGNOSIS — I10 ESSENTIAL HYPERTENSION: ICD-10-CM

## 2022-09-06 DIAGNOSIS — I12.9 BENIGN HYPERTENSION WITH CHRONIC KIDNEY DISEASE, STAGE III (HCC): ICD-10-CM

## 2022-09-06 DIAGNOSIS — N18.30 BENIGN HYPERTENSION WITH CHRONIC KIDNEY DISEASE, STAGE III (HCC): ICD-10-CM

## 2022-09-06 LAB
DME PARACHUTE DELIVERY DATE ACTUAL: NORMAL
DME PARACHUTE DELIVERY DATE REQUESTED: NORMAL
DME PARACHUTE ITEM DESCRIPTION: NORMAL
DME PARACHUTE ORDER STATUS: NORMAL
DME PARACHUTE SUPPLIER NAME: NORMAL
DME PARACHUTE SUPPLIER PHONE: NORMAL

## 2022-09-06 RX ORDER — LISINOPRIL 20 MG/1
20 TABLET ORAL 2 TIMES DAILY
Qty: 180 TABLET | Refills: 3 | Status: SHIPPED | OUTPATIENT
Start: 2022-09-06

## 2022-09-06 NOTE — TELEPHONE ENCOUNTER
Patient discharged from [facility] 09 03 22     LUZ GREEN scheduled for 09/13     Please call for LUZ GREEN

## 2022-09-06 NOTE — TELEPHONE ENCOUNTER
Spoke to patient regarding her concern for taking Aldactone  Advised that she should continue Aldactone daily as discussed during office visit  All her other questions were answered regarding concern for diet

## 2022-09-06 NOTE — TELEPHONE ENCOUNTER
Patient was admitted on the hospital and discharged on 9/4, she would like Santana Corado to review her chart and advise on any changes that may have to be made on medications or diet  Please call back at [phone] to discuss

## 2022-09-07 ENCOUNTER — RA CDI HCC (OUTPATIENT)
Dept: OTHER | Facility: HOSPITAL | Age: 82
End: 2022-09-07

## 2022-09-07 NOTE — PROGRESS NOTES
[address]  coding opportunities        I13 0 and E11 59  Chart Reviewed number of suggestions sent to Provider: 2     Patients Insurance     Medicare Insurance: Medicare

## 2022-09-08 NOTE — PHYSICIAN ADVISOR
Current patient class: Inpatient  The patient is currently on Hospital Day: 3 at [facility]      The patient was admitted to the hospital at  3:04 PM on 9/3/22 for the following diagnosis:  Dizziness [R42]  Weakness [R53 1]  Nausea and vomiting [R11 2]  Upper back pain on left side [M54 9]       There was documentation in the medical record of an expected length of stay of at least 2 midnights  The patient was therefore expected to satisfy the 2 midnight benchmark and given the 2 midnight presumption was appropriate for INPATIENT ADMISSION  Given this expectation of a satisfying stay, CMS instructs us that the patient is most often appropriate for inpatient admission under part A provided medical necessity is documented in the chart  After review of the relevant documentation, labs, vital signs and test results, the patient is appropriate for INPATIENT ADMISSION  Admission to the hospital as an inpatient is a complex decision making process which requires the practitioner to consider the patients presenting complaint, history and physical examination and all relevant testing  With this in mind, in this case, the patient was deemed appropriate for INPATIENT ADMISSION  After review of the documentation and testing available at the time of the admission, I concur with this clinical determination of medical necessity  For the reason noted, the patient was discharged before reaching 2 midnights as an inpatient  Rationale is as follows: The patient is a [de-identified] yrs old Female who presented to the ED at 9/2/2022  8:25 AM with a chief complaint of Dizziness (Pt had recent change in BP meds, started feeling N/V/dizzy with HTN last night  4mg zofran PTA)   The patient was admitted for above reasons  On day 2 admission the patient was still being monitored as far as the blood pressure goes with medication adjustments to be considered    Patient also had the nausea and vomiting likely due to food induced gastroenteritis for which they can consider further outpatient evaluation  Patient also has a history of a fall and was awaiting skilled inpatient PT OT evaluations  Given the above the patient did cross the 2 midnight benchmark as set by Medicare is inpatient status appropriate  The patients vitals on arrival were   ED Triage Vitals   Temperature Pulse Respirations Blood Pressure SpO2   09/02/22 0830 09/02/22 0830 09/02/22 0830 09/02/22 0834 09/02/22 0830   98 2 °F (36 8 °C) 74 18 (!) 194/83 95 %      Temp Source Heart Rate Source Patient Position - Orthostatic VS BP Location FiO2 (%)   09/02/22 0830 09/02/22 0830 09/02/22 1201 09/02/22 0834 --   Oral Monitor Sitting Right arm       Pain Score       09/02/22 0830       9           Past Medical History:   Diagnosis Date    Anxiety     Arthritis     [facility]     Chronic venous insufficiency     COPD [facility]chronic obstructive pulmonary disease) [facility]     Coronary artery disease     Diabetes mellitus ([address] )     type II    Disease of thyroid gland     hypothyroidism    DVT [facility]deep venous thrombosis) [facility]     HL (hearing loss)     Hyperlipidemia     Hypertension     Irregular heart beat     Paroxysmal a-fib    Pacemaker     Renal disorder     stage 3    Sleep apnea     Sleep difficulties     Thyroid disease     Urinary incontinence     Uterine leiomyoma      Past Surgical History:   Procedure Laterality Date    BREAST SURGERY      For Biopsy    CARDIAC PACEMAKER PLACEMENT      Permanent  Last assessed: 2/19/16    CARDIAC SURGERY      CARPAL TUNNEL RELEASE Bilateral     CATARACT EXTRACTION      CATARACT EXTRACTION W/ INTRAOCULAR LENS  IMPLANT, BILATERAL      CYSTOSCOPY      Diagnostic   Last assessed: 11/8/17    ECTOPIC PREGNANCY SURGERY      HYSTERECTOMY      Total Abdomonal  Resolved: 1982    JOINT REPLACEMENT Right     TKR    OOPHORECTOMY      [place] ENDOVENOUS LASER, 1ST VEIN Left 2/17/2017 Procedure: GREATER SAPHENOUS VEIN ENDOVASCULAR LASER THERAPY ;  Surgeon: Yojana Dahl DO;  [facility];  Service: Vascular    TONSILLECTOMY             Consults have been placed to:   IP CONSULT TO GASTROENTEROLOGY    Vitals:    09/03/22 2229 09/04/22 0100 09/04/22 0431 09/04/22 0721   BP: 136/68   163/79   BP Location:       Pulse: 60  (!) 50 62   Resp: 16   18   Temp: 98 °F (36 7 °C)   (!) 97 3 °F (36 3 °C)   TempSrc:       SpO2: 91% 92%  93%   Weight:       Height:           Most recent labs:    No results for input(s): WBC, HGB, HCT, PLT, K, NA, CALCIUM, BUN, CREATININE, LIPASE, AMYLASE, INR, TROPONINI, CKTOTAL, AST, ALT, ALKPHOS, BILITOT in the last 72 hours  Scheduled Meds:  Continuous Infusions:No current facility-administered medications for this encounter  PRN Meds:      Surgical procedures (if appropriate):

## 2022-09-13 ENCOUNTER — OFFICE VISIT (OUTPATIENT)
Dept: FAMILY MEDICINE CLINIC | Facility: CLINIC | Age: 82
End: 2022-09-13
Payer: MEDICARE

## 2022-09-13 VITALS
SYSTOLIC BLOOD PRESSURE: 128 MMHG | TEMPERATURE: 97.9 F | HEART RATE: 68 BPM | WEIGHT: 223 LBS | DIASTOLIC BLOOD PRESSURE: 80 MMHG | BODY MASS INDEX: 42.1 KG/M2 | HEIGHT: 61 IN

## 2022-09-13 DIAGNOSIS — E11.65 TYPE 2 DIABETES MELLITUS WITH HYPERGLYCEMIA, WITH LONG-TERM CURRENT USE OF INSULIN (HCC): ICD-10-CM

## 2022-09-13 DIAGNOSIS — Z76.89 ENCOUNTER FOR SUPPORT AND COORDINATION OF TRANSITION OF CARE: Primary | ICD-10-CM

## 2022-09-13 DIAGNOSIS — K52.9 ACUTE GASTROENTERITIS: ICD-10-CM

## 2022-09-13 DIAGNOSIS — I10 ESSENTIAL HYPERTENSION: ICD-10-CM

## 2022-09-13 DIAGNOSIS — M25.512 ACUTE PAIN OF LEFT SHOULDER: ICD-10-CM

## 2022-09-13 DIAGNOSIS — Z79.4 TYPE 2 DIABETES MELLITUS WITH HYPERGLYCEMIA, WITH LONG-TERM CURRENT USE OF INSULIN (HCC): ICD-10-CM

## 2022-09-13 PROCEDURE — 1111F DSCHRG MED/CURRENT MED MERGE: CPT | Performed by: FAMILY MEDICINE

## 2022-09-13 PROCEDURE — 99495 TRANSJ CARE MGMT MOD F2F 14D: CPT | Performed by: FAMILY MEDICINE

## 2022-09-13 NOTE — PROGRESS NOTES
Assessment & Plan     1  Encounter for support and coordination of transition of care    2  Acute gastroenteritis    3  Essential hypertension  -     CBC (Includes Diff/Plt) (Refl); Future  -     Comprehensive metabolic panel; Future    4  Type 2 diabetes mellitus with hyperglycemia, with long-term current use of insulin (HCC)    5  Acute pain of left shoulder  Discussion:  I reviewed with patient and    -regards to abdominal complaints, agree with GI  Appears to have been acute food related gastroenteritis, now resolved  Patient appears to be euvolemic and blood pressure appears to be stable  Will monitor for now  Because of CT findings, patient should continue omeprazole for 1 month and follow-up with Gastroenterology as advised  -regards to hypertension, blood pressure was elevated in the hospital but is now back to baseline  She continues to follow-up with Nephrology who has or on a low-potassium diet  Continue to monitor  Recheck 2 months  -regards to diabetes type 2, fingerstick blood sugars were labile while in the hospital, though A1c was 6 6 back in June  Continue to monitor  Follow-up with endocrinology  Recheck 2-3 months  -in regards to left shoulder discomfort, appears to be improving  Discussed physical therapy  Monitor for now  Recheck if symptoms should worsen  Patient will follow-up as above  Patient call for problems or concerns in the interim     Subjective     Transitional Care Management Review:   Gustabo Bauer is a [de-identified] y o  female here for TCM follow up       During the TCM phone call patient stated:  TCM Call     Date and time call was made  9/6/2022  3:51 PM    Hospital care reviewed  Discussed with Inpatient Physician    Patient was hospitialized at  [address] [place] [address]    Date of Admission  09/02/22    Date of discharge  09/04/22    Diagnosis  Fall    Disposition  Home    Were the patients medications reviewed and updated  Yes    Current Symptoms  Headache Headache pain severity  Moderate    Headache pain onset  Gradual    Headache location / laterality  --  back of head    Headache Radition / Laterality  Not radiating    Parietal Area  Bilateral    Episode pattern  Intermittent    Headache pain level  5    Headache cause / trigger  No known event    Cause certainty  Probably      TCM Call     Modifying factors-feels better  Acetaminophen    Modifying factors-feels worse  Movement    Clinical progress  Unchanged    Headache risk factors  --  none    Headache-pertinent history  --  none    Headache- Family history  --  unknown    Post hospital issues  None    Should patient be enrolled in anticoag monitoring? No    Scheduled for follow up? Yes    Patients specialists  Other (comment)    Other specialists names  Gastroenterology    Referrals needed  none    Did you obtain your prescribed medications  Yes    Do you need help managing your prescriptions or medications  No    Is transportation to your appointment needed  No    I have advised the patient to call PCP with any new or worsening symptoms  Marc Malone, [address] or [facility] other    Support System  None    Are you recieving any outpatient services  No    Are you recieving home care services  Yes    Types of home care services  Home PT    Are you using any community resources  No    Current waiver services  No    Have you fallen in the last 12 months  Yes    How many times  1    Interperter language line needed  No    Counseling  Patient    Counseling topics  Activities of daily living        Pt here for TCM visit  - pt developed acute nausea and vomiting after eating dinner on 9/1  Pt continued to vomit throughout the night  By the next morning, pt felt weak, so 911 was called and pt was taken to [facility] ED  Pt did not have diarrhea  In the ED, pt had unremarkable CT of the head (due to lightheadedness) which revealed sinus disease but no other issues   CT of the chest, abd and pelvis revealed a moderately sized hiatal hernia, with signs of reflux or esophageal dysmotility, cholelithiasis and colonic diverticulosis without diverticulitis  Initial labs were unremarkable except fort elevated BG and sl elevated d-dimer  Pt denies CP, SOB or worsening leg swelling and CT of chest with contrast was unremarkable    Pt was admitted and started on IV fluids  GI was consulted who believed that pt experienced food induced gastroenteritis  Repeat CT and possible EGD recommended as an out patient  PPI therapy was initiated  Pt slowly improved  Pt did complain of L shoulder pain since suffernig a fall 6d prior to admission (no preceding CP, palpitations, or lightheadedness, and no LOC/head trauma)  XR of shoulder and scapula was unremarkable  PT consulted who recommended out patient f/u re: shoulder pain  By 9/4/22, pt was felt to be stable  Pt here for TCM visit  - since discharge, pt has been on a low K+ diet per nephro  She has been eating a lot of fruits and vegetables and has been having multiple soft BMs a day  Pt denies any abd pains  She gets occ posterior HA  CT in the hospital was unremarkable  She denies any other new concerns  Elevations in BP experienced in the hospital has resolved  - I reviewed available hospital records, lab reports and study results with pt and family           Review of Systems   Constitutional: Negative  Negative for activity change, appetite change, chills, diaphoresis, fatigue and fever  HENT: Negative  Eyes: Positive for visual disturbance (chronic)  Negative for pain, discharge, redness and itching  Respiratory: Negative  Cardiovascular: Negative  Gastrointestinal: Negative  Soft BMs, no diarrhea   Genitourinary: Negative  Musculoskeletal: Positive for arthralgias, back pain, gait problem and myalgias  Skin: Positive for rash  Negative for color change, pallor and wound     Neurological: Negative for dizziness, tremors, syncope, speech difficulty, weakness, light-headedness, numbness and headaches  Psychiatric/Behavioral: Negative  Objective     /80   Pulse 68   Temp 97 9 °F (36 6 °C)   Ht 5' 1" (1 549 m)   Wt 101 kg (223 lb)   BMI 42 14 kg/m²      Physical Exam  Constitutional:       Appearance: She is well-developed  She is not diaphoretic  HENT:      Head: Normocephalic and atraumatic  Right Ear: Tympanic membrane, ear canal and external ear normal       Left Ear: Tympanic membrane, ear canal and external ear normal       Mouth/Throat:      Mouth: Mucous membranes are moist       Pharynx: No oropharyngeal exudate  Eyes:      Extraocular Movements: Extraocular movements intact  Conjunctiva/sclera: Conjunctivae normal       Pupils: Pupils are equal, round, and reactive to light  Comments: ?sl pallor? Neck:      Thyroid: No thyromegaly  Vascular: No JVD  Cardiovascular:      Rate and Rhythm: Normal rate and regular rhythm  Pulses: Normal pulses  Heart sounds:     No gallop  Pulmonary:      Effort: Pulmonary effort is normal       Breath sounds: Normal breath sounds  Abdominal:      General: There is no distension  Palpations: Abdomen is soft  There is no mass  Tenderness: There is no abdominal tenderness  Musculoskeletal:         General: Deformity (mild OA changes in hands) present  No tenderness  Cervical back: Normal range of motion and neck supple  No tenderness  Right lower leg: Edema (trace) present  Left lower leg: Edema (trace) present  Comments: Mild OA changes of the knees  Sl TTP over the lower lumbar spine   Lymphadenopathy:      Cervical: No cervical adenopathy  Skin:     General: Skin is warm and dry  Neurological:      Mental Status: She is alert and oriented to person, place, and time  Sensory: No sensory deficit  Motor: Weakness present  No abnormal muscle tone        Coordination: Coordination normal  Gait: Gait abnormal (mildly antalgic appearing gait  Ambulates with cane)  Deep Tendon Reflexes: Reflexes are normal and symmetric         Dinorah Haque MD

## 2022-09-14 ENCOUNTER — PATIENT OUTREACH (OUTPATIENT)
Dept: FAMILY MEDICINE CLINIC | Facility: CLINIC | Age: 82
End: 2022-09-14

## 2022-09-14 ENCOUNTER — APPOINTMENT (OUTPATIENT)
Dept: LAB | Facility: CLINIC | Age: 82
End: 2022-09-14
Payer: MEDICARE

## 2022-09-14 DIAGNOSIS — Z59.9 FINANCIAL DIFFICULTIES: Primary | ICD-10-CM

## 2022-09-14 DIAGNOSIS — I10 ESSENTIAL HYPERTENSION: ICD-10-CM

## 2022-09-14 LAB
ALBUMIN SERPL BCP-MCNC: 3.2 G/DL (ref 3.5–5)
ALP SERPL-CCNC: 86 U/L (ref 46–116)
ALT SERPL W P-5'-P-CCNC: 26 U/L (ref 12–78)
ANION GAP SERPL CALCULATED.3IONS-SCNC: 3 MMOL/L (ref 4–13)
AST SERPL W P-5'-P-CCNC: 18 U/L (ref 5–45)
BASOPHILS # BLD AUTO: 0.07 THOUSANDS/ΜL (ref 0–0.1)
BASOPHILS NFR BLD AUTO: 1 % (ref 0–1)
BILIRUB SERPL-MCNC: 0.97 MG/DL (ref 0.2–1)
BUN SERPL-MCNC: 25 MG/DL (ref 5–25)
CALCIUM ALBUM COR SERPL-MCNC: 9.7 MG/DL (ref 8.3–10.1)
CALCIUM SERPL-MCNC: 9.1 MG/DL (ref 8.3–10.1)
CHLORIDE SERPL-SCNC: 108 MMOL/L (ref 96–108)
CO2 SERPL-SCNC: 26 MMOL/L (ref 21–32)
CREAT SERPL-MCNC: 1.48 MG/DL (ref 0.6–1.3)
EOSINOPHIL # BLD AUTO: 0.2 THOUSAND/ΜL (ref 0–0.61)
EOSINOPHIL NFR BLD AUTO: 3 % (ref 0–6)
ERYTHROCYTE [DISTWIDTH] IN BLOOD BY AUTOMATED COUNT: 13.3 % (ref 11.6–15.1)
GFR SERPL CREATININE-BSD FRML MDRD: 33 ML/MIN/1.73SQ M
GLUCOSE SERPL-MCNC: 219 MG/DL (ref 65–140)
HCT VFR BLD AUTO: 39.9 % (ref 34.8–46.1)
HGB BLD-MCNC: 12 G/DL (ref 11.5–15.4)
IMM GRANULOCYTES # BLD AUTO: 0.02 THOUSAND/UL (ref 0–0.2)
IMM GRANULOCYTES NFR BLD AUTO: 0 % (ref 0–2)
LYMPHOCYTES # BLD AUTO: 1.7 THOUSANDS/ΜL (ref 0.6–4.47)
LYMPHOCYTES NFR BLD AUTO: 25 % (ref 14–44)
MCH RBC QN AUTO: 30.2 PG (ref 26.8–34.3)
MCHC RBC AUTO-ENTMCNC: 30.1 G/DL (ref 31.4–37.4)
MCV RBC AUTO: 100 FL (ref 82–98)
MONOCYTES # BLD AUTO: 0.64 THOUSAND/ΜL (ref 0.17–1.22)
MONOCYTES NFR BLD AUTO: 9 % (ref 4–12)
NEUTROPHILS # BLD AUTO: 4.15 THOUSANDS/ΜL (ref 1.85–7.62)
NEUTS SEG NFR BLD AUTO: 62 % (ref 43–75)
NRBC BLD AUTO-RTO: 0 /100 WBCS
PLATELET # BLD AUTO: 148 THOUSANDS/UL (ref 149–390)
PMV BLD AUTO: 10.7 FL (ref 8.9–12.7)
POTASSIUM SERPL-SCNC: 4.6 MMOL/L (ref 3.5–5.3)
PROT SERPL-MCNC: 6.8 G/DL (ref 6.4–8.4)
RBC # BLD AUTO: 3.98 MILLION/UL (ref 3.81–5.12)
SODIUM SERPL-SCNC: 137 MMOL/L (ref 135–147)
WBC # BLD AUTO: 6.78 THOUSAND/UL (ref 4.31–10.16)

## 2022-09-14 PROCEDURE — 80053 COMPREHEN METABOLIC PANEL: CPT

## 2022-09-14 PROCEDURE — 36415 COLL VENOUS BLD VENIPUNCTURE: CPT

## 2022-09-14 PROCEDURE — 85025 COMPLETE CBC W/AUTO DIFF WBC: CPT

## 2022-09-14 SDOH — ECONOMIC STABILITY - INCOME SECURITY: PROBLEM RELATED TO HOUSING AND ECONOMIC CIRCUMSTANCES, UNSPECIFIED: Z59.9

## 2022-09-14 NOTE — PROGRESS NOTES
Applications and med list printed to office, notes routed with instructions to complete  Patient called and message left to bring [phone] and rx card for copying and to sign applications

## 2022-09-14 NOTE — PROGRESS NOTES
OPCM SW received referral to assist with payment of Novolog and Toujeo  Chart reviewed and phone call placed to patient  Patient reports that the medications cost her $300 a month  Patient reports that there are 2 people in the household and the total income is less just less than $60,000  Novolog and Toujeo PAP programs both have income limits of $73,240 for household of 2  Patient qualifies for both programs  Patient notified that she will need to provide her [phone] and prescription insurance cards for copying to attach to the applications  Applications and med list to be printed to the office for completion  Patient will stop with documents for copying and to complete her portions of the applications  MD will then need to complete the prescription sections and fax the applications  Note routed to Dr Darrick Akins and clerical staff

## 2022-09-15 ENCOUNTER — TELEPHONE (OUTPATIENT)
Dept: VASCULAR SURGERY | Facility: CLINIC | Age: 82
End: 2022-09-15

## 2022-09-15 ENCOUNTER — TELEPHONE (OUTPATIENT)
Dept: NEPHROLOGY | Facility: CLINIC | Age: 82
End: 2022-09-15

## 2022-09-15 ENCOUNTER — OFFICE VISIT (OUTPATIENT)
Dept: VASCULAR SURGERY | Facility: CLINIC | Age: 82
End: 2022-09-15
Payer: MEDICARE

## 2022-09-15 VITALS
WEIGHT: 222 LBS | DIASTOLIC BLOOD PRESSURE: 68 MMHG | HEART RATE: 62 BPM | HEIGHT: 61 IN | BODY MASS INDEX: 41.91 KG/M2 | SYSTOLIC BLOOD PRESSURE: 124 MMHG

## 2022-09-15 DIAGNOSIS — I89.0 LYMPHEDEMA OF BOTH LOWER EXTREMITIES: Primary | ICD-10-CM

## 2022-09-15 PROCEDURE — 99203 OFFICE O/P NEW LOW 30 MIN: CPT | Performed by: NURSE PRACTITIONER

## 2022-09-15 NOTE — TELEPHONE ENCOUNTER
Left voice mail to schedule appointment with Dr Dayne Urias in the [facility] location  This is for their Feb 2023 appointment  This is the 1st attempt

## 2022-09-15 NOTE — TELEPHONE ENCOUNTER
Received call from [facility] at Currently supplier  He states pt's pumps are almost 6years old  He will f/u w/ pt, if unable to repair they will submit to insurance for new pump  Today's ov note and demographics faxed to him at [phone]  S/w pt and advised of same, request if she does not hear from [facility] by end of next week to contact us, she states she will do so

## 2022-09-15 NOTE — PROGRESS NOTES
Assessment/Plan:    Lymphedema of both lower extremities  73-year-old female with HLD, chronic heart failure, AFib on Eliquis, pacemaker, COPD, DM, CKD3, obesity, chronic venous insufficiency with bilateral lower extremity advanced venous stasis changes, L>R, history of left common femoral vein DVT, secondary lymphedema  Patient presents to the office for evaluation of bilateral lower extremity swelling and discoloration   -Stage II/III bilateral lower extremity lymphedema  -Patient has been using pneumatic compression pumps since 2017 with significant improvement   -Previously had several bouts of cellulitis and ulceration which have all been well controlled with use of compression pumps   -Compression pumps broken, nonfunctioning for the past week   -Attempted to call the medical supplier  Will see if pumps can be repaired and if not replaced  -Will order new pneumatic compression pumps   -Use compression pumps b i d  for 1 hour each  -Moisturizers to maintain skin integrity   -Follow up in the office in 1 month       Diagnoses and all orders for this visit:    Lymphedema of both lower extremities  -     Pneumatic compression pumps          Subjective:      Patient ID: Cristin Valles is a [de-identified] y o  female  Pt is new and is a self ref for lymph pumps  Pt c/o margarita calf swelling but denies heaviness and pain  Pt says she needs new lymph pumps because her old one is broken  Pt does not wear compression but elevates her legs  Pt is taking Eliquis and Atorvastatin           Patient Information  MRN: [id number]   Cristin Valles  1940  [address]     Insurance Information  Payor: MEDICARE / Plan: MEDICARE A AND B / Product Type: Medicare A & B Fee for Service /      [id number] - (Medicare )     Patient Height and Weight 5' 1" (1 549 m)    Wt Readings from Last 1 Encounters:   09/15/22 101 kg (222 lb)       Compression Lymphedema Pump Prescription Form      [x] Patient utilized Compression Garment ?  30 mmHg distally OR Gradient Wrap System    Appliance:     Legs:    [x] Right    [x] Left   Arms:    [] Right    [] Left     []Chest garment    []Abdominal garment     Length of need: 99 months    Protocol:  [x] Std: 40 mmHg, TID/ BID,  60 minutes  [] Other:   Pressures: __ mmHg    Frequency: __ / Day   Minutes/ Session: __ minutes    Patient History and Prognosis:  [x] Patient was diagnosed for the chronic disorder with reported on-set __2017  [x] Attempts at elevation and compression have not improved patients' condition and is now at risk of lymphatic disorder progressing to the next stage/ grade  [x] Patient's physical condition/ range of motion limited for exercise  [x] Patient/ Caregiver experienced difficulty applying 30 mmHg distal compression garments  [x] Patient compression stocking/ wrap tolerance limited due to pain/ reduced circulation  [x] Patient advised to reduce salt intake and adhere to a daily regiment of elevation, compression, and lymphatic exercises  [x] Patient's severe condition will not improve without further treatment interventions  [x] Patient has noted skin changes such as marked hyperkeratosis with hyperplasia and hyperpigmentation, papillomatosis cutis lymphostatica, elephantiasis, and/ or skin breakdown with persisting lymphorrhea    Symptoms/ Observation/ Evaluation/ Plan of Care for Lymphedema / Venous Compression Pumps     Conservative Care ? 4 weeks for severe lymphedema (check all that apply):  Patient instructions for DAILY use of conservative therapies;  [x] Elevate extremities daily and nightly to reduce swelling  [x] Exercise and ambulate / range of motion (ROM) daily to increase fluid flow and reduce swelling  [] Wear 30-mmHg distal compression garments / wraps daily to reduce / control swelling  [] Manual lymph drainage (MLD)  [x] On-set date of lymphedema / ulcers: 2017    Initial Measurements      Body Part Right Left   Ankle / Forearm 26 cm 28 cm   Calf / Elbow  43 cm 43 cm   Knee / Bicep  44 cm 46 cm   Mid-Thigh / Axilla  50 cm 52 cm         HPI  82-year-old female with HLD, chronic heart failure, AFib on Eliquis, pacemaker, COPD, DM, CKD3, obesity, chronic venous insufficiency with bilateral lower extremity advanced venous stasis changes, L>R, history of left common femoral vein DVT, secondary lymphedema  Patient presents to the office for evaluation of bilateral lower extremity swelling and discoloration   Patient has been utilizing pneumatic compression pumps for the past 5 years with excellent response in bilateral lower extremity lymphedema and without advancement to the next stage of lymphedema  She has not had any recurrent cellulitis or ulcerations since initiation of pump therapy  She has difficulty with with donning prescription compression  She uses pneumatic pumps for 45 minutes in the evening  Last week pumps broke and are no longer working  Tubing broke and lost suction  The following portions of the patient's history were reviewed and updated as appropriate: allergies, current medications, past family history, past medical history, past social history, past surgical history and problem list   ROS reviewed     Review of Systems   Constitutional: Negative  HENT: Negative  Eyes: Negative  Respiratory: Negative  Cardiovascular: Positive for leg swelling  Gastrointestinal: Negative  Endocrine: Negative  Genitourinary: Negative  Musculoskeletal: Negative  Skin: Negative  Allergic/Immunologic: Negative  Neurological: Negative  Hematological: Negative  Psychiatric/Behavioral: Negative  Objective:  I have reviewed and made appropriate changes to the review of systems input by the medical assistant      Vitals:    09/15/22 0930   BP: 124/68   BP Location: Right arm   Patient Position: Sitting   Cuff Size: Large   Pulse: 62   Weight: 101 kg (222 lb)   Height: 5' 1" (1 549 m) Patient Active Problem List   Diagnosis    Type 2 diabetes mellitus with hyperglycemia, with long-term current use of insulin (HCC)    Class 2 severe obesity with serious comorbidity in adult [facility])    Complete heart block (HCC)    Anemia    S/P cardiac pacemaker procedure    Hypothyroidism    Chronic diastolic congestive heart failure (HCC)    Chronic venous insufficiency    Benign hypertension with chronic kidney disease, stage III (HCC)    Stage 3a chronic kidney disease ([facility])    Iron deficiency anemia    Microalbuminuria    Lymphedema of both lower extremities    Vitamin D deficiency    Anxiety disorder    Urinary bladder incontinence    Chronic obstructive pulmonary disease ([facility])    Eczematous dermatitis    Mixed hyperlipidemia    OAB (overactive bladder)    Presence of permanent cardiac pacemaker    PAF (paroxysmal atrial fibrillation) ([facility])    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 neurological manifestations, uncontrolled([address])    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 ([address] )    Intraocular pressure increase    Sleep apnea    Dermatitis    Coronary artery disease involving native coronary artery of native heart    Memory difficulties    Imbalance    Secondary hyperparathyroidism of renal origin ([address] )    Fall    Hypertension       Past Surgical History:   Procedure Laterality Date    BREAST SURGERY      For Biopsy    CARDIAC PACEMAKER PLACEMENT      Permanent  Last assessed: 2/19/16    CARDIAC SURGERY      CARPAL TUNNEL RELEASE Bilateral     CATARACT EXTRACTION      CATARACT EXTRACTION W/ INTRAOCULAR LENS  IMPLANT, BILATERAL      CYSTOSCOPY      Diagnostic   Last assessed: 11/8/17    ECTOPIC PREGNANCY SURGERY      HYSTERECTOMY      Total Abdomonal  Resolved: 1982    JOINT REPLACEMENT Right     TKR    OOPHORECTOMY      ND ENDOVENOUS LASER, 1ST VEIN Left 2/17/2017    Procedure: GREATER SAPHENOUS VEIN ENDOVASCULAR LASER THERAPY ;  Surgeon: Gianluca Duque DO;  [facility];  Service: Vascular    TONSILLECTOMY         Family History   Problem Relation Age of Onset    Hypertension Father     Diabetes Father         Mellitus    Coronary artery disease Mother     Hypertension Mother     Heart disease Mother     Diabetes Brother         Mellitus    Breast cancer Maternal Aunt     Pancreatic cancer Maternal Aunt     Breast cancer Cousin     Breast cancer Cousin     No Known Problems Sister     No Known Problems Daughter     No Known Problems Maternal Grandmother     No Known Problems Maternal Grandfather     No Known Problems Paternal Grandmother     No Known Problems Paternal Grandfather     No Known Problems Daughter     No Known Problems Son        Social History     Socioeconomic History    Marital status: /Civil Union     Spouse name: Not on file    Number of children: 3    Years of education: Not on file    Highest education level: Not on file   Occupational History    Occupation: RETIRED   Tobacco Use    Smoking status: Never Smoker    Smokeless tobacco: Never Used    Tobacco comment: Per [employer]: Former smoker   Vaping Use    Vaping Use: Never used   Substance and Sexual Activity    Alcohol use: Yes     Comment: rarely   Per [employer]: Denied history of alcohol use    Drug use: No    Sexual activity: Never   Other Topics Concern    Not on file   Social History Narrative    Daily coffee consumption: 2 cups/day     Social Determinants of Health     Financial Resource Strain: Not on file   Food Insecurity: Not on file   Transportation Needs: Not on file   Physical Activity: Not on file   Stress: Not on file   Social Connections: Not on file   Intimate Partner Violence: Not on file   Housing Stability: Not on file       Allergies   Allergen Reactions    Ampicillin Hives         Current Outpatient Medications:     apixaban (Eliquis) 5 mg, Take 1 tablet (5 mg total) by mouth 2 (two) times a day, Disp: 56 tablet, Rfl: 0    Ascorbic Acid (VITAMIN C) 1000 MG tablet, Take 500 mg by mouth , Disp: , Rfl:     atorvastatin (LIPITOR) 80 mg tablet, TAKE 1 TABLET AT BEDTIME, Disp: 90 tablet, Rfl: 3    Calcium Carbonate-Vit D-Min (CALCIUM 1200 PO), Take by mouth, Disp: , Rfl:     cholecalciferol (VITAMIN D3) 1,000 units tablet, Take 2,000 Units by mouth daily , Disp: , Rfl:     citalopram (CeleXA) 20 mg tablet, TAKE 1 TABLET EVERY DAY, Disp: 90 tablet, Rfl: 3    Continuous Blood Gluc  (FREESTYLE RONNI 14 DAY READER) GARRY, Use to check BG levels 4+times daily and as needed, Disp: 1 Device, Rfl: 0    Continuous Blood Gluc Sensor (FREESTYLE RONNI 14 DAY SENSOR) MISC, Change sensor every 14 days to check BG levels 4+ times daily, Disp: 6 each, Rfl: 3    glucose blood (TRUE METRIX BLOOD GLUCOSE TEST) test strip, Test 4 times plus daily, as instructed, Disp: 400 each, Rfl: 1    insulin aspart (NovoLOG FlexPen) 100 UNIT/ML injection pen, INJECT 8 UNITS SUBCUTANEOUSLY WITH BREAKFAST, THEN 10 UNITS WITH LUNCH, AND THEN 10 UNITS WITH DINNER, Disp: 45 mL, Rfl: 1    insulin glargine (Toujeo SoloStar) 300 units/mL CONCENTRATED U-300 injection pen (1-unit dial), Inject 36 Units under the skin daily at bedtime, Disp: 12 mL, Rfl: 1    Insulin Pen Needle (Pen Needles) 32G X 6 MM MISC, Use 4 times a day, Disp: 400 each, Rfl: 1    Lancets (onetouch ultrasoft) lancets, Use 4 times a day, Disp: 400 each, Rfl: 0    levothyroxine 125 mcg tablet, TAKE 1 TABLET ONE TIME DAILY EXCEPT ON SUNDAY NO TABLET, Disp: 78 tablet, Rfl: 1    lisinopril (ZESTRIL) 20 mg tablet, Take 1 tablet (20 mg total) by mouth 2 (two) times a day, Disp: 180 tablet, Rfl: 3    metoprolol tartrate (LOPRESSOR) 25 mg tablet, TAKE 1 TABLET EVERY 12 HOURS, Disp: 180 tablet, Rfl: 3    Omega-3 Fatty Acids (FISH OIL) 1200 MG CAPS, Take 1,200 mg by mouth 2 (two) times a day , Disp: , Rfl:     omeprazole (PriLOSEC OTC) 20 MG tablet, Take 1 tablet (20 mg total) by mouth 2 (two) times a day, Disp: 60 tablet, Rfl: 0    oxybutynin (DITROPAN) 5 mg tablet, TAKE 1 TABLET TWICE DAILY, Disp: 180 tablet, Rfl: 1    spironolactone (ALDACTONE) 25 mg tablet, Take 1 tablet (25 mg total) by mouth daily, Disp: 30 tablet, Rfl: 5    acetaminophen (TYLENOL) 325 mg tablet, Take 650 mg by mouth (Patient not taking: Reported on 9/15/2022), Disp: , Rfl:     furosemide (LASIX) 20 mg tablet, Take 1 tablet (20 mg total) by mouth as needed (as needed for leg swelling) (Patient not taking: Reported on 9/15/2022), Disp: 30 tablet, Rfl: 2    GLUCOSAMINE CHONDROITIN COMPLX PO, Take 1 tablet by mouth 2 (two) times a day (Patient not taking: Reported on 9/15/2022), Disp: , Rfl:     Promethazine-DM (PHENERGAN-DM) 6 25-15 mg/5 mL oral syrup, Take 5 mL by mouth 4 (four) times a day as needed for cough (Patient not taking: Reported on 9/15/2022), Disp: 300 mL, Rfl: 2    Tetrahydroz-Polyvinyl Al-Povid (MURINE TEARS PLUS OP), Apply to eye 3 (three) times a day (Patient not taking: Reported on 9/15/2022), Disp: , Rfl:       /68 (BP Location: Right arm, Patient Position: Sitting, Cuff Size: Large)   Pulse 62   Ht 5' 1" (1 549 m)   Wt 101 kg (222 lb)   BMI 41 95 kg/m²          Physical Exam  Vitals and nursing note reviewed  Constitutional:       Appearance: She is well-developed  HENT:      Head: Normocephalic and atraumatic  Eyes:      Extraocular Movements: Extraocular movements intact  Cardiovascular:      Pulses:           Dorsalis pedis pulses are 0 on the right side and 0 on the left side  Posterior tibial pulses are 0 on the right side and 0 on the left side  Heart sounds: Normal heart sounds  Pulmonary:      Effort: Pulmonary effort is normal       Breath sounds: Normal breath sounds  Abdominal:      General: Bowel sounds are normal       Palpations: Abdomen is soft     Musculoskeletal:         General: Swelling present  Normal range of motion  Cervical back: Neck supple  Comments: Left greater than right lower extremity hyperpigmentation, skin fibrosis lipodermatosclerosis   Skin:     General: Skin is warm  Neurological:      Mental Status: She is alert and oriented to person, place, and time  Psychiatric:         Behavior: Behavior normal          Thought Content:  Thought content normal

## 2022-09-15 NOTE — LETTER
September 15, 2022     Referral Self    Patient: Maite Merrill   YOB: 1940   Date of Visit: 9/15/2022       Dear Dr Darryle Kicks:    Thank you for referring Phyllis Shook to me for evaluation  Below are my notes for this consultation  If you have questions, please do not hesitate to call me  I look forward to following your patient along with you           Sincerely,        DALILA Martínez        CC: [facility] Lymphodema

## 2022-09-15 NOTE — ASSESSMENT & PLAN NOTE
31-year-old female with HLD, chronic heart failure, AFib on Eliquis, pacemaker, COPD, DM, CKD3, obesity, chronic venous insufficiency with bilateral lower extremity advanced venous stasis changes, L>R, history of left common femoral vein DVT, secondary lymphedema  Patient presents to the office for evaluation of bilateral lower extremity swelling and discoloration   -Stage II/III bilateral lower extremity lymphedema  -Patient has been using pneumatic compression pumps since 2017 with significant improvement   -Previously had several bouts of cellulitis and ulceration which have all been well controlled with use of compression pumps   -Compression pumps broken, nonfunctioning for the past week   -Attempted to call the medical supplier    Will see if pumps can be repaired and if not replaced  -Will order new pneumatic compression pumps   -Use compression pumps b i d  for 1 hour each  -Moisturizers to maintain skin integrity   -Follow up in the office in 1 month

## 2022-09-15 NOTE — PATIENT INSTRUCTIONS
Lymphedema   AMBULATORY CARE:   The lymphatic system  contains fluid, vessels, tissue, and organs  This system removes and carries fluid throughout the body  It also helps the body fight infection  Lymphedema is the buildup of lymph fluid in fat tissue under your skin  The buildup causes the area to swell  Lymphedema can happen any time that lymphatic vessels are blocked or damaged  Damage to lymphatic vessels may be caused by surgery, infection, injury, cancer, radiation, or scar tissue     Common signs and symptoms include the following:  Signs and symptoms may happen where lymph nodes were removed, or in the arm, leg, chest, or underarm  Swelling or itching    Pain, burning, or aching    Tight, hard, or red skin    Hair loss    Heaviness or fullness    Numbness or tingling    Stiffness    Contact your healthcare provider or lymphedema specialist if:   You have a fever or chills  You have an open area of skin that looks red or swollen, or drains pus  Your symptoms, such as swelling or pain, get worse  Your arms or legs feel heavy, or you cannot move them  Your skin becomes hard, thick, or rough  You have a skin wound that will not heal      Your shoes, clothes, or jewelry feel tighter  You have questions or concerns about your condition or care  Treatment for lymphedema  can relieve symptoms and prevent lymphedema from getting worse  You may need therapeutic massage, physical therapy, or compression devices to help decrease swelling and pain  Surgery may be needed if other treatments do not work  Self-care:   Elevate  your arm or leg above the level of your heart as often as you can  This will help decrease swelling and pain  Prop your arm or leg on pillows or blankets to keep it elevated comfortably  Wear compression socks, sleeves, or bandages  as directed  Compression devices must be fitted by a healthcare provider   Compression devices may need to be replaced every 3 to 6 months  Exercise  can help you maintain or regain function of your arm or leg  Ask your healthcare provider what type of exercise to do and how often to do it  Start slow, take breaks, and gradually do more each day  Do not do vigorous, repeated exercises  Watch for changes in your arm or leg during exercise  Stop and rest if you have swelling, increased pain, or heaviness  Elevate your arm or leg above the level of your heart  Change your position often  to help move lymphatic fluid through your body  Do not sit or  one position for more than 30 minutes  Do not cross your legs when you sit  These actions can cause lymphatic fluid to buildup  Maintain a healthy weight  Ask your healthcare provider what you should weigh  Weight loss may improve your symptoms  If you need to lose weight, your healthcare provider can help you create a weight loss program     Prevent infection with proper skin care:  A skin infection can make lymphedema worse  Do the following to decrease your risk for a skin infection in your arm or leg with lymphedema:  Wash your skin gently and dry it well  Use a mild soap to wash your skin  Gently pat your skin dry after you bathe  Apply a mild cream or lotion to moisturize your skin and prevent dryness or cracking  Keep your feet clean and dry  Protect your skin from injury  Wear gloves when you garden and wash dishes  Cut your nails straight across to prevent injury to your fingers and toes  Use sunscreen and insect repellant to avoid burns and punctures  Wear slippers in the house  Wear shoes when you go outside  Check your skin every day for signs of infection  Signs of infection include redness, swelling, increased heat, or pus  You may also have a fever or chills  Care for cuts, scratches or burns  Apply antibiotic ointment to cuts and other small breaks in the skin  Apply a cold pack or cold water to a burn for 15 minutes  Then wash it with soap and water  Cover scratches, cuts, or burns with a clean, dry gauze or bandage as directed  Keep the area clean and dry  Tell healthcare providers that you have lymphedema  Tell them not to do, IVs, blood draws, and blood pressure readings in the arm or leg with lymphedema  If there is lymphedema in both arms, ask them to take your blood pressure on your leg  Do not allow flu shots or vaccinations in your arm with lymphedema  Follow up with your healthcare provider or lymphedema specialist as directed: You will need regular visits so healthcare providers can examine the affected areas  You may also be referred to a clinic that specializes in lymphedema treatment  Write down your questions so you remember to ask them during your visits  © Copyright [employer] 2022 Information is for End User's use only and may not be sold, redistributed or otherwise used for commercial purposes  All illustrations and images included in CareNotes® are the copyrighted property of A D A M , Inc  or [facility] Gurvinder Fountain   The above information is an  only  It is not intended as medical advice for individual conditions or treatments  Talk to your doctor, nurse or pharmacist before following any medical regimen to see if it is safe and effective for you

## 2022-09-15 NOTE — LETTER
September 15, 2022     Nathan Eubanks MD  [address]  [address] [place] [address]    Patient: Heaven Suazo   YOB: 1940   Date of Visit: 9/15/2022       Dear Dr Edvin Barron:    Thank you for referring Chino Santana to me for evaluation  Below are my notes for this consultation  If you have questions, please do not hesitate to call me  I look forward to following your patient along with you  Sincerely,        DALILA Sherwood        CC: No Recipients  DALILA Sherwood  9/15/2022 10:22 AM  Sign when Signing Visit  Assessment/Plan:    Lymphedema of both lower extremities  37-year-old female with HLD, chronic heart failure, AFib on Eliquis, pacemaker, COPD, DM, CKD3, obesity, chronic venous insufficiency with bilateral lower extremity advanced venous stasis changes, L>R, history of left common femoral vein DVT, secondary lymphedema  Patient presents to the office for evaluation of bilateral lower extremity swelling and discoloration   -Stage II/III bilateral lower extremity lymphedema  -Patient has been using pneumatic compression pumps since 2017 with significant improvement   -Previously had several bouts of cellulitis and ulceration which have all been well controlled with use of compression pumps   -Compression pumps broken, nonfunctioning for the past week   -Attempted to call the medical supplier  Will see if pumps can be repaired and if not replaced  -Will order new pneumatic compression pumps   -Use compression pumps b i d  for 1 hour each  -Moisturizers to maintain skin integrity   -Follow up in the office in 1 month       Diagnoses and all orders for this visit:    Lymphedema of both lower extremities  -     Pneumatic compression pumps          Subjective:      Patient ID: Heaven Suazo is a [de-identified] y o  female  Pt is new and is a self ref for lymph pumps  Pt c/o margarita calf swelling but denies heaviness and pain   Pt says she needs new lymph pumps because her old one is broken  Pt does not wear compression but elevates her legs  Pt is taking Eliquis and Atorvastatin  Patient Information  MRN: [id number]   Cristin Valles  1940  [address]     Insurance Information  Payor: MEDICARE / Plan: MEDICARE A AND B / Product Type: Medicare A & B Fee for Service /      [id number] - (Medicare )     Patient Height and Weight 5' 1" (1 549 m)    Wt Readings from Last 1 Encounters:   09/15/22 101 kg (222 lb)       Compression Lymphedema Pump Prescription Form      [x] Patient utilized Compression Garment ?  30 mmHg distally OR Gradient Wrap System    Appliance:     Legs:    [x] Right    [x] Left   Arms:    [] Right    [] Left     []Chest garment    []Abdominal garment     Length of need: 99 months    Protocol:  [x] Std: 40 mmHg, TID/ BID,  60 minutes  [] Other:   Pressures: __ mmHg    Frequency: __ / Day   Minutes/ Session: __ minutes    Patient History and Prognosis:  [x] Patient was diagnosed for the chronic disorder with reported on-set __2017  [x] Attempts at elevation and compression have not improved patients' condition and is now at risk of lymphatic disorder progressing to the next stage/ grade  [x] Patient's physical condition/ range of motion limited for exercise  [x] Patient/ Caregiver experienced difficulty applying 30 mmHg distal compression garments  [x] Patient compression stocking/ wrap tolerance limited due to pain/ reduced circulation  [x] Patient advised to reduce salt intake and adhere to a daily regiment of elevation, compression, and lymphatic exercises  [x] Patient's severe condition will not improve without further treatment interventions  [x] Patient has noted skin changes such as marked hyperkeratosis with hyperplasia and hyperpigmentation, papillomatosis cutis lymphostatica, elephantiasis, and/ or skin breakdown with persisting lymphorrhea    Symptoms/ Observation/ Evaluation/ Plan of Care for Lymphedema / Venous Compression Pumps     Conservative Care ? 4 weeks for severe lymphedema (check all that apply):  Patient instructions for DAILY use of conservative therapies;  [x] Elevate extremities daily and nightly to reduce swelling  [x] Exercise and ambulate / range of motion (ROM) daily to increase fluid flow and reduce swelling  [] Wear 30-mmHg distal compression garments / wraps daily to reduce / control swelling  [] Manual lymph drainage (MLD)  [x] On-set date of lymphedema / ulcers: 2017    Initial Measurements      Body Part Right Left   Ankle / Forearm 26 cm 28 cm   Calf / Elbow  43 cm 43 cm   Knee / Bicep  44 cm 46 cm   Mid-Thigh / Axilla  50 cm 52 cm         HPI  28-year-old female with HLD, chronic heart failure, AFib on Eliquis, pacemaker, COPD, DM, CKD3, obesity, chronic venous insufficiency with bilateral lower extremity advanced venous stasis changes, L>R, history of left common femoral vein DVT, secondary lymphedema  Patient presents to the office for evaluation of bilateral lower extremity swelling and discoloration   Patient has been utilizing pneumatic compression pumps for the past 5 years with excellent response in bilateral lower extremity lymphedema and without advancement to the next stage of lymphedema  She has not had any recurrent cellulitis or ulcerations since initiation of pump therapy  She has difficulty with with donning prescription compression  She uses pneumatic pumps for 45 minutes in the evening  Last week pumps broke and are no longer working  Tubing broke and lost suction  The following portions of the patient's history were reviewed and updated as appropriate: allergies, current medications, past family history, past medical history, past social history, past surgical history and problem list   ROS reviewed     Review of Systems   Constitutional: Negative  HENT: Negative  Eyes: Negative  Respiratory: Negative      Cardiovascular: Positive for leg swelling  Gastrointestinal: Negative  Endocrine: Negative  Genitourinary: Negative  Musculoskeletal: Negative  Skin: Negative  Allergic/Immunologic: Negative  Neurological: Negative  Hematological: Negative  Psychiatric/Behavioral: Negative  Objective:  I have reviewed and made appropriate changes to the review of systems input by the medical assistant      Vitals:    09/15/22 0930   BP: 124/68   BP Location: Right arm   Patient Position: Sitting   Cuff Size: Large   Pulse: 62   Weight: 101 kg (222 lb)   Height: 5' 1" (1 549 m)       Patient Active Problem List   Diagnosis    Type 2 diabetes mellitus with hyperglycemia, with long-term current use of insulin ([facility])    Class 2 severe obesity with serious comorbidity in adult [facility])    Complete heart block ([facility])    Anemia    S/P cardiac pacemaker procedure    Hypothyroidism    Chronic diastolic congestive heart failure ([facility])    Chronic venous insufficiency    Benign hypertension with chronic kidney disease, stage III ([facility])    Stage 3a chronic kidney disease ([facility])    Iron deficiency anemia    Microalbuminuria    Lymphedema of both lower extremities    Vitamin D deficiency    Anxiety disorder    Urinary bladder incontinence    Chronic obstructive pulmonary disease ([facility])    Eczematous dermatitis    Mixed hyperlipidemia    OAB (overactive bladder)    Presence of permanent cardiac pacemaker    PAF (paroxysmal atrial fibrillation) ([facility])    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 neurological manifestations, uncontrolled([address])    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 ([facility])    Intraocular pressure increase    Sleep apnea    Dermatitis    Coronary artery disease involving native coronary artery of native heart    Memory difficulties    Imbalance    Secondary hyperparathyroidism of renal origin ([address] )    Fall    Hypertension       Past Surgical History:   Procedure Laterality Date    BREAST SURGERY      For Biopsy    CARDIAC PACEMAKER PLACEMENT      Permanent  Last assessed: 2/19/16    CARDIAC SURGERY      CARPAL TUNNEL RELEASE Bilateral     CATARACT EXTRACTION      CATARACT EXTRACTION W/ INTRAOCULAR LENS  IMPLANT, BILATERAL      CYSTOSCOPY      Diagnostic  Last assessed: 11/8/17    ECTOPIC PREGNANCY SURGERY      HYSTERECTOMY      Total Abdomonal  Resolved: 1982    JOINT REPLACEMENT Right     TKR    OOPHORECTOMY      VA ENDOVENOUS LASER, 1ST VEIN Left 2/17/2017    Procedure: GREATER SAPHENOUS VEIN ENDOVASCULAR LASER THERAPY ;  Surgeon: DO Candice;  [facility];  Service: Vascular    TONSILLECTOMY         Family History   Problem Relation Age of Onset    Hypertension Father     Diabetes Father         Mellitus    Coronary artery disease Mother     Hypertension Mother     Heart disease Mother     Diabetes Brother         Mellitus    Breast cancer Maternal Aunt     Pancreatic cancer Maternal Aunt     Breast cancer Cousin     Breast cancer Cousin     No Known Problems Sister     No Known Problems Daughter     No Known Problems Maternal Grandmother     No Known Problems Maternal Grandfather     No Known Problems Paternal Grandmother     No Known Problems Paternal Grandfather     No Known Problems Daughter     No Known Problems Son        Social History     Socioeconomic History    Marital status: /Civil Union     Spouse name: Not on file    Number of children: 3    Years of education: Not on file    Highest education level: Not on file   Occupational History    Occupation: RETIRED   Tobacco Use    Smoking status: Never Smoker    Smokeless tobacco: Never Used    Tobacco comment: Per [employer]: Former smoker   Vaping Use    Vaping Use: Never used   Substance and Sexual Activity    Alcohol use: Yes     Comment: rarely   Per [employer]: Denied history of alcohol use    Drug use: No    Sexual activity: Never   Other Topics Concern    Not on file   Social History Narrative    Daily coffee consumption: 2 cups/day     Social Determinants of Health     Financial Resource Strain: Not on file   Food Insecurity: Not on file   Transportation Needs: Not on file   Physical Activity: Not on file   Stress: Not on file   Social Connections: Not on file   Intimate Partner Violence: Not on file   Housing Stability: Not on file       Allergies   Allergen Reactions    Ampicillin Hives         Current Outpatient Medications:     apixaban (Eliquis) 5 mg, Take 1 tablet (5 mg total) by mouth 2 (two) times a day, Disp: 56 tablet, Rfl: 0    Ascorbic Acid (VITAMIN C) 1000 MG tablet, Take 500 mg by mouth , Disp: , Rfl:     atorvastatin (LIPITOR) 80 mg tablet, TAKE 1 TABLET AT BEDTIME, Disp: 90 tablet, Rfl: 3    Calcium Carbonate-Vit D-Min (CALCIUM 1200 PO), Take by mouth, Disp: , Rfl:     cholecalciferol (VITAMIN D3) 1,000 units tablet, Take 2,000 Units by mouth daily , Disp: , Rfl:     citalopram (CeleXA) 20 mg tablet, TAKE 1 TABLET EVERY DAY, Disp: 90 tablet, Rfl: 3    Continuous Blood Gluc  (FREESTYLE RONNI 14 DAY READER) GARRY, Use to check BG levels 4+times daily and as needed, Disp: 1 Device, Rfl: 0    Continuous Blood Gluc Sensor (FREESTYLE RONNI 14 DAY SENSOR) MISC, Change sensor every 14 days to check BG levels 4+ times daily, Disp: 6 each, Rfl: 3    glucose blood (TRUE METRIX BLOOD GLUCOSE TEST) test strip, Test 4 times plus daily, as instructed, Disp: 400 each, Rfl: 1    insulin aspart (NovoLOG FlexPen) 100 UNIT/ML injection pen, INJECT 8 UNITS SUBCUTANEOUSLY WITH BREAKFAST, THEN 10 UNITS WITH LUNCH, AND THEN 10 UNITS WITH DINNER, Disp: 45 mL, Rfl: 1    insulin glargine (Toujeo SoloStar) 300 units/mL CONCENTRATED U-300 injection pen (1-unit dial), Inject 36 Units under the skin daily at bedtime, Disp: 12 mL, Rfl: 1    Insulin Pen Needle (Pen Needles) 32G X 6 MM MISC, Use 4 times a day, Disp: 400 each, Rfl: 1    Lancets (onetouch ultrasoft) lancets, Use 4 times a day, Disp: 400 each, Rfl: 0    levothyroxine 125 mcg tablet, TAKE 1 TABLET ONE TIME DAILY EXCEPT ON SUNDAY NO TABLET, Disp: 78 tablet, Rfl: 1    lisinopril (ZESTRIL) 20 mg tablet, Take 1 tablet (20 mg total) by mouth 2 (two) times a day, Disp: 180 tablet, Rfl: 3    metoprolol tartrate (LOPRESSOR) 25 mg tablet, TAKE 1 TABLET EVERY 12 HOURS, Disp: 180 tablet, Rfl: 3    Omega-3 Fatty Acids (FISH OIL) 1200 MG CAPS, Take 1,200 mg by mouth 2 (two) times a day , Disp: , Rfl:     omeprazole (PriLOSEC OTC) 20 MG tablet, Take 1 tablet (20 mg total) by mouth 2 (two) times a day, Disp: 60 tablet, Rfl: 0    oxybutynin (DITROPAN) 5 mg tablet, TAKE 1 TABLET TWICE DAILY, Disp: 180 tablet, Rfl: 1    spironolactone (ALDACTONE) 25 mg tablet, Take 1 tablet (25 mg total) by mouth daily, Disp: 30 tablet, Rfl: 5    acetaminophen (TYLENOL) 325 mg tablet, Take 650 mg by mouth (Patient not taking: Reported on 9/15/2022), Disp: , Rfl:     furosemide (LASIX) 20 mg tablet, Take 1 tablet (20 mg total) by mouth as needed (as needed for leg swelling) (Patient not taking: Reported on 9/15/2022), Disp: 30 tablet, Rfl: 2    GLUCOSAMINE CHONDROITIN COMPLX PO, Take 1 tablet by mouth 2 (two) times a day (Patient not taking: Reported on 9/15/2022), Disp: , Rfl:     Promethazine-DM (PHENERGAN-DM) 6 25-15 mg/5 mL oral syrup, Take 5 mL by mouth 4 (four) times a day as needed for cough (Patient not taking: Reported on 9/15/2022), Disp: 300 mL, Rfl: 2    Tetrahydroz-Polyvinyl Al-Povid (MURINE TEARS PLUS OP), Apply to eye 3 (three) times a day (Patient not taking: Reported on 9/15/2022), Disp: , Rfl:       /68 (BP Location: Right arm, Patient Position: Sitting, Cuff Size: Large)   Pulse 62   Ht 5' 1" (1 549 m)   Wt 101 kg (222 lb)   BMI 41 95 kg/m²          Physical Exam  Vitals and nursing note reviewed     Constitutional:       Appearance: She is well-developed  HENT:      Head: Normocephalic and atraumatic  Eyes:      Extraocular Movements: Extraocular movements intact  Cardiovascular:      Pulses:           Dorsalis pedis pulses are 0 on the right side and 0 on the left side  Posterior tibial pulses are 0 on the right side and 0 on the left side  Heart sounds: Normal heart sounds  Pulmonary:      Effort: Pulmonary effort is normal       Breath sounds: Normal breath sounds  Abdominal:      General: Bowel sounds are normal       Palpations: Abdomen is soft  Musculoskeletal:         General: Swelling present  Normal range of motion  Cervical back: Neck supple  Comments: Left greater than right lower extremity hyperpigmentation, skin fibrosis lipodermatosclerosis   Skin:     General: Skin is warm  Neurological:      Mental Status: She is alert and oriented to person, place, and time  Psychiatric:         Behavior: Behavior normal          Thought Content:  Thought content normal

## 2022-09-15 NOTE — TELEPHONE ENCOUNTER
Pt had ov today w/ MB Michael CONNER  Her lymphedema pumps provided by medical [employer] supplier are not working  Neida Drivers reached out to supplier but did not have any luck  Matthew Wright ask that we reach out to them  Pt has had pumps approx 5 years  If they are unable to help we can then contact [facility] and begin process of obtaining new pump for pt  Called Charan Minor (she is person to contact for repairs of pump) and left vm w/ pt info and request she return our call  Pt was notified of same and advised we would f/u w/ her re: outcome

## 2022-09-15 NOTE — TELEPHONE ENCOUNTER
Spoke with Patient and schedule appointment for 2/16 with Dr Barbara Choe in the [place] location  Pt called with concerns to her potassium diet  Pt is to go for bloodwork the week of the 19th after eating a low potassium diet  Pt was in the hospital and did not eat anything for a few days  She is trying to follow a diabetic diet as well  Please advise pt

## 2022-09-16 NOTE — TELEPHONE ENCOUNTER
Attempted to call patient in regards to her previous phone call and dietary questions  Left message for patient to return call at her earliest convenience to discuss       Thank you

## 2022-09-16 NOTE — TELEPHONE ENCOUNTER
Patient returned call and is requesting a call back  Patient stated she had lab work done on Wednesday for PCP and PCP recommended patient reach out to nephrology due to her creatinine

## 2022-09-19 ENCOUNTER — TELEPHONE (OUTPATIENT)
Dept: FAMILY MEDICINE CLINIC | Facility: CLINIC | Age: 82
End: 2022-09-19

## 2022-09-19 ENCOUNTER — TELEPHONE (OUTPATIENT)
Dept: OTHER | Facility: HOSPITAL | Age: 82
End: 2022-09-19

## 2022-09-19 ENCOUNTER — APPOINTMENT (OUTPATIENT)
Dept: LAB | Facility: CLINIC | Age: 82
End: 2022-09-19
Payer: MEDICARE

## 2022-09-19 DIAGNOSIS — I12.9 BENIGN HYPERTENSION WITH CHRONIC KIDNEY DISEASE, STAGE III (HCC): ICD-10-CM

## 2022-09-19 DIAGNOSIS — N18.30 BENIGN HYPERTENSION WITH CHRONIC KIDNEY DISEASE, STAGE III (HCC): ICD-10-CM

## 2022-09-19 DIAGNOSIS — N18.31 STAGE 3A CHRONIC KIDNEY DISEASE (HCC): ICD-10-CM

## 2022-09-19 LAB
ANION GAP SERPL CALCULATED.3IONS-SCNC: 4 MMOL/L (ref 4–13)
BUN SERPL-MCNC: 25 MG/DL (ref 5–25)
CALCIUM SERPL-MCNC: 9.3 MG/DL (ref 8.3–10.1)
CHLORIDE SERPL-SCNC: 110 MMOL/L (ref 96–108)
CO2 SERPL-SCNC: 26 MMOL/L (ref 21–32)
CREAT SERPL-MCNC: 1.23 MG/DL (ref 0.6–1.3)
GFR SERPL CREATININE-BSD FRML MDRD: 41 ML/MIN/1.73SQ M
GLUCOSE SERPL-MCNC: 98 MG/DL (ref 65–140)
POTASSIUM SERPL-SCNC: 4.3 MMOL/L (ref 3.5–5.3)
SODIUM SERPL-SCNC: 140 MMOL/L (ref 135–147)

## 2022-09-19 PROCEDURE — 80048 BASIC METABOLIC PNL TOTAL CA: CPT

## 2022-09-19 PROCEDURE — 36415 COLL VENOUS BLD VENIPUNCTURE: CPT

## 2022-09-19 NOTE — TELEPHONE ENCOUNTER
Shelbie Schwartz home health nurse called to let you know that patient has been having an issue with some loose stools with some urgency   It's been going on for a few days    She is asking if it would be okay for the patient to take some Imodium to help the issue     Please advise

## 2022-09-19 NOTE — TELEPHONE ENCOUNTER
Second attempt made to contact patient in regards to previous on-call and most recent labs  Her creatinine is elevated  Per previous documentation, patient was experiencing some diarrhea  She had repeat labs from today currently pending  Per medication review, patient is taking Lasix 20 mg as needed, lisinopril 20 mg daily, Aldactone 25 mg daily  If she is still experiencing diarrhea, she should not take her Lasix and hold her Aldactone  She should be avoiding any NSAIDs which included Motrin, ibuprofen, Advil, and Aleve  She should hydrate with water  We will await lab work from today to assess where her creatinine is currently at  Thank you

## 2022-09-19 NOTE — TELEPHONE ENCOUNTER
Called patient to review labs  She has CMP pending from today  Discussed with patient await these results before making any changes to her medications or diet  Will call again tomorrow once her labs are resulted

## 2022-09-20 ENCOUNTER — TELEPHONE (OUTPATIENT)
Dept: OTHER | Facility: HOSPITAL | Age: 82
End: 2022-09-20

## 2022-09-20 NOTE — TELEPHONE ENCOUNTER
Called patient to review lab results  Her potassium is 4 3 with improved creatinine to 1 2  She should continue aldactone and her other medications  We discussed it is okay for her to incorporate some potassium into her foods  She had concerns over following low potassium diet as previously instructed       Thank you

## 2022-09-21 ENCOUNTER — PATIENT OUTREACH (OUTPATIENT)
Dept: FAMILY MEDICINE CLINIC | Facility: CLINIC | Age: 82
End: 2022-09-21

## 2022-09-21 NOTE — PROGRESS NOTES
[facility] SW received phone call from patient who confirmed dropping income verification information at office  Patient did not leave copy of rx card as office stated they had insurance cards  Patient will stop by office tomorrow 9/22 while this SW is in office to provide copy of Rx card  SW will confirm that application is completed and fax same  Patient reports that she has 2 insulin pens left and is wondering if she should refill same  Patient reports that 1 pen lasts about 5 days  Request sent to office to see if there would be a sample to provide to patient until PAP application is approved and shipment sent  [facility] SW will speak with patient tomorrow to provide more information

## 2022-09-23 ENCOUNTER — PATIENT OUTREACH (OUTPATIENT)
Dept: FAMILY MEDICINE CLINIC | Facility: CLINIC | Age: 82
End: 2022-09-23

## 2022-09-23 NOTE — PROGRESS NOTES
I will check Problem: Pain:  Goal: Pain level will decrease  Description: Pain level will decrease  Outcome: Ongoing  Goal: Control of acute pain  Description: Control of acute pain  Outcome: Ongoing  Goal: Control of chronic pain  Description: Control of chronic pain  Outcome: Ongoing     Problem: Falls - Risk of:  Goal: Will remain free from falls  Description: Will remain free from falls  Outcome: Ongoing  Goal: Absence of physical injury  Description: Absence of physical injury  Outcome: Ongoing

## 2022-09-23 NOTE — PROGRESS NOTES
Never received her card yet  As soon as I  do I will fax it   All the paperwork has been filled out already

## 2022-09-23 NOTE — PROGRESS NOTES
Phone call to patient who reports daughter dropped of RX card for copying yesterday  Pepe Pena in office notified of same and will fax application when copy is attached  Patient asked for a smaller prescription order for the short acting insulin  Patient advised to call office to have them place this prescription

## 2022-09-26 ENCOUNTER — TELEPHONE (OUTPATIENT)
Dept: ENDOCRINOLOGY | Facility: CLINIC | Age: 82
End: 2022-09-26

## 2022-09-26 DIAGNOSIS — Z79.4 TYPE 2 DIABETES MELLITUS WITH OTHER CIRCULATORY COMPLICATION, WITH LONG-TERM CURRENT USE OF INSULIN (HCC): ICD-10-CM

## 2022-09-26 DIAGNOSIS — E11.59 TYPE 2 DIABETES MELLITUS WITH OTHER CIRCULATORY COMPLICATION, WITH LONG-TERM CURRENT USE OF INSULIN (HCC): ICD-10-CM

## 2022-09-26 RX ORDER — INSULIN ASPART 100 [IU]/ML
INJECTION, SOLUTION INTRAVENOUS; SUBCUTANEOUS
Qty: 15 ML | Refills: 1 | Status: SHIPPED | OUTPATIENT
Start: 2022-09-26 | End: 2023-06-12 | Stop reason: SDUPTHER

## 2022-09-26 NOTE — TELEPHONE ENCOUNTER
Pt called she is waiting for her mail order to come in of novolog pen   She is using 8-10-10 asking for samples to get her through next week  Pt was told she can have samples   Pt to come in tuesday

## 2022-09-26 NOTE — TELEPHONE ENCOUNTER
Received fax from [employer] informing us that pt's pump has been approved and is in the shipping process  Placed copy of fax in the scan bin

## 2022-09-27 ENCOUNTER — PATIENT OUTREACH (OUTPATIENT)
Dept: FAMILY MEDICINE CLINIC | Facility: CLINIC | Age: 82
End: 2022-09-27

## 2022-09-27 NOTE — PROGRESS NOTES
RAVINDER HOLLEY received completed applications for [facility] for [facility] for [facility]  [id number] copied and added to applications  Rx card copy attached to applications  Applications faxed from office  Patient notified that applications have been faxed and income verification documents are ready to be picked up at the front office desk  RAVINDER HOLLEY will follow up with pharmaceutical companies in 2 days for status of applications

## 2022-09-28 ENCOUNTER — CONSULT (OUTPATIENT)
Dept: GASTROENTEROLOGY | Facility: AMBULARY SURGERY CENTER | Age: 82
End: 2022-09-28
Payer: MEDICARE

## 2022-09-28 ENCOUNTER — TELEPHONE (OUTPATIENT)
Dept: GASTROENTEROLOGY | Facility: AMBULARY SURGERY CENTER | Age: 82
End: 2022-09-28

## 2022-09-28 VITALS
HEIGHT: 61 IN | SYSTOLIC BLOOD PRESSURE: 132 MMHG | HEART RATE: 64 BPM | BODY MASS INDEX: 42.71 KG/M2 | OXYGEN SATURATION: 98 % | DIASTOLIC BLOOD PRESSURE: 74 MMHG | WEIGHT: 226.2 LBS

## 2022-09-28 DIAGNOSIS — K44.9 HIATAL HERNIA: ICD-10-CM

## 2022-09-28 DIAGNOSIS — R93.3 ABNORMAL CT SCAN, ESOPHAGUS: Primary | ICD-10-CM

## 2022-09-28 DIAGNOSIS — R13.19 ESOPHAGEAL DYSPHAGIA: ICD-10-CM

## 2022-09-28 PROCEDURE — 99214 OFFICE O/P EST MOD 30 MIN: CPT | Performed by: PHYSICIAN ASSISTANT

## 2022-09-28 NOTE — TELEPHONE ENCOUNTER
Patient is scheduled for EGD on January 16 , 2023 at [facility] with Gema De Paz MD  Patient is aware of pre-procedure prep of Nothing to eat of drink after midnight and they will be called the day prior between 2 and 6 pm for time to report for procedure  Pre-procedure prep has been given to the patient  in person  on September 28 , 2022

## 2022-09-28 NOTE — TELEPHONE ENCOUNTER
Our mutual patient is scheduled for procedure: EGD    On: January 16 , 2023     With: Dr Alan Brewster MD    He/She is taking the following blood thinner: Eliquis (Apixaban)    Can this be stopped  2 days prior to the procedure    Physician Approving clearance:  Dr Aura Pratt

## 2022-09-28 NOTE — PATIENT INSTRUCTIONS
Scheduled date of EGD(as of today): 1/16/2022  Physician performing EGD: Dr Carlos Ray  Location of EGD: [facility]  Instructions reviewed with patient by: Jose Antonio Mancilla  Clearances:  [facility]

## 2022-09-28 NOTE — ASSESSMENT & PLAN NOTE
Patient noted on CT scan during recent hospitalization with moderate-sized hiatal hernia, debris-filled esophagus, also reports longstanding symptoms of intermittent dysphagia to solid foods; possibly related to hiatal hernia itself but rule out underlying precancerous or malignant changes in the esophagus, she has never had EGD    -plan for EGD, schedule to be done in the hospital    -procedure was explained in detail to the patient at this time including associated risks and benefits, risks including but not limited to infection, perforation bleeding    -advised patient to chew her food thoroughly, take small bites, pace herself with eating    -advised patient to discuss with the cardiologist to obtain clearance to hold Eliquis for 48 hours prior to procedures, patient says she has gotten clearance to hold Eliquis prior to other procedures in the past

## 2022-09-28 NOTE — PROGRESS NOTES
Follow-up Note -  [facility]  Heaven Venkateshraffy 1940 [de-identified] y o  female         Reason:  Follow-up; recent hospitalization with nausea and vomiting, abnormal esophagus CT scan, dysphagia, hiatal hernia    HPI:  53-year-old female with history of CHF, COPD, DVT anticoagulated with Eliquis, BMI 42 who presents for follow-up, she was hospitalized earlier this month after sustaining a fall about a week earlier, and had also developed symptoms of nausea and upper abdominal pain the day before she came to the hospital, which was felt to be possibly infectious gastroenteritis or food-borne illness  CT scan of the chest abdomen and pelvis showed evidence of a moderate-sized hiatal hernia and debris throughout her esophagus  She had said that she had never had an EGD, and that her last colonoscopy was between 5 and 10 years ago and was normal   Her imaging also showed gallstones and pancreatic atrophy  Patient tells me at this point she is feeling well, she is no longer having nausea and vomiting and her stomach feels good  She says that for a long time she has occasionally noticed food sticking in the throat with trying to swallow, requiring regurgitation  She says this happens maybe a couple times a week, she otherwise swallows normally between such episodes  She denies any odynophagia  Denies any unintentional weight loss, denies any disturbances to her bowel habits  She says she uses CPAP, denies any supplemental oxygen dependence  Again she takes Eliquis which is managed by her cardiologist     REVIEW OF SYSTEMS:      CONSTITUTIONAL: Denies any fever, chills, or rigors  Good appetite, and no recent weight loss  HEENT: No earache or tinnitus  Denies hearing loss or visual disturbances  CARDIOVASCULAR: No chest pain or palpitations  RESPIRATORY: Denies any cough, hemoptysis, shortness of breath or dyspnea on exertion  GASTROINTESTINAL: As noted in the History of Present Illness  GENITOURINARY: No problems with urination  Denies any hematuria or dysuria  NEUROLOGIC: No dizziness or vertigo, denies headaches  MUSCULOSKELETAL: Denies any muscle or joint pain  SKIN: Denies skin rashes or itching  ENDOCRINE: Denies excessive thirst  Denies intolerance to heat or cold  PSYCHOSOCIAL: Denies depression or anxiety  Denies any recent memory loss  Past Medical History:   Diagnosis Date    Anxiety     Arthritis     [facility]     Chronic venous insufficiency     COPD [facility]chronic obstructive pulmonary disease) [facility]     Coronary artery disease     Diabetes mellitus ([facility] [address] )     type II    Disease of thyroid gland     hypothyroidism    DVT (deep venous thrombosis) ([facility])     HL (hearing loss)     Hyperlipidemia     Hypertension     Irregular heart beat     Paroxysmal a-fib    Pacemaker     Renal disorder     stage 3    Sleep apnea     Sleep difficulties     Thyroid disease     Urinary incontinence     Uterine leiomyoma       Past Surgical History:   Procedure Laterality Date    BREAST SURGERY      For Biopsy    CARDIAC PACEMAKER PLACEMENT      Permanent  Last assessed: 2/19/16    CARDIAC SURGERY      CARPAL TUNNEL RELEASE Bilateral     CATARACT EXTRACTION      CATARACT EXTRACTION W/ INTRAOCULAR LENS  IMPLANT, BILATERAL      CYSTOSCOPY      Diagnostic   Last assessed: 11/8/17    ECTOPIC PREGNANCY SURGERY      HYSTERECTOMY      Total Abdomonal  Resolved: 1982    JOINT REPLACEMENT Right     TKR    OOPHORECTOMY      [place] ENDOVENOUS LASER, 1ST VEIN Left 2/17/2017    Procedure: GREATER SAPHENOUS VEIN ENDOVASCULAR LASER THERAPY ;  Surgeon: Maximus Boucher DO;  Location: [facility];  Service: Vascular    TONSILLECTOMY       Social History     Socioeconomic History    Marital status: /Civil Union     Spouse name: Not on file    Number of children: 3    Years of education: Not on file    Highest education level: Not on file   Occupational History    Occupation: RETIRED   Tobacco Use    Smoking status: Never Smoker    Smokeless tobacco: Never Used    Tobacco comment: Per Allscri[employer]s: Former smoker   Vaping Use    Vaping Use: Never used   Substance and Sexual Activity    Alcohol use: Yes     Comment: rarely   Per Allscripts: Denied history of alcohol use    Drug use: No    Sexual activity: Never   Other Topics Concern    Not on file   Social History Narrative    Daily coffee consumption: 2 cups/day     Social Determinants of Health     Financial Resource Strain: Not on file   Food Insecurity: Not on file   Transportation Needs: Not on file   Physical Activity: Not on file   Stress: Not on file   Social Connections: Not on file   Intimate Partner Violence: Not on file   Housing Stability: Not on file     Family History   Problem Relation Age of Onset    Hypertension Father     Diabetes Father         Mellitus    Coronary artery disease Mother     Hypertension Mother     Heart disease Mother     Diabetes Brother         Mellitus    Breast cancer Maternal Aunt     Pancreatic cancer Maternal Aunt     Breast cancer Cousin     Breast cancer Cousin     No Known Problems Sister     No Known Problems Daughter     No Known Problems Maternal Grandmother     No Known Problems Maternal Grandfather     No Known Problems Paternal Grandmother     No Known Problems Paternal Grandfather     No Known Problems Daughter     No Known Problems Son      Ampicillin  Current Outpatient Medications   Medication Sig Dispense Refill    apixaban (Eliquis) 5 mg Take 1 tablet (5 mg total) by mouth 2 (two) times a day 56 tablet 0    Ascorbic Acid (VITAMIN C) 1000 MG tablet Take 500 mg by mouth       atorvastatin (LIPITOR) 80 mg tablet TAKE 1 TABLET AT BEDTIME 90 tablet 3    Calcium Carbonate-Vit D-Min (CALCIUM 1200 PO) Take by mouth      cholecalciferol (VITAMIN D3) 1,000 units tablet Take 2,000 Units by mouth daily       citalopram (CeleXA) 20 mg tablet TAKE 1 TABLET EVERY DAY 90 tablet 3    Continuous Blood Gluc  (FREESTYLE RONNI 14 DAY READER) GARRY Use to check BG levels 4+times daily and as needed 1 Device 0    Continuous Blood Gluc Sensor (FREESTYLE RONNI 14 DAY SENSOR) MISC Change sensor every 14 days to check BG levels 4+ times daily 6 each 3    glucose blood (TRUE METRIX BLOOD GLUCOSE TEST) test strip Test 4 times plus daily, as instructed 400 each 1    insulin aspart (NovoLOG FlexPen) 100 UNIT/ML injection pen INJECT 8 UNITS SUBCUTANEOUSLY WITH BREAKFAST, THEN 10 UNITS WITH LUNCH, AND THEN 10 UNITS WITH DINNER 15 mL 1    insulin glargine (Toujeo SoloStar) 300 units/mL CONCENTRATED U-300 injection pen (1-unit dial) Inject 36 Units under the skin daily at bedtime 12 mL 1    Insulin Pen Needle (Pen Needles) 32G X 6 MM MISC Use 4 times a day 400 each 1    Lancets (onetouch ultrasoft) lancets Use 4 times a day 400 each 0    levothyroxine 125 mcg tablet TAKE 1 TABLET ONE TIME DAILY EXCEPT ON SUNDAY NO TABLET 78 tablet 1    lisinopril (ZESTRIL) 20 mg tablet Take 1 tablet (20 mg total) by mouth 2 (two) times a day 180 tablet 3    metoprolol tartrate (LOPRESSOR) 25 mg tablet TAKE 1 TABLET EVERY 12 HOURS 180 tablet 3    Omega-3 Fatty Acids (FISH OIL) 1200 MG CAPS Take 1,200 mg by mouth 2 (two) times a day       omeprazole (PriLOSEC OTC) 20 MG tablet Take 1 tablet (20 mg total) by mouth 2 (two) times a day 60 tablet 0    oxybutynin (DITROPAN) 5 mg tablet TAKE 1 TABLET TWICE DAILY 180 tablet 1    Tetrahydroz-Polyvinyl Al-Povid (MURINE TEARS PLUS OP) Apply to eye 3 (three) times a day      acetaminophen (TYLENOL) 325 mg tablet Take 650 mg by mouth (Patient not taking: No sig reported)      furosemide (LASIX) 20 mg tablet Take 1 tablet (20 mg total) by mouth as needed (as needed for leg swelling) (Patient not taking: No sig reported) 30 tablet 2    GLUCOSAMINE CHONDROITIN COMPLX PO Take 1 tablet by mouth 2 (two) times a day (Patient not taking: No sig reported)      Promethazine-DM (PHENERGAN-DM) 6 25-15 mg/5 mL oral syrup Take 5 mL by mouth 4 (four) times a day as needed for cough (Patient not taking: No sig reported) 300 mL 2    spironolactone (ALDACTONE) 25 mg tablet Take 1 tablet (25 mg total) by mouth daily (Patient not taking: Reported on 9/28/2022) 30 tablet 5     No current facility-administered medications for this visit  Blood pressure 132/74, pulse 64, height 5' 1" (1 549 m), weight 103 kg (226 lb 3 2 oz), SpO2 98 %, not currently breastfeeding  PHYSICAL EXAM:      General Appearance:   Alert, cooperative, no distress, appears stated age    HEENT:   Normocephalic, atraumatic, anicteric      Neck:  Supple, symmetrical, trachea midline, no adenopathy;    thyroid: no enlargement/tenderness/nodules; no carotid  bruit or JVD    Lungs:   Clear to auscultation bilaterally; no rales, rhonchi or wheezing; respirations unlabored    Heart[de-identified]   S1 and S2 normal; regular rate and rhythm; no murmur, rub, or gallop     Abdomen:   Soft, non-tender, non-distended; normal bowel sounds; no masses, no organomegaly    Extremities: No edema, erythema, wounds, rashes   Rectal:   Deferred                      Lab Results   Component Value Date    WBC 6 78 09/14/2022    HGB 12 0 09/14/2022    HCT 39 9 09/14/2022     (H) 09/14/2022     (L) 09/14/2022     Lab Results   Component Value Date    GLUCOSE 220 (H) 10/23/2015    CALCIUM 9 3 09/19/2022     12/06/2016    K 4 3 09/19/2022    CO2 26 09/19/2022     (H) 09/19/2022    BUN 25 09/19/2022    CREATININE 1 23 09/19/2022     Lab Results   Component Value Date    ALT 26 09/14/2022    AST 18 09/14/2022    ALKPHOS 86 09/14/2022    BILITOT 0 6 09/06/2016     Lab Results   Component Value Date    INR 1 21 (H) 02/14/2017    INR 1 1 12/06/2016    INR 2 1 (H) 06/27/2016    PROTIME 15 0 (H) 02/14/2017    PROTIME 11 3 12/06/2016    PROTIME 21 4 (H) 06/27/2016       XR scapula left    Result Date: 9/2/2022  Impression: No acute osseous abnormality  Degenerative changes as described  Workstation performed: [id number]     XR shoulder 2+ vw left    Result Date: 9/2/2022  Impression: No acute osseous abnormality  Degenerative changes as described  Workstation performed: [id number]     CT head without contrast    Result Date: 9/2/2022  Impression: No acute intracranial abnormality  Extensive right-sided paranasal sinus disease  Workstation performed: [id number]     US abdomen complete    Result Date: 9/2/2022  Impression: Cholelithiasis without acute cholecystitis  Workstation performed: [id number]     CT chest abdomen pelvis w contrast    Result Date: 9/2/2022  Impression: No acute posttraumatic abnormality in the chest, abdomen or pelvis  Moderate hiatal hernia, with mottled debris diffusely in the esophagus which may be related to reflux or esophageal dysmotility  Cholelithiasis  Colonic diverticulosis   Workstation performed: [id number]       ASSESSMENT & PLAN:    Abnormal CT scan, esophagus  Patient noted on CT scan during recent hospitalization with moderate-sized hiatal hernia, debris-filled esophagus, also reports longstanding symptoms of intermittent dysphagia to solid foods; possibly related to hiatal hernia itself but rule out underlying precancerous or malignant changes in the esophagus, she has never had EGD    -plan for EGD, schedule to be done in the hospital    -procedure was explained in detail to the patient at this time including associated risks and benefits, risks including but not limited to infection, perforation bleeding    -advised patient to chew her food thoroughly, take small bites, pace herself with eating    -advised patient to discuss with the cardiologist to obtain clearance to hold Eliquis for 48 hours prior to procedures, patient says she has gotten clearance to hold Eliquis prior to other procedures in the past

## 2022-10-03 ENCOUNTER — TELEPHONE (OUTPATIENT)
Dept: FAMILY MEDICINE CLINIC | Facility: CLINIC | Age: 82
End: 2022-10-03

## 2022-10-03 DIAGNOSIS — K44.9 HIATAL HERNIA: ICD-10-CM

## 2022-10-03 RX ORDER — OMEPRAZOLE 20 MG/1
20 TABLET, DELAYED RELEASE ORAL 2 TIMES DAILY
Qty: 60 TABLET | Refills: 0 | Status: SHIPPED | OUTPATIENT
Start: 2022-10-03 | End: 2022-10-04 | Stop reason: SDUPTHER

## 2022-10-03 NOTE — TELEPHONE ENCOUNTER
Pt is supposed to have an EGD in the future  If it is OK, then we can try to get her off of the omeprazole    I called in a 1m supply to [facility] - whether it will need to be taken longer will be based on the EGD result

## 2022-10-03 NOTE — TELEPHONE ENCOUNTER
Patient called asking if she should continue taking medication prilosec  She states that if she is supposed to take it daily, then she would like a 90 day supply sent to [facility]  Otherwise she is asking for a refill sent to [facility]

## 2022-10-04 ENCOUNTER — PATIENT OUTREACH (OUTPATIENT)
Dept: FAMILY MEDICINE CLINIC | Facility: CLINIC | Age: 82
End: 2022-10-04

## 2022-10-04 ENCOUNTER — REMOTE DEVICE CLINIC VISIT (OUTPATIENT)
Dept: CARDIOLOGY CLINIC | Facility: CLINIC | Age: 82
End: 2022-10-04
Payer: MEDICARE

## 2022-10-04 DIAGNOSIS — Z95.0 CARDIAC PACEMAKER IN SITU: Primary | ICD-10-CM

## 2022-10-04 DIAGNOSIS — K44.9 HIATAL HERNIA: ICD-10-CM

## 2022-10-04 PROCEDURE — 93296 REM INTERROG EVL PM/IDS: CPT | Performed by: INTERNAL MEDICINE

## 2022-10-04 PROCEDURE — 93294 REM INTERROG EVL PM/LDLS PM: CPT | Performed by: INTERNAL MEDICINE

## 2022-10-04 RX ORDER — OMEPRAZOLE 20 MG/1
20 TABLET, DELAYED RELEASE ORAL 2 TIMES DAILY
Qty: 180 TABLET | Refills: 1 | Status: SHIPPED | OUTPATIENT
Start: 2022-10-04 | End: 2023-04-26

## 2022-10-04 NOTE — PROGRESS NOTES
[facility] SW placed phone call to [employer] to check status of PAP application for Toujeo  Application was approved on 9/29/22 through 12/31/22  Medication to be shipped to doctor's office in 5-7 business days  Phone call placed to [employer] for [facility], application has been received  and has been approved for the Novolog flex pen through 11/30/22 and is being shipped to doctor's office within 10-14 business days  Patient advised of same by telephone    Note routed to MD and office clinical team

## 2022-10-04 NOTE — PROGRESS NOTES
Results for orders placed or performed in visit on 10/04/22   Cardiac EP device report    Narrative    MDT-DUAL CHAMBER PPM (DDDR MODE)/ ACTIVE SYSTEM IS MRI CONDITIONAL  CARELINK TRANSMISSION: BATTERY VOLTAGE ADEQUATE (2 YRS)  AP-70%, -100% (>40% MVP OFF/CHB/DDDR @ 60 PPM)  ALL AVAILABLE LEAD PARAMETERS WITHIN NORMAL LIMITS  1 AFLUTTER EPISODE LASTING 32 SEC  HX: PAF & ON ELIQUIS & METOPROLOL  NORMAL DEVICE FUNCTION   GV

## 2022-10-04 NOTE — TELEPHONE ENCOUNTER
I would recommend that she continue this med until we get the EGD results   I called in a 90d supply to [facility] to cover her until this test is done

## 2022-10-05 ENCOUNTER — TELEPHONE (OUTPATIENT)
Dept: ENDOCRINOLOGY | Facility: CLINIC | Age: 82
End: 2022-10-05

## 2022-10-11 ENCOUNTER — LAB (OUTPATIENT)
Dept: LAB | Facility: CLINIC | Age: 82
End: 2022-10-11
Payer: MEDICARE

## 2022-10-11 DIAGNOSIS — Z79.4 TYPE 2 DIABETES MELLITUS WITH HYPERGLYCEMIA, WITH LONG-TERM CURRENT USE OF INSULIN (HCC): ICD-10-CM

## 2022-10-11 DIAGNOSIS — E11.65 TYPE 2 DIABETES MELLITUS WITH HYPERGLYCEMIA, WITH LONG-TERM CURRENT USE OF INSULIN (HCC): ICD-10-CM

## 2022-10-11 DIAGNOSIS — I10 ESSENTIAL HYPERTENSION: ICD-10-CM

## 2022-10-11 LAB
ANION GAP SERPL CALCULATED.3IONS-SCNC: 3 MMOL/L (ref 4–13)
BUN SERPL-MCNC: 28 MG/DL (ref 5–25)
CALCIUM SERPL-MCNC: 9.5 MG/DL (ref 8.3–10.1)
CHLORIDE SERPL-SCNC: 107 MMOL/L (ref 96–108)
CO2 SERPL-SCNC: 30 MMOL/L (ref 21–32)
CREAT SERPL-MCNC: 1.19 MG/DL (ref 0.6–1.3)
EST. AVERAGE GLUCOSE BLD GHB EST-MCNC: 154 MG/DL
GFR SERPL CREATININE-BSD FRML MDRD: 43 ML/MIN/1.73SQ M
GLUCOSE P FAST SERPL-MCNC: 104 MG/DL (ref 65–99)
HBA1C MFR BLD: 7 %
POTASSIUM SERPL-SCNC: 4.2 MMOL/L (ref 3.5–5.3)
SODIUM SERPL-SCNC: 140 MMOL/L (ref 135–147)

## 2022-10-11 PROCEDURE — 83036 HEMOGLOBIN GLYCOSYLATED A1C: CPT

## 2022-10-11 PROCEDURE — 36415 COLL VENOUS BLD VENIPUNCTURE: CPT

## 2022-10-11 PROCEDURE — 80048 BASIC METABOLIC PNL TOTAL CA: CPT

## 2022-10-12 ENCOUNTER — OFFICE VISIT (OUTPATIENT)
Dept: ENDOCRINOLOGY | Facility: CLINIC | Age: 82
End: 2022-10-12
Payer: MEDICARE

## 2022-10-12 VITALS
SYSTOLIC BLOOD PRESSURE: 140 MMHG | HEART RATE: 64 BPM | TEMPERATURE: 97.7 F | BODY MASS INDEX: 42.29 KG/M2 | DIASTOLIC BLOOD PRESSURE: 84 MMHG | WEIGHT: 224 LBS | HEIGHT: 61 IN

## 2022-10-12 DIAGNOSIS — I10 PRIMARY HYPERTENSION: ICD-10-CM

## 2022-10-12 DIAGNOSIS — E78.2 MIXED HYPERLIPIDEMIA: ICD-10-CM

## 2022-10-12 DIAGNOSIS — E11.65 TYPE 2 DIABETES MELLITUS WITH HYPERGLYCEMIA, WITH LONG-TERM CURRENT USE OF INSULIN (HCC): Primary | ICD-10-CM

## 2022-10-12 DIAGNOSIS — E03.9 ACQUIRED HYPOTHYROIDISM: Chronic | ICD-10-CM

## 2022-10-12 DIAGNOSIS — Z79.4 TYPE 2 DIABETES MELLITUS WITH HYPERGLYCEMIA, WITH LONG-TERM CURRENT USE OF INSULIN (HCC): Primary | ICD-10-CM

## 2022-10-12 PROCEDURE — 99214 OFFICE O/P EST MOD 30 MIN: CPT | Performed by: PHYSICIAN ASSISTANT

## 2022-10-12 PROCEDURE — 95251 CONT GLUC MNTR ANALYSIS I&R: CPT | Performed by: PHYSICIAN ASSISTANT

## 2022-10-12 RX ORDER — PEN NEEDLE, DIABETIC 32GX 5/32"
NEEDLE, DISPOSABLE MISCELLANEOUS
Qty: 400 EACH | Refills: 1 | Status: SHIPPED | OUTPATIENT
Start: 2022-10-12

## 2022-10-12 NOTE — PATIENT INSTRUCTIONS
Hypoglycemia instructions   Lizzie L Maria Dolores  10/12/2022  [id number]    Low Blood Sugar    Steps to treat low blood sugar  1  Test blood sugar if you have symptoms of low blood sugar:   Low Blood Sugar Symptoms:  o Sweaty  o Dizzy  o Rapid heartbeat  o Shaky  o Bad mood  o Hungry      2  Treat blood sugar less than 70 with 15 grams of fast-acting carbohydrate:   Examples of 15 grams Fast-Acting Carbohydrate:  o 4 oz juice  o 4 oz regular soda  o 3-4 glucose tablets (chew)  o 3-4 hard candies (chew)          3  Wait 15 minutes and test your blood sugar again     4   If blood sugar is less than 100, repeat steps 2-3     5  When your blood sugar is 100 or more, eat a snack if it will be longer than one hour until your next meal  The snack should be 15 grams of carbohydrate and a protein:   Examples of snacks:  o ½ sandwich  o 6 crackers with cheese  o Piece of fruit with cheese or peanut butter  o 6 crackers with peanut butter

## 2022-10-12 NOTE — PROGRESS NOTES
Patient Progress Note      CC: Dm      Referring Provider  No referring provider defined for this encounter  History of Present Illness:   Jens Lara is a [de-identified] y o  female with a history of type 2 diabetes with long term use of insulin  Diabetes course has been stable  Complications of DM: CAD, CKD, retinopathy  Patient was hospitalized in September 2022 for weakness and  nausea/vomiting  Denies recent severe hypoglycemic or severe hyperglycemic episodes  Denies any issues with her current regimen  Home glucose monitoring: are performed regularly, Freestyle Tamia     Average glucose 158 mg/dl  In target range 68%, above range 31%, below range 1%     Current regimen: Toujeo 36 units QHS, NovoLog 8-10-10 units 3 times a day with meals plus scale  compliant most of the time, denies any side effects from medications  Injects in: abdomen, thigh  Rotates sites: Yes  Hypoglycemic episodes: No, rare  H/o of hypoglycemia causing hospitalization or Intervention such as glucagon injection  or ambulance call :  No  Hypoglycemia symptoms: jitteriness, weakness  Treatment of hypoglycemia: orange juice     Medic alert tag: recommended: Yes     Diet: 3 meals per day, 2-3 snacks per day  Timing of meals is predictable  Diabetic diet compliance:  noncompliant some of the time  Activity: Daily activity is predictable: Yes  She has PT/OT up to twice a week     Ophthamology: sees retina specialist every 6-8 weeks  Podiatry: foot exam UTD, Dr Pia Tubbs (has appointment next week)     Has hypertension: on ACE inhibitor/ARB, compliant most of the time  Has hyperlipidemia: on statin - tolerating well, no myalgias  compliant most of the time, denies any side effects from medications  Thyroid disorders:  Hypothyroidism  Patient is taking levothyroxine 125 mcg 1 tablet daily Monday through Saturday and no tablet on Sunday    Patient is taking medication on an empty stomach 1 hour before breakfast and at least 4 hours apart from supplements  Patient is tolerating medication well    History of pancreatitis: No    Patient Active Problem List   Diagnosis   • Type 2 diabetes mellitus with hyperglycemia, with long-term current use of insulin ([facility])   • Class 2 severe obesity with serious comorbidity in adult [facility])   • Complete heart block ([facility])   • Anemia   • S/P cardiac pacemaker procedure   • Hypothyroidism   • Chronic diastolic congestive heart failure ([facility])   • Chronic venous insufficiency   • Benign hypertension with chronic kidney disease, stage III ([facility])   • Stage 3a chronic kidney disease ([facility])   • Iron deficiency anemia   • Microalbuminuria   • Lymphedema of both lower extremities   • Vitamin D deficiency   • Anxiety disorder   • Urinary bladder incontinence   • Chronic obstructive pulmonary disease ([facility])   • Eczematous dermatitis   • Mixed hyperlipidemia   • OAB (overactive bladder)   • Presence of permanent cardiac pacemaker   • PAF (paroxysmal atrial fibrillation) ([facility])   • 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 neurological manifestations, uncontrolled(250 62)   • 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 ([facility])   • Intraocular pressure increase   • Sleep apnea   • Dermatitis   • Coronary artery disease involving native coronary artery of native heart   • Memory difficulties   • Imbalance   • Secondary hyperparathyroidism of renal origin ([facility] [address] )   • Fall   • Hypertension   • Abnormal CT scan, esophagus   • Hiatal hernia   • Esophageal dysphagia      Past Medical History:   Diagnosis Date   • Anxiety    • Arthritis    • CHF (congestive heart failure) ([facility])    • Chronic venous insufficiency    • COPD (chronic obstructive pulmonary disease) ([facility])    • Coronary artery disease    • Diabetes mellitus ([facility] [address] )     type II   • Disease of thyroid gland     hypothyroidism   • DVT (deep venous thrombosis) ([facility])    • HL (hearing loss)    • Hyperlipidemia    • Hypertension    • Irregular heart beat     Paroxysmal a-fib   • Pacemaker    • Renal disorder     stage 3   • Sleep apnea    • Sleep difficulties    • Thyroid disease    • Urinary incontinence    • Uterine leiomyoma       Past Surgical History:   Procedure Laterality Date   • BREAST SURGERY      For Biopsy   • CARDIAC PACEMAKER PLACEMENT      Permanent  Last assessed: 2/19/16   • CARDIAC SURGERY     • CARPAL TUNNEL RELEASE Bilateral    • CATARACT EXTRACTION     • CATARACT EXTRACTION W/ INTRAOCULAR LENS  IMPLANT, BILATERAL     • CYSTOSCOPY      Diagnostic  Last assessed: 11/8/17   • ECTOPIC PREGNANCY SURGERY     • HYSTERECTOMY      Total Abdomonal  Resolved: 1982   • JOINT REPLACEMENT Right     TKR   • OOPHORECTOMY     • DE ENDOVENOUS LASER, 1ST VEIN Left 2/17/2017    Procedure: GREATER SAPHENOUS VEIN ENDOVASCULAR LASER THERAPY ;  Surgeon: Maximus Boucher DO;  Location: [facility];  Service: Vascular   • TONSILLECTOMY        Family History   Problem Relation Age of Onset   • Hypertension Father    • Diabetes Father         Mellitus   • Coronary artery disease Mother    • Hypertension Mother    • Heart disease Mother    • Diabetes Brother         Mellitus   • Breast cancer Maternal Aunt    • Pancreatic cancer Maternal Aunt    • Breast cancer Cousin    • Breast cancer Cousin    • No Known Problems Sister    • No Known Problems Daughter    • No Known Problems Maternal Grandmother    • No Known Problems Maternal Grandfather    • No Known Problems Paternal Grandmother    • No Known Problems Paternal Grandfather    • No Known Problems Daughter    • No Known Problems Son      Social History     Tobacco Use   • Smoking status: Never Smoker   • Smokeless tobacco: Never Used   • Tobacco comment: Per [employer]: Former smoker   Substance Use Topics   • Alcohol use: Yes     Comment: rarely   Per [employer]: Denied history of alcohol use     Allergies   Allergen Reactions   • Ampicillin Hives         Current Outpatient Medications:   • acetaminophen (TYLENOL) 325 mg tablet, Take 650 mg by mouth, Disp: , Rfl:   •  apixaban (Eliquis) 5 mg, Take 1 tablet (5 mg total) by mouth 2 (two) times a day, Disp: 56 tablet, Rfl: 0  •  Ascorbic Acid (VITAMIN C) 1000 MG tablet, Take 500 mg by mouth , Disp: , Rfl:   •  atorvastatin (LIPITOR) 80 mg tablet, TAKE 1 TABLET AT BEDTIME, Disp: 90 tablet, Rfl: 3  •  Calcium Carbonate-Vit D-Min (CALCIUM 1200 PO), Take by mouth, Disp: , Rfl:   •  cholecalciferol (VITAMIN D3) 1,000 units tablet, Take 2,000 Units by mouth daily , Disp: , Rfl:   •  citalopram (CeleXA) 20 mg tablet, TAKE 1 TABLET EVERY DAY, Disp: 90 tablet, Rfl: 3  •  Continuous Blood Gluc  (FREESTYLE RONNI 14 DAY READER) GARRY, Use to check BG levels 4+times daily and as needed, Disp: 1 Device, Rfl: 0  •  Continuous Blood Gluc Sensor (ButlrSTYLE RONNI 14 DAY SENSOR) MISC, Change sensor every 14 days to check BG levels 4+ times daily, Disp: 6 each, Rfl: 3  •  GLUCOSAMINE CHONDROITIN COMPLX PO, Take 1 tablet by mouth 2 (two) times a day, Disp: , Rfl:   •  glucose blood (TRUE METRIX BLOOD GLUCOSE TEST) test strip, Test 4 times plus daily, as instructed, Disp: 400 each, Rfl: 1  •  insulin aspart (NovoLOG FlexPen) 100 UNIT/ML injection pen, INJECT 8 UNITS SUBCUTANEOUSLY WITH BREAKFAST, THEN 10 UNITS WITH LUNCH, AND THEN 10 UNITS WITH DINNER, Disp: 15 mL, Rfl: 1  •  insulin glargine (Toujeo SoloStar) 300 units/mL CONCENTRATED U-300 injection pen (1-unit dial), Inject 36 Units under the skin daily at bedtime, Disp: 12 mL, Rfl: 1  •  Insulin Pen Needle (BD Pen Needle Sasha U/F) 32G X 4 MM MISC, Inject 4 times a day, Disp: 400 each, Rfl: 1  •  Lancets (onetouch ultrasoft) lancets, Use 4 times a day, Disp: 400 each, Rfl: 0  •  levothyroxine 125 mcg tablet, TAKE 1 TABLET ONE TIME DAILY EXCEPT ON SUNDAY NO TABLET, Disp: 78 tablet, Rfl: 1  •  lisinopril (ZESTRIL) 20 mg tablet, Take 1 tablet (20 mg total) by mouth 2 (two) times a day, Disp: 180 tablet, Rfl: 3  • metoprolol tartrate (LOPRESSOR) 25 mg tablet, TAKE 1 TABLET EVERY 12 HOURS, Disp: 180 tablet, Rfl: 3  •  Omega-3 Fatty Acids (FISH OIL) 1200 MG CAPS, Take 1,200 mg by mouth 2 (two) times a day , Disp: , Rfl:   •  omeprazole (PriLOSEC OTC) 20 MG tablet, Take 1 tablet (20 mg total) by mouth 2 (two) times a day, Disp: 180 tablet, Rfl: 1  •  oxybutynin (DITROPAN) 5 mg tablet, TAKE 1 TABLET TWICE DAILY, Disp: 180 tablet, Rfl: 1  •  Tetrahydroz-Polyvinyl Al-Povid (MURINE TEARS PLUS OP), Apply to eye 3 (three) times a day, Disp: , Rfl:   •  furosemide (LASIX) 20 mg tablet, Take 1 tablet (20 mg total) by mouth as needed (as needed for leg swelling) (Patient not taking: No sig reported), Disp: 30 tablet, Rfl: 2  •  Promethazine-DM (PHENERGAN-DM) 6 25-15 mg/5 mL oral syrup, Take 5 mL by mouth 4 (four) times a day as needed for cough (Patient not taking: No sig reported), Disp: 300 mL, Rfl: 2  •  spironolactone (ALDACTONE) 25 mg tablet, Take 1 tablet (25 mg total) by mouth daily (Patient not taking: No sig reported), Disp: 30 tablet, Rfl: 5  Review of Systems   Constitutional: Negative for activity change, appetite change, fatigue and unexpected weight change  HENT: Negative for trouble swallowing  Eyes: Positive for visual disturbance  Respiratory: Negative for shortness of breath  Cardiovascular: Negative for chest pain and palpitations  Gastrointestinal: Negative for constipation and diarrhea  Endocrine: Negative for polydipsia and polyuria  Musculoskeletal: Positive for arthralgias (age-related)  Skin: Negative for wound  Neurological: Negative for numbness  Psychiatric/Behavioral: Negative  Physical Exam:  Body mass index is 42 32 kg/m²    /84   Pulse 64   Temp 97 7 °F (36 5 °C) (Skin)   Ht 5' 1" (1 549 m)   Wt 102 kg (224 lb)   BMI 42 32 kg/m²    Wt Readings from Last 3 Encounters:   10/12/22 102 kg (224 lb)   09/28/22 103 kg (226 lb 3 2 oz)   09/15/22 101 kg (222 lb)       Physical Exam  Vitals and nursing note reviewed  Constitutional:       Appearance: She is well-developed  HENT:      Head: Normocephalic  Eyes:      General: No scleral icterus  Pupils: Pupils are equal, round, and reactive to light  Neck:      Thyroid: No thyromegaly  Cardiovascular:      Rate and Rhythm: Normal rate and regular rhythm  Pulses:           Radial pulses are 2+ on the right side and 2+ on the left side  Heart sounds: No murmur heard  Pulmonary:      Effort: Pulmonary effort is normal  No respiratory distress  Breath sounds: Normal breath sounds  No wheezing  Musculoskeletal:      Cervical back: Neck supple  Skin:     General: Skin is warm and dry  Neurological:      Mental Status: She is alert  Patient's shoes and socks were not removed  Labs:   Component      Latest Ref Rng & Units 2/16/2022 3/11/2022 6/29/2022   Sodium      135 - 147 mmol/L      Potassium      3 5 - 5 3 mmol/L      Chloride      96 - 108 mmol/L      CO2      21 - 32 mmol/L      Anion Gap      4 - 13 mmol/L      BUN      5 - 25 mg/dL      Creatinine      0 60 - 1 30 mg/dL      Glucose, Random      65 - 140 mg/dL      Calcium      8 3 - 10 1 mg/dL      eGFR      ml/min/1 73sq m      GLUCOSE FASTING      65 - 99 mg/dL      Cholesterol      See Comment mg/dL  140    Triglycerides      See Comment mg/dL  56    HDL      >=50 mg/dL  67    LDL Calculated      0 - 100 mg/dL  62    Non-HDL Cholesterol      mg/dl  73    EXT Creatinine Urine      mg/dL 50 8     MICROALBUM ,U,RANDOM      0 0 - 20 0 mg/L 396 0 (H)     MICROALBUMIN/CREATININE RATIO      0 - 30 mg/g creatinine 780 (H)     Hemoglobin A1C      Normal 3 8-5 6%; PreDiabetic 5 7-6 4%;  Diabetic >=6 5%; Glycemic control for adults with diabetes <7 0% % 7 1 (H)  6 6 (A)   eAG, EST AVG Glucose      mg/dl 157     Free T4      0 76 - 1 46 ng/dL 1 06 0 97    TSH 3RD GENERATON      0 358 - 3 740 uIU/mL 15 100 (H) 11 200 (H)      Component      Latest Ref Rng & Units 8/23/2022 8/24/2022 10/11/2022   Sodium      135 - 147 mmol/L 140  140   Potassium      3 5 - 5 3 mmol/L 4 2  4 2   Chloride      96 - 108 mmol/L 109 (H)  107   CO2      21 - 32 mmol/L 28  30   Anion Gap      4 - 13 mmol/L 3 (L)  3 (L)   BUN      5 - 25 mg/dL 25  28 (H)   Creatinine      0 60 - 1 30 mg/dL 1 15  1 19   Glucose, Random      65 - 140 mg/dL 140     Calcium      8 3 - 10 1 mg/dL 9 5  9 5   eGFR      ml/min/1 73sq m 45  43   GLUCOSE FASTING      65 - 99 mg/dL   104 (H)   Cholesterol      See Comment mg/dL      Triglycerides      See Comment mg/dL      HDL      >=50 mg/dL      LDL Calculated      0 - 100 mg/dL      Non-HDL Cholesterol      mg/dl      EXT Creatinine Urine      mg/dL  23 8    MICROALBUM ,U,RANDOM      0 0 - 20 0 mg/L  209 0 (H)    MICROALBUMIN/CREATININE RATIO      0 - 30 mg/g creatinine  878 (H)    Hemoglobin A1C      Normal 3 8-5 6%; PreDiabetic 5 7-6 4%; Diabetic >=6 5%; Glycemic control for adults with diabetes <7 0% %   7 0 (H)   eAG, EST AVG Glucose      mg/dl   154   Free T4      0 76 - 1 46 ng/dL      TSH 3RD GENERATON      0 358 - 3 740 uIU/mL          Plan:    Diagnoses and all orders for this visit:    Type 2 diabetes mellitus with hyperglycemia, with long-term current use of insulin (HCC)  HGA1C 7 0%  Worsened  Treatment regimen: continue current treatment  Discussed intensive insulin regimen does increase risk for hypoglycemia  Episodes of hypoglycemia can lead to permanent disability and death  Discussed risks/complications associated with uncontrolled diabetes  Advised to adhere to diabetic diet, and recommended staying active/exercising routinely as tolerated  Keep carbohydrates consistent to limit blood glucose fluctuations  Advised to call if blood sugars less than 70 mg/dl or over 300 mg/dl  Check blood glucose 3+ times a day  Discussed symptoms and treatment of hypoglycemia     Discussed use of CGM to collect additional blood glucose data to reveal trends and patterns that can be used to optimize treatment plan  Recommended routine follow-up with podiatry and ophthalmology  Ordered blood work to complete prior to next visit  -     Hemoglobin A1C; Future  -     Basic metabolic panel; Future  -     Insulin Pen Needle (BD Pen Needle Sasha U/F) 32G X 4 MM MISC; Inject 4 times a day    Acquired hypothyroidism  TSH previously 11 200 and free T4 0 97  Dose of levothyroxine not previously changed as free T4 is normal and patient has history of afib  Monitor TFTs  -     T4, free; Future  -     TSH, 3rd generation; Future    Primary hypertension  Blood pressure adequately controlled, continue current treatment  -     Basic metabolic panel; Future    Mixed hyperlipidemia  LDL previously 62  Continue statin therapy         Discussed with the patient diagnosis and treatment and all questions fully answered  She will call me if any problems arise  Counseled patient on diagnostic results, prognosis, risk and benefit of treatment options, instruction for management, importance of treatment compliance, risk factor reduction and impressions        Vicki Guevara PA-C

## 2022-10-27 ENCOUNTER — TELEPHONE (OUTPATIENT)
Dept: FAMILY MEDICINE CLINIC | Facility: CLINIC | Age: 82
End: 2022-10-27

## 2022-10-27 NOTE — TELEPHONE ENCOUNTER
Patient was discharged from [facility] today   But she wanted to inform you that her BP was a little heena today at 152/78   Patient stated she is upset because something is going on with her daughter

## 2022-10-28 ENCOUNTER — OFFICE VISIT (OUTPATIENT)
Dept: FAMILY MEDICINE CLINIC | Facility: CLINIC | Age: 82
End: 2022-10-28

## 2022-10-28 VITALS
HEIGHT: 61 IN | SYSTOLIC BLOOD PRESSURE: 128 MMHG | DIASTOLIC BLOOD PRESSURE: 60 MMHG | WEIGHT: 226.6 LBS | BODY MASS INDEX: 42.78 KG/M2 | TEMPERATURE: 97.2 F | HEART RATE: 71 BPM

## 2022-10-28 DIAGNOSIS — Z79.4 TYPE 2 DIABETES MELLITUS WITH HYPERGLYCEMIA, WITH LONG-TERM CURRENT USE OF INSULIN (HCC): ICD-10-CM

## 2022-10-28 DIAGNOSIS — E03.9 ACQUIRED HYPOTHYROIDISM: Primary | Chronic | ICD-10-CM

## 2022-10-28 DIAGNOSIS — D69.6 PLATELETS DECREASED (HCC): ICD-10-CM

## 2022-10-28 DIAGNOSIS — E11.65 TYPE 2 DIABETES MELLITUS WITH HYPERGLYCEMIA, WITH LONG-TERM CURRENT USE OF INSULIN (HCC): ICD-10-CM

## 2022-10-28 DIAGNOSIS — J44.9 CHRONIC OBSTRUCTIVE PULMONARY DISEASE, UNSPECIFIED COPD TYPE (HCC): ICD-10-CM

## 2022-10-28 DIAGNOSIS — R41.3 MEMORY DYSFUNCTION: ICD-10-CM

## 2022-10-28 DIAGNOSIS — L30.9 ECZEMA, UNSPECIFIED TYPE: ICD-10-CM

## 2022-10-28 DIAGNOSIS — Z23 IMMUNIZATION DUE: ICD-10-CM

## 2022-10-28 DIAGNOSIS — I10 PRIMARY HYPERTENSION: ICD-10-CM

## 2022-10-28 DIAGNOSIS — I25.10 CORONARY ARTERY DISEASE INVOLVING NATIVE CORONARY ARTERY OF NATIVE HEART WITHOUT ANGINA PECTORIS: ICD-10-CM

## 2022-10-28 RX ORDER — TRIAMCINOLONE ACETONIDE 1 MG/G
CREAM TOPICAL 2 TIMES DAILY
Qty: 30 G | Refills: 4 | Status: SHIPPED | OUTPATIENT
Start: 2022-10-28 | End: 2022-11-01 | Stop reason: SDUPTHER

## 2022-10-28 NOTE — PROGRESS NOTES
Name: Diana Musa      : 1940      MRN: [id number]  Encounter Provider: Lit Alarcon MD  Encounter Date: 10/28/2022   Encounter department: [address] [place] [address] Road     1  Acquired hypothyroidism  Assessment & Plan:  Appears to be stable clinically  Check TSH  Adjust meds if TSH is not at goal  Recheck 6m        2  Type 2 diabetes mellitus with hyperglycemia, with long-term current use of insulin [facility])  Assessment & Plan:    Lab Results   Component Value Date    HGBA1C 7 0 (H) 10/11/2022   I reviewed with pt  A1C at goal  Continue present care  F/u with Endo Recheck 6m      3  Coronary artery disease involving native coronary artery of native heart without angina pectoris  Assessment & Plan:  Pt with out symptoms  Continue present care  Monitor labs  Recheck 6m      Orders:  -     Lipid panel; Future  -     CBC and differential; Future; Expected date: 10/28/2022    4  Primary hypertension  Assessment & Plan:  Well controlled  Cont present treatment  Monitor labs  Recheck 6m      Orders:  -     Lipid panel; Future  -     CBC and differential; Future; Expected date: 10/28/2022    5  Eczema, unspecified type  Assessment & Plan:  I reviewed with pt and   Start triamcinolone cream as directed bid  Moisturize  Recheck 2-3w if not improved    Orders:  -     triamcinolone (KENALOG) 0 1 % cream; Apply topically 2 (two) times a day    6  Platelets decreased ([address] )  Assessment & Plan:  No signs of bleeding  Recheck labs  7  Memory dysfunction  -     Ambulatory Referral to [facility] Care; Future    8  Chronic obstructive pulmonary disease, unspecified COPD type ([address] )  Assessment & Plan:  Breathing stable  Continue present care  Recheck 6m      9   Immunization due  -     influenza vaccine, high-dose, PF 0 7 mL (FLUZONE HIGH-DOSE)  -     Pneumococcal Conjugate Vaccine 20-valent (Pcv20)           Subjective     f/u multiple med issues  - pt states that she is doing well  - pt was recently released by the [facility]  She states that her BP was a little elevated yesterday due to stress but is better today  - pt is compliant with medications  Recent A1C was a little higher at 7 0  pt does not think that she has many low BG episodes, but  thinks that she has a couple a week  No LOC  - pt has been using her walker regularly due to imbalance  She feels much more comfortable and confident since using this more often  Pt states that family has noticed an improvement as well  - pt denies CP, palpitations, lightheadedness or other CV symptoms with or without exertion  - pt denies any new GI or  complaints  AGE symptoms have not resolved  - still with scattered body achess  - pt with pruritic areas on her back  Uses OTC moisturizers  - memory issues continue  Previous evaluation was unremarkable  Review of Systems   Constitutional: Negative  Negative for activity change, appetite change, chills, diaphoresis, fatigue and fever  HENT: Negative  Eyes: Positive for visual disturbance (chronic - unchanged)  Negative for pain, discharge, redness and itching  Respiratory: Negative  Cardiovascular: Negative  Gastrointestinal: Negative  Genitourinary: Negative  Musculoskeletal: Positive for arthralgias (chronic, unchanged  ), back pain (unchanged), gait problem (improved with walker) and myalgias  Skin: Positive for rash  Negative for color change, pallor and wound  Neurological: Negative for dizziness, tremors, syncope, speech difficulty, weakness, light-headedness, numbness and headaches  Psychiatric/Behavioral: Negative          Past Medical History:   Diagnosis Date   • Anxiety    • Arthritis    • CHF (congestive heart failure) ([facility])    • Chronic venous insufficiency    • COPD (chronic obstructive pulmonary disease) ([facility])    • Coronary artery disease    • Diabetes mellitus ([facility] )     type II   • Disease of thyroid gland     hypothyroidism   • DVT (deep venous thrombosis) (HCC)    • HL (hearing loss)    • Hyperlipidemia    • Hypertension    • Irregular heart beat     Paroxysmal a-fib   • Pacemaker    • Renal disorder     stage 3   • Sleep apnea    • Sleep difficulties    • Thyroid disease    • Urinary incontinence    • Uterine leiomyoma      Past Surgical History:   Procedure Laterality Date   • BREAST SURGERY      For Biopsy   • CARDIAC PACEMAKER PLACEMENT      Permanent  Last assessed: 2/19/16   • CARDIAC SURGERY     • CARPAL TUNNEL RELEASE Bilateral    • CATARACT EXTRACTION     • CATARACT EXTRACTION W/ INTRAOCULAR LENS  IMPLANT, BILATERAL     • CYSTOSCOPY      Diagnostic   Last assessed: 11/8/17   • ECTOPIC PREGNANCY SURGERY     • HYSTERECTOMY      Total Abdomonal  Resolved: 1982   • JOINT REPLACEMENT Right     TKR   • OOPHORECTOMY     • CA ENDOVENOUS LASER, 1ST VEIN Left 2/17/2017    Procedure: GREATER SAPHENOUS VEIN ENDOVASCULAR LASER THERAPY ;  Surgeon: Yoana Sanchez DO;  Location: [facility];  Service: Vascular   • TONSILLECTOMY       Family History   Problem Relation Age of Onset   • Hypertension Father    • Diabetes Father         Mellitus   • Coronary artery disease Mother    • Hypertension Mother    • Heart disease Mother    • Diabetes Brother         Mellitus   • Breast cancer Maternal Aunt    • Pancreatic cancer Maternal Aunt    • Breast cancer Cousin    • Breast cancer Cousin    • No Known Problems Sister    • No Known Problems Daughter    • No Known Problems Maternal Grandmother    • No Known Problems Maternal Grandfather    • No Known Problems Paternal Grandmother    • No Known Problems Paternal Grandfather    • No Known Problems Daughter    • No Known Problems Son      Social History     Socioeconomic History   • Marital status: /Civil Union     Spouse name: None   • Number of children: 3   • Years of education: None   • Highest education level: None   Occupational History   • Occupation: RETIRED   Tobacco Use   • Smoking status: Never Smoker   • Smokeless tobacco: Never Used   • Tobacco comment: Per [employer]: Former smoker   Vaping Use   • Vaping Use: Never used   Substance and Sexual Activity   • Alcohol use: Yes     Comment: rarely   Per [employer]: Denied history of alcohol use   • Drug use: No   • Sexual activity: Never   Other Topics Concern   • None   Social History Narrative    Daily coffee consumption: 2 cups/day     Social Determinants of Health     Financial Resource Strain: Not on file   Food Insecurity: Not on file   Transportation Needs: Not on file   Physical Activity: Not on file   Stress: Not on file   Social Connections: Not on file   Intimate Partner Violence: Not on file   Housing Stability: Not on file     Current Outpatient Medications on File Prior to Visit   Medication Sig   • acetaminophen (TYLENOL) 325 mg tablet Take 650 mg by mouth   • apixaban (Eliquis) 5 mg Take 1 tablet (5 mg total) by mouth 2 (two) times a day   • Ascorbic Acid (VITAMIN C) 1000 MG tablet Take 500 mg by mouth    • atorvastatin (LIPITOR) 80 mg tablet TAKE 1 TABLET AT BEDTIME   • Calcium Carbonate-Vit D-Min (CALCIUM 1200 PO) Take by mouth   • cholecalciferol (VITAMIN D3) 1,000 units tablet Take 2,000 Units by mouth daily    • citalopram (CeleXA) 20 mg tablet TAKE 1 TABLET EVERY DAY   • Continuous Blood Gluc  (FREESTYLE RONNI 14 DAY READER) GARRY Use to check BG levels 4+times daily and as needed   • Continuous Blood Gluc Sensor (FREESTYLE RONNI 14 DAY SENSOR) MISC Change sensor every 14 days to check BG levels 4+ times daily   • furosemide (LASIX) 20 mg tablet Take 1 tablet (20 mg total) by mouth as needed (as needed for leg swelling)   • GLUCOSAMINE CHONDROITIN COMPLX PO Take 1 tablet by mouth 2 (two) times a day   • glucose blood (TRUE METRIX BLOOD GLUCOSE TEST) test strip Test 4 times plus daily, as instructed   • insulin aspart (NovoLOG FlexPen) 100 UNIT/ML injection pen INJECT 8 UNITS SUBCUTANEOUSLY WITH BREAKFAST, THEN 10 UNITS WITH LUNCH, AND THEN 10 UNITS WITH DINNER   • insulin glargine (Toujeo SoloStar) 300 units/mL CONCENTRATED U-300 injection pen (1-unit dial) Inject 36 Units under the skin daily at bedtime   • Insulin Pen Needle (BD Pen Needle Sasha U/F) 32G X 4 MM MISC Inject 4 times a day   • Lancets (onetouch ultrasoft) lancets Use 4 times a day   • levothyroxine 125 mcg tablet TAKE 1 TABLET ONE TIME DAILY EXCEPT ON SUNDAY NO TABLET   • lisinopril (ZESTRIL) 20 mg tablet Take 1 tablet (20 mg total) by mouth 2 (two) times a day   • metoprolol tartrate (LOPRESSOR) 25 mg tablet TAKE 1 TABLET EVERY 12 HOURS   • Omega-3 Fatty Acids (FISH OIL) 1200 MG CAPS Take 1,200 mg by mouth 2 (two) times a day    • omeprazole (PriLOSEC OTC) 20 MG tablet Take 1 tablet (20 mg total) by mouth 2 (two) times a day   • oxybutynin (DITROPAN) 5 mg tablet TAKE 1 TABLET TWICE DAILY   • Tetrahydroz-Polyvinyl Al-Povid (MURINE TEARS PLUS OP) Apply to eye 3 (three) times a day   • Promethazine-DM (PHENERGAN-DM) 6 25-15 mg/5 mL oral syrup Take 5 mL by mouth 4 (four) times a day as needed for cough (Patient not taking: No sig reported)   • spironolactone (ALDACTONE) 25 mg tablet Take 1 tablet (25 mg total) by mouth daily (Patient not taking: No sig reported)     Allergies   Allergen Reactions   • Ampicillin Hives     Immunization History   Administered Date(s) Administered   • COVID-19 PFIZER VACCINE 0 3 ML IM 01/20/2021, 02/09/2021, 04/11/2022   • H1N1, All Formulations 01/12/2010, 01/12/2010   • INFLUENZA 11/04/2005   • Influenza Split High Dose Preservative Free IM 10/18/2012, 10/23/2013, 10/14/2014, 10/20/2015, 11/02/2016, 10/04/2017   • Influenza, high dose seasonal 0 7 mL 10/23/2018, 10/09/2019, 10/20/2020, 10/28/2022   • Influenza, seasonal, injectable 11/12/2009, 01/12/2010   • Pneumococcal Conjugate 13-Valent 10/21/2015   • Pneumococcal Conjugate Vaccine 20-valent (Pcv20), Polysace 10/28/2022   • Pneumococcal Polysaccharide PPV23 12/11/2000, 12/11/2010   • Td (adult), adsorbed 07/04/2008   • Tetanus, adsorbed 09/04/1998   • Zoster 07/22/2016       Objective     /60 (BP Location: Left arm)   Pulse 71   Temp (!) 97 2 °F (36 2 °C)   Ht 5' 1" (1 549 m)   Wt 103 kg (226 lb 9 6 oz)   BMI 42 82 kg/m²     Physical Exam  Vitals reviewed  Constitutional:       Appearance: She is well-developed  She is not diaphoretic  HENT:      Head: Normocephalic and atraumatic  Right Ear: Tympanic membrane, ear canal and external ear normal       Left Ear: Tympanic membrane, ear canal and external ear normal       Ears:      Comments: Sl decreased hearing     Mouth/Throat:      Mouth: Mucous membranes are moist    Eyes:      Extraocular Movements: Extraocular movements intact  Conjunctiva/sclera: Conjunctivae normal       Pupils: Pupils are equal, round, and reactive to light  Neck:      Thyroid: No thyromegaly  Vascular: No JVD  Cardiovascular:      Rate and Rhythm: Normal rate and regular rhythm  Pulses: Pulses are weak  Dorsalis pedis pulses are 0 on the right side and 0 on the left side  Posterior tibial pulses are 0 on the right side and 0 on the left side  Heart sounds: No murmur heard  Pulmonary:      Effort: Pulmonary effort is normal       Breath sounds: No wheezing, rhonchi or rales  Abdominal:      General: There is no distension  Palpations: Abdomen is soft  There is no mass  Tenderness: There is no abdominal tenderness  Musculoskeletal:         General: No swelling or deformity  Normal range of motion  Cervical back: Normal range of motion and neck supple  No tenderness  No muscular tenderness  Right lower leg: Edema (trace) present  Left lower leg: Edema (trace) present  Feet:      Right foot:      Skin integrity: No ulcer, skin breakdown, erythema, warmth, callus or dry skin  Left foot:      Skin integrity: No ulcer, skin breakdown, erythema, warmth, callus or dry skin  Lymphadenopathy:      Cervical: No cervical adenopathy  Skin:     General: Skin is warm and dry  Capillary Refill: Capillary refill takes less than 2 seconds  Findings: Rash present  Comments: Scattered eczematous patches over the back   Neurological:      Mental Status: She is alert and oriented to person, place, and time  Cranial Nerves: No cranial nerve deficit  Sensory: No sensory deficit  Motor: No weakness or abnormal muscle tone  Gait: Gait abnormal (Ambulates with walker)  Deep Tendon Reflexes: Reflexes are normal and symmetric  Psychiatric:         Behavior: Behavior normal          Thought Content: Thought content normal          Judgment: Judgment normal        Patient's shoes and socks removed  Right Foot/Ankle   Right Foot Inspection  Skin Exam: skin normal and skin intact  No dry skin, no warmth, no callus, no erythema, no maceration, no abnormal color, no pre-ulcer, no ulcer and no callus  Toe Exam: ROM and strength within normal limits  Sensory   Vibration: intact  Monofilament testing: intact    Vascular  Capillary refills: < 3 seconds  The right DP pulse is 0  The right PT pulse is 0  Left Foot/Ankle  Left Foot Inspection  Skin Exam: skin normal and skin intact  No dry skin, no warmth, no erythema, no maceration, normal color, no pre-ulcer, no ulcer and no callus  Toe Exam: ROM and strength within normal limits  Sensory   Vibration: intact  Monofilament testing: intact    Vascular  Capillary refills: < 3 seconds  The left DP pulse is 0  The left PT pulse is 0       Assign Risk Category  No deformity present  No loss of protective sensation  Weak pulses  Risk: 0    Tracey Greenfield MD

## 2022-10-31 NOTE — ASSESSMENT & PLAN NOTE
Lab Results   Component Value Date    HGBA1C 7 0 (H) 10/11/2022   I reviewed with pt  A1C at goal  Continue present care   F/u with Endo Recheck 6m

## 2022-10-31 NOTE — ASSESSMENT & PLAN NOTE
I reviewed with pt and   Start triamcinolone cream as directed bid  Moisturize    Recheck 2-3w if not improved

## 2022-10-31 NOTE — ASSESSMENT & PLAN NOTE
Previous evaluation without significant findings however patient continues to have difficulties  No cognitive issues noted    Refer to Geriatrics for further testing and evaluation

## 2022-11-01 DIAGNOSIS — E11.65 TYPE 2 DIABETES MELLITUS WITH HYPERGLYCEMIA, WITH LONG-TERM CURRENT USE OF INSULIN (HCC): ICD-10-CM

## 2022-11-01 DIAGNOSIS — Z79.4 TYPE 2 DIABETES MELLITUS WITH HYPERGLYCEMIA, WITH LONG-TERM CURRENT USE OF INSULIN (HCC): ICD-10-CM

## 2022-11-01 DIAGNOSIS — L30.9 ECZEMA, UNSPECIFIED TYPE: ICD-10-CM

## 2022-11-01 NOTE — TELEPHONE ENCOUNTER
Medication Refill Request     Name triamcinolone (KENALOG) 0 1 % cream  Dose/Frequency apply 2 times a day  Quantity 30 g  Verified pharmacy   [x]  Verified ordering Provider   [x]  Does patient have enough for the next 3 days? Yes [x] No []    Patient stated she is almost out of the medication  Patient stated she was prescribed the same medication through dermatology but that the cream is  and would like to know if she can still use it

## 2022-11-02 RX ORDER — TRIAMCINOLONE ACETONIDE 1 MG/G
CREAM TOPICAL 2 TIMES DAILY
Qty: 30 G | Refills: 0 | Status: SHIPPED | OUTPATIENT
Start: 2022-11-02

## 2022-11-14 ENCOUNTER — TELEPHONE (OUTPATIENT)
Dept: FAMILY MEDICINE CLINIC | Facility: CLINIC | Age: 82
End: 2022-11-14

## 2022-11-14 NOTE — TELEPHONE ENCOUNTER
Jacinta Marvin from [facility] called re: charge coding  Asking if osteoarthritis of the knees can be used for the ruma abnormality      Would like a phone call back at [phone]

## 2022-11-17 ENCOUNTER — PATIENT OUTREACH (OUTPATIENT)
Dept: FAMILY MEDICINE CLINIC | Facility: CLINIC | Age: 82
End: 2022-11-17

## 2022-11-17 NOTE — PROGRESS NOTES
OPCM SW received letter from [facility] to PCP office that Toujeo PAP will  22  New PAP application prepared and left at   Phone call to patient and message left advising that application has been left for patient to sign and patient to provide either SS statement or [phone] tax return copy to be attached to application  SW left contact information for any questions  When patient has signed application and provided proof of income, doctor to complete prescription portion and fax to [facility] at [phone]

## 2022-11-22 ENCOUNTER — TELEPHONE (OUTPATIENT)
Dept: ENDOCRINOLOGY | Facility: CLINIC | Age: 82
End: 2022-11-22

## 2022-11-30 ENCOUNTER — PATIENT OUTREACH (OUTPATIENT)
Dept: FAMILY MEDICINE CLINIC | Facility: CLINIC | Age: 82
End: 2022-11-30

## 2022-11-30 NOTE — PROGRESS NOTES
OPCM SW received voicemail from patient regarding PAP application  Return call placed to patient  Patient questioned what income verification documents are needed  SW advised that the 2021 tax return will be sufficient as there has been no filing for 2022 at this time  Patient will try to stop by office tomorrow 04/8 to complete application and provide copies

## 2022-12-05 DIAGNOSIS — Z12.31 SCREENING MAMMOGRAM FOR BREAST CANCER: Primary | ICD-10-CM

## 2022-12-05 DIAGNOSIS — R05.9 COUGH: ICD-10-CM

## 2022-12-05 RX ORDER — DEXTROMETHORPHAN HYDROBROMIDE AND PROMETHAZINE HYDROCHLORIDE 15; 6.25 MG/5ML; MG/5ML
5 SOLUTION ORAL 4 TIMES DAILY PRN
Qty: 300 ML | Refills: 2 | Status: SHIPPED | OUTPATIENT
Start: 2022-12-05

## 2022-12-06 ENCOUNTER — PATIENT OUTREACH (OUTPATIENT)
Dept: FAMILY MEDICINE CLINIC | Facility: CLINIC | Age: 82
End: 2022-12-06

## 2022-12-06 NOTE — PROGRESS NOTES
Patient presented to office to sign PAP application for [employer] for her Toujeo  Application reviewed and left for Dr Tatyana Chaudhry to complete prescription part of application  MA informed that when completed, application to be faxed with fax cover provided

## 2022-12-12 DIAGNOSIS — L30.9 ECZEMA, UNSPECIFIED TYPE: ICD-10-CM

## 2022-12-13 RX ORDER — TRIAMCINOLONE ACETONIDE 1 MG/G
CREAM TOPICAL 2 TIMES DAILY
Qty: 30 G | Refills: 0 | Status: SHIPPED | OUTPATIENT
Start: 2022-12-13 | End: 2022-12-19 | Stop reason: SDUPTHER

## 2022-12-14 ENCOUNTER — CLINICAL SUPPORT (OUTPATIENT)
Dept: FAMILY MEDICINE CLINIC | Facility: CLINIC | Age: 82
End: 2022-12-14

## 2022-12-14 ENCOUNTER — TELEPHONE (OUTPATIENT)
Dept: CARDIOLOGY CLINIC | Facility: CLINIC | Age: 82
End: 2022-12-14

## 2022-12-14 DIAGNOSIS — Z11.52 ENCOUNTER FOR SCREENING FOR COVID-19: Primary | ICD-10-CM

## 2022-12-14 DIAGNOSIS — U07.1 COVID-19: Primary | ICD-10-CM

## 2022-12-14 LAB
SARS-COV-2 AG UPPER RESP QL IA: POSITIVE
VALID CONTROL: ABNORMAL

## 2022-12-14 RX ORDER — NIRMATRELVIR AND RITONAVIR 150-100 MG
2 KIT ORAL 2 TIMES DAILY
Qty: 20 TABLET | Refills: 0 | Status: SHIPPED | OUTPATIENT
Start: 2022-12-14 | End: 2022-12-19

## 2022-12-14 NOTE — TELEPHONE ENCOUNTER
Patient is scheduled for an EGD on 1/16/23  Dr Yeyo Lay is requesting a 2 day hold on Eliquis prior to the procedure  Please advise if ok to hold

## 2022-12-15 NOTE — PROGRESS NOTES
COVID-19 Outpatient Progress Note    Assessment/Plan:    Problem List Items Addressed This Visit    None  Visit Diagnoses     COVID-19    -  Primary    Relevant Medications    nirmatrelvir & ritonavir (Paxlovid, 150/100,) tablet therapy pack         Disposition:     Patient has asymptomatic or mild COVID-19 infection  Based off CDC guidelines, they were recommended to isolate for 5 days  If they are asymptomatic or symptoms are improving with no fevers in the past 24 hours, isolation may be ended followed by 5 days of wearing a mask when around othes to minimize risk of infecting others  If still have a fever or other symptoms have not improved, continue to isolate until they improve  Regardless of when they end isolation, avoid being around people who are more likely to get very sick from COVID-19 until at least day 11  I have spent 15 minutes directly with the patient  Greater than 50% of this time was spent in counseling/coordination of care regarding: diagnostic results, prognosis, risks and benefits of treatment options, instructions for management, patient and family education, importance of treatment compliance, risk factor reductions and impressions  Encounter provider: Raehl Chan DO     Provider located at: [address] [place] [phone]     Recent Visits  No visits were found meeting these conditions  Showing recent visits within past 7 days and meeting all other requirements  Future Appointments  No visits were found meeting these conditions  Showing future appointments within next 150 days and meeting all other requirements     This virtual check-in was done via Pt seen out in her car and patient was informed that this is not a secure, HIPAA-compliant platform  She agrees to proceed      Patient agrees to participate in a virtual check in via telephone or video visit instead of presenting to the office to address urgent/immediate medical needs  Patient is aware this is a billable service  She acknowledged consent and understanding of privacy and security of the video platform  The patient has agreed to participate and understands they can discontinue the visit at any time  After connecting through [facility], the patient was identified by name and date of birth  Ronald Stevenson was informed that this was a telemedicine visit and that the exam was being conducted confidentially over secure lines  My office door was closed  No one else was in the room  Ronald Stevenson acknowledged consent and understanding of privacy and security of the telemedicine visit  I informed the patient that I have reviewed her record in Epic and presented the opportunity for her to ask any questions regarding the visit today  The patient agreed to participate  Verification of patient location:  Patient is located in the following state in which I hold an active license: [place]    Subjective:   Ronald Stevenson is a [de-identified] y o  female who has been screened for COVID-19  Symptom change since last report: unchanged  Patient's symptoms include fever, malaise, nasal congestion, sore throat and cough  Patient denies chills, fatigue, rhinorrhea, anosmia, loss of taste, shortness of breath, chest tightness, abdominal pain, nausea, vomiting, diarrhea, myalgias and headaches  - Date of symptom onset: 12/14/2022  - Date of positive COVID-19 test: 12/14/2022  COVID-19 vaccination status: Fully vaccinated with booster    Conrado Pino has been staying home and has isolated themselves in her home  She is taking care to not share personal items and is cleaning all surfaces that are touched often, like counters, tabletops, and doorknobs using household cleaning sprays or wipes  She is wearing a mask when she leaves her room       Lab Results   Component Value Date    SARSCOVAG Positive (A) 12/14/2022       Review of Systems   Constitutional: Positive for fever  Negative for chills and fatigue  HENT: Positive for congestion and sore throat  Negative for rhinorrhea  Respiratory: Positive for cough  Negative for chest tightness and shortness of breath  Gastrointestinal: Negative for abdominal pain, diarrhea, nausea and vomiting  Musculoskeletal: Negative for myalgias  Neurological: Negative for headaches  All other systems reviewed and are negative      Current Outpatient Medications on File Prior to Visit   Medication Sig   • acetaminophen (TYLENOL) 325 mg tablet Take 650 mg by mouth   • apixaban (Eliquis) 5 mg Take 1 tablet (5 mg total) by mouth 2 (two) times a day   • Ascorbic Acid (VITAMIN C) 1000 MG tablet Take 500 mg by mouth    • atorvastatin (LIPITOR) 80 mg tablet TAKE 1 TABLET AT BEDTIME   • Calcium Carbonate-Vit D-Min (CALCIUM 1200 PO) Take by mouth   • cholecalciferol (VITAMIN D3) 1,000 units tablet Take 2,000 Units by mouth daily    • citalopram (CeleXA) 20 mg tablet TAKE 1 TABLET EVERY DAY   • Continuous Blood Gluc  (FREESTYLE RONNI 14 DAY READER) GARRY Use to check BG levels 4+times daily and as needed   • Continuous Blood Gluc Sensor (FREESTYLE RONNI 14 DAY SENSOR) MISC Change sensor every 14 days to check BG levels 4+ times daily   • furosemide (LASIX) 20 mg tablet Take 1 tablet (20 mg total) by mouth as needed (as needed for leg swelling)   • GLUCOSAMINE CHONDROITIN COMPLX PO Take 1 tablet by mouth 2 (two) times a day   • glucose blood (TRUE METRIX BLOOD GLUCOSE TEST) test strip Test 4 times plus daily, as instructed   • insulin aspart (NovoLOG FlexPen) 100 UNIT/ML injection pen INJECT 8 UNITS SUBCUTANEOUSLY WITH BREAKFAST, THEN 10 UNITS WITH LUNCH, AND THEN 10 UNITS WITH DINNER   • insulin glargine (Toujeo SoloStar) 300 units/mL CONCENTRATED U-300 injection pen (1-unit dial) Inject 36 Units under the skin daily at bedtime   • Insulin Pen Needle (BD Pen Needle Sasha U/F) 32G X 4 MM MISC Inject 4 times a day   • Lancets (onetouch ultrasoft) lancets Use 4 times a day   • levothyroxine 125 mcg tablet TAKE 1 TABLET ONE TIME DAILY EXCEPT ON SUNDAY NO TABLET   • lisinopril (ZESTRIL) 20 mg tablet Take 1 tablet (20 mg total) by mouth 2 (two) times a day   • metoprolol tartrate (LOPRESSOR) 25 mg tablet TAKE 1 TABLET EVERY 12 HOURS   • Omega-3 Fatty Acids (FISH OIL) 1200 MG CAPS Take 1,200 mg by mouth 2 (two) times a day    • omeprazole (PriLOSEC OTC) 20 MG tablet Take 1 tablet (20 mg total) by mouth 2 (two) times a day   • oxybutynin (DITROPAN) 5 mg tablet TAKE 1 TABLET TWICE DAILY   • Promethazine-DM (PHENERGAN-DM) 6 25-15 mg/5 mL oral syrup Take 5 mL by mouth 4 (four) times a day as needed for cough   • spironolactone (ALDACTONE) 25 mg tablet Take 1 tablet (25 mg total) by mouth daily (Patient not taking: No sig reported)   • Tetrahydroz-Polyvinyl Al-Povid (MURINE TEARS PLUS OP) Apply to eye 3 (three) times a day   • triamcinolone (KENALOG) 0 1 % cream Apply topically 2 (two) times a day       Objective: There were no vitals taken for this visit  Physical Exam  Vitals reviewed  Constitutional:       Appearance: Normal appearance  She is not ill-appearing  Eyes:      General:         Right eye: No discharge  Left eye: No discharge  Pulmonary:      Effort: No respiratory distress  Neurological:      General: No focal deficit present  Mental Status: She is alert and oriented to person, place, and time  Psychiatric:         Mood and Affect: Mood normal          Behavior: Behavior normal          Thought Content:  Thought content normal          Judgment: Judgment normal        Lenore Smith DO

## 2022-12-17 ENCOUNTER — NURSE TRIAGE (OUTPATIENT)
Dept: OTHER | Facility: OTHER | Age: 82
End: 2022-12-17

## 2022-12-17 NOTE — TELEPHONE ENCOUNTER
Reason for Disposition  • [1] [id number] diagnosed by positive lab test (e g , PCR, rapid self-test kit) AND [2] mild symptoms (e g , cough, fever, others) AND [1] no complications or SOB    Answer Assessment - Initial Assessment Questions  1  Were you within 6 feet or less, for up to 15 minutes or more with a person that has a confirmed COVID-19 test?     Unknown    2  What was the date of your exposure? Unknown    3  Are you experiencing any symptoms attributed to the virus?  (Assess for SOB, cough, fever, difficulty breathing)      Main concerning symptom pt is calling about today- she is coughing up green colored sputum  Denies chest pain  Denies SOB  Denies fever  Currently on day 3/5 of Paxlovid  Thinks she is having some reasonable improvement  4   HIGH RISK: Do you have any history heart or lung conditions, weakened immune system, diabetes, Asthma, CHF, HIV, COPD, Chemo, renal failure, sickle cell, etc?      Yes- see hx    Protocols used: CORONAVIRUS (COVID-19) DIAGNOSED OR SUSPECTED-ADULT-

## 2022-12-17 NOTE — TELEPHONE ENCOUNTER
Regarding: Covid positive, green mucus  ----- Message from Louann Zurita sent at 12/17/2022 11:50 AM EST -----  " I tested positive for Covid on Wednesday, now my mucus has a greenish color   What should I do?"

## 2022-12-19 DIAGNOSIS — L30.9 ECZEMA, UNSPECIFIED TYPE: ICD-10-CM

## 2022-12-19 RX ORDER — TRIAMCINOLONE ACETONIDE 1 MG/G
CREAM TOPICAL 2 TIMES DAILY
Qty: 160 G | Refills: 1 | Status: SHIPPED | OUTPATIENT
Start: 2022-12-19 | End: 2023-02-23

## 2023-01-03 ENCOUNTER — REMOTE DEVICE CLINIC VISIT (OUTPATIENT)
Dept: CARDIOLOGY CLINIC | Facility: CLINIC | Age: 83
End: 2023-01-03

## 2023-01-03 DIAGNOSIS — Z95.0 PRESENCE OF CARDIAC PACEMAKER: Primary | ICD-10-CM

## 2023-01-03 NOTE — PROGRESS NOTES
Results for orders placed or performed in visit on 01/03/23   Cardiac EP device report    Narrative    MDT-DUAL CHAMBER PPM (DDDR MODE)/ ACTIVE SYSTEM IS MRI CONDITIONAL  CARELINK TRANSMISSION: BATTERY VOLTAGE ADEQUATE (2 YRS)  AP: 58 3%  : 100% (>40%~MVP-OFF~CHB)  ALL AVAILABLE LEAD PARAMETERS WITHIN NORMAL LIMITS  NO SIGNIFICANT HIGH RATE EPISODES  PACEMAKER FUNCTIONING APPROPRIATELY    [address] [place] [address]

## 2023-01-06 DIAGNOSIS — E03.9 HYPOTHYROIDISM, UNSPECIFIED TYPE: ICD-10-CM

## 2023-01-06 RX ORDER — LEVOTHYROXINE SODIUM 0.12 MG/1
TABLET ORAL
Qty: 78 TABLET | Refills: 1 | Status: SHIPPED | OUTPATIENT
Start: 2023-01-06

## 2023-01-09 LAB
LEFT EYE DIABETIC RETINOPATHY: POSITIVE
RIGHT EYE DIABETIC RETINOPATHY: POSITIVE

## 2023-01-16 ENCOUNTER — ANESTHESIA EVENT (OUTPATIENT)
Dept: GASTROENTEROLOGY | Facility: HOSPITAL | Age: 83
End: 2023-01-16

## 2023-01-16 ENCOUNTER — HOSPITAL ENCOUNTER (OUTPATIENT)
Dept: GASTROENTEROLOGY | Facility: HOSPITAL | Age: 83
Setting detail: OUTPATIENT SURGERY
Discharge: HOME | End: 2023-01-16

## 2023-01-16 ENCOUNTER — ANESTHESIA (OUTPATIENT)
Dept: GASTROENTEROLOGY | Facility: HOSPITAL | Age: 83
End: 2023-01-16

## 2023-01-16 VITALS
HEIGHT: 61 IN | DIASTOLIC BLOOD PRESSURE: 68 MMHG | HEART RATE: 59 BPM | SYSTOLIC BLOOD PRESSURE: 158 MMHG | OXYGEN SATURATION: 96 % | TEMPERATURE: 97.6 F | RESPIRATION RATE: 20 BRPM | WEIGHT: 218 LBS | BODY MASS INDEX: 41.16 KG/M2

## 2023-01-16 DIAGNOSIS — R93.3 ABNORMAL CT SCAN, ESOPHAGUS: ICD-10-CM

## 2023-01-16 DIAGNOSIS — R13.19 ESOPHAGEAL DYSPHAGIA: ICD-10-CM

## 2023-01-16 DIAGNOSIS — K44.9 HIATAL HERNIA: ICD-10-CM

## 2023-01-16 LAB — GLUCOSE SERPL-MCNC: 171 MG/DL (ref 65–140)

## 2023-01-16 RX ORDER — SODIUM CHLORIDE, SODIUM LACTATE, POTASSIUM CHLORIDE, CALCIUM CHLORIDE 600; 310; 30; 20 MG/100ML; MG/100ML; MG/100ML; MG/100ML
INJECTION, SOLUTION INTRAVENOUS CONTINUOUS PRN
Status: DISCONTINUED | OUTPATIENT
Start: 2023-01-16 | End: 2023-01-16

## 2023-01-16 RX ORDER — LIDOCAINE HYDROCHLORIDE 20 MG/ML
INJECTION, SOLUTION EPIDURAL; INFILTRATION; INTRACAUDAL; PERINEURAL AS NEEDED
Status: DISCONTINUED | OUTPATIENT
Start: 2023-01-16 | End: 2023-01-16

## 2023-01-16 RX ORDER — PROPOFOL 10 MG/ML
INJECTION, EMULSION INTRAVENOUS AS NEEDED
Status: DISCONTINUED | OUTPATIENT
Start: 2023-01-16 | End: 2023-01-16

## 2023-01-16 RX ADMIN — SODIUM CHLORIDE, SODIUM LACTATE, POTASSIUM CHLORIDE, AND CALCIUM CHLORIDE: .6; .31; .03; .02 INJECTION, SOLUTION INTRAVENOUS at 10:58

## 2023-01-16 RX ADMIN — PROPOFOL 20 MG: 10 INJECTION, EMULSION INTRAVENOUS at 11:18

## 2023-01-16 RX ADMIN — PROPOFOL 20 MG: 10 INJECTION, EMULSION INTRAVENOUS at 11:15

## 2023-01-16 RX ADMIN — LIDOCAINE HYDROCHLORIDE 100 MG: 20 INJECTION, SOLUTION EPIDURAL; INFILTRATION; INTRACAUDAL; PERINEURAL at 11:11

## 2023-01-16 RX ADMIN — PROPOFOL 60 MG: 10 INJECTION, EMULSION INTRAVENOUS at 11:13

## 2023-01-16 NOTE — H&P
History and Physical -  [facility]  Pooja Mccallum [de-identified] y o  female MRN: [id number]    HPI: Pooja Mccallum is a [de-identified]y o  year old female who presents with dysphagia, abnormal ct scan  Review of Systems    Historical Information   Past Medical History:   Diagnosis Date   • Anxiety    • Arthritis    • CHF (congestive heart failure) ([facility] )    • Chronic venous insufficiency    • COPD (chronic obstructive pulmonary disease) ([facility])    • Coronary artery disease    • Diabetes mellitus ([facility] )     type II   • Disease of thyroid gland     hypothyroidism   • DVT (deep venous thrombosis) ([facility])    • HL (hearing loss)    • Hyperlipidemia    • Hypertension    • Irregular heart beat     Paroxysmal a-fib   • Pacemaker    • Renal disorder     stage 3   • Sleep apnea    • Sleep difficulties    • Thyroid disease    • Urinary incontinence    • Uterine leiomyoma      Past Surgical History:   Procedure Laterality Date   • BREAST SURGERY      For Biopsy   • CARDIAC PACEMAKER PLACEMENT      Permanent  Last assessed: 2/19/16   • CARDIAC SURGERY     • CARPAL TUNNEL RELEASE Bilateral    • CATARACT EXTRACTION     • CATARACT EXTRACTION W/ INTRAOCULAR LENS  IMPLANT, BILATERAL     • CYSTOSCOPY      Diagnostic  Last assessed: 11/8/17   • ECTOPIC PREGNANCY SURGERY     • HYSTERECTOMY      Total Abdomonal  Resolved: 1982   • JOINT REPLACEMENT Right     TKR   • OOPHORECTOMY     • SD ENDOVEN ABLTJ INCMPTNT VEIN XTR LASER 1ST VEIN Left 2/17/2017    Procedure: GREATER SAPHENOUS VEIN ENDOVASCULAR LASER THERAPY ;  Surgeon: Brenda Murphy DO;  [facility];  Service: Vascular   • TONSILLECTOMY       Social History   Social History     Substance and Sexual Activity   Alcohol Use Yes    Comment: rarely   Per Allscripts: Denied history of alcohol use     Social History     Substance and Sexual Activity   Drug Use No     Social History     Tobacco Use   Smoking Status Never   Smokeless Tobacco Never   Tobacco Comments Per Allscriipts: Former smoker     Family History   Problem Relation Age of Onset   • Hypertension Father    • Diabetes Father         Mellitus   • Coronary artery disease Mother    • Hypertension Mother    • Heart disease Mother    • Diabetes Brother         Mellitus   • Breast cancer Maternal Aunt    • Pancreatic cancer Maternal Aunt    • Breast cancer Cousin    • Breast cancer Cousin    • No Known Problems Sister    • No Known Problems Daughter    • No Known Problems Maternal Grandmother    • No Known Problems Maternal Grandfather    • No Known Problems Paternal Grandmother    • No Known Problems Paternal Grandfather    • No Known Problems Daughter    • No Known Problems Son        Meds/Allergies     (Not in a hospital admission)      Allergies   Allergen Reactions   • Ampicillin Hives       Objective     There were no vitals taken for this visit  PHYSICAL EXAM    Gen: NAD  CV: RRR  CHEST: Clear  ABD: soft, NT/ND  EXT: no edema  Neuro: AAO      ASSESSMENT/PLAN:  This is a [de-identified]y o  year old female here for dysphagia, abnormal ct scan      PLAN:   Procedure: Dinora Chung

## 2023-01-16 NOTE — ANESTHESIA PREPROCEDURE EVALUATION
Procedure:  EGD    Relevant Problems   CARDIO   (+) Chronic diastolic congestive heart failure (HCC)   (+) Complete heart block (HCC)   (+) Coronary artery disease involving native coronary artery of native heart   (+) Hypertension   (+) Mixed hyperlipidemia   (+) PAF (paroxysmal atrial fibrillation) (HCC)      ENDO   (+) Hypothyroidism   (+) Secondary hyperparathyroidism of renal origin (HCC)   (+) Type 2 diabetes mellitus with hyperglycemia, with long-term current use of insulin (HCC)      GI/HEPATIC   (+) Esophageal dysphagia   (+) Hiatal hernia      /RENAL   (+) Benign hypertension with chronic kidney disease, stage III (HCC)   (+) Stage 3a chronic kidney disease (HCC)      HEMATOLOGY   (+) Anemia   (+) Iron deficiency anemia      NEURO/PSYCH   (+) Anxiety disorder   (+) S/P cardiac pacemaker procedure      PULMONARY   (+) Chronic obstructive pulmonary disease (HCC)   (+) Sleep apnea        Physical Exam    Airway    Mallampati score: II    Neck ROM: full     Dental       Cardiovascular      Pulmonary      Other Findings        Anesthesia Plan  ASA Score- 3     Anesthesia Type- IV sedation with anesthesia with ASA Monitors  Additional Monitors:   Airway Plan:           Plan Factors-    Chart reviewed  Existing labs reviewed  Patient summary reviewed  Patient is not a current smoker  Obstructive sleep apnea risk education given perioperatively  Induction- intravenous  Postoperative Plan-     Informed Consent- Anesthetic plan and risks discussed with patient  I personally reviewed this patient with the CRNA  Discussed and agreed on the Anesthesia Plan with the CRNA  Jonathan Sommer

## 2023-01-16 NOTE — ANESTHESIA POSTPROCEDURE EVALUATION
Post-Op Assessment Note    CV Status:  Stable    Pain management: satisfactory to patient     Mental Status:  Sleepy and arousable   Hydration Status:  Euvolemic   PONV Controlled:  Controlled   Airway Patency:  Patent      Post Op Vitals Reviewed: Yes      Staff: CRNA         No notable events documented      /68 (01/16/23 1127)    Temp 97 6 °F (36 4 °C) (01/16/23 1127)    Pulse 63 (01/16/23 1127)   Resp (!) 24 (01/16/23 1127)    SpO2 96 % (01/16/23 1127)

## 2023-01-24 DIAGNOSIS — R13.19 ESOPHAGEAL DYSPHAGIA: Primary | ICD-10-CM

## 2023-01-28 DIAGNOSIS — N32.81 OAB (OVERACTIVE BLADDER): ICD-10-CM

## 2023-01-30 RX ORDER — OXYBUTYNIN CHLORIDE 5 MG/1
TABLET ORAL
Qty: 180 TABLET | Refills: 1 | Status: SHIPPED | OUTPATIENT
Start: 2023-01-30

## 2023-02-02 ENCOUNTER — OFFICE VISIT (OUTPATIENT)
Dept: FAMILY MEDICINE CLINIC | Facility: CLINIC | Age: 83
End: 2023-02-02

## 2023-02-02 VITALS
HEART RATE: 61 BPM | TEMPERATURE: 97.8 F | OXYGEN SATURATION: 99 % | DIASTOLIC BLOOD PRESSURE: 72 MMHG | WEIGHT: 223.8 LBS | HEIGHT: 61 IN | SYSTOLIC BLOOD PRESSURE: 120 MMHG | BODY MASS INDEX: 42.25 KG/M2

## 2023-02-02 DIAGNOSIS — I50.32 CHRONIC DIASTOLIC CONGESTIVE HEART FAILURE (HCC): ICD-10-CM

## 2023-02-02 DIAGNOSIS — Z79.4 TYPE 2 DIABETES MELLITUS WITH HYPERGLYCEMIA, WITH LONG-TERM CURRENT USE OF INSULIN (HCC): Primary | ICD-10-CM

## 2023-02-02 DIAGNOSIS — E11.3393 MODERATE NONPROLIFERATIVE DIABETIC RETINOPATHY OF BOTH EYES ASSOCIATED WITH TYPE 2 DIABETES MELLITUS, MACULAR EDEMA PRESENCE UNSPECIFIED (HCC): ICD-10-CM

## 2023-02-02 DIAGNOSIS — I10 PRIMARY HYPERTENSION: ICD-10-CM

## 2023-02-02 DIAGNOSIS — G47.33 OBSTRUCTIVE SLEEP APNEA SYNDROME: ICD-10-CM

## 2023-02-02 DIAGNOSIS — E11.65 TYPE 2 DIABETES MELLITUS WITH HYPERGLYCEMIA, WITH LONG-TERM CURRENT USE OF INSULIN (HCC): Primary | ICD-10-CM

## 2023-02-02 DIAGNOSIS — N18.30 BENIGN HYPERTENSION WITH CHRONIC KIDNEY DISEASE, STAGE III (HCC): ICD-10-CM

## 2023-02-02 DIAGNOSIS — E03.9 ACQUIRED HYPOTHYROIDISM: Chronic | ICD-10-CM

## 2023-02-02 DIAGNOSIS — I12.9 BENIGN HYPERTENSION WITH CHRONIC KIDNEY DISEASE, STAGE III (HCC): ICD-10-CM

## 2023-02-02 LAB — SL AMB POCT HEMOGLOBIN AIC: 6.9 (ref ?–6.5)

## 2023-02-02 NOTE — PROGRESS NOTES
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, macular edema presence unspecified ([address] )  Assessment & Plan:  Central vision remains poor by history  Follow-up with ophthalmology  Continue present treatment of diabetes  Recheck 3 months  Lab Results   Component Value Date    HGBA1C 6 9 (A) 2023         3  Obstructive sleep apnea syndrome  Assessment & Plan:  Previous diagnosis  Patient does not have a sleep specialist at present  She has not been using her CPAP for several weeks and feels that she is sleeping very well without it  Patient would like to stop treatment  I explained the risks if she should still have sleep apnea, and if it is not treated  Home sleep study ordered  If negative, we can hold her CPAP  However if positive would recommend restarting and possibly seeing a sleep specialist   Kenisha Fox 3 months    Orders:  -     Home Study; Future    4  Acquired hypothyroidism  Assessment & Plan:  Appears to be stable clinically  Check TSH  Adjust meds if TSH is not at goal  Recheck 6m        5   Benign hypertension with chronic kidney disease, stage III [facility])  Assessment & Plan:  Lab Results   Component Value Date    EGFR 43 10/11/2022    EGFR 41 2022    EGFR 33 2022    CREATININE 1 19 10/11/2022    CREATININE 1 23 2022    CREATININE 1 48 (H) 2022   Creatinine had improved prior to COVID  Continue present treatment  Monitor labs  Recheck 3 months      6  Chronic diastolic congestive heart failure (HCC)  Assessment & Plan:  Wt Readings from Last 3 Encounters:   02/02/23 102 kg (223 lb 12 8 oz)   01/16/23 98 9 kg (218 lb)   10/28/22 103 kg (226 lb 9 6 oz)   Weight slightly higher though patient may have been a little dehydrated after her episode of COVID  Continue to monitor diet  Recheck 3 months            7  Primary hypertension  Assessment & Plan:  Blood pressure well controlled  Continue present treatment  Recheck 3 months             Subjective     f/u multiple med issues  - pt states that she is doing well  - pt had COVID in Dec  She has improved without sequelae  - pt has not been using her CPAP for several weeks  Pt states that she is sleeping well without it  Pt does not want to restart it  - pt does not exercise regularly  Pt denies CP, palpitations, lightheadedness or other CV symptoms with or without exertion  - pt continues to monitor diet  Pt struggling with changes in taste since recovering from [place]  A1C in the office today = 6 9  Pt admits to occasional low BG episode (1-2x a week)  - pt continues to use her walker for balance while ambulating    - pt thinks that her pruritic rash is a little better, but has not resolved  - pt denies any new GI or  complaints  AGE symptoms have not resolved  - still with scattered body aches  - central vision remains poor  Pt continues to f/u with ophth    Review of Systems   Constitutional: Negative  Negative for chills, fatigue and fever  HENT: Negative  Eyes: Negative  Respiratory: Negative  Cardiovascular: Negative  Gastrointestinal: Negative for abdominal pain, diarrhea, nausea and vomiting  Genitourinary:        Some urinary leakage   Musculoskeletal: Positive for arthralgias (mild, scattered), back pain and gait problem  Negative for myalgias     Skin: Positive for rash (improved but not resolved)  Neurological: Negative for dizziness, weakness, light-headedness, numbness and headaches  Psychiatric/Behavioral: Negative  All other systems reviewed and are negative  Past Medical History:   Diagnosis Date   • Anxiety    • Arthritis    • CHF (congestive heart failure) ([facility])    • Chronic venous insufficiency    • COPD (chronic obstructive pulmonary disease) ([facility])    • Coronary artery disease    • Diabetes mellitus ([facility] )     type II   • Disease of thyroid gland     hypothyroidism   • DVT (deep venous thrombosis) ([facility])    • HL (hearing loss)    • Hyperlipidemia    • Hypertension    • Irregular heart beat     Paroxysmal a-fib   • Pacemaker    • Renal disorder     stage 3   • Sleep apnea    • Sleep difficulties    • Thyroid disease    • Urinary incontinence    • Uterine leiomyoma      Past Surgical History:   Procedure Laterality Date   • BREAST SURGERY      For Biopsy   • CARDIAC PACEMAKER PLACEMENT      Permanent  Last assessed: 2/19/16   • CARDIAC SURGERY     • CARPAL TUNNEL RELEASE Bilateral    • CATARACT EXTRACTION     • CATARACT EXTRACTION W/ INTRAOCULAR LENS  IMPLANT, BILATERAL     • CYSTOSCOPY      Diagnostic   Last assessed: 11/8/17   • ECTOPIC PREGNANCY SURGERY     • HYSTERECTOMY      Total Abdomonal  Resolved: 1982   • JOINT REPLACEMENT Right     TKR   • OOPHORECTOMY     • NE ENDOVEN ABLTJ INCMPTNT VEIN XTR LASER 1ST VEIN Left 2/17/2017    Procedure: GREATER SAPHENOUS VEIN ENDOVASCULAR LASER THERAPY ;  Surgeon: Ricarda Pagan DO;  [facility];  Service: Vascular   • TONSILLECTOMY       Family History   Problem Relation Age of Onset   • Hypertension Father    • Diabetes Father         Mellitus   • Coronary artery disease Mother    • Hypertension Mother    • Heart disease Mother    • Diabetes Brother         Mellitus   • Breast cancer Maternal Aunt    • Pancreatic cancer Maternal Aunt    • Breast cancer Cousin    • Breast cancer Cousin    • No Known Problems Sister    • No Known Problems Daughter    • No Known Problems Maternal Grandmother    • No Known Problems Maternal Grandfather    • No Known Problems Paternal Grandmother    • No Known Problems Paternal Grandfather    • No Known Problems Daughter    • No Known Problems Son      Social History     Socioeconomic History   • Marital status: /Civil Union     Spouse name: None   • Number of children: 3   • Years of education: None   • Highest education level: None   Occupational History   • Occupation: RETIRED   Tobacco Use   • Smoking status: Never   • Smokeless tobacco: Never   • Tobacco comments:     Per Allscriipts: Former smoker   Vaping Use   • Vaping Use: Never used   Substance and Sexual Activity   • Alcohol use: Yes     Comment: rarely   Per Allscripts: Denied history of alcohol use   • Drug use: No   • Sexual activity: Never   Other Topics Concern   • None   Social History Narrative    Daily coffee consumption: 2 cups/day     Social Determinants of Health     Financial Resource Strain: Not on file   Food Insecurity: Not on file   Transportation Needs: Not on file   Physical Activity: Not on file   Stress: Not on file   Social Connections: Not on file   Intimate Partner Violence: Not on file   Housing Stability: Not on file     Current Outpatient Medications on File Prior to Visit   Medication Sig   • acetaminophen (TYLENOL) 325 mg tablet Take 650 mg by mouth   • apixaban (Eliquis) 5 mg Take 1 tablet (5 mg total) by mouth 2 (two) times a day   • Ascorbic Acid (VITAMIN C) 1000 MG tablet Take 500 mg by mouth    • atorvastatin (LIPITOR) 80 mg tablet TAKE 1 TABLET AT BEDTIME   • Calcium Carbonate-Vit D-Min (CALCIUM 1200 PO) Take by mouth   • cholecalciferol (VITAMIN D3) 1,000 units tablet Take 2,000 Units by mouth daily    • citalopram (CeleXA) 20 mg tablet TAKE 1 TABLET EVERY DAY   • Continuous Blood Gluc  ([employer] [place]YLE RONNI 14 DAY READER) GARRY Use to check BG levels 4+times daily and as needed   • Continuous Blood Gluc Sensor (FREESTYLE RONNI 14 DAY SENSOR) MISC Change sensor every 14 days to check BG levels 4+ times daily   • GLUCOSAMINE CHONDROITIN COMPLX PO Take 1 tablet by mouth 2 (two) times a day   • glucose blood (TRUE METRIX BLOOD GLUCOSE TEST) test strip Test 4 times plus daily, as instructed   • insulin aspart (NovoLOG FlexPen) 100 UNIT/ML injection pen INJECT 8 UNITS SUBCUTANEOUSLY WITH BREAKFAST, THEN 10 UNITS WITH LUNCH, AND THEN 10 UNITS WITH DINNER   • insulin glargine (Toujeo SoloStar) 300 units/mL CONCENTRATED U-300 injection pen (1-unit dial) Inject 36 Units under the skin daily at bedtime   • Insulin Pen Needle (BD Pen Needle Sasha U/F) 32G X 4 MM MISC Inject 4 times a day   • Lancets (onetouch ultrasoft) lancets Use 4 times a day   • levothyroxine 125 mcg tablet TAKE 1 TABLET ONE TIME DAILY EXCEPT ON SUNDAY NO TABLET   • lisinopril (ZESTRIL) 20 mg tablet Take 1 tablet (20 mg total) by mouth 2 (two) times a day   • metoprolol tartrate (LOPRESSOR) 25 mg tablet TAKE 1 TABLET EVERY 12 HOURS   • Omega-3 Fatty Acids (FISH OIL) 1200 MG CAPS Take 1,200 mg by mouth 2 (two) times a day    • omeprazole (PriLOSEC OTC) 20 MG tablet Take 1 tablet (20 mg total) by mouth 2 (two) times a day   • oxybutynin (DITROPAN) 5 mg tablet TAKE 1 TABLET TWICE DAILY   • Tetrahydroz-Polyvinyl Al-Povid (MURINE TEARS PLUS OP) Apply to eye 3 (three) times a day   • triamcinolone (KENALOG) 0 1 % cream Apply topically 2 (two) times a day   • spironolactone (ALDACTONE) 25 mg tablet Take 1 tablet (25 mg total) by mouth daily (Patient not taking: Reported on 9/28/2022)     Allergies   Allergen Reactions   • Ampicillin Hives     Immunization History   Administered Date(s) Administered   • COVID-19 PFIZER VACCINE 0 3 ML IM 01/20/2021, 02/09/2021, 04/11/2022   • H1N1, All Formulations 01/12/2010, 01/12/2010   • INFLUENZA 11/04/2005   • Influenza Split High Dose Preservative Free IM 10/18/2012, 10/23/2013, 10/14/2014, 10/20/2015, 11/02/2016, 10/04/2017   • Influenza, high dose seasonal 0 7 mL 10/23/2018, 10/09/2019, 10/20/2020, 10/28/2022   • Influenza, seasonal, injectable 11/12/2009, 01/12/2010   • Pneumococcal Conjugate 13-Valent 10/21/2015   • Pneumococcal Conjugate Vaccine 20-valent (Pcv20), Polysace 10/28/2022   • Pneumococcal Polysaccharide PPV23 12/11/2000, 12/11/2010   • Td (adult), adsorbed 07/04/2008   • Tetanus, adsorbed 09/04/1998   • Zoster 07/22/2016       Objective     /72   Pulse 61   Temp 97 8 °F (36 6 °C)   Ht 5' 1" (1 549 m)   Wt 102 kg (223 lb 12 8 oz)   SpO2 99%   BMI 42 29 kg/m²     Physical Exam  Vitals reviewed  Constitutional:       Appearance: She is well-developed  She is not diaphoretic  HENT:      Head: Normocephalic and atraumatic  Right Ear: Tympanic membrane, ear canal and external ear normal       Left Ear: Tympanic membrane, ear canal and external ear normal       Ears:      Comments: Sl decreased hearing     Mouth/Throat:      Mouth: Mucous membranes are moist    Eyes:      Extraocular Movements: Extraocular movements intact  Conjunctiva/sclera: Conjunctivae normal       Pupils: Pupils are equal, round, and reactive to light  Neck:      Thyroid: No thyromegaly  Vascular: No JVD  Cardiovascular:      Rate and Rhythm: Normal rate and regular rhythm  Pulses:           Dorsalis pedis pulses are 0 on the right side and 0 on the left side  Posterior tibial pulses are 0 on the right side and 0 on the left side  Heart sounds: No murmur heard  Pulmonary:      Effort: Pulmonary effort is normal       Breath sounds: No wheezing or rales  Abdominal:      General: There is no distension  Palpations: Abdomen is soft  There is no mass  Tenderness: There is no abdominal tenderness  Musculoskeletal:         General: Deformity (mild diffuse OA changes) present  No swelling  Normal range of motion  Cervical back: Normal range of motion and neck supple   No tenderness  No muscular tenderness  Right lower leg: Edema (trace) present  Left lower leg: Edema (trace) present  Feet:      Right foot:      Skin integrity: No ulcer, skin breakdown, erythema, warmth, callus or dry skin  Left foot:      Skin integrity: No ulcer, skin breakdown, erythema, warmth, callus or dry skin  Lymphadenopathy:      Cervical: No cervical adenopathy  Skin:     General: Skin is warm and dry  Capillary Refill: Capillary refill takes less than 2 seconds  Findings: Rash (mild xerosis on arms  Back greatly improved) present  Comments: Scattered eczematous patches over the back   Neurological:      Mental Status: She is alert and oriented to person, place, and time  Cranial Nerves: No cranial nerve deficit  Sensory: No sensory deficit  Motor: No weakness or abnormal muscle tone  Gait: Gait abnormal (Ambulates with walker  Mildly antalgic appearing gait)  Deep Tendon Reflexes: Reflexes are normal and symmetric  Psychiatric:         Mood and Affect: Mood normal          Behavior: Behavior normal          Thought Content:  Thought content normal          Judgment: Judgment normal       Comments:             Papito Cruz MD

## 2023-02-03 NOTE — ASSESSMENT & PLAN NOTE
Lab Results   Component Value Date    HGBA1C 6 9 (A) 02/02/2023   A1c remains well controlled  Continue present treatment  Follow-up with endocrinology    Recheck in 3 months

## 2023-02-03 NOTE — ASSESSMENT & PLAN NOTE
Lab Results   Component Value Date    EGFR 43 10/11/2022    EGFR 41 09/19/2022    EGFR 33 09/14/2022    CREATININE 1 19 10/11/2022    CREATININE 1 23 09/19/2022    CREATININE 1 48 (H) 09/14/2022   Creatinine had improved prior to COVID  Continue present treatment  Monitor labs    Recheck 3 months

## 2023-02-03 NOTE — ASSESSMENT & PLAN NOTE
Central vision remains poor by history  Follow-up with ophthalmology  Continue present treatment of diabetes    Recheck 3 months  Lab Results   Component Value Date    HGBA1C 6 9 (A) 02/02/2023

## 2023-02-03 NOTE — ASSESSMENT & PLAN NOTE
Wt Readings from Last 3 Encounters:   02/02/23 102 kg (223 lb 12 8 oz)   01/16/23 98 9 kg (218 lb)   10/28/22 103 kg (226 lb 9 6 oz)   Weight slightly higher though patient may have been a little dehydrated after her episode of COVID  Continue to monitor diet    Recheck 3 months

## 2023-02-03 NOTE — ASSESSMENT & PLAN NOTE
Previous diagnosis  Patient does not have a sleep specialist at present  She has not been using her CPAP for several weeks and feels that she is sleeping very well without it  Patient would like to stop treatment  I explained the risks if she should still have sleep apnea, and if it is not treated  Home sleep study ordered  If negative, we can hold her CPAP    However if positive would recommend restarting and possibly seeing a sleep specialist   Recheck 3 months

## 2023-02-06 ENCOUNTER — TELEPHONE (OUTPATIENT)
Dept: NEPHROLOGY | Facility: CLINIC | Age: 83
End: 2023-02-06

## 2023-02-06 NOTE — TELEPHONE ENCOUNTER
Called and spoke with [employer] to complete their bloodwork prior to their appointment on 02/16/23 with Dr Antonina Sanchez at the [facility] location

## 2023-02-06 NOTE — TELEPHONE ENCOUNTER
Patient called back as she missed our call  Advised we were just reminding her to have blood work done before her appointment as per below

## 2023-02-09 ENCOUNTER — TELEPHONE (OUTPATIENT)
Dept: SLEEP CENTER | Facility: CLINIC | Age: 83
End: 2023-02-09

## 2023-02-09 NOTE — TELEPHONE ENCOUNTER
----- Message from John Barraza MD sent at 2/8/2023  2:35 PM EST -----  Approved    ----- Message -----  From: Reji Centeno  Sent: 2/6/2023   8:42 AM EST  To: Sleep Medicine [facility] Provider    This Home sleep study needs approval      If approved please sign and return to clerical pool  If denied please include reasons why  Also provide alternative testing if warranted  Please sign and return to clerical pool

## 2023-02-15 ENCOUNTER — APPOINTMENT (OUTPATIENT)
Dept: LAB | Facility: CLINIC | Age: 83
End: 2023-02-15

## 2023-02-15 DIAGNOSIS — N25.81 SECONDARY HYPERPARATHYROIDISM OF RENAL ORIGIN (HCC): ICD-10-CM

## 2023-02-15 DIAGNOSIS — E55.9 VITAMIN D DEFICIENCY: ICD-10-CM

## 2023-02-15 DIAGNOSIS — N18.31 STAGE 3A CHRONIC KIDNEY DISEASE (HCC): ICD-10-CM

## 2023-02-15 DIAGNOSIS — R80.9 MICROALBUMINURIA: ICD-10-CM

## 2023-02-15 DIAGNOSIS — N18.30 BENIGN HYPERTENSION WITH CHRONIC KIDNEY DISEASE, STAGE III (HCC): ICD-10-CM

## 2023-02-15 DIAGNOSIS — I12.9 BENIGN HYPERTENSION WITH CHRONIC KIDNEY DISEASE, STAGE III (HCC): ICD-10-CM

## 2023-02-15 LAB
25(OH)D3 SERPL-MCNC: 47.2 NG/ML (ref 30–100)
ANION GAP SERPL CALCULATED.3IONS-SCNC: 3 MMOL/L (ref 4–13)
BUN SERPL-MCNC: 26 MG/DL (ref 5–25)
CALCIUM SERPL-MCNC: 9.4 MG/DL (ref 8.3–10.1)
CHLORIDE SERPL-SCNC: 106 MMOL/L (ref 96–108)
CO2 SERPL-SCNC: 32 MMOL/L (ref 21–32)
CREAT SERPL-MCNC: 0.99 MG/DL (ref 0.6–1.3)
CREAT UR-MCNC: 70.8 MG/DL
ERYTHROCYTE [DISTWIDTH] IN BLOOD BY AUTOMATED COUNT: 13.6 % (ref 11.6–15.1)
GFR SERPL CREATININE-BSD FRML MDRD: 53 ML/MIN/1.73SQ M
GLUCOSE P FAST SERPL-MCNC: 81 MG/DL (ref 65–99)
HCT VFR BLD AUTO: 38.4 % (ref 34.8–46.1)
HGB BLD-MCNC: 11.6 G/DL (ref 11.5–15.4)
MAGNESIUM SERPL-MCNC: 1.7 MG/DL (ref 1.6–2.6)
MCH RBC QN AUTO: 30.1 PG (ref 26.8–34.3)
MCHC RBC AUTO-ENTMCNC: 30.2 G/DL (ref 31.4–37.4)
MCV RBC AUTO: 100 FL (ref 82–98)
MICROALBUMIN UR-MCNC: 963 MG/L (ref 0–20)
MICROALBUMIN/CREAT 24H UR: 1360 MG/G CREATININE (ref 0–30)
PHOSPHATE SERPL-MCNC: 3.8 MG/DL (ref 2.3–4.1)
PLATELET # BLD AUTO: 168 THOUSANDS/UL (ref 149–390)
PMV BLD AUTO: 10.5 FL (ref 8.9–12.7)
POTASSIUM SERPL-SCNC: 4.2 MMOL/L (ref 3.5–5.3)
PTH-INTACT SERPL-MCNC: 58.5 PG/ML (ref 18.4–80.1)
RBC # BLD AUTO: 3.86 MILLION/UL (ref 3.81–5.12)
SODIUM SERPL-SCNC: 141 MMOL/L (ref 135–147)
WBC # BLD AUTO: 5.02 THOUSAND/UL (ref 4.31–10.16)

## 2023-02-16 ENCOUNTER — TELEPHONE (OUTPATIENT)
Dept: NEPHROLOGY | Facility: CLINIC | Age: 83
End: 2023-02-16

## 2023-02-16 DIAGNOSIS — R80.9 MICROALBUMINURIA: ICD-10-CM

## 2023-02-16 DIAGNOSIS — I12.9 BENIGN HYPERTENSION WITH CHRONIC KIDNEY DISEASE, STAGE III (HCC): ICD-10-CM

## 2023-02-16 DIAGNOSIS — N18.30 BENIGN HYPERTENSION WITH CHRONIC KIDNEY DISEASE, STAGE III (HCC): ICD-10-CM

## 2023-02-16 NOTE — TELEPHONE ENCOUNTER
Received phone call from women's care at [facility] stating patient showed up there and was lost for her 230 appointment for today thinking we were on the [facility]  Patient usually sees Dr Carmelina Ellison in our EO and wanted to continue with him in the [facility]  Dr Carmelina Ellison did not have any openings soon - offered to see one of the APs in EO for next visit and then follow with Dr Carmelina Ellison after that in [facility]  Patient scheduled for 2/21 with [facility] in [place]

## 2023-02-19 RX ORDER — SPIRONOLACTONE 25 MG/1
25 TABLET ORAL DAILY
Qty: 30 TABLET | Refills: 5 | Status: SHIPPED | OUTPATIENT
Start: 2023-02-19 | End: 2023-02-21 | Stop reason: SDUPTHER

## 2023-02-20 NOTE — PROGRESS NOTES
Assessment and Plan:    Saray Esquivel was seen today for follow-up  Diagnoses and all orders for this visit:    Stage 3a chronic kidney disease ([facility] [address] )  -     Basic metabolic panel; Future  -     Basic metabolic panel; Future  -     Magnesium; Future  -     Phosphorus; Future  -     Protein / creatinine ratio, urine; Future  -     CBC; Future  -     PTH, intact; Future    Benign hypertension with chronic kidney disease, stage III (HCC)  -     spironolactone (ALDACTONE) 25 mg tablet; Take 1 tablet (25 mg total) by mouth daily    Microalbuminuria  -     spironolactone (ALDACTONE) 25 mg tablet; Take 1 tablet (25 mg total) by mouth daily  -     Protein / creatinine ratio, urine; Future    Secondary hyperparathyroidism of renal origin [facility])    Urinary incontinence, unspecified type  -     Ambulatory Referral to Urology; Future      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  Creatinine is actually better than baseline  Electrolytes stable and within normal limits  Continue to monitor  Proteinuria- Microalbumin creatinine ratio is 1 36  She takes lisinopril and aldactone (will start)  Bone Mineral disorder- PTH and vitamin D levels acceptable and at goal     Hypertension- Avoid salt  Avoid NSAIDs  Stay active  Antihypertensive regimen includes metoprolol 25mg twice a day & lisinopril 20mg twice a day  Please start spironolactone 25mg daily  Incontinence- follow up with urology  Referral sent  Diabetes mellitus with retinopathy- continue management per endocrinology  She states she gets low blood sugars twice a day  Follow up with Dr Rochelle Strong in 6 months  Please call the office with any questions or concerns  Blood work in 2 weeks and 6 months        Reason for Visit: Follow-up (CKD 3/)    HPI: Pavannazario Homans is a 82 y o  female who is here for follow up of chronic kidney disease and proteinuria  She was last seen in the office in August 2022  ROS: A complete review of systems was performed and was negative unless otherwise noted in the history of present illness      Allergies:   Ampicillin    Medications:     Current Outpatient Medications:   •  acetaminophen (TYLENOL) 325 mg tablet, Take 650 mg by mouth, Disp: , Rfl:   •  apixaban (Eliquis) 5 mg, Take 1 tablet (5 mg total) by mouth 2 (two) times a day, Disp: 56 tablet, Rfl: 0  •  Ascorbic Acid (VITAMIN C) 1000 MG tablet, Take 500 mg by mouth , Disp: , Rfl:   •  atorvastatin (LIPITOR) 80 mg tablet, TAKE 1 TABLET AT BEDTIME, Disp: 90 tablet, Rfl: 3  •  Calcium Carbonate-Vit D-Min (CALCIUM 1200 PO), Take by mouth, Disp: , Rfl:   •  cholecalciferol (VITAMIN D3) 1,000 units tablet, Take 2,000 Units by mouth daily , Disp: , Rfl:   •  citalopram (CeleXA) 20 mg tablet, TAKE 1 TABLET EVERY DAY, Disp: 90 tablet, Rfl: 3  •  GLUCOSAMINE CHONDROITIN COMPLX PO, Take 1 tablet by mouth 2 (two) times a day, Disp: , Rfl:   •  insulin aspart (NovoLOG FlexPen) 100 UNIT/ML injection pen, INJECT 8 UNITS SUBCUTANEOUSLY WITH BREAKFAST, THEN 10 UNITS WITH LUNCH, AND THEN 10 UNITS WITH DINNER, Disp: 15 mL, Rfl: 1  •  insulin glargine (Toujeo SoloStar) 300 units/mL CONCENTRATED U-300 injection pen (1-unit dial), Inject 36 Units under the skin daily at bedtime, Disp: 12 mL, Rfl: 1  •  levothyroxine 125 mcg tablet, TAKE 1 TABLET ONE TIME DAILY EXCEPT ON SUNDAY NO TABLET, Disp: 78 tablet, Rfl: 1  •  lisinopril (ZESTRIL) 20 mg tablet, Take 1 tablet (20 mg total) by mouth 2 (two) times a day, Disp: 180 tablet, Rfl: 3  •  metoprolol tartrate (LOPRESSOR) 25 mg tablet, TAKE 1 TABLET EVERY 12 HOURS, Disp: 180 tablet, Rfl: 3  •  Omega-3 Fatty Acids (FISH OIL) 1200 MG CAPS, Take 1,200 mg by mouth 2 (two) times a day , Disp: , Rfl:   •  omeprazole (PriLOSEC OTC) 20 MG tablet, Take 1 tablet (20 mg total) by mouth 2 (two) times a day, Disp: 180 tablet, Rfl: 1  •  oxybutynin (DITROPAN) 5 mg tablet, TAKE 1 TABLET TWICE DAILY, Disp: 180 tablet, Rfl: 1  •  spironolactone (ALDACTONE) 25 mg tablet, Take 1 tablet (25 mg total) by mouth daily, Disp: 90 tablet, Rfl: 3  •  Continuous Blood Gluc  (FREESTYLE RONNI 14 DAY READER) GARRY, Use to check BG levels 4+times daily and as needed, Disp: 1 Device, Rfl: 0  •  Continuous Blood Gluc Sensor (FREESTYLE RONNI 14 DAY SENSOR) MISC, Change sensor every 14 days to check BG levels 4+ times daily, Disp: 6 each, Rfl: 3  •  glucose blood (TRUE METRIX BLOOD GLUCOSE TEST) test strip, Test 4 times plus daily, as instructed, Disp: 400 each, Rfl: 1  •  Insulin Pen Needle (BD Pen Needle Sasha U/F) 32G X 4 MM MISC, Inject 4 times a day, Disp: 400 each, Rfl: 1  •  Lancets (onetouch ultrasoft) lancets, Use 4 times a day, Disp: 400 each, Rfl: 0  •  Tetrahydroz-Polyvinyl Al-Povid (MURINE TEARS PLUS OP), Apply to eye 3 (three) times a day, Disp: , Rfl:   •  triamcinolone (KENALOG) 0 1 % cream, Apply topically 2 (two) times a day, Disp: 160 g, Rfl: 1    Past Medical History:   Diagnosis Date   • Anxiety    • Arthritis    • CHF (congestive heart failure) ([facility])    • Chronic venous insufficiency    • COPD (chronic obstructive pulmonary disease) ([facility])    • Coronary artery disease    • Diabetes mellitus ([facility])     type II   • Disease of thyroid gland     hypothyroidism   • DVT (deep venous thrombosis) ([facility])    • HL (hearing loss)    • Hyperlipidemia    • Hypertension    • Irregular heart beat     Paroxysmal a-fib   • Pacemaker    • Renal disorder     stage 3   • Sleep apnea    • Sleep difficulties    • Thyroid disease    • Urinary incontinence    • Uterine leiomyoma      Past Surgical History:   Procedure Laterality Date   • BREAST SURGERY      For Biopsy   • CARDIAC PACEMAKER PLACEMENT      Permanent   Last assessed: 2/19/16   • CARDIAC SURGERY     • CARPAL TUNNEL RELEASE Bilateral    • CATARACT EXTRACTION     • CATARACT EXTRACTION W/ INTRAOCULAR LENS  IMPLANT, BILATERAL     • CYSTOSCOPY      Diagnostic  Last assessed: 11/8/17   • ECTOPIC PREGNANCY SURGERY     • HYSTERECTOMY      Total Abdomonal  Resolved: 1982   • JOINT REPLACEMENT Right     TKR   • OOPHORECTOMY     • NH ENDOVEN ABLTJ INCMPTNT VEIN XTR LASER 1ST VEIN Left 2/17/2017    Procedure: GREATER SAPHENOUS VEIN ENDOVASCULAR LASER THERAPY ;  Surgeon: Hailey Reyes DO;  [facility];  Service: Vascular   • TONSILLECTOMY       Family History   Problem Relation Age of Onset   • Hypertension Father    • Diabetes Father         Mellitus   • Coronary artery disease Mother    • Hypertension Mother    • Heart disease Mother    • Diabetes Brother         Mellitus   • Breast cancer Maternal Aunt    • Pancreatic cancer Maternal Aunt    • Breast cancer Cousin    • Breast cancer Cousin    • No Known Problems Sister    • No Known Problems Daughter    • No Known Problems Maternal Grandmother    • No Known Problems Maternal Grandfather    • No Known Problems Paternal Grandmother    • No Known Problems Paternal Grandfather    • No Known Problems Daughter    • No Known Problems Son       reports that she has never smoked  She has never used smokeless tobacco  She reports current alcohol use  She reports that she does not use drugs  Physical Exam:   Vitals:    02/21/23 1054 02/21/23 1118   BP:  134/60   BP Location:  Left arm   Patient Position:  Sitting   Cuff Size:  Large   Pulse:  60   Weight: 101 kg (223 lb)    Height: 5' 1" (1 549 m)      Body mass index is 42 14 kg/m²  General: NAD  Neuro: AAO  Skin: no rash  Eyes: anicteric  ENMT: mm moist  Neck: no masses  Respiratory: CTAB  Cardiovascular: RRR  Extremities: trace LE edema  Gastrointestinal: soft nt nd    Procedure:  No results found for this or any previous visit      Lab Results   Component Value Date    GLUCOSE 220 (H) 10/23/2015    CALCIUM 9 4 02/15/2023     12/06/2016    K 4 2 02/15/2023    CO2 32 02/15/2023     02/15/2023    BUN 26 (H) 02/15/2023    CREATININE 0 99 02/15/2023       Results from last 7 days   Lab Units 02/15/23  0928   WBC Thousand/uL 5 02   HEMOGLOBIN g/dL 11 6   HEMATOCRIT % 38 4   PLATELETS Thousands/uL 168   POTASSIUM mmol/L 4 2   CHLORIDE mmol/L 106   CO2 mmol/L 32   BUN mg/dL 26*   CREATININE mg/dL 0 99   CALCIUM mg/dL 9 4   MAGNESIUM mg/dL 1 7   PHOSPHORUS mg/dL 3 8     I have personally reviewed the blood work as stated above and in my note  I have personally reviewed Dr Emily Hogan notes

## 2023-02-20 NOTE — TELEPHONE ENCOUNTER
Creatinine remained stable  Proteinuria worsening  Please confirm that she is taking lisinopril 20 mg twice daily and continue same  I had started her on spironolactone 25 mg daily although her med list she states she is not taking this  Please confirm this  If she is not taking spironolactone, she needs to start spironolactone 25 mg daily  If she is already taking this, she needs to increase this to 50 mg daily  Please let me know if she needs refill and please ensure her med list is updated 
Left message with patient advising:Creatinine remained stable  Proteinuria worsening  Please confirm that she is taking lisinopril 20 mg twice daily and continue same  I had started her on spironolactone 25 mg daily although her med list she states she is not taking this  Please confirm this  If she is not taking spironolactone, she needs to start spironolactone 25 mg daily  If she is already taking this, she needs to increase this to 50 mg daily  Please let me know if she needs refill and please ensure her med list is updated  Advised patient to call back to confirm that she is taking these medications 
Not sure the reason why she is not taking Aldectone which was prescribed in the past      Regardless, she should start taking aldectone 25 mg one tab daily  I have resent this to her pharmacy 
Patient was advised  No further questions at this time 
Received a call from patient said she is taking lisinopril 20 mg twice a daily and spironolactone 25 mg patient said never take this medication 
No significant past surgical history

## 2023-02-21 ENCOUNTER — OFFICE VISIT (OUTPATIENT)
Dept: NEPHROLOGY | Facility: CLINIC | Age: 83
End: 2023-02-21

## 2023-02-21 VITALS
BODY MASS INDEX: 42.1 KG/M2 | DIASTOLIC BLOOD PRESSURE: 60 MMHG | SYSTOLIC BLOOD PRESSURE: 134 MMHG | HEIGHT: 61 IN | WEIGHT: 223 LBS | HEART RATE: 60 BPM

## 2023-02-21 DIAGNOSIS — N18.30 BENIGN HYPERTENSION WITH CHRONIC KIDNEY DISEASE, STAGE III (HCC): ICD-10-CM

## 2023-02-21 DIAGNOSIS — R80.9 MICROALBUMINURIA: ICD-10-CM

## 2023-02-21 DIAGNOSIS — N18.31 STAGE 3A CHRONIC KIDNEY DISEASE (HCC): Primary | ICD-10-CM

## 2023-02-21 DIAGNOSIS — R32 URINARY INCONTINENCE, UNSPECIFIED TYPE: ICD-10-CM

## 2023-02-21 DIAGNOSIS — I12.9 BENIGN HYPERTENSION WITH CHRONIC KIDNEY DISEASE, STAGE III (HCC): ICD-10-CM

## 2023-02-21 DIAGNOSIS — N25.81 SECONDARY HYPERPARATHYROIDISM OF RENAL ORIGIN (HCC): ICD-10-CM

## 2023-02-21 RX ORDER — SPIRONOLACTONE 25 MG/1
25 TABLET ORAL DAILY
Qty: 90 TABLET | Refills: 3 | Status: SHIPPED | OUTPATIENT
Start: 2023-02-21

## 2023-02-21 NOTE — PATIENT INSTRUCTIONS
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  Creatinine is actually better than baseline  Electrolytes stable and within normal limits  Continue to monitor  Proteinuria- Microalbumin creatinine ratio is 1 36  She takes lisinopril and aldactone (will start)  Bone Mineral disorder- PTH and vitamin D levels acceptable and at goal     Hypertension- Avoid salt  Avoid NSAIDs  Stay active  Antihypertensive regimen includes metoprolol 25mg twice a day & lisinopril 20mg twice a day  Please start spironolactone 25mg daily  Incontinence- follow up with urology  Referral sent  Diabetes mellitus with retinopathy- continue management per endocrinology  She states she gets low blood sugars twice a day  Follow up with Dr Tico Collier in 6 months  Please call the office with any questions or concerns

## 2023-02-23 ENCOUNTER — TELEPHONE (OUTPATIENT)
Dept: CARDIOLOGY CLINIC | Facility: CLINIC | Age: 83
End: 2023-02-23

## 2023-02-23 ENCOUNTER — TELEPHONE (OUTPATIENT)
Dept: NEPHROLOGY | Facility: CLINIC | Age: 83
End: 2023-02-23

## 2023-02-23 DIAGNOSIS — L30.9 ECZEMA, UNSPECIFIED TYPE: ICD-10-CM

## 2023-02-23 RX ORDER — TRIAMCINOLONE ACETONIDE 1 MG/G
CREAM TOPICAL
Qty: 160 G | Refills: 1 | Status: SHIPPED | OUTPATIENT
Start: 2023-02-23

## 2023-02-23 NOTE — TELEPHONE ENCOUNTER
Pt called to see how she would make an appt with Urology  I gave her the number for the doctor she wants to see and also told her that there's a referral on file and to let them know that  Pt understood

## 2023-02-28 ENCOUNTER — TELEPHONE (OUTPATIENT)
Dept: FAMILY MEDICINE CLINIC | Facility: CLINIC | Age: 83
End: 2023-02-28

## 2023-02-28 ENCOUNTER — TELEPHONE (OUTPATIENT)
Dept: OTHER | Facility: OTHER | Age: 83
End: 2023-02-28

## 2023-02-28 DIAGNOSIS — R53.83 FATIGUE, UNSPECIFIED TYPE: Primary | ICD-10-CM

## 2023-02-28 NOTE — TELEPHONE ENCOUNTER
Patient's  called very upset  Within the last week patient has been extremely fatigued and is falling asleep randomly  She has been passing out at the dinner table  He denies SOB on exertion, denies any other symptoms   Blood sugars have been occasionally low between 50's and 60s, but this has never caused this level of fatigue in the past  He does state she seems slightly more pale than normal

## 2023-02-28 NOTE — TELEPHONE ENCOUNTER
called back and is willing to bring Pt to  see Dr Scot Miller on Friday 11:15 as the voicemail from 2/28/23 indicated  He will also get her blood work done

## 2023-03-01 ENCOUNTER — APPOINTMENT (OUTPATIENT)
Dept: LAB | Facility: CLINIC | Age: 83
End: 2023-03-01

## 2023-03-01 DIAGNOSIS — E03.9 ACQUIRED HYPOTHYROIDISM: ICD-10-CM

## 2023-03-01 DIAGNOSIS — I10 PRIMARY HYPERTENSION: ICD-10-CM

## 2023-03-01 DIAGNOSIS — N18.31 STAGE 3A CHRONIC KIDNEY DISEASE (HCC): ICD-10-CM

## 2023-03-01 DIAGNOSIS — I25.10 CORONARY ARTERY DISEASE INVOLVING NATIVE CORONARY ARTERY OF NATIVE HEART WITHOUT ANGINA PECTORIS: ICD-10-CM

## 2023-03-01 DIAGNOSIS — R53.83 FATIGUE, UNSPECIFIED TYPE: ICD-10-CM

## 2023-03-01 LAB
ALBUMIN SERPL BCP-MCNC: 3.4 G/DL (ref 3.5–5)
ALP SERPL-CCNC: 83 U/L (ref 46–116)
ALT SERPL W P-5'-P-CCNC: 19 U/L (ref 12–78)
ANION GAP SERPL CALCULATED.3IONS-SCNC: 2 MMOL/L (ref 4–13)
AST SERPL W P-5'-P-CCNC: 17 U/L (ref 5–45)
BACTERIA UR QL AUTO: ABNORMAL /HPF
BASOPHILS # BLD AUTO: 0.06 THOUSANDS/ÂΜL (ref 0–0.1)
BASOPHILS NFR BLD AUTO: 1 % (ref 0–1)
BILIRUB SERPL-MCNC: 0.84 MG/DL (ref 0.2–1)
BILIRUB UR QL STRIP: NEGATIVE
BUDDING YEAST: PRESENT
BUN SERPL-MCNC: 32 MG/DL (ref 5–25)
CALCIUM ALBUM COR SERPL-MCNC: 10.2 MG/DL (ref 8.3–10.1)
CALCIUM SERPL-MCNC: 9.7 MG/DL (ref 8.3–10.1)
CHLORIDE SERPL-SCNC: 106 MMOL/L (ref 96–108)
CHOLEST SERPL-MCNC: 122 MG/DL
CLARITY UR: ABNORMAL
CO2 SERPL-SCNC: 31 MMOL/L (ref 21–32)
COLOR UR: COLORLESS
CREAT SERPL-MCNC: 1.13 MG/DL (ref 0.6–1.3)
EOSINOPHIL # BLD AUTO: 0.22 THOUSAND/ÂΜL (ref 0–0.61)
EOSINOPHIL NFR BLD AUTO: 4 % (ref 0–6)
ERYTHROCYTE [DISTWIDTH] IN BLOOD BY AUTOMATED COUNT: 13.2 % (ref 11.6–15.1)
GFR SERPL CREATININE-BSD FRML MDRD: 45 ML/MIN/1.73SQ M
GLUCOSE P FAST SERPL-MCNC: 106 MG/DL (ref 65–99)
GLUCOSE UR STRIP-MCNC: NEGATIVE MG/DL
HCT VFR BLD AUTO: 41.3 % (ref 34.8–46.1)
HDLC SERPL-MCNC: 63 MG/DL
HGB BLD-MCNC: 12.5 G/DL (ref 11.5–15.4)
HGB UR QL STRIP.AUTO: ABNORMAL
IMM GRANULOCYTES # BLD AUTO: 0.01 THOUSAND/UL (ref 0–0.2)
IMM GRANULOCYTES NFR BLD AUTO: 0 % (ref 0–2)
KETONES UR STRIP-MCNC: NEGATIVE MG/DL
LDLC SERPL CALC-MCNC: 46 MG/DL (ref 0–100)
LEUKOCYTE ESTERASE UR QL STRIP: ABNORMAL
LYMPHOCYTES # BLD AUTO: 1.25 THOUSANDS/ÂΜL (ref 0.6–4.47)
LYMPHOCYTES NFR BLD AUTO: 24 % (ref 14–44)
MCH RBC QN AUTO: 30.4 PG (ref 26.8–34.3)
MCHC RBC AUTO-ENTMCNC: 30.3 G/DL (ref 31.4–37.4)
MCV RBC AUTO: 101 FL (ref 82–98)
MONOCYTES # BLD AUTO: 0.54 THOUSAND/ÂΜL (ref 0.17–1.22)
MONOCYTES NFR BLD AUTO: 10 % (ref 4–12)
NEUTROPHILS # BLD AUTO: 3.22 THOUSANDS/ÂΜL (ref 1.85–7.62)
NEUTS SEG NFR BLD AUTO: 61 % (ref 43–75)
NITRITE UR QL STRIP: NEGATIVE
NON-SQ EPI CELLS URNS QL MICRO: ABNORMAL /HPF
NONHDLC SERPL-MCNC: 59 MG/DL
NRBC BLD AUTO-RTO: 0 /100 WBCS
PH UR STRIP.AUTO: 7.5 [PH]
PLATELET # BLD AUTO: 160 THOUSANDS/UL (ref 149–390)
PMV BLD AUTO: 10.9 FL (ref 8.9–12.7)
POTASSIUM SERPL-SCNC: 4.4 MMOL/L (ref 3.5–5.3)
PROT SERPL-MCNC: 6.9 G/DL (ref 6.4–8.4)
PROT UR STRIP-MCNC: ABNORMAL MG/DL
RBC # BLD AUTO: 4.11 MILLION/UL (ref 3.81–5.12)
RBC #/AREA URNS AUTO: ABNORMAL /HPF
SODIUM SERPL-SCNC: 139 MMOL/L (ref 135–147)
SP GR UR STRIP.AUTO: 1.01 (ref 1–1.03)
T4 FREE SERPL-MCNC: 1.22 NG/DL (ref 0.76–1.46)
TRIGL SERPL-MCNC: 67 MG/DL
TSH SERPL DL<=0.05 MIU/L-ACNC: 16 UIU/ML (ref 0.45–4.5)
UROBILINOGEN UR STRIP-ACNC: <2 MG/DL
WBC # BLD AUTO: 5.3 THOUSAND/UL (ref 4.31–10.16)
WBC #/AREA URNS AUTO: ABNORMAL /HPF
WBC CLUMPS # UR AUTO: PRESENT /UL

## 2023-03-03 ENCOUNTER — TELEPHONE (OUTPATIENT)
Dept: ADMINISTRATIVE | Facility: OTHER | Age: 83
End: 2023-03-03

## 2023-03-03 ENCOUNTER — OFFICE VISIT (OUTPATIENT)
Dept: FAMILY MEDICINE CLINIC | Facility: CLINIC | Age: 83
End: 2023-03-03

## 2023-03-03 VITALS
TEMPERATURE: 97.7 F | WEIGHT: 223 LBS | SYSTOLIC BLOOD PRESSURE: 118 MMHG | HEIGHT: 61 IN | BODY MASS INDEX: 42.1 KG/M2 | DIASTOLIC BLOOD PRESSURE: 80 MMHG | RESPIRATION RATE: 16 BRPM | OXYGEN SATURATION: 95 % | HEART RATE: 69 BPM

## 2023-03-03 DIAGNOSIS — E03.9 ACQUIRED HYPOTHYROIDISM: Chronic | ICD-10-CM

## 2023-03-03 DIAGNOSIS — N30.00 ACUTE CYSTITIS WITHOUT HEMATURIA: ICD-10-CM

## 2023-03-03 DIAGNOSIS — R53.83 FATIGUE, UNSPECIFIED TYPE: Primary | ICD-10-CM

## 2023-03-03 LAB
BACTERIA UR CULT: ABNORMAL
BACTERIA UR CULT: ABNORMAL
T3FREE SERPL-MCNC: 1.73 PG/ML (ref 2.3–4.2)

## 2023-03-03 RX ORDER — CIPROFLOXACIN 250 MG/1
250 TABLET, FILM COATED ORAL EVERY 12 HOURS SCHEDULED
Qty: 12 TABLET | Refills: 0 | Status: SHIPPED | OUTPATIENT
Start: 2023-03-03 | End: 2023-03-09

## 2023-03-03 NOTE — TELEPHONE ENCOUNTER
----- Message from Noel Cheatham sent at 3/3/2023 11:31 AM EST -----  Regarding: care gap request  03/03/23 11:31 AM    Hello, our patient attached above has had Diabetic Eye Exam completed/performed  Please assist in updating the patient chart by making an External outreach to [facility] facility located in [facility]  The date of service is 2021and 2022      Thank you,  Scar Villalta PG [facility] MED Bart Sullivan

## 2023-03-03 NOTE — PROGRESS NOTES
Name: Magi Presley      : 1940      MRN: [id number]  Encounter Provider: Odilia Damico MD  Encounter Date: 3/3/2023   Encounter department: [address] [place] [address]     1  Fatigue, unspecified type    2  Acute cystitis without hematuria  -     ciprofloxacin (CIPRO) 250 mg tablet; Take 1 tablet (250 mg total) by mouth every 12 (twelve) hours for 6 days    3  Acquired hypothyroidism  Discussion:  I reviewed with pt and   Unclear cause of fatigue - consider UTI, MITCH, thyroid or other cause  Start Cipro for UTI (adjust if sensitivities show poor response to this antibiotic)  Await home sleep study result Will discuss TSH results with Endo  Continue all other meds for now  Recheck 1-2 if not improved - earlier if worse  Pt or  to call for any problems or concerns in the interim         Subjective     Pt here for evaluation  - pt's  has noticed pt with increased fatigue/somnolence intermittently over the last 2 weeks  She admits to getting tired though  is concerned and that she seems to fall into a deep sleep at times such as when she is sitting at the kitchen table etc   Patient denies any fever or chills, dysuria, or URI symptoms  Patient does have some urinary frequency, as well as a dry cough  Recent labs were significant for a elevated TSH (greater than 16) with a normal T4  Urinalysis was also positive with a urine culture (preliminary) positive for greater than 100,000 cultures of gram-negative rods-ID and sensitivities pending  Pt has not been using her CPAP  Pt is scheduled to have a repeat sleep study  - pt denies CP, palp, lightheadedness or other CV/resp complaints  - no new GI or  complaints  - I reviewed recent labs with pt and     Review of Systems   Constitutional: Positive for fatigue  Negative for activity change, appetite change, chills and fever  HENT: Negative  Eyes: Negative      Respiratory: Negative  Cardiovascular: Negative  Gastrointestinal: Negative  Genitourinary: Positive for frequency  Musculoskeletal: Positive for arthralgias and gait problem  Negative for myalgias  Neurological: Negative for dizziness, weakness, light-headedness, numbness and headaches  Past Medical History:   Diagnosis Date   • Anxiety    • Arthritis    • CHF (congestive heart failure) ([facility])    • Chronic venous insufficiency    • COPD (chronic obstructive pulmonary disease) ([facility])    • Coronary artery disease    • Diabetes mellitus ([facility] )     type II   • Disease of thyroid gland     hypothyroidism   • DVT (deep venous thrombosis) ([facility])    • HL (hearing loss)    • Hyperlipidemia    • Hypertension    • Irregular heart beat     Paroxysmal a-fib   • Pacemaker    • Renal disorder     stage 3   • Sleep apnea    • Sleep difficulties    • Thyroid disease    • Urinary incontinence    • Uterine leiomyoma      Past Surgical History:   Procedure Laterality Date   • BREAST SURGERY      For Biopsy   • CARDIAC PACEMAKER PLACEMENT      Permanent  Last assessed: 2/19/16   • CARDIAC SURGERY     • CARPAL TUNNEL RELEASE Bilateral    • CATARACT EXTRACTION     • CATARACT EXTRACTION W/ INTRAOCULAR LENS  IMPLANT, BILATERAL     • CYSTOSCOPY      Diagnostic   Last assessed: 11/8/17   • ECTOPIC PREGNANCY SURGERY     • HYSTERECTOMY      Total Abdomonal  Resolved: 1982   • JOINT REPLACEMENT Right     TKR   • OOPHORECTOMY     • SC ENDOVEN ABLTJ INCMPTNT VEIN XTR LASER 1ST VEIN Left 2/17/2017    Procedure: GREATER SAPHENOUS VEIN ENDOVASCULAR LASER THERAPY ;  Surgeon: Sushila Ghotra DO;  [facility];  Service: Vascular   • TONSILLECTOMY       Family History   Problem Relation Age of Onset   • Hypertension Father    • Diabetes Father         Mellitus   • Coronary artery disease Mother    • Hypertension Mother    • Heart disease Mother    • Diabetes Brother         Mellitus   • Breast cancer Maternal Aunt    • Pancreatic cancer Maternal Aunt    • Breast cancer Cousin    • Breast cancer Cousin    • No Known Problems Sister    • No Known Problems Daughter    • No Known Problems Maternal Grandmother    • No Known Problems Maternal Grandfather    • No Known Problems Paternal Grandmother    • No Known Problems Paternal Grandfather    • No Known Problems Daughter    • No Known Problems Son      Social History     Socioeconomic History   • Marital status: /Civil Union     Spouse name: None   • Number of children: 3   • Years of education: None   • Highest education level: None   Occupational History   • Occupation: RETIRED   Tobacco Use   • Smoking status: Never   • Smokeless tobacco: Never   • Tobacco comments:     Per [employer]: Former smoker   Vaping Use   • Vaping Use: Never used   Substance and Sexual Activity   • Alcohol use: Yes     Comment: rarely   Per [employer]: Denied history of alcohol use   • Drug use: No   • Sexual activity: Never   Other Topics Concern   • None   Social History Narrative    Daily coffee consumption: 2 cups/day     Social Determinants of Health     Financial Resource Strain: Not on file   Food Insecurity: Not on file   Transportation Needs: Not on file   Physical Activity: Not on file   Stress: Not on file   Social Connections: Not on file   Intimate Partner Violence: Not on file   Housing Stability: Not on file     Current Outpatient Medications on File Prior to Visit   Medication Sig   • acetaminophen (TYLENOL) 325 mg tablet Take 650 mg by mouth   • apixaban (Eliquis) 5 mg Take 1 tablet (5 mg total) by mouth 2 (two) times a day   • Ascorbic Acid (VITAMIN C) 1000 MG tablet Take 500 mg by mouth    • atorvastatin (LIPITOR) 80 mg tablet TAKE 1 TABLET AT BEDTIME   • Calcium Carbonate-Vit D-Min (CALCIUM 1200 PO) Take by mouth   • cholecalciferol (VITAMIN D3) 1,000 units tablet Take 2,000 Units by mouth daily    • citalopram (CeleXA) 20 mg tablet TAKE 1 TABLET EVERY DAY   • Continuous Blood Gluc  (FREESTYLE RONNI 14 DAY READER) GARRY Use to check BG levels 4+times daily and as needed   • Continuous Blood Gluc Sensor (FREESTYLE RONNI 14 DAY SENSOR) MISC Change sensor every 14 days to check BG levels 4+ times daily   • GLUCOSAMINE CHONDROITIN COMPLX PO Take 1 tablet by mouth 2 (two) times a day   • glucose blood (TRUE METRIX BLOOD GLUCOSE TEST) test strip Test 4 times plus daily, as instructed   • insulin aspart (NovoLOG FlexPen) 100 UNIT/ML injection pen INJECT 8 UNITS SUBCUTANEOUSLY WITH BREAKFAST, THEN 10 UNITS WITH LUNCH, AND THEN 10 UNITS WITH DINNER   • insulin glargine (Toujeo SoloStar) 300 units/mL CONCENTRATED U-300 injection pen (1-unit dial) Inject 36 Units under the skin daily at bedtime   • Insulin Pen Needle (BD Pen Needle Sasha U/F) 32G X 4 MM MISC Inject 4 times a day   • Lancets (onetouch ultrasoft) lancets Use 4 times a day   • levothyroxine 125 mcg tablet TAKE 1 TABLET ONE TIME DAILY EXCEPT ON SUNDAY NO TABLET   • lisinopril (ZESTRIL) 20 mg tablet Take 1 tablet (20 mg total) by mouth 2 (two) times a day   • metoprolol tartrate (LOPRESSOR) 25 mg tablet TAKE 1 TABLET EVERY 12 HOURS   • Omega-3 Fatty Acids (FISH OIL) 1200 MG CAPS Take 1,200 mg by mouth 2 (two) times a day    • oxybutynin (DITROPAN) 5 mg tablet TAKE 1 TABLET TWICE DAILY   • spironolactone (ALDACTONE) 25 mg tablet Take 1 tablet (25 mg total) by mouth daily   • Tetrahydroz-Polyvinyl Al-Povid (MURINE TEARS PLUS OP) Apply to eye 3 (three) times a day   • triamcinolone (KENALOG) 0 1 % cream APPLY TOPCICALLY TWO TIMES A DAY   • omeprazole (PriLOSEC OTC) 20 MG tablet Take 1 tablet (20 mg total) by mouth 2 (two) times a day     Allergies   Allergen Reactions   • Ampicillin Hives     Immunization History   Administered Date(s) Administered   • COVID-19 PFIZER VACCINE 0 3 ML IM 01/20/2021, 02/09/2021, 04/11/2022   • H1N1, All Formulations 01/12/2010, 01/12/2010   • INFLUENZA 11/04/2005   • Influenza Split High Dose Preservative Free IM 10/18/2012, 10/23/2013, 10/14/2014, 10/20/2015, 11/02/2016, 10/04/2017   • Influenza, high dose seasonal 0 7 mL 10/23/2018, 10/09/2019, 10/20/2020, 10/28/2022   • Influenza, seasonal, injectable 11/12/2009, 01/12/2010   • Pneumococcal Conjugate 13-Valent 10/21/2015   • Pneumococcal Conjugate Vaccine 20-valent (Pcv20), Polysace 10/28/2022   • Pneumococcal Polysaccharide PPV23 12/11/2000, 12/11/2010   • Td (adult), adsorbed 07/04/2008   • Tetanus, adsorbed 09/04/1998   • Zoster 07/22/2016       Objective     /80 (BP Location: Left arm, Patient Position: Sitting, Cuff Size: Adult)   Pulse 69   Temp 97 7 °F (36 5 °C)   Resp 16   Ht 5' 1" (1 549 m)   Wt 101 kg (223 lb)   SpO2 95%   BMI 42 14 kg/m²     Physical Exam  Vitals reviewed  Constitutional:       Appearance: She is well-developed  She is not diaphoretic  HENT:      Head: Normocephalic and atraumatic  Right Ear: Tympanic membrane, ear canal and external ear normal       Left Ear: Tympanic membrane, ear canal and external ear normal       Ears:      Comments: Sl decreased hearing     Mouth/Throat:      Mouth: Mucous membranes are moist    Eyes:      Extraocular Movements: Extraocular movements intact  Conjunctiva/sclera: Conjunctivae normal       Pupils: Pupils are equal, round, and reactive to light  Neck:      Thyroid: No thyromegaly  Vascular: No JVD  Cardiovascular:      Rate and Rhythm: Normal rate and regular rhythm  Pulses:           Dorsalis pedis pulses are 0 on the right side and 0 on the left side  Posterior tibial pulses are 0 on the right side and 0 on the left side  Heart sounds: No murmur heard  Pulmonary:      Effort: Pulmonary effort is normal       Breath sounds: No wheezing or rales  Abdominal:      General: There is no distension  Palpations: Abdomen is soft  There is no mass  Tenderness: There is no abdominal tenderness  There is no right CVA tenderness or left CVA tenderness  Musculoskeletal:         General: Deformity (mild diffuse OA changes) present  No swelling  Normal range of motion  Cervical back: Normal range of motion and neck supple  No tenderness  No muscular tenderness  Right lower leg: No edema (trace)  Left lower leg: No edema (trace)  Feet:      Right foot:      Skin integrity: No ulcer, skin breakdown, erythema, warmth, callus or dry skin  Left foot:      Skin integrity: No ulcer, skin breakdown, erythema, warmth, callus or dry skin  Lymphadenopathy:      Cervical: No cervical adenopathy  Skin:     General: Skin is warm and dry  Capillary Refill: Capillary refill takes less than 2 seconds  Findings: Rash (mild xerosis on arms  Back greatly improved) present  Comments: Scattered eczematous patches over the back   Neurological:      Mental Status: She is alert and oriented to person, place, and time  Cranial Nerves: No cranial nerve deficit  Sensory: No sensory deficit  Motor: No weakness or abnormal muscle tone  Gait: Gait abnormal (mildly antalgic appearing gait  )  Deep Tendon Reflexes: Reflexes are normal and symmetric  Psychiatric:         Mood and Affect: Mood normal          Behavior: Behavior normal          Thought Content:  Thought content normal          Judgment: Judgment normal        Roxi Rodriguez MD

## 2023-03-06 ENCOUNTER — TELEPHONE (OUTPATIENT)
Dept: ENDOCRINOLOGY | Facility: CLINIC | Age: 83
End: 2023-03-06

## 2023-03-06 DIAGNOSIS — Z79.4 TYPE 2 DIABETES MELLITUS WITH HYPERGLYCEMIA, WITH LONG-TERM CURRENT USE OF INSULIN (HCC): Primary | ICD-10-CM

## 2023-03-06 DIAGNOSIS — E03.9 ACQUIRED HYPOTHYROIDISM: ICD-10-CM

## 2023-03-06 DIAGNOSIS — E11.65 TYPE 2 DIABETES MELLITUS WITH HYPERGLYCEMIA, WITH LONG-TERM CURRENT USE OF INSULIN (HCC): Primary | ICD-10-CM

## 2023-03-06 NOTE — LETTER
03/09/23    Dear Eryn Connell,    Please inform pt that I have reviewed her blood work   her TSH is high, but free t4 is normal,  we do not recommend to increase the dose based on her age, and other medical problems     She should repeat TSH and free t4 in 6 weeks ( please order)     Lora Peabody        Blood work has been ordered

## 2023-03-07 NOTE — TELEPHONE ENCOUNTER
Upon review of the In Basket request and the patient's chart, initial outreach has been made via fax to facility  Please see Contacts section for details       Thank you  Courtney Neil

## 2023-03-07 NOTE — TELEPHONE ENCOUNTER
Pt states she saw Dr Linda Rucker and he was suppose to send a message to provider about levothyroxine  Per pt- he was concerned about her bw and pt only taking levothyroxine 6 days a week   Advised pt we will call tomorrow

## 2023-03-08 NOTE — TELEPHONE ENCOUNTER
Upon review of the In Basket request we were able to locate, review, and update the patient chart as requested for Diabetic Eye Exam     Any additional questions or concerns should be emailed to the Practice Liaisons via the appropriate education email address, please do not reply via In Basket      Thank you  Angela Ibarra

## 2023-03-08 NOTE — TELEPHONE ENCOUNTER
Please inform pt that I have reviewed her blood work   her TSH is high, but free t4 is normal,  we do not recommend to increase the dose based on her age, and other medical problems     She should repeat TSH and free t4 in 6 weeks ( please order)     Konrad Martinez

## 2023-03-09 ENCOUNTER — TELEPHONE (OUTPATIENT)
Dept: FAMILY MEDICINE CLINIC | Facility: CLINIC | Age: 83
End: 2023-03-09

## 2023-03-09 NOTE — TELEPHONE ENCOUNTER
Patient's  called asking if you had been in touch with Dr Bia Chung yet and if any adjustments need to be made to her thyroid medication     Please advise

## 2023-03-29 ENCOUNTER — OFFICE VISIT (OUTPATIENT)
Dept: UROLOGY | Facility: CLINIC | Age: 83
End: 2023-03-29

## 2023-03-29 ENCOUNTER — TELEPHONE (OUTPATIENT)
Dept: OTHER | Facility: OTHER | Age: 83
End: 2023-03-29

## 2023-03-29 VITALS
BODY MASS INDEX: 42.1 KG/M2 | DIASTOLIC BLOOD PRESSURE: 80 MMHG | SYSTOLIC BLOOD PRESSURE: 120 MMHG | HEIGHT: 61 IN | OXYGEN SATURATION: 95 % | HEART RATE: 81 BPM | WEIGHT: 223 LBS

## 2023-03-29 DIAGNOSIS — R32 URINARY INCONTINENCE, UNSPECIFIED TYPE: Primary | ICD-10-CM

## 2023-03-29 LAB — POST-VOID RESIDUAL VOLUME, ML POC: 143 ML

## 2023-03-29 NOTE — PROGRESS NOTES
1  Urinary incontinence, unspecified type  Ambulatory Referral to Urology          Assessment and plan:       1  Urinary incontinence  -Patient's primary bother is her nighttime incontinence  I reviewed with her that this may be contributory from her lower extremity edema as well as diagnosis of sleep apnea  She is currently not using a CPAP however she does report that she has an upcoming visit with sleep medicine for evaluation  We discussed compression stockings and lower extremity elevation   -We will discontinue patient's oxybutynin  Trial of Myrbetriq   -Return to clinic in 3 months with urine studies prior for repeat evaluation       Hernan Cui PA-C      Chief Complaint     Urinary incontinence    History of Present Illness     Rajiv Robertson is a 82 y o  female presenting today for consultation  Previously seen and 2020 with history of hematuria, overactive bladder, and urinary incontinence  A  noncontrast CT was negative for any upper tract abnormalities  She is unable to undergo a contrasted imaging given her renal function was unable to undergo MRI given compatible hardware  She underwent a cystoscopy 11/08/2017 which did reveal 1 area of hyperemia within the bladder dome  Dr Chase Curry had wish to repeat a cystoscopy approximately 4 months thereafter for continued surveillance  Repeat cystoscopy was performed 05/16/2018 which was negative for any abnormality  Patient's hematuria was felt to be given her anticoagulation  When patient was titrated to a lower dose, her hematuria significantly improved thereafter  Patient is on a daily diuretic  Previously had been prescribed oxybutynin 5 mg twice daily  He is unable to receive PTNS due to pacemaker  Recent + urine culture 3/1/23 treated with antibiotics by PCP  Patient does report both daytime and nighttime urinary frequency  Approximately every 2 hours during the daytime  Wearing on average 1 pad per day  At nighttime she has a large volume urinary incontinence  She reports that she sleeps quite soundly during this timeframe and does not wake up to void  PVR 143mL    Laboratory     Lab Results   Component Value Date    CREATININE 1 13 03/01/2023       Review of Systems     Review of Systems   Constitutional: Negative for activity change, appetite change, chills, diaphoresis, fatigue, fever and unexpected weight change  Respiratory: Negative for chest tightness and shortness of breath  Cardiovascular: Negative for chest pain, palpitations and leg swelling  Gastrointestinal: Negative for abdominal distention, abdominal pain, constipation, diarrhea, nausea and vomiting  Genitourinary: Positive for frequency and urgency  Negative for decreased urine volume, difficulty urinating, dysuria, enuresis, flank pain, genital sores and hematuria  Musculoskeletal: Negative for back pain, gait problem and myalgias  Skin: Negative for color change, pallor, rash and wound  Psychiatric/Behavioral: Negative for behavioral problems  The patient is not nervous/anxious  Allergies     Allergies   Allergen Reactions   • Ampicillin Hives       Physical Exam     Physical Exam  Constitutional:       General: She is not in acute distress  Appearance: Normal appearance  She is normal weight  She is not ill-appearing, toxic-appearing or diaphoretic  HENT:      Head: Normocephalic and atraumatic  Eyes:      General:         Right eye: No discharge  Left eye: No discharge  Conjunctiva/sclera: Conjunctivae normal    Pulmonary:      Effort: Pulmonary effort is normal  No respiratory distress  Musculoskeletal:         General: No tenderness  Right lower leg: Edema present  Left lower leg: Edema present  Comments: Ambulates with walker assistance   Skin:     General: Skin is warm and dry  Coloration: Skin is not jaundiced or pale     Neurological:      General: No focal deficit present  Mental Status: She is alert and oriented to person, place, and time  Psychiatric:         Mood and Affect: Mood normal          Behavior: Behavior normal          Thought Content: Thought content normal        Vital Signs     There were no vitals filed for this visit        Current Medications       Current Outpatient Medications:   •  acetaminophen (TYLENOL) 325 mg tablet, Take 650 mg by mouth, Disp: , Rfl:   •  apixaban (Eliquis) 5 mg, Take 1 tablet (5 mg total) by mouth 2 (two) times a day, Disp: 56 tablet, Rfl: 0  •  Ascorbic Acid (VITAMIN C) 1000 MG tablet, Take 500 mg by mouth , Disp: , Rfl:   •  atorvastatin (LIPITOR) 80 mg tablet, TAKE 1 TABLET AT BEDTIME, Disp: 90 tablet, Rfl: 3  •  Calcium Carbonate-Vit D-Min (CALCIUM 1200 PO), Take by mouth, Disp: , Rfl:   •  cholecalciferol (VITAMIN D3) 1,000 units tablet, Take 2,000 Units by mouth daily , Disp: , Rfl:   •  citalopram (CeleXA) 20 mg tablet, TAKE 1 TABLET EVERY DAY, Disp: 90 tablet, Rfl: 3  •  Continuous Blood Gluc  (FREESTYLE RONNI 14 DAY READER) GARRY, Use to check BG levels 4+times daily and as needed, Disp: 1 Device, Rfl: 0  •  Continuous Blood Gluc Sensor (FREESTYLE RONNI 14 DAY SENSOR) MISC, Change sensor every 14 days to check BG levels 4+ times daily, Disp: 6 each, Rfl: 3  •  GLUCOSAMINE CHONDROITIN COMPLX PO, Take 1 tablet by mouth 2 (two) times a day, Disp: , Rfl:   •  glucose blood (TRUE METRIX BLOOD GLUCOSE TEST) test strip, Test 4 times plus daily, as instructed, Disp: 400 each, Rfl: 1  •  insulin aspart (NovoLOG FlexPen) 100 UNIT/ML injection pen, INJECT 8 UNITS SUBCUTANEOUSLY WITH BREAKFAST, THEN 10 UNITS WITH LUNCH, AND THEN 10 UNITS WITH DINNER, Disp: 15 mL, Rfl: 1  •  insulin glargine (Toujeo SoloStar) 300 units/mL CONCENTRATED U-300 injection pen (1-unit dial), Inject 36 Units under the skin daily at bedtime, Disp: 12 mL, Rfl: 1  •  Insulin Pen Needle (BD Pen Needle Sasha U/F) 32G X 4 MM MISC, Inject 4 times a day, Disp: 400 each, Rfl: 1  •  Lancets (onetouch ultrasoft) lancets, Use 4 times a day, Disp: 400 each, Rfl: 0  •  levothyroxine 125 mcg tablet, TAKE 1 TABLET ONE TIME DAILY EXCEPT ON SUNDAY NO TABLET, Disp: 78 tablet, Rfl: 1  •  lisinopril (ZESTRIL) 20 mg tablet, Take 1 tablet (20 mg total) by mouth 2 (two) times a day, Disp: 180 tablet, Rfl: 3  •  metoprolol tartrate (LOPRESSOR) 25 mg tablet, TAKE 1 TABLET EVERY 12 HOURS, Disp: 180 tablet, Rfl: 3  •  Omega-3 Fatty Acids (FISH OIL) 1200 MG CAPS, Take 1,200 mg by mouth 2 (two) times a day , Disp: , Rfl:   •  omeprazole (PriLOSEC OTC) 20 MG tablet, Take 1 tablet (20 mg total) by mouth 2 (two) times a day, Disp: 180 tablet, Rfl: 1  •  oxybutynin (DITROPAN) 5 mg tablet, TAKE 1 TABLET TWICE DAILY, Disp: 180 tablet, Rfl: 1  •  spironolactone (ALDACTONE) 25 mg tablet, Take 1 tablet (25 mg total) by mouth daily, Disp: 90 tablet, Rfl: 3  •  Tetrahydroz-Polyvinyl Al-Povid (MURINE TEARS PLUS OP), Apply to eye 3 (three) times a day, Disp: , Rfl:   •  triamcinolone (KENALOG) 0 1 % cream, APPLY TOPCICALLY TWO TIMES A DAY, Disp: 160 g, Rfl: 1      Active Problems     Patient Active Problem List   Diagnosis   • Type 2 diabetes mellitus with hyperglycemia, with long-term current use of insulin ([facility])   • Class 2 severe obesity with serious comorbidity in [facility])   • Complete heart block (HCC)   • Anemia   • S/P cardiac pacemaker procedure   • Hypothyroidism   • Chronic diastolic congestive heart failure (HCC)   • Chronic venous insufficiency   • Benign hypertension with chronic kidney disease, stage III (HCC)   • Stage 3a chronic kidney disease (HCC)   • Iron deficiency anemia   • Microalbuminuria   • Lymphedema of both lower extremities   • Vitamin D deficiency   • Anxiety disorder   • Urinary bladder incontinence   • Chronic obstructive pulmonary disease (HCC)   • Eczematous dermatitis   • Mixed hyperlipidemia   • OAB (overactive bladder)   • Presence of permanent cardiac pacemaker   • PAF (paroxysmal atrial fibrillation) ([facility])   • 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 neurological manifestations, uncontrolled([address])   • 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 [address] )   • Intraocular pressure increase   • Sleep apnea   • Dermatitis   • Coronary artery disease involving native coronary artery of native heart   • Memory difficulties   • Imbalance   • Secondary hyperparathyroidism of renal origin [facility])   • Fall   • Hypertension   • Abnormal CT scan, esophagus   • Hiatal hernia   • Esophageal dysphagia   • Platelets decreased ([facility] [address] )         Past Medical History     Past Medical History:   Diagnosis Date   • Anxiety    • Arthritis    • CHF (congestive heart failure) ([facility])    • Chronic venous insufficiency    • COPD (chronic obstructive pulmonary disease) ([facility])    • Coronary artery disease    • Diabetes mellitus ([facility] [address] )     type II   • Disease of thyroid gland     hypothyroidism   • DVT (deep venous thrombosis) ([facility])    • HL (hearing loss)    • Hyperlipidemia    • Hypertension    • Irregular heart beat     Paroxysmal a-fib   • Pacemaker    • Renal disorder     stage 3   • Sleep apnea    • Sleep difficulties    • Thyroid disease    • Urinary incontinence    • Uterine leiomyoma          Surgical History     Past Surgical History:   Procedure Laterality Date   • BREAST SURGERY      For Biopsy   • CARDIAC PACEMAKER PLACEMENT      Permanent  Last assessed: 2/19/16   • CARDIAC SURGERY     • CARPAL TUNNEL RELEASE Bilateral    • CATARACT EXTRACTION     • CATARACT EXTRACTION W/ INTRAOCULAR LENS  IMPLANT, BILATERAL     • CYSTOSCOPY      Diagnostic   Last assessed: 11/8/17   • ECTOPIC PREGNANCY SURGERY     • HYSTERECTOMY      Total Abdomonal  Resolved: 1982   • JOINT REPLACEMENT Right     TKR   • OOPHORECTOMY     • IL ENDOVEN ABLTJ INCMPTNT VEIN XTR LASER 1ST VEIN Left 2/17/2017    Procedure: GREATER SAPHENOUS VEIN ENDOVASCULAR LASER THERAPY ;  Surgeon: Tashia Mayfield DO;  Location: [facility];  Service: Vascular   • TONSILLECTOMY           Family History     Family History   Problem Relation Age of Onset   • Hypertension Father    • Diabetes Father         Mellitus   • Coronary artery disease Mother    • Hypertension Mother    • Heart disease Mother    • Diabetes Brother         Mellitus   • Breast cancer Maternal Aunt    • Pancreatic cancer Maternal Aunt    • Breast cancer Cousin    • Breast cancer Cousin    • No Known Problems Sister    • No Known Problems Daughter    • No Known Problems Maternal Grandmother    • No Known Problems Maternal Grandfather    • No Known Problems Paternal Grandmother    • No Known Problems Paternal Grandfather    • No Known Problems Daughter    • No Known Problems Son          Social History     Social History       Radiology

## 2023-03-30 ENCOUNTER — TELEPHONE (OUTPATIENT)
Dept: OTHER | Facility: OTHER | Age: 83
End: 2023-03-30

## 2023-03-30 DIAGNOSIS — N32.81 OAB (OVERACTIVE BLADDER): Primary | ICD-10-CM

## 2023-03-30 DIAGNOSIS — R32 URINARY INCONTINENCE, UNSPECIFIED TYPE: Primary | ICD-10-CM

## 2023-03-30 RX ORDER — OXYBUTYNIN CHLORIDE 5 MG/1
5 TABLET ORAL 2 TIMES DAILY
Qty: 60 TABLET | Refills: 6 | Status: SHIPPED | OUTPATIENT
Start: 2023-03-30 | End: 2023-06-16 | Stop reason: ALTCHOICE

## 2023-03-30 NOTE — TELEPHONE ENCOUNTER
Spoke with patient and she cannot afford Myrbetriq  Asked her if the oxybutynin was effective for her, but she was unable to answer if she thought it was helping her or not    She is asking for an alternative to be sent to pharmacy

## 2023-03-30 NOTE — TELEPHONE ENCOUNTER
Spoke with patient and informed her oxybutynin was sent to her pharmacy as it's the only one covered by insurance  Patient verbalized understanding

## 2023-04-04 ENCOUNTER — REMOTE DEVICE CLINIC VISIT (OUTPATIENT)
Dept: CARDIOLOGY CLINIC | Facility: CLINIC | Age: 83
End: 2023-04-04

## 2023-04-04 DIAGNOSIS — Z95.0 CARDIAC PACEMAKER IN SITU: Primary | ICD-10-CM

## 2023-04-04 NOTE — PROGRESS NOTES
"Results for orders placed or performed in visit on 04/04/23   Cardiac EP device report    Narrative    MDT-DUAL CHAMBER PPM (DDDR MODE)/ ACTIVE SYSTEM IS MRI CONDITIONAL  CARELINK TRANSMISSION: BATTERY STATUS \"2 YRS  \" AP 47%  100%  ALL AVAILABLE LEAD PARAMETERS WITHIN NORMAL LIMITS  NO SIGNIFICANT HIGH RATE EPISODES  NORMAL DEVICE FUNCTION   NC         "

## 2023-04-26 ENCOUNTER — TELEPHONE (OUTPATIENT)
Dept: UROLOGY | Facility: MEDICAL CENTER | Age: 83
End: 2023-04-26

## 2023-04-26 DIAGNOSIS — K44.9 HIATAL HERNIA: ICD-10-CM

## 2023-04-26 RX ORDER — OMEPRAZOLE 20 MG/1
CAPSULE, DELAYED RELEASE ORAL
Qty: 180 CAPSULE | Refills: 3 | Status: SHIPPED | OUTPATIENT
Start: 2023-04-26

## 2023-04-26 NOTE — TELEPHONE ENCOUNTER
Pt  calling to request that pt follow up on 7/10/23 be changed to be seen with Dr Johnny Galvan or  wants to cancel and no longer be seen at the practice  Him and his wife are scheduled together and they both want to be scheduled together to see Dr Johnny Galvan      Please call at [phone]    Encounter also send on spouse

## 2023-04-27 NOTE — TELEPHONE ENCOUNTER
Attempted to call twice  The first time it seemed as though someone picked up but there was no answer  The second time I left a message asking for a call back, [phone], press option 1 and ask for [facility]   (same note left in husbands chart)

## 2023-04-28 NOTE — TELEPHONE ENCOUNTER
Called and spoke with patient's  at this time  Reviewed Dr Aimee Plummer is very booked and does not have availability for follow ups sooner than July  Offered sooner MD appts for him and his wife in the [facility] office  He was very grateful for this  Appts scheduled for 5/25  Patient's  requested I mail him directions and appt cards  Everything mailed to address on file, which was confirmed

## 2023-05-01 ENCOUNTER — HOSPITAL ENCOUNTER (OUTPATIENT)
Dept: RADIOLOGY | Facility: IMAGING CENTER | Age: 83
Discharge: HOME | End: 2023-05-01

## 2023-05-01 VITALS — WEIGHT: 194 LBS | HEIGHT: 61 IN | BODY MASS INDEX: 36.63 KG/M2

## 2023-05-01 DIAGNOSIS — Z12.31 SCREENING MAMMOGRAM FOR BREAST CANCER: ICD-10-CM

## 2023-05-04 ENCOUNTER — OFFICE VISIT (OUTPATIENT)
Dept: FAMILY MEDICINE CLINIC | Facility: CLINIC | Age: 83
End: 2023-05-04

## 2023-05-04 VITALS
DIASTOLIC BLOOD PRESSURE: 82 MMHG | TEMPERATURE: 98.2 F | WEIGHT: 219 LBS | OXYGEN SATURATION: 98 % | SYSTOLIC BLOOD PRESSURE: 120 MMHG | BODY MASS INDEX: 41.35 KG/M2 | HEIGHT: 61 IN | HEART RATE: 80 BPM

## 2023-05-04 DIAGNOSIS — J44.9 CHRONIC OBSTRUCTIVE PULMONARY DISEASE, UNSPECIFIED COPD TYPE (HCC): ICD-10-CM

## 2023-05-04 DIAGNOSIS — I25.10 CORONARY ARTERY DISEASE INVOLVING NATIVE CORONARY ARTERY OF NATIVE HEART WITHOUT ANGINA PECTORIS: ICD-10-CM

## 2023-05-04 DIAGNOSIS — E03.9 ACQUIRED HYPOTHYROIDISM: Chronic | ICD-10-CM

## 2023-05-04 DIAGNOSIS — Z00.00 MEDICARE ANNUAL WELLNESS VISIT, SUBSEQUENT: Primary | ICD-10-CM

## 2023-05-04 DIAGNOSIS — E11.3393 MODERATE NONPROLIFERATIVE DIABETIC RETINOPATHY OF BOTH EYES ASSOCIATED WITH TYPE 2 DIABETES MELLITUS, MACULAR EDEMA PRESENCE UNSPECIFIED (HCC): ICD-10-CM

## 2023-05-04 DIAGNOSIS — Z74.8 ASSISTANCE NEEDED WITH TRANSPORTATION: ICD-10-CM

## 2023-05-04 DIAGNOSIS — L30.9 ECZEMA, UNSPECIFIED TYPE: ICD-10-CM

## 2023-05-04 DIAGNOSIS — E11.65 TYPE 2 DIABETES MELLITUS WITH HYPERGLYCEMIA, WITH LONG-TERM CURRENT USE OF INSULIN (HCC): ICD-10-CM

## 2023-05-04 DIAGNOSIS — Z79.4 TYPE 2 DIABETES MELLITUS WITH HYPERGLYCEMIA, WITH LONG-TERM CURRENT USE OF INSULIN (HCC): ICD-10-CM

## 2023-05-04 DIAGNOSIS — I10 PRIMARY HYPERTENSION: ICD-10-CM

## 2023-05-04 DIAGNOSIS — I48.0 PAF (PAROXYSMAL ATRIAL FIBRILLATION) (HCC): ICD-10-CM

## 2023-05-04 LAB — SL AMB POCT HEMOGLOBIN AIC: 6.8 (ref ?–6.5)

## 2023-05-04 NOTE — PROGRESS NOTES
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 ([address] )       Lab Results   Component Value Date    HGBA1C 6 8 (A) 05/04/2023   Vision slowly worsening  F/u with ophth  Continue present care  Recheck 6m         Type 2 diabetes mellitus with hyperglycemia, with long-term current use of insulin (HCC)       Lab Results   Component Value Date    HGBA1C 6 8 (A) 05/04/2023   Stable  Continue present care  F/u with Endo  Recheck 6m         Relevant Orders    POCT hemoglobin A1c (Completed)       Respiratory    Chronic obstructive pulmonary disease (HCC)     Breathing stable  Continue present care  Cardiovascular and Mediastinum    Coronary artery disease involving native coronary artery of native heart     Asymptomatic  Continue present care  F/u with Cardio  Recheck 6m         Hypertension     Well controlled  Cont present treatment  Monitor labs  Recheck 6m           PAF (paroxysmal atrial fibrillation) ([facility])     Rate controlled  Continue present care  F/u with Cardio  Recheck 6m            Musculoskeletal and Integument    Eczematous dermatitis     Appears to be better on my exam, but not resolved  Will have pt continue moisturizing  Refer to derm  Recheck 3m         Relevant Orders    Ambulatory Referral to Dermatology       Other    Assistance needed with transportation     Pt no longer drives, and her  has difficulty getting her to various appointments  I discussed moving to assisted living with pt and  - both are open to investigating   MSW referral done to discuss help with transportation and to help investigating assisted living options         Relevant Orders    Ambulatory Referral to Social Work Care Management Program   Other Visit Diagnoses     Medicare annual wellness visit, subsequent    -  Primary           Preventive health issues were discussed with patient, and age appropriate screening tests were ordered as noted in patient's After Visit Summary  Personalized health advice and appropriate referrals for health education or preventive services given if needed, as noted in patient's After Visit Summary  History of Present Illness:     Patient presents for a Medicare Wellness Visit    f/u multiple med issues and AWV  - pt states that she is doing well  - pt is up to date with Endo  A1C in the office today = 6 8  Vision is slowly worsening  Pt is up to date with ophth  - pt does not exercise regularly  Pt denies CP, palpitations, lightheadedness or other CV symptoms with or without exertion  -  states that her rash is unchanged despite moisturizing  - still struggling with taste since COVID infection  Pt denies any other new GI or  complaints  - pt continues to use her walker for balance while ambulating    - AWV done     Patient Care Team:  Brooke Gutierres MD as PCP - MD Saleem Burgos MD Melita Dine, MD Harvey, Wilma Bagley MD Hilda End, MD Robinette Coventry, MD Jackqueline Guy, MD Marline Pipes, MD Elena Quiroga as Nurse Practitioner (Endocrinology)  Janett Radford MD as Consulting Physician (Nephrology)  Maite Cook LCSW as  Care Manager (Care Coordination)     Review of Systems:     Review of Systems   Constitutional: Negative  Negative for chills, fatigue and fever  HENT: Negative  Eyes: Positive for visual disturbance  Respiratory: Negative  Cardiovascular: Negative  Gastrointestinal: Negative for abdominal distention, abdominal pain, diarrhea, nausea and vomiting     Genitourinary:        Some urinary leakage - unchanged   Musculoskeletal: Positive for arthralgias (mild, scattered), back pain and gait problem  Negative for myalgias  Skin: Positive for rash (improved)  Neurological: Negative for dizziness, weakness, light-headedness, numbness and headaches  Psychiatric/Behavioral: Negative  All other systems reviewed and are negative         Problem List:     Patient Active Problem List   Diagnosis   • Type 2 diabetes mellitus with hyperglycemia, with long-term current use of insulin ([facility])   • Class 2 severe obesity with serious comorbidity in adult [facility])   • Complete heart block ([facility])   • Anemia   • S/P cardiac pacemaker procedure   • Hypothyroidism   • Chronic diastolic congestive heart failure ([facility])   • Chronic venous insufficiency   • Benign hypertension with chronic kidney disease, stage III ([facility])   • Stage 3a chronic kidney disease ([facility])   • Iron deficiency anemia   • Microalbuminuria   • Lymphedema of both lower extremities   • Vitamin D deficiency   • Anxiety disorder   • Urinary bladder incontinence   • Chronic obstructive pulmonary disease ([facility])   • Eczematous dermatitis   • Mixed hyperlipidemia   • OAB (overactive bladder)   • Presence of permanent cardiac pacemaker   • PAF (paroxysmal atrial fibrillation) (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)   • Intraocular pressure increase   • Sleep apnea   • Dermatitis   • Coronary artery disease involving native coronary artery of native heart   • Memory difficulties   • Imbalance   • Secondary hyperparathyroidism of renal origin [facility])   • Fall   • Hypertension   • Abnormal CT scan, esophagus   • Hiatal hernia   • Esophageal dysphagia   • Platelets decreased ([address] )   • Assistance needed with transportation      Past Medical and Surgical History:     Past Medical History:   Diagnosis Date   • Anxiety    • Arthritis    • CHF (congestive heart failure) ([facility])    • Chronic venous insufficiency    • COPD (chronic obstructive pulmonary disease) ([facility])    • Coronary artery disease    • Diabetes mellitus ([facility] )     type II   • Disease of thyroid gland     hypothyroidism   • DVT (deep venous thrombosis) ([facility])    • HL (hearing loss)    • Hyperlipidemia    • Hypertension    • Irregular heart beat     Paroxysmal a-fib   • Pacemaker    • Renal disorder     stage 3   • Sleep apnea    • Sleep difficulties    • Thyroid disease    • Urinary incontinence    • Uterine leiomyoma      Past Surgical History:   Procedure Laterality Date   • BREAST SURGERY      For Biopsy   • CARDIAC PACEMAKER PLACEMENT      Permanent  Last assessed: 2/19/16   • CARDIAC SURGERY     • CARPAL TUNNEL RELEASE Bilateral    • CATARACT EXTRACTION     • CATARACT EXTRACTION W/ INTRAOCULAR LENS  IMPLANT, BILATERAL     • CYSTOSCOPY      Diagnostic   Last assessed: 11/8/17   • ECTOPIC PREGNANCY SURGERY     • HYSTERECTOMY      Total Abdomonal  Resolved: 1982   • JOINT REPLACEMENT Right     TKR   • OOPHORECTOMY     • MS ENDOVEN ABLTJ INCMPTNT VEIN XTR LASER 1ST VEIN Left 2/17/2017    Procedure: GREATER SAPHENOUS VEIN ENDOVASCULAR LASER THERAPY ;  Surgeon: Manuelito Alfaro DO;  Location: [facility];  Service: Vascular   • TONSILLECTOMY        Family History:     Family History   Problem Relation Age of Onset   • Coronary artery disease Mother    • Hypertension Mother    • Heart disease Mother    • Hypertension Father    • Diabetes Father         Mellitus   • No Known Problems Sister    • No Known Problems Daughter    • No Known Problems Daughter    • No Known Problems Maternal Grandmother    • No Known Problems Maternal Grandfather    • No Known Problems Paternal Grandmother    • No Known Problems Paternal Grandfather    • Diabetes Brother         Mellitus   • No Known Problems Son    • Breast cancer Maternal Aunt    • Pancreatic cancer Maternal Aunt    • Breast cancer Cousin    • Breast cancer Cousin    • Breast cancer Paternal Aunt    • No Known Problems Paternal Aunt    • No Known Problems Paternal Aunt       Social History:     Social History     Socioeconomic History   • Marital status: /Civil Union     Spouse name: None   • Number of children: 3   • Years of education: None   • Highest education level: None   Occupational History   • Occupation: RETIRED   Tobacco Use   • Smoking status: Never   • Smokeless tobacco: Never   • Tobacco comments:     Per [employer]: Former smoker   Vaping Use   • Vaping Use: Never used   Substance and Sexual Activity   • Alcohol use: Yes     Comment: rarely  Per [employer]: Denied history of alcohol use   • Drug use: No   • Sexual activity: Never   Other Topics Concern   • None   Social History Narrative    Daily coffee consumption: 2 cups/day     Social Determinants of Health     Financial Resource Strain: Low Risk    • Difficulty of Paying Living Expenses: Not hard at all   Food Insecurity: Not on file   Transportation Needs: No Transportation Needs   • Lack of Transportation (Medical): No   • Lack of Transportation (Non-Medical):  No   Physical Activity: Not on file   Stress: Not on file   Social Connections: Not on file   Intimate Partner Violence: Not on file   Housing Stability: Not on file      Medications and Allergies:     Current Outpatient Medications   Medication Sig Dispense Refill   • acetaminophen (TYLENOL) 325 mg tablet Take 650 mg by mouth     • apixaban (Eliquis) 5 mg Take 1 tablet (5 mg total) by mouth 2 (two) times a day 56 tablet 0   • Ascorbic Acid (VITAMIN C) 1000 MG tablet Take 500 mg by mouth      • atorvastatin (LIPITOR) 80 mg tablet TAKE 1 TABLET AT BEDTIME 90 tablet 3   • Calcium Carbonate-Vit D-Min (CALCIUM 1200 PO) Take by mouth     • citalopram (CeleXA) 20 mg tablet TAKE 1 TABLET EVERY DAY 90 tablet 3   • Continuous Blood Gluc  (FREESTYLE RONNI 14 DAY READER) GARRY Use to check BG levels 4+times daily and as needed 1 Device 0   • Continuous Blood Gluc Sensor (FREESTYLE RONNI 14 DAY SENSOR) MISC Change sensor every 14 days to check BG levels 4+ times daily 6 each 3   • GLUCOSAMINE CHONDROITIN COMPLX PO Take 1 tablet by mouth 2 (two) times a day     • glucose blood (TRUE METRIX BLOOD GLUCOSE TEST) test strip Test 4 times plus daily, as instructed 400 each 1   • insulin aspart (NovoLOG FlexPen) 100 UNIT/ML injection pen INJECT 8 UNITS SUBCUTANEOUSLY WITH BREAKFAST, THEN 10 UNITS WITH LUNCH, AND THEN 10 UNITS WITH DINNER 15 mL 1   • insulin glargine (Toujeo SoloStar) 300 units/mL CONCENTRATED U-300 injection pen (1-unit dial) Inject 36 Units under the skin daily at bedtime 12 mL 1   • Insulin Pen Needle (BD Pen Needle Sasha U/F) 32G X 4 MM MISC Inject 4 times a day 400 each 1   • Lancets (onetouch ultrasoft) lancets Use 4 times a day 400 each 0   • levothyroxine 125 mcg tablet TAKE 1 TABLET ONE TIME DAILY EXCEPT ON SUNDAY NO TABLET 78 tablet 1   • lisinopril (ZESTRIL) 20 mg tablet Take 1 tablet (20 mg total) by mouth 2 (two) times a day 180 tablet 3   • metoprolol tartrate (LOPRESSOR) 25 mg tablet TAKE 1 TABLET EVERY 12 HOURS 180 tablet 3   • Omega-3 Fatty Acids (FISH OIL) 1200 MG CAPS Take 1,200 mg by mouth 2 (two) times a day      • omeprazole (PriLOSEC) 20 mg delayed release capsule TAKE 1 CAPSULE TWICE DAILY 180 capsule 3   • oxybutynin (DITROPAN) 5 mg tablet Take 1 tablet (5 mg total) by mouth 2 (two) times a day 60 tablet 6   • spironolactone (ALDACTONE) 25 mg tablet Take 1 tablet (25 mg total) by mouth daily 90 tablet 3   • Tetrahydroz-Polyvinyl Al-Povid (MURINE TEARS PLUS OP) Apply to eye 3 (three) times a day     • triamcinolone (KENALOG) 0 1 % cream Apply topically 2 (two) times a day 160 g 1   • Promethazine-DM (PHENERGAN-DM) 6 25-15 mg/5 mL oral syrup Take 5 mL by mouth 4 (four) times a day as needed for cough 150 mL 0     No current facility-administered medications for this visit       Allergies   Allergen Reactions   • Ampicillin Hives      Immunizations:     Immunization History   Administered Date(s) Administered   • COVID-19 PFIZER VACCINE 0 3 ML IM 01/20/2021, 02/09/2021, 04/11/2022   • H1N1, All Formulations 01/12/2010, 01/12/2010   • INFLUENZA 11/04/2005   • Influenza Split High Dose Preservative Free IM 10/18/2012, 10/23/2013, 10/14/2014, 10/20/2015, 11/02/2016, 10/04/2017   • Influenza, high dose seasonal 0 7 mL 10/23/2018, 10/09/2019, 10/20/2020, 10/28/2022   • Influenza, seasonal, injectable 11/12/2009, 01/12/2010   • Pneumococcal Conjugate 13-Valent 10/21/2015   • Pneumococcal Conjugate Vaccine 20-valent (Pcv20), Polysace 10/28/2022   • Pneumococcal Polysaccharide PPV23 12/11/2000, 12/11/2010   • Td (adult), adsorbed 07/04/2008   • Tetanus, adsorbed 09/04/1998   • Zoster 07/22/2016      Health Maintenance:         Topic Date Due   • DXA SCAN  07/07/2023   • Breast Cancer Screening: Mammogram  05/01/2024   • Hepatitis C Screening  Completed         Topic Date Due   • COVID-19 Vaccine (4 - Booster for [place] series) 06/06/2022      Medicare Screening Tests and Risk Assessments:     Roshni Limon is here for her Subsequent Wellness visit  Health Risk Assessment:   Patient rates overall health as good  Patient feels that their physical health rating is same  Patient is satisfied with their life  Eyesight was rated as slightly worse  Hearing was rated as same  Patient feels that their emotional and mental health rating is same  Patients states they are never, rarely angry  Patient states they are sometimes unusually tired/fatigued  Pain experienced in the last 7 days has been none  Patient states that she has experienced no weight loss or gain in last 6 months  Depression Screening:   PHQ-2 Score: 1      Fall Risk Screening: In the past year, patient has experienced: no history of falling in past year      Urinary Incontinence Screening:   Patient has leaked urine accidently in the last six months       Home Safety:  Patient has trouble with stairs inside or outside of their home  Patient has working smoke alarms and has working carbon monoxide detector  Home safety hazards include: household clutter  Nutrition:   Current diet is Regular  Medications:   Patient is currently taking over-the-counter supplements  OTC medications include: see medication list  Patient is able to manage medications  Activities of Daily Living (ADLs)/Instrumental Activities of Daily Living (IADLs):   Walk and transfer into and out of bed and chair?: Yes  Dress and groom yourself?: Yes    Bathe or shower yourself?: Yes    Feed yourself? Yes  Do your laundry/housekeeping?: Yes  Manage your money, pay your bills and track your expenses?: Yes  Make your own meals?: Yes    Do your own shopping?: Yes    Previous Hospitalizations:   Any hospitalizations or ED visits within the last 12 months?: No      Advance Care Planning:   Living will: Yes    Durable POA for healthcare:  Yes    Advanced directive: Yes    Advanced directive counseling given: Yes      Cognitive Screening:   Provider or family/friend/caregiver concerned regarding cognition?: No    PREVENTIVE SCREENINGS      Cardiovascular Screening:    General: Screening Not Indicated and History Lipid Disorder      Diabetes Screening:     General: Screening Not Indicated and History Diabetes      Colorectal Cancer Screening:     General: Screening Not Indicated      Breast Cancer Screening:     General: Screening Current      Cervical Cancer Screening:    General: Screening Not Indicated      Osteoporosis Screening:    General: Screening Current      Abdominal Aortic Aneurysm (AAA) Screening:        General: Screening Not Indicated      Lung Cancer Screening:     General: Screening Not Indicated      Hepatitis C Screening:    General: Screening Current    Screening, Brief Intervention, and Referral to Treatment (SBIRT)    Screening      AUDIT-C Screenin) How often did you have a "drink containing alcohol in the past year? never  2) How many drinks did you have on a typical day when you were drinking in the past year? 0  3) How often did you have 6 or more drinks on one occasion in the past year? never    AUDIT-C Score: 0  Interpretation: Score 0-2 (female): Negative screen for alcohol misuse    Single Item Drug Screening:  How often have you used an illegal drug (including marijuana) or a prescription medication for non-medical reasons in the past year? never    Single Item Drug Screen Score: 0  Interpretation: Negative screen for possible drug use disorder    Annual Depression Screening  Time spent screening and evaluating the patient for depression during today's encounter was 5 minutes  Other Counseling Topics:   Calcium and vitamin D intake and regular weightbearing exercise  No results found  Physical Exam:     /82 (BP Location: Left arm, Patient Position: Sitting, Cuff Size: Adult)   Pulse 80   Temp 98 2 °F (36 8 °C)   Ht 5' 1\" (1 549 m)   Wt 99 3 kg (219 lb)   SpO2 98%   BMI 41 38 kg/m²     Physical Exam  Vitals reviewed  Constitutional:       Appearance: She is well-developed  She is not diaphoretic  HENT:      Head: Normocephalic and atraumatic  Right Ear: Tympanic membrane, ear canal and external ear normal       Left Ear: Tympanic membrane, ear canal and external ear normal       Nose: Nose normal       Mouth/Throat:      Mouth: Mucous membranes are moist       Pharynx: No oropharyngeal exudate  Eyes:      Extraocular Movements: Extraocular movements intact  Conjunctiva/sclera: Conjunctivae normal       Pupils: Pupils are equal, round, and reactive to light  Neck:      Thyroid: No thyromegaly  Vascular: No JVD  Cardiovascular:      Rate and Rhythm: Normal rate  Rhythm irregularly irregular  Pulses: Normal pulses  Dorsalis pedis pulses are 2+ on the right side and 2+ on the left side  Heart sounds:     No gallop   " Pulmonary:      Effort: Pulmonary effort is normal       Breath sounds: Normal breath sounds  No wheezing or rales  Abdominal:      General: Abdomen is flat  There is no distension  Palpations: There is no mass  Tenderness: There is no abdominal tenderness  Musculoskeletal:         General: No swelling or tenderness  Cervical back: Normal range of motion and neck supple  No tenderness  Right lower leg: No edema  Left lower leg: No edema  Feet:      Right foot:      Skin integrity: No ulcer, skin breakdown, erythema, warmth, callus or dry skin  Left foot:      Skin integrity: No ulcer, skin breakdown, erythema, warmth, callus or dry skin  Lymphadenopathy:      Cervical: No cervical adenopathy  Skin:     General: Skin is warm and dry  Capillary Refill: Capillary refill takes less than 2 seconds  Neurological:      General: No focal deficit present  Mental Status: She is alert and oriented to person, place, and time  Cranial Nerves: No cranial nerve deficit  Sensory: No sensory deficit  Motor: No weakness or abnormal muscle tone  Gait: Gait abnormal (ambulating with walker)  Deep Tendon Reflexes: Reflexes are normal and symmetric  Psychiatric:         Mood and Affect: Mood normal          Behavior: Behavior normal          Thought Content:  Thought content normal          Judgment: Judgment normal       Comments: PHQ-2/9 Depression Screening    Little interest or pleasure in doing things: 0 - not at all  Feeling down, depressed, or hopeless: 1 - several days  PHQ-2 Score: 1  PHQ-2 Interpretation: Negative depression screen          Blanka Lewis MD

## 2023-05-05 ENCOUNTER — PATIENT OUTREACH (OUTPATIENT)
Dept: FAMILY MEDICINE CLINIC | Facility: CLINIC | Age: 83
End: 2023-05-05

## 2023-05-05 ENCOUNTER — TELEPHONE (OUTPATIENT)
Dept: FAMILY MEDICINE CLINIC | Facility: CLINIC | Age: 83
End: 2023-05-05

## 2023-05-05 DIAGNOSIS — Z74.8 ASSISTANCE NEEDED WITH TRANSPORTATION: Primary | ICD-10-CM

## 2023-05-05 DIAGNOSIS — R05.3 CHRONIC COUGH: Primary | ICD-10-CM

## 2023-05-05 NOTE — TELEPHONE ENCOUNTER
I called and spoke to patient and let her know we will call and arrange transportation for her and her  on 5/18  They both have appointments in [facility] on 5/25 so please check his appointment time because his is earlier than hers

## 2023-05-05 NOTE — TELEPHONE ENCOUNTER
PT called her and her  ( Magig Tomlinson [birth year]) are both scheduled in [facility] with Dr Riaz Strong on 5/25  Pt stated that they do not have transport  They are requesting transport  Please advise if we can arrange transport for visit      Pt can be reached at   [phone]

## 2023-05-05 NOTE — TELEPHONE ENCOUNTER
Patient is requesting a refill on the cough medicine you usually prescribe her    Please send to [facility] in [place]

## 2023-05-05 NOTE — PROGRESS NOTES
LEYDI was advised by MD that patient will need assistance with transportation to 2 medical appointments in the next 2 weeks  One is a [facility] care provider, the other is at [facility]  Patient and  would also like to discuss assisted living  They are not sure where to begin with making this decision  LEYDI owens spoke with patient and  and will contact patient on 5/5/23 to review further

## 2023-05-05 NOTE — PROGRESS NOTES
SW placed phone call to patient   Adrian Dempsey answered and was able to also have patient on the line as well  Patient reports that she call the urology office and they will be setting up transportation to that appointment for patient  Patient has an appointment on 5/24/23 with Dr Poppy Barber at [address] [place] [address]  Patient will need transportation for this appointment  Patient and  are considering [facility] as an option for their LTC moving forward  Patient reports that factors of finances and location play a role in if they are interested  Patient is very adamant that she will not consider [facility]  Patient reports that they have a lease for their apartment until October  LEYDI discussed [employer] with patient and  and they are agreeable to meet with  Júnior Martinez or Jennifer Evans to further discuss options  Referral placed by email

## 2023-05-08 ENCOUNTER — PATIENT OUTREACH (OUTPATIENT)
Dept: FAMILY MEDICINE CLINIC | Facility: CLINIC | Age: 83
End: 2023-05-08

## 2023-05-08 PROBLEM — Z74.8 ASSISTANCE NEEDED WITH TRANSPORTATION: Status: ACTIVE | Noted: 2023-05-08

## 2023-05-08 NOTE — ASSESSMENT & PLAN NOTE
Pt no longer drives, and her  has difficulty getting her to various appointments  I discussed moving to assisted living with pt and  - both are open to investigating   MSW referral done to discuss help with transportation and to help investigating assisted living options

## 2023-05-08 NOTE — ASSESSMENT & PLAN NOTE
Appears to be better on my exam, but not resolved  Will have pt continue moisturizing  Refer to derm   Recheck 3m

## 2023-05-08 NOTE — ASSESSMENT & PLAN NOTE
Lab Results   Component Value Date    HGBA1C 6 8 (A) 05/04/2023   Vision slowly worsening  F/u with ophth  Continue present care   Recheck 6m

## 2023-05-08 NOTE — PROGRESS NOTES
SW received email from [place] at [facility]  Bharati Hoffman and Trae Larkin reports that they would like to put their thoughts together and speak with their children prior to discussing assisted living any further  Patient will reach out to SW when ready to discuss further  SW placed email to [place] regarding same  SW continues to follow for transportation needs

## 2023-05-08 NOTE — ASSESSMENT & PLAN NOTE
Lab Results   Component Value Date    HGBA1C 6 8 (A) 05/04/2023   Stable  Continue present care  F/u with Endo   Recheck 6m

## 2023-05-16 ENCOUNTER — HOSPITAL ENCOUNTER (OUTPATIENT)
Dept: SLEEP CENTER | Facility: CLINIC | Age: 83
Discharge: HOME | End: 2023-05-16

## 2023-05-16 DIAGNOSIS — G47.33 OBSTRUCTIVE SLEEP APNEA SYNDROME: ICD-10-CM

## 2023-05-18 NOTE — PROGRESS NOTES
Home Sleep Study Documentation    HOME STUDY DEVICE: Noxturnal no                                           Sylwia G3 yes      Pre-Sleep Home Study:    Set-up and instructions performed by: Terence French performed demonstration for Patient: yes    Return demonstration performed by Patient: yes    Written instructions provided to Patient: yes    Patient signed consent form: yes        Post-Sleep Home Study:    Additional comments by Patient: none    Home Sleep Study Failed:no:    Failure reason: N/A    Reported or Detected: N/A    Scored by: MILAGROS Ibarra RPSGT

## 2023-06-01 ENCOUNTER — TELEPHONE (OUTPATIENT)
Dept: SLEEP CENTER | Facility: CLINIC | Age: 83
End: 2023-06-01

## 2023-06-01 NOTE — TELEPHONE ENCOUNTER
Per Dr Sherryle Ego sleep study shows severe MITCH   LA[address] 32 4    Patient needs to be scheduled for a consult

## 2023-06-05 DIAGNOSIS — E78.00 HYPERCHOLESTEROLEMIA: ICD-10-CM

## 2023-06-05 NOTE — TELEPHONE ENCOUNTER
Pt called asking for RX for atorvastatin to be renewed  She also asked for her sleep study results  It looks like sleep medicine also reached out and left a message do you want me to just advise that she call them to discuss? See message for  also

## 2023-06-06 ENCOUNTER — TELEPHONE (OUTPATIENT)
Dept: SLEEP CENTER | Facility: CLINIC | Age: 83
End: 2023-06-06

## 2023-06-06 RX ORDER — ATORVASTATIN CALCIUM 80 MG/1
80 TABLET, FILM COATED ORAL
Qty: 90 TABLET | Refills: 3 | Status: SHIPPED | OUTPATIENT
Start: 2023-06-06

## 2023-06-08 ENCOUNTER — TELEPHONE (OUTPATIENT)
Dept: SLEEP CENTER | Facility: CLINIC | Age: 83
End: 2023-06-08

## 2023-06-08 NOTE — TELEPHONE ENCOUNTER
Returned the patient's call she left message regarding her home sleep study testing  Patient reports that she and her  were scheduled for 1:00 and 1:30  Patient states that the person who set them up brought them in the room together  She reports that the person spent less then a half an hour with the two of them  She reports that her and her  both said the were not sure how to set up the equipment but that their concerns went un addressed  The are certain that the results of there sleep studies are not valid because of this  Advised her that I would contact sleep  with their concerns  Also scheduled them for consult with Dr Yeimi oFntana   I had to find two appointments together

## 2023-06-09 ENCOUNTER — LAB (OUTPATIENT)
Dept: LAB | Facility: CLINIC | Age: 83
End: 2023-06-09
Payer: MEDICARE

## 2023-06-09 DIAGNOSIS — E03.9 MYXEDEMA HEART DISEASE: Primary | ICD-10-CM

## 2023-06-09 DIAGNOSIS — I51.9 MYXEDEMA HEART DISEASE: Primary | ICD-10-CM

## 2023-06-09 DIAGNOSIS — E03.9 ACQUIRED HYPOTHYROIDISM: ICD-10-CM

## 2023-06-09 LAB
T4 FREE SERPL-MCNC: 1.12 NG/DL (ref 0.61–1.12)
TSH SERPL DL<=0.05 MIU/L-ACNC: 14.25 UIU/ML (ref 0.45–4.5)

## 2023-06-09 PROCEDURE — 36415 COLL VENOUS BLD VENIPUNCTURE: CPT

## 2023-06-09 PROCEDURE — 84439 ASSAY OF FREE THYROXINE: CPT

## 2023-06-09 PROCEDURE — 84443 ASSAY THYROID STIM HORMONE: CPT

## 2023-06-12 ENCOUNTER — OFFICE VISIT (OUTPATIENT)
Dept: ENDOCRINOLOGY | Facility: CLINIC | Age: 83
End: 2023-06-12
Payer: MEDICARE

## 2023-06-12 VITALS
SYSTOLIC BLOOD PRESSURE: 128 MMHG | HEART RATE: 60 BPM | DIASTOLIC BLOOD PRESSURE: 60 MMHG | HEIGHT: 61 IN | OXYGEN SATURATION: 92 % | BODY MASS INDEX: 40.33 KG/M2 | WEIGHT: 213.6 LBS

## 2023-06-12 DIAGNOSIS — E66.01 MORBID OBESITY WITH BODY MASS INDEX (BMI) OF 40.0 TO 44.9 IN ADULT (HCC): ICD-10-CM

## 2023-06-12 DIAGNOSIS — E11.59 TYPE 2 DIABETES MELLITUS WITH OTHER CIRCULATORY COMPLICATION, WITH LONG-TERM CURRENT USE OF INSULIN (HCC): ICD-10-CM

## 2023-06-12 DIAGNOSIS — E11.65 TYPE 2 DIABETES MELLITUS WITH HYPERGLYCEMIA, WITH LONG-TERM CURRENT USE OF INSULIN (HCC): Primary | ICD-10-CM

## 2023-06-12 DIAGNOSIS — E03.9 ACQUIRED HYPOTHYROIDISM: ICD-10-CM

## 2023-06-12 DIAGNOSIS — Z79.4 TYPE 2 DIABETES MELLITUS WITH OTHER CIRCULATORY COMPLICATION, WITH LONG-TERM CURRENT USE OF INSULIN (HCC): ICD-10-CM

## 2023-06-12 DIAGNOSIS — Z79.4 TYPE 2 DIABETES MELLITUS WITH HYPERGLYCEMIA, WITH LONG-TERM CURRENT USE OF INSULIN (HCC): Primary | ICD-10-CM

## 2023-06-12 DIAGNOSIS — I10 PRIMARY HYPERTENSION: ICD-10-CM

## 2023-06-12 DIAGNOSIS — N18.31 STAGE 3A CHRONIC KIDNEY DISEASE (HCC): ICD-10-CM

## 2023-06-12 DIAGNOSIS — E03.9 HYPOTHYROIDISM, UNSPECIFIED TYPE: ICD-10-CM

## 2023-06-12 LAB
SL AMB POCT GLUCOSE BLD: 107
SL AMB POCT GLUCOSE BLD: 107
SL AMB POCT GLUCOSE BLD: 112

## 2023-06-12 PROCEDURE — 99214 OFFICE O/P EST MOD 30 MIN: CPT | Performed by: INTERNAL MEDICINE

## 2023-06-12 PROCEDURE — 82948 REAGENT STRIP/BLOOD GLUCOSE: CPT | Performed by: INTERNAL MEDICINE

## 2023-06-12 PROCEDURE — 95251 CONT GLUC MNTR ANALYSIS I&R: CPT | Performed by: INTERNAL MEDICINE

## 2023-06-12 RX ORDER — INSULIN ASPART 100 [IU]/ML
INJECTION, SOLUTION INTRAVENOUS; SUBCUTANEOUS
Qty: 15 ML | Refills: 1 | Status: SHIPPED | OUTPATIENT
Start: 2023-06-12

## 2023-06-12 RX ORDER — LEVOTHYROXINE SODIUM 0.12 MG/1
TABLET ORAL
Qty: 90 TABLET | Refills: 1 | Status: SHIPPED | OUTPATIENT
Start: 2023-06-12

## 2023-06-12 RX ORDER — INSULIN GLARGINE 300 U/ML
INJECTION, SOLUTION SUBCUTANEOUS
Qty: 12 ML | Refills: 1
Start: 2023-06-12

## 2023-06-12 RX ORDER — INSULIN GLARGINE 300 U/ML
INJECTION, SOLUTION SUBCUTANEOUS
Qty: 12 ML | Refills: 1
Start: 2023-06-12 | End: 2023-06-12 | Stop reason: SDUPTHER

## 2023-06-16 ENCOUNTER — OFFICE VISIT (OUTPATIENT)
Dept: UROLOGY | Facility: MEDICAL CENTER | Age: 83
End: 2023-06-16
Payer: MEDICARE

## 2023-06-16 VITALS
OXYGEN SATURATION: 96 % | SYSTOLIC BLOOD PRESSURE: 132 MMHG | HEART RATE: 68 BPM | DIASTOLIC BLOOD PRESSURE: 84 MMHG | HEIGHT: 61 IN | WEIGHT: 211.2 LBS | BODY MASS INDEX: 39.88 KG/M2

## 2023-06-16 DIAGNOSIS — N32.81 OAB (OVERACTIVE BLADDER): Primary | ICD-10-CM

## 2023-06-16 PROCEDURE — 99214 OFFICE O/P EST MOD 30 MIN: CPT | Performed by: STUDENT IN AN ORGANIZED HEALTH CARE EDUCATION/TRAINING PROGRAM

## 2023-06-16 RX ORDER — TROSPIUM CHLORIDE 20 MG/1
20 TABLET, FILM COATED ORAL 2 TIMES DAILY
Qty: 180 TABLET | Refills: 0 | Status: SHIPPED | OUTPATIENT
Start: 2023-06-16 | End: 2023-09-14

## 2023-06-16 NOTE — PATIENT INSTRUCTIONS
"Some patients are able to save money beta 3 agonist used to treat overactive bladder  To enroll in this program you can either call the company or enroll online  For Myrbetriq: [employer] Program  Go to [employer]  Click \" Savings eligibility\"  Enroll online or by Text    Call [phone] for more information    For Gemtesa: Simple Savings Card  Go to [employer]  Click \"Get savings & support info\"  Click \"Simple Savings Program\"  Enroll online or by Text     Call [phone] for more information          "

## 2023-06-16 NOTE — PROGRESS NOTES
Urology Ambulatory Progress Note  6/16/2023    Burgess Vaz  1940  [id number]      Assessment/Plan:  Problem List Items Addressed This Visit        Genitourinary    OAB (overactive bladder) - Primary     Assessment:  We discussed the second line options for overactive bladder which include anticholinergics and beta 3 agonist   I discussed the difference in side effect profile as well as the concern for an association of dementia with anticholinergics  Beta 3 agonist have a more favorable side effect profile and are safer in the elderly population  I have provided them with information on a savings program in hopes that they will be able to obtain the medication at a reasonable cost   In the meantime I have prescribed a prescription for trospium which does not cross the blood-brain barrier and therefore there is less concern about developing cognitive decline  I advised that typically we give these medications at least 6 weeks to take effect prior to evaluating their efficacy  Plan:  • Discontinue oxybutynin  • Rx for trospium sent to [address] [place] [address]  • Information for savings program provided for Gemtesa and Myrbetriq  • Turn to clinic in 2 months for symptom check           Relevant Medications    trospium chloride (SANCTURA) 20 mg tablet         Chief Complaint: Follow-up for overactive bladder    History of Present Illness  Burgess Vaz is a 82 y o  female presenting for evaluation of overactive bladder  The patient has been managed on oxybutynin 5 mg twice a day for years  The patient is unsure if she has had improvement in her symptoms while on this medication  She complains of urgency, urgency urinary incontinence, and incontinence at night  She denies other urinary symptoms  At night she does not wake up to urinate but she wakes up wet  All of her urinary symptoms are bothersome but the fact that she wakes up what is the most bothersome    She was prescribed Myrbetriq and "Gemtesa but these were both cost prohibitive  She is not eligible for PTNS due to having a pacemaker  She is unaware but does not think that she has tried alternative medications  Past Medical History  Past Medical History:   Diagnosis Date   • Anxiety    • Arthritis    • CHF (congestive heart failure) ([facility] )    • Chronic venous insufficiency    • COPD (chronic obstructive pulmonary disease) ([facility])    • Coronary artery disease    • Diabetes mellitus ([facility] )     type II   • Disease of thyroid gland     hypothyroidism   • DVT (deep venous thrombosis) ([facility])    • HL (hearing loss)    • Hyperlipidemia    • Hypertension    • Irregular heart beat     Paroxysmal a-fib   • Pacemaker    • Renal disorder     stage 3   • Sleep apnea    • Sleep difficulties    • Thyroid disease    • Urinary incontinence    • Uterine leiomyoma    • Walker as ambulation aid        Past Surgical History  Past Surgical History:   Procedure Laterality Date   • BREAST SURGERY      For Biopsy   • CARDIAC PACEMAKER PLACEMENT      Permanent  Last assessed: 2/19/16   • CARDIAC SURGERY     • CARPAL TUNNEL RELEASE Bilateral    • CATARACT EXTRACTION     • CATARACT EXTRACTION W/ INTRAOCULAR LENS  IMPLANT, BILATERAL     • CYSTOSCOPY      Diagnostic  Last assessed: 11/8/17   • ECTOPIC PREGNANCY SURGERY     • HYSTERECTOMY      Total Abdomonal  Resolved: 1982   • JOINT REPLACEMENT Right     TKR   • OOPHORECTOMY     • [place] ENDOVEN ABLTJ INCMPTNT VEIN XTR LASER 1ST VEIN Left 2/17/2017    Procedure: GREATER SAPHENOUS VEIN ENDOVASCULAR LASER THERAPY ;  Surgeon: Manasa Cabrera DO;  [facility]: [facility];  Service: Vascular   • TONSILLECTOMY         Physical Exam  /84 (BP Location: Right arm, Patient Position: Sitting, Cuff Size: Adult)   Pulse 68   Ht 5' 1\" (1 549 m)   Wt 95 8 kg (211 lb 3 2 oz)   SpO2 96%   BMI 39 91 kg/m²     General: Patient appears stated age and is in no acute distress  Head:  Normocephalic, atraumatic     Cardiovascular:  " "Regular rate  Respiratory:  Patient has unlabored respirations  Sara North MD  [facility]    Portions of the above record have been created with voice recognition software  Occasional wrong word or \"sound alike\" substitution may have occurred due to the inherent limitations of voice recognition software  Please read the chart carefully and recognize, using context, where substitution may have occurred  For further clarification, please contact me directly     "

## 2023-06-16 NOTE — ASSESSMENT & PLAN NOTE
Assessment:  We discussed the second line options for overactive bladder which include anticholinergics and beta 3 agonist   I discussed the difference in side effect profile as well as the concern for an association of dementia with anticholinergics  Beta 3 agonist have a more favorable side effect profile and are safer in the elderly population  I have provided them with information on a savings program in hopes that they will be able to obtain the medication at a reasonable cost   In the meantime I have prescribed a prescription for trospium which does not cross the blood-brain barrier and therefore there is less concern about developing cognitive decline  I advised that typically we give these medications at least 6 weeks to take effect prior to evaluating their efficacy      Plan:  • Discontinue oxybutynin  • Rx for trospium sent to [address] [place] [address]  • Information for savings program provided for Gemtesa and Myrbetriq  • Turn to clinic in 2 months for symptom check

## 2023-06-19 ENCOUNTER — TELEPHONE (OUTPATIENT)
Dept: FAMILY MEDICINE CLINIC | Facility: CLINIC | Age: 83
End: 2023-06-19

## 2023-06-19 NOTE — TELEPHONE ENCOUNTER
My name is Clive Perea and I have a question for Doctor Prema Lopez I he had prescribed cough medicine for me  And I'm totally out of it and I just tried to call the pharmacy to get it and it needs to be authorized again, but the cost of it is $84 and  Some sense  And I just wondered if Doctor Mitchell Torres could knows of any other cough medicine that might be cheaper, but otherwise I'm just going to have to get it  But I need it and I do not take it  The I would like it to say to take four times a day, but I usually don't take it four times a day  I need it especially at night time because otherwise I cough all night long  So if he could just either prescribe something that would be cheaper to use or get this prescription refilled for me  OK  Thank you very much  Goodbye  Right

## 2023-06-19 NOTE — TELEPHONE ENCOUNTER
Left message for pt to give a call back  Tendon Sheath: R long MCP, L long MCP  Date/Time: 10/22/2018 1:53 PM  Performed by: Pérez Hernández MD  Authorized by: Pérez Hernández MD     Consent Done?: Yes (Verbal)  Timeout: prior to procedure the correct patient, procedure, and site was verified   Indications:  Pain  Site marked: the procedure site was marked    Timeout: prior to procedure the correct patient, procedure, and site was verified    Location:  Long finger  Site:  R long MCP and L long MCP  Prep: patient was prepped and draped in usual sterile fashion    Ultrasonic guidance for needle placement?: No    Needle size:  22 G  Approach:  Volar  Medications:  40 mg triamcinolone acetonide 40 mg/mL; 40 mg triamcinolone acetonide 40 mg/mL  Patient tolerance:  Patient tolerated the procedure well with no immediate complications

## 2023-06-26 ENCOUNTER — TELEPHONE (OUTPATIENT)
Dept: UROLOGY | Facility: AMBULATORY SURGERY CENTER | Age: 83
End: 2023-06-26

## 2023-06-26 DIAGNOSIS — N32.81 OAB (OVERACTIVE BLADDER): ICD-10-CM

## 2023-06-26 NOTE — TELEPHONE ENCOUNTER
Patient last seen on 6/16/23 with Dr Adria Urias in [facility]  Patient received a VM from [address] [place] [address] requesting to resend script for trospium chloride 20 mg       Patient requesting a call back at [phone] once the prescription is resent

## 2023-06-27 ENCOUNTER — PATIENT OUTREACH (OUTPATIENT)
Dept: FAMILY MEDICINE CLINIC | Facility: CLINIC | Age: 83
End: 2023-06-27

## 2023-06-27 RX ORDER — TROSPIUM CHLORIDE 20 MG/1
20 TABLET, FILM COATED ORAL 2 TIMES DAILY
Qty: 180 TABLET | Refills: 0 | Status: SHIPPED | OUTPATIENT
Start: 2023-06-27 | End: 2023-09-25

## 2023-06-27 NOTE — PROGRESS NOTES
SWCM placed follow up phone call with patient  Phone call was picked up however, no one answered  Phone call placed to patient's dtr, Cathy Kee, who is listed as emergency contact and on medical consent  Message left for dtr

## 2023-06-28 ENCOUNTER — TELEPHONE (OUTPATIENT)
Dept: FAMILY MEDICINE CLINIC | Facility: CLINIC | Age: 83
End: 2023-06-28

## 2023-06-28 NOTE — TELEPHONE ENCOUNTER
Spoke with pt, states its mostly throat  The food doesn't come back up  It's hard to swallow and doesn't allow her to eat very much  She coming in for the 2 pm appointment tomorrow to further discuss  Please advise

## 2023-06-28 NOTE — TELEPHONE ENCOUNTER
Patient's spouse came into the office stating he is starting to become concerned about Meghana Moses  For the past couple weeks she has been having trouble keeping her food down  When speaking with the patient, she states that she gets a stuck feeling, and she feels like she has to cough to clear the food from her throat  She is asking if she can go for an endoscopy  Patient and spouse are aware that PCP is out of the office and will respond as soon as he can

## 2023-06-29 ENCOUNTER — OFFICE VISIT (OUTPATIENT)
Dept: FAMILY MEDICINE CLINIC | Facility: CLINIC | Age: 83
End: 2023-06-29
Payer: MEDICARE

## 2023-06-29 VITALS
WEIGHT: 213 LBS | HEIGHT: 61 IN | TEMPERATURE: 97.5 F | BODY MASS INDEX: 40.22 KG/M2 | OXYGEN SATURATION: 96 % | DIASTOLIC BLOOD PRESSURE: 70 MMHG | SYSTOLIC BLOOD PRESSURE: 110 MMHG | HEART RATE: 68 BPM

## 2023-06-29 DIAGNOSIS — D69.6 PLATELETS DECREASED (HCC): ICD-10-CM

## 2023-06-29 DIAGNOSIS — R13.13 PHARYNGEAL DYSPHAGIA: Primary | ICD-10-CM

## 2023-06-29 DIAGNOSIS — M19.049 HAND ARTHRITIS: ICD-10-CM

## 2023-06-29 DIAGNOSIS — R05.3 CHRONIC COUGH: ICD-10-CM

## 2023-06-29 PROCEDURE — 99214 OFFICE O/P EST MOD 30 MIN: CPT | Performed by: FAMILY MEDICINE

## 2023-06-29 RX ORDER — DEXTROMETHORPHAN HYDROBROMIDE AND PROMETHAZINE HYDROCHLORIDE 15; 6.25 MG/5ML; MG/5ML
5 SOLUTION ORAL
Qty: 450 ML | Refills: 0 | Status: SHIPPED | OUTPATIENT
Start: 2023-06-29

## 2023-06-29 RX ORDER — SODIUM CHLORIDE 5 %
1 OINTMENT (GRAM) OPHTHALMIC (EYE) 2 TIMES DAILY
COMMUNITY

## 2023-06-29 NOTE — PROGRESS NOTES
Name: Abbe Shetty      : 1940      MRN: [id number]  Encounter Provider: Argelia Terrell MD  Encounter Date: 2023   Encounter department: [address] [place] [address]     1. Pharyngeal dysphagia  Assessment & Plan:  I reviewed with pt and family. No neck masses or other abnormalities appreciated. Refer to speech therapy and for modified video swallow. Recheck 6w    Orders:  -     FL barium swallow video w speech; Future; Expected date: 2023  -     Ambulatory Referral to Speech Therapy; Future    2. Chronic cough  Assessment & Plan:  ?related to swallowing? Check modified video swallow as above. Refilled promethazine as directed. Orders:  -     Promethazine-DM (PHENERGAN-DM) 6.25-15 mg/5 mL oral syrup; Take 5 mL by mouth daily at bedtime    3. Platelets decreased ([address])  Assessment & Plan:  Last few tests have been normal. Will resolve this issue for now      4. Hand arthritis  Assessment & Plan:  Pt on Eliquis, and cannot take NSAIDs, Tylenol not effective. Will try OTC Voltaren Gel as directed. recheck 6w    Orders:  -     Diclofenac Sodium (VOLTAREN) 1 %; Apply 2 g topically 4 (four) times a day           Subjective     Pt here with  and daughters for several concerns  - pt states that she has noticed decreased taste since suffering from [facility] in December. Over the last month or so, pt notes some difficulty with swallowing. Pills and food can get caught in her low throat. No problems with liquids and pt has not noticed any issues with aspiration or SOB. Weight down 6lb since May. Has a chronic cough - pt not sure if it is related to her swallowing  - pt continues to have scattered body aches, particularly in her hands. - no other GI or  complaints  - pt denies CP, palpitations, lightheadedness or other CV symptoms with or without exertion      Review of Systems   Constitutional: Positive for fatigue (mild).  Negative for chills and fever.   HENT: Positive for trouble swallowing. Negative for congestion, sinus pressure and sinus pain. Eyes: Negative. Respiratory: Positive for cough. Negative for shortness of breath and wheezing. Cardiovascular: Negative. Gastrointestinal: Negative. Genitourinary: Negative. Musculoskeletal: Positive for arthralgias, back pain, gait problem and myalgias. Skin: Negative. Negative for rash (resolved). Neurological: Negative for dizziness, weakness, light-headedness and headaches. Past Medical History:   Diagnosis Date   • Anxiety    • Arthritis    • CHF (congestive heart failure) ([facility])    • Chronic venous insufficiency    • COPD (chronic obstructive pulmonary disease) ([facility])    • Coronary artery disease    • Diabetes mellitus ([address])     type II   • Disease of thyroid gland     hypothyroidism   • DVT (deep venous thrombosis) ([facility])    • HL (hearing loss)    • Hyperlipidemia    • Hypertension    • Irregular heart beat     Paroxysmal a-fib   • Pacemaker    • Renal disorder     stage 3   • Sleep apnea    • Sleep difficulties    • Thyroid disease    • Urinary incontinence    • Uterine leiomyoma    • Walker as ambulation aid      Past Surgical History:   Procedure Laterality Date   • BREAST SURGERY      For Biopsy   • CARDIAC PACEMAKER PLACEMENT      Permanent. Last assessed: 2/19/16   • CARDIAC SURGERY     • CARPAL TUNNEL RELEASE Bilateral    • CATARACT EXTRACTION     • CATARACT EXTRACTION W/ INTRAOCULAR LENS  IMPLANT, BILATERAL     • CYSTOSCOPY      Diagnostic.  Last assessed: 11/8/17   • ECTOPIC PREGNANCY SURGERY     • HYSTERECTOMY      Total Abdomonal. Resolved: 1982   • JOINT REPLACEMENT Right     TKR   • OOPHORECTOMY     • [place] ENDOVEN ABLTJ INCMPTNT VEIN XTR LASER 1ST VEIN Left 2/17/2017    Procedure: GREATER SAPHENOUS VEIN ENDOVASCULAR LASER THERAPY ;  Surgeon: Montse Noel DO;  [facility];  Service: Vascular   • TONSILLECTOMY       Family History   Problem Relation Age of Onset   • Coronary artery disease Mother    • Hypertension Mother    • Heart disease Mother    • Hypertension Father    • Diabetes Father         Mellitus   • No Known Problems Sister    • No Known Problems Daughter    • No Known Problems Daughter    • No Known Problems Maternal Grandmother    • No Known Problems Maternal Grandfather    • No Known Problems Paternal Grandmother    • No Known Problems Paternal Grandfather    • Diabetes Brother         Mellitus   • No Known Problems Son    • Breast cancer Maternal Aunt    • Pancreatic cancer Maternal Aunt    • Breast cancer Cousin    • Breast cancer Cousin    • Breast cancer Paternal Aunt    • No Known Problems Paternal Aunt    • No Known Problems Paternal Aunt      Social History     Socioeconomic History   • Marital status: /Civil Union     Spouse name: None   • Number of children: 3   • Years of education: None   • Highest education level: None   Occupational History   • Occupation: RETIRED   Tobacco Use   • Smoking status: Never   • Smokeless tobacco: Never   • Tobacco comments:     Per [employer]: Former smoker   Vaping Use   • Vaping Use: Never used   Substance and Sexual Activity   • Alcohol use: Yes     Comment: rarely. Per Allscripts: Denied history of alcohol use   • Drug use: No   • Sexual activity: Never   Other Topics Concern   • None   Social History Narrative    Daily coffee consumption: 2 cups/day     Social Determinants of Health     Financial Resource Strain: Low Risk  (5/4/2023)    Overall Financial Resource Strain (CARDIA)    • Difficulty of Paying Living Expenses: Not hard at all   Food Insecurity: Not on file   Transportation Needs: No Transportation Needs (5/4/2023)    PRAPARE - Transportation    • Lack of Transportation (Medical): No    • Lack of Transportation (Non-Medical):  No   Physical Activity: Not on file   Stress: Not on file   Social Connections: Not on file   Intimate Partner Violence: Not on file   Housing Stability: Not on file     Current Outpatient Medications on File Prior to Visit   Medication Sig   • apixaban (Eliquis) 5 mg Take 1 tablet (5 mg total) by mouth 2 (two) times a day   • Ascorbic Acid (VITAMIN C) 1000 MG tablet Take 500 mg by mouth    • atorvastatin (LIPITOR) 80 mg tablet Take 1 tablet (80 mg total) by mouth daily at bedtime   • Calcium Carbonate-Vit D-Min (CALCIUM 1200 PO) Take by mouth   • citalopram (CeleXA) 20 mg tablet TAKE 1 TABLET EVERY DAY   • Continuous Blood Gluc  (FREESTYLE RONNI 14 DAY READER) GARRY Use to check BG levels 4+times daily and as needed   • Continuous Blood Gluc Sensor (FREESTYLE RONNI 14 DAY SENSOR) MISC Change sensor every 14 days to check BG levels 4+ times daily   • GLUCOSAMINE CHONDROITIN COMPLX PO Take 1 tablet by mouth 2 (two) times a day   • insulin aspart (NovoLOG FlexPen) 100 UNIT/ML injection pen INJECT 6 UNITS SUBCUTANEOUSLY WITH BREAKFAST, THEN 6 UNITS WITH LUNCH, AND THEN 6 UNITS WITH DINNER   • insulin glargine (Toujeo SoloStar) 300 units/mL CONCENTRATED U-300 injection pen (1-unit dial) Inject 25  units at bedtime   • Insulin Pen Needle (BD Pen Needle Sasha U/F) 32G X 4 MM MISC Inject 4 times a day   • levothyroxine 125 mcg tablet Take 1 tablet from mon to sat and 1/2 tablet on Sunday   • lisinopril (ZESTRIL) 20 mg tablet Take 1 tablet (20 mg total) by mouth 2 (two) times a day   • metoprolol tartrate (LOPRESSOR) 25 mg tablet TAKE 1 TABLET EVERY 12 HOURS   • Omega-3 Fatty Acids (FISH OIL) 1200 MG CAPS Take 1,200 mg by mouth 2 (two) times a day    • omeprazole (PriLOSEC) 20 mg delayed release capsule TAKE 1 CAPSULE TWICE DAILY   • sodium chloride (VANNESSA 128) 5 % hypertonic ophthalmic ointment Administer 1 drop to both eyes 2 (two) times a day   • spironolactone (ALDACTONE) 25 mg tablet Take 1 tablet (25 mg total) by mouth daily   • Tetrahydroz-Polyvinyl Al-Povid (MURINE TEARS PLUS OP) Apply to eye 3 (three) times a day   • trospium chloride (SANCTURA) 20 mg tablet Take 1 tablet (20 mg total) by mouth 2 (two) times a day   • acetaminophen (TYLENOL) 325 mg tablet Take 650 mg by mouth (Patient not taking: Reported on 6/29/2023)   • glucose blood (TRUE METRIX BLOOD GLUCOSE TEST) test strip Test 4 times plus daily, as instructed (Patient not taking: Reported on 6/12/2023)   • Lancets (onetouch ultrasoft) lancets Use 4 times a day (Patient not taking: Reported on 6/29/2023)     Allergies   Allergen Reactions   • Ampicillin Hives     Immunization History   Administered Date(s) Administered   • COVID-19 PFIZER VACCINE 0.3 ML IM 01/20/2021, 02/09/2021, 04/11/2022   • H1N1, All Formulations 01/12/2010, 01/12/2010   • INFLUENZA 11/04/2005   • Influenza Split High Dose Preservative Free IM 10/18/2012, 10/23/2013, 10/14/2014, 10/20/2015, 11/02/2016, 10/04/2017   • Influenza, high dose seasonal 0.7 mL 10/23/2018, 10/09/2019, 10/20/2020, 10/28/2022   • Influenza, seasonal, injectable 11/12/2009, 01/12/2010   • Pneumococcal Conjugate 13-Valent 10/21/2015   • Pneumococcal Conjugate Vaccine 20-valent (Pcv20), Polysace 10/28/2022   • Pneumococcal Polysaccharide PPV23 12/11/2000, 12/11/2010   • Td (adult), adsorbed 07/04/2008   • Tetanus, adsorbed 09/04/1998   • Zoster 07/22/2016       Objective     /70 (BP Location: Left arm, Patient Position: Sitting, Cuff Size: Large)   Pulse 68   Temp 97.5 °F (36.4 °C)   Ht 5' 1" (1.549 m)   Wt 96.6 kg (213 lb)   SpO2 96%   BMI 40.25 kg/m²     Physical Exam  Vitals reviewed. Constitutional:       Appearance: She is well-developed. She is not diaphoretic. HENT:      Head: Normocephalic and atraumatic. Right Ear: Tympanic membrane, ear canal and external ear normal.      Left Ear: Tympanic membrane, ear canal and external ear normal.      Ears:      Comments: Sl decreased hearing     Mouth/Throat:      Mouth: Mucous membranes are moist.   Eyes:      Extraocular Movements: Extraocular movements intact.       Conjunctiva/sclera: Conjunctivae normal. Pupils: Pupils are equal, round, and reactive to light. Neck:      Thyroid: No thyromegaly. Vascular: No JVD. Cardiovascular:      Rate and Rhythm: Normal rate and regular rhythm. Pulses:           Dorsalis pedis pulses are 0 on the right side and 0 on the left side. Posterior tibial pulses are 0 on the right side and 0 on the left side. Heart sounds: No murmur heard. Pulmonary:      Effort: Pulmonary effort is normal.   Abdominal:      General: There is no distension. Palpations: Abdomen is soft. Musculoskeletal:         General: Deformity (mild diffuse OA changes, especially in hands) present. No swelling. Normal range of motion. Cervical back: Normal range of motion and neck supple. No tenderness. No muscular tenderness. Right lower leg: Edema (trace) present. Left lower leg: Edema (trace) present. Feet:      Right foot:      Skin integrity: No ulcer, skin breakdown, erythema, warmth, callus or dry skin. Left foot:      Skin integrity: No ulcer, skin breakdown, erythema, warmth, callus or dry skin. Lymphadenopathy:      Cervical: No cervical adenopathy. Skin:     General: Skin is warm and dry. Capillary Refill: Capillary refill takes less than 2 seconds. Findings: No rash. Comments: Scattered eczematous patches over the back   Neurological:      Mental Status: She is alert and oriented to person, place, and time. Cranial Nerves: No cranial nerve deficit. Sensory: No sensory deficit. Motor: No weakness or abnormal muscle tone. Gait: Gait abnormal (mildly antalgic appearing gait. ). Deep Tendon Reflexes: Reflexes are normal and symmetric.        Yadi Ryder MD

## 2023-07-03 DIAGNOSIS — I10 ESSENTIAL HYPERTENSION: ICD-10-CM

## 2023-07-03 PROBLEM — R05.3 CHRONIC COUGH: Status: ACTIVE | Noted: 2023-07-03

## 2023-07-03 PROBLEM — M19.049 HAND ARTHRITIS: Status: ACTIVE | Noted: 2023-07-03

## 2023-07-03 PROBLEM — R13.13 PHARYNGEAL DYSPHAGIA: Status: ACTIVE | Noted: 2023-07-03

## 2023-07-03 NOTE — ASSESSMENT & PLAN NOTE
I reviewed with pt and family. No neck masses or other abnormalities appreciated. Refer to speech therapy and for modified video swallow.  Recheck 6w

## 2023-07-03 NOTE — ASSESSMENT & PLAN NOTE
Pt on Eliquis, and cannot take NSAIDs, Tylenol not effective. Will try OTC Voltaren Gel as directed.   recheck 6w

## 2023-07-05 ENCOUNTER — IN-CLINIC DEVICE VISIT (OUTPATIENT)
Dept: CARDIOLOGY CLINIC | Facility: CLINIC | Age: 83
End: 2023-07-05
Payer: MEDICARE

## 2023-07-05 DIAGNOSIS — Z95.0 CARDIAC PACEMAKER IN SITU: Primary | ICD-10-CM

## 2023-07-05 PROCEDURE — 93280 PM DEVICE PROGR EVAL DUAL: CPT | Performed by: INTERNAL MEDICINE

## 2023-07-05 NOTE — PROGRESS NOTES
Results for orders placed or performed in visit on 07/05/23   Cardiac EP device report    Narrative    MDT-DUAL CHAMBER PPM (DDDR MODE)/ ACTIVE SYSTEM IS MRI CONDITIONAL  DEVICE INTERROGATED IN THE [facility] OFFICE. BATTERY VOLTAGE ADEQUATE (1.5 YRS). AP-56%, -100% (>40% MVP [email or web address]). ALL LEAD PARAMETERS WITHIN NORMAL LIMITS. 5 AF/ AFLUTTER EPISODES MAX DURATION 3 HRS. AF BURDEN<0.1%. HX: PAF & ON ELIQUIS & METOPROLOL. NORMAL DEVICE FUNCTION.  GV

## 2023-07-13 ENCOUNTER — HOSPITAL ENCOUNTER (OUTPATIENT)
Dept: RADIOLOGY | Facility: HOSPITAL | Age: 83
Discharge: HOME | End: 2023-07-13
Attending: FAMILY MEDICINE
Payer: MEDICARE

## 2023-07-13 DIAGNOSIS — R13.13 PHARYNGEAL DYSPHAGIA: ICD-10-CM

## 2023-07-13 PROCEDURE — 92611 MOTION FLUOROSCOPY/SWALLOW: CPT

## 2023-07-13 PROCEDURE — 74230 X-RAY XM SWLNG FUNCJ C+: CPT

## 2023-07-13 NOTE — PROCEDURES
Video Swallow Study      Patient Name: Scott Sears Date: 7/13/2023        Past Medical History  Past Medical History:   Diagnosis Date   • Anxiety    • Arthritis    • CHF (congestive heart failure) ([address])    • Chronic venous insufficiency    • COPD (chronic obstructive pulmonary disease) ([address])    • Coronary artery disease    • Diabetes mellitus ([address])     type II   • Disease of thyroid gland     hypothyroidism   • DVT (deep venous thrombosis) ([facility])    • HL (hearing loss)    • Hyperlipidemia    • Hypertension    • Irregular heart beat     Paroxysmal a-fib   • Pacemaker    • Renal disorder     stage 3   • Sleep apnea    • Sleep difficulties    • Thyroid disease    • Urinary incontinence    • Uterine leiomyoma    • Walker as ambulation aid         Past Surgical History  Past Surgical History:   Procedure Laterality Date   • BREAST SURGERY      For Biopsy   • CARDIAC PACEMAKER PLACEMENT      Permanent. Last assessed: 2/19/16   • CARDIAC SURGERY     • CARPAL TUNNEL RELEASE Bilateral    • CATARACT EXTRACTION     • CATARACT EXTRACTION W/ INTRAOCULAR LENS  IMPLANT, BILATERAL     • CYSTOSCOPY      Diagnostic. Last assessed: 11/8/17   • ECTOPIC PREGNANCY SURGERY     • HYSTERECTOMY      Total Abdomonal. Resolved: 1982   • JOINT REPLACEMENT Right     TKR   • OOPHORECTOMY     • VT ENDOVEN ABLTJ INCMPTNT VEIN XTR LASER 1ST VEIN Left 2/17/2017    Procedure: GREATER SAPHENOUS VEIN ENDOVASCULAR LASER THERAPY ;  Surgeon: Mona Melendrez DO;  Location: [facility];  Service: Vascular   • TONSILLECTOMY           Modified (Video) Barium Swallow Study    Summary:  Images are on PACS for review. Pt presents with mild oropharyngeal dysphagia, characterized by mildly prolonged and inefficient mastication (pt missing molars), reduced bolus control and reduced BOT strength, resulting in premature spill of bolus, especially thin liquids into the pharynx.  Bolus spills into vallecula and pyriforms with thin liquids prior to the swallow. There is reduced hyolaryngeal movement, and mistiming of laryngeal vestibule closure as well, resulting in inconsistent deep laryngeal penetration of thin liquids, to the vocal folds. Trace aspiration cannot be ruled out. There was no response to the penetration, but cued cough was fairly effective at clearing the material from the vocal folds. There was a strong cough immediately after swallowing the barium tablet with water, ?if pt may have aspirated the water. Chin tuck, prep set hold, head turn right were not effective at preventing penetration of thin liquids, but head turn left was effective . Pt denies any recent pneumonias or respiratory issues. At this time recommendation is to continue current diet, but with aspiration precautions and strategies below. Reviewed results and recommendations with pt,  and daughter, and provided written information. All verbalized understanding. Per gross esophageal screen:  Brief scan of the esophagus did not reveal any obvious dysmotility or stasis. Recommendations:  Diet: Regular   Liquids: Thin  Meds: Whole with applesauce/yogurt/pudding or crush  Strategies: Eat slowly, small bites/sips, cut food into small pieces, turn head to left with liquids, occasional throat clear/cough, alternate bites/sips if having globus sensation  Frequent oral care  Upright position  F/u ST tx: Not at this time  Intermittent Supervision  Aspiration Precautions  Reflux Precautions  Consider consult with: GI if pt continues to have globus sensation  Results reviewed with: pt, family, sent to physician through [facility]  Repeat MBS as necessary  If a dedicated assessment of the esophagus is desired:  Consider esophagram/barium swallow w/ barium tablet administration   Consider EGD           Goals:  Pt will tolerate least restrictive diet w/out s/s aspiration or oral/pharyngeal difficulties.       Patient's goal:    Pt is an 82 y/o female seen today for OP VBS. Pt reports globus sensation with solids and chronic cough. She had a coughing episode while eating spaghetti recently. EGD was done in January 2023 (see below). Pt also reports some difficulty swallowing large pills. H&P/pertinent provider notes: (PMH noted above)  Copied from physician notes:  Pt here with  and daughters for several concerns  - pt states that she has noticed decreased taste since suffering from [facility] in December. Over the last month or so, pt notes some difficulty with swallowing. Pills and food can get caught in her low throat. No problems with liquids and pt has not noticed any issues with aspiration or SOB. Weight down 6lb since May. Has a chronic cough - pt not sure if it is related to her swallowing  - pt continues to have scattered body aches, particularly in her hands. - no other GI or  complaints  - pt denies CP, palpitations, lightheadedness or other CV symptoms with or without exertion      Special Studies:  EGD 1/16/23  IMPRESSION:  Upon insertion of the scope into the esophagus there was some retained capsule contents in the distal esophagus this was easily advanced into the stomach. There may be some underlying and esophageal dysmotility  Minimal esophagitis likely cause of recent thickening on CT scan  Mild antral gastritis biopsy taken for H. pylori  Normal duodenum  Normal retroflexion, no evidence of a hiatal hernia on today's endoscopy     RECOMMENDATION:    Await pathology results      Discharge home  resume regular diet  Resume home medications  Follow-up biopsy results  Consider barium esophagram for further evaluation  Call with any abdominal pain, bleeding, fevers       Previous MBS:  No        Does the pt have pain? If yes, was nursing made aware/was it addressed?       Food allergies: No   Current diet: Regular and thin liquids   Premorbid diet: Regular and thin liquids   Dentition: Anterior only, missing molars   O2 requirement: RA   Oral mech: Grossly intact   Vocal quality/speech: WNL   Cognitive status: Able to follow commands and answer questions appropriately. Precautions:    Consistencies administered: Puree, soft solid, hard solid,  barium pill w/ water thin, nectar, honey thick liquids. Liquids given via teaspoon, cup, straw. Pt was viewed seated laterally at 90 degrees. Oral stage:  Mild impairment  Lip closure: Adequate  Mastication: Mildly prolonged but functional, pt used anterior dentition due to missing molars  Bolus formation: cohesive  Bolus control: reduced, with premature spill into pharynx  Transfer: some tongue pumping observed  Residue: min to none    Pharyngeal stage:  Mild impairment  Swallow initiation: Min delay  Velar elevation: WNL  Laryngeal elevation: Reduced anterior motion and reduced elevation  Epiglottic inversion: WNL  Laryngeal vestibular closure: Mistiming of closure results in penetration of thin liquids  Pharyngeal stripping wave/constriction: WNL  UES/PES opening: WNL  Tongue base retraction: Somewhat reduced  Vallecular retention: No  Pyriform retention: No  PPW coating: No  Osteophytes: ?C3-4  CP prominence: No  Transient penetration: Thin liquids intermittently  Epiglottic undercoat: Yes, intermittently  Penetration: Thin liquids, intermittently, to the vocal folds  Aspiration: ?trace aspiration with thin liquids   Strategies:  -prep set hold, chin tuck, head turn right were not effective at preventing deep penetration to the vocal folds with thin liquids. Head turn left appeared effective at preventing penetration  Response to aspiration: No response to deep penetration of thin liquids to vocal folds. Cued cough was fairly effective at ejecting the barium. The barium did appear to briefly dip below the vocal folds, cannot r/o trace aspiration. There was a strong cough after swallowing the barium tablet with water.      Screening of Esophageal stage:  Brief scan of the esophagus showed the barium tablet quickly moving into the stomach. No retention or stasis observed.

## 2023-07-14 ENCOUNTER — PATIENT OUTREACH (OUTPATIENT)
Dept: FAMILY MEDICINE CLINIC | Facility: CLINIC | Age: 83
End: 2023-07-14

## 2023-07-14 NOTE — PROGRESS NOTES
[facility] placed phone call to patient to follow up on transportation and ADAN. Patient was not available at time of phone call and message left. Phone call placed to dtr, Cliff Boalnd, listed as emergency contact and on medical communication consent. Dtr reports that they have an appointment with [facility] [address] on Tuesday. Patient's son has been assisting with needs as well. Referral closed at this time as no further needs identified.

## 2023-07-24 ENCOUNTER — TELEPHONE (OUTPATIENT)
Dept: CARDIOLOGY CLINIC | Facility: CLINIC | Age: 83
End: 2023-07-24

## 2023-07-24 NOTE — TELEPHONE ENCOUNTER
Patient is asking for a return call regarding Eliquis samples and Eliquis patient assistance.   [phone]

## 2023-07-24 NOTE — TELEPHONE ENCOUNTER
Spoke with the patient . patients daughter will be picking up samples and bringing over patient assistance form.

## 2023-07-26 ENCOUNTER — OFFICE VISIT (OUTPATIENT)
Dept: UROLOGY | Facility: MEDICAL CENTER | Age: 83
End: 2023-07-26
Payer: MEDICARE

## 2023-07-26 VITALS
OXYGEN SATURATION: 94 % | SYSTOLIC BLOOD PRESSURE: 126 MMHG | BODY MASS INDEX: 40.22 KG/M2 | WEIGHT: 213 LBS | HEIGHT: 61 IN | HEART RATE: 68 BPM | DIASTOLIC BLOOD PRESSURE: 82 MMHG

## 2023-07-26 DIAGNOSIS — N32.81 OAB (OVERACTIVE BLADDER): Primary | ICD-10-CM

## 2023-07-26 PROCEDURE — 99213 OFFICE O/P EST LOW 20 MIN: CPT | Performed by: STUDENT IN AN ORGANIZED HEALTH CARE EDUCATION/TRAINING PROGRAM

## 2023-07-26 NOTE — PROGRESS NOTES
Urology Ambulatory Progress Note  7/26/2023    Caroline Records  1940  [id number]      Assessment/Plan:  Overactive bladder/urgency urinary incontinence doing well off medicines. We will manage expectantly and she may follow-up as needed. Chief Complaint: Follow-up for OAB    History of Present Illness    This is an 53-year-old female presenting in follow-up for overactive bladder. She was previously taking oxybutynin 5 mg twice daily. We attempted to get Myrbetriq and Ambrosio Mast but these were cost prohibitive. She is not eligible for PTNS due to having a pacemaker. I sent in a prescription for [place] but she is unable to pick it up but actually feels like she is able to hold her urine longer and is doing better even without the medicine. Past Medical History  Past Medical History:   Diagnosis Date   • Anxiety    • Arthritis    • CHF (congestive heart failure) ([address])    • Chronic venous insufficiency    • COPD (chronic obstructive pulmonary disease) ([facility])    • Coronary artery disease    • Diabetes mellitus ([address])     type II   • Disease of thyroid gland     hypothyroidism   • DVT (deep venous thrombosis) ([facility])    • HL (hearing loss)    • Hyperlipidemia    • Hypertension    • Irregular heart beat     Paroxysmal a-fib   • Pacemaker    • Renal disorder     stage 3   • Sleep apnea    • Sleep difficulties    • Thyroid disease    • Urinary incontinence    • Uterine leiomyoma    • Walker as ambulation aid        Past Surgical History  Past Surgical History:   Procedure Laterality Date   • BREAST SURGERY      For Biopsy   • CARDIAC PACEMAKER PLACEMENT      Permanent. Last assessed: 2/19/16   • CARDIAC SURGERY     • CARPAL TUNNEL RELEASE Bilateral    • CATARACT EXTRACTION     • CATARACT EXTRACTION W/ INTRAOCULAR LENS  IMPLANT, BILATERAL     • CYSTOSCOPY      Diagnostic.  Last assessed: 11/8/17   • ECTOPIC PREGNANCY SURGERY     • HYSTERECTOMY      Total Abdomonal. Resolved: 1982   • JOINT REPLACEMENT Right TKR   • OOPHORECTOMY     • MO ENDOVEN ABLTJ INCMPTNT VEIN XTR LASER 1ST VEIN Left 2/17/2017    Procedure: GREATER SAPHENOUS VEIN ENDOVASCULAR LASER THERAPY ;  Surgeon: Brigido Mosqueda DO;  [facility]: [facility];  Service: Vascular   • TONSILLECTOMY         Physical Exam  /82   Pulse 68   Ht 5' 1" (1.549 m)   Wt 96.6 kg (213 lb)   SpO2 94%   BMI 40.25 kg/m²     General:  Healthy appearing female in no acute distress. Head:  Normocephalic, atraumatic. Cardiovascular:  Regular rate  Respiratory:  Patient has unlabored respirations. Travis Esquivel MD  [facility]    Portions of the above record have been created with voice recognition software. Occasional wrong word or "sound alike" substitution may have occurred due to the inherent limitations of voice recognition software. Please read the chart carefully and recognize, using context, where substitution may have occurred. For further clarification, please contact me directly.

## 2023-08-04 DIAGNOSIS — I10 ESSENTIAL HYPERTENSION: ICD-10-CM

## 2023-08-04 DIAGNOSIS — I12.9 BENIGN HYPERTENSION WITH CHRONIC KIDNEY DISEASE, STAGE III (HCC): ICD-10-CM

## 2023-08-04 DIAGNOSIS — N18.30 BENIGN HYPERTENSION WITH CHRONIC KIDNEY DISEASE, STAGE III (HCC): ICD-10-CM

## 2023-08-04 RX ORDER — LISINOPRIL 20 MG/1
20 TABLET ORAL 2 TIMES DAILY
Qty: 180 TABLET | Refills: 3 | Status: SHIPPED | OUTPATIENT
Start: 2023-08-04

## 2023-08-10 ENCOUNTER — OFFICE VISIT (OUTPATIENT)
Dept: FAMILY MEDICINE CLINIC | Facility: CLINIC | Age: 83
End: 2023-08-10
Payer: MEDICARE

## 2023-08-10 VITALS
OXYGEN SATURATION: 98 % | WEIGHT: 209 LBS | TEMPERATURE: 97.2 F | HEIGHT: 61 IN | BODY MASS INDEX: 39.46 KG/M2 | DIASTOLIC BLOOD PRESSURE: 72 MMHG | SYSTOLIC BLOOD PRESSURE: 112 MMHG | HEART RATE: 79 BPM

## 2023-08-10 DIAGNOSIS — Z79.4 TYPE 2 DIABETES MELLITUS WITH HYPERGLYCEMIA, WITH LONG-TERM CURRENT USE OF INSULIN (HCC): ICD-10-CM

## 2023-08-10 DIAGNOSIS — I10 PRIMARY HYPERTENSION: ICD-10-CM

## 2023-08-10 DIAGNOSIS — R13.13 PHARYNGEAL DYSPHAGIA: Primary | ICD-10-CM

## 2023-08-10 DIAGNOSIS — I50.32 CHRONIC DIASTOLIC CONGESTIVE HEART FAILURE (HCC): ICD-10-CM

## 2023-08-10 DIAGNOSIS — E03.9 ACQUIRED HYPOTHYROIDISM: Chronic | ICD-10-CM

## 2023-08-10 DIAGNOSIS — E11.65 TYPE 2 DIABETES MELLITUS WITH HYPERGLYCEMIA, WITH LONG-TERM CURRENT USE OF INSULIN (HCC): ICD-10-CM

## 2023-08-10 PROCEDURE — 99214 OFFICE O/P EST MOD 30 MIN: CPT | Performed by: FAMILY MEDICINE

## 2023-08-10 NOTE — PROGRESS NOTES
Name: Shira Martinez      : 1940      MRN: [id number]  Encounter Provider: Anel Washington MD  Encounter Date: 8/10/2023   Encounter department: [address] [place] [address]     1. Pharyngeal dysphagia  Assessment & Plan:  Improved. Pt compliant with recommendations. Continue to monitor      2. Acquired hypothyroidism  Assessment & Plan:  Appears to be stable clinically. Monitor TSH. Adjust meds if TSH is not at goal. Recheck 6m        3. Chronic diastolic congestive heart failure [facility])  Assessment & Plan:  Wt Readings from Last 3 Encounters:   08/10/23 94.8 kg (209 lb)   23 96.6 kg (213 lb)   23 96.6 kg (213 lb)     Weight down 4 lbs. Continue to monitor diet and weight. F/u with Cardio. Recheck 3m          4. Primary hypertension  Assessment & Plan:  Well controlled. Cont present treatment. Monitor labs. Recheck 6m        5. Type 2 diabetes mellitus with hyperglycemia, with long-term current use of insulin (Formerly [facility])  Assessment & Plan:    Lab Results   Component Value Date    HGBA1C 6.8 (A) 2023   A1C stable. Continue present care. F/u with Endo. Recheck 3m             Subjective     f/u multiple med issues  - pt states she is doing well with her diet and is compliant with her meds/insulin. She is up to date with Endo. Last A1C in May = 6.8. Pt is up to date with Ophth  - pt has swallowing study in July which did show some dysfunction (mild). Pt has been following the recommendations and states she is swallowing better. Taste/smell does seem to have improved. - up to date with Cardio. She denies CP, palpitations, lightheadedness or other CV symptoms with or without exertion  - pt notes that she does not each as much as before. She is down 3-4lb since last visit 6w ago or so. No change in BMs. - pt still struggling with vision. Up to date with ophth    Review of Systems   Constitutional: Positive for fatigue (chronic, mild).  Negative for chills and fever.   HENT: Positive for trouble swallowing (improved but not fully resolved). Negative for congestion, sinus pressure and sinus pain. Eyes: Negative. Respiratory: Negative. Negative for shortness of breath and wheezing. Cardiovascular: Negative. Gastrointestinal: Negative. Genitourinary: Negative. Musculoskeletal: Positive for arthralgias, back pain, gait problem and myalgias. Skin: Negative. Negative for rash (resolved). Neurological: Negative for dizziness, weakness, light-headedness and headaches. Psychiatric/Behavioral: Negative. Past Medical History:   Diagnosis Date   • Anxiety    • Arthritis    • CHF (congestive heart failure) (Formerly [facility])    • Chronic venous insufficiency    • COPD (chronic obstructive pulmonary disease) (Formerly [facility])    • Coronary artery disease    • Diabetes mellitus ([address] )     type II   • Disease of thyroid gland     hypothyroidism   • DVT (deep venous thrombosis) (Formerly [facility])    • HL (hearing loss)    • Hyperlipidemia    • Hypertension    • Irregular heart beat     Paroxysmal a-fib   • Pacemaker    • Renal disorder     stage 3   • Sleep apnea    • Sleep difficulties    • Thyroid disease    • Urinary incontinence    • Uterine leiomyoma    • Walker as ambulation aid      Past Surgical History:   Procedure Laterality Date   • BREAST SURGERY      For Biopsy   • CARDIAC PACEMAKER PLACEMENT      Permanent. Last assessed: 2/19/16   • CARDIAC SURGERY     • CARPAL TUNNEL RELEASE Bilateral    • CATARACT EXTRACTION     • CATARACT EXTRACTION W/ INTRAOCULAR LENS  IMPLANT, BILATERAL     • CYSTOSCOPY      Diagnostic.  Last assessed: 11/8/17   • ECTOPIC PREGNANCY SURGERY     • HYSTERECTOMY      Total Abdomonal. Resolved: 1982   • JOINT REPLACEMENT Right     TKR   • OOPHORECTOMY     • ND ENDOVEN ABLTJ INCMPTNT VEIN XTR LASER 1ST VEIN Left 2/17/2017    Procedure: GREATER SAPHENOUS VEIN ENDOVASCULAR LASER THERAPY ;  Surgeon: Johan Lin DO;  Location: [facility];  Service: Vascular   • TONSILLECTOMY       Family History   Problem Relation Age of Onset   • Coronary artery disease Mother    • Hypertension Mother    • Heart disease Mother    • Hypertension Father    • Diabetes Father         Mellitus   • No Known Problems Sister    • No Known Problems Daughter    • No Known Problems Daughter    • No Known Problems Maternal Grandmother    • No Known Problems Maternal Grandfather    • No Known Problems Paternal Grandmother    • No Known Problems Paternal Grandfather    • Diabetes Brother         Mellitus   • No Known Problems Son    • Breast cancer Maternal Aunt    • Pancreatic cancer Maternal Aunt    • Breast cancer Cousin    • Breast cancer Cousin    • Breast cancer Paternal Aunt    • No Known Problems Paternal Aunt    • No Known Problems Paternal Aunt      Social History     Socioeconomic History   • Marital status: /Civil Union     Spouse name: None   • Number of children: 3   • Years of education: None   • Highest education level: None   Occupational History   • Occupation: RETIRED   Tobacco Use   • Smoking status: Never   • Smokeless tobacco: Never   • Tobacco comments:     Per [employer]: Former smoker   Vaping Use   • Vaping Use: Never used   Substance and Sexual Activity   • Alcohol use: Yes     Comment: rarely. Per [employer]: Denied history of alcohol use   • Drug use: No   • Sexual activity: Never   Other Topics Concern   • None   Social History Narrative    Daily coffee consumption: 2 cups/day     Social Determinants of Health     Financial Resource Strain: Low Risk  (5/4/2023)    Overall Financial Resource Strain (CARDIA)    • Difficulty of Paying Living Expenses: Not hard at all   Food Insecurity: Not on file   Transportation Needs: No Transportation Needs (5/4/2023)    PRAPARE - Transportation    • Lack of Transportation (Medical): No    • Lack of Transportation (Non-Medical):  No   Physical Activity: Not on file   Stress: Not on file   Social Connections: Not on file   Intimate Partner Violence: Not on file   Housing Stability: Not on file     Current Outpatient Medications on File Prior to Visit   Medication Sig   • acetaminophen (TYLENOL) 325 mg tablet Take 650 mg by mouth   • apixaban (Eliquis) 5 mg Take 1 tablet (5 mg total) by mouth 2 (two) times a day   • Ascorbic Acid (VITAMIN C) 1000 MG tablet Take 500 mg by mouth    • atorvastatin (LIPITOR) 80 mg tablet Take 1 tablet (80 mg total) by mouth daily at bedtime   • Calcium Carbonate-Vit D-Min (CALCIUM 1200 PO) Take by mouth   • citalopram (CeleXA) 20 mg tablet TAKE 1 TABLET EVERY DAY   • Continuous Blood Gluc  (FREESTYLE RONNI 14 DAY READER) GARRY Use to check BG levels 4+times daily and as needed   • Continuous Blood Gluc Sensor (FREESTYLE RONNI 14 DAY SENSOR) MISC Change sensor every 14 days to check BG levels 4+ times daily   • GLUCOSAMINE CHONDROITIN COMPLX PO Take 1 tablet by mouth 2 (two) times a day   • insulin aspart (NovoLOG FlexPen) 100 UNIT/ML injection pen INJECT 6 UNITS SUBCUTANEOUSLY WITH BREAKFAST, THEN 6 UNITS WITH LUNCH, AND THEN 6 UNITS WITH DINNER   • insulin glargine (Toujeo SoloStar) 300 units/mL CONCENTRATED U-300 injection pen (1-unit dial) Inject 25  units at bedtime   • Insulin Pen Needle (BD Pen Needle Sasha U/F) 32G X 4 MM MISC Inject 4 times a day   • Lancets (onetouch ultrasoft) lancets Use 4 times a day   • levothyroxine 125 mcg tablet Take 1 tablet from mon to sat and 1/2 tablet on Sunday   • lisinopril (ZESTRIL) 20 mg tablet TAKE 1 TABLET TWICE DAILY   • metoprolol tartrate (LOPRESSOR) 25 mg tablet TAKE 1 TABLET EVERY 12 HOURS   • Omega-3 Fatty Acids (FISH OIL) 1200 MG CAPS Take 1,200 mg by mouth 2 (two) times a day    • omeprazole (PriLOSEC) 20 mg delayed release capsule TAKE 1 CAPSULE TWICE DAILY   • Promethazine-DM (PHENERGAN-DM) 6.25-15 mg/5 mL oral syrup Take 5 mL by mouth daily at bedtime   • sodium chloride (VANNESSA 128) 5 % hypertonic ophthalmic ointment Administer 1 drop to both eyes 2 (two) times a day   • spironolactone (ALDACTONE) 25 mg tablet Take 1 tablet (25 mg total) by mouth daily   • Tetrahydroz-Polyvinyl Al-Povid (MURINE TEARS PLUS OP) Apply to eye 3 (three) times a day   • trospium chloride (SANCTURA) 20 mg tablet Take 1 tablet (20 mg total) by mouth 2 (two) times a day   • Diclofenac Sodium (VOLTAREN) 1 % Apply 2 g topically 4 (four) times a day (Patient not taking: Reported on 7/26/2023)   • glucose blood (TRUE METRIX BLOOD GLUCOSE TEST) test strip Test 4 times plus daily, as instructed (Patient not taking: Reported on 6/12/2023)     Allergies   Allergen Reactions   • Ampicillin Hives     Immunization History   Administered Date(s) Administered   • COVID-19 PFIZER VACCINE 0.3 ML IM 01/20/2021, 02/09/2021, 04/11/2022   • H1N1, All Formulations 01/12/2010, 01/12/2010   • INFLUENZA 11/04/2005   • Influenza Split High Dose Preservative Free IM 10/18/2012, 10/23/2013, 10/14/2014, 10/20/2015, 11/02/2016, 10/04/2017   • Influenza, high dose seasonal 0.7 mL 10/23/2018, 10/09/2019, 10/20/2020, 10/28/2022   • Influenza, seasonal, injectable 11/12/2009, 01/12/2010   • Pneumococcal Conjugate 13-Valent 10/21/2015   • Pneumococcal Conjugate Vaccine 20-valent (Pcv20), Polysace 10/28/2022   • Pneumococcal Polysaccharide PPV23 12/11/2000, 12/11/2010   • Td (adult), adsorbed 07/04/2008   • Tetanus, adsorbed 09/04/1998   • Zoster 07/22/2016       Objective     /72 (BP Location: Left arm, Patient Position: Sitting, Cuff Size: Large)   Pulse 79   Temp (!) 97.2 °F (36.2 °C)   Ht 5' 1" (1.549 m)   Wt 94.8 kg (209 lb)   SpO2 98%   BMI 39.49 kg/m²     Physical Exam  Vitals reviewed. Constitutional:       Appearance: She is well-developed. She is not diaphoretic. HENT:      Head: Normocephalic and atraumatic.       Right Ear: Tympanic membrane, ear canal and external ear normal.      Left Ear: Tympanic membrane, ear canal and external ear normal.   Eyes:      Extraocular Movements: Extraocular movements intact. Conjunctiva/sclera: Conjunctivae normal.      Pupils: Pupils are equal, round, and reactive to light. Comments: Sl decreased vision on confrontation   Neck:      Thyroid: No thyromegaly. Vascular: No carotid bruit or JVD. Cardiovascular:      Rate and Rhythm: Normal rate and regular rhythm. Pulses: no weak pulses          Dorsalis pedis pulses are 0 on the right side and 1+ on the left side. Posterior tibial pulses are 0 on the right side. Heart sounds: No murmur heard. Pulmonary:      Effort: Pulmonary effort is normal.      Breath sounds: Normal breath sounds. Abdominal:      General: There is no distension. Palpations: Abdomen is soft. There is no mass. Tenderness: There is no abdominal tenderness. Musculoskeletal:         General: No swelling. Normal range of motion. Cervical back: Normal range of motion and neck supple. No tenderness. No muscular tenderness. Right lower leg: Edema (trace) present. Left lower leg: Edema (trace) present. Feet:      Right foot:      Skin integrity: No ulcer, skin breakdown, erythema, warmth, callus or dry skin. Left foot:      Skin integrity: No ulcer, skin breakdown, erythema, warmth, callus or dry skin. Lymphadenopathy:      Cervical: No cervical adenopathy. Skin:     General: Skin is warm and dry. Capillary Refill: Capillary refill takes less than 2 seconds. Neurological:      Mental Status: She is alert and oriented to person, place, and time. Cranial Nerves: No cranial nerve deficit. Sensory: No sensory deficit. Motor: No weakness or abnormal muscle tone. Gait: Gait abnormal (pt uses cane). Deep Tendon Reflexes: Reflexes are normal and symmetric. Psychiatric:         Mood and Affect: Mood normal.         Behavior: Behavior normal.         Thought Content:  Thought content normal.         Judgment: Judgment normal.          Patient's shoes and socks removed. Right Foot/Ankle   Right Foot Inspection  Skin Exam: skin normal and skin intact. No dry skin, no warmth, no callus, no erythema, no maceration, no abnormal color, no pre-ulcer, no ulcer and no callus. Toe Exam: ROM and strength within normal limits. Sensory   Vibration: intact  Monofilament testing: intact    Vascular  Capillary refills: < 3 seconds  The right DP pulse is 0. The right PT pulse is 0. Left Foot/Ankle  Left Foot Inspection  Skin Exam: skin normal and skin intact. No dry skin, no warmth, no erythema, no maceration, normal color, no pre-ulcer, no ulcer and no callus. Toe Exam: ROM and strength within normal limits. Sensory   Vibration: intact  Monofilament testing: intact    Vascular  Capillary refills: < 3 seconds  The left DP pulse is 1+.      Assign Risk Category  No deformity present  No loss of protective sensation  No weak pulses  Risk: 0      Joana Thacker MD

## 2023-08-15 NOTE — ASSESSMENT & PLAN NOTE
Lab Results   Component Value Date    HGBA1C 6.8 (A) 05/04/2023   A1C stable. Continue present care. F/u with Endo.  Recheck 3m

## 2023-08-15 NOTE — ASSESSMENT & PLAN NOTE
Wt Readings from Last 3 Encounters:   08/10/23 94.8 kg (209 lb)   07/26/23 96.6 kg (213 lb)   06/29/23 96.6 kg (213 lb)     Weight down 4 lbs. Continue to monitor diet and weight. F/u with Cardio.  Recheck 3m

## 2023-09-12 ENCOUNTER — OFFICE VISIT (OUTPATIENT)
Dept: FAMILY MEDICINE CLINIC | Facility: CLINIC | Age: 83
End: 2023-09-12
Payer: MEDICARE

## 2023-09-12 ENCOUNTER — TELEPHONE (OUTPATIENT)
Dept: FAMILY MEDICINE CLINIC | Facility: CLINIC | Age: 83
End: 2023-09-12

## 2023-09-12 VITALS
HEIGHT: 61 IN | BODY MASS INDEX: 37.76 KG/M2 | SYSTOLIC BLOOD PRESSURE: 120 MMHG | WEIGHT: 200 LBS | TEMPERATURE: 97.2 F | OXYGEN SATURATION: 98 % | HEART RATE: 68 BPM | DIASTOLIC BLOOD PRESSURE: 78 MMHG

## 2023-09-12 DIAGNOSIS — S09.90XA TRAUMATIC INJURY OF HEAD, INITIAL ENCOUNTER: Primary | ICD-10-CM

## 2023-09-12 DIAGNOSIS — S00.83XA TRAUMATIC HEMATOMA OF FOREHEAD, INITIAL ENCOUNTER: ICD-10-CM

## 2023-09-12 DIAGNOSIS — L97.921 TRAUMATIC ULCER OF LEFT LOWER LEG, LIMITED TO BREAKDOWN OF SKIN (HCC): ICD-10-CM

## 2023-09-12 PROCEDURE — 99214 OFFICE O/P EST MOD 30 MIN: CPT | Performed by: FAMILY MEDICINE

## 2023-09-12 PROCEDURE — 97597 DBRDMT OPN WND 1ST 20 CM/<: CPT | Performed by: FAMILY MEDICINE

## 2023-09-12 NOTE — TELEPHONE ENCOUNTER
Dr. Jevon Esposito is asking for you to look at the document under the media tab, regarding her Eliquis assistance program. Is there anything we can do for this patient to help her with her Eliquis?

## 2023-09-12 NOTE — PROGRESS NOTES
Name: Sydni Silva      : 1940      MRN: [id number]  Encounter Provider: Nura Rogers MD  Encounter Date: 2023   Encounter department: [facility]     1. Traumatic injury of head, initial encounter  Assessment & Plan: With forehead hematoma. No signs of skull fracture or neck injury. No signs of concussion or intracranial injury. Continue ice and conservative measures. Recheck Monday      2. Traumatic hematoma of forehead, initial encounter  Assessment & Plan:  No signs of skull fracture or neck injury. No signs of concussion or intracranial injury. Continue ice and conservative measures. Recheck Monday      3. Traumatic ulcer of left lower leg, limited to breakdown of skin [facility])  Assessment & Plan:  S/p debridement of exudative material and devitalized material. Good granulation tissue exposed. I reviewed wound care with pt. Recheck Monday - earlier if signs of infection occurs             Subjective     Pt here for 2 concerns  - 83 yo female with hx of paroxysmal a-fib on Dee awoke on her couch at 1am on  and fell when she attempted to get up. Pt states that it happened so fast, she was unable to brace herself, and hit her R forehead. She denies LOC but did have some pain. She was able to push herself back onto the cough, and called her  who applied ice to the are. Pt denies HA, change in vision, N/V, change in cognition or other symptoms. Pt continues to ice area, but decided to seek medical care today when her upper eyelid swelled. She denies any new neurologic complaints, neck pain or other concerns associated with this fall  - pt also with several day hx of L pretibial wound. Pt suffered wound when she hit it on a shower chair getting into the tub. Pt and  cleaned it with wound wash and applied sterile dressing, but daughter notes some swelling and redness around the wound.  Pt here for eval    Review of Systems Constitutional: Negative for activity change, appetite change, fatigue and fever. HENT: Negative. Negative for hearing loss, sinus pressure and sinus pain. Eyes: Positive for visual disturbance (chronic - no worsening). R upper lid with bruising and swelling   Respiratory: Negative. Cardiovascular: Negative. Musculoskeletal: Negative for neck pain and neck stiffness. Skin: Positive for wound. R forehead bruising   Neurological: Positive for weakness (mild, legs, chronic). Negative for dizziness, light-headedness, numbness and headaches. Past Medical History:   Diagnosis Date   • Anxiety    • Arthritis    • CHF (congestive heart failure) ([facility])    • Chronic venous insufficiency    • COPD (chronic obstructive pulmonary disease) ([facility])    • Coronary artery disease    • Diabetes mellitus ([address])     type II   • Disease of thyroid gland     hypothyroidism   • DVT (deep venous thrombosis) ([facility])    • HL (hearing loss)    • Hyperlipidemia    • Hypertension    • Irregular heart beat     Paroxysmal a-fib   • Pacemaker    • Renal disorder     stage 3   • Sleep apnea    • Sleep difficulties    • Thyroid disease    • Urinary incontinence    • Uterine leiomyoma    • Walker as ambulation aid      Past Surgical History:   Procedure Laterality Date   • BREAST SURGERY      For Biopsy   • CARDIAC PACEMAKER PLACEMENT      Permanent. Last assessed: 2/19/16   • CARDIAC SURGERY     • CARPAL TUNNEL RELEASE Bilateral    • CATARACT EXTRACTION     • CATARACT EXTRACTION W/ INTRAOCULAR LENS  IMPLANT, BILATERAL     • CYSTOSCOPY      Diagnostic.  Last assessed: 11/8/17   • ECTOPIC PREGNANCY SURGERY     • HYSTERECTOMY      Total Abdomonal. Resolved: 1982   • JOINT REPLACEMENT Right     TKR   • OOPHORECTOMY     • NE ENDOVEN ABLTJ INCMPTNT VEIN XTR LASER 1ST VEIN Left 2/17/2017    Procedure: GREATER SAPHENOUS VEIN ENDOVASCULAR LASER THERAPY ;  Surgeon: Marcus Oliva DO;  [facility]: [facility];  Service: Vascular • TONSILLECTOMY       Family History   Problem Relation Age of Onset   • Coronary artery disease Mother    • Hypertension Mother    • Heart disease Mother    • Hypertension Father    • Diabetes Father         Mellitus   • No Known Problems Sister    • No Known Problems Daughter    • No Known Problems Daughter    • No Known Problems Maternal Grandmother    • No Known Problems Maternal Grandfather    • No Known Problems Paternal Grandmother    • No Known Problems Paternal Grandfather    • Diabetes Brother         Mellitus   • No Known Problems Son    • Breast cancer Maternal Aunt    • Pancreatic cancer Maternal Aunt    • Breast cancer Cousin    • Breast cancer Cousin    • Breast cancer Paternal Aunt    • No Known Problems Paternal Aunt    • No Known Problems Paternal Aunt      Social History     Socioeconomic History   • Marital status: /Civil Union     Spouse name: None   • Number of children: 3   • Years of education: None   • Highest education level: None   Occupational History   • Occupation: RETIRED   Tobacco Use   • Smoking status: Never   • Smokeless tobacco: Never   • Tobacco comments:     Per Allsc[employer]ipt[employer]: Former smoker   Vaping Use   • Vaping Use: Never used   Substance and Sexual Activity   • Alcohol use: Yes     Comment: rarely. Per Allscripts: Denied history of alcohol use   • Drug use: No   • Sexual activity: Never   Other Topics Concern   • None   Social History Narrative    Daily coffee consumption: 2 cups/day     Social Determinants of Health     Financial Resource Strain: Low Risk  (5/4/2023)    Overall Financial Resource Strain (CARDIA)    • Difficulty of Paying Living Expenses: Not hard at all   Food Insecurity: Not on file   Transportation Needs: No Transportation Needs (5/4/2023)    PRAPARE - Transportation    • Lack of Transportation (Medical): No    • Lack of Transportation (Non-Medical):  No   Physical Activity: Not on file   Stress: Not on file   Social Connections: Not on file Intimate Partner Violence: Not on file   Housing Stability: Not on file     Current Outpatient Medications on File Prior to Visit   Medication Sig   • acetaminophen (TYLENOL) 325 mg tablet Take 650 mg by mouth   • apixaban (Eliquis) 5 mg Take 1 tablet (5 mg total) by mouth 2 (two) times a day   • Ascorbic Acid (VITAMIN C) 1000 MG tablet Take 500 mg by mouth    • atorvastatin (LIPITOR) 80 mg tablet Take 1 tablet (80 mg total) by mouth daily at bedtime   • Calcium Carbonate-Vit D-Min (CALCIUM 1200 PO) Take by mouth   • citalopram (CeleXA) 20 mg tablet TAKE 1 TABLET EVERY DAY   • Continuous Blood Gluc  (FREESTYLE RONNI 14 DAY READER) GARRY Use to check BG levels 4+times daily and as needed   • Continuous Blood Gluc Sensor (FREESTYLE RONNI 14 DAY SENSOR) MISC Change sensor every 14 days to check BG levels 4+ times daily   • Diclofenac Sodium (VOLTAREN) 1 % Apply 2 g topically 4 (four) times a day   • GLUCOSAMINE CHONDROITIN COMPLX PO Take 1 tablet by mouth 2 (two) times a day   • insulin aspart (NovoLOG FlexPen) 100 UNIT/ML injection pen INJECT 6 UNITS SUBCUTANEOUSLY WITH BREAKFAST, THEN 6 UNITS WITH LUNCH, AND THEN 6 UNITS WITH DINNER   • insulin glargine (Toujeo SoloStar) 300 units/mL CONCENTRATED U-300 injection pen (1-unit dial) Inject 25  units at bedtime   • Insulin Pen Needle (BD Pen Needle Sasha U/F) 32G X 4 MM MISC Inject 4 times a day   • Lancets (onetouch ultrasoft) lancets Use 4 times a day   • levothyroxine 125 mcg tablet Take 1 tablet from mon to sat and 1/2 tablet on Sunday   • lisinopril (ZESTRIL) 20 mg tablet TAKE 1 TABLET TWICE DAILY   • metoprolol tartrate (LOPRESSOR) 25 mg tablet TAKE 1 TABLET EVERY 12 HOURS   • Omega-3 Fatty Acids (FISH OIL) 1200 MG CAPS Take 1,200 mg by mouth 2 (two) times a day    • omeprazole (PriLOSEC) 20 mg delayed release capsule TAKE 1 CAPSULE TWICE DAILY   • Promethazine-DM (PHENERGAN-DM) 6.25-15 mg/5 mL oral syrup Take 5 mL by mouth daily at bedtime   • sodium chloride (VANNESSA 128) 5 % hypertonic ophthalmic ointment Administer 1 drop to both eyes 2 (two) times a day   • spironolactone (ALDACTONE) 25 mg tablet Take 1 tablet (25 mg total) by mouth daily   • Tetrahydroz-Polyvinyl Al-Povid (MURINE TEARS PLUS OP) Apply to eye 3 (three) times a day   • glucose blood (TRUE METRIX BLOOD GLUCOSE TEST) test strip Test 4 times plus daily, as instructed (Patient not taking: Reported on 6/12/2023)   • trospium chloride (SANCTURA) 20 mg tablet Take 1 tablet (20 mg total) by mouth 2 (two) times a day (Patient not taking: Reported on 9/12/2023)     Allergies   Allergen Reactions   • Ampicillin Hives     Immunization History   Administered Date(s) Administered   • COVID-19 PFIZER VACCINE 0.3 ML IM 01/20/2021, 02/09/2021, 04/11/2022   • H1N1, All Formulations 01/12/2010, 01/12/2010   • INFLUENZA 11/04/2005   • Influenza Split High Dose Preservative Free IM 10/18/2012, 10/23/2013, 10/14/2014, 10/20/2015, 11/02/2016, 10/04/2017   • Influenza, high dose seasonal 0.7 mL 10/23/2018, 10/09/2019, 10/20/2020, 10/28/2022   • Influenza, seasonal, injectable 11/12/2009, 01/12/2010   • Pneumococcal Conjugate 13-Valent 10/21/2015   • Pneumococcal Conjugate Vaccine 20-valent (Pcv20), Polysace 10/28/2022   • Pneumococcal Polysaccharide PPV23 12/11/2000, 12/11/2010   • Td (adult), adsorbed 07/04/2008   • Tetanus, adsorbed 09/04/1998   • Zoster 07/22/2016       Objective     /78 (BP Location: Left arm, Patient Position: Sitting, Cuff Size: Large)   Pulse 68   Temp (!) 97.2 °F (36.2 °C)   Ht 5' 1" (1.549 m)   Wt 90.7 kg (200 lb)   SpO2 98%   BMI 37.79 kg/m²     Physical Exam  Vitals reviewed. HENT:      Head:      Comments: R forehead with sl fluctuant, sl tender hematoma with inferior spread of blood with swelling to upper lid and lateral orbit     Right Ear: Tympanic membrane, ear canal and external ear normal.      Nose: No congestion.       Mouth/Throat:      Mouth: Mucous membranes are moist.   Eyes:      Extraocular Movements: Extraocular movements intact. Conjunctiva/sclera: Conjunctivae normal.      Pupils: Pupils are equal, round, and reactive to light. Cardiovascular:      Rate and Rhythm: Normal rate. Pulmonary:      Effort: Pulmonary effort is normal.   Musculoskeletal:      Cervical back: Normal range of motion and neck supple. No tenderness. Right lower leg: Edema (trace) present. Left lower leg: Edema (trace) present. Lymphadenopathy:      Cervical: No cervical adenopathy. Skin:     General: Skin is warm. Findings: Bruising (R forehead as directed) present. Comments: L pretibial area with superficial exudate. Mild erythema around wound without warmth or fluctuance. Neurological:      Mental Status: She is alert. Cranial Nerves: No cranial nerve deficit (chronic visual changes - rest of CNs are WNL). Sensory: No sensory deficit. Motor: No weakness. Gait: Gait abnormal (mildly antalgic appearing gait - unchanged). Comments: DLROW - 5/5. Recall intact     PROCEDURE:  Verbal consent obtained. Exudative material removed fully from wound base with iris scissors and forceps, revealing good granulation tissue. Wound cleaned with sterile saline wash, and sterile dressing applied. Pt tolerated procedure well.    Johnnie Hernández MD

## 2023-09-13 PROBLEM — L97.921 TRAUMATIC ULCER OF LEFT LOWER LEG, LIMITED TO BREAKDOWN OF SKIN (HCC): Status: ACTIVE | Noted: 2023-09-13

## 2023-09-13 PROBLEM — S00.83XA TRAUMATIC HEMATOMA OF FOREHEAD: Status: ACTIVE | Noted: 2023-09-13

## 2023-09-13 PROBLEM — S09.90XA HEAD TRAUMA: Status: ACTIVE | Noted: 2023-09-13

## 2023-09-14 ENCOUNTER — PATIENT OUTREACH (OUTPATIENT)
Dept: FAMILY MEDICINE CLINIC | Facility: CLINIC | Age: 83
End: 2023-09-14

## 2023-09-14 DIAGNOSIS — Z59.6 PATIENT CANNOT AFFORD MEDICATIONS: Primary | ICD-10-CM

## 2023-09-14 SDOH — ECONOMIC STABILITY - INCOME SECURITY: LOW INCOME: Z59.6

## 2023-09-14 NOTE — PROGRESS NOTES
Request received from PCP to check status of Eliquis PAP. Chart review shows that patient was denied as she is did meet 3% OOP for medications that is required as she has Medicare Part D. Spoke with Marysol Gómez through [facility] PAP for Eliquis who states soft credit check shows patient's income is $46,441. OOP for medications would need to be met at $1,042.63 unless soft credit check has inaccurate income amount. Patient did not submit proof of OOP for medications with application. Patient is over income for [employer]. Attempted to reach patient to discuss and inform she would need to have OOP medication cost information sent to PAP for review to determine if eligible for PAP. No answer, voicemail left, and awaiting return call.

## 2023-09-18 ENCOUNTER — OFFICE VISIT (OUTPATIENT)
Dept: FAMILY MEDICINE CLINIC | Facility: CLINIC | Age: 83
End: 2023-09-18
Payer: MEDICARE

## 2023-09-18 VITALS
HEIGHT: 61 IN | TEMPERATURE: 97.9 F | SYSTOLIC BLOOD PRESSURE: 128 MMHG | HEART RATE: 60 BPM | WEIGHT: 200.2 LBS | OXYGEN SATURATION: 96 % | BODY MASS INDEX: 37.8 KG/M2 | DIASTOLIC BLOOD PRESSURE: 80 MMHG

## 2023-09-18 DIAGNOSIS — S00.83XD TRAUMATIC HEMATOMA OF FOREHEAD, SUBSEQUENT ENCOUNTER: ICD-10-CM

## 2023-09-18 DIAGNOSIS — Z79.4 TYPE 2 DIABETES MELLITUS WITH HYPERGLYCEMIA, WITH LONG-TERM CURRENT USE OF INSULIN (HCC): ICD-10-CM

## 2023-09-18 DIAGNOSIS — S09.90XD TRAUMATIC INJURY OF HEAD, SUBSEQUENT ENCOUNTER: Primary | ICD-10-CM

## 2023-09-18 DIAGNOSIS — E11.65 TYPE 2 DIABETES MELLITUS WITH HYPERGLYCEMIA, WITH LONG-TERM CURRENT USE OF INSULIN (HCC): ICD-10-CM

## 2023-09-18 DIAGNOSIS — L97.921 TRAUMATIC ULCER OF LEFT LOWER LEG, LIMITED TO BREAKDOWN OF SKIN (HCC): ICD-10-CM

## 2023-09-18 LAB — SL AMB POCT HEMOGLOBIN AIC: 6.8 (ref ?–6.5)

## 2023-09-18 PROCEDURE — 99214 OFFICE O/P EST MOD 30 MIN: CPT | Performed by: FAMILY MEDICINE

## 2023-09-18 PROCEDURE — 83036 HEMOGLOBIN GLYCOSYLATED A1C: CPT | Performed by: FAMILY MEDICINE

## 2023-09-20 ENCOUNTER — OFFICE VISIT (OUTPATIENT)
Dept: FAMILY MEDICINE CLINIC | Facility: CLINIC | Age: 83
End: 2023-09-20
Payer: MEDICARE

## 2023-09-20 VITALS
HEIGHT: 61 IN | SYSTOLIC BLOOD PRESSURE: 110 MMHG | OXYGEN SATURATION: 95 % | WEIGHT: 198.2 LBS | BODY MASS INDEX: 37.42 KG/M2 | TEMPERATURE: 98 F | HEART RATE: 75 BPM | DIASTOLIC BLOOD PRESSURE: 78 MMHG

## 2023-09-20 DIAGNOSIS — E11.3393 MODERATE NONPROLIFERATIVE DIABETIC RETINOPATHY OF BOTH EYES ASSOCIATED WITH TYPE 2 DIABETES MELLITUS, MACULAR EDEMA PRESENCE UNSPECIFIED (HCC): ICD-10-CM

## 2023-09-20 DIAGNOSIS — L97.921 TRAUMATIC ULCER OF LEFT LOWER LEG, LIMITED TO BREAKDOWN OF SKIN (HCC): ICD-10-CM

## 2023-09-20 DIAGNOSIS — S00.83XD TRAUMATIC HEMATOMA OF FOREHEAD, SUBSEQUENT ENCOUNTER: ICD-10-CM

## 2023-09-20 DIAGNOSIS — N18.30 BENIGN HYPERTENSION WITH CHRONIC KIDNEY DISEASE, STAGE III (HCC): ICD-10-CM

## 2023-09-20 DIAGNOSIS — E03.9 ACQUIRED HYPOTHYROIDISM: Chronic | ICD-10-CM

## 2023-09-20 DIAGNOSIS — Z79.4 TYPE 2 DIABETES MELLITUS WITH HYPERGLYCEMIA, WITH LONG-TERM CURRENT USE OF INSULIN (HCC): Primary | ICD-10-CM

## 2023-09-20 DIAGNOSIS — E11.65 TYPE 2 DIABETES MELLITUS WITH HYPERGLYCEMIA, WITH LONG-TERM CURRENT USE OF INSULIN (HCC): Primary | ICD-10-CM

## 2023-09-20 DIAGNOSIS — I12.9 BENIGN HYPERTENSION WITH CHRONIC KIDNEY DISEASE, STAGE III (HCC): ICD-10-CM

## 2023-09-20 DIAGNOSIS — I10 PRIMARY HYPERTENSION: ICD-10-CM

## 2023-09-20 PROCEDURE — 99214 OFFICE O/P EST MOD 30 MIN: CPT | Performed by: FAMILY MEDICINE

## 2023-09-20 NOTE — PROGRESS NOTES
Name: Kassidy Collazo      : 1940      MRN: [id number]  Encounter Provider: Nilay Cortez MD  Encounter Date: 2023   Encounter department: [address] [facility] [address]     1. Traumatic injury of head, subsequent encounter    2. Traumatic hematoma of forehead, subsequent encounter    3. Traumatic ulcer of left lower leg, limited to breakdown of skin ([address])    4. Type 2 diabetes mellitus with hyperglycemia, with long-term current use of insulin ([facility])  -     POCT hemoglobin A1c    Discussion:  R facial bruising improving. No new neurologic symptoms. Hematoma is smaller but has become firm. She has not tolerating warm compresses and has been icing instead. Continue conservative care. Restart warm compresses once tenderness resolves. - in re: to L pretib ulcer, lesions is healing well. Continue to moisturize, but can hold dressing the area at this point. Recheck Wed  - in re: to DMII, pt doing well. A1C at goal. Pt scheduled to f/u Wed for further eval.        Subjective     Pt here for f/u  - L lower leg wound seems to be doing better. Decreased pain. Some scant serous discharge. No fever/chills  - facial bruise resolving. Still with R forehead hematoma that has decreased in size. No HA, worsening balance, confusion or other symptoms. - no other new concerns    Review of Systems   Constitutional: Negative. HENT: Negative. Eyes: Positive for visual disturbance (chronic). R upper lid /periorbital bruising resolving   Respiratory: Negative. Cardiovascular: Negative. Musculoskeletal: Negative for neck pain and neck stiffness. Skin: Positive for wound. R forehead hematoma sl smaller? Neurological: Positive for weakness (unchanged). Negative for dizziness, light-headedness, numbness and headaches.        Past Medical History:   Diagnosis Date   • Anxiety    • Arthritis    • CHF (congestive heart failure) ([facility])    • Chronic venous insufficiency    • COPD (chronic obstructive pulmonary disease) ([facility])    • Coronary artery disease    • Diabetes mellitus ([address])     type II   • Disease of thyroid gland     hypothyroidism   • DVT (deep venous thrombosis) ([facility])    • HL (hearing loss)    • Hyperlipidemia    • Hypertension    • Irregular heart beat     Paroxysmal a-fib   • Pacemaker    • Renal disorder     stage 3   • Sleep apnea    • Sleep difficulties    • Thyroid disease    • Urinary incontinence    • Uterine leiomyoma    • Walker as ambulation aid      Past Surgical History:   Procedure Laterality Date   • BREAST SURGERY      For Biopsy   • CARDIAC PACEMAKER PLACEMENT      Permanent. Last assessed: 2/19/16   • CARDIAC SURGERY     • CARPAL TUNNEL RELEASE Bilateral    • CATARACT EXTRACTION     • CATARACT EXTRACTION W/ INTRAOCULAR LENS  IMPLANT, BILATERAL     • CYSTOSCOPY      Diagnostic.  Last assessed: 11/8/17   • ECTOPIC PREGNANCY SURGERY     • HYSTERECTOMY      Total Abdomonal. Resolved: 1982   • JOINT REPLACEMENT Right     TKR   • OOPHORECTOMY     • AL ENDOVEN ABLTJ INCMPTNT VEIN XTR LASER 1ST VEIN Left 2/17/2017    Procedure: GREATER SAPHENOUS VEIN ENDOVASCULAR LASER THERAPY ;  Surgeon: Alexander Martin DO;  Location: [facility];  Service: Vascular   • TONSILLECTOMY       Family History   Problem Relation Age of Onset   • Coronary artery disease Mother    • Hypertension Mother    • Heart disease Mother    • Hypertension Father    • Diabetes Father         Mellitus   • No Known Problems Sister    • No Known Problems Daughter    • No Known Problems Daughter    • No Known Problems Maternal Grandmother    • No Known Problems Maternal Grandfather    • No Known Problems Paternal Grandmother    • No Known Problems Paternal Grandfather    • Diabetes Brother         Mellitus   • No Known Problems Son    • Breast cancer Maternal Aunt    • Pancreatic cancer Maternal Aunt    • Breast cancer Cousin    • Breast cancer Cousin    • Breast cancer Paternal Aunt • No Known Problems Paternal Aunt    • No Known Problems Paternal Aunt      Social History     Socioeconomic History   • Marital status: /Civil Union     Spouse name: None   • Number of children: 3   • Years of education: None   • Highest education level: None   Occupational History   • Occupation: RETIRED   Tobacco Use   • Smoking status: Never   • Smokeless tobacco: Never   • Tobacco comments:     Per Allscriipts: Former smoker   Vaping Use   • Vaping Use: Never used   Substance and Sexual Activity   • Alcohol use: Yes     Comment: rarely. Per Allscripts: Denied history of alcohol use   • Drug use: No   • Sexual activity: Never   Other Topics Concern   • None   Social History Narrative    Daily coffee consumption: 2 cups/day     Social Determinants of Health     Financial Resource Strain: Low Risk  (5/4/2023)    Overall Financial Resource Strain (CARDIA)    • Difficulty of Paying Living Expenses: Not hard at all   Food Insecurity: Not on file   Transportation Needs: No Transportation Needs (5/4/2023)    PRAPARE - Transportation    • Lack of Transportation (Medical): No    • Lack of Transportation (Non-Medical):  No   Physical Activity: Not on file   Stress: Not on file   Social Connections: Not on file   Intimate Partner Violence: Not on file   Housing Stability: Not on file     Current Outpatient Medications on File Prior to Visit   Medication Sig   • acetaminophen (TYLENOL) 325 mg tablet Take 650 mg by mouth   • apixaban (Eliquis) 5 mg Take 1 tablet (5 mg total) by mouth 2 (two) times a day   • Ascorbic Acid (VITAMIN C) 1000 MG tablet Take 500 mg by mouth    • atorvastatin (LIPITOR) 80 mg tablet Take 1 tablet (80 mg total) by mouth daily at bedtime   • Calcium Carbonate-Vit D-Min (CALCIUM 1200 PO) Take by mouth   • citalopram (CeleXA) 20 mg tablet TAKE 1 TABLET EVERY DAY   • Continuous Blood Gluc  (FREESTYLE RONNI 14 DAY READER) GARRY Use to check BG levels 4+times daily and as needed   • Continuous Blood Gluc Sensor (FREESTYLE RONNI 14 DAY SENSOR) MISC Change sensor every 14 days to check BG levels 4+ times daily   • Diclofenac Sodium (VOLTAREN) 1 % Apply 2 g topically 4 (four) times a day   • GLUCOSAMINE CHONDROITIN COMPLX PO Take 1 tablet by mouth 2 (two) times a day   • insulin aspart (NovoLOG FlexPen) 100 UNIT/ML injection pen INJECT 6 UNITS SUBCUTANEOUSLY WITH BREAKFAST, THEN 6 UNITS WITH LUNCH, AND THEN 6 UNITS WITH DINNER   • insulin glargine (Toujeo SoloStar) 300 units/mL CONCENTRATED U-300 injection pen (1-unit dial) Inject 25  units at bedtime   • Insulin Pen Needle (BD Pen Needle Sasha U/F) 32G X 4 MM MISC Inject 4 times a day   • Lancets (onetouch ultrasoft) lancets Use 4 times a day   • levothyroxine 125 mcg tablet Take 1 tablet from mon to sat and 1/2 tablet on Sunday   • lisinopril (ZESTRIL) 20 mg tablet TAKE 1 TABLET TWICE DAILY   • metoprolol tartrate (LOPRESSOR) 25 mg tablet TAKE 1 TABLET EVERY 12 HOURS   • Omega-3 Fatty Acids (FISH OIL) 1200 MG CAPS Take 1,200 mg by mouth 2 (two) times a day    • omeprazole (PriLOSEC) 20 mg delayed release capsule TAKE 1 CAPSULE TWICE DAILY   • sodium chloride (VANNESSA 128) 5 % hypertonic ophthalmic ointment Administer 1 drop to both eyes 2 (two) times a day   • spironolactone (ALDACTONE) 25 mg tablet Take 1 tablet (25 mg total) by mouth daily   • glucose blood (TRUE METRIX BLOOD GLUCOSE TEST) test strip Test 4 times plus daily, as instructed (Patient not taking: Reported on 6/12/2023)   • Promethazine-DM (PHENERGAN-DM) 6.25-15 mg/5 mL oral syrup Take 5 mL by mouth daily at bedtime (Patient not taking: Reported on 9/18/2023)   • Tetrahydroz-Polyvinyl Al-Povid (MURINE TEARS PLUS OP) Apply to eye 3 (three) times a day (Patient not taking: Reported on 9/18/2023)   • trospium chloride (SANCTURA) 20 mg tablet Take 1 tablet (20 mg total) by mouth 2 (two) times a day (Patient not taking: Reported on 9/12/2023)     Allergies   Allergen Reactions   • Ampicillin Hives     Immunization History   Administered Date(s) Administered   • COVID-19 PFIZER VACCINE 0.3 ML IM 01/20/2021, 02/09/2021, 04/11/2022   • H1N1, All Formulations 01/12/2010, 01/12/2010   • INFLUENZA 11/04/2005   • Influenza Split High Dose Preservative Free IM 10/18/2012, 10/23/2013, 10/14/2014, 10/20/2015, 11/02/2016, 10/04/2017   • Influenza, high dose seasonal 0.7 mL 10/23/2018, 10/09/2019, 10/20/2020, 10/28/2022   • Influenza, seasonal, injectable 11/12/2009, 01/12/2010   • Pneumococcal Conjugate 13-Valent 10/21/2015   • Pneumococcal Conjugate Vaccine 20-valent (Pcv20), Polysace 10/28/2022   • Pneumococcal Polysaccharide PPV23 12/11/2000, 12/11/2010   • Td (adult), adsorbed 07/04/2008   • Tetanus, adsorbed 09/04/1998   • Zoster 07/22/2016       Objective     /80   Pulse 60   Temp 97.9 °F (36.6 °C)   Ht 5' 1" (1.549 m)   Wt 90.8 kg (200 lb 3.2 oz)   SpO2 96%   BMI 37.83 kg/m²     Physical Exam  Vitals reviewed. HENT:      Head:      Comments: R forehead hematoma decreased in size. (+) firm     Right Ear: Tympanic membrane, ear canal and external ear normal.      Left Ear: Tympanic membrane, ear canal and external ear normal.      Mouth/Throat:      Mouth: Mucous membranes are moist.   Eyes:      Extraocular Movements: Extraocular movements intact. Conjunctiva/sclera: Conjunctivae normal.      Pupils: Pupils are equal, round, and reactive to light. Comments: Periorbital swelling/bruising resolving   Cardiovascular:      Rate and Rhythm: Normal rate. Pulses: Normal pulses. Musculoskeletal:      Cervical back: No tenderness. Lymphadenopathy:      Cervical: No cervical adenopathy. Skin:     Findings: Bruising (R face - resolving) and lesion (L pretibial surface improving. No cellulitis) present. Neurological:      Mental Status: She is alert.        Radha Palomino MD

## 2023-09-20 NOTE — PROGRESS NOTES
Name: Makenna Shetty      : 1940      MRN: [id number]  Encounter Provider: Zachary Evangelista MD  Encounter Date: 2023   Encounter department: [address] [place] [address]     1. Type 2 diabetes mellitus with hyperglycemia, with long-term current use of insulin ([facility])  Assessment & Plan:    Lab Results   Component Value Date    HGBA1C 7.6 (H) 2023   A1c slightly higher. I reviewed with patient. Urged diet control. Continue present treatment. Follow-up with endocrinology. Recheck 6 months    Orders:  -     Lipid panel; Future    2. Acquired hypothyroidism  Assessment & Plan:  TSH stable. Continue present treatment. Recheck 3 months      3. 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 Up-to-date with ophthalmology. Continue present treatment. Recheck 3 months      4. Benign hypertension with chronic kidney disease, stage III [facility])  Assessment & Plan:  Lab Results   Component Value Date    EGFR 37 2023    EGFR 45 2023    EGFR 53 02/15/2023    CREATININE 1.33 (H) 2023    CREATININE 1.13 2023    CREATININE 0.99 02/15/2023   Creatinine slightly higher. 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 Continue present treatment. Recheck 6 months      5. Primary hypertension  Assessment & Plan:  Blood pressure well controlled. Continue present treatment. Recheck 6 months      6. Traumatic ulcer of left lower leg, limited to breakdown of skin [facility])  Assessment & Plan:  Slowly improving. Continue conservative care. I reviewed with patient. Recheck 1 to 2 weeks if not resolved      7. Traumatic hematoma of forehead, subsequent encounter  Assessment & Plan:  Slowly improving. Continue conservative care.   Recheck 2 to 3 weeks if not resolved             Subjective     f/u multiple med issues  - pt and  still waiting to get into [address] (had an apartment but the current resident decided to not move out). - pt is doing in with diet. Recent A1C = 6.8. Pt denies any low BG episodes. She is up to date with Endo. - still has some voice changes, though swallowing is much better. - up to date with Cardio. She denies CP, palpitations, lightheadedness or other CV symptoms with or without exertion  - pt is up to date with ophth. Vision is still poor but overall unchanged  - L lower leg wound is healing. No increased pain or drainage  - R facial bruise is slowly resolving. R forehead hematoma sl smaller? Review of Systems   Constitutional: Negative. HENT: Negative. Eyes: Positive for visual disturbance (chronic - grossly unchanged). R upper lid /periorbital bruising improved   Respiratory: Negative. Cardiovascular: Negative. Gastrointestinal: Negative. Negative for abdominal distention and abdominal pain. Musculoskeletal: Positive for arthralgias and gait problem. Negative for neck pain and neck stiffness. Skin: Positive for wound. R forehead hematoma sl smaller? Neurological: Positive for weakness (unchanged). Negative for dizziness, light-headedness, numbness and headaches. Psychiatric/Behavioral: Positive for dysphoric mood.        Past Medical History:   Diagnosis Date   • Anxiety    • Arthritis    • CHF (congestive heart failure) ([facility])    • Chronic venous insufficiency    • COPD (chronic obstructive pulmonary disease) ([facility])    • Coronary artery disease    • Diabetes mellitus ([address])     type II   • Disease of thyroid gland     hypothyroidism   • DVT (deep venous thrombosis) ([facility])    • HL (hearing loss)    • Hyperlipidemia    • Hypertension    • Irregular heart beat     Paroxysmal a-fib   • Pacemaker    • Renal disorder     stage 3   • Sleep apnea    • Sleep difficulties    • Thyroid disease • Urinary incontinence    • Uterine leiomyoma    • Walker as ambulation aid      Past Surgical History:   Procedure Laterality Date   • BREAST SURGERY      For Biopsy   • CARDIAC PACEMAKER PLACEMENT      Permanent. Last assessed: 2/19/16   • CARDIAC SURGERY     • CARPAL TUNNEL RELEASE Bilateral    • CATARACT EXTRACTION     • CATARACT EXTRACTION W/ INTRAOCULAR LENS  IMPLANT, BILATERAL     • CYSTOSCOPY      Diagnostic.  Last assessed: 11/8/17   • ECTOPIC PREGNANCY SURGERY     • HYSTERECTOMY      Total Abdomonal. Resolved: 1982   • JOINT REPLACEMENT Right     TKR   • OOPHORECTOMY     • MI ENDOVEN ABLTJ INCMPTNT VEIN XTR LASER 1ST VEIN Left 2/17/2017    Procedure: GREATER SAPHENOUS VEIN ENDOVASCULAR LASER THERAPY ;  Surgeon: Bigg Friedman DO;  Location: [facility];  Service: Vascular   • TONSILLECTOMY       Family History   Problem Relation Age of Onset   • Coronary artery disease Mother    • Hypertension Mother    • Heart disease Mother    • Hypertension Father    • Diabetes Father         Mellitus   • No Known Problems Sister    • No Known Problems Daughter    • No Known Problems Daughter    • No Known Problems Maternal Grandmother    • No Known Problems Maternal Grandfather    • No Known Problems Paternal Grandmother    • No Known Problems Paternal Grandfather    • Diabetes Brother         Mellitus   • No Known Problems Son    • Breast cancer Maternal Aunt    • Pancreatic cancer Maternal Aunt    • Breast cancer Cousin    • Breast cancer Cousin    • Breast cancer Paternal Aunt    • No Known Problems Paternal Aunt    • No Known Problems Paternal Aunt      Social History     Socioeconomic History   • Marital status: /Civil Union     Spouse name: None   • Number of children: 3   • Years of education: None   • Highest education level: None   Occupational History   • Occupation: RETIRED   Tobacco Use   • Smoking status: Never   • Smokeless tobacco: Never   • Tobacco comments:     Per [employer]: Former smoker   Vaping Use   • Vaping Use: Never used   Substance and Sexual Activity   • Alcohol use: Yes     Comment: rarely. Per [employer]: Denied history of alcohol use   • Drug use: No   • Sexual activity: Never   Other Topics Concern   • None   Social History Narrative    Daily coffee consumption: 2 cups/day     Social Determinants of Health     Financial Resource Strain: Low Risk  (5/4/2023)    Overall Financial Resource Strain (CARDIA)    • Difficulty of Paying Living Expenses: Not hard at all   Food Insecurity: Not on file   Transportation Needs: No Transportation Needs (5/4/2023)    PRAPARE - Transportation    • Lack of Transportation (Medical): No    • Lack of Transportation (Non-Medical):  No   Physical Activity: Not on file   Stress: Not on file   Social Connections: Not on file   Intimate Partner Violence: Not on file   Housing Stability: Not on file     Current Outpatient Medications on File Prior to Visit   Medication Sig   • acetaminophen (TYLENOL) 325 mg tablet Take 650 mg by mouth   • apixaban (Eliquis) 5 mg Take 1 tablet (5 mg total) by mouth 2 (two) times a day   • Ascorbic Acid (VITAMIN C) 1000 MG tablet Take 500 mg by mouth    • atorvastatin (LIPITOR) 80 mg tablet Take 1 tablet (80 mg total) by mouth daily at bedtime   • Calcium Carbonate-Vit D-Min (CALCIUM 1200 PO) Take by mouth   • citalopram (CeleXA) 20 mg tablet TAKE 1 TABLET EVERY DAY   • Continuous Blood Gluc  (FREESTYLE RONNI 14 DAY READER) GARRY Use to check BG levels 4+times daily and as needed   • Continuous Blood Gluc Sensor (FREESTYLE RONNI 14 DAY SENSOR) MISC Change sensor every 14 days to check BG levels 4+ times daily   • Diclofenac Sodium (VOLTAREN) 1 % Apply 2 g topically 4 (four) times a day   • GLUCOSAMINE CHONDROITIN COMPLX PO Take 1 tablet by mouth 2 (two) times a day   • insulin aspart (NovoLOG FlexPen) 100 UNIT/ML injection pen INJECT 6 UNITS SUBCUTANEOUSLY WITH BREAKFAST, THEN 6 UNITS WITH LUNCH, AND THEN 6 UNITS WITH DINNER   • insulin glargine (Toujeo SoloStar) 300 units/mL CONCENTRATED U-300 injection pen (1-unit dial) Inject 25  units at bedtime   • Insulin Pen Needle (BD Pen Needle Sasha U/F) 32G X 4 MM MISC Inject 4 times a day   • Lancets (onetouch ultrasoft) lancets Use 4 times a day   • levothyroxine 125 mcg tablet Take 1 tablet from mon to sat and 1/2 tablet on Sunday   • lisinopril (ZESTRIL) 20 mg tablet TAKE 1 TABLET TWICE DAILY   • metoprolol tartrate (LOPRESSOR) 25 mg tablet TAKE 1 TABLET EVERY 12 HOURS   • Omega-3 Fatty Acids (FISH OIL) 1200 MG CAPS Take 1,200 mg by mouth 2 (two) times a day    • omeprazole (PriLOSEC) 20 mg delayed release capsule TAKE 1 CAPSULE TWICE DAILY   • sodium chloride (VANNESSA 128) 5 % hypertonic ophthalmic ointment Administer 1 drop to both eyes 2 (two) times a day   • spironolactone (ALDACTONE) 25 mg tablet Take 1 tablet (25 mg total) by mouth daily   • glucose blood (TRUE METRIX BLOOD GLUCOSE TEST) test strip Test 4 times plus daily, as instructed (Patient not taking: Reported on 6/12/2023)   • Tetrahydroz-Polyvinyl Al-Povid (MURINE TEARS PLUS OP) Apply to eye 3 (three) times a day (Patient not taking: Reported on 9/18/2023)   • trospium chloride (SANCTURA) 20 mg tablet Take 1 tablet (20 mg total) by mouth 2 (two) times a day (Patient not taking: Reported on 9/12/2023)     Allergies   Allergen Reactions   • Ampicillin Hives     Immunization History   Administered Date(s) Administered   • COVID-19 PFIZER VACCINE 0.3 ML IM 01/20/2021, 02/09/2021, 04/11/2022   • H1N1, All Formulations 01/12/2010, 01/12/2010   • INFLUENZA 11/04/2005   • Influenza Split High Dose Preservative Free IM 10/18/2012, 10/23/2013, 10/14/2014, 10/20/2015, 11/02/2016, 10/04/2017   • Influenza, high dose seasonal 0.7 mL 10/23/2018, 10/09/2019, 10/20/2020, 10/28/2022   • Influenza, seasonal, injectable 11/12/2009, 01/12/2010   • Pneumococcal Conjugate 13-Valent 10/21/2015   • Pneumococcal Conjugate Vaccine 20-valent (Pcv20), Polysace 10/28/2022   • Pneumococcal Polysaccharide PPV23 12/11/2000, 12/11/2010   • Td (adult), adsorbed 07/04/2008   • Tetanus, adsorbed 09/04/1998   • Zoster 07/22/2016       Objective     /78 (BP Location: Left arm, Patient Position: Sitting, Cuff Size: Adult)   Pulse 75   Temp 98 °F (36.7 °C)   Ht 5' 1" (1.549 m)   Wt 89.9 kg (198 lb 3.2 oz)   SpO2 95%   BMI 37.45 kg/m²     Physical Exam  Vitals reviewed. HENT:      Head:      Comments: R forehead hematoma firm. ?size unchanged from previous exam. No fluctuance      Right Ear: Tympanic membrane, ear canal and external ear normal.      Left Ear: Tympanic membrane, ear canal and external ear normal.      Mouth/Throat:      Mouth: Mucous membranes are moist.   Eyes:      Extraocular Movements: Extraocular movements intact. Conjunctiva/sclera: Conjunctivae normal.      Pupils: Pupils are equal, round, and reactive to light. Comments: Periorbital swelling/bruising resolving   Cardiovascular:      Rate and Rhythm: Normal rate. Pulses: Normal pulses. Pulmonary:      Effort: Pulmonary effort is normal.      Breath sounds: No wheezing. Abdominal:      General: There is no distension. Palpations: There is no mass. Tenderness: There is no abdominal tenderness. Musculoskeletal:         General: Tenderness (mild low lumbar spine) and signs of injury (L pretibial wound healing) present. Cervical back: No tenderness. Right lower leg: No edema. Left lower leg: No edema. Lymphadenopathy:      Cervical: No cervical adenopathy. Skin:     Findings: Bruising (R face - resolving) and lesion (L pretibial wound continues to improve. No cellulitis) present. Neurological:      Mental Status: She is alert and oriented to person, place, and time. Cranial Nerves: Cranial nerve deficit (decreased vision) present. Motor: Weakness (mild legs bilat) present. Gait: Gait abnormal (ambulates with walker). Summer Baxter MD

## 2023-09-21 ENCOUNTER — LAB (OUTPATIENT)
Dept: LAB | Facility: CLINIC | Age: 83
End: 2023-09-21
Payer: MEDICARE

## 2023-09-21 ENCOUNTER — PATIENT OUTREACH (OUTPATIENT)
Dept: FAMILY MEDICINE CLINIC | Facility: CLINIC | Age: 83
End: 2023-09-21

## 2023-09-21 DIAGNOSIS — N18.31 STAGE 3A CHRONIC KIDNEY DISEASE (HCC): ICD-10-CM

## 2023-09-21 DIAGNOSIS — E03.9 HYPOTHYROIDISM, UNSPECIFIED TYPE: ICD-10-CM

## 2023-09-21 DIAGNOSIS — R32 URINARY INCONTINENCE, UNSPECIFIED TYPE: ICD-10-CM

## 2023-09-21 DIAGNOSIS — E11.65 TYPE 2 DIABETES MELLITUS WITH HYPERGLYCEMIA, WITH LONG-TERM CURRENT USE OF INSULIN (HCC): ICD-10-CM

## 2023-09-21 DIAGNOSIS — Z79.4 TYPE 2 DIABETES MELLITUS WITH HYPERGLYCEMIA, WITH LONG-TERM CURRENT USE OF INSULIN (HCC): ICD-10-CM

## 2023-09-21 DIAGNOSIS — R80.9 MICROALBUMINURIA: ICD-10-CM

## 2023-09-21 LAB
ANION GAP SERPL CALCULATED.3IONS-SCNC: 7 MMOL/L
BUN SERPL-MCNC: 32 MG/DL (ref 5–25)
CALCIUM SERPL-MCNC: 9 MG/DL (ref 8.4–10.2)
CHLORIDE SERPL-SCNC: 106 MMOL/L (ref 96–108)
CHOLEST SERPL-MCNC: 99 MG/DL
CO2 SERPL-SCNC: 28 MMOL/L (ref 21–32)
CREAT SERPL-MCNC: 1.33 MG/DL (ref 0.6–1.3)
EST. AVERAGE GLUCOSE BLD GHB EST-MCNC: 171 MG/DL
GFR SERPL CREATININE-BSD FRML MDRD: 37 ML/MIN/1.73SQ M
GLUCOSE P FAST SERPL-MCNC: 59 MG/DL (ref 65–99)
HBA1C MFR BLD: 7.6 %
HDLC SERPL-MCNC: 41 MG/DL
LDLC SERPL CALC-MCNC: 46 MG/DL (ref 0–100)
NONHDLC SERPL-MCNC: 58 MG/DL
POTASSIUM SERPL-SCNC: 5.5 MMOL/L (ref 3.5–5.3)
SODIUM SERPL-SCNC: 141 MMOL/L (ref 135–147)
T4 FREE SERPL-MCNC: 1.19 NG/DL (ref 0.61–1.12)
TRIGL SERPL-MCNC: 62 MG/DL
TSH SERPL DL<=0.05 MIU/L-ACNC: 3.12 UIU/ML (ref 0.45–4.5)

## 2023-09-21 PROCEDURE — 83036 HEMOGLOBIN GLYCOSYLATED A1C: CPT

## 2023-09-21 PROCEDURE — 80061 LIPID PANEL: CPT

## 2023-09-21 PROCEDURE — 84443 ASSAY THYROID STIM HORMONE: CPT

## 2023-09-21 PROCEDURE — 36415 COLL VENOUS BLD VENIPUNCTURE: CPT

## 2023-09-21 PROCEDURE — 84439 ASSAY OF FREE THYROXINE: CPT

## 2023-09-21 PROCEDURE — 80048 BASIC METABOLIC PNL TOTAL CA: CPT

## 2023-09-21 NOTE — PROGRESS NOTES
2nd outreach attempt to patient to discuss Eliquis PAP and reason for denial (OOP for medications would need to be met at $1,042.63 unless soft credit check has inaccurate income amount of $49,757. Patient did not submit proof of OOP for medications with application). No answer, voicemail left, and awaiting return call. Call placed to patient's dtr Kerrie Farah (on consent form). No answer, voicemail left, and awaiting return call. If no return call received, will mail unable to reach letter.

## 2023-09-22 DIAGNOSIS — R05.3 CHRONIC COUGH: ICD-10-CM

## 2023-09-22 RX ORDER — DEXTROMETHORPHAN HYDROBROMIDE AND PROMETHAZINE HYDROCHLORIDE 15; 6.25 MG/5ML; MG/5ML
SYRUP ORAL
Qty: 450 ML | Refills: 0 | Status: SHIPPED | OUTPATIENT
Start: 2023-09-22

## 2023-09-24 NOTE — ASSESSMENT & PLAN NOTE
Slowly improving. Continue conservative care. I reviewed with patient.   Recheck 1 to 2 weeks if not resolved

## 2023-09-24 NOTE — ASSESSMENT & PLAN NOTE
Lab Results   Component Value Date    HGBA1C 7.6 (H) 09/21/2023   A1c slightly higher. Up-to-date with ophthalmology. Continue present treatment.   Recheck 3 months

## 2023-09-24 NOTE — ASSESSMENT & PLAN NOTE
Lab Results   Component Value Date    HGBA1C 7.6 (H) 09/21/2023   A1c slightly higher. I reviewed with patient. Urged diet control. Continue present treatment. Follow-up with endocrinology.   Recheck 6 months

## 2023-09-24 NOTE — ASSESSMENT & PLAN NOTE
Lab Results   Component Value Date    EGFR 37 09/21/2023    EGFR 45 03/01/2023    EGFR 53 02/15/2023    CREATININE 1.33 (H) 09/21/2023    CREATININE 1.13 03/01/2023    CREATININE 0.99 02/15/2023   Creatinine slightly higher. 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 Continue present treatment.   Recheck 6 months

## 2023-09-25 ENCOUNTER — PATIENT OUTREACH (OUTPATIENT)
Dept: FAMILY MEDICINE CLINIC | Facility: CLINIC | Age: 83
End: 2023-09-25

## 2023-09-25 NOTE — PROGRESS NOTES
No return call received from patient or patient's dtr. Will mail Unable to Reach letter and close case at this time. Included information in letter informing that patient would need to provide out of pocket expense history from pharmacy to show medication expenses for the calendar year. If patient is interested in submitting OOP medication information, it can be faxed to [address] [place] [address] at [phone]. Will assist in submitting OOP information pending return call. Routed to PCP.

## 2023-09-25 NOTE — LETTER
[facility] [phone]    Re: Angela Caitie to Reach   9/25/2023       Dear Kit cervantes [address] [facility]  and wanted to be certain you had information to contact me should you desire assistance with or have questions about non-medical aspects of your care such as [but not limited to] medical insurance, housing, transportation, material needs, or emergency needs, as I was unable to reach you. If I do not have an answer I will assist you in finding the appropriate agency or individual who can help. Unfortunately, you were denied for the [employer] Patient Assistance Program due to out of pocket medication expenses not being submitted. If you would like to provide this information to the assistance program to see if you qualify, please obtain from your pharmacy and fax to [address] [place] [address] at [phone]. Please feel free to contact me at [phone]. Thank You.     Sincerely,         Carson Aguirre

## 2023-10-02 ENCOUNTER — OFFICE VISIT (OUTPATIENT)
Dept: URGENT CARE | Facility: MEDICAL CENTER | Age: 83
End: 2023-10-02
Payer: MEDICARE

## 2023-10-02 ENCOUNTER — APPOINTMENT (OUTPATIENT)
Dept: RADIOLOGY | Facility: MEDICAL CENTER | Age: 83
End: 2023-10-02
Payer: MEDICARE

## 2023-10-02 ENCOUNTER — TELEPHONE (OUTPATIENT)
Age: 83
End: 2023-10-02

## 2023-10-02 ENCOUNTER — TELEPHONE (OUTPATIENT)
Dept: FAMILY MEDICINE CLINIC | Facility: CLINIC | Age: 83
End: 2023-10-02

## 2023-10-02 VITALS
TEMPERATURE: 97.6 F | DIASTOLIC BLOOD PRESSURE: 82 MMHG | BODY MASS INDEX: 37.57 KG/M2 | WEIGHT: 199 LBS | HEART RATE: 78 BPM | HEIGHT: 61 IN | SYSTOLIC BLOOD PRESSURE: 158 MMHG | RESPIRATION RATE: 18 BRPM

## 2023-10-02 DIAGNOSIS — S46.911A STRAIN OF RIGHT SHOULDER, INITIAL ENCOUNTER: ICD-10-CM

## 2023-10-02 DIAGNOSIS — S50.01XA CONTUSION OF RIGHT ELBOW, INITIAL ENCOUNTER: Primary | ICD-10-CM
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
## 2023-10-02 DIAGNOSIS — S59.901A INJURY OF RIGHT ELBOW, INITIAL ENCOUNTER: ICD-10-CM

## 2023-10-02 DIAGNOSIS — S49.91XA INJURY OF RIGHT SHOULDER, INITIAL ENCOUNTER: ICD-10-CM

## 2023-10-02 PROCEDURE — 73080 X-RAY EXAM OF ELBOW: CPT

## 2023-10-02 PROCEDURE — 99214 OFFICE O/P EST MOD 30 MIN: CPT | Performed by: PHYSICIAN ASSISTANT

## 2023-10-02 PROCEDURE — G0463 HOSPITAL OUTPT CLINIC VISIT: HCPCS | Performed by: PHYSICIAN ASSISTANT

## 2023-10-02 PROCEDURE — 73030 X-RAY EXAM OF SHOULDER: CPT

## 2023-10-02 NOTE — TELEPHONE ENCOUNTER
Patient call back will like Dr. Katie Shaffer to call her back. I asked patient if she hit her head she states no she hit only her right arm, her right arm, shoulder and elbow hurts.     [phone]

## 2023-10-02 NOTE — TELEPHONE ENCOUNTER
Per my conversation with Dr. Idelia Bamberger, patient needs to be evaluated at ED. She could have multiple issues and needs to be checked rather than just him blindly ordering x-ray. Patient states she has no way to get to ED. Called patient's daughter, please transfer her to office.

## 2023-10-02 NOTE — PATIENT INSTRUCTIONS
Tylenol as needed for pain  Follow up with PCP in 3-5 days. Proceed to  ER if symptoms worsen. Rotator Cuff Injury   AMBULATORY CARE:   A rotator cuff injury  is damage to the muscles or tendons of your rotator cuff. The rotator cuff is a group of muscles and tendons that hold the shoulder joint in place. The damage may include muscle stretching, tendon tears, or bursa inflammation. The bursa is a fluid sac around the joint. Common signs and symptoms:   Pain that may be constant or come and go (such as only when you lie on the injured shoulder)    Pain or stiffness in your shoulder that travels down your arm    Trouble lifting your arm or placing it behind your back    Trouble moving or using your shoulder    A swollen shoulder that may be painful to the touch    Numbness in part or all of your arm    A popping noise along with pain when you lift your arm    Call your doctor or orthopedist if:   You suddenly cannot move your arm. The pain in your shoulder or arm is not improving, or is worse than before you started treatment. You have new pain in your neck. You have questions or concerns about your condition or care. Treatment  may include any of the following:  Medicines  may be needed for pain or inflammation. A physical therapist  can teach you exercises to help improve shoulder movement and strength, and decrease pain. You may learn changes to daily activities that will help decrease stress on your tendons. Surgery  may be needed if your injury is severe or your symptoms do not improve. Surgery may be used to clean away damaged tissue or fix a tear. A piece of another tissue or muscle may be used to fix a badly torn tendon. The bone of your shoulder joint may be reshaped so it stays in place. An artificial joint made of metal and plastic may be put into your shoulder. Care for your rotator cuff injury at home:   Rest your shoulder as directed.   Overuse of your shoulder can make your injury worse. Avoid heavy lifting, putting your arms over your head, or sports that need an overhead or throwing motion. Any of these movements can cause or worsen a rotator cuff injury. Put ice on your shoulder for 15 minutes every hour, or as directed. Ice helps decrease pain and swelling. Use an ice pack, or put crushed ice in a plastic bag. Wrap a towel around the bag before you put it on your shoulder. Put heat on your shoulder when directed. After the first several days, heat may help relax the muscles in your shoulder. Use a heat pack or heating pad. Apply heat for 20 minutes every hour, or as directed. Follow up with your doctor or orthopedist as directed:  Write down your questions so you remember to ask them during your visits. © Copyright Hilary Russo 2023 Information is for End User's use only and may not be sold, redistributed or otherwise used for commercial purposes. The above information is an  only. It is not intended as medical advice for individual conditions or treatments. Talk to your doctor, nurse or pharmacist before following any medical regimen to see if it is safe and effective for you. Contusion in Adults   AMBULATORY CARE:   A contusion  is a bruise that appears on your skin after an injury. A bruise happens when small blood vessels tear but skin does not. Blood leaks into nearby tissue, such as soft tissue or muscle. Other signs and symptoms you may have with a contusion:   Pain that increases when you touch the bruise, walk, or use the area around the bruise    Swelling or a lump at the site of the bruise or near it    Red, blue, or black skin that may change to green or yellow after a few days    Stiffness or problems moving the bruised area of your body    Seek care immediately if:   You have new trouble moving the injured area. You have tingling or numbness in or near the injured area.     Your hand or foot below the bruise gets cold or turns pale.    Call your doctor if:   You find a new lump in the injured area. Your symptoms do not improve with treatment after 4 to 5 days. You have questions or concerns about your condition or care. Treatment  may not be needed. The following may be needed if you have a serious injury:  NSAIDs , such as ibuprofen, help decrease swelling, pain, and fever. This medicine is available with or without a doctor's order. NSAIDs can cause stomach bleeding or kidney problems in certain people. If you take blood thinner medicine, always ask your healthcare provider if NSAIDs are safe for you. Always read the medicine label and follow directions. Prescription pain medicine  may be given. Ask your healthcare provider how to take this medicine safely. Some prescription pain medicines contain acetaminophen. Do not take other medicines that contain acetaminophen without talking to your healthcare provider. Too much acetaminophen may cause liver damage. Prescription pain medicine may cause constipation. Ask your healthcare provider how to prevent or treat constipation. Aspiration  is a procedure to drain pooled blood in your muscle. This may help prevent increased pressure in the muscle. Surgery  may be done to repair a tear in the muscle or relieve pressure in the muscle caused by swelling. Help a contusion heal:   Rest the injured area  or use it less than usual. If you bruised your leg or foot, you may need crutches or a cane to help you walk. This will help you keep weight off your injured body part. Apply ice  to decrease swelling and pain. Ice may also help prevent tissue damage. Use an ice pack, or put crushed ice in a plastic bag. Cover it with a towel and place it on your bruise for 15 to 20 minutes every hour or as directed. Use compression  to support the area and decrease swelling. Wrap an elastic bandage around the area over the bruised muscle. Make sure the bandage is not too tight.  You should be able to fit 1 finger between the bandage and your skin. Elevate (raise) your injured body part  above the level of your heart to help decrease pain and swelling. Use pillows, blankets, or rolled towels to elevate the area as often as you can. Do not drink alcohol  as directed. Alcohol may slow healing. Do not stretch injured muscles  right after your injury. Ask your healthcare provider when and how you may safely stretch after your injury. Gentle stretches can help increase your flexibility. Do not massage the area or put heating pads  on the bruise right after your injury. Heat and massage may slow healing. Your healthcare provider may tell you to apply heat after several days. At that time, heat will start to help the injury heal.    Follow up with your doctor as directed:  Write down your questions so you remember to ask them during your visits. © Copyright Sarah Mar 2023 Information is for End User's use only and may not be sold, redistributed or otherwise used for commercial purposes. The above information is an  only. It is not intended as medical advice for individual conditions or treatments. Talk to your doctor, nurse or pharmacist before following any medical regimen to see if it is safe and effective for you. Fall Prevention for Older Adults   WHAT YOU NEED TO KNOW:   As you age, your muscles weaken and your risk for falls increases. Your risk also increases if you take medicines that make you sleepy or dizzy. You may also be at risk if you have vision or joint problems, have low blood pressure, or are not active. DISCHARGE INSTRUCTIONS:   Arrange to have someone call your local emergency number (911 in the [address]) if:   You have fallen and are found unconscious. You have fallen and cannot move part of your body. Call your doctor if:   You have fallen and have pain or a headache. You have questions or concerns about your condition or care.     Fall prevention tips:   Stay active. Exercise can help strengthen your muscles and improve your balance. Your healthcare provider may recommend water aerobics, walking, or [place]. He or she may also recommend physical therapy to improve your coordination. Never start an exercise program without asking your healthcare provider first.            Wear shoes that fit well and have soles that . Wear shoes both inside and outside. Use slippers with good . Avoid shoes with high heels. Use assistive devices as directed. Your healthcare provider may suggest that you use a cane or walker to help you keep your balance. You may need to have grab bars put in your bathroom near the toilet or in the shower. Stand or sit up slowly. This may help you keep your balance and prevent falls. Manage your medical conditions. Keep all appointments with your healthcare providers. Visit your eye doctor as directed. Home safety tips: Add items to prevent falls in the bathroom. Put nonslip strips on your bath or shower floor to prevent you from slipping. Use a bath mat if you do not have carpet in the bathroom. This will prevent you from falling when you step out of the bath or shower. Use a shower seat so you do not need to stand while you shower. Sit on the toilet or a chair in your bathroom to dry yourself and put on clothing. This will prevent you from losing your balance from drying or dressing yourself while you are standing. Keep paths clear. Remove books, shoes, and other objects from walkways and stairs. Place cords for telephones and lamps out of the way so that you do not need to walk over them. Tape them down if you cannot move them. Remove small rugs. If you cannot remove a rug, secure it with double-sided tape. This will prevent you from tripping. Install bright lights in your home. Use night lights to help light paths to the bathroom or kitchen.  Always turn on the light before you start walking. Keep items you use often on shelves within reach. Do not use a step stool to help you reach an item. Paint or place reflective tape on the edges of your stairs. This will help you see the stairs better. Plan ahead in case you do fall:  Talk with family members, friends, and neighbors to create a fall plan. Someone will need to call for emergency help if you are injured or found unconscious. If possible, keep a mobile phone with you at all times, or wear an emergency alert device. You can contact emergency services by pressing a button on the device. Ask your healthcare provider for more information. Follow up with your doctor as directed:  Write down your questions so you remember to ask them during your visits. © Copyright [facility] 2023 Information is for End User's use only and may not be sold, redistributed or otherwise used for commercial purposes. The above information is an  only. It is not intended as medical advice for individual conditions or treatments. Talk to your doctor, nurse or pharmacist before following any medical regimen to see if it is safe and effective for you.

## 2023-10-02 NOTE — PROGRESS NOTES
[facility]        NAME: Lisseth Crowder is a 82 y.o. female  : 1940    MRN: [id number]  DATE: October 3, 2023  TIME: 12:56 PM    Assessment and Plan   Contusion of right elbow, initial encounter [S50.01XA]  1. Contusion of right elbow, initial encounter  XR elbow 3+ vw right      2. Strain of right shoulder, initial encounter  XR shoulder 2+ vw right      3. Fall, initial encounter              Patient Instructions     Tylenol as needed for pain  Follow up with PCP in 3-5 days. Proceed to  ER if symptoms worsen. Chief Complaint     Chief Complaint   Patient presents with   • Elbow Pain     Pt. With pain in her right arm from her elbow to her shoulder that began after a fall yesterday. States she landed on the elbow. Denies hitting her head or LOC. History of Present Illness       72-year-old female presents with daughter for fall injury that occurred yesterday. Daughter reports that they were getting out of her car to go into her community house when she started to feel little weak and dizzy causing her to fall to the ground. Patient's daughter reports that she fell onto her right arm. Since then been complaining of some right elbow and shoulder discomfort. Denied hitting head at all. No loss of consciousness reported. Denies any chest pain or shortness of breath. No vomiting reported. Fall  The accident occurred 12 to 24 hours ago. The fall occurred while standing. She fell from a height of 1 to 2 ft. She landed on concrete. There was no blood loss. The point of impact was the right elbow. The pain is present in the right elbow and right shoulder. The pain is moderate. The symptoms are aggravated by movement. Pertinent negatives include no headaches, loss of consciousness, numbness or vomiting. She has tried nothing for the symptoms. The treatment provided no relief. Review of Systems   Review of Systems   Constitutional: Negative. Respiratory: Negative. Cardiovascular: Negative. Gastrointestinal: Negative. Negative for vomiting. Musculoskeletal: Positive for arthralgias. Skin: Negative. Neurological: Negative. Negative for loss of consciousness, numbness and headaches.          Current Medications       Current Outpatient Medications:   •  acetaminophen (TYLENOL) 325 mg tablet, Take 650 mg by mouth, Disp: , Rfl:   •  apixaban (Eliquis) 5 mg, Take 1 tablet (5 mg total) by mouth 2 (two) times a day, Disp: 56 tablet, Rfl: 0  •  Ascorbic Acid (VITAMIN C) 1000 MG tablet, Take 500 mg by mouth , Disp: , Rfl:   •  atorvastatin (LIPITOR) 80 mg tablet, Take 1 tablet (80 mg total) by mouth daily at bedtime, Disp: 90 tablet, Rfl: 3  •  Calcium Carbonate-Vit D-Min (CALCIUM 1200 PO), Take by mouth, Disp: , Rfl:   •  citalopram (CeleXA) 20 mg tablet, TAKE 1 TABLET EVERY DAY, Disp: 90 tablet, Rfl: 3  •  Continuous Blood Gluc  (FREESTYLE RONNI 14 DAY READER) GARRY, Use to check BG levels 4+times daily and as needed, Disp: 1 Device, Rfl: 0  •  Continuous Blood Gluc Sensor (FREESTYLE RONNI 14 DAY SENSOR) MISC, Change sensor every 14 days to check BG levels 4+ times daily, Disp: 6 each, Rfl: 3  •  Diclofenac Sodium (VOLTAREN) 1 %, Apply 2 g topically 4 (four) times a day, Disp: 50 g, Rfl: 1  •  GLUCOSAMINE CHONDROITIN COMPLX PO, Take 1 tablet by mouth 2 (two) times a day, Disp: , Rfl:   •  insulin aspart (NovoLOG FlexPen) 100 UNIT/ML injection pen, INJECT 6 UNITS SUBCUTANEOUSLY WITH BREAKFAST, THEN 6 UNITS WITH LUNCH, AND THEN 6 UNITS WITH DINNER, Disp: 15 mL, Rfl: 1  •  insulin glargine (Toujeo SoloStar) 300 units/mL CONCENTRATED U-300 injection pen (1-unit dial), Inject 25  units at bedtime, Disp: 12 mL, Rfl: 1  •  Insulin Pen Needle (BD Pen Needle Sasha U/F) 32G X 4 MM MISC, Inject 4 times a day, Disp: 400 each, Rfl: 1  •  Lancets (onetouch ultrasoft) lancets, Use 4 times a day, Disp: 400 each, Rfl: 0  •  levothyroxine 125 mcg tablet, Take 1 tablet from mon to sat and 1/2 tablet on Sunday, Disp: 90 tablet, Rfl: 1  •  lisinopril (ZESTRIL) 20 mg tablet, TAKE 1 TABLET TWICE DAILY, Disp: 180 tablet, Rfl: 3  •  metoprolol tartrate (LOPRESSOR) 25 mg tablet, TAKE 1 TABLET EVERY 12 HOURS, Disp: 180 tablet, Rfl: 3  •  Omega-3 Fatty Acids (FISH OIL) 1200 MG CAPS, Take 1,200 mg by mouth 2 (two) times a day , Disp: , Rfl:   •  omeprazole (PriLOSEC) 20 mg delayed release capsule, TAKE 1 CAPSULE TWICE DAILY, Disp: 180 capsule, Rfl: 3  •  sodium chloride (VANNESSA 128) 5 % hypertonic ophthalmic ointment, Administer 1 drop to both eyes 2 (two) times a day, Disp: , Rfl:   •  spironolactone (ALDACTONE) 25 mg tablet, Take 1 tablet (25 mg total) by mouth daily, Disp: 90 tablet, Rfl: 3  •  glucose blood (TRUE METRIX BLOOD GLUCOSE TEST) test strip, Test 4 times plus daily, as instructed (Patient not taking: Reported on 6/12/2023), Disp: 400 each, Rfl: 1  •  promethazine-dextromethorphan (PHENERGAN-DM) 6.25-15 mg/5 mL oral syrup, TAKE 5ML DAILY AT BEDTIME (Patient not taking: Reported on 10/2/2023), Disp: 450 mL, Rfl: 0  •  Tetrahydroz-Polyvinyl Al-Povid (MURINE TEARS PLUS OP), Apply to eye 3 (three) times a day (Patient not taking: Reported on 9/18/2023), Disp: , Rfl:   •  trospium chloride (SANCTURA) 20 mg tablet, Take 1 tablet (20 mg total) by mouth 2 (two) times a day (Patient not taking: Reported on 9/12/2023), Disp: 180 tablet, Rfl: 0    Current Allergies     Allergies as of 10/02/2023 - Reviewed 10/02/2023   Allergen Reaction Noted   • Ampicillin Hives 01/15/2016            The following portions of the patient's history were reviewed and updated as appropriate: allergies, current medications, past family history, past medical history, past social history, past surgical history and problem list.     Past Medical History:   Diagnosis Date   • Anxiety    • Arthritis    • CHF (congestive heart failure) (HCC)    • Chronic venous insufficiency    • COPD (chronic obstructive pulmonary disease) [facility])    • Coronary artery disease    • Diabetes mellitus ([facility])     type II   • Disease of thyroid gland     hypothyroidism   • DVT (deep venous thrombosis) ([facility])    • HL (hearing loss)    • Hyperlipidemia    • Hypertension    • Irregular heart beat     Paroxysmal a-fib   • Pacemaker    • Renal disorder     stage 3   • Sleep apnea    • Sleep difficulties    • Thyroid disease    • Urinary incontinence    • Uterine leiomyoma    • Walker as ambulation aid        Past Surgical History:   Procedure Laterality Date   • BREAST SURGERY      For Biopsy   • CARDIAC PACEMAKER PLACEMENT      Permanent. Last assessed: 2/19/16   • CARDIAC SURGERY     • CARPAL TUNNEL RELEASE Bilateral    • CATARACT EXTRACTION     • CATARACT EXTRACTION W/ INTRAOCULAR LENS  IMPLANT, BILATERAL     • CYSTOSCOPY      Diagnostic.  Last assessed: 11/8/17   • ECTOPIC PREGNANCY SURGERY     • HYSTERECTOMY      Total Abdomonal. Resolved: 1982   • JOINT REPLACEMENT Right     TKR   • OOPHORECTOMY     • KS ENDOVEN ABLTJ INCMPTNT VEIN XTR LASER 1ST VEIN Left 2/17/2017    Procedure: GREATER SAPHENOUS VEIN ENDOVASCULAR LASER THERAPY ;  Surgeon: Doug Wu DO;  Location: [facility];  Service: Vascular   • TONSILLECTOMY         Family History   Problem Relation Age of Onset   • Coronary artery disease Mother    • Hypertension Mother    • Heart disease Mother    • Hypertension Father    • Diabetes Father         Mellitus   • No Known Problems Sister    • No Known Problems Daughter    • No Known Problems Daughter    • No Known Problems Maternal Grandmother    • No Known Problems Maternal Grandfather    • No Known Problems Paternal Grandmother    • No Known Problems Paternal Grandfather    • Diabetes Brother         Mellitus   • No Known Problems Son    • Breast cancer Maternal Aunt    • Pancreatic cancer Maternal Aunt    • Breast cancer Cousin    • Breast cancer Cousin    • Breast cancer Paternal Aunt    • No Known Problems Paternal Aunt    • No Known Problems Paternal Aunt          Medications have been verified. Objective   /82   Pulse 78   Temp 97.6 °F (36.4 °C)   Resp 18   Ht 5' 0.5" (1.537 m)   Wt 90.3 kg (199 lb)   BMI 38.22 kg/m²   No LMP recorded. Patient has had a hysterectomy. Physical Exam     Physical Exam  Vitals and nursing note reviewed. Constitutional:       General: She is not in acute distress. Appearance: She is well-developed. HENT:      Head: Normocephalic and atraumatic. Right Ear: External ear normal.      Left Ear: External ear normal.      Nose: Nose normal.      Mouth/Throat:      Pharynx: No oropharyngeal exudate. Eyes:      General:         Right eye: No discharge. Left eye: No discharge. Conjunctiva/sclera: Conjunctivae normal.   Pulmonary:      Effort: Pulmonary effort is normal. No respiratory distress. Musculoskeletal:         General: Normal range of motion. Right shoulder: Tenderness and bony tenderness present. No swelling, deformity, effusion, laceration or crepitus. Normal range of motion. Normal strength. Normal pulse. Right elbow: No swelling, deformity, effusion or lacerations. Normal range of motion. Tenderness present in olecranon process. Cervical back: Normal range of motion and neck supple. Skin:     General: Skin is warm and dry. Findings: Ecchymosis (Right olecranon area) present. Neurological:      Mental Status: She is alert and oriented to person, place, and time. Right elbow x-ray: Reviewed results and no fractures or abnormalities noted. Pending radiologist interpretation  Right shoulder x-ray: I reviewed results and images. No fractures or abnormalities noted. Pending radiologist interpretation. MDM: Reviewed imaging personally and no fractures are noted. Most likely contusion to right elbow and strain of right shoulder. Recommended some Tylenol and follow-up with PCP as needed.

## 2023-10-02 NOTE — TELEPHONE ENCOUNTER
Patient states that she was with her daughter yesterday and when they got home, her daughter helped her out of the car and then while walking towards the house, patient became very dizzy and fell, injuring her R shoulder and elbow    Patient did not go get checked out and is now stating that she can move her shoulder and elbow, but it is painful     She denies hitting her head or losing consciousness    She is asking if you would like her to come in or if you would like to send her for imaging     Please advise

## 2023-10-02 NOTE — TELEPHONE ENCOUNTER
Patient called and stated she was getting out of her daughter's car yesterday, felt dizzy and fell. She has pain in her shoulder and called to schedule an appointment. Warm transferred to practice to assist as there were no available appointments on the schedule.

## 2023-10-05 ENCOUNTER — TELEPHONE (OUTPATIENT)
Dept: CARDIOLOGY CLINIC | Facility: CLINIC | Age: 83
End: 2023-10-05

## 2023-10-05 NOTE — TELEPHONE ENCOUNTER
Patient is asking if she would be able to  Eliquis samples.   She is in the process of applying for assistance and is out of meds

## 2023-10-06 ENCOUNTER — PATIENT OUTREACH (OUTPATIENT)
Dept: FAMILY MEDICINE CLINIC | Facility: CLINIC | Age: 83
End: 2023-10-06

## 2023-10-06 NOTE — PROGRESS NOTES
Return call received from patient. Stated her dtr Alisson Law was helping with Eliquis PAP application. She feels it would be best for OP SWCM to provide update to Alisson Law regarding denial reason. Voicemail left for Alisson Law and awaiting return call.

## 2023-10-06 NOTE — PROGRESS NOTES
Message received from patient stating she received letter from [facility]. She would like to know what she needs to do regarding denial and submitting OOP expense history for medications. Attempted to return call. No answer, voicemail left, and awaiting return call. Detailed provided in voicemail.

## 2023-10-09 ENCOUNTER — REMOTE DEVICE CLINIC VISIT (OUTPATIENT)
Dept: CARDIOLOGY CLINIC | Facility: CLINIC | Age: 83
End: 2023-10-09
Payer: MEDICARE

## 2023-10-09 DIAGNOSIS — Z95.0 CARDIAC PACEMAKER IN SITU: Primary | ICD-10-CM

## 2023-10-09 PROCEDURE — 93296 REM INTERROG EVL PM/IDS: CPT | Performed by: INTERNAL MEDICINE

## 2023-10-09 PROCEDURE — 93294 REM INTERROG EVL PM/LDLS PM: CPT | Performed by: INTERNAL MEDICINE

## 2023-10-09 NOTE — PROGRESS NOTES
Results for orders placed or performed in visit on 10/09/23   Cardiac EP device report    Narrative    MDT-DUAL CHAMBER PPM (DDDR MODE)/ ACTIVE SYSTEM IS MRI CONDITIONAL  CARELINK TRANSMISSION: BATTERY VOLTAGE ADEQUATE (1.5 YRS). AP-54%, >99% (>40% MVP Opal@[employer]). ALL AVAILABLE LEAD PARAMETERS WITHIN NORMAL LIMITS. 16 AF/ AFLUTTER EPISODES MAX DURATION 6 HRS. AF BURDEN-1.1%. HX: PAF & ON ELIQUIS & METOPROLOL. NORMAL DEVICE FUNCTION.  GV

## 2023-11-01 DIAGNOSIS — L30.9 ECZEMA, UNSPECIFIED TYPE: ICD-10-CM

## 2023-11-01 RX ORDER — TRIAMCINOLONE ACETONIDE 1 MG/G
CREAM TOPICAL
Qty: 160 G | Refills: 1 | Status: SHIPPED | OUTPATIENT
Start: 2023-11-01

## 2023-11-03 ENCOUNTER — TELEPHONE (OUTPATIENT)
Age: 83
End: 2023-11-03

## 2023-11-03 NOTE — TELEPHONE ENCOUNTER
Patient's daughter, Prema, called and stated that her parents will be moving into a senior living facility on Sunday and they are requesting a history and physical, and information on their specialists.  She will stop by the office to complete a medical records release form for the information needed as she is unable to print out a form at home.

## 2023-11-07 ENCOUNTER — HOSPITAL ENCOUNTER (EMERGENCY)
Facility: HOSPITAL | Age: 83
Discharge: HOME | End: 2023-11-07
Attending: EMERGENCY MEDICINE
Payer: MEDICARE

## 2023-11-07 ENCOUNTER — TELEPHONE (OUTPATIENT)
Age: 83
End: 2023-11-07

## 2023-11-07 VITALS
SYSTOLIC BLOOD PRESSURE: 142 MMHG | OXYGEN SATURATION: 95 % | RESPIRATION RATE: 18 BRPM | TEMPERATURE: 98.2 F | HEART RATE: 62 BPM | DIASTOLIC BLOOD PRESSURE: 65 MMHG

## 2023-11-07 DIAGNOSIS — Z79.4 TYPE 2 DIABETES MELLITUS WITH HYPERGLYCEMIA, WITH LONG-TERM CURRENT USE OF INSULIN (HCC): ICD-10-CM

## 2023-11-07 DIAGNOSIS — E11.9 DIABETES (HCC): Primary | ICD-10-CM

## 2023-11-07 DIAGNOSIS — E11.65 TYPE 2 DIABETES MELLITUS WITH HYPERGLYCEMIA, WITH LONG-TERM CURRENT USE OF INSULIN (HCC): ICD-10-CM

## 2023-11-07 DIAGNOSIS — R73.9 HYPERGLYCEMIA: ICD-10-CM

## 2023-11-07 LAB — GLUCOSE SERPL-MCNC: 174 MG/DL (ref 65–140)

## 2023-11-07 PROCEDURE — 99284 EMERGENCY DEPT VISIT MOD MDM: CPT

## 2023-11-07 PROCEDURE — 99282 EMERGENCY DEPT VISIT SF MDM: CPT

## 2023-11-07 PROCEDURE — 82948 REAGENT STRIP/BLOOD GLUCOSE: CPT

## 2023-11-07 RX ORDER — FLASH GLUCOSE SENSOR
KIT MISCELLANEOUS
Qty: 6 EACH | Refills: 3 | Status: SHIPPED | OUTPATIENT
Start: 2023-11-07

## 2023-11-07 NOTE — TELEPHONE ENCOUNTER
Patient's daughter Andreia Courtney called in to report loss of glucose sensor during move into [facility] independent living over the past weekend. No current check on glucose levels. Please send emergency supply order to [facility] pharmacy. Please follow up with Andreia Courtney.

## 2023-11-07 NOTE — DISCHARGE INSTRUCTIONS
Your blood sugar was 174. Please follow up with your family doctor for refills on diabetic supplies.

## 2023-11-08 NOTE — ED PROVIDER NOTES
History  Chief Complaint   Patient presents with    Personal Problem     Pt is visiting in ER and wanted her blood sugar checked. Denies any complaints. 82-year-old female presenting today wanting a blood glucose test as she is requiring refill of her diabetic medication from her PCP was concerned that it might be high. Patient denies any symptoms at this time including polyuria, polydipsia, nausea, vomiting, abdominal pain, dizziness, flulike symptoms, or any other symptoms this time. Prior to Admission Medications   Prescriptions Last Dose Informant Patient Reported? Taking?    Ascorbic Acid (VITAMIN C) 1000 MG tablet  Self Yes No   Sig: Take 500 mg by mouth    Calcium Carbonate-Vit D-Min (CALCIUM 1200 PO)  Self Yes No   Sig: Take by mouth   Continuous Blood Gluc  (FREESTYLE RONNI 14 DAY READER) GARRY  Self No No   Sig: Use to check BG levels 4+times daily and as needed   Continuous Blood Gluc Sensor (FREESTYLE RONNI 14 DAY SENSOR) MISC  Self No No   Sig: Change sensor every 14 days to check BG levels 4+ times daily   Diclofenac Sodium (VOLTAREN) 1 %  Self No No   Sig: Apply 2 g topically 4 (four) times a day   GLUCOSAMINE CHONDROITIN COMPLX PO  Self Yes No   Sig: Take 1 tablet by mouth 2 (two) times a day   Insulin Pen Needle (BD Pen Needle Sasha U/F) 32G X 4 MM MISC  Self No No   Sig: Inject 4 times a day   Lancets (onetouch ultrasoft) lancets  Self No No   Sig: Use 4 times a day   Omega-3 Fatty Acids (FISH OIL) 1200 MG CAPS  Self Yes No   Sig: Take 1,200 mg by mouth 2 (two) times a day    Tetrahydroz-Polyvinyl Al-Povid (MURINE TEARS PLUS OP)  Self Yes No   Sig: Apply to eye 3 (three) times a day   Patient not taking: Reported on 9/18/2023   acetaminophen (TYLENOL) 325 mg tablet  Self Yes No   Sig: Take 650 mg by mouth   apixaban (Eliquis) 5 mg  Self No No   Sig: Take 1 tablet (5 mg total) by mouth 2 (two) times a day   atorvastatin (LIPITOR) 80 mg tablet  Self No No   Sig: Take 1 tablet (80 mg total) by mouth daily at bedtime   citalopram (CeleXA) 20 mg tablet  Self No No   Sig: TAKE 1 TABLET EVERY DAY   glucose blood (TRUE METRIX BLOOD GLUCOSE TEST) test strip  Self No No   Sig: Test 4 times plus daily, as instructed   Patient not taking: Reported on 6/12/2023   insulin aspart (NovoLOG FlexPen) 100 UNIT/ML injection pen  Self No No   Sig: INJECT 6 UNITS SUBCUTANEOUSLY WITH BREAKFAST, THEN 6 UNITS WITH LUNCH, AND THEN 6 UNITS WITH DINNER   insulin glargine (Toujeo SoloStar) 300 units/mL CONCENTRATED U-300 injection pen (1-unit dial)  Self No No   Sig: Inject 25  units at bedtime   levothyroxine 125 mcg tablet  Self No No   Sig: Take 1 tablet from mon to sat and 1/2 tablet on Sunday   lisinopril (ZESTRIL) 20 mg tablet  Self No No   Sig: TAKE 1 TABLET TWICE DAILY   metoprolol tartrate (LOPRESSOR) 25 mg tablet  Self No No   Sig: TAKE 1 TABLET EVERY 12 HOURS   omeprazole (PriLOSEC) 20 mg delayed release capsule  Self No No   Sig: TAKE 1 CAPSULE TWICE DAILY   promethazine-dextromethorphan (PHENERGAN-DM) 6.25-15 mg/5 mL oral syrup   No No   Sig: TAKE 5ML DAILY AT BEDTIME   Patient not taking: Reported on 10/2/2023   sodium chloride (VANNESSA 128) 5 % hypertonic ophthalmic ointment  Self Yes No   Sig: Administer 1 drop to both eyes 2 (two) times a day   spironolactone (ALDACTONE) 25 mg tablet  Self No No   Sig: Take 1 tablet (25 mg total) by mouth daily   triamcinolone (KENALOG) 0.1 % cream   No No   Sig: APPLY TOPCICALLY TWO TIMES A DAY   trospium chloride (SANCTURA) 20 mg tablet  Self No No   Sig: Take 1 tablet (20 mg total) by mouth 2 (two) times a day   Patient not taking: Reported on 9/12/2023      Facility-Administered Medications: None       Past Medical History:   Diagnosis Date    Anxiety     Arthritis     CHF (congestive heart failure) ([facility])     Chronic venous insufficiency     COPD (chronic obstructive pulmonary disease) ([facility])     Coronary artery disease     Diabetes mellitus ([facility] [address] [facility])     type II    Disease of thyroid gland     hypothyroidism    DVT (deep venous thrombosis) (HCC)     HL (hearing loss)     Hyperlipidemia     Hypertension     Irregular heart beat     Paroxysmal a-fib    Pacemaker     Renal disorder     stage 3    Sleep apnea     Sleep difficulties     Thyroid disease     Urinary incontinence     Uterine leiomyoma     Walker as ambulation aid        Past Surgical History:   Procedure Laterality Date    BREAST SURGERY      For Biopsy    CARDIAC PACEMAKER PLACEMENT      Permanent. Last assessed: 2/19/16    CARDIAC SURGERY      CARPAL TUNNEL RELEASE Bilateral     CATARACT EXTRACTION      CATARACT EXTRACTION W/ INTRAOCULAR LENS  IMPLANT, BILATERAL      CYSTOSCOPY      Diagnostic. Last assessed: 11/8/17    ECTOPIC PREGNANCY SURGERY      HYSTERECTOMY      Total Abdomonal. Resolved: 1982    JOINT REPLACEMENT Right     TKR    OOPHORECTOMY      MA ENDOVEN ABLTJ INCMPTNT VEIN XTR LASER 1ST VEIN Left 2/17/2017    Procedure: GREATER SAPHENOUS VEIN ENDOVASCULAR LASER THERAPY ;  Surgeon: Thea Philip DO;  Location: [facility];  Service: Vascular    TONSILLECTOMY         Family History   Problem Relation Age of Onset    Coronary artery disease Mother     Hypertension Mother     Heart disease Mother     Hypertension Father     Diabetes Father         Mellitus    No Known Problems Sister     No Known Problems Daughter     No Known Problems Daughter     No Known Problems Maternal Grandmother     No Known Problems Maternal Grandfather     No Known Problems Paternal Grandmother     No Known Problems Paternal Grandfather     Diabetes Brother         Mellitus    No Known Problems Son     Breast cancer Maternal Aunt     Pancreatic cancer Maternal Aunt     Breast cancer Cousin     Breast cancer Cousin     Breast cancer Paternal Aunt     No Known Problems Paternal Aunt     No Known Problems Paternal Aunt      I have reviewed and agree with the history as documented.     E-Cigarette/Vaping    E-Cigarette Use Never User E-Cigarette/Vaping Substances    Nicotine No     THC No     CBD No     Flavoring No     Other No     Unknown No      Social History     Tobacco Use    Smoking status: Never    Smokeless tobacco: Never    Tobacco comments:     Per [employer]: Former smoker   Vaping Use    Vaping Use: Never used   Substance Use Topics    Alcohol use: Yes     Comment: rarely. Per Allscripts: Denied history of alcohol use    Drug use: No       Review of Systems   Constitutional:  Negative for chills and fever. HENT:  Negative for ear pain and sore throat. Eyes:  Negative for pain and visual disturbance. Respiratory:  Negative for cough and shortness of breath. Cardiovascular:  Negative for chest pain and palpitations. Gastrointestinal:  Negative for abdominal pain and vomiting. Genitourinary:  Negative for dysuria and hematuria. Musculoskeletal:  Negative for arthralgias and back pain. Skin:  Negative for color change and rash. Neurological:  Negative for seizures and syncope. All other systems reviewed and are negative. Physical Exam  Physical Exam  Vitals and nursing note reviewed. Constitutional:       General: She is not in acute distress. Appearance: She is well-developed. HENT:      Head: Normocephalic and atraumatic. Eyes:      Conjunctiva/sclera: Conjunctivae normal.   Cardiovascular:      Rate and Rhythm: Normal rate and regular rhythm. Heart sounds: No murmur heard. Pulmonary:      Effort: Pulmonary effort is normal. No respiratory distress. Breath sounds: Normal breath sounds. Abdominal:      Palpations: Abdomen is soft. Tenderness: There is no abdominal tenderness. Musculoskeletal:         General: No swelling. Cervical back: Neck supple. Skin:     General: Skin is warm and dry. Capillary Refill: Capillary refill takes less than 2 seconds. Neurological:      Mental Status: She is alert.    Psychiatric:         Mood and Affect: Mood normal. Vital Signs  ED Triage Vitals [11/07/23 1623]   Temperature Pulse Respirations Blood Pressure SpO2   98.2 °F (36.8 °C) 62 18 142/65 95 %      Temp Source Heart Rate Source Patient Position - Orthostatic VS BP Location FiO2 (%)   Oral Monitor Sitting Right arm --      Pain Score       No Pain           Vitals:    11/07/23 1623   BP: 142/65   Pulse: 62   Patient Position - Orthostatic VS: Sitting         Visual Acuity      ED Medications  Medications - No data to display    Diagnostic Studies  Results Reviewed       Procedure Component Value Units Date/Time    Fingerstick Glucose (POCT) [[id number]]  (Abnormal) Collected: 11/07/23 1616    Lab Status: Final result Updated: 11/07/23 1618     POC Glucose 174 mg/dl                    No orders to display              Procedures  Procedures         ED Course                               SBIRT 22yo+      Flowsheet Row Most Recent Value   Initial Alcohol Screen: US AUDIT-C     1. How often do you have a drink containing alcohol? 0 Filed at: 11/07/2023 1623   2. How many drinks containing alcohol do you have on a typical day you are drinking? 0 Filed at: 11/07/2023 1623   3a. Male UNDER 65: How often do you have five or more drinks on one occasion? 0 Filed at: 11/07/2023 1623   3b. FEMALE Any Age, or MALE 65+: How often do you have 4 or more drinks on one occassion? 0 Filed at: 11/07/2023 1623   Audit-C Score 0 Filed at: 11/07/2023 1623   KRISTIE: How many times in the past year have you. .. Used an illegal drug or used a prescription medication for non-medical reasons? Never Filed at: 11/07/2023 1623                      Medical Decision Making  82-year-old female presents today for blood glucose check. Glucose was 174, again patient denies any current symptoms. We will have patient follow-up with family doctor, for further evaluation treatment and prescriptions of her diabetic medications. Strict return precautions discussed with patient.     Patient at time of discharge well-appearing in no acute distress, all questions answered. Patient agreeable to plan. Patient's vitals, lab/imaging results, diagnosis, and treatment plan were discussed with the patient. All new/changed medications were discussed with patient, specifically, route of administration, how often and when to take, and where they can be picked up. Strict return precautions as well as close follow up with PCP was discussed with the patient and the patient was agreeable to my recommendations. Patient verbally acknowledged understanding of the above communications. All labs reviewed and utilized in the medical decision making process (if labs were ordered). Portions of the record may have been created with voice recognition software. Occasional wrong word or "sound a like" substitutions may have occurred due to the inherent limitations of voice recognition software. Read the chart carefully and recognize, using context, where substitutions have occurred. Amount and/or Complexity of Data Reviewed  Labs: ordered. Disposition  Final diagnoses:   Diabetes ([address])   Hyperglycemia     Time reflects when diagnosis was documented in both MDM as applicable and the Disposition within this note       Time User Action Codes Description Comment    11/7/2023  4:19 PM Jf Hardin Add [E11.9] Diabetes ([address])     11/7/2023  4:20 PM Jf Hardin Add [R73.9] Hyperglycemia           ED Disposition       ED Disposition   Discharge    Condition   Stable    Date/Time   Tue Nov 7, 2023 [address] discharge to home/self care.                    Follow-up Information       Follow up With Specialties Details Why Contact Info Additional Information    [address] Emergency Department Emergency Medicine Go to  If symptoms worsen [address] [facility], [facility] [place], [address]    Zachary Evangelista MD Family Medicine Schedule an appointment as soon as possible for a visit  As needed [address] [facility].   [address] [place] [address]  [phone]               Discharge Medication List as of 11/7/2023  4:21 PM        CONTINUE these medications which have NOT CHANGED    Details   acetaminophen (TYLENOL) 325 mg tablet Take 650 mg by mouth, Starting Fri 1/22/2016, Historical Med      apixaban (Eliquis) 5 mg Take 1 tablet (5 mg total) by mouth 2 (two) times a day, Starting Wed 7/13/2022, Sample      Ascorbic Acid (VITAMIN C) 1000 MG tablet Take 500 mg by mouth , Historical Med      atorvastatin (LIPITOR) 80 mg tablet Take 1 tablet (80 mg total) by mouth daily at bedtime, Starting Tue 6/6/2023, Normal      Calcium Carbonate-Vit D-Min (CALCIUM 1200 PO) Take by mouth, Historical Med      citalopram (CeleXA) 20 mg tablet TAKE 1 TABLET EVERY DAY, Normal      Continuous Blood Gluc  (FREESTYLE RONNI 14 DAY READER) GARRY Use to check BG levels 4+times daily and as needed, Normal      Diclofenac Sodium (VOLTAREN) 1 % Apply 2 g topically 4 (four) times a day, Starting Thu 6/29/2023, Normal      GLUCOSAMINE CHONDROITIN COMPLX PO Take 1 tablet by mouth 2 (two) times a day, Starting Fri 3/11/2011, Historical Med      glucose blood (TRUE METRIX BLOOD GLUCOSE TEST) test strip Test 4 times plus daily, as instructed, No Print      insulin aspart (NovoLOG FlexPen) 100 UNIT/ML injection pen INJECT 6 UNITS SUBCUTANEOUSLY WITH BREAKFAST, THEN 6 UNITS WITH LUNCH, AND THEN 6 UNITS WITH DINNER, Normal      insulin glargine (Toujeo SoloStar) 300 units/mL CONCENTRATED U-300 injection pen (1-unit dial) Inject 25  units at bedtime, No Print      Insulin Pen Needle (BD Pen Needle Sasha U/F) 32G X 4 MM MISC Inject 4 times a day, Normal      Lancets (onetouch ultrasoft) lancets Use 4 times a day, Normal      levothyroxine 125 mcg tablet Take 1 tablet from mon to sat and 1/2 tablet on Sunday, Normal      lisinopril (ZESTRIL) 20 mg tablet TAKE 1 TABLET TWICE DAILY, Starting Fri 8/4/2023, Normal      metoprolol tartrate (LOPRESSOR) 25 mg tablet TAKE 1 TABLET EVERY 12 HOURS, Normal      Omega-3 Fatty Acids (FISH OIL) 1200 MG CAPS Take 1,200 mg by mouth 2 (two) times a day , Historical Med      omeprazole (PriLOSEC) 20 mg delayed release capsule TAKE 1 CAPSULE TWICE DAILY, Normal      promethazine-dextromethorphan (PHENERGAN-DM) 6.25-15 mg/5 mL oral syrup TAKE 5ML DAILY AT BEDTIME, Normal      sodium chloride (VANNESSA 128) 5 % hypertonic ophthalmic ointment Administer 1 drop to both eyes 2 (two) times a day, Historical Med      spironolactone (ALDACTONE) 25 mg tablet Take 1 tablet (25 mg total) by mouth daily, Starting Tue 2/21/2023, Normal      Tetrahydroz-Polyvinyl Al-Povid (MURINE TEARS PLUS OP) Apply to eye 3 (three) times a day, Historical Med      triamcinolone (KENALOG) 0.1 % cream APPLY TOPCICALLY TWO TIMES A DAY, Normal      trospium chloride (SANCTURA) 20 mg tablet Take 1 tablet (20 mg total) by mouth 2 (two) times a day, Starting Tue 6/27/2023, Until Mon 9/25/2023, Normal      Continuous Blood Gluc Sensor (FREESTYLE RONNI 14 DAY SENSOR) MISC Change sensor every 14 days to check BG levels 4+ times daily, Normal             No discharge procedures on file.     PDMP Review         Value Time User    PDMP Reviewed  Yes 9/4/2022  2:50 PM Harjinder Del Castillo MD            ED Provider  Electronically Signed by             Rosa Koroma PA-C  11/08/23 2970

## 2023-11-16 ENCOUNTER — PATIENT OUTREACH (OUTPATIENT)
Dept: FAMILY MEDICINE CLINIC | Facility: CLINIC | Age: 83
End: 2023-11-16

## 2023-11-16 DIAGNOSIS — Z59.6 PATIENT CANNOT AFFORD MEDICATIONS: Primary | ICD-10-CM

## 2023-11-16 SDOH — ECONOMIC STABILITY - INCOME SECURITY: LOW INCOME: Z59.6

## 2023-11-16 NOTE — PROGRESS NOTES
Informed by PCP office that Toujeo PAP application through [employer] needs to be renewed by 12/31/2023. Spoke with patient's dtr Halley Villagomez regarding this. She states Eliquis PAP application needs to be submitted again; she is aware these was outstanding information when previously submitted (OOP medication expense information was not provided to PAP). Informed pharmacy can provide this. Prepped both applications for Toujeo and Eliquis PAPs. Mailed to patient's address; Halley Villagomez will assist patient in completing. Office is aware Halley Villagomez will return to office for PCP/clinical staff to complete. Once completed and income documents provided, PCP office can fax to PAPs for review. Will follow.

## 2023-11-24 ENCOUNTER — NURSE TRIAGE (OUTPATIENT)
Age: 83
End: 2023-11-24

## 2023-11-24 NOTE — TELEPHONE ENCOUNTER
Reason for Disposition   Colds with no complications    Answer Assessment - Initial Assessment Questions  1. ONSET: "When did the nasal discharge start?"       Yesterday   2. AMOUNT: "How much discharge is there?"       Moderate   3. COUGH: "Do you have a cough?" If yes, ask: "Describe the color of your sputum" (clear, white, yellow, green)      Yes ,light green   4. RESPIRATORY DISTRESS: "Describe your breathing."       no  5. FEVER: "Do you have a fever?" If Yes, ask: "What is your temperature, how was it measured, and when did it start?  no      6. SEVERITY: "Overall, how bad are you feeling right now?" (e.g., doesn't interfere with normal activities, staying home from school/work, staying in bed)       No   7. OTHER SYMPTOMS: "Do you have any other symptoms?" (e.g., sore throat, earache, wheezing, vomiting)      no    Protocols used: Common Cold-ADULT-[place]  Patient called with cold symptoms,nasal congestion and cough. Patient denies sob and chest pain. Patient afebrile, Spoke to patients daughter to have patient tested for Covid. Daughter stated she is going to [facility] to  purchase a test and will polo back with the results. Care advise given to patient for common cold symptoms. Troy Speaker

## 2023-11-24 NOTE — TELEPHONE ENCOUNTER
----- Message from Betsy Cm sent at 11/24/2023  2:46 PM EST -----  Spoke with patient, patient has been experiencing a runny nose and a cough since last night. Patient is currently not taking anything for the this. A good call back number for the patient is [phone]. Please advise.

## 2023-11-27 ENCOUNTER — PATIENT OUTREACH (OUTPATIENT)
Dept: FAMILY MEDICINE CLINIC | Facility: CLINIC | Age: 83
End: 2023-11-27

## 2023-11-27 NOTE — TELEPHONE ENCOUNTER
Attempted to call and message states " call can not be completed at this time.  Please try again later"

## 2023-11-27 NOTE — PROGRESS NOTES
Message left for patient's dtr Ellen Bowels in attempt to check status of Eliquis and Toujeo PAP applications that were mailed to patient to complete with [employer]'s assistance. Awaiting return call.

## 2023-11-28 NOTE — TELEPHONE ENCOUNTER
Pt's  states that patient is feeling slightly better. Current symptoms are runny/congested nose, productive cough, white and light green sputum. Denies fever, wheezing. COVID negative x 2. Has been using Robitussin but is concerned it is to much with her current meds, asking if you agree. See 's message too.

## 2023-11-30 ENCOUNTER — TELEPHONE (OUTPATIENT)
Age: 83
End: 2023-11-30

## 2023-11-30 DIAGNOSIS — J34.89 PURULENT RHINORRHEA: Primary | ICD-10-CM

## 2023-11-30 RX ORDER — AZITHROMYCIN 250 MG/1
TABLET, FILM COATED ORAL
Qty: 6 TABLET | Refills: 0 | Status: SHIPPED | OUTPATIENT
Start: 2023-11-30 | End: 2023-12-04

## 2023-11-30 RX ORDER — AZITHROMYCIN 250 MG/1
TABLET, FILM COATED ORAL
Qty: 6 TABLET | Refills: 0 | Status: SHIPPED | OUTPATIENT
Start: 2023-11-30 | End: 2023-11-30 | Stop reason: SDUPTHER

## 2023-11-30 NOTE — TELEPHONE ENCOUNTER
Pt calling back with pharmacy information it is the [facility] in [facility] . Offered an appt tomorrow with Dr. Dominique Laughter - pt declined and stated her  has PT appt tomorrow and they have to arrange for transportation.

## 2023-11-30 NOTE — TELEPHONE ENCOUNTER
FYI: Patient stated it's a struggle to travel right now. She has head congestion, runny nose, green mucus , no fever, tested negative for covid 11/26/23. Pt requested an antibiotic to treat symptoms to  be sent to [facility]. Patient will call back with pharmacy information.

## 2023-12-04 ENCOUNTER — PATIENT OUTREACH (OUTPATIENT)
Dept: FAMILY MEDICINE CLINIC | Facility: CLINIC | Age: 83
End: 2023-12-04

## 2023-12-04 ENCOUNTER — TELEPHONE (OUTPATIENT)
Age: 83
End: 2023-12-04

## 2023-12-04 NOTE — PROGRESS NOTES
Spoke with patient's dtr who is aware patient needs to request expense history for medications from her pharmacy in order to submit with PAP applications. Patient provided application to PCP office, signed forms, but income information and medication OOP information was outstanding. Patient's dtr aware of this and will assist patient in getting this information. Will follow.

## 2023-12-04 NOTE — TELEPHONE ENCOUNTER
Pts daughter Jose G Larsonjulitawyatt called say [facility] requires a nurse to come out and review medications with them and [facility] which does PT and OT in order for them to get their med in a pill pack or blister packs from [facility]. Is this something that can be ordered by Dr Angelina Ortega. Please advise Jose G Peterson. Pts  needs this as well. Message sent for him.

## 2023-12-05 ENCOUNTER — PATIENT OUTREACH (OUTPATIENT)
Dept: FAMILY MEDICINE CLINIC | Facility: CLINIC | Age: 83
End: 2023-12-05

## 2023-12-05 NOTE — PROGRESS NOTES
Spoke with SofGenie from [employer]. She is asking for home health order to [facility] for medication assessment. Nurse will come out and go over medications and can assist patient then with pill packing thru [address]. Fax number [phone].

## 2023-12-05 NOTE — PROGRESS NOTES
Spoke with nimco spain daughter. [facility] is asking for medication review with her parents. This needs to be done by home care nurse she believe thru [address]. Once this is completed they can work with [address] [place] [address] to do pill packing. Kerrie Farah spoke with Hortensia Morales at [facility] about this. I will reach out to Hortensia Morales for details on home nurse.        Left message with my contact information for Hortensia Morales at [facility] to return my call

## 2023-12-06 ENCOUNTER — TELEPHONE (OUTPATIENT)
Age: 83
End: 2023-12-06

## 2023-12-06 NOTE — TELEPHONE ENCOUNTER
It was an order to be signed, not forms, so I placed it in Dr. Fausto hudson. He should have it.  However, just in case I contacted [facility] and asked them to resend it

## 2023-12-06 NOTE — TELEPHONE ENCOUNTER
[facility] OT forms for signature from Dr ANA Barrett, dropped off by team member Yvette Best on 11/29/2023.  Would like to know the status can be reached at [phone] Extension [address] and can be faxed to [phone]

## 2023-12-06 NOTE — TELEPHONE ENCOUNTER
Pt was calling to inform Dr. Constanza Gonzalez that the antibiotics that he prescribed her helped her out a little but she still not feeling 100%. Pt has finished the medication and is wondering what she should be doing now since she still isn't feeling better. Pt was also inquiring about an authorization that her occupational therapist called in about to get therapy for right hand. I didn't see anything in her chart in regards to this matter please advise with the pt, thank you.

## 2023-12-07 ENCOUNTER — TELEPHONE (OUTPATIENT)
Age: 83
End: 2023-12-07

## 2023-12-07 NOTE — TELEPHONE ENCOUNTER
Severino Valles from [facility] called in, she dropped of forms last Wednesday at the office with [facility] to have Dr. Michelle Stern fill out for the patient. If they don't have the forms done, Severino Valles said the doctor can also write out a referral for her for occupational therapy, she's been having right hand pain from arthritis and it's effecting her ability to squeeze her muscles, she's not able to open her pill bottles, etc.  That can be faxed to [phone]    Thank you!

## 2023-12-07 NOTE — TELEPHONE ENCOUNTER
Can you please write a RX for OT. I can fax it. These were dropped off per message, however I do not have them.  A written RX is ok, please advise

## 2023-12-08 ENCOUNTER — TELEPHONE (OUTPATIENT)
Age: 83
End: 2023-12-08

## 2023-12-08 ENCOUNTER — TELEPHONE (OUTPATIENT)
Dept: FAMILY MEDICINE CLINIC | Facility: CLINIC | Age: 83
End: 2023-12-08

## 2023-12-08 ENCOUNTER — OFFICE VISIT (OUTPATIENT)
Dept: FAMILY MEDICINE CLINIC | Facility: CLINIC | Age: 83
End: 2023-12-08
Payer: MEDICARE

## 2023-12-08 VITALS
DIASTOLIC BLOOD PRESSURE: 78 MMHG | OXYGEN SATURATION: 95 % | HEIGHT: 61 IN | WEIGHT: 185.5 LBS | BODY MASS INDEX: 35.02 KG/M2 | SYSTOLIC BLOOD PRESSURE: 112 MMHG | TEMPERATURE: 97.1 F | HEART RATE: 67 BPM

## 2023-12-08 DIAGNOSIS — J06.9 ACUTE URI: Primary | ICD-10-CM

## 2023-12-08 DIAGNOSIS — Z23 ENCOUNTER FOR IMMUNIZATION: ICD-10-CM

## 2023-12-08 PROCEDURE — G0008 ADMIN INFLUENZA VIRUS VAC: HCPCS | Performed by: NURSE PRACTITIONER

## 2023-12-08 PROCEDURE — 99214 OFFICE O/P EST MOD 30 MIN: CPT | Performed by: NURSE PRACTITIONER

## 2023-12-08 PROCEDURE — 90662 IIV NO PRSV INCREASED AG IM: CPT | Performed by: NURSE PRACTITIONER

## 2023-12-08 RX ORDER — AZITHROMYCIN 250 MG/1
TABLET, FILM COATED ORAL
Qty: 6 TABLET | Refills: 0 | Status: SHIPPED | OUTPATIENT
Start: 2023-12-08 | End: 2023-12-12

## 2023-12-08 NOTE — TELEPHONE ENCOUNTER
Is there any way you could add an addendum to your last office note stating why home care is needed. The home care office needs this info to proceed. See other message for  as well.

## 2023-12-08 NOTE — PROGRESS NOTES
Assessment/Plan:       Diagnoses and all orders for this visit:    Acute URI  -     azithromycin (ZITHROMAX) 250 mg tablet; Take 2 tabs on Day 1 than 1 tab Days 2-5    Encounter for immunization  -     influenza vaccine, high-dose, PF 0.7 mL (FLUZONE HIGH-DOSE)        #1 Acute URI  Discussed with patient plan to treat with another course of azithromycin and instructed to continue using the cough medication along with Mucinex  #2 Encounter for immunization  Patient given annual influenza vaccine  Patient instructed to call if no improvement in 72 hours or symptoms worsen      Subjective:      Patient ID: Marshall Henning is a 82 y.o. female. 82 y. o.female presenting with a reoccurring cough and congestion. She reports that she has been dealing with the chronic cough for months. Her PCP last saw her on 11/30/2023 and prescribed promethazine DM at bedtime and a course of azithromycin. The congestion improved for awhile than returned by the cough has not subsided at all. The following portions of the patient's history were reviewed and updated as appropriate: allergies, current medications, past family history, past medical history, past social history, past surgical history, and problem list.    Review of Systems   Constitutional:  Positive for fatigue. Negative for chills and fever. HENT:  Positive for congestion, postnasal drip, rhinorrhea and sinus pressure. Negative for ear pain and sore throat. Respiratory:  Positive for cough. Negative for chest tightness, shortness of breath and wheezing. Cardiovascular: Negative. Gastrointestinal: Negative. Musculoskeletal:  Negative for myalgias. Neurological:  Negative for dizziness, light-headedness and headaches. Psychiatric/Behavioral: Negative.            Objective:    /78 (BP Location: Right arm, Patient Position: Sitting, Cuff Size: Large)   Pulse 67   Temp (!) 97.1 °F (36.2 °C)   Ht 5' 0.5" (1.537 m)   Wt 84.1 kg (185 lb 8 oz) SpO2 95%   BMI 35.63 kg/m² (Reviewed)       Physical Exam  Vitals reviewed. Constitutional:       General: She is not in acute distress. Appearance: She is well-developed and well-groomed. She is not ill-appearing. HENT:      Head: Normocephalic and atraumatic. Right Ear: Tympanic membrane, ear canal and external ear normal.      Left Ear: Tympanic membrane, ear canal and external ear normal.      Nose: Congestion present. Mouth/Throat:      Mouth: Mucous membranes are moist.      Pharynx: Oropharynx is clear. Eyes:      General: Lids are normal.      Extraocular Movements: Extraocular movements intact. Conjunctiva/sclera: Conjunctivae normal.      Pupils: Pupils are equal, round, and reactive to light. Neck:      Trachea: Trachea and phonation normal.   Cardiovascular:      Rate and Rhythm: Normal rate and regular rhythm. Heart sounds: Normal heart sounds. Pulmonary:      Effort: Pulmonary effort is normal. No respiratory distress. Breath sounds: Examination of the right-middle field reveals rhonchi. Examination of the left-middle field reveals rhonchi. Examination of the right-lower field reveals rhonchi. Examination of the left-lower field reveals rhonchi. Rhonchi present. No decreased breath sounds, wheezing or rales. Musculoskeletal:      Cervical back: Neck supple. Lymphadenopathy:      Cervical: No cervical adenopathy. Skin:     General: Skin is warm and dry. Capillary Refill: Capillary refill takes less than 2 seconds. Neurological:      Mental Status: She is alert and oriented to person, place, and time. Psychiatric:         Mood and Affect: Mood normal.         Behavior: Behavior normal. Behavior is cooperative.

## 2023-12-08 NOTE — TELEPHONE ENCOUNTER
Lizzie's daughter Tennille osman says her mom was recently prescribed med for her cough. Daughter states her cough has not improved and has become worse; it sounds rattling. Daughter states her dad is also in the hospital and Tj Vásquezbestyradha wants to visit him, but she would prefer not to take her with the cough. I offered to schedule an appt, but daughter states she wouldn't want to leave work again today.     Please advise

## 2023-12-08 NOTE — TELEPHONE ENCOUNTER
Daughter returned call, message in chart relayed. No opening for her provider for today, please advise.

## 2023-12-15 ENCOUNTER — NURSE TRIAGE (OUTPATIENT)
Age: 83
End: 2023-12-15

## 2023-12-15 NOTE — TELEPHONE ENCOUNTER
Reason for Disposition  • Wheezing is present    Answer Assessment - Initial Assessment Questions  1. ONSET: "When did the cough begin?"           Several weeks ago      2. SEVERITY: "How bad is the cough today?"           Patient has a severe cough       3. SPUTUM: "Describe the color of your sputum" (none, dry cough; clear, white, yellow, green)        Denies     4. HEMOPTYSIS: "Are you coughing up any blood?" If so ask: "How much?" (flecks, streaks, tablespoons, etc.)          Denies      5. DIFFICULTY BREATHING: "Are you having difficulty breathing?" If Yes, ask: "How bad is it?" (e.g., mild, moderate, severe)     - MILD: No SOB at rest, mild SOB with walking, speaks normally in sentences, can lay down, no retractions, pulse < 100.     - MODERATE: SOB at rest, SOB with minimal exertion and prefers to sit, cannot lie down flat, speaks in phrases, mild retractions, audible wheezing, pulse 100-120.     - SEVERE: Very SOB at rest, speaks in single words, struggling to breathe, sitting hunched forward, retractions, pulse > 120        Denies SOB    6. FEVER: "Do you have a fever?" If Yes, ask: "What is your temperature, how was it measured, and when did it start?"         Denies      7. CARDIAC HISTORY: "Do you have any history of heart disease?" (e.g., heart attack, congestive heart failure)         Denies      8. LUNG HISTORY: "Do you have any history of lung disease?"  (e.g., pulmonary embolus, asthma, emphysema)          Denies      9. PE RISK FACTORS: "Do you have a history of blood clots?" (or: recent major surgery, recent prolonged travel, bedridden)        Denies      10.  OTHER SYMPTOMS: "Do you have any other symptoms?" (e.g., runny nose, wheezing, chest pain)         Denies    Protocols used: Cough-ADULT-[place]

## 2023-12-15 NOTE — TELEPHONE ENCOUNTER
Patient calls in concerned her cough continues and is severe. Patient started with a deep cough 6 weeks ago. She completed 2 courses of Z francia. Patient denies SOB, chest  pain , fever, chills, weakness. Patients cough via phone sounds deep , moist, wet cough. Patient sounds like her chest is filled with mucous. Patient sounds slightly wheezy on phone but denies feeling this. No appointments available in office today. I recommended patient get evaluated today at the . Patient agreed and will head there soon.

## 2023-12-16 ENCOUNTER — APPOINTMENT (OUTPATIENT)
Dept: RADIOLOGY | Age: 83
End: 2023-12-16
Attending: NURSE PRACTITIONER
Payer: MEDICARE

## 2023-12-16 ENCOUNTER — OFFICE VISIT (OUTPATIENT)
Dept: URGENT CARE | Age: 83
End: 2023-12-16
Payer: MEDICARE

## 2023-12-16 VITALS
SYSTOLIC BLOOD PRESSURE: 150 MMHG | TEMPERATURE: 98.1 F | HEART RATE: 84 BPM | OXYGEN SATURATION: 92 % | RESPIRATION RATE: 20 BRPM | DIASTOLIC BLOOD PRESSURE: 68 MMHG

## 2023-12-16 DIAGNOSIS — J40 BRONCHITIS: Primary | ICD-10-CM

## 2023-12-16 DIAGNOSIS — R05.2 SUBACUTE COUGH: ICD-10-CM

## 2023-12-16 DIAGNOSIS — Z87.09 HISTORY OF COPD: ICD-10-CM

## 2023-12-16 PROCEDURE — 71046 X-RAY EXAM CHEST 2 VIEWS: CPT

## 2023-12-16 PROCEDURE — 99213 OFFICE O/P EST LOW 20 MIN: CPT | Performed by: NURSE PRACTITIONER

## 2023-12-16 PROCEDURE — G0463 HOSPITAL OUTPT CLINIC VISIT: HCPCS | Performed by: NURSE PRACTITIONER

## 2023-12-16 RX ORDER — METHYLPREDNISOLONE 4 MG/1
TABLET ORAL
Qty: 1 EACH | Refills: 0 | Status: SHIPPED | OUTPATIENT
Start: 2023-12-16

## 2023-12-16 NOTE — PROGRESS NOTES
[facility]        NAME: Makenna Shetty is a 82 y.o. female  : 1940    MRN: [id number]  DATE: 2023  TIME: 2:19 PM    Assessment and Plan   Bronchitis [J40]  1. Bronchitis  methylPREDNISolone 4 MG tablet therapy pack      2. Subacute cough  XR chest pa & lateral    methylPREDNISolone 4 MG tablet therapy pack      3. History of COPD  methylPREDNISolone 4 MG tablet therapy pack            Patient Instructions       Follow up with PCP in 3-5 days. Proceed to  ER if symptoms worsen. Your xray was preliminarily read by your provider. A radiologist will read the xray and you will be notified if it is abnormal.    You are to stop the promethazine DM. You are to take plain robitussin and you must drink a lot of water. You are to take the methylprednisolone as prescribed. You are to follow up with your PCP this week. You are to go to the ED if symptoms worsen; trouble breathing  Your chart states that you have COPD. Chief Complaint     Chief Complaint   Patient presents with    Cough     Patient been dealing with a cough for the last 3 weeks- per family member states she was treated 2 times with zpack which is not helping her cough- again family member states no chest xray was ordered         History of Present Illness       This is an 82year old female who resides at an independent living facility but sits with other people at meals and gatherings who daughter brings to care now with c/o 3 weeks of cough. Daughter states she had productive coughing which is now dry and is "all the time". Pt has been on 2 Zpacks and taking promethazine DM w/o relief. She covid tested after  and was negative. She denies any other symptoms, fevers, chills, n/v/d. She denies having any lung disease. She saw here PCP recently and no CXR was ordered. EMR states that she does have COPD         Review of Systems   Review of Systems   Constitutional: Negative.     HENT: Negative. Eyes: Negative. Respiratory:  Positive for cough. Cardiovascular: Negative. Gastrointestinal: Negative. Endocrine: Negative. Genitourinary: Negative. Musculoskeletal: Negative. Skin: Negative. Allergic/Immunologic: Negative. Neurological: Negative. Hematological: Negative. Psychiatric/Behavioral: Negative.            Current Medications       Current Outpatient Medications:     methylPREDNISolone 4 MG tablet therapy pack, Use as directed on package, Disp: 1 each, Rfl: 0    acetaminophen (TYLENOL) 325 mg tablet, Take 650 mg by mouth, Disp: , Rfl:     apixaban (Eliquis) 5 mg, Take 1 tablet (5 mg total) by mouth 2 (two) times a day, Disp: 56 tablet, Rfl: 0    Ascorbic Acid (VITAMIN C) 1000 MG tablet, Take 500 mg by mouth , Disp: , Rfl:     atorvastatin (LIPITOR) 80 mg tablet, Take 1 tablet (80 mg total) by mouth daily at bedtime, Disp: 90 tablet, Rfl: 3    Calcium Carbonate-Vit D-Min (CALCIUM 1200 PO), Take by mouth, Disp: , Rfl:     citalopram (CeleXA) 20 mg tablet, TAKE 1 TABLET EVERY DAY, Disp: 90 tablet, Rfl: 3    Continuous Blood Gluc  (FREESTYLE RONNI 14 DAY READER) GARRY, Use to check BG levels 4+times daily and as needed, Disp: 1 Device, Rfl: 0    Continuous Blood Gluc Sensor (FreeStyle Ronni 14 Day Sensor) MIS, Change sensor every 14 days to check BG levels 4+ times daily, Disp: 6 each, Rfl: 3    Diclofenac Sodium (VOLTAREN) 1 %, Apply 2 g topically 4 (four) times a day, Disp: 50 g, Rfl: 1    GLUCOSAMINE CHONDROITIN COMPLX PO, Take 1 tablet by mouth 2 (two) times a day, Disp: , Rfl:     glucose blood (TRUE METRIX BLOOD GLUCOSE TEST) test strip, Test 4 times plus daily, as instructed (Patient not taking: Reported on 6/12/2023), Disp: 400 each, Rfl: 1    insulin aspart (NovoLOG FlexPen) 100 UNIT/ML injection pen, INJECT 6 UNITS SUBCUTANEOUSLY WITH BREAKFAST, THEN 6 UNITS WITH LUNCH, AND THEN 6 UNITS WITH DINNER, Disp: 15 mL, Rfl: 1    insulin glargine (Toujeo SoloStar) 300 units/mL CONCENTRATED U-300 injection pen (1-unit dial), Inject 25  units at bedtime, Disp: 12 mL, Rfl: 1    Insulin Pen Needle (BD Pen Needle Sasha U/F) 32G X 4 MM MISC, Inject 4 times a day, Disp: 400 each, Rfl: 1    Lancets (onetouch ultrasoft) lancets, Use 4 times a day, Disp: 400 each, Rfl: 0    levothyroxine 125 mcg tablet, Take 1 tablet from mon to sat and 1/2 tablet on Sunday, Disp: 90 tablet, Rfl: 1    lisinopril (ZESTRIL) 20 mg tablet, TAKE 1 TABLET TWICE DAILY, Disp: 180 tablet, Rfl: 3    metoprolol tartrate (LOPRESSOR) 25 mg tablet, TAKE 1 TABLET EVERY 12 HOURS, Disp: 180 tablet, Rfl: 3    Omega-3 Fatty Acids (FISH OIL) 1200 MG CAPS, Take 1,200 mg by mouth 2 (two) times a day , Disp: , Rfl:     omeprazole (PriLOSEC) 20 mg delayed release capsule, TAKE 1 CAPSULE TWICE DAILY, Disp: 180 capsule, Rfl: 3    promethazine-dextromethorphan (PHENERGAN-DM) 6.25-15 mg/5 mL oral syrup, TAKE 5ML DAILY AT BEDTIME, Disp: 450 mL, Rfl: 0    sodium chloride (VANNESSA 128) 5 % hypertonic ophthalmic ointment, Administer 1 drop to both eyes 2 (two) times a day, Disp: , Rfl:     spironolactone (ALDACTONE) 25 mg tablet, Take 1 tablet (25 mg total) by mouth daily, Disp: 90 tablet, Rfl: 3    triamcinolone (KENALOG) 0.1 % cream, APPLY TOPCICALLY TWO TIMES A DAY, Disp: 160 g, Rfl: 1    Current Allergies     Allergies as of 12/16/2023 - Reviewed 12/16/2023   Allergen Reaction Noted    Ampicillin Hives 01/15/2016            The following portions of the patient's history were reviewed and updated as appropriate: allergies, current medications, past family history, past medical history, past social history, past surgical history and problem list.     Past Medical History:   Diagnosis Date    Anxiety     Arthritis     CHF (congestive heart failure) ([facility])     Chronic venous insufficiency     COPD (chronic obstructive pulmonary disease) ([facility])     Coronary artery disease     Diabetes mellitus ([address])     type II    Disease of thyroid gland     hypothyroidism    DVT (deep venous thrombosis) ([facility])     HL (hearing loss)     Hyperlipidemia     Hypertension     Irregular heart beat     Paroxysmal a-fib    Pacemaker     Renal disorder     stage 3    Sleep apnea     Sleep difficulties     Thyroid disease     Urinary incontinence     Uterine leiomyoma     Walker as ambulation aid        Past Surgical History:   Procedure Laterality Date    BREAST SURGERY      For Biopsy    CARDIAC PACEMAKER PLACEMENT      Permanent. Last assessed: 2/19/16    CARDIAC SURGERY      CARPAL TUNNEL RELEASE Bilateral     CATARACT EXTRACTION      CATARACT EXTRACTION W/ INTRAOCULAR LENS  IMPLANT, BILATERAL      CYSTOSCOPY      Diagnostic. Last assessed: 11/8/17    ECTOPIC PREGNANCY SURGERY      HYSTERECTOMY      Total Abdomonal. Resolved: 1982    JOINT REPLACEMENT Right     TKR    OOPHORECTOMY      MD ENDOVEN ABLTJ INCMPTNT VEIN XTR LASER 1ST VEIN Left 2/17/2017    Procedure: GREATER SAPHENOUS VEIN ENDOVASCULAR LASER THERAPY ;  Surgeon: Alexander Martin DO;  Location: [facility];  Service: Vascular    TONSILLECTOMY         Family History   Problem Relation Age of Onset    Coronary artery disease Mother     Hypertension Mother     Heart disease Mother     Hypertension Father     Diabetes Father         Mellitus    No Known Problems Sister     No Known Problems Daughter     No Known Problems Daughter     No Known Problems Maternal Grandmother     No Known Problems Maternal Grandfather     No Known Problems Paternal Grandmother     No Known Problems Paternal Grandfather     Diabetes Brother         Mellitus    No Known Problems Son     Breast cancer Maternal Aunt     Pancreatic cancer Maternal Aunt     Breast cancer Cousin     Breast cancer Cousin     Breast cancer Paternal Aunt     No Known Problems Paternal Aunt     No Known Problems Paternal Aunt          Medications have been verified.         Objective   /68 (BP Location: Right arm, Patient Position: Sitting, Cuff Size: Standard)   Pulse 84   Temp 98.1 °F (36.7 °C)   Resp 20   SpO2 92%   No LMP recorded. Patient has had a hysterectomy. Physical Exam     Physical Exam  Vitals and nursing note reviewed. Constitutional:       General: She is not in acute distress. Appearance: Normal appearance. She is normal weight. She is not ill-appearing, toxic-appearing or diaphoretic. HENT:      Head: Normocephalic and atraumatic. Right Ear: Tympanic membrane and ear canal normal.      Left Ear: Tympanic membrane and ear canal normal.      Nose: Congestion present. No rhinorrhea. Mouth/Throat:      Mouth: Mucous membranes are moist.      Pharynx: Oropharynx is clear. No oropharyngeal exudate or posterior oropharyngeal erythema. Eyes:      Extraocular Movements: Extraocular movements intact. Cardiovascular:      Rate and Rhythm: Normal rate and regular rhythm. Pulses: Normal pulses. Heart sounds: Normal heart sounds. No murmur heard. Pulmonary:      Effort: No tachypnea or respiratory distress. Breath sounds: Normal air entry. Examination of the right-lower field reveals rhonchi. Examination of the left-lower field reveals rhonchi. Rhonchi present. No decreased breath sounds, wheezing or rales. Comments: Wet upper airway cough   Musculoskeletal:      Cervical back: Normal range of motion and neck supple. Neurological:      Mental Status: She is alert.

## 2023-12-16 NOTE — PATIENT INSTRUCTIONS
Your xray was preliminarily read by your provider. A radiologist will read the xray and you will be notified if it is abnormal.    You are to stop the promethazine DM. You are to take plain robitussin and you must drink a lot of water. You are to take the methylprednisolone as prescribed. You are to follow up with your PCP this week. You are to go to the ED if symptoms worsen; trouble breathing  Your chart states that you have COPD.

## 2023-12-18 ENCOUNTER — TELEPHONE (OUTPATIENT)
Age: 83
End: 2023-12-18

## 2023-12-18 ENCOUNTER — TELEPHONE (OUTPATIENT)
Dept: ENDOCRINOLOGY | Facility: CLINIC | Age: 83
End: 2023-12-18

## 2023-12-18 NOTE — TELEPHONE ENCOUNTER
Patient called regarding UC visit on 12/16. Patient thought  she needled to follow up with provider. Advised patient per discharge instructions to follow up with pcp if symptoms worsen. When asked if symptoms worsened patient stated no.

## 2023-12-26 ENCOUNTER — PATIENT OUTREACH (OUTPATIENT)
Dept: FAMILY MEDICINE CLINIC | Facility: CLINIC | Age: 83
End: 2023-12-26

## 2023-12-26 NOTE — PROGRESS NOTES
Call placed to patient's dtr Gorge to check status of needed income and medication OOP expense history documents for Toujeo and Eliquis PAP applications.  No answer, voicemail left, and awaiting return call.

## 2024-01-08 ENCOUNTER — PATIENT OUTREACH (OUTPATIENT)
Dept: FAMILY MEDICINE CLINIC | Facility: CLINIC | Age: 84
End: 2024-01-08

## 2024-01-08 NOTE — PROGRESS NOTES
Additional call placed to patient's dtr Gorge to check status of needed income and medication OOP expense history documents for Toujeo and Eliquis PAP applications.  No answer, voicemail left, and awaiting return call.  Due to outreach attempts and no return call from patient's dtr, will close case at this time but encouraged Gorge to call OP SWCM if further assistance is needed.

## 2024-01-10 ENCOUNTER — REMOTE DEVICE CLINIC VISIT (OUTPATIENT)
Dept: CARDIOLOGY CLINIC | Facility: CLINIC | Age: 84
End: 2024-01-10
Payer: MEDICARE

## 2024-01-10 DIAGNOSIS — Z95.0 CARDIAC PACEMAKER IN SITU: Primary | ICD-10-CM

## 2024-01-10 PROCEDURE — 93296 REM INTERROG EVL PM/IDS: CPT | Performed by: INTERNAL MEDICINE

## 2024-01-10 PROCEDURE — 93294 REM INTERROG EVL PM/LDLS PM: CPT | Performed by: INTERNAL MEDICINE

## 2024-01-10 NOTE — PROGRESS NOTES
"Results for orders placed or performed in visit on 01/10/24   Cardiac EP device report    Narrative    MDT-DUAL CHAMBER PPM (DDDR MODE)/ ACTIVE SYSTEM IS MRI CONDITIONAL  CARELINK TRANSMISSION: BATTERY STATUS \"1.5 YRS.\" AP 47%  100%. ALL AVAILABLE LEAD PARAMETERS WITHIN NORMAL LIMITS. 1 AT/AF NOTED; 0% BURDEN; 4.06 MINS LONG-PT ON ELIQUIS. NORMAL DEVICE FUNCTION. NC         "

## 2024-01-17 ENCOUNTER — RA CDI HCC (OUTPATIENT)
Dept: OTHER | Facility: HOSPITAL | Age: 84
End: 2024-01-17

## 2024-01-17 NOTE — PROGRESS NOTES
HCC coding opportunities          Chart Reviewed number of suggestions sent to Provider: 5     Patients Insurance   I13.0, E11.59, E11.22, E11.3393 and I73.9  Medicare Insurance: Medicare

## 2024-01-23 ENCOUNTER — APPOINTMENT (OUTPATIENT)
Dept: LAB | Facility: CLINIC | Age: 84
End: 2024-01-23
Payer: MEDICARE

## 2024-01-23 ENCOUNTER — OFFICE VISIT (OUTPATIENT)
Dept: FAMILY MEDICINE CLINIC | Facility: CLINIC | Age: 84
End: 2024-01-23
Payer: MEDICARE

## 2024-01-23 VITALS
BODY MASS INDEX: 35.61 KG/M2 | SYSTOLIC BLOOD PRESSURE: 110 MMHG | TEMPERATURE: 97.3 F | HEIGHT: 61 IN | OXYGEN SATURATION: 98 % | DIASTOLIC BLOOD PRESSURE: 70 MMHG | WEIGHT: 188.6 LBS | HEART RATE: 70 BPM

## 2024-01-23 DIAGNOSIS — Z79.4 TYPE 2 DIABETES MELLITUS WITH HYPERGLYCEMIA, WITH LONG-TERM CURRENT USE OF INSULIN (HCC): ICD-10-CM

## 2024-01-23 DIAGNOSIS — N18.30 BENIGN HYPERTENSION WITH CHRONIC KIDNEY DISEASE, STAGE III (HCC): ICD-10-CM

## 2024-01-23 DIAGNOSIS — Z79.4 TYPE 2 DIABETES MELLITUS WITH HYPERGLYCEMIA, WITH LONG-TERM CURRENT USE OF INSULIN (HCC): Primary | ICD-10-CM

## 2024-01-23 DIAGNOSIS — E11.65 TYPE 2 DIABETES MELLITUS WITH HYPERGLYCEMIA, WITH LONG-TERM CURRENT USE OF INSULIN (HCC): Primary | ICD-10-CM

## 2024-01-23 DIAGNOSIS — E11.3393 MODERATE NONPROLIFERATIVE DIABETIC RETINOPATHY OF BOTH EYES ASSOCIATED WITH TYPE 2 DIABETES MELLITUS, MACULAR EDEMA PRESENCE UNSPECIFIED (HCC): ICD-10-CM

## 2024-01-23 DIAGNOSIS — D69.6 PLATELETS DECREASED (HCC): ICD-10-CM

## 2024-01-23 DIAGNOSIS — I50.32 CHRONIC DIASTOLIC CONGESTIVE HEART FAILURE (HCC): ICD-10-CM

## 2024-01-23 DIAGNOSIS — N25.81 SECONDARY HYPERPARATHYROIDISM OF RENAL ORIGIN (HCC): ICD-10-CM

## 2024-01-23 DIAGNOSIS — E11.65 TYPE 2 DIABETES MELLITUS WITH HYPERGLYCEMIA, WITH LONG-TERM CURRENT USE OF INSULIN (HCC): ICD-10-CM

## 2024-01-23 DIAGNOSIS — I48.0 PAF (PAROXYSMAL ATRIAL FIBRILLATION) (HCC): ICD-10-CM

## 2024-01-23 DIAGNOSIS — J44.9 CHRONIC OBSTRUCTIVE PULMONARY DISEASE, UNSPECIFIED COPD TYPE (HCC): ICD-10-CM

## 2024-01-23 DIAGNOSIS — I12.9 BENIGN HYPERTENSION WITH CHRONIC KIDNEY DISEASE, STAGE III (HCC): ICD-10-CM

## 2024-01-23 LAB
ALBUMIN SERPL BCP-MCNC: 3.9 G/DL (ref 3.5–5)
ALP SERPL-CCNC: 90 U/L (ref 34–104)
ALT SERPL W P-5'-P-CCNC: 16 U/L (ref 7–52)
ANION GAP SERPL CALCULATED.3IONS-SCNC: 9 MMOL/L
AST SERPL W P-5'-P-CCNC: 17 U/L (ref 13–39)
BASOPHILS # BLD AUTO: 0.1 THOUSANDS/ÂΜL (ref 0–0.1)
BASOPHILS NFR BLD AUTO: 1 % (ref 0–1)
BILIRUB SERPL-MCNC: 0.68 MG/DL (ref 0.2–1)
BUN SERPL-MCNC: 41 MG/DL (ref 5–25)
CALCIUM SERPL-MCNC: 9.4 MG/DL (ref 8.4–10.2)
CHLORIDE SERPL-SCNC: 104 MMOL/L (ref 96–108)
CHOLEST SERPL-MCNC: 127 MG/DL
CO2 SERPL-SCNC: 30 MMOL/L (ref 21–32)
CREAT SERPL-MCNC: 1.54 MG/DL (ref 0.6–1.3)
EOSINOPHIL # BLD AUTO: 0.27 THOUSAND/ÂΜL (ref 0–0.61)
EOSINOPHIL NFR BLD AUTO: 3 % (ref 0–6)
ERYTHROCYTE [DISTWIDTH] IN BLOOD BY AUTOMATED COUNT: 13.4 % (ref 11.6–15.1)
EST. AVERAGE GLUCOSE BLD GHB EST-MCNC: 189 MG/DL
GFR SERPL CREATININE-BSD FRML MDRD: 31 ML/MIN/1.73SQ M
GLUCOSE P FAST SERPL-MCNC: 49 MG/DL (ref 65–99)
HBA1C MFR BLD: 8.2 %
HCT VFR BLD AUTO: 39 % (ref 34.8–46.1)
HDLC SERPL-MCNC: 48 MG/DL
HGB BLD-MCNC: 11.7 G/DL (ref 11.5–15.4)
IMM GRANULOCYTES # BLD AUTO: 0.04 THOUSAND/UL (ref 0–0.2)
IMM GRANULOCYTES NFR BLD AUTO: 1 % (ref 0–2)
LDLC SERPL CALC-MCNC: 65 MG/DL (ref 0–100)
LYMPHOCYTES # BLD AUTO: 2.49 THOUSANDS/ÂΜL (ref 0.6–4.47)
LYMPHOCYTES NFR BLD AUTO: 29 % (ref 14–44)
MCH RBC QN AUTO: 30.4 PG (ref 26.8–34.3)
MCHC RBC AUTO-ENTMCNC: 30 G/DL (ref 31.4–37.4)
MCV RBC AUTO: 101 FL (ref 82–98)
MONOCYTES # BLD AUTO: 0.95 THOUSAND/ÂΜL (ref 0.17–1.22)
MONOCYTES NFR BLD AUTO: 11 % (ref 4–12)
NEUTROPHILS # BLD AUTO: 4.66 THOUSANDS/ÂΜL (ref 1.85–7.62)
NEUTS SEG NFR BLD AUTO: 55 % (ref 43–75)
NONHDLC SERPL-MCNC: 79 MG/DL
NRBC BLD AUTO-RTO: 0 /100 WBCS
PLATELET # BLD AUTO: 181 THOUSANDS/UL (ref 149–390)
PMV BLD AUTO: 10.6 FL (ref 8.9–12.7)
POTASSIUM SERPL-SCNC: 5.1 MMOL/L (ref 3.5–5.3)
PROT SERPL-MCNC: 6.7 G/DL (ref 6.4–8.4)
RBC # BLD AUTO: 3.85 MILLION/UL (ref 3.81–5.12)
SODIUM SERPL-SCNC: 143 MMOL/L (ref 135–147)
TRIGL SERPL-MCNC: 68 MG/DL
TSH SERPL DL<=0.05 MIU/L-ACNC: 8.29 UIU/ML (ref 0.45–4.5)
WBC # BLD AUTO: 8.51 THOUSAND/UL (ref 4.31–10.16)

## 2024-01-23 PROCEDURE — 84443 ASSAY THYROID STIM HORMONE: CPT

## 2024-01-23 PROCEDURE — 83036 HEMOGLOBIN GLYCOSYLATED A1C: CPT

## 2024-01-23 PROCEDURE — 36415 COLL VENOUS BLD VENIPUNCTURE: CPT

## 2024-01-23 PROCEDURE — 80061 LIPID PANEL: CPT

## 2024-01-23 PROCEDURE — 99214 OFFICE O/P EST MOD 30 MIN: CPT | Performed by: FAMILY MEDICINE

## 2024-01-23 PROCEDURE — 80053 COMPREHEN METABOLIC PANEL: CPT

## 2024-01-23 PROCEDURE — 85025 COMPLETE CBC W/AUTO DIFF WBC: CPT

## 2024-01-23 NOTE — PROGRESS NOTES
Name: Lizzie Whitten      : 1940      MRN: [id number]  Encounter Provider: Kennedy Castellon MD  Encounter Date: 2024   Encounter department: [facility]    Assessment & Plan     1. Type 2 diabetes mellitus with hyperglycemia, with long-term current use of insulin ([facility])  Assessment & Plan:    Lab Results   Component Value Date    HGBA1C 8.2 (H) 2024   Labile BG. Elevated A1C related to prednisone given for bronchitis?  Urged diet compliance. Refer back to Endo. Recheck 3m    Orders:  -     Comprehensive metabolic panel; Future  -     Hemoglobin A1C; Future  -     CBC and differential; Future; Expected date: 2024  -     Lipid panel; Future  -     TSH, 3rd generation; Future    2. Chronic obstructive pulmonary disease, unspecified COPD type ([facility])  Assessment & Plan:  Breathing stable. Continue present care. Monitor. Recheck 3m      3. Chronic diastolic congestive heart failure ([facility])  Assessment & Plan:  Wt Readings from Last 3 Encounters:   24 85.5 kg (188 lb 9.6 oz)   23 84.1 kg (185 lb 8 oz)   10/02/23 90.3 kg (199 lb)     Weight stable. No signs of worsening symptoms. Recheck 3m            4. PAF (paroxysmal atrial fibrillation) ([facility])  Assessment & Plan:  Pt asymptomatic. Continue present care. F/u with Cardio. Recheck 3m      5. Secondary hyperparathyroidism of renal origin ([facility])  Assessment & Plan:  Overdue for labs. Pt to have labs done and f/u with Endo. Recheck 3m      6. Platelets decreased ([facility])  Assessment & Plan:  No signs of increased bruising/bleeding. Recent platelet counts have returned to normal. Will resolve this issue      7. 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 Continue present care - f/u with ophth/retinal  specialist. Recheck 3m      8. Benign hypertension with chronic kidney disease, stage III ([facility])  Assessment & Plan:  Lab Results   Component Value Date    EGFR 31 01/23/2024    EGFR 37 09/21/2023    EGFR 45 03/01/2023    CREATININE 1.54 (H) 01/23/2024    CREATININE 1.33 (H) 09/21/2023    CREATININE 1.13 03/01/2023   Recheck labs. F/u 3m             Subjective     f/u multiple med issues  - pt doing well   - pt notes that her BG have been labile. Pt was started on a medrol dose pack in December for bronchitis.  She notes that her fasting Bgs can often be low, but increases after dinner. She is overdue to see Endo  - pt continues to go to OT for her L hand and shoulder. Has some persistent hand tingling with weakness and coordination issues.    - pt denies CP, palpitations, lightheadedness or other CV symptoms with or without exertion. Pt is due to see Cardio (Dr Mendoza) in March  - pt notes that vision is unchanged. She is up to date with ophth. She does not need to get the injections as frequently.   - pt denies any new Gi or  complaints      Review of Systems   Constitutional: Negative.    HENT:  Positive for congestion (mild, improving). Negative for sinus pressure and sinus pain.    Eyes:  Positive for visual disturbance (chronic - grossly unchanged).   Respiratory: Negative.     Cardiovascular: Negative.    Gastrointestinal: Negative.  Negative for abdominal distention, abdominal pain, constipation, diarrhea, nausea and vomiting.   Genitourinary:  Positive for difficulty urinating.   Musculoskeletal:  Positive for arthralgias and gait problem. Negative for neck pain and neck stiffness.   Skin:  Negative for wound.   Neurological:  Positive for weakness (unchanged). Negative for dizziness, light-headedness, numbness and headaches.   Psychiatric/Behavioral:  Positive for dysphoric mood.        Past Medical History:   Diagnosis Date   • Anxiety    • Arthritis    • CHF (congestive heart failure) ([facility])    • Chronic  venous insufficiency    • COPD (chronic obstructive pulmonary disease) ([facility])    • Coronary artery disease    • Diabetes mellitus ([facility])     type II   • Disease of thyroid gland     hypothyroidism   • DVT (deep venous thrombosis) ([facility])    • HL (hearing loss)    • Hyperlipidemia    • Hypertension    • Irregular heart beat     Paroxysmal a-fib   • Pacemaker    • Renal disorder     stage 3   • Sleep apnea    • Sleep difficulties    • Thyroid disease    • Urinary incontinence    • Uterine leiomyoma    • Walker as ambulation aid      Past Surgical History:   Procedure Laterality Date   • BREAST SURGERY      For Biopsy   • CARDIAC PACEMAKER PLACEMENT      Permanent. Last assessed: 2/19/16   • CARDIAC SURGERY     • CARPAL TUNNEL RELEASE Bilateral    • CATARACT EXTRACTION     • CATARACT EXTRACTION W/ INTRAOCULAR LENS  IMPLANT, BILATERAL     • CYSTOSCOPY      Diagnostic. Last assessed: 11/8/17   • ECTOPIC PREGNANCY SURGERY     • HYSTERECTOMY      Total Abdomonal. Resolved: 1982   • JOINT REPLACEMENT Right     TKR   • OOPHORECTOMY     • ID ENDOVEN ABLTJ INCMPTNT VEIN XTR LASER 1ST VEIN Left 2/17/2017    Procedure: GREATER SAPHENOUS VEIN ENDOVASCULAR LASER THERAPY ;  Surgeon: Danielle Phan DO;  Location: [facility];  Service: Vascular   • TONSILLECTOMY       Family History   Problem Relation Age of Onset   • Coronary artery disease Mother    • Hypertension Mother    • Heart disease Mother    • Hypertension Father    • Diabetes Father         Mellitus   • No Known Problems Sister    • No Known Problems Daughter    • No Known Problems Daughter    • No Known Problems Maternal Grandmother    • No Known Problems Maternal Grandfather    • No Known Problems Paternal Grandmother    • No Known Problems Paternal Grandfather    • Diabetes Brother         Mellitus   • No Known Problems Son    • Breast cancer Maternal Aunt    • Pancreatic cancer Maternal Aunt    • Breast cancer Cousin    • Breast cancer Cousin    • Breast cancer  Paternal Aunt    • No Known Problems Paternal Aunt    • No Known Problems Paternal Aunt      Social History     Socioeconomic History   • Marital status: /Civil Union     Spouse name: None   • Number of children: 3   • Years of education: None   • Highest education level: None   Occupational History   • Occupation: RETIRED   Tobacco Use   • Smoking status: Never     Passive exposure: Never   • Smokeless tobacco: Never   • Tobacco comments:     Per [employer]: Former smoker   Vaping Use   • Vaping status: Never Used   Substance and Sexual Activity   • Alcohol use: Yes     Comment: rarely. Per [employer]: Denied history of alcohol use   • Drug use: No   • Sexual activity: Never   Other Topics Concern   • None   Social History Narrative    Daily coffee consumption: 2 cups/day     Social Determinants of Health     Financial Resource Strain: Low Risk  (5/4/2023)    Overall Financial Resource Strain (CARDIA)    • Difficulty of Paying Living Expenses: Not hard at all   Food Insecurity: Not on file   Transportation Needs: No Transportation Needs (5/4/2023)    PRAPARE - Transportation    • Lack of Transportation (Medical): No    • Lack of Transportation (Non-Medical): No   Physical Activity: Not on file   Stress: Not on file   Social Connections: Not on file   Intimate Partner Violence: Not on file   Housing Stability: Not on file     Current Outpatient Medications on File Prior to Visit   Medication Sig   • acetaminophen (TYLENOL) 325 mg tablet Take 650 mg by mouth   • apixaban (Eliquis) 5 mg Take 1 tablet (5 mg total) by mouth 2 (two) times a day   • Ascorbic Acid (VITAMIN C) 1000 MG tablet Take 500 mg by mouth    • atorvastatin (LIPITOR) 80 mg tablet Take 1 tablet (80 mg total) by mouth daily at bedtime   • Calcium Carbonate-Vit D-Min (CALCIUM 1200 PO) Take by mouth   • citalopram (CeleXA) 20 mg tablet TAKE 1 TABLET EVERY DAY   • Continuous Blood Gluc  (FREESTYLE RONNI 14 DAY READER) GARRY Use to check BG  levels 4+times daily and as needed   • Continuous Blood Gluc Sensor (FreeStyle Tamia 14 Day Sensor) MISC Change sensor every 14 days to check BG levels 4+ times daily   • Diclofenac Sodium (VOLTAREN) 1 % Apply 2 g topically 4 (four) times a day   • GLUCOSAMINE CHONDROITIN COMPLX PO Take 1 tablet by mouth 2 (two) times a day   • insulin aspart (NovoLOG FlexPen) 100 UNIT/ML injection pen INJECT 6 UNITS SUBCUTANEOUSLY WITH BREAKFAST, THEN 6 UNITS WITH LUNCH, AND THEN 6 UNITS WITH DINNER   • insulin glargine (Toujeo SoloStar) 300 units/mL CONCENTRATED U-300 injection pen (1-unit dial) Inject 25  units at bedtime   • Insulin Pen Needle (BD Pen Needle Sasha U/F) 32G X 4 MM MISC Inject 4 times a day   • Lancets (onetouch ultrasoft) lancets Use 4 times a day   • levothyroxine 125 mcg tablet Take 1 tablet from mon to sat and 1/2 tablet on Sunday   • metoprolol tartrate (LOPRESSOR) 25 mg tablet TAKE 1 TABLET EVERY 12 HOURS   • Omega-3 Fatty Acids (FISH OIL) 1200 MG CAPS Take 1,200 mg by mouth 2 (two) times a day    • omeprazole (PriLOSEC) 20 mg delayed release capsule TAKE 1 CAPSULE TWICE DAILY   • promethazine-dextromethorphan (PHENERGAN-DM) 6.25-15 mg/5 mL oral syrup TAKE 5ML DAILY AT BEDTIME   • sodium chloride (VANNESSA 128) 5 % hypertonic ophthalmic ointment Administer 1 drop to both eyes 2 (two) times a day   • spironolactone (ALDACTONE) 25 mg tablet Take 1 tablet (25 mg total) by mouth daily   • triamcinolone (KENALOG) 0.1 % cream APPLY TOPCICALLY TWO TIMES A DAY   • glucose blood (TRUE METRIX BLOOD GLUCOSE TEST) test strip Test 4 times plus daily, as instructed (Patient not taking: Reported on 6/12/2023)   • lisinopril (ZESTRIL) 20 mg tablet TAKE 1 TABLET TWICE DAILY     Allergies   Allergen Reactions   • Ampicillin Hives     Immunization History   Administered Date(s) Administered   • COVID-19 PFIZER VACCINE 0.3 ML IM 01/20/2021, 02/09/2021, 04/11/2022   • H1N1, All Formulations 01/12/2010, 01/12/2010   • INFLUENZA  "11/04/2005   • Influenza Split High Dose Preservative Free IM 10/18/2012, 10/23/2013, 10/14/2014, 10/20/2015, 11/02/2016, 10/04/2017   • Influenza, high dose seasonal 0.7 mL 10/23/2018, 10/09/2019, 10/20/2020, 10/28/2022, 12/08/2023   • Influenza, seasonal, injectable 11/12/2009, 01/12/2010   • Pneumococcal Conjugate 13-Valent 10/21/2015   • Pneumococcal Conjugate Vaccine 20-valent (Pcv20), Polysace 10/28/2022   • Pneumococcal Polysaccharide PPV23 12/11/2000, 12/11/2010   • Td (adult), adsorbed 07/04/2008   • Tetanus, adsorbed 09/04/1998   • Zoster 07/22/2016       Objective     /70 (BP Location: Left arm, Patient Position: Sitting, Cuff Size: Large)   Pulse 70   Temp (!) 97.3 °F (36.3 °C)   Ht 5' 0.59\" (1.539 m)   Wt 85.5 kg (188 lb 9.6 oz)   SpO2 98%   BMI 36.12 kg/m²     Physical Exam  Vitals reviewed.   HENT:      Head: Normocephalic.      Nose: Nose normal.      Mouth/Throat:      Mouth: Mucous membranes are moist.   Eyes:      General: No scleral icterus.     Extraocular Movements: Extraocular movements intact.      Conjunctiva/sclera: Conjunctivae normal.      Pupils: Pupils are equal, round, and reactive to light.   Cardiovascular:      Rate and Rhythm: Normal rate and regular rhythm.   Pulmonary:      Effort: Pulmonary effort is normal.      Breath sounds: No wheezing or rales.   Abdominal:      General: There is no distension.      Palpations: There is no mass.      Tenderness: There is no abdominal tenderness.   Musculoskeletal:         General: Deformity present. No tenderness.      Cervical back: No tenderness.      Right lower leg: Edema (trace) present.      Left lower leg: Edema (trace) present.   Lymphadenopathy:      Cervical: No cervical adenopathy.   Skin:     General: Skin is warm.      Capillary Refill: Capillary refill takes less than 2 seconds.   Neurological:      Mental Status: She is alert and oriented to person, place, and time.      Cranial Nerves: Cranial nerve deficit " (decreased vision) present.   Psychiatric:         Mood and Affect: Mood normal.         Behavior: Behavior normal.         Thought Content: Thought content normal.         Judgment: Judgment normal.       Kennedy Castellon MD

## 2024-01-24 ENCOUNTER — TELEPHONE (OUTPATIENT)
Age: 84
End: 2024-01-24

## 2024-01-24 DIAGNOSIS — N18.32 STAGE 3B CHRONIC KIDNEY DISEASE (CKD) (HCC): Primary | ICD-10-CM

## 2024-01-24 NOTE — TELEPHONE ENCOUNTER
Called patient back, made aware of results. Also advised patient that she needs imaging and further lab testing. Patient states she needs transportation, is the office able to help with this?

## 2024-01-24 NOTE — TELEPHONE ENCOUNTER
Patient received a call earlier  this morning and given her lab results. She wasn't feeling well and was half asleep and she would like a call back to go over them again

## 2024-01-25 PROBLEM — D69.6 PLATELETS DECREASED (HCC): Status: RESOLVED | Noted: 2022-10-28 | Resolved: 2024-01-25

## 2024-01-25 NOTE — ASSESSMENT & PLAN NOTE
Lab Results   Component Value Date    HGBA1C 8.2 (H) 01/23/2024   Labile BG. Elevated A1C related to prednisone given for bronchitis?  Urged diet compliance. Refer back to Endo. Recheck 3m

## 2024-01-25 NOTE — ASSESSMENT & PLAN NOTE
No signs of increased bruising/bleeding. Recent platelet counts have returned to normal. Will resolve this issue

## 2024-01-25 NOTE — TELEPHONE ENCOUNTER
Advised patient we can help with transportation to our office.  She will ask [facility] where she lives to help her arrange for transportation to other offices and procedures

## 2024-01-25 NOTE — ASSESSMENT & PLAN NOTE
Wt Readings from Last 3 Encounters:   01/23/24 85.5 kg (188 lb 9.6 oz)   12/08/23 84.1 kg (185 lb 8 oz)   10/02/23 90.3 kg (199 lb)     Weight stable. No signs of worsening symptoms. Recheck 3m

## 2024-01-25 NOTE — ASSESSMENT & PLAN NOTE
Lab Results   Component Value Date    HGBA1C 8.2 (H) 01/23/2024   Vision unchanged. Continue present care - f/u with ophth/retinal specialist. Recheck 3m

## 2024-01-25 NOTE — ASSESSMENT & PLAN NOTE
Lab Results   Component Value Date    EGFR 31 01/23/2024    EGFR 37 09/21/2023    EGFR 45 03/01/2023    CREATININE 1.54 (H) 01/23/2024    CREATININE 1.33 (H) 09/21/2023    CREATININE 1.13 03/01/2023   Recheck labs. F/u 3m

## 2024-01-26 ENCOUNTER — PATIENT OUTREACH (OUTPATIENT)
Dept: FAMILY MEDICINE CLINIC | Facility: CLINIC | Age: 84
End: 2024-01-26

## 2024-01-26 NOTE — PROGRESS NOTES
Informed by PCP that patient/patient's dtr is having difficult time getting OOP medication expense history from pharmacies.    Spoke with patient's dtr Prema who states patient was able to get expense history from 1 pharmacy but was having difficulty with others.  OP SWCM unable to request this on patient's behalf.  Informed Prema what needs to be requested (out of pocket medication expense history for the year to date).  She will discuss with patient.  Encouraged her to call OP SWCM if additional assistance is needed from OP SWCM.

## 2024-01-30 ENCOUNTER — HOSPITAL ENCOUNTER (OUTPATIENT)
Dept: ULTRASOUND IMAGING | Facility: HOSPITAL | Age: 84
Discharge: HOME | End: 2024-01-30
Attending: FAMILY MEDICINE
Payer: MEDICARE

## 2024-01-30 ENCOUNTER — APPOINTMENT (OUTPATIENT)
Dept: LAB | Age: 84
End: 2024-01-30
Payer: MEDICARE

## 2024-01-30 DIAGNOSIS — N18.32 STAGE 3B CHRONIC KIDNEY DISEASE (CKD) (HCC): ICD-10-CM

## 2024-01-30 LAB

## 2024-01-30 PROCEDURE — 81001 URINALYSIS AUTO W/SCOPE: CPT

## 2024-01-30 PROCEDURE — 76770 US EXAM ABDO BACK WALL COMP: CPT

## 2024-01-31 ENCOUNTER — PATIENT OUTREACH (OUTPATIENT)
Dept: FAMILY MEDICINE CLINIC | Facility: CLINIC | Age: 84
End: 2024-01-31

## 2024-01-31 DIAGNOSIS — E03.9 HYPOTHYROIDISM, UNSPECIFIED TYPE: ICD-10-CM

## 2024-01-31 RX ORDER — LEVOTHYROXINE SODIUM 0.12 MG/1
TABLET ORAL
Qty: 90 TABLET | Refills: 1 | Status: SHIPPED | OUTPATIENT
Start: 2024-01-31

## 2024-01-31 NOTE — TELEPHONE ENCOUNTER
Patient is calling and she did not realize that her levothyroxine was down to 1 pill.   She is wondering if Dr. Salazar would be able to call this in for her to the [facility] pharmacy.  She was told that if it was called in to mention she would like it delivered to her.  I advised he is not the prescribing doctor but she wanted to ask . Please review

## 2024-01-31 NOTE — PROGRESS NOTES
Update received from PCP office that patient's dtr dropped off pharmacy expense history forms.  Forms will need to be faxed to both [employer] [facility] for Toujeo and [facility] for Eliquis.  Fax numbers listed below and provided to PCP office staff.    Toujeo P[facility] Fax # [phone]  Eliquis PAP Fax # [phone]    Will remain available to assist as needed.

## 2024-02-02 ENCOUNTER — TELEPHONE (OUTPATIENT)
Dept: FAMILY MEDICINE CLINIC | Facility: CLINIC | Age: 84
End: 2024-02-02

## 2024-02-02 NOTE — TELEPHONE ENCOUNTER
----- Message from Kennedy Castellon MD sent at 1/31/2024  6:50 AM EST -----  Pt with large blood in urine. Is she having UTI symptoms?  Any abd or back pain?

## 2024-02-06 DIAGNOSIS — R31.0 GROSS HEMATURIA: Primary | ICD-10-CM

## 2024-02-06 NOTE — TELEPHONE ENCOUNTER
Patient denies any symptoms. No back pain, no abd pain, no frequency, urgency, burning, or foul odor

## 2024-02-08 ENCOUNTER — TELEPHONE (OUTPATIENT)
Dept: LAB | Facility: HOSPITAL | Age: 84
End: 2024-02-08

## 2024-02-08 NOTE — TELEPHONE ENCOUNTER
Spoke to [facility] they will reach out to the patient directly to obtain urine sample. Hold for results.

## 2024-02-08 NOTE — TELEPHONE ENCOUNTER
Spoke to patient and she can not get transportation. I will call [facility] to see if they can assist

## 2024-02-21 ENCOUNTER — TELEPHONE (OUTPATIENT)
Dept: CARDIOLOGY CLINIC | Facility: CLINIC | Age: 84
End: 2024-02-21

## 2024-02-21 DIAGNOSIS — I48.91 ATRIAL FIBRILLATION, UNSPECIFIED TYPE (HCC): ICD-10-CM

## 2024-02-21 NOTE — TELEPHONE ENCOUNTER
Spoke wit pt- she has not gotten a refill since 07/2022. She has a f/u with Dr. Mendoza 03/2024. Refill sent in to [facility] pharmacy. If it is too expensive for pt advised her to call to see about an alternative.    Little

## 2024-02-22 ENCOUNTER — TELEPHONE (OUTPATIENT)
Dept: CARDIOLOGY CLINIC | Facility: CLINIC | Age: 84
End: 2024-02-22

## 2024-02-22 NOTE — TELEPHONE ENCOUNTER
Spoke with pt- informed her we have been trying to get ahold of her- refill for Eliquis submitted earlier today

## 2024-02-22 NOTE — TELEPHONE ENCOUNTER
My name is Prudence Whitten, . My YOB: 1940 and my call is in regards to Eliquis. I have run out of it and I I've been trying to to get a hold of someone or somewhere where I can get it and I just keep getting a run around message and I don't know where I stand and I haven't taken it for about a week now because I do not have any. So if you can help me out, I would appreciate it very much. My phone number is [phone]. Thank you. Herminia.

## 2024-02-22 NOTE — TELEPHONE ENCOUNTER
Lizzie called [facility]. In regards to her Eliquis being sent to the pharmacy. I do not see this medication was sent to [facility].   Advised Lizzie to reach out to cardiology office.

## 2024-02-22 NOTE — TELEPHONE ENCOUNTER
My name is Prudence Whitten.   My phone number is [phone].   I have been trying to reach somebody to find out how to get either samples or a prescription of Eliquis, which I've been out of for the last week  So I'm really desperate to find out how I can get some samples because I do need to take it.   Please call me back. Goodbye.

## 2024-02-27 ENCOUNTER — PATIENT OUTREACH (OUTPATIENT)
Dept: FAMILY MEDICINE CLINIC | Facility: CLINIC | Age: 84
End: 2024-02-27

## 2024-02-27 NOTE — PROGRESS NOTES
[employer] was contacted and did not have record of an active application for the patient. Patient's daughter was contacted and states she will have her mother bring financial records to her next appointment on 3/12 to start the application process.

## 2024-02-27 NOTE — PROGRESS NOTES
Message received from patient's dtr Prema requesting status update on Toujeo and Eliquis PAP applications since sending in updated information.  Per chart review, PCP office faxed updated forms to PAP[facility] on 1/31.  Prema states patient was without Eliquis and recently did get through pharmacy again; cost was roughly $300.    Call placed to [facility] for Toujeo ([phone]).  Spoke with Rep Ashley yang states application was processed on 2/16.  Patient has been approved through 12/31.  Toujeo was shipped and delivered to PCP office on 2/21 (tracking # [id number]); confirmed with PCP office that Toujeo shipment was received.  Updated patient's address with [employer] to reflect address listed on patient's chart.      Call placed to [employer] for Eliquis ([phone]).  Rep unable to provide update to OP SWCM on application as overseeing provider is with Cardiology office.  Will relay to patient's dtr and Cardiology office for further follow-up.    Detailed voicemail left for Prema with above information.  Advised Prema that Toujeo medication is at PCP office for her/patient to .  Also advised OP SWCM was unable to check status of Eliquis PAP application.  Provided phone number for Eliquis PAP for her to check status.  Will also route to Cardiology office to check status with [employer].  Will remain available to assist as needed.

## 2024-02-27 NOTE — TELEPHONE ENCOUNTER
Patient is in the supine position. Patient's daughter was contacted and made aware that samples cannot be distributed in maintenance doses and only for bridging patients that need a small supply. Patient's daughter was advised to have her mother reach out with their financial information in order to apply for medical assistance through [employer] or pursue alternate therapy after discussion with her cardiologist. She expressed understanding of the above and was amenable to this plan.

## 2024-02-27 NOTE — TELEPHONE ENCOUNTER
Prema Mahajan. I'm calling about a patient of yours, Lizzie Whitten. She is my mother. We are trying to get Doctor Reji to give her some free samples of Eliquis, which she was able to get in the past, but we're getting a lot of argument about getting that and she just had to pay $271 for a month supply, which is ridiculous. And she went for days without the Eliquis because she couldn't even get the prescription. So if somebody could give me a call back Phone number [phone]. Thank you. I would greatly appreciate hearing back

## 2024-03-05 ENCOUNTER — TELEPHONE (OUTPATIENT)
Age: 84
End: 2024-03-05

## 2024-03-05 ENCOUNTER — TELEPHONE (OUTPATIENT)
Dept: CARDIOLOGY CLINIC | Facility: CLINIC | Age: 84
End: 2024-03-05

## 2024-03-05 NOTE — TELEPHONE ENCOUNTER
Sierra from [facility] called to check status of fax from 2/22/2024, plan of care would like to know if received, when will be faxed back, would like a call back at [phone] option 2

## 2024-03-05 NOTE — TELEPHONE ENCOUNTER
Hold for fax. I explained to them before that we have centralized faxing. It takes a few days to arrive. Will recheck daily until arrives

## 2024-03-05 NOTE — TELEPHONE ENCOUNTER
"Patient reports that when she went to the pharmacy to  her recently sent Eliquis script she only asked the staff there for a 2 week supply due to the price of $220 for a 1 month supply. Patient will run out of Eliquis by 3/8 before appointment with Dr. Mendoza on 3/12. Patient did also mention that she \"owes a friend Vaishalitru because he lent me some when I needed it.\" Patient was advised not to share her medication and that any medication provided by this office in the form of scripts or samples is solely intended for her. Patient was informed she would be called back on 3/6 with additional clarification on her AC therapy.  "

## 2024-03-06 NOTE — TELEPHONE ENCOUNTER
Hi Dr. Mendoza   Is there an alternative medication that Lizzie can be prescribed in place of Eliquis.  We can no longer provide her samples.

## 2024-03-07 NOTE — TELEPHONE ENCOUNTER
What strengths would you recommend of pradaxa and xarelto?  I am going to run a price check through her insurance.

## 2024-03-10 ENCOUNTER — TELEPHONE (OUTPATIENT)
Dept: OTHER | Facility: OTHER | Age: 84
End: 2024-03-10

## 2024-03-12 ENCOUNTER — OFFICE VISIT (OUTPATIENT)
Dept: CARDIOLOGY CLINIC | Facility: CLINIC | Age: 84
End: 2024-03-12
Payer: MEDICARE

## 2024-03-12 VITALS
HEIGHT: 61 IN | DIASTOLIC BLOOD PRESSURE: 78 MMHG | HEART RATE: 62 BPM | BODY MASS INDEX: 36.02 KG/M2 | SYSTOLIC BLOOD PRESSURE: 122 MMHG | OXYGEN SATURATION: 93 % | WEIGHT: 190.8 LBS

## 2024-03-12 DIAGNOSIS — I50.32 CHRONIC DIASTOLIC CONGESTIVE HEART FAILURE (HCC): ICD-10-CM

## 2024-03-12 DIAGNOSIS — I48.0 PAF (PAROXYSMAL ATRIAL FIBRILLATION) (HCC): Primary | ICD-10-CM

## 2024-03-12 PROCEDURE — 99214 OFFICE O/P EST MOD 30 MIN: CPT | Performed by: INTERNAL MEDICINE

## 2024-03-12 NOTE — PROGRESS NOTES
Cardiology Follow Up    Lizzie Whitten  1940  [id number]  [facility]  [address]  [address]  [place] [address] [phone]    1. PAF (paroxysmal atrial fibrillation) (HCC)  rivaroxaban (Xarelto) 20 mg tablet      2. Chronic diastolic congestive heart failure (HCC)            Interval History: Followup for PAF and Diastolic CHF.    She has no chest pain, dyspnea or palpitations.     Medical Problems       Problem List       Type 2 diabetes mellitus with hyperglycemia, with long-term current use of insulin (HCC)      Lab Results   Component Value Date    HGBA1C 8.2 (H) 01/23/2024         Complete heart block (HCC)    Anemia    S/P cardiac pacemaker procedure    Hypothyroidism (Chronic)    Overview Signed 6/6/2018  9:04 PM by Kennedy Castellon MD     Last Assessment & Plan:   Continue  med         Chronic diastolic congestive heart failure (HCC)    Wt Readings from Last 3 Encounters:   03/12/24 86.5 kg (190 lb 12.8 oz)   01/23/24 85.5 kg (188 lb 9.6 oz)   12/08/23 84.1 kg (185 lb 8 oz)                 Chronic venous insufficiency    Benign hypertension with chronic kidney disease, stage III (HCC)    Lab Results   Component Value Date    EGFR 31 01/23/2024    EGFR 37 09/21/2023    EGFR 45 03/01/2023    CREATININE 1.54 (H) 01/23/2024    CREATININE 1.33 (H) 09/21/2023    CREATININE 1.13 03/01/2023         Stage 3a chronic kidney disease (HCC)    Lab Results   Component Value Date    EGFR 31 01/23/2024    EGFR 37 09/21/2023    EGFR 45 03/01/2023    CREATININE 1.54 (H) 01/23/2024    CREATININE 1.33 (H) 09/21/2023    CREATININE 1.13 03/01/2023         Iron deficiency anemia    Microalbuminuria    Lymphedema of both lower extremities    Vitamin D deficiency    Anxiety disorder    Urinary bladder incontinence    Chronic obstructive pulmonary disease (HCC)    Eczematous dermatitis    Mixed hyperlipidemia    Overview Signed  6/6/2018  9:04 PM by Kennedy Castellon MD     Last Assessment & Plan:   Continue med         OAB (overactive bladder)    Presence of permanent cardiac pacemaker    PAF (paroxysmal atrial fibrillation) ([facility])    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 neurological manifestations, uncontrolled(250.62)    Overview Signed 6/6/2018  9:04 PM by Kennedy Castellon MD     Last Assessment & Plan:   AIC will be done  Decrease PM lantus to 25  Otherwise weight down 25 pounds         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 ([facility])    Overview Signed 12/14/2018  3:41 PM by Mally Mina RN     Updated according to CMS/ICD-10 guidelines           Lab Results   Component Value Date    HGBA1C 8.2 (H) 01/23/2024         Intraocular pressure increase    Sleep apnea    Dermatitis    Coronary artery disease involving native coronary artery of native heart    Memory difficulties    Imbalance    Secondary hyperparathyroidism of renal origin ([facility])    Fall    Hypertension    Abnormal CT scan, esophagus    Hiatal hernia    Esophageal dysphagia    Assistance needed with transportation    Pharyngeal dysphagia    Chronic cough    Hand arthritis    Head trauma    Traumatic hematoma of forehead    Traumatic ulcer of left lower leg, limited to breakdown of skin ([facility])        Past Medical History:   Diagnosis Date    Anxiety     Arthritis     CHF (congestive heart failure) ([facility])     Chronic venous insufficiency     COPD (chronic obstructive pulmonary disease) ([facility])     Coronary artery disease     Diabetes mellitus ([facility])     type II    Disease of thyroid gland     hypothyroidism    DVT (deep venous thrombosis) ([facility])     HL (hearing loss)     Hyperlipidemia     Hypertension     Irregular heart beat     Paroxysmal a-fib    Pacemaker     Renal disorder     stage 3    Sleep apnea     Sleep difficulties     Thyroid disease     Urinary incontinence     Uterine leiomyoma      Walker as ambulation aid      Social History     Socioeconomic History    Marital status: /Civil Union     Spouse name: Not on file    Number of children: 3    Years of education: Not on file    Highest education level: Not on file   Occupational History    Occupation: RETIRED   Tobacco Use    Smoking status: Never     Passive exposure: Never    Smokeless tobacco: Never    Tobacco comments:     Per [employer]: Former smoker   Vaping Use    Vaping status: Never Used   Substance and Sexual Activity    Alcohol use: Not Currently     Comment: rarely. Per [employer]: Denied history of alcohol use    Drug use: No    Sexual activity: Never   Other Topics Concern    Not on file   Social History Narrative    Daily coffee consumption: 2 cups/day     Social Determinants of Health     Financial Resource Strain: Low Risk  (5/4/2023)    Overall Financial Resource Strain (CARDIA)     Difficulty of Paying Living Expenses: Not hard at all   Food Insecurity: Not on file   Transportation Needs: No Transportation Needs (5/4/2023)    PRAPARE - Transportation     Lack of Transportation (Medical): No     Lack of Transportation (Non-Medical): No   Physical Activity: Not on file   Stress: Not on file   Social Connections: Not on file   Intimate Partner Violence: Not on file   Housing Stability: Not on file      Family History   Problem Relation Age of Onset    Coronary artery disease Mother     Hypertension Mother     Heart disease Mother     Hypertension Father     Diabetes Father         Mellitus    No Known Problems Sister     No Known Problems Daughter     No Known Problems Daughter     No Known Problems Maternal Grandmother     No Known Problems Maternal Grandfather     No Known Problems Paternal Grandmother     No Known Problems Paternal Grandfather     Diabetes Brother         Mellitus    No Known Problems Son     Breast cancer Maternal Aunt     Pancreatic cancer Maternal Aunt     Breast cancer Cousin     Breast cancer  Cousin     Breast cancer Paternal Aunt     No Known Problems Paternal Aunt     No Known Problems Paternal Aunt      Past Surgical History:   Procedure Laterality Date    BREAST SURGERY      For Biopsy    CARDIAC PACEMAKER PLACEMENT      Permanent. Last assessed: 2/19/16    CARDIAC SURGERY      CARPAL TUNNEL RELEASE Bilateral     CATARACT EXTRACTION      CATARACT EXTRACTION W/ INTRAOCULAR LENS  IMPLANT, BILATERAL      CYSTOSCOPY      Diagnostic. Last assessed: 11/8/17    ECTOPIC PREGNANCY SURGERY      HYSTERECTOMY      Total Abdomonal. Resolved: 1982    JOINT REPLACEMENT Right     TKR    OOPHORECTOMY      NE ENDOVEN ABLTJ INCMPTNT VEIN XTR LASER 1ST VEIN Left 2/17/2017    Procedure: GREATER SAPHENOUS VEIN ENDOVASCULAR LASER THERAPY ;  Surgeon: Danielle Phan DO;  Location: [facility];  Service: Vascular    TONSILLECTOMY         Current Outpatient Medications:     acetaminophen (TYLENOL) 325 mg tablet, Take 650 mg by mouth, Disp: , Rfl:     Ascorbic Acid (VITAMIN C) 1000 MG tablet, Take 500 mg by mouth , Disp: , Rfl:     atorvastatin (LIPITOR) 80 mg tablet, Take 1 tablet (80 mg total) by mouth daily at bedtime, Disp: 90 tablet, Rfl: 3    Calcium Carbonate-Vit D-Min (CALCIUM 1200 PO), Take by mouth, Disp: , Rfl:     citalopram (CeleXA) 20 mg tablet, TAKE 1 TABLET EVERY DAY, Disp: 90 tablet, Rfl: 3    Continuous Blood Gluc  (FREESTYLE RONNI 14 DAY READER) GARRY, Use to check BG levels 4+times daily and as needed, Disp: 1 Device, Rfl: 0    Continuous Blood Gluc Sensor (FreeStyle Ronni 14 Day Sensor) MISC, Change sensor every 14 days to check BG levels 4+ times daily, Disp: 6 each, Rfl: 3    Diclofenac Sodium (VOLTAREN) 1 %, Apply 2 g topically 4 (four) times a day, Disp: 50 g, Rfl: 1    insulin aspart (NovoLOG FlexPen) 100 UNIT/ML injection pen, INJECT 6 UNITS SUBCUTANEOUSLY WITH BREAKFAST, THEN 6 UNITS WITH LUNCH, AND THEN 6 UNITS WITH DINNER, Disp: 15 mL, Rfl: 1    insulin glargine (Toujeo SoloStar) 300  units/mL CONCENTRATED U-300 injection pen (1-unit dial), Inject 25  units at bedtime, Disp: 12 mL, Rfl: 1    Insulin Pen Needle (BD Pen Needle Sasha U/F) 32G X 4 MM MISC, Inject 4 times a day, Disp: 400 each, Rfl: 1    Lancets (onetouch ultrasoft) lancets, Use 4 times a day, Disp: 400 each, Rfl: 0    levothyroxine 125 mcg tablet, Take 1 tablet from mon to sat and 1/2 tablet on Sunday, Disp: 90 tablet, Rfl: 1    lisinopril (ZESTRIL) 20 mg tablet, TAKE 1 TABLET TWICE DAILY, Disp: 180 tablet, Rfl: 3    metoprolol tartrate (LOPRESSOR) 25 mg tablet, TAKE 1 TABLET EVERY 12 HOURS, Disp: 180 tablet, Rfl: 3    Omega-3 Fatty Acids (FISH OIL) 1200 MG CAPS, Take 1,200 mg by mouth 2 (two) times a day , Disp: , Rfl:     omeprazole (PriLOSEC) 20 mg delayed release capsule, TAKE 1 CAPSULE TWICE DAILY, Disp: 180 capsule, Rfl: 3    promethazine-dextromethorphan (PHENERGAN-DM) 6.25-15 mg/5 mL oral syrup, TAKE 5ML DAILY AT BEDTIME, Disp: 450 mL, Rfl: 0    rivaroxaban (Xarelto) 20 mg tablet, Take 1 tablet (20 mg total) by mouth daily with breakfast, Disp: 90 tablet, Rfl: 3    sodium chloride (VANNESSA 128) 5 % hypertonic ophthalmic ointment, Administer 1 drop to both eyes 2 (two) times a day, Disp: , Rfl:     GLUCOSAMINE CHONDROITIN COMPLX PO, Take 1 tablet by mouth 2 (two) times a day (Patient not taking: Reported on 3/12/2024), Disp: , Rfl:     glucose blood (TRUE METRIX BLOOD GLUCOSE TEST) test strip, Test 4 times plus daily, as instructed (Patient not taking: Reported on 6/12/2023), Disp: 400 each, Rfl: 1    spironolactone (ALDACTONE) 25 mg tablet, Take 1 tablet (25 mg total) by mouth daily (Patient not taking: Reported on 3/12/2024), Disp: 90 tablet, Rfl: 3    triamcinolone (KENALOG) 0.1 % cream, APPLY TOPCICALLY TWO TIMES A DAY (Patient not taking: Reported on 3/12/2024), Disp: 160 g, Rfl: 1  Allergies   Allergen Reactions    Ampicillin Hives       Labs:     Chemistry        Component Value Date/Time     12/06/2016 0939    K 5.1  "01/23/2024 1016    K 4.2 12/06/2016 0939     01/23/2024 1016     12/06/2016 0939    CO2 30 01/23/2024 1016    CO2 28 12/06/2016 0939    BUN 41 (H) 01/23/2024 1016    BUN 41 (H) 12/06/2016 0939    CREATININE 1.54 (H) 01/23/2024 1016    CREATININE 1.23 (H) 12/06/2016 0939        Component Value Date/Time    CALCIUM 9.4 01/23/2024 1016    CALCIUM 9.2 12/06/2016 0939    ALKPHOS 90 01/23/2024 1016    ALKPHOS 86 09/06/2016 0907    AST 17 01/23/2024 1016    AST 18 09/06/2016 0907    ALT 16 01/23/2024 1016    ALT 21 09/06/2016 0907    BILITOT 0.6 09/06/2016 0907            Lab Results   Component Value Date    CHOL 130 09/06/2016     Lab Results   Component Value Date    HDL 48 (L) 01/23/2024    HDL 41 (L) 09/21/2023    HDL 63 03/01/2023     Lab Results   Component Value Date    LDLCALC 65 01/23/2024    LDLCALC 46 09/21/2023    LDLCALC 46 03/01/2023     Lab Results   Component Value Date    TRIG 68 01/23/2024    TRIG 62 09/21/2023    TRIG 67 03/01/2023     No results found for: \"CHOLHDL\"    Imaging: No results found.    EKG: .    Review of Systems   Constitutional: Negative.   HENT: Negative.     Eyes: Negative.    Cardiovascular: Negative.    Respiratory: Negative.     Endocrine: Negative.    Hematologic/Lymphatic: Negative.    Skin: Negative.    Musculoskeletal: Negative.    Gastrointestinal: Negative.    Genitourinary: Negative.    Neurological: Negative.    Psychiatric/Behavioral: Negative.     Allergic/Immunologic: Negative.        Vitals:    03/12/24 0839   BP: 122/78   Pulse: 62   SpO2: 93%           Physical Exam  Vitals and nursing note reviewed.   Constitutional:       Appearance: Normal appearance.   HENT:      Head: Normocephalic.      Nose: Nose normal.      Mouth/Throat:      Mouth: Mucous membranes are moist.   Eyes:      General: No scleral icterus.     Conjunctiva/sclera: Conjunctivae normal.   Cardiovascular:      Rate and Rhythm: Normal rate and regular rhythm.      Heart sounds: No murmur " heard.     No gallop.   Pulmonary:      Effort: Pulmonary effort is normal. No respiratory distress.      Breath sounds: Normal breath sounds. No wheezing or rales.   Abdominal:      General: Abdomen is flat. Bowel sounds are normal. There is no distension.      Palpations: Abdomen is soft.      Tenderness: There is no abdominal tenderness. There is no guarding.   Musculoskeletal:      Cervical back: Normal range of motion and neck supple.      Right lower leg: No edema.      Left lower leg: No edema.   Skin:     General: Skin is warm and dry.   Neurological:      General: No focal deficit present.      Mental Status: She is alert and oriented to person, place, and time.   Psychiatric:         Mood and Affect: Mood normal.         Behavior: Behavior normal.         Discussion/Summary:    Complete Heart Block s/p PPM implant. Continue with device checks 100% V paced. 1 AF noted on last check.      Persistent AF: She has remained asymptomatic. We will try her on Xarelto and see how she does re: copay.     HTN: BP is stable on lisinopril. No changes.      The patient was counseled regarding diagnostic results, instructions for management, risk factor reductions, impressions. total time of encounter was 25 minutes and 15 minutes was spent counseling.

## 2024-03-12 NOTE — TELEPHONE ENCOUNTER
1 box sample given.  This is the last sample the pt is allowed to have.  Pt given assistance application for Xarelto and [employer] to fill out.  Coumadin is the most cost effective medication, but currently pt is refusing this as an option.  If unable to qualify for assistance, coumadin will be the only option as samples will no longer be provided to her.

## 2024-03-28 DIAGNOSIS — I48.0 PAF (PAROXYSMAL ATRIAL FIBRILLATION) (HCC): ICD-10-CM

## 2024-04-01 ENCOUNTER — TELEPHONE (OUTPATIENT)
Dept: CARDIOLOGY CLINIC | Facility: CLINIC | Age: 84
End: 2024-04-01

## 2024-04-01 NOTE — TELEPHONE ENCOUNTER
Hello, my name is Prema. I'm calling for my mother, Lizzie Whitten. [birth year]  We're trying to get her prescription for Eliquis resolved.   If you could please call back as soon as possible, we would really appreciate it.   My phone number is [phone].

## 2024-04-02 ENCOUNTER — DOCUMENTATION (OUTPATIENT)
Dept: CARDIOLOGY CLINIC | Facility: CLINIC | Age: 84
End: 2024-04-02

## 2024-04-02 DIAGNOSIS — I48.0 PAF (PAROXYSMAL ATRIAL FIBRILLATION) (HCC): ICD-10-CM

## 2024-04-02 DIAGNOSIS — I48.0 PAF (PAROXYSMAL ATRIAL FIBRILLATION) (HCC): Primary | ICD-10-CM

## 2024-04-02 NOTE — TELEPHONE ENCOUNTER
Spoke with Daughter Prema.  Unfortunately, Eliquis is being denied a tier exception through [employer].  The price for a 3 month supply is $376.  Checked pradaxa and xarelto and they are also denied for tier exception.  Per daughter's request will reach out to pt about patient assistance.  Daughter believes paperwork was submitted to PCP's office.     [employer] ID: [id number]  Rx Bin: [id number]   Rx Grp:     Tier exception department: [phone]

## 2024-04-02 NOTE — TELEPHONE ENCOUNTER
Spoke with pt's daughter.  Reports that pt has another insurance plan which was not reported and Eliquis would be covered.  Can we please have Eliquis reordered?

## 2024-04-08 ENCOUNTER — APPOINTMENT (EMERGENCY)
Dept: RADIOLOGY | Facility: HOSPITAL | Age: 84
DRG: 291 | End: 2024-04-08
Payer: MEDICARE

## 2024-04-08 ENCOUNTER — HOSPITAL ENCOUNTER (INPATIENT)
Facility: HOSPITAL | Age: 84
LOS: 4 days | Discharge: HOME | DRG: 291 | End: 2024-04-12
Attending: EMERGENCY MEDICINE | Admitting: INTERNAL MEDICINE
Payer: MEDICARE

## 2024-04-08 ENCOUNTER — TELEPHONE (OUTPATIENT)
Dept: CARDIOLOGY CLINIC | Facility: CLINIC | Age: 84
End: 2024-04-08

## 2024-04-08 DIAGNOSIS — I48.0 PAF (PAROXYSMAL ATRIAL FIBRILLATION) (HCC): ICD-10-CM

## 2024-04-08 DIAGNOSIS — E03.9 ACQUIRED HYPOTHYROIDISM: Chronic | ICD-10-CM

## 2024-04-08 DIAGNOSIS — Z79.4 TYPE 2 DIABETES MELLITUS WITH HYPERGLYCEMIA, WITH LONG-TERM CURRENT USE OF INSULIN (HCC): ICD-10-CM

## 2024-04-08 DIAGNOSIS — R09.02 HYPOXIA: Primary | ICD-10-CM

## 2024-04-08 DIAGNOSIS — R63.0 APPETITE LOSS: ICD-10-CM

## 2024-04-08 DIAGNOSIS — I50.33 ACUTE ON CHRONIC DIASTOLIC CONGESTIVE HEART FAILURE (HCC): ICD-10-CM

## 2024-04-08 DIAGNOSIS — E11.65 TYPE 2 DIABETES MELLITUS WITH HYPERGLYCEMIA, WITH LONG-TERM CURRENT USE OF INSULIN (HCC): ICD-10-CM

## 2024-04-08 PROBLEM — I50.9 CHF (CONGESTIVE HEART FAILURE) (HCC): Status: ACTIVE | Noted: 2024-04-08

## 2024-04-08 LAB
2HR DELTA HS TROPONIN: 7 NG/L
4HR DELTA HS TROPONIN: 12 NG/L
ANION GAP SERPL CALCULATED.3IONS-SCNC: 4 MMOL/L (ref 4–13)
ATRIAL RATE: 59 BPM
ATRIAL RATE: 62 BPM
BASOPHILS # BLD AUTO: 0.07 THOUSANDS/ÂΜL (ref 0–0.1)
BASOPHILS NFR BLD AUTO: 1 % (ref 0–1)
BUN SERPL-MCNC: 34 MG/DL (ref 5–25)
CALCIUM SERPL-MCNC: 8.7 MG/DL (ref 8.4–10.2)
CARDIAC TROPONIN I PNL SERPL HS: 12 NG/L
CARDIAC TROPONIN I PNL SERPL HS: 19 NG/L
CARDIAC TROPONIN I PNL SERPL HS: 24 NG/L
CHLORIDE SERPL-SCNC: 103 MMOL/L (ref 96–108)
CO2 SERPL-SCNC: 32 MMOL/L (ref 21–32)
CREAT SERPL-MCNC: 1.3 MG/DL (ref 0.6–1.3)
EOSINOPHIL # BLD AUTO: 0.32 THOUSAND/ÂΜL (ref 0–0.61)
EOSINOPHIL NFR BLD AUTO: 5 % (ref 0–6)
ERYTHROCYTE [DISTWIDTH] IN BLOOD BY AUTOMATED COUNT: 12.8 % (ref 11.6–15.1)
FLUAV RNA RESP QL NAA+PROBE: NEGATIVE
FLUBV RNA RESP QL NAA+PROBE: NEGATIVE
GFR SERPL CREATININE-BSD FRML MDRD: 38 ML/MIN/1.73SQ M
GLUCOSE SERPL-MCNC: 155 MG/DL (ref 65–140)
GLUCOSE SERPL-MCNC: 166 MG/DL (ref 65–140)
GLUCOSE SERPL-MCNC: 79 MG/DL (ref 65–140)
HCT VFR BLD AUTO: 37 % (ref 34.8–46.1)
HGB BLD-MCNC: 11.2 G/DL (ref 11.5–15.4)
IMM GRANULOCYTES # BLD AUTO: 0.02 THOUSAND/UL (ref 0–0.2)
IMM GRANULOCYTES NFR BLD AUTO: 0 % (ref 0–2)
LYMPHOCYTES # BLD AUTO: 1.38 THOUSANDS/ÂΜL (ref 0.6–4.47)
LYMPHOCYTES NFR BLD AUTO: 23 % (ref 14–44)
MCH RBC QN AUTO: 30.3 PG (ref 26.8–34.3)
MCHC RBC AUTO-ENTMCNC: 30.3 G/DL (ref 31.4–37.4)
MCV RBC AUTO: 100 FL (ref 82–98)
MONOCYTES # BLD AUTO: 0.58 THOUSAND/ÂΜL (ref 0.17–1.22)
MONOCYTES NFR BLD AUTO: 10 % (ref 4–12)
NEUTROPHILS # BLD AUTO: 3.63 THOUSANDS/ÂΜL (ref 1.85–7.62)
NEUTS SEG NFR BLD AUTO: 61 % (ref 43–75)
NRBC BLD AUTO-RTO: 0 /100 WBCS
P AXIS: -50 DEGREES
P AXIS: 194 DEGREES
PLATELET # BLD AUTO: 181 THOUSANDS/UL (ref 149–390)
PMV BLD AUTO: 10.5 FL (ref 8.9–12.7)
POTASSIUM SERPL-SCNC: 5 MMOL/L (ref 3.5–5.3)
PR INTERVAL: 184 MS
PR INTERVAL: 196 MS
QRS AXIS: 261 DEGREES
QRS AXIS: 267 DEGREES
QRSD INTERVAL: 170 MS
QRSD INTERVAL: 180 MS
QT INTERVAL: 454 MS
QT INTERVAL: 474 MS
QTC INTERVAL: 460 MS
QTC INTERVAL: 469 MS
RBC # BLD AUTO: 3.7 MILLION/UL (ref 3.81–5.12)
RSV RNA RESP QL NAA+PROBE: NEGATIVE
SARS-COV-2 RNA RESP QL NAA+PROBE: NEGATIVE
SODIUM SERPL-SCNC: 139 MMOL/L (ref 135–147)
T WAVE AXIS: 64 DEGREES
T WAVE AXIS: 77 DEGREES
VENTRICULAR RATE: 59 BPM
VENTRICULAR RATE: 62 BPM
WBC # BLD AUTO: 6 THOUSAND/UL (ref 4.31–10.16)

## 2024-04-08 PROCEDURE — 84484 ASSAY OF TROPONIN QUANT: CPT

## 2024-04-08 PROCEDURE — 93010 ELECTROCARDIOGRAM REPORT: CPT | Performed by: INTERNAL MEDICINE

## 2024-04-08 PROCEDURE — 80048 BASIC METABOLIC PNL TOTAL CA: CPT

## 2024-04-08 PROCEDURE — 36415 COLL VENOUS BLD VENIPUNCTURE: CPT

## 2024-04-08 PROCEDURE — 85025 COMPLETE CBC W/AUTO DIFF WBC: CPT

## 2024-04-08 PROCEDURE — 99285 EMERGENCY DEPT VISIT HI MDM: CPT | Performed by: EMERGENCY MEDICINE

## 2024-04-08 PROCEDURE — 93005 ELECTROCARDIOGRAM TRACING: CPT

## 2024-04-08 PROCEDURE — 99285 EMERGENCY DEPT VISIT HI MDM: CPT

## 2024-04-08 PROCEDURE — 82948 REAGENT STRIP/BLOOD GLUCOSE: CPT

## 2024-04-08 PROCEDURE — 71275 CT ANGIOGRAPHY CHEST: CPT

## 2024-04-08 PROCEDURE — 99223 1ST HOSP IP/OBS HIGH 75: CPT | Performed by: INTERNAL MEDICINE

## 2024-04-08 PROCEDURE — 0241U HB NFCT DS VIR RESP RNA 4 TRGT: CPT | Performed by: INTERNAL MEDICINE

## 2024-04-08 RX ORDER — INSULIN LISPRO 100 [IU]/ML
6 INJECTION, SOLUTION INTRAVENOUS; SUBCUTANEOUS
Status: DISCONTINUED | OUTPATIENT
Start: 2024-04-08 | End: 2024-04-12 | Stop reason: HOSPADM

## 2024-04-08 RX ORDER — SODIUM CHLORIDE 9 MG/ML
3 INJECTION INTRAVENOUS
Status: DISCONTINUED | OUTPATIENT
Start: 2024-04-08 | End: 2024-04-12 | Stop reason: HOSPADM

## 2024-04-08 RX ORDER — ACETAMINOPHEN 325 MG/1
650 TABLET ORAL EVERY 6 HOURS PRN
Status: DISCONTINUED | OUTPATIENT
Start: 2024-04-08 | End: 2024-04-12 | Stop reason: HOSPADM

## 2024-04-08 RX ORDER — FUROSEMIDE 10 MG/ML
20 INJECTION INTRAMUSCULAR; INTRAVENOUS ONCE
Status: COMPLETED | OUTPATIENT
Start: 2024-04-08 | End: 2024-04-08

## 2024-04-08 RX ORDER — ATORVASTATIN CALCIUM 80 MG/1
80 TABLET, FILM COATED ORAL
Status: DISCONTINUED | OUTPATIENT
Start: 2024-04-08 | End: 2024-04-12 | Stop reason: HOSPADM

## 2024-04-08 RX ORDER — LEVOTHYROXINE SODIUM 0.12 MG/1
125 TABLET ORAL
Status: DISCONTINUED | OUTPATIENT
Start: 2024-04-09 | End: 2024-04-10

## 2024-04-08 RX ORDER — PANTOPRAZOLE SODIUM 40 MG/1
40 TABLET, DELAYED RELEASE ORAL
Status: DISCONTINUED | OUTPATIENT
Start: 2024-04-09 | End: 2024-04-12 | Stop reason: HOSPADM

## 2024-04-08 RX ORDER — CITALOPRAM 20 MG/1
20 TABLET ORAL DAILY
Status: DISCONTINUED | OUTPATIENT
Start: 2024-04-08 | End: 2024-04-12 | Stop reason: HOSPADM

## 2024-04-08 RX ORDER — INSULIN GLARGINE 100 [IU]/ML
25 INJECTION, SOLUTION SUBCUTANEOUS
Status: DISCONTINUED | OUTPATIENT
Start: 2024-04-08 | End: 2024-04-12 | Stop reason: HOSPADM

## 2024-04-08 RX ORDER — LISINOPRIL 20 MG/1
20 TABLET ORAL 2 TIMES DAILY
Status: DISCONTINUED | OUTPATIENT
Start: 2024-04-08 | End: 2024-04-12 | Stop reason: HOSPADM

## 2024-04-08 RX ADMIN — IOHEXOL 100 ML: 350 INJECTION, SOLUTION INTRAVENOUS at 11:15

## 2024-04-08 RX ADMIN — FUROSEMIDE 20 MG: 10 INJECTION, SOLUTION INTRAMUSCULAR; INTRAVENOUS at 15:40

## 2024-04-08 RX ADMIN — LISINOPRIL 20 MG: 20 TABLET ORAL at 17:59

## 2024-04-08 RX ADMIN — INSULIN LISPRO 6 UNITS: 100 INJECTION, SOLUTION INTRAVENOUS; SUBCUTANEOUS at 18:00

## 2024-04-08 RX ADMIN — METOPROLOL TARTRATE 25 MG: 25 TABLET, FILM COATED ORAL at 17:11

## 2024-04-08 RX ADMIN — APIXABAN 5 MG: 5 TABLET, FILM COATED ORAL at 17:58

## 2024-04-08 RX ADMIN — ATORVASTATIN CALCIUM 80 MG: 80 TABLET, FILM COATED ORAL at 23:22

## 2024-04-08 RX ADMIN — CITALOPRAM HYDROBROMIDE 20 MG: 20 TABLET ORAL at 17:58

## 2024-04-08 RX ADMIN — INSULIN GLARGINE 25 UNITS: 100 INJECTION, SOLUTION SUBCUTANEOUS at 23:22

## 2024-04-08 NOTE — PROGRESS NOTES
Pastoral Care Progress Note    2024  Patient: Lizzie Whitten : 1940  Admission Date & Time: 2024 0759  MRN: [id number] CSN: [id number]         while rounding check on pt and family at nurse's suggestion.Nurse had noticed that  was a little emotional earlier. Daughter, Prema, and pt's  were present and welcomed prayer.  remains available.                24 1000   Clinical Encounter Type   Visited With Patient and family together   Routine Visit Introduction   Referral From Nurse   Referral To    [facility] Encounters   [facility] Needs Prayer

## 2024-04-08 NOTE — TELEPHONE ENCOUNTER
Daughter Prema called stating they are trying to get the Eliquis straightened out. Stated her mother is currently in the hospital and asking for a return call  [phone]

## 2024-04-08 NOTE — ED PROVIDER NOTES
History  Chief Complaint   Patient presents with    Medical Problem     C/o of falling this morning witnessed by  G[facility] 15, denies HS and denies recent doses of elliquis. EMS brought pt in for O2 sat of 85 on RA and right arm swelling, hx of COPD per pt. Denies any symptoms.      HPI    Patient is an 84-year-old female with a history of CHF, COPD, CAD, DM, DVT, and A-fib who presents after a fall.  Patient reports that she was in bed this morning when she slid out of bed and landed on her buttocks.  She did not hit her head. She did not lose consciousness. She is not complaining of any pain.  When EMS arrived, the patient's O2 was 85% on room air.  She denies shortness of breath at this time.  She has not taken her Eliquis in over a week due to inability to obtain this medication.  She denies fever, chest pain, cough, and congestion.    Prior to Admission Medications   Prescriptions Last Dose Informant Patient Reported? Taking?   Ascorbic Acid (VITAMIN C) 1000 MG tablet More than a month Self Yes No   Sig: Take 500 mg by mouth    Calcium Carbonate-Vit D-Min (CALCIUM 1200 PO) 4/7/2024 Self Yes Yes   Sig: Take by mouth   Continuous Blood Gluc  (FREESTYLE RONNI 14 DAY READER) GARRY 4/8/2024 Self No Yes   Sig: Use to check BG levels 4+times daily and as needed   Continuous Blood Gluc Sensor (FreeStyle Ronni 14 Day Sensor) MISC 4/8/2024 Self No Yes   Sig: Change sensor every 14 days to check BG levels 4+ times daily   Diclofenac Sodium (VOLTAREN) 1 % 4/7/2024 Self No Yes   Sig: Apply 2 g topically 4 (four) times a day   GLUCOSAMINE CHONDROITIN COMPLX PO Not Taking Self Yes No   Sig: Take 1 tablet by mouth 2 (two) times a day   Patient not taking: Reported on 3/12/2024   Insulin Pen Needle (BD Pen Needle Sasha U/F) 32G X 4 MM MISC 4/7/2024 Self No Yes   Sig: Inject 4 times a day   Lancets (onetouch ultrasoft) lancets Not Taking Self No No   Sig: Use 4 times a day   Patient not taking: Reported on 4/8/2024    Omega-3 Fatty Acids (FISH OIL) 1200 MG CAPS 4/7/2024 Self Yes Yes   Sig: Take 1,200 mg by mouth 2 (two) times a day    acetaminophen (TYLENOL) 325 mg tablet Past Week Self Yes Yes   Sig: Take 650 mg by mouth   apixaban (Eliquis) 5 mg 4/8/2024  No Yes   Sig: Take 1 tablet (5 mg total) by mouth 2 (two) times a day   apixaban (Eliquis) 5 mg   No No   Sig: Take 1 tablet (5 mg total) by mouth 2 (two) times a day   atorvastatin (LIPITOR) 80 mg tablet 4/8/2024 Self No Yes   Sig: Take 1 tablet (80 mg total) by mouth daily at bedtime   citalopram (CeleXA) 20 mg tablet 4/7/2024 Self No Yes   Sig: TAKE 1 TABLET EVERY DAY   glucose blood (TRUE METRIX BLOOD GLUCOSE TEST) test strip Not Taking Self No No   Sig: Test 4 times plus daily, as instructed   Patient not taking: Reported on 6/12/2023   insulin aspart (NovoLOG FlexPen) 100 UNIT/ML injection pen 4/7/2024 Self No Yes   Sig: INJECT 6 UNITS SUBCUTANEOUSLY WITH BREAKFAST, THEN 6 UNITS WITH LUNCH, AND THEN 6 UNITS WITH DINNER   insulin glargine (Toujeo SoloStar) 300 units/mL CONCENTRATED U-300 injection pen (1-unit dial) 4/7/2024 Self No Yes   Sig: Inject 25  units at bedtime   levothyroxine 125 mcg tablet 4/8/2024 Self No Yes   Sig: Take 1 tablet from mon to sat and 1/2 tablet on Sunday   lisinopril (ZESTRIL) 20 mg tablet 4/7/2024 Self No Yes   Sig: TAKE 1 TABLET TWICE DAILY   metoprolol tartrate (LOPRESSOR) 25 mg tablet 4/7/2024 Self No Yes   Sig: TAKE 1 TABLET EVERY 12 HOURS   omeprazole (PriLOSEC) 20 mg delayed release capsule 4/7/2024 Self No Yes   Sig: TAKE 1 CAPSULE TWICE DAILY   promethazine-dextromethorphan (PHENERGAN-DM) 6.25-15 mg/5 mL oral syrup 4/7/2024 Self No Yes   Sig: TAKE 5ML DAILY AT BEDTIME   sodium chloride (VANNESSA 128) 5 % hypertonic ophthalmic ointment 4/7/2024 Self Yes Yes   Sig: Administer 1 drop to both eyes 2 (two) times a day   spironolactone (ALDACTONE) 25 mg tablet Not Taking Self No No   Sig: Take 1 tablet (25 mg total) by mouth daily   Patient  not taking: Reported on 3/12/2024   triamcinolone (KENALOG) 0.1 % cream 4/7/2024 Self No Yes   Sig: APPLY TOPCICALLY TWO TIMES A DAY      Facility-Administered Medications: None       Past Medical History:   Diagnosis Date    Anxiety     Arthritis     CHF (congestive heart failure) (HCC)     Chronic venous insufficiency     COPD (chronic obstructive pulmonary disease) (HCC)     Coronary artery disease     Diabetes mellitus (HCC)     type II    Disease of thyroid gland     hypothyroidism    DVT (deep venous thrombosis) ([facility])     HL (hearing loss)     Hyperlipidemia     Hypertension     Irregular heart beat     Paroxysmal a-fib    Pacemaker     Renal disorder     stage 3    Sleep apnea     Sleep difficulties     Thyroid disease     Urinary incontinence     Uterine leiomyoma     Walker as ambulation aid        Past Surgical History:   Procedure Laterality Date    BREAST SURGERY      For Biopsy    CARDIAC PACEMAKER PLACEMENT      Permanent. Last assessed: 2/19/16    CARDIAC SURGERY      CARPAL TUNNEL RELEASE Bilateral     CATARACT EXTRACTION      CATARACT EXTRACTION W/ INTRAOCULAR LENS  IMPLANT, BILATERAL      CYSTOSCOPY      Diagnostic. Last assessed: 11/8/17    ECTOPIC PREGNANCY SURGERY      HYSTERECTOMY      Total Abdomonal. Resolved: 1982    JOINT REPLACEMENT Right     TKR    OOPHORECTOMY      NC ENDOVEN ABLTJ INCMPTNT VEIN XTR LASER 1ST VEIN Left 2/17/2017    Procedure: GREATER SAPHENOUS VEIN ENDOVASCULAR LASER THERAPY ;  Surgeon: Danielle Phan DO;  Location: [facility];  Service: Vascular    TONSILLECTOMY         Family History   Problem Relation Age of Onset    Coronary artery disease Mother     Hypertension Mother     Heart disease Mother     Hypertension Father     Diabetes Father         Mellitus    No Known Problems Sister     No Known Problems Daughter     No Known Problems Daughter     No Known Problems Maternal Grandmother     No Known Problems Maternal Grandfather     No Known Problems Paternal  Grandmother     No Known Problems Paternal Grandfather     Diabetes Brother         Mellitus    No Known Problems Son     Breast cancer Maternal Aunt     Pancreatic cancer Maternal Aunt     Breast cancer Cousin     Breast cancer Cousin     Breast cancer Paternal Aunt     No Known Problems Paternal Aunt     No Known Problems Paternal Aunt      I have reviewed and agree with the history as documented.    E-Cigarette/Vaping    E-Cigarette Use Never User      E-Cigarette/Vaping Substances    Nicotine No     THC No     CBD No     Flavoring No     Other No     Unknown No      Social History     Tobacco Use    Smoking status: Never     Passive exposure: Never    Smokeless tobacco: Never    Tobacco comments:     Per [employer]: Former smoker   Vaping Use    Vaping status: Never Used   Substance Use Topics    Alcohol use: Not Currently     Comment: rarely. Per [employer]: Denied history of alcohol use    Drug use: No        Review of Systems   Constitutional:  Negative for chills and fever.   HENT:  Negative for congestion and sore throat.    Respiratory:  Negative for cough and shortness of breath.    Cardiovascular:  Negative for chest pain and palpitations.   Gastrointestinal:  Negative for abdominal pain and vomiting.   Genitourinary:  Negative for dysuria and hematuria.   Musculoskeletal:  Negative for back pain and neck pain.   Skin:  Negative for rash and wound.   Neurological:  Negative for syncope and headaches.   All other systems reviewed and are negative.      Physical Exam  ED Triage Vitals   Temperature Pulse Respirations Blood Pressure SpO2   04/08/24 0803 04/08/24 0803 04/08/24 0803 04/08/24 0803 04/08/24 0803   98.1 °F (36.7 °C) 72 20 151/67 (S) (!) 85 %      Temp Source Heart Rate Source Patient Position - Orthostatic VS BP Location FiO2 (%)   04/08/24 0803 04/08/24 0803 04/08/24 1500 -- --   Oral Monitor Lying        Pain Score       04/08/24 2038       No Pain             Orthostatic Vital Signs  Vitals:     04/09/24 2127 04/10/24 0708 04/10/24 1459 04/10/24 2116   BP: 141/65 146/65 147/80 143/63   Pulse: 60 61 60 60   Patient Position - Orthostatic VS:           Physical Exam  Vitals and nursing note reviewed.   Constitutional:       General: She is not in acute distress.     Appearance: She is well-developed.   HENT:      Head: Normocephalic and atraumatic.      Nose: No congestion or rhinorrhea.      Mouth/Throat:      Mouth: Mucous membranes are moist.   Eyes:      Extraocular Movements: Extraocular movements intact.      Conjunctiva/sclera: Conjunctivae normal.   Cardiovascular:      Rate and Rhythm: Normal rate and regular rhythm.      Heart sounds: No murmur heard.  Pulmonary:      Effort: Pulmonary effort is normal. No respiratory distress.      Breath sounds: Normal breath sounds.   Abdominal:      Palpations: Abdomen is soft.      Tenderness: There is no abdominal tenderness.   Musculoskeletal:      Cervical back: Neck supple.      Right lower leg: No edema.      Left lower leg: No edema.   Skin:     General: Skin is warm and dry.      Capillary Refill: Capillary refill takes less than 2 seconds.   Neurological:      Mental Status: She is alert.      Sensory: No sensory deficit.      Motor: No weakness.   Psychiatric:         Mood and Affect: Mood normal.         ED Medications  Medications   sodium chloride (PF) 0.9 % injection 3 mL (has no administration in time range)   acetaminophen (TYLENOL) tablet 650 mg (has no administration in time range)   apixaban (ELIQUIS) tablet 5 mg (5 mg Oral Given 4/10/24 1710)   atorvastatin (LIPITOR) tablet 80 mg (80 mg Oral Given 4/10/24 2117)   citalopram (CeleXA) tablet 20 mg (20 mg Oral Given 4/10/24 0824)   insulin lispro (HumALOG/ADMELOG) 100 units/mL subcutaneous injection 6 Units (6 Units Subcutaneous Given 4/10/24 1712)   insulin glargine (LANTUS) subcutaneous injection 25 Units 0.25 mL (25 Units Subcutaneous Given 4/10/24 2117)   lisinopril (ZESTRIL) tablet 20 mg  (20 mg Oral Given 4/10/24 1710)   metoprolol tartrate (LOPRESSOR) tablet 25 mg (25 mg Oral Given 4/10/24 1710)   pantoprazole (PROTONIX) EC tablet 40 mg (40 mg Oral Given 4/10/24 0618)   furosemide (LASIX) injection 40 mg (40 mg Intravenous Given 4/10/24 1710)   levothyroxine tablet 150 mcg (has no administration in time range)   iohexol (OMNIPAQUE) 350 MG/ML injection (MULTI-DOSE) 100 mL (100 mL Intravenous Given 4/8/24 1115)   furosemide (LASIX) injection 20 mg (20 mg Intravenous Given 4/8/24 1540)       Diagnostic Studies  Results Reviewed       Procedure Component Value Units Date/Time    COVID/FLU/RSV [[id number]]  (Normal) Collected: 04/08/24 1543    Lab Status: Final result Specimen: Nares from Nose Updated: 04/08/24 1705     SARS-CoV-2 Negative     INFLUENZA A PCR Negative     INFLUENZA B PCR Negative     RSV PCR Negative    Narrative:      FOR PEDIATRIC PATIENTS - copy/paste COVID Guidelines URL to browser: [email or web address]    SARS-CoV-2 assay is a Nucleic Acid Amplification assay intended for the  qualitative detection of nucleic acid from SARS-CoV-2 in nasopharyngeal  swabs. Results are for the presumptive identification of SARS-CoV-2 RNA.    Positive results are indicative of infection with SARS-CoV-2, the virus  causing COVID-19, but do not rule out bacterial infection or co-infection  with other viruses. Laboratories within the [place] and its  territories are required to report all positive results to the appropriate  public health authorities. Negative results do not preclude SARS-CoV-2  infection and should not be used as the sole basis for treatment or other  patient management decisions. Negative results must be combined with  clinical observations, patient history, and epidemiological information.  This test has not been FDA cleared or approved.    This test has been authorized by FDA under an Emergency Use Authorization  (EUA). This  test is only authorized for the duration of time the  declaration that circumstances exist justifying the authorization of the  emergency use of an in vitro diagnostic tests for detection of SARS-CoV-2  virus and/or diagnosis of COVID-19 infection under section 564(b)(1) of  the Act, 21 U.S.C. 360bbb-3(b)(1), unless the authorization is terminated  or revoked sooner. The test has been validated but independent review by FDA  and CLIA is pending.    Test performed using [employer]: This RT-PCR assay targets N2,  a region unique to SARS-CoV-2. A conserved region in the E-gene was chosen  for pan-Sarbecovirus detection which includes SARS-CoV-2.    According to CMS-2020-01-R, this platform meets the definition of high-throughput technology.    Fingerstick Glucose (POCT) [[id number]]  (Abnormal) Collected: 04/08/24 1558    Lab Status: Final result Specimen: Blood Updated: 04/08/24 1559     POC Glucose 155 mg/dl     HS Troponin I 4hr [[id number]]  (Normal) Collected: 04/08/24 1249    Lab Status: Final result Specimen: Blood from Arm, Left Updated: 04/08/24 1319     hs TnI 4hr 24 ng/L      Delta 4hr hsTnI 12 ng/L     HS Troponin I 2hr [[id number]]  (Normal) Collected: 04/08/24 1105    Lab Status: Final result Specimen: Blood from Arm, Left Updated: 04/08/24 1135     hs TnI 2hr 19 ng/L      Delta 2hr hsTnI 7 ng/L     Basic metabolic panel [[id number]]  (Abnormal) Collected: 04/08/24 0846    Lab Status: Final result Specimen: Blood from Arm, Left Updated: 04/08/24 1054     Sodium 139 mmol/L      Potassium 5.0 mmol/L      Chloride 103 mmol/L      CO2 32 mmol/L      ANION GAP 4 mmol/L      BUN 34 mg/dL      Creatinine 1.30 mg/dL      Glucose 79 mg/dL      Calcium 8.7 mg/dL      eGFR 38 ml/min/1.73sq m     Narrative:      National Kidney Disease Foundation guidelines for Chronic Kidney Disease (CKD):     Stage 1 with normal or high GFR (GFR > 90 mL/min/1.73 square meters)    Stage 2 Mild CKD (GFR = 60-89 mL/min/1.73 square  meters)    Stage 3A Moderate CKD (GFR = 45-59 mL/min/1.73 square meters)    Stage 3B Moderate CKD (GFR = 30-44 mL/min/1.73 square meters)    Stage 4 Severe CKD (GFR = 15-29 mL/min/1.73 square meters)    Stage 5 End Stage CKD (GFR <15 mL/min/1.73 square meters)  Note: GFR calculation is accurate only with a steady state creatinine    HS Troponin 0hr (reflex protocol) [[id number]]  (Normal) Collected: 04/08/24 0846    Lab Status: Final result Specimen: Blood from Arm, Left Updated: 04/08/24 0920     hs TnI 0hr 12 ng/L     CBC and differential [[id number]]  (Abnormal) Collected: 04/08/24 0846    Lab Status: Final result Specimen: Blood from Arm, Left Updated: 04/08/24 0856     WBC 6.00 Thousand/uL      RBC 3.70 Million/uL      Hemoglobin 11.2 g/dL      Hematocrit 37.0 %       fL      MCH 30.3 pg      MCHC 30.3 g/dL      RDW 12.8 %      MPV 10.5 fL      Platelets 181 Thousands/uL      nRBC 0 /100 WBCs      Segmented % 61 %      Immature Grans % 0 %      Lymphocytes % 23 %      Monocytes % 10 %      Eosinophils Relative 5 %      Basophils Relative 1 %      Absolute Neutrophils 3.63 Thousands/µL      Absolute Immature Grans 0.02 Thousand/uL      Absolute Lymphocytes 1.38 Thousands/µL      Absolute Monocytes 0.58 Thousand/µL      Eosinophils Absolute 0.32 Thousand/µL      Basophils Absolute 0.07 Thousands/µL                    CTA ED chest PE Study   Final Result by Sonja Desai MD (04/08 1236)      No pulmonary embolism identified.      However unable to fully evaluate distal right basilar pulmonary arteries secondary to adjacent atelectasis      Cardiomegaly with enlarged main pulmonary artery and reflux of injected contrast into the central hepatic veins. Correlate with tricuspid regurgitation      4 mm left lower lung nodule. Based on current Fleischner Society 2017 Guidelines on incidental pulmonary nodule, no routine follow-up is needed if the patient is low risk. If the patient is high risk, optional  follow-up chest CT at 12 months can be    considered.  .      The study was marked in EPIC for immediate notification.      Workstation performed: [id number]               Procedures  ECG 12 Lead Documentation Only    Date/Time: 4/8/2024 9:15 AM    Performed by: Ana Laura Pedraza MD  Authorized by: Ana Laura Pedraza MD    Patient location:  ED  Previous ECG:     Previous ECG:  Compared to current    Similarity:  No change  Interpretation:     Interpretation: abnormal    Rate:     ECG rate:  62    ECG rate assessment: normal    Rhythm:     Rhythm: paced    Pacing:     Capture:  Complete    Type of pacing:  Ventricular  Ectopy:     Ectopy: none    QRS:     QRS axis:  Right    QRS intervals:  Wide (170)  Conduction:     Conduction: abnormal      Abnormal conduction: non-specific intraventricular conduction delay    ST segments:     ST segments:  Normal  T waves:     T waves: normal          ED Course  ED Course as of 04/10/24 2118   Mon Apr 08, 2024   0907 Hemoglobin(!): 11.2  Stable     0920 hs TnI 0hr: 12   1203 Delta 2hr hsTnI: 7   1314 Creatinine: 1.30  Baseline                                 Wells' Criteria for PE      Flowsheet Row Most Recent Value   Wells' Criteria for PE    Clinical signs and symptoms of DVT 3 Filed at: 04/08/2024 0823   PE is primary diagnosis or equally likely 3 Filed at: 04/08/2024 0823   HR >100 0 Filed at: 04/08/2024 0823   Immobilization at least 3 days or Surgery in the previous 4 weeks 0 Filed at: 04/08/2024 0823   Previous, objectively diagnosed PE or DVT 1.5 Filed at: 04/08/2024 0823   Hemoptysis 0 Filed at: 04/08/2024 0823   Malignancy with treatment within 6 months or palliative 0 Filed at: 04/08/2024 0823   Wells' Criteria Total 7.5 Filed at: 04/08/2024 0823              Medical Decision Making  Patient is an 84-year-old female with a history of CHF, COPD, CAD, DM, DVT, and A-fib who presents after a fall.     Differential includes COPD exacerbation, CHF, ACS, PE, pneumonia, viral  illness.  Will order EKG, CBC, BMP, troponin, CTA PE scan, viral swab.  Patient has no signs of trauma.  She is currently requiring 3L of O2.  EKG is nonischemic.  Hemoglobin stable.  Delta troponin is 7.  Viral swab was negative.  CTA PE is negative for PE.  She is still requiring 3L of O2 to maintain oxygenation.  Will admit patient for hypoxia of unclear etiology.    Amount and/or Complexity of Data Reviewed  External Data Reviewed: labs and ECG.  Labs: ordered. Decision-making details documented in ED Course.  Radiology: ordered.  ECG/medicine tests: ordered.  Discussion of management or test interpretation with external provider(s): Internal medicine     Risk  Prescription drug management.  Decision regarding hospitalization.    Differential includes      Disposition  Final diagnoses:   Hypoxia     Time reflects when diagnosis was documented in both MDM as applicable and the Disposition within this note       Time User Action Codes Description Comment    4/8/2024  3:25 PM Ana Laura Pedraza Add [R09.02] Hypoxia     4/9/2024 11:20 AM Jon Pearson Add [R63.0] Appetite loss           ED Disposition       ED Disposition   Admit    Condition   Stable    Date/Time   Mon Apr 8, 2024 1311    Comment   --             Follow-up Information       Follow up With Specialties Details Why Contact [facility]  Follow up  phone [phone]            Current Discharge Medication List        START taking these medications    Details   !! apixaban (Eliquis) 5 mg Take 1 tablet (5 mg total) by mouth 2 (two) times a day  Qty: 60 tablet, Refills: 0    Associated Diagnoses: PAF (paroxysmal atrial fibrillation) (HCC)       !! - Potential duplicate medications found. Please discuss with provider.        CONTINUE these medications which have NOT CHANGED    Details   acetaminophen (TYLENOL) 325 mg tablet Take 650 mg by mouth      !! apixaban (Eliquis) 5 mg Take 1 tablet (5 mg total) by mouth 2 (two) times a day  Qty: 180  tablet, Refills: 3    Associated Diagnoses: PAF (paroxysmal atrial fibrillation) ([facility])      atorvastatin (LIPITOR) 80 mg tablet Take 1 tablet (80 mg total) by mouth daily at bedtime  Qty: 90 tablet, Refills: 3    Associated Diagnoses: Hypercholesterolemia      Calcium Carbonate-Vit D-Min (CALCIUM 1200 PO) Take by mouth      citalopram (CeleXA) 20 mg tablet TAKE 1 TABLET EVERY DAY  Qty: 90 tablet, Refills: 3    Associated Diagnoses: Anxiety      Continuous Blood Gluc  (FREESTYLE RONNI 14 DAY READER) GARRY Use to check BG levels 4+times daily and as needed  Qty: 1 Device, Refills: 0    Comments: Dx: E11.65  Associated Diagnoses: Type 2 diabetes mellitus with hyperglycemia, with long-term current use of insulin ([facility])      Continuous Blood Gluc Sensor (FreeStyle Ronni 14 Day Sensor) MISC Change sensor every 14 days to check BG levels 4+ times daily  Qty: 6 each, Refills: 3    Comments: Dx: E11.65  Associated Diagnoses: Type 2 diabetes mellitus with hyperglycemia, with long-term current use of insulin ([facility])      Diclofenac Sodium (VOLTAREN) 1 % Apply 2 g topically 4 (four) times a day  Qty: 50 g, Refills: 1    Associated Diagnoses: Hand arthritis      insulin aspart (NovoLOG FlexPen) 100 UNIT/ML injection pen INJECT 6 UNITS SUBCUTANEOUSLY WITH BREAKFAST, THEN 6 UNITS WITH LUNCH, AND THEN 6 UNITS WITH DINNER  Qty: 15 mL, Refills: 1    Associated Diagnoses: Type 2 diabetes mellitus with other circulatory complication, with long-term current use of insulin ([facility])      insulin glargine (Toujeo SoloStar) 300 units/mL CONCENTRATED U-300 injection pen (1-unit dial) Inject 25  units at bedtime  Qty: 12 mL, Refills: 1    Associated Diagnoses: Type 2 diabetes mellitus with hyperglycemia, with long-term current use of insulin ([facility])      Insulin Pen Needle (BD Pen Needle Sasha U/F) 32G X 4 MM MISC Inject 4 times a day  Qty: 400 each, Refills: 1    Associated Diagnoses: Type 2 diabetes mellitus with hyperglycemia, with long-term  current use of insulin ([facility])      levothyroxine 125 mcg tablet Take 1 tablet from mon to sat and 1/2 tablet on Sunday  Qty: 90 tablet, Refills: 1    Associated Diagnoses: Hypothyroidism, unspecified type      lisinopril (ZESTRIL) 20 mg tablet TAKE 1 TABLET TWICE DAILY  Qty: 180 tablet, Refills: 3    Associated Diagnoses: Essential hypertension; Benign hypertension with chronic kidney disease, stage III ([facility])      metoprolol tartrate (LOPRESSOR) 25 mg tablet TAKE 1 TABLET EVERY 12 HOURS  Qty: 180 tablet, Refills: 3    Associated Diagnoses: Essential hypertension      Omega-3 Fatty Acids (FISH OIL) 1200 MG CAPS Take 1,200 mg by mouth 2 (two) times a day       omeprazole (PriLOSEC) 20 mg delayed release capsule TAKE 1 CAPSULE TWICE DAILY  Qty: 180 capsule, Refills: 3    Associated Diagnoses: Hiatal hernia      promethazine-dextromethorphan (PHENERGAN-DM) 6.25-15 mg/5 mL oral syrup TAKE 5ML DAILY AT BEDTIME  Qty: 450 mL, Refills: 0    Associated Diagnoses: Chronic cough      sodium chloride (VANNESSA 128) 5 % hypertonic ophthalmic ointment Administer 1 drop to both eyes 2 (two) times a day      triamcinolone (KENALOG) 0.1 % cream APPLY TOPCICALLY TWO TIMES A DAY  Qty: 160 g, Refills: 1    Associated Diagnoses: Eczema, unspecified type      Ascorbic Acid (VITAMIN C) 1000 MG tablet Take 500 mg by mouth       GLUCOSAMINE CHONDROITIN COMPLX PO Take 1 tablet by mouth 2 (two) times a day      glucose blood (TRUE METRIX BLOOD GLUCOSE TEST) test strip Test 4 times plus daily, as instructed  Qty: 400 each, Refills: 1    Associated Diagnoses: Uncontrolled type 2 diabetes mellitus with complication, unspecified long term insulin use status      Lancets (onetouch ultrasoft) lancets Use 4 times a day  Qty: 400 each, Refills: 0    Associated Diagnoses: Type 2 diabetes mellitus with hyperglycemia, with long-term current use of insulin ([facility])      spironolactone (ALDACTONE) 25 mg tablet Take 1 tablet (25 mg total) by mouth daily  Qty: 90  tablet, Refills: 3    Associated Diagnoses: Benign hypertension with chronic kidney disease, stage III (HCC); Microalbuminuria       !! - Potential duplicate medications found. Please discuss with provider.        No discharge procedures on file.    PDMP Review         Value Time User    PDMP Reviewed  Yes 9/4/2022  2:50 PM Carlie Del Castillo MD             ED Provider  Attending physically available and evaluated Lizzie Whitten. I managed the patient along with the ED Attending.    Electronically Signed by           Ana Laura Pedraza MD  04/10/24 8166

## 2024-04-08 NOTE — ED ATTENDING ATTESTATION
4/8/2024  I, Ailyn Baires MD, saw and evaluated the patient. I have discussed the patient with the resident/non-physician practitioner and agree with the resident's/non-physician practitioner's findings, Plan of Care, and MDM as documented in the resident's/non-physician practitioner's note, except where noted. All available labs and Radiology studies were reviewed.  I was present for key portions of any procedure(s) performed by the resident/non-physician practitioner and I was immediately available to provide assistance.       At this point I agree with the current assessment done in the Emergency Department.  I have conducted an independent evaluation of this patient a history and physical is as follows:    ED Course         Critical Care Time  Procedures    85 yo female here after disorientation from waking up, fell to ground, no headstrike, no loc. Pt noted to be hypoxic when ems arrived. No sob, no cp, no abdominal pain, no fever. Pt with no symptoms currently, no current copd meds, on asa. Pt able to ambulate.  Pt has not taken eliquis last few weeks, hx of dvt, dm, chf, copd, htn, hld.  Vss, afebrile, sat 95% on ra, lungs mild wheezes, rrr, abodmen soft nontender, bilateral pedal edema, erythema. Cardiac workup, bnp, ct pe

## 2024-04-08 NOTE — ASSESSMENT & PLAN NOTE
Wt Readings from Last 3 Encounters:   03/12/24 86.5 kg (190 lb 12.8 oz)   01/23/24 85.5 kg (188 lb 9.6 oz)   12/08/23 84.1 kg (185 lb 8 oz)           Patient followed as outpatient with Dr. Mendoza.  Consult cardiology  Check echocardiogram  One-time dose of Lasix

## 2024-04-08 NOTE — TELEPHONE ENCOUNTER
"Patient's daughter was contacted and she reports patient was brought to ED due to oxygen saturation of 85% with concern for pulmonary embolism due to patient being off of her anticoagulation due to concerns over price. She states that work-up in the hospital does not indicate a pulmonary embolism. Patient and her daughter have previously indicated paperwork for financial assistance with Eliquis was dropped off at PCP's office. Patient's daughter states this happened 2 months ago and they have heard nothing back. She states she is reluctant to have paperwork compeletd again as it took \"months to receive information from [employer] for the paperwork.\"     Patient's daughter states her  called a [facility] at [facility] over the weekend and he told the patient's relative that the medication would cost $11 a month with a prior authorization. Patient's daughter was informed our office did submit a prior authorization to [employer] however it was denied. Patient's family expressed concern over this denial and repeated previous request to assist patient in acquiring Eliquis at a more reasonable price point.    Patient's daughter states that she is aware our office has been trying to contact her mother. She states the patient often misses calls at her facility due to being at lunch or being away from her room. Patient's daughter states she had meant to call us back but did not have the time with a similarly busy schedule.    Patient's daughter was informed that patient has been reluctant to try other alternatives due to cost and patient's daughter reiterated her mother's concern with trying coumadin due to dietary restrictions.    Patient's family was informed that we would be reaching out to her PCP's office to inquire on the status of patient assistance paperwork.  "

## 2024-04-08 NOTE — H&P
[facility]  H&P  Name: Lizzie Whitten 84 y.o. female I MRN: [id number]  Unit/Bed#: ED 17 I Date of Admission: 4/8/2024   Date of Service: 4/8/2024 I Hospital Day: 0      Assessment/Plan   * Fall  Assessment & Plan  Patient presents with increasing fatigue and weakness  Patient reportedly slid out of bed to the floor.  Presenting with increased weakness.  PT/OT  Telemetry.  Noted history of pacer    CHF (congestive heart failure) (HCC)  Assessment & Plan  Wt Readings from Last 3 Encounters:   03/12/24 86.5 kg (190 lb 12.8 oz)   01/23/24 85.5 kg (188 lb 9.6 oz)   12/08/23 84.1 kg (185 lb 8 oz)           Patient followed as outpatient with Dr. Mendoza.  Consult cardiology  Check echocardiogram  One-time dose of Lasix    Mixed hyperlipidemia  Assessment & Plan  Lipitor 80      Hypothyroidism  Assessment & Plan  Levothyroxine      Complete heart block (HCC)  Assessment & Plan  History of pacemaker, Eliquis metoprolol    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 neurological manifestations, uncontrolled(250.62)-resolved as of 4/8/2024  Assessment & Plan  Sliding-scale insulin.  She has been on Lantus 25 units at bedtime         VTE Prophylaxis: Apixaban (Eliquis)  / sequential compression device   Code Status: fc  POLST: There is no POLST form on file for this patient (pre-hospital)      Anticipated Length of Stay:  Patient will be admitted on an Inpatient basis with an anticipated length of stay of  > 2 midnights.   Justification for Hospital Stay: Need to monitor symptoms    Total Time for Visit, including Counseling / Coordination of Care:  85 .  Greater than 50% of this total time spent on direct patient counseling and coordination of care.    Chief Complaint:   Weakness    History of Present Illness:    Lizzie Whitten is a 84 y.o. female who presents with symptoms of increasing fatigue/weakness.  Patient reports sliding out of the bed onto the floor and unable to  get off the floor.  Patient presents profoundly fatigued in the ED.  Patient's daughter reports that yesterday she noticed that she was a little bit more lethargic than baseline.  She was brought to the ED for further assessment.  She presents to the ED with lower extremity edema and decreased oxygen saturation on presentation.    Review of Systems:    Review of Systems   Constitutional:  Negative for chills, diaphoresis and fatigue.   Respiratory:  Negative for chest tightness and shortness of breath.    Gastrointestinal:  Negative for abdominal pain.   Musculoskeletal:  Negative for arthralgias and back pain.   Neurological:  Negative for dizziness and headaches.   Psychiatric/Behavioral:  Negative for agitation.    All other systems reviewed and are negative.      Past Medical and Surgical History:     Past Medical History:   Diagnosis Date    Anxiety     Arthritis     CHF (congestive heart failure) ([facility])     Chronic venous insufficiency     COPD (chronic obstructive pulmonary disease) ([facility])     Coronary artery disease     Diabetes mellitus ([facility])     type II    Disease of thyroid gland     hypothyroidism    DVT (deep venous thrombosis) ([facility])     HL (hearing loss)     Hyperlipidemia     Hypertension     Irregular heart beat     Paroxysmal a-fib    Pacemaker     Renal disorder     stage 3    Sleep apnea     Sleep difficulties     Thyroid disease     Urinary incontinence     Uterine leiomyoma     Walker as ambulation aid        Past Surgical History:   Procedure Laterality Date    BREAST SURGERY      For Biopsy    CARDIAC PACEMAKER PLACEMENT      Permanent. Last assessed: 2/19/16    CARDIAC SURGERY      CARPAL TUNNEL RELEASE Bilateral     CATARACT EXTRACTION      CATARACT EXTRACTION W/ INTRAOCULAR LENS  IMPLANT, BILATERAL      CYSTOSCOPY      Diagnostic. Last assessed: 11/8/17    ECTOPIC PREGNANCY SURGERY      HYSTERECTOMY      Total Abdomonal. Resolved: 1982    JOINT REPLACEMENT Right     TKR    OOPHORECTOMY       FL ENDOVEN ABLTJ INCMPTNT VEIN XTR LASER 1ST VEIN Left 2/17/2017    Procedure: GREATER SAPHENOUS VEIN ENDOVASCULAR LASER THERAPY ;  Surgeon: Danielle Phan DO;  Location: [facility];  Service: Vascular    TONSILLECTOMY         Meds/Allergies:    Prior to Admission medications    Medication Sig Start Date End Date Taking? Authorizing Provider   apixaban (Eliquis) 5 mg Take 1 tablet (5 mg total) by mouth 2 (two) times a day 4/2/24  Yes Ta Mendoza MD   acetaminophen (TYLENOL) 325 mg tablet Take 650 mg by mouth 1/22/16   Historical Provider, MD   Ascorbic Acid (VITAMIN C) 1000 MG tablet Take 500 mg by mouth     Historical Provider, MD   atorvastatin (LIPITOR) 80 mg tablet Take 1 tablet (80 mg total) by mouth daily at bedtime 6/6/23   Kennedy Castellon MD   Calcium Carbonate-Vit D-Min (CALCIUM 1200 PO) Take by mouth    Historical Provider, MD   citalopram (CeleXA) 20 mg tablet TAKE 1 TABLET EVERY DAY 7/18/22   Kennedy Castellon MD   Continuous Blood Gluc  (SnowGateYLE TAMIA 14 DAY READER) GARRY Use to check BG levels 4+times daily and as needed 12/14/19   Octavio Rico MD   Continuous Blood Gluc Sensor (FreeStyle Tamia 14 Day Sensor) MISC Change sensor every 14 days to check BG levels 4+ times daily 11/7/23   Kennedy Castellon MD   Diclofenac Sodium (VOLTAREN) 1 % Apply 2 g topically 4 (four) times a day 6/29/23   Kennedy Castellon MD   GLUCOSAMINE CHONDROITIN COMPLX PO Take 1 tablet by mouth 2 (two) times a day  Patient not taking: Reported on 3/12/2024 3/11/11   Historical Provider, MD   glucose blood (TRUE METRIX BLOOD GLUCOSE TEST) test strip Test 4 times plus daily, as instructed  Patient not taking: Reported on 6/12/2023 4/4/18   DALILA Nuñez   insulin aspart (NovoLOG FlexPen) 100 UNIT/ML injection pen INJECT 6 UNITS SUBCUTANEOUSLY WITH BREAKFAST, THEN 6 UNITS WITH LUNCH, AND THEN 6 UNITS WITH DINNER 6/12/23   Octavio Rico MD   insulin glargine  (Toujeo SoloStar) 300 units/mL CONCENTRATED U-300 injection pen (1-unit dial) Inject 25  units at bedtime 6/12/23   Octavio Rico MD   Insulin Pen Needle (BD Pen Needle Sasha U/F) 32G X 4 MM MISC Inject 4 times a day 10/12/22   Vicki Guevara PA-C   Lancets (onetouch ultrasoft) lancets Use 4 times a day 2/17/22   Vicki Guevara PA-C   levothyroxine 125 mcg tablet Take 1 tablet from mon to sat and 1/2 tablet on Sunday 1/31/24   Octavio Rico MD   lisinopril (ZESTRIL) 20 mg tablet TAKE 1 TABLET TWICE DAILY 8/4/23   Kennedy Castellon MD   metoprolol tartrate (LOPRESSOR) 25 mg tablet TAKE 1 TABLET EVERY 12 HOURS 7/3/23   Kennedy Castellon MD   Omega-3 Fatty Acids (FISH OIL) 1200 MG CAPS Take 1,200 mg by mouth 2 (two) times a day     Historical Provider, MD   omeprazole (PriLOSEC) 20 mg delayed release capsule TAKE 1 CAPSULE TWICE DAILY 4/26/23   Kennedy Castellon MD   promethazine-dextromethorphan (PHENERGAN-DM) 6.25-15 mg/5 mL oral syrup TAKE 5ML DAILY AT BEDTIME 9/22/23   Kennedy Castellon MD   sodium chloride (VANNESSA 128) 5 % hypertonic ophthalmic ointment Administer 1 drop to both eyes 2 (two) times a day    Historical Provider, MD   spironolactone (ALDACTONE) 25 mg tablet Take 1 tablet (25 mg total) by mouth daily  Patient not taking: Reported on 3/12/2024 2/21/23   Cami Peck PA-C   triamcinolone (KENALOG) 0.1 % cream APPLY TOPCICALLY TWO TIMES A DAY  Patient not taking: Reported on 3/12/2024 11/1/23   Kennedy Castellon MD     I have reviewed home medications with patient personally.    Allergies:   Allergies   Allergen Reactions    Ampicillin Hives       Social History:     Marital Status: /Civil Union   Occupation: Retired  Patient Pre-hospital Living Situation: Home  Patient Pre-hospital Level of Mobility: Ambulatory  Patient Pre-hospital Diet Restrictions: Denies  Substance Use History:   Social History     Substance and Sexual Activity   Alcohol Use Not  Currently    Comment: rarely. Per [employer]: Denied history of alcohol use     Social History     Tobacco Use   Smoking Status Never    Passive exposure: Never   Smokeless Tobacco Never   Tobacco Comments    Per [employer]ipts: Former smoker     Social History     Substance and Sexual Activity   Drug Use No       Family History:    Family History   Problem Relation Age of Onset    Coronary artery disease Mother     Hypertension Mother     Heart disease Mother     Hypertension Father     Diabetes Father         Mellitus    No Known Problems Sister     No Known Problems Daughter     No Known Problems Daughter     No Known Problems Maternal Grandmother     No Known Problems Maternal Grandfather     No Known Problems Paternal Grandmother     No Known Problems Paternal Grandfather     Diabetes Brother         Mellitus    No Known Problems Son     Breast cancer Maternal Aunt     Pancreatic cancer Maternal Aunt     Breast cancer Cousin     Breast cancer Cousin     Breast cancer Paternal Aunt     No Known Problems Paternal Aunt     No Known Problems Paternal Aunt        Physical Exam:     Vitals:   Blood Pressure: 161/72 (04/08/24 1800)  Pulse: 62 (04/08/24 1800)  Temperature: 98.1 °F (36.7 °C) (04/08/24 0803)  Temp Source: Oral (04/08/24 0803)  Respirations: 19 (04/08/24 1800)  SpO2: 97 % (04/08/24 1800)    Physical Exam  Constitutional:       Appearance: Normal appearance.   HENT:      Head: Normocephalic and atraumatic.   Cardiovascular:      Rate and Rhythm: Normal rate and regular rhythm.   Pulmonary:      Effort: No respiratory distress.      Breath sounds: No stridor.   Abdominal:      General: Abdomen is flat.      Palpations: Abdomen is soft.   Musculoskeletal:         General: No swelling.      Cervical back: Normal range of motion and neck supple.   Skin:     General: Skin is warm and dry.   Neurological:      General: No focal deficit present.      Mental Status: She is alert.   Psychiatric:         Mood and  Affect: Mood normal.         Behavior: Behavior normal.             Additional Data:     Lab Results: I have personally reviewed pertinent reports.      Results from last 7 days   Lab Units 04/08/24  0846   WBC Thousand/uL 6.00   HEMOGLOBIN g/dL 11.2*   HEMATOCRIT % 37.0   PLATELETS Thousands/uL 181   NEUTROS PCT % 61   LYMPHS PCT % 23   MONOS PCT % 10   EOS PCT % 5     Results from last 7 days   Lab Units 04/08/24  0846   SODIUM mmol/L 139   POTASSIUM mmol/L 5.0   CHLORIDE mmol/L 103   CO2 mmol/L 32   BUN mg/dL 34*   CREATININE mg/dL 1.30   ANION GAP mmol/L 4   CALCIUM mg/dL 8.7   GLUCOSE RANDOM mg/dL 79         Results from last 7 days   Lab Units 04/08/24  1558   POC GLUCOSE mg/dl 155*               Imaging: I have personally reviewed pertinent reports.      CTA ED chest PE Study   Final Result by Sonja Desai MD (04/08 1236)      No pulmonary embolism identified.      However unable to fully evaluate distal right basilar pulmonary arteries secondary to adjacent atelectasis      Cardiomegaly with enlarged main pulmonary artery and reflux of injected contrast into the central hepatic veins. Correlate with tricuspid regurgitation      4 mm left lower lung nodule. Based on current Fleischner Society 2017 Guidelines on incidental pulmonary nodule, no routine follow-up is needed if the patient is low risk. If the patient is high risk, optional follow-up chest CT at 12 months can be    considered.  .      The study was marked in EPIC for immediate notification.      Workstation performed: [id number]                 ** Please Note: This note has been constructed using a voice recognition system. **

## 2024-04-08 NOTE — TELEPHONE ENCOUNTER
Clinical staff at patient's PCP office was contacted. They report that an itemized list of outpatient medications and additional financial information is needed to complete the forms dropped off by the patient's daughter. They report several attempts to contact the patient and her daughter were made to acquire this information however they were unable to reach them. The paperwork can be viewable in patient's Media from 1/31/24. Patient's daughter will be contacted to reinitiate financial assistance efforts.

## 2024-04-08 NOTE — ASSESSMENT & PLAN NOTE
Patient presents with increasing fatigue and weakness  Patient reportedly slid out of bed to the floor.  Presenting with increased weakness.  PT/OT  Telemetry.  Noted history of pacer

## 2024-04-09 ENCOUNTER — APPOINTMENT (INPATIENT)
Dept: NON INVASIVE DIAGNOSTICS | Facility: HOSPITAL | Age: 84
DRG: 291 | End: 2024-04-09
Payer: MEDICARE

## 2024-04-09 PROBLEM — R91.1 PULMONARY NODULE: Status: ACTIVE | Noted: 2024-04-09

## 2024-04-09 LAB
AORTIC ROOT: 3.8 CM
AORTIC VALVE ANNULUS: 2.1 CM
APICAL FOUR CHAMBER EJECTION FRACTION: 67 %
ASCENDING AORTA: 4.1 CM
BSA FOR ECHO PROCEDURE: 1.84 M2
E WAVE DECELERATION TIME: 195 MS
E/A RATIO: 1.03
FRACTIONAL SHORTENING: 44 (ref 28–44)
GLUCOSE SERPL-MCNC: 173 MG/DL (ref 65–140)
GLUCOSE SERPL-MCNC: 173 MG/DL (ref 65–140)
GLUCOSE SERPL-MCNC: 175 MG/DL (ref 65–140)
INTERVENTRICULAR SEPTUM IN DIASTOLE (PARASTERNAL SHORT AXIS VIEW): 1.4 CM
INTERVENTRICULAR SEPTUM: 1.4 CM (ref 0.6–1.1)
IVC: 20 MM
LAAS-AP2: 17.9 CM2
LAAS-AP4: 17.3 CM2
LEFT ATRIUM SIZE: 4.8 CM
LEFT ATRIUM VOLUME (MOD BIPLANE): 50 ML
LEFT ATRIUM VOLUME INDEX (MOD BIPLANE): 27.2 ML/M2
LEFT INTERNAL DIMENSION IN SYSTOLE: 2.5 CM (ref 2.1–4)
LEFT VENTRICULAR INTERNAL DIMENSION IN DIASTOLE: 4.5 CM (ref 3.5–6)
LEFT VENTRICULAR POSTERIOR WALL IN END DIASTOLE: 1.2 CM
LEFT VENTRICULAR STROKE VOLUME: 68 ML
LVSV (TEICH): 68 ML
MV E'TISSUE VEL-LAT: 7 CM/S
MV E'TISSUE VEL-SEP: 7 CM/S
MV PEAK A VEL: 0.89 M/S
MV PEAK E VEL: 92 CM/S
MV STENOSIS PRESSURE HALF TIME: 56 MS
MV VALVE AREA P 1/2 METHOD: 3.93
RA PRESSURE ESTIMATED: 3 MMHG
RIGHT ATRIUM AREA SYSTOLE A4C: 13.1 CM2
RIGHT VENTRICLE ID DIMENSION: 4.1 CM
RV PSP: 52 MMHG
SINOTUBULAR JUNCTION: 3.1 CM
SL CV LEFT ATRIUM LENGTH A2C: 5.5 CM
SL CV LV EF: 65
SL CV PED ECHO LEFT VENTRICLE DIASTOLIC VOLUME (MOD BIPLANE) 2D: 91 ML
SL CV PED ECHO LEFT VENTRICLE SYSTOLIC VOLUME (MOD BIPLANE) 2D: 23 ML
SL CV SINUS OF VALSALVA 2D: 3.6 CM
STJ: 3.1 CM
T4 FREE SERPL-MCNC: 0.83 NG/DL (ref 0.61–1.12)
TR MAX PG: 49 MMHG
TR PEAK VELOCITY: 3.5 M/S
TRICUSPID ANNULAR PLANE SYSTOLIC EXCURSION: 2.3 CM
TRICUSPID VALVE PEAK REGURGITATION VELOCITY: 3.5 M/S
TSH SERPL DL<=0.05 MIU/L-ACNC: 10.46 UIU/ML (ref 0.45–4.5)

## 2024-04-09 PROCEDURE — 99232 SBSQ HOSP IP/OBS MODERATE 35: CPT | Performed by: FAMILY MEDICINE

## 2024-04-09 PROCEDURE — 84439 ASSAY OF FREE THYROXINE: CPT | Performed by: FAMILY MEDICINE

## 2024-04-09 PROCEDURE — 99223 1ST HOSP IP/OBS HIGH 75: CPT | Performed by: INTERNAL MEDICINE

## 2024-04-09 PROCEDURE — 93306 TTE W/DOPPLER COMPLETE: CPT | Performed by: INTERNAL MEDICINE

## 2024-04-09 PROCEDURE — 97167 OT EVAL HIGH COMPLEX 60 MIN: CPT

## 2024-04-09 PROCEDURE — 97163 PT EVAL HIGH COMPLEX 45 MIN: CPT

## 2024-04-09 PROCEDURE — 99222 1ST HOSP IP/OBS MODERATE 55: CPT | Performed by: INTERNAL MEDICINE

## 2024-04-09 PROCEDURE — 82948 REAGENT STRIP/BLOOD GLUCOSE: CPT

## 2024-04-09 PROCEDURE — 93306 TTE W/DOPPLER COMPLETE: CPT

## 2024-04-09 PROCEDURE — 84443 ASSAY THYROID STIM HORMONE: CPT | Performed by: FAMILY MEDICINE

## 2024-04-09 RX ORDER — FUROSEMIDE 10 MG/ML
40 INJECTION INTRAMUSCULAR; INTRAVENOUS
Status: DISCONTINUED | OUTPATIENT
Start: 2024-04-09 | End: 2024-04-11

## 2024-04-09 RX ADMIN — LISINOPRIL 20 MG: 20 TABLET ORAL at 17:18

## 2024-04-09 RX ADMIN — FUROSEMIDE 40 MG: 10 INJECTION, SOLUTION INTRAMUSCULAR; INTRAVENOUS at 17:17

## 2024-04-09 RX ADMIN — ATORVASTATIN CALCIUM 80 MG: 80 TABLET, FILM COATED ORAL at 21:39

## 2024-04-09 RX ADMIN — PANTOPRAZOLE SODIUM 40 MG: 40 TABLET, DELAYED RELEASE ORAL at 06:22

## 2024-04-09 RX ADMIN — INSULIN GLARGINE 25 UNITS: 100 INJECTION, SOLUTION SUBCUTANEOUS at 21:38

## 2024-04-09 RX ADMIN — INSULIN LISPRO 6 UNITS: 100 INJECTION, SOLUTION INTRAVENOUS; SUBCUTANEOUS at 11:54

## 2024-04-09 RX ADMIN — METOPROLOL TARTRATE 25 MG: 25 TABLET, FILM COATED ORAL at 04:42

## 2024-04-09 RX ADMIN — APIXABAN 5 MG: 5 TABLET, FILM COATED ORAL at 17:18

## 2024-04-09 RX ADMIN — CITALOPRAM HYDROBROMIDE 20 MG: 20 TABLET ORAL at 08:13

## 2024-04-09 RX ADMIN — METOPROLOL TARTRATE 25 MG: 25 TABLET, FILM COATED ORAL at 17:18

## 2024-04-09 RX ADMIN — INSULIN LISPRO 6 UNITS: 100 INJECTION, SOLUTION INTRAVENOUS; SUBCUTANEOUS at 17:20

## 2024-04-09 RX ADMIN — LISINOPRIL 20 MG: 20 TABLET ORAL at 08:13

## 2024-04-09 RX ADMIN — LEVOTHYROXINE SODIUM 125 MCG: 125 TABLET ORAL at 06:22

## 2024-04-09 RX ADMIN — APIXABAN 5 MG: 5 TABLET, FILM COATED ORAL at 08:13

## 2024-04-09 NOTE — PHYSICAL THERAPY NOTE
Physical Therapy Evaluation     Patient's Name: Lizzie Whitten    Admitting Diagnosis  Edema [R60.9]  Hypoxia [R09.02]    Problem List  Patient Active Problem List   Diagnosis    Type 2 diabetes mellitus with hyperglycemia, with long-term current use of insulin ([facility])    Complete heart block (HCC)    Anemia    S/P cardiac pacemaker procedure    Hypothyroidism    Chronic diastolic congestive heart failure (HCC)    Chronic venous insufficiency    Benign hypertension with chronic kidney disease, stage III ([facility])    Stage 3a chronic kidney disease ([facility])    Iron deficiency anemia    Microalbuminuria    Lymphedema of both lower extremities    Vitamin D deficiency    Anxiety disorder    Urinary bladder incontinence    Chronic obstructive pulmonary disease ([facility])    Eczematous dermatitis    Mixed hyperlipidemia    OAB (overactive bladder)    Presence of permanent cardiac pacemaker    PAF (paroxysmal atrial fibrillation) ([facility])    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 ([facility])    Intraocular pressure increase    Sleep apnea    Dermatitis    Coronary artery disease involving native coronary artery of native heart    Memory difficulties    Imbalance    Secondary hyperparathyroidism of renal origin ([facility])    Fall    Hypertension    Abnormal CT scan, esophagus    Hiatal hernia    Esophageal dysphagia    Assistance needed with transportation    Pharyngeal dysphagia    Chronic cough    Hand arthritis    Head trauma    Traumatic hematoma of forehead    Traumatic ulcer of left lower leg, limited to breakdown of skin (HCC)    CHF (congestive heart failure) ([facility])    Pulmonary nodule       Past Medical History  Past Medical History:   Diagnosis Date    Anxiety     Arthritis     CHF (congestive heart failure) ([facility])     Chronic venous insufficiency     COPD (chronic obstructive pulmonary disease) ([facility])     Coronary artery disease     Diabetes mellitus ([facility])     type II     Disease of thyroid gland     hypothyroidism    DVT (deep venous thrombosis) (HCC)     HL (hearing loss)     Hyperlipidemia     Hypertension     Irregular heart beat     Paroxysmal a-fib    Pacemaker     Renal disorder     stage 3    Sleep apnea     Sleep difficulties     Thyroid disease     Urinary incontinence     Uterine leiomyoma     Walker as ambulation aid        Past Surgical History  Past Surgical History:   Procedure Laterality Date    BREAST SURGERY      For Biopsy    CARDIAC PACEMAKER PLACEMENT      Permanent. Last assessed: 2/19/16    CARDIAC SURGERY      CARPAL TUNNEL RELEASE Bilateral     CATARACT EXTRACTION      CATARACT EXTRACTION W/ INTRAOCULAR LENS  IMPLANT, BILATERAL      CYSTOSCOPY      Diagnostic. Last assessed: 11/8/17    ECTOPIC PREGNANCY SURGERY      HYSTERECTOMY      Total Abdomonal. Resolved: 1982    JOINT REPLACEMENT Right     TKR    OOPHORECTOMY      KY ENDOVEN ABLTJ INCMPTNT VEIN XTR LASER 1ST VEIN Left 2/17/2017    Procedure: GREATER SAPHENOUS VEIN ENDOVASCULAR LASER THERAPY ;  Surgeon: Danielle Phan DO;  Location: [facility];  Service: Vascular    TONSILLECTOMY            04/09/24 1145   PT Last Visit   PT Visit Date 04/09/24   Note Type   Note type Evaluation   Pain Assessment   Pain Assessment Tool 0-10   Pain Location/Orientation Location: Generalized   Effect of Pain on Daily Activities Increased time for activity   Hospital Pain Intervention(s) Repositioned;Ambulation/increased activity;Emotional support   Restrictions/Precautions   Weight Bearing Precautions Per Order No   Other Precautions Bed Alarm;Chair Alarm;Multiple lines;Fall Risk;Pain;O2  (2L O2)   Home Living   Type of Home Apartment  ([facility])   Home Layout One level;Access;Elevator   Bathroom Shower/Tub Walk-in shower   Bathroom Toilet Standard   Bathroom Equipment Grab bars in shower;Shower chair;Grab bars around toilet   Home Equipment Walker   Additional Comments P lives at [facility] with spouse.   Prior Function   Level of [place] Independent with ADLs;Independent with functional mobility  (Per family, facility staff assist with laundry, cleaning)   Lives With Spouse   Receives Help From Family   IADLs Family/Friend/Other provides transportation;Family/Friend/Other provides meals;Family/Friend/Other provides medication management   Falls in the last 6 months 1 to 4  (1 leading to this admission)   Vocational Retired   Cognition   Overall Cognitive Status WFL   Arousal/Participation Alert   Attention Within functional limits   Orientation Level Oriented X4   Following Commands Follows all commands and directions without difficulty   Comments Pt cooperative during sessiopn   Subjective   Subjective Agreeable to mobilize   RLE Assessment   RLE Assessment   (grossly 3+/5)   LLE Assessment   LLE Assessment   (grossly 3+/5)   Bed Mobility   Supine to Sit 4  Minimal assistance   Additional items Assist x 1;Increased time required;Verbal cues;LE management;HOB elevated;Bedrails   Sit to Supine Unable to assess   Additional Comments Pt in bed upon arrival.   Transfers   Sit to Stand 4  Minimal assistance   Additional items Assist x 1;Increased time required;Verbal cues   Stand to Sit 4  Minimal assistance   Additional items Assist x 1;Increased time required;Verbal cues   Additional Comments w/RW   Ambulation/Elevation   Gait pattern Forward Flexion;Excessively slow;Short stride;Step to   Gait Assistance 4  Minimal assist   Additional items Assist x 1;Verbal cues;Tactile cues   Assistive Device Rolling walker   Distance 16 ft   Ambulation/Elevation Additional Comments Limited ambulation distance 2* fatigue, unsteady gait. Pt's O2 sats on RA at rest at 93%, drop to 87% with activity, required to be restarted on 2L O2 to recover to 92%. Nurse updated.   Balance   Static Sitting Fair +   Dynamic Sitting Fair   Static Standing Fair -   Dynamic Standing Poor +   Ambulatory Poor +   Activity  Tolerance   Activity Tolerance Patient limited by fatigue   Nurse Made Aware RN cleared pt for therapy   Assessment   Prognosis Good   Problem List Decreased strength;Decreased endurance;Decreased mobility;Pain   Assessment Pt is a 84 y.o. female seen for PT evaluation s/p admit to [facility] on 4/8/2024. Pt was admitted with a primary dx of: Fall , with PMH sx for Pulmonary nodule, CHF, Complete heart block, Anemia, CKD, DM, COPD, Sleep apnea, Imbalance, PAF, Dermatitis, Head trauma, Hiatal hernia.  PT now consulted for assessment of mobility and d/c needs. Pt with Up and OOB as tolerated  orders.  Pts current clinical presentation is Unstable/ Unpredictable (high complexity) due to Ongoing medical management for primary dx, Increased reliance on more restrictive AD compared to baseline, Decreased activity tolerance compared to baseline, Fall risk, Increased assistance needed from caregiver at current time. Prior to admission, pt was Independent for mobility and ADLs.Pt lives at [facility] with spouse. Upon evaluation, pt currently is requiring Assist X 1 for bed mobility , transfers and ambulation using RW at this time. Limited mobility 2* fatigue, decreased O2 sats, generalized weakness as detailed in above flowsheet.  Pt presents at PT eval functioning below baseline and currently w/ overall mobility deficits 2* to: BLE weakness, impaired balance, decreased endurance, gait deviations, decreased activity tolerance compared to baseline, decreased functional mobility tolerance compared to baseline, fall risk. Pt currently at a fall risk 2* to impairments listed above.  Pt will continue to benefit from skilled acute PT interventions to address stated impairments; to maximize functional mobility; for ongoing pt  training; and DME needs. At conclusion of PT session chair alarm engaged, all needs in reach, RN notified of session findings/recommendations, and pt returned back in recliner chair  with phone and call bell within reach. Pt denies any further questions at this time. The patient's AM-PAC Basic Mobility Inpatient Short Form Raw Score is 15. A Raw score of less than or equal to 16 suggests the patient may benefit from discharge to post-acute rehabilitation services. Please also refer to the recommendation of the Physical Therapist for safe discharge planning. Recommend Level II upon hospital D/C.   Goals   Patient Goals none stated   STG Expiration Date 04/23/24   Short Term Goal #1 STG 1. Pt will be able to perform bed mobility tasks with supervision in order to improve overall functional mobility and assist in safe d/c. STG 2. Pt with sit EOB for at least 25 minutes at Ind level in order to strengthen abdominal musculature and assist in future transfers/ ambulation. STG 3. Pt will be able to perform functional transfer with supervision in order to improve overall functional mobility and assist in safe d/c. STG 4. Pt will be able to ambulate at least 100 feet with least restrictive device with supervision A in order to improve overall functional mobility and assist in safe d/c. STG 5. Pt will improve sitting/standing static/dynamic balance 1/2 grade in order to improve functional mobility and assist in safe d/c. STG 6. Pt will improve LE strength by 1/2 grade in order to improve functional mobility and assist in safe d/c.   PT Treatment Day 0   Plan   Treatment/Interventions Functional transfer training;Therapeutic exercise;Gait training;Bed mobility;Spoke to nursing;OT   PT Frequency 2-3x/wk   Discharge Recommendation   Rehab Resource Intensity Level, PT II (Moderate Resource Intensity)   Equipment Recommended Walker   AM-PAC Basic Mobility Inpatient   Turning in Flat Bed Without Bedrails 3   Lying on Back to Sitting on Edge of Flat Bed Without Bedrails 2   Moving Bed to Chair 3   Standing Up From Chair Using Arms 3   Walk in Room 3   Climb 3-5 Stairs With Railing 1   Basic Mobility Inpatient  Raw Score 15   Basic Mobility Standardized Score 36.97   [facility] Highest Level Of Mobility   -[facility] Goal 4: Move to chair/commode   -HL Achieved 6: Walk 10 steps or more   Modified [place] Scale   Modified [place] Scale 4   End of Consult   Patient Position at End of Consult All needs within reach;Bed/Chair alarm activated;Bedside chair         Elda Andrade, PT DPT

## 2024-04-09 NOTE — PROGRESS NOTES
[facility]  Progress Note  Name: Lizzie Whitten I  MRN: [id number]  Unit/Bed#: [facility] 803-01 I Date of Admission: 4/8/2024   Date of Service: 4/9/2024 I Hospital Day: 1    Assessment/Plan   * Fall  Assessment & Plan  Presenting with increased weakness.  Will have PT/OT evaluate patient  Fall precautions  Telemetry.  Noted history of pacer    CHF (congestive heart failure) (HCC)  Assessment & Plan  Wt Readings from Last 3 Encounters:   04/08/24 87.6 kg (193 lb 1.6 oz)   03/12/24 86.5 kg (190 lb 12.8 oz)   01/23/24 85.5 kg (188 lb 9.6 oz)     Patient followed as outpatient with Dr. Mendoza.  Consult cardiology  IV Lasix 40 mg twice daily  Continue metoprolol and lisinopril    Mixed hyperlipidemia  Assessment & Plan  Continue atorvastatin 80 mg    Hypothyroidism  Assessment & Plan  Continue levothyroxine 125 mcg  Most recent TSH done in January was abnormal will repeat at this time    Complete heart block (HCC)  Assessment & Plan  Patient has a pacemaker in place  Continue Eliquis  Continue metoprolol               VTE Pharmacologic Prophylaxis:   Moderate Risk (Score 3-4) - Pharmacological DVT Prophylaxis Ordered: apixaban (Eliquis).    Mobility:   Basic Mobility Inpatient Raw Score: 15  JH-HLM Goal: 4: Move to chair/commode  JH-HLM Achieved: 6: Walk 10 steps or more  JH-HLM Goal achieved. Continue to encourage appropriate mobility.    Patient Centered Rounds: I performed bedside rounds with nursing staff today.   Discussions with Specialists or Other Care Team Provider: Geriatrics.  Patient has a poor appetite.  Discussed case and plan with her geriatrician.    Education and Discussions with Family / Patient: Updated  (daughter) at bedside.    Total Time Spent on Date of Encounter in care of patient: 50 mins. This time was spent on one or more of the following: performing physical exam; counseling and coordination of care; obtaining or reviewing history; documenting  in the medical record; reviewing/ordering tests, medications or procedures; communicating with other healthcare professionals and discussing with patient's family/caregivers.    Current Length of Stay: 1 day(s)  Current Patient Status: Inpatient   Certification Statement: The patient will continue to require additional inpatient hospital stay due to follow-up echocardiogram  Discharge Plan: Anticipate discharge in 48 hrs to pending PT/OT evaluation    Code Status: Level 1 - Full Code    Subjective:   Is a very pleasant 84-year-old female who was seen and evaluated today at bedside.  Patient has no acute complaints at this time.    Objective:     Vitals:   Temp (24hrs), Av.4 °F (36.9 °C), Min:98 °F (36.7 °C), Max:98.8 °F (37.1 °C)    Temp:  [98 °F (36.7 °C)-98.8 °F (37.1 °C)] 98 °F (36.7 °C)  HR:  [54-62] 60  Resp:  [17-24] 19  BP: ()/(55-77) 143/70  SpO2:  [96 %-99 %] 97 %  Body mass index is 37.69 kg/m².     Input and Output Summary (last 24 hours):     Intake/Output Summary (Last 24 hours) at 2024 1406  Last data filed at 2024 0801  Gross per 24 hour   Intake 180 ml   Output 250 ml   Net -70 ml       Physical Exam:   Physical Exam  Vitals reviewed.   Constitutional:       General: She is not in acute distress.     Appearance: She is not ill-appearing.   HENT:      Head: Normocephalic.   Eyes:      Conjunctiva/sclera: Conjunctivae normal.   Cardiovascular:      Rate and Rhythm: Normal rate and regular rhythm.      Heart sounds: No murmur heard.  Pulmonary:      Effort: Pulmonary effort is normal.   Abdominal:      General: Abdomen is flat.      Palpations: Abdomen is soft.      Tenderness: There is no abdominal tenderness.   Skin:     General: Skin is warm and dry.   Neurological:      General: No focal deficit present.      Mental Status: She is alert. Mental status is at baseline.          Additional Data:     Labs:  Results from last 7 days   Lab Units 24  0846   WBC Thousand/uL 6.00    HEMOGLOBIN g/dL 11.2*   HEMATOCRIT % 37.0   PLATELETS Thousands/uL 181   SEGS PCT % 61   LYMPHO PCT % 23   MONO PCT % 10   EOS PCT % 5     Results from last 7 days   Lab Units 04/08/24  0846   SODIUM mmol/L 139   POTASSIUM mmol/L 5.0   CHLORIDE mmol/L 103   CO2 mmol/L 32   BUN mg/dL 34*   CREATININE mg/dL 1.30   ANION GAP mmol/L 4   CALCIUM mg/dL 8.7   GLUCOSE RANDOM mg/dL 79         Results from last 7 days   Lab Units 04/09/24  1136 04/09/24  0801 04/08/24  2101 04/08/24  1558   POC GLUCOSE mg/dl 175* 173* 166* 155*               Lines/Drains:  Invasive Devices       Peripheral Intravenous Line  Duration             Peripheral IV 04/08/24 Left Antecubital 1 day              Drain  Duration             External Urinary Catheter <1 day                          Imaging: Reviewed radiology reports from this admission including: chest CT scan    Recent Cultures (last 7 days):         Last 24 Hours Medication List:   Current Facility-Administered Medications   Medication Dose Route Frequency Provider Last Rate    acetaminophen  650 mg Oral Q6H PRN Hetul Fernandez, DO      apixaban  5 mg Oral BID Hetul Fernandez, DO      atorvastatin  80 mg Oral HS Hetul Fernandez, DO      citalopram  20 mg Oral Daily Hetul Fernandez, DO      furosemide  40 mg Intravenous BID (diuretic) DALILA Cole      insulin glargine  25 Units Subcutaneous HS Hetul Fernandez, DO      insulin lispro  6 Units Subcutaneous TID With Meals Hetul Fernandez, DO      levothyroxine  125 mcg Oral Early Morning Hetul Fernandez, DO      lisinopril  20 mg Oral BID Hetul Fernandez, DO      metoprolol tartrate  25 mg Oral Q12H Hetul Fernandez, DO      pantoprazole  40 mg Oral Early Morning Hetul Fernandez, DO      sodium chloride (PF)  3 mL Intravenous Q1H PRN Ana Laura Pedraza MD          Today, Patient Was Seen By: Jon Pearson MD    **Please Note: This note may have been constructed using a voice recognition system.**

## 2024-04-09 NOTE — ASSESSMENT & PLAN NOTE
Continue levothyroxine 125 mcg  Most recent TSH done in January was abnormal will repeat at this time

## 2024-04-09 NOTE — OCCUPATIONAL THERAPY NOTE
Occupational Therapy Evaluation     Patient Name: Lizzie Whitten  Today's Date: 4/9/2024  Problem List  Principal Problem:    Fall  Active Problems:    Complete heart block (HCC)    Hypothyroidism    Mixed hyperlipidemia    CHF (congestive heart failure) (HCC)    Past Medical History  Past Medical History:   Diagnosis Date    Anxiety     Arthritis     CHF (congestive heart failure) (HCC)     Chronic venous insufficiency     COPD (chronic obstructive pulmonary disease) (HCC)     Coronary artery disease     Diabetes mellitus (HCC)     type II    Disease of thyroid gland     hypothyroidism    DVT (deep venous thrombosis) (HCC)     HL (hearing loss)     Hyperlipidemia     Hypertension     Irregular heart beat     Paroxysmal a-fib    Pacemaker     Renal disorder     stage 3    Sleep apnea     Sleep difficulties     Thyroid disease     Urinary incontinence     Uterine leiomyoma     Walker as ambulation aid      Past Surgical History  Past Surgical History:   Procedure Laterality Date    BREAST SURGERY      For Biopsy    CARDIAC PACEMAKER PLACEMENT      Permanent. Last assessed: 2/19/16    CARDIAC SURGERY      CARPAL TUNNEL RELEASE Bilateral     CATARACT EXTRACTION      CATARACT EXTRACTION W/ INTRAOCULAR LENS  IMPLANT, BILATERAL      CYSTOSCOPY      Diagnostic. Last assessed: 11/8/17    ECTOPIC PREGNANCY SURGERY      HYSTERECTOMY      Total Abdomonal. Resolved: 1982    JOINT REPLACEMENT Right     TKR    OOPHORECTOMY      UT ENDOVEN ABLTJ INCMPTNT VEIN XTR LASER 1ST VEIN Left 2/17/2017    Procedure: GREATER SAPHENOUS VEIN ENDOVASCULAR LASER THERAPY ;  Surgeon: Danielle Phan DO;  Location: [facility];  Service: Vascular    TONSILLECTOMY           04/09/24 0916   OT Last Visit   OT Visit Date 04/09/24   Note Type   Note type Evaluation   Pain Assessment   Pain Assessment Tool 0-10   Pain Score 3   Pain Location/Orientation Location: Generalized   Effect of Pain on Daily Activities Impacts ability to engage in  "valued occupations   Hospital Pain Intervention(s) Repositioned;Ambulation/increased activity;Emotional support   Restrictions/Precautions   Weight Bearing Precautions Per Order No   Other Precautions Bed Alarm;Multiple lines;Fall Risk;Pain   Home Living   Type of Home Apartment  ([facility])   Home Layout One level;Performs ADLs on one level;Able to live on main level with bedroom/bathroom;Access;Elevator   Bathroom Shower/Tub Walk-in shower   Bathroom Toilet Standard   Bathroom Equipment Grab bars in shower;Shower chair;Grab bars around toilet   Bathroom Accessibility Accessible   Home Equipment Walker   Additional Comments Pt reports living with her  at [facility]; uses RW for functional mobility   Prior Function   Level of [place] Independent with ADLs;Independent with functional mobility;Independent with IADLS  (Pt reporting that facility provides 3 meals a day; reports that she does her own laundry; cleaning aide that comes 1x/wk)   Lives With Spouse   Receives Help From Family   IADLs Independent with meal prep;Independent with medication management;Family/Friend/Other provides transportation   Falls in the last 6 months 1 to 4  (Leading to this admission)   Vocational Retired   Comments (-) driving, reporting that facility/family assists with transportation needs   Lifestyle   Autonomy PTA, pt was independent in ADLs, some IADLs (faciltity provides some assistance), and uses RW for functional mobility; (-) driving   Reciprocal Relationships Lives with her ; supportive family nearby   Service to Others Retired; reports previously working at [employer]   Intrinsic Gratification Enjoys Jewish activities and being with family; reports that facility has activites to do   Subjective   Subjective \"I just feel weaker than normal.\"   ADL   Where Assessed Edge of bed   Eating Assistance 5  Supervision/Setup " "  Grooming Assistance 5  Supervision/Setup   UB Bathing Assistance 4  Minimal Assistance   LB Bathing Assistance 3  Moderate Assistance   UB Dressing Assistance 4  Minimal Assistance   LB Dressing Assistance 3  Moderate Assistance   Toileting Assistance  3  Moderate Assistance   Functional Assistance 4  Minimal Assistance   Bed Mobility   Supine to Sit 4  Minimal assistance   Additional items Assist x 1;HOB elevated;Bedrails;Increased time required;Verbal cues;LE management   Sit to Supine 4  Minimal assistance   Additional items Assist x 1;HOB elevated;Bedrails;Increased time required;Verbal cues;LE management   Additional Comments At end of session, pt left lying supine in bed with all functional needs in reach with bed alarm activated   Transfers   Sit to Stand 4  Minimal assistance   Additional items Assist x 1;Increased time required;Verbal cues   Stand to Sit 4  Minimal assistance   Additional items Assist x 1;Increased time required;Verbal cues   Additional Comments w/ RW   Functional Mobility   Functional Mobility 4  Minimal assistance   Additional Comments Pt completed few small steps from EOB towards HOB with Min A x1 w/ RW; increased time noted throughout with pt reporting \"I just feel weaker than normal.\"   Additional items Rolling walker   Balance   Static Sitting Fair   Dynamic Sitting Fair -   Static Standing Fair -   Dynamic Standing Poor +   Ambulatory Poor +   Activity Tolerance   Activity Tolerance Patient tolerated treatment well;Patient limited by fatigue   Nurse Made Aware RN cleared   RUE Assessment   RUE Assessment WFL   LUE Assessment   LUE Assessment WFL   Hand Function   Gross Motor Coordination Functional   Fine Motor Coordination Functional   Cognition   Overall Cognitive Status WFL   Arousal/Participation Alert;Responsive;Arousable;Cooperative   Attention Within functional limits   Orientation Level Oriented X4   Memory Within functional limits   Following Commands Follows all commands " and directions without difficulty   Comments Pt pleasant and cooperative   Assessment   Limitation Decreased ADL status;Decreased endurance;Decreased self-care trans;Decreased high-level ADLs   Prognosis Fair   Assessment Pt is a 83 yo female who presented to [facility] s/p fall 2* fatigue and weakness. Pt dx w/ fall. Pt  has a past medical history of Anxiety, Arthritis, CHF (congestive heart failure) ([facility]), Chronic venous insufficiency, COPD (chronic obstructive pulmonary disease) ([facility]), Coronary artery disease, Diabetes mellitus (HCC), Disease of thyroid gland, DVT (deep venous thrombosis) ([facility]), HL (hearing loss), Hyperlipidemia, Hypertension, Irregular heart beat, Pacemaker, Renal disorder, Sleep apnea, Sleep difficulties, Thyroid disease, Urinary incontinence, Uterine leiomyoma, and Walker as ambulation aid. Pt with active OT orders in which OT consulted to assess pt's functional status and occupational performance to determine safe d/c needs. Pt lives with her  at [facility]. PTA, pt was independent in ADLs, most IADLs, and uses RW for functional mobility. (-) driving. Currently, pt performing bed mobility w/ Min A, functional transfers/mobility w/ Min A w/ RW, UB ADLs w/ Min A, and LB ADLs w/ Mod A. Pt demonstrates the following limitations/impairments which impact the pt's ability to engage in valued occupations: balance, endurance/activity tolerance, standing tolerance, functional reach, postural/trunk control, strength, safety awareness, and pain. From an OT standpoint, recommend discharge to post-acute rehab, pending continued functional progress, once medically stable. The patient's raw score on the -PAC Daily Activity Inpatient Short Form is 17. A raw score of less than 19 suggests the patient may benefit from discharge to post-acute rehabilitation services. Please refer to the recommendation of the Occupational Therapist for safe discharge planning. Pt would  benefit from skilled OT services 2-3x/wk to address acute care needs and underlying performance skills to promote safety, decrease fall risk, and enhance occupational performance to return to [facility]. Goals to be met within the next 10-14 days.   Goals   Patient Goals To get stronger and go home   LTG Time Frame 10-14   Long Term Goal #1 See OT goals listed below   Plan   Treatment Interventions ADL retraining;Functional transfer training;UE strengthening/ROM;Endurance training;Patient/family training;Equipment evaluation/education;Compensatory technique education;Continued evaluation;Energy conservation;Activityengagement   Goal Expiration Date 04/23/24   OT Frequency 2-3x/wk   Discharge Recommendation   Rehab Resource Intensity Level, OT II (Moderate Resource Intensity)   AM-PAC Daily Activity Inpatient   Lower Body Dressing 2   Bathing 3   Toileting 2   Upper Body Dressing 3   Grooming 3   Eating 4   Daily Activity Raw Score 17   Daily Activity Standardized Score (Calc for Raw Score >=11) 37.26   AM-PAC Applied Cognition Inpatient   Following a Speech/Presentation 3   Understanding Ordinary Conversation 4   Taking Medications 4   Remembering Where Things Are Placed or Put Away 4   Remembering List of 4-5 Errands 4   Taking Care of Complicated Tasks 3   Applied Cognition Raw Score 22   Applied Cognition Standardized Score 47.83   End of Consult   Education Provided Yes   Patient Position at End of Consult Supine;Bed/Chair alarm activated;All needs within reach   Nurse Communication Nurse aware of consult     OT GOALS:    Pt will improve functional mobility during ADL/IADL/leisure tasks with Mod I using AE/DME prn.    Pt will improve activity tolerance/functional endurance during ADL/IADL/leisure tasks for at least 20 minutes to improve occupational performance and engagement in valued occupations using AE/DME prn.    Pt will engage in ongoing functional/formal cognitive assessments to assist with safe d/c planning  and increase safety during functional tasks.    Pt will participate in simulated IADL management task w/ Mod I to increase independence and engagement in valued occupations w/ good balance/safety.    Pt will improve dynamic standing balance for at least 15 minutes with Mod I during functional tasks to decrease fall risk and improve independence and engagement in ADL/IADL/leisure activities.    Pt will follow 100% of multi-step commands in ADL/IADL/leisure activities to improve functional cognition used in functional daily routines.    Pt will complete functional transfers on and off all surfaces used in daily routines with Mod I for safety to maximize functional/occupational performance.    Pt will complete all bed mobility tasks with Mod I to serve as a prerequisite for EOB/OOB ADL/IADL/leisure tasks, optimize positioning/comfort, and increase functional independence.    Pt will independently demonstrate good carryover of safety precautions and education/training during ADL/IADL/leisure tasks with energy conservation techniques s/p skilled instruction without verbal cues.    Pt will complete UB ADL tasks with Mod I using AE/DME prn to increase functional independence in ADL/IADL/leisure tasks.    Pt will complete LB ADL tasks with Mod I using AE/DME prn to increase functional independence in ADL/IADL/leisure tasks.     Pt will complete toileting tasks with Mod I and good hygiene/thoroughness using AE/DME prn to increase functional independence.    Pt will independently identify and utilize 2-3 positive coping strategies to enhance overall wellbeing and engagement in valued occupations.      Rachael Egan MS, OTR/L

## 2024-04-09 NOTE — PLAN OF CARE
Problem: OCCUPATIONAL THERAPY ADULT  Goal: Performs self-care activities at highest level of function for planned discharge setting.  See evaluation for individualized goals.  Description: Treatment Interventions: ADL retraining, Functional transfer training, UE strengthening/ROM, Endurance training, Patient/family training, Equipment evaluation/education, Compensatory technique education, Continued evaluation, Energy conservation, Activityengagement          See flowsheet documentation for full assessment, interventions and recommendations.   Note: Limitation: Decreased ADL status, Decreased endurance, Decreased self-care trans, Decreased high-level ADLs  Prognosis: Fair  Assessment: Pt is a 85 yo female who presented to [facility] s/p fall 2* fatigue and weakness. Pt dx w/ fall. Pt  has a past medical history of Anxiety, Arthritis, CHF (congestive heart failure) ([facility]), Chronic venous insufficiency, COPD (chronic obstructive pulmonary disease) ([facility]), Coronary artery disease, Diabetes mellitus ([facility]), Disease of thyroid gland, DVT (deep venous thrombosis) ([facility]), HL (hearing loss), Hyperlipidemia, Hypertension, Irregular heart beat, Pacemaker, Renal disorder, Sleep apnea, Sleep difficulties, Thyroid disease, Urinary incontinence, Uterine leiomyoma, and Walker as ambulation aid. Pt with active OT orders in which OT consulted to assess pt's functional status and occupational performance to determine safe d/c needs. Pt lives with her  at [facility]. PTA, pt was independent in ADLs, most IADLs, and uses RW for functional mobility. (-) driving. Currently, pt performing bed mobility w/ Min A, functional transfers/mobility w/ Min A w/ RW, UB ADLs w/ Min A, and LB ADLs w/ Mod A. Pt demonstrates the following limitations/impairments which impact the pt's ability to engage in valued occupations: balance, endurance/activity tolerance, standing tolerance, functional reach, postural/trunk  control, strength, safety awareness, and pain. From an OT standpoint, recommend discharge to post-acute rehab, pending continued functional progress, once medically stable. The patient's raw score on the AM-PAC Daily Activity Inpatient Short Form is 17. A raw score of less than 19 suggests the patient may benefit from discharge to post-acute rehabilitation services. Please refer to the recommendation of the Occupational Therapist for safe discharge planning. Pt would benefit from skilled OT services 2-3x/wk to address acute care needs and underlying performance skills to promote safety, decrease fall risk, and enhance occupational performance to return to [facility]. Goals to be met within the next 10-14 days.     Rehab Resource Intensity Level, OT: II (Moderate Resource Intensity)

## 2024-04-09 NOTE — WOUND OSTOMY CARE
Consult Note - Wound   Lizzie Whitten 84 y.o. female MRN: [id number]  Unit/Bed#: [facility] 803-01 Encounter: [id number]        Assessment Findings:   Wound care consulted for 83 yo [place] female for left foot wound.    Left foot is intact, small fissure noted, not currently draining, B/L feet just needs moisturizer.    Sacral/buttocks and B/L heels intact and blanching, preventative skin care orders placed.     Orders listed below and wound care will sign off, call or tiger text with questions. Bedside nurse updated of findings and orders. Flowsheets below with pictures and measurements.     Skin care plans:  1-Hydraguard to bilateral sacrum, buttock and heels BID and PRN  2-Elevate heels to offload pressure.  3-Ehob cushion in chair when out of bed.  4-Moisturize skin daily with skin nourishing cream.  5-Turn/reposition q2h or when medically stable for pressure re-distribution on skin.     Wounds:  Wound 04/08/24 Ankle Anterior;Left (Active)   Wound Image   04/09/24 0916   Wound Description Epithelialization 04/09/24 0916   Kayla-wound Assessment Scaly 04/09/24 0916   Wound Length (cm) 0 cm 04/09/24 0916   Wound Width (cm) 0 cm 04/09/24 0916   Wound Depth (cm) 0 cm 04/09/24 0916   Wound Surface Area (cm^2) 0 cm^2 04/09/24 0916   Wound Volume (cm^3) 0 cm^3 04/09/24 0916   Calculated Wound Volume (cm^3) 0 cm^3 04/09/24 0916   Wound Site Closure SVETLANA 04/09/24 0916   Drainage Amount None 04/09/24 0916   Non-staged Wound Description Not applicable 04/09/24 0916   Treatments Cleansed 04/09/24 0916   Dressing Protective barrier 04/09/24 0916   Wound packed? No 04/09/24 0916   Packing- # removed 0 04/09/24 0916   Packing- # inserted 0 04/09/24 0916   Dressing Changed New 04/09/24 0916   Patient Tolerance Tolerated well 04/09/24 0916   Dressing Status Clean;Dry;Intact 04/09/24 0916         Ana Strauss BSN, RN, CWOCN

## 2024-04-09 NOTE — PLAN OF CARE
Problem: PHYSICAL THERAPY ADULT  Goal: Performs mobility at highest level of function for planned discharge setting.  See evaluation for individualized goals.  Description: Treatment/Interventions: Functional transfer training, Therapeutic exercise, Gait training, Bed mobility, Spoke to nursing, OT  Equipment Recommended: Walker       See flowsheet documentation for full assessment, interventions and recommendations.  Note: Prognosis: Good  Problem List: Decreased strength, Decreased endurance, Decreased mobility, Pain  Assessment: Pt is a 84 y.o. female seen for PT evaluation s/p admit to [facility] on 4/8/2024. Pt was admitted with a primary dx of: Fall , with PMH sx for Pulmonary nodule, CHF, Complete heart block, Anemia, CKD, DM, COPD, Sleep apnea, Imbalance, PAF, Dermatitis, Head trauma, Hiatal hernia.  PT now consulted for assessment of mobility and d/c needs. Pt with Up and OOB as tolerated  orders.  Pts current clinical presentation is Unstable/ Unpredictable (high complexity) due to Ongoing medical management for primary dx, Increased reliance on more restrictive AD compared to baseline, Decreased activity tolerance compared to baseline, Fall risk, Increased assistance needed from caregiver at current time. Prior to admission, pt was Independent for mobility and ADLs.Pt lives at [facility] with spouse. Upon evaluation, pt currently is requiring Assist X 1 for bed mobility , transfers and ambulation using RW at this time. Limited mobility 2* fatigue, decreased O2 sats, generalized weakness as detailed in above flowsheet.  Pt presents at PT eval functioning below baseline and currently w/ overall mobility deficits 2* to: BLE weakness, impaired balance, decreased endurance, gait deviations, decreased activity tolerance compared to baseline, decreased functional mobility tolerance compared to baseline, fall risk. Pt currently at a fall risk 2* to impairments listed above.  Pt will  continue to benefit from skilled acute PT interventions to address stated impairments; to maximize functional mobility; for ongoing pt  training; and DME needs. At conclusion of PT session chair alarm engaged, all needs in reach, RN notified of session findings/recommendations, and pt returned back in recliner chair with phone and call bell within reach. Pt denies any further questions at this time. The patient's AM-PAC Basic Mobility Inpatient Short Form Raw Score is 15. A Raw score of less than or equal to 16 suggests the patient may benefit from discharge to post-acute rehabilitation services. Please also refer to the recommendation of the Physical Therapist for safe discharge planning. Recommend Level II upon hospital D/C.        Rehab Resource Intensity Level, PT: II (Moderate Resource Intensity)    See flowsheet documentation for full assessment.

## 2024-04-09 NOTE — PLAN OF CARE

## 2024-04-09 NOTE — CONSULTS
Consultation - Cardiology Team 1  Lizzie Whitten 84 y.o. female MRN: [id number]  Unit/Bed#: [facility] 803-01 Encounter: [id number]    Assessment/Plan     Principal Problem:    Fall  Active Problems:    Complete heart block (HCC)    Hypothyroidism    Mixed hyperlipidemia    CHF (congestive heart failure) (HCC)      Assessment:  Acute hypoxic respiratory failure        No respiratory complaints. Presented with weakness        85% on r/a- currently on 3 liters        Influenza  A/B/RSV/COVID-negative        CTA chest- no PE. Enlarged main pulmonary artery        TTE 9/2022- RVSP 50mmHg        Normal troponins  Diastolic heart failure-patient with chronic diastolic heart failure.        PTA-spironolactone 25 mg daily        Weight few weeks ago 190 lbs. Jan 188 lbs. Current bed scale 193lbs        She does not appear to have significant volume on board.  ,     Given 20mg IV lasix- incomplete I.O  CHB s/p PPM-pacemaker interrogation 1/2024 normal  PAF-pacer interrogation 1/2024-0% A-fib burden        On eliquis 5mg BID        ECG- sinus rhythm  Type 2 diabetes-hemoglobin A1c 8.2%  CAD-left heart cath 2016 moderate atherosclerosis LAD.  Proximal circumflex 30%, minor luminal irregularities RCA and circumflex.        On no AP while on eliquis, statin, BB-Lopressor 25 mg twice daily.        Troponins negative. No anginal symptoms  Pulmonary hypertension- current echo pending.   8.   HLD-atorvastatin 80 mg  9.   Hypertension-she was mildly hypertensive.  Predominantly mildly           hypertensive. On Lopressor 25 mg twice daily and lisinopril 20 mg          daily.   10. CKD III-1.3 which is stable  11. History of hyperkalemia- potassium high normal. Aldactone held.     Plan:  Will give lasix 40mg IV BID today  F/u with TTE  Continue AC- eliquis.  Continue beta-blocker and ACE inhibitor  Attempt strict I.O. stand scale weights. 2 gram Na restriction, 1800cc FR.   BMP in the a.m. ,will also check BNP  Monitor BP with diuresis      History of Present Illness   Physician Requesting Consult: Jon Pearson MD  Reason for Consult / Principal Problem:hypoxia     HPI: Lizzie Whitten is a 84 y.o. year old female with CAD, chronic venous insufficiency, benign hypertension , HLD, hypothyroidism, complete heart block status post dual-chamber PPM, type 2 diabetes, chronic diastolic heart failure, PAF, CKD stage III, iron deficiency anemia, lymphedema LE, MITCH who presents with increasing fatigue and weakness.  Slid out of bed and fell on the floor.  Her  had to call 911 to get her up.  No lightheadedness dizziness.  No reports of chest pain or shortness of breath.  The day prior she reportedly was in her usual state of health.  In the ED noted to be hypoxic-85% on room air.  Placed on 3 L.  Increased lower extremity edema.  CT of the chest-no PE.  Enlarged main pulmonary artery.  Coronary artery calcifications.  No thoracic aortic aneurysm.  Cardiomegaly.  For concerns for mild volume overload she was given 20 mg of IV Lasix.  She is currently living in [facility].    0-hour troponin 12  2-hour troponin 19  4 troponin 24  BUN/creatinine-34/1.3  Influenza A/B/RSV/COVID-negative    Patient is followed by Dr. Mendoza.  Last office visit 1 month ago weight 190 pounds.    PPM interrogation 1/2024 1 AT/AF episode noted lasted 4 minutes.    TTE 9/2022-LVEF 65%.  Grade 1 diastolic dysfunction.  Mild to moderate MR.  Mild TR.  Ascending aorta mildly dilated.    Inpatient consult to Cardiology  Consult performed by: DALILA Cole  Consult ordered by: Mary Fernandez DO          Review of Systems   Constitutional:  Positive for activity change and unexpected weight change.   HENT: Negative.     Eyes: Negative.    Respiratory:  Positive for wheezing. Negative for cough and shortness of breath.    Cardiovascular:  Positive for leg swelling. Negative for chest pain and palpitations.   Gastrointestinal: Negative.    Endocrine:  Negative.    Genitourinary: Negative.    Musculoskeletal:  Positive for gait problem.   Allergic/Immunologic: Negative.    Neurological:  Positive for weakness.   Hematological: Negative.    Psychiatric/Behavioral: Negative.     All other systems reviewed and are negative.      Historical Information   Past Medical History:   Diagnosis Date    Anxiety     Arthritis     CHF (congestive heart failure) (HCC)     Chronic venous insufficiency     COPD (chronic obstructive pulmonary disease) (HCC)     Coronary artery disease     Diabetes mellitus (HCC)     type II    Disease of thyroid gland     hypothyroidism    DVT (deep venous thrombosis) (HCC)     HL (hearing loss)     Hyperlipidemia     Hypertension     Irregular heart beat     Paroxysmal a-fib    Pacemaker     Renal disorder     stage 3    Sleep apnea     Sleep difficulties     Thyroid disease     Urinary incontinence     Uterine leiomyoma     Walker as ambulation aid      Past Surgical History:   Procedure Laterality Date    BREAST SURGERY      For Biopsy    CARDIAC PACEMAKER PLACEMENT      Permanent. Last assessed: 2/19/16    CARDIAC SURGERY      CARPAL TUNNEL RELEASE Bilateral     CATARACT EXTRACTION      CATARACT EXTRACTION W/ INTRAOCULAR LENS  IMPLANT, BILATERAL      CYSTOSCOPY      Diagnostic. Last assessed: 11/8/17    ECTOPIC PREGNANCY SURGERY      HYSTERECTOMY      Total Abdomonal. Resolved: 1982    JOINT REPLACEMENT Right     TKR    OOPHORECTOMY      GA ENDOVEN ABLTJ INCMPTNT VEIN XTR LASER 1ST VEIN Left 2/17/2017    Procedure: GREATER SAPHENOUS VEIN ENDOVASCULAR LASER THERAPY ;  Surgeon: Danielle Phan DO;  Location: [facility];  Service: Vascular    TONSILLECTOMY       Social History     Substance and Sexual Activity   Alcohol Use Not Currently    Comment: rarely. Per Allscripts: Denied history of alcohol use     Social History     Substance and Sexual Activity   Drug Use No     Social History     Tobacco Use   Smoking Status Never    Passive  exposure: Never   Smokeless Tobacco Never   Tobacco Comments    Per Allscriipts: Former smoker     Family History:   Family History   Problem Relation Age of Onset    Coronary artery disease Mother     Hypertension Mother     Heart disease Mother     Hypertension Father     Diabetes Father         Mellitus    No Known Problems Sister     No Known Problems Daughter     No Known Problems Daughter     No Known Problems Maternal Grandmother     No Known Problems Maternal Grandfather     No Known Problems Paternal Grandmother     No Known Problems Paternal Grandfather     Diabetes Brother         Mellitus    No Known Problems Son     Breast cancer Maternal Aunt     Pancreatic cancer Maternal Aunt     Breast cancer Cousin     Breast cancer Cousin     Breast cancer Paternal Aunt     No Known Problems Paternal Aunt     No Known Problems Paternal Aunt        Meds/Allergies   current meds:   Current Facility-Administered Medications   Medication Dose Route Frequency    acetaminophen (TYLENOL) tablet 650 mg  650 mg Oral Q6H PRN    apixaban (ELIQUIS) tablet 5 mg  5 mg Oral BID    atorvastatin (LIPITOR) tablet 80 mg  80 mg Oral HS    citalopram (CeleXA) tablet 20 mg  20 mg Oral Daily    insulin glargine (LANTUS) subcutaneous injection 25 Units 0.25 mL  25 Units Subcutaneous HS    insulin lispro (HumALOG/ADMELOG) 100 units/mL subcutaneous injection 6 Units  6 Units Subcutaneous TID With Meals    levothyroxine tablet 125 mcg  125 mcg Oral Early Morning    lisinopril (ZESTRIL) tablet 20 mg  20 mg Oral BID    metoprolol tartrate (LOPRESSOR) tablet 25 mg  25 mg Oral Q12H    pantoprazole (PROTONIX) EC tablet 40 mg  40 mg Oral Early Morning    sodium chloride (PF) 0.9 % injection 3 mL  3 mL Intravenous Q1H PRN    and PTA meds:    Medications Prior to Admission   Medication    acetaminophen (TYLENOL) 325 mg tablet    apixaban (Eliquis) 5 mg    atorvastatin (LIPITOR) 80 mg tablet    Calcium Carbonate-Vit D-Min (CALCIUM 1200 PO)     citalopram (CeleXA) 20 mg tablet    Continuous Blood Gluc  (FREESTYLE TAMIA 14 DAY READER) GARRY    Continuous Blood Gluc Sensor (FreeStyle Tamia 14 Day Sensor) MISC    Diclofenac Sodium (VOLTAREN) 1 %    insulin aspart (NovoLOG FlexPen) 100 UNIT/ML injection pen    insulin glargine (Toujeo SoloStar) 300 units/mL CONCENTRATED U-300 injection pen (1-unit dial)    Insulin Pen Needle (BD Pen Needle Sasha U/F) 32G X 4 MM MISC    levothyroxine 125 mcg tablet    lisinopril (ZESTRIL) 20 mg tablet    metoprolol tartrate (LOPRESSOR) 25 mg tablet    Omega-3 Fatty Acids (FISH OIL) 1200 MG CAPS    omeprazole (PriLOSEC) 20 mg delayed release capsule    promethazine-dextromethorphan (PHENERGAN-DM) 6.25-15 mg/5 mL oral syrup    sodium chloride (VANNESSA 128) 5 % hypertonic ophthalmic ointment    triamcinolone (KENALOG) 0.1 % cream    Ascorbic Acid (VITAMIN C) 1000 MG tablet    GLUCOSAMINE CHONDROITIN COMPLX PO    glucose blood (TRUE METRIX BLOOD GLUCOSE TEST) test strip    Lancets (onetouch ultrasoft) lancets    spironolactone (ALDACTONE) 25 mg tablet     Allergies   Allergen Reactions    Ampicillin Hives       Objective   Vitals: Blood pressure 143/70, pulse 60, temperature 98 °F (36.7 °C), resp. rate 19, height 5' (1.524 m), weight 87.6 kg (193 lb 1.6 oz), SpO2 97%, not currently breastfeeding.  Orthostatic Blood Pressures      Flowsheet Row Most Recent Value   Blood Pressure 143/70 filed at 04/09/2024 0740   Patient Position - Orthostatic VS Lying filed at 04/08/2024 1500              Intake/Output Summary (Last 24 hours) at 4/9/2024 0828  Last data filed at 4/9/2024 0801  Gross per 24 hour   Intake 180 ml   Output 250 ml   Net -70 ml       Invasive Devices       Peripheral Intravenous Line  Duration             Peripheral IV 04/08/24 Left Antecubital <1 day                    Physical Exam: /70   Pulse 60   Temp 98 °F (36.7 °C)   Resp 19   Ht 5' (1.524 m)   Wt 87.6 kg (193 lb 1.6 oz)   SpO2 97%   BMI 37.71 kg/m²    General Appearance:    Alert, cooperative, no distress, appears stated age   Head:    Normocephalic, no scleral icterus   Eyes:    PERRL   Nose:   Nares normal, septum midline, mucosa normal, no drainage    Throat:   Lips, mucosa, and tongue normal   Neck:   Supple, symmetrical, trachea midline     no carotid bruit        Lungs:     Decreased, faint wheezes to auscultation bilaterally, respirations unlabored        Heart:    Regular rate and rhythm, S1 and S2 normal, no murmur, rub   or gallop   Abdomen:     Soft, non-tender, bowel sounds active all four quadrants,     no masses, no organomegaly   Extremities:   Extremities normal, atraumatic, mild edema   Pulses:   2+ and symmetric all extremities   Skin:   Skin color, texture, turgor normal, no rashes or lesions   Neurologic:   Alert and oriented to person place and time. No focal deficits       Lab Results:   Recent Results (from the past 72 hour(s))   ECG 12 lead    Collection Time: 04/08/24  8:13 AM   Result Value Ref Range    Ventricular Rate 62 BPM    Atrial Rate 62 BPM    NM Interval 196 ms    QRSD Interval 170 ms    QT Interval 454 ms    QTC Interval 460 ms    P Axis -50 degrees    QRS Axis 261 degrees    T Wave Axis 64 degrees   CBC and differential    Collection Time: 04/08/24  8:46 AM   Result Value Ref Range    WBC 6.00 4.31 - 10.16 Thousand/uL    RBC 3.70 (L) 3.81 - 5.12 Million/uL    Hemoglobin 11.2 (L) 11.5 - 15.4 g/dL    Hematocrit 37.0 34.8 - 46.1 %     (H) 82 - 98 fL    MCH 30.3 26.8 - 34.3 pg    MCHC 30.3 (L) 31.4 - 37.4 g/dL    RDW 12.8 11.6 - 15.1 %    MPV 10.5 8.9 - 12.7 fL    Platelets 181 149 - 390 Thousands/uL    nRBC 0 /100 WBCs    Neutrophils Relative 61 43 - 75 %    Immature Grans % 0 0 - 2 %    Lymphocytes Relative 23 14 - 44 %    Monocytes Relative 10 4 - 12 %    Eosinophils Relative 5 0 - 6 %    Basophils Relative 1 0 - 1 %    Neutrophils Absolute 3.63 1.85 - 7.62 Thousands/µL    Absolute Immature Grans 0.02 0.00 - 0.20  "Thousand/uL    Absolute Lymphocytes 1.38 0.60 - 4.47 Thousands/µL    Absolute Monocytes 0.58 0.17 - 1.22 Thousand/µL    Eosinophils Absolute 0.32 0.00 - 0.61 Thousand/µL    Basophils Absolute 0.07 0.00 - 0.10 Thousands/µL   Basic metabolic panel    Collection Time: 04/08/24  8:46 AM   Result Value Ref Range    Sodium 139 135 - 147 mmol/L    Potassium 5.0 3.5 - 5.3 mmol/L    Chloride 103 96 - 108 mmol/L    CO2 32 21 - 32 mmol/L    ANION GAP 4 4 - 13 mmol/L    BUN 34 (H) 5 - 25 mg/dL    Creatinine 1.30 0.60 - 1.30 mg/dL    Glucose 79 65 - 140 mg/dL    Calcium 8.7 8.4 - 10.2 mg/dL    eGFR 38 ml/min/1.73sq m   HS Troponin 0hr (reflex protocol)    Collection Time: 04/08/24  8:46 AM   Result Value Ref Range    hs TnI 0hr 12 \"Refer to ACS Flowchart\"- see link ng/L   HS Troponin I 2hr    Collection Time: 04/08/24 11:05 AM   Result Value Ref Range    hs TnI 2hr 19 \"Refer to ACS Flowchart\"- see link ng/L    Delta 2hr hsTnI 7 <20 ng/L   ECG 12 lead    Collection Time: 04/08/24 12:07 PM   Result Value Ref Range    Ventricular Rate 59 BPM    Atrial Rate 59 BPM    GA Interval 184 ms    QRSD Interval 180 ms    QT Interval 474 ms    QTC Interval 469 ms    P Axis 194 degrees    QRS Axis 267 degrees    T Wave [place] 77 degrees   HS Troponin I 4hr    Collection Time: 04/08/24 12:49 PM   Result Value Ref Range    hs TnI 4hr 24 \"Refer to ACS Flowchart\"- see link ng/L    Delta 4hr hsTnI 12 <20 ng/L   COVID/FLU/RSV    Collection Time: 04/08/24  3:43 PM    Specimen: Nose; Nares   Result Value Ref Range    SARS-CoV-2 Negative Negative    INFLUENZA A PCR Negative Negative    INFLUENZA B PCR Negative Negative    RSV PCR Negative Negative   Fingerstick Glucose (POCT)    Collection Time: 04/08/24  3:58 PM   Result Value Ref Range    POC Glucose 155 (H) 65 - 140 mg/dl   Fingerstick Glucose (POCT)    Collection Time: 04/08/24  9:01 PM   Result Value Ref Range    POC Glucose 166 (H) 65 - 140 mg/dl   Fingerstick Glucose (POCT)    Collection Time: " 04/09/24  8:01 AM   Result Value Ref Range    POC Glucose 173 (H) 65 - 140 mg/dl     CORONARY CIRCULATION:  LAD: The vessel was heavily calcified. Angiography showed moderate  atherosclerosis.  Circumflex: Angiography showed minor luminal irregularities.  Proximal circumflex: There was a 30 % stenosis. The lesion was eccentric and  heavily calcified.  RCA: Angiography showed minor luminal irregularities.     CARDIAC STRUCTURES:  Global left ventricular function was hypercontractile. EF calculated by  contrast ventriculography was 65 %.    Left Ventricle Left ventricular cavity size is normal. Wall thickness is moderately increased. The left ventricular ejection fraction is 65%. Systolic function is normal.  Wall motion is normal. Diastolic function is mildly abnormal, consistent with grade I (abnormal) relaxation.   Right Ventricle Right ventricular cavity size is normal. Systolic function is normal. Wall thickness is normal. A pacer wire is present.   Left Atrium The atrium is mildly dilated.   Right Atrium The atrium is normal in size.   Aortic Valve The aortic valve is trileaflet. The leaflets are not thickened. The leaflets are not calcified. The leaflets exhibit normal mobility. There is no evidence of regurgitation. The aortic valve has no significant stenosis.   Mitral Valve There is mild annular calcification. There is mild to moderate regurgitation. There is no evidence of stenosis. The mitral valve has normal structure and normal function.   Tricuspid Valve Tricuspid valve structure is normal. There is mild regurgitation. There is no evidence of stenosis. The estimated right ventricular systolic pressure is 50.00 mmHg.   Pulmonic Valve Pulmonic valve structure is normal. There is mild regurgitation. There is no evidence of stenosis.   Ascending Aorta The aortic root is normal in size. The ascending aorta is mildly dilated.   IVC/SVC The right atrial pressure is estimated at 7.0 mmHg. The inferior vena cava  is normal in size.   Pericardium There is no pericardial effusion. The pericardium is normal in appearance.     Imaging: I have personally reviewed pertinent reports.    EKG: V paced      Code Status: Level 1 - Full Code  Advance Directive and Living Will:      Power of :    POLST:      Counseling / Coordination of Care  Total floor / unit time spent today 45 minutes.  Greater than 50% of total time was spent with the patient and / or family counseling and / or coordination of care.

## 2024-04-09 NOTE — ASSESSMENT & PLAN NOTE
Wt Readings from Last 3 Encounters:   04/08/24 87.6 kg (193 lb 1.6 oz)   03/12/24 86.5 kg (190 lb 12.8 oz)   01/23/24 85.5 kg (188 lb 9.6 oz)     Patient followed as outpatient with Dr. Mendoza.  Consult cardiology  IV Lasix 40 mg twice daily  Continue metoprolol and lisinopril

## 2024-04-09 NOTE — CASE MANAGEMENT
Case Management Assessment & Discharge Planning Note    Patient name Lizzie Whitten  Location [facility] 803/[facility] 803-01 MRN [id number]  : 1940 Date 2024       Current Admission Date: 2024  Current Admission Diagnosis:Fall   Patient Active Problem List    Diagnosis Date Noted    CHF (congestive heart failure) ([facility]) 2024    Head trauma 2023    Traumatic hematoma of forehead 2023    Traumatic ulcer of left lower leg, limited to breakdown of skin ([facility]) 2023    Pharyngeal dysphagia 2023    Chronic cough 2023    Hand arthritis 2023    Assistance needed with transportation 2023    Abnormal CT scan, esophagus 2022    Hiatal hernia 2022    Esophageal dysphagia 2022    Hypertension 2022    Fall 2022    Secondary hyperparathyroidism of renal origin ([facility]) 2022    Memory difficulties 2022    Imbalance 2022    Coronary artery disease involving native coronary artery of native heart 2021    Dermatitis 09/15/2020    Sleep apnea 2019    Intraocular pressure increase 2018    Vitamin D deficiency 2018    Lymphedema of both lower extremities 2018    Iron deficiency anemia 10/12/2017    Urinary bladder incontinence 2017    OAB (overactive bladder) 2017    Benign hypertension with chronic kidney disease, stage III ([facility]) 2017    Microalbuminuria 2017    PAF (paroxysmal atrial fibrillation) ([facility]) 10/31/2016    Chronic venous insufficiency 2016    Presence of permanent cardiac pacemaker 2016    Chronic diastolic congestive heart failure ([facility]) 2016    Anemia 2016    S/P cardiac pacemaker procedure 2016    Hypothyroidism 2016    Complete heart block ([facility]) 2016    Type 2 diabetes mellitus with hyperglycemia, with long-term current use of insulin ([facility]) 01/15/2016    Stage 3a chronic kidney disease ([facility]) 10/21/2015    Eczematous  dermatitis 03/03/2015    Anxiety disorder 06/26/2013    Chronic obstructive pulmonary disease (HCC) 09/24/2012    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 [address]  Phone: [phone] Fax: [phone]    [id number] - [place], [place] - [address]  [address] [facility] [place] [address]  Phone: [phone] Fax: [phone]    [facility] - [facility] [address]  [address] [address]  Phone: [phone] Fax: [phone]    Primary Care Provider: Kennedy Csatellon MD    Primary Insurance: MEDICARE  Secondary Insurance: AARP    ASSESSMENT:  Active Health Care Proxies    There are no active Health Care Proxies on file.                 Readmission Root Cause  30 Day Readmission: No    Patient Information  Admitted from:: Home  Mental Status: Alert  During Assessment patient was accompanied by: Not accompanied during assessment  Assessment information provided by:: Patient  Support Systems: Self  Home entry access options. Select all that apply.: No steps to enter home  Type of Current Residence: Apartment ([facility] [place])  Floor Level: 1  Upon entering residence, is there a bedroom on the main floor (no further steps)?: Yes  Upon entering residence, is there a bathroom on the main floor (no further steps)?: Yes  Living Arrangements: Lives w/ Spouse/significant other    Activities of Daily Living Prior to Admission  Functional Status: Independent  Completes ADLs independently?: Yes  Ambulates  independently?: Yes  Does patient use assisted devices?: Yes  Assisted Devices (DME) used: Walker  Does patient currently own DME?: Yes  What DME does the patient currently own?: Walker  Does patient have a history of Outpatient Therapy (PT/OT)?: No  Does the patient have a history of Short-Term Rehab?: Yes ([facility])  Does patient have a history of HHC?: Yes  Does patient currently have HHC?: No         Patient Information Continued  Income Source: SSI/SSD  Does patient have prescription coverage?: Yes  Does patient have a history of substance abuse?: No  Does patient have a history of Mental Health Diagnosis?: No         Means of Transportation  Means of Transport to [facility]:: Other (Comment) ([employer] transports)      Social Determinants of Health (SDOH)      Flowsheet Row Most Recent Value   Housing Stability    In the last 12 months, was there a time when you were not able to pay the mortgage or rent on time? N   In the last 12 months, how many places have you lived? 1   In the last 12 months, was there a time when you did not have a steady place to sleep or slept in a shelter (including now)? N   Transportation Needs    In the past 12 months, has lack of transportation kept you from medical appointments or from getting medications? no   In the past 12 months, has lack of transportation kept you from meetings, work, or from getting things needed for daily living? No   Food Insecurity    Within the past 12 months, you worried that your food would run out before you got the money to buy more. Never true   Within the past 12 months, the food you bought just didn't last and you didn't have money to get more. Never true   Utilities    In the past 12 months has the electric, gas, oil, or water company threatened to shut off services in your home? No            DISCHARGE DETAILS:    Discharge planning discussed with:: patient  Freedom of Choice: Yes     CM contacted family/caregiver?: No- see  comments  Were Treatment Team discharge recommendations reviewed with patient/caregiver?: Yes  Did patient/caregiver verbalize understanding of patient care needs?: Yes  Were patient/caregiver advised of the risks associated with not following Treatment Team discharge recommendations?: Yes    Contacts  Patient Contacts: daughter Prema  Relationship to Patient:: Family  Contact Method: Phone  Phone Number: [phone]  Reason/Outcome: Emergency Contact            0830 Called Fernando & confirmed pt is from the independent living apartments.         Fernando [phone].

## 2024-04-09 NOTE — PLAN OF CARE
Problem: Potential for Falls  Goal: Patient will remain free of falls  Description: INTERVENTIONS:  - Educate patient/family on patient safety including physical limitations  - Instruct patient to call for assistance with activity   - Consult OT/PT to assist with strengthening/mobility   - Keep Call bell within reach  - Keep bed low and locked with side rails adjusted as appropriate  - Keep care items and personal belongings within reach  - Initiate and maintain comfort rounds  - Initiate/Maintain bed and chair alarm  - Apply yellow socks and bracelet for high fall risk patients  - Consider moving patient to room near nurses station  Outcome: Progressing     Problem: PAIN - ADULT  Goal: Verbalizes/displays adequate comfort level or baseline comfort level  Description: Interventions:  - Encourage patient to monitor pain and request assistance  - Assess pain using appropriate pain scale  - Administer analgesics based on type and severity of pain and evaluate response  - Implement non-pharmacological measures as appropriate and evaluate response  - Consider cultural and social influences on pain and pain management  - Notify physician/advanced practitioner if interventions unsuccessful or patient reports new pain  Outcome: Progressing     Problem: INFECTION - ADULT  Goal: Absence or prevention of progression during hospitalization  Description: INTERVENTIONS:  - Assess and monitor for signs and symptoms of infection  - Monitor lab/diagnostic results  - Monitor all insertion sites, i.e. indwelling lines, tubes, and drains  - Monitor endotracheal if appropriate and nasal secretions for changes in amount and color  - [place] appropriate cooling/warming therapies per order  - Administer medications as ordered  - Instruct and encourage patient and family to use good hand hygiene technique  - Identify and instruct in appropriate isolation precautions for identified infection/condition  Outcome: Progressing  Goal: Absence  of fever/infection during neutropenic period  Description: INTERVENTIONS:  - Monitor WBC    Outcome: Progressing     Problem: SAFETY ADULT  Goal: Patient will remain free of falls  Description: INTERVENTIONS:  - Educate patient/family on patient safety including physical limitations  - Instruct patient to call for assistance with activity   - Consult OT/PT to assist with strengthening/mobility   - Keep Call bell within reach  - Keep bed low and locked with side rails adjusted as appropriate  - Keep care items and personal belongings within reach  - Initiate and maintain comfort rounds  - Initiate/Maintain bed and chair alarm  - Apply yellow socks and bracelet for high fall risk patients  - Consider moving patient to room near nurses station  Outcome: Progressing  Goal: Maintain or return to baseline ADL function  Description: INTERVENTIONS:  - Educate patient/family on patient safety including physical limitations  - Instruct patient to call for assistance with activity   - Consult OT/PT to assist with strengthening/mobility   - Keep Call bell within reach  - Keep bed low and locked with side rails adjusted as appropriate  - Keep care items and personal belongings within reach  - Initiate and maintain comfort rounds  - Initiate/Maintain bed and chair alarm  - Apply yellow socks and bracelet for high fall risk patients  - Consider moving patient to room near nurses station  Outcome: Progressing  Goal: Maintains/Returns to pre admission functional level  Description: INTERVENTIONS:  - Perform AM-PAC 6 Click Basic Mobility/ Daily Activity assessment daily.  - Set and communicate daily mobility goal to care team and patient/family/caregiver.   - Collaborate with rehabilitation services on mobility goals if consulted  - Out of bed for toileting  - Record patient progress and toleration of activity level   Outcome: Progressing     Problem: DISCHARGE PLANNING  Goal: Discharge to home or other facility with appropriate  resources  Description: INTERVENTIONS:  - Identify barriers to discharge w/patient and caregiver  - Arrange for needed discharge resources and transportation as appropriate  - Identify discharge learning needs (meds, wound care, etc.)  - Arrange for interpretive services to assist at discharge as needed  - Refer to Case Management Department for coordinating discharge planning if the patient needs post-hospital services based on physician/advanced practitioner order or complex needs related to functional status, cognitive ability, or social support system  Outcome: Progressing     Problem: Knowledge Deficit  Goal: Patient/family/caregiver demonstrates understanding of disease process, treatment plan, medications, and discharge instructions  Description: Complete learning assessment and assess knowledge base.  Interventions:  - Provide teaching at level of understanding  - Provide teaching via preferred learning methods  Outcome: Progressing     Problem: Prexisting or High Potential for Compromised Skin Integrity  Goal: Skin integrity is maintained or improved  Description: INTERVENTIONS:  - Identify patients at risk for skin breakdown  - Assess and monitor skin integrity  - Assess and monitor nutrition and hydration status  - Monitor labs   - Assess for incontinence   - Turn and reposition patient  - Assist with mobility/ambulation  - Relieve pressure over bony prominences  - Avoid friction and shearing  - Provide appropriate hygiene as needed including keeping skin clean and dry  - Evaluate need for skin moisturizer/barrier cream  - Collaborate with interdisciplinary team   - Patient/family teaching  - Consider wound care consult   Outcome: Progressing

## 2024-04-09 NOTE — ASSESSMENT & PLAN NOTE
Presenting with increased weakness.  Will have PT/OT evaluate patient  Fall precautions  Telemetry.  Noted history of pacer

## 2024-04-09 NOTE — CONSULTS
Consultation - Geriatric Medicine   Lizzie TEJAL Whitten 84 y.o. female MRN: [id number]  [facility] Encounter: [id number]      Assessment/Plan     Ambulatory dysfunction with fall  -reportedly mechanical fall at assisted living   -(-) head strike (-) loss of consciousness  -no injuries reported on admission   -Requires use of walker for ambulation at baseline  -hx prior falls but none of which are noted in past six months   -high risk future falls due to age, hx of fall, deconditioning/debility and unfamiliar environment   -encourage good body mechanics and assist with all transfers  -keep personal items and call bell close to prevent reaching  -maintain environment free of fall hazards  -encourage appropriate footwear and adequate lighting at all times when out of bed  -consider environmental fall risk assessment and personal fall alert system on returning home  -PT and OT following     Frailty syndrome in geriatric patient   -clinical frailty scale stage V, mildly frail, progressive, at risk acceleration with acute metabolic derangements   -multifactorial including acute respiratory failure requiring supplemental O2 and CHF requiring IV diuresis, paroxysmal afib, DM-II and poor recent oral intake  -poor appetite - discussed taste changes with aging including preference for sweet taste as last sensation lost and importance of mindfulness of this as well as impact of stress on appetite, meal planning if prefers food other than what is offered at care facility and consideration of magic cup ice cream in place of traditional for increased protein intake  -continue to encourage well balanced nutritional intake  -continue optimization of chronic conditions and address acute derangements as arise  -ensure any underlying anxiety/mood/depression symptoms are well-controlled as may impact patient response to therapies as well as overall sense of wellbeing and quality of life  -Continue to ensure that treatments  and interventions align with patient's wishes and goals of care  -Continue psychosocial supports of patient and caregivers    OA of hands bilaterally  -Pain may be impacting patient's ability to appropriately utilize utensils at mealtime and therefore impact oral intake, discussed with OT will evaluate for potential foam build-up for utensils, appreciate input and assist   -utilizes topical diclofenac for pain control as o/p     Anxiety disorder   -symptoms well controlled with home citalopram regimen, currently on max geriatric dose (20mg daily) monitor QTc and electrolytes with use  -continue to encourage patient remain active and engaged in care facility activities for socialization and social support   -continue close o/p f/u with PCP for ongoing management     Cognitive screening   -alert and oriented, denies memory or cognitive concerns, same endorsed by family at bedside   -at baseline mostly independent with ADLs and iADLs  -at risk age related cognitive decline, continue secondary risk factor modifications   -MMSE 30/30 (2013) and (6/2022) by PCP   -[facility] 9/2/22 imaging personally viewed, mild diffuse chronic microangiopathic changes noted as well as mild bilateral frontal lobe volume loss   -maintained on levothyroxine for hypothyroidism, no recent B12 on record review, consider checking with routine labs  -Encourage use of sensory disturbances such as corrective lenses and hearing aids at all appropriate times reduce risk of uncorrected sensory benefit from continuing to further isolation, confusion, encephalopathy and more precipitous cognitive decline  -Encourage patient remain physically, socially, cognitively active and engaged to maintain cognitive acuity    Acute respiratory failure with hypoxia   -Influenza/RSV/COVID negative on admission  -CTA on admission negative for PE  -Likely multifactorial including chronic diastolic CHF with volume overload present on admission requiring cardiology consult  and IV diuresis  -Echo pending   -Currently requiring submental O2 3LPM NC not utilized at baseline, continue titration to maintain appropriate saturations  -Continue to encourage aggressive pulmonary toilet    Paroxysmal Afib   -Home regimen includes rate control with metoprolol and anticoagulation with Eliquis (which she has not been on due to insurance coverage issue)  -Cardiology on consult     Complete heart block   -s/p pacemaker placement   -Cardiology on consult     Chronic diastolic CHF   -EF 65% by echo 9/3/2022  -Repeat echo completed earlier today, final read pending  -Cardiology on consult and actively diuresing   -Continue close monitoring of fluid and electrolyte balance, daily weights    Hypothyroidism  -TSH elevated at 8.294  -Continue home levothyroxine regimen and close outpatient follow-up with PCP for ongoing dose titration and medication management    DM-II, uncontrolled with hyperglycemia and long term use of insulin   -A1c 8.2  -Insulin coverage during hospitalization with goal blood sugar   -Maintained on insulin regimen as outpatient, continue close outpatient follow-up with endocrinology for ongoing titration and age-appropriate ongoing diabetic screenings and cares    Impaired Vision  -recommend use of corrective lenses at all appropriate times  -encourage adequate lighting and encourage use of assistance with ambulation  -keep personal belongings close to person to avoid reaching  -encourage appropriate footwear at all times  -consider large font for printed materials provided to patient    Impaired Hearing  -Encourage use of hearing aids at all appropriate times  -encourage providers and caregivers to speak slowly and clearly directly to patient  -minimize background noise to encourage patient engagement  -consider use of hearing amplifier to reduce risk of straining to hear if hearing aids are not present or are not sufficient   -Encourage use of teach back method to ensure clear  communication    Impaired mastication  -Requires use of dentures-encourage use at all appropriate times  -ensure meal consistency appropriate for abilities  -continue aspiration precautions    Delirium precautions  -Patient is high risk of delirium due to age, hospitalization, acute hypoxic resp failure   -Initiate delirium precautions  -encourage normal sleep/wake cycle  -minimize overnight interruptions, group overnight vitals/labs/nursing checks as possible  -monitor for fecal and urinary retention which may precipitate delirium  -encourage early mobilization and ambulation with assist as cleared to safely do so  -provide frequent reorientation and redirection as indicated and appropriate   -appreciate family at the bedside for familiarity and reassurance   -avoid medications which may precipitate or worsen delirium such as tramadol, benzodiazepine, anticholinergics, and benadryl as possible  -encourage hydration and nutrition as above   -redirect unwanted behaviors as first line    Home medication review     Atorvastatin 80 Mg daily  Citalopram 20 Mg daily  Diclofex cream 2 g topically 4 times daily  Insulin aspart 6U (six) TIDWM  Insulin glargine 300u/mL 25U (twenty five) HS  Levothyroxine 125 mcg daily except on Sunday, take 1/2 tablet on Sundays  Lisinopril 20 Mg twice daily  Metoprolol tartrate 25 Mg Q12H  Omeprazole 20 Mg BID    Not currently taking Eliquis as o/p due to insurance coverage issue has not been able to get medication      Care coordination: rounded with Dr. Maldonado and Rachael (OT)    History of Present Illness   Physician Requesting Consult: Jon Pearson MD  Reason for Consult / Principal Problem: Cognitive decline   Hx and PE limited by: N/A  Additional history obtained from: Chart review and patient evaluation, additional history obtained from daughter and  at bedside     HPI: Lizzie Whitten is a 84 y.o. year old female with hypothyroidism, CHF, complete heart block s/p pacer  placement, DM-II with hyperglycemia and long-term use of insulin, paroxysmal A-fib, CKD 3A, hypertension, anxiety disorder, CAD, COPD, and memory difficulties who is admitted to the medical service with acute hypoxic respiratory failure, LE edema, generalized weakness and fall from bed at home with no head strike or loss of consciousness, she is being seen in consultation by Geriatrics for frailty syndrome in geriatric patient and family concern for poor appetite. She is seen and examined at bedside where she is lying resting with her daughter and  at her side, they assist with additional history. They explain that since relocating to [facility] this past November the patient and her  have had many new stressors and initially had a difficult time adjusting which has improved except for the meal program. They note that the food and choices are terrible and Lizzie has had significant amount of weight loss due to poor oral intake since moving there. She notes that she has lost some taste sensation since having COVID two years ago and this is exacerbated by the unappetizing food at her new residence. Her daughter notes that the her main source of caloric intake has been sugarfree ice cream but she reports frequent loose stools with it and other sugar free foods causing her to further self limit her intake. She notes other recent psychosocial stressors which may also have been impacting her appetite which are slowly improving and being addressed.     Prior to admission Lizzie was residing in Independent Living with her . At baseline she is independent with ADLs and iADLs including medication management but is in process of transitioning medications to blister packs at the facility for ease of use. She does not endorse memory or cognitive concerns. She ambulates with use of walker at baseline with no reported falls in the past six months. She requires use of glasses,  hearing aids and partial dentures.     Inpatient consult to Gerontology  Consult performed by: Rissa Gomez DO  Consult ordered by: Jon Pearson MD      Review of Systems   Constitutional:  Positive for appetite change.   HENT:  Positive for dental problem (partial denture).    Eyes: Negative.  Visual disturbance: wears partial denture.   Respiratory: Negative.  Negative for shortness of breath.    Cardiovascular:  Positive for leg swelling.   Gastrointestinal: Negative.    Genitourinary: Negative.    Musculoskeletal:  Positive for arthralgias (hands bilataterally) and gait problem.   Skin: Negative.    Neurological:  Positive for weakness and numbness (hands bilaterally, chronic and unchagned from baseline). Negative for dizziness, light-headedness and headaches.   Hematological: Negative.    Psychiatric/Behavioral: Negative.  Negative for sleep disturbance.    All other systems reviewed and are negative.    Historical Information   Past Medical History:   Diagnosis Date    Anxiety     Arthritis     CHF (congestive heart failure) ([facility])     Chronic venous insufficiency     COPD (chronic obstructive pulmonary disease) ([facility])     Coronary artery disease     Diabetes mellitus (HCC)     type II    Disease of thyroid gland     hypothyroidism    DVT (deep venous thrombosis) ([facility])     HL (hearing loss)     Hyperlipidemia     Hypertension     Irregular heart beat     Paroxysmal a-fib    Pacemaker     Renal disorder     stage 3    Sleep apnea     Sleep difficulties     Thyroid disease     Urinary incontinence     Uterine leiomyoma     Walker as ambulation aid      Past Surgical History:   Procedure Laterality Date    BREAST SURGERY      For Biopsy    CARDIAC PACEMAKER PLACEMENT      Permanent. Last assessed: 2/19/16    CARDIAC SURGERY      CARPAL TUNNEL RELEASE Bilateral     CATARACT EXTRACTION      CATARACT EXTRACTION W/ INTRAOCULAR LENS  IMPLANT, BILATERAL      CYSTOSCOPY      Diagnostic. Last assessed: 11/8/17     ECTOPIC PREGNANCY SURGERY      HYSTERECTOMY      Total Abdomonal. Resolved: 1982    JOINT REPLACEMENT Right     TKR    OOPHORECTOMY      UT ENDOVEN ABLTJ INCMPTNT VEIN XTR LASER 1ST VEIN Left 2/17/2017    Procedure: GREATER SAPHENOUS VEIN ENDOVASCULAR LASER THERAPY ;  Surgeon: Danielle Phan DO;  Location: [facility];  Service: Vascular    TONSILLECTOMY       Social History   Social History     Substance and Sexual Activity   Alcohol Use Not Currently    Comment: rarely. Per [employer]: Denied history of alcohol use     Social History     Substance and Sexual Activity   Drug Use No     Social History     Tobacco Use   Smoking Status Never    Passive exposure: Never   Smokeless Tobacco Never   Tobacco Comments    Per [employer]: Former smoker     Family History:   Family History   Problem Relation Age of Onset    Coronary artery disease Mother     Hypertension Mother     Heart disease Mother     Hypertension Father     Diabetes Father         Mellitus    No Known Problems Sister     No Known Problems Daughter     No Known Problems Daughter     No Known Problems Maternal Grandmother     No Known Problems Maternal Grandfather     No Known Problems Paternal Grandmother     No Known Problems Paternal Grandfather     Diabetes Brother         Mellitus    No Known Problems Son     Breast cancer Maternal Aunt     Pancreatic cancer Maternal Aunt     Breast cancer Cousin     Breast cancer Cousin     Breast cancer Paternal Aunt     No Known Problems Paternal Aunt     No Known Problems Paternal Aunt      Meds/Allergies   all current active meds have been reviewed    Allergies   Allergen Reactions    Ampicillin Hives     Objective     Intake/Output Summary (Last 24 hours) at 4/9/2024 1134  Last data filed at 4/9/2024 0801  Gross per 24 hour   Intake 180 ml   Output 250 ml   Net -70 ml     Invasive Devices       Peripheral Intravenous Line  Duration             Peripheral IV 04/08/24 Left Antecubital 1 day              Drain   Duration             External Urinary Catheter <1 day                  Physical Exam  Vitals and nursing note reviewed.   Constitutional:       General: She is not in acute distress.     Appearance: She is obese. She is not toxic-appearing.   HENT:      Head: Normocephalic and atraumatic.      Nose: Nose normal.      Comments: O2 via NC      Mouth/Throat:      Mouth: Mucous membranes are moist.   Eyes:      General: No scleral icterus.        Right eye: No discharge.         Left eye: No discharge.      Conjunctiva/sclera: Conjunctivae normal.      Comments: Glasses at bedside table    Neck:      Comments: Trachea midline, phonation normal  Cardiovascular:      Rate and Rhythm: Normal rate and regular rhythm.   Pulmonary:      Effort: Pulmonary effort is normal. No respiratory distress.      Breath sounds: No wheezing.      Comments: No rales or rhonci  Abdominal:      General: There is no distension.      Palpations: Abdomen is soft.      Tenderness: There is no abdominal tenderness.   Musculoskeletal:      Cervical back: Neck supple.      Right lower leg: Edema present.      Left lower leg: Edema present.      Comments: Obese body habitus    Skin:     General: Skin is warm and dry.   Neurological:      Mental Status: She is alert.      Comments: Awake and alert, oriented, answers questions appropriately   Psychiatric:         Mood and Affect: Mood normal.         Behavior: Behavior normal.      Comments: Polite pleasant cooperative      Lab Results:     I have personally reviewed pertinent lab results including the following:    Results from last 7 days   Lab Units 04/08/24  0846   WBC Thousand/uL 6.00   HEMOGLOBIN g/dL 11.2*   HEMATOCRIT % 37.0   PLATELETS Thousands/uL 181   SEGS PCT % 61   MONO PCT % 10   EOS PCT % 5     Results from last 7 days   Lab Units 04/08/24  0846   POTASSIUM mmol/L 5.0   CHLORIDE mmol/L 103   CO2 mmol/L 32   BUN mg/dL 34*   CREATININE mg/dL 1.30   CALCIUM mg/dL 8.7     I have personally  reviewed the following imaging study reports in PACS:    9/2/22- CTH w/o contrast   4/8/24- CTA chest PE study    Therapies:   PT: following   OT: following     VTE Prophylaxis: Eliquis    Code Status: Level 1 - Full Code  Advance Directive and Living Will:      Power of :    POLST:      Family and Social Support:   Living Arrangements: Lives w/ Spouse/significant other  Support Systems: Self  [facility]  Type of Current Residence: Apartment (Resides at [facility])  Current Care Facility Name: [facility]  Current Home Care Services: No  Discharge planning discussed with:: patient  Freedom of Choice: Yes    Goals of Care: resolution of acute illness

## 2024-04-09 NOTE — TELEPHONE ENCOUNTER
Patient's daughter was contacted and advised to contact patient's PCP to provide additional documentation for financial assistance. Patient's daughter denies receiving calls from PCP to continue efforts to acquire documentation. She expressed ongoing concern with her mother's health and acknowledges the next stage in acquiring financial assistance is to contact patient's PCP's office.

## 2024-04-10 DIAGNOSIS — I48.0 PAF (PAROXYSMAL ATRIAL FIBRILLATION) (HCC): Primary | ICD-10-CM

## 2024-04-10 PROBLEM — J96.01 ACUTE RESPIRATORY FAILURE WITH HYPOXIA (HCC): Status: ACTIVE | Noted: 2024-04-10

## 2024-04-10 PROBLEM — I50.33 ACUTE ON CHRONIC DIASTOLIC CONGESTIVE HEART FAILURE (HCC): Status: ACTIVE | Noted: 2024-04-08

## 2024-04-10 LAB
ALBUMIN SERPL BCP-MCNC: 3.3 G/DL (ref 3.5–5)
ALP SERPL-CCNC: 81 U/L (ref 34–104)
ALT SERPL W P-5'-P-CCNC: 5 U/L (ref 7–52)
ANION GAP SERPL CALCULATED.3IONS-SCNC: 5 MMOL/L (ref 4–13)
AST SERPL W P-5'-P-CCNC: 10 U/L (ref 13–39)
BASOPHILS # BLD AUTO: 0.04 THOUSANDS/ÂΜL (ref 0–0.1)
BASOPHILS NFR BLD AUTO: 1 % (ref 0–1)
BILIRUB SERPL-MCNC: 0.76 MG/DL (ref 0.2–1)
BUN SERPL-MCNC: 32 MG/DL (ref 5–25)
CALCIUM ALBUM COR SERPL-MCNC: 9.4 MG/DL (ref 8.3–10.1)
CALCIUM SERPL-MCNC: 8.8 MG/DL (ref 8.4–10.2)
CHLORIDE SERPL-SCNC: 97 MMOL/L (ref 96–108)
CO2 SERPL-SCNC: 38 MMOL/L (ref 21–32)
CREAT SERPL-MCNC: 1.44 MG/DL (ref 0.6–1.3)
EOSINOPHIL # BLD AUTO: 0.29 THOUSAND/ÂΜL (ref 0–0.61)
EOSINOPHIL NFR BLD AUTO: 5 % (ref 0–6)
ERYTHROCYTE [DISTWIDTH] IN BLOOD BY AUTOMATED COUNT: 12.7 % (ref 11.6–15.1)
GFR SERPL CREATININE-BSD FRML MDRD: 33 ML/MIN/1.73SQ M
GLUCOSE SERPL-MCNC: 147 MG/DL (ref 65–140)
GLUCOSE SERPL-MCNC: 151 MG/DL (ref 65–140)
GLUCOSE SERPL-MCNC: 152 MG/DL (ref 65–140)
GLUCOSE SERPL-MCNC: 77 MG/DL (ref 65–140)
GLUCOSE SERPL-MCNC: 84 MG/DL (ref 65–140)
HCT VFR BLD AUTO: 38.7 % (ref 34.8–46.1)
HGB BLD-MCNC: 11.6 G/DL (ref 11.5–15.4)
IMM GRANULOCYTES # BLD AUTO: 0.02 THOUSAND/UL (ref 0–0.2)
IMM GRANULOCYTES NFR BLD AUTO: 0 % (ref 0–2)
LYMPHOCYTES # BLD AUTO: 1.36 THOUSANDS/ÂΜL (ref 0.6–4.47)
LYMPHOCYTES NFR BLD AUTO: 23 % (ref 14–44)
MCH RBC QN AUTO: 29.7 PG (ref 26.8–34.3)
MCHC RBC AUTO-ENTMCNC: 30 G/DL (ref 31.4–37.4)
MCV RBC AUTO: 99 FL (ref 82–98)
MONOCYTES # BLD AUTO: 0.73 THOUSAND/ÂΜL (ref 0.17–1.22)
MONOCYTES NFR BLD AUTO: 13 % (ref 4–12)
NEUTROPHILS # BLD AUTO: 3.41 THOUSANDS/ÂΜL (ref 1.85–7.62)
NEUTS SEG NFR BLD AUTO: 58 % (ref 43–75)
NRBC BLD AUTO-RTO: 0 /100 WBCS
PLATELET # BLD AUTO: 164 THOUSANDS/UL (ref 149–390)
PMV BLD AUTO: 10 FL (ref 8.9–12.7)
POTASSIUM SERPL-SCNC: 4.1 MMOL/L (ref 3.5–5.3)
PROT SERPL-MCNC: 5.9 G/DL (ref 6.4–8.4)
RBC # BLD AUTO: 3.91 MILLION/UL (ref 3.81–5.12)
SODIUM SERPL-SCNC: 140 MMOL/L (ref 135–147)
WBC # BLD AUTO: 5.85 THOUSAND/UL (ref 4.31–10.16)

## 2024-04-10 PROCEDURE — 97535 SELF CARE MNGMENT TRAINING: CPT

## 2024-04-10 PROCEDURE — 99232 SBSQ HOSP IP/OBS MODERATE 35: CPT | Performed by: INTERNAL MEDICINE

## 2024-04-10 PROCEDURE — 85025 COMPLETE CBC W/AUTO DIFF WBC: CPT | Performed by: FAMILY MEDICINE

## 2024-04-10 PROCEDURE — 82948 REAGENT STRIP/BLOOD GLUCOSE: CPT

## 2024-04-10 PROCEDURE — 80053 COMPREHEN METABOLIC PANEL: CPT | Performed by: FAMILY MEDICINE

## 2024-04-10 PROCEDURE — 97530 THERAPEUTIC ACTIVITIES: CPT

## 2024-04-10 PROCEDURE — 99233 SBSQ HOSP IP/OBS HIGH 50: CPT | Performed by: FAMILY MEDICINE

## 2024-04-10 PROCEDURE — 97116 GAIT TRAINING THERAPY: CPT

## 2024-04-10 RX ORDER — LEVOTHYROXINE SODIUM 0.07 MG/1
150 TABLET ORAL
Status: DISCONTINUED | OUTPATIENT
Start: 2024-04-10 | End: 2024-04-10

## 2024-04-10 RX ORDER — LEVOTHYROXINE SODIUM 0.07 MG/1
150 TABLET ORAL
Status: DISCONTINUED | OUTPATIENT
Start: 2024-04-11 | End: 2024-04-12 | Stop reason: HOSPADM

## 2024-04-10 RX ADMIN — FUROSEMIDE 40 MG: 10 INJECTION, SOLUTION INTRAMUSCULAR; INTRAVENOUS at 17:10

## 2024-04-10 RX ADMIN — METOPROLOL TARTRATE 25 MG: 25 TABLET, FILM COATED ORAL at 17:10

## 2024-04-10 RX ADMIN — INSULIN LISPRO 6 UNITS: 100 INJECTION, SOLUTION INTRAVENOUS; SUBCUTANEOUS at 11:45

## 2024-04-10 RX ADMIN — APIXABAN 5 MG: 5 TABLET, FILM COATED ORAL at 17:10

## 2024-04-10 RX ADMIN — ATORVASTATIN CALCIUM 80 MG: 80 TABLET, FILM COATED ORAL at 21:17

## 2024-04-10 RX ADMIN — INSULIN GLARGINE 25 UNITS: 100 INJECTION, SOLUTION SUBCUTANEOUS at 21:17

## 2024-04-10 RX ADMIN — INSULIN LISPRO 6 UNITS: 100 INJECTION, SOLUTION INTRAVENOUS; SUBCUTANEOUS at 17:12

## 2024-04-10 RX ADMIN — LEVOTHYROXINE SODIUM 125 MCG: 125 TABLET ORAL at 06:18

## 2024-04-10 RX ADMIN — CITALOPRAM HYDROBROMIDE 20 MG: 20 TABLET ORAL at 08:24

## 2024-04-10 RX ADMIN — LISINOPRIL 20 MG: 20 TABLET ORAL at 08:24

## 2024-04-10 RX ADMIN — FUROSEMIDE 40 MG: 10 INJECTION, SOLUTION INTRAMUSCULAR; INTRAVENOUS at 08:24

## 2024-04-10 RX ADMIN — LISINOPRIL 20 MG: 20 TABLET ORAL at 17:10

## 2024-04-10 RX ADMIN — APIXABAN 5 MG: 5 TABLET, FILM COATED ORAL at 08:24

## 2024-04-10 RX ADMIN — PANTOPRAZOLE SODIUM 40 MG: 40 TABLET, DELAYED RELEASE ORAL at 06:18

## 2024-04-10 NOTE — CASE MANAGEMENT
Case Management Discharge Planning Note    Patient name Lizzie Whitten  Location [facility] 803/[facility] 803-01 MRN [id number]  : 1940 Date 4/10/2024       Current Admission Date: 2024  Current Admission Diagnosis:Fall   Patient Active Problem List    Diagnosis Date Noted    Pulmonary nodule 2024    CHF (congestive heart failure) ([facility]) 2024    Head trauma 2023    Traumatic hematoma of forehead 2023    Traumatic ulcer of left lower leg, limited to breakdown of skin ([facility]) 2023    Pharyngeal dysphagia 2023    Chronic cough 2023    Hand arthritis 2023    Assistance needed with transportation 2023    Abnormal CT scan, esophagus 2022    Hiatal hernia 2022    Esophageal dysphagia 2022    Hypertension 2022    Fall 2022    Secondary hyperparathyroidism of renal origin ([facility]) 2022    Memory difficulties 2022    Imbalance 2022    Coronary artery disease involving native coronary artery of native heart 2021    Dermatitis 09/15/2020    Sleep apnea 2019    Intraocular pressure increase 2018    Vitamin D deficiency 2018    Lymphedema of both lower extremities 2018    Iron deficiency anemia 10/12/2017    Urinary bladder incontinence 2017    OAB (overactive bladder) 2017    Benign hypertension with chronic kidney disease, stage III ([facility]) 2017    Microalbuminuria 2017    PAF (paroxysmal atrial fibrillation) ([facility]) 10/31/2016    Chronic venous insufficiency 2016    Presence of permanent cardiac pacemaker 2016    Chronic diastolic congestive heart failure ([facility]) 2016    Anemia 2016    S/P cardiac pacemaker procedure 2016    Hypothyroidism 2016    Complete heart block ([facility]) 2016    Type 2 diabetes mellitus with hyperglycemia, with long-term current use of insulin ([facility]) 01/15/2016    Stage 3a chronic kidney disease ([facility]) 10/21/2015     Eczematous dermatitis 03/03/2015    Anxiety disorder 06/26/2013    Chronic obstructive pulmonary disease (HCC) 09/24/2012    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 (HCC) 09/22/2011    Mixed hyperlipidemia 03/14/2011      LOS (days): 2  Geometric Mean LOS (GMLOS) (days): 3.9  Days to GMLOS:2.1     OBJECTIVE:  Risk of Unplanned Readmission Score: 15.64         Current admission status: Inpatient   Preferred Pharmacy:   [facility] - [place], [place] - [address]  [address] [address]  Phone: [phone] Fax: [phone]    [facility] - [place], [place] - [address]  [address] [place] [address]  Phone: [phone] Fax: [phone]    [facility] - [place], [place] - [address]   [address] [address]  Phone: [phone] Fax: [phone]    [facility]  [place] [place] - [address]  [address]  [place] [address]  Phone: [phone] Fax: [phone]    Primary Care Provider: Kennedy Castellon MD    Primary Insurance: MEDICARE  Secondary Insurance: [facility]    DISCHARGE DETAILS:                       Additional Comments: S/w Dr. Mendoza with cardiology & asked if cm could f/u with eliquis script sent to [facility] for cost. F/u with [facility] & they could not check d/t a script was recently filled on April 2nd. Called [employer] [phone] & informed it was filled through their mail order pharmacy called [facility] & it is copay is $376.00 per month. Updated Dr. Mendoza.        Pt discussed with [facility] during care coordination rounds & informed pt & family declined STR & wanted [facility].

## 2024-04-10 NOTE — PLAN OF CARE
Problem: OCCUPATIONAL THERAPY ADULT  Goal: Performs self-care activities at highest level of function for planned discharge setting.  See evaluation for individualized goals.  Description: Treatment Interventions: ADL retraining, Functional transfer training, UE strengthening/ROM, Endurance training, Patient/family training, Equipment evaluation/education, Compensatory technique education, Continued evaluation, Energy conservation, Activityengagement          See flowsheet documentation for full assessment, interventions and recommendations.   Outcome: Progressing  Note: Limitation: Decreased ADL status, Decreased endurance, Decreased self-care trans, Decreased high-level ADLs  Prognosis: Fair  Assessment: Pt is a 85 yo female who actively participated in skilled OT session on 4/10/2024. Pt seen with PT to increase safety, decrease fall risk, and maximize functional/occupational performance 2* medical complexity which is a regression from pt's functional baseline. Treatment focused to improve functional transfers with fall prevention strategies, static/dynamic balance, postural/trunk control, proper body mechanics, functional use of b/l UE's, higher level cognitive functions, safety awareness, and overall increased activity tolerance in ADL/IADL/leisure tasks. Upon arrival, pt found lying supine in bed and was agreeable to OT session. Pt performed rolling R <> L @ supervision level and required Max A for posterior hygiene. Pt performed supine <> sit with Min A, sat EOB to complete UB dressing tasks w/ Min A and LB dressing tasks w/ Mod A, and performed STS w/ Min A x1 w/ RW. Pt completed household functional mobility <> bathroom with Min A x1 w/ RW and required Max A in standing for posterior hygiene. At the end of the session, pt was left sitting upright in recliner with all functional needs in reach. Pt demonstrates gradual functional improvements towards OT goals however continues to be functioning below  occupational baseline and is still limited by the following limitations/impairments which were addressed through skilled instruction: generalized weakness, balance, endurance/activity tolerance, postural/trunk control, strength, pain, and safety awareness. At this time, recommend discharge to post-acute rehab when medically stable. The patient's raw score on the -PAC Daily Activity Inpatient Short Form is 16. A raw score of less than 19 suggests the patient may benefit from discharge to post-acute rehabilitation services. Please refer to the recommendation of the Occupational Therapist for safe discharge planning.  Established OT goals will be continued 2-3x/wk to address acute care needs and underlying performance skills to maximize occupational performance and safety to return to PLOF.     Rehab Resource Intensity Level, OT: II (Moderate Resource Intensity)

## 2024-04-10 NOTE — CASE MANAGEMENT
Case Management Discharge Planning Note    Patient name Lizzie Whitten  Location [facility] 803/[facility] 803-01 MRN [id number]  : 1940 Date 4/10/2024       Current Admission Date: 2024  Current Admission Diagnosis:Fall   Patient Active Problem List    Diagnosis Date Noted    Pulmonary nodule 2024    CHF (congestive heart failure) ([facility]) 2024    Head trauma 2023    Traumatic hematoma of forehead 2023    Traumatic ulcer of left lower leg, limited to breakdown of skin ([facility]) 2023    Pharyngeal dysphagia 2023    Chronic cough 2023    Hand arthritis 2023    Assistance needed with transportation 2023    Abnormal CT scan, esophagus 2022    Hiatal hernia 2022    Esophageal dysphagia 2022    Hypertension 2022    Fall 2022    Secondary hyperparathyroidism of renal origin ([facility]) 2022    Memory difficulties 2022    Imbalance 2022    Coronary artery disease involving native coronary artery of native heart 2021    Dermatitis 09/15/2020    Sleep apnea 2019    Intraocular pressure increase 2018    Vitamin D deficiency 2018    Lymphedema of both lower extremities 2018    Iron deficiency anemia 10/12/2017    Urinary bladder incontinence 2017    OAB (overactive bladder) 2017    Benign hypertension with chronic kidney disease, stage III ([facility]) 2017    Microalbuminuria 2017    PAF (paroxysmal atrial fibrillation) ([facility]) 10/31/2016    Chronic venous insufficiency 2016    Presence of permanent cardiac pacemaker 2016    Chronic diastolic congestive heart failure ([facility]) 2016    Anemia 2016    S/P cardiac pacemaker procedure 2016    Hypothyroidism 2016    Complete heart block ([facility]) 2016    Type 2 diabetes mellitus with hyperglycemia, with long-term current use of insulin ([facility]) 01/15/2016    Stage 3a chronic kidney disease ([facility]) 10/21/2015     Eczematous dermatitis 03/03/2015    Anxiety disorder 06/26/2013    Chronic obstructive pulmonary disease (HCC) 09/24/2012    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 (HCC) 09/22/2011    Mixed hyperlipidemia 03/14/2011      LOS (days): 2  Geometric Mean LOS (GMLOS) (days): 3.9  Days to GMLOS:2.2     OBJECTIVE:  Risk of Unplanned Readmission Score: 16.01         Current admission status: Inpatient   Preferred Pharmacy:   [facility] - [place], [place] - [address]  [address] [address]  Phone: [phone] Fax: [phone]    [id number] - [place], [place] - [address]  [address] [place] [address]  Phone: [phone] Fax: [phone]    [facility] - [address] [address]  Phone: [phone] Fax: [phone]    Primary Care Provider: Kennedy Castellon MD    Primary Insurance: MEDICARE  Secondary Insurance: [facility]    DISCHARGE DETAILS:    Discharge planning discussed with:: patient,  & daughter Nidia  Freedom of Choice: Yes     CM contacted family/caregiver?: Yes  Were Treatment Team discharge recommendations reviewed with patient/caregiver?: Yes  Did patient/caregiver verbalize understanding of patient care needs?: Yes  Were patient/caregiver advised of the risks associated with not following Treatment Team discharge recommendations?: Yes                   Other Referral/Resources/Interventions Provided:  Referral Comments: S/w pt,  & daughter Nidia in room yesterday & discussed therapy recommendations for STR. They all are declining & want pt to use [facility] at her apartment as she has used them in the past. Referral placed in aidin. CM to follow.

## 2024-04-10 NOTE — PROGRESS NOTES
Progress Note - Cardiology Team 1  Lizzie Whitten 84 y.o. female MRN: [id number]  Unit/Bed#: [facility] 803-01 Encounter: [id number]        Principal Problem:    Fall  Active Problems:    Complete heart block (HCC)    Hypothyroidism    Mixed hyperlipidemia    CHF (congestive heart failure) (HCC)    Pulmonary nodule      Assessment:  Acute hypoxic respiratory failure        No respiratory complaints. Presented with weakness        85% on r/a- requiring 3 L, weaned to 1 L today        Influenza  A/B/RSV/COVID-negative        CTA chest- no PE. Enlarged main pulmonary artery        TTE 9/2022- RVSP 50mmHg        Normal troponins  Acute on chronic diastolic heart failure        PTA-spironolactone 25 mg daily ? Taking        Current TTE- LVEF 65%.  Grade 1 diastolic dysfunction.  Moderate         pulmonary HTN        Weight few weeks ago 190 lbs. Jan 188 lbs. Current bed scale 193lbs        Mild volume overload presentation  ,     On IV lasix 40mg BID- incomplete I/O d/t incontinence        Oxygen requirements decreasing   CHB s/p PPM-pacemaker interrogation 1/2024 normal  PAF-pacer interrogation 1/2024-0% A-fib burden        On eliquis 5mg BID        ECG- sinus rhythm  Type 2 diabetes-hemoglobin A1c 8.2%  CAD-left heart cath 2016 moderate atherosclerosis LAD.  Proximal circumflex 30%, minor luminal irregularities RCA and circumflex.        On no AP while on eliquis, statin, BB-Lopressor 25 mg twice daily.        Troponins negative. No anginal symptoms  Pulmonary hypertension- moderate  8.   HLD-atorvastatin 80 mg  9.   Hypertension-she was mildly hypertensive.  Predominantly mildly           hypertensive. On Lopressor 25 mg twice daily and lisinopril 20 mg          daily.   10. CKD III-creat 1.3-1.5. stable   11. History of hyperkalemia- potassium high normal. Aldactone held.      Plan:  Continue 40mg IV BID lasix and likely transition to oral tomorrow.   OOB - stand scale weight. Attempt more accurate I.O  Continue AC-  eliquis.  Continue beta-blocker and ACE inhibitor  4.   2 gram Na restriction, 1800cc FR.   BMP in the a.m.  Monitor BP with diuresis     Subjective/Objective   Chief Complaint/subjective  No events overnight  Still without complaints of sob  No cp  Legs weak oob yesterday      Vitals: /65   Pulse 61   Temp 98.1 °F (36.7 °C)   Resp 18   Ht 5' (1.524 m)   Wt 87.5 kg (193 lb)   SpO2 96%   BMI 37.69 kg/m²     Vitals:    04/08/24 2029 04/09/24 1210   Weight: 87.6 kg (193 lb 1.6 oz) 87.5 kg (193 lb)     Orthostatic Blood Pressures      Flowsheet Row Most Recent Value   Blood Pressure 146/65 filed at 04/10/2024 0708   Patient Position - Orthostatic VS Lying filed at 04/08/2024 1500              Intake/Output Summary (Last 24 hours) at 4/10/2024 0940  Last data filed at 4/10/2024 0830  Gross per 24 hour   Intake 720 ml   Output 450 ml   Net 270 ml       Invasive Devices       Peripheral Intravenous Line  Duration             Peripheral IV 04/08/24 Left Antecubital 2 days              Drain  Duration             External Urinary Catheter 1 day                    Current Facility-Administered Medications   Medication Dose Route Frequency    acetaminophen (TYLENOL) tablet 650 mg  650 mg Oral Q6H PRN    apixaban (ELIQUIS) tablet 5 mg  5 mg Oral BID    atorvastatin (LIPITOR) tablet 80 mg  80 mg Oral HS    citalopram (CeleXA) tablet 20 mg  20 mg Oral Daily    furosemide (LASIX) injection 40 mg  40 mg Intravenous BID (diuretic)    insulin glargine (LANTUS) subcutaneous injection 25 Units 0.25 mL  25 Units Subcutaneous HS    insulin lispro (HumALOG/ADMELOG) 100 units/mL subcutaneous injection 6 Units  6 Units Subcutaneous TID With Meals    [START ON 4/11/2024] levothyroxine tablet 150 mcg  150 mcg Oral Early Morning    lisinopril (ZESTRIL) tablet 20 mg  20 mg Oral BID    metoprolol tartrate (LOPRESSOR) tablet 25 mg  25 mg Oral Q12H    pantoprazole (PROTONIX) EC tablet 40 mg  40 mg Oral Early Morning    sodium chloride  (PF) 0.9 % injection 3 mL  3 mL Intravenous Q1H PRN         Physical Exam: /65   Pulse 61   Temp 98.1 °F (36.7 °C)   Resp 18   Ht 5' (1.524 m)   Wt 87.5 kg (193 lb)   SpO2 96%   BMI 37.69 kg/m²     General Appearance:    Alert, cooperative, no distress, appears stated age   Head:    Normocephalic, no scleral icterus   Eyes:    PERRL   Nose:   Nares normal, septum midline, no drainage    Throat:   Lips, mucosa, and tongue normal   Neck:   Supple, symmetrical, trachea midline,              Lungs:     Decreased few crackles to auscultation bilaterally, respirations unlabored   Chest Wall:    No tenderness or deformity    Heart:    Regular rate and rhythm, S1 and S2 normal, no murmur, rub   or gallop   Abdomen:     Soft, non-tender, bowel sounds active all four quadrants,     no masses, no organomegaly   Extremities:   Extremities normal, atraumatic, mild  edema   Pulses:   2+ and symmetric all extremities   Skin:   Skin color, texture, turgor normal, no rashes or lesions   Neurologic:   Alert and oriented to person place and time, no focal deficits                 Lab Results:   Recent Results (from the past 72 hour(s))   ECG 12 lead    Collection Time: 04/08/24  8:13 AM   Result Value Ref Range    Ventricular Rate 62 BPM    Atrial Rate 62 BPM    CT Interval 196 ms    QRSD Interval 170 ms    QT Interval 454 ms    QTC Interval 460 ms    P Axis -50 degrees    QRS Axis 261 degrees    T Wave Axis 64 degrees   CBC and differential    Collection Time: 04/08/24  8:46 AM   Result Value Ref Range    WBC 6.00 4.31 - 10.16 Thousand/uL    RBC 3.70 (L) 3.81 - 5.12 Million/uL    Hemoglobin 11.2 (L) 11.5 - 15.4 g/dL    Hematocrit 37.0 34.8 - 46.1 %     (H) 82 - 98 fL    MCH 30.3 26.8 - 34.3 pg    MCHC 30.3 (L) 31.4 - 37.4 g/dL    RDW 12.8 11.6 - 15.1 %    MPV 10.5 8.9 - 12.7 fL    Platelets 181 149 - 390 Thousands/uL    nRBC 0 /100 WBCs    Segmented % 61 43 - 75 %    Immature Grans % 0 0 - 2 %    Lymphocytes % 23  "14 - 44 %    Monocytes % 10 4 - 12 %    Eosinophils Relative 5 0 - 6 %    Basophils Relative 1 0 - 1 %    Absolute Neutrophils 3.63 1.85 - 7.62 Thousands/µL    Absolute Immature Grans 0.02 0.00 - 0.20 Thousand/uL    Absolute Lymphocytes 1.38 0.60 - 4.47 Thousands/µL    Absolute Monocytes 0.58 0.17 - 1.22 Thousand/µL    Eosinophils Absolute 0.32 0.00 - 0.61 Thousand/µL    Basophils Absolute 0.07 0.00 - 0.10 Thousands/µL   Basic metabolic panel    Collection Time: 04/08/24  8:46 AM   Result Value Ref Range    Sodium 139 135 - 147 mmol/L    Potassium 5.0 3.5 - 5.3 mmol/L    Chloride 103 96 - 108 mmol/L    CO2 32 21 - 32 mmol/L    ANION GAP 4 4 - 13 mmol/L    BUN 34 (H) 5 - 25 mg/dL    Creatinine 1.30 0.60 - 1.30 mg/dL    Glucose 79 65 - 140 mg/dL    Calcium 8.7 8.4 - 10.2 mg/dL    eGFR 38 ml/min/1.73sq m   HS Troponin 0hr (reflex protocol)    Collection Time: 04/08/24  8:46 AM   Result Value Ref Range    hs TnI 0hr 12 \"Refer to ACS Flowchart\"- see link ng/L   HS Troponin I 2hr    Collection Time: 04/08/24 11:05 AM   Result Value Ref Range    hs TnI 2hr 19 \"Refer to ACS Flowchart\"- see link ng/L    Delta 2hr hsTnI 7 <20 ng/L   ECG 12 lead    Collection Time: 04/08/24 12:07 PM   Result Value Ref Range    Ventricular Rate 59 BPM    Atrial Rate 59 BPM    [place] Interval 184 ms    QRSD Interval 180 ms    QT Interval 474 ms    QTC Interval 469 ms    P Axis 194 degrees    QRS Axis 267 degrees    T Wave [place] 77 degrees   HS Troponin I 4hr    Collection Time: 04/08/24 12:49 PM   Result Value Ref Range    hs TnI 4hr 24 \"Refer to ACS Flowchart\"- see link ng/L    Delta 4hr hsTnI 12 <20 ng/L   COVID/FLU/RSV    Collection Time: 04/08/24  3:43 PM    Specimen: Nose; Nares   Result Value Ref Range    SARS-CoV-2 Negative Negative    INFLUENZA A PCR Negative Negative    INFLUENZA B PCR Negative Negative    RSV PCR Negative Negative   Fingerstick Glucose (POCT)    Collection Time: 04/08/24  3:58 PM   Result Value Ref Range    POC Glucose 155 " (H) 65 - 140 mg/dl   Fingerstick Glucose (POCT)    Collection Time: 04/08/24  9:01 PM   Result Value Ref Range    POC Glucose 166 (H) 65 - 140 mg/dl   Fingerstick Glucose (POCT)    Collection Time: 04/09/24  8:01 AM   Result Value Ref Range    POC Glucose 173 (H) 65 - 140 mg/dl   Fingerstick Glucose (POCT)    Collection Time: 04/09/24 11:36 AM   Result Value Ref Range    POC Glucose 175 (H) 65 - 140 mg/dl   Echo complete w/ contrast if indicated    Collection Time: 04/09/24 12:59 PM   Result Value Ref Range    BSA 1.84 m2    A4C EF 67 %    LVIDd 4.50 cm    LVIDS 2.50 cm    IVSd 1.40 cm    LVPWd 1.20 cm    FS 44 28 - 44    MV E' Tissue Velocity Septal 7 cm/s    MV E' Tissue Velocity Lateral 7 cm/s    LA Volume Index (BP) 27.2 mL/m2    E/A ratio 1.03     E wave deceleration time 195 ms    MV Peak E Steve 92 cm/s    MV Peak A Steve 0.89 m/s    RVID d 4.1 cm    Tricuspid annular plane systolic excursion 2.30 cm    LA size 4.8 cm    LA length (A2C) 5.50 cm    LA volume (BP) 50 mL    RAA A4C 13.1 cm2    MV stenosis pressure 1/2 time 56 ms    MV valve area p 1/2 method 3.93     TR Peak Steve 3.5 m/s    Triscuspid Valve Regurgitation Peak Gradient 49.0 mmHg    Ao root 3.80 cm    STJ 3.1 cm    Asc Ao 4.1 cm    Sinus of Valsalva, 2D 3.6 cm    Tricuspid valve peak regurgitation velocity 3.50 m/s    Left ventricular stroke volume (2D) 68.00 mL    Ao STJ 3.10 cm    Ao annulus 2.10 cm    IVS 1.4 cm    LEFT VENTRICLE SYSTOLIC VOLUME (MOD BIPLANE) 2D 23 mL    LV DIASTOLIC VOLUME (MOD BIPLANE) 2D 91 mL    Left Atrium Area-systolic Four Chamber 17.3 cm2    Left Atrium Area-systolic Apical Two Chamber 17.9 cm2    LVSV, 2D 68 mL    LV EF 65     Est. RA pres 3.0 mmHg    Right Ventricular Peak Systolic Pressure 52.00 mmHg    IVC 20.0 mm   TSH, 3rd generation with Free T4 reflex    Collection Time: 04/09/24  1:53 PM   Result Value Ref Range    TSH 3RD GENERATON 10.461 (H) 0.450 - 4.500 uIU/mL   T4, free    Collection Time: 04/09/24  1:53 PM    Result Value Ref Range    Free T4 0.83 0.61 - 1.12 ng/dL   Fingerstick Glucose (POCT)    Collection Time: 04/09/24 11:15 PM   Result Value Ref Range    POC Glucose 173 (H) 65 - 140 mg/dl   Comprehensive metabolic panel    Collection Time: 04/10/24  6:43 AM   Result Value Ref Range    Sodium 140 135 - 147 mmol/L    Potassium 4.1 3.5 - 5.3 mmol/L    Chloride 97 96 - 108 mmol/L    CO2 38 (H) 21 - 32 mmol/L    ANION GAP 5 4 - 13 mmol/L    BUN 32 (H) 5 - 25 mg/dL    Creatinine 1.44 (H) 0.60 - 1.30 mg/dL    Glucose 77 65 - 140 mg/dL    Calcium 8.8 8.4 - 10.2 mg/dL    Corrected Calcium 9.4 8.3 - 10.1 mg/dL    AST 10 (L) 13 - 39 U/L    ALT 5 (L) 7 - 52 U/L    Alkaline Phosphatase 81 34 - 104 U/L    Total Protein 5.9 (L) 6.4 - 8.4 g/dL    Albumin 3.3 (L) 3.5 - 5.0 g/dL    Total Bilirubin 0.76 0.20 - 1.00 mg/dL    eGFR 33 ml/min/1.73sq m   CBC and differential    Collection Time: 04/10/24  6:43 AM   Result Value Ref Range    WBC 5.85 4.31 - 10.16 Thousand/uL    RBC 3.91 3.81 - 5.12 Million/uL    Hemoglobin 11.6 11.5 - 15.4 g/dL    Hematocrit 38.7 34.8 - 46.1 %    MCV 99 (H) 82 - 98 fL    MCH 29.7 26.8 - 34.3 pg    MCHC 30.0 (L) 31.4 - 37.4 g/dL    RDW 12.7 11.6 - 15.1 %    MPV 10.0 8.9 - 12.7 fL    Platelets 164 149 - 390 Thousands/uL    nRBC 0 /100 WBCs    Segmented % 58 43 - 75 %    Immature Grans % 0 0 - 2 %    Lymphocytes % 23 14 - 44 %    Monocytes % 13 (H) 4 - 12 %    Eosinophils Relative 5 0 - 6 %    Basophils Relative 1 0 - 1 %    Absolute Neutrophils 3.41 1.85 - 7.62 Thousands/µL    Absolute Immature Grans 0.02 0.00 - 0.20 Thousand/uL    Absolute Lymphocytes 1.36 0.60 - 4.47 Thousands/µL    Absolute Monocytes 0.73 0.17 - 1.22 Thousand/µL    Eosinophils Absolute 0.29 0.00 - 0.61 Thousand/µL    Basophils Absolute 0.04 0.00 - 0.10 Thousands/µL   Fingerstick Glucose (POCT)    Collection Time: 04/10/24  7:11 AM   Result Value Ref Range    POC Glucose 84 65 - 140 mg/dl     Imaging: I have personally reviewed pertinent  reports.        Counseling / Coordination of Care  Total time spent today 20  minutes. Greater than 50% of total time was spent with the patient and / or family counseling and / or coordination of care.

## 2024-04-10 NOTE — ASSESSMENT & PLAN NOTE
Presenting with increased weakness.  PT OT recommending rehab  Fall precautions  Telemetry.  Noted history of pacer

## 2024-04-10 NOTE — PROGRESS NOTES
[facility]  Progress Note  Name: Lizzie Whitten I  MRN: [id number]  Unit/Bed#: [facility] 803-01 I Date of Admission: 4/8/2024   Date of Service: 4/10/2024 I Hospital Day: 2    Assessment/Plan   * Fall  Assessment & Plan  Presenting with increased weakness.  PT OT recommending rehab  Fall precautions  Telemetry.  Noted history of pacer    Acute respiratory failure with hypoxia (HCC)  Assessment & Plan  Patient was saturating at 85% on room air she required 3 L of nasal cannula  Treating with diuresis  Cardiology consulted    Pulmonary nodule  Assessment & Plan  Incidental finding on CTA  Recommend follow-up in 12 months    Acute on chronic diastolic congestive heart failure (HCC)  Assessment & Plan  Wt Readings from Last 3 Encounters:   04/09/24 87.5 kg (193 lb)   03/12/24 86.5 kg (190 lb 12.8 oz)   01/23/24 85.5 kg (188 lb 9.6 oz)     Patient followed as outpatient with Dr. Mendoza.  Consult cardiology  IV Lasix 40 mg twice daily  Continue metoprolol and lisinopril  Reviewed echocardiogram  Daily weights and strict ins and outs    Mixed hyperlipidemia  Assessment & Plan  Continue atorvastatin 80 mg    Hypothyroidism  Assessment & Plan  Repeat TSH is 10.461  Increase levothyroxine to 150 micrograms daily  Recheck TSH and 4 to 6 weeks      Complete heart block (HCC)  Assessment & Plan  Patient has a pacemaker in place  Continue Eliquis  Continue metoprolol               VTE Pharmacologic Prophylaxis:   Moderate Risk (Score 3-4) - Pharmacological DVT Prophylaxis Ordered: apixaban (Eliquis).    Mobility:   Basic Mobility Inpatient Raw Score: 15  JH-HLM Goal: 4: Move to chair/commode  JH-HLM Achieved: 7: Walk 25 feet or more  JH-HLM Goal achieved. Continue to encourage appropriate mobility.    Patient Centered Rounds: I performed bedside rounds with nursing staff today.   Discussions with Specialists or Other Care Team Provider: Cardiology    Education and Discussions with Family /  Patient: Updated  ( and daughter son-in-law) at bedside.  Had an extensive 15-minute conversation with patient's family in regards to discharge planning specifically rehab.    Total Time Spent on Date of Encounter in care of patient: 65 mins. This time was spent on one or more of the following: performing physical exam; counseling and coordination of care; obtaining or reviewing history; documenting in the medical record; reviewing/ordering tests, medications or procedures; communicating with other healthcare professionals and discussing with patient's family/caregivers.    Current Length of Stay: 2 day(s)  Current Patient Status: Inpatient   Certification Statement: The patient will continue to require additional inpatient hospital stay due to CHF exacerbation causing acute hypoxic respiratory failure requiring IV diuretics  Discharge Plan: Anticipate discharge in 24-48 hrs to rehab facility.    Code Status: Level 1 - Full Code    Subjective:   This is a very pleasant 84-year-old female who was seen evaluated today at bedside.  Patient has no acute complaints at this time.    Objective:     Vitals:   Temp (24hrs), Av.3 °F (36.8 °C), Min:98.1 °F (36.7 °C), Max:98.5 °F (36.9 °C)    Temp:  [98.1 °F (36.7 °C)-98.5 °F (36.9 °C)] 98.1 °F (36.7 °C)  HR:  [60-61] 61  Resp:  [16-18] 18  BP: (140-146)/(65-70) 146/65  SpO2:  [95 %-99 %] 96 %  Body mass index is 35.61 kg/m².     Input and Output Summary (last 24 hours):     Intake/Output Summary (Last 24 hours) at 4/10/2024 1440  Last data filed at 4/10/2024 1249  Gross per 24 hour   Intake 660 ml   Output 833 ml   Net -173 ml       Physical Exam:   Physical Exam  Vitals reviewed.   Constitutional:       General: She is not in acute distress.     Appearance: She is not ill-appearing.   HENT:      Head: Normocephalic.   Eyes:      Conjunctiva/sclera: Conjunctivae normal.   Cardiovascular:      Rate and Rhythm: Normal rate and regular rhythm.    Pulmonary:      Effort: Pulmonary effort is normal.      Breath sounds: Rales present.   Abdominal:      General: Abdomen is flat.      Palpations: Abdomen is soft.      Tenderness: There is no abdominal tenderness.   Musculoskeletal:      Right lower leg: Edema present.      Left lower leg: Edema present.   Skin:     General: Skin is warm and dry.   Neurological:      Mental Status: She is alert. Mental status is at baseline.          Additional Data:     Labs:  Results from last 7 days   Lab Units 04/10/24  0643   WBC Thousand/uL 5.85   HEMOGLOBIN g/dL 11.6   HEMATOCRIT % 38.7   PLATELETS Thousands/uL 164   SEGS PCT % 58   LYMPHO PCT % 23   MONO PCT % 13*   EOS PCT % 5     Results from last 7 days   Lab Units 04/10/24  0643   SODIUM mmol/L 140   POTASSIUM mmol/L 4.1   CHLORIDE mmol/L 97   CO2 mmol/L 38*   BUN mg/dL 32*   CREATININE mg/dL 1.44*   ANION GAP mmol/L 5   CALCIUM mg/dL 8.8   ALBUMIN g/dL 3.3*   TOTAL BILIRUBIN mg/dL 0.76   ALK PHOS U/L 81   ALT U/L 5*   AST U/L 10*   GLUCOSE RANDOM mg/dL 77         Results from last 7 days   Lab Units 04/10/24  1110 04/10/24  0711 04/09/24  2315 04/09/24  1136 04/09/24  0801 04/08/24  2101 04/08/24  1558   POC GLUCOSE mg/dl 152* 84 173* 175* 173* 166* 155*               Lines/Drains:  Invasive Devices       Peripheral Intravenous Line  Duration             Peripheral IV 04/08/24 Left Antecubital 2 days              Drain  Duration             External Urinary Catheter 1 day                          Imaging: No pertinent imaging reviewed.    Recent Cultures (last 7 days):         Last 24 Hours Medication List:   Current Facility-Administered Medications   Medication Dose Route Frequency Provider Last Rate    acetaminophen  650 mg Oral Q6H PRN Hetul Fernandez, DO      apixaban  5 mg Oral BID Hetul Fernandez, DO      atorvastatin  80 mg Oral HS Hetul Fernandez, DO      citalopram  20 mg Oral Daily Hetul Fernandez, DO      furosemide  40 mg Intravenous BID (diuretic) Amara Krueger  CRNP      insulin glargine  25 Units Subcutaneous HS Hetul Fernandez, DO      insulin lispro  6 Units Subcutaneous TID With Meals Hetul Fernandez, DO      [START ON 4/11/2024] levothyroxine  150 mcg Oral Early Morning Jon Pearson MD      lisinopril  20 mg Oral BID Hetul Fernandez, DO      metoprolol tartrate  25 mg Oral Q12H Hetul Fernandez, DO      pantoprazole  40 mg Oral Early Morning Hetul Fernandez, DO      sodium chloride (PF)  3 mL Intravenous Q1H PRN Ana Laura Pedraza MD          Today, Patient Was Seen By: Jon Pearson MD    **Please Note: This note may have been constructed using a voice recognition system.**

## 2024-04-10 NOTE — OCCUPATIONAL THERAPY NOTE
Occupational Therapy Progress Note     Patient Name: Lizzie Whitten  Today's Date: 4/10/2024  Problem List  Principal Problem:    Fall  Active Problems:    Complete heart block (HCC)    Hypothyroidism    Mixed hyperlipidemia    CHF (congestive heart failure) (HCC)    Pulmonary nodule            04/10/24 0947   OT Last Visit   OT Visit Date 04/10/24   Note Type   Note Type Treatment   Pain Assessment   Pain Assessment Tool 0-10   Pain Score No Pain   Hospital Pain Intervention(s) Repositioned;Ambulation/increased activity;Emotional support   Restrictions/Precautions   Weight Bearing Precautions Per Order No   Other Precautions Chair Alarm;Bed Alarm;Multiple lines;Fall Risk;Pain;O2   Lifestyle   Autonomy PTA, pt was independent in ADLs, some IADLs (faciltity provides some assistance), and uses RW for functional mobility; (-) driving   Reciprocal Relationships Lives with her ; supportive family nearby   Service to Others Retired; reports previously working at [employer]   Intrinsic Gratification Enjoys [employer] activities and being with family; reports that facility has activites to do   ADL   Where Assessed Edge of bed   UB Dressing Assistance 4  Minimal Assistance   UB Dressing Deficit Setup;Steadying;Verbal cueing;Supervision/safety;Increased time to complete;Thread RUE;Thread LUE;Pull over head;Pull around back;Pull down in back   LB Dressing Assistance 3  Moderate Assistance   LB Dressing Deficit Thread RLE into underwear;Thread LLE into underwear;Pull up over hips   Toileting Assistance  2  Maximal Assistance   Toileting Deficit Clothing management up;Clothing management down;Perineal hygiene   Bed Mobility   Rolling R 5  Supervision   Additional items Bedrails   Rolling L 5  Supervision   Additional items Bedrails   Supine to Sit 4  Minimal assistance   Additional items Assist x 1;HOB elevated;Bedrails;Increased time required;Verbal cues;LE management   Sit to Supine Unable to assess  "  Additional Comments Upon arrival, pt found lying on bedpan, performing rolling R <> L with supervision with use of grab bars; supine <> sit with Min A; sat EOB for approx 5 minutes to complete UB dressing/LB ADLs; @ end of session, pt left sitting upright in recliner with all functional needs in reach with chair alarm activated   Transfers   Sit to Stand 4  Minimal assistance   Additional items Assist x 1;Verbal cues;Increased time required   Stand to Sit 4  Minimal assistance   Additional items Assist x 1;Increased time required;Verbal cues   Toilet transfer 4  Minimal assistance   Additional items Assist x 1;Increased time required;Verbal cues;Commode;Raised toilet seat  (BSC over toilet)   Additional Comments w/ RW with increased time and verbal cues for proper technique/hand placement   Functional Mobility   Functional Mobility 4  Minimal assistance   Additional Comments Pt completed household functional mobility distances <> bathroom + additional household distances to simulate common ADL/IADL routines with Min A x1 w/ RW with chair follow for safety in which pt required 1 seated rest break 2* fatigue   Additional items Rolling walker   Toilet Transfers   Toilet Transfer From Rolling walker   Toilet Transfer Type To and from   Toilet Transfer to Raised toilet seat with rails   Toilet Transfer Technique Ambulating   Toilet Transfers Minimal assistance   Toilet Transfers Comments w/ RW   Subjective   Subjective \"I feel like I have to go to the bathroom again.\"   Cognition   Overall Cognitive Status WFL   Arousal/Participation Responsive;Arousable;Cooperative   Attention Within functional limits   Orientation Level Oriented X4   Memory Within functional limits   Following Commands Follows one step commands without difficulty   Comments Pt pleasant and cooperative; flat affect   Activity Tolerance   Activity Tolerance Patient tolerated treatment well;Patient limited by fatigue   Medical Staff Made Aware Elda, " CECILT; RN cleared   Assessment   Assessment Pt is a 83 yo female who actively participated in skilled OT session on 4/10/2024. Pt seen with PT to increase safety, decrease fall risk, and maximize functional/occupational performance 2* medical complexity which is a regression from pt's functional baseline. Treatment focused to improve functional transfers with fall prevention strategies, static/dynamic balance, postural/trunk control, proper body mechanics, functional use of b/l UE's, higher level cognitive functions, safety awareness, and overall increased activity tolerance in ADL/IADL/leisure tasks. Upon arrival, pt found lying supine in bed and was agreeable to OT session. Pt performed rolling R <> L @ supervision level and required Max A for posterior hygiene. Pt performed supine <> sit with Min A, sat EOB to complete UB dressing tasks w/ Min A and LB dressing tasks w/ Mod A, and performed STS w/ Min A x1 w/ RW. Pt completed household functional mobility <> bathroom with Min A x1 w/ RW and required Max A in standing for posterior hygiene. At the end of the session, pt was left sitting upright in recliner with all functional needs in reach. Pt demonstrates gradual functional improvements towards OT goals however continues to be functioning below occupational baseline and is still limited by the following limitations/impairments which were addressed through skilled instruction: generalized weakness, balance, endurance/activity tolerance, postural/trunk control, strength, pain, and safety awareness. At this time, recommend discharge to post-acute rehab when medically stable. The patient's raw score on the AM-PAC Daily Activity Inpatient Short Form is 16. A raw score of less than 19 suggests the patient may benefit from discharge to post-acute rehabilitation services. Please refer to the recommendation of the Occupational Therapist for safe discharge planning.  Established OT goals will be continued 2-3x/wk to address  acute care needs and underlying performance skills to maximize occupational performance and safety to return to [facility].   Plan   Treatment Interventions ADL retraining;Functional transfer training;UE strengthening/ROM;Endurance training;Patient/family training;Equipment evaluation/education;Compensatory technique education;Continued evaluation;Energy conservation;Activityengagement   Goal Expiration Date 04/23/24   OT Treatment Day 1   OT Frequency 2-3x/wk   Discharge Recommendation   Rehab Resource Intensity Level, OT II (Moderate Resource Intensity)   AM-PAC Daily Activity Inpatient   Lower Body Dressing 2   Bathing 2   Toileting 2   Upper Body Dressing 3   Grooming 3   Eating 4   Daily Activity Raw Score 16   Daily Activity Standardized Score (Calc for Raw Score >=11) 35.96   AM-PAC Applied Cognition Inpatient   Following a Speech/Presentation 3   Understanding Ordinary Conversation 4   Taking Medications 4   Remembering Where Things Are Placed or Put Away 4   Remembering List of 4-5 Errands 4   Taking Care of Complicated Tasks 3   Applied Cognition Raw Score 22   Applied Cognition Standardized Score 47.83   End of Consult   Education Provided Yes   Patient Position at End of Consult Bedside chair;Bed/Chair alarm activated;All needs within reach   Nurse Communication Nurse aware of consult       Rachael Egan MS, OTR/L

## 2024-04-10 NOTE — PLAN OF CARE
Problem: PHYSICAL THERAPY ADULT  Goal: Performs mobility at highest level of function for planned discharge setting.  See evaluation for individualized goals.  Description: Treatment/Interventions: Functional transfer training, Therapeutic exercise, Gait training, Bed mobility, Spoke to nursing, OT  Equipment Recommended: Walker       See flowsheet documentation for full assessment, interventions and recommendations.  Outcome: Progressing  Note: Prognosis: Good  Problem List: Decreased strength, Decreased endurance, Decreased mobility, Pain  Assessment: Pt seen today for treatment session. Pt cooperative and very pleasant during session. Pt follows all commands, requiring intermittent VC for hand placement , foot placement and completing task. Pt demonstrates improved mobility compared to IE. Able to tolerate increased activity , completing multiple functional transfers with Ax 1 and RW. Pt ambulates increased distance , took some seated rest breaks 2* fatigue. O2 sats on 1L O2 during session between 90-94%. Pt making progress with mobility level, will benefit from continued PT services to address reamining limitations. Post dc recommendation at this time is Level II moderate resource intensity at this time.  Barriers to Discharge: Decreased caregiver support     Rehab Resource Intensity Level, PT: II (Moderate Resource Intensity)    See flowsheet documentation for full assessment.

## 2024-04-10 NOTE — CASE MANAGEMENT
Case Management Discharge Planning Note    Patient name Lizzei Whitten  Location [facility] 803/[facility] 803-01 MRN [id number]  : 1940 Date 4/10/2024       Current Admission Date: 2024  Current Admission Diagnosis:Fall   Patient Active Problem List    Diagnosis Date Noted    Pulmonary nodule 2024    CHF (congestive heart failure) ([facility]) 2024    Head trauma 2023    Traumatic hematoma of forehead 2023    Traumatic ulcer of left lower leg, limited to breakdown of skin ([facility]) 2023    Pharyngeal dysphagia 2023    Chronic cough 2023    Hand arthritis 2023    Assistance needed with transportation 2023    Abnormal CT scan, esophagus 2022    Hiatal hernia 2022    Esophageal dysphagia 2022    Hypertension 2022    Fall 2022    Secondary hyperparathyroidism of renal origin ([facility]) 2022    Memory difficulties 2022    Imbalance 2022    Coronary artery disease involving native coronary artery of native heart 2021    Dermatitis 09/15/2020    Sleep apnea 2019    Intraocular pressure increase 2018    Vitamin D deficiency 2018    Lymphedema of both lower extremities 2018    Iron deficiency anemia 10/12/2017    Urinary bladder incontinence 2017    OAB (overactive bladder) 2017    Benign hypertension with chronic kidney disease, stage III ([facility]) 2017    Microalbuminuria 2017    PAF (paroxysmal atrial fibrillation) ([facility]) 10/31/2016    Chronic venous insufficiency 2016    Presence of permanent cardiac pacemaker 2016    Chronic diastolic congestive heart failure ([facility]) 2016    Anemia 2016    S/P cardiac pacemaker procedure 2016    Hypothyroidism 2016    Complete heart block ([facility]) 2016    Type 2 diabetes mellitus with hyperglycemia, with long-term current use of insulin ([facility]) 01/15/2016    Stage 3a chronic kidney disease ([facility]) 10/21/2015     Eczematous dermatitis 03/03/2015    Anxiety disorder 06/26/2013    Chronic obstructive pulmonary disease (HCC) 09/24/2012    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 (HCC) 09/22/2011    Mixed hyperlipidemia 03/14/2011      LOS (days): 2  Geometric Mean LOS (GMLOS) (days): 3.9  Days to GMLOS:2.1     OBJECTIVE:  Risk of Unplanned Readmission Score: 15.64         Current admission status: Inpatient   Preferred Pharmacy:   [facility] - [place], [place] - [address]  [address] [address]  Phone: [phone] Fax: [phone]    [id number] - [place], [place] - [address]  [address] [facility] [place] [address]  Phone: [phone] Fax: [phone]    [facility] - [facility] [address] [place] [address]  [address] [place] [address]  Phone: [phone] Fax: [phone]    Primary Care Provider: Kennedy Castellon MD    Primary Insurance: MEDICARE  Secondary Insurance: [facility]    DISCHARGE DETAILS:                                Requested Home Health Care         Is the patient interested in HHC at discharge?: Yes  Home Health Discipline requested:: Occupational Therapy, Physical Therapy  Home Health Agency Name:: [facility]  Home Health Follow-Up Provider:: PCP  Home Health Services Needed:: Evaluate Functional Status and Safety, Gait/ADL Training, Strengthening/Theraputic Exercises to Improve Function  Homebound Criteria Met:: Uses an Assist Device (i.e. cane, walker, etc)  Supporting Clincal Findings:: Limited Endurance         Other Referral/Resources/Interventions Provided:  Referral Comments: [facility] accepted in aidin referral. Reserved. Updated DCI.       [facility] will need script for PT/OT at time of dc.

## 2024-04-10 NOTE — ASSESSMENT & PLAN NOTE
Patient was saturating at 85% on room air she required 3 L of nasal cannula  Treating with diuresis  Cardiology consulted

## 2024-04-10 NOTE — RESTORATIVE TECHNICIAN NOTE
Restorative Technician Note      Patient Name: Lizzie Whitten     Restorative Tech Visit Date: 04/10/24  Note Type: Mobility  Patient Position Upon Consult: Bedside chair  Activity Performed: Ambulated  Assistive Device: Standard walker; Other (Comment) (1L O2; had chair follow however pt did not utilize it)  Education Provided: Yes  Patient Position at End of Consult: Bedside chair; All needs within reach; Bed/Chair alarm activated    Shanika Wick  DPT, Restorative Technician

## 2024-04-10 NOTE — PROGRESS NOTES
Progress Note - Geriatric Medicine   Lizzie Whitten 84 y.o. female MRN: [id number]  Unit/Bed#: [facility] 803-01 Encounter: [id number]      Assessment/Plan:    Ambulatory dysfunction with fall  -reportedly mechanical fall at assisted living with no reported injuries on admission cleared for admission to medical service   -Requires use of walker for ambulation at baseline  -prior hx prior falls none of which are noted in past six months   -remains high risk future falls due to age, hx of fall, deconditioning/debility and unfamiliar environment   -encourage good body mechanics and assist with all transfers  -keep personal items and call bell close to prevent reaching  -maintain environment free of fall hazards  -encourage appropriate footwear and adequate lighting at all times when out of bed  -consider environmental fall risk assessment and personal fall alert system on returning home  -PT and OT following      Frailty syndrome in geriatric patient   -clinical frailty scale stage V, mildly frail, progressive, at risk acceleration with acute metabolic derangements  -multifactorial including acute respiratory failure requiring supplemental O2 and CHF requiring IV diuresis, paroxysmal afib, DM-II and poor recent oral intake  -poor appetite - discussed taste changes with aging including preference for sweet taste as last sensation lost and importance of mindfulness of this as well as impact of stress on appetite, meal planning if prefers food other than what is offered at care facility and consideration of magic cup ice cream for increased protein content in place of traditional ice cream which she eats often as o/p  -continue to encourage well balanced nutritional intake  -continue optimization of chronic conditions and address acute derangements as arise  -ensure underlying anxiety/mood/depression symptoms are well-controlled as may impact patient response to therapies as well as overall sense of wellbeing and quality of  life  -Continue to ensure that treatments and interventions align with patient's wishes and goals of care  -Continue psychosocial supports of patient and caregivers     OA of hands bilaterally  -Pain may be impacting patient's ability to appropriately utilize utensils at mealtime and therefore impact oral intake, discussed with OT will evaluate for potential foam build-up for utensils which were reviewed with and left at bedside by therapy yesterday, appreciate input and assist   -continue symptomatic management and lifestyle modifications/adaptions to minimize symptoms and improve quality of life   -utilizes topical diclofenac for pain control as o/p      Anxiety disorder   -symptoms well controlled with home citalopram regimen, currently on max geriatric dose (20mg daily) monitor QTc and electrolytes with use  -continue to encourage patient remain active and engaged in care facility activities for socialization and social support   -continue close o/p f/u with PCP for ongoing management      Cognitive screening   -alert and oriented, denies memory or cognitive concerns, same endorsed by family at bedside on admit   -at baseline mostly independent with ADLs and iADLs  -at risk age related cognitive decline, continue secondary risk factor modifications   -MMSE 30/30 (2013) and (6/2022) by PCP   -[facility] 9/2/22 imaging personally viewed, mild diffuse chronic microangiopathic changes noted as well as mild bilateral frontal lobe volume loss   -maintained on levothyroxine for hypothyroidism, no recent B12 on record review, consider checking with routine labs  -Encourage use of sensory disturbances such as corrective lenses and hearing aids at all appropriate times reduce risk of uncorrected sensory benefit from continuing to further isolation, confusion, encephalopathy and more precipitous cognitive decline  -Encourage patient remain physically, socially, cognitively active and engaged to maintain cognitive acuity     Acute  respiratory failure with hypoxia   -Influenza/RSV/COVID negative on admission  -CTA on admission negative for PE  -Likely multifactorial including chronic diastolic CHF with volume overload present on admission requiring cardiology consult and IV diuresis  -Echo pending   -Currently requiring submental O2 3LPM NC not utilized at baseline, continue titration to maintain appropriate saturations  -Continue to encourage aggressive pulmonary toilet     Paroxysmal Afib   -Home regimen includes rate control with metoprolol and anticoagulation with Eliquis (which family states she has not been on due to insurance coverage issue)  -Cardiology on consult      Complete heart block   -s/p pacemaker placement   -Cardiology on consult      Chronic diastolic CHF   -EF 65% by echo 9/3/2022  -Repeat echo 4/9/23 reports EF 65% with mild abnormal diastolic dysfunction and G1DD  -Cardiology on consult currently on IV lasix, fluid and sodium restrictions   -Continue close monitoring of fluid and electrolyte balance, daily weights     Hypothyroidism  -TSH elevated at 8.294  -Continue home levothyroxine regimen and close outpatient follow-up with PCP for ongoing dose titration and medication management     DM-II, uncontrolled with hyperglycemia and long term use of insulin   -A1c 8.2  -Insulin coverage during hospitalization with goal blood sugar 140-180 to reduce risk hypoglycemia   -Maintained on insulin regimen as outpatient, continue close outpatient follow-up with Endocrinology for ongoing titration and age-appropriate ongoing diabetic screenings and cares     Impaired Vision  -recommend use of corrective lenses at all appropriate times  -consider large font for printed materials provided to patient     Impaired Hearing  -Encourage use of hearing aids at all appropriate times  -encourage providers and caregivers to speak slowly and clearly directly to patient  -minimize background noise to encourage patient engagement  -consider use  of hearing amplifier to reduce risk of straining to hear if hearing aids are not present or are not sufficient   -Encourage use of teach back method to ensure clear communication     Impaired mastication  -Requires use of dentures-encourage use at all appropriate times  -ensure meal consistency appropriate for abilities  -continue aspiration precautions     High risk developing delirium   -due to age, hospitalization, acute hypoxic resp failure, CHF, anxiety disorder and psychosocial stressors   -cont delirium precautions  -encourage normal sleep/wake cycle  -minimize overnight interruptions, group overnight vitals/labs/nursing checks as medically appropriate   -monitor for fecal and urinary retention which may precipitate delirium  -encourage early mobilization and ambulation with assist as cleared to safely do so  -provide frequent reorientation and redirection as indicated and appropriate   -appreciate family at the bedside for familiarity and reassurance   -avoid medications which may precipitate or worsen delirium such as tramadol, benzodiazepine, anticholinergics, and benadryl as possible  -encourage hydration and nutrition within fluid restrictions as above  -redirect unwanted behaviors as first line    Care coordination: rounded with Calvin (RN)    Subjective:     Lizzie is seen and examined at bedside where she is seen and resting comfortably, she reports that she slept well and ate at least some of her breakfast despite it not being to her liking. She feels like she needs to have BM, no other acute complaints. Nursing reports no acute events overnight.     Review of Systems   Constitutional:  Negative for appetite change (appetite is good, food choices are not), chills and fever.   HENT: Negative.     Eyes: Negative.  Negative for visual disturbance.   Respiratory: Negative.  Negative for shortness of breath.    Cardiovascular: Negative.    Gastrointestinal: Negative.  Negative for abdominal pain,  constipation, nausea and vomiting.   Genitourinary: Negative.  Negative for difficulty urinating.   Musculoskeletal:  Positive for gait problem.   Skin: Negative.    Neurological:  Negative for dizziness, light-headedness and headaches.   Hematological: Negative.    Psychiatric/Behavioral: Negative.  Negative for sleep disturbance.    All other systems reviewed and are negative.    Objective:     Vitals: Blood pressure 146/65, pulse 61, temperature 98.1 °F (36.7 °C), resp. rate 18, height 5' (1.524 m), weight 87.5 kg (193 lb), SpO2 96%, not currently breastfeeding.,Body mass index is 37.69 kg/m².      Intake/Output Summary (Last 24 hours) at 4/10/2024 0904  Last data filed at 4/10/2024 0830  Gross per 24 hour   Intake 720 ml   Output 450 ml   Net 270 ml     Current Medications: Reviewed    Physical Exam:   Physical Exam  Vitals and nursing note reviewed.   Constitutional:       General: She is not in acute distress.     Appearance: She is obese. She is not toxic-appearing.   HENT:      Head: Normocephalic and atraumatic.      Nose: Nose normal.      Comments: O2 via NC     Mouth/Throat:      Mouth: Mucous membranes are moist.   Eyes:      General: No scleral icterus.        Right eye: No discharge.         Left eye: No discharge.      Conjunctiva/sclera: Conjunctivae normal.   Neck:      Comments: Phonation norm   Cardiovascular:      Rate and Rhythm: Normal rate and regular rhythm.   Pulmonary:      Effort: Pulmonary effort is normal. No respiratory distress.      Comments: Shallow resp no rales or rhonci  Abdominal:      General: Bowel sounds are normal. There is no distension.      Palpations: Abdomen is soft.      Tenderness: There is no abdominal tenderness.      Comments: Obese abd    Musculoskeletal:      Cervical back: Neck supple.      Comments: Obese body habitus, reduced overall muscle mass    Skin:     General: Skin is warm and dry.   Neurological:      Mental Status: She is alert.      Comments: Awake  and alert, answers questions appropriately   Psychiatric:      Comments: Pleasant and cooperative         Invasive Devices       Peripheral Intravenous Line  Duration             Peripheral IV 04/08/24 Left Antecubital 2 days              Drain  Duration             External Urinary Catheter 1 day                  Lab Results:     I have personally reviewed pertinent lab results including the following:    Results from last 7 days   Lab Units 04/10/24  0643 04/08/24  0846   WBC Thousand/uL 5.85 6.00   HEMOGLOBIN g/dL 11.6 11.2*   HEMATOCRIT % 38.7 37.0   PLATELETS Thousands/uL 164 181   SEGS PCT % 58 61   MONO PCT % 13* 10   EOS PCT % 5 5     Results from last 7 days   Lab Units 04/10/24  0643 04/08/24  0846   POTASSIUM mmol/L 4.1 5.0   CHLORIDE mmol/L 97 103   CO2 mmol/L 38* 32   BUN mg/dL 32* 34*   CREATININE mg/dL 1.44* 1.30   CALCIUM mg/dL 8.8 8.7   ALK PHOS U/L 81  --    ALT U/L 5*  --    AST U/L 10*  --      I have personally reviewed the following imaging study reports in PACS:    No new imaging overnight

## 2024-04-10 NOTE — PHYSICAL THERAPY NOTE
PHYSICAL THERAPY NOTE          Patient Name: Lizzie Whitten  Today's Date: 4/10/2024     04/10/24 0948   PT Last Visit   PT Visit Date 04/10/24   Note Type   Note Type Treatment   Pain Assessment   Pain Assessment Tool 0-10   Pain Score No Pain   Restrictions/Precautions   Weight Bearing Precautions Per Order No   Other Precautions Bed Alarm;Chair Alarm;O2;Fall Risk;Multiple lines   General   Chart Reviewed Yes   Family/Caregiver Present No   Cognition   Overall Cognitive Status WFL   Arousal/Participation Alert;Responsive   Attention Within functional limits   Orientation Level Oriented X4   Following Commands Follows one step commands without difficulty   Comments Pt cooperative durign session   Subjective   Subjective Agreeable to mobilize   Bed Mobility   Rolling R 5  Supervision   Additional items Bedrails   Rolling L 5  Supervision   Additional items Bedrails   Supine to Sit 4  Minimal assistance   Additional items Assist x 1;HOB elevated;Bedrails;Increased time required;Verbal cues;LE management   Sit to Supine Unable to assess   Additional Comments Pt in bed upon arrival.   Transfers   Sit to Stand 4  Minimal assistance   Additional items Assist x 1;Increased time required;Verbal cues   Stand to Sit 4  Minimal assistance   Additional items Assist x 1;Increased time required;Verbal cues   Toilet transfer 4  Minimal assistance   Additional items Assist x 1;Commode;Increased time required;Verbal cues   Additional Comments w/RW - VC for hand placement, proper technique   Ambulation/Elevation   Gait pattern Forward Flexion;Excessively slow;Short stride;Step to   Gait Assistance 4  Minimal assist   Additional items Assist x 1;Verbal cues;Tactile cues   Assistive Device Rolling walker   Distance 30 ft + 40 ft+ 10 ft   Ambulation/Elevation Additional Comments Pt making progress with mobility   Balance   Static Sitting Good    Dynamic Sitting Fair +   Static Standing Fair   Dynamic Standing Fair -   Ambulatory Poor +   Endurance Deficit   Endurance Deficit Yes   Activity Tolerance   Activity Tolerance Patient limited by fatigue;Patient limited by pain   Nurse Made Aware RN cleared pt for therapy   Exercises   Balance training  STS X 3, Static standing ~ 1-2 minutes   Assessment   Prognosis Good   Problem List Decreased strength;Decreased endurance;Decreased mobility;Pain   Assessment Pt seen today for treatment session. Pt cooperative and very pleasant during session. Pt follows all commands, requiring intermittent VC for hand placement , foot placement and completing task. Pt demonstrates improved mobility compared to IE. Able to tolerate increased activity , completing multiple functional transfers with Ax 1 and RW. Pt ambulates increased distance , took some seated rest breaks 2* fatigue. O2 sats on 1L O2 during session between 90-94%. Pt making progress with mobility level, will benefit from continued PT services to address reamining limitations. Post dc recommendation at this time is Level II moderate resource intensity at this time.   Barriers to Discharge Decreased caregiver support   Goals   Patient Goals to go to bathroom   STG Expiration Date 04/23/24   PT Treatment Day 1   Plan   Treatment/Interventions Functional transfer training;Therapeutic exercise;Bed mobility;Gait training;OT;Spoke to nursing;Spoke to case management   Progress Progressing toward goals   PT Frequency 2-3x/wk   Discharge Recommendation   Rehab Resource Intensity Level, PT II (Moderate Resource Intensity)   Equipment Recommended Walker   AM-PAC Basic Mobility Inpatient   Turning in Flat Bed Without Bedrails 3   Lying on Back to Sitting on Edge of Flat Bed Without Bedrails 2   Moving Bed to Chair 3   Standing Up From Chair Using Arms 3   Walk in Room 3   Climb 3-5 Stairs With Railing 1   Basic Mobility Inpatient Raw Score 15   Basic Mobility Standardized  Score 36.97   [facility] Highest Level Of Mobility   -[facility] Goal 4: Move to chair/commode   -[facility] Achieved 7: Walk 25 feet or more   Education   Education Provided Mobility training   Patient Reinforcement needed;Demonstrates verbal understanding   End of Consult   Patient Position at End of Consult Bedside chair;Bed/Chair alarm activated;All needs within reach   Elda Andrade PT DPT

## 2024-04-10 NOTE — ASSESSMENT & PLAN NOTE
Repeat TSH is 10.461  Increase levothyroxine to 150 micrograms daily  Recheck TSH and 4 to 6 weeks

## 2024-04-10 NOTE — ASSESSMENT & PLAN NOTE
Wt Readings from Last 3 Encounters:   04/09/24 87.5 kg (193 lb)   03/12/24 86.5 kg (190 lb 12.8 oz)   01/23/24 85.5 kg (188 lb 9.6 oz)     Patient followed as outpatient with Dr. Mendoza.  Consult cardiology  IV Lasix 40 mg twice daily  Continue metoprolol and lisinopril  Reviewed echocardiogram  Daily weights and strict ins and outs

## 2024-04-10 NOTE — PLAN OF CARE
Problem: Potential for Falls  Goal: Patient will remain free of falls  Description: INTERVENTIONS:  - Educate patient/family on patient safety including physical limitations  - Instruct patient to call for assistance with activity   - Consult OT/PT to assist with strengthening/mobility   - Keep Call bell within reach  - Keep bed low and locked with side rails adjusted as appropriate  - Keep care items and personal belongings within reach  - Initiate and maintain comfort rounds  - Initiate/Maintain bed and chair alarm  - Apply yellow socks and bracelet for high fall risk patients  - Consider moving patient to room near nurses station  Outcome: Progressing     Problem: PAIN - ADULT  Goal: Verbalizes/displays adequate comfort level or baseline comfort level  Description: Interventions:  - Encourage patient to monitor pain and request assistance  - Assess pain using appropriate pain scale  - Administer analgesics based on type and severity of pain and evaluate response  - Implement non-pharmacological measures as appropriate and evaluate response  - Consider cultural and social influences on pain and pain management  - Notify physician/advanced practitioner if interventions unsuccessful or patient reports new pain  Outcome: Progressing     Problem: INFECTION - ADULT  Goal: Absence or prevention of progression during hospitalization  Description: INTERVENTIONS:  - Assess and monitor for signs and symptoms of infection  - Monitor lab/diagnostic results  - Monitor all insertion sites, i.e. indwelling lines, tubes, and drains  - Monitor endotracheal if appropriate and nasal secretions for changes in amount and color  - [place] appropriate cooling/warming therapies per order  - Administer medications as ordered  - Instruct and encourage patient and family to use good hand hygiene technique  - Identify and instruct in appropriate isolation precautions for identified infection/condition  Outcome: Progressing  Goal: Absence  of fever/infection during neutropenic period  Description: INTERVENTIONS:  - Monitor WBC    Outcome: Progressing     Problem: SAFETY ADULT  Goal: Patient will remain free of falls  Description: INTERVENTIONS:  - Educate patient/family on patient safety including physical limitations  - Instruct patient to call for assistance with activity   - Consult OT/PT to assist with strengthening/mobility   - Keep Call bell within reach  - Keep bed low and locked with side rails adjusted as appropriate  - Keep care items and personal belongings within reach  - Initiate and maintain comfort rounds  - Initiate/Maintain bed and chair alarm  - Apply yellow socks and bracelet for high fall risk patients  - Consider moving patient to room near nurses station  Outcome: Progressing  Goal: Maintain or return to baseline ADL function  Description: INTERVENTIONS:  - Educate patient/family on patient safety including physical limitations  - Instruct patient to call for assistance with activity   - Consult OT/PT to assist with strengthening/mobility   - Keep Call bell within reach  - Keep bed low and locked with side rails adjusted as appropriate  - Keep care items and personal belongings within reach  - Initiate and maintain comfort rounds  - Initiate/Maintain bed and chair alarm  - Apply yellow socks and bracelet for high fall risk patients  - Consider moving patient to room near nurses station  Outcome: Progressing  Goal: Maintains/Returns to pre admission functional level  Description: INTERVENTIONS:  - Educate patient/family on patient safety including physical limitations  - Instruct patient to call for assistance with activity   - Consult OT/PT to assist with strengthening/mobility   - Keep Call bell within reach  - Keep bed low and locked with side rails adjusted as appropriate  - Keep care items and personal belongings within reach  - Initiate and maintain comfort rounds  - Initiate/Maintain bed and chair alarm  - Apply yellow  socks and bracelet for high fall risk patients  - Consider moving patient to room near nurses station  Outcome: Progressing     Problem: DISCHARGE PLANNING  Goal: Discharge to home or other facility with appropriate resources  Description: INTERVENTIONS:  - Identify barriers to discharge w/patient and caregiver  - Arrange for needed discharge resources and transportation as appropriate  - Identify discharge learning needs (meds, wound care, etc.)  - Arrange for interpretive services to assist at discharge as needed  - Refer to Case Management Department for coordinating discharge planning if the patient needs post-hospital services based on physician/advanced practitioner order or complex needs related to functional status, cognitive ability, or social support system  Outcome: Progressing     Problem: Knowledge Deficit  Goal: Patient/family/caregiver demonstrates understanding of disease process, treatment plan, medications, and discharge instructions  Description: Complete learning assessment and assess knowledge base.  Interventions:  - Provide teaching at level of understanding  - Provide teaching via preferred learning methods  Outcome: Progressing     Problem: Prexisting or High Potential for Compromised Skin Integrity  Goal: Skin integrity is maintained or improved  Description: INTERVENTIONS:  - Identify patients at risk for skin breakdown  - Assess and monitor skin integrity  - Assess and monitor nutrition and hydration status  - Monitor labs   - Assess for incontinence   - Turn and reposition patient  - Assist with mobility/ambulation  - Relieve pressure over bony prominences  - Avoid friction and shearing  - Provide appropriate hygiene as needed including keeping skin clean and dry  - Evaluate need for skin moisturizer/barrier cream  - Collaborate with interdisciplinary team   - Patient/family teaching  - Consider wound care consult   Outcome: Progressing     Problem: Nutrition/Hydration-ADULT  Goal:  Nutrient/Hydration intake appropriate for improving, restoring or maintaining nutritional needs  Description: Monitor and assess patient's nutrition/hydration status for malnutrition. Collaborate with interdisciplinary team and initiate plan and interventions as ordered.  Monitor patient's weight and dietary intake as ordered or per policy. Utilize nutrition screening tool and intervene as necessary. Determine patient's food preferences and provide high-protein, high-caloric foods as appropriate.     INTERVENTIONS:  - Monitor oral intake, urinary output, labs, and treatment plans  - Assess nutrition and hydration status and recommend course of action  - Evaluate amount of meals eaten  - Assist patient with eating if necessary   - Allow adequate time for meals  - Recommend/ encourage appropriate diets, oral nutritional supplements, and vitamin/mineral supplements  - Order, calculate, and assess calorie counts as needed  - Recommend, monitor, and adjust tube feedings and TPN/PPN based on assessed needs  - Assess need for intravenous fluids  - Provide specific nutrition/hydration education as appropriate  - Include patient/family/caregiver in decisions related to nutrition  Outcome: Progressing

## 2024-04-11 LAB
ALBUMIN SERPL BCP-MCNC: 3.3 G/DL (ref 3.5–5)
ALP SERPL-CCNC: 78 U/L (ref 34–104)
ALT SERPL W P-5'-P-CCNC: <3 U/L (ref 7–52)
ANION GAP SERPL CALCULATED.3IONS-SCNC: 8 MMOL/L (ref 4–13)
AST SERPL W P-5'-P-CCNC: 10 U/L (ref 13–39)
BASOPHILS # BLD AUTO: 0.06 THOUSANDS/ÂΜL (ref 0–0.1)
BASOPHILS NFR BLD AUTO: 1 % (ref 0–1)
BILIRUB SERPL-MCNC: 0.9 MG/DL (ref 0.2–1)
BUN SERPL-MCNC: 34 MG/DL (ref 5–25)
CALCIUM ALBUM COR SERPL-MCNC: 9.4 MG/DL (ref 8.3–10.1)
CALCIUM SERPL-MCNC: 8.8 MG/DL (ref 8.4–10.2)
CHLORIDE SERPL-SCNC: 95 MMOL/L (ref 96–108)
CO2 SERPL-SCNC: 34 MMOL/L (ref 21–32)
CREAT SERPL-MCNC: 1.47 MG/DL (ref 0.6–1.3)
EOSINOPHIL # BLD AUTO: 0.21 THOUSAND/ÂΜL (ref 0–0.61)
EOSINOPHIL NFR BLD AUTO: 3 % (ref 0–6)
ERYTHROCYTE [DISTWIDTH] IN BLOOD BY AUTOMATED COUNT: 12.6 % (ref 11.6–15.1)
GFR SERPL CREATININE-BSD FRML MDRD: 33 ML/MIN/1.73SQ M
GLUCOSE SERPL-MCNC: 101 MG/DL (ref 65–140)
GLUCOSE SERPL-MCNC: 120 MG/DL (ref 65–140)
GLUCOSE SERPL-MCNC: 191 MG/DL (ref 65–140)
GLUCOSE SERPL-MCNC: 235 MG/DL (ref 65–140)
GLUCOSE SERPL-MCNC: 64 MG/DL (ref 65–140)
GLUCOSE SERPL-MCNC: 76 MG/DL (ref 65–140)
HCT VFR BLD AUTO: 40.2 % (ref 34.8–46.1)
HGB BLD-MCNC: 12.1 G/DL (ref 11.5–15.4)
IMM GRANULOCYTES # BLD AUTO: 0.02 THOUSAND/UL (ref 0–0.2)
IMM GRANULOCYTES NFR BLD AUTO: 0 % (ref 0–2)
LYMPHOCYTES # BLD AUTO: 1.57 THOUSANDS/ÂΜL (ref 0.6–4.47)
LYMPHOCYTES NFR BLD AUTO: 22 % (ref 14–44)
MAGNESIUM SERPL-MCNC: 1.4 MG/DL (ref 1.9–2.7)
MCH RBC QN AUTO: 30 PG (ref 26.8–34.3)
MCHC RBC AUTO-ENTMCNC: 30.1 G/DL (ref 31.4–37.4)
MCV RBC AUTO: 100 FL (ref 82–98)
MONOCYTES # BLD AUTO: 0.83 THOUSAND/ÂΜL (ref 0.17–1.22)
MONOCYTES NFR BLD AUTO: 12 % (ref 4–12)
NEUTROPHILS # BLD AUTO: 4.33 THOUSANDS/ÂΜL (ref 1.85–7.62)
NEUTS SEG NFR BLD AUTO: 62 % (ref 43–75)
NRBC BLD AUTO-RTO: 0 /100 WBCS
PHOSPHATE SERPL-MCNC: 3.8 MG/DL (ref 2.3–4.1)
PLATELET # BLD AUTO: 172 THOUSANDS/UL (ref 149–390)
PMV BLD AUTO: 9.9 FL (ref 8.9–12.7)
POTASSIUM SERPL-SCNC: 4.3 MMOL/L (ref 3.5–5.3)
PROT SERPL-MCNC: 6 G/DL (ref 6.4–8.4)
RBC # BLD AUTO: 4.04 MILLION/UL (ref 3.81–5.12)
SODIUM SERPL-SCNC: 137 MMOL/L (ref 135–147)
WBC # BLD AUTO: 7.02 THOUSAND/UL (ref 4.31–10.16)

## 2024-04-11 PROCEDURE — 82948 REAGENT STRIP/BLOOD GLUCOSE: CPT

## 2024-04-11 PROCEDURE — 99232 SBSQ HOSP IP/OBS MODERATE 35: CPT | Performed by: INTERNAL MEDICINE

## 2024-04-11 PROCEDURE — 83735 ASSAY OF MAGNESIUM: CPT | Performed by: FAMILY MEDICINE

## 2024-04-11 PROCEDURE — 80053 COMPREHEN METABOLIC PANEL: CPT | Performed by: FAMILY MEDICINE

## 2024-04-11 PROCEDURE — 85025 COMPLETE CBC W/AUTO DIFF WBC: CPT | Performed by: FAMILY MEDICINE

## 2024-04-11 PROCEDURE — 99232 SBSQ HOSP IP/OBS MODERATE 35: CPT | Performed by: FAMILY MEDICINE

## 2024-04-11 PROCEDURE — 84100 ASSAY OF PHOSPHORUS: CPT | Performed by: FAMILY MEDICINE

## 2024-04-11 RX ORDER — SENNOSIDES 8.6 MG
1 TABLET ORAL DAILY PRN
Status: DISCONTINUED | OUTPATIENT
Start: 2024-04-11 | End: 2024-04-12 | Stop reason: HOSPADM

## 2024-04-11 RX ORDER — FUROSEMIDE 40 MG/1
40 TABLET ORAL DAILY
Status: DISCONTINUED | OUTPATIENT
Start: 2024-04-12 | End: 2024-04-12 | Stop reason: HOSPADM

## 2024-04-11 RX ADMIN — APIXABAN 5 MG: 5 TABLET, FILM COATED ORAL at 17:05

## 2024-04-11 RX ADMIN — LISINOPRIL 20 MG: 20 TABLET ORAL at 17:05

## 2024-04-11 RX ADMIN — PANTOPRAZOLE SODIUM 40 MG: 40 TABLET, DELAYED RELEASE ORAL at 05:36

## 2024-04-11 RX ADMIN — FUROSEMIDE 40 MG: 10 INJECTION, SOLUTION INTRAMUSCULAR; INTRAVENOUS at 08:22

## 2024-04-11 RX ADMIN — INSULIN GLARGINE 25 UNITS: 100 INJECTION, SOLUTION SUBCUTANEOUS at 21:02

## 2024-04-11 RX ADMIN — INSULIN LISPRO 6 UNITS: 100 INJECTION, SOLUTION INTRAVENOUS; SUBCUTANEOUS at 08:25

## 2024-04-11 RX ADMIN — METOPROLOL TARTRATE 25 MG: 25 TABLET, FILM COATED ORAL at 17:05

## 2024-04-11 RX ADMIN — APIXABAN 5 MG: 5 TABLET, FILM COATED ORAL at 08:22

## 2024-04-11 RX ADMIN — ATORVASTATIN CALCIUM 80 MG: 80 TABLET, FILM COATED ORAL at 21:02

## 2024-04-11 RX ADMIN — INSULIN LISPRO 6 UNITS: 100 INJECTION, SOLUTION INTRAVENOUS; SUBCUTANEOUS at 17:05

## 2024-04-11 RX ADMIN — LISINOPRIL 20 MG: 20 TABLET ORAL at 08:22

## 2024-04-11 RX ADMIN — METOPROLOL TARTRATE 25 MG: 25 TABLET, FILM COATED ORAL at 05:36

## 2024-04-11 RX ADMIN — LEVOTHYROXINE SODIUM 150 MCG: 75 TABLET ORAL at 05:37

## 2024-04-11 RX ADMIN — CITALOPRAM HYDROBROMIDE 20 MG: 20 TABLET ORAL at 08:22

## 2024-04-11 NOTE — ASSESSMENT & PLAN NOTE
Patient was saturating at 85% on room air she required 3 L of nasal cannula  Treating with diuresis  Cardiology consulted  Will repeat ambulatory oxygen evaluation prior to discharge

## 2024-04-11 NOTE — PROGRESS NOTES
Progress Note - Geriatric Medicine   Lizzie Whitten 84 y.o. female MRN: [id number]  Unit/Bed#: [facility] 803-01 Encounter: [id number]      Assessment/Plan:    Ambulatory dysfunction with fall  -reportedly mechanical fall at assisted living   -no traumatic injuries reported on admit   -utilizes walker for ambulation at baseline  -prior hx prior falls none of which are noted in past six months   -remains high risk future falls cont fall precautions   -cont good body mechanics and assist with all transfers  -maintain environment free of fall hazards  -consider environmental fall risk assessment and personal fall alert system on returning home  -PT and OT following      Frailty syndrome in geriatric patient   -clinical frailty scale stage V, mildly frail, progressive, at risk acceleration with acute metabolic derangements  -multifactorial including acute respiratory failure requiring supplemental O2 and CHF requiring IV diuresis, paroxysmal afib, DM-II and poor recent oral intake  -poor appetite - discussed taste changes with aging including preference for sweet taste as last sensation lost and importance of mindfulness of this as well as impact of stress on appetite, meal planning if prefers food other than what is offered at care facility and consideration of magic cup ice cream for increased protein content in place of traditional ice cream which she eats often as o/p  -cont to optimize nutrition intake   -continue optimization of chronic conditions and address acute derangements as arise  -ensure underlying anxiety/mood/depression symptoms are well-controlled as may impact patient response to therapies as well as overall sense of wellbeing and quality of life  -Continue to ensure that treatments and interventions align with patient's wishes and goals of care  -Continue psychosocial supports     Constipation   -no BM reported since admission and patient reports straining   -start senna daily PRN     OA of hands  bilaterally  -utilizes topical diclofenac for pain control as o/p   -encourage use of foam buildup for utensils for ease of use, reviewed with patient and family by myself and OT   -continue symptomatic management and lifestyle modifications/adaptions to minimize symptoms and improve quality of life      Anxiety disorder   -symptoms well controlled with home citalopram regimen  -currently on max geriatric dose of Celexa (20mg daily) monitor QTc and electrolytes with use  -continue to encourage patient remain active and engaged in care facility activities for socialization and social support as isolation can exacerbate anxiety and contribute to development of depression   -continue close o/p f/u with PCP for ongoing management      Cognitive screening   -alert and oriented, denies memory or cognitive concerns, same endorsed by family at bedside on admit   -at baseline mostly independent with ADLs and iADLs requires assist mostly for physical debilities   -at risk age related cognitive decline, continue secondary risk factor modifications   -MMSE 30/30 (2013) and (6/2022) by PCP   -[facility] 9/2/22 imaging personally viewed, mild diffuse chronic microangiopathic changes noted as well as mild bilateral frontal lobe volume loss   -maintained on levothyroxine for hypothyroidism, no recent B12 on record review, consider checking with routine labs  -Encourage use of sensory disturbances such as corrective lenses and hearing aids at all appropriate times reduce risk of uncorrected sensory benefit from continuing to further isolation, confusion, encephalopathy and more precipitous cognitive decline  -Encourage patient remain physically, socially, cognitively active and engaged to maintain cognitive acuity     Acute respiratory failure with hypoxia   -Influenza/RSV/COVID negative on admission  -CTA on admission negative for PE  -Likely multifactorial including chronic diastolic CHF with volume overload present on admission  requiring cardiology consult and IV diuresis  -Currently requiring submental O2 3LPM NC not utilized at baseline, continue titration to maintain appropriate saturations and wean down/off as tolerated/appropriate   -Continue to encourage aggressive pulmonary toilet     Paroxysmal Afib   -Home regimen includes rate control with metoprolol and anticoagulation with Eliquis (which family states she has not been on due to insurance coverage issue)  -Cardiology on consult      Complete heart block   -s/p pacemaker placement   -Cardiology on consult      Chronic diastolic CHF   -EF 65% by echo 9/3/2022  -Repeat echo 4/9/23 reports EF 65% with mild abnormal diastolic dysfunction and G1DD  -Cardiology on consult currently on IV lasix, fluid and sodium restrictions   -Continue close monitoring of fluid and electrolyte balance, daily weights  -Cardiology on consult and managing      Hypothyroidism  -TSH elevated at 10.461 from 8.294  -levothyroxine increased from 125mcg to 150 mcg (4/11/24), repeat TSH/FT4 in 4-6 wks   -outpatient follow-up with PCP for ongoing dose titration and medication management     DM-II, uncontrolled with hyperglycemia and long term use of insulin   -A1c 8.2  -Insulin coverage during hospitalization with goal blood sugar 140-180 to reduce risk hypoglycemia   -Maintained on insulin regimen as outpatient, continue close outpatient follow-up with Endocrinology for ongoing titration and age-appropriate ongoing diabetic screenings and cares  -encourage healthy dietary choices      Impaired Vision  -recommend use of corrective lenses at all appropriate times  -consider large font for printed materials provided to patient     Impaired Hearing  -Encourage use of hearing aids at all appropriate times  -encourage providers and caregivers to speak slowly and clearly directly to patient  -minimize background noise to encourage patient engagement  -consider use of hearing amplifier to reduce risk of straining to  hear if hearing aids are not present or are not sufficient   -Encourage use of teach back method to ensure clear communication     Impaired mastication  -Requires use of dentures-encourage use at all appropriate times  -ensure meal consistency appropriate for abilities  -continue aspiration precautions     High risk developing delirium   -due to age, hospitalization, acute hypoxic resp failure, CHF, anxiety disorder and psychosocial stressors   -continue delirium precautions  -maintain normal sleep/wake cycle  -monitor for fecal and urinary retention which may precipitate delirium  -encourage early mobilization and ambulation with assist as cleared to safely do so  -provide frequent reorientation and redirection as indicated and appropriate   -appreciate family at the bedside for familiarity and reassurance   -avoid medications which may precipitate or worsen delirium such as tramadol, benzodiazepine, anticholinergics, and benadryl as possible  -encourage hydration and nutrition within fluid restrictions as above  -redirect unwanted behaviors as first line    Care coordination: rounded with Nila (RN)    Subjective:     Lizzie is seen and examined at bedside where she is sitting resting comfortably, she reports that she slept well last night and appetite is slightly improved nothing that she completing all of her breakfast. She notes straining to have BM and requests stool softener, no other acute complaints. Nursing reports no acute events overnight.    Review of Systems   Constitutional: Negative.  Negative for chills and fever. Appetite change: improving.  HENT: Negative.     Eyes: Negative.    Respiratory: Negative.  Negative for shortness of breath.    Cardiovascular: Negative.    Gastrointestinal:  Positive for constipation.   Genitourinary: Negative.    Musculoskeletal: Negative.    Skin: Negative.    Neurological: Negative.    Hematological: Negative.    Psychiatric/Behavioral: Negative.  Negative for  sleep disturbance.    All other systems reviewed and are negative.    Objective:     Vitals: Blood pressure 134/64, pulse 61, temperature 98.2 °F (36.8 °C), resp. rate 18, height 5' (1.524 m), weight 80.8 kg (178 lb 3.2 oz), SpO2 94%, not currently breastfeeding.,Body mass index is 34.8 kg/m².      Intake/Output Summary (Last 24 hours) at 4/11/2024 0858  Last data filed at 4/11/2024 0800  Gross per 24 hour   Intake 600 ml   Output 1210 ml   Net -610 ml     Current Medications: Reviewed    Physical Exam:   Physical Exam  Vitals and nursing note reviewed.   Constitutional:       General: She is not in acute distress.     Appearance: Normal appearance. She is not toxic-appearing.   HENT:      Head: Normocephalic and atraumatic.      Nose: Nose normal.      Comments: O2 via NC      Mouth/Throat:      Mouth: Mucous membranes are moist.   Eyes:      General: No scleral icterus.        Right eye: No discharge.         Left eye: No discharge.      Conjunctiva/sclera: Conjunctivae normal.   Neck:      Comments: phonation normal  Cardiovascular:      Rate and Rhythm: Normal rate.   Pulmonary:      Effort: No respiratory distress.      Breath sounds: No wheezing.      Comments: Shallow resp with no conversational dyspnea   Abdominal:      General: Bowel sounds are normal. There is no distension.      Palpations: Abdomen is soft.      Tenderness: There is no abdominal tenderness.   Musculoskeletal:      Cervical back: Neck supple.      Comments: Obese body habitus, reduced overall muscle mass    Skin:     General: Skin is warm and dry.   Neurological:      Mental Status: She is alert.      Comments: Awake and alert, oriented, answers questions appropriately   Psychiatric:         Mood and Affect: Mood normal.         Behavior: Behavior normal.        Invasive Devices       Peripheral Intravenous Line  Duration             Peripheral IV 04/08/24 Left Antecubital 3 days              Drain  Duration             External Urinary  Catheter 2 days                  Lab Results:     I have personally reviewed pertinent lab results including the following:    Results from last 7 days   Lab Units 04/11/24  0802 04/10/24  0643 04/08/24  0846   WBC Thousand/uL 7.02 5.85 6.00   HEMOGLOBIN g/dL 12.1 11.6 11.2*   HEMATOCRIT % 40.2 38.7 37.0   PLATELETS Thousands/uL 172 164 181   SEGS PCT % 62 58 61   MONO PCT % 12 13* 10   EOS PCT % 3 5 5     Results from last 7 days   Lab Units 04/11/24  0802 04/10/24  0643 04/08/24  0846   POTASSIUM mmol/L 4.3 4.1 5.0   CHLORIDE mmol/L 95* 97 103   CO2 mmol/L 34* 38* 32   BUN mg/dL 34* 32* 34*   CREATININE mg/dL 1.47* 1.44* 1.30   CALCIUM mg/dL 8.8 8.8 8.7   ALK PHOS U/L 78 81  --    ALT U/L <3* 5*  --    AST U/L 10* 10*  --      I have personally reviewed the following imaging study reports in PACS:    No new imaging overnight

## 2024-04-11 NOTE — PROGRESS NOTES
Cardiology Progress Note - Lizzie Whitten 84 y.o. female MRN: [id number]    Unit/Bed#: [facility] 803-01 Encounter: [id number]        Subjective:    No significant events overnight.  No chest pain or shortness of breath.     Review of Systems   Constitutional: Positive for malaise/fatigue.   Cardiovascular:  Negative for chest pain.   Respiratory:  Negative for shortness of breath.        Objective:   Vitals: Blood pressure 134/64, pulse 61, temperature 98.2 °F (36.8 °C), resp. rate 18, height 5' (1.524 m), weight 80.8 kg (178 lb 3.2 oz), SpO2 94%, not currently breastfeeding., Body mass index is 34.8 kg/m².,   Orthostatic Blood Pressures      Flowsheet Row Most Recent Value   Blood Pressure 134/64 filed at 2024 0736   Patient Position - Orthostatic VS Lying filed at 2024 1500           Systolic (24hrs), Av , Min:134 , Max:147     Diastolic (24hrs), Av, Min:63, Max:80      Intake/Output Summary (Last 24 hours) at 2024 0942  Last data filed at 2024 0800  Gross per 24 hour   Intake 600 ml   Output 1210 ml   Net -610 ml     Weight (last 2 days)       Date/Time Weight    24 0600 80.8 (178.2)    04/10/24 1012 82.7 (182.32)    04/10/24 0600 83.1 (183.2)    24 1210 87.5 (193)                  Physical Exam  Vitals and nursing note reviewed.   Constitutional:       Appearance: Normal appearance.   HENT:      Head: Normocephalic.      Nose: Nose normal.      Mouth/Throat:      Mouth: Mucous membranes are moist.   Eyes:      General: No scleral icterus.     Conjunctiva/sclera: Conjunctivae normal.   Cardiovascular:      Rate and Rhythm: Normal rate and regular rhythm.      Heart sounds: No murmur heard.     No gallop.   Pulmonary:      Effort: Pulmonary effort is normal. No respiratory distress.      Breath sounds: Normal breath sounds. No wheezing or rales.   Abdominal:      General: Abdomen is flat. Bowel sounds are normal. There is no distension.      Palpations: Abdomen is soft.     "  Tenderness: There is no abdominal tenderness. There is no guarding.   Musculoskeletal:      Cervical back: Normal range of motion and neck supple.      Right lower leg: No edema.      Left lower leg: No edema.   Skin:     General: Skin is warm and dry.   Neurological:      General: No focal deficit present.      Mental Status: She is alert and oriented to person, place, and time.   Psychiatric:         Mood and Affect: Mood normal.         Behavior: Behavior normal.           Laboratory Results:        CBC with diff:   Results from last 7 days   Lab Units 04/11/24  0802 04/10/24  0643 04/08/24  0846   WBC Thousand/uL 7.02 5.85 6.00   HEMOGLOBIN g/dL 12.1 11.6 11.2*   HEMATOCRIT % 40.2 38.7 37.0   MCV fL 100* 99* 100*   PLATELETS Thousands/uL 172 164 181   RBC Million/uL 4.04 3.91 3.70*   MCH pg 30.0 29.7 30.3   MCHC g/dL 30.1* 30.0* 30.3*   RDW % 12.6 12.7 12.8   MPV fL 9.9 10.0 10.5   NRBC AUTO /100 WBCs 0 0 0         CMP:  Results from last 7 days   Lab Units 04/11/24  0802 04/10/24  0643 04/08/24  0846   POTASSIUM mmol/L 4.3 4.1 5.0   CHLORIDE mmol/L 95* 97 103   CO2 mmol/L 34* 38* 32   BUN mg/dL 34* 32* 34*   CREATININE mg/dL 1.47* 1.44* 1.30   CALCIUM mg/dL 8.8 8.8 8.7   AST U/L 10* 10*  --    ALT U/L <3* 5*  --    ALK PHOS U/L 78 81  --    EGFR ml/min/1.73sq m 33 33 38         BMP:  Results from last 7 days   Lab Units 04/11/24  0802 04/10/24  0643 04/08/24  0846   POTASSIUM mmol/L 4.3 4.1 5.0   CHLORIDE mmol/L 95* 97 103   CO2 mmol/L 34* 38* 32   BUN mg/dL 34* 32* 34*   CREATININE mg/dL 1.47* 1.44* 1.30   CALCIUM mg/dL 8.8 8.8 8.7       BNP: No results for input(s): \"BNP\" in the last 72 hours.    Magnesium:   Results from last 7 days   Lab Units 04/11/24  0802   MAGNESIUM mg/dL 1.4*       Coags:       TSH: No results found for: \"TSH\"    Hemoglobin A1C       Lipid Profile:       Cardiac testing:   No results found for this or any previous visit.    No results found for this or any previous visit.    No " results found for this or any previous visit.    No results found for this or any previous visit.      Meds/Allergies   current meds:   Current Facility-Administered Medications   Medication Dose Route Frequency    acetaminophen (TYLENOL) tablet 650 mg  650 mg Oral Q6H PRN    apixaban (ELIQUIS) tablet 5 mg  5 mg Oral BID    atorvastatin (LIPITOR) tablet 80 mg  80 mg Oral HS    citalopram (CeleXA) tablet 20 mg  20 mg Oral Daily    [START ON 4/12/2024] furosemide (LASIX) tablet 40 mg  40 mg Oral Daily    insulin glargine (LANTUS) subcutaneous injection 25 Units 0.25 mL  25 Units Subcutaneous HS    insulin lispro (HumALOG/ADMELOG) 100 units/mL subcutaneous injection 6 Units  6 Units Subcutaneous TID With Meals    levothyroxine tablet 150 mcg  150 mcg Oral Early Morning    lisinopril (ZESTRIL) tablet 20 mg  20 mg Oral BID    metoprolol tartrate (LOPRESSOR) tablet 25 mg  25 mg Oral Q12H    pantoprazole (PROTONIX) EC tablet 40 mg  40 mg Oral Early Morning    senna (SENOKOT) tablet 8.6 mg  1 tablet Oral Daily PRN    sodium chloride (PF) 0.9 % injection 3 mL  3 mL Intravenous Q1H PRN     Medications Prior to Admission   Medication    acetaminophen (TYLENOL) 325 mg tablet    apixaban (Eliquis) 5 mg    atorvastatin (LIPITOR) 80 mg tablet    Calcium Carbonate-Vit D-Min (CALCIUM 1200 PO)    citalopram (CeleXA) 20 mg tablet    Continuous Blood Gluc  (FREESTYLE RONNI 14 DAY READER) GARRY    Continuous Blood Gluc Sensor (FreeStyle Ronni 14 Day Sensor) MISC    Diclofenac Sodium (VOLTAREN) 1 %    insulin aspart (NovoLOG FlexPen) 100 UNIT/ML injection pen    insulin glargine (Toujeo SoloStar) 300 units/mL CONCENTRATED U-300 injection pen (1-unit dial)    Insulin Pen Needle (BD Pen Needle Sasha U/F) 32G X 4 MM MISC    levothyroxine 125 mcg tablet    lisinopril (ZESTRIL) 20 mg tablet    metoprolol tartrate (LOPRESSOR) 25 mg tablet    Omega-3 Fatty Acids (FISH OIL) 1200 MG CAPS    omeprazole (PriLOSEC) 20 mg delayed release  capsule    promethazine-dextromethorphan (PHENERGAN-DM) 6.25-15 mg/5 mL oral syrup    sodium chloride (VANNESSA 128) 5 % hypertonic ophthalmic ointment    triamcinolone (KENALOG) 0.1 % cream    Ascorbic Acid (VITAMIN C) 1000 MG tablet    GLUCOSAMINE CHONDROITIN COMPLX PO    glucose blood (TRUE METRIX BLOOD GLUCOSE TEST) test strip    Lancets (onetouch ultrasoft) lancets    spironolactone (ALDACTONE) 25 mg tablet          Assessment:  Principal Problem:    Fall  Active Problems:    Complete heart block (HCC)    Hypothyroidism    Mixed hyperlipidemia    Acute on chronic diastolic congestive heart failure (HCC)    Pulmonary nodule    Acute respiratory failure with hypoxia (HCC)    Plan:    Volume status improved. Transition to PO lasix.   Continue with Eliquis for anticoagulation and continue metoprolol for PAF.  S/P PPM implant.   Patient is stable for DC from a cardiac perspective.         Counseling / Coordination of Care  Total floor / unit time spent today 25 minutes.  Greater than 50% of total time was spent with the patient and / or family counseling and / or coordination of care.  A description of the counseling / coordination of care.

## 2024-04-11 NOTE — PLAN OF CARE
Problem: Potential for Falls  Goal: Patient will remain free of falls  Description: INTERVENTIONS:  - Educate patient/family on patient safety including physical limitations  - Instruct patient to call for assistance with activity   - Consult OT/PT to assist with strengthening/mobility   - Keep Call bell within reach  - Keep bed low and locked with side rails adjusted as appropriate  - Keep care items and personal belongings within reach  - Initiate and maintain comfort rounds  - Initiate/Maintain bed and chair alarm  - Apply yellow socks and bracelet for high fall risk patients  - Consider moving patient to room near nurses station  Outcome: Progressing     Problem: PAIN - ADULT  Goal: Verbalizes/displays adequate comfort level or baseline comfort level  Description: Interventions:  - Encourage patient to monitor pain and request assistance  - Assess pain using appropriate pain scale  - Administer analgesics based on type and severity of pain and evaluate response  - Implement non-pharmacological measures as appropriate and evaluate response  - Consider cultural and social influences on pain and pain management  - Notify physician/advanced practitioner if interventions unsuccessful or patient reports new pain  Outcome: Progressing     Problem: INFECTION - ADULT  Goal: Absence or prevention of progression during hospitalization  Description: INTERVENTIONS:  - Assess and monitor for signs and symptoms of infection  - Monitor lab/diagnostic results  - Monitor all insertion sites, i.e. indwelling lines, tubes, and drains  - Monitor endotracheal if appropriate and nasal secretions for changes in amount and color  - [place] appropriate cooling/warming therapies per order  - Administer medications as ordered  - Instruct and encourage patient and family to use good hand hygiene technique  - Identify and instruct in appropriate isolation precautions for identified infection/condition  Outcome: Progressing  Goal: Absence  of fever/infection during neutropenic period  Description: INTERVENTIONS:  - Monitor WBC    Outcome: Progressing     Problem: SAFETY ADULT  Goal: Patient will remain free of falls  Description: INTERVENTIONS:  - Educate patient/family on patient safety including physical limitations  - Instruct patient to call for assistance with activity   - Consult OT/PT to assist with strengthening/mobility   - Keep Call bell within reach  - Keep bed low and locked with side rails adjusted as appropriate  - Keep care items and personal belongings within reach  - Initiate and maintain comfort rounds  - Initiate/Maintain bed and chair alarm  - Apply yellow socks and bracelet for high fall risk patients  - Consider moving patient to room near nurses station  Outcome: Progressing  Goal: Maintain or return to baseline ADL function  Description: INTERVENTIONS:  - Educate patient/family on patient safety including physical limitations  - Instruct patient to call for assistance with activity   - Consult OT/PT to assist with strengthening/mobility   - Keep Call bell within reach  - Keep bed low and locked with side rails adjusted as appropriate  - Keep care items and personal belongings within reach  - Initiate and maintain comfort rounds  - Initiate/Maintain bed and chair alarm  - Apply yellow socks and bracelet for high fall risk patients  - Consider moving patient to room near nurses station  Outcome: Progressing  Goal: Maintains/Returns to pre admission functional level  Description: INTERVENTIONS:  - Perform AM-PAC 6 Click Basic Mobility/ Daily Activity assessment daily.  - Set and communicate daily mobility goal to care team and patient/family/caregiver.   - Collaborate with rehabilitation services on mobility goals if consulted  - Out of bed for toileting  - Record patient progress and toleration of activity level   Outcome: Progressing     Problem: DISCHARGE PLANNING  Goal: Discharge to home or other facility with appropriate  resources  Description: INTERVENTIONS:  - Identify barriers to discharge w/patient and caregiver  - Arrange for needed discharge resources and transportation as appropriate  - Identify discharge learning needs (meds, wound care, etc.)  - Arrange for interpretive services to assist at discharge as needed  - Refer to Case Management Department for coordinating discharge planning if the patient needs post-hospital services based on physician/advanced practitioner order or complex needs related to functional status, cognitive ability, or social support system  Outcome: Progressing     Problem: Knowledge Deficit  Goal: Patient/family/caregiver demonstrates understanding of disease process, treatment plan, medications, and discharge instructions  Description: Complete learning assessment and assess knowledge base.  Interventions:  - Provide teaching at level of understanding  - Provide teaching via preferred learning methods  Outcome: Progressing     Problem: Prexisting or High Potential for Compromised Skin Integrity  Goal: Skin integrity is maintained or improved  Description: INTERVENTIONS:  - Identify patients at risk for skin breakdown  - Assess and monitor skin integrity  - Assess and monitor nutrition and hydration status  - Monitor labs   - Assess for incontinence   - Turn and reposition patient  - Assist with mobility/ambulation  - Relieve pressure over bony prominences  - Avoid friction and shearing  - Provide appropriate hygiene as needed including keeping skin clean and dry  - Evaluate need for skin moisturizer/barrier cream  - Collaborate with interdisciplinary team   - Patient/family teaching  - Consider wound care consult   Outcome: Progressing     Problem: Nutrition/Hydration-ADULT  Goal: Nutrient/Hydration intake appropriate for improving, restoring or maintaining nutritional needs  Description: Monitor and assess patient's nutrition/hydration status for malnutrition. Collaborate with interdisciplinary  team and initiate plan and interventions as ordered.  Monitor patient's weight and dietary intake as ordered or per policy. Utilize nutrition screening tool and intervene as necessary. Determine patient's food preferences and provide high-protein, high-caloric foods as appropriate.     INTERVENTIONS:  - Monitor oral intake, urinary output, labs, and treatment plans  - Assess nutrition and hydration status and recommend course of action  - Evaluate amount of meals eaten  - Assist patient with eating if necessary   - Allow adequate time for meals  - Recommend/ encourage appropriate diets, oral nutritional supplements, and vitamin/mineral supplements  - Order, calculate, and assess calorie counts as needed  - Recommend, monitor, and adjust tube feedings and TPN/PPN based on assessed needs  - Assess need for intravenous fluids  - Provide specific nutrition/hydration education as appropriate  - Include patient/family/caregiver in decisions related to nutrition  Outcome: Progressing

## 2024-04-11 NOTE — PROGRESS NOTES
[facility]  Progress Note  Name: Lizzie Whitten I  MRN: [id number]  Unit/Bed#: [facility] 803-01 I Date of Admission: 4/8/2024   Date of Service: 4/11/2024 I Hospital Day: 3    Assessment/Plan   * Fall  Assessment & Plan  Presenting with increased weakness.  PT OT recommending rehab  Fall precautions      Acute respiratory failure with hypoxia (HCC)  Assessment & Plan  Patient was saturating at 85% on room air she required 3 L of nasal cannula  Treating with diuresis  Cardiology consulted  Will repeat ambulatory oxygen evaluation prior to discharge    Pulmonary nodule  Assessment & Plan  Incidental finding on CTA  Recommend follow-up in 12 months    Acute on chronic diastolic congestive heart failure (HCC)  Assessment & Plan  Wt Readings from Last 3 Encounters:   04/11/24 80.8 kg (178 lb 3.2 oz)   03/12/24 86.5 kg (190 lb 12.8 oz)   01/23/24 85.5 kg (188 lb 9.6 oz)     Patient followed as outpatient with Dr. Mendoza.  Consult cardiology  Continue diuresis  Continue metoprolol and lisinopril  Reviewed echocardiogram  Daily weights and strict ins and outs    Mixed hyperlipidemia  Assessment & Plan  Continue atorvastatin 80 mg    Hypothyroidism  Assessment & Plan  Repeat TSH is 10.461  Increase levothyroxine to 150 micrograms daily  Recheck TSH and 4 to 6 weeks      Complete heart block (HCC)  Assessment & Plan  Patient has a pacemaker in place  Continue Eliquis  Continue metoprolol               VTE Pharmacologic Prophylaxis:   Moderate Risk (Score 3-4) - Pharmacological DVT Prophylaxis Ordered: apixaban (Eliquis).    Mobility:   Basic Mobility Inpatient Raw Score: 17  JH-HLM Goal: 5: Stand one or more mins  JH-HLM Achieved: 6: Walk 10 steps or more  JH-HLM Goal achieved. Continue to encourage appropriate mobility.    Patient Centered Rounds: I performed bedside rounds with nursing staff today.   Discussions with Specialists or Other Care Team Provider: None    Education and  Discussions with Family / Patient: Updated  (daughter) via phone.    Total Time Spent on Date of Encounter in care of patient: 55 mins. This time was spent on one or more of the following: performing physical exam; counseling and coordination of care; obtaining or reviewing history; documenting in the medical record; reviewing/ordering tests, medications or procedures; communicating with other healthcare professionals and discussing with patient's family/caregivers.    Current Length of Stay: 3 day(s)  Current Patient Status: Inpatient   Certification Statement: The patient will continue to require additional inpatient hospital stay due to Patients family / patient considering rehab vs home  Discharge Plan:  pending discharge family decision    Code Status: Level 1 - Full Code    Subjective:   Is a very pleasant 84-year-old female who was seen and evaluated today at bedside.  Patient has no acute complaints this time.    Objective:     Vitals:   Temp (24hrs), Av.4 °F (36.9 °C), Min:98.2 °F (36.8 °C), Max:98.6 °F (37 °C)    Temp:  [98.2 °F (36.8 °C)-98.6 °F (37 °C)] 98.5 °F (36.9 °C)  HR:  [60-63] 60  Resp:  [18-20] 18  BP: (132-143)/(62-64) 132/62  SpO2:  [93 %-97 %] 97 %  Body mass index is 34.8 kg/m².     Input and Output Summary (last 24 hours):     Intake/Output Summary (Last 24 hours) at 2024 1511  Last data filed at 2024 1321  Gross per 24 hour   Intake 720 ml   Output 1077 ml   Net -357 ml       Physical Exam:   Physical Exam  Vitals reviewed.   Constitutional:       General: She is not in acute distress.     Appearance: She is not ill-appearing.   HENT:      Head: Normocephalic.   Eyes:      Conjunctiva/sclera: Conjunctivae normal.   Cardiovascular:      Rate and Rhythm: Normal rate and regular rhythm.   Pulmonary:      Effort: Pulmonary effort is normal.   Abdominal:      General: Abdomen is flat.      Palpations: Abdomen is soft.      Tenderness: There is no abdominal  tenderness.   Skin:     General: Skin is warm and dry.   Neurological:      Mental Status: She is alert. Mental status is at baseline.          Additional Data:     Labs:  Results from last 7 days   Lab Units 04/11/24  0802   WBC Thousand/uL 7.02   HEMOGLOBIN g/dL 12.1   HEMATOCRIT % 40.2   PLATELETS Thousands/uL 172   SEGS PCT % 62   LYMPHO PCT % 22   MONO PCT % 12   EOS PCT % 3     Results from last 7 days   Lab Units 04/11/24  0802   SODIUM mmol/L 137   POTASSIUM mmol/L 4.3   CHLORIDE mmol/L 95*   CO2 mmol/L 34*   BUN mg/dL 34*   CREATININE mg/dL 1.47*   ANION GAP mmol/L 8   CALCIUM mg/dL 8.8   ALBUMIN g/dL 3.3*   TOTAL BILIRUBIN mg/dL 0.90   ALK PHOS U/L 78   ALT U/L <3*   AST U/L 10*   GLUCOSE RANDOM mg/dL 101         Results from last 7 days   Lab Units 04/11/24  1123 04/11/24  0757 04/11/24  0721 04/10/24  2051 04/10/24  1622 04/10/24  1110 04/10/24  0711 04/09/24  2315 04/09/24  1136 04/09/24  0801 04/08/24  2101 04/08/24  1558   POC GLUCOSE mg/dl 76 120 64* 147* 151* 152* 84 173* 175* 173* 166* 155*               Lines/Drains:  Invasive Devices       Peripheral Intravenous Line  Duration             Peripheral IV 04/08/24 Left Antecubital 3 days              Drain  Duration             External Urinary Catheter 2 days                          Imaging: No pertinent imaging reviewed.    Recent Cultures (last 7 days):         Last 24 Hours Medication List:   Current Facility-Administered Medications   Medication Dose Route Frequency Provider Last Rate    acetaminophen  650 mg Oral Q6H PRN Hetul Fernandez, DO      apixaban  5 mg Oral BID Hetul Fernandez, DO      atorvastatin  80 mg Oral HS Hetul Fernandez, DO      citalopram  20 mg Oral Daily Hetchavez Fernandez, DO      [START ON 4/12/2024] furosemide  40 mg Oral Daily Ta Mendoza MD      insulin glargine  25 Units Subcutaneous HS Hetul Fernandez, DO      insulin lispro  6 Units Subcutaneous TID With Meals Mary Fernandez DO      levothyroxine  150 mcg Oral Early Morning Jon  MD Lili      lisinopril  20 mg Oral BID Hetul Fernandez, DO      metoprolol tartrate  25 mg Oral Q12H Hetul Fernandez, DO      pantoprazole  40 mg Oral Early Morning Hetul Fernandez, DO      senna  1 tablet Oral Daily PRN Rissa Krasa, DO      sodium chloride (PF)  3 mL Intravenous Q1H PRN Ana Laura Pedraza MD          Today, Patient Was Seen By: Jon Pearson MD    **Please Note: This note may have been constructed using a voice recognition system.**

## 2024-04-11 NOTE — PROGRESS NOTES
[facility] Progress Note    2024  Patient: Lizzie Whitten : 1940  Admission Date & Time: 2024 0759  MRN: [id number] CSN: [id number]       follow-up with pt that had been seen in ED earlier in the week. Pt reported that a lot was going on in her family that was making it hard for them to be here to support her.  prayed with pt and remains available.                  24 1200   Clinical Encounter Type   Visited With Patient   Routine Visit Follow-up   Religion Encounters   Religion Needs Prayer

## 2024-04-11 NOTE — RESTORATIVE TECHNICIAN NOTE
Restorative Technician Note      Patient Name: Lizzie Whitten     Restorative Tech Visit Date: 04/11/24  Note Type: Mobility  Patient Position Upon Consult: Supine  Activity Performed: Ambulated  Assistive Device: Standard walker; Other (Comment) (1L O2)  Education Provided: Yes  Patient Position at End of Consult: Bedside chair; All needs within reach; Bed/Chair alarm activated    Shanika Wick  DPT, Restorative Technician

## 2024-04-11 NOTE — PLAN OF CARE
Problem: Potential for Falls  Goal: Patient will remain free of falls  Description: INTERVENTIONS:  - Educate patient/family on patient safety including physical limitations  - Instruct patient to call for assistance with activity   - Consult OT/PT to assist with strengthening/mobility   - Keep Call bell within reach  - Keep bed low and locked with side rails adjusted as appropriate  - Keep care items and personal belongings within reach  - Initiate and maintain comfort rounds  - Initiate/Maintain bed and chair alarm  - Apply yellow socks and bracelet for high fall risk patients  - Consider moving patient to room near nurses station  Outcome: Progressing     Problem: PAIN - ADULT  Goal: Verbalizes/displays adequate comfort level or baseline comfort level  Description: Interventions:  - Encourage patient to monitor pain and request assistance  - Assess pain using appropriate pain scale  - Administer analgesics based on type and severity of pain and evaluate response  - Implement non-pharmacological measures as appropriate and evaluate response  - Consider cultural and social influences on pain and pain management  - Notify physician/advanced practitioner if interventions unsuccessful or patient reports new pain  Outcome: Progressing     Problem: INFECTION - ADULT  Goal: Absence or prevention of progression during hospitalization  Description: INTERVENTIONS:  - Assess and monitor for signs and symptoms of infection  - Monitor lab/diagnostic results  - Monitor all insertion sites, i.e. indwelling lines, tubes, and drains  - Monitor endotracheal if appropriate and nasal secretions for changes in amount and color  - [place] appropriate cooling/warming therapies per order  - Administer medications as ordered  - Instruct and encourage patient and family to use good hand hygiene technique  - Identify and instruct in appropriate isolation precautions for identified infection/condition  Outcome: Progressing  Goal: Absence  of fever/infection during neutropenic period  Description: INTERVENTIONS:  - Monitor WBC    Outcome: Progressing     Problem: SAFETY ADULT  Goal: Patient will remain free of falls  Description: INTERVENTIONS:  - Educate patient/family on patient safety including physical limitations  - Instruct patient to call for assistance with activity   - Consult OT/PT to assist with strengthening/mobility   - Keep Call bell within reach  - Keep bed low and locked with side rails adjusted as appropriate  - Keep care items and personal belongings within reach  - Initiate and maintain comfort rounds  - Initiate/Maintain bed and chair alarm  - Apply yellow socks and bracelet for high fall risk patients  - Consider moving patient to room near nurses station  Outcome: Progressing  Goal: Maintain or return to baseline ADL function  Description: INTERVENTIONS:  - Educate patient/family on patient safety including physical limitations  - Instruct patient to call for assistance with activity   - Consult OT/PT to assist with strengthening/mobility   - Keep Call bell within reach  - Keep bed low and locked with side rails adjusted as appropriate  - Keep care items and personal belongings within reach  - Initiate and maintain comfort rounds  - Initiate/Maintain bed and chair alarm  - Apply yellow socks and bracelet for high fall risk patients  - Consider moving patient to room near nurses station  Outcome: Progressing  Goal: Maintains/Returns to pre admission functional level  Description: INTERVENTIONS:  - Perform AM-PAC 6 Click Basic Mobility/ Daily Activity assessment daily.  - Set and communicate daily mobility goal to care team and patient/family/caregiver.   - Collaborate with rehabilitation services on mobility goals if consulted  - Perform Range of Motion 3 times a day.  - Reposition patient every 3 hours.  - Dangle patient 3 times a day  - Stand patient 3 times a day  - Ambulate patient 3 times a day  - Out of bed to chair 3 times  a day   - Out of bed for meals 3 times a day  - Out of bed for toileting  - Record patient progress and toleration of activity level   Outcome: Progressing     Problem: DISCHARGE PLANNING  Goal: Discharge to home or other facility with appropriate resources  Description: INTERVENTIONS:  - Identify barriers to discharge w/patient and caregiver  - Arrange for needed discharge resources and transportation as appropriate  - Identify discharge learning needs (meds, wound care, etc.)  - Arrange for interpretive services to assist at discharge as needed  - Refer to Case Management Department for coordinating discharge planning if the patient needs post-hospital services based on physician/advanced practitioner order or complex needs related to functional status, cognitive ability, or social support system  Outcome: Progressing     Problem: Knowledge Deficit  Goal: Patient/family/caregiver demonstrates understanding of disease process, treatment plan, medications, and discharge instructions  Description: Complete learning assessment and assess knowledge base.  Interventions:  - Provide teaching at level of understanding  - Provide teaching via preferred learning methods  Outcome: Progressing     Problem: Prexisting or High Potential for Compromised Skin Integrity  Goal: Skin integrity is maintained or improved  Description: INTERVENTIONS:  - Identify patients at risk for skin breakdown  - Assess and monitor skin integrity  - Assess and monitor nutrition and hydration status  - Monitor labs   - Assess for incontinence   - Turn and reposition patient  - Assist with mobility/ambulation  - Relieve pressure over bony prominences  - Avoid friction and shearing  - Provide appropriate hygiene as needed including keeping skin clean and dry  - Evaluate need for skin moisturizer/barrier cream  - Collaborate with interdisciplinary team   - Patient/family teaching  - Consider wound care consult   Outcome: Progressing     Problem:  Nutrition/Hydration-ADULT  Goal: Nutrient/Hydration intake appropriate for improving, restoring or maintaining nutritional needs  Description: Monitor and assess patient's nutrition/hydration status for malnutrition. Collaborate with interdisciplinary team and initiate plan and interventions as ordered.  Monitor patient's weight and dietary intake as ordered or per policy. Utilize nutrition screening tool and intervene as necessary. Determine patient's food preferences and provide high-protein, high-caloric foods as appropriate.     INTERVENTIONS:  - Monitor oral intake, urinary output, labs, and treatment plans  - Assess nutrition and hydration status and recommend course of action  - Evaluate amount of meals eaten  - Assist patient with eating if necessary   - Allow adequate time for meals  - Recommend/ encourage appropriate diets, oral nutritional supplements, and vitamin/mineral supplements  - Order, calculate, and assess calorie counts as needed  - Recommend, monitor, and adjust tube feedings and TPN/PPN based on assessed needs  - Assess need for intravenous fluids  - Provide specific nutrition/hydration education as appropriate  - Include patient/family/caregiver in decisions related to nutrition  Outcome: Progressing

## 2024-04-11 NOTE — ASSESSMENT & PLAN NOTE
Wt Readings from Last 3 Encounters:   04/11/24 80.8 kg (178 lb 3.2 oz)   03/12/24 86.5 kg (190 lb 12.8 oz)   01/23/24 85.5 kg (188 lb 9.6 oz)     Patient followed as outpatient with Dr. Mendoza.  Consult cardiology  Continue diuresis  Continue metoprolol and lisinopril  Reviewed echocardiogram  Daily weights and strict ins and outs

## 2024-04-12 VITALS
OXYGEN SATURATION: 89 % | SYSTOLIC BLOOD PRESSURE: 137 MMHG | BODY MASS INDEX: 33.89 KG/M2 | DIASTOLIC BLOOD PRESSURE: 69 MMHG | WEIGHT: 172.62 LBS | TEMPERATURE: 98.1 F | HEIGHT: 60 IN | HEART RATE: 61 BPM | RESPIRATION RATE: 16 BRPM

## 2024-04-12 LAB
ALBUMIN SERPL BCP-MCNC: 3.3 G/DL (ref 3.5–5)
ALP SERPL-CCNC: 75 U/L (ref 34–104)
ALT SERPL W P-5'-P-CCNC: 5 U/L (ref 7–52)
ANION GAP SERPL CALCULATED.3IONS-SCNC: 8 MMOL/L (ref 4–13)
AST SERPL W P-5'-P-CCNC: 14 U/L (ref 13–39)
BASOPHILS # BLD AUTO: 0.05 THOUSANDS/ÂΜL (ref 0–0.1)
BASOPHILS NFR BLD AUTO: 1 % (ref 0–1)
BILIRUB SERPL-MCNC: 0.86 MG/DL (ref 0.2–1)
BUN SERPL-MCNC: 40 MG/DL (ref 5–25)
CALCIUM ALBUM COR SERPL-MCNC: 9.6 MG/DL (ref 8.3–10.1)
CALCIUM SERPL-MCNC: 9 MG/DL (ref 8.4–10.2)
CHLORIDE SERPL-SCNC: 93 MMOL/L (ref 96–108)
CO2 SERPL-SCNC: 38 MMOL/L (ref 21–32)
CREAT SERPL-MCNC: 1.51 MG/DL (ref 0.6–1.3)
EOSINOPHIL # BLD AUTO: 0.29 THOUSAND/ÂΜL (ref 0–0.61)
EOSINOPHIL NFR BLD AUTO: 5 % (ref 0–6)
ERYTHROCYTE [DISTWIDTH] IN BLOOD BY AUTOMATED COUNT: 12.6 % (ref 11.6–15.1)
GFR SERPL CREATININE-BSD FRML MDRD: 31 ML/MIN/1.73SQ M
GLUCOSE SERPL-MCNC: 128 MG/DL (ref 65–140)
GLUCOSE SERPL-MCNC: 153 MG/DL (ref 65–140)
GLUCOSE SERPL-MCNC: 45 MG/DL (ref 65–140)
GLUCOSE SERPL-MCNC: 90 MG/DL (ref 65–140)
GLUCOSE SERPL-MCNC: 93 MG/DL (ref 65–140)
GLUCOSE SERPL-MCNC: 94 MG/DL (ref 65–140)
HCT VFR BLD AUTO: 38.5 % (ref 34.8–46.1)
HGB BLD-MCNC: 11.7 G/DL (ref 11.5–15.4)
IMM GRANULOCYTES # BLD AUTO: 0.01 THOUSAND/UL (ref 0–0.2)
IMM GRANULOCYTES NFR BLD AUTO: 0 % (ref 0–2)
LYMPHOCYTES # BLD AUTO: 1.65 THOUSANDS/ÂΜL (ref 0.6–4.47)
LYMPHOCYTES NFR BLD AUTO: 28 % (ref 14–44)
MCH RBC QN AUTO: 29.8 PG (ref 26.8–34.3)
MCHC RBC AUTO-ENTMCNC: 30.4 G/DL (ref 31.4–37.4)
MCV RBC AUTO: 98 FL (ref 82–98)
MONOCYTES # BLD AUTO: 0.84 THOUSAND/ÂΜL (ref 0.17–1.22)
MONOCYTES NFR BLD AUTO: 14 % (ref 4–12)
NEUTROPHILS # BLD AUTO: 3.03 THOUSANDS/ÂΜL (ref 1.85–7.62)
NEUTS SEG NFR BLD AUTO: 52 % (ref 43–75)
NRBC BLD AUTO-RTO: 0 /100 WBCS
PLATELET # BLD AUTO: 174 THOUSANDS/UL (ref 149–390)
PMV BLD AUTO: 10.3 FL (ref 8.9–12.7)
POTASSIUM SERPL-SCNC: 4 MMOL/L (ref 3.5–5.3)
PROT SERPL-MCNC: 6.1 G/DL (ref 6.4–8.4)
RBC # BLD AUTO: 3.92 MILLION/UL (ref 3.81–5.12)
SODIUM SERPL-SCNC: 139 MMOL/L (ref 135–147)
WBC # BLD AUTO: 5.87 THOUSAND/UL (ref 4.31–10.16)

## 2024-04-12 PROCEDURE — 99232 SBSQ HOSP IP/OBS MODERATE 35: CPT | Performed by: INTERNAL MEDICINE

## 2024-04-12 PROCEDURE — 85025 COMPLETE CBC W/AUTO DIFF WBC: CPT | Performed by: FAMILY MEDICINE

## 2024-04-12 PROCEDURE — 80053 COMPREHEN METABOLIC PANEL: CPT | Performed by: FAMILY MEDICINE

## 2024-04-12 PROCEDURE — 99239 HOSP IP/OBS DSCHRG MGMT >30: CPT | Performed by: FAMILY MEDICINE

## 2024-04-12 PROCEDURE — 82948 REAGENT STRIP/BLOOD GLUCOSE: CPT

## 2024-04-12 RX ORDER — DEXTROSE MONOHYDRATE 25 G/50ML
50 INJECTION, SOLUTION INTRAVENOUS ONCE
Status: DISCONTINUED | OUTPATIENT
Start: 2024-04-12 | End: 2024-04-12

## 2024-04-12 RX ORDER — FUROSEMIDE 40 MG/1
40 TABLET ORAL DAILY
Qty: 30 TABLET | Refills: 0 | Status: SHIPPED | OUTPATIENT
Start: 2024-04-13 | End: 2024-04-18

## 2024-04-12 RX ORDER — INSULIN GLARGINE 300 U/ML
20 INJECTION, SOLUTION SUBCUTANEOUS
Qty: 12 ML | Refills: 0 | Status: SHIPPED | OUTPATIENT
Start: 2024-04-12 | End: 2024-04-18

## 2024-04-12 RX ORDER — LEVOTHYROXINE SODIUM 0.15 MG/1
150 TABLET ORAL
Qty: 30 TABLET | Refills: 0 | Status: SHIPPED | OUTPATIENT
Start: 2024-04-13

## 2024-04-12 RX ORDER — PANTOPRAZOLE SODIUM 40 MG/1
40 TABLET, DELAYED RELEASE ORAL
Qty: 30 TABLET | Refills: 0 | Status: SHIPPED | OUTPATIENT
Start: 2024-04-13

## 2024-04-12 RX ORDER — SENNOSIDES 8.6 MG
8.6 TABLET ORAL DAILY PRN
Qty: 30 TABLET | Refills: 0 | Status: SHIPPED | OUTPATIENT
Start: 2024-04-12 | End: 2024-04-18

## 2024-04-12 RX ADMIN — LISINOPRIL 20 MG: 20 TABLET ORAL at 17:21

## 2024-04-12 RX ADMIN — APIXABAN 5 MG: 5 TABLET, FILM COATED ORAL at 08:06

## 2024-04-12 RX ADMIN — METOPROLOL TARTRATE 25 MG: 25 TABLET, FILM COATED ORAL at 17:21

## 2024-04-12 RX ADMIN — INSULIN LISPRO 6 UNITS: 100 INJECTION, SOLUTION INTRAVENOUS; SUBCUTANEOUS at 11:45

## 2024-04-12 RX ADMIN — APIXABAN 5 MG: 5 TABLET, FILM COATED ORAL at 17:21

## 2024-04-12 RX ADMIN — METOPROLOL TARTRATE 25 MG: 25 TABLET, FILM COATED ORAL at 05:05

## 2024-04-12 RX ADMIN — CITALOPRAM HYDROBROMIDE 20 MG: 20 TABLET ORAL at 08:05

## 2024-04-12 RX ADMIN — LISINOPRIL 20 MG: 20 TABLET ORAL at 08:05

## 2024-04-12 RX ADMIN — LEVOTHYROXINE SODIUM 150 MCG: 75 TABLET ORAL at 05:05

## 2024-04-12 RX ADMIN — INSULIN LISPRO 6 UNITS: 100 INJECTION, SOLUTION INTRAVENOUS; SUBCUTANEOUS at 08:06

## 2024-04-12 RX ADMIN — FUROSEMIDE 40 MG: 40 TABLET ORAL at 08:05

## 2024-04-12 RX ADMIN — PANTOPRAZOLE SODIUM 40 MG: 40 TABLET, DELAYED RELEASE ORAL at 05:05

## 2024-04-12 NOTE — PROGRESS NOTES
Cardiology Progress Note - Lizzie Whitten 84 y.o. female MRN: [id number]    Unit/Bed#: [facility] 803-01 Encounter: [id number]        Subjective:    No significant events overnight.  No chest pain or shortness of breath.     Review of Systems   Constitutional: Negative.   HENT: Negative.     Eyes: Negative.    Cardiovascular: Negative.    Respiratory: Negative.     Endocrine: Negative.    Hematologic/Lymphatic: Negative.    Skin: Negative.    Musculoskeletal: Negative.    Gastrointestinal: Negative.    Genitourinary: Negative.    Neurological: Negative.    Psychiatric/Behavioral: Negative.     Allergic/Immunologic: Negative.        Objective:   Vitals: Blood pressure 134/58, pulse 60, temperature 98.1 °F (36.7 °C), resp. rate 18, height 5' (1.524 m), weight 78.3 kg (172 lb 9.9 oz), SpO2 92%, not currently breastfeeding., Body mass index is 33.71 kg/m².,   Orthostatic Blood Pressures      Flowsheet Row Most Recent Value   Blood Pressure 134/58 filed at 2024 0724   Patient Position - Orthostatic VS Lying filed at 2024 1500           Systolic (24hrs), Av , Min:129 , Max:143     Diastolic (24hrs), Av, Min:58, Max:62      Intake/Output Summary (Last 24 hours) at 2024 1040  Last data filed at 2024 0724  Gross per 24 hour   Intake 660 ml   Output 675 ml   Net -15 ml     Weight (last 2 days)       Date/Time Weight    24 0547 78.3 (172.62)    24 0600 80.8 (178.2)    04/10/24 1012 82.7 (182.32)    04/10/24 0600 83.1 (183.2)                  Physical Exam  Vitals and nursing note reviewed.   Constitutional:       Appearance: Normal appearance.   HENT:      Head: Normocephalic.      Nose: Nose normal.      Mouth/Throat:      Mouth: Mucous membranes are moist.   Eyes:      General: No scleral icterus.     Conjunctiva/sclera: Conjunctivae normal.   Cardiovascular:      Rate and Rhythm: Normal rate and regular rhythm.      Heart sounds: No murmur heard.     No gallop.   Pulmonary:       Effort: Pulmonary effort is normal. No respiratory distress.      Breath sounds: Normal breath sounds. No wheezing or rales.   Abdominal:      General: Abdomen is flat. Bowel sounds are normal. There is no distension.      Palpations: Abdomen is soft.      Tenderness: There is no abdominal tenderness. There is no guarding.   Musculoskeletal:      Cervical back: Normal range of motion and neck supple.      Right lower leg: No edema.      Left lower leg: No edema.   Skin:     General: Skin is warm and dry.   Neurological:      General: No focal deficit present.      Mental Status: She is alert and oriented to person, place, and time.   Psychiatric:         Mood and Affect: Mood normal.         Behavior: Behavior normal.           Laboratory Results:        CBC with diff:   Results from last 7 days   Lab Units 04/12/24 0453 04/11/24  0802 04/10/24  0643 04/08/24  0846   WBC Thousand/uL 5.87 7.02 5.85 6.00   HEMOGLOBIN g/dL 11.7 12.1 11.6 11.2*   HEMATOCRIT % 38.5 40.2 38.7 37.0   MCV fL 98 100* 99* 100*   PLATELETS Thousands/uL 174 172 164 181   RBC Million/uL 3.92 4.04 3.91 3.70*   MCH pg 29.8 30.0 29.7 30.3   MCHC g/dL 30.4* 30.1* 30.0* 30.3*   RDW % 12.6 12.6 12.7 12.8   MPV fL 10.3 9.9 10.0 10.5   NRBC AUTO /100 WBCs 0 0 0 0         CMP:  Results from last 7 days   Lab Units 04/12/24 0453 04/11/24  0802 04/10/24  0643 04/08/24  0846   POTASSIUM mmol/L 4.0 4.3 4.1 5.0   CHLORIDE mmol/L 93* 95* 97 103   CO2 mmol/L 38* 34* 38* 32   BUN mg/dL 40* 34* 32* 34*   CREATININE mg/dL 1.51* 1.47* 1.44* 1.30   CALCIUM mg/dL 9.0 8.8 8.8 8.7   AST U/L 14 10* 10*  --    ALT U/L 5* <3* 5*  --    ALK PHOS U/L 75 78 81  --    EGFR ml/min/1.73sq m 31 33 33 38         BMP:  Results from last 7 days   Lab Units 04/12/24  0453 04/11/24  0802 04/10/24  0643 04/08/24  0846   POTASSIUM mmol/L 4.0 4.3 4.1 5.0   CHLORIDE mmol/L 93* 95* 97 103   CO2 mmol/L 38* 34* 38* 32   BUN mg/dL 40* 34* 32* 34*   CREATININE mg/dL 1.51* 1.47* 1.44* 1.30  "  CALCIUM mg/dL 9.0 8.8 8.8 8.7       BNP: No results for input(s): \"BNP\" in the last 72 hours.    Magnesium:   Results from last 7 days   Lab Units 04/11/24  0802   MAGNESIUM mg/dL 1.4*       Coags:       TSH: No results found for: \"TSH\"    Hemoglobin A1C       Lipid Profile:       Cardiac testing:   No results found for this or any previous visit.    No results found for this or any previous visit.    No results found for this or any previous visit.    No results found for this or any previous visit.      Meds/Allergies   current meds:   Current Facility-Administered Medications   Medication Dose Route Frequency    acetaminophen (TYLENOL) tablet 650 mg  650 mg Oral Q6H PRN    apixaban (ELIQUIS) tablet 5 mg  5 mg Oral BID    atorvastatin (LIPITOR) tablet 80 mg  80 mg Oral HS    citalopram (CeleXA) tablet 20 mg  20 mg Oral Daily    furosemide (LASIX) tablet 40 mg  40 mg Oral Daily    insulin glargine (LANTUS) subcutaneous injection 25 Units 0.25 mL  25 Units Subcutaneous HS    insulin lispro (HumALOG/ADMELOG) 100 units/mL subcutaneous injection 6 Units  6 Units Subcutaneous TID With Meals    levothyroxine tablet 150 mcg  150 mcg Oral Early Morning    lisinopril (ZESTRIL) tablet 20 mg  20 mg Oral BID    metoprolol tartrate (LOPRESSOR) tablet 25 mg  25 mg Oral Q12H    pantoprazole (PROTONIX) EC tablet 40 mg  40 mg Oral Early Morning    senna (SENOKOT) tablet 8.6 mg  1 tablet Oral Daily PRN    sodium chloride (PF) 0.9 % injection 3 mL  3 mL Intravenous Q1H PRN     Medications Prior to Admission   Medication    acetaminophen (TYLENOL) 325 mg tablet    apixaban (Eliquis) 5 mg    atorvastatin (LIPITOR) 80 mg tablet    Calcium Carbonate-Vit D-Min (CALCIUM 1200 PO)    citalopram (CeleXA) 20 mg tablet    Continuous Blood Gluc  (FREESTYLE RONNI 14 DAY READER) GARRY    Continuous Blood Gluc Sensor (SprucelingStyle Ronni 14 Day Sensor) MISC    Diclofenac Sodium (VOLTAREN) 1 %    insulin aspart (NovoLOG FlexPen) 100 UNIT/ML " injection pen    insulin glargine (Toujeo SoloStar) 300 units/mL CONCENTRATED U-300 injection pen (1-unit dial)    Insulin Pen Needle (BD Pen Needle Sasha U/F) 32G X 4 MM MISC    levothyroxine 125 mcg tablet    lisinopril (ZESTRIL) 20 mg tablet    metoprolol tartrate (LOPRESSOR) 25 mg tablet    Omega-3 Fatty Acids (FISH OIL) 1200 MG CAPS    omeprazole (PriLOSEC) 20 mg delayed release capsule    promethazine-dextromethorphan (PHENERGAN-DM) 6.25-15 mg/5 mL oral syrup    sodium chloride (VANNESSA 128) 5 % hypertonic ophthalmic ointment    triamcinolone (KENALOG) 0.1 % cream    Ascorbic Acid (VITAMIN C) 1000 MG tablet    GLUCOSAMINE CHONDROITIN COMPLX PO    glucose blood (TRUE METRIX BLOOD GLUCOSE TEST) test strip    Lancets (onetouch ultrasoft) lancets    spironolactone (ALDACTONE) 25 mg tablet          Assessment:  Principal Problem:    Fall  Active Problems:    Complete heart block (HCC)    Hypothyroidism    Mixed hyperlipidemia    Acute on chronic diastolic congestive heart failure (HCC)    Pulmonary nodule    Acute respiratory failure with hypoxia (HCC)    Plan:     Volume status stable. Renal function stable.   Continue with Eliquis for anticoagulation and continue metoprolol for PAF.  S/P PPM implant.   Patient is stable for DC from a cardiac perspective.   We will sign off.   Office to call for followup.            Counseling / Coordination of Care  Total floor / unit time spent today 25 minutes.  Greater than 50% of total time was spent with the patient and / or family counseling and / or coordination of care.  A description of the counseling / coordination of care.

## 2024-04-12 NOTE — PLAN OF CARE
Problem: Potential for Falls  Goal: Patient will remain free of falls  Description: INTERVENTIONS:  - Educate patient/family on patient safety including physical limitations  - Instruct patient to call for assistance with activity   - Consult OT/PT to assist with strengthening/mobility   - Keep Call bell within reach  - Keep bed low and locked with side rails adjusted as appropriate  - Keep care items and personal belongings within reach  - Initiate and maintain comfort rounds  - Initiate/Maintain bed and chair alarm  - Apply yellow socks and bracelet for high fall risk patients  - Consider moving patient to room near nurses station  Outcome: Progressing     Problem: PAIN - ADULT  Goal: Verbalizes/displays adequate comfort level or baseline comfort level  Description: Interventions:  - Encourage patient to monitor pain and request assistance  - Assess pain using appropriate pain scale  - Administer analgesics based on type and severity of pain and evaluate response  - Implement non-pharmacological measures as appropriate and evaluate response  - Consider cultural and social influences on pain and pain management  - Notify physician/advanced practitioner if interventions unsuccessful or patient reports new pain  Outcome: Progressing     Problem: INFECTION - ADULT  Goal: Absence or prevention of progression during hospitalization  Description: INTERVENTIONS:  - Assess and monitor for signs and symptoms of infection  - Monitor lab/diagnostic results  - Monitor all insertion sites, i.e. indwelling lines, tubes, and drains  - Monitor endotracheal if appropriate and nasal secretions for changes in amount and color  - [place] appropriate cooling/warming therapies per order  - Administer medications as ordered  - Instruct and encourage patient and family to use good hand hygiene technique  - Identify and instruct in appropriate isolation precautions for identified infection/condition  Outcome: Progressing  Goal: Absence  of fever/infection during neutropenic period  Description: INTERVENTIONS:  - Monitor WBC    Outcome: Progressing     Problem: SAFETY ADULT  Goal: Patient will remain free of falls  Description: INTERVENTIONS:  - Educate patient/family on patient safety including physical limitations  - Instruct patient to call for assistance with activity   - Consult OT/PT to assist with strengthening/mobility   - Keep Call bell within reach  - Keep bed low and locked with side rails adjusted as appropriate  - Keep care items and personal belongings within reach  - Initiate and maintain comfort rounds  - Initiate/Maintain bed and chair alarm  - Apply yellow socks and bracelet for high fall risk patients  - Consider moving patient to room near nurses station  Outcome: Progressing  Goal: Maintain or return to baseline ADL function  Description: INTERVENTIONS:  - Educate patient/family on patient safety including physical limitations  - Instruct patient to call for assistance with activity   - Consult OT/PT to assist with strengthening/mobility   - Keep Call bell within reach  - Keep bed low and locked with side rails adjusted as appropriate  - Keep care items and personal belongings within reach  - Initiate and maintain comfort rounds  - Initiate/Maintain bed and chair alarm  - Apply yellow socks and bracelet for high fall risk patients  - Consider moving patient to room near nurses station  Outcome: Progressing  Goal: Maintains/Returns to pre admission functional level  Description: INTERVENTIONS:  - Perform AM-PAC 6 Click Basic Mobility/ Daily Activity assessment daily.  - Set and communicate daily mobility goal to care team and patient/family/caregiver.   - Collaborate with rehabilitation services on mobility goals if consulted  - Perform Range of Motion 3 times a day.  - Reposition patient every 3 hours.  - Dangle patient 3 times a day  - Stand patient 3 times a day  - Ambulate patient 3 times a day  - Out of bed to chair 3 times  a day   - Out of bed for meals 3 times a day  - Out of bed for toileting  - Record patient progress and toleration of activity level   Outcome: Progressing     Problem: DISCHARGE PLANNING  Goal: Discharge to home or other facility with appropriate resources  Description: INTERVENTIONS:  - Identify barriers to discharge w/patient and caregiver  - Arrange for needed discharge resources and transportation as appropriate  - Identify discharge learning needs (meds, wound care, etc.)  - Arrange for interpretive services to assist at discharge as needed  - Refer to Case Management Department for coordinating discharge planning if the patient needs post-hospital services based on physician/advanced practitioner order or complex needs related to functional status, cognitive ability, or social support system  Outcome: Progressing     Problem: Knowledge Deficit  Goal: Patient/family/caregiver demonstrates understanding of disease process, treatment plan, medications, and discharge instructions  Description: Complete learning assessment and assess knowledge base.  Interventions:  - Provide teaching at level of understanding  - Provide teaching via preferred learning methods  Outcome: Progressing     Problem: Prexisting or High Potential for Compromised Skin Integrity  Goal: Skin integrity is maintained or improved  Description: INTERVENTIONS:  - Identify patients at risk for skin breakdown  - Assess and monitor skin integrity  - Assess and monitor nutrition and hydration status  - Monitor labs   - Assess for incontinence   - Turn and reposition patient  - Assist with mobility/ambulation  - Relieve pressure over bony prominences  - Avoid friction and shearing  - Provide appropriate hygiene as needed including keeping skin clean and dry  - Evaluate need for skin moisturizer/barrier cream  - Collaborate with interdisciplinary team   - Patient/family teaching  - Consider wound care consult   Outcome: Progressing     Problem:  Nutrition/Hydration-ADULT  Goal: Nutrient/Hydration intake appropriate for improving, restoring or maintaining nutritional needs  Description: Monitor and assess patient's nutrition/hydration status for malnutrition. Collaborate with interdisciplinary team and initiate plan and interventions as ordered.  Monitor patient's weight and dietary intake as ordered or per policy. Utilize nutrition screening tool and intervene as necessary. Determine patient's food preferences and provide high-protein, high-caloric foods as appropriate.     INTERVENTIONS:  - Monitor oral intake, urinary output, labs, and treatment plans  - Assess nutrition and hydration status and recommend course of action  - Evaluate amount of meals eaten  - Assist patient with eating if necessary   - Allow adequate time for meals  - Recommend/ encourage appropriate diets, oral nutritional supplements, and vitamin/mineral supplements  - Order, calculate, and assess calorie counts as needed  - Recommend, monitor, and adjust tube feedings and TPN/PPN based on assessed needs  - Assess need for intravenous fluids  - Provide specific nutrition/hydration education as appropriate  - Include patient/family/caregiver in decisions related to nutrition  Outcome: Progressing

## 2024-04-12 NOTE — ASSESSMENT & PLAN NOTE
Wt Readings from Last 3 Encounters:   04/12/24 78.3 kg (172 lb 9.9 oz)   03/12/24 86.5 kg (190 lb 12.8 oz)   01/23/24 85.5 kg (188 lb 9.6 oz)     Patient followed as outpatient with Dr. Mendoza.  Consult cardiology  Continue diuresis  Continue metoprolol and lisinopril  Reviewed echocardiogram  Daily weights and strict ins and outs

## 2024-04-12 NOTE — DISCHARGE SUMMARY
[facility]  Discharge- Lizzie Whitten 1940, 84 y.o. female MRN: [id number]  Unit/Bed#: [facility] 803-01 Encounter: [id number]  Primary Care Provider: Kennedy Castellon MD   Date and time admitted to hospital: 4/8/2024  7:59 AM    * Fall  Assessment & Plan  Presenting with increased weakness.  PT OT recommending rehab  Fall precautions      Acute respiratory failure with hypoxia (HCC)  Assessment & Plan  Patient was saturating at 85% on room air she required 3 L of nasal cannula  Treating with diuresis  Cardiology consulted  Will repeat ambulatory oxygen evaluation prior to discharge    Pulmonary nodule  Assessment & Plan  Incidental finding on CTA  Recommend follow-up in 12 months    Acute on chronic diastolic congestive heart failure (HCC)  Assessment & Plan  Wt Readings from Last 3 Encounters:   04/12/24 78.3 kg (172 lb 9.9 oz)   03/12/24 86.5 kg (190 lb 12.8 oz)   01/23/24 85.5 kg (188 lb 9.6 oz)     Patient followed as outpatient with Dr. Mendoza.  Consult cardiology  Continue diuresis  Continue metoprolol and lisinopril  Reviewed echocardiogram  Daily weights and strict ins and outs    Mixed hyperlipidemia  Assessment & Plan  Continue atorvastatin 80 mg    Hypothyroidism  Assessment & Plan  Repeat TSH is 10.461  Increase levothyroxine to 150 micrograms daily  Recheck TSH and 4 to 6 weeks      Complete heart block (HCC)  Assessment & Plan  Patient has a pacemaker in place  Continue Eliquis  Continue metoprolol        Medical Problems       Resolved Problems  Date Reviewed: 4/12/2024   None       Discharging Physician / Practitioner: Jon Pearson MD  PCP: Kennedy Castellon MD  Admission Date:   Admission Orders (From admission, onward)       Ordered        04/08/24 1526  INPATIENT ADMISSION  Once                          Discharge Date: 04/12/24    Consultations During Hospital Stay:  Cardiology  Geriatric medicine    Procedures Performed:    None    Significant Findings / Test Results:   None    Incidental Findings:   4 mm left lower lung nodule. Based on current Fleischner Society 2017 Guidelines on incidental pulmonary nodule, no routine follow-up is needed if the patient is low risk. If the patient is high risk, optional follow-up chest CT at 12 months can be   considered.   I reviewed the above mentioned incidental findings with the patient and/or family and they expressed understanding.    Test Results Pending at Discharge (will require follow up):   None     Outpatient Tests Requested:  None    Complications:  None    Reason for Admission: Fall    Hospital Course:   Lizzie Whitten is a 84 y.o. female patient who originally presented to the hospital on 4/8/2024 due to fall. Patient hada mecanical fall and presented to the ED. Patient was found to have respiratory failure and required diuresis. PT/OT evaluated and recommended patient to go to inpatient rehab. Patient and family declined. Patient and family are aware of risks and benefits, including increased risk of fall.         Please see above list of diagnoses and related plan for additional information.     Condition at Discharge: stable    Discharge Day Visit / Exam:   Subjective:  This is a very pleasant 84 y.o. female who was seen today at bedside. Patient has no new complaints. Patient is not in any acute distress.     Vitals: Blood Pressure: 137/69 (04/12/24 1458)  Pulse: 61 (04/12/24 1458)  Temperature: 98.1 °F (36.7 °C) (04/12/24 1458)  Temp Source: Oral (04/08/24 2029)  Respirations: 16 (04/12/24 1458)  Height: 5' (152.4 cm) (04/09/24 1210)  Weight - Scale: 78.3 kg (172 lb 9.9 oz) (04/12/24 0547)  SpO2: (!) 89 % (04/12/24 1458)  Exam:   Physical Exam  Vitals reviewed.   Constitutional:       General: She is not in acute distress.     Appearance: She is not ill-appearing.   HENT:      Head: Normocephalic.   Eyes:      Conjunctiva/sclera: Conjunctivae normal.   Cardiovascular:       Rate and Rhythm: Normal rate and regular rhythm.   Pulmonary:      Effort: Pulmonary effort is normal.   Abdominal:      General: Abdomen is flat.      Palpations: Abdomen is soft.      Tenderness: There is no abdominal tenderness.   Skin:     General: Skin is warm and dry.   Neurological:      Mental Status: She is alert. Mental status is at baseline.          Discussion with Family:  Family at bedside.     Discharge instructions/Information to patient and family:   See after visit summary for information provided to patient and family.      Provisions for Follow-Up Care:  See after visit summary for information related to follow-up care and any pertinent home health orders.      Mobility at time of Discharge:   Basic Mobility Inpatient Raw Score: 17  JH-HLM Goal: 5: Stand one or more mins  JH-HLM Achieved: 7: Walk 25 feet or more  HLM Goal achieved. Continue to encourage appropriate mobility.     Disposition:   Home with VNA Services (Reminder: Complete face to face encounter)    Planned Readmission: None     Discharge Statement:  I spent 55 minutes discharging the patient. This time was spent on the day of discharge. I had direct contact with the patient on the day of discharge. Greater than 50% of the total time was spent examining patient, answering all patient questions, arranging and discussing plan of care with patient as well as directly providing post-discharge instructions.  Additional time then spent on discharge activities.    Discharge Medications:  See after visit summary for reconciled discharge medications provided to patient and/or family.      **Please Note: This note may have been constructed using a voice recognition system**

## 2024-04-12 NOTE — RESTORATIVE TECHNICIAN NOTE
Restorative Technician Note      Patient Name: Lizzie Whitten     Restorative Tech Visit Date: 04/12/24  Note Type: Mobility  Patient Position Upon Consult: Bedside chair  Activity Performed: Ambulated; Other (Comment) (on room air 89-93% while ambulating; 94-96% at rest)  Assistive Device: Standard walker  Education Provided: Yes  Patient Position at End of Consult: Bedside chair; All needs within reach; Bed/Chair alarm activated    Shanika Wick  DPT, Restorative Technician

## 2024-04-13 ENCOUNTER — APPOINTMENT (EMERGENCY)
Dept: RADIOLOGY | Facility: HOSPITAL | Age: 84
DRG: 183 | End: 2024-04-13
Payer: MEDICARE

## 2024-04-13 ENCOUNTER — HOSPITAL ENCOUNTER (INPATIENT)
Facility: HOSPITAL | Age: 84
LOS: 5 days | Discharge: NURSING, REHAB OR LONG-TERM CARE | DRG: 183 | End: 2024-04-18
Attending: EMERGENCY MEDICINE | Admitting: SURGERY
Payer: MEDICARE

## 2024-04-13 DIAGNOSIS — I48.0 PAF (PAROXYSMAL ATRIAL FIBRILLATION) (HCC): ICD-10-CM

## 2024-04-13 DIAGNOSIS — R29.6 MULTIPLE FALLS: ICD-10-CM

## 2024-04-13 DIAGNOSIS — J94.2 HEMOPNEUMOTHORAX ON RIGHT: ICD-10-CM

## 2024-04-13 DIAGNOSIS — S22.41XA CLOSED FRACTURE OF MULTIPLE RIBS OF RIGHT SIDE, INITIAL ENCOUNTER: Primary | ICD-10-CM
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
## 2024-04-13 DIAGNOSIS — G89.11 ACUTE PAIN DUE TO TRAUMA: ICD-10-CM

## 2024-04-13 PROBLEM — N17.9 AKI (ACUTE KIDNEY INJURY) (HCC): Status: ACTIVE | Noted: 2024-04-13

## 2024-04-13 PROBLEM — S22.43XA FRACTURE OF MULTIPLE RIBS OF BOTH SIDES: Status: ACTIVE | Noted: 2024-04-13

## 2024-04-13 PROBLEM — J44.9 COPD (CHRONIC OBSTRUCTIVE PULMONARY DISEASE) (HCC): Status: ACTIVE | Noted: 2024-04-13

## 2024-04-13 LAB
2HR DELTA HS TROPONIN: 0 NG/L
4HR DELTA HS TROPONIN: -4 NG/L
ALBUMIN SERPL BCP-MCNC: 3.4 G/DL (ref 3.5–5)
ALP SERPL-CCNC: 80 U/L (ref 34–104)
ALT SERPL W P-5'-P-CCNC: 6 U/L (ref 7–52)
ANION GAP SERPL CALCULATED.3IONS-SCNC: 8 MMOL/L (ref 4–13)
AST SERPL W P-5'-P-CCNC: 16 U/L (ref 13–39)
ATRIAL RATE: 59 BPM
BACTERIA UR QL AUTO: ABNORMAL /HPF
BASOPHILS # BLD AUTO: 0.07 THOUSANDS/ÂΜL (ref 0–0.1)
BASOPHILS NFR BLD AUTO: 1 % (ref 0–1)
BILIRUB SERPL-MCNC: 0.8 MG/DL (ref 0.2–1)
BILIRUB UR QL STRIP: NEGATIVE
BUN SERPL-MCNC: 44 MG/DL (ref 5–25)
CALCIUM ALBUM COR SERPL-MCNC: 9.1 MG/DL (ref 8.3–10.1)
CALCIUM SERPL-MCNC: 8.6 MG/DL (ref 8.4–10.2)
CARDIAC TROPONIN I PNL SERPL HS: 11 NG/L
CARDIAC TROPONIN I PNL SERPL HS: 15 NG/L
CARDIAC TROPONIN I PNL SERPL HS: 15 NG/L
CHLORIDE SERPL-SCNC: 89 MMOL/L (ref 96–108)
CLARITY UR: ABNORMAL
CO2 SERPL-SCNC: 37 MMOL/L (ref 21–32)
COLOR UR: ABNORMAL
CREAT SERPL-MCNC: 1.7 MG/DL (ref 0.6–1.3)
EOSINOPHIL # BLD AUTO: 0.07 THOUSAND/ÂΜL (ref 0–0.61)
EOSINOPHIL NFR BLD AUTO: 1 % (ref 0–6)
ERYTHROCYTE [DISTWIDTH] IN BLOOD BY AUTOMATED COUNT: 12.5 % (ref 11.6–15.1)
GFR SERPL CREATININE-BSD FRML MDRD: 27 ML/MIN/1.73SQ M
GLUCOSE SERPL-MCNC: 262 MG/DL (ref 65–140)
GLUCOSE SERPL-MCNC: 293 MG/DL (ref 65–140)
GLUCOSE SERPL-MCNC: 354 MG/DL (ref 65–140)
GLUCOSE UR STRIP-MCNC: NEGATIVE MG/DL
HCT VFR BLD AUTO: 39.3 % (ref 34.8–46.1)
HGB BLD-MCNC: 12 G/DL (ref 11.5–15.4)
HGB UR QL STRIP.AUTO: ABNORMAL
IMM GRANULOCYTES # BLD AUTO: 0.05 THOUSAND/UL (ref 0–0.2)
IMM GRANULOCYTES NFR BLD AUTO: 1 % (ref 0–2)
KETONES UR STRIP-MCNC: NEGATIVE MG/DL
LEUKOCYTE ESTERASE UR QL STRIP: NEGATIVE
LYMPHOCYTES # BLD AUTO: 1.18 THOUSANDS/ÂΜL (ref 0.6–4.47)
LYMPHOCYTES NFR BLD AUTO: 13 % (ref 14–44)
MCH RBC QN AUTO: 30.1 PG (ref 26.8–34.3)
MCHC RBC AUTO-ENTMCNC: 30.5 G/DL (ref 31.4–37.4)
MCV RBC AUTO: 99 FL (ref 82–98)
MONOCYTES # BLD AUTO: 0.77 THOUSAND/ÂΜL (ref 0.17–1.22)
MONOCYTES NFR BLD AUTO: 8 % (ref 4–12)
NEUTROPHILS # BLD AUTO: 7.06 THOUSANDS/ÂΜL (ref 1.85–7.62)
NEUTS SEG NFR BLD AUTO: 76 % (ref 43–75)
NITRITE UR QL STRIP: NEGATIVE
NON-SQ EPI CELLS URNS QL MICRO: ABNORMAL /HPF
NRBC BLD AUTO-RTO: 0 /100 WBCS
P AXIS: 43 DEGREES
PH UR STRIP.AUTO: 5.5 [PH]
PLATELET # BLD AUTO: 197 THOUSANDS/UL (ref 149–390)
PMV BLD AUTO: 10.7 FL (ref 8.9–12.7)
POTASSIUM SERPL-SCNC: 4.6 MMOL/L (ref 3.5–5.3)
PR INTERVAL: 192 MS
PROT SERPL-MCNC: 6.4 G/DL (ref 6.4–8.4)
PROT UR STRIP-MCNC: ABNORMAL MG/DL
QRS AXIS: 260 DEGREES
QRSD INTERVAL: 172 MS
QT INTERVAL: 470 MS
QTC INTERVAL: 465 MS
RBC # BLD AUTO: 3.99 MILLION/UL (ref 3.81–5.12)
RBC #/AREA URNS AUTO: ABNORMAL /HPF
SODIUM SERPL-SCNC: 134 MMOL/L (ref 135–147)
SP GR UR STRIP.AUTO: 1.01 (ref 1–1.03)
T WAVE AXIS: 69 DEGREES
UROBILINOGEN UR STRIP-ACNC: <2 MG/DL
VENTRICULAR RATE: 59 BPM
WBC # BLD AUTO: 9.2 THOUSAND/UL (ref 4.31–10.16)
WBC #/AREA URNS AUTO: ABNORMAL /HPF

## 2024-04-13 PROCEDURE — 71250 CT THORAX DX C-: CPT

## 2024-04-13 PROCEDURE — 82948 REAGENT STRIP/BLOOD GLUCOSE: CPT

## 2024-04-13 PROCEDURE — 99291 CRITICAL CARE FIRST HOUR: CPT | Performed by: EMERGENCY MEDICINE

## 2024-04-13 PROCEDURE — 80053 COMPREHEN METABOLIC PANEL: CPT | Performed by: STUDENT IN AN ORGANIZED HEALTH CARE EDUCATION/TRAINING PROGRAM

## 2024-04-13 PROCEDURE — 93010 ELECTROCARDIOGRAM REPORT: CPT | Performed by: INTERNAL MEDICINE

## 2024-04-13 PROCEDURE — 81001 URINALYSIS AUTO W/SCOPE: CPT | Performed by: STUDENT IN AN ORGANIZED HEALTH CARE EDUCATION/TRAINING PROGRAM

## 2024-04-13 PROCEDURE — 87086 URINE CULTURE/COLONY COUNT: CPT | Performed by: STUDENT IN AN ORGANIZED HEALTH CARE EDUCATION/TRAINING PROGRAM

## 2024-04-13 PROCEDURE — 84484 ASSAY OF TROPONIN QUANT: CPT | Performed by: STUDENT IN AN ORGANIZED HEALTH CARE EDUCATION/TRAINING PROGRAM

## 2024-04-13 PROCEDURE — 72125 CT NECK SPINE W/O DYE: CPT

## 2024-04-13 PROCEDURE — 70450 CT HEAD/BRAIN W/O DYE: CPT

## 2024-04-13 PROCEDURE — 99291 CRITICAL CARE FIRST HOUR: CPT | Performed by: SURGERY

## 2024-04-13 PROCEDURE — 93005 ELECTROCARDIOGRAM TRACING: CPT

## 2024-04-13 PROCEDURE — 36415 COLL VENOUS BLD VENIPUNCTURE: CPT | Performed by: STUDENT IN AN ORGANIZED HEALTH CARE EDUCATION/TRAINING PROGRAM

## 2024-04-13 PROCEDURE — 99233 SBSQ HOSP IP/OBS HIGH 50: CPT | Performed by: EMERGENCY MEDICINE

## 2024-04-13 PROCEDURE — 96360 HYDRATION IV INFUSION INIT: CPT

## 2024-04-13 PROCEDURE — 99285 EMERGENCY DEPT VISIT HI MDM: CPT

## 2024-04-13 PROCEDURE — 85025 COMPLETE CBC W/AUTO DIFF WBC: CPT | Performed by: STUDENT IN AN ORGANIZED HEALTH CARE EDUCATION/TRAINING PROGRAM

## 2024-04-13 RX ORDER — ACETAMINOPHEN 325 MG/1
975 TABLET ORAL ONCE
Status: COMPLETED | OUTPATIENT
Start: 2024-04-13 | End: 2024-04-13

## 2024-04-13 RX ORDER — FUROSEMIDE 20 MG/1
20 TABLET ORAL DAILY
Status: DISCONTINUED | OUTPATIENT
Start: 2024-04-14 | End: 2024-04-18 | Stop reason: HOSPADM

## 2024-04-13 RX ORDER — OXYCODONE HYDROCHLORIDE 5 MG/1
5 TABLET ORAL EVERY 4 HOURS PRN
Status: DISCONTINUED | OUTPATIENT
Start: 2024-04-13 | End: 2024-04-18 | Stop reason: HOSPADM

## 2024-04-13 RX ORDER — ENOXAPARIN SODIUM 100 MG/ML
30 INJECTION SUBCUTANEOUS EVERY 12 HOURS
Status: DISCONTINUED | OUTPATIENT
Start: 2024-04-13 | End: 2024-04-13

## 2024-04-13 RX ORDER — INSULIN GLARGINE 100 [IU]/ML
20 INJECTION, SOLUTION SUBCUTANEOUS
Status: DISCONTINUED | OUTPATIENT
Start: 2024-04-13 | End: 2024-04-15

## 2024-04-13 RX ORDER — HYDRALAZINE HYDROCHLORIDE 20 MG/ML
10 INJECTION INTRAMUSCULAR; INTRAVENOUS EVERY 4 HOURS PRN
Status: DISCONTINUED | OUTPATIENT
Start: 2024-04-13 | End: 2024-04-18 | Stop reason: HOSPADM

## 2024-04-13 RX ORDER — POLYETHYLENE GLYCOL 3350 17 G/17G
17 POWDER, FOR SOLUTION ORAL DAILY PRN
Status: DISCONTINUED | OUTPATIENT
Start: 2024-04-13 | End: 2024-04-13

## 2024-04-13 RX ORDER — INSULIN LISPRO 100 [IU]/ML
2-12 INJECTION, SOLUTION INTRAVENOUS; SUBCUTANEOUS
Status: DISCONTINUED | OUTPATIENT
Start: 2024-04-14 | End: 2024-04-15

## 2024-04-13 RX ORDER — PANTOPRAZOLE SODIUM 40 MG/1
40 TABLET, DELAYED RELEASE ORAL
Status: DISCONTINUED | OUTPATIENT
Start: 2024-04-14 | End: 2024-04-18 | Stop reason: HOSPADM

## 2024-04-13 RX ORDER — AMOXICILLIN 250 MG
2 CAPSULE ORAL 2 TIMES DAILY
Status: DISCONTINUED | OUTPATIENT
Start: 2024-04-13 | End: 2024-04-18 | Stop reason: HOSPADM

## 2024-04-13 RX ORDER — SILICONE ADHESIVE 1.5" X 3"
2 SHEET (EA) TOPICAL 2 TIMES DAILY
Status: DISCONTINUED | OUTPATIENT
Start: 2024-04-13 | End: 2024-04-18 | Stop reason: HOSPADM

## 2024-04-13 RX ORDER — ACETAMINOPHEN 325 MG/1
975 TABLET ORAL EVERY 8 HOURS SCHEDULED
Status: DISCONTINUED | OUTPATIENT
Start: 2024-04-13 | End: 2024-04-13

## 2024-04-13 RX ORDER — LABETALOL HYDROCHLORIDE 5 MG/ML
10 INJECTION, SOLUTION INTRAVENOUS EVERY 4 HOURS PRN
Status: DISCONTINUED | OUTPATIENT
Start: 2024-04-13 | End: 2024-04-18 | Stop reason: HOSPADM

## 2024-04-13 RX ORDER — HYDROMORPHONE HCL IN WATER/PF 6 MG/30 ML
0.2 PATIENT CONTROLLED ANALGESIA SYRINGE INTRAVENOUS EVERY 2 HOUR PRN
Status: DISCONTINUED | OUTPATIENT
Start: 2024-04-13 | End: 2024-04-18 | Stop reason: HOSPADM

## 2024-04-13 RX ORDER — INSULIN LISPRO 100 [IU]/ML
1-6 INJECTION, SOLUTION INTRAVENOUS; SUBCUTANEOUS
Status: DISCONTINUED | OUTPATIENT
Start: 2024-04-13 | End: 2024-04-15

## 2024-04-13 RX ORDER — LEVOTHYROXINE SODIUM 0.07 MG/1
150 TABLET ORAL
Status: DISCONTINUED | OUTPATIENT
Start: 2024-04-14 | End: 2024-04-18 | Stop reason: HOSPADM

## 2024-04-13 RX ORDER — ENOXAPARIN SODIUM 100 MG/ML
30 INJECTION SUBCUTANEOUS EVERY 12 HOURS
Status: DISCONTINUED | OUTPATIENT
Start: 2024-04-14 | End: 2024-04-18 | Stop reason: HOSPADM

## 2024-04-13 RX ORDER — ATORVASTATIN CALCIUM 80 MG/1
80 TABLET, FILM COATED ORAL
Status: DISCONTINUED | OUTPATIENT
Start: 2024-04-13 | End: 2024-04-18 | Stop reason: HOSPADM

## 2024-04-13 RX ORDER — FUROSEMIDE 40 MG/1
40 TABLET ORAL DAILY
Status: DISCONTINUED | OUTPATIENT
Start: 2024-04-14 | End: 2024-04-13

## 2024-04-13 RX ORDER — ACETAMINOPHEN 325 MG/1
975 TABLET ORAL EVERY 8 HOURS SCHEDULED
Status: DISCONTINUED | OUTPATIENT
Start: 2024-04-13 | End: 2024-04-18 | Stop reason: HOSPADM

## 2024-04-13 RX ORDER — CHLORHEXIDINE GLUCONATE ORAL RINSE 1.2 MG/ML
15 SOLUTION DENTAL EVERY 12 HOURS SCHEDULED
Status: DISCONTINUED | OUTPATIENT
Start: 2024-04-13 | End: 2024-04-18 | Stop reason: HOSPADM

## 2024-04-13 RX ORDER — GABAPENTIN 100 MG/1
100 CAPSULE ORAL
Status: DISCONTINUED | OUTPATIENT
Start: 2024-04-13 | End: 2024-04-18 | Stop reason: HOSPADM

## 2024-04-13 RX ORDER — POLYETHYLENE GLYCOL 3350 17 G/17G
17 POWDER, FOR SOLUTION ORAL DAILY
Status: DISCONTINUED | OUTPATIENT
Start: 2024-04-13 | End: 2024-04-18 | Stop reason: HOSPADM

## 2024-04-13 RX ORDER — CITALOPRAM 20 MG/1
20 TABLET ORAL DAILY
Status: DISCONTINUED | OUTPATIENT
Start: 2024-04-13 | End: 2024-04-18 | Stop reason: HOSPADM

## 2024-04-13 RX ORDER — INSULIN LISPRO 100 [IU]/ML
1-6 INJECTION, SOLUTION INTRAVENOUS; SUBCUTANEOUS
Status: DISCONTINUED | OUTPATIENT
Start: 2024-04-13 | End: 2024-04-13

## 2024-04-13 RX ADMIN — ATORVASTATIN CALCIUM 80 MG: 80 TABLET, FILM COATED ORAL at 21:12

## 2024-04-13 RX ADMIN — SENNOSIDES, DOCUSATE SODIUM 2 TABLET: 8.6; 5 TABLET ORAL at 17:33

## 2024-04-13 RX ADMIN — SODIUM CHLORIDE 2 DROP: 50 SOLUTION OPHTHALMIC at 21:13

## 2024-04-13 RX ADMIN — CHLORHEXIDINE GLUCONATE 0.12% ORAL RINSE 15 ML: 1.2 LIQUID ORAL at 14:55

## 2024-04-13 RX ADMIN — POLYETHYLENE GLYCOL 3350 17 G: 17 POWDER, FOR SOLUTION ORAL at 14:55

## 2024-04-13 RX ADMIN — INSULIN LISPRO 3 UNITS: 100 INJECTION, SOLUTION INTRAVENOUS; SUBCUTANEOUS at 21:12

## 2024-04-13 RX ADMIN — SODIUM CHLORIDE 500 ML: 0.9 INJECTION, SOLUTION INTRAVENOUS at 12:45

## 2024-04-13 RX ADMIN — ACETAMINOPHEN 975 MG: 325 TABLET, FILM COATED ORAL at 17:33

## 2024-04-13 RX ADMIN — GABAPENTIN 100 MG: 100 CAPSULE ORAL at 21:12

## 2024-04-13 RX ADMIN — INSULIN GLARGINE 20 UNITS: 100 INJECTION, SOLUTION SUBCUTANEOUS at 21:12

## 2024-04-13 RX ADMIN — CHLORHEXIDINE GLUCONATE 0.12% ORAL RINSE 15 ML: 1.2 LIQUID ORAL at 21:12

## 2024-04-13 RX ADMIN — INSULIN LISPRO 6 UNITS: 100 INJECTION, SOLUTION INTRAVENOUS; SUBCUTANEOUS at 17:33

## 2024-04-13 RX ADMIN — METOPROLOL TARTRATE 25 MG: 25 TABLET, FILM COATED ORAL at 15:21

## 2024-04-13 RX ADMIN — ACETAMINOPHEN 975 MG: 325 TABLET, FILM COATED ORAL at 11:50

## 2024-04-13 RX ADMIN — CITALOPRAM HYDROBROMIDE 20 MG: 20 TABLET ORAL at 16:25

## 2024-04-13 NOTE — ED PROVIDER NOTES
History  Chief Complaint   Patient presents with    Fall     Pt comes from [facility], had two witnessed falls overnight , +thinners (eliquis), - HS, pt was here not to long ago with the option to go to rehab which she refused now looking to go to outpatient rehab.      84-year-old female on Eliquis presents emergency department today for evaluation of 2 falls that happened last night.  Falls reportedly witnessed at her assisted living facility.  Head strike and loss of consciousness were denied by the individuals who witnessed the event.  The patient says that she fell the first time while trying to get her glucometer.  She has no recollection of the second fall.  She is complaining of  right upper back and right posterior lateral chest wall pain.  She states she is compliant with her medications.  She denies chest pain shortness of breath dizziness visual disturbances or abdominal pain.  She denies hip pain and ambulates with a walker which she did this morning.  She has no headache or head pain in general from her falls.  Denies neck pain as well.        Prior to Admission Medications   Prescriptions Last Dose Informant Patient Reported? Taking?   Ascorbic Acid (VITAMIN C) 1000 MG tablet  Self Yes No   Sig: Take 500 mg by mouth    Calcium Carbonate-Vit D-Min (CALCIUM 1200 PO)  Self Yes No   Sig: Take by mouth   Continuous Blood Gluc  (FREESTYLE RONNI 14 DAY READER) GARRY  Self No No   Sig: Use to check BG levels 4+times daily and as needed   Continuous Blood Gluc Sensor (FreeStyle Ronni 14 Day Sensor) MISC  Self No No   Sig: Change sensor every 14 days to check BG levels 4+ times daily   Diclofenac Sodium (VOLTAREN) 1 %  Self No No   Sig: Apply 2 g topically 4 (four) times a day   Insulin Pen Needle (BD Pen Needle Sasha U/F) 32G X 4 MM MISC  Self No No   Sig: Inject 4 times a day   Lancets (onetouch ultrasoft) lancets  Self No No   Sig: Use 4 times a day   Patient not taking: Reported on  4/8/2024   Omega-3 Fatty Acids (FISH OIL) 1200 MG CAPS  Self Yes No   Sig: Take 1,200 mg by mouth 2 (two) times a day    acetaminophen (TYLENOL) 325 mg tablet  Self Yes No   Sig: Take 650 mg by mouth   apixaban (Eliquis) 5 mg   No No   Sig: Take 1 tablet (5 mg total) by mouth 2 (two) times a day   atorvastatin (LIPITOR) 80 mg tablet  Self No No   Sig: Take 1 tablet (80 mg total) by mouth daily at bedtime   citalopram (CeleXA) 20 mg tablet  Self No No   Sig: TAKE 1 TABLET EVERY DAY   furosemide (LASIX) 40 mg tablet   No No   Sig: Take 1 tablet (40 mg total) by mouth daily   insulin aspart (NovoLOG FlexPen) 100 UNIT/ML injection pen  Self No No   Sig: INJECT 6 UNITS SUBCUTANEOUSLY WITH BREAKFAST, THEN 6 UNITS WITH LUNCH, AND THEN 6 UNITS WITH DINNER   insulin glargine (Toujeo SoloStar) 300 units/mL CONCENTRATED U-300 injection pen (1-unit dial)   No No   Sig: Inject 20 Units under the skin daily at bedtime Inject 25  units at bedtime   levothyroxine 150 mcg tablet   No No   Sig: Take 1 tablet (150 mcg total) by mouth daily in the early morning   lisinopril (ZESTRIL) 20 mg tablet  Self No No   Sig: TAKE 1 TABLET TWICE DAILY   metoprolol tartrate (LOPRESSOR) 25 mg tablet   No No   Sig: Take 1 tablet (25 mg total) by mouth every 12 (twelve) hours   pantoprazole (PROTONIX) 40 mg tablet   No No   Sig: Take 1 tablet (40 mg total) by mouth daily in the early morning   promethazine-dextromethorphan (PHENERGAN-DM) 6.25-15 mg/5 mL oral syrup  Self No No   Sig: TAKE 5ML DAILY AT BEDTIME   senna (SENOKOT) 8.6 mg   No No   Sig: Take 1 tablet (8.6 mg total) by mouth daily as needed for constipation   sodium chloride (VANNESSA 128) 5 % hypertonic ophthalmic ointment  Self Yes No   Sig: Administer 1 drop to both eyes 2 (two) times a day   triamcinolone (KENALOG) 0.1 % cream  Self No No   Sig: APPLY TOPCICALLY TWO TIMES A DAY      Facility-Administered Medications: None       Past Medical History:   Diagnosis Date    Anxiety     Arthritis      CHF (congestive heart failure) (HCC)     Chronic venous insufficiency     COPD (chronic obstructive pulmonary disease) (HCC)     Coronary artery disease     Diabetes mellitus (HCC)     type II    Disease of thyroid gland     hypothyroidism    DVT (deep venous thrombosis) (HCC)     HL (hearing loss)     Hyperlipidemia     Hypertension     Irregular heart beat     Paroxysmal a-fib    Pacemaker     Renal disorder     stage 3    Sleep apnea     Sleep difficulties     Thyroid disease     Urinary incontinence     Uterine leiomyoma     Walker as ambulation aid        Past Surgical History:   Procedure Laterality Date    BREAST SURGERY      For Biopsy    CARDIAC PACEMAKER PLACEMENT      Permanent. Last assessed: 2/19/16    CARDIAC SURGERY      CARPAL TUNNEL RELEASE Bilateral     CATARACT EXTRACTION      CATARACT EXTRACTION W/ INTRAOCULAR LENS  IMPLANT, BILATERAL      CYSTOSCOPY      Diagnostic. Last assessed: 11/8/17    ECTOPIC PREGNANCY SURGERY      HYSTERECTOMY      Total Abdomonal. Resolved: 1982    JOINT REPLACEMENT Right     TKR    OOPHORECTOMY      IL ENDOVEN ABLTJ INCMPTNT VEIN XTR LASER 1ST VEIN Left 2/17/2017    Procedure: GREATER SAPHENOUS VEIN ENDOVASCULAR LASER THERAPY ;  Surgeon: Danielle Phan DO;  Location: [facility];  Service: Vascular    TONSILLECTOMY         Family History   Problem Relation Age of Onset    Coronary artery disease Mother     Hypertension Mother     Heart disease Mother     Hypertension Father     Diabetes Father         Mellitus    No Known Problems Sister     No Known Problems Daughter     No Known Problems Daughter     No Known Problems Maternal Grandmother     No Known Problems Maternal Grandfather     No Known Problems Paternal Grandmother     No Known Problems Paternal Grandfather     Diabetes Brother         Mellitus    No Known Problems Son     Breast cancer Maternal Aunt     Pancreatic cancer Maternal Aunt     Breast cancer Cousin     Breast cancer Cousin     Breast cancer  Paternal Aunt     No Known Problems Paternal Aunt     No Known Problems Paternal Aunt      I have reviewed and agree with the history as documented.    E-Cigarette/Vaping    E-Cigarette Use Never User      E-Cigarette/Vaping Substances    Nicotine No     THC No     CBD No     Flavoring No     Other No     Unknown No      Social History     Tobacco Use    Smoking status: Never     Passive exposure: Never    Smokeless tobacco: Never    Tobacco comments:     Per [employer]: Former smoker   Vaping Use    Vaping status: Never Used   Substance Use Topics    Alcohol use: Not Currently     Comment: rarely. Per [employer]: Denied history of alcohol use    Drug use: No        Review of Systems   Constitutional:  Negative for activity change, appetite change, chills, fatigue and fever.   HENT:  Negative for congestion and sore throat.    Eyes:  Negative for visual disturbance.   Respiratory:  Negative for shortness of breath.    Cardiovascular:  Negative for chest pain and palpitations.   Gastrointestinal:  Negative for abdominal pain, nausea and vomiting.   Genitourinary:  Negative for dysuria, frequency and urgency.   Musculoskeletal:  Positive for back pain. Negative for neck pain.   Skin:  Positive for color change. Negative for rash and wound.   Neurological:  Negative for dizziness, tremors, facial asymmetry, weakness, light-headedness and headaches.   Psychiatric/Behavioral:  Negative for agitation.        Physical Exam  ED Triage Vitals [04/13/24 1034]   Temperature Pulse Respirations Blood Pressure SpO2   (!) 97.4 °F (36.3 °C) 77 20 162/72 93 %      Temp Source Heart Rate Source Patient Position - Orthostatic VS BP Location FiO2 (%)   Oral Monitor Lying Left arm --      Pain Score       6             Orthostatic Vital Signs  Vitals:    04/13/24 1034 04/13/24 1100 04/13/24 1200 04/13/24 1300   BP: 162/72 (!) 177/72 (!) 184/81 (!) 183/86   Pulse: 77 58 58 60   Patient Position - Orthostatic VS: Lying Lying Lying Lying        Physical Exam  Vitals reviewed.   Constitutional:       General: She is not in acute distress.     Appearance: Normal appearance. She is not ill-appearing.   HENT:      Head: Normocephalic and atraumatic.      Mouth/Throat:      Mouth: Mucous membranes are moist.      Pharynx: Oropharynx is clear.   Eyes:      Pupils: Pupils are equal, round, and reactive to light.   Cardiovascular:      Rate and Rhythm: Normal rate and regular rhythm.      Pulses: Normal pulses.      Heart sounds: Normal heart sounds. No murmur heard.  Pulmonary:      Effort: Pulmonary effort is normal. No respiratory distress.      Breath sounds: Normal breath sounds. No wheezing.   Abdominal:      General: Abdomen is flat.      Palpations: Abdomen is soft.      Tenderness: There is no abdominal tenderness.   Musculoskeletal:         General: Tenderness present. No swelling or deformity.      Cervical back: Normal range of motion. No rigidity or tenderness.      Comments: Tenderness palpation around right scapula and posterior lateral chest wall   Skin:     General: Skin is warm and dry.      Findings: Bruising present.      Comments: Small bruise noted on the dorsum of left hand.  No overlying skin changes of the report injured areas.   Neurological:      Mental Status: She is alert and oriented to person, place, and time.      Cranial Nerves: No cranial nerve deficit.   Psychiatric:         Mood and Affect: Mood normal.         Behavior: Behavior normal.         ED Medications  Medications   chlorhexidine (PERIDEX) 0.12 % oral rinse 15 mL (has no administration in time range)   gabapentin (NEURONTIN) capsule 100 mg (has no administration in time range)   acetaminophen (TYLENOL) tablet 975 mg (975 mg Oral Not Given 4/13/24 1417)   oxyCODONE (ROXICODONE) split tablet 2.5 mg (has no administration in time range)     Or   oxyCODONE (ROXICODONE) IR tablet 5 mg (has no administration in time range)   HYDROmorphone HCl (DILAUDID) injection 0.2 mg  (has no administration in time range)   senna-docusate sodium (SENOKOT S) 8.6-50 mg per tablet 2 tablet (has no administration in time range)   enoxaparin (LOVENOX) subcutaneous injection 30 mg (has no administration in time range)   polyethylene glycol (MIRALAX) packet 17 g (has no administration in time range)   acetaminophen (TYLENOL) tablet 975 mg (975 mg Oral Given 4/13/24 1150)   sodium chloride 0.9 % bolus 500 mL (500 mL Intravenous New Bag 4/13/24 1245)       Diagnostic Studies  Results Reviewed       Procedure Component Value Units Date/Time    HS Troponin I 2hr [[id number]] Collected: 04/13/24 1407    Lab Status: In process Specimen: Blood from Arm, Left Updated: 04/13/24 1415    UA w Reflex to Microscopic w Reflex to Culture [[id number]] Collected: 04/13/24 1341    Lab Status: In process Specimen: Urine, Clean Catch Updated: 04/13/24 1346    HS Troponin 0hr (reflex protocol) [[id number]]  (Normal) Collected: 04/13/24 1118    Lab Status: Final result Specimen: Blood from Arm, Right Updated: 04/13/24 1228     hs TnI 0hr 15 ng/L     HS Troponin I 4hr [[id number]]     Lab Status: No result Specimen: Blood     Comprehensive metabolic panel [[id number]]  (Abnormal) Collected: 04/13/24 1118    Lab Status: Final result Specimen: Blood from Arm, Right Updated: 04/13/24 1202     Sodium 134 mmol/L      Potassium 4.6 mmol/L      Chloride 89 mmol/L      CO2 37 mmol/L      ANION GAP 8 mmol/L      BUN 44 mg/dL      Creatinine 1.70 mg/dL      Glucose 293 mg/dL      Calcium 8.6 mg/dL      Corrected Calcium 9.1 mg/dL      AST 16 U/L      ALT 6 U/L      Alkaline Phosphatase 80 U/L      Total Protein 6.4 g/dL      Albumin 3.4 g/dL      Total Bilirubin 0.80 mg/dL      eGFR 27 ml/min/1.73sq m     Narrative:      [employer] Kid[employer] guidelines for Chronic Kidney Disease (CKD):     Stage 1 with normal or high GFR (GFR > 90 mL/min/1.73 square meters)    Stage 2 Mild CKD (GFR = 60-89 mL/min/1.73 square meters)    Stage  "3A Moderate CKD (GFR = 45-59 mL/min/1.73 square meters)    Stage 3B Moderate CKD (GFR = 30-44 mL/min/1.73 square meters)    Stage 4 Severe CKD (GFR = 15-29 mL/min/1.73 square meters)    Stage 5 End Stage CKD (GFR <15 mL/min/1.73 square meters)  Note: GFR calculation is accurate only with a steady state creatinine    CBC and differential [[id number]]  (Abnormal) Collected: 04/13/24 1118    Lab Status: Final result Specimen: Blood from Arm, Right Updated: 04/13/24 1130     WBC 9.20 Thousand/uL      RBC 3.99 Million/uL      Hemoglobin 12.0 g/dL      Hematocrit 39.3 %      MCV 99 fL      MCH 30.1 pg      MCHC 30.5 g/dL      RDW 12.5 %      MPV 10.7 fL      Platelets 197 Thousands/uL      nRBC 0 /100 WBCs      Segmented % 76 %      Immature Grans % 1 %      Lymphocytes % 13 %      Monocytes % 8 %      Eosinophils Relative 1 %      Basophils Relative 1 %      Absolute Neutrophils 7.06 Thousands/µL      Absolute Immature Grans 0.05 Thousand/uL      Absolute Lymphocytes 1.18 Thousands/µL      Absolute Monocytes 0.77 Thousand/µL      Eosinophils Absolute 0.07 Thousand/µL      Basophils Absolute 0.07 Thousands/µL                    CT head wo contrast   Final Result by Uriel Mortensen DO (04/13 1159)      Stable mild chronic microangiopathic change within the brain parenchyma with no acute intracranial abnormality.      Just below the foramen magnum there is soft tissue density in the anterior epidural space behind the dens extending into the anterior epidural space resulting in rather advanced canal stenosis with mild compression of the upper cervical cord. This was    present on prior study from 2022. Correlate for signs of myelopathy. Consider contrast-enhanced MRI. Differential considerations would include pannus formation from rheumatoid arthritis.   This examination was marked \"immediate notification\" in Epic in order to begin the standard process by which the radiology reading room liaison alerts the referring " "practitioner.                  Workstation performed: [id number]         CT spine cervical without contrast   Final Result by Uriel Mortensen DO (04/13 1203)      Multilevel cervical spondylitic degenerative change with multilevel mild to moderate canal stenosis and foraminal narrowing.      Homogeneously hyperdense soft tissue density in the anterior epidural space of the upper cervical spine posterior to the dens possibly representing pannus formation from rheumatoid arthritis. This results in moderately severe canal stenosis with    compression of the upper cervical cord. Correlate for signs of myelopathy. Consider follow-up MRI of the cervical spine.      This examination was marked \"immediate notification\" in Epic in order to begin the standard process by which the radiology reading room liaison alerts the referring practitioner.                  Workstation performed: [id number]         CT chest wo contrast   Final Result by Kyle Concepcion MD (04/13 1229)      Fractures of right fourth through eighth ribs including 3 rib segments (fifth, sixth, and seventh) which are fractured in 2 places. Findings are consistent with flail chest.      Small multiloculated right chest pleural collection containing few tiny bubbles of gas. Few bubbles of gas are also noted in the mediastinum (pneumomediastinum).      Small amount of right chest subcutaneous emphysema.      Reidentified cardiomegaly, coronary artery bypass grafting, and prominence of central pulmonary arterial tree suggestive of pulmonary hypertension.         I reported these results to LYNDON HECK and Dr. Starr via HIPAA compliant secure electronic messaging on 4/13/2024 12:23 PM. I personally discussed this study with Dr. Starr on 4/13/2024 12:28 PM.               Workstation performed: [id number]               Procedures  Procedures      ED Course                             SBIRT 22yo+      Flowsheet Row Most Recent Value   Initial Alcohol " Screen: US AUDIT-C     1. How often do you have a drink containing alcohol? 0 Filed at: 04/13/2024 1044   Audit-C Score 0 Filed at: 04/13/2024 1044   KRISTIE: How many times in the past year have you...    Used an illegal drug or used a prescription medication for non-medical reasons? Never Filed at: 04/13/2024 1044                  Medical Decision Making  84-year-old female presents emergency department today for evaluation of fall.  She takes Eliquis and has been compliant with her medications.  Given her age and poor history, there is concern for possible head injury as well as rib fractures in the right chest.  CT scan of the head C-spine and chest will be performed for trauma assessment.  Although etiology is likely mechanical, we also obtain some basic labs to investigate other possible etiology of her fall to include CBC CMP troponin and EKG.  CT of the chest reveals 4-8th rib fractures with two breaks in ribs 5-7 concerning with flail chest. Patient is hemodynamically stable.  Trauma was consulted for evaluation and are amenable to admission for further workup.      Amount and/or Complexity of Data Reviewed  Labs: ordered.  Radiology: ordered.    Risk  OTC drugs.          Disposition  Final diagnoses:   Fall, initial encounter   Closed fracture of multiple ribs of right side, initial encounter     Time reflects when diagnosis was documented in both MDM as applicable and the Disposition within this note       Time User Action Codes Description Comment    4/13/2024  1:49 PM Zabrina Sands Add [W19.XXXA] Fall, initial encounter     4/13/2024  1:49 PM Zabrina Sands Add [S22.41XA] Closed fracture of multiple ribs of right side, initial encounter     4/13/2024  2:10 PM Shari Bunn Modify [W19.XXXA] Fall, initial encounter     4/13/2024  2:10 PM Shari Bunn Modify [S22.41XA] Closed fracture of multiple ribs of right side, initial encounter           ED Disposition       None          Follow-up  Information    None         Patient's Medications   Discharge Prescriptions    No medications on file     No discharge procedures on file.    PDMP Review         Value Time User    PDMP Reviewed  Yes 9/4/2022  2:50 PM Carlie Del Castillo MD             ED Provider  Attending physically available and evaluated Lizzie Whitten. I managed the patient along with the ED Attending.    Electronically Signed by           Estrada Starr MD  04/13/24 2755

## 2024-04-13 NOTE — H&P
"H&P - Trauma   Lizzie Whitten 84 y.o. female MRN: [id number]  Unit/Bed#: ED 28 Encounter: [id number]    Trauma Alert: Evaluation; trauma team notified at 1242 via text   Model of Arrival: Ambulance    Trauma Team: Attending Yomi, Residents Shavon, and AMANDA Desir  Consultants:     Other: {Critical Care - STAT consult; notified at 1341 via text;     Assessment/Plan   Active Problems / Assessment:   Multiple falls   On Eliquis for paroxysmal afib   Complete heart block s/p pacemaker placement   Multiple rib fractures   Pneumomediastinum     CT head and cervical spine: negative for acute traumatic injuries        Plan:   Admit to Critical Care for close monitoring given extent of injuries    History of Present Illness     Chief Complaint: right upper back pain  Mechanism:Fall     HPI:    Lizzie Whitten is a 84 y.o. female who presents to \[facility]\"" ED for evaluation of after sustaining 2 falls last night. For the first fall, patient states she was walking towards the couch to sit with her family members when she fell onto her buttocks. Denies preceding CP, SOB, dizziness. Denies headstrike or LOC. For the second fall, patient states she was trying to get to the kitchen to replace her continuous glucose monitoring device when she \"lost her balance\" and fell. Denies headstrike or LOC. Patient states she takes Eliquis daily. She is currently endorsing right sided upper back pain. CT chest obtained in the ED on 4/13/24 shows right 4-8 rib fractures with ribs 5-7 fractured in 2 places, findings consistent with flail chest; pneumomediastinum; small amount of right chest subQ emphysema.    Review of Systems   Respiratory:  Positive for shortness of breath.    Musculoskeletal:         Right upper back pain      12-point, complete review of systems was reviewed and negative except as stated above.     Historical Information     Past Medical History:   Diagnosis Date    Anxiety     Arthritis     CHF (congestive heart " failure) (HCC)     Chronic venous insufficiency     COPD (chronic obstructive pulmonary disease) (HCC)     Coronary artery disease     Diabetes mellitus (HCC)     type II    Disease of thyroid gland     hypothyroidism    DVT (deep venous thrombosis) (HCC)     HL (hearing loss)     Hyperlipidemia     Hypertension     Irregular heart beat     Paroxysmal a-fib    Pacemaker     Renal disorder     stage 3    Sleep apnea     Sleep difficulties     Thyroid disease     Urinary incontinence     Uterine leiomyoma     Walker as ambulation aid      Past Surgical History:   Procedure Laterality Date    BREAST SURGERY      For Biopsy    CARDIAC PACEMAKER PLACEMENT      Permanent. Last assessed: 2/19/16    CARDIAC SURGERY      CARPAL TUNNEL RELEASE Bilateral     CATARACT EXTRACTION      CATARACT EXTRACTION W/ INTRAOCULAR LENS  IMPLANT, BILATERAL      CYSTOSCOPY      Diagnostic. Last assessed: 11/8/17    ECTOPIC PREGNANCY SURGERY      HYSTERECTOMY      Total Abdomonal. Resolved: 1982    JOINT REPLACEMENT Right     TKR    OOPHORECTOMY      NH ENDOVEN ABLTJ INCMPTNT VEIN XTR LASER 1ST VEIN Left 2/17/2017    Procedure: GREATER SAPHENOUS VEIN ENDOVASCULAR LASER THERAPY ;  Surgeon: Danielle Phan DO;  Location: [facility];  Service: Vascular    TONSILLECTOMY          Social History     Tobacco Use    Smoking status: Never     Passive exposure: Never    Smokeless tobacco: Never    Tobacco comments:     Per [employer]: Former smoker   Vaping Use    Vaping status: Never Used   Substance Use Topics    Alcohol use: Not Currently     Comment: rarely. Per All[employer]: Denied history of alcohol use    Drug use: No     Immunization History   Administered Date(s) Administered    COVID-19 PFIZER VACCINE 0.3 ML IM 01/20/2021, 02/09/2021, 04/11/2022    H1N1, All Formulations 01/12/2010, 01/12/2010    INFLUENZA 11/04/2005    Influenza Split High Dose Preservative Free IM 10/18/2012, 10/23/2013, 10/14/2014, 10/20/2015, 11/02/2016, 10/04/2017     Influenza, high dose seasonal 0.7 mL 10/23/2018, 10/09/2019, 10/20/2020, 10/28/2022, 12/08/2023    Influenza, seasonal, injectable 11/12/2009, 01/12/2010    Pneumococcal Conjugate 13-Valent 10/21/2015    Pneumococcal Conjugate Vaccine 20-valent (Pcv20), Polysace 10/28/2022    Pneumococcal Polysaccharide PPV23 12/11/2000, 12/11/2010    Td (adult), adsorbed 07/04/2008    Tetanus, adsorbed 09/04/1998    Zoster 07/22/2016     Last Tetanus: unknown  Family History: Non-contributory    1. Before the illness or injury that brought you to the Emergency, did you need someone to help you on a regular basis? 1=Yes   2. Since the illness or injury that brought you to the Emergency, have you needed more help than usual to take care of yourself? 1=Yes   3. Have you been hospitalized for one or more nights during the past 6 months (excluding a stay in the Emergency Department)? 1=Yes   4. In general, do you see well? 1=No   5. In general, do you have serious problems with your memory? 0=No   6. Do you take more than three different medications everyday? 1=Yes   TOTAL   4     Did you order a geriatric consult if the score was 2 or greater?: yes     Meds/Allergies   all current active meds have been reviewed     Allergies   Allergen Reactions    Ampicillin Hives       Objective   Initial Vitals:   Temperature: (!) 97.4 °F (36.3 °C) (04/13/24 1034)  Pulse: 77 (04/13/24 1034)  Respirations: 20 (04/13/24 1034)  Blood Pressure: 162/72 (04/13/24 1034)    Primary Survey:   Airway:        Status: patent;        Pre-hospital Interventions: none        Hospital Interventions: other (comment) (on 2L NC)  Breathing:        Pre-hospital Interventions: oxygen       Effort: normal       Right breath sounds: ; decreased       Left breath sounds: normal  Circulation:        Rhythm: regular       Rate: regular   Right Pulses Left Pulses    R radial: 2+    R pedal: 2+     L radial: 2+    L pedal: 2+       Disability:        GCS: Eye: 4; Verbal: 5  Motor: 6 Total: 15       Right Pupil: round;  reactive         Left Pupil:  round;  reactive      R Motor Strength L Motor Strength    R : 5/5  R dorsiflex: 5/5  R plantarflex: 5/5 L : 5/5  L dorsiflex: 5/5  L plantarflex: 5/5        Sensory:  No sensory deficit  Exposure:       Completed: Yes      Secondary Survey:  Physical Exam  Vitals and nursing note reviewed.   Constitutional:       General: She is not in acute distress.     Appearance: Normal appearance. She is obese.   HENT:      Head: Normocephalic and atraumatic.      Mouth/Throat:      Mouth: Mucous membranes are moist.      Pharynx: Oropharynx is clear.   Eyes:      General:         Right eye: No discharge.         Left eye: No discharge.      Extraocular Movements: Extraocular movements intact.      Conjunctiva/sclera: Conjunctivae normal.      Pupils: Pupils are equal, round, and reactive to light.   Cardiovascular:      Rate and Rhythm: Normal rate and regular rhythm.      Pulses: Normal pulses.   Pulmonary:      Effort: Pulmonary effort is normal. No respiratory distress.      Comments: Currently on 2L NC   Abdominal:      General: There is no distension.      Palpations: Abdomen is soft.      Tenderness: There is no abdominal tenderness.   Musculoskeletal:         General: No swelling or deformity. Normal range of motion.      Cervical back: Normal range of motion. No rigidity.      Comments: No midline cervical, thoracic or lumbar tenderness to palpation, no step offs.     TTP to right mid thoracic region.    Skin:     General: Skin is warm and dry.      Capillary Refill: Capillary refill takes less than 2 seconds.      Comments: Chronic venous stasis changes noted in bilateral lower extremities    Neurological:      General: No focal deficit present.      Mental Status: She is alert and oriented to person, place, and time.         Invasive Devices       Peripheral Intravenous Line  Duration             Peripheral IV 04/13/24 Left  Antecubital <1 day              Drain  Duration             External Urinary Catheter <1 day                  Lab Results: I have personally reviewed all pertinent laboratory/test results 04/13/24 and in the preceding 24 hours.  Recent Labs     04/11/24  0802 04/12/24  0453 04/13/24  1118 04/13/24  1407   WBC 7.02   < > 9.20  --    HGB 12.1   < > 12.0  --    HCT 40.2   < > 39.3  --       < > 197  --    SODIUM 137   < > 134*  --    K 4.3   < > 4.6  --    CL 95*   < > 89*  --    CO2 34*   < > 37*  --    BUN 34*   < > 44*  --    CREATININE 1.47*   < > 1.70*  --    GLUC 101   < > 293*  --    MG 1.4*  --   --   --    PHOS 3.8  --   --   --    AST 10*   < > 16  --    ALT <3*   < > 6*  --    ALB 3.3*   < > 3.4*  --    TBILI 0.90   < > 0.80  --    ALKPHOS 78   < > 80  --    HSTNI0  --   --  15  --    HSTNI2  --   --   --  15    < > = values in this interval not displayed.       Imaging Results: I have personally reviewed pertinent images saved in PACS. CT scan findings (and other pertinent positive findings on images) were discussed with radiology. My interpretation of the images/reports are as follows:  Chest Xray(s): N/A   FAST exam(s): N/A   CT Scan(s): positive for acute findings: R 4-8 rib fractures   Additional Xray(s): N/A     Other Studies:     Code Status: Level 1 - Full Code  Advance Directive and Living Will:      Power of :    POLST:

## 2024-04-13 NOTE — PROGRESS NOTES
[facility]  Progress Note: Critical Care  Name: Lizzie Whitten 84 y.o. female I MRN: [id number]  Unit/Bed#: ED 28 I Date of Admission: 4/13/2024   Date of Service: 4/13/2024 I Hospital Day: 0    Assessment/Plan   Neuro:   Diagnosis: Anxiety  Plan: continue citalopram  Diagnosis: pain control  Plan: tylenol q8, gabapentin 100 qhs, oxy 2.5/5, dilaudid 0.2 prn  Appreciate APS recs    CV:   Diagnosis: CHF  Plan: hold home lasix today, lasix 20 tomorrow in setting of CHACHA  Diagnosis: CAD  Plan: continue statin  Diagnosis: paroxysmal Afib  Plan: hold eliquis, continue lopressor 25 bid  Diagnosis: chronic venous insuffiency  Plan: leg elevation, mechanical ppx  Diagnosis: HTN  Plan: hold lisinopril  Labetalol, hydralazine prn  Diagnosis: HLD  Plan: continue fish oil    Pulm:  Diagnosis: R 4-8th rib fxs, 5-7th rib flail chest, mild pneumomediastinum  Plan: O2 as needed, IVF, pain mgmt, pulm toilet  Diagnosis: COPD  Plan: wean O2 as tolerated, goal >88%. Does not use O2 at baseline  Diagnosis: MITCH  Plan: does not use any Cpap at home    GI:   No active issues  Plan: continue pantoprazole  Bowel regimen: senna, miralax    :   Diagnosis: CHACHA on CKD3  Plan: hold lasix, monitor Cr, I/O's    F/E/N:   Fluids: none  Electrolytes: replete as needed  Nutrition: cardiac diet    Heme/Onc:   Diagnosis: prior DVT  Plan: lovenox 30 bid starting 4/14 morning    Endo:   Diagnosis: DM2  Plan: glargine 20 hs, SSI, monitor glucose (goal 140-180)  Diagnosis: hypothyroidism  Plan: continue levothyroxine    ID:   No active issues    MSK/Skin:   Diagnosis: arthritis  Plan: diclofenac, PT/OT    Disposition: Critical care    ICU Core Measures     A: Assess, Prevent, and Manage Pain Has pain been assessed? Yes  Need for changes to pain regimen? No   B: Both SAT/SAT  N/A   C: Choice of Sedation RASS Goal: 0 Alert and Calm  Need for changes to sedation or analgesia regimen? No   D: Delirium CAM-ICU: Negative    E: Early Mobility  Plan for early mobility? Yes   F: Family Engagement Plan for family engagement today? Yes         Prophylaxis:  VTE VTE covered by:    None       Stress Ulcer  not orderedcovered bypantoprazole (PROTONIX) 40 mg tablet [[id number]] (Long-Term Med)        Significant 24hr Events     24hr events: 84F with hx of Afib on eliquis, HTN, HLD, anxiety, CHF, DM2, prior DVT, COPD, CAD, indwelling pacer, ambulatory dysfunction, hypothyroidism, 1982 hysterectomy p/w 2 falls last night due to losing balance. Does not recall 1 of the falls but was witnessed by family. Unclear head strike. R sided rib and scapular pain. No neck pain, headache, CP, SOB, n/v.     Subjective     Review of Systems   Constitutional:  Negative for chills and fever.   HENT:  Negative for ear pain and sore throat.    Eyes:  Negative for pain and visual disturbance.   Respiratory:  Positive for cough. Negative for shortness of breath.    Cardiovascular:  Negative for chest pain and palpitations.   Gastrointestinal:  Positive for constipation and diarrhea. Negative for abdominal pain, nausea and vomiting.   Genitourinary:  Negative for dysuria and hematuria.   Musculoskeletal:  Negative for arthralgias and back pain.   Skin:  Negative for color change and rash.   Neurological:  Negative for seizures and syncope.   All other systems reviewed and are negative.       Objective                            Vitals I/O      Most Recent Min/Max in 24hrs   Temp (!) 97.4 °F (36.3 °C) Temp  Min: 97.4 °F (36.3 °C)  Max: 97.4 °F (36.3 °C)   Pulse 60 Pulse  Min: 58  Max: 77   Resp 20 Resp  Min: 20  Max: 20   BP (!) 183/86 BP  Min: 162/72  Max: 184/81   O2 Sat 97 % SpO2  Min: 93 %  Max: 98 %    No intake or output data in the 24 hours ending 04/13/24 1355    No diet orders on file    Invasive Monitoring           Physical Exam   Physical Exam  Eyes:      Extraocular Movements: Extraocular movements intact.      Conjunctiva/sclera: Conjunctivae normal.    Skin:     General: Skin is warm and dry.   HENT:      Head: Normocephalic.      Mouth/Throat:      Mouth: Mucous membranes are moist.   Cardiovascular:      Rate and Rhythm: Normal rate.      Pulses: Normal pulses.   Musculoskeletal:         General: Normal range of motion.      Right lower leg: No edema.      Left lower leg: No edema.   Abdominal: General: There is no distension.      Palpations: Abdomen is soft.      Tenderness: There is no abdominal tenderness.   Constitutional:       General: She is not in acute distress.  Pulmonary:      Effort: Pulmonary effort is normal.   Neurological:      General: No focal deficit present.      Mental Status: She is oriented to person, place and time.      Motor: Strength full and intact in all extremities.            Diagnostic Studies      EKG: pending  Imagin/13 CT chest: R 4-8th rib fxs including 5-7th flail chest. Small multiloculated R pleural collection with few tiny bubbles of gas. Also pneumomediastinum (few bubbles). Small R chest subQ emphysema.  I have personally reviewed pertinent reports.       Medications:  Scheduled PRN        Continuous    No current facility-administered medications for this encounter.       Labs:    CBC    Recent Labs     24  0453 24  1118   WBC 5.87 9.20   HGB 11.7 12.0   HCT 38.5 39.3    197     BMP    Recent Labs     24  0453 24  1118   SODIUM 139 134*   K 4.0 4.6   CL 93* 89*   CO2 38* 37*   AGAP 8 8   BUN 40* 44*   CREATININE 1.51* 1.70*   CALCIUM 9.0 8.6       Coags    No recent results     Additional Electrolytes  No recent results       Blood Gas    No recent results  No recent results LFTs  Recent Labs     24  0453 24  1118   ALT 5* 6*   AST 14 16   ALKPHOS 75 80   ALB 3.3* 3.4*   TBILI 0.86 0.80       Infectious  No recent results  Glucose  Recent Labs     24  0453 24  1118   GLUC 45* 293*               Solomon Soto MD

## 2024-04-13 NOTE — ED ATTENDING ATTESTATION
"4/13/2024  I, Clarence Davis MD, saw and evaluated the patient. I have discussed the patient with the resident and agree with the resident's findings, Plan of Care, and MDM as documented in the resident's note, except where noted. All available labs and Radiology studies were reviewed.  I was present for key portions of any procedure(s) performed by the resident and I was immediately available to provide assistance.    At this point I agree with the current assessment done in the Emergency Department.  I have conducted an independent evaluation of this patient a history and physical is as follows:    85 yo female with a complicated past medical history including HTN, hyperlipidemia, anxiety, CHF, DM, prior DVT, COPD, CAD, indwelling pacer, ambulatory dysfunction, and hypothyroidism brought to the ED by EMS for evaluation following two falls last night. The patient only remembers one of the falls --> she says she was trying to get to the kitchen to check her blood glucose but \"I didn't make it\". Other fall was witnessed by family but she does not recall it. Unclear head strike. She is on Eliquis. The patient is now complaining of some right sided rib pain and right scapular pain. No neck pain or headache. She denies chest pain, shortness of breath, nausea, vomiting, and diaphoresis. No abdominal discomfort. The patient says she no longer feels safe at home and would like to be admitted to [facility] for placement in a rehab facility. No other specific complaints.    ROS: per resident physician note    Gen: NAD, AA&Ox3  HEENT: PERRL, EOMI  Neck: supple  CV: RRR  Lungs: CTA B/L  Chest: (+) marked ttp over right lateral ribs/scapula  Abdomen: soft, NT/ND  Ext: no swelling or deformity  Neuro: 5/5 strength all extremities, sensation grossly intact  Skin: (+) ecchymoses to left hand    ED Course  The patient is comfortable appearing with stable vital signs. (+) Tenderness to palpation over right lateral chest wall. (+) 2 " falls at home last night. Rib fractures vs contusions vs ICH vs FTT/worsening ambulatory dysfunction? Will check EKG, basic labs, troponin, UA, and CT head/cervical spine/thorax. APAP administered, will continue to monitor in the ED. Disposition per workup and reassessment. The patient will likely require admission to B --> Trauma vs SLIM.      Critical Care Time  CriticalCare Time    Date/Time: 4/13/2024 4:27 PM    Performed by: Clarence Davis MD  Authorized by: Clarence Davis MD    Critical care provider statement:     Critical care time (minutes):  40    Critical care start time:  4/13/2024 12:30 PM    Critical care end time:  4/13/2024 1:10 PM    Critical care time was exclusive of:  Separately billable procedures and treating other patients and teaching time    Critical care was necessary to treat or prevent imminent or life-threatening deterioration of the following conditions:  Respiratory failure and trauma    Critical care was time spent personally by me on the following activities:  Obtaining history from patient or surrogate, development of treatment plan with patient or surrogate, discussions with consultants, discussions with primary provider, evaluation of patient's response to treatment, examination of patient, interpretation of cardiac output measurements, ordering and performing treatments and interventions, ordering and review of laboratory studies, ordering and review of radiographic studies, re-evaluation of patient's condition and review of old charts    I assumed direction of critical care for this patient from another provider in my specialty: no    Comments:      Patient with multiple rib fractures/flail chest requiring emergent Trauma consultation and admission to ICU setting.

## 2024-04-13 NOTE — PLAN OF CARE
Problem: PAIN - ADULT  Goal: Verbalizes/displays adequate comfort level or baseline comfort level  Description: Interventions:  - Encourage patient to monitor pain and request assistance  - Assess pain using appropriate pain scale  - Administer analgesics based on type and severity of pain and evaluate response  - Implement non-pharmacological measures as appropriate and evaluate response  - Consider cultural and social influences on pain and pain management  - Notify physician/advanced practitioner if interventions unsuccessful or patient reports new pain  Outcome: Progressing     Problem: INFECTION - ADULT  Goal: Absence or prevention of progression during hospitalization  Description: INTERVENTIONS:  - Assess and monitor for signs and symptoms of infection  - Monitor lab/diagnostic results  - Monitor all insertion sites, i.e. indwelling lines, tubes, and drains  - Monitor endotracheal if appropriate and nasal secretions for changes in amount and color  - [place] appropriate cooling/warming therapies per order  - Administer medications as ordered  - Instruct and encourage patient and family to use good hand hygiene technique  - Identify and instruct in appropriate isolation precautions for identified infection/condition  Outcome: Progressing  Goal: Absence of fever/infection during neutropenic period  Description: INTERVENTIONS:  - Monitor WBC    Outcome: Progressing     Problem: SAFETY ADULT  Goal: Patient will remain free of falls  Description: INTERVENTIONS:  - Educate patient/family on patient safety including physical limitations  - Instruct patient to call for assistance with activity   - Consult OT/PT to assist with strengthening/mobility   - Keep Call bell within reach  - Keep bed low and locked with side rails adjusted as appropriate  - Keep care items and personal belongings within reach  - Initiate and maintain comfort rounds  - Make Fall Risk Sign visible to staff  - Offer Toileting every  Hours,  in advance of need  - Initiate/Maintain alarm  - Obtain necessary fall risk management equipment:   - Apply yellow socks and bracelet for high fall risk patients  - Consider moving patient to room near nurses station  Outcome: Progressing  Goal: Maintain or return to baseline ADL function  Description: INTERVENTIONS:  -  Assess patient's ability to carry out ADLs; assess patient's baseline for ADL function and identify physical deficits which impact ability to perform ADLs (bathing, care of mouth/teeth, toileting, grooming, dressing, etc.)  - Assess/evaluate cause of self-care deficits   - Assess range of motion  - Assess patient's mobility; develop plan if impaired  - Assess patient's need for assistive devices and provide as appropriate  - Encourage maximum independence but intervene and supervise when necessary  - Involve family in performance of ADLs  - Assess for home care needs following discharge   - Consider OT consult to assist with ADL evaluation and planning for discharge  - Provide patient education as appropriate  Outcome: Progressing  Goal: Maintains/Returns to pre admission functional level  Description: INTERVENTIONS:  - Perform AM-PAC 6 Click Basic Mobility/ Daily Activity assessment daily.  - Set and communicate daily mobility goal to care team and patient/family/caregiver.   - Collaborate with rehabilitation services on mobility goals if consulted  - Perform Range of Motion  times a day.  - Reposition patient everyy hours.  - Dangle patient  times a day  - Stand patient  times a day  - Ambulate patient  times a day  - Out of bed to chair  times a day   - Out of bed for meals  times a day  - Out of bed for toileting  - Record patient progress and toleration of activity level   Outcome: Progressing     Problem: DISCHARGE PLANNING  Goal: Discharge to home or other facility with appropriate resources  Description: INTERVENTIONS:  - Identify barriers to discharge w/patient and caregiver  - Arrange for  needed discharge resources and transportation as appropriate  - Identify discharge learning needs (meds, wound care, etc.)  - Arrange for interpretive services to assist at discharge as needed  - Refer to Case Management Department for coordinating discharge planning if the patient needs post-hospital services based on physician/advanced practitioner order or complex needs related to functional status, cognitive ability, or social support system  Outcome: Progressing

## 2024-04-14 ENCOUNTER — APPOINTMENT (INPATIENT)
Dept: RADIOLOGY | Facility: HOSPITAL | Age: 84
DRG: 183 | End: 2024-04-14
Payer: MEDICARE

## 2024-04-14 LAB
ANION GAP SERPL CALCULATED.3IONS-SCNC: 8 MMOL/L (ref 4–13)
BASOPHILS # BLD AUTO: 0.05 THOUSANDS/ÂΜL (ref 0–0.1)
BASOPHILS NFR BLD AUTO: 1 % (ref 0–1)
BUN SERPL-MCNC: 41 MG/DL (ref 5–25)
CALCIUM SERPL-MCNC: 8.5 MG/DL (ref 8.4–10.2)
CHLORIDE SERPL-SCNC: 93 MMOL/L (ref 96–108)
CO2 SERPL-SCNC: 38 MMOL/L (ref 21–32)
CREAT SERPL-MCNC: 1.42 MG/DL (ref 0.6–1.3)
EOSINOPHIL # BLD AUTO: 0.27 THOUSAND/ÂΜL (ref 0–0.61)
EOSINOPHIL NFR BLD AUTO: 4 % (ref 0–6)
ERYTHROCYTE [DISTWIDTH] IN BLOOD BY AUTOMATED COUNT: 12.5 % (ref 11.6–15.1)
GFR SERPL CREATININE-BSD FRML MDRD: 34 ML/MIN/1.73SQ M
GLUCOSE SERPL-MCNC: 123 MG/DL (ref 65–140)
GLUCOSE SERPL-MCNC: 138 MG/DL (ref 65–140)
GLUCOSE SERPL-MCNC: 219 MG/DL (ref 65–140)
GLUCOSE SERPL-MCNC: 256 MG/DL (ref 65–140)
GLUCOSE SERPL-MCNC: 256 MG/DL (ref 65–140)
GLUCOSE SERPL-MCNC: 272 MG/DL (ref 65–140)
GLUCOSE SERPL-MCNC: 345 MG/DL (ref 65–140)
HCT VFR BLD AUTO: 37.4 % (ref 34.8–46.1)
HGB BLD-MCNC: 11.3 G/DL (ref 11.5–15.4)
IMM GRANULOCYTES # BLD AUTO: 0.01 THOUSAND/UL (ref 0–0.2)
IMM GRANULOCYTES NFR BLD AUTO: 0 % (ref 0–2)
LYMPHOCYTES # BLD AUTO: 1.6 THOUSANDS/ÂΜL (ref 0.6–4.47)
LYMPHOCYTES NFR BLD AUTO: 23 % (ref 14–44)
MAGNESIUM SERPL-MCNC: 1.5 MG/DL (ref 1.9–2.7)
MCH RBC QN AUTO: 29.7 PG (ref 26.8–34.3)
MCHC RBC AUTO-ENTMCNC: 30.2 G/DL (ref 31.4–37.4)
MCV RBC AUTO: 98 FL (ref 82–98)
MONOCYTES # BLD AUTO: 0.88 THOUSAND/ÂΜL (ref 0.17–1.22)
MONOCYTES NFR BLD AUTO: 13 % (ref 4–12)
NEUTROPHILS # BLD AUTO: 4.25 THOUSANDS/ÂΜL (ref 1.85–7.62)
NEUTS SEG NFR BLD AUTO: 59 % (ref 43–75)
NRBC BLD AUTO-RTO: 0 /100 WBCS
PLATELET # BLD AUTO: 186 THOUSANDS/UL (ref 149–390)
PMV BLD AUTO: 10.2 FL (ref 8.9–12.7)
POTASSIUM SERPL-SCNC: 4.3 MMOL/L (ref 3.5–5.3)
RBC # BLD AUTO: 3.8 MILLION/UL (ref 3.81–5.12)
SODIUM SERPL-SCNC: 139 MMOL/L (ref 135–147)
WBC # BLD AUTO: 7.06 THOUSAND/UL (ref 4.31–10.16)

## 2024-04-14 PROCEDURE — 83735 ASSAY OF MAGNESIUM: CPT | Performed by: PHYSICIAN ASSISTANT

## 2024-04-14 PROCEDURE — 85025 COMPLETE CBC W/AUTO DIFF WBC: CPT | Performed by: PHYSICIAN ASSISTANT

## 2024-04-14 PROCEDURE — 99222 1ST HOSP IP/OBS MODERATE 55: CPT | Performed by: ANESTHESIOLOGY

## 2024-04-14 PROCEDURE — 82948 REAGENT STRIP/BLOOD GLUCOSE: CPT

## 2024-04-14 PROCEDURE — 71045 X-RAY EXAM CHEST 1 VIEW: CPT

## 2024-04-14 PROCEDURE — 99233 SBSQ HOSP IP/OBS HIGH 50: CPT | Performed by: EMERGENCY MEDICINE

## 2024-04-14 PROCEDURE — 80048 BASIC METABOLIC PNL TOTAL CA: CPT | Performed by: PHYSICIAN ASSISTANT

## 2024-04-14 PROCEDURE — NC001 PR NO CHARGE: Performed by: EMERGENCY MEDICINE

## 2024-04-14 RX ORDER — MAGNESIUM SULFATE HEPTAHYDRATE 40 MG/ML
2 INJECTION, SOLUTION INTRAVENOUS ONCE
Status: COMPLETED | OUTPATIENT
Start: 2024-04-14 | End: 2024-04-14

## 2024-04-14 RX ORDER — CHLORAL HYDRATE 500 MG
1000 CAPSULE ORAL 2 TIMES DAILY
Status: DISCONTINUED | OUTPATIENT
Start: 2024-04-14 | End: 2024-04-18 | Stop reason: HOSPADM

## 2024-04-14 RX ADMIN — INSULIN LISPRO 6 UNITS: 100 INJECTION, SOLUTION INTRAVENOUS; SUBCUTANEOUS at 17:33

## 2024-04-14 RX ADMIN — INSULIN LISPRO 3 UNITS: 100 INJECTION, SOLUTION INTRAVENOUS; SUBCUTANEOUS at 21:24

## 2024-04-14 RX ADMIN — SENNOSIDES, DOCUSATE SODIUM 2 TABLET: 8.6; 5 TABLET ORAL at 08:15

## 2024-04-14 RX ADMIN — OMEGA-3 FATTY ACIDS CAP 1000 MG 1000 MG: 1000 CAP at 08:15

## 2024-04-14 RX ADMIN — ATORVASTATIN CALCIUM 80 MG: 80 TABLET, FILM COATED ORAL at 21:24

## 2024-04-14 RX ADMIN — ACETAMINOPHEN 975 MG: 325 TABLET, FILM COATED ORAL at 05:35

## 2024-04-14 RX ADMIN — FUROSEMIDE 20 MG: 20 TABLET ORAL at 08:15

## 2024-04-14 RX ADMIN — PANTOPRAZOLE SODIUM 40 MG: 40 TABLET, DELAYED RELEASE ORAL at 05:36

## 2024-04-14 RX ADMIN — METOPROLOL TARTRATE 25 MG: 25 TABLET, FILM COATED ORAL at 03:04

## 2024-04-14 RX ADMIN — SENNOSIDES, DOCUSATE SODIUM 2 TABLET: 8.6; 5 TABLET ORAL at 17:33

## 2024-04-14 RX ADMIN — SODIUM CHLORIDE 2 DROP: 50 SOLUTION OPHTHALMIC at 08:23

## 2024-04-14 RX ADMIN — MAGNESIUM SULFATE HEPTAHYDRATE 2 G: 40 INJECTION, SOLUTION INTRAVENOUS at 09:25

## 2024-04-14 RX ADMIN — INSULIN LISPRO 8 UNITS: 100 INJECTION, SOLUTION INTRAVENOUS; SUBCUTANEOUS at 14:29

## 2024-04-14 RX ADMIN — GABAPENTIN 100 MG: 100 CAPSULE ORAL at 21:24

## 2024-04-14 RX ADMIN — ENOXAPARIN SODIUM 30 MG: 30 INJECTION SUBCUTANEOUS at 08:15

## 2024-04-14 RX ADMIN — METOPROLOL TARTRATE 25 MG: 25 TABLET, FILM COATED ORAL at 14:26

## 2024-04-14 RX ADMIN — CHLORHEXIDINE GLUCONATE 0.12% ORAL RINSE 15 ML: 1.2 LIQUID ORAL at 08:15

## 2024-04-14 RX ADMIN — ACETAMINOPHEN 975 MG: 325 TABLET, FILM COATED ORAL at 21:24

## 2024-04-14 RX ADMIN — CHLORHEXIDINE GLUCONATE 0.12% ORAL RINSE 15 ML: 1.2 LIQUID ORAL at 21:24

## 2024-04-14 RX ADMIN — POLYETHYLENE GLYCOL 3350 17 G: 17 POWDER, FOR SOLUTION ORAL at 08:15

## 2024-04-14 RX ADMIN — ACETAMINOPHEN 975 MG: 325 TABLET, FILM COATED ORAL at 14:26

## 2024-04-14 RX ADMIN — OMEGA-3 FATTY ACIDS CAP 1000 MG 1000 MG: 1000 CAP at 17:33

## 2024-04-14 RX ADMIN — ENOXAPARIN SODIUM 30 MG: 30 INJECTION SUBCUTANEOUS at 21:24

## 2024-04-14 RX ADMIN — INSULIN GLARGINE 20 UNITS: 100 INJECTION, SOLUTION SUBCUTANEOUS at 21:24

## 2024-04-14 RX ADMIN — CITALOPRAM HYDROBROMIDE 20 MG: 20 TABLET ORAL at 08:15

## 2024-04-14 RX ADMIN — LEVOTHYROXINE SODIUM 150 MCG: 75 TABLET ORAL at 05:36

## 2024-04-14 NOTE — CASE MANAGEMENT
Case Management Assessment & Discharge Planning Note    Patient name Lizzie Whitten  Location ICU 03/ICU 03 MRN [id number]  : 1940 Date 2024       Current Admission Date: 2024  Current Admission Diagnosis:Fracture of multiple ribs of both sides   Patient Active Problem List    Diagnosis Date Noted    Fracture of multiple ribs of both sides 2024    COPD (chronic obstructive pulmonary disease) ([facility]) 2024    CHACHA (acute kidney injury) ([facility]) 2024    Acute respiratory failure with hypoxia ([facility]) 04/10/2024    Pulmonary nodule 2024    Acute on chronic diastolic congestive heart failure ([facility]) 2024    Head trauma 2023    Traumatic hematoma of forehead 2023    Traumatic ulcer of left lower leg, limited to breakdown of skin ([facility]) 2023    Pharyngeal dysphagia 2023    Chronic cough 2023    Hand arthritis 2023    Assistance needed with transportation 2023    Abnormal CT scan, esophagus 2022    Hiatal hernia 2022    Esophageal dysphagia 2022    Hypertension 2022    Fall 2022    Secondary hyperparathyroidism of renal origin ([facility]) 2022    Memory difficulties 2022    Imbalance 2022    Coronary artery disease involving native coronary artery of native heart 2021    Dermatitis 09/15/2020    Sleep apnea 2019    Intraocular pressure increase 2018    Vitamin D deficiency 2018    Lymphedema of both lower extremities 2018    Iron deficiency anemia 10/12/2017    Urinary bladder incontinence 2017    OAB (overactive bladder) 2017    Benign hypertension with chronic kidney disease, stage III ([facility]) 2017    Microalbuminuria 2017    PAF (paroxysmal atrial fibrillation) ([facility]) 10/31/2016    Chronic venous insufficiency 2016    Presence of permanent cardiac pacemaker 2016    Chronic diastolic congestive heart failure ([facility]) 2016     Anemia 01/19/2016    S/P cardiac pacemaker procedure 01/19/2016    Hypothyroidism 01/19/2016    Complete heart block ([facility]) 01/16/2016    Type 2 diabetes mellitus with hyperglycemia, with long-term current use of insulin ([facility]) 01/15/2016    Stage 3a chronic kidney disease ([facility]) 10/21/2015    Eczematous dermatitis 03/03/2015    Anxiety disorder 06/26/2013    Chronic obstructive pulmonary disease ([facility]) 09/24/2012    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 (HCC) 09/22/2011    Mixed hyperlipidemia 03/14/2011      LOS (days): 1  Geometric Mean LOS (GMLOS) (days):   Days to GMLOS:     OBJECTIVE:  PATIENT READMITTED TO HOSPITAL  Risk of Unplanned Readmission Score: 22.43         Current admission status: Inpatient       Preferred Pharmacy:   [facility] - [place], [place] - [address]  [address] [address]  Phone: [phone] Fax: [phone]    [id number] - [place], [place] - [address]  [address] [address]  Phone: [phone] Fax: [phone]    [facility] - [address] [address]  Phone: [phone] Fax: [phone]    Primary Care Provider: Kennedy Castellon MD    Primary Insurance: MEDICARE  Secondary Insurance: AARP    ASSESSMENT:  Active Health Care Proxies    There are no active Health Care Proxies on file.       Advance Directives  Primary Contact: Gorge Mahajan (Daughter) [phone]    Readmission Root Cause  30 Day Readmission: Yes  Who directed you to return to the hospital?: Specialist  Did you understand whom to contact if you had questions or problems?: Yes  Did you get your prescriptions before you left the hospital?: Yes  Were you able to get your prescriptions filled when you left the hospital?: Yes  Did you take your medications as prescribed?: Yes  Were you able to get to your  follow-up appointments?: No  Reason:: Readmitted prior to appointment  During previous admission, was a post-acute recommendation made?: Yes  What post-acute resources were offered?: STR, Offered, but declined, HHC  Patient was readmitted due to: fall, bilateral rib fx  Action Plan: pain control, therapy recs    Patient Information  Admitted from:: Facility ([facility])  Mental Status: Alert  During Assessment patient was accompanied by: Not accompanied during assessment  Assessment information provided by:: Patient  Primary Caregiver: Self  Support Systems: Self, Family members  County of Residence: [place]  What city do you live in?: [place]  Home entry access options. Select all that apply.: No steps to enter home  Type of Current Residence: Facility  Upon entering residence, is there a bedroom on the main floor (no further steps)?: Yes  Upon entering residence, is there a bathroom on the main floor (no further steps)?: Yes  Living Arrangements: Lives w/ Spouse/significant other  Is patient a ?: No    Activities of Daily Living Prior to Admission  Functional Status: Independent  Completes ADLs independently?: Yes  Ambulates independently?: Yes  Does patient use assisted devices?: Yes  Assisted Devices (DME) used: Walker  Does patient currently own DME?: Yes  What DME does the patient currently own?: Walker  Does patient have a history of Outpatient Therapy (PT/OT)?: Yes  Does the patient have a history of Short-Term Rehab?: Yes  Does patient have a history of HHC?: Yes  Does patient currently have HHC?: Yes    Current Home Health Care  Type of Current Home Care Services: Home PT, Nurse visit, Home health aide  Current Home Health Agency:: Other (please enter name in comment) ([facility])  Current Home Health Follow-Up Provider:: PCP    Patient Information Continued  Income Source: Pension/correction  Does patient have prescription coverage?: Yes  Does patient receive dialysis  treatments?: No  Does patient have a history of substance abuse?: No  Does patient have a history of Mental Health Diagnosis?: No    Means of Transportation  Means of Transport to Appts:: Family transport    Social Determinants of Health (SDOH)      Flowsheet Row Most Recent Value   Housing Stability    In the last 12 months, was there a time when you were not able to pay the mortgage or rent on time? N   In the last 12 months, how many places have you lived? 1   In the last 12 months, was there a time when you did not have a steady place to sleep or slept in a shelter (including now)? N   Transportation Needs    In the past 12 months, has lack of transportation kept you from medical appointments or from getting medications? no   In the past 12 months, has lack of transportation kept you from meetings, work, or from getting things needed for daily living? No   Food Insecurity    Within the past 12 months, you worried that your food would run out before you got the money to buy more. Never true   Within the past 12 months, the food you bought just didn't last and you didn't have money to get more. Never true   Utilities    In the past 12 months has the electric, gas, oil, or water company threatened to shut off services in your home? No          DISCHARGE DETAILS:    Discharge planning discussed with:: patient  Freedom of Choice: Yes     CM contacted family/caregiver?: Yes  Were Treatment Team discharge recommendations reviewed with patient/caregiver?: Yes  Did patient/caregiver verbalize understanding of patient care needs?: N/A- going to facility  Were patient/caregiver advised of the risks associated with not following Treatment Team discharge recommendations?: Yes    Contacts  Patient Contacts: Gorge Mahajan (Daughter) [phone]  Relationship to Patient:: Family  Contact Method: Phone  Phone Number: [phone]  Reason/Outcome: Continuity of Care, Emergency Contact    Requested Home Health Care         Is the  patient interested in [facility] at discharge?: No    DME Referral Provided  Referral made for DME?: No    Pt is a recent readmit.  Pt was discharged back to [facility], but was recommended for IP rehab previously. Pt discharged home with [facility].  While at [facility],  The pt lives with her spouse. Pt has elevator access to her apartment. Pt has a walk-in shower with grab bars and shower chair. Pt has bars around her toilet. Pt uses a rolling walker for ambulation. Pt's had 2 falls.   Pt is independent for ADL/iADLs. Pt has a cleaning aide there.   Pt enjoys participating in [employer] activities  CM will follow up with therapy recs      CM reviewed d/c planning process including the following: identifying help at home, patient preference for d/c planning needs, Discharge Lounge, [facility] Meds to Bed program, availability of treatment team to discuss questions or concerns patient and/or family may have regarding understanding medications and recognizing signs and symptoms once discharged.  CM also encouraged patient to follow up with all recommended appointments after discharge. Patient advised of importance for patient and family to participate in managing patient’s medical well being.

## 2024-04-14 NOTE — PROGRESS NOTES
Progress Note - Trauma ICU Transfer   and Tertiary [facility]   Lizzie Whitten 84 y.o. female [id number]   Unit/Bed#: ICU 03 Encounter: [id number]     Assessment & Plan   Summary of Diagnosed Injuries: Flail chest, R Rib fx 4-8, Mild pneumomediastinum     PLAN:  - Continue rib fx protocol  - Continue PRN pain control. If pain is worsening or SOB hold Lovenox and discuss interventions with APS    VTE Prophylaxis:Enoxaparin (Lovenox)     Disposition: Trauma MS, will need rehab    Code status:  Level 1 - Full Code    Consultants: IP CONSULT TO GERONTOLOGY  IP CONSULT TO ACUTE PAIN SERVICE  IP CONSULT TO CASE MANAGEMENT     Subjective   Mechanism of Injury:Fall     HPI/Last 24 hour events:   - On RA overnight, minimal pain. Slept well    Reason for ICU admission: Flail chest, pneumomediastinum     Summary of ICU clinical course: Patient was brought to ICU for monitoring. She had no painful symptoms, and progressed well on IS. She got OOB to chair this morning and ate breakfast.     Recent or scheduled procedures: None    Outstanding/pending diagnostics: None        Objective   Vitals:   Temp:  [97.3 °F (36.3 °C)-98 °F (36.7 °C)] 98 °F (36.7 °C)  HR:  [48-64] 58  Resp:  [16-22] 20  BP: ()/(46-91) 106/56    I/O         04/12 0701  04/13 0700 04/13 0701  04/14 0700 04/14 0701  04/15 0700    Urine (mL/kg/hr)  700     Total Output  700     Net  -700            Unmeasured Urine Occurrence   1 x             Physical Exam:   GENERAL APPEARANCE: wdwn, nad. Laying in bed watching tv. Alert and oriented x3..  NEURO: GCS15. Light sensation in tact. No deficits. At baseline.   HEENT: Normocephalic, atraumatic. EOMS in tact. External ear normal. Neck supple and good ROM.   CV: RRR no m/r/g. 2+ radial and DP pulses.  LUNGS: CTAB. Good expansion. Pulling 1200 on IS. Strong cough.   GI: Abdomen soft, nontender, nondistended.   : Voiding spontaneously. Pelvis stable   MSK: Full ROM and 5/5 strength of b/l UE and LE. Advanced  strength and ROM for age.   SKIN: Warm, dry, in tact. No abrasions or pressure injuries.      Invasive Devices       Peripheral Intravenous Line  Duration             Peripheral IV 04/13/24 Left Antecubital <1 day              Drain  Duration             External Urinary Catheter <1 day                   Rationale for remaining devices: IV medications     Did you order a geriatric consult if the score was 2 or greater?: yes       Lab Results: BMP/CMP:   Lab Results   Component Value Date    SODIUM 139 04/14/2024    K 4.3 04/14/2024    CL 93 (L) 04/14/2024    CO2 38 (H) 04/14/2024    BUN 41 (H) 04/14/2024    CREATININE 1.42 (H) 04/14/2024    CALCIUM 8.5 04/14/2024    EGFR 34 04/14/2024    and CBC:   Lab Results   Component Value Date    WBC 7.06 04/14/2024    HGB 11.3 (L) 04/14/2024    HCT 37.4 04/14/2024    MCV 98 04/14/2024     04/14/2024    RBC 3.80 (L) 04/14/2024    MCH 29.7 04/14/2024    MCHC 30.2 (L) 04/14/2024    RDW 12.5 04/14/2024    MPV 10.2 04/14/2024    NRBC 0 04/14/2024       Imaging Results: I have personally reviewed pertinent reports.   and I have personally reviewed pertinent films in PACS  4/13 CTH and cervical spine: negative for traumatic injuries  4/13 CT chest wo contrast: Fractures of right fourth through eighth ribs including 3 rib segments (fifth, sixth, and seventh) which are fractured in 2 places. Findings are consistent with flail chest. Small multiloculated right chest pleural collection containing few tiny bubbles of gas. Few bubbles of gas are also noted in the mediastinum (pneumomediastinum). Small amount of right chest subcutaneous emphysema. Reidentified cardiomegaly, coronary artery bypass grafting, and prominence of central pulmonary arterial tree suggestive of pulmonary hypertension.    Other Studies: n/a    Code Status: Level 1 - Full Code       Patient seen and evaluated by Critical Care today and deemed to be appropriate for transfer to Med Surg. Spoke to Kaleb TOURE PA-C  from Trauma service regarding transfer. Critical Care can be contacted via [facility] with any questions or concerns.

## 2024-04-14 NOTE — PLAN OF CARE
Problem: PAIN - ADULT  Goal: Verbalizes/displays adequate comfort level or baseline comfort level  Description: Interventions:  - Encourage patient to monitor pain and request assistance  - Assess pain using appropriate pain scale  - Administer analgesics based on type and severity of pain and evaluate response  - Implement non-pharmacological measures as appropriate and evaluate response  - Consider cultural and social influences on pain and pain management  - Notify physician/advanced practitioner if interventions unsuccessful or patient reports new pain  Outcome: Progressing     Problem: INFECTION - ADULT  Goal: Absence or prevention of progression during hospitalization  Description: INTERVENTIONS:  - Assess and monitor for signs and symptoms of infection  - Monitor lab/diagnostic results  - Monitor all insertion sites, i.e. indwelling lines, tubes, and drains  - Monitor endotracheal if appropriate and nasal secretions for changes in amount and color  - [place] appropriate cooling/warming therapies per order  - Administer medications as ordered  - Instruct and encourage patient and family to use good hand hygiene technique  - Identify and instruct in appropriate isolation precautions for identified infection/condition  Outcome: Progressing  Goal: Absence of fever/infection during neutropenic period  Description: INTERVENTIONS:  - Monitor WBC    Outcome: Progressing     Problem: SAFETY ADULT  Goal: Patient will remain free of falls  Description: INTERVENTIONS:  - Educate patient/family on patient safety including physical limitations  - Instruct patient to call for assistance with activity   - Consult OT/PT to assist with strengthening/mobility   - Keep Call bell within reach  - Keep bed low and locked with side rails adjusted as appropriate  - Keep care items and personal belongings within reach  - Initiate and maintain comfort rounds  - Make Fall Risk Sign visible to staff  - Offer Toileting every  Hours,  in advance of need  - Initiate/Maintain alarm  - Obtain necessary fall risk management equipment:   - Apply yellow socks and bracelet for high fall risk patients  - Consider moving patient to room near nurses station  Outcome: Progressing  Goal: Maintain or return to baseline ADL function  Description: INTERVENTIONS:  -  Assess patient's ability to carry out ADLs; assess patient's baseline for ADL function and identify physical deficits which impact ability to perform ADLs (bathing, care of mouth/teeth, toileting, grooming, dressing, etc.)  - Assess/evaluate cause of self-care deficits   - Assess range of motion  - Assess patient's mobility; develop plan if impaired  - Assess patient's need for assistive devices and provide as appropriate  - Encourage maximum independence but intervene and supervise when necessary  - Involve family in performance of ADLs  - Assess for home care needs following discharge   - Consider OT consult to assist with ADL evaluation and planning for discharge  - Provide patient education as appropriate  Outcome: Progressing  Goal: Maintains/Returns to pre admission functional level  Description: INTERVENTIONS:  - Perform AM-PAC 6 Click Basic Mobility/ Daily Activity assessment daily.  - Set and communicate daily mobility goal to care team and patient/family/caregiver.   - Collaborate with rehabilitation services on mobility goals if consulted  - Perform Range of Motion 3 times a day.  - Reposition patient every 2 hours.  - Dangle patient 3 times a day  - Stand patient 3 times a day  - Ambulate patient 3 times a day  - Out of bed to chair 3 times a day   - Out of bed for meals 3 times a day  - Out of bed for toileting  - Record patient progress and toleration of activity level   Outcome: Progressing     Problem: DISCHARGE PLANNING  Goal: Discharge to home or other facility with appropriate resources  Description: INTERVENTIONS:  - Identify barriers to discharge w/patient and caregiver  -  Arrange for needed discharge resources and transportation as appropriate  - Identify discharge learning needs (meds, wound care, etc.)  - Arrange for interpretive services to assist at discharge as needed  - Refer to Case Management Department for coordinating discharge planning if the patient needs post-hospital services based on physician/advanced practitioner order or complex needs related to functional status, cognitive ability, or social support system  Outcome: Progressing     Problem: Knowledge Deficit  Goal: Patient/family/caregiver demonstrates understanding of disease process, treatment plan, medications, and discharge instructions  Description: Complete learning assessment and assess knowledge base.  Interventions:  - Provide teaching at level of understanding  - Provide teaching via preferred learning methods  Outcome: Progressing     Problem: Prexisting or High Potential for Compromised Skin Integrity  Goal: Skin integrity is maintained or improved  Description: INTERVENTIONS:  - Identify patients at risk for skin breakdown  - Assess and monitor skin integrity  - Assess and monitor nutrition and hydration status  - Monitor labs   - Assess for incontinence   - Turn and reposition patient  - Assist with mobility/ambulation  - Relieve pressure over bony prominences  - Avoid friction and shearing  - Provide appropriate hygiene as needed including keeping skin clean and dry  - Evaluate need for skin moisturizer/barrier cream  - Collaborate with interdisciplinary team   - Patient/family teaching  - Consider wound care consult   Outcome: Progressing

## 2024-04-14 NOTE — CONSULTS
Consultation - Acute Pain Service  Lizzie Whitten 84 y.o. female MRN: [id number]  Unit/Bed#: ICU 03 Encounter: [id number]               Lizzie Whitten is a 84 y.o. female with PMH s/f CHF EF 65%, DM2, heart block s/p pacemaker, COPD, a-fib on eliquis (last dose 4/12), presents on 4/13 after falling in the evening on 4/12. Found to have Right rib fx 4-8 with flail chest and pneumomediastinum (no chest tube). Acute Pain Service consulted for post post traumatic pain management with rib fractures.    Patient seen in ICU this morning. Case discussed with ICU team and ICU nursing. Patient is not reporting pain nor taking pain medication beyond tylenol. She shows signs of discomfort with movement but has been able to ambulate yesterday and is to be OOB to chair today. She is sitting up comfortably eating breakfast. She is on 1L O2 with good O2 sats and able to pull 300-500cc on IS. Her presentation is better than expected considering the extent of her rib fractures. She takes eliquis at home, last dose reportedly 4/12.     * Fracture of multiple ribs of both sides  Assessment & Plan  - Right side rib fx 4-8 s/p fall on 4/12    - Continue oxycodone 2.5mg/5mg q4hrs PRN mod/sev pain  - Las dose eliquis reported to be 4/12  - Continue tylenol 975mg q8hrs scheduled  - Lidocaine patches  - Consider holding morning dose lovenox on 4/15 in the event of respiratory decline or difficulty getting OOB    - OOB as tolerated  - aggressive pulm hygiene with frequent IS use        APS will continue to follow. Please contact Acute Pain Service - via [email or web address] from [phone] with additional questions or concerns. See [email or web address] or [facility] for additional contacts and after hours information.     History of Present Illness    Admit Date:  4/13/2024  Hospital Day:  1 day  Primary Service:  Trauma  Attending Provider:  Kurt Celaya DO  Physician Requesting Consult: Kurt Celaya DO  Reason for Consult / Principal Problem: Fall with  rib fractures and post traumatic pain  HPI: Lizzie Whitten is a 84 y.o. year old female who presents with (as above)    Current pain location(s): Pain Score: 0  Pain Location/Orientation: Orientation: Right  Pain Scale: Pain Assessment Tool: 0-10  Current Analgesic regimen:  tylenol      Inpatient consult to Acute Pain Service  Consult performed by: Juarez Weinstein MD  Consult ordered by: Zabrina Sands MD          Review of Systems   Constitutional: Negative.    HENT: Negative.     Eyes: Negative.    Respiratory:  Negative for chest tightness and shortness of breath.    Cardiovascular: Negative.    Gastrointestinal:  Negative for abdominal pain, constipation and nausea.   Endocrine: Negative.    Genitourinary: Negative.    Musculoskeletal: Negative.    Skin: Negative.    Allergic/Immunologic: Negative.    Neurological: Negative.    Hematological: Negative.    Psychiatric/Behavioral: Negative.         Historical Information   Past Medical History:   Diagnosis Date    Anxiety     Arthritis     CHF (congestive heart failure) ([facility])     Chronic venous insufficiency     COPD (chronic obstructive pulmonary disease) ([facility])     Coronary artery disease     Diabetes mellitus (HCC)     type II    Disease of thyroid gland     hypothyroidism    DVT (deep venous thrombosis) ([facility])     HL (hearing loss)     Hyperlipidemia     Hypertension     Irregular heart beat     Paroxysmal a-fib    Pacemaker     Renal disorder     stage 3    Sleep apnea     Sleep difficulties     Thyroid disease     Urinary incontinence     Uterine leiomyoma     Walker as ambulation aid      Past Surgical History:   Procedure Laterality Date    BREAST SURGERY      For Biopsy    CARDIAC PACEMAKER PLACEMENT      Permanent. Last assessed: 2/19/16    CARDIAC SURGERY      CARPAL TUNNEL RELEASE Bilateral     CATARACT EXTRACTION      CATARACT EXTRACTION W/ INTRAOCULAR LENS  IMPLANT, BILATERAL      CYSTOSCOPY      Diagnostic. Last assessed: 11/8/17    ECTOPIC  PREGNANCY SURGERY      HYSTERECTOMY      Total Abdomonal. Resolved: 1980    JOINT REPLACEMENT Right     TKR    OOPHORECTOMY      AR ENDOVEN ABLTJ INCMPTNT VEIN XTR LASER 1ST VEIN Left 2/17/2017    Procedure: GREATER SAPHENOUS VEIN ENDOVASCULAR LASER THERAPY ;  Surgeon: Danielle Phan DO;  Location: [facility];  Service: Vascular    TONSILLECTOMY       Social History   Social History     Substance and Sexual Activity   Alcohol Use Not Currently    Comment: rarely. Per [employer]: Denied history of alcohol use     Social History     Substance and Sexual Activity   Drug Use No     Social History     Tobacco Use   Smoking Status Never    Passive exposure: Never   Smokeless Tobacco Never   Tobacco Comments    Per [employer]: Former smoker     Family History: non-contributory    Meds/Allergies   all current active meds have been reviewed    Allergies   Allergen Reactions    Ampicillin Hives       Objective   Vitals:    04/14/24 0400 04/14/24 0500 04/14/24 0600 04/14/24 0700   BP: 128/53 94/68 141/68 114/55   BP Location:    Right arm   Pulse: (!) 50 (!) 50 58 60   Resp: 20 18 20 22   Temp:   97.5 °F (36.4 °C)    TempSrc:   Oral    SpO2: 96% 96% 95% 95%   Weight:       Height:             Intake/Output Summary (Last 24 hours) at 4/14/2024 0855  Last data filed at 4/14/2024 0600  Gross per 24 hour   Intake --   Output 700 ml   Net -700 ml       Physical Exam  Constitutional:       General: She is not in acute distress.     Appearance: Normal appearance.   Eyes:      Extraocular Movements: Extraocular movements intact.      Conjunctiva/sclera: Conjunctivae normal.   Cardiovascular:      Rate and Rhythm: Normal rate and regular rhythm.   Pulmonary:      Effort: Pulmonary effort is normal. No respiratory distress.      Breath sounds: Normal breath sounds.   Abdominal:      General: Abdomen is flat. There is no distension.      Palpations: Abdomen is soft.   Musculoskeletal:         General: Signs of injury present. No  deformity. Normal range of motion.      Cervical back: Normal range of motion and neck supple.   Skin:     General: Skin is warm and dry.   Neurological:      General: No focal deficit present.      Mental Status: She is alert and oriented to person, place, and time.   Psychiatric:         Mood and Affect: Mood normal.         Behavior: Behavior normal.           Lab Results:  Estimated Creatinine Clearance: 29.1 mL/min (A) (by C-G formula based on SCr of 1.42 mg/dL (H)).  Lab Results   Component Value Date    WBC 7.06 04/14/2024    WBC 7.7 12/06/2016    HGB 11.3 (L) 04/14/2024    HGB 11.6 (L) 12/06/2016    HCT 37.4 04/14/2024    HCT 36.4 12/06/2016     04/14/2024     12/06/2016         Component Value Date/Time     12/06/2016 0939    K 4.3 04/14/2024 0542    K 4.2 12/06/2016 0939    CL 93 (L) 04/14/2024 0542     12/06/2016 0939    CO2 38 (H) 04/14/2024 0542    CO2 28 12/06/2016 0939    BUN 41 (H) 04/14/2024 0542    BUN 41 (H) 12/06/2016 0939    CREATININE 1.42 (H) 04/14/2024 0542    CREATININE 1.23 (H) 12/06/2016 0939         Component Value Date/Time    CALCIUM 8.5 04/14/2024 0542    CALCIUM 9.2 12/06/2016 0939    ALKPHOS 80 04/13/2024 1118    ALKPHOS 86 09/06/2016 0907    AST 16 04/13/2024 1118    AST 18 09/06/2016 0907    ALT 6 (L) 04/13/2024 1118    ALT 21 09/06/2016 0907    BILITOT 0.6 09/06/2016 0907    TP 6.4 04/13/2024 1118    ALB 3.4 (L) 04/13/2024 1118    ALB 4.0 09/06/2016 0907       Please note that the APS provides consultative services regarding pain management only.  With the exception of ketamine and epidural infusions and except when indicated, final decisions regarding starting or changing doses of analgesic medications are at the discretion of the consulting service.  Juarez Weinstein MD  Acute Pain Service

## 2024-04-14 NOTE — PLAN OF CARE
Problem: PAIN - ADULT  Goal: Verbalizes/displays adequate comfort level or baseline comfort level  Description: Interventions:  - Encourage patient to monitor pain and request assistance  - Assess pain using appropriate pain scale  - Administer analgesics based on type and severity of pain and evaluate response  - Implement non-pharmacological measures as appropriate and evaluate response  - Consider cultural and social influences on pain and pain management  - Notify physician/advanced practitioner if interventions unsuccessful or patient reports new pain  Outcome: Progressing     Problem: SAFETY ADULT  Goal: Patient will remain free of falls  Description: INTERVENTIONS:  - Educate patient/family on patient safety including physical limitations  - Instruct patient to call for assistance with activity   - Consult OT/PT to assist with strengthening/mobility   - Keep Call bell within reach  - Keep bed low and locked with side rails adjusted as appropriate  - Keep care items and personal belongings within reach  - Initiate and maintain comfort rounds  - Make Fall Risk Sign visible to staff  - Apply yellow socks and bracelet for high fall risk patients  - Consider moving patient to room near nurses station  Outcome: Progressing  Goal: Maintain or return to baseline ADL function  Description: INTERVENTIONS:  -  Assess patient's ability to carry out ADLs; assess patient's baseline for ADL function and identify physical deficits which impact ability to perform ADLs (bathing, care of mouth/teeth, toileting, grooming, dressing, etc.)  - Assess/evaluate cause of self-care deficits   - Assess range of motion  - Assess patient's mobility; develop plan if impaired  - Assess patient's need for assistive devices and provide as appropriate  - Encourage maximum independence but intervene and supervise when necessary  - Involve family in performance of ADLs  - Assess for home care needs following discharge   - Consider OT consult  to assist with ADL evaluation and planning for discharge  - Provide patient education as appropriate  Outcome: Progressing  Goal: Maintains/Returns to pre admission functional level  Description: INTERVENTIONS:  - Perform AM-PAC 6 Click Basic Mobility/ Daily Activity assessment daily.  - Set and communicate daily mobility goal to care team and patient/family/caregiver.   - Collaborate with rehabilitation services on mobility goals if consulted  - Out of bed for toileting  - Record patient progress and toleration of activity level   Outcome: Progressing

## 2024-04-14 NOTE — ASSESSMENT & PLAN NOTE
- Right side rib fx 4-8 s/p fall on 4/12    - Continue oxycodone 2.5mg/5mg q4hrs PRN mod/sev pain  - consider discontinuing dilaudid IV 0.2 mg q2h PRN breakthrough pain  - Last dose of eliquis reported to be 4/12  - Continue tylenol 975mg q8hrs scheduled  - Lidocaine patches  - consider discontinuing gabapentin 100 mg qhs due to some confusion this AM  - no intervention at this time, please reach out to APS team if patient would like to consider a block for her rib pain    - OOB as tolerated  - aggressive pulm hygiene with frequent IS use

## 2024-04-14 NOTE — PROGRESS NOTES
[facility]  Progress Note: Critical Care  Name: Lizzie Whitten 84 y.o. female I MRN: [id number]  Unit/Bed#: ICU 03 I Date of Admission: 4/13/2024   Date of Service: 4/14/2024 I Hospital Day: 1    Assessment/Plan   Neuro:   Diagnosis: Anxiety  Plan: continue citalopram  Diagnosis: pain control  Plan: tylenol q8, gabapentin 100 qhs, oxy 2.5/5, dilaudid 0.2 prn  Appreciate APS recs, appreciate geriatrics recs     CV:   Diagnosis: CHF  Plan: lasix 20 today in setting of CHACHA, can do home 40 tomorrow  Diagnosis: CAD  Plan: continue statin  Diagnosis: paroxysmal Afib  Plan: hold eliquis, continue lopressor 25 bid  Diagnosis: chronic venous insuffiency  Plan: leg elevation, mechanical ppx  Diagnosis: HTN  Plan: hold lisinopril  Labetalol, hydralazine prn  Diagnosis: HLD  Plan: continue fish oil     Pulm:  Diagnosis: R 4-8th rib fxs, 5-7th rib flail chest, mild pneumomediastinum  Plan: O2 prn, pain mgmt, pulm toilet  Diagnosis: COPD  Plan: wean O2 as tolerated, goal >88%. Does not use O2 at baseline  Diagnosis: MITCH  Plan: does not use any Cpap at home     GI:   No active issues  Plan: continue pantoprazole  Bowel regimen: senna, miralax     :   Diagnosis: CHACHA on CKD3  Plan: monitor Cr: 1.4 from 1.7  UOP: 700cc     F/E/N:   Fluids: none  Electrolytes: replete as needed  Nutrition: cardiac diet     Heme/Onc:   Diagnosis: prior DVT  Plan: lovenox 30 bid     Endo:   Diagnosis: DM2  Plan: glargine 20 hs, SSI, monitor glucose (goal 140-180), last gluc 262 from 354  Diagnosis: hypothyroidism  Plan: continue levothyroxine     ID:   No active issues     MSK/Skin:   Diagnosis: arthritis  Plan: diclofenac, PT/OT    Disposition: Stepdown Level 2    ICU Core Measures     A: Assess, Prevent, and Manage Pain Has pain been assessed? Yes  Need for changes to pain regimen? No   B: Both SAT/SAT  N/A   C: Choice of Sedation RASS Goal: 0 Alert and Calm  Need for changes to sedation or analgesia  regimen? No   D: Delirium CAM-ICU: Negative   E: Early Mobility  Plan for early mobility? Yes   F: Family Engagement Plan for family engagement today? Yes         Prophylaxis:  VTE VTE covered by:  enoxaparin, Subcutaneous       Stress Ulcer  covered bypantoprazole (PROTONIX) 40 mg tablet [[id number]] (Long-Term Med), pantoprazole (PROTONIX) EC tablet 40 mg [[id number]]        Significant 24hr Events     24hr events: NAOE. Denies pain, SOB, n/v. Walked briefly yesterday. Tolerating diet.     Subjective     Review of Systems   Constitutional:  Negative for chills and fever.   HENT:  Negative for ear pain and sore throat.    Eyes:  Negative for pain and visual disturbance.   Respiratory:  Negative for cough and shortness of breath.    Cardiovascular:  Negative for chest pain and palpitations.   Gastrointestinal:  Negative for abdominal pain, nausea and vomiting.   Genitourinary:  Negative for dysuria and hematuria.   Musculoskeletal:  Negative for arthralgias and back pain.   Skin:  Negative for color change and rash.   Neurological:  Negative for seizures and syncope.   All other systems reviewed and are negative.       Objective                            Vitals I/O      Most Recent Min/Max in 24hrs   Temp (!) 97.4 °F (36.3 °C) Temp  Min: 97.4 °F (36.3 °C)  Max: 97.4 °F (36.3 °C)   Pulse (!) 50 Pulse  Min: 48  Max: 77   Resp 18 Resp  Min: 16  Max: 22   BP 94/68 BP  Min: 94/68  Max: 185/81   O2 Sat 96 % SpO2  Min: 93 %  Max: 98 %    No intake or output data in the 24 hours ending 04/14/24 0611    Diet Cardiovascular; Cardiac    Invasive Monitoring           Physical Exam   Physical Exam  Eyes:      Extraocular Movements: Extraocular movements intact.      Conjunctiva/sclera: Conjunctivae normal.   Skin:     General: Skin is warm and dry.   HENT:      Head: Normocephalic.   Cardiovascular:      Rate and Rhythm: Normal rate.   Musculoskeletal:         General: Normal range of motion.      Right lower leg: No edema.       Left lower leg: No edema.   Abdominal:      Palpations: Abdomen is soft.      Tenderness: There is no abdominal tenderness.   Constitutional:       General: She is not in acute distress.  Pulmonary:      Effort: Pulmonary effort is normal. No respiratory distress.   Neurological:      General: No focal deficit present.      Mental Status: She is oriented to person, place and time.      Motor: Strength full and intact in all extremities.            Diagnostic Studies      EK/13 Electronic ventricular pacemaker, no change from prior  Imagin/13 CT chest: fxs of R 4-8th ribs including 5-7th flail chest. Small multiloculated R pleural collection with few tiny bubbles of gas. Also pneumomediastinum (few bubbles). Small R chest subQ emphysema. I have personally reviewed pertinent reports.       Medications:  Scheduled PRN   acetaminophen, 975 mg, Q8H PRASHANT  atorvastatin, 80 mg, HS  chlorhexidine, 15 mL, Q12H PRASHANT  citalopram, 20 mg, Daily  enoxaparin, 30 mg, Q12H  furosemide, 20 mg, Daily  gabapentin, 100 mg, HS  insulin glargine, 20 Units, HS  insulin lispro, 1-6 Units, HS  insulin lispro, 2-12 Units, 4 times day  levothyroxine, 150 mcg, Early Morning  metoprolol tartrate, 25 mg, Q12H  pantoprazole, 40 mg, Early Morning  polyethylene glycol, 17 g, Daily  senna-docusate sodium, 2 tablet, BID  sodium chloride, 2 drop, BID      hydrALAZINE, 10 mg, Q4H PRN  HYDROmorphone, 0.2 mg, Q2H PRN  labetalol, 10 mg, Q4H PRN  oxyCODONE, 2.5 mg, Q4H PRN   Or  oxyCODONE, 5 mg, Q4H PRN       Continuous          Labs:    CBC    Recent Labs     24  1118 24  0542   WBC 9.20 7.06   HGB 12.0 11.3*   HCT 39.3 37.4    186     BMP    Recent Labs     24  1118   SODIUM 134*   K 4.6   CL 89*   CO2 37*   AGAP 8   BUN 44*   CREATININE 1.70*   CALCIUM 8.6       Coags    No recent results     Additional Electrolytes  No recent results       Blood Gas    No recent results  No recent results LFTs  Recent Labs      04/13/24  1118   ALT 6*   AST 16   ALKPHOS 80   ALB 3.4*   TBILI 0.80       Infectious  No recent results  Glucose  Recent Labs     04/13/24  1118   GLUC 293*               Solomon Soto MD

## 2024-04-15 ENCOUNTER — TELEPHONE (OUTPATIENT)
Age: 84
End: 2024-04-15

## 2024-04-15 ENCOUNTER — APPOINTMENT (INPATIENT)
Dept: RADIOLOGY | Facility: HOSPITAL | Age: 84
DRG: 183 | End: 2024-04-15
Payer: MEDICARE

## 2024-04-15 ENCOUNTER — TRANSITIONAL CARE MANAGEMENT (OUTPATIENT)
Dept: FAMILY MEDICINE CLINIC | Facility: CLINIC | Age: 84
End: 2024-04-15

## 2024-04-15 PROBLEM — J94.2 HEMOPNEUMOTHORAX ON RIGHT: Status: ACTIVE | Noted: 2024-04-15

## 2024-04-15 LAB
ANION GAP SERPL CALCULATED.3IONS-SCNC: 9 MMOL/L (ref 4–13)
BUN SERPL-MCNC: 42 MG/DL (ref 5–25)
CA-I BLD-SCNC: 1.14 MMOL/L (ref 1.12–1.32)
CALCIUM SERPL-MCNC: 8.4 MG/DL (ref 8.4–10.2)
CHLORIDE SERPL-SCNC: 95 MMOL/L (ref 96–108)
CO2 SERPL-SCNC: 35 MMOL/L (ref 21–32)
CREAT SERPL-MCNC: 1.47 MG/DL (ref 0.6–1.3)
ERYTHROCYTE [DISTWIDTH] IN BLOOD BY AUTOMATED COUNT: 12.6 % (ref 11.6–15.1)
GFR SERPL CREATININE-BSD FRML MDRD: 33 ML/MIN/1.73SQ M
GLUCOSE SERPL-MCNC: 145 MG/DL (ref 65–140)
GLUCOSE SERPL-MCNC: 153 MG/DL (ref 65–140)
GLUCOSE SERPL-MCNC: 203 MG/DL (ref 65–140)
GLUCOSE SERPL-MCNC: 204 MG/DL (ref 65–140)
GLUCOSE SERPL-MCNC: 49 MG/DL (ref 65–140)
GLUCOSE SERPL-MCNC: 59 MG/DL (ref 65–140)
GLUCOSE SERPL-MCNC: 73 MG/DL (ref 65–140)
HCT VFR BLD AUTO: 36.8 % (ref 34.8–46.1)
HGB BLD-MCNC: 11.1 G/DL (ref 11.5–15.4)
MAGNESIUM SERPL-MCNC: 1.9 MG/DL (ref 1.9–2.7)
MCH RBC QN AUTO: 30.1 PG (ref 26.8–34.3)
MCHC RBC AUTO-ENTMCNC: 30.2 G/DL (ref 31.4–37.4)
MCV RBC AUTO: 100 FL (ref 82–98)
PLATELET # BLD AUTO: 198 THOUSANDS/UL (ref 149–390)
PMV BLD AUTO: 10.1 FL (ref 8.9–12.7)
POTASSIUM SERPL-SCNC: 3.8 MMOL/L (ref 3.5–5.3)
RBC # BLD AUTO: 3.69 MILLION/UL (ref 3.81–5.12)
SODIUM SERPL-SCNC: 139 MMOL/L (ref 135–147)
WBC # BLD AUTO: 7.5 THOUSAND/UL (ref 4.31–10.16)

## 2024-04-15 PROCEDURE — 97167 OT EVAL HIGH COMPLEX 60 MIN: CPT

## 2024-04-15 PROCEDURE — 80048 BASIC METABOLIC PNL TOTAL CA: CPT

## 2024-04-15 PROCEDURE — 82948 REAGENT STRIP/BLOOD GLUCOSE: CPT

## 2024-04-15 PROCEDURE — 83735 ASSAY OF MAGNESIUM: CPT

## 2024-04-15 PROCEDURE — 71045 X-RAY EXAM CHEST 1 VIEW: CPT

## 2024-04-15 PROCEDURE — 97163 PT EVAL HIGH COMPLEX 45 MIN: CPT

## 2024-04-15 PROCEDURE — 99232 SBSQ HOSP IP/OBS MODERATE 35: CPT | Performed by: SURGERY

## 2024-04-15 PROCEDURE — 85027 COMPLETE CBC AUTOMATED: CPT

## 2024-04-15 PROCEDURE — 82330 ASSAY OF CALCIUM: CPT

## 2024-04-15 PROCEDURE — 99222 1ST HOSP IP/OBS MODERATE 55: CPT | Performed by: INTERNAL MEDICINE

## 2024-04-15 PROCEDURE — 99232 SBSQ HOSP IP/OBS MODERATE 35: CPT | Performed by: ANESTHESIOLOGY

## 2024-04-15 RX ORDER — LIDOCAINE 50 MG/G
2 PATCH TOPICAL DAILY
Status: DISCONTINUED | OUTPATIENT
Start: 2024-04-15 | End: 2024-04-18 | Stop reason: HOSPADM

## 2024-04-15 RX ORDER — INSULIN LISPRO 100 [IU]/ML
2-12 INJECTION, SOLUTION INTRAVENOUS; SUBCUTANEOUS
Status: DISCONTINUED | OUTPATIENT
Start: 2024-04-15 | End: 2024-04-18 | Stop reason: HOSPADM

## 2024-04-15 RX ORDER — METHOCARBAMOL 500 MG/1
500 TABLET, FILM COATED ORAL EVERY 8 HOURS SCHEDULED
Status: DISCONTINUED | OUTPATIENT
Start: 2024-04-15 | End: 2024-04-18 | Stop reason: HOSPADM

## 2024-04-15 RX ORDER — INSULIN GLARGINE 100 [IU]/ML
10 INJECTION, SOLUTION SUBCUTANEOUS
Status: DISCONTINUED | OUTPATIENT
Start: 2024-04-15 | End: 2024-04-16

## 2024-04-15 RX ORDER — INSULIN LISPRO 100 [IU]/ML
3 INJECTION, SOLUTION INTRAVENOUS; SUBCUTANEOUS
Status: DISCONTINUED | OUTPATIENT
Start: 2024-04-15 | End: 2024-04-18 | Stop reason: HOSPADM

## 2024-04-15 RX ORDER — DEXTROSE MONOHYDRATE 25 G/50ML
25 INJECTION, SOLUTION INTRAVENOUS ONCE
Status: DISCONTINUED | OUTPATIENT
Start: 2024-04-15 | End: 2024-04-18 | Stop reason: HOSPADM

## 2024-04-15 RX ADMIN — ACETAMINOPHEN 975 MG: 325 TABLET, FILM COATED ORAL at 05:08

## 2024-04-15 RX ADMIN — FUROSEMIDE 20 MG: 20 TABLET ORAL at 08:41

## 2024-04-15 RX ADMIN — OMEGA-3 FATTY ACIDS CAP 1000 MG 1000 MG: 1000 CAP at 17:16

## 2024-04-15 RX ADMIN — INSULIN GLARGINE 10 UNITS: 100 INJECTION, SOLUTION SUBCUTANEOUS at 21:20

## 2024-04-15 RX ADMIN — GABAPENTIN 100 MG: 100 CAPSULE ORAL at 21:19

## 2024-04-15 RX ADMIN — ATORVASTATIN CALCIUM 80 MG: 80 TABLET, FILM COATED ORAL at 21:19

## 2024-04-15 RX ADMIN — POLYETHYLENE GLYCOL 3350 17 G: 17 POWDER, FOR SOLUTION ORAL at 08:41

## 2024-04-15 RX ADMIN — ENOXAPARIN SODIUM 30 MG: 30 INJECTION SUBCUTANEOUS at 08:40

## 2024-04-15 RX ADMIN — CITALOPRAM HYDROBROMIDE 20 MG: 20 TABLET ORAL at 08:41

## 2024-04-15 RX ADMIN — ACETAMINOPHEN 975 MG: 325 TABLET, FILM COATED ORAL at 21:19

## 2024-04-15 RX ADMIN — SODIUM CHLORIDE 2 DROP: 50 SOLUTION OPHTHALMIC at 21:23

## 2024-04-15 RX ADMIN — ENOXAPARIN SODIUM 30 MG: 30 INJECTION SUBCUTANEOUS at 21:19

## 2024-04-15 RX ADMIN — SODIUM CHLORIDE 2 DROP: 50 SOLUTION OPHTHALMIC at 08:51

## 2024-04-15 RX ADMIN — INSULIN LISPRO 3 UNITS: 100 INJECTION, SOLUTION INTRAVENOUS; SUBCUTANEOUS at 13:24

## 2024-04-15 RX ADMIN — OMEGA-3 FATTY ACIDS CAP 1000 MG 1000 MG: 1000 CAP at 08:40

## 2024-04-15 RX ADMIN — SENNOSIDES, DOCUSATE SODIUM 2 TABLET: 8.6; 5 TABLET ORAL at 08:41

## 2024-04-15 RX ADMIN — INSULIN LISPRO 4 UNITS: 100 INJECTION, SOLUTION INTRAVENOUS; SUBCUTANEOUS at 08:50

## 2024-04-15 RX ADMIN — METHOCARBAMOL 500 MG: 500 TABLET ORAL at 17:14

## 2024-04-15 RX ADMIN — OXYCODONE HYDROCHLORIDE 5 MG: 5 TABLET ORAL at 08:49

## 2024-04-15 RX ADMIN — LEVOTHYROXINE SODIUM 150 MCG: 75 TABLET ORAL at 05:08

## 2024-04-15 RX ADMIN — CHLORHEXIDINE GLUCONATE 0.12% ORAL RINSE 15 ML: 1.2 LIQUID ORAL at 21:20

## 2024-04-15 RX ADMIN — ACETAMINOPHEN 975 MG: 325 TABLET, FILM COATED ORAL at 13:24

## 2024-04-15 RX ADMIN — CHLORHEXIDINE GLUCONATE 0.12% ORAL RINSE 15 ML: 1.2 LIQUID ORAL at 08:40

## 2024-04-15 RX ADMIN — LIDOCAINE 5% 2 PATCH: 700 PATCH TOPICAL at 17:14

## 2024-04-15 RX ADMIN — INSULIN LISPRO 4 UNITS: 100 INJECTION, SOLUTION INTRAVENOUS; SUBCUTANEOUS at 17:16

## 2024-04-15 RX ADMIN — PANTOPRAZOLE SODIUM 40 MG: 40 TABLET, DELAYED RELEASE ORAL at 05:08

## 2024-04-15 RX ADMIN — INSULIN LISPRO 3 UNITS: 100 INJECTION, SOLUTION INTRAVENOUS; SUBCUTANEOUS at 17:16

## 2024-04-15 RX ADMIN — METOPROLOL TARTRATE 25 MG: 25 TABLET, FILM COATED ORAL at 13:29

## 2024-04-15 NOTE — TELEPHONE ENCOUNTER
FYI:Pt's daughter Prema requested to have access to pt's mychart. Prema was told to go to the hospital where patient is and tell them she wants to be granted access.

## 2024-04-15 NOTE — ASSESSMENT & PLAN NOTE
"- \"Small multiloculated right chest pleural collection containing few tiny bubbles of gas. Few bubbles of gas are also noted in the mediastinum (pneumomediastinum). Small amount of right chest subcutaneous emphysema.\"  - No chest tube needed  - No ptx on repeat CXR  - Aggressive pulmonary hygiene  - Supplemental O2 PRN for sat > 94%       "

## 2024-04-15 NOTE — PROGRESS NOTES
"[facility]  Progress Note  Name: Lizzie Whitten I  MRN: [id number]  Unit/Bed#: [facility] 815-01 I Date of Admission: 4/13/2024   Date of Service: 4/15/2024 I Hospital Day: 2    Assessment/Plan   Hemopneumothorax on right  Assessment & Plan  - \"Small multiloculated right chest pleural collection containing few tiny bubbles of gas. Few bubbles of gas are also noted in the mediastinum (pneumomediastinum). Small amount of right chest subcutaneous emphysema.\"  - No chest tube needed  - No ptx on repeat CXR  - Aggressive pulmonary hygiene  - Supplemental O2 PRN for sat > 94%         CHACHA (acute kidney injury) ([facility])  Assessment & Plan  - Cr on admission 1.7  - Baseline Cr 1.4  - Resolved with 500 cc bolus resuscitation  - Continue to monitor Cr  - Avoid nephrotoxins  - Encourage PO intake    COPD (chronic obstructive pulmonary disease) ([facility])  Assessment & Plan  - History of COPD  - Respiratory protocol, airway clearance protocol  - Aggressive pulmonary hygiene for this patient with acute rib fractures  - High risk for pneumonia    PAF (paroxysmal atrial fibrillation) ([facility])  Assessment & Plan  - Continue home lopressor, rate controlled   - Hold home Eliquis due to acute small hemothorax, restart as able    Mixed hyperlipidemia  Assessment & Plan  - Continue home lipitor      Benign hypertension with chronic kidney disease, stage III ([facility])  Assessment & Plan  Lab Results   Component Value Date    EGFR 33 04/15/2024    EGFR 34 04/14/2024    EGFR 27 04/13/2024    CREATININE 1.47 (H) 04/15/2024    CREATININE 1.42 (H) 04/14/2024    CREATININE 1.70 (H) 04/13/2024     - Baseline Cr 1.4  - Continue current HTN medication regimen.  - Avoid nephrotoxins  - Outpatient follow-up with PCP.      Hypothyroidism  Assessment & Plan  - Continue home synthroid  - Appreciate Geriatric medicine consult and recommendations    Type 2 diabetes mellitus with hyperglycemia, with long-term current use of insulin " "([facility])  Assessment & Plan  Lab Results   Component Value Date    HGBA1C 8.2 (H) 01/23/2024       Recent Labs     04/15/24  0618 04/15/24  0629 04/15/24  0846 04/15/24  1106   POCGLU 59* 73 204* 153*       Blood Sugar Average: Last 72 hrs:  (P) 215.6797603114767183    - decreased to lantus 10 u QHS due to hypoglycemia in the AM  - Continue mealtime insulin and SSI   - Continue to monitor    * Fracture of multiple ribs of both sides  Assessment & Plan  - Multiple right-sided rib fractures (4-8) and 4-6 are flail chest, present on admission.  - Continue rib fracture protocol.  - Continue to encourage incentive spirometer use and adequate pulmonary hygiene.  Currently pulling 500 mL on I.S.  - Appreciate APS evaluation and recommendations.  - Continue multimodal analgesic regimen.  - Supplemental oxygen via nasal cannula as needed to maintain saturations greater than or equal to 94%.  - Repeat chest x-ray from 4/15 reviewed.   - PT and OT evaluation and treatment as indicated.  - Outpatient follow-up in the trauma clinic for re-evaluation in approximately 2 weeks.               Bowel Regimen: senokot S  VTE Prophylaxis:Enoxaparin (Lovenox)     Disposition: Continue MS level of care. Aggressive pulmonary hygiene and PT/OT    Subjective   Chief Complaint: \"I'm not feeling great\"    Subjective: Patient reports right chest wall pain worse with deep breaths. She reports she is feeling tired today.     Objective   Vitals:   Temp:  [97.4 °F (36.3 °C)-98.7 °F (37.1 °C)] 97.4 °F (36.3 °C)  HR:  [49-64] 60  Resp:  [16-18] 18  BP: ()/(57-72) 151/72    I/O         04/13 0701  04/14 0700 04/14 0701  04/15 0700 04/15 0701  04/16 0700    P.O.   90    IV Piggyback  50     Total Intake(mL/kg)  50 (0.6) 90 (1.1)    Urine (mL/kg/hr) 700 400 (0.2)     Stool  0     Total Output 700 400     Net -700 -350 +90           Unmeasured Urine Occurrence  1 x     Unmeasured Stool Occurrence  1 x              Physical Exam:   GENERAL APPEARANCE: " Patient appears fatigued  HEENT: NCAT; PERRL, EOMs intact; Mucous membranes dry. Nasal cannular O2 in place  CV: Irregularly irregular rate and rhythm; no murmur/gallops/rubs appreciated.  CHEST / LUNGS: Clear to auscultation; no wheezes/rales/rhonci. Right chest wallt enderness  ABD: NABS; soft; non-distended; non-tender.  : Voiding  EXT: +2 pulses bilaterally upper & lower extremities; no edema.  NEURO: GCS 15; no focal neurologic deficits; neurovascularly intact.  SKIN: Warm, dry and well perfused; no rash; no jaundice.      Invasive Devices       Peripheral Intravenous Line  Duration             Peripheral IV 04/13/24 Left Antecubital 1 day              Drain  Duration             External Urinary Catheter 2 days                     PIC Score  PIC Pain Score: 2 (4/15/2024 10:00 AM)  PIC Incentive Spirometry Score: 2 (4/15/2024 10:00 AM)  PIC Cough Description: 2 (4/15/2024 10:00 AM)  PIC Total Score: 6 (4/15/2024 10:00 AM)       If the Total PIC Score </=5, did you consult APS and evaluate patient for further intervention?: yes      Pain:    Incentive Spirometry  Cough  3 = Controlled  4 = Above goal volume 3 = Strong  2 = Moderate  3 = Goal to alert volume 2 = Weak  1 = Severe  2 = Below alert volume 1 = Absent     1 = Unable to perform IS         Lab Results: Results: I have personally reviewed all pertinent laboratory/tests results, BMP/CMP:   Lab Results   Component Value Date    SODIUM 139 04/15/2024    K 3.8 04/15/2024    CL 95 (L) 04/15/2024    CO2 35 (H) 04/15/2024    BUN 42 (H) 04/15/2024    CREATININE 1.47 (H) 04/15/2024    CALCIUM 8.4 04/15/2024    EGFR 33 04/15/2024   , and CBC:   Lab Results   Component Value Date    WBC 7.50 04/15/2024    HGB 11.1 (L) 04/15/2024    HCT 36.8 04/15/2024     (H) 04/15/2024     04/15/2024    RBC 3.69 (L) 04/15/2024    MCH 30.1 04/15/2024    MCHC 30.2 (L) 04/15/2024    RDW 12.6 04/15/2024    MPV 10.1 04/15/2024     Imaging: I have personally reviewed  pertinent reports.     Other Studies: none

## 2024-04-15 NOTE — ASSESSMENT & PLAN NOTE
Lab Results   Component Value Date    HGBA1C 8.2 (H) 01/23/2024       Recent Labs     04/15/24  0618 04/15/24  0629 04/15/24  0846 04/15/24  1106   POCGLU 59* 73 204* 153*       Blood Sugar Average: Last 72 hrs:  (P) [id number]    - decreased to lantus 10 u QHS due to hypoglycemia in the AM  - Continue mealtime insulin and SSI   - Continue to monitor

## 2024-04-15 NOTE — CONSULTS
Consultation - Geriatric Medicine   Lizzie TEJAL Whitten 84 y.o. female MRN: [id number]  Unit/Bed#: [facility] 815-01 Encounter: [id number]      Assessment/Plan     Ambulatory dysfunction with fall  -reportedly mechanical fall at home earlier on day of admit   -(-) head strike (-) loss of consciousness  -injuries as outlined below  -Requires use of walker for ambulation at baseline  -remote hx of prior falls   -remains high risk future falls due to age, hx of fall, deconditioning/debility and unfamiliar environment   -encourage good body mechanics and assist with all transfers  -keep personal items and call bell close to prevent reaching  -maintain environment free of fall hazards  -encourage appropriate footwear and adequate lighting at all times when out of bed  -recommend home fall risk assessment and personal fall alert system on returning to independent living   -PT and OT pending     Multiple right-sided rib fractures (R4-8)  -s/p fall as outlined above  -CT chest on admission reports multiple right-sided rib fractures including fractures of right fourth through eighth ribs with 3 rib segments (5-7) fractured in 2 places consistent with flail chest as well as gas in the mediastinum consistent with pneumomediastinum and small amount right chest subcutaneous emphysema  -Currently requiring supplemental O2 which has been required since hospitalization for resp failure earlier this month, cont O2 titration to maintain appropriate saturations  -Continue acute multimodal pain control, APS on consult  -Encourage aggressive pulmonary toilet and ISS  -follow-up CXR completed, final read pending      Acute pain due to trauma  -recommend pain control per Geriatric pain protocol:  Tylenol 975mg Q8H scheduled  Roxicodone 2.5mg Q4H PRN moderate pain  Roxicodone 5mg Q4H PRN severe pain  Dilaudid 0.2mg Q4H PRN  -consider adjuncts such as lidocaine patch topically  -encourage addition of non-pharmacologic pain treatment including ice  and frequent repositioning  -recommend  bowel regimen to prevent and treat constipation due to increased risk with acute pain and opiate pain medications, pt reports last BM earlier this morning     Anxiety disorder  -Symptoms well-controlled with home citalopram regimen, continue home dosing  -Currently on maximum recommended geriatric dose of citalopram (20mg daily), monitor electrolytes and QTc with use  -Consider outpatient referral to counseling/support group as part of comprehensive multimodal treatment approach  -Encourage calm quiet environment to reduce risk overstimulation  -Continue close outpatient follow-up with PCP for ongoing management    Cognitive screening  -Alert and fully oriented, denies memory or chronic concerns  -At baseline mostly independent with ADLs and IADLs limited primarily by physical debilities not cognitive  -Prior cognitive testing includes MMSE 30/30 as outpatient by PCP both in 2013 and 6/2022  -[facility] 4/13/24 imaging personally viewed, reveals very mild diffuse bilateral frontal volume loss and at least mild to moderate diffuse chronic microangiopathic changes  -Maintained on levothyroxine chronically as outpatient for hypothyroidism  -No recent B12 on record review, consider checking with routine labs  -Encourage use of scented devices such as corrective lenses and hearing aids at all appropriate times to reduce risk of uncorrected sensory impairment from contributing to isolation, confusion, encephalopathy and more precipitous cognitive decline  -At risk age-related and cardiovascular related cognitive decline, continue secondary risk modifications and encourage patient remain physically, socially, and cognitively active and engaged to maintain cognitive acuity    Paroxysmal A-fib  -Home regimen includes rate control with metoprolol and anticoagulation with Apixaban  -follows regularly with Cardiology as o/p, cont close o/p f/u    Hypothyroidism  -TSH slightly elevated at 10.461  on last check for which home levothyroxine regimen was increased to 150 mcg daily  -Continue home levothyroxine regimen and close outpatient follow-up with PCP for ongoing dose titration and medication management    Complete heart block  -s/p pacemaker implantation  -Continue close outpatient follow-up with PCP and Cardiology for ongoing management    DM-II, uncontrolled with hyperglycemia and long-term use of insulin  -A1c somewhat elevated at 8.2  -Maintained on basal bolus insulin chronically as outpatient, continue goal blood sugars for hospitalization 140-180 to reduce risk hypoglycemia  -Continue close outpatient follow-up with PCP for ongoing age-appropriate diabetic screenings and cares    Chronic diastolic CHF  -Maintained on Lasix chronically as outpatient  -Monitor electrolytes and volume status closely, consider standing daily weights  -Continue close outpatient follow-up with cardiology and PCP    Impaired Vision  -recommend use of corrective lenses at all appropriate times  -encourage adequate lighting and encourage use of assistance with ambulation  -keep personal belongings close to person to avoid reaching  -encourage appropriate footwear at all times  -Consider large font for printed materials provided to patient    Impaired Hearing  -Encourage use of hearing aids at all appropriate times  -encourage providers and caregivers to speak slowly and clearly directly to patient  -minimize background noise to encourage patient engagement  -consider use of hearing amplifier to reduce risk of straining to hear if hearing aids are not present or are not sufficient   -Encourage use of teach back method to ensure clear communication    Deconditioning/debility/frailty   -Clinical frailty scale stage V, mildly frail, progressive  -Multifactor including age, amatory function now with recurrent falls, DM-II uncontrolled with long-term use of insulin, and multitude of additional chronic medical comorbidities in  elderly individual with limited physiologic and metabolic reserves  -Albumin slightly low at 3.4, encourage well-balanced nutritional intake, consider nutrition consult for assistance with optimization of nutritional intake within dietary restrictions including diabetic diet and fluid restriction for heart failure  -Continue to ensure that underlying anxiety/mood depression symptoms are well-controlled as may impact patient response to therapies as well as overall sense of wellbeing and quality of life  -Continue optimization of chronic medical conditions and address acute metabolic derangements as arise  -Continue to ensure that treatment and interventions align with patient's wishes and goals of care    Delirium precautions  -Patient is high risk of delirium due to age, fall, traumatic injuries, hospital env  -Initiate delirium precautions  -maintain normal sleep/wake cycle  -minimize overnight interruptions, group overnight vitals/labs/nursing checks as possible and medically appropriate   -dim lights, close blinds and turn off tv to minimize stimulation and encourage sleep environment in evenings  -ensure that pain is well controlled   -monitor for fecal and urinary retention which may precipitate delirium  -encourage early mobilization and ambulation with assist as cleared to safely do so  -provide frequent reorientation and redirection as indicated and appropriate  -Minimize use of medications which may precipitate or worsen delirium such as tramadol, benzodiazepine, anticholinergics, and benadryl whenever possible  -encourage hydration and nutrition   -redirect unwanted behaviors as first line    Home medication review    Atorvastatin 80 Mg daily  Citalopram 20 Mg daily  Diclofenac topical cream 2 g topically 4 times daily  Lasix 40mg daily  Insulin aspart 6U (six) TIDWM  Insulin glargine 300u/mL 25U (twenty five) HS  Levothyroxine 150 mcg daily   Lisinopril 20 Mg twice daily  Metoprolol tartrate 25 Mg  Q12H  Pantoprazole 40mg daily  Phenergan DM 6.25-15 Mg/5 mL take 5 mL at bedtime  Sennosides 8.6 Mg daily as needed  Apixaban 5mg BID    Care coordination: rounded with Nelida (RN) and Kaleb (Trauma AP)    History of Present Illness   Physician Requesting Consult: Kurt Celaya DO  Reason for Consult / Principal Problem: Fall  Hx and PE limited by: N/A  Additional history obtained from: Chart review and patient evaluation    HPI: Lizzie Whitten is a 84 y.o. year old female with hypothyroidism, COPD, hypertension with CKD 3, DM-II uncontrolled with hyperglycemia and long-term use of insulin, history of complete heart block s/p pacemaker implantation, anxiety disorder, obesity, chronic diastolic CHF, and ambulatory dysfunction with recurrent falls who is admitted to trauma service with ambulatory dysfunction and fall found to have multiple right-sided rib fractures on admission imaging, she is being seen in consultation by Geriatrics for high risk developing delirium during hospitalization.  Of note she had recent hospitalization from 4/8/2024-4/12/2024 for fall without traumatic injuries, acute on chronic diastolic CHF and acute respiratory failure with hypoxia after which she was recommended for short-term rehab however patient and family declined and returned to her independent living facility where she presents from for current admission. She is seen and examined at bedside where she is lying resting, she reports that since returning home from her recent hospitalization she has had two  mechanical falls neither of which resulted in head strike or loss of consciousness. Following the second fall however she did note significant right upper back pain prompting presentation to the ED for further evaluation at which time she was found to have multiple right-sided rib fractures and right flail chest. She was initially admitted to critical care and has since been transferred to general medical floor. She reports  that since admission her pain has been well controlled and she denies any new acute complaints.    Prior to admission Lizzie was residing in independent living with her , at baseline she is independent with ADLs and IADLs including medication management. She is currently working on transitioning to blister packs for medication management for ease of use.  She denies any memory or cognitive concerns.  She ambulates with use of walker and has remote history of recurrent falls however aside from the multiple leading to current admission denies additional in the past six months.  Requires use of hearing aids glasses and partial dentures.    Inpatient consult to Gerontology  Consult performed by: Rissa Gomez DO  Consult ordered by: Zabrina Sands MD        Review of Systems   Constitutional:  Positive for appetite change (poor). Negative for chills and fever.   HENT: Negative.  Dental problem: partial denture.    Eyes: Negative.  Visual disturbance: wears glasses.   Respiratory: Negative.  Negative for shortness of breath.    Cardiovascular: Negative.    Gastrointestinal: Negative.  Negative for constipation (BM earlier this morning).   Genitourinary: Negative.    Musculoskeletal:  Positive for gait problem.        Right sided rib and upper back pain well controlled at rest    Skin: Negative.    Neurological:  Positive for numbness (hands bilatearlly, reports chronic due to arthritis and unchagned from baseline). Negative for dizziness, light-headedness and headaches.   Hematological:  Bruises/bleeds easily.   Psychiatric/Behavioral: Negative.  Negative for sleep disturbance.    All other systems reviewed and are negative.    Historical Information   Past Medical History:   Diagnosis Date    Anxiety     Arthritis     CHF (congestive heart failure) (HCC)     Chronic venous insufficiency     COPD (chronic obstructive pulmonary disease) (HCC)     Coronary artery disease     Diabetes mellitus ([facility])     type II     Disease of thyroid gland     hypothyroidism    DVT (deep venous thrombosis) (HCC)     HL (hearing loss)     Hyperlipidemia     Hypertension     Irregular heart beat     Paroxysmal a-fib    Pacemaker     Renal disorder     stage 3    Sleep apnea     Sleep difficulties     Thyroid disease     Urinary incontinence     Uterine leiomyoma     Walker as ambulation aid      Past Surgical History:   Procedure Laterality Date    BREAST SURGERY      For Biopsy    CARDIAC PACEMAKER PLACEMENT      Permanent. Last assessed: 2/19/16    CARDIAC SURGERY      CARPAL TUNNEL RELEASE Bilateral     CATARACT EXTRACTION      CATARACT EXTRACTION W/ INTRAOCULAR LENS  IMPLANT, BILATERAL      CYSTOSCOPY      Diagnostic. Last assessed: 11/8/17    ECTOPIC PREGNANCY SURGERY      HYSTERECTOMY      Total Abdomonal. Resolved: 1982    JOINT REPLACEMENT Right     TKR    OOPHORECTOMY      MA ENDOVEN ABLTJ INCMPTNT VEIN XTR LASER 1ST VEIN Left 2/17/2017    Procedure: GREATER SAPHENOUS VEIN ENDOVASCULAR LASER THERAPY ;  Surgeon: Danielle Phan DO;  Location: [facility];  Service: Vascular    TONSILLECTOMY       Social History   Social History     Substance and Sexual Activity   Alcohol Use Not Currently    Comment: rarely. Per [employer]: Denied history of alcohol use     Social History     Substance and Sexual Activity   Drug Use No     Social History     Tobacco Use   Smoking Status Never    Passive exposure: Never   Smokeless Tobacco Never   Tobacco Comments    Per [employer]: Former smoker     Family History:   Family History   Problem Relation Age of Onset    Coronary artery disease Mother     Hypertension Mother     Heart disease Mother     Hypertension Father     Diabetes Father         Mellitus    No Known Problems Sister     No Known Problems Daughter     No Known Problems Daughter     No Known Problems Maternal Grandmother     No Known Problems Maternal Grandfather     No Known Problems Paternal Grandmother     No Known Problems Paternal  Grandfather     Diabetes Brother         Mellitus    No Known Problems Son     Breast cancer Maternal Aunt     Pancreatic cancer Maternal Aunt     Breast cancer Cousin     Breast cancer Cousin     Breast cancer Paternal Aunt     No Known Problems Paternal Aunt     No Known Problems Paternal Aunt      Meds/Allergies   all current active meds have been reviewed    Allergies   Allergen Reactions    Ampicillin Hives     Objective     Intake/Output Summary (Last 24 hours) at 4/15/2024 0718  Last data filed at 4/15/2024 0510  Gross per 24 hour   Intake 50 ml   Output 400 ml   Net -350 ml     Invasive Devices       Peripheral Intravenous Line  Duration             Peripheral IV 04/13/24 Left Antecubital 1 day              Drain  Duration             External Urinary Catheter 1 day                  Physical Exam  Vitals and nursing note reviewed.   Constitutional:       General: She is not in acute distress.     Appearance: She is obese. She is not toxic-appearing.   HENT:      Head: Normocephalic.      Nose: Nose normal.      Comments: O2 via NC     Mouth/Throat:      Mouth: Mucous membranes are dry.   Eyes:      General: No scleral icterus.        Right eye: No discharge.         Left eye: No discharge.      Conjunctiva/sclera: Conjunctivae normal.      Comments: Glasses at bedside table    Neck:      Comments: Phonation normal  Cardiovascular:      Rate and Rhythm: Normal rate.      Pulses: Normal pulses.   Pulmonary:      Effort: Pulmonary effort is normal. No respiratory distress.      Breath sounds: No wheezing.      Comments: Shallow resp no rales or rhonci  Abdominal:      General: Bowel sounds are normal. There is no distension.      Palpations: Abdomen is soft.      Tenderness: There is no abdominal tenderness.   Musculoskeletal:      Cervical back: Neck supple.      Comments: Obese body habitus    Skin:     General: Skin is warm and dry.   Neurological:      Mental Status: She is alert.      Comments: Awake and  alert, oriented, answers questions appropriately   Psychiatric:      Comments: Pleasant and cooperative        Lab Results:     I have personally reviewed pertinent lab results including the following:    Results from last 7 days   Lab Units 04/15/24  0503 04/14/24  0542 04/13/24  1118 04/12/24  0453   WBC Thousand/uL 7.50 7.06 9.20 5.87   HEMOGLOBIN g/dL 11.1* 11.3* 12.0 11.7   HEMATOCRIT % 36.8 37.4 39.3 38.5   PLATELETS Thousands/uL 198 186 197 174   SEGS PCT %  --  59 76* 52   MONO PCT %  --  13* 8 14*   EOS PCT %  --  4 1 5     Results from last 7 days   Lab Units 04/15/24  0503 04/14/24  0542 04/13/24  1118 04/12/24  0453 04/11/24  0802   POTASSIUM mmol/L 3.8 4.3 4.6 4.0 4.3   CHLORIDE mmol/L 95* 93* 89* 93* 95*   CO2 mmol/L 35* 38* 37* 38* 34*   BUN mg/dL 42* 41* 44* 40* 34*   CREATININE mg/dL 1.47* 1.42* 1.70* 1.51* 1.47*   CALCIUM mg/dL 8.4 8.5 8.6 9.0 8.8   ALK PHOS U/L  --   --  80 75 78   ALT U/L  --   --  6* 5* <3*   AST U/L  --   --  16 14 10*     I have personally reviewed the following imaging study reports in PACS:    4/13/24-CT head without contrast, CT C-spine without contrast, CT chest without contrast  4/14/24-portable chest x-ray    Therapies:   PT: pending   OT: pending     VTE Prophylaxis: Enoxaparin (Lovenox)    Code Status: Level 1 - Full Code  Advance Directive and Living Will:      Power of :    POLST:      Family and Social Support:   Living Arrangements: Lives w/ Spouse/significant other  Support Systems: Self; Family members  Type of Current Residence: Facility  Type of Current Home Care Services: Home PT; Nurse visit; Home health aide  Discharge planning discussed with:: patient  Freedom of Choice: Yes    Goals of Care: Recovery from acute injuries

## 2024-04-15 NOTE — CASE MANAGEMENT
Case Management Discharge Planning Note    Patient name Lizzie Whitten  Location [facility]-01 MRN [id number]  : 1940 Date 4/15/2024       Current Admission Date: 2024  Current Admission Diagnosis:Fracture of multiple ribs of both sides   Patient Active Problem List    Diagnosis Date Noted    Fracture of multiple ribs of both sides 2024    COPD (chronic obstructive pulmonary disease) ([facility]) 2024    CHACHA (acute kidney injury) ([facility]) 2024    Acute respiratory failure with hypoxia ([facility]) 04/10/2024    Pulmonary nodule 2024    Acute on chronic diastolic congestive heart failure ([facility]) 2024    Head trauma 2023    Traumatic hematoma of forehead 2023    Traumatic ulcer of left lower leg, limited to breakdown of skin ([facility]) 2023    Pharyngeal dysphagia 2023    Chronic cough 2023    Hand arthritis 2023    Assistance needed with transportation 2023    Abnormal CT scan, esophagus 2022    Hiatal hernia 2022    Esophageal dysphagia 2022    Hypertension 2022    Fall 2022    Secondary hyperparathyroidism of renal origin ([facility]) 2022    Memory difficulties 2022    Imbalance 2022    Coronary artery disease involving native coronary artery of native heart 2021    Dermatitis 09/15/2020    Sleep apnea 2019    Intraocular pressure increase 2018    Vitamin D deficiency 2018    Lymphedema of both lower extremities 2018    Iron deficiency anemia 10/12/2017    Urinary bladder incontinence 2017    OAB (overactive bladder) 2017    Benign hypertension with chronic kidney disease, stage III ([facility]) 2017    Microalbuminuria 2017    PAF (paroxysmal atrial fibrillation) ([facility]) 10/31/2016    Chronic venous insufficiency 2016    Presence of permanent cardiac pacemaker 2016    Chronic diastolic congestive heart failure ([facility]) 2016    Anemia  01/19/2016    S/P cardiac pacemaker procedure 01/19/2016    Hypothyroidism 01/19/2016    Complete heart block (HCC) 01/16/2016    Type 2 diabetes mellitus with hyperglycemia, with long-term current use of insulin ([facility]) 01/15/2016    Stage 3a chronic kidney disease ([facility]) 10/21/2015    Eczematous dermatitis 03/03/2015    Anxiety disorder 06/26/2013    Chronic obstructive pulmonary disease ([facility]) 09/24/2012    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 (HCC) 09/22/2011    Mixed hyperlipidemia 03/14/2011      LOS (days): 2  Geometric Mean LOS (GMLOS) (days):   Days to GMLOS:     OBJECTIVE:  Risk of Unplanned Readmission Score: 22.73         Current admission status: Inpatient   Preferred Pharmacy:   [facility] - [place], [place] - [address]  [address] [address]  Phone: [phone] Fax: [phone]    [id number] - [place], [place] - [address]  [address] [address]  Phone: [phone] Fax: [phone]    [facility] - [address] [address]  Phone: [phone] Fax: [phone]    Primary Care Provider: Kennedy Castellon MD    Primary Insurance: MEDICARE  Secondary Insurance: [facility]    DISCHARGE DETAILS:    CM spoke to pt's dtr Prema to discuss d/c planning, as the pt was recommended for IP rehab  CM offered acute rehab options and SNF options. Pt's dtr would like to talk to the pt and then will follow up with CM.

## 2024-04-15 NOTE — ASSESSMENT & PLAN NOTE
Lab Results   Component Value Date    EGFR 33 04/15/2024    EGFR 34 04/14/2024    EGFR 27 04/13/2024    CREATININE 1.47 (H) 04/15/2024    CREATININE 1.42 (H) 04/14/2024    CREATININE 1.70 (H) 04/13/2024     - Baseline Cr 1.4  - Continue current HTN medication regimen.  - Avoid nephrotoxins  - Outpatient follow-up with PCP.

## 2024-04-15 NOTE — PHYSICAL THERAPY NOTE
PHYSICAL THERAPY EVALUTAION          Patient Name: Lizzie Whitten  Today's Date: 4/15/2024       04/15/24 0942   PT Last Visit   PT Visit Date 04/15/24   Note Type   Note type Evaluation   Pain Assessment   Pain Assessment Tool 0-10   Pain Score 8   Pain Location/Orientation Location: Rib Cage;Orientation: Bilateral   Pain Onset/Description Onset: Ongoing   Effect of Pain on Daily Activities increased time for activities, increased pain with activity   Patient's Stated Pain Goal No pain   Hospital Pain Intervention(s) Ambulation/increased activity;Repositioned   Restrictions/Precautions   Weight Bearing Precautions Per Order No   Other Precautions Chair Alarm;Bed Alarm;O2;Fall Risk;Pain   Home Living   Type of Home Apartment  (pt resides at [facility])   Home Layout One level;Able to live on main level with bedroom/bathroom;Performs ADLs on one level   Home Equipment Walker   Additional Comments 0 DAMIAN, lives with spouse, recieves assistance from family   Prior Function   Level of [place] Independent with ADLs;Independent with functional mobility;Independent with IADLS   Lives With Spouse   Receives Help From Family   IADLs Independent with medication management;Family/Friend/Other provides transportation;Family/Friend/Other provides meals   Falls in the last 6 months 1 to 4  (at least 3 falls, Pt unable to recall)   Vocational Retired   Comments uses RW at baseline   General   Additional Pertinent History Pt recently hospitalized on 4/8 sp fall, PT recommended rehab, however pt was discharged to home on 4/12 and readmitted on 4/13 after falling twice at home   Family/Caregiver Present No   Cognition   Overall Cognitive Status Impaired   Arousal/Participation Alert   Orientation Level Oriented X4   Memory Decreased short term memory;Decreased recall of recent events;Decreased recall of precautions   Following  "Commands Follows one step commands without difficulty   Comments Pt unable to recall falls or reason for recent hospitalization from 4/8-4/12   Subjective   Subjective \"my  was recently hospitalized\"   RUE Assessment   RUE Assessment WFL   LUE Assessment   LUE Assessment WFL   RLE Assessment   RLE Assessment WFL   Strength RLE   RLE Overall Strength 3+/5   LLE Assessment   LLE Assessment WFL   Strength LLE   LLE Overall Strength 3+/5   Bed Mobility   Supine to Sit 4  Minimal assistance   Additional items Assist x 1;HOB elevated;Bedrails;Increased time required;Verbal cues   Sit to Supine Unable to assess   Transfers   Sit to Stand 4  Minimal assistance   Additional items Assist x 1;Increased time required;Verbal cues   Stand to Sit 4  Minimal assistance   Additional items Assist x 1;Increased time required;Verbal cues   Toilet transfer 4  Minimal assistance   Additional items Assist x 1;Standard toilet;Increased time required   Additional Comments Pt initially min a x1 for transfers, after toilet transfer pt demonstrated decreased verbal responses and required mod a x2 for sit to stand from toilet and ambulation approximately 1-2 feet to chair. Required mod a x2 for stand to sit to chair. BP taken in seated (L arm) and was 111/60. Once returned to chair pt answered all questions appropriately. Pt on 1 L NC O2, NC removed for pt to walk to bathroom, spO2 went from 96% to 88% after toileting, 1 L NC O2 donned when pt was in bedside chair and spO2 levels returned to 93%. Nursing made aware.   Ambulation/Elevation   Gait pattern Wide MARKOS;Forward Flexion;Decreased foot clearance;Shuffling;Short stride;Excessively slow   Gait Assistance 4  Minimal assist   Additional items Assist x 1;Other (Comment)   Assistive Device Rolling walker   Distance 10 ft   Stair Management Assistance Not tested   Ambulation/Elevation Additional Comments Pt required mod a x2 with RW for ambulation approximately 1-2 feet to return to " chair after toileting.   Balance   Static Sitting Fair -   Static Standing Poor +   Ambulatory Poor   Endurance Deficit   Endurance Deficit Yes   Endurance Deficit Description weakness, fatigue   Activity Tolerance   Activity Tolerance Patient limited by fatigue   Medical Staff Made Aware CECIL HicksT; Taras OT; Christopher student OT; OT present for coeval due to medical complexity of pt   Nurse Made Aware pt appropriate to be seen and mobilized per nursing   Assessment   Prognosis Fair   Problem List Decreased strength;Decreased range of motion;Decreased endurance;Impaired balance;Decreased mobility;Impaired judgement;Decreased cognition;Decreased safety awareness;Obesity;Pain   Assessment Pt is a 84 y.o. female seen for PT eval sp admission to [facility] on 4/13/2024. Pt presented with R sided upper back pain sp 2 mechanical falls and a primary diagnosis of fracture of multiple ribs of both sides with additional problems of flail chest and mild pheumomediastinum. Pt's co morbidities effecting treatment include: has a past medical history of Anxiety, Arthritis, CHF (congestive heart failure) (HCC), Chronic venous insufficiency, COPD (chronic obstructive pulmonary disease) (HCC), Coronary artery disease, Diabetes mellitus (HCC), Disease of thyroid gland, DVT (deep venous thrombosis) (HCC), HL (hearing loss), Hyperlipidemia, Hypertension, Irregular heart beat, Pacemaker, Renal disorder, Sleep apnea, Sleep difficulties, Thyroid disease, Urinary incontinence, Uterine leiomyoma, and Walker as ambulation aid. PT performed high complexity evaluation due to decreased activity tolerance, decreased mobility, fall risk, increased reliance on caregiver support, continuous pulse ox monitoring, and ongoing medical management of primary diagnosis. Pt presented with decreased strength, decreased ROM, decreased endurance, impaired balance, decreased mobility,impaired judgement, decreased cognition, decreased safety awareness, obesity, and  pain. Pt unable to recall recent hospitalizations or recent falls during PT eval. During PT eval, pt performed bed mobility with min a x1. Pt initially performed transfers and ambulation using a rolling walker all with min a x. Pt performed stand to sit toilet transfer. After toileting pt demonstrated decreased verbal responses and required mod a x2 for sit to stand from toilet and ambulation approximately 1-2 feet to chair. Required mod a x2 for stand to sit to chair. BP taken in seated (L arm) and was 111/60. Once seated in bedside chair pt able to answer all questions appropriately.  Pt presented on 1 L NC O2 (pt reports no use of supplemental O2 at baseline), NC removed for pt to walk to bathroom, spO2 went from 96% to 88% after toileting, 1 L NC O2 donned when pt was in bedside chair and spO2 levels returned to 93%. Nursing made aware. Pt would continue to benefit from skilled PT services during hosptial stay. At conclusion of PT eval, pt left seated in bedside chair with chair alarm activated, call bell, & all other needs within reach. Discharge recommendation when medically cleared is inpatient rehab (level II).   Barriers to Discharge Decreased caregiver support;None   Goals   Patient Goals to go home   STG Expiration Date 04/29/24   Short Term Goal #1 In 14 days, pt will 1) perform bed mobility with supervision to reduce caregiver burden 2) perform transfers with supervision to promote independence 3) ambulate 150 feet using least restrictive assistive device with supervision to facilitate return to PLOF and ability to navigate household and community distances 4) Negotiate 3 stairs to facilitate ability to perform community ambulation 5) improve strength in BL LE's by 1 grade to improve ability to perform transfers 6) improve balance by 1/2 grade to reduce fall risk.   Discharge Recommendation   Rehab Resource Intensity Level, PT II (Moderate Resource Intensity)   Equipment Recommended Walker   Walker  Package Recommended Wheeled walker   AM-PAC Basic Mobility Inpatient   Turning in Flat Bed Without Bedrails 3   Lying on Back to Sitting on Edge of Flat Bed Without Bedrails 3   Moving Bed to Chair 3   Standing Up From Chair Using Arms 3   Walk in Room 2   Climb 3-5 Stairs With Railing 2   Basic Mobility Inpatient Raw Score 16   Basic Mobility Standardized Score 38.32   [facility] Highest Level Of Mobility   -[facility] Goal 5: Stand one or more mins   -HLM Achieved 6: Walk 10 steps or more   Modified Sac Scale   Modified Sac Scale 4   End of Consult   Patient Position at End of Consult Bedside chair;Bed/Chair alarm activated;All needs within reach     Marga Sellers, SPT

## 2024-04-15 NOTE — PROGRESS NOTES
Progress Note - Acute Pain Service    Lizzie Whitten 84 y.o. female MRN: [id number]  Unit/Bed#: [facility] 815-01 Encounter: [id number]      Lizzie Whitten is a 84 y.o. female with PMH s/f CHF EF 65%, DM2, heart block s/p pacemaker, COPD, a-fib on eliquis (last dose 4/12), presents on 4/13 after falling in the evening on 4/12. Found to have Right rib fx 4-8 with flail chest and pneumomediastinum (no chest tube). Acute Pain Service consulted for post post traumatic pain management with rib fractures.     Was with PT and spoke with them briefly. Minimal to plus 1 assist. Not felt to be limited by pain.    The patient denies pain at rest. Tolerable with activity. She is using her IS appropriately. Discussed epidural but given above, would provide minimal benefit.    * Fracture of multiple ribs of both sides  Assessment & Plan  - Right side rib fx 4-8 s/p fall on 4/12    - Continue oxycodone 2.5mg/5mg q4hrs PRN mod/sev pain  - consider discontinuing dilaudid IV 0.2 mg q2h PRN breakthrough pain  - Las dose eliquis reported to be 4/12  - Continue tylenol 975mg q8hrs scheduled  - Lidocaine patches  - consider discontinuing gabapentin 100 mg qhs due to some confusion this AM  - no intervention at this time, please re consult if clinical picture changes    - OOB as tolerated  - aggressive pulm hygiene with frequent IS use        APS will sign off at this time. Thank you for the consult. All opioids and other analgesics to be written at discretion of primary team. Please contact Acute Pain Service - via [employer] from [phone] with additional questions or concerns. See [employer]uba or [employer] for additional contacts and after hours information.    Pain History  Current pain location(s):  Pain Score: 8  Pain Location/Orientation: Location: Rib Cage  Pain Scale: Pain Assessment Tool: 0-10  24 hour history: no acute events    Opioid requirement previous 24 hours: 2 doses 5 mg oxycodone    Meds/Allergies   all current active meds  have been reviewed    Allergies   Allergen Reactions    Ampicillin Hives       Objective        Vitals:    04/14/24 2140 04/15/24 0220 04/15/24 0221 04/15/24 0757   BP: 135/65 133/58 133/58 139/60   BP Location:       Pulse: 60 (!) 49 (!) 49 60   Resp: 16 17 17 18   Temp: 98.7 °F (37.1 °C) 97.5 °F (36.4 °C) 97.5 °F (36.4 °C) (!) 97.4 °F (36.3 °C)   TempSrc:       SpO2: 97% 97% 97% 96%   Weight:       Height:             Physical Exam  HENT:      Head: Normocephalic and atraumatic.      Nose: Nose normal.      Mouth/Throat:      Mouth: Mucous membranes are moist.   Pulmonary:      Effort: Pulmonary effort is normal.   Chest:      Chest wall: Tenderness present.   Abdominal:      General: Abdomen is flat.   Musculoskeletal:         General: Normal range of motion.   Skin:     General: Skin is warm.   Neurological:      General: No focal deficit present.   Psychiatric:         Mood and Affect: Mood normal.         Behavior: Behavior normal.         Thought Content: Thought content normal.         Judgment: Judgment normal.       Lab Results:   Estimated Creatinine Clearance: 28.1 mL/min (A) (by C-G formula based on SCr of 1.47 mg/dL (H)).  Lab Results   Component Value Date    WBC 7.50 04/15/2024    WBC 7.7 12/06/2016    HGB 11.1 (L) 04/15/2024    HGB 11.6 (L) 12/06/2016    HCT 36.8 04/15/2024    HCT 36.4 12/06/2016     04/15/2024     12/06/2016         Component Value Date/Time     12/06/2016 0939    K 3.8 04/15/2024 0503    K 4.2 12/06/2016 0939    CL 95 (L) 04/15/2024 0503     12/06/2016 0939    CO2 35 (H) 04/15/2024 0503    CO2 28 12/06/2016 0939    BUN 42 (H) 04/15/2024 0503    BUN 41 (H) 12/06/2016 0939    CREATININE 1.47 (H) 04/15/2024 0503    CREATININE 1.23 (H) 12/06/2016 0939         Component Value Date/Time    CALCIUM 8.4 04/15/2024 0503    CALCIUM 9.2 12/06/2016 0939    ALKPHOS 80 04/13/2024 1118    ALKPHOS 86 09/06/2016 0907    AST 16 04/13/2024 1118    AST 18 09/06/2016 0907     ALT 6 (L) 04/13/2024 1118    ALT 21 09/06/2016 0907    BILITOT 0.6 09/06/2016 0907    TP 6.4 04/13/2024 1118    ALB 3.4 (L) 04/13/2024 1118    ALB 4.0 09/06/2016 0907       Imaging Studies/EKG: I have personally reviewed pertinent reports.       Please note that the APS provides consultative services regarding pain management only.  With the exception of ketamine and epidural infusions and except when indicated, final decisions regarding starting or changing doses of analgesic medications are at the discretion of the consulting service.    Kane Edmondson MD   Acute Pain Service

## 2024-04-15 NOTE — ASSESSMENT & PLAN NOTE
- History of COPD  - Respiratory protocol, airway clearance protocol  - Aggressive pulmonary hygiene for this patient with acute rib fractures  - High risk for pneumonia

## 2024-04-15 NOTE — PLAN OF CARE
Problem: PHYSICAL THERAPY ADULT  Goal: Performs mobility at highest level of function for planned discharge setting.  See evaluation for individualized goals.  Description:    Equipment Recommended: Walker       See flowsheet documentation for full assessment, interventions and recommendations.  Note: Prognosis: Fair  Problem List: Decreased strength, Decreased range of motion, Decreased endurance, Impaired balance, Decreased mobility, Impaired judgement, Decreased cognition, Decreased safety awareness, Obesity, Pain  Assessment: Pt is a 84 y.o. female seen for PT eval sp admission to [facility] on 4/13/2024. Pt presented with R sided upper back pain sp 2 mechanical falls and a primary diagnosis of fracture of multiple ribs of both sides with additional problems of flail chest and mild pheumomediastinum. Pt's co morbidities effecting treatment include: has a past medical history of Anxiety, Arthritis, CHF (congestive heart failure) ([facility]), Chronic venous insufficiency, COPD (chronic obstructive pulmonary disease) (HCC), Coronary artery disease, Diabetes mellitus (HCC), Disease of thyroid gland, DVT (deep venous thrombosis) ([facility]), HL (hearing loss), Hyperlipidemia, Hypertension, Irregular heart beat, Pacemaker, Renal disorder, Sleep apnea, Sleep difficulties, Thyroid disease, Urinary incontinence, Uterine leiomyoma, and Walker as ambulation aid. PT performed high complexity evaluation due to decreased activity tolerance, decreased mobility, fall risk, increased reliance on caregiver support, continuous pulse ox monitoring, and ongoing medical management of primary diagnosis. Pt presented with decreased strength, decreased ROM, decreased endurance, impaired balance, decreased mobility,impaired judgement, decreased cognition, decreased safety awareness, obesity, and pain. Pt unable to recall recent hospitalizations or recent falls during PT eval. During PT eval, pt performed bed mobility with min a x1. Pt initially performed  transfers and ambulation using a rolling walker all with min a x. Pt performed stand to sit toilet transfer. After toileting pt demonstrated decreased verbal responses and required mod a x2 for sit to stand from toilet and ambulation approximately 1-2 feet to chair. Required mod a x2 for stand to sit to chair. BP taken in seated (L arm) and was 111/60. Once seated in bedside chair pt able to answer all questions appropriately.  Pt presented on 1 L NC O2 (pt reports no use of supplemental O2 at baseline), NC removed for pt to walk to bathroom, spO2 went from 96% to 88% after toileting, 1 L NC O2 donned when pt was in bedside chair and spO2 levels returned to 93%. Nursing made aware. Pt would continue to benefit from skilled PT services during hosptial stay. At conclusion of PT eval, pt left seated in bedside chair with chair alarm activated, call bell, & all other needs within reach. Discharge recommendation when medically cleared is inpatient rehab (level II).  Barriers to Discharge: Decreased caregiver support, None     Rehab Resource Intensity Level, PT: II (Moderate Resource Intensity)    See flowsheet documentation for full assessment.

## 2024-04-15 NOTE — CASE MANAGEMENT
Case Management Discharge Planning Note    Patient name Lizzie Whitten  Location [facility] 815/[facility] 815-01 MRN [id number]  : 1940 Date 4/15/2024       Current Admission Date: 2024  Current Admission Diagnosis:Fracture of multiple ribs of both sides   Patient Active Problem List    Diagnosis Date Noted    Fracture of multiple ribs of both sides 2024    COPD (chronic obstructive pulmonary disease) ([facility]) 2024    CHACHA (acute kidney injury) ([facility]) 2024    Acute respiratory failure with hypoxia ([facility]) 04/10/2024    Pulmonary nodule 2024    Acute on chronic diastolic congestive heart failure ([facility]) 2024    Head trauma 2023    Traumatic hematoma of forehead 2023    Traumatic ulcer of left lower leg, limited to breakdown of skin ([facility]) 2023    Pharyngeal dysphagia 2023    Chronic cough 2023    Hand arthritis 2023    Assistance needed with transportation 2023    Abnormal CT scan, esophagus 2022    Hiatal hernia 2022    Esophageal dysphagia 2022    Hypertension 2022    Fall 2022    Secondary hyperparathyroidism of renal origin ([facility]) 2022    Memory difficulties 2022    Imbalance 2022    Coronary artery disease involving native coronary artery of native heart 2021    Dermatitis 09/15/2020    Sleep apnea 2019    Intraocular pressure increase 2018    Vitamin D deficiency 2018    Lymphedema of both lower extremities 2018    Iron deficiency anemia 10/12/2017    Urinary bladder incontinence 2017    OAB (overactive bladder) 2017    Benign hypertension with chronic kidney disease, stage III ([facility]) 2017    Microalbuminuria 2017    PAF (paroxysmal atrial fibrillation) ([facility]) 10/31/2016    Chronic venous insufficiency 2016    Presence of permanent cardiac pacemaker 2016    Chronic diastolic congestive heart failure ([facility]) 2016    Anemia  01/19/2016    S/P cardiac pacemaker procedure 01/19/2016    Hypothyroidism 01/19/2016    Complete heart block (HCC) 01/16/2016    Type 2 diabetes mellitus with hyperglycemia, with long-term current use of insulin ([facility]) 01/15/2016    Stage 3a chronic kidney disease ([facility]) 10/21/2015    Eczematous dermatitis 03/03/2015    Anxiety disorder 06/26/2013    Chronic obstructive pulmonary disease ([facility]) 09/24/2012    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 (HCC) 09/22/2011    Mixed hyperlipidemia 03/14/2011      LOS (days): 2  Geometric Mean LOS (GMLOS) (days):   Days to GMLOS:     OBJECTIVE:  Risk of Unplanned Readmission Score: 22.73         Current admission status: Inpatient   Preferred Pharmacy:   [facility] - [place], [place] - [address]  [address] [address]  Phone: [phone] Fax: [phone]    [id number] - [place], [place] - [address]  [address] [address]  Phone: [phone] Fax: [phone]    [facility] - [address] [address]  Phone: [phone] Fax: [phone]    Primary Care Provider: Kennedy Castellon MD    Primary Insurance: MEDICARE  [facility]    DISCHARGE DETAILS:    Pt informed CM that she would like a referral to [facility] and [facility] placed

## 2024-04-15 NOTE — RESPIRATORY THERAPY NOTE
Resp care   04/15/24 1359   Respiratory Protocol   Protocol Initiated? Yes   Protocol Selection Airway Clearance   Language Barrier? No   Medical & Social History Reviewed? Yes   Diagnostic Studies Reviewed? Yes   Physical Assessment Performed? Yes   Airway Clearance Plan Incentive Spirometer   Respiratory Assessment   Assessment Type Pre-treatment   General Appearance Awake;Alert   Respiratory Pattern Normal;Spontaneous   Chest Assessment Chest expansion symmetrical   Bilateral Breath Sounds Diminished;Clear   Resp Comments Pt admitted with rib fx after fall. Pt offers no resp c/o. bs decreased but clear. CXR shows layered effusions. Pt has documented copd with no pft or meds at home. Will encourage IS via ACP.

## 2024-04-15 NOTE — ASSESSMENT & PLAN NOTE
- Continue home lopressor, rate controlled   - Hold home Eliquis due to acute small hemothorax, restart as able

## 2024-04-15 NOTE — RESTORATIVE TECHNICIAN NOTE
Restorative Technician Note      Patient Name: Lizzie Whitten     Restorative Tech Visit Date: 04/15/24  Note Type: Mobility  Patient Position Upon Consult: Other (Comment) (Pt bedside chiar; requesting mobility per PCA)  Activity Performed: Dangled; Stood; Other (Comment) (attempted ambulation with chair follow however pt became lethargic with complaints of dizziness; O2 increased to 4L during activity; BP 97/57; pt agreeable and understanding that ambulation is unsafe at this time. Will follow up as appropriate)  Assistive Device: Standard walker  Education Provided: Yes  Patient Position at End of Consult: Bedside chair; All needs within reach; Bed/Chair alarm activated    Shanika Wick  DPT, Restorative Technician

## 2024-04-15 NOTE — OCCUPATIONAL THERAPY NOTE
Occupational Therapy Evaluation     Patient Name: Lizzie Whitten  Today's Date: 4/15/2024  Problem List  Principal Problem:    Fracture of multiple ribs of both sides  Active Problems:    Type 2 diabetes mellitus with hyperglycemia, with long-term current use of insulin ([facility])    Hypothyroidism    Benign hypertension with chronic kidney disease, stage III ([facility])    Mixed hyperlipidemia    Presence of permanent cardiac pacemaker    PAF (paroxysmal atrial fibrillation) ([facility])    COPD (chronic obstructive pulmonary disease) ([facility])    CHACHA (acute kidney injury) ([facility])    Hemopneumothorax on right    Past Medical History  Past Medical History:   Diagnosis Date    Anxiety     Arthritis     CHF (congestive heart failure) ([facility])     Chronic venous insufficiency     COPD (chronic obstructive pulmonary disease) ([facility])     Coronary artery disease     Diabetes mellitus ([facility])     type II    Disease of thyroid gland     hypothyroidism    DVT (deep venous thrombosis) ([facility])     HL (hearing loss)     Hyperlipidemia     Hypertension     Irregular heart beat     Paroxysmal a-fib    Pacemaker     Renal disorder     stage 3    Sleep apnea     Sleep difficulties     Thyroid disease     Urinary incontinence     Uterine leiomyoma     Walker as ambulation aid      Past Surgical History  Past Surgical History:   Procedure Laterality Date    BREAST SURGERY      For Biopsy    CARDIAC PACEMAKER PLACEMENT      Permanent. Last assessed: 2/19/16    CARDIAC SURGERY      CARPAL TUNNEL RELEASE Bilateral     CATARACT EXTRACTION      CATARACT EXTRACTION W/ INTRAOCULAR LENS  IMPLANT, BILATERAL      CYSTOSCOPY      Diagnostic. Last assessed: 11/8/17    ECTOPIC PREGNANCY SURGERY      HYSTERECTOMY      Total Abdomonal. Resolved: 1982    JOINT REPLACEMENT Right     TKR    OOPHORECTOMY      IA ENDOVEN ABLTJ INCMPTNT VEIN XTR LASER 1ST VEIN Left 2/17/2017    Procedure: GREATER SAPHENOUS VEIN ENDOVASCULAR LASER THERAPY ;  Surgeon: Danielle Phan DO;   Location: [facility];  Service: Vascular    TONSILLECTOMY        04/15/24 0943   OT Last Visit   OT Visit Date 04/15/24   Note Type   Note type Evaluation   Pain Assessment   Pain Assessment Tool 0-10   Pain Score 8   Pain Location/Orientation Location: Rib Cage;Orientation: Bilateral   Pain Onset/Description Onset: Ongoing   Effect of Pain on Daily Activities increased time for activities, increased pain with activity   Patient's Stated Pain Goal No pain   Hospital Pain Intervention(s) Ambulation/increased activity;Repositioned   Restrictions/Precautions   Weight Bearing Precautions Per Order No   Other Precautions Chair Alarm;O2;Fall Risk;Pain;Cognitive;Bed Alarm   Home Living   Type of Home Apartment  (pt resides at [facility] Independent section)   Home Layout One level;Able to live on main level with bedroom/bathroom   Bathroom Shower/Tub Walk-in shower   Bathroom Toilet Standard   Bathroom Equipment Shower chair;Grab bars around toilet   Bathroom Accessibility Accessible   Home Equipment Walker   Additional Comments 0 DAMIAN, lives with spouse, receives assistance from  and facility staff as needed   Prior Function   Level of [place] Independent with ADLs;Independent with functional mobility;Needs assistance with IADLS  (Has a  in the home 1x week (wednesday) for cleaning)   Lives With Spouse   Receives Help From Family;Other (Comment)  (Facility staff)   IADLs Family/Friend/Other provides transportation;Family/Friend/Other provides meals;Independent with medication management  (Facility has a dining lee for meals, family or facility staff provides transportation)   Falls in the last 6 months 1 to 4  (2 falls, at time of evalaution pt only recalled this recent fall, needed remainder of her other fall that she was admitted for previously)   Vocational Retired  ( at [employer])   Comments RW at baseline   Lifestyle   Autonomy Independent with ADLS and some  "IADLS   Reciprocal Relationships lives with spouse at [facility], spouse is able to assist as needed   Service to Others Retired   Intrinsic Gratification Enjoys spending time with spouse and when family visits   Subjective   Subjective \" I only had 1 fall\"  (pt unable to recall her previous fall that nii her into the ED on 4/8)   ADL   Eating Assistance 7  Independent   Grooming Assistance 4  Minimal Assistance   UB Bathing Assistance 4  Minimal Assistance   LB Bathing Assistance 3  Moderate Assistance   UB Dressing Assistance 4  Minimal Assistance   LB Dressing Assistance 3  Moderate Assistance   Toileting Assistance  3  Moderate Assistance   Functional Assistance 3  Moderate Assistance   Bed Mobility   Supine to Sit 4  Minimal assistance   Additional items Assist x 1;HOB elevated;Bedrails;Increased time required;Verbal cues   Sit to Supine Unable to assess   Transfers   Sit to Stand 4  Minimal assistance   Additional items Assist x 1;Increased time required;Verbal cues   Stand to Sit 4  Minimal assistance   Additional items Assist x 1;Increased time required;Verbal cues   Toilet transfer 4  Minimal assistance   Additional items Assist x 1;Standard toilet;Increased time required   Additional Comments Pt expressed increased need to go to the toilet upon standing, after toilet transfer pt demonstrated decreased verbal responses and required MOD A x2 for stand to sit to bedsidechair. Once returned to chair pt became more responsive. BP was taken seated (L arm) and was 111/60. Pt on 1 L NC O2, NC removed for pt to walk to bathroom, spO2 went from 96% to 88% after toileting, 1 L NC O2 donned when pt was in bedside chair and spO2 levels returned to 93%. NSG made aware.   Functional Mobility   Functional Mobility 4  Minimal assistance   Additional items Rolling walker   Balance   Static Sitting Fair -   Dynamic Sitting Fair -   Static Standing Poor   Dynamic Standing Poor +   Ambulatory Poor   Activity Tolerance   Activity " Tolerance Patient limited by fatigue   Medical Staff Made Aware Taras, OT; Kurt DPT; Margaret, SPT   Nurse Made Aware NSG made aware before OT session   RUE Assessment   RUE Assessment WFL   LUE Assessment   LUE Assessment WFL   Hand Function   Gross Motor Coordination Functional   Fine Motor Coordination Functional   Cognition   Overall Cognitive Status Impaired   Arousal/Participation Other (Comment)  (Pt upon entry into the room was responsive to all questions, during toilet tranfer pt became less responsive)   Attention Attends with cues to redirect   Orientation Level Oriented X4   Memory Decreased short term memory;Decreased recall of recent events;Decreased recall of precautions   Following Commands Follows multistep commands without difficulty   Comments Pt unable to recall her past fall and reason for hospitalization from 4/8-4/12   Assessment   Limitation Decreased ADL status;Decreased cognition;Decreased endurance;Decreased self-care trans;Decreased high-level ADLs   Prognosis Fair   Assessment Pt is a 84 year old female presented to Emergency Department on 4/13 for a fall (multiple x2). Diagnosed with right rib fx 4-8 with flail chest and pneumomediastinum. Pt has a past medical history of HTN, hyperlipidemia, anxiety, CHF, DM, prior DVT, COPD, CAD, indwelling pacer, ambulatory dysfunction, and hypothyroidism . Pt with active OT Orders and Out of Bed Orders. Pt resides at [facility] living with spouse, 0  DAMIAN. Prior to admission pt was independent with ADLS and required some assistance with IADLS (cleaning, driving) . She utilized a walker at baseline. During eval pt was oriented x4, she was able to recall her most recent fall and reason for admission but unable to recall prior fall and admission. Currently pt requires MIN A for UB ADLS and MOD A with LB ADLS . During eval pt was MIN A x1 for bed mobility, transfers, and functional mobility with the use of a RW. Upon standing from toilet pt  "became less verbally responsive and required MOD A x2 for stand to sit to bedside chair. Once returned to chair pt became more responsive. Pt's BP was taken seated (L arm) and was 111/60. Pt on 1 L NC O2, NC removed for pt to walk to bathroom, spO2 went from 96% to 88% after toileting, 1 L NC O2 donned when pt was in bedside chair and spO2 levels returned to 93%. NSG made aware. Pt is currently limited by fatigue, decreased ADL status, decreased cognition, decreased endurance, decreased self-care trans, and decreased high level ADLS. These impair baseline function with ADLS, functional mobility, community mobility, and household maintenance. The patient's raw score on the AM-PAC Daily Activity Inpatient Short Form is 16. A raw score of less than 19 suggests the patient may benefit from discharge to post-acute rehabilitation services. Please refer to the recommendation of the Occupational Therapist for safe discharge planning. From an Occupational Therapy standpoint the pt's rehab intensity is a Level II (Moderate Resource Intensity). Pt continues to benefit from acute OT services to address the following goals 2-3x a week for the next 10-14 days.   Goals   Patient Goals \" to get better\"   LT Time Frame 10-14   Plan   Treatment Interventions ADL retraining;Functional transfer training;Endurance training;Cognitive reorientation;Equipment evaluation/education;Compensatory technique education;Energy conservation   Goal Expiration Date 04/29/24   OT Frequency 2-3x/wk   Discharge Recommendation   Rehab Resource Intensity Level, OT II (Moderate Resource Intensity)   AM-PAC Daily Activity Inpatient   Lower Body Dressing 2   Bathing 2   Toileting 2   Upper Body Dressing 3   Grooming 3   Eating 4   Daily Activity Raw Score 16   Daily Activity Standardized Score (Calc for Raw Score >=11) 35.96   AM-PAC Applied Cognition Inpatient   Following a Speech/Presentation 4   Understanding Ordinary Conversation 4   Taking Medications " 3   Remembering Where Things Are Placed or Put Away 3   Remembering List of 4-5 Errands 3   Taking Care of Complicated Tasks 3   Applied Cognition Raw Score 20   Applied Cognition Standardized Score 41.76   End of Consult   Education Provided Yes   Patient Position at End of Consult Bedside chair;Bed/Chair alarm activated;All needs within reach   Nurse Communication Nurse aware of consult      Goals:   Pt will demonstrate UB/LB dressing with MOD I with use of DME as needed.  Pt will demonstrate sit to stand on and off all surfaces with MOD I with the use of DME as needed.  Pt will demonstrate functional mobility with MOD I with the use of DME as needed.   Pt will demonstrate grooming (washing face, brushing teeth, combing hair) with MOD I.  Pt will participate in simulated IADLS with MOD I with G safety and endurance and the use of DME as needed.   Pt will attend to ECON strategies/education and demonstrate good carry over during functional activities.   Pt will increase activity tolerance to G for 30min. treatment sessions.  Pt will engage in ongoing cog assessment with G participation to assist with safe d/c planning.    Pt will demonstrate increase safety awareness during functional tasks/ADL's s/p skilled education.     YUAN Albert

## 2024-04-15 NOTE — ASSESSMENT & PLAN NOTE
- Multiple right-sided rib fractures (4-8) and 4-6 are flail chest, present on admission.  - Continue rib fracture protocol.  - Continue to encourage incentive spirometer use and adequate pulmonary hygiene.  Currently pulling 500 mL on I.S.  - Appreciate APS evaluation and recommendations.  - Continue multimodal analgesic regimen.  - Supplemental oxygen via nasal cannula as needed to maintain saturations greater than or equal to 94%.  - Repeat chest x-ray from 4/15 reviewed.   - PT and OT evaluation and treatment as indicated.  - Outpatient follow-up in the trauma clinic for re-evaluation in approximately 2 weeks.

## 2024-04-15 NOTE — PLAN OF CARE
Problem: OCCUPATIONAL THERAPY ADULT  Goal: Performs self-care activities at highest level of function for planned discharge setting.  See evaluation for individualized goals.  Description: Treatment Interventions: ADL retraining, Functional transfer training, Endurance training, Cognitive reorientation, Equipment evaluation/education, Compensatory technique education, Energy conservation          See flowsheet documentation for full assessment, interventions and recommendations.   Note: Limitation: Decreased ADL status, Decreased cognition, Decreased endurance, Decreased self-care trans, Decreased high-level ADLs  Prognosis: Fair  Assessment: Pt is a 84 year old female presented to Emergency Department on 4/13 for a fall (multiple x2). Diagnosed with right rib fx 4-8 with flail chest and pneumomediastinum. Pt has a past medical history of HTN, hyperlipidemia, anxiety, CHF, DM, prior DVT, COPD, CAD, indwelling pacer, ambulatory dysfunction, and hypothyroidism . Pt with active OT Orders and Out of Bed Orders. Pt resides at [facility] living with spouse, 0  DAMIAN. Prior to admission pt was independent with ADLS and required some assistance with IADLS (cleaning, driving) . She utilized a walker at baseline. During eval pt was oriented x4, she was able to recall her most recent fall and reason for admission but unable to recall prior fall and admission. Currently pt requires MIN A for UB ADLS and MOD A with LB ADLS . During eval pt was MIN A x1 for bed mobility, transfers, and functional mobility with the use of a RW. Upon standing from toilet pt became less verbally responsive and required MOD A x2 for stand to sit to bedside chair. Once returned to chair pt became more responsive. Pt's BP was taken seated (L arm) and was 111/60. Pt on 1 L NC O2, NC removed for pt to walk to bathroom, spO2 went from 96% to 88% after toileting, 1 L NC O2 donned when pt was in bedside chair and spO2 levels returned to 93%.  NSG made aware. Pt is currently limited by fatigue, decreased ADL status, decreased cognition, decreased endurance, decreased self-care trans, and decreased high level ADLS. These impair baseline function with ADLS, functional mobility, community mobility, and household maintenance. The patient's raw score on the -PAC Daily Activity Inpatient Short Form is 16. A raw score of less than 19 suggests the patient may benefit from discharge to post-acute rehabilitation services. Please refer to the recommendation of the Occupational Therapist for safe discharge planning. From an Occupational Therapy standpoint the pt's rehab intensity is a Level II (Moderate Resource Intensity). Pt continues to benefit from acute OT services to address the following goals 2-3x a week for the next 10-14 days.     Rehab Resource Intensity Level, OT: II (Moderate Resource Intensity)

## 2024-04-15 NOTE — ASSESSMENT & PLAN NOTE
- Cr on admission 1.7  - Baseline Cr 1.4  - Resolved with 500 cc bolus resuscitation  - Continue to monitor Cr  - Avoid nephrotoxins  - Encourage PO intake

## 2024-04-16 ENCOUNTER — APPOINTMENT (OUTPATIENT)
Dept: SURGERY | Facility: HOSPITAL | Age: 84
DRG: 183 | End: 2024-04-16
Payer: MEDICARE

## 2024-04-16 LAB
ANION GAP SERPL CALCULATED.3IONS-SCNC: 6 MMOL/L (ref 4–13)
BACTERIA UR CULT: NORMAL
BASOPHILS # BLD AUTO: 0.07 THOUSANDS/ÂΜL (ref 0–0.1)
BASOPHILS NFR BLD AUTO: 1 % (ref 0–1)
BUN SERPL-MCNC: 41 MG/DL (ref 5–25)
CALCIUM SERPL-MCNC: 8.9 MG/DL (ref 8.4–10.2)
CHLORIDE SERPL-SCNC: 95 MMOL/L (ref 96–108)
CO2 SERPL-SCNC: 36 MMOL/L (ref 21–32)
CREAT SERPL-MCNC: 1.58 MG/DL (ref 0.6–1.3)
EOSINOPHIL # BLD AUTO: 0.21 THOUSAND/ÂΜL (ref 0–0.61)
EOSINOPHIL NFR BLD AUTO: 3 % (ref 0–6)
ERYTHROCYTE [DISTWIDTH] IN BLOOD BY AUTOMATED COUNT: 12.6 % (ref 11.6–15.1)
GFR SERPL CREATININE-BSD FRML MDRD: 30 ML/MIN/1.73SQ M
GLUCOSE SERPL-MCNC: 131 MG/DL (ref 65–140)
GLUCOSE SERPL-MCNC: 144 MG/DL (ref 65–140)
GLUCOSE SERPL-MCNC: 197 MG/DL (ref 65–140)
GLUCOSE SERPL-MCNC: 61 MG/DL (ref 65–140)
GLUCOSE SERPL-MCNC: 69 MG/DL (ref 65–140)
GLUCOSE SERPL-MCNC: 97 MG/DL (ref 65–140)
HCT VFR BLD AUTO: 37.3 % (ref 34.8–46.1)
HGB BLD-MCNC: 11.2 G/DL (ref 11.5–15.4)
IMM GRANULOCYTES # BLD AUTO: 0.03 THOUSAND/UL (ref 0–0.2)
IMM GRANULOCYTES NFR BLD AUTO: 0 % (ref 0–2)
LYMPHOCYTES # BLD AUTO: 2 THOUSANDS/ÂΜL (ref 0.6–4.47)
LYMPHOCYTES NFR BLD AUTO: 28 % (ref 14–44)
MCH RBC QN AUTO: 29.9 PG (ref 26.8–34.3)
MCHC RBC AUTO-ENTMCNC: 30 G/DL (ref 31.4–37.4)
MCV RBC AUTO: 100 FL (ref 82–98)
MONOCYTES # BLD AUTO: 0.88 THOUSAND/ÂΜL (ref 0.17–1.22)
MONOCYTES NFR BLD AUTO: 12 % (ref 4–12)
NEUTROPHILS # BLD AUTO: 4.09 THOUSANDS/ÂΜL (ref 1.85–7.62)
NEUTS SEG NFR BLD AUTO: 56 % (ref 43–75)
NRBC BLD AUTO-RTO: 0 /100 WBCS
PLATELET # BLD AUTO: 212 THOUSANDS/UL (ref 149–390)
PMV BLD AUTO: 10.5 FL (ref 8.9–12.7)
POTASSIUM SERPL-SCNC: 4.5 MMOL/L (ref 3.5–5.3)
RBC # BLD AUTO: 3.75 MILLION/UL (ref 3.81–5.12)
SODIUM SERPL-SCNC: 137 MMOL/L (ref 135–147)
WBC # BLD AUTO: 7.28 THOUSAND/UL (ref 4.31–10.16)

## 2024-04-16 PROCEDURE — NC001 PR NO CHARGE: Performed by: STUDENT IN AN ORGANIZED HEALTH CARE EDUCATION/TRAINING PROGRAM

## 2024-04-16 PROCEDURE — 99232 SBSQ HOSP IP/OBS MODERATE 35: CPT | Performed by: ANESTHESIOLOGY

## 2024-04-16 PROCEDURE — 82948 REAGENT STRIP/BLOOD GLUCOSE: CPT

## 2024-04-16 PROCEDURE — 80048 BASIC METABOLIC PNL TOTAL CA: CPT | Performed by: SURGERY

## 2024-04-16 PROCEDURE — 85025 COMPLETE CBC W/AUTO DIFF WBC: CPT | Performed by: SURGERY

## 2024-04-16 RX ORDER — HYDROMORPHONE HCL IN WATER/PF 6 MG/30 ML
0.2 PATIENT CONTROLLED ANALGESIA SYRINGE INTRAVENOUS ONCE
Status: COMPLETED | OUTPATIENT
Start: 2024-04-16 | End: 2024-04-16

## 2024-04-16 RX ADMIN — CHLORHEXIDINE GLUCONATE 0.12% ORAL RINSE 15 ML: 1.2 LIQUID ORAL at 21:46

## 2024-04-16 RX ADMIN — SENNOSIDES, DOCUSATE SODIUM 2 TABLET: 8.6; 5 TABLET ORAL at 17:20

## 2024-04-16 RX ADMIN — OMEGA-3 FATTY ACIDS CAP 1000 MG 1000 MG: 1000 CAP at 17:20

## 2024-04-16 RX ADMIN — FUROSEMIDE 20 MG: 20 TABLET ORAL at 09:38

## 2024-04-16 RX ADMIN — ACETAMINOPHEN 975 MG: 325 TABLET, FILM COATED ORAL at 14:00

## 2024-04-16 RX ADMIN — ACETAMINOPHEN 975 MG: 325 TABLET, FILM COATED ORAL at 05:47

## 2024-04-16 RX ADMIN — Medication 2.5 MG: at 17:20

## 2024-04-16 RX ADMIN — CHLORHEXIDINE GLUCONATE 0.12% ORAL RINSE 15 ML: 1.2 LIQUID ORAL at 09:38

## 2024-04-16 RX ADMIN — SODIUM CHLORIDE 2 DROP: 50 SOLUTION OPHTHALMIC at 09:38

## 2024-04-16 RX ADMIN — METHOCARBAMOL 500 MG: 500 TABLET ORAL at 21:46

## 2024-04-16 RX ADMIN — SODIUM CHLORIDE 2 DROP: 50 SOLUTION OPHTHALMIC at 21:46

## 2024-04-16 RX ADMIN — FUROSEMIDE 20 MG: 20 TABLET ORAL at 09:40

## 2024-04-16 RX ADMIN — METOPROLOL TARTRATE 25 MG: 25 TABLET, FILM COATED ORAL at 17:20

## 2024-04-16 RX ADMIN — ACETAMINOPHEN 975 MG: 325 TABLET, FILM COATED ORAL at 21:45

## 2024-04-16 RX ADMIN — PANTOPRAZOLE SODIUM 40 MG: 40 TABLET, DELAYED RELEASE ORAL at 05:47

## 2024-04-16 RX ADMIN — INSULIN LISPRO 3 UNITS: 100 INJECTION, SOLUTION INTRAVENOUS; SUBCUTANEOUS at 17:23

## 2024-04-16 RX ADMIN — LEVOTHYROXINE SODIUM 150 MCG: 75 TABLET ORAL at 05:46

## 2024-04-16 RX ADMIN — LIDOCAINE 5% 2 PATCH: 700 PATCH TOPICAL at 09:37

## 2024-04-16 RX ADMIN — METHOCARBAMOL 500 MG: 500 TABLET ORAL at 05:46

## 2024-04-16 RX ADMIN — POLYETHYLENE GLYCOL 3350 17 G: 17 POWDER, FOR SOLUTION ORAL at 09:38

## 2024-04-16 RX ADMIN — SENNOSIDES, DOCUSATE SODIUM 2 TABLET: 8.6; 5 TABLET ORAL at 09:39

## 2024-04-16 RX ADMIN — INSULIN LISPRO 2 UNITS: 100 INJECTION, SOLUTION INTRAVENOUS; SUBCUTANEOUS at 12:05

## 2024-04-16 RX ADMIN — METHOCARBAMOL 500 MG: 500 TABLET ORAL at 12:04

## 2024-04-16 RX ADMIN — INSULIN LISPRO 3 UNITS: 100 INJECTION, SOLUTION INTRAVENOUS; SUBCUTANEOUS at 12:05

## 2024-04-16 RX ADMIN — OMEGA-3 FATTY ACIDS CAP 1000 MG 1000 MG: 1000 CAP at 09:38

## 2024-04-16 RX ADMIN — SENNOSIDES, DOCUSATE SODIUM 2 TABLET: 8.6; 5 TABLET ORAL at 09:38

## 2024-04-16 RX ADMIN — GABAPENTIN 100 MG: 100 CAPSULE ORAL at 21:46

## 2024-04-16 RX ADMIN — ATORVASTATIN CALCIUM 80 MG: 80 TABLET, FILM COATED ORAL at 21:45

## 2024-04-16 RX ADMIN — METOPROLOL TARTRATE 25 MG: 25 TABLET, FILM COATED ORAL at 05:46

## 2024-04-16 RX ADMIN — CITALOPRAM HYDROBROMIDE 20 MG: 20 TABLET ORAL at 09:38

## 2024-04-16 RX ADMIN — HYDROMORPHONE HYDROCHLORIDE 0.2 MG: 0.2 INJECTION, SOLUTION INTRAMUSCULAR; INTRAVENOUS; SUBCUTANEOUS at 19:38

## 2024-04-16 RX ADMIN — ENOXAPARIN SODIUM 30 MG: 30 INJECTION SUBCUTANEOUS at 21:56

## 2024-04-16 NOTE — PROGRESS NOTES
Patient:    MRN:  [id number]    Yolanda Request ID:  [id number]    Level of care reserved:  Skilled Nursing Facility    Partner Reserved:  [facility], [place], [place] [id number] [phone]    Clinical needs requested:    Geography searched:  10 miles around [id number]    Start of Service:    Request sent:  11:48am EDT on 4/15/2024 by Pelon Mclain    Partner reserved:  1:41pm EDT on 4/16/2024 by Pelon Mclain    Choice list shared:  1:41pm EDT on 4/16/2024 by Pelon Mclain

## 2024-04-16 NOTE — RESTORATIVE TECHNICIAN NOTE
Restorative Technician Note      Patient Name: Lizzie Whitten     Restorative Tech Visit Date: 04/16/24  Note Type: Mobility  Patient Position Upon Consult: Bedside chair  Activity Performed: Ambulated  Assistive Device: Standard walker; Other (Comment) (pt on room air throughout session (91-93%); second person for chair follow 2/2 ongoing dizziness)  Education Provided: Yes  Patient Position at End of Consult: Bedside chair; All needs within reach; Bed/Chair alarm activated    Shanika Wick  DPT, Restorative Technician

## 2024-04-16 NOTE — RESTORATIVE TECHNICIAN NOTE
Restorative Technician Note      Patient Name: Lizzie Whitten     Restorative Tech Visit Date: 04/16/24  Note Type: Mobility  Patient Position Upon Consult: Supine  Activity Performed: Transferred  Assistive Device: Standard walker  Education Provided: Yes  Patient Position at End of Consult: Bedside chair; All needs within reach; Bed/Chair alarm activated    Pt with noticeable dizziness; vitals WFLs. Will follow up once dizziness subsides    Shanika JACOBT, Restorative Technician

## 2024-04-16 NOTE — CASE MANAGEMENT
Case Management Discharge Planning Note    Patient name Lizzie Whitten  Location [facility] 815/[facility] 815-01 MRN [id number]  : 1940 Date 2024       Current Admission Date: 2024  Current Admission Diagnosis:Fracture of multiple ribs of both sides   Patient Active Problem List    Diagnosis Date Noted    Hemopneumothorax on right 04/15/2024    Fracture of multiple ribs of both sides 2024    COPD (chronic obstructive pulmonary disease) ([facility]) 2024    CHACHA (acute kidney injury) ([facility]) 2024    Acute respiratory failure with hypoxia ([facility]) 04/10/2024    Pulmonary nodule 2024    Acute on chronic diastolic congestive heart failure ([facility]) 2024    Head trauma 2023    Traumatic hematoma of forehead 2023    Traumatic ulcer of left lower leg, limited to breakdown of skin ([facility]) 2023    Pharyngeal dysphagia 2023    Chronic cough 2023    Hand arthritis 2023    Assistance needed with transportation 2023    Abnormal CT scan, esophagus 2022    Hiatal hernia 2022    Esophageal dysphagia 2022    Hypertension 2022    Fall 2022    Secondary hyperparathyroidism of renal origin ([facility]) 2022    Memory difficulties 2022    Imbalance 2022    Coronary artery disease involving native coronary artery of native heart 2021    Dermatitis 09/15/2020    Sleep apnea 2019    Intraocular pressure increase 2018    Vitamin D deficiency 2018    Lymphedema of both lower extremities 2018    Iron deficiency anemia 10/12/2017    Urinary bladder incontinence 2017    OAB (overactive bladder) 2017    Benign hypertension with chronic kidney disease, stage III ([facility]) 2017    Microalbuminuria 2017    PAF (paroxysmal atrial fibrillation) ([facility]) 10/31/2016    Chronic venous insufficiency 2016    Presence of permanent cardiac pacemaker 2016    Chronic diastolic congestive heart  failure ([facility]) 01/21/2016    Anemia 01/19/2016    S/P cardiac pacemaker procedure 01/19/2016    Hypothyroidism 01/19/2016    Complete heart block ([facility]) 01/16/2016    Type 2 diabetes mellitus with hyperglycemia, with long-term current use of insulin ([facility]) 01/15/2016    Stage 3a chronic kidney disease ([facility]) 10/21/2015    Eczematous dermatitis 03/03/2015    Anxiety disorder 06/26/2013    Chronic obstructive pulmonary disease ([facility]) 09/24/2012    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 ([facility]) 09/22/2011    Mixed hyperlipidemia 03/14/2011      LOS (days): 3  Geometric Mean LOS (GMLOS) (days): 3.1  Days to GMLOS:0     OBJECTIVE:  Risk of Unplanned Readmission Score: 23.14         Current admission status: Inpatient   Preferred Pharmacy:   [facility] - [place], [place] - [address]  [address] [address]  Phone: [phone] Fax: [phone]    [id number] - [place], [place] - [address]  [address] [address]  Phone: [phone] Fax: [phone]    [facility] - [address] [address]  Phone: [phone] Fax: [phone]    Primary Care Provider: Kennedy Castellon MD    Primary Insurance: MEDICARE  Secondary Insurance: [facility]    DISCHARGE DETAILS:    CM met with pt and her dtr Nidia to discuss d/c plan  CM informed dtr, that the pt chose [facility] as her SNF location. Pt's dtr wanted to review the facility and if it would be the best spot for the pt.  Pt will remain over night as she isn't stable for d/c. CM will follow up in the AM

## 2024-04-16 NOTE — ASSESSMENT & PLAN NOTE
Lab Results   Component Value Date    HGBA1C 8.2 (H) 01/23/2024       Recent Labs     04/15/24  0846 04/15/24  1106 04/15/24  1630 04/15/24  2103   POCGLU 204* 153* 203* 145*         Blood Sugar Average: Last 72 hrs:  (P) [id number]    - Lantus 10 u QHS   - Continue mealtime insulin and SSI   - Continue to monitor

## 2024-04-16 NOTE — CASE MANAGEMENT
Case Management Discharge Planning Note    Patient name Lizzie Whitten  Location [facility]15/[facility]-01 MRN [id number]  : 1940 Date 2024       Current Admission Date: 2024  Current Admission Diagnosis:Fracture of multiple ribs of both sides   Patient Active Problem List    Diagnosis Date Noted    Hemopneumothorax on right 04/15/2024    Fracture of multiple ribs of both sides 2024    COPD (chronic obstructive pulmonary disease) ([facility]) 2024    CHACHA (acute kidney injury) ([facility]) 2024    Acute respiratory failure with hypoxia ([facility]) 04/10/2024    Pulmonary nodule 2024    Acute on chronic diastolic congestive heart failure ([facility]) 2024    Head trauma 2023    Traumatic hematoma of forehead 2023    Traumatic ulcer of left lower leg, limited to breakdown of skin ([facility]) 2023    Pharyngeal dysphagia 2023    Chronic cough 2023    Hand arthritis 2023    Assistance needed with transportation 2023    Abnormal CT scan, esophagus 2022    Hiatal hernia 2022    Esophageal dysphagia 2022    Hypertension 2022    Fall 2022    Secondary hyperparathyroidism of renal origin ([facility]) 2022    Memory difficulties 2022    Imbalance 2022    Coronary artery disease involving native coronary artery of native heart 2021    Dermatitis 09/15/2020    Sleep apnea 2019    Intraocular pressure increase 2018    Vitamin D deficiency 2018    Lymphedema of both lower extremities 2018    Iron deficiency anemia 10/12/2017    Urinary bladder incontinence 2017    OAB (overactive bladder) 2017    Benign hypertension with chronic kidney disease, stage III ([facility]) 2017    Microalbuminuria 2017    PAF (paroxysmal atrial fibrillation) ([facility]) 10/31/2016    Chronic venous insufficiency 2016    Presence of permanent cardiac pacemaker 2016    Chronic diastolic congestive heart  failure ([facility]) 01/21/2016    Anemia 01/19/2016    S/P cardiac pacemaker procedure 01/19/2016    Hypothyroidism 01/19/2016    Complete heart block ([facility]) 01/16/2016    Type 2 diabetes mellitus with hyperglycemia, with long-term current use of insulin ([facility]) 01/15/2016    Stage 3a chronic kidney disease ([facility]) 10/21/2015    Eczematous dermatitis 03/03/2015    Anxiety disorder 06/26/2013    Chronic obstructive pulmonary disease ([facility]) 09/24/2012    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 ([facility]) 09/22/2011    Mixed hyperlipidemia 03/14/2011      LOS (days): 3  Geometric Mean LOS (GMLOS) (days): 3.1  Days to GMLOS:0.2     OBJECTIVE:  Risk of Unplanned Readmission Score: 23.26         Current admission status: Inpatient   Preferred Pharmacy:   [facility] - [place], [place] - [address]  [address] [address]  Phone: [phone] Fax: [phone]    [id number] - [place], [place] - [address]  [address] [address]  Phone: [phone] Fax: [phone]    [facility] - [address] [address]  Phone: [phone] Fax: [phone]    Primary Care Provider: Kennedy Castellon MD    Primary Insurance: MEDICARE  Secondary Insurance: [facility]    DISCHARGE DETAILS:    CM met to discuss d/c planning  Pt was accepted to [facility] but waiting on decision from [facility]  Pt's preference is [facility]

## 2024-04-16 NOTE — PROGRESS NOTES
"[facility]  Progress Note  Name: Lizzie Whitten I  MRN: [id number]  Unit/Bed#: [facility] 815-01 I Date of Admission: 4/13/2024   Date of Service: 4/16/2024 I Hospital Day: 3    Assessment/Plan   Hemopneumothorax on right  Assessment & Plan  - \"Small multiloculated right chest pleural collection containing few tiny bubbles of gas. Few bubbles of gas are also noted in the mediastinum (pneumomediastinum). Small amount of right chest subcutaneous emphysema.\"  - No chest tube needed  - No ptx on repeat CXR  - Aggressive pulmonary hygiene  - Supplemental O2 PRN for sat > 94%         CHACHA (acute kidney injury) ([facility])  Assessment & Plan  - Cr on admission 1.7  - Baseline Cr 1.4  - Resolved with 500 cc bolus resuscitation  - Continue to monitor Cr  - Avoid nephrotoxins  - Encourage PO intake    COPD (chronic obstructive pulmonary disease) ([facility])  Assessment & Plan  - History of COPD  - Respiratory protocol, airway clearance protocol  - Aggressive pulmonary hygiene for this patient with acute rib fractures  - High risk for pneumonia    PAF (paroxysmal atrial fibrillation) ([facility])  Assessment & Plan  - Continue home lopressor, rate controlled   - Hold home Eliquis due to acute small hemothorax, restart as able    Mixed hyperlipidemia  Assessment & Plan  - Continue home lipitor      Benign hypertension with chronic kidney disease, stage III ([facility])  Assessment & Plan  Lab Results   Component Value Date    EGFR 33 04/15/2024    EGFR 34 04/14/2024    EGFR 27 04/13/2024    CREATININE 1.47 (H) 04/15/2024    CREATININE 1.42 (H) 04/14/2024    CREATININE 1.70 (H) 04/13/2024     - Baseline Cr 1.4  - Continue current HTN medication regimen.  - Avoid nephrotoxins  - Outpatient follow-up with PCP.      Hypothyroidism  Assessment & Plan  - Continue home synthroid  - Appreciate Geriatric medicine consult and recommendations    Type 2 diabetes mellitus with hyperglycemia, with long-term current use of insulin " "([facility])  Assessment & Plan  Lab Results   Component Value Date    HGBA1C 8.2 (H) 01/23/2024       Recent Labs     04/15/24  0846 04/15/24  1106 04/15/24  1630 04/15/24  2103   POCGLU 204* 153* 203* 145*         Blood Sugar Average: Last 72 hrs:  (P) [id number]    - Lantus 10 u QHS   - Continue mealtime insulin and SSI   - Continue to monitor    * Fracture of multiple ribs of both sides  Assessment & Plan  - Multiple right-sided rib fractures (4-8) and 4-6 are flail chest, present on admission.  - Continue rib fracture protocol.  - Continue to encourage incentive spirometer use and adequate pulmonary hygiene.  Currently pulling 500 mL on I.S.  - Appreciate APS evaluation and recommendations.  - Continue multimodal analgesic regimen.  - Supplemental oxygen via nasal cannula as needed to maintain saturations greater than or equal to 94%.  - Repeat chest x-ray from 4/15 reviewed.   - PT and OT evaluation and treatment as indicated.  - Outpatient follow-up in the trauma clinic for re-evaluation in approximately 2 weeks.               Bowel Regimen: senokot  VTE Prophylaxis:Sequential compression device (Venodyne)  and Enoxaparin (Lovenox)     Disposition: PT/OT evaluated and recommends inpatient rehab. Placement pending     Subjective   Chief Complaint: \"I feel tired\"    Subjective: Patient states her pain is well controlled. She could not fall asleep until late last night. Last BM was yesterday.      Objective   Vitals:   Temp:  [97.4 °F (36.3 °C)-98.8 °F (37.1 °C)] 97.6 °F (36.4 °C)  HR:  [60-64] 62  Resp:  [16-18] 18  BP: ()/(57-72) 152/68    I/O         04/14 0701  04/15 0700 04/15 0701  04/16 0700 04/16 0701  04/17 0700    P.O.  390     IV Piggyback 50      Total Intake(mL/kg) 50 (0.6) 390 (4.7)     Urine (mL/kg/hr) 400 (0.2) 300 (0.2)     Stool 0      Total Output 400 300     Net -350 +90            Unmeasured Urine Occurrence 1 x      Unmeasured Stool Occurrence 1 x               Physical Exam: "   GENERAL APPEARANCE: appears in no acute distress  NEURO: GCS 15, no focal neurologic deficits  HEENT: NCAT  CV: irregularly irregularly   LUNGS: no respiratory distress, no increased work of breathing, lungs clear to auscultation bilaterally, on 1L NC  GI: soft, nontender, nondistended  : voiding   MSK: moving all extremities equally with 5/5 strength  SKIN: warm, dry     Invasive Devices       Peripheral Intravenous Line  Duration             Peripheral IV 04/13/24 Left Antecubital 2 days              Drain  Duration             External Urinary Catheter 2 days                     PIC Score  PIC Pain Score: 2 (4/16/2024 12:00 AM)  PIC Incentive Spirometry Score: 2 (4/16/2024 12:00 AM)  PIC Cough Description: 2 (4/16/2024 12:00 AM)  PIC Total Score: 6 (4/16/2024 12:00 AM)       If the Total PIC Score </=5, did you consult APS and evaluate patient for further intervention?: APS consulted already placed       Pain:    Incentive Spirometry  Cough  3 = Controlled  4 = Above goal volume 3 = Strong  2 = Moderate  3 = Goal to alert volume 2 = Weak  1 = Severe  2 = Below alert volume 1 = Absent     1 = Unable to perform IS         Lab Results: Results: I have personally reviewed all pertinent laboratory/tests results  Imaging: I have personally reviewed pertinent reports.     Other Studies:         normal (ped)...

## 2024-04-16 NOTE — PROGRESS NOTES
Progress Note - Acute Pain Service    Lizzie Whitten 84 y.o. female MRN: [id number]  Unit/Bed#: [facility] 815-01 Encounter: [id number]      Lizzie Whitten is a 84 y.o. female with PMH s/f CHF EF 65%, DM2, heart block s/p pacemaker, COPD, a-fib on eliquis (last dose 4/12), presents on 4/13 after falling in the evening on 4/12. Found to have Right rib fx 4-8 with flail chest and pneumomediastinum (no chest tube). Acute Pain Service consulted for post post traumatic pain management with rib fractures.     Patient was sitting in a chair, no acute distress. I observed the patient using her IS and she achieved no more than 250-300 cc per attempt. I spoke with the nurse who stated that she did much better at PT today.     She doesn't appear to be in a lot of pain and states that at night she was uncomfortable. We discussed both an epidural and other regional techniques. Given her posterior pain I think an erector spinae block or catheter would also be a reasonable option.     The patient and family would like to be re-evaluated tomorrow and perhaps make a determination at that time. They are in no rush to be discharged.    * Fracture of multiple ribs of both sides  Assessment & Plan  - Right side rib fx 4-8 s/p fall on 4/12    - Continue oxycodone 2.5mg/5mg q4hrs PRN mod/sev pain  - consider discontinuing dilaudid IV 0.2 mg q2h PRN breakthrough pain  - Las dose eliquis reported to be 4/12  - Continue tylenol 975mg q8hrs scheduled  - Lidocaine patches  - consider discontinuing gabapentin 100 mg qhs due to some confusion this AM  - no intervention at this time, will re-evaluate tomorrow morning    - OOB as tolerated  - aggressive pulm hygiene with frequent IS use        APS will continue to follow. Please contact Acute Pain Service - via [employer] from [phone] with additional questions or concerns. See [employer] or [place] for additional contacts and after hours information.     Pain History  Current pain location(s):   Pain Score: 3  Pain Location/Orientation: Location: Rib Cage, Orientation: Bilateral  Pain Scale: Pain Assessment Tool: 0-10  24 hour history: no acute events    Opioid requirement previous 24 hours: one dose 5 mg oxycodone    Meds/Allergies   all current active meds have been reviewed    Allergies   Allergen Reactions    Ampicillin Hives       Objective        Vitals:    04/15/24 2233 04/16/24 0546 04/16/24 0715 04/16/24 0716   BP: 151/67 147/68 152/68 152/68   Pulse: 60 64 60 62   Resp: 16  18 18   Temp: 98.8 °F (37.1 °C)  97.6 °F (36.4 °C) 97.6 °F (36.4 °C)   TempSrc:       SpO2: 95%  97% 98%   Weight:       Height:             Physical Exam  Constitutional:       Appearance: She is obese.   HENT:      Head: Normocephalic and atraumatic.      Nose: Nose normal.   Pulmonary:      Effort: Pulmonary effort is normal.   Chest:      Chest wall: Tenderness present.   Musculoskeletal:         General: Normal range of motion.      Cervical back: Normal range of motion.   Skin:     General: Skin is warm.   Neurological:      General: No focal deficit present.      Mental Status: She is alert and oriented to person, place, and time. Mental status is at baseline.   Psychiatric:         Mood and Affect: Mood normal.         Behavior: Behavior normal.         Thought Content: Thought content normal.         Judgment: Judgment normal.       Lab Results:   Estimated Creatinine Clearance: 26.2 mL/min (A) (by C-G formula based on SCr of 1.58 mg/dL (H)).  Lab Results   Component Value Date    WBC 7.28 04/16/2024    WBC 7.7 12/06/2016    HGB 11.2 (L) 04/16/2024    HGB 11.6 (L) 12/06/2016    HCT 37.3 04/16/2024    HCT 36.4 12/06/2016     04/16/2024     12/06/2016         Component Value Date/Time     12/06/2016 0939    K 4.5 04/16/2024 0534    K 4.2 12/06/2016 0939    CL 95 (L) 04/16/2024 0534     12/06/2016 0939    CO2 36 (H) 04/16/2024 0534    CO2 28 12/06/2016 0939    BUN 41 (H) 04/16/2024 0534    BUN 41  (H) 12/06/2016 0939    CREATININE 1.58 (H) 04/16/2024 0534    CREATININE 1.23 (H) 12/06/2016 0939         Component Value Date/Time    CALCIUM 8.9 04/16/2024 0534    CALCIUM 9.2 12/06/2016 0939    ALKPHOS 80 04/13/2024 1118    ALKPHOS 86 09/06/2016 0907    AST 16 04/13/2024 1118    AST 18 09/06/2016 0907    ALT 6 (L) 04/13/2024 1118    ALT 21 09/06/2016 0907    BILITOT 0.6 09/06/2016 0907    TP 6.4 04/13/2024 1118    ALB 3.4 (L) 04/13/2024 1118    ALB 4.0 09/06/2016 0907       Imaging Studies/EKG: I have personally reviewed pertinent reports.       Please note that the APS provides consultative services regarding pain management only.  With the exception of ketamine and epidural infusions and except when indicated, final decisions regarding starting or changing doses of analgesic medications are at the discretion of the consulting service.    Kane Edmondson MD   Acute Pain Service

## 2024-04-17 PROBLEM — G89.11 ACUTE PAIN DUE TO TRAUMA: Status: ACTIVE | Noted: 2024-04-17

## 2024-04-17 PROBLEM — R29.6 MULTIPLE FALLS: Status: ACTIVE | Noted: 2024-04-17

## 2024-04-17 LAB
ANION GAP SERPL CALCULATED.3IONS-SCNC: 5 MMOL/L (ref 4–13)
BUN SERPL-MCNC: 40 MG/DL (ref 5–25)
CALCIUM SERPL-MCNC: 8.8 MG/DL (ref 8.4–10.2)
CHLORIDE SERPL-SCNC: 95 MMOL/L (ref 96–108)
CO2 SERPL-SCNC: 35 MMOL/L (ref 21–32)
CREAT SERPL-MCNC: 1.53 MG/DL (ref 0.6–1.3)
GFR SERPL CREATININE-BSD FRML MDRD: 31 ML/MIN/1.73SQ M
GLUCOSE SERPL-MCNC: 146 MG/DL (ref 65–140)
GLUCOSE SERPL-MCNC: 154 MG/DL (ref 65–140)
GLUCOSE SERPL-MCNC: 185 MG/DL (ref 65–140)
GLUCOSE SERPL-MCNC: 208 MG/DL (ref 65–140)
GLUCOSE SERPL-MCNC: 221 MG/DL (ref 65–140)
POTASSIUM SERPL-SCNC: 4.5 MMOL/L (ref 3.5–5.3)
SARS-COV-2 RNA RESP QL NAA+PROBE: NEGATIVE
SODIUM SERPL-SCNC: 135 MMOL/L (ref 135–147)

## 2024-04-17 PROCEDURE — 82948 REAGENT STRIP/BLOOD GLUCOSE: CPT

## 2024-04-17 PROCEDURE — 80048 BASIC METABOLIC PNL TOTAL CA: CPT | Performed by: PHYSICIAN ASSISTANT

## 2024-04-17 PROCEDURE — 97110 THERAPEUTIC EXERCISES: CPT

## 2024-04-17 PROCEDURE — 87635 SARS-COV-2 COVID-19 AMP PRB: CPT | Performed by: NURSE PRACTITIONER

## 2024-04-17 PROCEDURE — 99233 SBSQ HOSP IP/OBS HIGH 50: CPT | Performed by: STUDENT IN AN ORGANIZED HEALTH CARE EDUCATION/TRAINING PROGRAM

## 2024-04-17 PROCEDURE — 97116 GAIT TRAINING THERAPY: CPT

## 2024-04-17 PROCEDURE — 99232 SBSQ HOSP IP/OBS MODERATE 35: CPT | Performed by: ANESTHESIOLOGY

## 2024-04-17 PROCEDURE — 97535 SELF CARE MNGMENT TRAINING: CPT

## 2024-04-17 RX ADMIN — OMEGA-3 FATTY ACIDS CAP 1000 MG 1000 MG: 1000 CAP at 08:45

## 2024-04-17 RX ADMIN — ENOXAPARIN SODIUM 30 MG: 30 INJECTION SUBCUTANEOUS at 21:20

## 2024-04-17 RX ADMIN — GABAPENTIN 100 MG: 100 CAPSULE ORAL at 21:20

## 2024-04-17 RX ADMIN — METHOCARBAMOL 500 MG: 500 TABLET ORAL at 13:13

## 2024-04-17 RX ADMIN — Medication 2.5 MG: at 14:31

## 2024-04-17 RX ADMIN — CHLORHEXIDINE GLUCONATE 0.12% ORAL RINSE 15 ML: 1.2 LIQUID ORAL at 08:37

## 2024-04-17 RX ADMIN — LIDOCAINE 5% 2 PATCH: 700 PATCH TOPICAL at 08:38

## 2024-04-17 RX ADMIN — METOPROLOL TARTRATE 25 MG: 25 TABLET, FILM COATED ORAL at 06:15

## 2024-04-17 RX ADMIN — CITALOPRAM HYDROBROMIDE 20 MG: 20 TABLET ORAL at 08:45

## 2024-04-17 RX ADMIN — CHLORHEXIDINE GLUCONATE 0.12% ORAL RINSE 15 ML: 1.2 LIQUID ORAL at 21:20

## 2024-04-17 RX ADMIN — ACETAMINOPHEN 975 MG: 325 TABLET, FILM COATED ORAL at 21:20

## 2024-04-17 RX ADMIN — SODIUM CHLORIDE 2 DROP: 50 SOLUTION OPHTHALMIC at 21:20

## 2024-04-17 RX ADMIN — ATORVASTATIN CALCIUM 80 MG: 80 TABLET, FILM COATED ORAL at 21:20

## 2024-04-17 RX ADMIN — ENOXAPARIN SODIUM 30 MG: 30 INJECTION SUBCUTANEOUS at 08:37

## 2024-04-17 RX ADMIN — PANTOPRAZOLE SODIUM 40 MG: 40 TABLET, DELAYED RELEASE ORAL at 06:08

## 2024-04-17 RX ADMIN — FUROSEMIDE 20 MG: 20 TABLET ORAL at 08:45

## 2024-04-17 RX ADMIN — ACETAMINOPHEN 975 MG: 325 TABLET, FILM COATED ORAL at 13:13

## 2024-04-17 RX ADMIN — INSULIN LISPRO 4 UNITS: 100 INJECTION, SOLUTION INTRAVENOUS; SUBCUTANEOUS at 12:04

## 2024-04-17 RX ADMIN — OXYCODONE HYDROCHLORIDE 5 MG: 5 TABLET ORAL at 18:52

## 2024-04-17 RX ADMIN — SENNOSIDES, DOCUSATE SODIUM 2 TABLET: 8.6; 5 TABLET ORAL at 17:33

## 2024-04-17 RX ADMIN — POLYETHYLENE GLYCOL 3350 17 G: 17 POWDER, FOR SOLUTION ORAL at 08:46

## 2024-04-17 RX ADMIN — ACETAMINOPHEN 975 MG: 325 TABLET, FILM COATED ORAL at 06:08

## 2024-04-17 RX ADMIN — OMEGA-3 FATTY ACIDS CAP 1000 MG 1000 MG: 1000 CAP at 17:33

## 2024-04-17 RX ADMIN — SODIUM CHLORIDE 2 DROP: 50 SOLUTION OPHTHALMIC at 08:47

## 2024-04-17 RX ADMIN — LEVOTHYROXINE SODIUM 150 MCG: 75 TABLET ORAL at 06:08

## 2024-04-17 RX ADMIN — INSULIN LISPRO 2 UNITS: 100 INJECTION, SOLUTION INTRAVENOUS; SUBCUTANEOUS at 17:37

## 2024-04-17 RX ADMIN — METHOCARBAMOL 500 MG: 500 TABLET ORAL at 21:20

## 2024-04-17 RX ADMIN — METOPROLOL TARTRATE 25 MG: 25 TABLET, FILM COATED ORAL at 17:33

## 2024-04-17 RX ADMIN — METHOCARBAMOL 500 MG: 500 TABLET ORAL at 06:08

## 2024-04-17 RX ADMIN — SENNOSIDES, DOCUSATE SODIUM 2 TABLET: 8.6; 5 TABLET ORAL at 08:45

## 2024-04-17 RX ADMIN — INSULIN LISPRO 3 UNITS: 100 INJECTION, SOLUTION INTRAVENOUS; SUBCUTANEOUS at 07:49

## 2024-04-17 RX ADMIN — INSULIN LISPRO 3 UNITS: 100 INJECTION, SOLUTION INTRAVENOUS; SUBCUTANEOUS at 17:38

## 2024-04-17 RX ADMIN — INSULIN LISPRO 2 UNITS: 100 INJECTION, SOLUTION INTRAVENOUS; SUBCUTANEOUS at 07:49

## 2024-04-17 NOTE — OCCUPATIONAL THERAPY NOTE
Occupational Therapy Progress Note     Patient Name: Lizzie Whitten  Today's Date: 4/17/2024  Problem List  Principal Problem:    Fracture of multiple ribs of both sides  Active Problems:    Type 2 diabetes mellitus with hyperglycemia, with long-term current use of insulin ([facility])    Hypothyroidism    Benign hypertension with chronic kidney disease, stage III ([facility])    Mixed hyperlipidemia    Presence of permanent cardiac pacemaker    PAF (paroxysmal atrial fibrillation) ([facility])    COPD (chronic obstructive pulmonary disease) ([facility])    CHACHA (acute kidney injury) ([facility])    Hemopneumothorax on right    Multiple falls    Acute pain due to trauma          04/17/24 0956   OT Last Visit   OT Visit Date 04/17/24   Note Type   Note Type Treatment   Pain Assessment   Pain Assessment Tool 0-10   Pain Score 6   Pain Location/Orientation Orientation: Right;Location: Chest;Location: Back   Pain Onset/Description Onset: Ongoing   Effect of Pain on Daily Activities Increased time for activities   Patient's Stated Pain Goal No pain   Hospital Pain Intervention(s) Repositioned;Ambulation/increased activity;Emotional support   Restrictions/Precautions   Weight Bearing Precautions Per Order No   Other Precautions Chair Alarm;Bed Alarm;Multiple lines;Fall Risk;Pain   Lifestyle   Autonomy Independent with ADLS and some IADLS   Reciprocal Relationships lives with spouse at [facility], spouse is able to assist as needed   Service to Others Retired   Intrinsic Gratification Enjoys spending time with spouse and when family visits   ADL   Where Assessed Edge of bed   Grooming Assistance 5  Supervision/Setup   Grooming Deficit Wash/dry hands;Wash/dry face;Brushing hair;Teeth care   Grooming Comments seated EOB with s/u assist and SUP for safety   UB Bathing Assistance 4  Minimal Assistance   UB Bathing Deficit Right arm;Chest;Abdomen;Left arm   LB Bathing Assistance 3  Moderate Assistance   LB Bathing Deficit Perineal area;Buttocks;Right upper  "leg;Left upper leg;Right lower leg including foot;Left lower leg including foot   UB Dressing Assistance 4  Minimal Assistance   UB Dressing Deficit Thread RUE;Thread LUE;Pull around back   LB Dressing Assistance 2  Maximal Assistance   LB Dressing Deficit Don/doff R sock;Don/doff L sock   Bed Mobility   Supine to Sit 4  Minimal assistance   Additional items Assist x 1;Increased time required;Verbal cues   Sit to Supine Unable to assess   Additional Comments Pt OOB in recliner post session, all needs met, call bell within reach. +chair alarm   Transfers   Sit to Stand 4  Minimal assistance   Additional items Assist x 1;Increased time required;Verbal cues   Stand to Sit 4  Minimal assistance   Additional items Assist x 1;Increased time required;Verbal cues   Additional Comments RW in stance. Pt with no c/o dizziness t/o session, BP remained stable at 131/62 t/o session in different positions.   Functional Mobility   Functional Mobility 4  Minimal assistance   Additional Comments Ax1, RW in stance. Short household distances from bed>chair.   Additional items Rolling walker   Subjective   Subjective \"I can do it\"   Cognition   Overall Cognitive Status Impaired   Arousal/Participation Alert;Cooperative   Attention Attends with cues to redirect   Orientation Level Oriented X4   Memory Decreased recall of recent events   Following Commands Follows one step commands without difficulty   Comments Pt pleasant and cooperative. Increased orientation on this date with ability to ask questions and respond appropriately. Requiring some VCs for safety awareness and task completion.   Activity Tolerance   Activity Tolerance Patient limited by fatigue;Patient limited by pain   Medical Staff Made Aware RN cleared for therapy/updated   Assessment   Assessment Patient participated in Skilled OT session this date with interventions consisting of ADL re training with the use of correct body mechnaics, Energy Conservation techniques, " safety awareness and fall prevention techniques,  therapeutic activities to: increase activity tolerance, and increase OOB/ sitting tolerance . Patient agreeable to OT treatment session, upon arrival patient was found supine in bed, alert, responsive , and in no apparent distress.  In comparison to previous session, patient with improvements in ADL completion, functional mobility. Patient requiring verbal cues for safety, frequent rest periods, and ocassional safety reminders. Pt completed functional STS txfs and functional mobility with min Ax1, RW in stance. Pt required SUP for grooming. Min A for UB dressing and bathing. Mod A for LB bathing. Max A for LB dressing. Patient continues to be functioning below baseline level, occupational performance remains limited secondary to factors listed above and increased risk for falls and injury. The patient's raw score on the AM-PAC Daily Activity Inpatient Short Form is 15. A raw score of less than 19 suggests the patient may benefit from discharge to post-acute rehabilitation services. Please refer to the recommendation of the Occupational Therapist for safe discharge planning. From OT standpoint, recommendation at time of d/c would be Level 1 resources. Patient to benefit from continued Occupational Therapy treatment while in the hospital to address deficits as defined above and maximize level of functional independence with ADLs and functional mobility.   Plan   Treatment Interventions ADL retraining;Functional transfer training;Endurance training;Cognitive reorientation;Patient/family training;Compensatory technique education;Continued evaluation;Energy conservation;Activityengagement   Goal Expiration Date 04/29/24   OT Treatment Day 1   OT Frequency 2-3x/wk   Discharge Recommendation   Rehab Resource Intensity Level, OT II (Moderate Resource Intensity)   AM-PAC Daily Activity Inpatient   Lower Body Dressing 2   Bathing 2   Toileting 2   Upper Body Dressing 3    Grooming 3   Eating 3   Daily Activity Raw Score 15   Daily Activity Standardized Score (Calc for Raw Score >=11) 34.69   AM-PAC Applied Cognition Inpatient   Following a Speech/Presentation 3   Understanding Ordinary Conversation 4   Taking Medications 3   Remembering Where Things Are Placed or Put Away 3   Remembering List of 4-5 Errands 3   Taking Care of Complicated Tasks 3   Applied Cognition Raw Score 19   Applied Cognition Standardized Score 39.77   End of Consult   Education Provided Yes   Patient Position at End of Consult Bedside chair;Bed/Chair alarm activated;All needs within reach   Nurse Communication Nurse aware of consult         Jean Corbett MS, OTR/L

## 2024-04-17 NOTE — ASSESSMENT & PLAN NOTE
- Right-sided hemothorax, present on admission.  - CT imaging of the chest noted a small multiloculated right chest pleural fluid collection.  - No thoracostomy tube necessary for this injury/issue at this time.  - Management of rib fractures as noted.  - Continue to encourage incentive spirometer use and adequate pulmonary hygiene.  Currently pulling 500 mL on I.S.  - Supplemental oxygen via nasal cannula as needed.  Wean oxygen as able for goal saturation >/= 92%.   - Repeat chest x-ray from 4/15/2024 reviewed.   - Outpatient follow-up in the trauma clinic for re-evaluation in approximately 2 weeks.

## 2024-04-17 NOTE — ASSESSMENT & PLAN NOTE
- Patient with chronic history of hyperlipidemia.  - Continue home lipitor therapy.  - Outpatient follow-up routine.

## 2024-04-17 NOTE — PLAN OF CARE
Problem: OCCUPATIONAL THERAPY ADULT  Goal: Performs self-care activities at highest level of function for planned discharge setting.  See evaluation for individualized goals.  Description: Treatment Interventions: ADL retraining, Functional transfer training, Endurance training, Cognitive reorientation, Equipment evaluation/education, Compensatory technique education, Energy conservation          See flowsheet documentation for full assessment, interventions and recommendations.   Note: Limitation: Decreased ADL status, Decreased cognition, Decreased endurance, Decreased self-care trans, Decreased high-level ADLs  Prognosis: Fair  Assessment: Patient participated in Skilled OT session this date with interventions consisting of ADL re training with the use of correct body mechnaics, Energy Conservation techniques, safety awareness and fall prevention techniques,  therapeutic activities to: increase activity tolerance, and increase OOB/ sitting tolerance . Patient agreeable to OT treatment session, upon arrival patient was found supine in bed, alert, responsive , and in no apparent distress.  In comparison to previous session, patient with improvements in ADL completion, functional mobility. Patient requiring verbal cues for safety, frequent rest periods, and ocassional safety reminders. Pt completed functional STS txfs and functional mobility with min Ax1, RW in stance. Pt required SUP for grooming. Min A for UB dressing and bathing. Mod A for LB bathing. Max A for LB dressing. Patient continues to be functioning below baseline level, occupational performance remains limited secondary to factors listed above and increased risk for falls and injury. The patient's raw score on the AM-PAC Daily Activity Inpatient Short Form is 15. A raw score of less than 19 suggests the patient may benefit from discharge to post-acute rehabilitation services. Please refer to the recommendation of the Occupational Therapist for safe  discharge planning. From OT standpoint, recommendation at time of d/c would be Level 1 resources. Patient to benefit from continued Occupational Therapy treatment while in the hospital to address deficits as defined above and maximize level of functional independence with ADLs and functional mobility.     Rehab Resource Intensity Level, OT: II (Moderate Resource Intensity)

## 2024-04-17 NOTE — ASSESSMENT & PLAN NOTE
- Status post falls x2 with the below noted injuries.  - Fall precautions.  - Geriatric Medicine consultation for evaluation, medication review and recommendations.  - PT and OT evaluation and treatment as indicated.  - Case Management consultation for disposition planning.

## 2024-04-17 NOTE — ASSESSMENT & PLAN NOTE
- Multiple right-sided rib fractures (4th-8th) with flail chest segment from ribs 4th-6th, present on admission.  - Continue rib fracture protocol.  - Continue to encourage incentive spirometer use and adequate pulmonary hygiene.  Currently pulling 500 mL on I.S.  - Monitor PIC score.  - Appreciate APS evaluation and recommendations.  - Continue multimodal analgesic regimen.  - Supplemental oxygen via nasal cannula as needed to maintain saturations greater than or equal to 94%.  - Repeat chest x-ray from 4/15/2024 reviewed.   - PT and OT evaluation and treatment as indicated.  - Outpatient follow-up in the trauma clinic for re-evaluation in approximately 2 weeks.

## 2024-04-17 NOTE — ASSESSMENT & PLAN NOTE
Lab Results   Component Value Date    HGBA1C 8.2 (H) 01/23/2024       Recent Labs     04/16/24  0805 04/16/24  1136 04/16/24  1602 04/16/24  2100   POCGLU 97 197* 131 144*         Blood Sugar Average: Last 72 hrs:  (P) [id number]    - Patient with chronic history of type 2 diabetes mellitus.  - Continue current subcutaneous insulin regimen and adjust as indicated.  - Hypoglycemia protocol as needed.  - Resume home medication therapy on discharge as appropriate.  - Outpatient follow-up routine.

## 2024-04-17 NOTE — ASSESSMENT & PLAN NOTE
Lab Results   Component Value Date    EGFR 30 04/16/2024    EGFR 33 04/15/2024    EGFR 34 04/14/2024    CREATININE 1.58 (H) 04/16/2024    CREATININE 1.47 (H) 04/15/2024    CREATININE 1.42 (H) 04/14/2024     - Patient with chronic history of hypertension and and associated CKD stage III with A baseline creatinine of 1.4.    - Avoid hypotension in setting of mild acute CHACHA.  - Continue current medication regimen and adjust as indicated.  - Outpatient follow-up per routine.

## 2024-04-17 NOTE — PLAN OF CARE
Problem: PAIN - ADULT  Goal: Verbalizes/displays adequate comfort level or baseline comfort level  Description: Interventions:  - Encourage patient to monitor pain and request assistance  - Assess pain using appropriate pain scale  - Administer analgesics based on type and severity of pain and evaluate response  - Implement non-pharmacological measures as appropriate and evaluate response  - Consider cultural and social influences on pain and pain management  - Notify physician/advanced practitioner if interventions unsuccessful or patient reports new pain  Outcome: Progressing     Problem: INFECTION - ADULT  Goal: Absence or prevention of progression during hospitalization  Description: INTERVENTIONS:  - Assess and monitor for signs and symptoms of infection  - Monitor lab/diagnostic results  - Monitor all insertion sites, i.e. indwelling lines, tubes, and drains  - Monitor endotracheal if appropriate and nasal secretions for changes in amount and color  - [facility] appropriate cooling/warming therapies per order  - Administer medications as ordered  - Instruct and encourage patient and family to use good hand hygiene technique  - Identify and instruct in appropriate isolation precautions for identified infection/condition  Outcome: Progressing  Goal: Absence of fever/infection during neutropenic period  Description: INTERVENTIONS:  - Monitor WBC    Outcome: Progressing     Problem: SAFETY ADULT  Goal: Patient will remain free of falls  Description: INTERVENTIONS:  - Educate patient/family on patient safety including physical limitations  - Instruct patient to call for assistance with activity   - Consult OT/PT to assist with strengthening/mobility   - Keep Call bell within reach  - Keep bed low and locked with side rails adjusted as appropriate  - Keep care items and personal belongings within reach  - Initiate and maintain comfort rounds  - Make Fall Risk Sign visible to staff  - Offer Toileting every 2 Hours,  in advance of need  - Initiate/Maintain bed alarm  - Obtain necessary fall risk management equipment: bed alarm  - Apply yellow socks and bracelet for high fall risk patients  - Consider moving patient to room near nurses station  Outcome: Progressing  Goal: Maintain or return to baseline ADL function  Description: INTERVENTIONS:  -  Assess patient's ability to carry out ADLs; assess patient's baseline for ADL function and identify physical deficits which impact ability to perform ADLs (bathing, care of mouth/teeth, toileting, grooming, dressing, etc.)  - Assess/evaluate cause of self-care deficits   - Assess range of motion  - Assess patient's mobility; develop plan if impaired  - Assess patient's need for assistive devices and provide as appropriate  - Encourage maximum independence but intervene and supervise when necessary  - Involve family in performance of ADLs  - Assess for home care needs following discharge   - Consider OT consult to assist with ADL evaluation and planning for discharge  - Provide patient education as appropriate  Outcome: Progressing  Goal: Maintains/Returns to pre admission functional level  Description: INTERVENTIONS:  - Perform AM-PAC 6 Click Basic Mobility/ Daily Activity assessment daily.  - Set and communicate daily mobility goal to care team and patient/family/caregiver.   - Collaborate with rehabilitation services on mobility goals if consulted  - Perform Range of Motion 3 times a day.  - Reposition patient every 2  hours.  - Dangle patient 3 times a day  - Stand patient 3 times a day  - Ambulate patient 3 times a day  - Out of bed to chair 3 times a day   - Out of bed for meals 3 times a day  - Out of bed for toileting  - Record patient progress and toleration of activity level   Outcome: Progressing     Problem: DISCHARGE PLANNING  Goal: Discharge to home or other facility with appropriate resources  Description: INTERVENTIONS:  - Identify barriers to discharge w/patient and  caregiver  - Arrange for needed discharge resources and transportation as appropriate  - Identify discharge learning needs (meds, wound care, etc.)  - Arrange for interpretive services to assist at discharge as needed  - Refer to Case Management Department for coordinating discharge planning if the patient needs post-hospital services based on physician/advanced practitioner order or complex needs related to functional status, cognitive ability, or social support system  Outcome: Progressing     Problem: Knowledge Deficit  Goal: Patient/family/caregiver demonstrates understanding of disease process, treatment plan, medications, and discharge instructions  Description: Complete learning assessment and assess knowledge base.  Interventions:  - Provide teaching at level of understanding  - Provide teaching via preferred learning methods  Outcome: Progressing     Problem: Prexisting or High Potential for Compromised Skin Integrity  Goal: Skin integrity is maintained or improved  Description: INTERVENTIONS:  - Identify patients at risk for skin breakdown  - Assess and monitor skin integrity  - Assess and monitor nutrition and hydration status  - Monitor labs   - Assess for incontinence   - Turn and reposition patient  - Assist with mobility/ambulation  - Relieve pressure over bony prominences  - Avoid friction and shearing  - Provide appropriate hygiene as needed including keeping skin clean and dry  - Evaluate need for skin moisturizer/barrier cream  - Collaborate with interdisciplinary team   - Patient/family teaching  - Consider wound care consult   Outcome: Progressing     Problem: Nutrition/Hydration-ADULT  Goal: Nutrient/Hydration intake appropriate for improving, restoring or maintaining nutritional needs  Description: Monitor and assess patient's nutrition/hydration status for malnutrition. Collaborate with interdisciplinary team and initiate plan and interventions as ordered.  Monitor patient's weight and  dietary intake as ordered or per policy. Utilize nutrition screening tool and intervene as necessary. Determine patient's food preferences and provide high-protein, high-caloric foods as appropriate.     INTERVENTIONS:  - Monitor oral intake, urinary output, labs, and treatment plans  - Assess nutrition and hydration status and recommend course of action  - Evaluate amount of meals eaten  - Assist patient with eating if necessary   - Allow adequate time for meals  - Recommend/ encourage appropriate diets, oral nutritional supplements, and vitamin/mineral supplements  - Order, calculate, and assess calorie counts as needed  - Recommend, monitor, and adjust tube feedings and TPN/PPN based on assessed needs  - Assess need for intravenous fluids  - Provide specific nutrition/hydration education as appropriate  - Include patient/family/caregiver in decisions related to nutrition  Outcome: Progressing

## 2024-04-17 NOTE — PROGRESS NOTES
Progress Note - Acute Pain Service    Lizzie Whitten 84 y.o. female MRN: [id number]  Unit/Bed#: [facility] 815-01 Encounter: [id number]      Lizzie Whitten is a 84 y.o. female with PMH s/f CHF EF 65%, DM2, heart block s/p pacemaker, COPD, a-fib on eliquis (last dose 4/12), presents on 4/13 after falling in the evening on 4/12. Found to have Right rib fx 4-8 with flail chest and pneumomediastinum (no chest tube). Acute Pain Service consulted for post post traumatic pain management with rib fractures.      Patient was sitting in a chair, no acute distress. Patient continues to get around 300ml per IS attempt, was able to sit up in bed with no significant discomfort.      Patient seems unsure if she wants any intervention for her pain, she states that her pain has remained pretty much unchanged but prefers to continue to observe for improvement rather than opting for an LINWOOD block which was discussed and recommended for her.     Advised patient that she can reach out to her team if she would like to consider a nerve block and APS team will be available for a possible intervention. Patient appreciated the discussion, all questions and concerns were addressed.     * Fracture of multiple ribs of both sides  Assessment & Plan  - Right side rib fx 4-8 s/p fall on 4/12    - Continue oxycodone 2.5mg/5mg q4hrs PRN mod/sev pain  - consider discontinuing dilaudid IV 0.2 mg q2h PRN breakthrough pain  - Last dose of eliquis reported to be 4/12  - Continue tylenol 975mg q8hrs scheduled  - Lidocaine patches  - consider discontinuing gabapentin 100 mg qhs due to some confusion this AM  - no intervention at this time, please reach out to APS team if patient would like to consider a block for her rib pain    - OOB as tolerated  - aggressive pulm hygiene with frequent IS use        APS will sign off at this time. Thank you for the consult. All opioids and other analgesics to be written at discretion of primary team. Please contact  Acute Pain Service - via [employer] from [phone] with additional questions or concerns. [facility] for additional contacts and after hours information.    Pain History  Current pain location(s):  Pain Score: 5  Pain Location/Orientation: Location: Head  Pain Scale: Pain Assessment Tool: 0-10  24 hour history: see above     Opioid requirement previous 24 hours: IV dialudid 0.2mg x1, Oxycodone 2.5mg x1     Meds/Allergies   all current active meds have been reviewed    Allergies   Allergen Reactions    Ampicillin Hives       Objective        Vitals:    04/16/24 2215 04/17/24 0612 04/17/24 0719 04/17/24 0946   BP:  136/68 139/69 131/62   Pulse:  60 60 63   Resp:   17    Temp:   98.8 °F (37.1 °C)    TempSrc:       SpO2: 95% 95% 96% 92%   Weight:       Height:             Physical Exam      Lab Results:   Estimated Creatinine Clearance: 27 mL/min (A) (by C-G formula based on SCr of 1.53 mg/dL (H)).  Lab Results   Component Value Date    WBC 7.28 04/16/2024    WBC 7.7 12/06/2016    HGB 11.2 (L) 04/16/2024    HGB 11.6 (L) 12/06/2016    HCT 37.3 04/16/2024    HCT 36.4 12/06/2016     04/16/2024     12/06/2016         Component Value Date/Time     12/06/2016 0939    K 4.5 04/17/2024 0921    K 4.2 12/06/2016 0939    CL 95 (L) 04/17/2024 0921     12/06/2016 0939    CO2 35 (H) 04/17/2024 0921    CO2 28 12/06/2016 0939    BUN 40 (H) 04/17/2024 0921    BUN 41 (H) 12/06/2016 0939    CREATININE 1.53 (H) 04/17/2024 0921    CREATININE 1.23 (H) 12/06/2016 0939         Component Value Date/Time    CALCIUM 8.8 04/17/2024 0921    CALCIUM 9.2 12/06/2016 0939    ALKPHOS 80 04/13/2024 1118    ALKPHOS 86 09/06/2016 0907    AST 16 04/13/2024 1118    AST 18 09/06/2016 0907    ALT 6 (L) 04/13/2024 1118    ALT 21 09/06/2016 0907    BILITOT 0.6 09/06/2016 0907    TP 6.4 04/13/2024 1118    ALB 3.4 (L) 04/13/2024 1118    ALB 4.0 09/06/2016 0907       Imaging Studies/EKG: I have personally reviewed pertinent  reports.       Counseling / Coordination of Care  Total floor / unit time spent today 45 minutes minutes. Greater than 50% of total time was spent with the patient and / or family counseling and / or coordination of care. A description of the counseling / coordination of care:     Please note that the APS provides consultative services regarding pain management only.  With the exception of ketamine and epidural infusions and except when indicated, final decisions regarding starting or changing doses of analgesic medications are at the discretion of the consulting service.    Hero Norman MD   Acute Pain Service

## 2024-04-17 NOTE — ASSESSMENT & PLAN NOTE
- Patient with acute kidney injury, presented to presentation, with creatinine elevation to 1.7 on presentation.  - Baseline creatinine of 1.4.  - Avoid nephrotoxic medications and hypotension.  - Continue to monitor volume status with accurate I/Os.  - Continue to monitor renal function and electrolytes.  - Encourage adequate oral intake.  - Outpatient follow-up with PCP recommended.

## 2024-04-17 NOTE — PLAN OF CARE
Problem: PHYSICAL THERAPY ADULT  Goal: Performs mobility at highest level of function for planned discharge setting.  See evaluation for individualized goals.  Description:    Equipment Recommended: Walker       See flowsheet documentation for full assessment, interventions and recommendations.  Outcome: Progressing  Note: Prognosis: Fair  Problem List: Decreased strength, Decreased endurance, Decreased mobility, Pain, Decreased safety awareness, Impaired judgement, Decreased cognition  Assessment: Pt seen for PT treatment session this date. Therapy session focused on bed mobility, functional transfers, and ambulation in order to improve overall mobility and independence. Pt requires Assist x 1 for transfers and ambulation, with limiting factors being pain, fatigue, impaired balance , gait. Pt making steady progress toward goals. Pt was left in recliner at the end of PT session with all needs in reach. Pt would benefit from continued PT services while in hospital to address remaining limitations.The patient's AM-PAC Basic Mobility Inpatient Short Form Raw Score is 16. A Raw score of less than or equal to 16 suggests the patient may benefit from discharge to post-acute rehabilitation services. Please also refer to the recommendation of the Physical Therapist for safe discharge planning. Post dc recommendation is Level 2 at this time.  Barriers to Discharge: Decreased caregiver support     Rehab Resource Intensity Level, PT: II (Moderate Resource Intensity)    See flowsheet documentation for full assessment.

## 2024-04-17 NOTE — ASSESSMENT & PLAN NOTE
- Patient with chronic history of paroxysmal atrial fibrillation with adequate heart rate control at this time.  - Continue current medication regimen including home Lopressor therapy.  - Holding chronic anticoagulation, Eliquis, due to acute small hemothorax and for potential intervention by the pain service  - Plan to restart anticoagulation when appropriate.  - Outpatient follow-up per routine.

## 2024-04-17 NOTE — ASSESSMENT & PLAN NOTE
- Patient with chronic history of hypothyroidism.  - Continue home synthroid therapy.  - Appreciate Geriatric medicine consult and recommendations.  - Outpatient follow-up per routine.

## 2024-04-17 NOTE — PROGRESS NOTES
[facility]  Progress Note  Name: Lizzie Whitten I  MRN: [id number]  Unit/Bed#: [facility] 815-01 I Date of Admission: 4/13/2024   Date of Service: 4/17/2024 I Hospital Day: 4    Assessment/Plan   Multiple falls  Assessment & Plan  - Status post falls x2 with the below noted injuries.  - Fall precautions.  - Geriatric Medicine consultation for evaluation, medication review and recommendations.  - PT and OT evaluation and treatment as indicated.  - Case Management consultation for disposition planning.      * Fracture of multiple ribs of both sides  Assessment & Plan  - Multiple right-sided rib fractures (4th-8th) with flail chest segment from ribs 4th-6th, present on admission.  - Continue rib fracture protocol.  - Continue to encourage incentive spirometer use and adequate pulmonary hygiene.  Currently pulling 500 mL on I.S.  - Monitor PIC score.  - Appreciate APS evaluation and recommendations.  - Continue multimodal analgesic regimen.  - Supplemental oxygen via nasal cannula as needed to maintain saturations greater than or equal to 94%.  - Repeat chest x-ray from 4/15/2024 reviewed.   - PT and OT evaluation and treatment as indicated.  - Outpatient follow-up in the trauma clinic for re-evaluation in approximately 2 weeks.      Hemopneumothorax on right  Assessment & Plan    - Right-sided hemothorax, present on admission.  - CT imaging of the chest noted a small multiloculated right chest pleural fluid collection.  - No thoracostomy tube necessary for this injury/issue at this time.  - Management of rib fractures as noted.  - Continue to encourage incentive spirometer use and adequate pulmonary hygiene.  Currently pulling 500 mL on I.S.  - Supplemental oxygen via nasal cannula as needed.  Wean oxygen as able for goal saturation >/= 92%.   - Repeat chest x-ray from 4/15/2024 reviewed.   - Outpatient follow-up in the trauma clinic for re-evaluation in approximately 2 weeks.            Acute pain due to trauma  Assessment & Plan  - Acute pain secondary to traumatic injuries.  - Continue multimodal analgesic regimen.  - Appreciate APS evaluation and recommendations.  - Bowel regimen as long as using opioids.  - Continue to monitor pain and adjust regimen as indicated.      CHACHA (acute kidney injury) ([facility])  Assessment & Plan  - Patient with acute kidney injury, presented to presentation, with creatinine elevation to 1.7 on presentation.  - Baseline creatinine of 1.4.  - Avoid nephrotoxic medications and hypotension.  - Continue to monitor volume status with accurate I/Os.  - Continue to monitor renal function and electrolytes.  - Encourage adequate oral intake.  - Outpatient follow-up with PCP recommended.    COPD (chronic obstructive pulmonary disease) ([facility])  Assessment & Plan  - Patient with chronic history of COPD without evidence of acute exacerbation.  - Respiratory protocol, airway clearance protocol.  - Aggressive pulmonary hygiene for this patient with acute rib fractures.  - High risk for pneumonia.  - Continue current medication regimen.  - Outpatient follow-up per routine.    PAF (paroxysmal atrial fibrillation) ([facility])  Assessment & Plan  - Patient with chronic history of paroxysmal atrial fibrillation with adequate heart rate control at this time.  - Continue current medication regimen including home Lopressor therapy.  - Holding chronic anticoagulation, Eliquis, due to acute small hemothorax and for potential intervention by the pain service  - Plan to restart anticoagulation when appropriate.  - Outpatient follow-up per routine.    Type 2 diabetes mellitus with hyperglycemia, with long-term current use of insulin ([facility])  Assessment & Plan  Lab Results   Component Value Date    HGBA1C 8.2 (H) 01/23/2024       Recent Labs     04/16/24  0805 04/16/24  1136 04/16/24  1602 04/16/24  2100   POCGLU 97 197* 131 144*         Blood Sugar Average: Last 72 hrs:  (P) [id number]    -  "Patient with chronic history of type 2 diabetes mellitus.  - Continue current subcutaneous insulin regimen and adjust as indicated.  - Hypoglycemia protocol as needed.  - Resume home medication therapy on discharge as appropriate.  - Outpatient follow-up routine.    Benign hypertension with chronic kidney disease, stage III ([facility])  Assessment & Plan  Lab Results   Component Value Date    EGFR 30 04/16/2024    EGFR 33 04/15/2024    EGFR 34 04/14/2024    CREATININE 1.58 (H) 04/16/2024    CREATININE 1.47 (H) 04/15/2024    CREATININE 1.42 (H) 04/14/2024     - Patient with chronic history of hypertension and and associated CKD stage III with A baseline creatinine of 1.4.    - Avoid hypotension in setting of mild acute CHACHA.  - Continue current medication regimen and adjust as indicated.  - Outpatient follow-up per routine.      Mixed hyperlipidemia  Assessment & Plan  - Patient with chronic history of hyperlipidemia.  - Continue home lipitor therapy.  - Outpatient follow-up routine.      Hypothyroidism  Assessment & Plan  - Patient with chronic history of hypothyroidism.  - Continue home synthroid therapy.  - Appreciate Geriatric medicine consult and recommendations.  - Outpatient follow-up per routine.             Bowel Regimen: On MiraLAX and Senokot-S.  VTE Prophylaxis:Sequential compression device (Venodyne)  and Enoxaparin (Lovenox)     Disposition: Continue current level of care.  Continue therapy evaluation and treatment as indicated.  Case management following for disposition planning with anticipated discharge to postacute care facility when appropriate.    Subjective   Chief Complaint: \"My pain is a little worse this morning.\"    Subjective: The patient notes some increased pain in her right axilla/chest wall this morning, but denied any significant issues sleeping overnight.  She denies any shortness of breath or difficulty breathing.  She has no other new complaints at this time.  She is tolerating oral intake " without nausea, vomiting or constipation.     Objective   Vitals:   Temp:  [97.6 °F (36.4 °C)-98.7 °F (37.1 °C)] 98.6 °F (37 °C)  HR:  [60-62] 60  Resp:  [16-18] 16  BP: (120-152)/(65-71) 136/68    I/O         04/15 0701  04/16 0700 04/16 0701  04/17 0700    P.O. 390 1282    Total Intake(mL/kg) 390 (4.7) 1282 (15.5)    Urine (mL/kg/hr) 300 (0.2) 1550 (0.8)    Total Output 300 1550    Net +90 -268                   Physical Exam:   GENERAL APPEARANCE: Patient in no acute distress.  HEENT: NCAT; EOMs intact; Mucous membranes moist  CV: Irregularly irregular rhythm with normal rate; no murmur/gallops/rubs appreciated.  CHEST / LUNGS: Mildly decreased breath sounds in the right base, otherwise lungs were clear to auscultation; no wheezes/rales/rhonci.  Minimal right anterior and lateral chest wall tenderness this morning.  No crepitus or deformities noted.  Patient was breathing comfortably on room air with minimal desaturation to the upper 80s while eating that improved with deep breaths.  ABD: NABS; soft; non-distended; non-tender.  : Voiding spontaneously.  EXT: +2 pulses bilaterally upper & lower extremities; no edema.  NEURO: GCS 15; no focal neurologic deficits; neurovascularly intact.  SKIN: Warm, dry and well perfused; no rash; no jaundice.    Invasive Devices       Peripheral Intravenous Line  Duration             Peripheral IV 04/13/24 Left Antecubital 3 days              Drain  Duration             External Urinary Catheter 3 days                     PIC Score  PIC Pain Score: 2 (4/17/2024  6:08 AM)  PIC Incentive Spirometry Score: 2 (4/17/2024 12:00 AM)  PIC Cough Description: 3 (4/17/2024 12:00 AM)  PIC Total Score: 8 (4/17/2024 12:00 AM)       If the Total PIC Score </=5, did you consult APS and evaluate patient for further intervention?: yes      Pain:    Incentive Spirometry  Cough  3 = Controlled  4 = Above goal volume 3 = Strong  2 = Moderate  3 = Goal to alert volume 2 = Weak  1 = Severe  2 = Below  alert volume 1 = Absent     1 = Unable to perform IS         Lab Results: Results: I have personally reviewed all pertinent laboratory/tests results  Imaging: I have personally reviewed pertinent reports.     Other Studies: N/A     Horacio Olivares PA-C  4/17/2024  06:06 AM

## 2024-04-17 NOTE — PHYSICAL THERAPY NOTE
PHYSICAL THERAPY NOTE          Patient Name: Lizzie Whitten  Today's Date: 4/17/2024 04/17/24 1237   PT Last Visit   PT Visit Date 04/17/24   Note Type   Note Type Treatment   Education   Education Provided Yes   Pain Assessment   Pain Assessment Tool 0-10   Pain Location/Orientation Orientation: Right;Location: Rib Cage   Pain Onset/Description Onset: Ongoing   Effect of Pain on Daily Activities Limited mobility   Patient's Stated Pain Goal No pain   Hospital Pain Intervention(s) Repositioned;Ambulation/increased activity   Restrictions/Precautions   Weight Bearing Precautions Per Order No   Other Precautions Chair Alarm;Bed Alarm;Multiple lines;Fall Risk;Pain  (Rib Fx precaution)   General   Chart Reviewed Yes   Cognition   Arousal/Participation Alert;Responsive   Attention Attends with cues to redirect   Following Commands Follows one step commands with increased time or repetition   Comments Pt cooperative and very pleasant during session   Subjective   Subjective Agreeable to mobilize   Bed Mobility   Supine to Sit Unable to assess   Sit to Supine Unable to assess   Additional Comments Pt OOB in chair upon arrival.   Transfers   Sit to Stand 4  Minimal assistance   Additional items Assist x 1;Increased time required;Verbal cues   Stand to Sit 4  Minimal assistance   Additional items Assist x 1;Increased time required;Verbal cues   Additional Comments w/RW- 2 STS completed   Ambulation/Elevation   Gait pattern Wide MARKOS;Forward Flexion;Step to;Short stride;Excessively slow   Gait Assistance 4  Minimal assist   Additional items Assist x 1;Tactile cues;Verbal cues   Assistive Device Rolling walker   Distance 20 ft + 35 ft   Ambulation/Elevation Additional Comments Pt making gains with mobility , chair follow  for ambulation 2* decreased endurance level./   Balance   Static Sitting Fair +   Dynamic Sitting Fair   Static  Standing Fair -   Dynamic Standing Poor +   Ambulatory Poor +   Endurance Deficit   Endurance Deficit Yes   Activity Tolerance   Activity Tolerance Patient limited by fatigue;Patient limited by pain   Nurse Made Aware RN cleared pt for therapy   Exercises   Hip Flexion Sitting;10 reps;Bilateral   Knee AROM Long Arc Quad Sitting;20 reps;Bilateral   Ankle Pumps Sitting;20 reps;Bilateral   Balance training  STS x 2   Assessment   Prognosis Fair   Problem List Decreased strength;Decreased endurance;Decreased mobility;Pain;Decreased safety awareness;Impaired judgement;Decreased cognition   Assessment Pt seen for PT treatment session this date. Therapy session focused on bed mobility, functional transfers, and ambulation in order to improve overall mobility and independence. Pt requires Assist x 1 for transfers and ambulation, with limiting factors being pain, fatigue, impaired balance , gait. Pt making steady progress toward goals. Pt was left in recliner at the end of PT session with all needs in reach. Pt would benefit from continued PT services while in hospital to address remaining limitations.The patient's [facility] Basic Mobility Inpatient Short Form Raw Score is 16. A Raw score of less than or equal to 16 suggests the patient may benefit from discharge to post-acute rehabilitation services. Please also refer to the recommendation of the Physical Therapist for safe discharge planning. Post dc recommendation is Level 2 at this time.   Barriers to Discharge Decreased caregiver support   Goals   Patient Goals to get better   STG Expiration Date 04/29/24   PT Treatment Day 1   Plan   Treatment/Interventions Functional transfer training;Therapeutic exercise;Bed mobility;Gait training;Spoke to nursing;OT   Progress Progressing toward goals   PT Frequency 3-5x/wk   Discharge Recommendation   Rehab Resource Intensity Level, PT II (Moderate Resource Intensity)   Equipment Recommended Walker   [facility] Basic Mobility Inpatient    Turning in Flat Bed Without Bedrails 3   Lying on Back to Sitting on Edge of Flat Bed Without Bedrails 2   Moving Bed to Chair 3   Standing Up From Chair Using Arms 3   Walk in Room 3   Climb 3-5 Stairs With Railing 2   Basic Mobility Inpatient Raw Score 16   Basic Mobility Standardized Score 38.32   [facility] Highest Level Of Mobility   -[facility] Goal 5: Stand one or more mins   -HLM Achieved 7: Walk 25 feet or more   Education   Education Provided Mobility training   Patient Reinforcement needed   End of Consult   Patient Position at End of Consult All needs within reach;Bed/Chair alarm activated;Bedside chair   Elda Andrade PT DPT

## 2024-04-17 NOTE — ASSESSMENT & PLAN NOTE
- Patient with chronic history of COPD without evidence of acute exacerbation.  - Respiratory protocol, airway clearance protocol.  - Aggressive pulmonary hygiene for this patient with acute rib fractures.  - High risk for pneumonia.  - Continue current medication regimen.  - Outpatient follow-up per routine.

## 2024-04-18 ENCOUNTER — APPOINTMENT (OUTPATIENT)
Dept: SURGERY | Facility: HOSPITAL | Age: 84
DRG: 183 | End: 2024-04-18
Payer: MEDICARE

## 2024-04-18 ENCOUNTER — ANESTHESIA EVENT (INPATIENT)
Dept: SURGERY | Facility: HOSPITAL | Age: 84
DRG: 183 | End: 2024-04-18
Payer: MEDICARE

## 2024-04-18 ENCOUNTER — ANESTHESIA (INPATIENT)
Dept: SURGERY | Facility: HOSPITAL | Age: 84
DRG: 183 | End: 2024-04-18
Payer: MEDICARE

## 2024-04-18 VITALS
OXYGEN SATURATION: 90 % | DIASTOLIC BLOOD PRESSURE: 71 MMHG | HEART RATE: 60 BPM | BODY MASS INDEX: 35.79 KG/M2 | SYSTOLIC BLOOD PRESSURE: 142 MMHG | WEIGHT: 182.32 LBS | HEIGHT: 60 IN | RESPIRATION RATE: 24 BRPM | TEMPERATURE: 98.8 F

## 2024-04-18 LAB
GLUCOSE SERPL-MCNC: 153 MG/DL (ref 65–140)
GLUCOSE SERPL-MCNC: 198 MG/DL (ref 65–140)

## 2024-04-18 PROCEDURE — 3E0T3BZ INTRODUCTION OF ANESTHETIC AGENT INTO PERIPHERAL NERVES AND PLEXI, PERCUTANEOUS APPROACH: ICD-10-PCS | Performed by: STUDENT IN AN ORGANIZED HEALTH CARE EDUCATION/TRAINING PROGRAM

## 2024-04-18 PROCEDURE — 99238 HOSP IP/OBS DSCHRG MGMT 30/<: CPT | Performed by: STUDENT IN AN ORGANIZED HEALTH CARE EDUCATION/TRAINING PROGRAM

## 2024-04-18 PROCEDURE — C9290 INJ, BUPIVACAINE LIPOSOME: HCPCS | Performed by: ANESTHESIOLOGY

## 2024-04-18 PROCEDURE — 82948 REAGENT STRIP/BLOOD GLUCOSE: CPT

## 2024-04-18 RX ORDER — GABAPENTIN 100 MG/1
100 CAPSULE ORAL
Start: 2024-04-18

## 2024-04-18 RX ORDER — POLYETHYLENE GLYCOL 3350 17 G/17G
17 POWDER, FOR SOLUTION ORAL DAILY
Start: 2024-04-19

## 2024-04-18 RX ORDER — ACETAMINOPHEN 325 MG/1
975 TABLET ORAL EVERY 8 HOURS SCHEDULED
Start: 2024-04-18

## 2024-04-18 RX ORDER — METHOCARBAMOL 500 MG/1
500 TABLET, FILM COATED ORAL EVERY 8 HOURS SCHEDULED
Start: 2024-04-18

## 2024-04-18 RX ORDER — OXYCODONE HYDROCHLORIDE 5 MG/1
5 TABLET ORAL EVERY 6 HOURS PRN
Qty: 30 TABLET | Refills: 0 | Status: SHIPPED | OUTPATIENT
Start: 2024-04-18 | End: 2024-04-26

## 2024-04-18 RX ORDER — INSULIN LISPRO 100 [IU]/ML
2-12 INJECTION, SOLUTION INTRAVENOUS; SUBCUTANEOUS
Start: 2024-04-18

## 2024-04-18 RX ORDER — AMOXICILLIN 250 MG
2 CAPSULE ORAL 2 TIMES DAILY
Start: 2024-04-18

## 2024-04-18 RX ORDER — INSULIN LISPRO 100 [IU]/ML
3 INJECTION, SOLUTION INTRAVENOUS; SUBCUTANEOUS
Start: 2024-04-18

## 2024-04-18 RX ORDER — BUPIVACAINE HYDROCHLORIDE 2.5 MG/ML
INJECTION, SOLUTION EPIDURAL; INFILTRATION; INTRACAUDAL
Status: DISCONTINUED | OUTPATIENT
Start: 2024-04-18 | End: 2024-04-18

## 2024-04-18 RX ORDER — LIDOCAINE 50 MG/G
2 PATCH TOPICAL DAILY
Start: 2024-04-19

## 2024-04-18 RX ORDER — FUROSEMIDE 20 MG/1
20 TABLET ORAL DAILY
Start: 2024-04-19

## 2024-04-18 RX ADMIN — OXYCODONE HYDROCHLORIDE 5 MG: 5 TABLET ORAL at 12:37

## 2024-04-18 RX ADMIN — CITALOPRAM HYDROBROMIDE 20 MG: 20 TABLET ORAL at 08:44

## 2024-04-18 RX ADMIN — PANTOPRAZOLE SODIUM 40 MG: 40 TABLET, DELAYED RELEASE ORAL at 05:26

## 2024-04-18 RX ADMIN — CHLORHEXIDINE GLUCONATE 0.12% ORAL RINSE 15 ML: 1.2 LIQUID ORAL at 08:44

## 2024-04-18 RX ADMIN — SODIUM CHLORIDE 2 DROP: 50 SOLUTION OPHTHALMIC at 08:40

## 2024-04-18 RX ADMIN — METOPROLOL TARTRATE 25 MG: 25 TABLET, FILM COATED ORAL at 05:26

## 2024-04-18 RX ADMIN — INSULIN LISPRO 2 UNITS: 100 INJECTION, SOLUTION INTRAVENOUS; SUBCUTANEOUS at 12:38

## 2024-04-18 RX ADMIN — BUPIVACAINE HYDROCHLORIDE 10 ML: 2.5 INJECTION, SOLUTION EPIDURAL; INFILTRATION; INTRACAUDAL; PERINEURAL at 13:40

## 2024-04-18 RX ADMIN — INSULIN LISPRO 2 UNITS: 100 INJECTION, SOLUTION INTRAVENOUS; SUBCUTANEOUS at 08:39

## 2024-04-18 RX ADMIN — METHOCARBAMOL 500 MG: 500 TABLET ORAL at 05:26

## 2024-04-18 RX ADMIN — INSULIN LISPRO 3 UNITS: 100 INJECTION, SOLUTION INTRAVENOUS; SUBCUTANEOUS at 08:40

## 2024-04-18 RX ADMIN — SENNOSIDES, DOCUSATE SODIUM 2 TABLET: 8.6; 5 TABLET ORAL at 08:44

## 2024-04-18 RX ADMIN — INSULIN LISPRO 3 UNITS: 100 INJECTION, SOLUTION INTRAVENOUS; SUBCUTANEOUS at 12:38

## 2024-04-18 RX ADMIN — ENOXAPARIN SODIUM 30 MG: 30 INJECTION SUBCUTANEOUS at 08:44

## 2024-04-18 RX ADMIN — OXYCODONE HYDROCHLORIDE 5 MG: 5 TABLET ORAL at 08:43

## 2024-04-18 RX ADMIN — LEVOTHYROXINE SODIUM 150 MCG: 75 TABLET ORAL at 05:26

## 2024-04-18 RX ADMIN — BUPIVACAINE 20 ML: 13.3 INJECTION, SUSPENSION, LIPOSOMAL INFILTRATION at 13:40

## 2024-04-18 RX ADMIN — FUROSEMIDE 20 MG: 20 TABLET ORAL at 08:44

## 2024-04-18 RX ADMIN — LIDOCAINE 5% 2 PATCH: 700 PATCH TOPICAL at 08:43

## 2024-04-18 RX ADMIN — OMEGA-3 FATTY ACIDS CAP 1000 MG 1000 MG: 1000 CAP at 08:44

## 2024-04-18 RX ADMIN — ACETAMINOPHEN 975 MG: 325 TABLET, FILM COATED ORAL at 05:26

## 2024-04-18 RX ADMIN — POLYETHYLENE GLYCOL 3350 17 G: 17 POWDER, FOR SOLUTION ORAL at 08:43

## 2024-04-18 RX ADMIN — OXYCODONE HYDROCHLORIDE 5 MG: 5 TABLET ORAL at 16:35

## 2024-04-18 NOTE — PLAN OF CARE
Problem: PAIN - ADULT  Goal: Verbalizes/displays adequate comfort level or baseline comfort level  Description: Interventions:  - Encourage patient to monitor pain and request assistance  - Assess pain using appropriate pain scale  - Administer analgesics based on type and severity of pain and evaluate response  - Implement non-pharmacological measures as appropriate and evaluate response  - Consider cultural and social influences on pain and pain management  - Notify physician/advanced practitioner if interventions unsuccessful or patient reports new pain  Outcome: Progressing     Problem: INFECTION - ADULT  Goal: Absence or prevention of progression during hospitalization  Description: INTERVENTIONS:  - Assess and monitor for signs and symptoms of infection  - Monitor lab/diagnostic results  - Monitor all insertion sites, i.e. indwelling lines, tubes, and drains  - Monitor endotracheal if appropriate and nasal secretions for changes in amount and color  - [place] appropriate cooling/warming therapies per order  - Administer medications as ordered  - Instruct and encourage patient and family to use good hand hygiene technique  - Identify and instruct in appropriate isolation precautions for identified infection/condition  Outcome: Progressing  Goal: Absence of fever/infection during neutropenic period  Description: INTERVENTIONS:  - Monitor WBC    Outcome: Progressing     Problem: SAFETY ADULT  Goal: Patient will remain free of falls  Description: INTERVENTIONS:  - Educate patient/family on patient safety including physical limitations  - Instruct patient to call for assistance with activity   - Consult OT/PT to assist with strengthening/mobility   - Keep Call bell within reach  - Keep bed low and locked with side rails adjusted as appropriate  - Keep care items and personal belongings within reach  - Initiate and maintain comfort rounds  - Make Fall Risk Sign visible to staff  - Apply yellow socks and bracelet  for high fall risk patients  - Consider moving patient to room near nurses station  Outcome: Progressing  Goal: Maintain or return to baseline ADL function  Description: INTERVENTIONS:  -  Assess patient's ability to carry out ADLs; assess patient's baseline for ADL function and identify physical deficits which impact ability to perform ADLs (bathing, care of mouth/teeth, toileting, grooming, dressing, etc.)  - Assess/evaluate cause of self-care deficits   - Assess range of motion  - Assess patient's mobility; develop plan if impaired  - Assess patient's need for assistive devices and provide as appropriate  - Encourage maximum independence but intervene and supervise when necessary  - Involve family in performance of ADLs  - Assess for home care needs following discharge   - Consider OT consult to assist with ADL evaluation and planning for discharge  - Provide patient education as appropriate  Outcome: Progressing  Goal: Maintains/Returns to pre admission functional level  Description: INTERVENTIONS:  - Perform AM-PAC 6 Click Basic Mobility/ Daily Activity assessment daily.  - Set and communicate daily mobility goal to care team and patient/family/caregiver.   - Collaborate with rehabilitation services on mobility goals if consulted  - Out of bed for toileting  - Record patient progress and toleration of activity level   Outcome: Progressing     Problem: DISCHARGE PLANNING  Goal: Discharge to home or other facility with appropriate resources  Description: INTERVENTIONS:  - Identify barriers to discharge w/patient and caregiver  - Arrange for needed discharge resources and transportation as appropriate  - Identify discharge learning needs (meds, wound care, etc.)  - Arrange for interpretive services to assist at discharge as needed  - Refer to Case Management Department for coordinating discharge planning if the patient needs post-hospital services based on physician/advanced practitioner order or complex needs  related to functional status, cognitive ability, or social support system  Outcome: Progressing     Problem: Knowledge Deficit  Goal: Patient/family/caregiver demonstrates understanding of disease process, treatment plan, medications, and discharge instructions  Description: Complete learning assessment and assess knowledge base.  Interventions:  - Provide teaching at level of understanding  - Provide teaching via preferred learning methods  Outcome: Progressing     Problem: Prexisting or High Potential for Compromised Skin Integrity  Goal: Skin integrity is maintained or improved  Description: INTERVENTIONS:  - Identify patients at risk for skin breakdown  - Assess and monitor skin integrity  - Assess and monitor nutrition and hydration status  - Monitor labs   - Assess for incontinence   - Turn and reposition patient  - Assist with mobility/ambulation  - Relieve pressure over bony prominences  - Avoid friction and shearing  - Provide appropriate hygiene as needed including keeping skin clean and dry  - Evaluate need for skin moisturizer/barrier cream  - Collaborate with interdisciplinary team   - Patient/family teaching  - Consider wound care consult   Outcome: Progressing     Problem: Nutrition/Hydration-ADULT  Goal: Nutrient/Hydration intake appropriate for improving, restoring or maintaining nutritional needs  Description: Monitor and assess patient's nutrition/hydration status for malnutrition. Collaborate with interdisciplinary team and initiate plan and interventions as ordered.  Monitor patient's weight and dietary intake as ordered or per policy. Utilize nutrition screening tool and intervene as necessary. Determine patient's food preferences and provide high-protein, high-caloric foods as appropriate.     INTERVENTIONS:  - Monitor oral intake, urinary output, labs, and treatment plans  - Assess nutrition and hydration status and recommend course of action  - Evaluate amount of meals eaten  - Assist  patient with eating if necessary   - Allow adequate time for meals  - Recommend/ encourage appropriate diets, oral nutritional supplements, and vitamin/mineral supplements  - Order, calculate, and assess calorie counts as needed  - Recommend, monitor, and adjust tube feedings and TPN/PPN based on assessed needs  - Assess need for intravenous fluids  - Provide specific nutrition/hydration education as appropriate  - Include patient/family/caregiver in decisions related to nutrition  Outcome: Progressing

## 2024-04-18 NOTE — ASSESSMENT & PLAN NOTE
- Acute pain secondary to traumatic injuries.  - Continue multimodal analgesic regimen.  - Appreciate APS evaluation and recommendations.   - Prior to discharge, patient had a right sided Erector Spinae regional block by APS  - Bowel regimen as long as using opioids.  - Continue to monitor pain and adjust regimen as indicated.

## 2024-04-18 NOTE — ASSESSMENT & PLAN NOTE
Lab Results   Component Value Date    HGBA1C 8.2 (H) 01/23/2024       Recent Labs     04/17/24  1125 04/17/24  1614 04/17/24  2113 04/18/24  0702   POCGLU 208* 185* 146* 153*       Blood Sugar Average: Last 72 hrs:  (P) 145.0625    - Patient with chronic history of type 2 diabetes mellitus.  - Continue current subcutaneous insulin regimen and adjust as indicated.  - Hypoglycemia protocol as needed.  - Resume home medication therapy on discharge as appropriate.  - Outpatient follow-up routine.

## 2024-04-18 NOTE — DISCHARGE INSTR - AVS FIRST PAGE
Traumatic Rib Fracture Discharge Instructions:    Your rib fractures will take time to heal. Rib fractures typically take at least 6-8 weeks to heal and may take longer.    Activity:  - Walking and normal light activities are encouraged. Normal daily activities including climbing steps are okay.  - Avoid lifting greater than 10 pounds, any strenuous activities and/or exercise, and contact sports until cleared by the trauma service.  - Continue using the incentive spirometer at least 10 times every hour while awake.    Return to work:    - You may return to work once you are cleared by the trauma service.    Medications:    - You should continue your current medication regimen after discharge unless otherwise instructed. Please refer to your discharge medication list for further details.  - Please take the pain medications as directed.  - You are encouraged to use non-narcotic pain medications first and whenever possible. Reserve the use of narcotic pain medication for moderate to severe pain not controlled by non-narcotic medications.  - No driving while taking narcotic pain medications.  - You may become constipated, especially if taking pain medications. You may take any over the counter stool softeners or laxatives as needed. Examples: Milk of Magnesia, Colace, Senna.    Additional Instructions:  - If you have any questions or concerns after discharge please call the office.  - Call office or return to ER if fever greater than 101, chills, worsening/uncontrollable pain, develop productive cough, increasing shortness of breath, and/or difficulty breathing.

## 2024-04-18 NOTE — CASE MANAGEMENT
Case Management Discharge Planning Note    Patient name Lizzie Whitten  Location [facility] 815/[facility] 815-01 MRN [id number]  : 1940 Date 2024       Current Admission Date: 2024  Current Admission Diagnosis:Fracture of multiple ribs of both sides   Patient Active Problem List    Diagnosis Date Noted    Multiple falls 2024    Acute pain due to trauma 2024    Hemopneumothorax on right 04/15/2024    Fracture of multiple ribs of both sides 2024    COPD (chronic obstructive pulmonary disease) ([facility]) 2024    CHACHA (acute kidney injury) ([facility]) 2024    Acute respiratory failure with hypoxia ([facility]) 04/10/2024    Pulmonary nodule 2024    Acute on chronic diastolic congestive heart failure ([facility]) 2024    Head trauma 2023    Traumatic hematoma of forehead 2023    Traumatic ulcer of left lower leg, limited to breakdown of skin ([facility]) 2023    Pharyngeal dysphagia 2023    Chronic cough 2023    Hand arthritis 2023    Assistance needed with transportation 2023    Abnormal CT scan, esophagus 2022    Hiatal hernia 2022    Esophageal dysphagia 2022    Hypertension 2022    Fall 2022    Secondary hyperparathyroidism of renal origin ([facility]) 2022    Memory difficulties 2022    Imbalance 2022    Coronary artery disease involving native coronary artery of native heart 2021    Dermatitis 09/15/2020    Sleep apnea 2019    Intraocular pressure increase 2018    Vitamin D deficiency 2018    Lymphedema of both lower extremities 2018    Iron deficiency anemia 10/12/2017    Urinary bladder incontinence 2017    OAB (overactive bladder) 2017    Benign hypertension with chronic kidney disease, stage III ([facility]) 2017    Microalbuminuria 2017    PAF (paroxysmal atrial fibrillation) ([facility]) 10/31/2016    Chronic venous insufficiency 2016    Presence of permanent  cardiac pacemaker 01/28/2016    Chronic diastolic congestive heart failure (HCC) 01/21/2016    Anemia 01/19/2016    S/P cardiac pacemaker procedure 01/19/2016    Hypothyroidism 01/19/2016    Complete heart block ([facility]) 01/16/2016    Type 2 diabetes mellitus with hyperglycemia, with long-term current use of insulin ([facility]) 01/15/2016    Stage 3a chronic kidney disease ([facility]) 10/21/2015    Eczematous dermatitis 03/03/2015    Anxiety disorder 06/26/2013    Chronic obstructive pulmonary disease ([facility]) 09/24/2012    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 ([facility]) 09/22/2011    Mixed hyperlipidemia 03/14/2011      LOS (days): 5  Geometric Mean LOS (GMLOS) (days): 3.1  Days to GMLOS:-1.8     OBJECTIVE:  Risk of Unplanned Readmission Score: 23.67         Current admission status: Inpatient   Preferred Pharmacy:   [facility] - [facility] [address]  [address] [address]  Phone: [phone] Fax: [phone]    [id number] - [place], [place] - [address]  [address] [address]  Phone: [phone] Fax: [phone]    [facility] - [address] [address]  Phone: [phone] Fax: [phone]    Primary Care Provider: Kennedy Castellon MD    Primary Insurance: MEDICARE  Secondary Insurance: [facility]    DISCHARGE DETAILS:    CM spoke to pt's dtr Gorge, to discuss pt's d/c.  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  CM was instructed to have the medical team call the pt's dtr Nidia vernon to discuss d/c  Pt's transport is 11am

## 2024-04-18 NOTE — ASSESSMENT & PLAN NOTE
Lab Results   Component Value Date    EGFR 31 04/17/2024    EGFR 30 04/16/2024    EGFR 33 04/15/2024    CREATININE 1.53 (H) 04/17/2024    CREATININE 1.58 (H) 04/16/2024    CREATININE 1.47 (H) 04/15/2024     - Patient with chronic history of hypertension and and associated CKD stage III with A baseline creatinine of 1.4.    - Avoid hypotension in setting of mild acute CHACHA.  - Continue current medication regimen and adjust as indicated.  - Outpatient follow-up per routine.

## 2024-04-18 NOTE — DISCHARGE SUMMARY
[facility]  Discharge- Lizzie Whitten 1940, 84 y.o. female MRN: [id number]  Unit/Bed#: [facility] 815-01 Encounter: [id number]  Primary Care Provider: Kennedy Castellon MD   Date and time admitted to hospital: 4/13/2024 10:32 AM    Acute pain due to trauma  Assessment & Plan  - Acute pain secondary to traumatic injuries.  - Continue multimodal analgesic regimen.  - Appreciate APS evaluation and recommendations.   - Prior to discharge, patient had a right sided Erector Spinae regional block by APS  - Bowel regimen as long as using opioids.  - Continue to monitor pain and adjust regimen as indicated.      Multiple falls  Assessment & Plan  - Status post falls x2 with the below noted injuries.  - Fall precautions.  - Geriatric Medicine consultation for evaluation, medication review and recommendations.  - PT and OT evaluation and treatment as indicated.  - Case Management consultation for disposition planning.      Hemopneumothorax on right  Assessment & Plan    - Right-sided hemothorax, present on admission.  - CT imaging of the chest noted a small multiloculated right chest pleural fluid collection.  - No thoracostomy tube necessary for this injury/issue at this time.  - Management of rib fractures as noted.  - Continue to encourage incentive spirometer use and adequate pulmonary hygiene.  Currently pulling 500 mL on I.S.  - Supplemental oxygen via nasal cannula as needed.  Wean oxygen as able for goal saturation >/= 92%.   - Repeat chest x-ray from 4/15/2024 reviewed.   - Outpatient follow-up in the trauma clinic for re-evaluation in approximately 2 weeks.           CHACHA (acute kidney injury) ([facility])  Assessment & Plan  - Patient with acute kidney injury, presented to presentation, with creatinine elevation to 1.7 on presentation.  - Baseline creatinine of 1.4.  - Avoid nephrotoxic medications and hypotension.  - Continue to monitor volume status with accurate I/Os.  -  Continue to monitor renal function and electrolytes.  - Encourage adequate oral intake.  - Outpatient follow-up with PCP recommended.    COPD (chronic obstructive pulmonary disease) ([facility])  Assessment & Plan  - Patient with chronic history of COPD without evidence of acute exacerbation.  - Respiratory protocol, airway clearance protocol.  - Aggressive pulmonary hygiene for this patient with acute rib fractures.  - High risk for pneumonia.  - Continue current medication regimen.  - Outpatient follow-up per routine.    PAF (paroxysmal atrial fibrillation) ([facility])  Assessment & Plan  - Patient with chronic history of paroxysmal atrial fibrillation with adequate heart rate control at this time.  - Continue current medication regimen including home Lopressor therapy.  - Holding chronic anticoagulation, Eliquis, due to acute small hemothorax and for potential intervention by the pain service  - Plan to restart anticoagulation when appropriate.  - Outpatient follow-up per routine.    Mixed hyperlipidemia  Assessment & Plan  - Patient with chronic history of hyperlipidemia.  - Continue home lipitor therapy.  - Outpatient follow-up routine.      Benign hypertension with chronic kidney disease, stage III ([facility])  Assessment & Plan  Lab Results   Component Value Date    EGFR 31 04/17/2024    EGFR 30 04/16/2024    EGFR 33 04/15/2024    CREATININE 1.53 (H) 04/17/2024    CREATININE 1.58 (H) 04/16/2024    CREATININE 1.47 (H) 04/15/2024     - Patient with chronic history of hypertension and and associated CKD stage III with A baseline creatinine of 1.4.    - Avoid hypotension in setting of mild acute CHACHA.  - Continue current medication regimen and adjust as indicated.  - Outpatient follow-up per routine.      Hypothyroidism  Assessment & Plan  - Patient with chronic history of hypothyroidism.  - Continue home synthroid therapy.  - Appreciate Geriatric medicine consult and recommendations.  - Outpatient follow-up per routine.    Type 2  "diabetes mellitus with hyperglycemia, with long-term current use of insulin ([facility])  Assessment & Plan  Lab Results   Component Value Date    HGBA1C 8.2 (H) 01/23/2024       Recent Labs     04/17/24  1125 04/17/24  1614 04/17/24  2113 04/18/24  0702   POCGLU 208* 185* 146* 153*       Blood Sugar Average: Last 72 hrs:  (P) 145.0625    - Patient with chronic history of type 2 diabetes mellitus.  - Continue current subcutaneous insulin regimen and adjust as indicated.  - Hypoglycemia protocol as needed.  - Resume home medication therapy on discharge as appropriate.  - Outpatient follow-up routine.    * Fracture of multiple ribs of both sides  Assessment & Plan  - Multiple right-sided rib fractures (4th-8th) with flail chest segment from ribs 4th-6th, present on admission.  - Continue rib fracture protocol.  - Continue to encourage incentive spirometer use and adequate pulmonary hygiene.  Currently pulling 500 mL on I.S.  - Monitor PIC score.  - Appreciate APS evaluation and recommendations.  - Continue multimodal analgesic regimen.  - Supplemental oxygen via nasal cannula as needed to maintain saturations greater than or equal to 94%.  - Repeat chest x-ray from 4/15/2024 reviewed.   - PT and OT evaluation and treatment as indicated.  - Outpatient follow-up in the trauma clinic for re-evaluation in approximately 2 weeks.      Bowel Regimen: senokot S  VTE Prophylaxis:Enoxaparin (Lovenox)     Disposition: Medically stable for discharge to rehab. Improved with  mL, on room air, pain is tolerated on an oral regimen. I called patient's daughter Nidia and she was interested in having a nerve block for her mom prior to transportation. This was coordinated and patient tolerated it well.     Subjective   Chief Complaint: \"I'm fine right now\"    Subjective: Patient is sitting in bed comfortably and reports that she has no pain at rest currently. She reports severe pain with movement. She continues to use the IS frequently " and reports relief with the pain medications. Pt is tolerating a diet and voiding.     Objective   Vitals:   Temp:  [97.8 °F (36.6 °C)-98.8 °F (37.1 °C)] 98.8 °F (37.1 °C)  HR:  [52-60] 60  Resp:  [16-24] 24  BP: (119-145)/(54-71) 142/71    I/O         04/16 0701  04/17 0700 04/17 0701  04/18 0700 04/18 0701  04/19 0700    P.O. 1282 460     Total Intake(mL/kg) 1282 (15.5) 460 (5.6)     Urine (mL/kg/hr) 1550 (0.8) 730 (0.4)     Total Output 1550 730     Net -268 -270                     Physical Exam:   GENERAL APPEARANCE: Patient in no acute distress.  HEENT: NCAT; PERRL, EOMs intact; Mucous membranes moist  CV: Regular rate and rhythm; no murmur/gallops/rubs appreciated.  CHEST / LUNGS: Clear to auscultation; no wheezes/rales/rhonci. Right posterior chest wall tenderness  ABD: NABS; soft; non-distended; non-tender.  : Voiding  EXT: +2 pulses bilaterally upper & lower extremities; no edema.  NEURO: GCS 15; no focal neurologic deficits; neurovascularly intact.  SKIN: Warm, dry and well perfused; no rash; no jaundice.      Invasive Devices       Drain  Duration             External Urinary Catheter 5 days                     PIC Score  PIC Pain Score: 2 (4/18/2024 12:37 PM)  PIC Incentive Spirometry Score: 3 (4/18/2024  4:00 AM)  PIC Cough Description: 3 (4/18/2024  4:00 AM)  PIC Total Score: 9 (4/18/2024  4:00 AM)       If the Total PIC Score </=5, did you consult APS and evaluate patient for further intervention?: yes      Pain:    Incentive Spirometry  Cough  3 = Controlled  4 = Above goal volume 3 = Strong  2 = Moderate  3 = Goal to alert volume 2 = Weak  1 = Severe  2 = Below alert volume 1 = Absent     1 = Unable to perform IS         Lab Results: Results: I have personally reviewed all pertinent laboratory/tests results  Imaging: I have personally reviewed pertinent reports.     Other Studies: none             Medical Problems       Resolved Problems  Date Reviewed: 4/18/2024   None         Admission Date:  "  Admission Orders (From admission, onward)       Ordered        04/13/24 1357  Inpatient Admission  Once                            Admitting Diagnosis: Head injury [S09.90XA]  Fall, initial encounter [W19.XXXA]  Closed fracture of multiple ribs of right side, initial encounter [S22.41XA]    HPI written by Dr. Sands 4/13/24 \"Lizzie Whitten is a 84 y.o. female who presents to [facility] ED for evaluation of after sustaining 2 falls last night. For the first fall, patient states she was walking towards the couch to sit with her family members when she fell onto her buttocks. Denies preceding CP, SOB, dizziness. Denies headstrike or LOC. For the second fall, patient states she was trying to get to the kitchen to replace her continuous glucose monitoring device when she \"lost her balance\" and fell. Denies headstrike or LOC. Patient states she takes Eliquis daily. She is currently endorsing right sided upper back pain. CT chest obtained in the ED on 4/13/24 shows right 4-8 rib fractures with ribs 5-7 fractured in 2 places, findings consistent with flail chest; pneumomediastinum; small amount of right chest subQ emphysema. \"    Procedures Performed:   Orders Placed This Encounter   Procedures    Critical Care       Summary of Hospital Course: Please see above and reference hospital medical records.     Significant Findings, Care, Treatment and Services Provided: Patient was admitted [facility] for trauma. She was found to have right sided rib fractures causing flail chest with associated tiny hemopneumothorax and pneumomediastinum. She was admitted to critical care for rib fracture protocol and observation of pneumomediastinum. Serial CXR were performed and her pneumothorax resolved. No concern for esophageal injury as patient was tolerating a diet without difficulties. She was downgraded to med surg when medically stable. PT and OT evaluated the patient and recommended discharge to rehab. CM worked with family to find the " appropriate rehab center for her. APS was consulted for pain management and completed an erector spinae block 4/18 prior to DC to rehab. Additional consult was completed by Geriatric medicine for chronic condition management.    Complications: none    Condition at Discharge: stable         Discharge instructions/Information to patient and family:   See after visit summary for information provided to patient and family.      Provisions for Follow-Up Care:  See after visit summary for information related to follow-up care and any pertinent home health orders.      PCP: Kennedy Castellon MD    Disposition: Short-term rehab at [facility]    Planned Readmission: No- but high risk due to frequent readmissions  Discharge Medications:  See after visit summary for reconciled discharge medications provided to patient and family.

## 2024-04-18 NOTE — INCIDENTAL FINDINGS
"The following findings require follow up:  Radiographic finding   Finding:     \"Homogeneously hyperdense soft tissue density in the anterior epidural space of the upper cervical spine posterior to the dens possibly representing pannus formation from rheumatoid arthritis. This results in moderately severe canal stenosis with compression of the upper cervical cord. Correlate for signs of myelopathy. Consider follow-up MRI of the cervical spine.\"   Discussed with patient's daughter Nidia over the phone. She expressed understanding and all questions and concerns were answered to her satisfaction. She reports the pt has history of RA and will follow with her PCP.  \"Reidentified cardiomegaly, coronary artery bypass grafting, and prominence of central pulmonary arterial tree suggestive of pulmonary hypertension\"   Discussed with patient's daughter Nidia over the phone. She expressed prior knowledge and understanding and reports she will continue to have the pt follow up with Cardiology.        Follow up required: Yes   Follow up should be done within 1 month(s)    Please notify the following clinician to assist with the follow up:  Primary Care Provider  Kennedy Castellon MD Phone  [phone]     "

## 2024-04-18 NOTE — PHYSICAL THERAPY NOTE
Physical Therapy Cancellation Note     04/18/24 1328   PT Last Visit   PT Visit Date 04/18/24   Note Type   Note Type Cancelled Session   Cancel Reasons Patient to operating room     Pt off the floor for nerve block. PT will hold off and attempt as appropriate.  Elda Andrade PT DPT

## 2024-04-18 NOTE — ANESTHESIA PROCEDURE NOTES
Peripheral Block    Patient location during procedure: holding area  Start time: 4/18/2024 1:40 PM  Reason for block: at surgeon's request and post-op pain management  Staffing  Performed by: Hero Norman MD  Authorized by: Hero Norman MD    Preanesthetic Checklist  Completed: patient identified, IV checked, site marked, risks and benefits discussed, surgical consent, monitors and equipment checked, pre-op evaluation and timeout performed  Peripheral Block  Patient position: sitting  Prep: ChloraPrep  Patient monitoring: frequent blood pressure checks, continuous pulse oximetry and heart rate  Block type: Erector Spinae  Laterality: right  Injection technique: single-shot  Procedures: ultrasound guided, Ultrasound guidance required for the procedure to increase accuracy and safety of medication placement and decrease risk of complications.  Ultrasound permanent image savedbupivacaine (PF) (MARCAINE) 0.25 % injection 20 mL - Perineural   10 mL - 4/18/2024 1:40:00 PM  bupivacaine liposomal (EXPAREL) 1.3 % injection 20 mL - Perineural   20 mL - 4/18/2024 1:40:00 PM  Needle  Needle type: Stimuplex   Needle gauge: 20 G  Needle length: 4 in  Needle localization: anatomical landmarks and ultrasound guidance  Assessment  Injection assessment: incremental injection, frequent aspiration, injected with ease, negative aspiration, negative for heart rate change, no paresthesia on injection, no symptoms of intraneural/intravenous injection and needle tip visualized at all times  Paresthesia pain: none  Post-procedure:  site cleaned  patient tolerated the procedure well with no immediate complications

## 2024-04-18 NOTE — CASE MANAGEMENT
Case Management Discharge Planning Note    Patient name Lizzie Whitten  Location [facility] 815/[facility] 815-01 MRN [id number]  : 1940 Date 2024       Current Admission Date: 2024  Current Admission Diagnosis:Fracture of multiple ribs of both sides   Patient Active Problem List    Diagnosis Date Noted    Multiple falls 2024    Acute pain due to trauma 2024    Hemopneumothorax on right 04/15/2024    Fracture of multiple ribs of both sides 2024    COPD (chronic obstructive pulmonary disease) ([facility]) 2024    CHACHA (acute kidney injury) ([facility]) 2024    Acute respiratory failure with hypoxia ([facility]) 04/10/2024    Pulmonary nodule 2024    Acute on chronic diastolic congestive heart failure ([facility]) 2024    Head trauma 2023    Traumatic hematoma of forehead 2023    Traumatic ulcer of left lower leg, limited to breakdown of skin ([facility]) 2023    Pharyngeal dysphagia 2023    Chronic cough 2023    Hand arthritis 2023    Assistance needed with transportation 2023    Abnormal CT scan, esophagus 2022    Hiatal hernia 2022    Esophageal dysphagia 2022    Hypertension 2022    Fall 2022    Secondary hyperparathyroidism of renal origin ([facility]) 2022    Memory difficulties 2022    Imbalance 2022    Coronary artery disease involving native coronary artery of native heart 2021    Dermatitis 09/15/2020    Sleep apnea 2019    Intraocular pressure increase 2018    Vitamin D deficiency 2018    Lymphedema of both lower extremities 2018    Iron deficiency anemia 10/12/2017    Urinary bladder incontinence 2017    OAB (overactive bladder) 2017    Benign hypertension with chronic kidney disease, stage III ([facility]) 2017    Microalbuminuria 2017    PAF (paroxysmal atrial fibrillation) ([facility]) 10/31/2016    Chronic venous insufficiency 2016    Presence of permanent  cardiac pacemaker 01/28/2016    Chronic diastolic congestive heart failure (HCC) 01/21/2016    Anemia 01/19/2016    S/P cardiac pacemaker procedure 01/19/2016    Hypothyroidism 01/19/2016    Complete heart block ([facility]) 01/16/2016    Type 2 diabetes mellitus with hyperglycemia, with long-term current use of insulin ([facility]) 01/15/2016    Stage 3a chronic kidney disease ([facility]) 10/21/2015    Eczematous dermatitis 03/03/2015    Anxiety disorder 06/26/2013    Chronic obstructive pulmonary disease ([facility]) 09/24/2012    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 ([facility]) 09/22/2011    Mixed hyperlipidemia 03/14/2011      LOS (days): 5  Geometric Mean LOS (GMLOS) (days): 3.1  Days to GMLOS:-1.8     OBJECTIVE:  Risk of Unplanned Readmission Score: 26.31         Current admission status: Inpatient   Preferred Pharmacy:   [facility] - [facility] [address]  [address] [address]  Phone: [phone] Fax: [phone]    [id number] - [place], [place] - [address]  [address] [address]  Phone: [phone] Fax: [phone]    [facility] - [address] [address]  Phone: [phone] Fax: [phone]    Primary Care Provider: Kennedy Castellon MD    Primary Insurance: MEDICARE  Secondary Insurance: [facility]    DISCHARGE DETAILS:    Pt's transport switched to [facility]  Facility aware

## 2024-04-19 ENCOUNTER — NURSING HOME VISIT (OUTPATIENT)
Dept: GERIATRICS | Facility: OTHER | Age: 84
End: 2024-04-19
Payer: MEDICARE

## 2024-04-19 ENCOUNTER — TRANSITIONAL CARE MANAGEMENT (OUTPATIENT)
Dept: FAMILY MEDICINE CLINIC | Facility: CLINIC | Age: 84
End: 2024-04-19

## 2024-04-19 ENCOUNTER — PATIENT OUTREACH (OUTPATIENT)
Dept: CASE MANAGEMENT | Facility: OTHER | Age: 84
End: 2024-04-19

## 2024-04-19 VITALS
DIASTOLIC BLOOD PRESSURE: 72 MMHG | HEART RATE: 60 BPM | TEMPERATURE: 98.5 F | BODY MASS INDEX: 34.84 KG/M2 | RESPIRATION RATE: 18 BRPM | WEIGHT: 178.4 LBS | OXYGEN SATURATION: 92 % | SYSTOLIC BLOOD PRESSURE: 156 MMHG

## 2024-04-19 DIAGNOSIS — I87.2 CHRONIC VENOUS INSUFFICIENCY: ICD-10-CM

## 2024-04-19 DIAGNOSIS — Z79.4 TYPE 2 DIABETES MELLITUS WITH HYPERGLYCEMIA, WITH LONG-TERM CURRENT USE OF INSULIN (HCC): ICD-10-CM

## 2024-04-19 DIAGNOSIS — F41.1 GENERALIZED ANXIETY DISORDER: ICD-10-CM

## 2024-04-19 DIAGNOSIS — I89.0 LYMPHEDEMA OF BOTH LOWER EXTREMITIES: ICD-10-CM

## 2024-04-19 DIAGNOSIS — R26.2 AMBULATORY DYSFUNCTION: ICD-10-CM

## 2024-04-19 DIAGNOSIS — I50.32 CHRONIC DIASTOLIC CONGESTIVE HEART FAILURE (HCC): ICD-10-CM

## 2024-04-19 DIAGNOSIS — R41.3 MEMORY DIFFICULTIES: ICD-10-CM

## 2024-04-19 DIAGNOSIS — N18.30 BENIGN HYPERTENSION WITH CHRONIC KIDNEY DISEASE, STAGE III (HCC): ICD-10-CM

## 2024-04-19 DIAGNOSIS — J94.2 HEMOPNEUMOTHORAX ON RIGHT: ICD-10-CM

## 2024-04-19 DIAGNOSIS — E55.9 VITAMIN D DEFICIENCY: ICD-10-CM

## 2024-04-19 DIAGNOSIS — I44.2 COMPLETE HEART BLOCK (HCC): ICD-10-CM

## 2024-04-19 DIAGNOSIS — N18.31 STAGE 3A CHRONIC KIDNEY DISEASE (HCC): ICD-10-CM

## 2024-04-19 DIAGNOSIS — S22.43XD MULTIPLE CLOSED FRACTURES OF RIBS OF BOTH SIDES WITH ROUTINE HEALING, SUBSEQUENT ENCOUNTER: Primary | ICD-10-CM

## 2024-04-19 DIAGNOSIS — N25.81 SECONDARY HYPERPARATHYROIDISM OF RENAL ORIGIN (HCC): ICD-10-CM

## 2024-04-19 DIAGNOSIS — I48.0 PAF (PAROXYSMAL ATRIAL FIBRILLATION) (HCC): ICD-10-CM

## 2024-04-19 DIAGNOSIS — J43.8 OTHER EMPHYSEMA (HCC): ICD-10-CM

## 2024-04-19 DIAGNOSIS — G47.33 OBSTRUCTIVE SLEEP APNEA SYNDROME: ICD-10-CM

## 2024-04-19 DIAGNOSIS — E11.65 TYPE 2 DIABETES MELLITUS WITH HYPERGLYCEMIA, WITH LONG-TERM CURRENT USE OF INSULIN (HCC): ICD-10-CM

## 2024-04-19 DIAGNOSIS — E03.9 ACQUIRED HYPOTHYROIDISM: Chronic | ICD-10-CM

## 2024-04-19 DIAGNOSIS — N17.9 AKI (ACUTE KIDNEY INJURY) (HCC): ICD-10-CM

## 2024-04-19 DIAGNOSIS — R53.81 DEBILITY: ICD-10-CM

## 2024-04-19 DIAGNOSIS — I12.9 BENIGN HYPERTENSION WITH CHRONIC KIDNEY DISEASE, STAGE III (HCC): ICD-10-CM

## 2024-04-19 DIAGNOSIS — E11.3393 MODERATE NONPROLIFERATIVE DIABETIC RETINOPATHY OF BOTH EYES ASSOCIATED WITH TYPE 2 DIABETES MELLITUS, MACULAR EDEMA PRESENCE UNSPECIFIED (HCC): ICD-10-CM

## 2024-04-19 DIAGNOSIS — K44.9 HIATAL HERNIA: ICD-10-CM

## 2024-04-19 PROBLEM — J96.01 ACUTE RESPIRATORY FAILURE WITH HYPOXIA (HCC): Status: RESOLVED | Noted: 2024-04-10 | Resolved: 2024-04-19

## 2024-04-19 PROBLEM — L30.9 DERMATITIS: Status: RESOLVED | Noted: 2020-09-15 | Resolved: 2024-04-19

## 2024-04-19 PROBLEM — I50.33 ACUTE ON CHRONIC DIASTOLIC CONGESTIVE HEART FAILURE (HCC): Status: RESOLVED | Noted: 2024-04-08 | Resolved: 2024-04-19

## 2024-04-19 PROBLEM — M19.049 HAND ARTHRITIS: Status: RESOLVED | Noted: 2023-07-03 | Resolved: 2024-04-19

## 2024-04-19 PROBLEM — S09.90XA HEAD TRAUMA: Status: RESOLVED | Noted: 2023-09-13 | Resolved: 2024-04-19

## 2024-04-19 PROCEDURE — 99306 1ST NF CARE HIGH MDM 50: CPT | Performed by: FAMILY MEDICINE

## 2024-04-19 NOTE — ASSESSMENT & PLAN NOTE
Repeatedly mechanical falls twice this month  Uses of walker for ambulation at baseline  High risk future falls due to age, hx of fall, debility and unfamiliar environment   OT/PT  Fall precautions

## 2024-04-19 NOTE — ASSESSMENT & PLAN NOTE
Small right-sided hemothorax and pneumomediatinum noted on CT chest on admission, in setting of multiple rib fracture after fall  Currently on oxygen 2L NC  Pain control, monitor respiratory status closely and wean off oxygen when able

## 2024-04-19 NOTE — ASSESSMENT & PLAN NOTE
TSH elevated 10.4 (4/9/24) when levothyroxine was increased to 150 mcg daily  Continue current dose and f/u with PCP

## 2024-04-19 NOTE — ASSESSMENT & PLAN NOTE
Wt Readings from Last 3 Encounters:   04/19/24 80.9 kg (178 lb 6.4 oz)   04/13/24 82.7 kg (182 lb 5.1 oz)   04/12/24 78.3 kg (172 lb 9.9 oz)   Maintained on Lasix chronically as outpatient  Monitor electrolytes and volume status closely, daily weight. BMP ordered

## 2024-04-19 NOTE — ASSESSMENT & PLAN NOTE
Home omeprazole 20 mg BID was substituted by pantoprazole 40 mg daily while in the hospital  Continue pantoprazole 40 mg daily

## 2024-04-19 NOTE — LETTER
April 19, 2024       Patient: Lizzie Whitten   YOB: 1940   Date of Visit: 4/19/2024       Dear Ms. Jamison:    Below are my notes for this admission.    If you have questions, please do not hesitate to call me. I look forward to following your patient along with you.         Sincerely,        Beverly Dixon MD        CC: No Recipients    Beverly Dixon MD  4/19/2024  3:36 PM  Sign when Signing Visit  [facility]  [address] [place] [address] [address]  [place], [place] [id number] [place] [address]  History and Physical    NAME: Lizzie Whitten  AGE: 84 y.o. SEX: female [phone]    DATE OF ENCOUNTER: 4/19/2024    Pain: none reported  Rehab Potential: good  Patient Informed of Medical Condition: yes  Patient is Capable of Understanding Their Right: yes  Prognosis: fair  Discharge Plan: back to [facility]  Surrogate Decision Maker: sonJossue  Advanced Directives: yes  Code status: full code  PCP: Kennedy Castellon MD    Assessment and Plan     Fracture of multiple ribs of both sides  S/p mechanical fall  CT chest 4/13/24 reports  multiple right-sided rib fractures including fractures of right fourth through eighth ribs with 3 rib segments (5-7) fractured in 2 places consistent with flail chest as well as gas in the mediastinum consistent with pneumomediastinum and small amount right chest subcutaneous emphysema  Multimodal pain control, s/p LINWOOD block with Acute Pain Service 4/18/24  Encourage incentive spirometer  Supplemental oxygen via NC as needed   OT/PT    Ambulatory dysfunction  Repeatedly mechanical falls twice this month  Uses of walker for ambulation at baseline  High risk future falls due to age, hx of fall, debility and unfamiliar environment   OT/PT  Fall precautions    Anxiety disorder  Continue home dose of citalopram 20 mg daily  Psychosocial support    Benign hypertension with chronic kidney disease, stage III (HCC)  Continue  holding lisinopril due to CHACHA  Monitor BP and resume when able    Chronic diastolic congestive heart failure (HCC)  Wt Readings from Last 3 Encounters:   04/19/24 80.9 kg (178 lb 6.4 oz)   04/13/24 82.7 kg (182 lb 5.1 oz)   04/12/24 78.3 kg (172 lb 9.9 oz)   Maintained on Lasix chronically as outpatient  Monitor electrolytes and volume status closely, daily weight. BMP ordered    Chronic venous insufficiency  Compression stockings, leg elevation    Complete heart block (HCC)  S/p PPM  Continue Eliquis daily    PAF (paroxysmal atrial fibrillation) (HCC)  On lopressor 25 mg BID and AC with Eliquis  Monitor vitals    Hemopneumothorax on right  Small right-sided hemothorax and pneumomediatinum noted on CT chest on admission, in setting of multiple rib fracture after fall  Currently on oxygen 2L NC  Pain control, monitor respiratory status closely and wean off oxygen when able    Hiatal hernia  Home omeprazole 20 mg BID was substituted by pantoprazole 40 mg daily while in the hospital  Continue pantoprazole 40 mg daily     Sleep apnea  Reported no on CPAP    Hypothyroidism  TSH elevated 10.4 (4/9/24) when levothyroxine was increased to 150 mcg daily  Continue current dose and f/u with PCP    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 ([facility])  Needs close f/u with ophth/retinal specialist    Secondary hyperparathyroidism of renal origin ([facility])  Managed by endocrinologist. F/u as scheduled    Type 2 diabetes mellitus with hyperglycemia, with long-term current use of insulin ([facility])  Lab Results   Component Value Date    HGBA1C 8.2 (H) 01/23/2024   Inadequately controlled DM  Home regimen with Toujeo 25U HS and aspart 6U TID with meals  Was on SSI and home regimen of insulin was held while in the hospital  BG was high 332 at bed time last night  Will resume home glargine at lower dose 17U HS and home aspart 6U TID with meals.   Monitor BG QID and d/c sliding scale of  insulin when able      CHACHA (acute kidney injury) (HCC)  Peak Creatinine 1.7 on admission, improving.   Recent Cre 1.53 (4/17/24)  Follow CHACHA pathway     Stage 3a chronic kidney disease (HCC)  Lab Results   Component Value Date    EGFR 31 04/17/2024    EGFR 30 04/16/2024    EGFR 33 04/15/2024    CREATININE 1.53 (H) 04/17/2024    CREATININE 1.58 (H) 04/16/2024    CREATININE 1.47 (H) 04/15/2024   CHACHA on CKD with improving creatinine  Monitor BMP  Avoid NSAIDs. Avoid hypotension    Lymphedema of both lower extremities  Patient reported stop using pneumatic compression pumps     Memory difficulties  MMSE was normal 30/30 at PCP's office in 6/2022, no cognitive deficits.  Patient moved to [facility] recently, independent in all ADLs.  OT/PT at SNF  Encourage frequent family visits    Vitamin D deficiency  Noted since 2018  Vitamin D was WNL 47.2 in 2023    Debility  Multifactorial, age, comorbid medical conditions, and recurrent hospitalizations for falls  Admitted to SNF for rehab  PT/OT consult-evaluate and treat  Nutrition consult  Supportive care, nutritional support, ADL support  Fall precautions    All medications and routine orders were reviewed and updated as needed.    Plan discussed with: Patient. Coordination of care with nursing, OT/PT, SW, dietician.    Chief Complaint     Seen for admission at Skilled Nursing Facility    History of Present Illness     84 y.o. female with HTN, CAD, COPD, CHF, hypothyroidism, paroxysmal Afib, complete heart block s/p PPM, T2DM with long-term use of insulin, CKD stage 3A, anxiety disorder and memory difficulties who is seen today, following hospitalization at [facility] from 4/13/2024 to 4/18/2024 for right-sided hemopneumothorax 2/2 multiple rib fractures after a fall. Hospital course was complicated by CHACHA for which lisinopril was held.   She is sitting in recliner, denies chest pain or back pain. She had breakfast with no nausea or  vomiting. Had BM yesterday. Reports losing her taste after Covid infection.     HISTORY:  Past Medical History:   Diagnosis Date   • Anxiety    • Arthritis    • CHF (congestive heart failure) ([facility])    • Chronic venous insufficiency    • COPD (chronic obstructive pulmonary disease) ([facility])    • Coronary artery disease    • Diabetes mellitus ([facility])     type II   • Disease of thyroid gland     hypothyroidism   • DVT (deep venous thrombosis) ([facility])    • HL (hearing loss)    • Hyperlipidemia    • Hypertension    • Irregular heart beat     Paroxysmal a-fib   • Pacemaker    • Renal disorder     stage 3   • Sleep apnea    • Sleep difficulties    • Thyroid disease    • Urinary incontinence    • Uterine leiomyoma    • Walker as ambulation aid      Family History   Problem Relation Age of Onset   • Coronary artery disease Mother    • Hypertension Mother    • Heart disease Mother    • Hypertension Father    • Diabetes Father         Mellitus   • No Known Problems Sister    • No Known Problems Daughter    • No Known Problems Daughter    • No Known Problems Maternal Grandmother    • No Known Problems Maternal Grandfather    • No Known Problems Paternal Grandmother    • No Known Problems Paternal Grandfather    • Diabetes Brother         Mellitus   • No Known Problems Son    • Breast cancer Maternal Aunt    • Pancreatic cancer Maternal Aunt    • Breast cancer Cousin    • Breast cancer Cousin    • Breast cancer Paternal Aunt    • No Known Problems Paternal Aunt    • No Known Problems Paternal Aunt      Social History     Socioeconomic History   • Marital status: /Civil Union     Spouse name: None   • Number of children: 3   • Years of education: None   • Highest education level: None   Occupational History   • Occupation: RETIRED   Tobacco Use   • Smoking status: Never     Passive exposure: Never   • Smokeless tobacco: Never   • Tobacco comments:     Per [employer]: Former smoker   Vaping Use   • Vaping status: Never Used    Substance and Sexual Activity   • Alcohol use: Not Currently     Comment: rarely. Per [employer]: Denied history of alcohol use   • Drug use: No   • Sexual activity: Never   Other Topics Concern   • None   Social History Narrative    Daily coffee consumption: 2 cups/day     Social Determinants of Health     Financial Resource Strain: Low Risk  (5/4/2023)    Overall Financial Resource Strain (CARDIA)    • Difficulty of Paying Living Expenses: Not hard at all   Food Insecurity: No Food Insecurity (4/14/2024)    Hunger Vital Sign    • Worried About Running Out of Food in the Last Year: Never true    • Ran Out of Food in the Last Year: Never true   Transportation Needs: No Transportation Needs (4/14/2024)    PRAPARE - Transportation    • Lack of Transportation (Medical): No    • Lack of Transportation (Non-Medical): No   Physical Activity: Not on file   Stress: Not on file   Social Connections: Not on file   Intimate Partner Violence: Not on file   Housing Stability: Low Risk  (4/14/2024)    Housing Stability Vital Sign    • Unable to Pay for Housing in the Last Year: No    • Number of Places Lived in the Last Year: 1    • Unstable Housing in the Last Year: No       Allergies:  Allergies   Allergen Reactions   • Ampicillin Hives       Review of Systems     Review of Systems   HENT:  Positive for dental problem (partial denture) and hearing loss (hearing aids). Negative for trouble swallowing.    Eyes:  Positive for visual disturbance (glasses).   Gastrointestinal:  Negative for nausea and vomiting.   Musculoskeletal:  Positive for gait problem.     Medications and orders     All medications reviewed and updated in skilled nursing EMR.    Objective     /72   Pulse 60   Temp 98.5 °F (36.9 °C)   Resp 18   Wt 80.9 kg (178 lb 6.4 oz)   SpO2 92% Comment: oxy 2L NC  BMI 34.84 kg/m²     Physical Exam  Vitals and nursing note reviewed.   Constitutional:       General: She is not in acute distress.     Appearance: She  is obese.   HENT:      Head: Normocephalic.      Nose: Nose normal.      Mouth/Throat:      Mouth: Mucous membranes are moist.   Eyes:      General:         Right eye: No discharge.         Left eye: No discharge.      Conjunctiva/sclera: Conjunctivae normal.   Cardiovascular:      Rate and Rhythm: Normal rate and regular rhythm.      Heart sounds: No murmur heard.  Pulmonary:      Effort: Pulmonary effort is normal.      Breath sounds: No rales.      Comments: Diminished breath sounds b/l due to shallow inhalation 2/2 rib fractures.  Right basilar rales  Abdominal:      General: Bowel sounds are normal. There is no distension.      Palpations: Abdomen is soft.      Tenderness: There is no abdominal tenderness.   Musculoskeletal:         General: No deformity.      Cervical back: Neck supple.      Right lower leg: Edema (1+) present.      Left lower leg: Edema (1+) present.   Skin:     General: Skin is warm and dry.      Comments: Hyperpigmentation on b/l LE due to chronic lymphedema. No open ulcer noted. 2 small scabs #0.3-0.4 mm anterior RLE.   Neurological:      Mental Status: She is alert and oriented to person, place, and time.   Psychiatric:         Behavior: Behavior normal.       Pertinent Laboratory/Diagnostic Studies:   The following labs/studies were reviewed please see chart or hospital paperwork for details.    Labs & Imaging:  Lab Results   Component Value Date    WBC 7.28 04/16/2024    HGB 11.2 (L) 04/16/2024    HCT 37.3 04/16/2024     (H) 04/16/2024     04/16/2024     Lab Results   Component Value Date     12/06/2016    SODIUM 135 04/17/2024    K 4.5 04/17/2024    CL 95 (L) 04/17/2024    CO2 35 (H) 04/17/2024    ANIONGAP 8 10/23/2015    AGAP 5 04/17/2024    BUN 40 (H) 04/17/2024    CREATININE 1.53 (H) 04/17/2024    GLUC 221 (H) 04/17/2024    GLUF 49 (L) 01/23/2024    CALCIUM 8.8 04/17/2024    AST 16 04/13/2024    ALT 6 (L) 04/13/2024    ALKPHOS 80 04/13/2024    PROT 6.4 09/06/2016     TP 6.4 04/13/2024    BILITOT 0.6 09/06/2016    TBILI 0.80 04/13/2024    EGFR 31 04/17/2024     Lab Results   Component Value Date    HGBA1C 8.2 (H) 01/23/2024     Lab Results   Component Value Date    CHOLESTEROL 127 01/23/2024    CHOLESTEROL 99 09/21/2023    CHOLESTEROL 122 03/01/2023     Lab Results   Component Value Date    HDL 48 (L) 01/23/2024    HDL 41 (L) 09/21/2023    HDL 63 03/01/2023     Lab Results   Component Value Date    TRIG 68 01/23/2024    TRIG 62 09/21/2023    TRIG 67 03/01/2023     Lab Results   Component Value Date    NONHDLC 79 01/23/2024    NONHDLC 58 09/21/2023    NONHDLC 59 03/01/2023     Lab Results   Component Value Date    LDLCALC 65 01/23/2024    LDLCALC 46 09/21/2023     Lab Results   Component Value Date    YEN6ILKFSPMG 10.461 (H) 04/09/2024       Lab Results   Component Value Date    LDSQ10XPJUCN 47.2 02/15/2023      CT BRAIN - WITHOUT CONTRAST 4/13/24    Stable mild chronic microangiopathic change within the brain parenchyma with no acute intracranial abnormality.    Just below the foramen magnum there is soft tissue density in the anterior epidural space behind the dens extending into the anterior epidural space resulting in rather advanced canal stenosis with mild compression of the upper cervical cord. This was present on prior study from 2022. Correlate for signs of myelopathy. Consider contrast-enhanced MRI. Differential considerations would include pannus formation from rheumatoid arthritis.    I have spent a total time of 60 minutes on 04/19/24 in caring for this patient including Diagnostic results, Prognosis, Risks and benefits of tx options, Instructions for management, Patient and family education, Importance of tx compliance, Counseling / Coordination of care, Documenting in the medical record, Reviewing / ordering tests, medicine, procedures  , Obtaining or reviewing history  , and Communicating with other healthcare professionals .    Beverly Dixon MD

## 2024-04-19 NOTE — ASSESSMENT & PLAN NOTE
Multifactorial, age, comorbid medical conditions, and recurrent hospitalizations for falls  Admitted to SNF for rehab  PT/OT consult-evaluate and treat  Nutrition consult  Supportive care, nutritional support, ADL support  Fall precautions

## 2024-04-19 NOTE — ASSESSMENT & PLAN NOTE
Lab Results   Component Value Date    EGFR 31 04/17/2024    EGFR 30 04/16/2024    EGFR 33 04/15/2024    CREATININE 1.53 (H) 04/17/2024    CREATININE 1.58 (H) 04/16/2024    CREATININE 1.47 (H) 04/15/2024   CHACHA on CKD with improving creatinine  Monitor BMP  Avoid NSAIDs. Avoid hypotension

## 2024-04-19 NOTE — PROGRESS NOTES
[facility]  [address]  [place] [address]  [facility]  History and Physical    NAME: Lizzie Whitten  AGE: 84 y.o. SEX: female [phone]    DATE OF ENCOUNTER: 4/19/2024    Pain: none reported  Rehab Potential: good  Patient Informed of Medical Condition: yes  Patient is Capable of Understanding Their Right: yes  Prognosis: fair  Discharge Plan: back to [facility]  Surrogate Decision Maker: son, Jossue Whitten  Advanced Directives: yes  Code status: full code  PCP: Kennedy Castellon MD    Assessment and Plan     Fracture of multiple ribs of both sides  S/p mechanical fall  CT chest 4/13/24 reports  multiple right-sided rib fractures including fractures of right fourth through eighth ribs with 3 rib segments (5-7) fractured in 2 places consistent with flail chest as well as gas in the mediastinum consistent with pneumomediastinum and small amount right chest subcutaneous emphysema  Multimodal pain control, s/p LINWOOD block with Acute Pain Service 4/18/24  Encourage incentive spirometer  Supplemental oxygen via NC as needed   OT/PT    Ambulatory dysfunction  Repeatedly mechanical falls twice this month  Uses of walker for ambulation at baseline  High risk future falls due to age, hx of fall, debility and unfamiliar environment   OT/PT  Fall precautions    Anxiety disorder  Continue home dose of citalopram 20 mg daily  Psychosocial support    Benign hypertension with chronic kidney disease, stage III (HCC)  Continue holding lisinopril due to CHACHA  Monitor BP and resume when able    Chronic diastolic congestive heart failure (HCC)  Wt Readings from Last 3 Encounters:   04/19/24 80.9 kg (178 lb 6.4 oz)   04/13/24 82.7 kg (182 lb 5.1 oz)   04/12/24 78.3 kg (172 lb 9.9 oz)   Maintained on Lasix chronically as outpatient  Monitor electrolytes and volume status closely, daily weight. BMP ordered    Chronic venous insufficiency  Compression stockings, leg  elevation    Complete heart block (HCC)  S/p PPM  Continue Eliquis daily    PAF (paroxysmal atrial fibrillation) ([facility])  On lopressor 25 mg BID and AC with Eliquis  Monitor vitals    Hemopneumothorax on right  Small right-sided hemothorax and pneumomediatinum noted on CT chest on admission, in setting of multiple rib fracture after fall  Currently on oxygen 2L NC  Pain control, monitor respiratory status closely and wean off oxygen when able    Hiatal hernia  Home omeprazole 20 mg BID was substituted by pantoprazole 40 mg daily while in the hospital  Continue pantoprazole 40 mg daily     Sleep apnea  Reported no on CPAP    Hypothyroidism  TSH elevated 10.4 (4/9/24) when levothyroxine was increased to 150 mcg daily  Continue current dose and f/u with PCP    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 ([facility])  Needs close f/u with ophth/retinal specialist    Secondary hyperparathyroidism of renal origin ([facility])  Managed by endocrinologist. F/u as scheduled    Type 2 diabetes mellitus with hyperglycemia, with long-term current use of insulin ([facility])  Lab Results   Component Value Date    HGBA1C 8.2 (H) 01/23/2024   Inadequately controlled DM  Home regimen with Toujeo 25U HS and aspart 6U TID with meals  Was on SSI and home regimen of insulin was held while in the hospital  BG was high 332 at bed time last night  Will resume home glargine at lower dose 17U HS and home aspart 6U TID with meals.   Monitor BG QID and d/c sliding scale of insulin when able      CHACHA (acute kidney injury) ([facility])  Peak Creatinine 1.7 on admission, improving.   Recent Cre 1.53 (4/17/24)  Follow CHACHA pathway     Stage 3a chronic kidney disease ([facility])  Lab Results   Component Value Date    EGFR 31 04/17/2024    EGFR 30 04/16/2024    EGFR 33 04/15/2024    CREATININE 1.53 (H) 04/17/2024    CREATININE 1.58 (H) 04/16/2024    CREATININE 1.47 (H) 04/15/2024   CHACHA on CKD with improving creatinine  Monitor  BMP  Avoid NSAIDs. Avoid hypotension    Lymphedema of both lower extremities  Patient reported stop using pneumatic compression pumps     Memory difficulties  MMSE was normal 30/30 at PCP's office in 6/2022, no cognitive deficits.  Patient moved to [facility] recently, independent in all ADLs.  OT/PT at SNF  Encourage frequent family visits    Vitamin D deficiency  Noted since 2018  Vitamin D was WNL 47.2 in 2023    Debility  Multifactorial, age, comorbid medical conditions, and recurrent hospitalizations for falls  Admitted to SNF for rehab  PT/OT consult-evaluate and treat  Nutrition consult  Supportive care, nutritional support, ADL support  Fall precautions    All medications and routine orders were reviewed and updated as needed.    Plan discussed with: Patient. Coordination of care with nursing, OT/PT, SW, dietician.    Chief Complaint     Seen for admission at Skilled Nursing Facility    History of Present Illness     84 y.o. female with HTN, CAD, COPD, CHF, hypothyroidism, paroxysmal Afib, complete heart block s/p PPM, T2DM with long-term use of insulin, CKD stage 3A, anxiety disorder and memory difficulties who is seen today, following hospitalization at [facility] from 4/13/2024 to 4/18/2024 for right-sided hemopneumothorax 2/2 multiple rib fractures after a fall. Hospital course was complicated by CHACHA for which lisinopril was held.   She is sitting in recliner, denies chest pain or back pain. She had breakfast with no nausea or vomiting. Had BM yesterday. Reports losing her taste after Covid infection.     HISTORY:  Past Medical History:   Diagnosis Date    Anxiety     Arthritis     CHF (congestive heart failure) ([facility])     Chronic venous insufficiency     COPD (chronic obstructive pulmonary disease) ([facility])     Coronary artery disease     Diabetes mellitus (HCC)     type II    Disease of thyroid gland     hypothyroidism    DVT (deep venous thrombosis) ([facility])     HL  (hearing loss)     Hyperlipidemia     Hypertension     Irregular heart beat     Paroxysmal a-fib    Pacemaker     Renal disorder     stage 3    Sleep apnea     Sleep difficulties     Thyroid disease     Urinary incontinence     Uterine leiomyoma     Walker as ambulation aid      Family History   Problem Relation Age of Onset    Coronary artery disease Mother     Hypertension Mother     Heart disease Mother     Hypertension Father     Diabetes Father         Mellitus    No Known Problems Sister     No Known Problems Daughter     No Known Problems Daughter     No Known Problems Maternal Grandmother     No Known Problems Maternal Grandfather     No Known Problems Paternal Grandmother     No Known Problems Paternal Grandfather     Diabetes Brother         Mellitus    No Known Problems Son     Breast cancer Maternal Aunt     Pancreatic cancer Maternal Aunt     Breast cancer Cousin     Breast cancer Cousin     Breast cancer Paternal Aunt     No Known Problems Paternal Aunt     No Known Problems Paternal Aunt      Social History     Socioeconomic History    Marital status: /Civil Union     Spouse name: None    Number of children: 3    Years of education: None    Highest education level: None   Occupational History    Occupation: RETIRED   Tobacco Use    Smoking status: Never     Passive exposure: Never    Smokeless tobacco: Never    Tobacco comments:     Per [employer]: Former smoker   Vaping Use    Vaping status: Never Used   Substance and Sexual Activity    Alcohol use: Not Currently     Comment: rarely. Per [employer]: Denied history of alcohol use    Drug use: No    Sexual activity: Never   Other Topics Concern    None   Social History Narrative    Daily coffee consumption: 2 cups/day     Social Determinants of Health     Financial Resource Strain: Low Risk  (5/4/2023)    Overall Financial Resource Strain (CARDIA)     Difficulty of Paying Living Expenses: Not hard at all   Food Insecurity: No Food Insecurity  (4/14/2024)    Hunger Vital Sign     Worried About Running Out of Food in the Last Year: Never true     Ran Out of Food in the Last Year: Never true   Transportation Needs: No Transportation Needs (4/14/2024)    PRAPARE - Transportation     Lack of Transportation (Medical): No     Lack of Transportation (Non-Medical): No   Physical Activity: Not on file   Stress: Not on file   Social Connections: Not on file   Intimate Partner Violence: Not on file   Housing Stability: Low Risk  (4/14/2024)    Housing Stability Vital Sign     Unable to Pay for Housing in the Last Year: No     Number of Places Lived in the Last Year: 1     Unstable Housing in the Last Year: No       Allergies:  Allergies   Allergen Reactions    Ampicillin Hives       Review of Systems     Review of Systems   HENT:  Positive for dental problem (partial denture) and hearing loss (hearing aids). Negative for trouble swallowing.    Eyes:  Positive for visual disturbance (glasses).   Gastrointestinal:  Negative for nausea and vomiting.   Musculoskeletal:  Positive for gait problem.     Medications and orders     All medications reviewed and updated in shelter EMR.    Objective     /72   Pulse 60   Temp 98.5 °F (36.9 °C)   Resp 18   Wt 80.9 kg (178 lb 6.4 oz)   SpO2 92% Comment: oxy 2L NC  BMI 34.84 kg/m²     Physical Exam  Vitals and nursing note reviewed.   Constitutional:       General: She is not in acute distress.     Appearance: She is obese.   HENT:      Head: Normocephalic.      Nose: Nose normal.      Mouth/Throat:      Mouth: Mucous membranes are moist.   Eyes:      General:         Right eye: No discharge.         Left eye: No discharge.      Conjunctiva/sclera: Conjunctivae normal.   Cardiovascular:      Rate and Rhythm: Normal rate and regular rhythm.      Heart sounds: No murmur heard.  Pulmonary:      Effort: Pulmonary effort is normal.      Breath sounds: No rales.      Comments: Diminished breath sounds b/l due to shallow  inhalation 2/2 rib fractures.  Right basilar rales  Abdominal:      General: Bowel sounds are normal. There is no distension.      Palpations: Abdomen is soft.      Tenderness: There is no abdominal tenderness.   Musculoskeletal:         General: No deformity.      Cervical back: Neck supple.      Right lower leg: Edema (1+) present.      Left lower leg: Edema (1+) present.   Skin:     General: Skin is warm and dry.      Comments: Hyperpigmentation on b/l LE due to chronic lymphedema. No open ulcer noted. 2 small scabs #0.3-0.4 mm anterior RLE.   Neurological:      Mental Status: She is alert and oriented to person, place, and time.   Psychiatric:         Behavior: Behavior normal.       Pertinent Laboratory/Diagnostic Studies:   The following labs/studies were reviewed please see chart or hospital paperwork for details.    Labs & Imaging:  Lab Results   Component Value Date    WBC 7.28 04/16/2024    HGB 11.2 (L) 04/16/2024    HCT 37.3 04/16/2024     (H) 04/16/2024     04/16/2024     Lab Results   Component Value Date     12/06/2016    SODIUM 135 04/17/2024    K 4.5 04/17/2024    CL 95 (L) 04/17/2024    CO2 35 (H) 04/17/2024    ANIONGAP 8 10/23/2015    AGAP 5 04/17/2024    BUN 40 (H) 04/17/2024    CREATININE 1.53 (H) 04/17/2024    GLUC 221 (H) 04/17/2024    GLUF 49 (L) 01/23/2024    CALCIUM 8.8 04/17/2024    AST 16 04/13/2024    ALT 6 (L) 04/13/2024    ALKPHOS 80 04/13/2024    PROT 6.4 09/06/2016    TP 6.4 04/13/2024    BILITOT 0.6 09/06/2016    TBILI 0.80 04/13/2024    EGFR 31 04/17/2024     Lab Results   Component Value Date    HGBA1C 8.2 (H) 01/23/2024     Lab Results   Component Value Date    CHOLESTEROL 127 01/23/2024    CHOLESTEROL 99 09/21/2023    CHOLESTEROL 122 03/01/2023     Lab Results   Component Value Date    HDL 48 (L) 01/23/2024    HDL 41 (L) 09/21/2023    HDL 63 03/01/2023     Lab Results   Component Value Date    TRIG 68 01/23/2024    TRIG 62 09/21/2023    TRIG 67 03/01/2023      Lab Results   Component Value Date    NONHDLC 79 01/23/2024    NONHDLC 58 09/21/2023    NONHDLC 59 03/01/2023     Lab Results   Component Value Date    LDLCALC 65 01/23/2024    LDLCALC 46 09/21/2023     Lab Results   Component Value Date    KCZ8XUWYBXFS 10.461 (H) 04/09/2024       Lab Results   Component Value Date    SRKK04QKFAKP 47.2 02/15/2023      CT BRAIN - WITHOUT CONTRAST 4/13/24    Stable mild chronic microangiopathic change within the brain parenchyma with no acute intracranial abnormality.    Just below the foramen magnum there is soft tissue density in the anterior epidural space behind the dens extending into the anterior epidural space resulting in rather advanced canal stenosis with mild compression of the upper cervical cord. This was present on prior study from 2022. Correlate for signs of myelopathy. Consider contrast-enhanced MRI. Differential considerations would include pannus formation from rheumatoid arthritis.    I have spent a total time of 60 minutes on 04/19/24 in caring for this patient including Diagnostic results, Prognosis, Risks and benefits of tx options, Instructions for management, Patient and family education, Importance of tx compliance, Counseling / Coordination of care, Documenting in the medical record, Reviewing / ordering tests, medicine, procedures  , Obtaining or reviewing history  , and Communicating with other healthcare professionals .    Beverly Dixon MD

## 2024-04-19 NOTE — ASSESSMENT & PLAN NOTE
Lab Results   Component Value Date    HGBA1C 8.2 (H) 01/23/2024   Inadequately controlled DM  Home regimen with Toujeo 25U HS and aspart 6U TID with meals  Was on SSI and home regimen of insulin was held while in the hospital  BG was high 332 at bed time last night  Will resume home glargine at lower dose 17U HS and home aspart 6U TID with meals.   Monitor BG QID and d/c sliding scale of insulin when able

## 2024-04-19 NOTE — ASSESSMENT & PLAN NOTE
MMSE was normal 30/30 at PCP's office in 6/2022, no cognitive deficits.  Patient moved to [facility] recently, independent in all ADLs.  OT/PT at SNF  Encourage frequent family visits

## 2024-04-19 NOTE — ASSESSMENT & PLAN NOTE
S/p mechanical fall  CT chest 4/13/24 reports  multiple right-sided rib fractures including fractures of right fourth through eighth ribs with 3 rib segments (5-7) fractured in 2 places consistent with flail chest as well as gas in the mediastinum consistent with pneumomediastinum and small amount right chest subcutaneous emphysema  Multimodal pain control, s/p LINWOOD block with Acute Pain Service 4/18/24  Encourage incentive spirometer  Supplemental oxygen via NC as needed   OT/PT

## 2024-04-19 NOTE — PROGRESS NOTES
Outpatient Care Management CHACHA/SNF Pathway. Discharged to [facility] 4/18/24. Email sent to facility to inform them the patient is on the CHACHA Pathway and I will be following them during their skilled stay.  This Admin Coordinator will continue to monitor via chart review.

## 2024-04-22 ENCOUNTER — NURSING HOME VISIT (OUTPATIENT)
Dept: GERIATRICS | Facility: OTHER | Age: 84
End: 2024-04-22
Payer: MEDICARE

## 2024-04-22 VITALS
BODY MASS INDEX: 34.69 KG/M2 | SYSTOLIC BLOOD PRESSURE: 132 MMHG | DIASTOLIC BLOOD PRESSURE: 68 MMHG | OXYGEN SATURATION: 92 % | TEMPERATURE: 98 F | WEIGHT: 177.6 LBS | HEART RATE: 64 BPM | RESPIRATION RATE: 16 BRPM

## 2024-04-22 DIAGNOSIS — S22.43XD MULTIPLE CLOSED FRACTURES OF RIBS OF BOTH SIDES WITH ROUTINE HEALING, SUBSEQUENT ENCOUNTER: Primary | ICD-10-CM

## 2024-04-22 DIAGNOSIS — I48.0 PAF (PAROXYSMAL ATRIAL FIBRILLATION) (HCC): ICD-10-CM

## 2024-04-22 DIAGNOSIS — N18.30 BENIGN HYPERTENSION WITH CHRONIC KIDNEY DISEASE, STAGE III (HCC): ICD-10-CM

## 2024-04-22 DIAGNOSIS — E03.8 OTHER SPECIFIED HYPOTHYROIDISM: Chronic | ICD-10-CM

## 2024-04-22 DIAGNOSIS — K44.9 HIATAL HERNIA: ICD-10-CM

## 2024-04-22 DIAGNOSIS — I44.2 COMPLETE HEART BLOCK (HCC): ICD-10-CM

## 2024-04-22 DIAGNOSIS — Z79.4 TYPE 2 DIABETES MELLITUS WITH HYPERGLYCEMIA, WITH LONG-TERM CURRENT USE OF INSULIN (HCC): ICD-10-CM

## 2024-04-22 DIAGNOSIS — I12.9 BENIGN HYPERTENSION WITH CHRONIC KIDNEY DISEASE, STAGE III (HCC): ICD-10-CM

## 2024-04-22 DIAGNOSIS — E11.65 TYPE 2 DIABETES MELLITUS WITH HYPERGLYCEMIA, WITH LONG-TERM CURRENT USE OF INSULIN (HCC): ICD-10-CM

## 2024-04-22 DIAGNOSIS — I50.32 CHRONIC DIASTOLIC CONGESTIVE HEART FAILURE (HCC): ICD-10-CM

## 2024-04-22 DIAGNOSIS — N17.9 AKI (ACUTE KIDNEY INJURY) (HCC): ICD-10-CM

## 2024-04-22 DIAGNOSIS — J94.2 HEMOPNEUMOTHORAX ON RIGHT: ICD-10-CM

## 2024-04-22 DIAGNOSIS — F41.1 GENERALIZED ANXIETY DISORDER: ICD-10-CM

## 2024-04-22 PROCEDURE — 99310 SBSQ NF CARE HIGH MDM 45: CPT | Performed by: FAMILY MEDICINE

## 2024-04-22 NOTE — PROGRESS NOTES
Facility: [facility]  POS: 31  Progress Note    Chief Complaint/Reason for visit: STR follow up  Code status: Full code  History of Present Illness: The patient is seen in her room today for STR follow-up visit.  She was admitted to [facility] on 4/18 for postacute rehab following hospitalization for right-sided hemopneumothorax secondary to multiple rib fractures after a fall. She is lying in bed, family at bed side.  c/o she was too weak to go to the bathroom. She requires at least 2 assistance with transfer. Bedside commode was ordered over the weekend.   She c/o poor appetite. She takes Glucerna. Denies HA, dizziness, CP, SOB, or palpitations. Per nursing report, her BP was low 103/66 HR 60 after being transferred to the bathroom. She was then assisted back to rest in her bed.     Past Medical History: unchanged from history and physical  Past Medical History:   Diagnosis Date    Anxiety     Arthritis     CHF (congestive heart failure) ([facility])     Chronic venous insufficiency     COPD (chronic obstructive pulmonary disease) ([facility])     Coronary artery disease     Diabetes mellitus ([facility])     type II    Disease of thyroid gland     hypothyroidism    DVT (deep venous thrombosis) ([facility])     HL (hearing loss)     Hyperlipidemia     Hypertension     Irregular heart beat     Paroxysmal a-fib    Pacemaker     Renal disorder     stage 3    Sleep apnea     Sleep difficulties     Thyroid disease     Urinary incontinence     Uterine leiomyoma     Walker as ambulation aid      Family History: Unchanged from history and physical  Social History: Unchanged from history and physical  Review of systems: As per review of medical illness, all other systems reviewed and negative.  Medications: All medication and routine orders were reviewed and updated  Allergies: Reviewed and unchanged  Consults reviewed:PT, OT, and Nutrition  Labs/Diagnostics (reviewed by this provider): Copy in Chart  BASIC METABOLIC PNL  4/22/24     GLUCOSE 120  H 65 - 99 mg/dL      BUN 51  H 7 - 25 mg/dL      CREATININE 1.72  H 0.40 - 1.10 mg/dL      SODIUM 139 135 - 145 mmol/L      POTASSIUM 4.3 3.5 - 5.2 mmol/L      CHLORIDE 94  L 100 - 109 mmol/L      CARBON DIOXIDE 36  H 21 - 31 mmol/L      CALCIUM 9.0 8.5 - 10.1 mg/dL      ANION GAP 9 3 - 11       eGFRcr 29  L >59  Imaging Reviewed: No new imaging  Vitals:    04/22/24 0900   BP: 132/68   Pulse: 64   Resp: 16   Temp: 98 °F (36.7 °C)   SpO2: 92%   Weight: 80.6 kg (177 lb 9.6 oz)     Physical Exam  Vitals and nursing note reviewed.   Constitutional:       General: She is not in acute distress.     Appearance: She is obese.   HENT:      Head: Normocephalic.   Eyes:      General:         Right eye: No discharge.         Left eye: No discharge.      Conjunctiva/sclera: Conjunctivae normal.   Cardiovascular:      Rate and Rhythm: Normal rate and regular rhythm.      Heart sounds: No murmur heard.  Pulmonary:      Effort: Pulmonary effort is normal.      Breath sounds: Normal breath sounds.   Abdominal:      General: Bowel sounds are normal. There is no distension.      Palpations: Abdomen is soft.      Tenderness: There is no abdominal tenderness.   Musculoskeletal:      Cervical back: Neck supple.      Right lower leg: Edema present.      Left lower leg: Edema present.   Skin:     General: Skin is warm.   Neurological:      Mental Status: She is alert and oriented to person, place, and time.   Psychiatric:         Behavior: Behavior normal.      Assessment/Plan:    Fracture of multiple ribs of both sides  S/p mechanical fall  CT chest 4/13/24 reports  multiple right-sided rib fractures including fractures of right fourth through eighth ribs with 3 rib segments (5-7) fractured in 2 places consistent with flail chest as well as gas in the mediastinum consistent with pneumomediastinum and small amount right chest subcutaneous emphysema  Multimodal pain control, s/p LINWOOD block with Acute Pain Service  4/18/24  Encourage incentive spirometer  Wean off oxygen when able  Continue OT/PT    Benign hypertension with chronic kidney disease, stage III ([facility])  BP at goal, 120s-130s/60s-70s  Lisinopril was held since hospitalization due to CHACHA  CMP today showed elevated Creatinine 1.72, GFR 29  Encourage hydration  Continue holding Lisinopril  Monitor BP    PAF (paroxysmal atrial fibrillation) ([facility])  Continue lopressor 25 mg BID and AC with Eliquis  Monitor vitals    Hemopneumothorax on right  Small right-sided hemothorax and pneumomediatinum noted on CT chest on admission, in setting of multiple rib fracture after fall  oxygen 2L NC was able to weaned off yesterday  Pain control, monitor respiratory status closely and wean off oxygen when able    Hiatal hernia  Home omeprazole 20 mg BID was substituted by pantoprazole 40 mg daily while in the hospital  Continue pantoprazole 40 mg daily     Hypothyroidism  TSH elevated 10.4 (4/9/24) when levothyroxine was increased to 150 mcg daily  Continue current dose and f/u with PCP    Type 2 diabetes mellitus with hyperglycemia, with long-term current use of insulin ([facility])  Lab Results   Component Value Date    HGBA1C 8.2 (H) 01/23/2024     Lab Results   Component Value Date    HGBA1C 8.2 (H) 01/23/2024   Inadequately controlled DM  Home regimen with Toujeo 25U HS and aspart 6U TID with meals  Was on SSI and home regimen of insulin was held while in the hospital  Home glargine was resumed at lower dose 17U HS and home aspart 6U TID with meals on SNF admission.   Reviewed glucose log, FG 120s-150s, BG at lunch and bed time 180s-270s  Will increase glargine to 20U HS and continue aspart 6U TID with meals. D/C SSI.  Monitor BG QID and adjust the dose of insulin accordingly.    CHACHA (acute kidney injury) ([facility])  Peak Creatinine 1.7 on hospital admission, improving.   Recent Cre 1.53 (4/17/24)  Follow CHACHA pathway   CMP today reviewed, Creatinine trending up 1.72/ eGFR 29  Encourage orall  fluid intake  Monitor BMP closely. Order placed    Anxiety disorder  Continue home dose of citalopram 20 mg daily  Psychosocial support     I have spent a total time of 45 minutes on 04/22/24 in caring for this patient including Diagnostic results, Prognosis, Risks and benefits of tx options, Instructions for management, Patient and family education, Importance of tx compliance, Risk factor reductions, Impressions, Counseling / Coordination of care, Documenting in the medical record, Reviewing / ordering tests, medicine, procedures  , Obtaining or reviewing history  , and Communicating with other healthcare professionals , RN, CNA, dietician.    Beverly Dixon MD  4/22/2024

## 2024-04-22 NOTE — ASSESSMENT & PLAN NOTE
Lab Results   Component Value Date    HGBA1C 8.2 (H) 01/23/2024     Lab Results   Component Value Date    HGBA1C 8.2 (H) 01/23/2024   Inadequately controlled DM  Home regimen with Toujeo 25U HS and aspart 6U TID with meals  Was on SSI and home regimen of insulin was held while in the hospital  Home glargine was resumed at lower dose 17U HS and home aspart 6U TID with meals on SNF admission.   Reviewed glucose log, FG 120s-150s, BG at lunch and bed time 180s-270s  Will increase glargine to 20U HS and continue aspart 6U TID with meals. D/C SSI.  Monitor BG QID and adjust the dose of insulin accordingly.

## 2024-04-22 NOTE — ASSESSMENT & PLAN NOTE
Peak Creatinine 1.7 on hospital admission, improving.   Recent Cre 1.53 (4/17/24)  Follow CHACHA pathway   CMP today reviewed, Creatinine trending up 1.72/ eGFR 29  Encourage orall fluid intake  Monitor BMP closely. Order placed

## 2024-04-22 NOTE — ASSESSMENT & PLAN NOTE
S/p mechanical fall  CT chest 4/13/24 reports  multiple right-sided rib fractures including fractures of right fourth through eighth ribs with 3 rib segments (5-7) fractured in 2 places consistent with flail chest as well as gas in the mediastinum consistent with pneumomediastinum and small amount right chest subcutaneous emphysema  Multimodal pain control, s/p LINWOOD block with Acute Pain Service 4/18/24  Encourage incentive spirometer  Wean off oxygen when able  Continue OT/PT

## 2024-04-22 NOTE — ASSESSMENT & PLAN NOTE
Small right-sided hemothorax and pneumomediatinum noted on CT chest on admission, in setting of multiple rib fracture after fall  oxygen 2L NC was able to weaned off yesterday  Pain control, monitor respiratory status closely and wean off oxygen when able

## 2024-04-22 NOTE — ASSESSMENT & PLAN NOTE
BP at goal, 120s-130s/60s-70s  Lisinopril was held since hospitalization due to CHACHA  CMP today showed elevated Creatinine 1.72, GFR 29  Encourage hydration  Continue holding Lisinopril  Monitor BP

## 2024-04-24 ENCOUNTER — NURSING HOME VISIT (OUTPATIENT)
Dept: GERIATRICS | Facility: OTHER | Age: 84
End: 2024-04-24
Payer: MEDICARE

## 2024-04-24 DIAGNOSIS — J44.9 CHRONIC OBSTRUCTIVE PULMONARY DISEASE, UNSPECIFIED COPD TYPE (HCC): ICD-10-CM

## 2024-04-24 DIAGNOSIS — J94.2 HEMOPNEUMOTHORAX ON RIGHT: ICD-10-CM

## 2024-04-24 DIAGNOSIS — R53.81 PHYSICAL DECONDITIONING: ICD-10-CM

## 2024-04-24 DIAGNOSIS — I50.32 CHRONIC DIASTOLIC CONGESTIVE HEART FAILURE (HCC): ICD-10-CM

## 2024-04-24 DIAGNOSIS — E87.0 HYPERNATREMIA: ICD-10-CM

## 2024-04-24 DIAGNOSIS — Z79.4 TYPE 2 DIABETES MELLITUS WITH HYPERGLYCEMIA, WITH LONG-TERM CURRENT USE OF INSULIN (HCC): ICD-10-CM

## 2024-04-24 DIAGNOSIS — N17.9 AKI (ACUTE KIDNEY INJURY) (HCC): ICD-10-CM

## 2024-04-24 DIAGNOSIS — S22.43XD MULTIPLE CLOSED FRACTURES OF RIBS OF BOTH SIDES WITH ROUTINE HEALING, SUBSEQUENT ENCOUNTER: Primary | ICD-10-CM

## 2024-04-24 DIAGNOSIS — I44.2 COMPLETE HEART BLOCK (HCC): ICD-10-CM

## 2024-04-24 DIAGNOSIS — E11.65 TYPE 2 DIABETES MELLITUS WITH HYPERGLYCEMIA, WITH LONG-TERM CURRENT USE OF INSULIN (HCC): ICD-10-CM

## 2024-04-24 DIAGNOSIS — I48.0 PAF (PAROXYSMAL ATRIAL FIBRILLATION) (HCC): ICD-10-CM

## 2024-04-24 DIAGNOSIS — I89.0 LYMPHEDEMA OF BOTH LOWER EXTREMITIES: ICD-10-CM

## 2024-04-24 PROBLEM — Z99.2 TYPE 2 DIABETES MELLITUS WITH CHRONIC KIDNEY DISEASE ON CHRONIC DIALYSIS, WITH LONG-TERM CURRENT USE OF INSULIN (HCC): Status: ACTIVE | Noted: 2024-04-24

## 2024-04-24 PROBLEM — E11.22 TYPE 2 DIABETES MELLITUS WITH CHRONIC KIDNEY DISEASE ON CHRONIC DIALYSIS, WITH LONG-TERM CURRENT USE OF INSULIN (HCC): Status: ACTIVE | Noted: 2024-04-24

## 2024-04-24 PROBLEM — N18.6 TYPE 2 DIABETES MELLITUS WITH CHRONIC KIDNEY DISEASE ON CHRONIC DIALYSIS, WITH LONG-TERM CURRENT USE OF INSULIN (HCC): Status: ACTIVE | Noted: 2024-04-24

## 2024-04-24 PROCEDURE — 99309 SBSQ NF CARE MODERATE MDM 30: CPT | Performed by: NURSE PRACTITIONER

## 2024-04-24 RX ORDER — AMMONIUM LACTATE 12 G/100G
1 LOTION TOPICAL DAILY
COMMUNITY

## 2024-04-24 NOTE — PROGRESS NOTES
Facility: [facility]  POS: 31  Progress Note    Chief Complaint/Reason for visit: STR follow-up visit  Code status: Full code  History of Present Illness: 84-year-old female seen and examined for STR follow-up of acute on chronic medical conditions.  Patient is currently residing at [facility] for rehabilitation following hospitalization for right sided hemopneumothorax secondary to multiple rib fractures after a fall.  At time of examination this morning, found patient seated in chair, and she appeared very weak and tired.  Patient admits to feeling weak with poor appetite and oral intake.  Patient uses the [employer] blood glucose monitoring system which prevents fingersticks in order to check blood glucose.  Blood glucose 98 this morning; will discontinue mealtime insulin and continue to monitor.  Patient's nurse reports that she is drinking Glucerna.  Patient is at high risk for readmission in setting of recent hospitalization, deconditioning, and poor oral intake.  See A/P for additional information.  Past Medical History: unchanged from history and physical  Past Medical History:   Diagnosis Date    Anxiety     Arthritis     CHF (congestive heart failure) ([facility])     Chronic venous insufficiency     COPD (chronic obstructive pulmonary disease) ([facility])     Coronary artery disease     Diabetes mellitus ([facility])     type II    Disease of thyroid gland     hypothyroidism    DVT (deep venous thrombosis) ([facility])     HL (hearing loss)     Hyperlipidemia     Hypertension     Irregular heart beat     Paroxysmal a-fib    Pacemaker     Renal disorder     stage 3    Sleep apnea     Sleep difficulties     Thyroid disease     Urinary incontinence     Uterine leiomyoma     Walker as ambulation aid      Family History: Unchanged from history and physical  Social History: Unchanged from history and physical  Review of systems: As per review of medical illness, all other systems reviewed and negative.  Medications: All  medication and routine orders were reviewed and updated  Allergies: Reviewed and unchanged  Consults reviewed:PT, OT, and Other  Labs/Diagnostics (reviewed by this provider): Copy in Chart  Imaging Reviewed:  Physical Exam  Weight: 180.2 pounds temp: 98.4          BP: 118/66  pulse: 70 resp: 20    O2 sat: 96% on oxygen via nasal cannula at time of exam  Orientation:Person, Place, and Day     Physical Exam  Vitals and nursing note reviewed.   Constitutional:       General: She is not in acute distress.     Appearance: She is not toxic-appearing or diaphoretic.   HENT:      Head: Normocephalic.      Nose: No congestion or rhinorrhea.      Mouth/Throat:      Mouth: Mucous membranes are moist.      Pharynx: No oropharyngeal exudate.   Eyes:      General:         Right eye: No discharge.         Left eye: No discharge.      Extraocular Movements: Extraocular movements intact.      Conjunctiva/sclera: Conjunctivae normal.      Pupils: Pupils are equal, round, and reactive to light.   Cardiovascular:      Rate and Rhythm: Normal rate.      Pulses: Normal pulses.      Comments: Pacemaker in situ  Pulmonary:      Effort: Pulmonary effort is normal. No respiratory distress.      Breath sounds: No wheezing, rhonchi or rales.      Comments: Decreased breath sounds bilateral lung fields.  Abdominal:      General: Bowel sounds are normal. There is no distension.      Palpations: Abdomen is soft.      Tenderness: There is no abdominal tenderness. There is no guarding.   Musculoskeletal:      Cervical back: Neck supple. No rigidity.      Right lower leg: Edema present.      Left lower leg: Edema present.      Comments: Moves all 4 extremities.   Lymphadenopathy:      Cervical: No cervical adenopathy.   Skin:     General: Skin is warm and dry.      Capillary Refill: Capillary refill takes less than 2 seconds.      Comments: With chronic venous stasis noted.  Bilateral lower extremity skin is very dry and scaly.   Neurological:       Mental Status: She is alert and oriented to person, place, and time.      Motor: Weakness present.      Gait: Gait abnormal.   Psychiatric:         Mood and Affect: Mood normal.         Behavior: Behavior normal.         Thought Content: Thought content normal.       Assessment/Plan:  84-year-old female with:    Fracture of multiple ribs of both sides  S/p mechanical fall resulting in multiple right-sided rib fractures including fractures of the right 4th through 8th ribs with 3 rib segments (5-7) fractured in 2 places consistent with flail chest as well as gas in the mediastinum consistent with pneumomediastinum and small amount right chest subcutaneous emphysema. S/p LINWOOD block with acute pain service on 4/18/2024  Respiratory status currently stable on oxygen.  No complaints of shortness of breath  Continue gabapentin, methocarbamol, and oxycodone as needed with goal to wean patient off of oxycodone when able  Continue incentive spirometer    Hemopneumothorax on right  Small right-sided hemothorax present on admission to hospital recently due to fall  Follow-up with trauma service outpatient    CHACHA (acute kidney injury) ([facility])  Recent CHACHA inpatient secondary to traumatic injuries  Baseline creatinine approximately 1.3-1.5 per record review  Creatinine trending up in setting of poor oral intake  Patient was encouraged to drink fluids but states that she does not have an appetite  Recheck a BMP on 4/26/2024    Hypernatremia  Sodium trending up, 149 today in setting of poor oral intake  Order 8 ounces of extra fluids with each med Pass  Recheck BMP on 4/26/2024    PAF (paroxysmal atrial fibrillation) ([facility])  Heart rate stable on exam  Continue Lopressor 25 mg twice daily  Continue Eliquis 5 mg twice daily for anticoagulation/stroke prevention  Recent hemoglobin 11.2    Type 2 diabetes mellitus with chronic kidney disease on chronic dialysis, with long-term current use of insulin ([facility])    Lab Results   Component Value  Date    HGBA1C 8.2 (H) 01/23/2024   Blood glucose trending lower in setting of poor appetite and oral intake  Will discontinue all mealtime insulin for now and continue to monitor blood glucose trends  Decrease glargine insulin to 15 units nightly    COPD (chronic obstructive pulmonary disease) (HCC)  Currently stable without exacerbation  Currently not on any maintenance inhalers    Chronic diastolic congestive heart failure (HCC)  Wt Readings from Last 3 Encounters:   04/22/24 80.6 kg (177 lb 9.6 oz)   04/19/24 80.9 kg (178 lb 6.4 oz)   04/13/24 82.7 kg (182 lb 5.1 oz)   Continue Lasix 20 mg daily  Patient follows with cardiology outpatient  Recent potassium level stable    Complete heart block (HCC)  With history of complete heart block status post pacemaker insertion  Follow-up with cardiology outpatient    Lymphedema of both lower extremities  Also with venous insufficiency  Patient reported that she stopped using pneumatic lymphedema pumps   Continue Lasix 20 mg daily  Encourage patient to elevate legs at rest    Physical deconditioning  Patient feels weak from recent hospitalization  Continue supportive care at SNF  Continue PT/OT  Continue fall precautions  Ensure adequate hydration and nutrition     This note was completed in part utilizing [employer] direct voice recognition software.  Grammatical errors, random word insertion, spelling mistakes, and incomplete sentences may be an occasional consequence of the system secondary to software limitations, ambient noise and hardware issues.  At the time of dictation, efforts were made to edit, clarify and/or correct errors.  Please read the chart carefully and recognize, using context, where substitutions have occurred.  If you have any questions or concerns about the context, text or information contained within the body of this dictation, please contact myself, the provider, for further clarification.    DALILA Martinez  4/24/20246:18  PM

## 2024-04-24 NOTE — ASSESSMENT & PLAN NOTE
Lab Results   Component Value Date    HGBA1C 8.2 (H) 01/23/2024   Blood glucose trending lower in setting of poor appetite and oral intake  Will discontinue all mealtime insulin for now and continue to monitor blood glucose trends  Decrease glargine insulin to 15 units nightly

## 2024-04-24 NOTE — ASSESSMENT & PLAN NOTE
Small right-sided hemothorax present on admission to hospital recently due to fall  Follow-up with trauma service outpatient

## 2024-04-24 NOTE — ASSESSMENT & PLAN NOTE
Also with venous insufficiency  Patient reported that she stopped using pneumatic lymphedema pumps   Continue Lasix 20 mg daily  Encourage patient to elevate legs at rest

## 2024-04-24 NOTE — ASSESSMENT & PLAN NOTE
Wt Readings from Last 3 Encounters:   04/22/24 80.6 kg (177 lb 9.6 oz)   04/19/24 80.9 kg (178 lb 6.4 oz)   04/13/24 82.7 kg (182 lb 5.1 oz)   Continue Lasix 20 mg daily  Patient follows with cardiology outpatient  Recent potassium level stable

## 2024-04-24 NOTE — ASSESSMENT & PLAN NOTE
Patient feels weak from recent hospitalization  Continue supportive care at SNF  Continue PT/OT  Continue fall precautions  Ensure adequate hydration and nutrition

## 2024-04-24 NOTE — ASSESSMENT & PLAN NOTE
Recent CHACHA inpatient secondary to traumatic injuries  Baseline creatinine approximately 1.3-1.5 per record review  Creatinine trending up in setting of poor oral intake  Patient was encouraged to drink fluids but states that she does not have an appetite  Recheck a BMP on 4/26/2024

## 2024-04-24 NOTE — ASSESSMENT & PLAN NOTE
Heart rate stable on exam  Continue Lopressor 25 mg twice daily  Continue Eliquis 5 mg twice daily for anticoagulation/stroke prevention  Recent hemoglobin 11.2

## 2024-04-24 NOTE — ASSESSMENT & PLAN NOTE
With history of complete heart block status post pacemaker insertion  Follow-up with cardiology outpatient

## 2024-04-24 NOTE — ASSESSMENT & PLAN NOTE
S/p mechanical fall resulting in multiple right-sided rib fractures including fractures of the right 4th through 8th ribs with 3 rib segments (5-7) fractured in 2 places consistent with flail chest as well as gas in the mediastinum consistent with pneumomediastinum and small amount right chest subcutaneous emphysema. S/p LINWOOD block with acute pain service on 4/18/2024  Respiratory status currently stable on oxygen.  No complaints of shortness of breath  Continue gabapentin, methocarbamol, and oxycodone as needed with goal to wean patient off of oxycodone when able  Continue incentive spirometer

## 2024-04-24 NOTE — ASSESSMENT & PLAN NOTE
Sodium trending up, 149 today in setting of poor oral intake  Order 8 ounces of extra fluids with each med Pass  Recheck [facility] on 4/26/2024

## 2024-04-25 ENCOUNTER — PATIENT OUTREACH (OUTPATIENT)
Dept: CASE MANAGEMENT | Facility: OTHER | Age: 84
End: 2024-04-25

## 2024-04-25 NOTE — PROGRESS NOTES
Chart review completed.  Email sent to facility requesting update on patient.   This care manager assistant will continue to monitor via chart review throughout SNF/STR Surveillance episode.   Update received the patient continues with therapy no LCD at this time.

## 2024-04-29 ENCOUNTER — NURSING HOME VISIT (OUTPATIENT)
Dept: GERIATRICS | Facility: OTHER | Age: 84
End: 2024-04-29
Payer: MEDICARE

## 2024-04-29 DIAGNOSIS — E11.22 TYPE 2 DIABETES MELLITUS WITH CHRONIC KIDNEY DISEASE ON CHRONIC DIALYSIS, WITH LONG-TERM CURRENT USE OF INSULIN (HCC): ICD-10-CM

## 2024-04-29 DIAGNOSIS — N18.6 TYPE 2 DIABETES MELLITUS WITH CHRONIC KIDNEY DISEASE ON CHRONIC DIALYSIS, WITH LONG-TERM CURRENT USE OF INSULIN (HCC): ICD-10-CM

## 2024-04-29 DIAGNOSIS — Z79.4 TYPE 2 DIABETES MELLITUS WITH CHRONIC KIDNEY DISEASE ON CHRONIC DIALYSIS, WITH LONG-TERM CURRENT USE OF INSULIN (HCC): ICD-10-CM

## 2024-04-29 DIAGNOSIS — I50.32 CHRONIC DIASTOLIC CONGESTIVE HEART FAILURE (HCC): ICD-10-CM

## 2024-04-29 DIAGNOSIS — J94.2 HEMOPNEUMOTHORAX ON RIGHT: ICD-10-CM

## 2024-04-29 DIAGNOSIS — I48.0 PAF (PAROXYSMAL ATRIAL FIBRILLATION) (HCC): ICD-10-CM

## 2024-04-29 DIAGNOSIS — J44.9 CHRONIC OBSTRUCTIVE PULMONARY DISEASE, UNSPECIFIED COPD TYPE (HCC): ICD-10-CM

## 2024-04-29 DIAGNOSIS — N17.9 AKI (ACUTE KIDNEY INJURY) (HCC): ICD-10-CM

## 2024-04-29 DIAGNOSIS — S22.43XD MULTIPLE CLOSED FRACTURES OF RIBS OF BOTH SIDES WITH ROUTINE HEALING, SUBSEQUENT ENCOUNTER: Primary | ICD-10-CM

## 2024-04-29 DIAGNOSIS — Z99.2 TYPE 2 DIABETES MELLITUS WITH CHRONIC KIDNEY DISEASE ON CHRONIC DIALYSIS, WITH LONG-TERM CURRENT USE OF INSULIN (HCC): ICD-10-CM

## 2024-04-29 PROCEDURE — 99309 SBSQ NF CARE MODERATE MDM 30: CPT | Performed by: NURSE PRACTITIONER

## 2024-04-29 NOTE — PROGRESS NOTES
Facility: [facility]  POS: 31  Progress Note    Chief Complaint/Reason for visit: STR follow-up visit  Code status: Full code  History of Present Illness: 84-year-old female seen and examined for STR follow-up of acute and chronic medical conditions.  At time of examination this morning, received patient seated in chair, and she appeared weak.  Denies pain or discomfort denies shortness of breath, chest pain, headache, dizziness, abdominal pain, nausea, vomiting, diarrhea, or constipation.  Continues to require oxygen supplementation via nasal cannula.  No acute issues reported by nursing staff.  Patient is at risk for readmission to hospital in setting of recent hospitalization, physical deconditioning, poor oral intake, and chronic medical conditions.  See A/P for additional information.  Past Medical History: unchanged from history and physical  Past Medical History:   Diagnosis Date    Anxiety     Arthritis     CHF (congestive heart failure) ([facility])     Chronic venous insufficiency     COPD (chronic obstructive pulmonary disease) (HCC)     Coronary artery disease     Diabetes mellitus (HCC)     type II    Disease of thyroid gland     hypothyroidism    DVT (deep venous thrombosis) ([facility])     HL (hearing loss)     Hyperlipidemia     Hypertension     Irregular heart beat     Paroxysmal a-fib    Pacemaker     Renal disorder     stage 3    Sleep apnea     Sleep difficulties     Thyroid disease     Urinary incontinence     Uterine leiomyoma     Walker as ambulation aid      Family History: Unchanged from history and physical  Social History: Unchanged from history and physical  Review of systems: As per review of medical illness, all other systems reviewed and negative.  Medications: All medication and routine orders were reviewed and updated  Allergies: Reviewed and unchanged  Consults reviewed:PT, OT, and Other  Labs/Diagnostics (reviewed by this provider): Copy in Chart  Imaging Reviewed:  Physical Exam  Weight:  176.2 pounds Temp: 98         BP: 110/66  pulse: 62 resp: 16      Orientation:Person, Place, and Day     Physical Exam  Vitals and nursing note reviewed.   Constitutional:       General: She is not in acute distress.     Appearance: She is obese. She is ill-appearing. She is not toxic-appearing or diaphoretic.   HENT:      Head: Normocephalic.      Nose: No congestion or rhinorrhea.      Mouth/Throat:      Mouth: Mucous membranes are dry.      Pharynx: No oropharyngeal exudate.   Eyes:      General:         Right eye: No discharge.         Left eye: No discharge.      Extraocular Movements: Extraocular movements intact.      Conjunctiva/sclera: Conjunctivae normal.      Pupils: Pupils are equal, round, and reactive to light.   Cardiovascular:      Rate and Rhythm: Normal rate.      Pulses: Normal pulses.   Pulmonary:      Effort: Pulmonary effort is normal. No respiratory distress.      Breath sounds: No wheezing or rhonchi.      Comments: Decreased breath sounds bilateral lung fields.  Oxygen on via nasal cannula.  Abdominal:      General: Bowel sounds are normal. There is no distension.      Palpations: Abdomen is soft.      Tenderness: There is no abdominal tenderness. There is no guarding.   Musculoskeletal:      Cervical back: Neck supple. No rigidity.      Right lower leg: Edema present.      Left lower leg: Edema present.      Comments: Moves all 4 extremities.   Lymphadenopathy:      Cervical: No cervical adenopathy.   Skin:     General: Skin is warm and dry.      Capillary Refill: Capillary refill takes less than 2 seconds.      Comments: With dark skin discoloration to bilateral lower extremities   Neurological:      Mental Status: She is alert and oriented to person, place, and time.      Motor: Weakness present.      Gait: Gait abnormal.   Psychiatric:         Mood and Affect: Mood normal.         Behavior: Behavior normal.         Thought Content: Thought content normal.        Assessment/Plan:  84-year-old female with:    Fracture of multiple ribs of both sides  Sustained multiple right-sided rib fractures due to mechanical fall  S/p LINWOOD block with acute pain service on 4/18/2024  No complaints of pain at time of exam  Respiratory status stable  Continue gabapentin, methocarbamol, oxycodone as needed with goal to wean patient off of oxycodone when able    Hemopneumothorax on right  Small right-sided hemothorax present on admission recently to hospital in setting of fall  Follow-up with trauma services outpatient    CHACHA (acute kidney injury) ([facility])  Recent CHACHA inpatient secondary to traumatic injuries  Baseline creatinine approximately 1.3-1.5  Creatinine 1.56 today  Patient was encouraged to drink fluids  Appetite poor but drinking Glucerna 3 times daily    PAF (paroxysmal atrial fibrillation) ([facility])  Heart rate stable  Continue Lopressor 25 mg twice daily  Continue Eliquis 5 mg twice daily for anticoagulation/stroke prevention  Routine follow-up with cardiology outpatient    Chronic diastolic congestive heart failure ([facility])  Wt Readings from Last 3 Encounters:   04/22/24 80.6 kg (177 lb 9.6 oz)   04/19/24 80.9 kg (178 lb 6.4 oz)   04/13/24 82.7 kg (182 lb 5.1 oz)   With chronic bilateral lower extremity edema  With 2 pound weight loss from admission weight  Continue Lasix 20 mg daily   Potassium level 4.6 today  Follow-up with cardiology outpatient    Type 2 diabetes mellitus with chronic kidney disease on chronic dialysis, with long-term current use of insulin ([facility])    Lab Results   Component Value Date    HGBA1C 8.2 (H) 01/23/2024   Blood glucose more stable now that she is no longer receiving mealtime insulin and glargine insulin was decreased  Continue glargine insulin 15 units nightly  Continue Accu-Cheks as ordered    COPD (chronic obstructive pulmonary disease) ([facility])  Stable without exacerbation  Currently not on any maintenance inhalers    This note was completed in part  utilizing [employer] direct voice recognition software.  Grammatical errors, random word insertion, spelling mistakes, and incomplete sentences may be an occasional consequence of the system secondary to software limitations, ambient noise and hardware issues.  At the time of dictation, efforts were made to edit, clarify and/or correct errors.  Please read the chart carefully and recognize, using context, where substitutions have occurred.  If you have any questions or concerns about the context, text or information contained within the body of this dictation, please contact myself, the provider, for further clarification.    DALILA Martinez  4/29/20246:06 PM

## 2024-04-29 NOTE — ASSESSMENT & PLAN NOTE
Lab Results   Component Value Date    HGBA1C 8.2 (H) 01/23/2024   Blood glucose more stable now that she is no longer receiving mealtime insulin and glargine insulin was decreased  Continue glargine insulin 15 units nightly  Continue Accu-Cheks as ordered

## 2024-04-29 NOTE — ASSESSMENT & PLAN NOTE
Wt Readings from Last 3 Encounters:   04/22/24 80.6 kg (177 lb 9.6 oz)   04/19/24 80.9 kg (178 lb 6.4 oz)   04/13/24 82.7 kg (182 lb 5.1 oz)   With chronic bilateral lower extremity edema  With 2 pound weight loss from admission weight  Continue Lasix 20 mg daily   Potassium level 4.6 today  Follow-up with cardiology outpatient

## 2024-04-29 NOTE — ASSESSMENT & PLAN NOTE
Recent CHACHA inpatient secondary to traumatic injuries  Baseline creatinine approximately 1.3-1.5  Creatinine 1.56 today  Patient was encouraged to drink fluids  Appetite poor but drinking Glucerna 3 times daily

## 2024-04-29 NOTE — ASSESSMENT & PLAN NOTE
Sustained multiple right-sided rib fractures due to mechanical fall  S/p LINWOOD block with acute pain service on 4/18/2024  No complaints of pain at time of exam  Respiratory status stable  Continue gabapentin, methocarbamol, oxycodone as needed with goal to wean patient off of oxycodone when able

## 2024-04-29 NOTE — ASSESSMENT & PLAN NOTE
Small right-sided hemothorax present on admission recently to hospital in setting of fall  Follow-up with trauma services outpatient

## 2024-05-02 ENCOUNTER — TELEPHONE (OUTPATIENT)
Age: 84
End: 2024-05-02

## 2024-05-02 ENCOUNTER — PATIENT OUTREACH (OUTPATIENT)
Dept: CASE MANAGEMENT | Facility: OTHER | Age: 84
End: 2024-05-02

## 2024-05-02 ENCOUNTER — NURSE TRIAGE (OUTPATIENT)
Age: 84
End: 2024-05-02

## 2024-05-02 NOTE — TELEPHONE ENCOUNTER
Pt daughter call to notify update provider of [facility] call was transfer to nursing triage.

## 2024-05-02 NOTE — TELEPHONE ENCOUNTER
RN called number listed for patient's daughter Nidia as she was instructed to call back a few minutest after the 3:00pm call. No additional call received from patient's daughter. LVM to call office back.

## 2024-05-02 NOTE — TELEPHONE ENCOUNTER
RN called number listed for patient's daughter.     Patient's daughter states that patient is at [facility]. States that an x-ray was ordered for tomorrow as patient broke her left ribs. Patient's daughter states that patient has been experiencing labored breathing, very confused, weakness and wheezing present. States patient is sleeping all day because she is fatigued. Patient's daughter unable to describe situation further due to personal life concerns, started crying on the phone.     RN advised for patient's daughter to call back in a few minutes to continue to discuss concern with patient after take a few minutes to collect thoughts.     Patient's daughter verbalized understanding.

## 2024-05-03 ENCOUNTER — NURSING HOME VISIT (OUTPATIENT)
Dept: GERIATRICS | Facility: OTHER | Age: 84
End: 2024-05-03
Payer: MEDICARE

## 2024-05-03 DIAGNOSIS — N18.32 STAGE 3B CHRONIC KIDNEY DISEASE (HCC): ICD-10-CM

## 2024-05-03 DIAGNOSIS — Z79.4 TYPE 2 DIABETES MELLITUS WITH HYPERGLYCEMIA, WITH LONG-TERM CURRENT USE OF INSULIN (HCC): ICD-10-CM

## 2024-05-03 DIAGNOSIS — S22.43XD MULTIPLE CLOSED FRACTURES OF RIBS OF BOTH SIDES WITH ROUTINE HEALING, SUBSEQUENT ENCOUNTER: ICD-10-CM

## 2024-05-03 DIAGNOSIS — R53.81 PHYSICAL DECONDITIONING: ICD-10-CM

## 2024-05-03 DIAGNOSIS — I48.0 PAF (PAROXYSMAL ATRIAL FIBRILLATION) (HCC): ICD-10-CM

## 2024-05-03 DIAGNOSIS — E11.65 TYPE 2 DIABETES MELLITUS WITH HYPERGLYCEMIA, WITH LONG-TERM CURRENT USE OF INSULIN (HCC): ICD-10-CM

## 2024-05-03 DIAGNOSIS — J94.2 HEMOPNEUMOTHORAX ON RIGHT: ICD-10-CM

## 2024-05-03 DIAGNOSIS — I50.32 CHRONIC DIASTOLIC CONGESTIVE HEART FAILURE (HCC): Primary | ICD-10-CM

## 2024-05-03 DIAGNOSIS — J44.9 CHRONIC OBSTRUCTIVE PULMONARY DISEASE, UNSPECIFIED COPD TYPE (HCC): ICD-10-CM

## 2024-05-03 PROCEDURE — 99309 SBSQ NF CARE MODERATE MDM 30: CPT | Performed by: NURSE PRACTITIONER

## 2024-05-03 NOTE — ASSESSMENT & PLAN NOTE
Wt Readings from Last 3 Encounters:   04/22/24 80.6 kg (177 lb 9.6 oz)   04/19/24 80.9 kg (178 lb 6.4 oz)   04/13/24 82.7 kg (182 lb 5.1 oz)   LVEF 65%.  Systolic function is normal.  Wall motion is normal.  Diastolic function is mildly abnormal, consistent with grade 1 relaxation  Follow-up chest x-ray which was ordered by trauma due to fracture of ribs showed there are residuals of acute/chronic elevation of the right hemidiaphragm, a right pleural effusion cannot be ruled out.  Follow-up recommended as symptoms resolved.  In setting of chest x-ray report and bibasilar Rales, will order additional doses of Lasix 40 mg x 2 days then increase lasix for 3 days then re-evaluate  Check CBC and BMP 5/6/2024  Follow-up with cardiology outpatient

## 2024-05-03 NOTE — ASSESSMENT & PLAN NOTE
Recently hospitalized with right-sided hemothorax present on admission in setting of fall  Follow-up chest x-ray which was ordered by trauma on 5/2/2024 showed residuals of acute/chronic elevation of right hemidiaphragm, a right pleural effusion cannot be ruled out.  Follow-up recommended as symptoms resolved.  Will monitor respiratory status closely as chest x-ray cannot rule out pleural effusion/hemothorax

## 2024-05-03 NOTE — PROGRESS NOTES
Facility: [facility]  POS: 31  Progress Note    Chief Complaint/Reason for visit: STR follow-up visit  Code status: Full code  History of Present Illness: 84-year-old female seen and examined for STR follow-up of acute and chronic medical conditions.  At time of examination this morning, received patient seated in chair, and she appears weak, but nontoxic.  Tamia glucose sensor intact to right upper arm and blood glucose was 188 at 0928.  No episodes of hypoglycemia reported.  Patient completed 80% of her breakfast including Glucerna.  Patient denies shortness of breath, chest pain, headache, dizziness, abdominal pain, nausea, vomiting, diarrhea, constipation.  Spoke to patient's daughter Gorge Mahajan via phone this morning at length regarding clinical assessment findings, chest x-ray report that was ordered by trauma, and plan of care.  Patient's daughter verbalized understanding.  Past Medical History: unchanged from history and physical  Past Medical History:   Diagnosis Date    Anxiety     Arthritis     CHF (congestive heart failure) ([facility])     Chronic venous insufficiency     COPD (chronic obstructive pulmonary disease) ([facility])     Coronary artery disease     Diabetes mellitus ([facility])     type II    Disease of thyroid gland     hypothyroidism    DVT (deep venous thrombosis) ([facility])     HL (hearing loss)     Hyperlipidemia     Hypertension     Irregular heart beat     Paroxysmal a-fib    Pacemaker     Renal disorder     stage 3    Sleep apnea     Sleep difficulties     Thyroid disease     Urinary incontinence     Uterine leiomyoma     Walker as ambulation aid      Family History: Unchanged from history and physical  Social History: Unchanged from history and physical  Review of systems: As per review of medical illness, all other systems reviewed and negative.  Medications: All medication and routine orders were reviewed and updated  Allergies: Reviewed and unchanged  Consults reviewed:PT, OT, and  Other  Labs/Diagnostics (reviewed by this provider): Copy in Chart  Paper chart, BMP stable  Imaging Reviewed: Chest x-ray 5/2/2024  Physical Exam  Weight: 177.6 pounds Temp: 98.2         BP: 138/80  pulse: 66 resp: 20      O2 Sat:  Orientation:Person and Place     Physical Exam  Vitals and nursing note reviewed.   Constitutional:       General: She is not in acute distress.     Appearance: She is ill-appearing. She is not toxic-appearing or diaphoretic.   HENT:      Head: Normocephalic.      Mouth/Throat:      Mouth: Mucous membranes are moist.      Pharynx: No oropharyngeal exudate.   Eyes:      Extraocular Movements: Extraocular movements intact.      Pupils: Pupils are equal, round, and reactive to light.   Cardiovascular:      Rate and Rhythm: Normal rate and regular rhythm.   Pulmonary:      Effort: Pulmonary effort is normal. No respiratory distress.      Breath sounds: Rales (Rales bibasilar.) present.   Abdominal:      General: Bowel sounds are normal. There is no distension.      Palpations: Abdomen is soft.      Tenderness: There is no abdominal tenderness. There is no guarding.   Musculoskeletal:      Cervical back: Neck supple. No rigidity.      Right lower leg: Edema (Nonpitting edema noted) present.      Left lower leg: Edema (Nonpitting edema noted) present.      Comments: Moves all 4 extremities.   Skin:     General: Skin is warm and dry.      Capillary Refill: Capillary refill takes less than 2 seconds.      Comments: Vascular changes to bilateral lower extremities noted.   Neurological:      Mental Status: She is alert. Mental status is at baseline.   Psychiatric:         Mood and Affect: Mood normal.         Behavior: Behavior normal.         Thought Content: Thought content normal.       Assessment/Plan:  84-year-old female with:    Chronic diastolic congestive heart failure (HCC)  Wt Readings from Last 3 Encounters:   04/22/24 80.6 kg (177 lb 9.6 oz)   04/19/24 80.9 kg (178 lb 6.4 oz)    04/13/24 82.7 kg (182 lb 5.1 oz)   LVEF 65%.  Systolic function is normal.  Wall motion is normal.  Diastolic function is mildly abnormal, consistent with grade 1 relaxation  Follow-up chest x-ray which was ordered by trauma due to fracture of ribs showed there are residuals of acute/chronic elevation of the right hemidiaphragm, a right pleural effusion cannot be ruled out.  Follow-up recommended as symptoms resolved.  In setting of chest x-ray report and bibasilar Rales, will order additional doses of Lasix 40 mg x 2 days then increase lasix for 3 days then re-evaluate  Check CBC and BMP 5/6/2024  Follow-up with cardiology outpatient    Fracture of multiple ribs of both sides  Sustained multiple right-sided rib fractures due to mechanical fall. S/p LINWOOD block with acute pain service on 4/18/2024  Nursing reports that patient does occasionally complain of right-sided rib pain  Respiratory status stable with oxygen via nasal cannula  Continue gabapentin, methocarbamol, oxycodone as needed with goal to wean patient off of oxycodone when able    Stage 3b chronic kidney disease (HCC)  Lab Results   Component Value Date    EGFR 33 (L) 04/29/2024    EGFR 30 (L) 04/23/2024    EGFR 29 (L) 04/22/2024    CREATININE 1.56 (H) 04/29/2024    CREATININE 1.67 (H) 04/23/2024    CREATININE 1.72 (H) 04/22/2024   Baseline creatinine 1.3-1.5 per record review  Most recent creatinine 1.56/GFR 33 on 4/29/2024  Avoid nephrotoxic medications such as NSAIDs  Avoid hypotension  Ensure adequate hydration  Recheck BMP on 5/6/2024    COPD (chronic obstructive pulmonary disease) (HCC)  Stable without exacerbation  Currently not on any maintenance inhalers    Hemopneumothorax on right  Recently hospitalized with right-sided hemothorax present on admission in setting of fall  Follow-up chest x-ray which was ordered by trauma on 5/2/2024 showed residuals of acute/chronic elevation of right hemidiaphragm, a right pleural effusion cannot be ruled  out.  Follow-up recommended as symptoms resolved.  Will monitor respiratory status closely as chest x-ray cannot rule out pleural effusion/hemothorax    PAF (paroxysmal atrial fibrillation) ([facility])  Heart rate stable  Continue Lopressor 25 mg twice daily  Continue Eliquis twice daily for anticoagulation/stroke prevention  Follow-up with cardiology outpatient    Type 2 diabetes mellitus with hyperglycemia, with long-term current use of insulin ([facility])    Lab Results   Component Value Date    HGBA1C 8.2 (H) 01/23/2024   Patient uses the [employer] glucose monitoring system  Postprandial blood glucose 188 this morning  Blood glucose within acceptable range for patient's age and comorbidities  Continue glargine insulin 15 units every night    Physical deconditioning  Multifactorial  Continue supportive care at SNF  Continue PT/OT  Continue fall precautions  Ensure adequate hydration and nutrition     This note was completed in part utilizing [employer] direct voice recognition software.  Grammatical errors, random word insertion, spelling mistakes, and incomplete sentences may be an occasional consequence of the system secondary to software limitations, ambient noise and hardware issues.  At the time of dictation, efforts were made to edit, clarify and/or correct errors.  Please read the chart carefully and recognize, using context, where substitutions have occurred.  If you have any questions or concerns about the context, text or information contained within the body of this dictation, please contact myself, the provider, for further clarification.    DALILA Martinez  5/3/590914:22 PM

## 2024-05-03 NOTE — ASSESSMENT & PLAN NOTE
Heart rate stable  Continue Lopressor 25 mg twice daily  Continue Eliquis twice daily for anticoagulation/stroke prevention  Follow-up with cardiology outpatient

## 2024-05-03 NOTE — ASSESSMENT & PLAN NOTE
Lab Results   Component Value Date    HGBA1C 8.2 (H) 01/23/2024   Patient uses the [employer] glucose monitoring system  Postprandial blood glucose 188 this morning  Blood glucose within acceptable range for patient's age and comorbidities  Continue glargine insulin 15 units every night

## 2024-05-03 NOTE — ASSESSMENT & PLAN NOTE
Multifactorial  Continue supportive care at SNF  Continue PT/OT  Continue fall precautions  Ensure adequate hydration and nutrition

## 2024-05-03 NOTE — ASSESSMENT & PLAN NOTE
Lab Results   Component Value Date    EGFR 33 (L) 04/29/2024    EGFR 30 (L) 04/23/2024    EGFR 29 (L) 04/22/2024    CREATININE 1.56 (H) 04/29/2024    CREATININE 1.67 (H) 04/23/2024    CREATININE 1.72 (H) 04/22/2024   Baseline creatinine 1.3-1.5 per record review  Most recent creatinine 1.56/GFR 33 on 4/29/2024  Avoid nephrotoxic medications such as NSAIDs  Avoid hypotension  Ensure adequate hydration  Recheck BMP on 5/6/2024

## 2024-05-03 NOTE — ASSESSMENT & PLAN NOTE
Sustained multiple right-sided rib fractures due to mechanical fall. S/p LINWOOD block with acute pain service on 4/18/2024  Nursing reports that patient does occasionally complain of right-sided rib pain  Respiratory status stable with oxygen via nasal cannula  Continue gabapentin, methocarbamol, oxycodone as needed with goal to wean patient off of oxycodone when able

## 2024-05-03 NOTE — ASSESSMENT & PLAN NOTE
Lab Results   Component Value Date    EGFR 33 (L) 04/29/2024    EGFR 30 (L) 04/23/2024    EGFR 29 (L) 04/22/2024    CREATININE 1.56 (H) 04/29/2024    CREATININE 1.67 (H) 04/23/2024    CREATININE 1.72 (H) 04/22/2024   Baseline creatinine 1.3-1.5 per record review  Most recent creatinine 1.56/GFR 33 on 4/29/2024

## 2024-05-06 ENCOUNTER — NURSING HOME VISIT (OUTPATIENT)
Dept: GERIATRICS | Facility: OTHER | Age: 84
End: 2024-05-06
Payer: MEDICARE

## 2024-05-06 DIAGNOSIS — I89.0 LYMPHEDEMA OF BOTH LOWER EXTREMITIES: ICD-10-CM

## 2024-05-06 DIAGNOSIS — S22.41XD CLOSED FRACTURE OF MULTIPLE RIBS OF RIGHT SIDE WITH ROUTINE HEALING: ICD-10-CM

## 2024-05-06 DIAGNOSIS — J44.9 CHRONIC OBSTRUCTIVE PULMONARY DISEASE, UNSPECIFIED COPD TYPE (HCC): ICD-10-CM

## 2024-05-06 DIAGNOSIS — R29.6 MULTIPLE FALLS: ICD-10-CM

## 2024-05-06 DIAGNOSIS — S22.41XA CLOSED FRACTURE OF MULTIPLE RIBS OF RIGHT SIDE, INITIAL ENCOUNTER: ICD-10-CM

## 2024-05-06 DIAGNOSIS — R53.81 PHYSICAL DECONDITIONING: ICD-10-CM

## 2024-05-06 DIAGNOSIS — J96.01 ACUTE RESPIRATORY FAILURE WITH HYPOXIA (HCC): ICD-10-CM

## 2024-05-06 DIAGNOSIS — E87.0 HYPERNATREMIA: ICD-10-CM

## 2024-05-06 DIAGNOSIS — G89.11 ACUTE PAIN DUE TO TRAUMA: ICD-10-CM

## 2024-05-06 DIAGNOSIS — I50.32 CHRONIC DIASTOLIC CONGESTIVE HEART FAILURE (HCC): Primary | ICD-10-CM

## 2024-05-06 DIAGNOSIS — E11.65 TYPE 2 DIABETES MELLITUS WITH HYPERGLYCEMIA, WITH LONG-TERM CURRENT USE OF INSULIN (HCC): ICD-10-CM

## 2024-05-06 DIAGNOSIS — I48.0 PAF (PAROXYSMAL ATRIAL FIBRILLATION) (HCC): ICD-10-CM

## 2024-05-06 DIAGNOSIS — J94.2 HEMOPNEUMOTHORAX ON RIGHT: ICD-10-CM

## 2024-05-06 DIAGNOSIS — N18.32 STAGE 3B CHRONIC KIDNEY DISEASE (HCC): ICD-10-CM

## 2024-05-06 DIAGNOSIS — Z79.4 TYPE 2 DIABETES MELLITUS WITH HYPERGLYCEMIA, WITH LONG-TERM CURRENT USE OF INSULIN (HCC): ICD-10-CM

## 2024-05-06 PROCEDURE — 99309 SBSQ NF CARE MODERATE MDM 30: CPT | Performed by: NURSE PRACTITIONER

## 2024-05-06 RX ORDER — FUROSEMIDE 20 MG/1
40 TABLET ORAL DAILY
Status: SHIPPED
Start: 2024-05-06

## 2024-05-06 NOTE — ASSESSMENT & PLAN NOTE
Multifactorial  Participating in therapy  Ambulates using a walker  Continue fall precautions  Ensure adequate hydration and nutrition

## 2024-05-06 NOTE — ASSESSMENT & PLAN NOTE
Continues to require oxygen via nasal cannula  O2 sat 98% this morning on oxygen at 2 L  Goal to wean off oxygen as tolerated

## 2024-05-06 NOTE — ASSESSMENT & PLAN NOTE
Lab Results   Component Value Date    HGBA1C 8.2 (H) 01/23/2024   Patient uses the tamia glucose monitoring system.  Tamia sensor intact to left upper arm  No episodes of hypoglycemia reported  Blood glucose within acceptable range for patient's age and comorbidities  Continue glargine insulin 15 units every night

## 2024-05-06 NOTE — ASSESSMENT & PLAN NOTE
Wt Readings from Last 3 Encounters:   04/22/24 80.6 kg (177 lb 9.6 oz)   04/19/24 80.9 kg (178 lb 6.4 oz)   04/13/24 82.7 kg (182 lb 5.1 oz)   LVEF 65%.  Systolic function is normal.  Wall motion is normal.  Diastolic function is mildly abnormal, consistent with grade 1 relaxation  Follow-up chest x-ray which was ordered by trauma due to fracture of ribs showed residuals of acute/chronic elevation of the right hemidiaphragm, a right pleural effusion cannot be ruled out.  Additional doses of Lasix 40 mg ordered x 2 days from 5/3/2024 and Lasix was increased from 20 mg daily to 40 mg daily.  Patient has lost 2.6 pounds since 5/3/2024 likely fluid loss  Will continue Lasix 40 mg daily for now.  Currently not on any potassium supplements  Today's BMP stable for patient  Continue CHF pathway/daily weights  Follow-up with cardiology outpatient

## 2024-05-06 NOTE — ASSESSMENT & PLAN NOTE
Recently hospitalized with right-sided hemothorax which was present on admission after mechanical fall.  Patient did not require chest tube  Follow-up chest x-ray ordered by trauma on 5/2/2024 showed residuals of acute/chronic elevation of right hemidiaphragm, a right pleural effusion cannot be ruled out  Respiratory stable on oxygen  No complaints of chest pain or difficulty breathing

## 2024-05-06 NOTE — ASSESSMENT & PLAN NOTE
Heart rate stable  Continue metoprolol tartrate 25 mg twice daily  Continue Eliquis twice daily for anticoagulation/stroke prevention  Follow-up with cardiology outpatient

## 2024-05-06 NOTE — ASSESSMENT & PLAN NOTE
Patient sustained multiple right-sided rib fractures due to mechanical fall  Patient reports that she has some discomfort at times to ribs with movement  Patient is currently on scheduled Tylenol, gabapentin, and oxycodone as needed; will discontinue oxycodone the patient is not using  Encouraged patient to take deep breaths  Respiratory status is stable

## 2024-05-06 NOTE — PROGRESS NOTES
Facility: [facility]  POS: 31  Progress Note    Chief Complaint/Reason for visit: STR follow-up visit  Code status: Full code  History of Present Illness: 84-year-old female seen and examined for STR follow-up of acute and chronic medical conditions.  Patient is currently residing at [facility] for rehabilitation.  Therapist reports that patient complained of feeling shaky after ambulating to the bathroom.  Blood glucose was stable at that time, afebrile, and hemodynamically stable.  O2 sat 98% on room air.  Patient denies having pain or discomfort.  She has Rales bibasilar which appears chronic.  She lost 2.6 pounds after additional doses of Lasix 40 mg ordered on 5/3/2024 so likely fluid loss.  Patient drank all of her Glucerna this morning.  Today's BMP and CBC stable for patient.  Past Medical History: unchanged from history and physical  Past Medical History:   Diagnosis Date    Anxiety     Arthritis     CHF (congestive heart failure) ([facility])     Chronic venous insufficiency     COPD (chronic obstructive pulmonary disease) ([facility])     Coronary artery disease     Diabetes mellitus ([facility])     type II    Disease of thyroid gland     hypothyroidism    DVT (deep venous thrombosis) ([facility])     HL (hearing loss)     Hyperlipidemia     Hypertension     Irregular heart beat     Paroxysmal a-fib    Pacemaker     Renal disorder     stage 3    Sleep apnea     Sleep difficulties     Thyroid disease     Urinary incontinence     Uterine leiomyoma     Walker as ambulation aid      Family History: Unchanged from history and physical  Social History: Unchanged from history and physical  Review of systems: As per review of medical illness, all other systems reviewed and negative.  Medications: All medication and routine orders were reviewed and updated  Allergies: Reviewed and unchanged  Consults reviewed:PT, OT, and Other  Labs/Diagnostics (reviewed by this provider): Copy in Chart  5/6/2024 CBC without differential and  BMP reviewed; platelets 132, all other lab results stable for patient  Imaging Reviewed: CXR   Physical Exam  Weight: 175 pounds Temp: 97.4          BP: 148/76  pulse: 78 resp: 20       O2 Sat: 98% on oxygen via nasal cannula  Orientation:Person and Place     Physical Exam  Vitals and nursing note reviewed.   Constitutional:       General: She is not in acute distress.     Appearance: She is ill-appearing. She is not toxic-appearing or diaphoretic.   HENT:      Head: Normocephalic.      Nose: No congestion.      Mouth/Throat:      Mouth: Mucous membranes are moist.      Pharynx: No oropharyngeal exudate.   Eyes:      Extraocular Movements: Extraocular movements intact.      Conjunctiva/sclera: Conjunctivae normal.   Cardiovascular:      Rate and Rhythm: Normal rate.      Comments: Pacemaker in situ  Pulmonary:      Effort: Pulmonary effort is normal. No respiratory distress.      Breath sounds: Rales present.      Comments: Decreased breath sounds bilateral lung fields.  Abdominal:      General: Bowel sounds are normal. There is no distension.      Palpations: Abdomen is soft.      Tenderness: There is no abdominal tenderness. There is no guarding.   Musculoskeletal:      Cervical back: Neck supple. No rigidity.      Right lower leg: Edema present.      Left lower leg: Edema present.      Comments: Moves all 4 extremities.   Skin:     General: Skin is warm and dry.      Capillary Refill: Capillary refill takes less than 2 seconds.   Neurological:      Mental Status: She is alert. Mental status is at baseline.      Motor: Weakness present.      Gait: Gait abnormal.   Psychiatric:         Mood and Affect: Mood normal.         Behavior: Behavior normal.         Thought Content: Thought content normal.       Assessment/Plan:  84-year-old female with:  Chronic diastolic heart failure, multiple closed fractures of ribs, stage III chronic kidney disease, chronic obstructive pulmonary disease, hemopneumothorax, paroxysmal  A-fib, type 2 diabetes mellitus, physical deconditioning  Chronic diastolic congestive heart failure (HCC)  Wt Readings from Last 3 Encounters:   04/22/24 80.6 kg (177 lb 9.6 oz)   04/19/24 80.9 kg (178 lb 6.4 oz)   04/13/24 82.7 kg (182 lb 5.1 oz)   LVEF 65%.  Systolic function is normal.  Wall motion is normal.  Diastolic function is mildly abnormal, consistent with grade 1 relaxation  Follow-up chest x-ray which was ordered by trauma due to fracture of ribs showed residuals of acute/chronic elevation of the right hemidiaphragm, a right pleural effusion cannot be ruled out.  Additional doses of Lasix 40 mg ordered x 2 days from 5/3/2024 and Lasix was increased from 20 mg daily to 40 mg daily.  Patient has lost 2.6 pounds since 5/3/2024 likely fluid loss  Will continue Lasix 40 mg daily for now.  Currently not on any potassium supplements  Today's BMP stable for patient  Continue CHF pathway/daily weights  Follow-up with cardiology outpatient    Closed fracture of multiple ribs of right side with routine healing  Patient sustained multiple right-sided rib fractures due to mechanical fall  Patient reports that she has some discomfort at times to ribs with movement  Patient is currently on scheduled Tylenol, gabapentin, and oxycodone as needed; will discontinue oxycodone if patient is not using  Encouraged patient to take deep breaths  Respiratory status is stable    Acute respiratory failure with hypoxia (HCC)  Continues to require oxygen via nasal cannula  O2 sat 98% this morning on oxygen at 2 L  Goal to wean off oxygen as tolerated    Hemopneumothorax on right  Recently hospitalized with right-sided hemothorax which was present on admission after mechanical fall.  Patient did not require chest tube  Follow-up chest x-ray ordered by trauma on 5/2/2024 showed residuals of acute/chronic elevation of right hemidiaphragm, a right pleural effusion cannot be ruled out  Respiratory stable on oxygen  No complaints of  chest pain or difficulty breathing    Hypernatremia  Sodium 149 previously in setting of poor oral intake  Sodium level 142 today    Stage 3b chronic kidney disease ([facility])  Lab Results   Component Value Date    EGFR 33 (L) 04/29/2024    EGFR 30 (L) 04/23/2024    EGFR 29 (L) 04/22/2024    CREATININE 1.56 (H) 04/29/2024    CREATININE 1.67 (H) 04/23/2024    CREATININE 1.72 (H) 04/22/2024   Baseline creatinine 1.3-1.5  Today's creatinine 1.50/GFR 35 avoid nephrotoxic medications such as NSAIDs  Renal dose medications  Avoid hypotension  Ensure adequate hydration    Type 2 diabetes mellitus with hyperglycemia, with long-term current use of insulin ([facility])    Lab Results   Component Value Date    HGBA1C 8.2 (H) 01/23/2024   Patient uses the tamia glucose monitoring system.  Tamia sensor intact to left upper arm  No episodes of hypoglycemia reported  Blood glucose within acceptable range for patient's age and comorbidities  Continue glargine insulin 15 units every night    PAF (paroxysmal atrial fibrillation) ([facility])  Heart rate stable  Continue metoprolol tartrate 25 mg twice daily  Continue Eliquis twice daily for anticoagulation/stroke prevention  Follow-up with cardiology outpatient    Physical deconditioning  Multifactorial  Participating in therapy  Ambulates using a walker  Continue fall precautions  Ensure adequate hydration and nutrition    COPD (chronic obstructive pulmonary disease) ([facility])  Stable without exacerbation  Currently not on any maintenance inhalers     Lymphedema  Patient reported that she stopped using pneumatic lymphedema pumps  Continue Lasix 40 mg daily  Encouraged patient to elevate legs at rest    This note was completed in part utilizing [employer] direct voice recognition software.  Grammatical errors, random word insertion, spelling mistakes, and incomplete sentences may be an occasional consequence of the system secondary to software limitations, ambient noise and hardware issues.  At the  time of dictation, efforts were made to edit, clarify and/or correct errors.  Please read the chart carefully and recognize, using context, where substitutions have occurred.  If you have any questions or concerns about the context, text or information contained within the body of this dictation, please contact myself, the provider, for further clarification.    DALILA Martinez  5/6/20247:16 PM

## 2024-05-06 NOTE — ASSESSMENT & PLAN NOTE
Lab Results   Component Value Date    EGFR 33 (L) 04/29/2024    EGFR 30 (L) 04/23/2024    EGFR 29 (L) 04/22/2024    CREATININE 1.56 (H) 04/29/2024    CREATININE 1.67 (H) 04/23/2024    CREATININE 1.72 (H) 04/22/2024   Baseline creatinine 1.3-1.5  Today's creatinine 1.50/GFR 35 avoid nephrotoxic medications such as NSAIDs  Renal dose medications  Avoid hypotension  Ensure adequate hydration

## 2024-05-07 ENCOUNTER — OFFICE VISIT (OUTPATIENT)
Dept: SURGERY | Facility: CLINIC | Age: 84
End: 2024-05-07
Payer: MEDICARE

## 2024-05-07 DIAGNOSIS — S22.41XD CLOSED FRACTURE OF MULTIPLE RIBS OF RIGHT SIDE WITH ROUTINE HEALING: Primary | ICD-10-CM

## 2024-05-07 PROCEDURE — 99212 OFFICE O/P EST SF 10 MIN: CPT | Performed by: PHYSICIAN ASSISTANT

## 2024-05-07 NOTE — ASSESSMENT & PLAN NOTE
- Right 4-8th rib fractures with flail segment at 5-7   - Small hemopneumothorax initially noted  - Repeat CXR completed at outside facility - report reviewed no pneumothorax elevated right hemidiaphragm cannot rule out pleural effusion.   - Previous cxr reviewed with elevated right hemidiaphragm.   - Pain well controlled without oxycodone   - Continue to use incentive spirometer   - Follow up with nursing home physicians for ongoing care  - Wean oxygen as tolerated to oxygen saturation >88%

## 2024-05-07 NOTE — PROGRESS NOTES
Office Visit - Trauma Office Note  Lizzie Whitten MRN: [id number]  Encounter: [id number]    Assessment and Plan  Problem List Items Addressed This Visit          Musculoskeletal and Integument    Closed fracture of multiple ribs of right side with routine healing - Primary     - Right 4-8th rib fractures with flail segment at 5-7   - Small hemopneumothorax initially noted  - Repeat CXR completed at outside facility - report reviewed no pneumothorax elevated right hemidiaphragm cannot rule out pleural effusion.   - Previous cxr reviewed with elevated right hemidiaphragm.   - Pain well controlled without oxycodone   - Continue to use incentive spirometer   - Follow up with nursing home physicians for ongoing care  - Wean oxygen as tolerated to oxygen saturation >88%            Does the patient have a positive PTSD Screen? No  Was a psychiatric referral provided? N/a    Chief Complaint:  Lizzie Whitten is a 84 y.o. female who presents for No chief complaint on file.    Subjective  Patient reports she has been doing well, she has no complaints, her pain is well controlled and she has been participating in PT/OT. She has no new complaints.     Past Medical History:   Diagnosis Date    Anxiety     Arthritis     CHF (congestive heart failure) ([facility])     Chronic venous insufficiency     COPD (chronic obstructive pulmonary disease) ([facility])     Coronary artery disease     Diabetes mellitus (HCC)     type II    Disease of thyroid gland     hypothyroidism    DVT (deep venous thrombosis) ([facility])     HL (hearing loss)     Hyperlipidemia     Hypertension     Irregular heart beat     Paroxysmal a-fib    Pacemaker     Renal disorder     stage 3    Sleep apnea     Sleep difficulties     Thyroid disease     Urinary incontinence     Uterine leiomyoma     Walker as ambulation aid        Past Surgical History:   Procedure Laterality Date    BREAST SURGERY      For Biopsy    CARDIAC PACEMAKER PLACEMENT      Permanent. Last assessed:  2/19/16    CARDIAC SURGERY      CARPAL TUNNEL RELEASE Bilateral     CATARACT EXTRACTION      CATARACT EXTRACTION W/ INTRAOCULAR LENS  IMPLANT, BILATERAL      CYSTOSCOPY      Diagnostic. Last assessed: 11/8/17    ECTOPIC PREGNANCY SURGERY      HYSTERECTOMY      Total Abdomonal. Resolved: 1982    JOINT REPLACEMENT Right     TKR    OOPHORECTOMY      CA ENDOVEN ABLTJ INCMPTNT VEIN XTR LASER 1ST VEIN Left 2/17/2017    Procedure: GREATER SAPHENOUS VEIN ENDOVASCULAR LASER THERAPY ;  Surgeon: Danielle Phan DO;  Location: [facility];  Service: Vascular    TONSILLECTOMY         Family History   Problem Relation Age of Onset    Coronary artery disease Mother     Hypertension Mother     Heart disease Mother     Hypertension Father     Diabetes Father         Mellitus    No Known Problems Sister     No Known Problems Daughter     No Known Problems Daughter     No Known Problems Maternal Grandmother     No Known Problems Maternal Grandfather     No Known Problems Paternal Grandmother     No Known Problems Paternal Grandfather     Diabetes Brother         Mellitus    No Known Problems Son     Breast cancer Maternal Aunt     Pancreatic cancer Maternal Aunt     Breast cancer Cousin     Breast cancer Cousin     Breast cancer Paternal Aunt     No Known Problems Paternal Aunt     No Known Problems Paternal Aunt        Social History     Tobacco Use    Smoking status: Never     Passive exposure: Never    Smokeless tobacco: Never    Tobacco comments:     Per [employer]: Former smoker   Vaping Use    Vaping status: Never Used   Substance Use Topics    Alcohol use: Not Currently     Comment: rarely. Per [employer]: Denied history of alcohol use    Drug use: No        Medications  Current Outpatient Medications on File Prior to Visit   Medication Sig Dispense Refill    acetaminophen (TYLENOL) 325 mg tablet Take 3 tablets (975 mg total) by mouth every 8 (eight) hours      ammonium lactate (LAC-HYDRIN) 12 % lotion Apply 1 Application  topically daily      apixaban (Eliquis) 5 mg Take 1 tablet (5 mg total) by mouth 2 (two) times a day Do not start before April 22, 2024.      Ascorbic Acid (VITAMIN C) 1000 MG tablet Take 500 mg by mouth       atorvastatin (LIPITOR) 80 mg tablet Take 1 tablet (80 mg total) by mouth daily at bedtime 90 tablet 3    Calcium Carbonate-Vit D-Min (CALCIUM 1200 PO) Take by mouth      citalopram (CeleXA) 20 mg tablet TAKE 1 TABLET EVERY DAY 90 tablet 3    Continuous Blood Gluc  (FREESTYLE RONNI 14 DAY READER) GARRY Use to check BG levels 4+times daily and as needed 1 Device 0    Continuous Blood Gluc Sensor (FreeStyle Ronni 14 Day Sensor) MISC Change sensor every 14 days to check BG levels 4+ times daily 6 each 3    Diclofenac Sodium (VOLTAREN) 1 % Apply 2 g topically 4 (four) times a day 50 g 1    furosemide (LASIX) 20 mg tablet Take 2 tablets (40 mg total) by mouth daily      gabapentin (NEURONTIN) 100 mg capsule Take 1 capsule (100 mg total) by mouth daily at bedtime      insulin lispro (HumALOG/ADMELOG) 100 units/mL injection Inject 3 Units under the skin 3 (three) times a day with meals      insulin lispro (HumALOG/ADMELOG) 100 units/mL injection Inject 2-12 Units under the skin 3 (three) times a day with meals      Insulin Pen Needle (BD Pen Needle Sasha U/F) 32G X 4 MM MISC Inject 4 times a day 400 each 1    Lancets (onetouch ultrasoft) lancets Use 4 times a day (Patient not taking: Reported on 4/8/2024) 400 each 0    levothyroxine 150 mcg tablet Take 1 tablet (150 mcg total) by mouth daily in the early morning 30 tablet 0    lidocaine (LIDODERM) 5 % Apply 2 patches topically over 12 hours daily Remove & Discard patch within 12 hours or as directed by MD      methocarbamol (ROBAXIN) 500 mg tablet Take 1 tablet (500 mg total) by mouth every 8 (eight) hours      metoprolol tartrate (LOPRESSOR) 25 mg tablet Take 1 tablet (25 mg total) by mouth every 12 (twelve) hours 60 tablet 0    Omega-3 Fatty Acids (FISH OIL)  1200 MG CAPS Take 1,200 mg by mouth 2 (two) times a day       pantoprazole (PROTONIX) 40 mg tablet Take 1 tablet (40 mg total) by mouth daily in the early morning 30 tablet 0    polyethylene glycol (MIRALAX) 17 g packet Take 17 g by mouth daily      senna-docusate sodium (SENOKOT S) 8.6-50 mg per tablet Take 2 tablets by mouth 2 (two) times a day      sodium chloride (VANNESSA 128) 5 % hypertonic ophthalmic ointment Administer 1 drop to both eyes 2 (two) times a day       No current facility-administered medications on file prior to visit.       Allergies  Allergies   Allergen Reactions    Ampicillin Hives       Review of Systems   Constitutional:  Negative for activity change, appetite change, diaphoresis, fatigue and fever.   HENT:  Negative for congestion, facial swelling, nosebleeds, postnasal drip, rhinorrhea, sinus pressure, sinus pain and sore throat.    Eyes:  Negative for photophobia and visual disturbance.   Respiratory:  Negative for cough, shortness of breath and wheezing.    Cardiovascular:  Negative for chest pain and palpitations.   Gastrointestinal:  Negative for abdominal distention, abdominal pain, blood in stool, constipation, diarrhea, nausea and vomiting.   Genitourinary:  Negative for decreased urine volume, dysuria, flank pain, frequency and hematuria.   Musculoskeletal:  Negative for arthralgias, back pain and neck stiffness.   Skin:  Negative for wound.   Neurological:  Negative for dizziness, seizures, syncope, facial asymmetry, weakness, light-headedness, numbness and headaches.       Objective  There were no vitals filed for this visit.    Physical Exam  Vitals reviewed.   Constitutional:       General: She is not in acute distress.     Appearance: Normal appearance. She is not ill-appearing or toxic-appearing.   HENT:      Head: Normocephalic and atraumatic.      Right Ear: External ear normal.      Left Ear: External ear normal.      Mouth/Throat:      Mouth: Mucous membranes are moist.       Pharynx: Oropharynx is clear.   Eyes:      Extraocular Movements: Extraocular movements intact.      Pupils: Pupils are equal, round, and reactive to light.   Cardiovascular:      Rate and Rhythm: Normal rate and regular rhythm.      Heart sounds: No murmur heard.     No friction rub. No gallop.   Pulmonary:      Effort: Pulmonary effort is normal.      Breath sounds: Rhonchi (right sided) present. No wheezing.   Abdominal:      Palpations: Abdomen is soft.      Tenderness: There is no abdominal tenderness.   Musculoskeletal:         General: Normal range of motion.      Cervical back: Normal range of motion. No tenderness.   Skin:     General: Skin is warm and dry.      Capillary Refill: Capillary refill takes less than 2 seconds.      Findings: No bruising.   Neurological:      General: No focal deficit present.      Mental Status: She is alert and oriented to person, place, and time.      Sensory: No sensory deficit.      Motor: No weakness.

## 2024-05-08 ENCOUNTER — NURSING HOME VISIT (OUTPATIENT)
Dept: GERIATRICS | Facility: OTHER | Age: 84
End: 2024-05-08
Payer: MEDICARE

## 2024-05-08 DIAGNOSIS — K59.01 SLOW TRANSIT CONSTIPATION: ICD-10-CM

## 2024-05-08 DIAGNOSIS — S22.41XD CLOSED FRACTURE OF MULTIPLE RIBS OF RIGHT SIDE WITH ROUTINE HEALING: Primary | ICD-10-CM

## 2024-05-08 DIAGNOSIS — I50.32 CHRONIC DIASTOLIC CONGESTIVE HEART FAILURE (HCC): ICD-10-CM

## 2024-05-08 DIAGNOSIS — R53.81 PHYSICAL DECONDITIONING: ICD-10-CM

## 2024-05-08 DIAGNOSIS — J96.01 ACUTE RESPIRATORY FAILURE WITH HYPOXIA (HCC): ICD-10-CM

## 2024-05-08 DIAGNOSIS — N18.32 STAGE 3B CHRONIC KIDNEY DISEASE (HCC): ICD-10-CM

## 2024-05-08 DIAGNOSIS — Z79.4 TYPE 2 DIABETES MELLITUS WITH HYPERGLYCEMIA, WITH LONG-TERM CURRENT USE OF INSULIN (HCC): ICD-10-CM

## 2024-05-08 DIAGNOSIS — I48.0 PAF (PAROXYSMAL ATRIAL FIBRILLATION) (HCC): ICD-10-CM

## 2024-05-08 DIAGNOSIS — E11.65 TYPE 2 DIABETES MELLITUS WITH HYPERGLYCEMIA, WITH LONG-TERM CURRENT USE OF INSULIN (HCC): ICD-10-CM

## 2024-05-08 PROCEDURE — 99309 SBSQ NF CARE MODERATE MDM 30: CPT | Performed by: NURSE PRACTITIONER

## 2024-05-08 NOTE — ASSESSMENT & PLAN NOTE
Patient has required oxygen supplementation since hospitalization  No complaints of shortness of breath  Wean off oxygen as tolerated

## 2024-05-08 NOTE — ASSESSMENT & PLAN NOTE
Lab Results   Component Value Date    HGBA1C 8.2 (H) 01/23/2024   Patient uses her own malorie glucose monitoring system which does not require fingersticks  Blood glucose within acceptable range for patient's age and comorbidities  No episodes of hypoglycemia reported  Continue glargine insulin 15 units every night

## 2024-05-08 NOTE — ASSESSMENT & PLAN NOTE
Lab Results   Component Value Date    EGFR 35 (L) 05/06/2024    EGFR 33 (L) 04/29/2024    EGFR 30 (L) 04/23/2024    CREATININE 1.50 (H) 05/06/2024    CREATININE 1.56 (H) 04/29/2024    CREATININE 1.67 (H) 04/23/2024   Baseline creatinine 1.3-1.5  Most recent creatinine 1.5/GFR 35  Avoid nephrotoxic medications  Renal dose medications  Avoid hypotension  Recheck BMP on 5/16/2024

## 2024-05-08 NOTE — ASSESSMENT & PLAN NOTE
Wt Readings from Last 3 Encounters:   04/22/24 80.6 kg (177 lb 9.6 oz)   04/19/24 80.9 kg (178 lb 6.4 oz)   04/13/24 82.7 kg (182 lb 5.1 oz)   Echo: LVEF 65%  Recent chest x-ray showed residuals of acute/chronic elevation of right hemidiaphragm and trauma surgery aware.  A right pleural effusion cannot be ruled out; additional doses of Lasix ordered x 2 days on 5/3/2024 then Lasix was increased from 20 mg to 40 mg daily.  Patient has lost 4 to 5 pounds likely fluid loss as a decrease in bilateral lower extremity edema noted  No complaints of shortness of breath  Most recent potassium level 4.2 and creatinine stable  Continue CHF pathway/daily weights  Repeat chest x-ray around May 16, 2024  Follow-up with cardiology outpatient

## 2024-05-08 NOTE — ASSESSMENT & PLAN NOTE
Multifactorial  Participating in therapy  Patient ambulatory with walker  Continue fall precautions  Ensure adequate hydration and nutrition

## 2024-05-08 NOTE — PROGRESS NOTES
Facility: [facility]  POS: 31  Progress Note    Chief Complaint/Reason for visit: STR follow-up visit  Code status: Full code  History of Present Illness: 84-year-old female seen and examined for STR follow-up of acute on chronic medical conditions.  At time of examination this morning, received patient seated in chair, and she appeared in no distress.  Patient stated that she had 1 episode of diarrhea this morning; will reduce stool softener from twice daily to daily and hold for loose stools.  Patient reports having rib pain with movement only.  Encouraged use of incentive spirometer.  Patient had follow-up with trauma surgery yesterday; notes reviewed.  Trauma surgery did receive a copy of recent portable chest x-ray results performed at [facility].  Weight loss of 4-5 pounds noted likely fluid loss from increase in diuretic.  Decreased in bilateral lower extremity edema noted as well.   Past Medical History: unchanged from history and physical  Past Medical History:   Diagnosis Date    Anxiety     Arthritis     CHF (congestive heart failure) ([facility])     Chronic venous insufficiency     COPD (chronic obstructive pulmonary disease) ([facility])     Coronary artery disease     Diabetes mellitus ([facility])     type II    Disease of thyroid gland     hypothyroidism    DVT (deep venous thrombosis) ([facility])     HL (hearing loss)     Hyperlipidemia     Hypertension     Irregular heart beat     Paroxysmal a-fib    Pacemaker     Renal disorder     stage 3    Sleep apnea     Sleep difficulties     Thyroid disease     Urinary incontinence     Uterine leiomyoma     Walker as ambulation aid      Family History: Unchanged from history and physical  Social History: Unchanged from history and physical  Review of systems: As per review of medical illness, all other systems reviewed and negative.  Medications: All medication and routine orders were reviewed and updated  Allergies: Reviewed and unchanged  Consults reviewed:PT, OT, and  Other  Labs/Diagnostics (reviewed by this provider): Copy in Chart  Imaging Reviewed:  Physical Exam  Weight: 173.2 pounds Temp: 97.8         BP: 138/76  pulse: 76    resp: 20       O2 Sat: 92% on room air/98% with oxygen 1.5 L  Orientation:Person, Place, and Day     Physical Exam  Vitals and nursing note reviewed.   Constitutional:       General: She is not in acute distress.     Appearance: She is ill-appearing. She is not toxic-appearing or diaphoretic.      Comments: Elderly female who appears with chronic illness.  In no distress.   HENT:      Head: Normocephalic.      Nose: No congestion.      Mouth/Throat:      Mouth: Mucous membranes are moist.      Pharynx: No oropharyngeal exudate.   Eyes:      Extraocular Movements: Extraocular movements intact.   Neck:      Vascular: No carotid bruit.   Cardiovascular:      Rate and Rhythm: Normal rate.   Pulmonary:      Effort: Pulmonary effort is normal. No respiratory distress.      Breath sounds: Rales (Rales bibasilar) present.      Comments: Oxygen on via nasal cannula.  Abdominal:      General: Bowel sounds are normal. There is no distension.      Palpations: Abdomen is soft.      Tenderness: There is no abdominal tenderness. There is no guarding.   Musculoskeletal:      Cervical back: Neck supple.      Right lower leg: Edema present.      Left lower leg: Edema present.      Comments: Moves all 4 extremities.   Skin:     General: Skin is warm and dry.      Capillary Refill: Capillary refill takes less than 2 seconds.      Comments: With vascular changes to bilateral lower extremities   Neurological:      Mental Status: She is alert. Mental status is at baseline.      Motor: Weakness present.      Gait: Gait abnormal.   Psychiatric:         Mood and Affect: Mood normal.         Behavior: Behavior normal.         Thought Content: Thought content normal.       Assessment/Plan:  84-year-old female with:    Closed fracture of multiple ribs of right side with routine  healing  Status post fall sustaining right 4th-8th rib fractures with flail segment at 5-7, small hemopneumothorax  Patient had follow-up with trauma surgery yesterday; no new orders  No complaints of pain at time of exam.  Patient reports rib pain has decreased significantly  Continue incentive spirometer  Currently on oxygen with plan to wean patient off of oxygen when able    Chronic diastolic congestive heart failure (HCC)  Wt Readings from Last 3 Encounters:   04/22/24 80.6 kg (177 lb 9.6 oz)   04/19/24 80.9 kg (178 lb 6.4 oz)   04/13/24 82.7 kg (182 lb 5.1 oz)   Echo: LVEF 65%  Recent chest x-ray showed residuals of acute/chronic elevation of right hemidiaphragm and trauma surgery aware.  A right pleural effusion cannot be ruled out; additional doses of Lasix ordered x 2 days on 5/3/2024 then Lasix was increased from 20 mg to 40 mg daily.  Patient has lost 4 to 5 pounds likely fluid loss as a decrease in bilateral lower extremity edema noted  No complaints of shortness of breath  Most recent potassium level 4.2 and creatinine stable  Continue CHF pathway/daily weights  Repeat chest x-ray around May 16, 2024  Follow-up with cardiology outpatient    Acute respiratory failure with hypoxia (HCC)  Patient has required oxygen supplementation since hospitalization  No complaints of shortness of breath  Wean off oxygen as tolerated    Stage 3b chronic kidney disease ([facility])  Lab Results   Component Value Date    EGFR 35 (L) 05/06/2024    EGFR 33 (L) 04/29/2024    EGFR 30 (L) 04/23/2024    CREATININE 1.50 (H) 05/06/2024    CREATININE 1.56 (H) 04/29/2024    CREATININE 1.67 (H) 04/23/2024   Baseline creatinine 1.3-1.5  Most recent creatinine 1.5/GFR 35  Avoid nephrotoxic medications  Renal dose medications  Avoid hypotension  Recheck BMP on 5/16/2024    Type 2 diabetes mellitus with hyperglycemia, with long-term current use of insulin ([facility])    Lab Results   Component Value Date    HGBA1C 8.2 (H) 01/23/2024   Patient uses  her own malorie glucose monitoring system which does not require fingersticks  Blood glucose within acceptable range for patient's age and comorbidities  No episodes of hypoglycemia reported  Continue glargine insulin 15 units every night    PAF (paroxysmal atrial fibrillation) (HCC)  With pacemaker  Heart rate stable  Continue metoprolol titrate 25 mg twice daily  Continue Eliquis twice daily for anticoagulation/stroke prevention  Follow-up with cardiology outpatient    Constipation  Patient had diarrhea this morning and requesting decrease in frequency of stool softeners  Decrease MiraLAX and sennaS from twice daily to daily     Physical deconditioning  Multifactorial  Participating in therapy  Patient ambulatory with walker  Continue fall precautions  Ensure adequate hydration and nutrition     This note was completed in part utilizing [employer] direct voice recognition software.  Grammatical errors, random word insertion, spelling mistakes, and incomplete sentences may be an occasional consequence of the system secondary to software limitations, ambient noise and hardware issues.  At the time of dictation, efforts were made to edit, clarify and/or correct errors.  Please read the chart carefully and recognize, using context, where substitutions have occurred.  If you have any questions or concerns about the context, text or information contained within the body of this dictation, please contact myself, the provider, for further clarification.    DALILA Martinez  5/8/20242:48 PM

## 2024-05-08 NOTE — ASSESSMENT & PLAN NOTE
With pacemaker  Heart rate stable  Continue metoprolol titrate 25 mg twice daily  Continue Eliquis twice daily for anticoagulation/stroke prevention  Follow-up with cardiology outpatient

## 2024-05-09 ENCOUNTER — PATIENT OUTREACH (OUTPATIENT)
Dept: CASE MANAGEMENT | Facility: OTHER | Age: 84
End: 2024-05-09

## 2024-05-09 ENCOUNTER — TELEPHONE (OUTPATIENT)
Dept: OTHER | Facility: OTHER | Age: 84
End: 2024-05-09

## 2024-05-09 ENCOUNTER — NURSING HOME VISIT (OUTPATIENT)
Dept: GERIATRICS | Facility: OTHER | Age: 84
End: 2024-05-09
Payer: MEDICARE

## 2024-05-09 DIAGNOSIS — I50.32 CHRONIC DIASTOLIC CONGESTIVE HEART FAILURE (HCC): ICD-10-CM

## 2024-05-09 DIAGNOSIS — J96.01 ACUTE RESPIRATORY FAILURE WITH HYPOXIA (HCC): Primary | ICD-10-CM

## 2024-05-09 DIAGNOSIS — J43.8 OTHER EMPHYSEMA (HCC): ICD-10-CM

## 2024-05-09 DIAGNOSIS — R31.9 HEMATURIA, UNSPECIFIED TYPE: ICD-10-CM

## 2024-05-09 PROCEDURE — 99309 SBSQ NF CARE MODERATE MDM 30: CPT | Performed by: NURSE PRACTITIONER

## 2024-05-09 NOTE — ASSESSMENT & PLAN NOTE
Wt Readings from Last 3 Encounters:   04/22/24 80.6 kg (177 lb 9.6 oz)   04/19/24 80.9 kg (178 lb 6.4 oz)   04/13/24 82.7 kg (182 lb 5.1 oz)   Lasix recently increased from 20 mg to 40 mg daily due to increased edema to bilateral lower extremities and Pleural effusion cannot be ruled out on chest x-ray report from 5/2/2024.  Improvement noted with decrease in edema to bilateral lower extremities, weight loss likely fluid loss, and chest x-ray today showed small right pleural effusion  Continue Lasix 40 mg daily  Check BMP in a.m.  Continue CHF pathway/daily weights

## 2024-05-09 NOTE — ASSESSMENT & PLAN NOTE
Pink-tinged urine noted by PT  Patient does not appear toxic, no chills, rigors, dysuria, abdominal pain, pressure, or flank pain  Hold Eliquis x 2 days  Order UA/C&S, CBC

## 2024-05-09 NOTE — ASSESSMENT & PLAN NOTE
Patient has required oxygen supplementation since hospitalization  Drop in O2 sat this am 70s.  Unable to wean patient off of oxygen.  O2 sat 96% on 2 L via nasal cannula.  Advised nursing staff not to wean patient off of oxygen.  Patient with history of COPD, but does not appear with exacerbation.  Stat chest x-ray today showed no active lung disease, probable small right pleural effusion, mild elevation of the right diaphragm, subacute or healing fractures involving the right ribs (third through seventh).  Patient has been using the incentive spirometer.  No respiratory distress noted on exam  Plan:  Rapid COVID-19 study  Continue incentive spirometer  Ordered nebulizer treatments 4 times daily x 5 days due to mild wheezing noted; allergy related?  Albuterol inhaler 2 puffs every 4 hours as needed for shortness of breath  Continue oxygen supplementation to maintain O2 sat >88%  Refer to pulmonology

## 2024-05-09 NOTE — ASSESSMENT & PLAN NOTE
Mild wheezes bilateral lung fields with decreased breath sounds noted  Patient does not appear to be in exacerbation  Follow-up with pulmonology outpatient

## 2024-05-09 NOTE — PROGRESS NOTES
Facility: [facility]  POS: 31  Progress Note    Chief Complaint/Reason for visit: Drop in O2 sat   Code status: Full code  History of Present Illness: 84-year-old female seen and examined at the request of PT manager.  PT manager reported that patient's O2 sat dropped to 70s this am and had pink-tinged urine.  Patient is on Eliquis in setting of atrial fibrillation.  Patient denies dysuria, abdominal pain, pressure, or flank pain.  No chills or rigors.  No active signs of infection noted.  02 sat for nursing dropped to 80s on oxygen at 0.5 L/m via NC.  Informed nursing to increase oxygen to 2 L via nasal cannula and do not try to wean patient off of oxygen.  Patient denied shortness of breath or respiratory distress when 02 sat was low.  Stat chest x-ray was done; no active lung disease is seen.  Patient's daughter Gorge Mahajan was notified via phone regarding clinical assessment findings, chest x-ray report, and plan of care.  She was thankful for the phone call and had no further questions prior to hanging up.  Past Medical History: unchanged from history and physical  Past Medical History:   Diagnosis Date    Anxiety     Arthritis     CHF (congestive heart failure) ([facility])     Chronic venous insufficiency     COPD (chronic obstructive pulmonary disease) ([facility])     Coronary artery disease     Diabetes mellitus (HCC)     type II    Disease of thyroid gland     hypothyroidism    DVT (deep venous thrombosis) ([facility])     HL (hearing loss)     Hyperlipidemia     Hypertension     Irregular heart beat     Paroxysmal a-fib    Pacemaker     Renal disorder     stage 3    Sleep apnea     Sleep difficulties     Thyroid disease     Urinary incontinence     Uterine leiomyoma     Walker as ambulation aid      Family History: Unchanged from history and physical  Social History: Unchanged from history and physical  Review of systems: As per review of medical illness, all other systems reviewed and negative.  Medications: All  medication and routine orders were reviewed and updated  Allergies: Reviewed and unchanged  Consults reviewed:PT, OT, and Other  Labs/Diagnostics (reviewed by this provider): Copy in Chart  Imaging Reviewed: Chest x-ray 5/9/2024: No active lung disease is seen.  There is mild elevation of the right diaphragm.  There is a probable small right pleural effusion.  There is mild cardiomegaly.  There are subacute or healing fractures involving the right ribs.  Physical Exam  Weight: 174.2 pounds Temp: 98.6        BP:  Pulse: 80 resp: 20      O2 Sat: 96% on 2 L via nasal cannula  Orientation:Person, Place, and Day     Physical Exam  Vitals and nursing note reviewed.   Constitutional:       General: She is not in acute distress.     Appearance: She is ill-appearing. She is not toxic-appearing or diaphoretic.   HENT:      Head: Normocephalic.      Nose: No congestion.      Mouth/Throat:      Mouth: Mucous membranes are moist.      Pharynx: No oropharyngeal exudate.   Eyes:      Extraocular Movements: Extraocular movements intact.      Conjunctiva/sclera: Conjunctivae normal.   Cardiovascular:      Rate and Rhythm: Normal rate.      Comments: Pacemaker in situ.  Pulmonary:      Effort: Pulmonary effort is normal. No respiratory distress.      Comments: O2 on via nasal cannula.  Decreased breath sounds bilateral lung fields with mild wheezes throughout.  Abdominal:      General: Bowel sounds are normal. There is no distension.      Palpations: Abdomen is soft.      Tenderness: There is no abdominal tenderness. There is no guarding.   Musculoskeletal:      Cervical back: Neck supple. No rigidity.      Right lower leg: Edema present.      Left lower leg: Edema present.      Comments: Moves all 4 extremities.   Skin:     General: Skin is warm and dry.      Capillary Refill: Capillary refill takes less than 2 seconds.   Neurological:      Mental Status: She is alert and oriented to person, place, and time.      Motor: Weakness  present.      Gait: Gait abnormal.   Psychiatric:         Mood and Affect: Mood normal.         Behavior: Behavior normal.         Thought Content: Thought content normal.       Assessment/Plan:  84-year-old female with:    Acute respiratory failure with hypoxia (HCC)  Patient has required oxygen supplementation since hospitalization  Drop in O2 sat this am to 70s.  Unable to wean patient off of oxygen.  O2 sat 96% on 2 L via nasal cannula.  Advised nursing staff not to wean patient off of oxygen.  Patient with history of COPD, but does not appear with exacerbation.  Stat chest x-ray today showed no active lung disease, probable small right pleural effusion, mild elevation of the right diaphragm, subacute or healing fractures involving the right ribs (third through seventh).  Patient has been using the incentive spirometer.  No respiratory distress noted on exam   Plan:  Rapid COVID-19 study  Continue incentive spirometer  Ordered nebulizer treatments 4 times daily x 5 days due to mild wheezing noted; allergy related?  Albuterol inhaler 2 puffs every 4 hours as needed for shortness of breath  Continue oxygen supplementation to maintain O2 sat >88%  Refer to pulmonology    Chronic obstructive pulmonary disease (HCC)  Mild wheezes bilateral lung fields with decreased breath sounds noted  Patient does not appear to be in exacerbation  Follow-up with pulmonology outpatient    Chronic diastolic congestive heart failure (HCC)  Wt Readings from Last 3 Encounters:   04/22/24 80.6 kg (177 lb 9.6 oz)   04/19/24 80.9 kg (178 lb 6.4 oz)   04/13/24 82.7 kg (182 lb 5.1 oz)   Lasix recently increased from 20 mg to 40 mg daily due to increased edema to bilateral lower extremities and Pleural effusion cannot be ruled out on chest x-ray report from 5/2/2024.  Improvement noted with decrease in edema to bilateral lower extremities, weight loss likely fluid loss, and chest x-ray today showed small right pleural effusion  Continue Lasix  40 mg daily  Check BMP in a.m.  Continue CHF pathway/daily weights    Hematuria  Pink-tinged urine noted by PT  Patient does not appear toxic, no chills, rigors, dysuria, abdominal pain, pressure, or flank pain  Hold Eliquis x 2 days  Order UA/C&S, CBC      This note was completed in part utilizing [employer] direct voice recognition software.  Grammatical errors, random word insertion, spelling mistakes, and incomplete sentences may be an occasional consequence of the system secondary to software limitations, ambient noise and hardware issues.  At the time of dictation, efforts were made to edit, clarify and/or correct errors.  Please read the chart carefully and recognize, using context, where substitutions have occurred.  If you have any questions or concerns about the context, text or information contained within the body of this dictation, please contact myself, the provider, for further clarification.    DALILA Martinez  5/9/20242:09 PM

## 2024-05-10 NOTE — TELEPHONE ENCOUNTER
Pt's blood sugar is 478 and she does not have any orders for fast-acting insulin.     Via TC

## 2024-05-13 ENCOUNTER — NURSING HOME VISIT (OUTPATIENT)
Dept: GERIATRICS | Facility: OTHER | Age: 84
End: 2024-05-13
Payer: MEDICARE

## 2024-05-13 DIAGNOSIS — I87.2 CHRONIC VENOUS INSUFFICIENCY: ICD-10-CM

## 2024-05-13 DIAGNOSIS — J43.8 OTHER EMPHYSEMA (HCC): ICD-10-CM

## 2024-05-13 DIAGNOSIS — E11.3393 MODERATE NONPROLIFERATIVE DIABETIC RETINOPATHY OF BOTH EYES ASSOCIATED WITH TYPE 2 DIABETES MELLITUS, MACULAR EDEMA PRESENCE UNSPECIFIED (HCC): ICD-10-CM

## 2024-05-13 DIAGNOSIS — Z79.4 TYPE 2 DIABETES MELLITUS WITH HYPERGLYCEMIA, WITH LONG-TERM CURRENT USE OF INSULIN (HCC): ICD-10-CM

## 2024-05-13 DIAGNOSIS — I44.2 COMPLETE HEART BLOCK (HCC): ICD-10-CM

## 2024-05-13 DIAGNOSIS — I48.0 PAF (PAROXYSMAL ATRIAL FIBRILLATION) (HCC): ICD-10-CM

## 2024-05-13 DIAGNOSIS — F41.1 GENERALIZED ANXIETY DISORDER: ICD-10-CM

## 2024-05-13 DIAGNOSIS — E03.8 OTHER SPECIFIED HYPOTHYROIDISM: Chronic | ICD-10-CM

## 2024-05-13 DIAGNOSIS — I50.32 CHRONIC DIASTOLIC CONGESTIVE HEART FAILURE (HCC): Primary | ICD-10-CM

## 2024-05-13 DIAGNOSIS — E11.65 TYPE 2 DIABETES MELLITUS WITH HYPERGLYCEMIA, WITH LONG-TERM CURRENT USE OF INSULIN (HCC): ICD-10-CM

## 2024-05-13 DIAGNOSIS — J94.2 HEMOPNEUMOTHORAX ON RIGHT: ICD-10-CM

## 2024-05-13 DIAGNOSIS — K44.9 HIATAL HERNIA: ICD-10-CM

## 2024-05-13 DIAGNOSIS — S22.41XD CLOSED FRACTURE OF MULTIPLE RIBS OF RIGHT SIDE WITH ROUTINE HEALING: ICD-10-CM

## 2024-05-13 PROCEDURE — 99309 SBSQ NF CARE MODERATE MDM 30: CPT | Performed by: FAMILY MEDICINE

## 2024-05-13 NOTE — PROGRESS NOTES
Facility: [facility]  POS: 31  Progress Note  Chief Complaint/Reason for visit: STR f/u, abnormal UA  Code status: Full Code  History of Present Illness: The patient is seen in her room today, STR follow-up visit.  She is sitting in recliner, denies CP, pain in chest or back, or palpitations.  She asked when she can go home. Reports poor appetite due to loss of taste.  Denies nausea, vomiting, or abdominal pain. Her UA on 5/10 showed leukocyte esterase, nitrite, 3+ bacteria. However, no dysuria, frequency or urgency reported. Discussed that we will wait for the urine culture.  Past Medical History: unchanged from history and physical  Past Medical History:   Diagnosis Date    Anxiety     Arthritis     CHF (congestive heart failure) ([facility])     Chronic venous insufficiency     COPD (chronic obstructive pulmonary disease) ([facility])     Coronary artery disease     Diabetes mellitus ([facility])     type II    Disease of thyroid gland     hypothyroidism    DVT (deep venous thrombosis) ([facility])     HL (hearing loss)     Hyperlipidemia     Hypertension     Irregular heart beat     Paroxysmal a-fib    Pacemaker     Renal disorder     stage 3    Sleep apnea     Sleep difficulties     Thyroid disease     Urinary incontinence     Uterine leiomyoma     Walker as ambulation aid      Family History: Unchanged from history and physical  Social History: Unchanged from history and physical  Review of systems: As per review of medical illness, all other systems reviewed and negative.  Medications: All medication and routine orders were reviewed and updated  Allergies: Reviewed and unchanged  Consults reviewed:PT, OT, Nutrition, and Other  Labs/Diagnostics (reviewed by this provider): Copy in Chart  Vitals:    05/13/24 0801   BP: 127/70   Pulse: 70   Resp: 18   Temp: (!) 97 °F (36.1 °C)   SpO2: 96%   Weight: 78.9 kg (174 lb)   Physical Exam  Vitals and nursing note reviewed.   General: no acute distress.  HENT:      Head: Normocephalic.       Nose: Nose normal.      Mouth: Mucous membranes are moist.   Eyes:       Right eye: No discharge.         Left eye: No discharge.      Conjunctivae normal.   Cardiovascular:      Normal rate and regular rhythm. No murmur heard.  Pulmonary:      Pulmonary effort is normal on oxygen 2L NC. Diminished breath sounds b/l. Wheezing. No rhonchi or rales.  cc  Abdominal:      Bowel sounds are normal. There is no distension.      Abdomen is soft. There is no abdominal tenderness.   Musculoskeletal:      Cervical back: Neck supple.      Right lower le+ edema.      Left lower le+ edema.   Skin:     General: Skin is warm. Thickening and hyperpigmentation in b/l LE 2/2 chronic venous stasis  Neurological: alert.      Oriented x3. Cooperative, follow commands      Behavior: normal.     Assessment/Plan:  Chronic diastolic congestive heart failure (HCC)  Wt Readings from Last 3 Encounters:   24 78.9 kg (174 lb)   24 80.6 kg (177 lb 9.6 oz)   24 80.9 kg (178 lb 6.4 oz)   Echo 24 revealed LVEF 65%.  Systolic function is normal.  Wall motion is normal. Diastolic function is mildly abnormal, consistent with grade 1 relaxation   Continue Lasix 40 mg daily  Recent BMP reviewed, K 3.9 (5/10/24)  Will start potassium supplement 20 mEq daily while on Lasix  Monitor weight, BMP    Chronic venous insufficiency  Compression stockings, leg elevation    Complete heart block (HCC)  With history of complete heart block status post pacemaker insertion  Follow-up with cardiology outpatient    PAF (paroxysmal atrial fibrillation) (HCC)  With pacemaker  Heart rate stable  Continue metoprolol tartrate 25 mg twice daily  Continue Eliquis twice daily for anticoagulation/stroke prevention  Follow-up with cardiology outpatient      Hemopneumothorax on right  Recently hospitalized with right-sided hemothorax which was present on admission after mechanical fall.  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 Patient has lost her taste, and enjoy sweet only, such as ice cream, cookies.  Maintain on glargine 15U QHS and SSI  Continue to monitor BG    Closed fracture of multiple ribs of right side with routine healing  Status post fall sustaining right 4th-8th rib fractures with flail segment at 5-7, small hemopneumothorax  Patient had follow-up with trauma surgery; no new orders  No complaints of pain at time of exam.  Patient reports rib pain has decreased significantly  Continue incentive spirometer  Currently on oxygen with plan to wean patient off of oxygen when able    Anxiety disorder  Continue home dose of citalopram 20 mg daily  Psychosocial support     Beverly Dixon MD  5/13/2024

## 2024-05-14 VITALS
OXYGEN SATURATION: 96 % | TEMPERATURE: 97 F | BODY MASS INDEX: 33.98 KG/M2 | HEART RATE: 70 BPM | WEIGHT: 174 LBS | SYSTOLIC BLOOD PRESSURE: 127 MMHG | DIASTOLIC BLOOD PRESSURE: 70 MMHG | RESPIRATION RATE: 18 BRPM

## 2024-05-14 NOTE — ASSESSMENT & PLAN NOTE
Lab Results   Component Value Date    HGBA1C 8.2 (H) 01/23/2024   Uncontrolled BG on Tamia monitoring, likely due to nonadherent to diabetic diet. Patient has lost her taste, and enjoy sweet only, such as ice cream, cookies.  Maintain on glargine 15U QHS and SSI  Continue to monitor BG

## 2024-05-14 NOTE — ASSESSMENT & PLAN NOTE
Lab Results   Component Value Date    HGBA1C 8.2 (H) 01/23/2024   Needs close f/u with ophth/retinal specialist

## 2024-05-14 NOTE — ASSESSMENT & PLAN NOTE
With pacemaker  Heart rate stable  Continue metoprolol tartrate 25 mg twice daily  Continue Eliquis twice daily for anticoagulation/stroke prevention  Follow-up with cardiology outpatient

## 2024-05-14 NOTE — ASSESSMENT & PLAN NOTE
Status post fall sustaining right 4th-8th rib fractures with flail segment at 5-7, small hemopneumothorax  Patient had follow-up with trauma surgery; no new orders  No complaints of pain at time of exam.  Patient reports rib pain has decreased significantly  Continue incentive spirometer  Currently on oxygen with plan to wean patient off of oxygen when able

## 2024-05-14 NOTE — ASSESSMENT & PLAN NOTE
Wt Readings from Last 3 Encounters:   05/13/24 78.9 kg (174 lb)   04/22/24 80.6 kg (177 lb 9.6 oz)   04/19/24 80.9 kg (178 lb 6.4 oz)   Echo 4/9/24 revealed LVEF 65%.  Systolic function is normal.  Wall motion is normal. Diastolic function is mildly abnormal, consistent with grade 1 relaxation   Continue Lasix 40 mg daily  Recent BMP reviewed, K 3.9 (5/10/24)  Will start potassium supplement 20 mEq daily while on Lasix  Monitor weight, BMP

## 2024-05-16 ENCOUNTER — NURSING HOME VISIT (OUTPATIENT)
Dept: GERIATRICS | Facility: OTHER | Age: 84
End: 2024-05-16
Payer: MEDICARE

## 2024-05-16 ENCOUNTER — TELEPHONE (OUTPATIENT)
Age: 84
End: 2024-05-16

## 2024-05-16 ENCOUNTER — PATIENT OUTREACH (OUTPATIENT)
Dept: CASE MANAGEMENT | Facility: OTHER | Age: 84
End: 2024-05-16

## 2024-05-16 DIAGNOSIS — I50.32 CHRONIC DIASTOLIC CONGESTIVE HEART FAILURE (HCC): ICD-10-CM

## 2024-05-16 DIAGNOSIS — Z79.4 TYPE 2 DIABETES MELLITUS WITH HYPERGLYCEMIA, WITH LONG-TERM CURRENT USE OF INSULIN (HCC): ICD-10-CM

## 2024-05-16 DIAGNOSIS — S22.41XD CLOSED FRACTURE OF MULTIPLE RIBS OF RIGHT SIDE WITH ROUTINE HEALING: ICD-10-CM

## 2024-05-16 DIAGNOSIS — J90 PLEURAL EFFUSION: Primary | ICD-10-CM

## 2024-05-16 DIAGNOSIS — J44.9 CHRONIC OBSTRUCTIVE PULMONARY DISEASE, UNSPECIFIED COPD TYPE (HCC): ICD-10-CM

## 2024-05-16 DIAGNOSIS — I48.0 PAF (PAROXYSMAL ATRIAL FIBRILLATION) (HCC): ICD-10-CM

## 2024-05-16 DIAGNOSIS — R53.81 DEBILITY: ICD-10-CM

## 2024-05-16 DIAGNOSIS — E11.65 TYPE 2 DIABETES MELLITUS WITH HYPERGLYCEMIA, WITH LONG-TERM CURRENT USE OF INSULIN (HCC): ICD-10-CM

## 2024-05-16 DIAGNOSIS — J96.01 ACUTE RESPIRATORY FAILURE WITH HYPOXIA (HCC): ICD-10-CM

## 2024-05-16 DIAGNOSIS — J90 PLEURAL EFFUSION, RIGHT: ICD-10-CM

## 2024-05-16 PROCEDURE — 99310 SBSQ NF CARE HIGH MDM 45: CPT | Performed by: NURSE PRACTITIONER

## 2024-05-16 NOTE — ASSESSMENT & PLAN NOTE
Recently hospitalized with multiple right rib fractures which was sustained after falls  Had recent follow-up with trauma surgery  No complaints of pain  Continue incentive spirometer

## 2024-05-16 NOTE — ASSESSMENT & PLAN NOTE
Requiring oxygen supplementation likely in setting of right pleural effusion and recent trauma with rib fractures  Will continue albuterol inhaler as needed for shortness of breath  Continue nebulizer treatments

## 2024-05-16 NOTE — ASSESSMENT & PLAN NOTE
Multifactorial  Continue supportive care at SNF  Continue PT/OT  Continue fall precautions  Ensure adequate hydration and nutrition  Management of acute and chronic medical conditions  Patient is scheduled for discharge on 5/20/2024

## 2024-05-16 NOTE — ASSESSMENT & PLAN NOTE
Recently hospitalized with rib fractures, hemopneumothorax, layered effusion as per hospital imaging.  Patient did not require chest tube during hospitalization  Outpatient chest x-ray today showed modest right pleural effusion, rales noted on right lung field, and unable to wean patient off of oxygen  IR thoracentesis ordered and scheduled for tomorrow at [facility] IR department at 1130

## 2024-05-16 NOTE — PROGRESS NOTES
Facility: [facility]  POS: 31  Progress Note    Chief Complaint/Reason for visit: STR follow-up visit  Code status: Full code  History of Present Illness: 84-year-old female seen and examined for STR follow-up of acute and chronic medical conditions.  Patient, received patient seated in chair, and she appeared in no distress.  She has oxygen on via nasal cannula.  Denies shortness of breath, chest pain, lightheadedness, dizziness, abdominal pain, nausea, vomiting, diarrhea, or constipation.  Portable chest x-ray today showed modest right pleural effusion/right lower lobe pneumonia.  Vital signs have been stable.  Afebrile, no chills/rigors, productive cough, see stable.  Clinical assessment findings do not correlate with chest x-ray finding of pneumonia.  Rales auscultated right lung field and unable to wean patient off of oxygen.  Spoke to patient's daughter via phone regarding chest x-ray results and plan for IR thoracentesis ASAP in setting of right pleural effusion.  Patient's daughter verbalized an understanding and agreed to plan of care.  Patient is at high risk for rehospitalization in setting of recent hospitalization for traumatic injuries and persistent right pleural effusion.  Past Medical History: unchanged from history and physical  Past Medical History:   Diagnosis Date    Anxiety     Arthritis     CHF (congestive heart failure) (HCC)     Chronic venous insufficiency     COPD (chronic obstructive pulmonary disease) ([facility])     Coronary artery disease     Diabetes mellitus (HCC)     type II    Disease of thyroid gland     hypothyroidism    DVT (deep venous thrombosis) ([facility])     HL (hearing loss)     Hyperlipidemia     Hypertension     Irregular heart beat     Paroxysmal a-fib    Pacemaker     Renal disorder     stage 3    Sleep apnea     Sleep difficulties     Thyroid disease     Urinary incontinence     Uterine leiomyoma     Walker as ambulation aid      Family History: Unchanged from history and  physical  Social History: Unchanged from history and physical  Review of systems: As per review of medical illness, all other systems reviewed and negative.  Medications: All medication and routine orders were reviewed and updated  Allergies: Reviewed and unchanged  Consults reviewed:PT, OT, and Other  Labs/Diagnostics (reviewed by this provider): Copy in Chart  Imaging Reviewed: Portable chest x-ray results from today revealed modest right pleural effusion, right lower lobe pneumonia  Physical Exam  Weight: 174.8 pounds Temp: 98.1          BP: 155/71  pulse: 64 resp: 20     O2 sat: 93% on 2 L via nasal cannula    Orientation:Person, Place, and Day     Physical Exam  Vitals and nursing note reviewed. Exam conducted with a chaperone present.   Constitutional:       General: She is not in acute distress.     Appearance: She is ill-appearing. She is not toxic-appearing or diaphoretic.   HENT:      Head: Normocephalic.      Nose: No congestion or rhinorrhea.      Mouth/Throat:      Mouth: Mucous membranes are moist.      Pharynx: No oropharyngeal exudate.   Eyes:      General:         Right eye: No discharge.         Left eye: No discharge.      Extraocular Movements: Extraocular movements intact.      Conjunctiva/sclera: Conjunctivae normal.      Pupils: Pupils are equal, round, and reactive to light.   Cardiovascular:      Rate and Rhythm: Normal rate.      Pulses: Normal pulses.   Pulmonary:      Effort: Pulmonary effort is normal. No respiratory distress.      Breath sounds: Rales present. No wheezing.      Comments: Oxygen on via nasal cannula.  No shortness of breath noted at rest.  Abdominal:      General: Bowel sounds are normal. There is no distension.      Palpations: Abdomen is soft.      Tenderness: There is no abdominal tenderness. There is no guarding.   Musculoskeletal:      Cervical back: Neck supple. No rigidity.      Right lower leg: Edema present.      Left lower leg: Edema present.      Comments:  Moves all 4 extremities.   Lymphadenopathy:      Cervical: No cervical adenopathy.   Skin:     General: Skin is warm and dry.      Capillary Refill: Capillary refill takes less than 2 seconds.   Neurological:      Mental Status: She is alert and oriented to person, place, and time.      Motor: Weakness present.      Gait: Gait abnormal.   Psychiatric:         Mood and Affect: Mood normal.         Behavior: Behavior normal.         Thought Content: Thought content normal.       Assessment/Plan:  84-year-old female with:    Pleural effusion, right  Recently hospitalized with rib fractures, hemopneumothorax, layered effusion as per hospital imaging.  Patient did not require chest tube during hospitalization  Outpatient chest x-ray today showed modest right pleural effusion, rales noted on right lung field, and unable to wean patient off of oxygen  IR thoracentesis ordered and scheduled for tomorrow at [facility] IR department at 1130    Closed fracture of multiple ribs of right side with routine healing  Recently hospitalized with multiple right rib fractures which was sustained after falls  Had recent follow-up with trauma surgery  No complaints of pain  Continue incentive spirometer    Acute respiratory failure with hypoxia (HCC)  Patient has required oxygen supplementation since recent hospitalization for status post fall sustaining multiple right rib fractures, hemopneumothorax.  Unable to wean patient off of oxygen.  Chest x-ray today reveals modest right pleural effusion  We will continue nebulizer treatments 4 times daily and albuterol inhaler 2 puffs every 4 hours as needed for shortness of breath  Follow-up with pulmonology outpatient on 5/22/2024    COPD (chronic obstructive pulmonary disease) (HCC)  Requiring oxygen supplementation likely in setting of right pleural effusion and recent trauma with rib fractures  Will continue albuterol inhaler as needed for shortness of breath  Continue nebulizer  treatments    Chronic diastolic congestive heart failure (HCC)  Wt Readings from Last 3 Encounters:   05/13/24 78.9 kg (174 lb)   04/22/24 80.6 kg (177 lb 9.6 oz)   04/19/24 80.9 kg (178 lb 6.4 oz)   Echo 4/9/2024 showed LVEF 65%.  Systolic function is normal.  Wall motion is normal.  Diastolic function is mildly abnormal, consistent with grade 1 relaxation  Chest x-ray today shows right modest pleural effusion and scheduled at [facility] tomorrow for right thoracentesis  Continue Lasix 40 mg daily  Continue CHF pathway/daily weights    PAF (paroxysmal atrial fibrillation) ([facility])  With pacemaker  Heart rate stable  Continue metoprolol tartrate 25 mg twice daily  Continue Eliquis twice daily for anticoagulation/stroke prevention  Follow-up with cardiology outpatient    Type 2 diabetes mellitus with hyperglycemia, with long-term current use of insulin ([facility])    Lab Results   Component Value Date    HGBA1C 8.2 (H) 01/23/2024   Patient uses the freestyle malorie glucose monitoring system  Glargine insulin was increased from 15 units to 18 units daily on 5/10/2024  Continue sliding scale insulin coverage according to blood glucose 4 times daily    Debility  Multifactorial  Continue supportive care at SNF  Continue PT/OT  Continue fall precautions  Ensure adequate hydration and nutrition  Management of acute and chronic medical conditions  Patient is scheduled for discharge on 5/20/2024    I have spent a total time of 45 minutes on 05/16/24 in caring for this patient including Diagnostic results, Risks and benefits of tx options, Instructions for management, Patient and family education, Impressions, Counseling / Coordination of care, Documenting in the medical record, Reviewing / ordering tests, medicine, procedures  , Obtaining or reviewing history  , and Communicating with other healthcare professionals .  Communicating with patient's daughter.    This note was completed in part utilizing [employer] direct voice  recognition software.  Grammatical errors, random word insertion, spelling mistakes, and incomplete sentences may be an occasional consequence of the system secondary to software limitations, ambient noise and hardware issues.  At the time of dictation, efforts were made to edit, clarify and/or correct errors.  Please read the chart carefully and recognize, using context, where substitutions have occurred.  If you have any questions or concerns about the context, text or information contained within the body of this dictation, please contact myself, the provider, for further clarification.    DALILA Martinez  5/16/202412:13 PM

## 2024-05-16 NOTE — ASSESSMENT & PLAN NOTE
Lab Results   Component Value Date    HGBA1C 8.2 (H) 01/23/2024   Patient uses the freestyle malorie glucose monitoring system  Glargine insulin was increased from 15 units to 18 units daily on 5/10/2024  Continue sliding scale insulin coverage according to blood glucose 4 times daily

## 2024-05-16 NOTE — ASSESSMENT & PLAN NOTE
Wt Readings from Last 3 Encounters:   05/13/24 78.9 kg (174 lb)   04/22/24 80.6 kg (177 lb 9.6 oz)   04/19/24 80.9 kg (178 lb 6.4 oz)   Echo 4/9/2024 showed LVEF 65%.  Systolic function is normal.  Wall motion is normal.  Diastolic function is mildly abnormal, consistent with grade 1 relaxation  Chest x-ray today shows right modest pleural effusion and scheduled at [facility] tomorrow for right thoracentesis  Continue Lasix 40 mg daily  Continue CHF pathway/daily weights

## 2024-05-16 NOTE — PROGRESS NOTES
Chart review completed.  Email sent to facility requesting update on patient.   This care manager assistant will continue to monitor via chart review throughout SNF/STR Surveillance episode.   Update received the patient continues with therapy LCD of 5/19/24 and TDD of 5/20/24

## 2024-05-16 NOTE — TELEPHONE ENCOUNTER
DALILA Glover  @[facility] - requesting patient get established post traumatic fall resulting in ribs 4-7 fractured and 5-7 flail segment and subsequent pulmonary issues.     Pt now requiring a thoracentesis, IR to do 5/17/24. PULM appt scheduled for 5/22/24, [facility] approved date and time.

## 2024-05-16 NOTE — ASSESSMENT & PLAN NOTE
Patient has required oxygen supplementation since recent hospitalization for status post fall sustaining multiple right rib fractures, hemopneumothorax.  Unable to wean patient off of oxygen.  Chest x-ray today reveals modest right pleural effusion  We will continue nebulizer treatments 4 times daily and albuterol inhaler 2 puffs every 4 hours as needed for shortness of breath  Follow-up with pulmonology outpatient on 5/22/2024

## 2024-05-17 ENCOUNTER — PATIENT OUTREACH (OUTPATIENT)
Dept: CASE MANAGEMENT | Facility: OTHER | Age: 84
End: 2024-05-17

## 2024-05-17 ENCOUNTER — HOSPITAL ENCOUNTER (OUTPATIENT)
Dept: RADIOLOGY | Facility: HOSPITAL | Age: 84
Discharge: HOME | End: 2024-05-17
Payer: MEDICARE

## 2024-05-17 VITALS — HEART RATE: 71 BPM | SYSTOLIC BLOOD PRESSURE: 142 MMHG | DIASTOLIC BLOOD PRESSURE: 67 MMHG | OXYGEN SATURATION: 100 %

## 2024-05-17 DIAGNOSIS — J90 PLEURAL EFFUSION: ICD-10-CM

## 2024-05-17 LAB
BASOPHILS NFR FLD MANUAL: 1 %
EOSINOPHIL NFR FLD MANUAL: 8 %
GLUCOSE FLD-MCNC: 184 MG/DL
HISTIOCYTES NFR FLD: 6 %
LDH FLD L TO P-CCNC: 169 U/L
LYMPHOCYTES NFR BLD AUTO: 72 %
MONO+MESO NFR FLD MANUAL: 3 %
MONOCYTES NFR BLD AUTO: 8 %
NEUTS SEG NFR BLD AUTO: 2 %
PH BODY FLUID: 7.6
PROT FLD-MCNC: 3.6 G/DL
SITE: NORMAL
TOTAL CELLS COUNTED SPEC: 100
WBC # FLD MANUAL: 1678 /UL

## 2024-05-17 PROCEDURE — 82945 GLUCOSE OTHER FLUID: CPT

## 2024-05-17 PROCEDURE — 88112 CYTOPATH CELL ENHANCE TECH: CPT | Performed by: PATHOLOGY

## 2024-05-17 PROCEDURE — 83615 LACTATE (LD) (LDH) ENZYME: CPT

## 2024-05-17 PROCEDURE — 89051 BODY FLUID CELL COUNT: CPT

## 2024-05-17 PROCEDURE — 83986 ASSAY PH BODY FLUID NOS: CPT

## 2024-05-17 PROCEDURE — 87070 CULTURE OTHR SPECIMN AEROBIC: CPT

## 2024-05-17 PROCEDURE — 88305 TISSUE EXAM BY PATHOLOGIST: CPT | Performed by: PATHOLOGY

## 2024-05-17 PROCEDURE — 87205 SMEAR GRAM STAIN: CPT

## 2024-05-17 PROCEDURE — 84157 ASSAY OF PROTEIN OTHER: CPT

## 2024-05-17 PROCEDURE — 32555 ASPIRATE PLEURA W/ IMAGING: CPT

## 2024-05-17 RX ORDER — LIDOCAINE HYDROCHLORIDE 10 MG/ML
INJECTION, SOLUTION EPIDURAL; INFILTRATION; INTRACAUDAL; PERINEURAL AS NEEDED
Status: COMPLETED | OUTPATIENT
Start: 2024-05-17 | End: 2024-05-17

## 2024-05-17 RX ADMIN — LIDOCAINE HYDROCHLORIDE 10 ML: 10 INJECTION, SOLUTION EPIDURAL; INFILTRATION; INTRACAUDAL; PERINEURAL at 12:30

## 2024-05-17 NOTE — SEDATION DOCUMENTATION
Right side thoracentesis performed by Venus CONNER. 300 mL of serosanguinous fluid obtained. Patient tolerated well.

## 2024-05-17 NOTE — BRIEF OP NOTE (RAD/CATH)
IR THORACENTESIS Procedure Note    PATIENT NAME: Lizzie Whitten  : 1940  MRN: [id number]    Pre-op Diagnosis:   1. Pleural effusion      Post-op Diagnosis:   1. Pleural effusion        Surgeon:   DALILA Rehman  Assistants:     No qualified resident was available, Resident is only observing    Estimated Blood Loss: minimal  Findings: 300 ml serosanguineous right pleural fluid    Specimens: multiple sent including culture and cytology    Complications:  none immediate    Anesthesia: local    DALILA Rehman     Date: 2024  Time: 12:47 PM

## 2024-05-17 NOTE — PROGRESS NOTES
It has been 30 days since SNF/STR Surveillance episode was opened I will no longer be following the patient per protocol. I have removed myself from the care team, updated the Care Coordination note, and closed the episode.

## 2024-05-17 NOTE — DISCHARGE INSTRUCTIONS
Thoracentesis   WHAT YOU NEED TO KNOW:   A thoracentesis is a procedure to remove extra fluid or air from between your lungs and your inner chest wall. Air or fluid buildup may make it hard for you to breathe. A thoracentesis allows your lungs to expand fully so you can breathe more easily.    DISCHARGE INSTRUCTIONS:     Small amount of shoulder pain and bloody sputum is normal after a Thoracentesis.     Rest:  Rest when you feel it is needed. Slowly start to do more each day. Return to your daily activities as directed.     Resume your normal diet. Small sips of flat soda will help mild nausea.    Do not smoke:  If you smoke, it is never too late to quit. Ask for information about how to stop smoking if you need help.    Contact Interventional Radiology at [phone] (DARYL PATIENTS: Contact Interventional Radiology at [phone]) (CARROL PATIENTS: Contact Interventional Radiology at [phone]) if:   You have a fever.    Your puncture site is red, warm, swollen, or draining pus.    You have questions or concerns about your procedure, medicine, or care.    Seek care immediately or call 911 if:   Severe chest pain with inspiration and shortness of breath    Large amounts of blood in your sputum    Follow up with your healthcare provider as directed.

## 2024-05-19 LAB
BACTERIA SPEC BFLD CULT: NO GROWTH
GRAM STN SPEC: NORMAL
GRAM STN SPEC: NORMAL

## 2024-05-20 ENCOUNTER — NURSING HOME VISIT (OUTPATIENT)
Dept: GERIATRICS | Facility: OTHER | Age: 84
End: 2024-05-20
Payer: MEDICARE

## 2024-05-20 DIAGNOSIS — I50.32 CHRONIC DIASTOLIC CONGESTIVE HEART FAILURE (HCC): ICD-10-CM

## 2024-05-20 DIAGNOSIS — I48.0 PAF (PAROXYSMAL ATRIAL FIBRILLATION) (HCC): ICD-10-CM

## 2024-05-20 DIAGNOSIS — R53.81 PHYSICAL DECONDITIONING: ICD-10-CM

## 2024-05-20 DIAGNOSIS — Z79.4 TYPE 2 DIABETES MELLITUS WITH HYPERGLYCEMIA, WITH LONG-TERM CURRENT USE OF INSULIN (HCC): ICD-10-CM

## 2024-05-20 DIAGNOSIS — J90 PLEURAL EFFUSION, RIGHT: Primary | ICD-10-CM

## 2024-05-20 DIAGNOSIS — E11.65 TYPE 2 DIABETES MELLITUS WITH HYPERGLYCEMIA, WITH LONG-TERM CURRENT USE OF INSULIN (HCC): ICD-10-CM

## 2024-05-20 DIAGNOSIS — H10.12 ALLERGIC CONJUNCTIVITIS OF LEFT EYE: ICD-10-CM

## 2024-05-20 DIAGNOSIS — J94.2 HEMOPNEUMOTHORAX ON RIGHT: ICD-10-CM

## 2024-05-20 DIAGNOSIS — S22.41XD CLOSED FRACTURE OF MULTIPLE RIBS OF RIGHT SIDE WITH ROUTINE HEALING: ICD-10-CM

## 2024-05-20 LAB
BACTERIA SPEC BFLD CULT: NO GROWTH
GRAM STN SPEC: NORMAL
GRAM STN SPEC: NORMAL

## 2024-05-20 PROCEDURE — 99309 SBSQ NF CARE MODERATE MDM 30: CPT | Performed by: NURSE PRACTITIONER

## 2024-05-20 NOTE — PROGRESS NOTES
Facility: [facility]  POS: 31  Progress Note    Chief Complaint/Reason for visit: STR follow-up visit  Code status: Full code  History of Present Illness: 84-year-old female seen and examined for STR follow-up of acute and chronic medical conditions.  At time of examination this morning, received patient seated in chair, and she appeared in no distress. S/p right thoracentesis at [facility] IR department on 5/17/2024 due to pleural effusion with 300 mL serosanguineous fluid removal; fluid sent by IR for culture, Gram stain, and cytology.  As per nursing staff, respiratory status improved as patient did not use any oxygen when she ambulated to the bathroom this morning, and her O2 sat was 94% on room air.  She is complaining of pruritus of left eye.  Left eye is watery.  No signs of infection noted.  Past Medical History: unchanged from history and physical  Past Medical History:   Diagnosis Date    Anxiety     Arthritis     CHF (congestive heart failure) ([facility])     Chronic venous insufficiency     COPD (chronic obstructive pulmonary disease) ([facility])     Coronary artery disease     Diabetes mellitus ([facility])     type II    Disease of thyroid gland     hypothyroidism    DVT (deep venous thrombosis) ([facility])     HL (hearing loss)     Hyperlipidemia     Hypertension     Irregular heart beat     Paroxysmal a-fib    Pacemaker     Renal disorder     stage 3    Sleep apnea     Sleep difficulties     Thyroid disease     Urinary incontinence     Uterine leiomyoma     Walker as ambulation aid      Family History: Unchanged from history and physical  Social History: Unchanged from history and physical  Review of systems: As per review of medical illness, all other systems reviewed and negative.  Medications: All medication and routine orders were reviewed and updated  Allergies: Reviewed and unchanged  Consults reviewed:PT, OT, and Other  Labs/Diagnostics (reviewed by this provider): Copy in Chart  Imaging Reviewed: None  today  Physical Exam  Weight: 172 pounds Temp: 98           BP: 16  pulse: 64   resp: 18       O2 Sat: 97% at time of exam  Orientation:Person, Place, and Day     Physical Exam  Vitals and nursing note reviewed.   Constitutional:       General: She is not in acute distress.     Appearance: She is not ill-appearing, toxic-appearing or diaphoretic.   HENT:      Head: Normocephalic.      Nose: No congestion.      Mouth/Throat:      Mouth: Mucous membranes are moist.      Pharynx: No oropharyngeal exudate.   Eyes:      General: No scleral icterus.        Right eye: No discharge.         Left eye: No discharge.      Extraocular Movements: Extraocular movements intact.      Pupils: Pupils are equal, round, and reactive to light.      Comments: Left conjunctive a slightly inflamed with itching.  There is no abnormal drainage from the eye.   Cardiovascular:      Rate and Rhythm: Normal rate.      Comments: Pacemaker in situ  Pulmonary:      Effort: Pulmonary effort is normal. No respiratory distress.   Abdominal:      General: Bowel sounds are normal. There is no distension.      Palpations: Abdomen is soft.      Tenderness: There is no abdominal tenderness. There is no guarding.   Musculoskeletal:      Right lower leg: Edema present.      Left lower leg: Edema present.      Comments: Moves all 4 extremities.   Skin:     General: Skin is warm and dry.      Capillary Refill: Capillary refill takes less than 2 seconds.   Neurological:      Mental Status: She is alert. Mental status is at baseline.      Motor: Weakness present.      Gait: Gait abnormal.   Psychiatric:         Mood and Affect: Mood normal.         Behavior: Behavior normal.         Thought Content: Thought content normal.       Assessment/Plan:  84-year-old female with:    Pleural effusion, right  Recently hospitalized with rib fractures, hemopneumothorax, [place] fusion as per hospital imaging.  Chest x-ray on 5/16/2024 was done as follow-up for pleural effusion  showed modest right pleural effusion, s/p IR right thoracentesis on 5/17/2024; 300 mL of serosanguineous fluid obtained.  Fluid culture revealed no growth.  Awaiting cytology report  Patient reports that her respiratory status has improved.  She did not require oxygen this morning while ambulating to the bathroom and her O2 sat was 94% post ambulation without shortness of breath  Follow-up with pulmonology on 5/22/2024    Closed fracture of multiple ribs of right side with routine healing  Recently hospitalized with multiple right rib fractures which was sustained after falls  She had follow-up with trauma surgery as scheduled  No complaints of pain  Continue incentive spirometer    Hemopneumothorax on right  Recently hospitalized with right-sided hemothorax which was present on admission after mechanical fall and patient did not require a chest tube  Follow-up chest x-ray on 5/16/2024 showed modest right pleural effusion and patient was scheduled for IR thoracentesis on 5/17/2024. S/p IR right thoracentesis for 300 mL serosanguineous fluid    PAF (paroxysmal atrial fibrillation) ([facility])  With pacemaker  Heart rate stable  Continue metoprolol tartrate 25 mg twice daily  Continue Eliquis twice daily for anticoagulation/stroke prevention  Follow-up with cardiology outpatient    Type 2 diabetes mellitus with hyperglycemia, with long-term current use of insulin ([facility])    Lab Results   Component Value Date    HGBA1C 8.2 (H) 01/23/2024   Patient uses the freestyle malorie glucose monitoring system  No episodes of hypoglycemia reported  Preprandial BG trending 90s and post prandial blood glucose trending 200s  Currently on glargine insulin 18 units daily  Continue sliding scale insulin coverage according to blood glucose 4 times a day    Allergic conjunctivitis of left eye  Patient reports pruritus of left eye, watering, and slightly inflamed conjunctiva suggestive of allergic conjunctivitis.  No abnormal drainage coming from  either eyes  Order olopatadine eyedrop 1 drop in each eye twice daily x 2 weeks then as needed  Monitor for secondary infection as patient has been rubbing her eyes.  Advised patient not to rub her eyes    Chronic diastolic congestive heart failure (HCC)  Wt Readings from Last 3 Encounters:   05/13/24 78.9 kg (174 lb)   04/22/24 80.6 kg (177 lb 9.6 oz)   04/19/24 80.9 kg (178 lb 6.4 oz)   Echo 4/9/2024 showed LVEF 65%  Patient appears euvolemic on exam  Edema has decreased to bilateral lower extremities and patient has lost weight  Continue Lasix to 40 mg daily  Continue CHF pathway/daily weights  Follow-up with cardiology outpatient    Physical deconditioning  Multifactorial  Continue supportive care   Continue PT/OT  Continue fall precautions  Continue nutritional supplements with Glucerna 3 times daily; patient has been completing Glucerna  Social service assisting patient with discharge plans tentative discharge on.  5/23/2024    This note was completed in part utilizing [employer] direct voice recognition software.  Grammatical errors, random word insertion, spelling mistakes, and incomplete sentences may be an occasional consequence of the system secondary to software limitations, ambient noise and hardware issues.  At the time of dictation, efforts were made to edit, clarify and/or correct errors.  Please read the chart carefully and recognize, using context, where substitutions have occurred.  If you have any questions or concerns about the context, text or information contained within the body of this dictation, please contact myself, the provider, for further clarification.    DALILA Martinez  5/20/20245:57 PM

## 2024-05-20 NOTE — ASSESSMENT & PLAN NOTE
Wt Readings from Last 3 Encounters:   05/13/24 78.9 kg (174 lb)   04/22/24 80.6 kg (177 lb 9.6 oz)   04/19/24 80.9 kg (178 lb 6.4 oz)   Echo 4/9/2024 showed LVEF 65%  Patient appears euvolemic on exam  Edema has decreased to bilateral lower extremities and patient has lost weight  Continue Lasix to 40 mg daily  Continue CHF pathway/daily weights  Follow-up with cardiology outpatient

## 2024-05-20 NOTE — ASSESSMENT & PLAN NOTE
Recently hospitalized with right-sided hemothorax which was present on admission after mechanical fall and patient did not require a chest tube  Follow-up chest x-ray on 5/16/2024 showed modest right pleural effusion and patient was scheduled for IR thoracentesis on 5/17/2024. S/p IR right thoracentesis for 300 mL serosanguineous fluid

## 2024-05-20 NOTE — ASSESSMENT & PLAN NOTE
Lab Results   Component Value Date    HGBA1C 8.2 (H) 01/23/2024   Patient uses the freestyle malorie glucose monitoring system  No episodes of hypoglycemia reported  Preprandial BG trending 90s and post prandial blood glucose trending 200s  Currently on glargine insulin 18 units daily  Continue sliding scale insulin coverage according to blood glucose 4 times a day

## 2024-05-20 NOTE — ASSESSMENT & PLAN NOTE
Multifactorial  Continue supportive care   Continue PT/OT  Continue fall precautions  Continue nutritional supplements with Glucerna 3 times daily; patient has been completing Glucerna  Social service assisting patient with discharge plans tentative discharge on.  5/23/2024

## 2024-05-20 NOTE — ASSESSMENT & PLAN NOTE
Patient reports pruritus of left eye, watering, and slightly inflamed conjunctiva suggestive of allergic conjunctivitis.  No abnormal drainage coming from either eyes  Order olopatadine eyedrop 1 drop in each eye twice daily x 2 weeks then as needed  Monitor for secondary infection as patient has been rubbing her eyes.  Advised patient not to rub her eyes

## 2024-05-20 NOTE — ASSESSMENT & PLAN NOTE
Recently hospitalized with rib fractures, hemopneumothorax, [place] fusion as per hospital imaging.  Chest x-ray on 5/16/2024 was done as follow-up for pleural effusion showed modest right pleural effusion, s/p IR right thoracentesis on 5/17/2024; 300 mL of serosanguineous fluid obtained.  Fluid culture revealed no growth.  Awaiting cytology report  Patient reports that her respiratory status has improved.  She did not require oxygen this morning while ambulating to the bathroom and her O2 sat was 94% post ambulation without shortness of breath  Follow-up with pulmonology on 5/22/2024

## 2024-05-20 NOTE — ASSESSMENT & PLAN NOTE
Recently hospitalized with multiple right rib fractures which was sustained after falls  She had follow-up with trauma surgery as scheduled  No complaints of pain  Continue incentive spirometer

## 2024-05-22 ENCOUNTER — NURSING HOME VISIT (OUTPATIENT)
Dept: GERIATRICS | Facility: OTHER | Age: 84
End: 2024-05-22
Payer: MEDICARE

## 2024-05-22 DIAGNOSIS — R53.81 PHYSICAL DECONDITIONING: ICD-10-CM

## 2024-05-22 DIAGNOSIS — Z79.4 TYPE 2 DIABETES MELLITUS WITH HYPERGLYCEMIA, WITH LONG-TERM CURRENT USE OF INSULIN (HCC): ICD-10-CM

## 2024-05-22 DIAGNOSIS — I48.0 PAF (PAROXYSMAL ATRIAL FIBRILLATION) (HCC): ICD-10-CM

## 2024-05-22 DIAGNOSIS — I87.2 CHRONIC VENOUS INSUFFICIENCY: ICD-10-CM

## 2024-05-22 DIAGNOSIS — J94.2 HEMOPNEUMOTHORAX ON RIGHT: ICD-10-CM

## 2024-05-22 DIAGNOSIS — I50.32 CHRONIC DIASTOLIC CONGESTIVE HEART FAILURE (HCC): ICD-10-CM

## 2024-05-22 DIAGNOSIS — J90 PLEURAL EFFUSION, RIGHT: Primary | ICD-10-CM

## 2024-05-22 DIAGNOSIS — F41.1 GENERALIZED ANXIETY DISORDER: ICD-10-CM

## 2024-05-22 DIAGNOSIS — E11.65 TYPE 2 DIABETES MELLITUS WITH HYPERGLYCEMIA, WITH LONG-TERM CURRENT USE OF INSULIN (HCC): ICD-10-CM

## 2024-05-22 DIAGNOSIS — N18.32 STAGE 3B CHRONIC KIDNEY DISEASE (HCC): ICD-10-CM

## 2024-05-22 DIAGNOSIS — H10.12 ALLERGIC CONJUNCTIVITIS OF LEFT EYE: ICD-10-CM

## 2024-05-22 DIAGNOSIS — S22.41XD CLOSED FRACTURE OF MULTIPLE RIBS OF RIGHT SIDE WITH ROUTINE HEALING: ICD-10-CM

## 2024-05-22 PROCEDURE — 99316 NF DSCHRG MGMT 30 MIN+: CPT | Performed by: NURSE PRACTITIONER

## 2024-05-22 NOTE — ASSESSMENT & PLAN NOTE
Lab Results   Component Value Date    EGFR 35 (L) 05/06/2024    EGFR 33 (L) 04/29/2024    EGFR 30 (L) 04/23/2024    CREATININE 1.50 (H) 05/06/2024    CREATININE 1.56 (H) 04/29/2024    CREATININE 1.67 (H) 04/23/2024   Baseline creatinine 1.3-1.5  Most recent creatinine 1.31/GFR 41 on 5/16/2024  Avoid nephrotoxic medications such as NSAIDs  Renal dose medications  Avoid hypotension  Follow-up with PCP for management

## 2024-05-22 NOTE — PROGRESS NOTES
[facility]  [address] [facility] [address] [facility] [address]  [phone]  DISCHARGE SUMMARY  POS: 31  Facility: [facility]     NAME: Lizzie Whitten  AGE: 84 y.o. SEX: female  DATE OF ADMISSION: 4/18/2024 DATE OF DISCHARGE: 5/23/2024 DISCHARGE DISPOSITION: [facility]    Reason for admission: Patient was admitted from [facility] for rehabilitation after hospitalization for status post fall sustaining traumatic fracture of multiple ribs on right side; acute pain from trauma; hemopneumothorax on right; acute respiratory failure with hypoxia; CHACHA; physical deconditioning  Additional Problems:   Past Medical History:   Diagnosis Date    Anxiety     Arthritis     CHF (congestive heart failure) ([facility])     Chronic venous insufficiency     COPD (chronic obstructive pulmonary disease) ([facility])     Coronary artery disease     Diabetes mellitus ([facility])     type II    Disease of thyroid gland     hypothyroidism    DVT (deep venous thrombosis) ([facility])     HL (hearing loss)     Hyperlipidemia     Hypertension     Irregular heart beat     Paroxysmal a-fib    Pacemaker     Renal disorder     stage 3    Sleep apnea     Sleep difficulties     Thyroid disease     Urinary incontinence     Uterine leiomyoma     Walker as ambulation aid      Discharge Diagnoses: See problem list follow up recommendations below.    Course of stay: Patient was admitted to [facility] for rehabilitation following hospitalization for above-mentioned.  At time of examination this morning, received patient seated in chair, and she appeared in no distress.  She denies having shortness of breath, chest pain, headache, dizziness, abdominal pain, nausea, vomiting, diarrhea, or constipation.  Blood glucose 80s this morning and patient was given extra Glucerna and breakfast; long-acting insulin decreased.  During the resident's stay at [facility], she received skilled nursing care, PT, OT, social service  support, dietitian support, and medical management for an overall improvement in her functional status.  She is scheduled to be discharged home on 5/23/2024.  A referral was placed to [facility] by [facility] for nursing services and PT/OT to increase strength and endurance and to return to previous level of ADLs.     Labs and testing performed during stay: BMP, chest x-ray, UA, CBC    Discharge Medications: See discharge medication list which was reviewed in [facility] and compared to facility orders for accuracy.  Status at time of discharge exam: Stable    Today's Visit: 5/22/20241:54 PM    Subjective: No complaints    Review of systems: As per review of medical illness, all other systems reviewed and negative.    Vitals:   Weight: 174 pounds     BP: 118/62    Temp: 98.5    HR: 60   Resp: 16     Exam: Physical Exam  Vitals and nursing note reviewed.   Constitutional:       General: She is not in acute distress.     Appearance: She is ill-appearing. She is not toxic-appearing or diaphoretic.   HENT:      Head: Normocephalic.      Nose: No congestion or rhinorrhea.      Mouth/Throat:      Mouth: Mucous membranes are moist.      Pharynx: No oropharyngeal exudate.   Eyes:      General:         Right eye: No discharge.         Left eye: No discharge.      Extraocular Movements: Extraocular movements intact.      Comments: Left eye with mild allergic conjunctivitis.  No signs of infection noted.   Neck:      Vascular: No carotid bruit.   Cardiovascular:      Rate and Rhythm: Normal rate.      Comments: Pacemaker in situ left chest  Pulmonary:      Effort: Pulmonary effort is normal. No respiratory distress.   Abdominal:      General: Bowel sounds are normal. There is no distension.      Palpations: Abdomen is soft.      Tenderness: There is no abdominal tenderness. There is no guarding.   Musculoskeletal:      Cervical back: Rigidity present.      Right lower leg: Edema present.      Left lower leg: Edema  present.      Comments: Moves all 4 extremities.   Skin:     General: Skin is warm and dry.      Capillary Refill: Capillary refill takes less than 2 seconds.   Neurological:      Mental Status: She is alert and oriented to person, place, and time.      Motor: Weakness present.      Gait: Gait abnormal.   Psychiatric:         Mood and Affect: Mood normal.         Behavior: Behavior normal.         Thought Content: Thought content normal.       Discussion with patient/family and further instructions:  -Fall precaution  -Bleeding precautions  -Monitor for signs/symptoms of infection  -Medication list was reviewed     Follow-up Recommendations: Please follow-up with your primary care physician within 7-10 days of discharge to review medication changes and current status.     Problem List Follow-up Recommendations:  84-year-old female with:    Pleural effusion, right  Recently hospitalized with rib fractures, hemopneumothorax, [place] fusion as per hospital imaging.  Chest x-ray on 5/16/2024 was done as follow-up for pleural effusion showed modest right pleural effusion, s/p IR right thoracentesis on 5/17/2024; 300 mL of serosanguineous fluid obtained.  Fluid culture revealed no growth.  Awaiting cytology report  No complaints of shortness of breath, respiratory status remained stable  Patient has been ordered by social service for home use  Follow-up with pulmonology today      Closed fracture of multiple ribs of right side with routine healing  Recently hospitalized with multiple right rib fractures which was sustained after falls  She had follow-up with trauma surgery on 5/7/2024  No complaints of pain  Continue incentive spirometer     Hemopneumothorax on right  Recently hospitalized with right-sided hemothorax which was present on admission after mechanical fall and patient did not require a chest tube  Follow-up chest x-ray on 5/16/2024 showed modest right pleural effusion   S/p IR right thoracentesis for 300 mL  serosanguineous fluid     PAF (paroxysmal atrial fibrillation) ([facility])  With pacemaker  Heart rate stable  Continue metoprolol tartrate 25 mg twice daily  Continue Eliquis twice daily for anticoagulation/stroke prevention  Follow-up with cardiology outpatient     Type 2 diabetes mellitus with hyperglycemia, with long-term current use of insulin ([facility])   Lab Results  Component Value Date    HGBA1C 8.2 (H) 01/23/2024  Patient uses the freestyle malorie glucose monitoring system  No episodes of hypoglycemia reported  Preprandial BG trending 80s and post prandial blood glucose trending 200s  Decrease glargine insulin to 16 units daily; Hold for BG <100   Continue sliding scale insulin coverage according to blood glucose 4 times a day for BG >200      Allergic conjunctivitis of left eye  Patient reports pruritus of left eye, watering, and slightly inflamed conjunctiva suggestive of allergic conjunctivitis.  No abnormal drainage coming from either eyes  Order olopatadine eyedrop 1 drop in each eye twice daily x 2 weeks then as needed  Monitor for infection as patient has been rubbing her eyes.  Advised patient not to rub her eyes     Chronic diastolic congestive heart failure ([facility])  Wt Readings from Last 3 Encounters:  05/13/24 78.9 kg (174 lb)  04/22/24 80.6 kg (177 lb 9.6 oz)  04/19/24 80.9 kg (178 lb 6.4 oz)  Echo 4/9/2024 showed LVEF 65%  With chronic edema to bilateral lower extremities; edema has decreased  Continue Lasix to 40 mg daily  Continue daily weights  Follow-up with cardiology outpatient      Stage 3b chronic kidney disease  Lab Results   Component Value Date    EGFR 35 (L) 05/06/2024    EGFR 33 (L) 04/29/2024    EGFR 30 (L) 04/23/2024    CREATININE 1.50 (H) 05/06/2024    CREATININE 1.56 (H) 04/29/2024    CREATININE 1.67 (H) 04/23/2024   Baseline creatinine 1.3-1.5  Most recent creatinine 1.31/GFR 41 on 5/16/2024  Avoid nephrotoxic medications such as NSAIDs  Renal dose medications  Avoid hypotension  Follow-up  with PCP for management    Physical deconditioning  Multifactorial  Patient participated in therapy during her SNF stay  Ambulatory with walker and assist of 1  Continue fall precautions  Continue nutritional supplements with Glucerna 3 times daily  Recommend that patient continue with OT/OT with home health services  Social service placed a referral to [facility] health services    Chronic venous insufficiency  With chronic edema to bilateral lower extremities along with vascular discoloration  Encourage patient to elevate legs at rest  Continue compression stockings    Anxiety disorder  Mood appears stable  Continue home dose citalopram 20 mg daily  Follow-up with PCP for management    History of pacemaker insertion in setting of complete heart block    I have spent >30 minutes with patient today in which greater than 50% of this time was spent in counseling/coordination of care regarding Diagnostic results, Instructions for management, Importance of tx compliance, Counseling / Coordination of care, Documenting in the medical record, Reviewing / ordering tests, medicine, procedures  , Obtaining or reviewing history  , and Communicating with other healthcare professionals .    PCP made aware of discharge summary via epic communications.    This note was completed in part utilizing [employer] one voice recognition software.  Grammatical errors, random word insertion, spelling mistakes, and incomplete sentences may be an occasional consequence of the system secondary to software limitations, ambient noise and hardware issues.  At the time of dictation, efforts were made to edit, clarify and/or correct errors.  Please read the chart carefully and recognize, using context, where substitutions have occurred.  If you have any questions or concerns about the context, text or information contained within the body of this dictation, please contact myself, the provider, for further clarification.    Aleah Ramirez  DALILA Morse  5/22/20241:54 PM

## 2024-05-22 NOTE — LETTER
May 22, 2024     Kennedy Castellon MD  [address]  [address]  [place] [address]    Patient: Lizzie Whitten   YOB: 1940   Date of Visit: 5/22/2024       Dear Dr. Castellon:    Thank you for referring Lizzie Whitten to me for evaluation. Below are my notes for this consultation.    If you have questions, please do not hesitate to call me. I look forward to following your patient along with you.         Sincerely,        DALILA Martinez        CC: No Recipients    DALILA Martinez  5/22/2024  3:03 PM  Sign when Signing Visit  [address] [address] [phone]  DISCHARGE SUMMARY  POS: 31  Facility: [facility]     NAME: Lizzie Whitten  AGE: 84 y.o. SEX: female  DATE OF ADMISSION: 4/18/2024 DATE OF DISCHARGE: 5/23/2024 DISCHARGE DISPOSITION: [facility]    Reason for admission: Patient was admitted from [facility] for rehabilitation after hospitalization for status post fall sustaining traumatic fracture of multiple ribs on right side; acute pain from trauma; hemopneumothorax on right; acute respiratory failure with hypoxia; CHACHA; physical deconditioning  Additional Problems:   Past Medical History:   Diagnosis Date   • Anxiety    • Arthritis    • CHF (congestive heart failure) ([facility])    • Chronic venous insufficiency    • COPD (chronic obstructive pulmonary disease) ([facility])    • Coronary artery disease    • Diabetes mellitus ([facility])     type II   • Disease of thyroid gland     hypothyroidism   • DVT (deep venous thrombosis) ([facility])    • HL (hearing loss)    • Hyperlipidemia    • Hypertension    • Irregular heart beat     Paroxysmal a-fib   • Pacemaker    • Renal disorder     stage 3   • Sleep apnea    • Sleep difficulties    • Thyroid disease    • Urinary incontinence    • Uterine leiomyoma    • Walker as ambulation aid      Discharge Diagnoses: See problem list follow up  recommendations below.    Course of stay: Patient was admitted to [facility] for rehabilitation following hospitalization for above-mentioned.  At time of examination this morning, received patient seated in chair, and she appeared in no distress.  She denies having shortness of breath, chest pain, headache, dizziness, abdominal pain, nausea, vomiting, diarrhea, or constipation.  Blood glucose 80s this morning and patient was given extra Glucerna and breakfast; long-acting insulin decreased.  During the resident's stay at [facility], she received skilled nursing care, PT, OT, social service support, dietitian support, and medical management for an overall improvement in her functional status.  She is scheduled to be discharged home on 5/23/2024.  A referral was placed to [facility] by social service for nursing services and PT/OT to increase strength and endurance and to return to previous level of ADLs.     Labs and testing performed during stay: BMP, chest x-ray, UA, CBC    Discharge Medications: See discharge medication list which was reviewed in [facility] and compared to facility orders for accuracy.  Status at time of discharge exam: Stable    Today's Visit: 5/22/20241:54 PM    Subjective: No complaints    Review of systems: As per review of medical illness, all other systems reviewed and negative.    Vitals:   Weight: 174 pounds     BP: 118/62    Temp: 98.5    HR: 60   Resp: 16     Exam: Physical Exam  Vitals and nursing note reviewed.   Constitutional:       General: She is not in acute distress.     Appearance: She is ill-appearing. She is not toxic-appearing or diaphoretic.   HENT:      Head: Normocephalic.      Nose: No congestion or rhinorrhea.      Mouth/Throat:      Mouth: Mucous membranes are moist.      Pharynx: No oropharyngeal exudate.   Eyes:      General:         Right eye: No discharge.         Left eye: No discharge.      Extraocular Movements: Extraocular movements  intact.      Comments: Left eye with mild allergic conjunctivitis.  No signs of infection noted.   Neck:      Vascular: No carotid bruit.   Cardiovascular:      Rate and Rhythm: Normal rate.      Comments: Pacemaker in situ left chest  Pulmonary:      Effort: Pulmonary effort is normal. No respiratory distress.   Abdominal:      General: Bowel sounds are normal. There is no distension.      Palpations: Abdomen is soft.      Tenderness: There is no abdominal tenderness. There is no guarding.   Musculoskeletal:      Cervical back: Rigidity present.      Right lower leg: Edema present.      Left lower leg: Edema present.      Comments: Moves all 4 extremities.   Skin:     General: Skin is warm and dry.      Capillary Refill: Capillary refill takes less than 2 seconds.   Neurological:      Mental Status: She is alert and oriented to person, place, and time.      Motor: Weakness present.      Gait: Gait abnormal.   Psychiatric:         Mood and Affect: Mood normal.         Behavior: Behavior normal.         Thought Content: Thought content normal.       Discussion with patient/family and further instructions:  -Fall precaution  -Bleeding precautions  -Monitor for signs/symptoms of infection  -Medication list was reviewed     Follow-up Recommendations: Please follow-up with your primary care physician within 7-10 days of discharge to review medication changes and current status.     Problem List Follow-up Recommendations:  84-year-old female with:    Pleural effusion, right  Recently hospitalized with rib fractures, hemopneumothorax, [place] fusion as per hospital imaging.  Chest x-ray on 5/16/2024 was done as follow-up for pleural effusion showed modest right pleural effusion, s/p IR right thoracentesis on 5/17/2024; 300 mL of serosanguineous fluid obtained.  Fluid culture revealed no growth.  Awaiting cytology report  No complaints of shortness of breath, respiratory status remained stable  Patient has been ordered by  social service for home use  Follow-up with pulmonology today      Closed fracture of multiple ribs of right side with routine healing  Recently hospitalized with multiple right rib fractures which was sustained after falls  She had follow-up with trauma surgery on 5/7/2024  No complaints of pain  Continue incentive spirometer     Hemopneumothorax on right  Recently hospitalized with right-sided hemothorax which was present on admission after mechanical fall and patient did not require a chest tube  Follow-up chest x-ray on 5/16/2024 showed modest right pleural effusion   S/p IR right thoracentesis for 300 mL serosanguineous fluid     PAF (paroxysmal atrial fibrillation) ([facility])  With pacemaker  Heart rate stable  Continue metoprolol tartrate 25 mg twice daily  Continue Eliquis twice daily for anticoagulation/stroke prevention  Follow-up with cardiology outpatient     Type 2 diabetes mellitus with hyperglycemia, with long-term current use of insulin ([facility])   Lab Results  Component Value Date    HGBA1C 8.2 (H) 01/23/2024  Patient uses the freestyle malorie glucose monitoring system  No episodes of hypoglycemia reported  Preprandial BG trending 80s and post prandial blood glucose trending 200s  Decrease glargine insulin to 16 units daily; Hold for BG <100   Continue sliding scale insulin coverage according to blood glucose 4 times a day for BG >200      Allergic conjunctivitis of left eye  Patient reports pruritus of left eye, watering, and slightly inflamed conjunctiva suggestive of allergic conjunctivitis.  No abnormal drainage coming from either eyes  Order olopatadine eyedrop 1 drop in each eye twice daily x 2 weeks then as needed  Monitor for infection as patient has been rubbing her eyes.  Advised patient not to rub her eyes     Chronic diastolic congestive heart failure ([facility])  Wt Readings from Last 3 Encounters:  05/13/24 78.9 kg (174 lb)  04/22/24 80.6 kg (177 lb 9.6 oz)  04/19/24 80.9 kg (178 lb 6.4 oz)  Echo  4/9/2024 showed LVEF 65%  With chronic edema to bilateral lower extremities; edema has decreased  Continue Lasix to 40 mg daily  Continue daily weights  Follow-up with cardiology outpatient      Stage 3b chronic kidney disease  Lab Results   Component Value Date    EGFR 35 (L) 05/06/2024    EGFR 33 (L) 04/29/2024    EGFR 30 (L) 04/23/2024    CREATININE 1.50 (H) 05/06/2024    CREATININE 1.56 (H) 04/29/2024    CREATININE 1.67 (H) 04/23/2024   Baseline creatinine 1.3-1.5  Most recent creatinine 1.31/GFR 41 on 5/16/2024  Avoid nephrotoxic medications such as NSAIDs  Renal dose medications  Avoid hypotension  Follow-up with PCP for management    Physical deconditioning  Multifactorial  Patient participated in therapy during her SNF stay  Ambulatory with walker and assist of 1  Continue fall precautions  Continue nutritional supplements with Glucerna 3 times daily  Recommend that patient continue with OT/OT with home health services  [facility]    Chronic venous insufficiency  With chronic edema to bilateral lower extremities along with vascular discoloration  Encourage patient to elevate legs at rest  Continue compression stockings    Anxiety disorder  Mood appears stable  Continue home dose citalopram 20 mg daily  Follow-up with PCP for management    History of pacemaker insertion in setting of complete heart block    I have spent >30 minutes with patient today in which greater than 50% of this time was spent in counseling/coordination of care regarding Diagnostic results, Instructions for management, Importance of tx compliance, Counseling / Coordination of care, Documenting in the medical record, Reviewing / ordering tests, medicine, procedures  , Obtaining or reviewing history  , and Communicating with other healthcare professionals .    PCP made aware of discharge summary via epic communications.    This note was completed in part utilizing [employer]  one voice recognition software.  Grammatical errors, random word insertion, spelling mistakes, and incomplete sentences may be an occasional consequence of the system secondary to software limitations, ambient noise and hardware issues.  At the time of dictation, efforts were made to edit, clarify and/or correct errors.  Please read the chart carefully and recognize, using context, where substitutions have occurred.  If you have any questions or concerns about the context, text or information contained within the body of this dictation, please contact myself, the provider, for further clarification.    DALILA Martinez  5/22/20241:54 PM

## 2024-05-23 ENCOUNTER — TELEPHONE (OUTPATIENT)
Age: 84
End: 2024-05-23

## 2024-05-23 NOTE — TELEPHONE ENCOUNTER
Ashley from [facility] was calling stating that the pt is getting released today from rehab. Pt was admitted in on 4/18 and is getting released today. Please advise with the pt to try to schedule a TCM thank you.

## 2024-05-24 ENCOUNTER — TRANSITIONAL CARE MANAGEMENT (OUTPATIENT)
Dept: FAMILY MEDICINE CLINIC | Facility: CLINIC | Age: 84
End: 2024-05-24

## 2024-05-24 ENCOUNTER — TELEPHONE (OUTPATIENT)
Dept: FAMILY MEDICINE CLINIC | Facility: CLINIC | Age: 84
End: 2024-05-24

## 2024-05-24 ENCOUNTER — TELEPHONE (OUTPATIENT)
Age: 84
End: 2024-05-24

## 2024-05-24 NOTE — PROGRESS NOTES
Patient recently discharged from rehab after admission to the hospital for acute respiratory failure with hypoxia, COPD, acute kidney injury and other problems.  Presently on nebulized treatments using DuoNeb 4 times daily.  Continue use of this treatment is medically necessary for this patient.  She is scheduled for follow-up in this office on 6/4/2024.  Patient

## 2024-05-24 NOTE — TELEPHONE ENCOUNTER
Pt's daughter called in regarding pt's medication schedule for night time.  The night time caregiver leaves at 8pm after they are already in bed for the night and the next caregiver doesn't arrive till 10 pm  She stated that it is extremely hard for Lizzie to give herself insulin at 9pm when she is already in bed for the night and no one is there to assist her.  She is inquiring if there is anything you can do to have her night time insulin moved to 8pm so that the caregiver can help her before leaving.

## 2024-05-24 NOTE — TELEPHONE ENCOUNTER
Guillermina from [facility] called states patient is to have DuoNeb treatments QID but has no machine. Would need a prescription and note for machine. Would like faxed to [phone]. Any questions Guillermina can be reached at [phone]. Also patient is starting home health care 5/25/2024

## 2024-05-24 NOTE — TELEPHONE ENCOUNTER
It was actually patient's daughter Nidia that called. [phone]   She wanted to know if it is ok to move her gabapentin to 8 pm as well?

## 2024-05-28 ENCOUNTER — APPOINTMENT (EMERGENCY)
Dept: RADIOLOGY | Facility: HOSPITAL | Age: 84
End: 2024-05-28
Payer: MEDICARE

## 2024-05-28 ENCOUNTER — OFFICE VISIT (OUTPATIENT)
Dept: PULMONOLOGY | Facility: CLINIC | Age: 84
End: 2024-05-28
Payer: MEDICARE

## 2024-05-28 ENCOUNTER — HOSPITAL ENCOUNTER (EMERGENCY)
Facility: HOSPITAL | Age: 84
Discharge: HOME | End: 2024-05-28
Attending: EMERGENCY MEDICINE
Payer: MEDICARE

## 2024-05-28 VITALS
DIASTOLIC BLOOD PRESSURE: 82 MMHG | BODY MASS INDEX: 33.98 KG/M2 | HEART RATE: 60 BPM | TEMPERATURE: 96.3 F | OXYGEN SATURATION: 99 % | SYSTOLIC BLOOD PRESSURE: 136 MMHG | HEIGHT: 60 IN | RESPIRATION RATE: 18 BRPM

## 2024-05-28 VITALS
RESPIRATION RATE: 16 BRPM | OXYGEN SATURATION: 100 % | HEART RATE: 60 BPM | SYSTOLIC BLOOD PRESSURE: 156 MMHG | DIASTOLIC BLOOD PRESSURE: 66 MMHG | TEMPERATURE: 97.5 F

## 2024-05-28 DIAGNOSIS — I50.32 CHRONIC DIASTOLIC CONGESTIVE HEART FAILURE (HCC): ICD-10-CM

## 2024-05-28 DIAGNOSIS — J45.30 MILD PERSISTENT ASTHMA WITHOUT COMPLICATION: ICD-10-CM

## 2024-05-28 DIAGNOSIS — J90 PLEURAL EFFUSION, RIGHT: ICD-10-CM

## 2024-05-28 DIAGNOSIS — R06.00 DYSPNEA: Primary | ICD-10-CM

## 2024-05-28 DIAGNOSIS — J96.11 CHRONIC HYPOXIC RESPIRATORY FAILURE (HCC): ICD-10-CM

## 2024-05-28 DIAGNOSIS — J96.11 CHRONIC HYPOXIC RESPIRATORY FAILURE (HCC): Primary | ICD-10-CM

## 2024-05-28 DIAGNOSIS — S22.41XD CLOSED FRACTURE OF MULTIPLE RIBS OF RIGHT SIDE WITH ROUTINE HEALING: ICD-10-CM

## 2024-05-28 DIAGNOSIS — J96.01 ACUTE RESPIRATORY FAILURE WITH HYPOXIA (HCC): Primary | ICD-10-CM

## 2024-05-28 DIAGNOSIS — J96.10 CHRONIC RESPIRATORY FAILURE (HCC): ICD-10-CM

## 2024-05-28 LAB
2HR DELTA HS TROPONIN: -2 NG/L
ALBUMIN SERPL BCP-MCNC: 3.7 G/DL (ref 3.5–5)
ALP SERPL-CCNC: 78 U/L (ref 34–104)
ALT SERPL W P-5'-P-CCNC: 10 U/L (ref 7–52)
ANION GAP SERPL CALCULATED.3IONS-SCNC: 8 MMOL/L (ref 4–13)
AST SERPL W P-5'-P-CCNC: 19 U/L (ref 13–39)
ATRIAL RATE: 61 BPM
BASOPHILS # BLD AUTO: 0.07 THOUSANDS/ÂΜL (ref 0–0.1)
BASOPHILS NFR BLD AUTO: 1 % (ref 0–1)
BILIRUB SERPL-MCNC: 0.48 MG/DL (ref 0.2–1)
BUN SERPL-MCNC: 45 MG/DL (ref 5–25)
CALCIUM SERPL-MCNC: 9.6 MG/DL (ref 8.4–10.2)
CARDIAC TROPONIN I PNL SERPL HS: 13 NG/L
CARDIAC TROPONIN I PNL SERPL HS: 15 NG/L
CHLORIDE SERPL-SCNC: 103 MMOL/L (ref 96–108)
CO2 SERPL-SCNC: 30 MMOL/L (ref 21–32)
CREAT SERPL-MCNC: 1.53 MG/DL (ref 0.6–1.3)
EOSINOPHIL # BLD AUTO: 0.1 THOUSAND/ÂΜL (ref 0–0.61)
EOSINOPHIL NFR BLD AUTO: 1 % (ref 0–6)
ERYTHROCYTE [DISTWIDTH] IN BLOOD BY AUTOMATED COUNT: 14.4 % (ref 11.6–15.1)
GFR SERPL CREATININE-BSD FRML MDRD: 31 ML/MIN/1.73SQ M
GLUCOSE SERPL-MCNC: 109 MG/DL (ref 65–140)
HCT VFR BLD AUTO: 40.2 % (ref 34.8–46.1)
HGB BLD-MCNC: 12.1 G/DL (ref 11.5–15.4)
IMM GRANULOCYTES # BLD AUTO: 0.02 THOUSAND/UL (ref 0–0.2)
IMM GRANULOCYTES NFR BLD AUTO: 0 % (ref 0–2)
LYMPHOCYTES # BLD AUTO: 1.5 THOUSANDS/ÂΜL (ref 0.6–4.47)
LYMPHOCYTES NFR BLD AUTO: 19 % (ref 14–44)
MCH RBC QN AUTO: 30.7 PG (ref 26.8–34.3)
MCHC RBC AUTO-ENTMCNC: 30.1 G/DL (ref 31.4–37.4)
MCV RBC AUTO: 102 FL (ref 82–98)
MONOCYTES # BLD AUTO: 0.65 THOUSAND/ÂΜL (ref 0.17–1.22)
MONOCYTES NFR BLD AUTO: 8 % (ref 4–12)
NEUTROPHILS # BLD AUTO: 5.42 THOUSANDS/ÂΜL (ref 1.85–7.62)
NEUTS SEG NFR BLD AUTO: 71 % (ref 43–75)
NRBC BLD AUTO-RTO: 0 /100 WBCS
PLATELET # BLD AUTO: 171 THOUSANDS/UL (ref 149–390)
PMV BLD AUTO: 10 FL (ref 8.9–12.7)
POTASSIUM SERPL-SCNC: 4.5 MMOL/L (ref 3.5–5.3)
PR INTERVAL: 108 MS
PROT SERPL-MCNC: 6.6 G/DL (ref 6.4–8.4)
QRS AXIS: 266 DEGREES
QRSD INTERVAL: 170 MS
QT INTERVAL: 472 MS
QTC INTERVAL: 475 MS
RBC # BLD AUTO: 3.94 MILLION/UL (ref 3.81–5.12)
SODIUM SERPL-SCNC: 141 MMOL/L (ref 135–147)
T WAVE AXIS: 79 DEGREES
VENTRICULAR RATE: 61 BPM
WBC # BLD AUTO: 7.76 THOUSAND/UL (ref 4.31–10.16)

## 2024-05-28 PROCEDURE — 84484 ASSAY OF TROPONIN QUANT: CPT

## 2024-05-28 PROCEDURE — 93005 ELECTROCARDIOGRAM TRACING: CPT

## 2024-05-28 PROCEDURE — 93010 ELECTROCARDIOGRAM REPORT: CPT | Performed by: INTERNAL MEDICINE

## 2024-05-28 PROCEDURE — 80053 COMPREHEN METABOLIC PANEL: CPT

## 2024-05-28 PROCEDURE — 99215 OFFICE O/P EST HI 40 MIN: CPT | Performed by: INTERNAL MEDICINE

## 2024-05-28 PROCEDURE — 36415 COLL VENOUS BLD VENIPUNCTURE: CPT

## 2024-05-28 PROCEDURE — 94618 PULMONARY STRESS TESTING: CPT | Performed by: INTERNAL MEDICINE

## 2024-05-28 PROCEDURE — 71046 X-RAY EXAM CHEST 2 VIEWS: CPT

## 2024-05-28 PROCEDURE — 99285 EMERGENCY DEPT VISIT HI MDM: CPT

## 2024-05-28 PROCEDURE — 85025 COMPLETE CBC W/AUTO DIFF WBC: CPT

## 2024-05-28 RX ORDER — POTASSIUM CHLORIDE 20 MEQ/1
20 TABLET, EXTENDED RELEASE ORAL DAILY
COMMUNITY
Start: 2024-05-24

## 2024-05-28 RX ORDER — IPRATROPIUM BROMIDE AND ALBUTEROL SULFATE 2.5; .5 MG/3ML; MG/3ML
3 SOLUTION RESPIRATORY (INHALATION) EVERY 8 HOURS PRN
Qty: 180 ML | Refills: 0 | Status: SHIPPED | OUTPATIENT
Start: 2024-05-28

## 2024-05-28 RX ORDER — CALCIUM CARBONATE 500(1250)
1 TABLET ORAL DAILY
COMMUNITY
Start: 2024-05-24

## 2024-05-28 NOTE — ASSESSMENT & PLAN NOTE
In the office 6 minute walk today she required 2LPM at rest and then required 8LPM with exertion and at 8LPM her SpO2 decreased to 85%    Her exertional hypoxia is much higher than baseline (which is reported to be 2-3LPM).  I would recommend ED visit and evaluation in the hospital.  She may need evaluation for pulmonary edema, repeat pleural effusion, and ambulatory O2 assessment in the hospital when she is optimized.

## 2024-05-28 NOTE — PROGRESS NOTES
Chief Complaint   Patient presents with    Documentation     Letter for disability      Body mass index is 36.56 kg/m². 1. Have you been to the ER, urgent care clinic since your last visit? Hospitalized since your last visit? No    2. Have you seen or consulted any other health care providers outside of the [facility] since your last visit? Include any pap smears or colon screening. No    Reviewed record in preparation for visit and have necessary documentation  Pt did not bring medication to office visit for review  Information was given to pt on Advanced Directives, Living Will  Information was given on Shingles Vaccine  Opportunity was given for questions  Goals that were addressed and/or need to be completed after this appointment include: There are no preventive care reminders to display for this patient. Pulmonary Outpatient Note   Lizzie Whitten 84 y.o. female MRN: [id number]  5/28/2024      Referring Physician: Kennedy Castellon    Reason for Consultation:    Chief Complaint   Patient presents with    Hypoxia       Assessment/Plan:    1. Acute respiratory failure with hypoxia (HCC)  Assessment & Plan:  In the office 6 minute walk today she required 2LPM at rest and then required 8LPM with exertion and at 8LPM her SpO2 decreased to 85%    Her exertional hypoxia is much higher than baseline (which is reported to be 2-3LPM).  I would recommend ED visit and evaluation in the hospital.  She may need evaluation for pulmonary edema, repeat pleural effusion, and ambulatory O2 assessment in the hospital when she is optimized.    2. Chronic hypoxic respiratory failure (HCC)  Assessment & Plan:  Likely due to atelectasis, pulmonary edema, right-sided pleural effusion related to recent rib fractures.    6-minute walk today in the office.  At baseline with SpO2 decreased to 88% on room air.  Orders:  -     POCT 6 minute walk  3. Pleural effusion, right  Assessment & Plan:  Likely related to rib fractures.  Effusion was not present on 4/8 CTA pulm was present on 4/13 CT chest.  300 mL removed appears to be partially exudative, lymphocytic predominant.  Cytology and cultures were negative.    Repeat CT chest to reevaluate the pleural space.  4. Mild persistent asthma without complication  Assessment & Plan:  Has a history of asthma and purported COPD.  Has no personal smoking history but was exposed to secondhand smoke.    Not dyspnea at rest but ambulation has been difficult due to trauma/fractures recently.    Recommended at the rehab to start nebulized DuoNebs 3 times a day as needed.  Unfortunately does not have a nebulizer machine or DuoNeb prescription.  I have prescribed a nebulizer/nebulizer supplies to be sent to DME  DuoNeb prescription written and sent to pharmacy  Orders:  -     ipratropium-albuterol  (DUO-NEB) 0.5-2.5 mg/3 mL nebulizer solution; Take 3 mL by nebulization every 8 (eight) hours as needed for wheezing or shortness of breath  5. Chronic diastolic congestive heart failure (HCC)  6. Closed fracture of multiple ribs of right side with routine healing        Health Maintenance  Immunization History   Administered Date(s) Administered    COVID-19 PFIZER VACCINE 0.3 ML IM 01/20/2021, 02/09/2021, 04/11/2022    COVID-19 Pfizer vac (Ej-sucrose, gray cap) 12 yr+ IM 04/11/2022    H1N1, All Formulations 01/12/2010, 01/12/2010    INFLUENZA 11/04/2005, 10/21/2021    Influenza Split High Dose Preservative Free IM 10/18/2012, 10/23/2013, 10/14/2014, 10/20/2015, 11/02/2016, 10/04/2017    Influenza, high dose seasonal 0.7 mL 10/23/2018, 10/09/2019, 10/20/2020, 10/28/2022, 12/08/2023    Influenza, seasonal, injectable 11/12/2009, 01/12/2010    Pneumococcal Conjugate 13-Valent 10/21/2015    Pneumococcal Conjugate Vaccine 20-valent (Pcv20), Polysace 10/28/2022    Pneumococcal Polysaccharide PPV23 12/11/2000, 12/11/2010    Td (adult), adsorbed 07/04/2008    Tetanus, adsorbed 09/04/1998    Zoster 07/22/2016    Zoster Vaccine Recombinant 05/30/2021        Return pending hospitalization.    History of Present Illness   HPI:  Lizzie Whitten is a pleasant 84 y.o. female with a history of chronic diastolic congestive heart failure, hypertension, paroxysmal atrial fibrillation, hyperlipidemia, MITCH not on CPAP, diabetes mellitus, prior DVT, anxiety, asthma, who is presenting for evaluation after recent rib fractures, thoracentesis    She presents in a wheelchair with her daughter and her  today.  She currently lives at [facility]. She was admitted to the hospital 4/13 - 4/18 due to traumatic fall with resultant multiple rib fractures, right-sided hemothorax.  She was managed conservatively and discharged with supplemental oxygen at 2LPM at all times.  She was subsequently discharged to [facility]  rehab.  At the rehab she was unable to be weaned off of oxygen and her oxygen requirement actually increased to 3 L at rest.  Over the past week she has not received much physical therapy and has mostly been wheelchair or bedbound.  She was then discharged to [facility].  She was told that she needed a nebulizer machine and portable concentrator but has not been prescribed these    She has purported history of asthma/COPD and was supposed to on DuoNeb with nebulizer machine.  She does not use any inhalers.    Due to persistent hypoxia her nursing home CRNP ordered a chest x-ray that shows right-sided pleural effusion and she was sent for IR thoracentesis on 5/17 with 300 mL of serosanguineous fluid removed.  Cultures and cytology has been negative.    Due to recent trauma she is not able to walk very well.  She is in a wheelchair today.  She has no dyspnea at rest.  She has a chronic regular cough that is nonproductive.  No fevers.  No significant weight changes.  She seems to be at her baseline but she has mostly been wheelchair or bedbound.  Her daughter reports that when she tried to ambulate last week she had a desaturation to 76% and that is why she remains on 3 L.    Rib pain from the rib fractures have decreased.  She is able to take bigger breaths and she uses the incentive spirometry that was given to her at rehab.    Historical Information   Past Medical History:   Diagnosis Date    Anxiety     Arthritis     CHF (congestive heart failure) ([facility])     Chronic venous insufficiency     COPD (chronic obstructive pulmonary disease) ([facility])     Coronary artery disease     Diabetes mellitus ([facility])     type II    Disease of thyroid gland     hypothyroidism    DVT (deep venous thrombosis) ([facility])     HL (hearing loss)     Hyperlipidemia     Hypertension     Irregular heart beat     Paroxysmal a-fib    Pacemaker     Renal disorder     stage 3    Sleep apnea     Sleep difficulties     Thyroid disease     Urinary  incontinence     Uterine leiomyoma     Walker as ambulation aid      Past Surgical History:   Procedure Laterality Date    BREAST SURGERY      For Biopsy    CARDIAC PACEMAKER PLACEMENT      Permanent. Last assessed: 2/19/16    CARDIAC SURGERY      CARPAL TUNNEL RELEASE Bilateral     CATARACT EXTRACTION      CATARACT EXTRACTION W/ INTRAOCULAR LENS  IMPLANT, BILATERAL      CYSTOSCOPY      Diagnostic. Last assessed: 11/8/17    ECTOPIC PREGNANCY SURGERY      HYSTERECTOMY      Total Abdomonal. Resolved: 1982    IR THORACENTESIS  5/17/2024    JOINT REPLACEMENT Right     TKR    OOPHORECTOMY      AL ENDOVEN ABLTJ INCMPTNT VEIN XTR LASER 1ST VEIN Left 2/17/2017    Procedure: GREATER SAPHENOUS VEIN ENDOVASCULAR LASER THERAPY ;  Surgeon: Danielle Phan DO;  Location: [facility];  Service: Vascular    TONSILLECTOMY       Family History   Problem Relation Age of Onset    Coronary artery disease Mother     Hypertension Mother     Heart disease Mother     Hypertension Father     Diabetes Father         Mellitus    No Known Problems Sister     No Known Problems Daughter     No Known Problems Daughter     No Known Problems Maternal Grandmother     No Known Problems Maternal Grandfather     No Known Problems Paternal Grandmother     No Known Problems Paternal Grandfather     Diabetes Brother         Mellitus    No Known Problems Son     Breast cancer Maternal Aunt     Pancreatic cancer Maternal Aunt     Breast cancer Cousin     Breast cancer Cousin     Breast cancer Paternal Aunt     No Known Problems Paternal Aunt     No Known Problems Paternal Aunt        Meds/Allergies     Current Outpatient Medications:     acetaminophen (TYLENOL) 325 mg tablet, Take 3 tablets (975 mg total) by mouth every 8 (eight) hours, Disp: , Rfl:     ammonium lactate (LAC-HYDRIN) 12 % lotion, Apply 1 Application topically daily, Disp: , Rfl:     apixaban (Eliquis) 5 mg, Take 1 tablet (5 mg total) by mouth 2 (two) times a day Do not start before April  22, 2024., Disp: , Rfl:     Ascorbic Acid (VITAMIN C) 1000 MG tablet, Take 500 mg by mouth , Disp: , Rfl:     atorvastatin (LIPITOR) 80 mg tablet, Take 1 tablet (80 mg total) by mouth daily at bedtime, Disp: 90 tablet, Rfl: 3    calcium carbonate (OYSTER SHELL,OSCAL) 500 mg, Take 1 tablet by mouth daily, Disp: , Rfl:     Calcium Carbonate-Vit D-Min (CALCIUM 1200 PO), Take by mouth, Disp: , Rfl:     citalopram (CeleXA) 20 mg tablet, TAKE 1 TABLET EVERY DAY, Disp: 90 tablet, Rfl: 3    Continuous Blood Gluc  (FREESTYLE RONNI 14 DAY READER) GARRY, Use to check BG levels 4+times daily and as needed, Disp: 1 Device, Rfl: 0    Continuous Blood Gluc Sensor (FreeStyle Ronni 14 Day Sensor) MISC, Change sensor every 14 days to check BG levels 4+ times daily, Disp: 6 each, Rfl: 3    Diclofenac Sodium (VOLTAREN) 1 %, Apply 2 g topically 4 (four) times a day, Disp: 50 g, Rfl: 1    furosemide (LASIX) 20 mg tablet, Take 2 tablets (40 mg total) by mouth daily, Disp: , Rfl:     gabapentin (NEURONTIN) 100 mg capsule, Take 1 capsule (100 mg total) by mouth daily at bedtime, Disp: , Rfl:     insulin lispro (HumALOG/ADMELOG) 100 units/mL injection, Inject 3 Units under the skin 3 (three) times a day with meals, Disp: , Rfl:     insulin lispro (HumALOG/ADMELOG) 100 units/mL injection, Inject 2-12 Units under the skin 3 (three) times a day with meals, Disp: , Rfl:     Insulin Pen Needle (BD Pen Needle Sasha U/F) 32G X 4 MM MISC, Inject 4 times a day, Disp: 400 each, Rfl: 1    ipratropium-albuterol (DUO-NEB) 0.5-2.5 mg/3 mL nebulizer solution, Take 3 mL by nebulization every 8 (eight) hours as needed for wheezing or shortness of breath, Disp: 180 mL, Rfl: 0    levothyroxine 150 mcg tablet, Take 1 tablet (150 mcg total) by mouth daily in the early morning, Disp: 30 tablet, Rfl: 0    lidocaine (LIDODERM) 5 %, Apply 2 patches topically over 12 hours daily Remove & Discard patch within 12 hours or as directed by MD, Disp: , Rfl:      metoprolol tartrate (LOPRESSOR) 25 mg tablet, Take 1 tablet (25 mg total) by mouth every 12 (twelve) hours, Disp: 60 tablet, Rfl: 0    Omega-3 Fatty Acids (FISH OIL) 1200 MG CAPS, Take 1,200 mg by mouth 2 (two) times a day , Disp: , Rfl:     pantoprazole (PROTONIX) 40 mg tablet, Take 1 tablet (40 mg total) by mouth daily in the early morning, Disp: 30 tablet, Rfl: 0    polyethylene glycol (MIRALAX) 17 g packet, Take 17 g by mouth daily, Disp: , Rfl:     potassium chloride (Klor-Con M20) 20 mEq tablet, Take 20 mEq by mouth daily, Disp: , Rfl:     senna-docusate sodium (SENOKOT S) 8.6-50 mg per tablet, Take 2 tablets by mouth 2 (two) times a day, Disp: , Rfl:     sodium chloride (VANNESSA 128) 5 % hypertonic ophthalmic ointment, Administer 1 drop to both eyes 2 (two) times a day, Disp: , Rfl:     Lancets (onetouch ultrasoft) lancets, Use 4 times a day (Patient not taking: Reported on 4/8/2024), Disp: 400 each, Rfl: 0  Allergies   Allergen Reactions    Ampicillin Hives       Vitals: Blood pressure 136/82, pulse 60, temperature (!) 96.3 °F (35.7 °C), temperature source Tympanic, resp. rate 18, height 5' (1.524 m), SpO2 99%, not currently breastfeeding. Body mass index is 33.98 kg/m². Oxygen Therapy  SpO2: 99 %  Oxygen Therapy: Supplemental oxygen  O2 Delivery Method: Nasal cannula  O2 Flow Rate (L/min): 3 L/min    Physical Exam  Vitals and nursing note reviewed.   Constitutional:       General: She is not in acute distress.     Appearance: She is well-developed. She is not ill-appearing, toxic-appearing or diaphoretic.      Comments: Elderly, sitting in wheelchair   HENT:      Head: Normocephalic and atraumatic.      Mouth/Throat:      Mouth: Mucous membranes are moist.      Pharynx: Oropharynx is clear. No oropharyngeal exudate.   Eyes:      General: No scleral icterus.     Extraocular Movements: Extraocular movements intact.      Conjunctiva/sclera: Conjunctivae normal.   Cardiovascular:      Rate and Rhythm: Normal rate.  "  Pulmonary:      Effort: Pulmonary effort is normal. No respiratory distress.      Breath sounds: No stridor. Rales present.   Abdominal:      Tenderness: There is no guarding.   Musculoskeletal:         General: No swelling.      Cervical back: Normal range of motion and neck supple. No rigidity.      Right lower leg: No edema.      Left lower leg: No edema.   Skin:     General: Skin is warm and dry.      Coloration: Skin is not jaundiced.   Neurological:      General: No focal deficit present.      Mental Status: She is alert. Mental status is at baseline.         Labs:   I have personally reviewed pertinent lab results.    ABG: No results found for: \"PHART\", \"ULP7QOQ\", \"PO2ART\", \"ANI6PAL\", \"G1PNJQTB\", \"BEART\", \"SOURCE\",   BNP:   Lab Results   Component Value Date    BNP 64 05/31/2015   ,   CBC:  Lab Results   Component Value Date    WBC 7.28 04/16/2024    HGB 11.2 (L) 04/16/2024    HCT 37.3 04/16/2024     (H) 04/16/2024     04/16/2024    EOSPCT 3 04/16/2024    EOSABS 0.21 04/16/2024    NEUTOPHILPCT 56 04/16/2024    LYMPHOPCT 28 04/16/2024   ,   CMP:   Lab Results   Component Value Date    SODIUM 142 05/06/2024    K 4.2 05/06/2024    CL 98 (L) 05/06/2024    CO2 37 (H) 05/06/2024    ANIONGAP 8 10/23/2015    BUN 48 (H) 05/06/2024    CREATININE 1.50 (H) 05/06/2024    GLUCOSE 220 (H) 10/23/2015    CALCIUM 9.0 05/06/2024    AST 16 04/23/2024    ALT 13 04/23/2024    ALKPHOS 86 04/23/2024    PROT 6.4 09/06/2016    BILITOT 0.6 09/06/2016    EGFR 35 (L) 05/06/2024   ,   PT/INR:   Lab Results   Component Value Date    INR 1.21 (H) 02/14/2017   ,   Troponin:   Lab Results   Component Value Date    TROPONINI 0.82 (H) 01/16/2016       IR thoracentesis  Pleural effusion status post right rib fractures  300 mL serosanguineous right pleural fluid  pH 7.6, glucose 184    WBC 1678, 72% lymphocytes.  Total protein 3.6  Cytology shows benign mesothelial cells, histiocytes, lymphocytes.  Negative for " "malignancy  Culture/Gram stain negative    Imaging and other studies: I have personally reviewed pertinent reports.   and I have personally reviewed pertinent films in PACS  4/13/2024-CT chest without contrast  Fracture right fourth through eighth ribs including 3 rib segments which are fractured in 2 places.  Consistent with flail chest.  Small multiloculated right chest pleural containing few tiny bubbles of gas.  Very small pneumomediastinum.  Subcu emphysema.  Cardiomegaly, CABG, central pulmonary arterial tree prominence suggestive of pulmonary hypertension      Pulmonary function testing: none    Transthoracic Echo:  4/9/2024  LVEF normal.  Grade 1 diastolic dysfunction  RV size/function normal.  Left atrium moderately dilated  Trace tricuspid regurgitation.  Estimated RVSP 52 mmHg    Sleep Study:  5/18/23  The patient had a total of 156 respiratory events made up of 2 obstructive apneas, 0 central apneas, 0 mixed apneas and 154 hypopneas resulting in a respiratory event index (LAVERNE) of 32.4.  The lowest SpO2 recorded is 75% and 211.8 minutes during the study was spent with saturations below 90%.  The snore index was 0%.         Connor Velazco MD  Pulmonary, Critical Care and Sleep Medicine  [facility]     Portions of the record may have been created with voice recognition software. Occasional wrong word or \"sound a like\" substitutions may have occurred due to the inherent limitations of voice recognition software. Please read the chart carefully and recognize, using context, where substitutions have occurred.     "

## 2024-05-28 NOTE — ASSESSMENT & PLAN NOTE
Likely related to rib fractures.  Effusion was not present on 4/8 CTA pulm was present on 4/13 CT chest.  300 mL removed appears to be partially exudative, lymphocytic predominant.  Cytology and cultures were negative.    Repeat CT chest to reevaluate the pleural space.

## 2024-05-28 NOTE — ASSESSMENT & PLAN NOTE
Has a history of asthma and purported COPD.  Has no personal smoking history but was exposed to secondhand smoke.    Not dyspnea at rest but ambulation has been difficult due to trauma/fractures recently.    Recommended at the rehab to start nebulized DuoNebs 3 times a day as needed.  Unfortunately does not have a nebulizer machine or DuoNeb prescription.  I have prescribed a nebulizer/nebulizer supplies to be sent to DME  DuoNeb prescription written and sent to pharmacy

## 2024-05-28 NOTE — DISCHARGE INSTRUCTIONS
Your workup here was not concerning for anything dangerous. Therefore there is no need for you to stay at the hospital for further testing. We feel safe to send you home.    You should follow up with your Pulmonologist to assess for resolution of your symptoms and to determine if there is any further evaluation that needs to be performed.    Return to the emergency department if you have any worsening symptoms.    Thank you for choosing [facility] for your care!

## 2024-05-28 NOTE — ASSESSMENT & PLAN NOTE
Likely due to atelectasis, pulmonary edema, right-sided pleural effusion related to recent rib fractures.    6-minute walk today in the office.  At baseline with SpO2 decreased to 88% on room air.

## 2024-05-28 NOTE — ED PROVIDER NOTES
Chief Complaint   Patient presents with    Shortness of Breath     Pt was at pulmonologist office and oxygen sat was in 70's on 6 L, recent rib fractures, on 3 L at baseline, - CP, denies SOB on arrival      History of Present Illness and Review of Systems   This is a 84 y.o. female with PMH notable for CHF, COPD on 3L chronically, recent hospitalization for traumatic rib fractures, DVT on Eliquis, HLD, HTN  coming in today with complaint of shortness of breath.  Patient presents from her pulmonology office due to concerns of desaturating while ambulating.  Reportedly desaturated to the 70s while on 3 L.  She was sent here for further evaluation.  She does have notable history of pleural effusion secondary to traumatic rib fractures during hospitalization in April.  Otherwise denies any fevers, chills, nausea vomiting, peripheral edema, episodes syncope, heart palpitations, abdominal pain or otherwise.  No cough or hemoptysis.  She is compliant with her Eliquis therapy.  No other symptoms currently.    - No language barrier.     No other complaints for this encounter.    Remainder of ROS Reviewed and Non-Pertinent    Past Medical, Past Surgical History:    has a past medical history of Anxiety, Arthritis, CHF (congestive heart failure) (HCC), Chronic venous insufficiency, COPD (chronic obstructive pulmonary disease) (HCC), Coronary artery disease, Diabetes mellitus (HCC), Disease of thyroid gland, DVT (deep venous thrombosis) (HCC), HL (hearing loss), Hyperlipidemia, Hypertension, Irregular heart beat, Pacemaker, Renal disorder, Sleep apnea, Sleep difficulties, Thyroid disease, Urinary incontinence, Uterine leiomyoma, and Walker as ambulation aid.   has a past surgical history that includes Hysterectomy; Joint replacement (Right); Tonsillectomy; Cataract extraction w/ intraocular lens  implant, bilateral; Ectopic pregnancy surgery; Cardiac surgery; Cardiac pacemaker placement; Carpal tunnel release (Bilateral); pr  endoven abltj incmptnt vein xtr laser 1st vein (Left, 2/17/2017); Cystoscopy; Cataract extraction; Breast surgery; Oophorectomy; and IR thoracentesis (5/17/2024).     Allergies:     Allergies   Allergen Reactions    Ampicillin Hives       Social and Family History:     Social History     Substance and Sexual Activity   Alcohol Use Not Currently    Comment: rarely. Per [employer]: Denied history of alcohol use     Social History     Tobacco Use   Smoking Status Never    Passive exposure: Never   Smokeless Tobacco Never   Tobacco Comments    Per [employer]: Former smoker     Social History     Substance and Sexual Activity   Drug Use No       Physical Examination     Vitals:    05/28/24 1513 05/28/24 1700 05/28/24 1800   BP: 152/69 149/67 156/66   BP Location: Left arm Right arm Right arm   Pulse: 60 60 60   Resp: 18 22 16   Temp: 97.5 °F (36.4 °C)     TempSrc: Oral     SpO2: 100% 100% 100%       Physical Exam  Vitals and nursing note reviewed.   Constitutional:       General: She is not in acute distress.     Appearance: She is well-developed.   HENT:      Head: Normocephalic and atraumatic.   Eyes:      Extraocular Movements: Extraocular movements intact.      Conjunctiva/sclera: Conjunctivae normal.      Pupils: Pupils are equal, round, and reactive to light.   Cardiovascular:      Rate and Rhythm: Normal rate and regular rhythm.      Heart sounds: No murmur heard.  Pulmonary:      Effort: Pulmonary effort is normal. No respiratory distress.      Breath sounds: Normal breath sounds. No wheezing, rhonchi or rales.      Comments: On 3L NC  Abdominal:      Palpations: Abdomen is soft.      Tenderness: There is no abdominal tenderness.   Musculoskeletal:         General: No swelling. Normal range of motion.      Cervical back: Normal range of motion and neck supple.      Right lower leg: No edema.      Left lower leg: No edema.   Skin:     General: Skin is warm and dry.      Capillary Refill: Capillary refill takes  less than 2 seconds.   Neurological:      General: No focal deficit present.      Mental Status: She is alert.      Cranial Nerves: No cranial nerve deficit.   Psychiatric:         Mood and Affect: Mood normal.           Procedures   Procedures      MDM:   Medical Decision Making  Lizzie Whitten is a 84 y.o. who presents with complaints of shortness of breath    Vital signs: Stable  Physical Exam: Unremarkable, patient appears at her baseline    Differential includes but not limited to: Pleural effusion, acute on chronic respiratory failure, ACS, dysrhythmia.  Doubt infection given her afebrile status.  Also does not appear fluid overloaded on examination.    Plan: Labs, ECG, troponin, chest x-ray, POCUS, ambulatory trial. Will monitor closely and reassess.    Reassessment/Disposition: Workup negative for any acute abnormality.  No evidence of large pleural effusion that would require drainage at this time.  Patient ambulated on 3 L without difficulty and maintain her oxygen saturation. No indication for further workup or admission. Recommended prompt outpatient follow up. Advised on return precautions. Patient expressed understanding with no further questions.          Amount and/or Complexity of Data Reviewed  Labs: ordered. Decision-making details documented in ED Course.  Radiology: ordered and independent interpretation performed.        - Reviewed relevant past office visits/hospitalizations/procedures  -Obtained pertinent history that influenced decision making from the patient and family    ED Course as of 05/29/24 0038   Tue May 28, 2024   1600 My EKG read: paced rhythm, rate controlled, no ST or T wave abnormalities, appropriate intervals, normal axis, no evidence of ischemia     1607 POCUS shows trace pleural effusion on the R.    1607 No indication for drainage at this point. Labs appear stable   1607 Pt maintaining O2 on 3L without difficulty. Will ambulate and if stable, no indication for  admission at this point   1615 hs TnI 0hr: 15   1636 Pt ambulated on 3L well, no desaturations.   1637 Will obtain delta trop, if negative will DC home for close follow up   1800 Delta 2hr hsTnI: -2      Final Dispo   Final Diagnosis:  1. Dyspnea    2. Chronic respiratory failure (HCC)      Time reflects when diagnosis was documented in both MDM as applicable and the Disposition within this note       Time User Action Codes Description Comment    5/28/2024  4:46 PM Erik Isaacs Add [R06.00] Dyspnea     5/28/2024  4:46 PM Erik Isaacs Add [J96.10] Chronic respiratory failure (HCC)           ED Disposition       ED Disposition   Discharge    Condition   Stable    Date/Time   Tue May 28, 2024  6:00 PM    Comment   Lizzie Whitten discharge to home/self care.                   Follow-up Information    None       Medications - No data to display    Risk Stratification Tools                Orders Placed This Encounter   Procedures    XR chest 2 views    US bedside procedure    CBC and differential    Comprehensive metabolic panel    HS Troponin 0hr (reflex protocol)    HS Troponin I 2hr    ECG 12 lead    ECG 12 lead       Labs:     Labs Reviewed   CBC AND DIFFERENTIAL - Abnormal       Result Value Ref Range Status    WBC 7.76  4.31 - 10.16 Thousand/uL Final    RBC 3.94  3.81 - 5.12 Million/uL Final    Hemoglobin 12.1  11.5 - 15.4 g/dL Final    Hematocrit 40.2  34.8 - 46.1 % Final     (*) 82 - 98 fL Final    MCH 30.7  26.8 - 34.3 pg Final    MCHC 30.1 (*) 31.4 - 37.4 g/dL Final    RDW 14.4  11.6 - 15.1 % Final    MPV 10.0  8.9 - 12.7 fL Final    Platelets 171  149 - 390 Thousands/uL Final    nRBC 0  /100 WBCs Final    Segmented % 71  43 - 75 % Final    Immature Grans % 0  0 - 2 % Final    Lymphocytes % 19  14 - 44 % Final    Monocytes % 8  4 - 12 % Final    Eosinophils Relative 1  0 - 6 % Final    Basophils Relative 1  0 - 1 % Final    Absolute Neutrophils 5.42  1.85 - 7.62 Thousands/µL Final    Absolute  Immature Grans 0.02  0.00 - 0.20 Thousand/uL Final    Absolute Lymphocytes 1.50  0.60 - 4.47 Thousands/µL Final    Absolute Monocytes 0.65  0.17 - 1.22 Thousand/µL Final    Eosinophils Absolute 0.10  0.00 - 0.61 Thousand/µL Final    Basophils Absolute 0.07  0.00 - 0.10 Thousands/µL Final   COMPREHENSIVE METABOLIC PANEL - Abnormal    Sodium 141  135 - 147 mmol/L Final    Potassium 4.5  3.5 - 5.3 mmol/L Final    Chloride 103  96 - 108 mmol/L Final    CO2 30  21 - 32 mmol/L Final    ANION GAP 8  4 - 13 mmol/L Final    BUN 45 (*) 5 - 25 mg/dL Final    Creatinine 1.53 (*) 0.60 - 1.30 mg/dL Final    Comment: Standardized to IDMS reference method    Glucose 109  65 - 140 mg/dL Final    Comment: If the patient is fasting, the ADA then defines impaired fasting glucose as > 100 mg/dL and diabetes as > or equal to 123 mg/dL.    Calcium 9.6  8.4 - 10.2 mg/dL Final    AST 19  13 - 39 U/L Final    ALT 10  7 - 52 U/L Final    Comment: Specimen collection should occur prior to Sulfasalazine administration due to the potential for falsely depressed results.     Alkaline Phosphatase 78  34 - 104 U/L Final    Total Protein 6.6  6.4 - 8.4 g/dL Final    Albumin 3.7  3.5 - 5.0 g/dL Final    Total Bilirubin 0.48  0.20 - 1.00 mg/dL Final    Comment: Use of this assay is not recommended for patients undergoing treatment with eltrombopag due to the potential for falsely elevated results.  N-acetyl-p-benzoquinone imine (metabolite of Acetaminophen) will generate erroneously low results in samples for patients that have taken an overdose of Acetaminophen.    eGFR 31  ml/min/1.73sq m Final    Narrative:     National Kidney Disease Foundation guidelines for Chronic Kidney Disease (CKD):     Stage 1 with normal or high GFR (GFR > 90 mL/min/1.73 square meters)    Stage 2 Mild CKD (GFR = 60-89 mL/min/1.73 square meters)    Stage 3A Moderate CKD (GFR = 45-59 mL/min/1.73 square meters)    Stage 3B Moderate CKD (GFR = 30-44 mL/min/1.73 square  "meters)    Stage 4 Severe CKD (GFR = 15-29 mL/min/1.73 square meters)    Stage 5 End Stage CKD (GFR <15 mL/min/1.73 square meters)  Note: GFR calculation is accurate only with a steady state creatinine   HS TROPONIN I 0HR - Normal    hs TnI 0hr 15  \"Refer to ACS Flowchart\"- see link ng/L Final    Comment:                                              Initial (time 0) result  If >=50 ng/L, Myocardial injury suggested ;  Type of myocardial injury and treatment strategy  to be determined.  If 5-49 ng/L, a delta result at 2 hours and or 4 hours will be needed to further evaluate.  If <4 ng/L, and chest pain has been >3 hours since onset, patient may qualify for discharge based on the HEART score in the ED.  If <5 ng/L and <3hours since onset of chest pain, a delta result at 2 hours will be needed to further evaluate.    HS Troponin 99th Percentile URL of a Health Population=12 ng/L with a 95% Confidence Interval of 8-18 ng/L.    Second Troponin (time 2 hours)  If calculated delta >= 20 ng/L,  Myocardial injury suggested ; Type of myocardial injury and treatment strategy to be determined.  If 5-49 ng/L and the calculated delta is 5-19 ng/L, consult medical service for evaluation.  Continue evaluation for ischemia on ecg and other possible etiology and repeat hs troponin at 4 hours.  If delta is <5 ng/L at 2 hours, consider discharge based on risk stratification via the HEART score (if in ED), or RIMA risk score in IP/Observation.    HS Troponin 99th Percentile URL of a Health Population=12 ng/L with a 95% Confidence Interval of 8-18 ng/L.   HS TROPONIN I 2HR - Normal    hs TnI 2hr 13  \"Refer to ACS Flowchart\"- see link ng/L Final    Comment:                                              Initial (time 0) result  If >=50 ng/L, Myocardial injury suggested ;  Type of myocardial injury and treatment strategy  to be determined.  If 5-49 ng/L, a delta result at 2 hours and or 4 hours will be needed to further evaluate.  If <4 " "ng/L, and chest pain has been >3 hours since onset, patient may qualify for discharge based on the HEART score in the ED.  If <5 ng/L and <3hours since onset of chest pain, a delta result at 2 hours will be needed to further evaluate.    HS Troponin 99th Percentile URL of a Health Population=12 ng/L with a 95% Confidence Interval of 8-18 ng/L.    Second Troponin (time 2 hours)  If calculated delta >= 20 ng/L,  Myocardial injury suggested ; Type of myocardial injury and treatment strategy to be determined.  If 5-49 ng/L and the calculated delta is 5-19 ng/L, consult medical service for evaluation.  Continue evaluation for ischemia on ecg and other possible etiology and repeat hs troponin at 4 hours.  If delta is <5 ng/L at 2 hours, consider discharge based on risk stratification via the HEART score (if in ED), or RIMA risk score in IP/Observation.    HS Troponin 99th Percentile URL of a Health Population=12 ng/L with a 95% Confidence Interval of 8-18 ng/L.    Delta 2hr hsTnI -2  <20 ng/L Final       Imaging:     US bedside procedure   Final Result by Interface, Externalimages (05/28 0318)      XR chest 2 views   ED Interpretation by Erik Isaacs MD (05/28 6546)   No evidence of acute cardiopulmonary abnormality by my independent interpretation            All details of the evaluation and treatment plan were made clear and additionally all questions and concerns were addressed while under my care.    Portions of the record may have been created with voice recognition software. Occasional wrong word or \"sound a like\" substitutions may have occurred due to the inherent limitations of voice recognition software. Read the chart carefully and recognize, using context, where substitutions have occurred.     Erik Isaacs MD  05/29/24 0038    "

## 2024-05-29 ENCOUNTER — TELEPHONE (OUTPATIENT)
Age: 84
End: 2024-05-29

## 2024-05-29 ENCOUNTER — RA CDI HCC (OUTPATIENT)
Dept: OTHER | Facility: HOSPITAL | Age: 84
End: 2024-05-29

## 2024-05-29 NOTE — PROGRESS NOTES
HCC coding opportunities          Chart Reviewed number of suggestions sent to Provider: 4     Patients Insurance   I13.0, E11.59, E11.22 and I73.9  Medicare Insurance: Medicare

## 2024-05-29 NOTE — TELEPHONE ENCOUNTER
[facility] Anali called inquiring if the patient needs labs drawn. I do not see  active orders for labs.        Please review and call Anali back at .

## 2024-05-29 NOTE — ED ATTENDING ATTESTATION
5/28/2024  I, Nepatli Mcclellan MD, saw and evaluated the patient. I have discussed the patient with the resident/non-physician practitioner and agree with the resident's/non-physician practitioner's findings, Plan of Care, and MDM as documented in the resident's/non-physician practitioner's note, except where noted. All available labs and Radiology studies were reviewed.  I was present for key portions of any procedure(s) performed by the resident/non-physician practitioner and I was immediately available to provide assistance.       At this point I agree with the current assessment done in the Emergency Department.  I have conducted an independent evaluation of this patient a history and physical is as follows:    84-year-old woman with history of COPD on 3L O2 at baseline presenting from her pulmonology office after she had an abnormal pulse ox reading there.  Patient had an ambulatory pulse ox of 70%.  She tells me that she was not having any symptoms at that time including chest pain, shortness of breath, back pain, abdominal pain, dizziness or lightheadedness.  Other notes mention shortness of breath but the patient adamantly denies this to me.  Denies fever or cough.  Denies leg pain or swelling.  On exam patient awake and alert no acute distress.  She is on 3 L O2 by nasal cannula with a sat of 100%.  Oropharynx clear.  Neck supple.  Heart regular rate and rhythm, no murmurs rubs or gallops.  Lungs clear to auscultation bilaterally.  Normal work of breathing.  Abdomen soft, nontender, nondistended.  Skin warm and dry.  No extremity swelling or edema.  EKG, labs and chest x-ray done.  Patient was able to ambulate without difficulty with a normal ambulatory pulse ox.  Discussed results with patient and her family and patient was discharged.    ED Course         Critical Care Time  Procedures

## 2024-05-31 NOTE — TELEPHONE ENCOUNTER
Anali from [facility] called as she was not able to get a clean urine catch from the pt. Please order a  UA with culture and sensitivity for the pt to go to [facility] lab. Please notify the pt once is ordered.

## 2024-06-03 NOTE — TELEPHONE ENCOUNTER
Called received from Anali stating that multiple attempts where made to receive a UA from pt. Advised family to take pt to the lab. Anali wanted to make note for appointment tomorrow.

## 2024-06-04 ENCOUNTER — OFFICE VISIT (OUTPATIENT)
Dept: FAMILY MEDICINE CLINIC | Facility: CLINIC | Age: 84
End: 2024-06-04
Payer: MEDICARE

## 2024-06-04 VITALS
OXYGEN SATURATION: 99 % | HEART RATE: 59 BPM | HEIGHT: 61 IN | TEMPERATURE: 97.5 F | SYSTOLIC BLOOD PRESSURE: 110 MMHG | BODY MASS INDEX: 32.88 KG/M2 | DIASTOLIC BLOOD PRESSURE: 64 MMHG

## 2024-06-04 DIAGNOSIS — I10 PRIMARY HYPERTENSION: ICD-10-CM

## 2024-06-04 DIAGNOSIS — E11.65 TYPE 2 DIABETES MELLITUS WITH HYPERGLYCEMIA, WITH LONG-TERM CURRENT USE OF INSULIN (HCC): ICD-10-CM

## 2024-06-04 DIAGNOSIS — S22.41XD CLOSED FRACTURE OF MULTIPLE RIBS OF RIGHT SIDE WITH ROUTINE HEALING: ICD-10-CM

## 2024-06-04 DIAGNOSIS — R53.81 PHYSICAL DECONDITIONING: ICD-10-CM

## 2024-06-04 DIAGNOSIS — Z76.89 ENCOUNTER FOR SUPPORT AND COORDINATION OF TRANSITION OF CARE: Primary | ICD-10-CM

## 2024-06-04 DIAGNOSIS — Z78.0 ASYMPTOMATIC POSTMENOPAUSAL STATE: ICD-10-CM

## 2024-06-04 DIAGNOSIS — J94.2 HEMOPNEUMOTHORAX ON RIGHT: ICD-10-CM

## 2024-06-04 DIAGNOSIS — F41.9 ANXIETY: ICD-10-CM

## 2024-06-04 DIAGNOSIS — R63.0 ANOREXIA: ICD-10-CM

## 2024-06-04 DIAGNOSIS — R82.998 DARK URINE: ICD-10-CM

## 2024-06-04 DIAGNOSIS — R29.6 MULTIPLE FALLS: ICD-10-CM

## 2024-06-04 DIAGNOSIS — I50.32 CHRONIC DIASTOLIC CONGESTIVE HEART FAILURE (HCC): ICD-10-CM

## 2024-06-04 DIAGNOSIS — J96.11 CHRONIC HYPOXIC RESPIRATORY FAILURE (HCC): ICD-10-CM

## 2024-06-04 DIAGNOSIS — R19.7 BLOODY DIARRHEA: ICD-10-CM

## 2024-06-04 DIAGNOSIS — Z79.4 TYPE 2 DIABETES MELLITUS WITH HYPERGLYCEMIA, WITH LONG-TERM CURRENT USE OF INSULIN (HCC): ICD-10-CM

## 2024-06-04 PROBLEM — L97.921 TRAUMATIC ULCER OF LEFT LOWER LEG, LIMITED TO BREAKDOWN OF SKIN (HCC): Status: RESOLVED | Noted: 2023-09-13 | Resolved: 2024-06-04

## 2024-06-04 LAB — SL AMB POCT HEMOGLOBIN AIC: 7.9 (ref ?–6.5)

## 2024-06-04 PROCEDURE — 83036 HEMOGLOBIN GLYCOSYLATED A1C: CPT | Performed by: FAMILY MEDICINE

## 2024-06-04 PROCEDURE — 99495 TRANSJ CARE MGMT MOD F2F 14D: CPT | Performed by: FAMILY MEDICINE

## 2024-06-04 RX ORDER — MIRTAZAPINE 7.5 MG/1
7.5 TABLET, FILM COATED ORAL
Qty: 30 TABLET | Refills: 5 | Status: SHIPPED | OUTPATIENT
Start: 2024-06-04

## 2024-06-04 RX ORDER — CITALOPRAM 20 MG/1
10 TABLET ORAL DAILY
Start: 2024-06-04 | End: 2024-06-21

## 2024-06-04 NOTE — PROGRESS NOTES
Transition of Care Visit  Name: Lizzie Whitten      : 1940      MRN: [id number]  Encounter Provider: Kennedy Castellon MD  Encounter Date: 2024   Encounter department: [facility]    Assessment & Plan   1. Encounter for support and coordination of transition of care  2. Chronic diastolic congestive heart failure (HCC)  3. Chronic hypoxic respiratory failure (HCC)  4. Closed fracture of multiple ribs of right side with routine healing  5. Hemopneumothorax on right  6. Multiple falls  7. Type 2 diabetes mellitus with hyperglycemia, with long-term current use of insulin (HCC)  -     POCT hemoglobin A1c  8. Bloody diarrhea  -     CBC and differential; Future  -     Comprehensive metabolic panel; Future; Expected date: 2024  -     C-reactive protein; Future  9. Anorexia  -     mirtazapine (REMERON) 7.5 MG tablet; Take 1 tablet (7.5 mg total) by mouth daily at bedtime  10. Dark urine  -     UA w Reflex to Microscopic w Reflex to Culture; Future  11. Anxiety  -     citalopram (CeleXA) 20 mg tablet; Take 0.5 tablets (10 mg total) by mouth daily  12. Primary hypertension  13. Physical deconditioning  14. Asymptomatic postmenopausal state  -     DXA bone density spine hip and pelvis; Future; Expected date: 2024    Discussion:  I reviewed all with pt and family  -In regards to her first admission, patient appeared to be in diastolic heart failure with hypoxia.  She improved with diuresis.  Weight is being monitored.  Follow-up with cardiology.  Continue present medications in the interim.  - In regards to her second admission with 2 falls, one resulting in multiple right sided rib fractures with flail chest in hemopneumothorax, patient has been improving.  Breathing is now stable with O2 sat of 99% in office today on room air.  Will continue to monitor  - In regards to her physical deconditioning, patient is getting PT/OT at home.  She is walking ambulating short  distances with a walker.  Continue present care  - Regards to her anorexia, unclear cause.  Blood sugars seem to be stable.  Will continue present medications.  Check labs.  Recheck 4 weeks  - In regards to her dark urine, will check UA and culture.  Treat if evidence of infection is found.  -In regards to her hypertension, blood pressure appears to be stable.  No lightheadedness or other signs of hypotension were noted prior to falls.  Will continue present medications and monitor.  - In regards to her bloody diarrhea, history is unclear.  Abdominal exam was unremarkable.  If truly present, appears to be intermittent.  Patient has not noticed this herself but does have some underlying memory issues.  Will check labs.  Consider GI eval.  -In regards to her multiple rib fractures, will need DEXA scan to evaluate for osteoporosis.    Patient will follow-up as above.  Patient or family to call for any problems or concerns in the interim       History of Present Illness     Transitional Care Management Review:   Lizzie Whitten is a 84 y.o. female here for TCM follow up.     During the TCM phone call patient stated:  TCM Call     Date and time call was made  5/24/2024  8:33 AM    Hospital care reviewed  Records reviewed    Patient was hospitialized at  --  [facility]    Date of Admission  04/18/24    Date of discharge  05/23/24    Diagnosis  CHF    Disposition  Home    Were the patients medications reviewed and updated  Yes    Current Symptoms  None    Headache pain severity  Moderate    Headache pain onset  Gradual    Headache location / laterality  --  back of head    Headache Radition / Laterality  Not radiating    Parietal Area  Bilateral    Episode pattern  Intermittent    Headache pain level  5    Headache cause / trigger  No known event    Cause certainty  Probably      TCM Call     Modifying factors-feels better  Acetaminophen    Modifying factors-feels worse  Movement    Clinical progress  Unchanged     Headache risk factors  --  none    Headache-pertinent history  --  none    Headache- Family history  --  unknown    Post hospital issues  None    Should patient be enrolled in anticoag monitoring?  No    Scheduled for follow up?  Yes    Patients specialists  Other (comment)    Other specialists names  Gastroenterology    Referrals needed  none    Did you obtain your prescribed medications  Yes    Do you need help managing your prescriptions or medications  No    Is transportation to your appointment needed  No    I have advised the patient to call PCP with any new or worsening symptoms  K Leyda    Living Arrangements  Spouse or Significiant other    Support System  None    Are you recieving any outpatient services  No    Are you recieving home care services  Yes    Types of home care services  Home PT    Are you using any community resources  No    Current waiver services  No    Have you fallen in the last 12 months  Yes    How many times  1    Interperter language line needed  No    Counseling  Patient    Counseling topics  Activities of daily living        Pt here for TCM visit  - pt with complex recent hx.  Pt developed worsening fatigue and weakness for several days prior to admission.  Dtr noted that she seemed to worsen the day prior to admission. On 4/8, pt had a fall at home, and was taken to the ED where she was found to have increased edema, and decreased O2sat.  Pt was admitted and diuresed with some improved. Echo showed concentric hypertrophy with normal EF, and borderline increased RV pressure. CTA did not show evidence of PE though she had an incidental finding of a 4mm L lower lung nodule.  Pt was seen by PT/OT who recommended inpatient rehab though pt and family refused.   Pt was discharged to home on 4/12.    - on 4/13, pt suffered 2 falls at home, neither associated with CP, palpitations or lightheadedness. Pt taken to ED where CT of chest revealed multiple rib fractures with ribs 5-7 showing  "2 fractures resulting in flail chest, pneumomediastinum and R chest subcutaneous emphysema.  PT was admitted ti the ICU due to risk for deterioration.  Geriatric med and pain management were consulted.  Pt slowly improved though pain was difficult to control.  On 4/18 pt was able to be discahrged to rehab.  Pt underwent spinal block for pain control prior to discharge. .   - in rehab, pt required thoracentesis, removing 300cc of serosanguinous fluid. Pt improved and was able to be discharged to home on 5/22.  Pt here for TCM visit  - since discharge she has been experiencing bloody diarrhea intermittently. (Reported by her family, not by patient). Appetite is poor - she typically eats 25% or so of her meals.  Pt is using Glucerna supplements and  denies nausea/vomiting, abd pain or other symptoms. Chest wall tender but improved. Pt denies worsening SOB. She continues to undergo PT/OT at home.    - I reviewed available hospital records, lab results and study reports with pt and son in law      Review of Systems   Constitutional:  Positive for activity change, appetite change and fatigue. Negative for chills and fever.   HENT: Negative.     Eyes:  Positive for visual disturbance (chronic).   Respiratory:  Negative for choking and shortness of breath.    Cardiovascular:  Positive for chest pain (mild chest wall - improving) and leg swelling (slight). Negative for palpitations.   Gastrointestinal:  Negative for abdominal distention, abdominal pain, constipation, diarrhea, nausea and vomiting.   Genitourinary:  Positive for dysuria and flank pain. Negative for difficulty urinating.   Musculoskeletal:  Positive for arthralgias, gait problem and myalgias.   Skin: Negative.    Neurological:  Positive for weakness (in legs). Negative for dizziness, light-headedness, numbness and headaches.   Psychiatric/Behavioral: Negative.       Objective     /64   Pulse 59   Temp 97.5 °F (36.4 °C)   Ht 5' 1\" (1.549 m)   SpO2 99% "   BMI 32.88 kg/m²     Physical Exam  Vitals reviewed.   Constitutional:       Appearance: Normal appearance.   HENT:      Head: Normocephalic.      Nose: Nose normal.      Mouth/Throat:      Mouth: Mucous membranes are moist.   Eyes:      Extraocular Movements: Extraocular movements intact.      Conjunctiva/sclera: Conjunctivae normal.      Pupils: Pupils are equal, round, and reactive to light.      Comments: No significant conjunctival pallor   Cardiovascular:      Rate and Rhythm: Normal rate and regular rhythm.   Pulmonary:      Effort: Pulmonary effort is normal.      Breath sounds: No wheezing or rales.   Abdominal:      General: There is no distension.      Palpations: There is no mass.      Tenderness: There is no abdominal tenderness.   Musculoskeletal:         General: Tenderness (mild R chest wall discomfort to palpation. No crepitus) present.      Cervical back: No tenderness.      Right lower leg: No edema.      Left lower leg: No edema.   Lymphadenopathy:      Cervical: No cervical adenopathy.   Skin:     General: Skin is warm.      Capillary Refill: Capillary refill takes less than 2 seconds.   Neurological:      Mental Status: She is alert.      Cranial Nerves: No cranial nerve deficit.      Sensory: No sensory deficit.      Motor: Weakness (mild leg weakness bilat) present.      Gait: Gait abnormal (ambulates with cane).   Psychiatric:         Mood and Affect: Mood normal.      Comments: Pt happy to be home       Medications have been reviewed by provider in current encounter    Administrative Statements

## 2024-06-06 LAB
ALBUMIN SERPL-MCNC: 3.6 G/DL (ref 3.5–5.7)
ALP SERPL-CCNC: 73 U/L (ref 35–120)
ALT SERPL-CCNC: 8 U/L
ANION GAP SERPL CALCULATED.3IONS-SCNC: 11 MMOL/L (ref 3–11)
AST SERPL-CCNC: 16 U/L
BACTERIA URNS QL MICRO: ABNORMAL
BASOPHILS # BLD AUTO: 0.1 THOU/CMM (ref 0–0.1)
BASOPHILS NFR BLD AUTO: 1 %
BILIRUB SERPL-MCNC: 0.5 MG/DL (ref 0.2–1)
BUN SERPL-MCNC: 49 MG/DL (ref 7–25)
CALCIUM SERPL-MCNC: 9 MG/DL (ref 8.5–10.1)
CHLORIDE SERPL-SCNC: 104 MMOL/L (ref 100–109)
CO2 SERPL-SCNC: 26 MMOL/L (ref 21–31)
CREAT SERPL-MCNC: 1.61 MG/DL (ref 0.4–1.1)
CRP SERPL-MCNC: 4.3 MG/L
CYTOLOGY CMNT CVX/VAG CYTO-IMP: ABNORMAL
DIFFERENTIAL METHOD BLD: ABNORMAL
EOSINOPHIL # BLD AUTO: 0.2 THOU/CMM (ref 0–0.5)
EOSINOPHIL NFR BLD AUTO: 3 %
ERYTHROCYTE [DISTWIDTH] IN BLOOD BY AUTOMATED COUNT: 16.2 % (ref 12–16)
GFR/BSA.PRED SERPLBLD CYS-BASED-ARV: 32 ML/MIN/{1.73_M2}
GLUCOSE SERPL-MCNC: 66 MG/DL (ref 65–99)
GLUCOSE UR QL STRIP: NEGATIVE MG/DL
HCT VFR BLD AUTO: 36.5 % (ref 35–43)
HGB BLD-MCNC: 11.8 G/DL (ref 11.5–14.5)
HGB UR QL STRIP: ABNORMAL MG/DL
KETONES UR QL STRIP: NEGATIVE MG/DL
LEUKOCYTE ESTERASE UR QL STRIP: 500 /UL
LYMPHOCYTES # BLD AUTO: 1.6 THOU/CMM (ref 1–3)
LYMPHOCYTES NFR BLD AUTO: 24 %
MCH RBC QN AUTO: 30.6 PG (ref 26–34)
MCHC RBC AUTO-ENTMCNC: 32.4 G/DL (ref 32–37)
MCV RBC AUTO: 94 FL (ref 80–100)
MONOCYTES # BLD AUTO: 0.6 THOU/CMM (ref 0.3–1)
MONOCYTES NFR BLD AUTO: 9 %
MUCOUS THREADS URNS QL MICRO: ABNORMAL
NEUTROPHILS # BLD AUTO: 4.5 THOU/CMM (ref 1.8–7.8)
NEUTROPHILS NFR BLD AUTO: 63 %
NITRITE UR QL STRIP: NEGATIVE
PH UR: 5 [PH] (ref 4.5–8)
PLATELET # BLD AUTO: 179 THOU/CMM (ref 140–350)
PMV BLD REES-ECKER: 8.5 FL (ref 7.5–11.3)
POTASSIUM SERPL-SCNC: 4.6 MMOL/L (ref 3.5–5.2)
PROT 24H UR-MRATE: ABNORMAL MG/DL
PROT SERPL-MCNC: 5.9 G/DL (ref 6.3–8.3)
RBC # BLD AUTO: 3.87 MILL/CMM (ref 3.7–4.7)
RBC #/AREA URNS HPF: >100 /HPF (ref 0–2)
SL AMB POCT URINE COMMENT: ABNORMAL
SODIUM SERPL-SCNC: 141 MMOL/L (ref 135–145)
SP GR UR: 1.02 (ref 1–1.03)
SQUAMOUS #/AREA URNS HPF: >10 /LPF (ref 0–5)
TRANS CELLS #/AREA URNS HPF: ABNORMAL /LPF (ref 0–1)
WBC # BLD AUTO: 7 THOU/CMM (ref 4–10)
WBC #/AREA URNS HPF: >100 /HPF (ref 0–5)

## 2024-06-07 DIAGNOSIS — R31.29 MICROSCOPIC HEMATURIA: Primary | ICD-10-CM

## 2024-06-08 ENCOUNTER — APPOINTMENT (OUTPATIENT)
Dept: LAB | Age: 84
End: 2024-06-08
Payer: MEDICARE

## 2024-06-08 DIAGNOSIS — R19.7 BLOODY DIARRHEA: ICD-10-CM

## 2024-06-08 DIAGNOSIS — R82.998 DARK URINE: ICD-10-CM

## 2024-06-08 DIAGNOSIS — R31.29 MICROSCOPIC HEMATURIA: ICD-10-CM

## 2024-06-08 LAB
ALBUMIN SERPL BCP-MCNC: 3.7 G/DL (ref 3.5–5)
ALP SERPL-CCNC: 76 U/L (ref 34–104)
ALT SERPL W P-5'-P-CCNC: 10 U/L (ref 7–52)
ANION GAP SERPL CALCULATED.3IONS-SCNC: 9 MMOL/L (ref 4–13)
AST SERPL W P-5'-P-CCNC: 17 U/L (ref 13–39)
BASOPHILS # BLD AUTO: 0.07 THOUSANDS/ÂΜL (ref 0–0.1)
BASOPHILS NFR BLD AUTO: 1 % (ref 0–1)
BILIRUB SERPL-MCNC: 0.5 MG/DL (ref 0.2–1)
BUN SERPL-MCNC: 44 MG/DL (ref 5–25)
CALCIUM SERPL-MCNC: 9.3 MG/DL (ref 8.4–10.2)
CHLORIDE SERPL-SCNC: 103 MMOL/L (ref 96–108)
CO2 SERPL-SCNC: 29 MMOL/L (ref 21–32)
CREAT SERPL-MCNC: 1.36 MG/DL (ref 0.6–1.3)
CRP SERPL QL: 3.3 MG/L
EOSINOPHIL # BLD AUTO: 0.3 THOUSAND/ÂΜL (ref 0–0.61)
EOSINOPHIL NFR BLD AUTO: 4 % (ref 0–6)
ERYTHROCYTE [DISTWIDTH] IN BLOOD BY AUTOMATED COUNT: 14.8 % (ref 11.6–15.1)
GFR SERPL CREATININE-BSD FRML MDRD: 36 ML/MIN/1.73SQ M
GLUCOSE SERPL-MCNC: 100 MG/DL (ref 65–140)
HCT VFR BLD AUTO: 40.8 % (ref 34.8–46.1)
HGB BLD-MCNC: 12.2 G/DL (ref 11.5–15.4)
IMM GRANULOCYTES # BLD AUTO: 0.04 THOUSAND/UL (ref 0–0.2)
IMM GRANULOCYTES NFR BLD AUTO: 1 % (ref 0–2)
LEGIONELLA SPEC CULT: NORMAL
LYMPHOCYTES # BLD AUTO: 1.61 THOUSANDS/ÂΜL (ref 0.6–4.47)
LYMPHOCYTES NFR BLD AUTO: 23 % (ref 14–44)
MAGNESIUM SERPL-MCNC: 1.4 MG/DL (ref 1.9–2.7)
MCH RBC QN AUTO: 30.3 PG (ref 26.8–34.3)
MCHC RBC AUTO-ENTMCNC: 29.9 G/DL (ref 31.4–37.4)
MCV RBC AUTO: 101 FL (ref 82–98)
MONOCYTES # BLD AUTO: 0.63 THOUSAND/ÂΜL (ref 0.17–1.22)
MONOCYTES NFR BLD AUTO: 9 % (ref 4–12)
NEUTROPHILS # BLD AUTO: 4.49 THOUSANDS/ÂΜL (ref 1.85–7.62)
NEUTS SEG NFR BLD AUTO: 62 % (ref 43–75)
NRBC BLD AUTO-RTO: 0 /100 WBCS
PHOSPHATE SERPL-MCNC: 3.5 MG/DL (ref 2.3–4.1)
PLATELET # BLD AUTO: 176 THOUSANDS/UL (ref 149–390)
PMV BLD AUTO: 10.3 FL (ref 8.9–12.7)
POTASSIUM SERPL-SCNC: 5.3 MMOL/L (ref 3.5–5.3)
PROT SERPL-MCNC: 6.3 G/DL (ref 6.4–8.4)
PTH-INTACT SERPL-MCNC: 55.4 PG/ML (ref 12–88)
RBC # BLD AUTO: 4.03 MILLION/UL (ref 3.81–5.12)
SODIUM SERPL-SCNC: 141 MMOL/L (ref 135–147)
WBC # BLD AUTO: 7.14 THOUSAND/UL (ref 4.31–10.16)

## 2024-06-08 PROCEDURE — 86140 C-REACTIVE PROTEIN: CPT

## 2024-06-08 PROCEDURE — 85025 COMPLETE CBC W/AUTO DIFF WBC: CPT

## 2024-06-08 PROCEDURE — 80053 COMPREHEN METABOLIC PANEL: CPT

## 2024-06-11 DIAGNOSIS — N30.00 ACUTE CYSTITIS WITHOUT HEMATURIA: Primary | ICD-10-CM

## 2024-06-11 RX ORDER — CIPROFLOXACIN 250 MG/1
250 TABLET, FILM COATED ORAL EVERY 12 HOURS SCHEDULED
Qty: 10 TABLET | Refills: 0 | Status: SHIPPED | OUTPATIENT
Start: 2024-06-11 | End: 2024-06-13 | Stop reason: SDUPTHER

## 2024-06-13 ENCOUNTER — TELEPHONE (OUTPATIENT)
Age: 84
End: 2024-06-13

## 2024-06-13 DIAGNOSIS — N30.00 ACUTE CYSTITIS WITHOUT HEMATURIA: ICD-10-CM

## 2024-06-13 RX ORDER — CIPROFLOXACIN 250 MG/1
250 TABLET, FILM COATED ORAL EVERY 12 HOURS SCHEDULED
Qty: 10 TABLET | Refills: 0 | Status: SHIPPED | OUTPATIENT
Start: 2024-06-13 | End: 2024-06-18

## 2024-06-13 NOTE — TELEPHONE ENCOUNTER
Nurse called, antibiotic was sent to mail order.  Can it be re-sent to [facility] ASAP.  Nurse would like it put on her file, any medication that she needs ASAP should go to local pharmacy not mail order.

## 2024-06-14 ENCOUNTER — TELEPHONE (OUTPATIENT)
Dept: NEPHROLOGY | Facility: CLINIC | Age: 84
End: 2024-06-14

## 2024-06-14 NOTE — TELEPHONE ENCOUNTER
Message left on patient's VM that CR stable.  Mag low.  Per Cami, to start Slow Mag  OTC one daily.    ----- Message from Cami Peck PA-C sent at 6/11/2024 10:32 AM EDT -----  Mag low.  Recommend starting daily slo mag.  Creatinine stable.

## 2024-06-19 PROBLEM — H10.12 ALLERGIC CONJUNCTIVITIS OF LEFT EYE: Status: RESOLVED | Noted: 2024-05-20 | Resolved: 2024-06-19

## 2024-06-21 DIAGNOSIS — I10 PRIMARY HYPERTENSION: ICD-10-CM

## 2024-06-21 DIAGNOSIS — G89.11 ACUTE PAIN DUE TO TRAUMA: ICD-10-CM

## 2024-06-21 DIAGNOSIS — R29.6 MULTIPLE FALLS: ICD-10-CM

## 2024-06-21 DIAGNOSIS — E55.9 VITAMIN D DEFICIENCY: Primary | ICD-10-CM

## 2024-06-21 DIAGNOSIS — S22.41XA CLOSED FRACTURE OF MULTIPLE RIBS OF RIGHT SIDE, INITIAL ENCOUNTER: ICD-10-CM

## 2024-06-21 DIAGNOSIS — E78.00 HYPERCHOLESTEROLEMIA: ICD-10-CM

## 2024-06-21 DIAGNOSIS — J94.2 HEMOPNEUMOTHORAX ON RIGHT: ICD-10-CM

## 2024-06-21 DIAGNOSIS — E03.9 ACQUIRED HYPOTHYROIDISM: Chronic | ICD-10-CM

## 2024-06-21 DIAGNOSIS — F41.9 ANXIETY: ICD-10-CM

## 2024-06-21 RX ORDER — ATORVASTATIN CALCIUM 80 MG/1
80 TABLET, FILM COATED ORAL EVERY EVENING
Qty: 30 TABLET | Refills: 5 | Status: SHIPPED | OUTPATIENT
Start: 2024-06-21

## 2024-06-21 RX ORDER — CITALOPRAM 20 MG/1
20 TABLET ORAL DAILY
Qty: 30 TABLET | Refills: 5 | Status: SHIPPED | OUTPATIENT
Start: 2024-06-21

## 2024-06-21 RX ORDER — LEVOTHYROXINE SODIUM 0.15 MG/1
150 TABLET ORAL DAILY
Qty: 30 TABLET | Refills: 5 | Status: SHIPPED | OUTPATIENT
Start: 2024-06-21

## 2024-06-21 RX ORDER — GABAPENTIN 100 MG/1
100 CAPSULE ORAL
Qty: 30 CAPSULE | Refills: 1 | Status: SHIPPED | OUTPATIENT
Start: 2024-06-21

## 2024-06-24 RX ORDER — POTASSIUM CHLORIDE 20 MEQ/1
20 TABLET, EXTENDED RELEASE ORAL DAILY
Qty: 30 TABLET | Refills: 5 | Status: SHIPPED | OUTPATIENT
Start: 2024-06-24

## 2024-06-24 RX ORDER — CALCIUM CARBONATE 500(1250)
1 TABLET ORAL DAILY
Qty: 30 TABLET | Refills: 5 | Status: SHIPPED | OUTPATIENT
Start: 2024-06-24

## 2024-07-01 DIAGNOSIS — G89.11 ACUTE PAIN DUE TO TRAUMA: ICD-10-CM

## 2024-07-01 DIAGNOSIS — J94.2 HEMOPNEUMOTHORAX ON RIGHT: ICD-10-CM

## 2024-07-01 DIAGNOSIS — R29.6 MULTIPLE FALLS: ICD-10-CM

## 2024-07-01 DIAGNOSIS — S22.41XA CLOSED FRACTURE OF MULTIPLE RIBS OF RIGHT SIDE, INITIAL ENCOUNTER: ICD-10-CM

## 2024-07-02 RX ORDER — ACETAMINOPHEN 325 MG/1
975 TABLET ORAL 3 TIMES DAILY
Qty: 180 TABLET | Refills: 1 | Status: SHIPPED | OUTPATIENT
Start: 2024-07-02

## 2024-07-05 ENCOUNTER — IN-CLINIC DEVICE VISIT (OUTPATIENT)
Dept: CARDIOLOGY CLINIC | Facility: CLINIC | Age: 84
End: 2024-07-05
Payer: MEDICARE

## 2024-07-05 DIAGNOSIS — E78.00 HYPERCHOLESTEROLEMIA: Primary | ICD-10-CM

## 2024-07-05 DIAGNOSIS — I44.2 COMPLETE HEART BLOCK (HCC): ICD-10-CM

## 2024-07-05 DIAGNOSIS — Z95.0 PRESENCE OF CARDIAC PACEMAKER: Primary | ICD-10-CM

## 2024-07-05 PROCEDURE — 93280 PM DEVICE PROGR EVAL DUAL: CPT | Performed by: INTERNAL MEDICINE

## 2024-07-05 RX ORDER — AMOXICILLIN 500 MG
1 CAPSULE ORAL 2 TIMES DAILY
Qty: 90 CAPSULE | Refills: 0 | Status: SHIPPED | OUTPATIENT
Start: 2024-07-05

## 2024-07-05 NOTE — PROGRESS NOTES
Results for orders placed or performed in visit on 07/05/24   Cardiac EP device report    Narrative    MDT-DUAL CHAMBER PPM (DDDR MODE)/ ACTIVE SYSTEM IS MRI CONDITIONAL  DEVICE INTERROGATED IN THE [facility] OFFICE. BATTERY VOLTAGE ADEQUATE (13 MTHS). AP 48.4%  99.7%. ALL LEAD PARAMETERS WITHIN NORMAL LIMITS. NO SIGNIFICANT HIGH RATE EPISODES. 8 AT AF EPISODES, LONGEST DURATION >23:59:59. PT TAKES ELIQUIS, METOPROLOL SUCC.  EF 65% (ECHO 4/9/2024).  AT AF BURDEN 3.5%. NO PROGRAMMING CHANGES MADE TO DEVICE PARAMETERS. NORMAL DEVICE FUNCTION. PAS/NC

## 2024-07-08 ENCOUNTER — TELEPHONE (OUTPATIENT)
Age: 84
End: 2024-07-08

## 2024-07-08 NOTE — TELEPHONE ENCOUNTER
Spoke with Guillermina from [facility]- understands need for O2 sats at rest and with ambulation off of O2. States that pts O2 sat when resting off oxygen was 98 at last visit. Guillermina sees the pt next week and will provide more data then.

## 2024-07-08 NOTE — TELEPHONE ENCOUNTER
Guillermina with [facility] called patient is doing well, vitals are all good and would like to start patient on PT but patient does not want oxygen anymore. Feels is doing well and has never had oxygen before. Guillermina would like to know if can have a verbal for patient to have oxygen in home as needed. Guillermina can be reached at [phone]

## 2024-07-24 DIAGNOSIS — I50.33 ACUTE ON CHRONIC DIASTOLIC CONGESTIVE HEART FAILURE (HCC): ICD-10-CM

## 2024-07-24 DIAGNOSIS — I48.0 PAF (PAROXYSMAL ATRIAL FIBRILLATION) (HCC): ICD-10-CM

## 2024-07-25 RX ORDER — APIXABAN 5 MG/1
5 TABLET, FILM COATED ORAL 2 TIMES DAILY
Qty: 60 TABLET | Refills: 2 | Status: SHIPPED | OUTPATIENT
Start: 2024-07-25

## 2024-07-25 RX ORDER — FUROSEMIDE 40 MG/1
40 TABLET ORAL DAILY
Qty: 30 TABLET | Refills: 5 | Status: SHIPPED | OUTPATIENT
Start: 2024-07-25

## 2024-07-25 RX ORDER — PANTOPRAZOLE SODIUM 40 MG/1
40 TABLET, DELAYED RELEASE ORAL DAILY
Qty: 30 TABLET | Refills: 0 | Status: SHIPPED | OUTPATIENT
Start: 2024-07-25

## 2024-08-01 ENCOUNTER — OFFICE VISIT (OUTPATIENT)
Dept: FAMILY MEDICINE CLINIC | Facility: CLINIC | Age: 84
End: 2024-08-01
Payer: MEDICARE

## 2024-08-01 VITALS
WEIGHT: 177.2 LBS | HEART RATE: 60 BPM | HEIGHT: 61 IN | SYSTOLIC BLOOD PRESSURE: 126 MMHG | DIASTOLIC BLOOD PRESSURE: 80 MMHG | TEMPERATURE: 96.5 F | OXYGEN SATURATION: 98 % | BODY MASS INDEX: 33.46 KG/M2

## 2024-08-01 DIAGNOSIS — I10 PRIMARY HYPERTENSION: ICD-10-CM

## 2024-08-01 DIAGNOSIS — Z00.00 MEDICARE ANNUAL WELLNESS VISIT, SUBSEQUENT: Primary | ICD-10-CM

## 2024-08-01 DIAGNOSIS — I48.0 PAF (PAROXYSMAL ATRIAL FIBRILLATION) (HCC): ICD-10-CM

## 2024-08-01 DIAGNOSIS — E11.65 TYPE 2 DIABETES MELLITUS WITH HYPERGLYCEMIA, WITH LONG-TERM CURRENT USE OF INSULIN (HCC): ICD-10-CM

## 2024-08-01 DIAGNOSIS — E03.8 OTHER SPECIFIED HYPOTHYROIDISM: Chronic | ICD-10-CM

## 2024-08-01 DIAGNOSIS — Z78.0 ASYMPTOMATIC POSTMENOPAUSAL STATE: ICD-10-CM

## 2024-08-01 DIAGNOSIS — M54.9 MID BACK PAIN: ICD-10-CM

## 2024-08-01 DIAGNOSIS — I25.10 CORONARY ARTERY DISEASE INVOLVING NATIVE CORONARY ARTERY OF NATIVE HEART WITHOUT ANGINA PECTORIS: ICD-10-CM

## 2024-08-01 DIAGNOSIS — E11.3393 MODERATE NONPROLIFERATIVE DIABETIC RETINOPATHY OF BOTH EYES ASSOCIATED WITH TYPE 2 DIABETES MELLITUS, MACULAR EDEMA PRESENCE UNSPECIFIED (HCC): ICD-10-CM

## 2024-08-01 DIAGNOSIS — Z79.4 TYPE 2 DIABETES MELLITUS WITH HYPERGLYCEMIA, WITH LONG-TERM CURRENT USE OF INSULIN (HCC): ICD-10-CM

## 2024-08-01 DIAGNOSIS — J44.9 CHRONIC OBSTRUCTIVE PULMONARY DISEASE, UNSPECIFIED COPD TYPE (HCC): ICD-10-CM

## 2024-08-01 PROCEDURE — G0442 ANNUAL ALCOHOL SCREEN 15 MIN: HCPCS | Performed by: FAMILY MEDICINE

## 2024-08-01 PROCEDURE — 99214 OFFICE O/P EST MOD 30 MIN: CPT | Performed by: FAMILY MEDICINE

## 2024-08-01 PROCEDURE — G0439 PPPS, SUBSEQ VISIT: HCPCS | Performed by: FAMILY MEDICINE

## 2024-08-01 PROCEDURE — G0444 DEPRESSION SCREEN ANNUAL: HCPCS | Performed by: FAMILY MEDICINE

## 2024-08-01 RX ORDER — LIDOCAINE 50 MG/G
1 PATCH TOPICAL DAILY
Qty: 30 PATCH | Refills: 5 | Status: SHIPPED | OUTPATIENT
Start: 2024-08-01

## 2024-08-01 NOTE — PROGRESS NOTES
Ambulatory Visit  Name: Lizzie Whitten      : 1940      MRN: [id number]  Encounter Provider: Kennedy Castellon MD  Encounter Date: 2024   Encounter department: [facility]    Assessment & Plan   1. Medicare annual wellness visit, subsequent  2. Coronary artery disease involving native coronary artery of native heart without angina pectoris  Assessment & Plan:  Pt asymptomatic. Continue present care. Monitor labs. Recheck 3m  Orders:  -     CBC and differential; Future  -     Comprehensive metabolic panel; Future  -     Lipid panel; Future  3. Primary hypertension  Assessment & Plan:  Well controlled. Cont present treatment. Monitor labs. Recheck 6m    Orders:  -     CBC and differential; Future  -     Comprehensive metabolic panel; Future  -     Lipid panel; Future  4. PAF (paroxysmal atrial fibrillation) (formerly [facility])  Assessment & Plan:  Pt in reg rhythm today. Continue present care. Recheck 3m  Orders:  -     TSH, 3rd generation; Future  5. Chronic obstructive pulmonary disease, unspecified COPD type (formerly [facility])  Assessment & Plan:  Breathing stable on O2. Will work on weaning off. Will try holding O2 while sitting at rest. Continue O2 with ambulation and while sleeping. Recheck 3m  Orders:  -     XR chest pa & lateral; Future; Expected date: 2024  6. Other specified hypothyroidism  Assessment & Plan:  Appears to be stable clinically. Check TSH. Adjust meds if TSH is not at goal. Recheck 6m    7. 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 Pt continues to f/u with ophth. Continue present care. Recheck 3m  8. Type 2 diabetes mellitus with hyperglycemia, with long-term current use of insulin (formerly [facility])  Assessment & Plan:    Lab Results   Component Value Date    HGBA1C 7.9 (A)  06/04/2024   I reviewed with pt and family. Appetite has improved. Continue to monitor labs. Recheck A1C in Sept  Orders:  -     Hemoglobin A1C; Future  9. Mid back pain  Assessment & Plan:  Musculoskeletal. Trial of lidoderm patch.  Recheck 1-2w if not improving - earlier if worse  Orders:  -     lidocaine (Lidoderm) 5 %; Apply 1 patch topically over 12 hours daily Remove & Discard patch within 12 hours or as directed by MD  10. Asymptomatic postmenopausal state  -     DXA bone density spine hip and pelvis; Future; Expected date: 08/01/2024     Preventive health issues were discussed with patient, and age appropriate screening tests were ordered as noted in patient's After Visit Summary. Personalized health advice and appropriate referrals for health education or preventive services given if needed, as noted in patient's After Visit Summary.    History of Present Illness     f/u multiple med issues  - since last visit, pt has not had any more falls. No longer has any rib cage pains. Still using O2.   - has occasional middle back pain. Would like a script for lidoderm  - still has occasional episode of diarrhea but no bloody Bms. Takes imodium occasioanlly.   - appetite is sl labile. Eating a little better than at discharge, but not back to baseline.  Weight is stable. Does drink Glucerna  - denies CP, palpitations, lightheadedness or other CV symptoms with or without exertion  - AWV done       Patient Care Team:  Kennedy Castellon MD as PCP - General  MD Jhon Lopez MD Sudip Nanda, MD Calogero Dimaggio, DO Melinda McLane, CRNP Francis Burt, MD Mrunalini Deshmukh, MD Christopher Pogodzinski, MD Brett Gibson, MD Eugene Saravitz, MD Sandra Stubblefield, CRNP as Nurse Practitioner (Endocrinology)  Joseph Esparza MD as Consulting Physician (Nephrology)    Review of Systems   Constitutional:  Positive for activity change, appetite change (improved but not back to  baseline) and fatigue. Negative for chills and fever.   HENT: Negative.     Eyes:  Positive for visual disturbance (chronic - unchanged).   Respiratory:  Negative for choking and shortness of breath.    Cardiovascular:  Negative for chest pain (chest wall resolved) and palpitations.   Gastrointestinal:  Positive for diarrhea (occasional). Negative for abdominal distention, abdominal pain, constipation, nausea and vomiting.   Genitourinary:  Negative for difficulty urinating, dysuria and flank pain.   Musculoskeletal:  Positive for arthralgias, gait problem and myalgias.   Skin: Negative.    Neurological:  Positive for weakness (in legs). Negative for dizziness, light-headedness, numbness and headaches.   Psychiatric/Behavioral: Negative.       Medical History Reviewed by provider this encounter:  Tobacco  Allergies  Meds  Problems  Med Hx  Surg Hx  Fam Hx       Annual Wellness Visit Questionnaire   Lizzie is here for her Subsequent Wellness visit. Last Medicare Wellness visit information reviewed, patient interviewed and updates made to the record today.      Health Risk Assessment:   Patient rates overall health as fair. Patient feels that their physical health rating is slightly worse. Patient is satisfied with their life. Eyesight was rated as much worse. Hearing was rated as much worse. Patient feels that their emotional and mental health rating is same. Patients states they are never, rarely angry. Patient states they are never, rarely unusually tired/fatigued. Pain experienced in the last 7 days has been some. Patient's pain rating has been 2/10. Patient states that she has experienced no weight loss or gain in last 6 months.     Depression Screening:   PHQ-2 Score: 0      Fall Risk Screening:   In the past year, patient has experienced: history of falling in past year    Number of falls: 2 or more  Injured during fall?: Yes    Feels unsteady when standing or walking?: Yes    Worried about falling?:  Yes      Urinary Incontinence Screening:   Patient has leaked urine accidently in the last six months.     Home Safety:  Patient has trouble with stairs inside or outside of their home. Patient has working smoke alarms and has working carbon monoxide detector. Home safety hazards include: none.     Nutrition:   Current diet is Regular.     Medications:   Patient is currently taking over-the-counter supplements. OTC medications include: see medication list. Patient is not able to manage medications.     Activities of Daily Living (ADLs)/Instrumental Activities of Daily Living (IADLs):   Walk and transfer into and out of bed and chair?: No  Dress and groom yourself?: No    Bathe or shower yourself?: No    Feed yourself? Yes  Do your laundry/housekeeping?: No  Manage your money, pay your bills and track your expenses?: No  Make your own meals?: No    Do your own shopping?: No    Previous Hospitalizations:   Any hospitalizations or ED visits within the last 12 months?: Yes    How many hospitalizations have you had in the last year?: more than 4    Advance Care Planning:   Living will: Yes    Durable POA for healthcare: Yes    Advanced directive: Yes    Advanced directive counseling given: Yes      Cognitive Screening:   Provider or family/friend/caregiver concerned regarding cognition?: No    PREVENTIVE SCREENINGS      Cardiovascular Screening:    General: Screening Not Indicated and History Lipid Disorder      Diabetes Screening:     General: Screening Not Indicated and History Diabetes      Colorectal Cancer Screening:     General: Screening Not Indicated      Breast Cancer Screening:     General: Screening Current      Cervical Cancer Screening:    General: Screening Not Indicated      Osteoporosis Screening:    General: Risks and Benefits Discussed      Abdominal Aortic Aneurysm (AAA) Screening:        General: Screening Not Indicated      Lung Cancer Screening:     General: Screening Not Indicated      Hepatitis C  Screening:    General: Screening Current    Screening, Brief Intervention, and Referral to Treatment (SBIRT)    Screening      AUDIT-C Screenin) How often did you have a drink containing alcohol in the past year? never  2) How many drinks did you have on a typical day when you were drinking in the past year? 0  3) How often did you have 6 or more drinks on one occasion in the past year? never    AUDIT-C Score: 0  Interpretation: Score 0-2 (female): Negative screen for alcohol misuse    Single Item Drug Screening:  How often have you used an illegal drug (including marijuana) or a prescription medication for non-medical reasons in the past year? never    Single Item Drug Screen Score: 0  Interpretation: Negative screen for possible drug use disorder    Time Spent  Time spent screening/evaluating the patient for alcohol misuse: 5 minutes.     Annual Depression Screening  Time spent screening and evaluating the patient for depression during today's encounter was 5 minutes.    Other Counseling Topics:   Calcium and vitamin D intake and regular weightbearing exercise.     Social Determinants of Health     Financial Resource Strain: Low Risk  (2023)    Overall Financial Resource Strain (CARDIA)    • Difficulty of Paying Living Expenses: Not hard at all   Food Insecurity: No Food Insecurity (2024)    Hunger Vital Sign    • Worried About Running Out of Food in the Last Year: Never true    • Ran Out of Food in the Last Year: Never true   Transportation Needs: No Transportation Needs (2024)    PRAPARE - Transportation    • Lack of Transportation (Medical): No    • Lack of Transportation (Non-Medical): No   Housing Stability: Low Risk  (2024)    Housing Stability Vital Sign    • Unable to Pay for Housing in the Last Year: No    • Number of Times Moved in the Last Year: 1    • Homeless in the Last Year: No   Utilities: Not At Risk (2024)    [facility] Utilities    • Threatened with loss of utilities: No     No  "results found.    Objective     /80 (BP Location: Left arm, Patient Position: Sitting, Cuff Size: Adult)   Pulse 60   Temp (!) 96.5 °F (35.8 °C)   Ht 5' 1\" (1.549 m)   Wt 80.4 kg (177 lb 3.2 oz)   SpO2 98%   BMI 33.48 kg/m²     Physical Exam  Vitals reviewed.   Constitutional:       Comments: Pt sitting in wheelchair, on 2L O2 via NC, in NAD   HENT:      Head: Normocephalic.      Nose: Nose normal.      Mouth/Throat:      Mouth: Mucous membranes are moist.   Eyes:      Extraocular Movements: Extraocular movements intact.      Conjunctiva/sclera: Conjunctivae normal.      Pupils: Pupils are equal, round, and reactive to light.   Cardiovascular:      Rate and Rhythm: Normal rate and regular rhythm.   Pulmonary:      Effort: Pulmonary effort is normal.      Breath sounds: No wheezing or rales.      Comments: O2 sat 98% on 2L O2 via NC  Abdominal:      General: There is no distension.      Palpations: There is no mass.      Tenderness: There is no abdominal tenderness.   Musculoskeletal:         General: Deformity (diffuse OA changes) present. No swelling or tenderness (mild low thoracic-upper lumbar spine and paraspinal muscles). Normal range of motion.      Cervical back: No tenderness.      Right lower leg: No edema.      Left lower leg: No edema.   Lymphadenopathy:      Cervical: No cervical adenopathy.   Skin:     General: Skin is warm.      Capillary Refill: Capillary refill takes less than 2 seconds.   Neurological:      Mental Status: She is alert.      Sensory: No sensory deficit.      Motor: Weakness (mild in legs) present.      Gait: Gait abnormal (pt in wheelchair).      Deep Tendon Reflexes: Reflexes normal.   Psychiatric:         Mood and Affect: Mood normal.           "

## 2024-08-05 PROBLEM — M54.9 MID BACK PAIN: Status: ACTIVE | Noted: 2024-08-05

## 2024-08-05 NOTE — ASSESSMENT & PLAN NOTE
Lab Results   Component Value Date    HGBA1C 7.9 (A) 06/04/2024   A1C sl high. Pt continues to f/u with ophth. Continue present care. Recheck 3m

## 2024-08-05 NOTE — ASSESSMENT & PLAN NOTE
Lab Results   Component Value Date    HGBA1C 7.9 (A) 06/04/2024   I reviewed with pt and family. Appetite has improved. Continue to monitor labs. Recheck A1C in Sept

## 2024-08-05 NOTE — ASSESSMENT & PLAN NOTE
Breathing stable on O2. Will work on weaning off. Will try holding O2 while sitting at rest. Continue O2 with ambulation and while sleeping. Recheck 3m

## 2024-08-06 ENCOUNTER — TELEPHONE (OUTPATIENT)
Age: 84
End: 2024-08-06

## 2024-08-06 NOTE — TELEPHONE ENCOUNTER
Yes. They were faxed today. We explained to the facility multiple times that the forms do not always get faxed the same day. I also have different ones I received yesterday if she questions those.

## 2024-08-06 NOTE — TELEPHONE ENCOUNTER
[facility]  They are refaxing it today.  Please check [employer].  (They stated will have a big barcode on top of form)    Thank you.  Please fax back.  Any questions, please call  [phone]

## 2024-08-13 ENCOUNTER — TELEPHONE (OUTPATIENT)
Age: 84
End: 2024-08-13

## 2024-08-15 ENCOUNTER — TELEPHONE (OUTPATIENT)
Age: 84
End: 2024-08-15

## 2024-08-15 DIAGNOSIS — S22.41XA CLOSED FRACTURE OF MULTIPLE RIBS OF RIGHT SIDE, INITIAL ENCOUNTER: ICD-10-CM

## 2024-08-15 DIAGNOSIS — R29.6 MULTIPLE FALLS: ICD-10-CM

## 2024-08-15 DIAGNOSIS — G89.11 ACUTE PAIN DUE TO TRAUMA: ICD-10-CM

## 2024-08-15 DIAGNOSIS — J94.2 HEMOPNEUMOTHORAX ON RIGHT: ICD-10-CM

## 2024-08-15 NOTE — TELEPHONE ENCOUNTER
Hola from [facility] called.  He prepared the blister packs for pt and sent them to [facility], but they need an order for pt's Insulin.  Please fax to [phone].

## 2024-08-16 RX ORDER — GABAPENTIN 100 MG/1
100 CAPSULE ORAL
Qty: 30 CAPSULE | Refills: 5 | Status: SHIPPED | OUTPATIENT
Start: 2024-08-16

## 2024-08-16 NOTE — TELEPHONE ENCOUNTER
Called [facility]. They are speaking with the clinical team to confirm insulin usage and dosage. She will call back.

## 2024-08-16 NOTE — TELEPHONE ENCOUNTER
We were aware the daughter was unsure of her meds which is why I called the facility directly as the provider recommended and asked us to do. I explained to the facility that we need this info from them because they have health care members that do work with coordinating this info. The facility is not available and we will not have this info til Monday

## 2024-08-16 NOTE — TELEPHONE ENCOUNTER
Pts daughter Nidia called back to say the pt is taking 4 units TID with meals and she is also gets 16 units at bedtime. She is not sure what the name of the insulin is as she is not with her mother now.  Please advise.

## 2024-08-16 NOTE — TELEPHONE ENCOUNTER
Patient daughter called, she stated patient gets insulin 3 to 4 times per day and also HS.  She did not know dosage.  I explained to her that we do need the dose she is getting to be able to order insulin, she stated she would talk to [facility] again and have them get office that info.

## 2024-08-19 DIAGNOSIS — J94.2 HEMOPNEUMOTHORAX ON RIGHT: ICD-10-CM

## 2024-08-19 DIAGNOSIS — E11.65 TYPE 2 DIABETES MELLITUS WITH HYPERGLYCEMIA, WITH LONG-TERM CURRENT USE OF INSULIN (HCC): Primary | ICD-10-CM

## 2024-08-19 DIAGNOSIS — S22.41XA CLOSED FRACTURE OF MULTIPLE RIBS OF RIGHT SIDE, INITIAL ENCOUNTER: ICD-10-CM

## 2024-08-19 DIAGNOSIS — Z79.4 TYPE 2 DIABETES MELLITUS WITH HYPERGLYCEMIA, WITH LONG-TERM CURRENT USE OF INSULIN (HCC): Primary | ICD-10-CM

## 2024-08-19 DIAGNOSIS — G89.11 ACUTE PAIN DUE TO TRAUMA: ICD-10-CM

## 2024-08-19 DIAGNOSIS — R29.6 MULTIPLE FALLS: ICD-10-CM

## 2024-08-19 RX ORDER — INSULIN GLARGINE 300 U/ML
16 INJECTION, SOLUTION SUBCUTANEOUS
Qty: 15 ML | Refills: 1 | Status: SHIPPED | OUTPATIENT
Start: 2024-08-19

## 2024-08-19 RX ORDER — INSULIN LISPRO 100 [IU]/ML
3 INJECTION, SOLUTION INTRAVENOUS; SUBCUTANEOUS
Qty: 9 ML | Refills: 1 | Status: SHIPPED | OUTPATIENT
Start: 2024-08-19

## 2024-08-19 NOTE — TELEPHONE ENCOUNTER
Received call from patient's daughter with insulin patient is taking currently:  Insulin lispor kwik pen 3 units with meals  Trujeo insulin solostar insulin glargine 16 units at bedtime  Please delete order for Tylenol from pill packs; used post rib fractures 4x per day; no longer taking.   Please follow up with Nidia if any additional questions/concerns. Please send orders to pharmacy; they are holding up all patient medications until they receive insulin orders.

## 2024-08-19 NOTE — TELEPHONE ENCOUNTER
Soft transfer to office clinical    Spoke to daughter she will be with her sister and mom today.   We need to know-  Insulin names  Insulin doses  Frequency

## 2024-08-23 DIAGNOSIS — I50.33 ACUTE ON CHRONIC DIASTOLIC CONGESTIVE HEART FAILURE (HCC): ICD-10-CM

## 2024-08-23 RX ORDER — PANTOPRAZOLE SODIUM 40 MG/1
40 TABLET, DELAYED RELEASE ORAL DAILY
Qty: 90 TABLET | Refills: 1 | Status: SHIPPED | OUTPATIENT
Start: 2024-08-23

## 2024-08-23 RX ORDER — METOPROLOL TARTRATE 25 MG/1
25 TABLET, FILM COATED ORAL 2 TIMES DAILY
Qty: 90 TABLET | Refills: 1 | Status: SHIPPED | OUTPATIENT
Start: 2024-08-23

## 2024-09-18 ENCOUNTER — HOSPITAL ENCOUNTER (OUTPATIENT)
Dept: RADIOLOGY | Facility: MEDICAL CENTER | Age: 84
Discharge: HOME | End: 2024-09-18
Payer: MEDICARE

## 2024-09-18 DIAGNOSIS — Z13.820 SCREENING FOR OSTEOPOROSIS: ICD-10-CM

## 2024-09-18 DIAGNOSIS — Z78.0 ASYMPTOMATIC POSTMENOPAUSAL STATE: ICD-10-CM

## 2024-09-18 PROCEDURE — 77080 DXA BONE DENSITY AXIAL: CPT

## 2024-09-23 ENCOUNTER — TELEPHONE (OUTPATIENT)
Dept: FAMILY MEDICINE CLINIC | Facility: CLINIC | Age: 84
End: 2024-09-23

## 2024-09-23 NOTE — TELEPHONE ENCOUNTER
----- Message from Kennedy Castellon MD sent at 9/22/2024 10:13 AM EDT -----  Dexa showed osteopenia. Make sure that the pt is taking in at least 1000iu Vit D a day with 1200mg calcium. Also encourage weight bearing exercise. Recheck Dexa in 2-3 years

## 2024-10-07 DIAGNOSIS — E78.00 HYPERCHOLESTEROLEMIA: ICD-10-CM

## 2024-10-08 RX ORDER — AMOXICILLIN 500 MG
1 CAPSULE ORAL 2 TIMES DAILY
Qty: 90 CAPSULE | Refills: 1 | Status: SHIPPED | OUTPATIENT
Start: 2024-10-08

## 2024-10-14 DIAGNOSIS — I48.0 PAF (PAROXYSMAL ATRIAL FIBRILLATION) (HCC): ICD-10-CM

## 2024-10-15 RX ORDER — APIXABAN 5 MG/1
5 TABLET, FILM COATED ORAL 2 TIMES DAILY
Qty: 60 TABLET | Refills: 5 | Status: SHIPPED | OUTPATIENT
Start: 2024-10-15

## 2024-10-30 ENCOUNTER — RA CDI HCC (OUTPATIENT)
Dept: OTHER | Facility: HOSPITAL | Age: 84
End: 2024-10-30

## 2024-11-05 ENCOUNTER — OFFICE VISIT (OUTPATIENT)
Dept: FAMILY MEDICINE CLINIC | Facility: CLINIC | Age: 84
End: 2024-11-05
Payer: MEDICARE

## 2024-11-05 VITALS
HEART RATE: 62 BPM | HEIGHT: 61 IN | OXYGEN SATURATION: 95 % | DIASTOLIC BLOOD PRESSURE: 78 MMHG | WEIGHT: 168.4 LBS | SYSTOLIC BLOOD PRESSURE: 118 MMHG | BODY MASS INDEX: 31.79 KG/M2 | TEMPERATURE: 97.2 F

## 2024-11-05 DIAGNOSIS — I10 PRIMARY HYPERTENSION: ICD-10-CM

## 2024-11-05 DIAGNOSIS — Z79.4 TYPE 2 DIABETES MELLITUS WITH HYPERGLYCEMIA, WITH LONG-TERM CURRENT USE OF INSULIN (HCC): Primary | ICD-10-CM

## 2024-11-05 DIAGNOSIS — I48.0 PAF (PAROXYSMAL ATRIAL FIBRILLATION) (HCC): ICD-10-CM

## 2024-11-05 DIAGNOSIS — J96.11 CHRONIC HYPOXIC RESPIRATORY FAILURE (HCC): ICD-10-CM

## 2024-11-05 DIAGNOSIS — I50.32 CHRONIC DIASTOLIC CONGESTIVE HEART FAILURE (HCC): ICD-10-CM

## 2024-11-05 DIAGNOSIS — E11.65 TYPE 2 DIABETES MELLITUS WITH HYPERGLYCEMIA, WITH LONG-TERM CURRENT USE OF INSULIN (HCC): Primary | ICD-10-CM

## 2024-11-05 DIAGNOSIS — I25.10 CORONARY ARTERY DISEASE INVOLVING NATIVE CORONARY ARTERY OF NATIVE HEART WITHOUT ANGINA PECTORIS: ICD-10-CM

## 2024-11-05 LAB — SL AMB POCT HEMOGLOBIN AIC: 8.1 (ref ?–6.5)

## 2024-11-05 PROCEDURE — 83036 HEMOGLOBIN GLYCOSYLATED A1C: CPT | Performed by: FAMILY MEDICINE

## 2024-11-05 PROCEDURE — 99214 OFFICE O/P EST MOD 30 MIN: CPT | Performed by: FAMILY MEDICINE

## 2024-11-05 NOTE — PROGRESS NOTES
Ambulatory Visit  Name: Lizzie Whitten      : 1940      MRN: [id number]  Encounter Provider: Kennedy Castellon MD  Encounter Date: 2024   Encounter department: [facility]    Assessment & Plan  Type 2 diabetes mellitus with hyperglycemia, with long-term current use of insulin ([facility])    Lab Results   Component Value Date    HGBA1C 8.1 (A) 2024   Suboptimal control. Would like to get pt <7, however she has not had any hypoglycemia.  Continue present care. Monitor diet. Recheck 6m    Orders:    POCT hemoglobin A1c    PAF (paroxysmal atrial fibrillation) (HCC)  Rate controlled. Continue present care. Monitor. F/u with Cardio.  Recheck 6m       Primary hypertension  Well controlled. Cont present treatment. Monitor labs. Recheck 6m         Coronary artery disease involving native coronary artery of native heart without angina pectoris  Asymptomatic.  Continue present care. Recheck 6m       Chronic diastolic congestive heart failure (HCC)  Wt Readings from Last 3 Encounters:   24 76.4 kg (168 lb 6.4 oz)   24 80.4 kg (177 lb 3.2 oz)   24 78.9 kg (174 lb)   Pt has lost 6-8lb since this summer, most likely due to decreased intake.  Pt has labs that are due (already ordered).  Continue to monitor - earlier if weight loss persists       Chronic hypoxic respiratory failure (HCC)  I reviewed with pt and dtr.  Continue O2. F/u with Pulm.  Recheck 6m          I have spent a total time of 30 minutes in caring for this patient on the day of the visit/encounter including Diagnostic results, Prognosis, Risks and benefits of tx options, Instructions for management, Patient and family education, Importance of tx compliance, Impressions, Documenting in the medical record, Reviewing / ordering tests, medicine, procedures  , and Obtaining or reviewing history  .   History of Present Illness     f/u multiple med issues  - pt states that she has been doing well  - pt  continues to be O2 dependent but feels well with use. Denies worsening SOB with or without exertion (pt has some dependent LUEVANO)  - pt has not had any further falls. Rib fx painhas resolved  - pt does not exercise.  Denies CP, palpitations, lightheadedness or other CV symptoms with or without exertion  - still with occasional diarrhea.  Appetite remains labile, though pt states that it is b/c she does not like a lot of the meals served at her assisted living facility. No abd pain  - still has some scattered body aches and back pains. No worsening        Review of Systems   Constitutional:  Positive for appetite change (improved but not back to baseline) and fatigue (remains fatigued with increased daytime somnolence). Negative for activity change (sedenetary), chills and fever.   HENT: Negative.     Eyes:  Positive for visual disturbance (chronic - unchanged). Negative for pain and redness.   Respiratory:  Positive for shortness of breath (mild with exertion - improved with O2). Negative for choking.    Cardiovascular: Negative.  Negative for palpitations.   Gastrointestinal:  Positive for diarrhea (occasional). Negative for abdominal distention, abdominal pain, constipation, nausea and vomiting.   Genitourinary:  Negative for difficulty urinating, dysuria and flank pain.   Musculoskeletal:  Positive for arthralgias, back pain, gait problem (uses walker) and myalgias.   Skin: Negative.    Neurological:  Positive for weakness (in legs - unchanged). Negative for dizziness, light-headedness, numbness and headaches.   Psychiatric/Behavioral: Negative.       Past Medical History:   Diagnosis Date    Anxiety     Arthritis     CHF (congestive heart failure) ([facility])     Chronic venous insufficiency     COPD (chronic obstructive pulmonary disease) ([facility])     Coronary artery disease     Diabetes mellitus ([facility])     type II    Disease of thyroid gland     hypothyroidism    DVT (deep venous thrombosis) ([facility])     HL (hearing loss)      Hyperlipidemia     Hypertension     Irregular heart beat     Paroxysmal a-fib    Pacemaker     Renal disorder     stage 3    Sleep apnea     Sleep difficulties     Thyroid disease     Urinary incontinence     Uterine leiomyoma     Walker as ambulation aid      Past Surgical History:   Procedure Laterality Date    BREAST SURGERY      For Biopsy    CARDIAC PACEMAKER PLACEMENT      Permanent. Last assessed: 2/19/16    CARDIAC SURGERY      CARPAL TUNNEL RELEASE Bilateral     CATARACT EXTRACTION      CATARACT EXTRACTION W/ INTRAOCULAR LENS  IMPLANT, BILATERAL      CYSTOSCOPY      Diagnostic. Last assessed: 11/8/17    ECTOPIC PREGNANCY SURGERY      HYSTERECTOMY      Total Abdomonal. Resolved: 1982    IR THORACENTESIS  5/17/2024    JOINT REPLACEMENT Right     TKR    OOPHORECTOMY      NH ENDOVEN ABLTJ INCMPTNT VEIN XTR LASER 1ST VEIN Left 2/17/2017    Procedure: GREATER SAPHENOUS VEIN ENDOVASCULAR LASER THERAPY ;  Surgeon: Danielle Phan DO;  Location: [facility];  Service: Vascular    TONSILLECTOMY       Family History   Problem Relation Age of Onset    Coronary artery disease Mother     Hypertension Mother     Heart disease Mother     Hypertension Father     Diabetes Father         Mellitus    No Known Problems Sister     No Known Problems Daughter     No Known Problems Daughter     No Known Problems Maternal Grandmother     No Known Problems Maternal Grandfather     No Known Problems Paternal Grandmother     No Known Problems Paternal Grandfather     Diabetes Brother         Mellitus    No Known Problems Son     Breast cancer Maternal Aunt     Pancreatic cancer Maternal Aunt     Breast cancer Cousin     Breast cancer Cousin     Breast cancer Paternal Aunt     No Known Problems Paternal Aunt     No Known Problems Paternal Aunt      Social History     Tobacco Use    Smoking status: Never     Passive exposure: Never    Smokeless tobacco: Never    Tobacco comments:     Per [employer]: Former smoker   Vaping Use     Vaping status: Never Used   Substance and Sexual Activity    Alcohol use: Not Currently     Comment: rarely. Per [employer]: Denied history of alcohol use    Drug use: No    Sexual activity: Never     Current Outpatient Medications on File Prior to Visit   Medication Sig    ammonium lactate (LAC-HYDRIN) 12 % lotion Apply 1 Application topically daily    apixaban (Eliquis) 5 mg TAKE 1 TABLET TWICE DAILY    atorvastatin (LIPITOR) 80 mg tablet TAKE 1 TABLET DAILY IN THE EVENING    calcium carbonate (OYSTER SHELL,OSCAL) 500 mg TAKE 1 TABLET DAILY    Calcium Carbonate-Vit D-Min (CALCIUM 1200 PO) Take by mouth (Patient not taking: Reported on 8/1/2024)    citalopram (CeleXA) 20 mg tablet TAKE 1 TABLET DAILY    Continuous Blood Gluc  (FREESTYLE RONNI 14 DAY READER) GARRY Use to check BG levels 4+times daily and as needed    Continuous Blood Gluc Sensor (FreeStyle Ronni 14 Day Sensor) MISC Change sensor every 14 days to check BG levels 4+ times daily    Diclofenac Sodium (VOLTAREN) 1 % Apply 2 g topically 4 (four) times a day    furosemide (LASIX) 40 mg tablet TAKE 1 TABLET DAILY    gabapentin (NEURONTIN) 100 mg capsule TAKE ONE CAPSULE AT BEDTIME    insulin glargine (Toujeo Max SoloStar) 300 units/mL CONCENTRATED U-300 injection pen (2-unit dial) Inject 16 Units under the skin daily at bedtime    insulin lispro (HumALOG/ADMELOG) 100 units/mL injection Inject 3 Units under the skin 3 (three) times a day with meals    Insulin Pen Needle (BD Pen Needle Sasha U/F) 32G X 4 MM MISC Inject 4 times a day    ipratropium-albuterol (DUO-NEB) 0.5-2.5 mg/3 mL nebulizer solution Take 3 mL by nebulization every 8 (eight) hours as needed for wheezing or shortness of breath    levothyroxine 150 mcg tablet TAKE 1 TABLET DAILY    lidocaine (Lidoderm) 5 % Apply 1 patch topically over 12 hours daily Remove & Discard patch within 12 hours or as directed by MD    Magnesium Cl-Calcium Carbonate (SLOW-MAG PO) Take by mouth in the morning     "metoprolol tartrate (LOPRESSOR) 25 mg tablet TAKE 1 TABLET TWICE DAILY    Omega-3 Fatty Acids (Fish Oil) 1200 MG CAPS TAKE 1 CAPSULE TWICE A DAY    pantoprazole (PROTONIX) 40 mg tablet TAKE 1 TABLET DAILY    potassium chloride (Klor-Con M20) 20 mEq tablet TAKE 1 TABLET DAILY    sodium chloride (VANNESSA 128) 5 % hypertonic ophthalmic ointment Administer 1 drop to both eyes 2 (two) times a day     Allergies   Allergen Reactions    Ampicillin Hives     Immunization History   Administered Date(s) Administered    COVID-19 PFIZER VACCINE 0.3 ML IM 01/20/2021, 02/09/2021, 04/11/2022    COVID-19 Pfizer vac (Ej-sucrose, gray cap) 12 yr+ IM 04/11/2022    H1N1, All Formulations 01/12/2010, 01/12/2010    INFLUENZA 11/04/2005, 10/21/2021    Influenza Split High Dose Preservative Free IM 10/18/2012, 10/23/2013, 10/14/2014, 10/20/2015, 11/02/2016, 10/04/2017    Influenza, high dose seasonal 0.7 mL 10/23/2018, 10/09/2019, 10/20/2020, 10/28/2022, 12/08/2023    Influenza, seasonal, injectable 11/12/2009, 01/12/2010    Pneumococcal Conjugate 13-Valent 10/21/2015    Pneumococcal Conjugate Vaccine 20-valent (Pcv20), Polysace 10/28/2022    Pneumococcal Polysaccharide PPV23 12/11/2000, 12/11/2010    Td (adult), adsorbed 07/04/2008    Tetanus, adsorbed 09/04/1998    Zoster 07/22/2016    Zoster Vaccine Recombinant 05/30/2021     Objective     /78 (BP Location: Left arm, Patient Position: Sitting, Cuff Size: Large)   Pulse 62   Temp (!) 97.2 °F (36.2 °C)   Ht 5' 1\" (1.549 m)   Wt 76.4 kg (168 lb 6.4 oz)   SpO2 95%   BMI 31.82 kg/m²     Physical Exam  Vitals reviewed.   Constitutional:       Comments: Pt sitting in wheelchair, on 2L O2 via NC, in NAD   HENT:      Head: Normocephalic.      Nose: Nose normal.      Comments: O2 by NC in place     Mouth/Throat:      Mouth: Mucous membranes are moist.   Eyes:      Extraocular Movements: Extraocular movements intact.      Conjunctiva/sclera: Conjunctivae normal.      Pupils: Pupils are " equal, round, and reactive to light.      Comments: No pallor appreciated   Neck:      Vascular: No carotid bruit.   Cardiovascular:      Rate and Rhythm: Normal rate and regular rhythm.      Pulses: no weak pulses.           Dorsalis pedis pulses are 0 on the right side and 0 on the left side.        Posterior tibial pulses are 0 on the right side and 0 on the left side.   Pulmonary:      Effort: Pulmonary effort is normal.      Comments: O2 sat 95% on 2L O2 via NC.  Pt breathing comfortably  Abdominal:      General: There is no distension.      Palpations: There is no mass.      Tenderness: There is no abdominal tenderness.   Musculoskeletal:         General: Tenderness (mild  along thoracic-upper lumbar spine and paraspinal muscles) and deformity (diffuse OA changes) present. No swelling. Normal range of motion.      Cervical back: No tenderness.      Right lower leg: No edema.      Left lower leg: No edema.   Feet:      Right foot:      Skin integrity: No ulcer, skin breakdown, erythema, warmth, callus or dry skin.      Left foot:      Skin integrity: No ulcer, skin breakdown, erythema, warmth, callus or dry skin.   Lymphadenopathy:      Cervical: No cervical adenopathy.   Skin:     General: Skin is warm.      Capillary Refill: Capillary refill takes less than 2 seconds.   Neurological:      Mental Status: She is alert.      Cranial Nerves: Cranial nerve deficit (decreased hearing) present.      Sensory: No sensory deficit.      Motor: Weakness (mild in legs - unchanged) present.      Gait: Gait abnormal (pt in wheelchair).      Deep Tendon Reflexes: Reflexes normal.   Psychiatric:         Mood and Affect: Mood normal.         Patient's shoes and socks removed.    Right Foot/Ankle   Right Foot Inspection  Skin Exam: skin normal and skin intact. No dry skin, no warmth, no callus, no erythema, no maceration, no abnormal color, no pre-ulcer, no ulcer and no callus.     Toe Exam: ROM and strength within normal  limits.     Sensory   Vibration: diminished  Monofilament testing: diminished    Vascular  Capillary refills: < 3 seconds  The right DP pulse is 0. The right PT pulse is 0.     Left Foot/Ankle  Left Foot Inspection  Skin Exam: skin normal and skin intact. No dry skin, no warmth, no erythema, no maceration, normal color, no pre-ulcer, no ulcer and no callus.     Toe Exam: ROM and strength within normal limits.     Sensory   Vibration: diminished  Monofilament testing: diminished    Vascular  Capillary refills: < 3 seconds  The left DP pulse is 0. The left PT pulse is 0.     Assign Risk Category  No deformity present  No loss of protective sensation  No weak pulses  Risk: 0

## 2024-11-08 PROBLEM — J96.11 CHRONIC HYPOXIC RESPIRATORY FAILURE (HCC): Status: ACTIVE | Noted: 2024-04-10

## 2024-11-08 NOTE — ASSESSMENT & PLAN NOTE
Lab Results   Component Value Date    HGBA1C 8.1 (A) 11/05/2024   Suboptimal control. Would like to get pt <7, however she has not had any hypoglycemia.  Continue present care. Monitor diet. Recheck 6m    Orders:    POCT hemoglobin A1c

## 2024-11-08 NOTE — ASSESSMENT & PLAN NOTE
Wt Readings from Last 3 Encounters:   11/05/24 76.4 kg (168 lb 6.4 oz)   08/01/24 80.4 kg (177 lb 3.2 oz)   05/13/24 78.9 kg (174 lb)   Pt has lost 6-8lb since this summer, most likely due to decreased intake.  Pt has labs that are due (already ordered).  Continue to monitor - earlier if weight loss persists

## 2024-11-19 NOTE — ASSESSMENT & PLAN NOTE
Carlos Manuel called. Stating he is unable to get to the lab today as he had an INR drawn yesterday in  [facility]. He will plan to have INR drawn as scheduled lab appt on 11/27.    Denise Pena RN     Lab Results   Component Value Date    EGFR 44 02/12/2021    EGFR 49 01/11/2021    EGFR 28 10/30/2020    CREATININE 1 17 02/12/2021    CREATININE 1 09 01/11/2021    CREATININE 1 73 (H) 10/30/2020      creatinine has improved and is now stable  Continue to monitor  Continue present treatment    Recheck 6 months

## 2024-11-20 DIAGNOSIS — I50.33 ACUTE ON CHRONIC DIASTOLIC CONGESTIVE HEART FAILURE (HCC): ICD-10-CM

## 2024-11-20 RX ORDER — METOPROLOL TARTRATE 25 MG/1
25 TABLET, FILM COATED ORAL 2 TIMES DAILY
Qty: 60 TABLET | Refills: 5 | Status: SHIPPED | OUTPATIENT
Start: 2024-11-20

## 2024-11-20 NOTE — TELEPHONE ENCOUNTER
[facility] pharmacist called back to inform Dr Salazar the prescription that was sent in August was only a 45 day supply not 90 and patient is out of refill. He asked for a 30 day supply.     Reason for call:   [x] Refill   [] Prior Auth  [] Other:     Office:   [x] PCP/Provider - Ravinder   [] Specialty/Provider -     Medication: Metoprolol tart 25mg    Dose/Frequency: 1 tab bid    Quantity: 60    Pharmacy: [facility] - [place] [facility] [address] [phone]     Does the patient have enough for 3 days?   [] Yes   [x] No - Send as HP to POD

## 2024-12-03 DIAGNOSIS — I10 PRIMARY HYPERTENSION: ICD-10-CM

## 2024-12-03 DIAGNOSIS — E55.9 VITAMIN D DEFICIENCY: ICD-10-CM

## 2024-12-03 DIAGNOSIS — F41.9 ANXIETY: ICD-10-CM

## 2024-12-03 DIAGNOSIS — E03.9 ACQUIRED HYPOTHYROIDISM: Chronic | ICD-10-CM

## 2024-12-03 DIAGNOSIS — E78.00 HYPERCHOLESTEROLEMIA: ICD-10-CM

## 2024-12-04 RX ORDER — POTASSIUM CHLORIDE 1500 MG/1
20 TABLET, EXTENDED RELEASE ORAL DAILY
Qty: 90 TABLET | Refills: 1 | Status: SHIPPED | OUTPATIENT
Start: 2024-12-04

## 2024-12-04 RX ORDER — CITALOPRAM HYDROBROMIDE 20 MG/1
20 TABLET ORAL DAILY
Qty: 90 TABLET | Refills: 1 | Status: SHIPPED | OUTPATIENT
Start: 2024-12-04

## 2024-12-04 RX ORDER — ATORVASTATIN CALCIUM 80 MG/1
80 TABLET, FILM COATED ORAL EVERY EVENING
Qty: 90 TABLET | Refills: 1 | Status: SHIPPED | OUTPATIENT
Start: 2024-12-04

## 2024-12-04 RX ORDER — CALCIUM CARBONATE 500(1250)
1 TABLET ORAL DAILY
Qty: 90 TABLET | Refills: 1 | Status: SHIPPED | OUTPATIENT
Start: 2024-12-04

## 2024-12-04 RX ORDER — LEVOTHYROXINE SODIUM 150 UG/1
150 TABLET ORAL DAILY
Qty: 90 TABLET | Refills: 1 | Status: SHIPPED | OUTPATIENT
Start: 2024-12-04

## 2024-12-05 ENCOUNTER — APPOINTMENT (OUTPATIENT)
Dept: RADIOLOGY | Facility: HOSPITAL | Age: 84
DRG: 083 | End: 2024-12-05
Payer: MEDICARE

## 2024-12-05 ENCOUNTER — HOSPITAL ENCOUNTER (INPATIENT)
Facility: HOSPITAL | Age: 84
LOS: 3 days | Discharge: HOME HEALTH | DRG: 083 | End: 2024-12-09
Attending: EMERGENCY MEDICINE | Admitting: EMERGENCY MEDICINE
Payer: MEDICARE

## 2024-12-05 ENCOUNTER — APPOINTMENT (EMERGENCY)
Dept: RADIOLOGY | Facility: HOSPITAL | Age: 84
DRG: 083 | End: 2024-12-05
Payer: MEDICARE

## 2024-12-05 DIAGNOSIS — E16.2 HYPOGLYCEMIA: ICD-10-CM
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
## 2024-12-05 DIAGNOSIS — I60.9 SAH (SUBARACHNOID HEMORRHAGE) (HCC): ICD-10-CM

## 2024-12-05 PROBLEM — I50.9 CHF (CONGESTIVE HEART FAILURE) (HCC): Status: ACTIVE | Noted: 2024-12-05

## 2024-12-05 PROBLEM — R26.2 AMBULATORY DYSFUNCTION: Status: ACTIVE | Noted: 2024-12-05

## 2024-12-05 PROBLEM — J44.9 COPD (CHRONIC OBSTRUCTIVE PULMONARY DISEASE) (HCC): Status: ACTIVE | Noted: 2024-12-05

## 2024-12-05 PROBLEM — G47.33 OSA (OBSTRUCTIVE SLEEP APNEA): Status: ACTIVE | Noted: 2024-12-05

## 2024-12-05 PROBLEM — E78.5 HLD (HYPERLIPIDEMIA): Status: ACTIVE | Noted: 2024-12-05

## 2024-12-05 PROBLEM — N18.9 CKD (CHRONIC KIDNEY DISEASE): Status: ACTIVE | Noted: 2024-12-05

## 2024-12-05 PROBLEM — H91.93 BILATERAL HEARING LOSS: Status: ACTIVE | Noted: 2024-12-05

## 2024-12-05 PROBLEM — F41.8 DEPRESSION WITH ANXIETY: Status: ACTIVE | Noted: 2024-12-05

## 2024-12-05 PROBLEM — I10 PRIMARY HYPERTENSION: Status: ACTIVE | Noted: 2024-12-05

## 2024-12-05 PROBLEM — I25.10 CAD (CORONARY ARTERY DISEASE): Status: ACTIVE | Noted: 2024-12-05

## 2024-12-05 PROBLEM — R54 FRAILTY SYNDROME IN GERIATRIC PATIENT: Status: ACTIVE | Noted: 2024-12-05

## 2024-12-05 PROBLEM — E03.9 HYPOTHYROIDISM: Status: ACTIVE | Noted: 2024-12-05

## 2024-12-05 PROBLEM — K21.9 GERD (GASTROESOPHAGEAL REFLUX DISEASE): Status: ACTIVE | Noted: 2024-12-05

## 2024-12-05 PROBLEM — E11.9 DM (DIABETES MELLITUS) (HCC): Status: ACTIVE | Noted: 2024-12-05

## 2024-12-05 PROBLEM — Z91.89 AT RISK FOR DELIRIUM: Status: ACTIVE | Noted: 2024-12-05

## 2024-12-05 PROBLEM — G89.11 ACUTE PAIN DUE TO TRAUMA: Status: ACTIVE | Noted: 2024-12-05

## 2024-12-05 PROBLEM — D50.9 IRON DEFICIENCY ANEMIA: Status: ACTIVE | Noted: 2024-12-05

## 2024-12-05 PROBLEM — R41.89 COGNITIVE IMPAIRMENT: Status: ACTIVE | Noted: 2024-12-05

## 2024-12-05 PROBLEM — I48.91 A-FIB (HCC): Status: ACTIVE | Noted: 2024-12-05

## 2024-12-05 LAB
2HR DELTA HS TROPONIN: -3 NG/L
2HR DELTA HS TROPONIN: -3 NG/L
ABO GROUP BLD: NORMAL
ALBUMIN SERPL BCG-MCNC: 3.7 G/DL (ref 3.5–5)
ALBUMIN SERPL BCG-MCNC: 3.7 G/DL (ref 3.5–5)
ALP SERPL-CCNC: 84 U/L (ref 34–104)
ALP SERPL-CCNC: 84 U/L (ref 34–104)
ALT SERPL W P-5'-P-CCNC: 8 U/L (ref 7–52)
ALT SERPL W P-5'-P-CCNC: 8 U/L (ref 7–52)
ANION GAP SERPL CALCULATED.3IONS-SCNC: 6 MMOL/L (ref 4–13)
ANION GAP SERPL CALCULATED.3IONS-SCNC: 6 MMOL/L (ref 4–13)
AST SERPL W P-5'-P-CCNC: 15 U/L (ref 13–39)
AST SERPL W P-5'-P-CCNC: 15 U/L (ref 13–39)
ATRIAL RATE: 250 BPM
ATRIAL RATE: 250 BPM
BASE EXCESS BLDA CALC-SCNC: 11 MMOL/L (ref -2–3)
BASE EXCESS BLDA CALC-SCNC: 11 MMOL/L (ref -2–3)
BASOPHILS # BLD AUTO: 0.07 THOUSANDS/ÂΜL (ref 0–0.1)
BASOPHILS # BLD AUTO: 0.07 THOUSANDS/ΜL (ref 0–0.1)
BASOPHILS NFR BLD AUTO: 1 % (ref 0–1)
BASOPHILS NFR BLD AUTO: 1 % (ref 0–1)
BILIRUB SERPL-MCNC: 0.93 MG/DL (ref 0.2–1)
BILIRUB SERPL-MCNC: 0.93 MG/DL (ref 0.2–1)
BLD GP AB SCN SERPL QL: NEGATIVE
BLD GP AB SCN SERPL QL: NEGATIVE
BUN SERPL-MCNC: 35 MG/DL (ref 5–25)
BUN SERPL-MCNC: 35 MG/DL (ref 5–25)
CA-I BLD-SCNC: 0.9 MMOL/L (ref 1.12–1.32)
CA-I BLD-SCNC: 0.9 MMOL/L (ref 1.12–1.32)
CALCIUM SERPL-MCNC: 8.9 MG/DL (ref 8.4–10.2)
CALCIUM SERPL-MCNC: 8.9 MG/DL (ref 8.4–10.2)
CARDIAC TROPONIN I PNL SERPL HS: 13 NG/L (ref ?–50)
CARDIAC TROPONIN I PNL SERPL HS: 13 NG/L (ref ?–50)
CARDIAC TROPONIN I PNL SERPL HS: 16 NG/L (ref ?–50)
CARDIAC TROPONIN I PNL SERPL HS: 16 NG/L (ref ?–50)
CHLORIDE SERPL-SCNC: 100 MMOL/L (ref 96–108)
CHLORIDE SERPL-SCNC: 100 MMOL/L (ref 96–108)
CO2 SERPL-SCNC: 35 MMOL/L (ref 21–32)
CO2 SERPL-SCNC: 35 MMOL/L (ref 21–32)
CREAT SERPL-MCNC: 1.72 MG/DL (ref 0.6–1.3)
CREAT SERPL-MCNC: 1.72 MG/DL (ref 0.6–1.3)
EOSINOPHIL # BLD AUTO: 0.19 THOUSAND/ÂΜL (ref 0–0.61)
EOSINOPHIL # BLD AUTO: 0.19 THOUSAND/ΜL (ref 0–0.61)
EOSINOPHIL NFR BLD AUTO: 3 % (ref 0–6)
EOSINOPHIL NFR BLD AUTO: 3 % (ref 0–6)
ERYTHROCYTE [DISTWIDTH] IN BLOOD BY AUTOMATED COUNT: 14.3 % (ref 11.6–15.1)
ERYTHROCYTE [DISTWIDTH] IN BLOOD BY AUTOMATED COUNT: 14.3 % (ref 11.6–15.1)
EST. AVERAGE GLUCOSE BLD GHB EST-MCNC: 209 MG/DL
EST. AVERAGE GLUCOSE BLD GHB EST-MCNC: 209 MG/DL
GFR SERPL CREATININE-BSD FRML MDRD: 27 ML/MIN/1.73SQ M
GFR SERPL CREATININE-BSD FRML MDRD: 27 ML/MIN/1.73SQ M
GLUCOSE SERPL-MCNC: 110 MG/DL (ref 65–140)
GLUCOSE SERPL-MCNC: 110 MG/DL (ref 65–140)
GLUCOSE SERPL-MCNC: 118 MG/DL (ref 65–140)
GLUCOSE SERPL-MCNC: 124 MG/DL (ref 65–140)
GLUCOSE SERPL-MCNC: 124 MG/DL (ref 65–140)
GLUCOSE SERPL-MCNC: 93 MG/DL (ref 65–140)
GLUCOSE SERPL-MCNC: 93 MG/DL (ref 65–140)
HBA1C MFR BLD: 8.9 %
HBA1C MFR BLD: 8.9 %
HCO3 BLDA-SCNC: 32.6 MMOL/L (ref 24–30)
HCO3 BLDA-SCNC: 32.6 MMOL/L (ref 24–30)
HCT VFR BLD AUTO: 38.1 % (ref 34.8–46.1)
HCT VFR BLD AUTO: 38.1 % (ref 34.8–46.1)
HCT VFR BLD CALC: 40 % (ref 34.8–46.1)
HCT VFR BLD CALC: 40 % (ref 34.8–46.1)
HGB BLD-MCNC: 11.5 G/DL (ref 11.5–15.4)
HGB BLD-MCNC: 11.5 G/DL (ref 11.5–15.4)
HGB BLDA-MCNC: 13.6 G/DL (ref 11.5–15.4)
HGB BLDA-MCNC: 13.6 G/DL (ref 11.5–15.4)
IMM GRANULOCYTES # BLD AUTO: 0.02 THOUSAND/UL (ref 0–0.2)
IMM GRANULOCYTES # BLD AUTO: 0.02 THOUSAND/UL (ref 0–0.2)
IMM GRANULOCYTES NFR BLD AUTO: 0 % (ref 0–2)
IMM GRANULOCYTES NFR BLD AUTO: 0 % (ref 0–2)
LYMPHOCYTES # BLD AUTO: 2.13 THOUSANDS/ÂΜL (ref 0.6–4.47)
LYMPHOCYTES # BLD AUTO: 2.13 THOUSANDS/ΜL (ref 0.6–4.47)
LYMPHOCYTES NFR BLD AUTO: 32 % (ref 14–44)
LYMPHOCYTES NFR BLD AUTO: 32 % (ref 14–44)
MCH RBC QN AUTO: 29.2 PG (ref 26.8–34.3)
MCH RBC QN AUTO: 29.2 PG (ref 26.8–34.3)
MCHC RBC AUTO-ENTMCNC: 30.2 G/DL (ref 31.4–37.4)
MCHC RBC AUTO-ENTMCNC: 30.2 G/DL (ref 31.4–37.4)
MCV RBC AUTO: 97 FL (ref 82–98)
MCV RBC AUTO: 97 FL (ref 82–98)
MONOCYTES # BLD AUTO: 0.68 THOUSAND/ÂΜL (ref 0.17–1.22)
MONOCYTES # BLD AUTO: 0.68 THOUSAND/ΜL (ref 0.17–1.22)
MONOCYTES NFR BLD AUTO: 10 % (ref 4–12)
MONOCYTES NFR BLD AUTO: 10 % (ref 4–12)
NEUTROPHILS # BLD AUTO: 3.56 THOUSANDS/ÂΜL (ref 1.85–7.62)
NEUTROPHILS # BLD AUTO: 3.56 THOUSANDS/ΜL (ref 1.85–7.62)
NEUTS SEG NFR BLD AUTO: 54 % (ref 43–75)
NEUTS SEG NFR BLD AUTO: 54 % (ref 43–75)
NRBC BLD AUTO-RTO: 0 /100 WBCS
NRBC BLD AUTO-RTO: 0 /100 WBCS
P AXIS: -36 DEGREES
P AXIS: -36 DEGREES
PCO2 BLD: 33.5 MM HG (ref 42–50)
PCO2 BLD: 33.5 MM HG (ref 42–50)
PCO2 BLD: 34 MMOL/L (ref 21–32)
PCO2 BLD: 34 MMOL/L (ref 21–32)
PH BLD: 7.6 [PH] (ref 7.3–7.4)
PH BLD: 7.6 [PH] (ref 7.3–7.4)
PLATELET # BLD AUTO: 179 THOUSANDS/UL (ref 149–390)
PLATELET # BLD AUTO: 179 THOUSANDS/UL (ref 149–390)
PMV BLD AUTO: 10.6 FL (ref 8.9–12.7)
PMV BLD AUTO: 10.6 FL (ref 8.9–12.7)
PO2 BLD: 42 MM HG (ref 35–45)
PO2 BLD: 42 MM HG (ref 35–45)
POTASSIUM BLD-SCNC: 5.9 MMOL/L (ref 3.5–5.3)
POTASSIUM BLD-SCNC: 5.9 MMOL/L (ref 3.5–5.3)
POTASSIUM SERPL-SCNC: 4.4 MMOL/L (ref 3.5–5.3)
POTASSIUM SERPL-SCNC: 4.4 MMOL/L (ref 3.5–5.3)
PROT SERPL-MCNC: 6.4 G/DL (ref 6.4–8.4)
PROT SERPL-MCNC: 6.4 G/DL (ref 6.4–8.4)
QRS AXIS: 267 DEGREES
QRS AXIS: 267 DEGREES
QRSD INTERVAL: 172 MS
QRSD INTERVAL: 172 MS
QT INTERVAL: 466 MS
QT INTERVAL: 466 MS
QTC INTERVAL: 461 MS
QTC INTERVAL: 461 MS
RBC # BLD AUTO: 3.94 MILLION/UL (ref 3.81–5.12)
RBC # BLD AUTO: 3.94 MILLION/UL (ref 3.81–5.12)
RH BLD: POSITIVE
SAO2 % BLD FROM PO2: 86 % (ref 60–85)
SAO2 % BLD FROM PO2: 86 % (ref 60–85)
SODIUM BLD-SCNC: 136 MMOL/L (ref 136–145)
SODIUM BLD-SCNC: 136 MMOL/L (ref 136–145)
SODIUM SERPL-SCNC: 141 MMOL/L (ref 135–147)
SODIUM SERPL-SCNC: 141 MMOL/L (ref 135–147)
SPECIMEN EXPIRATION DATE: NORMAL
SPECIMEN EXPIRATION DATE: NORMAL
SPECIMEN SOURCE: ABNORMAL
SPECIMEN SOURCE: ABNORMAL
T WAVE AXIS: 86 DEGREES
T WAVE AXIS: 86 DEGREES
VENTRICULAR RATE: 59 BPM
VENTRICULAR RATE: 59 BPM
WBC # BLD AUTO: 6.65 THOUSAND/UL (ref 4.31–10.16)
WBC # BLD AUTO: 6.65 THOUSAND/UL (ref 4.31–10.16)

## 2024-12-05 PROCEDURE — 82947 ASSAY GLUCOSE BLOOD QUANT: CPT

## 2024-12-05 PROCEDURE — 71045 X-RAY EXAM CHEST 1 VIEW: CPT

## 2024-12-05 PROCEDURE — EDAIR PR ED AIR: Performed by: EMERGENCY MEDICINE

## 2024-12-05 PROCEDURE — 93308 TTE F-UP OR LMTD: CPT | Performed by: EMERGENCY MEDICINE

## 2024-12-05 PROCEDURE — 90471 IMMUNIZATION ADMIN: CPT

## 2024-12-05 PROCEDURE — 84132 ASSAY OF SERUM POTASSIUM: CPT

## 2024-12-05 PROCEDURE — 86900 BLOOD TYPING SEROLOGIC ABO: CPT | Performed by: EMERGENCY MEDICINE

## 2024-12-05 PROCEDURE — 86901 BLOOD TYPING SEROLOGIC RH(D): CPT | Performed by: EMERGENCY MEDICINE

## 2024-12-05 PROCEDURE — 84295 ASSAY OF SERUM SODIUM: CPT

## 2024-12-05 PROCEDURE — 84484 ASSAY OF TROPONIN QUANT: CPT

## 2024-12-05 PROCEDURE — 93010 ELECTROCARDIOGRAM REPORT: CPT | Performed by: INTERNAL MEDICINE

## 2024-12-05 PROCEDURE — 70450 CT HEAD/BRAIN W/O DYE: CPT

## 2024-12-05 PROCEDURE — 86850 RBC ANTIBODY SCREEN: CPT | Performed by: EMERGENCY MEDICINE

## 2024-12-05 PROCEDURE — 90715 TDAP VACCINE 7 YRS/> IM: CPT

## 2024-12-05 PROCEDURE — 80053 COMPREHEN METABOLIC PANEL: CPT

## 2024-12-05 PROCEDURE — 99223 1ST HOSP IP/OBS HIGH 75: CPT | Performed by: PHYSICIAN ASSISTANT

## 2024-12-05 PROCEDURE — 82948 REAGENT STRIP/BLOOD GLUCOSE: CPT

## 2024-12-05 PROCEDURE — 36415 COLL VENOUS BLD VENIPUNCTURE: CPT

## 2024-12-05 PROCEDURE — 99285 EMERGENCY DEPT VISIT HI MDM: CPT

## 2024-12-05 PROCEDURE — 82330 ASSAY OF CALCIUM: CPT

## 2024-12-05 PROCEDURE — 83036 HEMOGLOBIN GLYCOSYLATED A1C: CPT

## 2024-12-05 PROCEDURE — 82803 BLOOD GASES ANY COMBINATION: CPT

## 2024-12-05 PROCEDURE — 76705 ECHO EXAM OF ABDOMEN: CPT | Performed by: EMERGENCY MEDICINE

## 2024-12-05 PROCEDURE — 99205 OFFICE O/P NEW HI 60 MIN: CPT

## 2024-12-05 PROCEDURE — 99215 OFFICE O/P EST HI 40 MIN: CPT

## 2024-12-05 PROCEDURE — 85014 HEMATOCRIT: CPT

## 2024-12-05 PROCEDURE — 72125 CT NECK SPINE W/O DYE: CPT

## 2024-12-05 PROCEDURE — 85025 COMPLETE CBC W/AUTO DIFF WBC: CPT

## 2024-12-05 PROCEDURE — 93005 ELECTROCARDIOGRAM TRACING: CPT

## 2024-12-05 RX ORDER — SENNOSIDES 8.6 MG
2 TABLET ORAL DAILY
Status: DISCONTINUED | OUTPATIENT
Start: 2024-12-05 | End: 2024-12-09 | Stop reason: HOSPADM

## 2024-12-05 RX ORDER — GABAPENTIN 100 MG/1
100 CAPSULE ORAL
COMMUNITY

## 2024-12-05 RX ORDER — ACETAMINOPHEN 325 MG/1
650 TABLET ORAL EVERY 6 HOURS SCHEDULED
Status: DISCONTINUED | OUTPATIENT
Start: 2024-12-05 | End: 2024-12-09 | Stop reason: HOSPADM

## 2024-12-05 RX ORDER — POTASSIUM CHLORIDE 1500 MG/1
20 TABLET, EXTENDED RELEASE ORAL DAILY
COMMUNITY

## 2024-12-05 RX ORDER — INSULIN GLARGINE 100 [IU]/ML
16 INJECTION, SOLUTION SUBCUTANEOUS
COMMUNITY
End: 2024-12-09

## 2024-12-05 RX ORDER — CITALOPRAM HYDROBROMIDE 20 MG/1
20 TABLET ORAL DAILY
Status: DISCONTINUED | OUTPATIENT
Start: 2024-12-06 | End: 2024-12-09 | Stop reason: HOSPADM

## 2024-12-05 RX ORDER — ATORVASTATIN CALCIUM 80 MG/1
80 TABLET, FILM COATED ORAL DAILY
COMMUNITY

## 2024-12-05 RX ORDER — ACETAMINOPHEN 325 MG/1
975 TABLET ORAL 3 TIMES DAILY
COMMUNITY

## 2024-12-05 RX ORDER — LEVOTHYROXINE SODIUM 150 UG/1
150 TABLET ORAL DAILY
COMMUNITY

## 2024-12-05 RX ORDER — CITALOPRAM HYDROBROMIDE 20 MG/1
20 TABLET ORAL DAILY
COMMUNITY

## 2024-12-05 RX ORDER — SENNA AND DOCUSATE SODIUM 50; 8.6 MG/1; MG/1
2 TABLET, FILM COATED ORAL DAILY
COMMUNITY

## 2024-12-05 RX ORDER — PANTOPRAZOLE SODIUM 40 MG/1
40 TABLET, DELAYED RELEASE ORAL DAILY
COMMUNITY

## 2024-12-05 RX ORDER — METOPROLOL TARTRATE 25 MG/1
25 TABLET, FILM COATED ORAL 2 TIMES DAILY
Status: DISCONTINUED | OUTPATIENT
Start: 2024-12-05 | End: 2024-12-09 | Stop reason: HOSPADM

## 2024-12-05 RX ORDER — PANTOPRAZOLE SODIUM 40 MG/1
40 TABLET, DELAYED RELEASE ORAL
Status: DISCONTINUED | OUTPATIENT
Start: 2024-12-06 | End: 2024-12-09 | Stop reason: HOSPADM

## 2024-12-05 RX ORDER — LEVOTHYROXINE SODIUM 75 UG/1
150 TABLET ORAL
Status: DISCONTINUED | OUTPATIENT
Start: 2024-12-06 | End: 2024-12-09 | Stop reason: HOSPADM

## 2024-12-05 RX ORDER — INSULIN GLARGINE 100 [IU]/ML
16 INJECTION, SOLUTION SUBCUTANEOUS
Status: DISCONTINUED | OUTPATIENT
Start: 2024-12-05 | End: 2024-12-08

## 2024-12-05 RX ORDER — OXYCODONE HYDROCHLORIDE 5 MG/1
5 TABLET ORAL EVERY 4 HOURS PRN
Refills: 0 | Status: DISCONTINUED | OUTPATIENT
Start: 2024-12-05 | End: 2024-12-09 | Stop reason: HOSPADM

## 2024-12-05 RX ORDER — LEVETIRACETAM 500 MG/1
500 TABLET ORAL 2 TIMES DAILY
Status: DISCONTINUED | OUTPATIENT
Start: 2024-12-05 | End: 2024-12-09 | Stop reason: HOSPADM

## 2024-12-05 RX ORDER — ATORVASTATIN CALCIUM 80 MG/1
80 TABLET, FILM COATED ORAL
Status: DISCONTINUED | OUTPATIENT
Start: 2024-12-05 | End: 2024-12-09 | Stop reason: HOSPADM

## 2024-12-05 RX ORDER — POLYETHYLENE GLYCOL 3350 17 G/17G
17 POWDER, FOR SOLUTION ORAL DAILY
Status: DISCONTINUED | OUTPATIENT
Start: 2024-12-05 | End: 2024-12-09 | Stop reason: HOSPADM

## 2024-12-05 RX ORDER — METOPROLOL TARTRATE 25 MG/1
25 TABLET, FILM COATED ORAL EVERY 12 HOURS SCHEDULED
COMMUNITY

## 2024-12-05 RX ORDER — FUROSEMIDE 40 MG/1
40 TABLET ORAL DAILY
COMMUNITY

## 2024-12-05 RX ORDER — ONDANSETRON 2 MG/ML
4 INJECTION INTRAMUSCULAR; INTRAVENOUS EVERY 6 HOURS PRN
Status: DISCONTINUED | OUTPATIENT
Start: 2024-12-05 | End: 2024-12-09 | Stop reason: HOSPADM

## 2024-12-05 RX ORDER — INSULIN LISPRO 100 [IU]/ML
1-6 INJECTION, SOLUTION INTRAVENOUS; SUBCUTANEOUS
Status: DISCONTINUED | OUTPATIENT
Start: 2024-12-05 | End: 2024-12-09 | Stop reason: HOSPADM

## 2024-12-05 RX ORDER — HYDROMORPHONE HCL IN WATER/PF 6 MG/30 ML
0.2 PATIENT CONTROLLED ANALGESIA SYRINGE INTRAVENOUS EVERY 4 HOURS PRN
Refills: 0 | Status: DISCONTINUED | OUTPATIENT
Start: 2024-12-05 | End: 2024-12-09 | Stop reason: HOSPADM

## 2024-12-05 RX ORDER — AMOXICILLIN 500 MG
1 CAPSULE ORAL 2 TIMES DAILY
COMMUNITY

## 2024-12-05 RX ADMIN — METOPROLOL TARTRATE 25 MG: 25 TABLET, FILM COATED ORAL at 17:05

## 2024-12-05 RX ADMIN — ACETAMINOPHEN 650 MG: 325 TABLET, FILM COATED ORAL at 17:05

## 2024-12-05 RX ADMIN — ACETAMINOPHEN 650 MG: 325 TABLET, FILM COATED ORAL at 13:45

## 2024-12-05 RX ADMIN — LEVETIRACETAM 500 MG: 500 TABLET, FILM COATED ORAL at 14:15

## 2024-12-05 RX ADMIN — TETANUS TOXOID, REDUCED DIPHTHERIA TOXOID AND ACELLULAR PERTUSSIS VACCINE, ADSORBED 0.5 ML: 5; 2.5; 8; 8; 2.5 SUSPENSION INTRAMUSCULAR at 11:53

## 2024-12-05 RX ADMIN — ATORVASTATIN CALCIUM 80 MG: 80 TABLET, FILM COATED ORAL at 15:49

## 2024-12-05 RX ADMIN — OXYCODONE HYDROCHLORIDE 5 MG: 5 TABLET ORAL at 22:24

## 2024-12-05 RX ADMIN — INSULIN GLARGINE 16 UNITS: 100 INJECTION, SOLUTION SUBCUTANEOUS at 22:24

## 2024-12-05 NOTE — ASSESSMENT & PLAN NOTE
Continuous oxygen 2L NC   states that she has been talking in her sleep a lot  Monitor for daytime sleepiness  Consider overnight sleep study while hospitalized

## 2024-12-05 NOTE — ASSESSMENT & PLAN NOTE
Patient presented s/p unwitnessed fall at her facility.   Reported eliquis use s/p reversal     Imaging:   CT head 12/5/2024: minimal posttraumatic SAH in the R sylvian fissure.    Plan:   ongoing frequent neurological checks.   Recommend STAT CT head for decline in GCS >2 points in 1 hour.   Recommend repeat CT head in am given AC use.  Keppra for seizure ppx per trauma team   DVT ppx: SCD's only. Recommend stable CT head prior to initiation of pharm DVT ppx.   Hold all AC/AP medication at this time. Reversal per trauma team on initial evaluation.   Eliquis use  PT/OT evaluation.   Ongoing medical management and pain control per primary team.   CM following for dispo planning.   Neurosurgery will continue to follow with completion of repeat CT head. Call with questions or concerns.

## 2024-12-05 NOTE — H&P
"H&P - Trauma   Name: Lizzie Whitten 84 y.o. female I MRN: [id number]  Unit/Bed#: TR 02 I Date of Admission: 12/5/2024   Date of Service: 12/5/2024 I Hospital Day: 0     Assessment & Plan  Fall  -Unwitnessed fall at [facility] [place], positive head strike, unknown loss of consciousness, takes Eliquis for A-fib  -Trauma workup significant for minimal posttraumatic SAH in the right sylvian fissure, right frontal scalp hematoma on CTh, otherwise CT C-spine negative and c-collar cleared in ED  -CXR shows small right sided pleural effusion and old rib fractures, no PTX  -Geriatrics consult  -PT/OT consult  SAH (subarachnoid hemorrhage) ([facility])  -As above  -Neurosurgery consult  -HOT protocol, repeat CTh tomorrow 12/6, Keppra, holding Eliquis and other chemical DVT prophylaxis  Primary hypertension  -Continue metoprolol  A-fib ([facility])  -Continue metoprolol  -Holding Eliquis  CHF (congestive heart failure) ([facility])  Wt Readings from Last 3 Encounters:   12/05/24 78.9 kg (173 lb 15.1 oz)   -Holding Lasix  CAD (coronary artery disease)  -Continue statin  COPD (chronic obstructive pulmonary disease) ([facility])  -Continue home meds  MITCH (obstructive sleep apnea)    DM (diabetes mellitus) ([facility])  No results found for: \"HGBA1C\"    No results for input(s): \"POCGLU\" in the last 72 hours.    Blood Sugar Average: Last 72 hrs:    -Continue glargine at bedtime, SSI while inpatient  -Goal blood glucose less than 180  GERD (gastroesophageal reflux disease)  -Continue pantoprazole  Hypothyroidism  - Continue levothyroxine  CKD (chronic kidney disease)  Lab Results   Component Value Date    EGFR 27 12/05/2024    CREATININE 1.72 (H) 12/05/2024   -Admit CKD 1.72, continue to monitor  Iron deficiency anemia  -Admit hemoglobin 11.5, continue to monitor  Depression with anxiety  -Continue citalopram  Ambulatory dysfunction  -History of ambulatory dysfunction with history of previous falls  HLD (hyperlipidemia)  -Continue home " statin    Trauma Alert: Level B   Model of Arrival: Ambulance    Trauma Team: Attending Joshua, Residents Gómez, and AMANDA Irene  Consultants:     Neurosurgery: routine consult; Epic consult order placed;     History of Present Illness   Chief Complaint: Headache/right-sided forehead pain  Mechanism:Fall     Lizzie Whitten is a 84 y.o. female w PMH of depression and HTN, A-fib on Eliquis, CHF, heart block, CAD, COPD, MITCH, hypothyroidism, DM, CKD, depression/anxiety, iron deficiency anemia, ambulatory dysfunction with history of falls, presenting to [facility] ED as a level B trauma for evaluation after suffering a fall at [facility] [place].  Per EMS, she suffered an unwitnessed fall, positive head strike with large bump on the right side of her forehead, unknown loss of consciousness, takes Eliquis for A-fib, patient does not remember the events leading up to the fall.  She does report pain on the right side of her forehead and headache with head strike.  Hemodynamically stable on evaluation, GCS 15.    Review of Systems   Constitutional:  Negative for appetite change, chills and fever.   Respiratory:  Negative for shortness of breath.    Cardiovascular:  Negative for chest pain.   Gastrointestinal:  Negative for abdominal distention, abdominal pain, constipation, diarrhea, nausea and vomiting.   Genitourinary:  Negative for difficulty urinating.   Musculoskeletal:  Negative for back pain and neck pain.   Skin:  Positive for wound. Negative for color change.   Neurological:  Positive for headaches. Negative for dizziness and light-headedness.   Psychiatric/Behavioral:  Negative for agitation and confusion. The patient is not nervous/anxious.    All other systems reviewed and are negative.    I have reviewed the patient's PMH, PSH, Social History, Family History, Meds, and Allergies  Immunization History   Administered Date(s) Administered    Tdap 12/05/2024     Last Tetanus: given in trauma bay 12/5        Objective :  Temp:  [98.2 °F (36.8 °C)] 98.2 °F (36.8 °C)  HR:  [66-76] 66  BP: (161-179)/(94) 179/94  Resp:  [18] 18  SpO2:  [91 %-92 %] 92 %  O2 Device: Nasal cannula    Initial Vitals:   Temperature: 98.2 °F (36.8 °C) (12/05/24 1148)  Pulse: 76 (12/05/24 1148)  Respirations: 18 (12/05/24 1148)  Blood Pressure: 161/94 (12/05/24 1148)    Primary Survey:   Airway:        Status: patent;        Pre-hospital Interventions: none        Hospital Interventions: none  Breathing:        Pre-hospital Interventions: none       Effort: normal       Right breath sounds: normal       Left breath sounds: normal  Circulation:        Rhythm: regular       Rate: regular   Right Pulses Left Pulses    R radial: 2+  R femoral: 2+  R pedal: 2+     L radial: 2+  L femoral: 2+  L pedal: 2+       Disability:        GCS: Eye: 4; Verbal: 5 Motor: 6 Total: 15       Right Pupil: round;  reactive         Left Pupil:  round;  reactive      R Motor Strength L Motor Strength    R : 5/5  R dorsiflex: 5/5  R plantarflex: 5/5 L : 5/5  L dorsiflex: 5/5  L plantarflex: 5/5        Sensory:  No sensory deficit  Exposure:       Completed: Yes      Secondary Survey:  Physical Exam  Constitutional:       General: She is not in acute distress.     Appearance: She is not ill-appearing.   HENT:      Head:        Nose: Nose normal.      Mouth/Throat:      Mouth: Mucous membranes are moist.      Pharynx: Oropharynx is clear.   Eyes:      Extraocular Movements: Extraocular movements intact.      Pupils: Pupils are equal, round, and reactive to light.   Neck:      Comments: C-collar in place  Cardiovascular:      Rate and Rhythm: Normal rate and regular rhythm.      Pulses: Normal pulses.      Heart sounds: Normal heart sounds.   Pulmonary:      Effort: Pulmonary effort is normal. No respiratory distress.      Breath sounds: Normal breath sounds. No stridor. No wheezing, rhonchi or rales.   Chest:      Chest wall: No tenderness.   Abdominal:       General: Abdomen is flat. Bowel sounds are normal. There is no distension.      Palpations: Abdomen is soft.      Tenderness: There is no abdominal tenderness. There is no guarding.   Genitourinary:     Comments: Pelvis stable  Musculoskeletal:         General: Deformity present. No signs of injury.      Comments: No C, T, L spine tenderness. Moving all extremities--no deformities.    Skin:     General: Skin is warm.          Neurological:      Mental Status: She is alert and oriented to person, place, and time.      Sensory: No sensory deficit.      Motor: No weakness.   Psychiatric:      Comments: Cooperative, speech clear             Lab Results: I have reviewed the following results:  Recent Labs     12/05/24  1155   HGB 13.6   HCT 40   CO2 34*   CAIONIZED 0.90*       Imaging Results: I have personally reviewed pertinent images saved in PACS. CT scan findings (and other pertinent positive findings on images) were discussed with radiology. My interpretation of the images/reports are as follows:  Chest Xray(s): negative for acute findings   FAST exam(s): negative for acute findings   CT Scan(s): positive for acute findings: SAH   Additional Xray(s): N/A     Other Studies:

## 2024-12-05 NOTE — ASSESSMENT & PLAN NOTE
Patient at risk for delirium secondary to age, cognitive decline, poly pharmacy, hospitalization, pain,head trauma, immobility  Identify and treat underlying cause  Provide frequent redirection, reorientation, distraction techniques  Avoid deliriogenic medications such as tramadol, benzodiazepines, anticholinergics,  Benadryl  Treat pain, See geriatric pain protocol  Monitor for constipation and urinary retention  Encourage early and frequent moblization, OOB  Encourage Hydration/ Nutrition  Implement sleep hygiene, limit night time interuptions, group activities  Avoid physical restraints  Use chemical restraint only when other efforts have failed, recommend zyprexa 2.5mg IM q8h prn  Monitor Qtc, if greater than 500 do not use antipsychotic medication  If QTc greater than 460, monitor and replete and deficiency of  K and Mg, recheck EKG  EKG on 12/05/2024   QTc was 461

## 2024-12-05 NOTE — ASSESSMENT & PLAN NOTE
Fall at living facility, [facility]  Fell forward, hitting head  Hematoma on right side of forehead  CT head on admission:  IMPRESSION: Minimal posttraumatic subarachnoid hemorrhage in the right sylvian fissure. No other acute intracranial hemorrhage. Right frontal scalp hematoma  Previous fall in April 2024 sustaining rib fractures    Plan:  Ensure all personal belongings and call bell are within reach   PT/OT   At baseline uses walker, wheelchair  Geriatric Pain Protocol  Acetaminophen 975 mg Q8 hours scheduled  Oxycodone 2.5 mg Q4 hours prn for moderate pain  Oxycodone 5 mg Q4 hours prn for severe pain  Apply ice to affected area as tolerated

## 2024-12-05 NOTE — ASSESSMENT & PLAN NOTE
"No results found for: \"HGBA1C\"    No results for input(s): \"POCGLU\" in the last 72 hours.    Blood Sugar Average: Last 72 hrs:    -Continue glargine at bedtime, SSI while inpatient  -Goal blood glucose less than 180  "

## 2024-12-05 NOTE — PROCEDURES
POC FAST US    Date/Time: 12/5/2024 12:00 PM    Performed by: Beny Magaña MD  Authorized by: Beny Magaña MD    Patient location:  Trauma  Other Assisting Provider: Yes (comment)    Procedure details:     Exam Type:  Diagnostic    Indications: blunt abdominal trauma      Assess for:  Intra-abdominal fluid and pericardial effusion    Technique: FAST      Views obtained:  Heart - Pericardial sac, LUQ - Splenorenal space, Suprapubic - Pouch of Manjinder and RUQ - Adkins's Pouch    Image quality: diagnostic      Image availability:  Images available in PACS  FAST Findings:     RUQ (Hepatorenal) free fluid: absent      LUQ (Splenorenal) free fluid: absent      Suprapubic free fluid: absent      Cardiac wall motion: identified      Pericardial effusion: absent    Interpretation:     Impressions: negative

## 2024-12-05 NOTE — ASSESSMENT & PLAN NOTE
Wt Readings from Last 3 Encounters:   12/05/24 78.9 kg (173 lb 15.1 oz)     Maintained on furosemide 40 mg tablet daily  Monitor electrolytes and daily weights

## 2024-12-05 NOTE — ASSESSMENT & PLAN NOTE
Continuous oxygen 2L NC  Family/spouse state that sometimes she has trouble with keeping oxygen in nose, oxygen tubing has been kinked at times  No shortness of breath on exam  Oxygen saturations when resting in bed during exam were 96-98%

## 2024-12-05 NOTE — ASSESSMENT & PLAN NOTE
Lab Results   Component Value Date    HGBA1C 8.9 (H) 12/05/2024       Recent Labs     12/05/24  1548   POCGLU 118       Blood Sugar Average: Last 72 hrs:  (P) 118    Managed on insulin glargine (Lantus) 16 units SQ once daily at bedtime

## 2024-12-05 NOTE — QUICK NOTE
Cervical Collar Clearance:    The patient had a CT scan of the cervical spine demonstrating no acute injury. On exam, the patient had no midline point tenderness or paresthesias/numbness/weakness in the extremities. The patient had full range of motion (was then able to flex, extend, and rotate head laterally) without pain. There were no distracting injuries and the patient was not intoxicated.      The patient's cervical spine was cleared radiologically and clinically. Cervical collar removed at this time.     Beny Magaña MD  12/5/2024 1:08 PM

## 2024-12-05 NOTE — ASSESSMENT & PLAN NOTE
-Unwitnessed fall at her [facility] [place], positive head strike, unknown loss of consciousness, takes Eliquis for A-fib  -Trauma workup significant for minimal posttraumatic SAH in the right sylvian fissure, right frontal scalp hematoma on CTh, otherwise CT C-spine negative and c-collar cleared in ED  -CXR shows small right sided pleural effusion and old rib fractures, no PTX  -Geriatrics consult  -PT/OT consult

## 2024-12-05 NOTE — ASSESSMENT & PLAN NOTE
Mood stable on assessment  Continue home dose of citalopram 20 mg daily  Good family support with  and daughters

## 2024-12-05 NOTE — CONSULTS
Consultation - Neurosurgery   Name: Lizzie Whitten 84 y.o. female I MRN: [id number]  Unit/Bed#: ED 20 I Date of Admission: 12/5/2024   Date of Service: 12/5/2024 I Hospital Day: 0   Inpatient consult to Neurosurgery  Consult performed by: Alejandro Peter PA-C  Consult ordered by: Beny Magaña MD        Physician Requesting Evaluation: Jai Lewis MD   Reason for Evaluation / Principal Problem: SAH    Assessment & Plan  SAH (subarachnoid hemorrhage) ([facility])  Patient presented s/p unwitnessed fall at her facility.   Reported eliquis use s/p reversal     Imaging:   CT head 12/5/2024: minimal posttraumatic SAH in the R sylvian fissure.    Plan:   ongoing frequent neurological checks.   Recommend STAT CT head for decline in GCS >2 points in 1 hour.   Recommend repeat CT head in am given AC use.  Keppra for seizure ppx per trauma team   DVT ppx: SCD's only. Recommend stable CT head prior to initiation of pharm DVT ppx.   Hold all AC/AP medication at this time. Reversal per trauma team on initial evaluation.   Eliquis use  PT/OT evaluation.   Ongoing medical management and pain control per primary team.   CM following for dispo planning.   Neurosurgery will continue to follow with completion of repeat CT head. Call with questions or concerns.     [facility] ([facility])    Please contact the SecureChat role for the Neurosurgery service with any questions/concerns.    History of Present Illness   HPI: Lizzie Whitten is a 84 y.o. year old female who presents unwitnessed fall at facility. Eliquis use reversed by trauma. External signs of trauma in forehead scalp hematoma. Oriented x3. Family at bedside. Hard of hearing     Review of Systems   Constitutional: Negative.    HENT: Negative.     Respiratory: Negative.     Cardiovascular: Negative.    Gastrointestinal: Negative.    Musculoskeletal: Negative.    Skin:  Positive for wound.   Neurological:  Negative for dizziness, speech difficulty, weakness and headaches.    Hematological:  Bruises/bleeds easily.   Psychiatric/Behavioral: Negative.       I have reviewed the patient's PMH, PSH, Social History, Family History, Meds, and Allergies    Objective :  Temp:  [98.2 °F (36.8 °C)] 98.2 °F (36.8 °C)  HR:  [58-76] 58  BP: (158-179)/(76-94) 158/79  Resp:  [1-19] 1  SpO2:  [91 %-99 %] 97 %  O2 Device: Nasal cannula    Physical Exam  Constitutional:       Appearance: Normal appearance. She is well-developed.   HENT:      Head: Normocephalic.      Comments: R forehead scalp hematoma with overlying blister  Eyes:      Extraocular Movements: Extraocular movements intact.   Neck:      Trachea: No tracheal deviation.   Pulmonary:      Effort: Pulmonary effort is normal. No respiratory distress.   Musculoskeletal:         General: Normal range of motion.      Cervical back: Normal range of motion and neck supple. No tenderness.   Skin:     General: Skin is warm and dry.   Neurological:      Mental Status: She is alert and oriented to person, place, and time.      Cranial Nerves: No cranial nerve deficit.      Sensory: No sensory deficit.      Motor: Motor strength is normal.No weakness.   Psychiatric:         Speech: Speech normal.         Behavior: Behavior normal.         Thought Content: Thought content normal.      Neurological Exam  Mental Status  Alert. Oriented to person, place and time. Oriented to person, place, and time. Recent and remote memory are intact. Speech is normal. Language is fluent with no aphasia. Attention and concentration are normal. Fund of knowledge is appropriate for level of education.    Cranial Nerves  CN III, IV, VI: Extraocular movements intact bilaterally.  CN V: Facial sensation is normal.  CN VII: Full and symmetric facial movement.  CN VIII: Hearing is normal.  CN XII: Tongue midline without atrophy or fasciculations.    Motor  Normal muscle bulk throughout. Normal muscle tone. Strength is 5/5 throughout all four extremities.    Sensory  Sensation is  intact to light touch, pinprick, vibration and proprioception in all four extremities.      Lab Results: I have reviewed the following results:  Recent Labs     12/05/24  1155 12/05/24  1218   WBC  --  6.65   HGB 13.6 11.5   HCT 40 38.1   PLT  --  179   SODIUM  --  141   K  --  4.4   CL  --  100   CO2 34* 35*   BUN  --  35*   CREATININE  --  1.72*   GLUC  --  118   CAIONIZED 0.90*  --    AST  --  15   ALT  --  8   ALB  --  3.7   TBILI  --  0.93   ALKPHOS  --  84   HSTNI0  --  16       Imaging Results Review: I personally reviewed the following image studies in PACS and associated radiology reports: CT head. My interpretation of the radiology images/reports is: See ABOVE.  Other Study Results Review: No additional pertinent studies reviewed.    VTE Pharmacologic Prophylaxis: Sequential compression device (Venodyne)

## 2024-12-05 NOTE — ASSESSMENT & PLAN NOTE
Lab Results   Component Value Date    EGFR 27 12/05/2024    CREATININE 1.72 (H) 12/05/2024   -Admit CKD 1.72, continue to monitor

## 2024-12-05 NOTE — CASE MANAGEMENT
Case Management Discharge Planning Note    Patient name Lizzie Whitten  Location TR /TR  MRN [id number]  : 1940 Date 2024       Current Admission Date: 2024  Current Admission Diagnosis:  There are no active problems to display for this patient.     LOS (days): 0  Geometric Mean LOS (GMLOS) (days):   Days to GMLOS:     OBJECTIVE:            Current admission status: Emergency   Preferred Pharmacy: No Pharmacies Listed  Primary Care Provider: No primary care provider on file.    Primary Insurance:   Secondary Insurance:     DISCHARGE DETAILS:       CM responded to trauma alert.   Pt was brought to the ED via [facility] s/p fall while at [facility]  Pt c/o + head strike and an injury above her right eye

## 2024-12-05 NOTE — ASSESSMENT & PLAN NOTE
TSH on 04/09/2024 was 10.461  Continue levothyroxine 150 mcg tablet daily  Recommend recheck of TSH and adjust medication accordingly   Ensure medication adherence and proper administration of medication  Patient utilizes blister packs from pharmacy and facility helps to administer medications

## 2024-12-05 NOTE — ASSESSMENT & PLAN NOTE
"CT head on admission showing \"minimal posttraumatic subarachnoid hemorrhage in the right sylvian fissure.  No other acute intracranial hemorrhage.  Right frontal scalp hematoma\"  Contusion noted on right side of forehead  Currently has mild pain   Eliquis being held  Was started on keppra 500 mg BID   " BLE 3/5

## 2024-12-05 NOTE — ASSESSMENT & PLAN NOTE
Geriatric Pain Protocol  Acetaminophen 975 mg Q8 hours scheduled  Oxycodone 2.5 mg Q4 hours prn for moderate pain  Oxycodone 5 mg Q4 hours prn for severe pain  Apply ice to affected area as tolerated    Recommend bowel regimen to prevent and treat constipation due to increased risk with acute pain and opiate pain medications

## 2024-12-05 NOTE — ASSESSMENT & PLAN NOTE
Alert and oriented on exam  Most recent TSH on 04/09/2024 noted to be 10.461  Recommend checking repeat TSH and Vitamin B-12 level  CT of the head on 04/13/2024 revealed   PARENCHYMA: Decreased attenuation is noted in periventricular and subcortical white matter demonstrating an appearance that is statistically most likely to represent mild microangiopathic change; this appearance is similar when compared to most recent   prior examination  No CT signs of acute infarction.  No intracranial mass, mass effect or midline shift.  No acute parenchymal hemorrhage  Stable mild chronic microangiopathic change within the brain parenchyma with no acute intracranial abnormality.  Patient scored 30/30 on MMSE with PCP in 06/2022  Resides at [facility]   Requires assistance from nursing staff with medications, dressing, bathing  Keep physically, mentally, and socially active

## 2024-12-05 NOTE — ASSESSMENT & PLAN NOTE
Clinical Frail Scale: 6- Moderately Frail  Secondary to age, falls, and co-morbidities   Need help with all outside activities  Need help with stairs and bathing  May need assistance with dressing   Family noting that patient receives help from independent living facility with medications  Decreased appetite noted from family

## 2024-12-05 NOTE — ASSESSMENT & PLAN NOTE
Hearing Impairment  Hearing impairment strongly correlated with depression, cognitive impairment, delirium and falls in the older adult  Use hearing aids or sound amplifier  Speaking face to face  Use clear dictation, enunciation of words  Patient with difficulty using hearing aides due to not having the dexterity to place hearing aides in ear and also to turn them on

## 2024-12-05 NOTE — ED PROVIDER NOTES
"Emergency Department Airway Evaluation and Management Form    History  Obtained from: EMS and patient  Review of patient's allergies indicates no known allergies.    Chief Complaint:  Trauma Alert    HPI: Pt is a 89 y.o. female presents s/p fall head strike on apixaban      I have reviewed and agree with the history as documented.  ROS is unobtainable secondary to critical status of the patient as a result of the trauma.    Physical Exam    There were no vitals filed for this visit.  Supplemental Oxygen: RA    GCS: 15  Airway: patent  Breathing and Pulmonary exam: bilateral BS  Cardiac and Circulation: RRR  Neurologic exam: moving all extremities  C spine and Neck exam: In collar      Monitor:  SR      ED Medications    No current facility-administered medications for this encounter.  No current outpatient medications on file.    Medical Decision Makin. Fall with head strike on AC      Portions of the record may have been created with voice recognition software. Occasional wrong word or \"sound a like\" substitutions may have occurred due to the inherent limitations of voice recognition software.         Rene Amos MD  24 [id number]    "

## 2024-12-05 NOTE — CONSULTS
"Consultation - Geriatric Medicine   Name: Lizzie Whitten 84 y.o. female I MRN: [id number]  Unit/Bed#: ED 20 I Date of Admission: 12/5/2024   Date of Service: 12/5/2024 I Hospital Day: 0   Inpatient consult to Gerontology  Consult performed by: DALILA Gomez  Consult ordered by: Beny Magaña MD        Physician Requesting Evaluation: Jai Lewis MD   Reason for Evaluation / Principal Problem: fall    Assessment & Plan  Fall  Fall at living facility, [facility]  Fell forward, hitting head  Hematoma on right side of forehead  CT head on admission:  IMPRESSION: Minimal posttraumatic subarachnoid hemorrhage in the right sylvian fissure. No other acute intracranial hemorrhage. Right frontal scalp hematoma  Previous fall in April 2024 sustaining rib fractures    Plan:  Ensure all personal belongings and call bell are within reach   PT/OT   At baseline uses walker, wheelchair  Geriatric Pain Protocol  Acetaminophen 975 mg Q8 hours scheduled  Oxycodone 2.5 mg Q4 hours prn for moderate pain  Oxycodone 5 mg Q4 hours prn for severe pain  Apply ice to affected area as tolerated  SAH (subarachnoid hemorrhage) (HCC)  CT head on admission showing \"minimal posttraumatic subarachnoid hemorrhage in the right sylvian fissure.  No other acute intracranial hemorrhage.  Right frontal scalp hematoma\"  Contusion noted on right side of forehead  Currently has mild pain   Eliquis being held  Was started on keppra 500 mg BID   Cognitive impairment  Alert and oriented on exam  Most recent TSH on 04/09/2024 noted to be 10.461  Recommend checking repeat TSH and Vitamin B-12 level  CT of the head on 04/13/2024 revealed   PARENCHYMA: Decreased attenuation is noted in periventricular and subcortical white matter demonstrating an appearance that is statistically most likely to represent mild microangiopathic change; this appearance is similar when compared to most recent   prior examination  No CT signs of acute " infarction.  No intracranial mass, mass effect or midline shift.  No acute parenchymal hemorrhage  Stable mild chronic microangiopathic change within the brain parenchyma with no acute intracranial abnormality.  Patient scored 30/30 on MMSE with PCP in 06/2022  Resides at [facility]   Requires assistance from nursing staff with medications, dressing, bathing  Keep physically, mentally, and socially active   A-fib ([facility])  Appears well controlled on metoprolol tartrate and eliquis  Follows outpatient with cardiology   CHF (congestive heart failure) ([facility])  Wt Readings from Last 3 Encounters:   12/05/24 78.9 kg (173 lb 15.1 oz)     Maintained on furosemide 40 mg tablet daily  Monitor electrolytes and daily weights    COPD (chronic obstructive pulmonary disease) ([facility])  Continuous oxygen 2L NC  Family/spouse state that sometimes she has trouble with keeping oxygen in nose, oxygen tubing has been kinked at times  No shortness of breath on exam  Oxygen saturations when resting in bed during exam were 96-98%  MITCH (obstructive sleep apnea)  Continuous oxygen 2L NC   states that she has been talking in her sleep a lot  Monitor for daytime sleepiness  Consider overnight sleep study while hospitalized   DM (diabetes mellitus) ([facility])  Lab Results   Component Value Date    HGBA1C 8.9 (H) 12/05/2024       Recent Labs     12/05/24  1548   POCGLU 118       Blood Sugar Average: Last 72 hrs:  (P) 118    Managed on insulin glargine (Lantus) 16 units SQ once daily at bedtime    Hypothyroidism  TSH on 04/09/2024 was 10.461  Continue levothyroxine 150 mcg tablet daily  Recommend recheck of TSH and adjust medication accordingly   Ensure medication adherence and proper administration of medication  Patient utilizes blister packs from pharmacy and facility helps to administer medications    CKD (chronic kidney disease)  Lab Results   Component Value Date    EGFR 27 12/05/2024    CREATININE 1.72 (H) 12/05/2024     Depression  with anxiety  Mood stable on assessment  Continue home dose of citalopram 20 mg daily  Good family support with  and daughters  Ambulatory dysfunction  Secondary to 2 falls this year, previous fall happening in April 2024 sustained rib fractures  Completed STR at [facility], returned home to [facility] and continued rehab there  PT/OT  Encourage use of assistive device   Uses walker/wheelchair  Continue fall precautions  DEXA scan 09/18/2024-osteopenia   Recommendation at that time was to start vitamin D 1000 units, calcium 1200 mg, weight-bearing exercises  Encourage patient to participate with activities  Provide education for patient, family, and caregivers    Acute pain due to trauma  Geriatric Pain Protocol  Acetaminophen 975 mg Q8 hours scheduled  Oxycodone 2.5 mg Q4 hours prn for moderate pain  Oxycodone 5 mg Q4 hours prn for severe pain  Apply ice to affected area as tolerated    Recommend bowel regimen to prevent and treat constipation due to increased risk with acute pain and opiate pain medications    Frailty syndrome in geriatric patient  Clinical Frail Scale: 6- Moderately Frail  Secondary to age, falls, and co-morbidities   Need help with all outside activities  Need help with stairs and bathing  May need assistance with dressing   Family noting that patient receives help from independent living facility with medications  Decreased appetite noted from family  At risk for delirium  Patient at risk for delirium secondary to age, cognitive decline, poly pharmacy, hospitalization, pain,head trauma, immobility  Identify and treat underlying cause  Provide frequent redirection, reorientation, distraction techniques  Avoid deliriogenic medications such as tramadol, benzodiazepines, anticholinergics,  Benadryl  Treat pain, See geriatric pain protocol  Monitor for constipation and urinary retention  Encourage early and frequent moblization, OOB  Encourage Hydration/ Nutrition  Implement  sleep hygiene, limit night time interuptions, group activities  Avoid physical restraints  Use chemical restraint only when other efforts have failed, recommend zyprexa 2.5mg IM q8h prn  Monitor Qtc, if greater than 500 do not use antipsychotic medication  If QTc greater than 460, monitor and replete and deficiency of  K and Mg, recheck EKG  EKG on 12/05/2024   QTc was 461  Bilateral hearing loss  Hearing Impairment  Hearing impairment strongly correlated with depression, cognitive impairment, delirium and falls in the older adult  Use hearing aids or sound amplifier  Speaking face to face  Use clear dictation, enunciation of words  Patient with difficulty using hearing aides due to not having the dexterity to place hearing aides in ear and also to turn them on      History of Present Illness   Hx and PE limited by: fatigue  HPI: Lizzie Whitten is a 84 y.o. year old female who presents after a fall at independent living facility, [facility].  Medical problems include, not limited to, hypertension, hypothyroidism, COPD, obstructive sleep apnea, DM, atrial fibrillation, depression with anxiety, and CHF.  Upon entering the room Lizzie is resting comfortably in bed.  Family present at time of assessment.  Most information was provided by daughters and .  Family states she has a fall back in April sustaining rib fractures.  At that time she was discharged to [facility] for [facility].  She continued therapy back at her living facility.  Recently she has been moving around well, she uses at baseline a walker and wheelchair.  Facility staff at [facility] help to manage medications.  [facility] helps to distribute medications in blister packs and facility helps to administer them.  They also help with IADLs, bathing and dressing.  Lizzie is continuously on oxygen but family states that recently oxygen tubing has been kinked and they are unsure if she is getting the correct oxygen requirements.   They also state diet changes have occurred.  She will eat/drink more foods that are sweet.  They have supplied the facility with Glucerna.  Lizzie has permanent teeth implants.  Has hearing aides but has been having trouble using them due to dexterity reasons.  Family recently concerned with patient being more forgetful.      Review of Systems   Constitutional:  Positive for activity change and appetite change. Negative for fatigue, fever and unexpected weight change.   HENT:  Positive for hearing loss. Negative for congestion.    Eyes:  Negative for pain, discharge and visual disturbance.   Respiratory:  Negative for cough and shortness of breath.         Chronic oxygen, 2L NC   Cardiovascular:  Negative for chest pain and palpitations.   Gastrointestinal:  Negative for abdominal pain, constipation and diarrhea.   Genitourinary:  Negative for difficulty urinating, dysuria, hematuria and urgency.   Musculoskeletal:  Positive for gait problem. Negative for arthralgias.   Skin:  Positive for wound. Negative for color change.   Neurological:  Negative for syncope and weakness.   Hematological:  Bruises/bleeds easily.   Psychiatric/Behavioral:  Positive for sleep disturbance. Negative for confusion.    All other systems reviewed and are negative.        I have reviewed the patient's PMH, PSH, Social History, Family History, Meds, and Allergies  Historical Information   No past medical history on file.  No past surgical history on file.  Social History     Tobacco Use    Smoking status: Not on file    Smokeless tobacco: Not on file   Substance and Sexual Activity    Alcohol use: Not on file    Drug use: Not on file    Sexual activity: Not on file     No existing history information found.  No existing history information found.  Family history non-contributory  Social History     Tobacco Use    Smoking status: Not on file    Smokeless tobacco: Not on file   Substance and Sexual Activity    Alcohol use: Not on file     Drug use: Not on file    Sexual activity: Not on file       Current Facility-Administered Medications:     acetaminophen (TYLENOL) tablet 650 mg, Q6H PRASHANT    atorvastatin (LIPITOR) tablet 80 mg, Daily With Dinner    [START ON 12/6/2024] citalopram (CeleXA) tablet 20 mg, Daily    HYDROmorphone HCl (DILAUDID) injection 0.2 mg, Q4H PRN    insulin glargine (LANTUS) subcutaneous injection 16 Units 0.16 mL, HS    insulin lispro (HumALOG/ADMELOG) 100 units/mL subcutaneous injection 1-6 Units, 4x Daily (AC & HS) **AND** Fingerstick Glucose (POCT), 4x Daily AC and at bedtime    levETIRAcetam (KEPPRA) tablet 500 mg, BID    [START ON 12/6/2024] levothyroxine tablet 150 mcg, Early Morning    metoprolol tartrate (LOPRESSOR) tablet 25 mg, BID    ondansetron (ZOFRAN) injection 4 mg, Q6H PRN    oxyCODONE (ROXICODONE) IR tablet 5 mg, Q4H PRN    oxyCODONE (ROXICODONE) split tablet 2.5 mg, Q4H PRN    [START ON 12/6/2024] pantoprazole (PROTONIX) EC tablet 40 mg, Early Morning    polyethylene glycol (MIRALAX) packet 17 g, Daily    senna (SENOKOT) tablet 17.2 mg, Daily  Patient has no allergy information on record.    Meds/Allergies   Home medication review  Acetaminophen 325 mg tablets, take 3 tablets TID 0600/1200/1900  Atorvastatin 80 mg tablet, take 1 tablet daily  Oyster Shell Calcium 500 mg tablet, take 1 tablet daily  Citalopram 20 mg tablet, take 1 tablet daily  Diclofenac 1% gel  Fish oil 1200 mg capsule, take 1 capsule BID  Gabapentin 100 mg capsule, take 1 capsule daily HS  Levothyroxine 150 mcg tablet, take 1 tablet daily  Lidocaine patch  Metoprolol tartrate 25 mg tablet, take 1 tablet BID   Pantoprazole 40 mg tablet, take 1 tablet daily  Furosemide 40 mg tablet, take 1 tablet daily  Miralax  Senna-s tablet, take 2 tablets daily  Insulin glargine 100 units/mL, inject 16 units SQ once daily HS  Potassium chloride 20mEq tablet, take 1 tablet daily  Eliquis 5 mg tablet, take 1 tablet BID    Personally confirmed with nursing home  records     Objective :  Temp:  [98.2 °F (36.8 °C)] 98.2 °F (36.8 °C)  HR:  [58-76] 58  BP: (158-179)/(76-94) 158/79  Resp:  [1-19] 1  SpO2:  [91 %-99 %] 97 %  O2 Device: Nasal cannula    Physical Exam  Vitals and nursing note reviewed.   Constitutional:       General: She is not in acute distress.     Appearance: She is well-developed. She is obese.   HENT:      Head: Normocephalic. Contusion present.        Comments: Hematoma, bubble on right side of forehead  Eyes:      Conjunctiva/sclera: Conjunctivae normal.   Cardiovascular:      Rate and Rhythm: Normal rate and regular rhythm.      Pulses: Normal pulses.      Heart sounds: Normal heart sounds. No murmur heard.  Pulmonary:      Effort: Pulmonary effort is normal. No respiratory distress.      Breath sounds: Normal breath sounds.   Abdominal:      General: Bowel sounds are normal.      Palpations: Abdomen is soft.      Tenderness: There is no abdominal tenderness.   Musculoskeletal:         General: No swelling.      Cervical back: Neck supple.   Skin:     General: Skin is warm and dry.      Capillary Refill: Capillary refill takes less than 2 seconds.      Findings: Bruising present.   Neurological:      Mental Status: She is alert and oriented to person, place, and time. Mental status is at baseline.   Psychiatric:         Mood and Affect: Mood normal.           Lab Results: I have reviewed the following results:CBC/BMP:   .     12/05/24  1155 12/05/24  1218   WBC  --  6.65   HGB 13.6 11.5   HCT 40 38.1   PLT  --  179   SODIUM  --  141   K  --  4.4   CL  --  100   CO2 34* 35*   BUN  --  35*   CREATININE  --  1.72*   GLUC  --  118   CAIONIZED 0.90*  --     , TSH:       Imaging Results Review: I reviewed radiology reports from this admission including: CT head.  Other Study Results Review: EKG was reviewed.       VTE Prophylaxis: Sequential compression device (Venodyne)     Code Status: Level 1 - Full Code      Family and Social Support: Daughters: Gorge and  Nidia, Spouse: Wayne    I have spent a total time of 75 minutes in caring for this patient on the day of the visit/encounter including Diagnostic results, Prognosis, Risks and benefits of tx options, Patient and family education, Importance of tx compliance, Risk factor reductions, Counseling / Coordination of care, Documenting in the medical record, Reviewing / ordering tests, medicine, procedures  , Obtaining or reviewing history  , and Communicating with other healthcare professionals .

## 2024-12-05 NOTE — ASSESSMENT & PLAN NOTE
-As above  -Neurosurgery consult  -HOT protocol, repeat CTh tomorrow 12/6, Keppra, holding Eliquis and other chemical DVT prophylaxis

## 2024-12-05 NOTE — ASSESSMENT & PLAN NOTE
Secondary to 2 falls this year, previous fall happening in April 2024 sustained rib fractures  Completed STR at [facility], returned home to [facility] and continued rehab there  PT/OT  Encourage use of assistive device   Uses walker/wheelchair  Continue fall precautions  DEXA scan 09/18/2024-osteopenia   Recommendation at that time was to start vitamin D 1000 units, calcium 1200 mg, weight-bearing exercises  Encourage patient to participate with activities  Provide education for patient, family, and caregivers

## 2024-12-06 ENCOUNTER — APPOINTMENT (OUTPATIENT)
Dept: RADIOLOGY | Facility: HOSPITAL | Age: 84
DRG: 083 | End: 2024-12-06
Payer: MEDICARE

## 2024-12-06 LAB
ANION GAP SERPL CALCULATED.3IONS-SCNC: 7 MMOL/L (ref 4–13)
ANION GAP SERPL CALCULATED.3IONS-SCNC: 7 MMOL/L (ref 4–13)
BUN SERPL-MCNC: 33 MG/DL (ref 5–25)
BUN SERPL-MCNC: 33 MG/DL (ref 5–25)
CALCIUM SERPL-MCNC: 9.1 MG/DL (ref 8.4–10.2)
CALCIUM SERPL-MCNC: 9.1 MG/DL (ref 8.4–10.2)
CHLORIDE SERPL-SCNC: 97 MMOL/L (ref 96–108)
CHLORIDE SERPL-SCNC: 97 MMOL/L (ref 96–108)
CO2 SERPL-SCNC: 36 MMOL/L (ref 21–32)
CO2 SERPL-SCNC: 36 MMOL/L (ref 21–32)
CREAT SERPL-MCNC: 1.57 MG/DL (ref 0.6–1.3)
CREAT SERPL-MCNC: 1.57 MG/DL (ref 0.6–1.3)
ERYTHROCYTE [DISTWIDTH] IN BLOOD BY AUTOMATED COUNT: 14.4 % (ref 11.6–15.1)
ERYTHROCYTE [DISTWIDTH] IN BLOOD BY AUTOMATED COUNT: 14.4 % (ref 11.6–15.1)
GFR SERPL CREATININE-BSD FRML MDRD: 30 ML/MIN/1.73SQ M
GFR SERPL CREATININE-BSD FRML MDRD: 30 ML/MIN/1.73SQ M
GLUCOSE SERPL-MCNC: 152 MG/DL (ref 65–140)
GLUCOSE SERPL-MCNC: 152 MG/DL (ref 65–140)
GLUCOSE SERPL-MCNC: 174 MG/DL (ref 65–140)
GLUCOSE SERPL-MCNC: 174 MG/DL (ref 65–140)
GLUCOSE SERPL-MCNC: 184 MG/DL (ref 65–140)
GLUCOSE SERPL-MCNC: 184 MG/DL (ref 65–140)
GLUCOSE SERPL-MCNC: 49 MG/DL (ref 65–140)
GLUCOSE SERPL-MCNC: 49 MG/DL (ref 65–140)
GLUCOSE SERPL-MCNC: 54 MG/DL (ref 65–140)
GLUCOSE SERPL-MCNC: 54 MG/DL (ref 65–140)
GLUCOSE SERPL-MCNC: 96 MG/DL (ref 65–140)
GLUCOSE SERPL-MCNC: 96 MG/DL (ref 65–140)
HCT VFR BLD AUTO: 39.2 % (ref 34.8–46.1)
HCT VFR BLD AUTO: 39.2 % (ref 34.8–46.1)
HGB BLD-MCNC: 11.6 G/DL (ref 11.5–15.4)
HGB BLD-MCNC: 11.6 G/DL (ref 11.5–15.4)
MAGNESIUM SERPL-MCNC: 1.3 MG/DL (ref 1.9–2.7)
MAGNESIUM SERPL-MCNC: 1.3 MG/DL (ref 1.9–2.7)
MCH RBC QN AUTO: 28.7 PG (ref 26.8–34.3)
MCH RBC QN AUTO: 28.7 PG (ref 26.8–34.3)
MCHC RBC AUTO-ENTMCNC: 29.6 G/DL (ref 31.4–37.4)
MCHC RBC AUTO-ENTMCNC: 29.6 G/DL (ref 31.4–37.4)
MCV RBC AUTO: 97 FL (ref 82–98)
MCV RBC AUTO: 97 FL (ref 82–98)
PHOSPHATE SERPL-MCNC: 3.9 MG/DL (ref 2.3–4.1)
PHOSPHATE SERPL-MCNC: 3.9 MG/DL (ref 2.3–4.1)
PLATELET # BLD AUTO: 175 THOUSANDS/UL (ref 149–390)
PLATELET # BLD AUTO: 175 THOUSANDS/UL (ref 149–390)
PMV BLD AUTO: 10.5 FL (ref 8.9–12.7)
PMV BLD AUTO: 10.5 FL (ref 8.9–12.7)
POTASSIUM SERPL-SCNC: 3.9 MMOL/L (ref 3.5–5.3)
POTASSIUM SERPL-SCNC: 3.9 MMOL/L (ref 3.5–5.3)
RBC # BLD AUTO: 4.04 MILLION/UL (ref 3.81–5.12)
RBC # BLD AUTO: 4.04 MILLION/UL (ref 3.81–5.12)
SODIUM SERPL-SCNC: 140 MMOL/L (ref 135–147)
SODIUM SERPL-SCNC: 140 MMOL/L (ref 135–147)
WBC # BLD AUTO: 5.64 THOUSAND/UL (ref 4.31–10.16)
WBC # BLD AUTO: 5.64 THOUSAND/UL (ref 4.31–10.16)

## 2024-12-06 PROCEDURE — 82948 REAGENT STRIP/BLOOD GLUCOSE: CPT

## 2024-12-06 PROCEDURE — 80048 BASIC METABOLIC PNL TOTAL CA: CPT

## 2024-12-06 PROCEDURE — 99232 SBSQ HOSP IP/OBS MODERATE 35: CPT | Performed by: EMERGENCY MEDICINE

## 2024-12-06 PROCEDURE — 99232 SBSQ HOSP IP/OBS MODERATE 35: CPT | Performed by: PHYSICIAN ASSISTANT

## 2024-12-06 PROCEDURE — 70450 CT HEAD/BRAIN W/O DYE: CPT

## 2024-12-06 PROCEDURE — 97163 PT EVAL HIGH COMPLEX 45 MIN: CPT

## 2024-12-06 PROCEDURE — 83735 ASSAY OF MAGNESIUM: CPT

## 2024-12-06 PROCEDURE — 97167 OT EVAL HIGH COMPLEX 60 MIN: CPT

## 2024-12-06 PROCEDURE — 99233 SBSQ HOSP IP/OBS HIGH 50: CPT

## 2024-12-06 PROCEDURE — 85027 COMPLETE CBC AUTOMATED: CPT

## 2024-12-06 PROCEDURE — 84100 ASSAY OF PHOSPHORUS: CPT

## 2024-12-06 RX ORDER — FUROSEMIDE 40 MG/1
40 TABLET ORAL DAILY
Status: DISCONTINUED | OUTPATIENT
Start: 2024-12-06 | End: 2024-12-09 | Stop reason: HOSPADM

## 2024-12-06 RX ADMIN — INSULIN LISPRO 1 UNITS: 100 INJECTION, SOLUTION INTRAVENOUS; SUBCUTANEOUS at 21:04

## 2024-12-06 RX ADMIN — PANTOPRAZOLE SODIUM 40 MG: 40 TABLET, DELAYED RELEASE ORAL at 05:23

## 2024-12-06 RX ADMIN — INSULIN LISPRO 1 UNITS: 100 INJECTION, SOLUTION INTRAVENOUS; SUBCUTANEOUS at 17:21

## 2024-12-06 RX ADMIN — LEVOTHYROXINE SODIUM 150 MCG: 75 TABLET ORAL at 05:23

## 2024-12-06 RX ADMIN — ATORVASTATIN CALCIUM 80 MG: 80 TABLET, FILM COATED ORAL at 16:21

## 2024-12-06 RX ADMIN — LEVETIRACETAM 500 MG: 500 TABLET, FILM COATED ORAL at 08:51

## 2024-12-06 RX ADMIN — LEVETIRACETAM 500 MG: 500 TABLET, FILM COATED ORAL at 17:21

## 2024-12-06 RX ADMIN — INSULIN GLARGINE 16 UNITS: 100 INJECTION, SOLUTION SUBCUTANEOUS at 21:05

## 2024-12-06 RX ADMIN — ACETAMINOPHEN 650 MG: 325 TABLET, FILM COATED ORAL at 17:21

## 2024-12-06 RX ADMIN — ACETAMINOPHEN 650 MG: 325 TABLET, FILM COATED ORAL at 11:47

## 2024-12-06 RX ADMIN — METOPROLOL TARTRATE 25 MG: 25 TABLET, FILM COATED ORAL at 08:51

## 2024-12-06 RX ADMIN — CITALOPRAM HYDROBROMIDE 20 MG: 20 TABLET ORAL at 08:51

## 2024-12-06 RX ADMIN — METOPROLOL TARTRATE 25 MG: 25 TABLET, FILM COATED ORAL at 17:21

## 2024-12-06 RX ADMIN — ACETAMINOPHEN 650 MG: 325 TABLET, FILM COATED ORAL at 05:23

## 2024-12-06 RX ADMIN — SENNOSIDES 17.2 MG: 8.6 TABLET, FILM COATED ORAL at 08:51

## 2024-12-06 RX ADMIN — FUROSEMIDE 40 MG: 40 TABLET ORAL at 16:21

## 2024-12-06 RX ADMIN — INSULIN LISPRO 1 UNITS: 100 INJECTION, SOLUTION INTRAVENOUS; SUBCUTANEOUS at 11:46

## 2024-12-06 RX ADMIN — ACETAMINOPHEN 650 MG: 325 TABLET, FILM COATED ORAL at 23:30

## 2024-12-06 RX ADMIN — POLYETHYLENE GLYCOL 3350 17 G: 17 POWDER, FOR SOLUTION ORAL at 08:51

## 2024-12-06 NOTE — OCCUPATIONAL THERAPY NOTE
83 YO Female SEEN FOR INITIAL OCCUPATIONAL THERAPY EVALUATION FOLLOWING ADMISSION TO [facility] S/P FALL WITH +HS RESULTING IN SAH. PROBLEMS LIST/PMH INCLUDES DEPRESSION, ANXIETY, CKD, HYPOTHYROIDISM, GERD, DM, COPD, CAD, CHF, A-FIB,  AND AMBULATORY DYSFUNCTION. PT IS FROM [facility] WHERE SHE REPORTS HAVING ASSIST WITH ADLS INCLUDING DRESSING/BATHING AND IADLS PTA. PT CURRENTLY REQUIRES OVERALL WITH ADLS, TRANSFERS AND FUNCTIONAL MOBILITY WITH WITHOUT USE OF RW. PT IS LIMITED 2' {impairments:33309}. PT EDUCATED ON {EDUCATION:17756}. FROM AN OCCUPATIONAL THERAPY PERSPECTIVE, {D/CPLAN:98819}. WILL CONT TO FOLLOW TO ADDRESS THE BELOW DESCRIBED GOALS.

## 2024-12-06 NOTE — PLAN OF CARE
Problem: OCCUPATIONAL THERAPY ADULT  Goal: Performs self-care activities at highest level of function for planned discharge setting.  See evaluation for individualized goals.  Description: Treatment Interventions: ADL retraining, Functional transfer training, Endurance training, Cognitive reorientation, Patient/family training, Equipment evaluation/education, Compensatory technique education, Energy conservation, Activityengagement          See flowsheet documentation for full assessment, interventions and recommendations.   Note: Limitation: Decreased ADL status, Decreased Safe judgement during ADL, Decreased cognition, Decreased endurance, Decreased self-care trans, Decreased high-level ADLs  Prognosis: Good  Assessment: 83 YO Female SEEN FOR INITIAL OCCUPATIONAL THERAPY EVALUATION FOLLOWING ADMISSION TO [facility] S/P FALL WITH +HS RESULTING IN SAH. PROBLEMS LIST/PMH INCLUDES DEPRESSION, ANXIETY, CKD, HYPOTHYROIDISM, GERD, DM, COPD, CAD, CHF, A-FIB, AND AMBULATORY DYSFUNCTION. PT IS FROM [facility] WHERE SHE REPORTS HAVING ASSIST WITH ADLS INCLUDING DRESSING/BATHING AND IADLS PTA. MOD I FOR MOBILITY AT BASELINE.  PT CURRENTLY REQUIRES OVERALL MIN-MOD A WITH UB ADLS, MAX A WITH LB ADLS AND MOD A WITH TRANSFERS / LIMITED FUNCTIONAL MOBILITY WITH USE OF RW. PT IS LIMITED 2' PAIN, FATIGUE, IMPAIRED BALANCE, FALL RISK , OVERALL WEAKNESS/DECONDITIONING , MILDLY SLOW TO PROCESS/RESPOND, and OVERALL LIMITED ACTIVITY TOLERANCE. PT EDUCATED ON DEEP BREATHING TECHNIQUES T/O ACTIVITY, SLOWING OF PACE, and CONTINUE PARTICIPATION IN SELF-CARE/MOBILITY WITH STAFF WHILE IN THE HOSPITAL . The patient's raw score on the -PAC Daily Activity Inpatient Short Form is 14. A raw score of less than 19 suggests the patient may benefit from discharge to post-acute rehabilitation services. Please refer to the recommendation of the Occupational Therapist for safe discharge planning.  FROM AN OCCUPATIONAL THERAPY  PERSPECTIVE, RECOMMEND LEVEL II RESOURCES UPON D/C. WILL CONT TO FOLLOW TO ADDRESS THE BELOW DESCRIBED GOALS.     Rehab Resource Intensity Level, OT: II (Moderate Resource Intensity)

## 2024-12-06 NOTE — ASSESSMENT & PLAN NOTE
Continuous oxygen 2L NC  Patient unsure of when she started using oxygen continuously   Family/spouse state that sometimes she has trouble with keeping oxygen in nose, oxygen tubing has been kinked at times  No shortness of breath on exam  Oxygen saturations when resting in recliner was 100%

## 2024-12-06 NOTE — ASSESSMENT & PLAN NOTE
Alert and oriented on exam  Most recent TSH on 04/09/2024 noted to be 10.461  Recommend checking repeat TSH and Vitamin B-12 level  CT of the head on 04/13/2024 revealed   No CT signs of acute infarction.  No intracranial mass, mass effect or midline shift.  No acute parenchymal hemorrhage  Stable mild chronic microangiopathic change within the brain parenchyma with no acute intracranial abnormality.  Patient scored 30/30 on MMSE with PCP in 06/2022  Resides at [facility]   Requires assistance from nursing staff with medications, dressing, bathing  Keep physically, mentally, and socially active   Fall precautions   Monitor I/O's  Ensure good bowel function with goal of 1 BM every 1-2 days

## 2024-12-06 NOTE — OCCUPATIONAL THERAPY NOTE
Occupational Therapy Evaluation     Patient Name: Lizzie Whitten  Today's Date: 12/6/2024  Problem List  Active Problems:    Fall    SAH (subarachnoid hemorrhage) (HCC)    Primary hypertension    A-fib (HCC)    CHF (congestive heart failure) (HCC)    CAD (coronary artery disease)    COPD (chronic obstructive pulmonary disease) (HCC)    MITCH (obstructive sleep apnea)    DM (diabetes mellitus) (HCC)    GERD (gastroesophageal reflux disease)    Hypothyroidism    CKD (chronic kidney disease)    Iron deficiency anemia    Depression with anxiety    Ambulatory dysfunction    HLD (hyperlipidemia)    Acute pain due to trauma    Frailty syndrome in geriatric patient    At risk for delirium    Bilateral hearing loss    Cognitive impairment    Past Medical History  No past medical history on file.  Past Surgical History  No past surgical history on file.      12/06/24 0900   OT Last Visit   OT Visit Date 12/06/24   Note Type   Note type Evaluation   Pain Assessment   Pain Assessment Tool 0-10   Pain Score 4   Pain Location/Orientation Location: Head   Patient's Stated Pain Goal No pain   Hospital Pain Intervention(s) Repositioned;Ambulation/increased activity;Emotional support   Restrictions/Precautions   Weight Bearing Precautions Per Order No   Other Precautions Cognitive;Chair Alarm;Bed Alarm;Multiple lines;Fall Risk;Pain   Home Living   Type of Home Assisted living  ([facility])   Home Layout One level   Bathroom Shower/Tub Walk-in shower   Bathroom Toilet Raised   Bathroom Equipment Shower chair;Grab bars in shower;Grab bars around toilet   Bathroom Accessibility Accessible   Home Equipment Walker;Wheelchair-manual   Additional Comments PT REPORTS PRIAMRILY USING RW AT BASELINE HOWEVER UTILIZES W/C WITH FATIGUE   Prior Function   Level of [place] Independent with functional mobility;Needs assistance with ADLs;Needs assistance with IADLS   Lives With Spouse   Receives Help From Family;Personal care  attendant   IADLs Family/Friend/Other provides transportation;Family/Friend/Other provides meals;Family/Friend/Other provides medication management   Falls in the last 6 months 1 to 4  (2)   Vocational Retired   Lifestyle   Autonomy PT REPORT HAVING ASSIST WITH BATHING/DRESSING AND IADLS AT BASELINE   Reciprocal Relationships LIVES [facility] SPOUSE [facility]   Service to Others RETIRED   Intrinsic Gratification ENJOYS SPENDING TIME WITH SPOUSE.   ADL   Eating Assistance 5  Supervision/Setup   Grooming Assistance 4  Minimal Assistance   UB Bathing Assistance 4  Minimal Assistance   LB Bathing Assistance 2  Maximal Assistance   UB Dressing Assistance 3  Moderate Assistance   LB Dressing Assistance 2  Maximal Assistance   Toileting Assistance  2  Maximal Assistance   Functional Assistance 2  Maximal Assistance   Bed Mobility   Supine to Sit 4  Minimal assistance   Additional items Assist x 1;Increased time required;Verbal cues;LE management   Sit to Supine Unable to assess   Additional Comments PT LEFT OOB WITH ALL NEEDS IN REACH + CHAIR ALARM ACTIVATED.   Transfers   Sit to Stand 3  Moderate assistance   Additional items Assist x 1;Increased time required;Verbal cues   Stand to Sit 3  Moderate assistance   Additional items Assist x 1;Increased time required;Verbal cues   Functional Mobility   Functional Mobility 3  Moderate assistance   Additional items Rolling walker   Balance   Static Sitting Fair   Static Standing Poor +   Ambulatory Poor   Activity Tolerance   Activity Tolerance Patient limited by fatigue;Patient limited by pain   Medical Staff Made Aware PT SEEN FOR CO-EVAL WITH SKILLED PHYSICAL THERAPIST 2' TRAUAMTIC INJURIES, NEW PRECAUTIONS/LIMITATIONS, AND LIMITED ACTIVITY TOLERANCE WHICH IMPACT PERFORMANCE AND ARE A REGRESSION FROM PT'S BASELINE.   Nurse Made Aware APPROPRIATE TO SEE PER RN.   RUE Assessment   RUE Assessment WFL   LUE Assessment   LUE Assessment WFL   Cognition   Overall Cognitive  Status Impaired   Arousal/Participation Alert;Cooperative   Attention Attends with cues to redirect   Orientation Level Oriented to person;Oriented to place;Oriented to situation;Disoriented to time   Memory Decreased recall of precautions;Decreased recall of recent events;Decreased short term memory   Following Commands Follows one step commands without difficulty   Comments PT IS PLEASANT AND COOPERATIVE. MILDLY SLOW TO PROCESS/RESPOND. ALARM ON FOR SAFETY   Assessment   Limitation Decreased ADL status;Decreased Safe judgement during ADL;Decreased cognition;Decreased endurance;Decreased self-care trans;Decreased high-level ADLs   Prognosis Good   Assessment 85 YO Female SEEN FOR INITIAL OCCUPATIONAL THERAPY EVALUATION FOLLOWING ADMISSION TO [facility] S/ FALL WITH +HS RESULTING IN SAH. PROBLEMS LIST/PMH INCLUDES DEPRESSION, ANXIETY, CKD, HYPOTHYROIDISM, GERD, DM, COPD, CAD, CHF, A-FIB, AND AMBULATORY DYSFUNCTION. PT IS FROM [facility] [place] WHERE SHE REPORTS HAVING ASSIST WITH ADLS INCLUDING DRESSING/BATHING AND IADLS PTA. MOD I FOR MOBILITY AT BASELINE.  PT CURRENTLY REQUIRES OVERALL MIN-MOD A WITH UB ADLS, MAX A WITH LB ADLS AND MOD A WITH TRANSFERS / LIMITED FUNCTIONAL MOBILITY WITH USE OF RW. PT IS LIMITED 2' PAIN, FATIGUE, IMPAIRED BALANCE, FALL RISK , OVERALL WEAKNESS/DECONDITIONING , MILDLY SLOW TO PROCESS/RESPOND, and OVERALL LIMITED ACTIVITY TOLERANCE. PT EDUCATED ON DEEP BREATHING TECHNIQUES T/O ACTIVITY, SLOWING OF PACE, and CONTINUE PARTICIPATION IN SELF-CARE/MOBILITY WITH STAFF WHILE IN THE HOSPITAL . The patient's raw score on the -PAC Daily Activity Inpatient Short Form is 14. A raw score of less than 19 suggests the patient may benefit from discharge to post-acute rehabilitation services. Please refer to the recommendation of the Occupational Therapist for safe discharge planning.  FROM AN OCCUPATIONAL THERAPY PERSPECTIVE, RECOMMEND LEVEL II RESOURCES UPON D/C. WILL CONT TO FOLLOW  TO ADDRESS THE BELOW DESCRIBED GOALS.   Goals   Patient Goals TO GET BETTER   LTG Time Frame 10-14   Long Term Goal #1 SEE BELOW   Plan   Treatment Interventions ADL retraining;Functional transfer training;Endurance training;Cognitive reorientation;Patient/family training;Equipment evaluation/education;Compensatory technique education;Energy conservation;Activityengagement   Goal Expiration Date 12/20/24   OT Frequency 2-3x/wk   Discharge Recommendation   Rehab Resource Intensity Level, OT II (Moderate Resource Intensity)   AM-PAC Daily Activity Inpatient   Lower Body Dressing 2   Bathing 2   Toileting 2   Upper Body Dressing 2   Grooming 3   Eating 3   Daily Activity Raw Score 14   Daily Activity Standardized Score (Calc for Raw Score >=11) 33.39   AM-PAC Applied Cognition Inpatient   Following a Speech/Presentation 3   Understanding Ordinary Conversation 4   Taking Medications 2   Remembering Where Things Are Placed or Put Away 3   Remembering List of 4-5 Errands 2   Taking Care of Complicated Tasks 2   Applied Cognition Raw Score 16   Applied Cognition Standardized Score 35.03       OCCUPATIONAL THERAPY GOALS TO BE MET WITHIN 14 DAYS:    -Pt will increase bed mobility to S to participate in functional activities with G tolerance and balance.  -Pt will improve functional mobility and transfers to S on/off all surfaces including toileting.  -Pt will increase independence in all ADLS to MIN A with G balance sitting upright in chair.  -Pt will improve activity tolerance to G for 20 min txment sessions w/ G carry over of learned energy conservation techniques.  -Pt will demonstrate G carryover of learned safety techniques and proper body mechanics in functional and leisure activities with use of DME.  -Pt will complete additional cognitive assessment with 100% attention to task in order to assist with safe d/c plan.   -Pt will follow 100% simple 2-step commands and be A&O x4 consistently with environmental cues to  increase participation in functional activities.       Documentation completed by KRISTINA Arriola OTR/L  MOCA Certified ID# [id number]

## 2024-12-06 NOTE — ASSESSMENT & PLAN NOTE
Lab Results   Component Value Date    HGBA1C 8.9 (H) 12/05/2024       Recent Labs     12/05/24  1548 12/05/24  1758 12/05/24 2054   POCGLU 118 110 93       Blood Sugar Average: Last 72 hrs:  (P) 107  -Continue glargine at bedtime, SSI while inpatient  -Goal blood glucose less than 180

## 2024-12-06 NOTE — PHYSICAL THERAPY NOTE
Physical Therapy Evaluation     Patient's Name: Lizzie Whitten    Admitting Diagnosis  Multiple injuries [T07.XXXA]  SAH (subarachnoid hemorrhage) (HCC) [I60.9]  Fall, initial encounter [W19.XXXA]    Problem List  Patient Active Problem List   Diagnosis    Fall    SAH (subarachnoid hemorrhage) (HCC)    Primary hypertension    A-fib (HCC)    CHF (congestive heart failure) (HCC)    CAD (coronary artery disease)    COPD (chronic obstructive pulmonary disease) (HCC)    MITCH (obstructive sleep apnea)    DM (diabetes mellitus) (HCC)    GERD (gastroesophageal reflux disease)    Hypothyroidism    CKD (chronic kidney disease)    Iron deficiency anemia    Depression with anxiety    Ambulatory dysfunction    HLD (hyperlipidemia)    Acute pain due to trauma    Frailty syndrome in geriatric patient    At risk for delirium    Bilateral hearing loss    Cognitive impairment       Past Medical History  No past medical history on file.    Past Surgical History  No past surgical history on file.         12/06/24 0901   PT Last Visit   PT Visit Date 12/06/24   Note Type   Note type Evaluation   Pain Assessment   Pain Assessment Tool 0-10   Pain Score 4   Pain Location/Orientation Location: Head   Restrictions/Precautions   Weight Bearing Precautions Per Order No   Other Precautions Cognitive;Chair Alarm;Bed Alarm;Multiple lines;Telemetry;Fall Risk;Pain   Home Living   Type of Home Assisted living  ([facility])   Home Equipment Walker;Wheelchair-manual  (uses both PTA, WC when fatigued)   Prior Function   Level of [place] Needs assistance with ADLs;Needs assistance with functional mobility;Needs assistance with IADLS   Lives With Facility staff   Receives Help From Personal care attendant   Falls in the last 6 months 1 to 4  (2 falls)   General   Family/Caregiver Present No   Cognition   Overall Cognitive Status Impaired   Arousal/Participation Alert   Orientation Level Oriented to person;Oriented to place;Oriented to  situation;Disoriented to time   Memory Decreased recall of precautions   Following Commands Follows one step commands with increased time or repetition   Subjective   Subjective Pleasant and agreeable to participate in therapy session   RLE Assessment   RLE Assessment   (functionally 3/5)   LLE Assessment   LLE Assessment   (functionally 3/5)   Bed Mobility   Supine to Sit 4  Minimal assistance   Additional items Assist x 1;HOB elevated;Increased time required;Verbal cues;LE management   Additional Comments Left OOB in chair with chair alarm intact and all needs in reach.   Transfers   Sit to Stand 3  Moderate assistance   Additional items Assist x 1;Increased time required;Verbal cues   Stand to Sit 3  Moderate assistance   Additional items Assist x 1;Increased time required;Verbal cues   Additional Comments with RW   Ambulation/Elevation   Gait pattern Excessively slow;Short stride;Foward flexed;Decreased foot clearance;Improper Weight shift   Gait Assistance 3  Moderate assist   Additional items Assist x 1;Tactile cues;Verbal cues   Assistive Device Rolling walker   Distance 3 ft from bed to chair   Balance   Static Sitting Fair   Dynamic Sitting Fair -   Static Standing Poor +   Dynamic Standing Poor +   Ambulatory Poor   Activity Tolerance   Activity Tolerance Patient limited by fatigue;Patient limited by pain   Medical Staff Made Aware OT Chise   Nurse Made Aware RN cleared pt to be seen by PT   Assessment   Prognosis Good   Problem List Decreased strength;Decreased endurance;Impaired balance;Decreased mobility;Decreased safety awareness;Decreased cognition;Impaired judgement;Pain   Assessment Pt seen for high complexity PT evaluation due to decrease in functional mobility status compared to baseline.  Pt with active PT eval/treat orders at this time.  Pt is a 84 y.o. F who presented to [facility] s/p unwitnessed fall with +head strike resulting in SAH on 15/5/24.  Pt  has no past medical history on  file.  Pt resides at [facility].  Pt presents with decreased strength, balance, endurance that contribute to limitations in bed mobility, functional transfers, functional mobility.  Pt requires Min A for bed mobility, Mod A for STS and short distance mobility at this time.  Pt left upright in bedside chair with chair alarm intact with all needs in reach.  Pt will benefit from skilled therapy in order to address current impairments and functional limitations. PT to follow pt and recommending level II.  The patient's AM-PAC Basic Mobility Inpatient Short Form Raw Score is 13. A Raw score of less than or equal to 16 suggests the patient may benefit from discharge to post-acute rehabilitation services. Please also refer to the recommendation of the Physical Therapist for safe discharge planning.   Goals   Patient Goals to get better   [facility] Expiration Date 12/20/24   Short Term Goal #1 1. Pt will demonstrate ability to perform all aspects of bed mobility with S in order to increase independence and decrease burden on caregivers. 2. Pt will demonstrate ability to perform functional transfers with S in order to increase independence and decrease burden on caregivers.  3. Pt will demonstrate ability to ambulate 50+ ft with least restrictive AD with S in order to return to mobility safely. 4. Pt will demonstrate ability to perform WC mobility 200 ft with S in order to return to household/community mobility safely. 5. Pt will demonstrate improved balance by one grade order to decrease risk of falls.  6. Pt will increase b/l LE strength by 1 grade in order to increase ease of functional mobility and transfers.   Plan   Treatment/Interventions Functional transfer training;LE strengthening/ROM;Therapeutic exercise;Endurance training;Patient/family training;Equipment eval/education;Gait training;Bed mobility;Spoke to nursing   PT Frequency 3-5x/wk   Discharge Recommendation   Rehab Resource Intensity Level, PT II  (Moderate Resource Intensity)   AM-PAC Basic Mobility Inpatient   Turning in Flat Bed Without Bedrails 3   Lying on Back to Sitting on Edge of Flat Bed Without Bedrails 3   Moving Bed to Chair 2   Standing Up From Chair Using Arms 2   Walk in Room 2   Climb 3-5 Stairs With Railing 1   Basic Mobility Inpatient Raw Score 13   Basic Mobility Standardized Score 33.99   [facility] Highest Level Of Mobility   -HLM Goal 4: Move to chair/commode   -HLM Achieved 4: Move to chair/commode   Modified [place] Scale   Modified [place] Scale 4       Rody Sanford, PT, DPT

## 2024-12-06 NOTE — ASSESSMENT & PLAN NOTE
Appears well controlled on metoprolol tartrate 25 mg BID and eliquis  Eliquis currently on hold per Neurosurgery, restarting around 12/19  Follows outpatient with cardiology

## 2024-12-06 NOTE — ASSESSMENT & PLAN NOTE
Lab Results   Component Value Date    EGFR 30 12/06/2024    EGFR 27 12/05/2024    CREATININE 1.57 (H) 12/06/2024    CREATININE 1.72 (H) 12/05/2024

## 2024-12-06 NOTE — ASSESSMENT & PLAN NOTE
Clinical Frail Scale: 6- Moderately Frail  Secondary to age, falls, and co-morbidities   Need help with all outside activities  Need help with stairs and bathing  May need assistance with dressing   Family noting that patient receives help from independent living facility with medications  Decreased appetite noted from family  Recommend nutritional supplements, drinks glucerna at home  Albumin on 12/05/2024 was 3.7

## 2024-12-06 NOTE — PROGRESS NOTES
"Progress Note - Geriatric Medicine   Name: Lizzie Whitten 84 y.o. female I MRN: [id number]  [facility] I Date of Admission: 12/5/2024   Date of Service: 12/6/2024 I Hospital Day: 0    Assessment & Plan  Fall  Fall at living facility, [facility]  Fell forward, hitting head  Hematoma on right side of forehead  CT head on admission:  IMPRESSION: Minimal posttraumatic subarachnoid hemorrhage in the right sylvian fissure. No other acute intracranial hemorrhage. Right frontal scalp hematoma  Previous fall in April 2024 sustaining rib fractures    Plan:  Ensure all personal belongings and call bell are within reach   PT/OT   At baseline uses walker, wheelchair  Geriatric Pain Protocol  Acetaminophen 975 mg Q8 hours scheduled  Oxycodone 2.5 mg Q4 hours prn for moderate pain  Oxycodone 5 mg Q4 hours prn for severe pain  Apply ice to affected area as tolerated  SAH (subarachnoid hemorrhage) (HCC)  CT head on admission showing \"minimal posttraumatic subarachnoid hemorrhage in the right sylvian fissure.  No other acute intracranial hemorrhage.  Right frontal scalp hematoma\"  Repeat CT head on 12/06 with \"redemonstration of large right frontal scalp hematoma\" \"interval evolution of previously seen small amount of hyperdense subarachnoid hemorrhage in the posterior sylvian fissure, mild interval increase in size\"  Hematoma noted on right side of forehead with bubble  Currently has mild pain on forehead  Eliquis being held, per Neurosurgery will resume around 12/19  Was started on keppra 500 mg BID   Cognitive impairment  Alert and oriented on exam  Most recent TSH on 04/09/2024 noted to be 10.461  Recommend checking repeat TSH and Vitamin B-12 level  CT of the head on 04/13/2024 revealed   No CT signs of acute infarction.  No intracranial mass, mass effect or midline shift.  No acute parenchymal hemorrhage  Stable mild chronic microangiopathic change within the brain parenchyma with no acute " intracranial abnormality.  Patient scored 30/30 on MMSE with PCP in 06/2022  Resides at [facility]   Requires assistance from nursing staff with medications, dressing, bathing  Keep physically, mentally, and socially active   Fall precautions   Monitor I/O's  Ensure good bowel function with goal of 1 BM every 1-2 days  A-fib ([facility])  Appears well controlled on metoprolol tartrate 25 mg BID and eliquis  Eliquis currently on hold per Neurosurgery, restarting around 12/19  Follows outpatient with cardiology   CHF (congestive heart failure) ([facility])  Wt Readings from Last 3 Encounters:   12/05/24 78.9 kg (173 lb 15.1 oz)     Maintained on furosemide 40 mg tablet daily  Monitor electrolytes and daily weights    COPD (chronic obstructive pulmonary disease) ([facility])  Continuous oxygen 2L NC  Patient unsure of when she started using oxygen continuously   Family/spouse state that sometimes she has trouble with keeping oxygen in nose, oxygen tubing has been kinked at times  No shortness of breath on exam  Oxygen saturations when resting in recliner was 100%  MITCH (obstructive sleep apnea)  Continuous oxygen 2L NC   states that she has been talking in her sleep a lot  Monitor for daytime sleepiness  Consider overnight sleep study while hospitalized   DM (diabetes mellitus) ([facility])  Lab Results   Component Value Date    HGBA1C 8.9 (H) 12/05/2024       Recent Labs     12/05/24 2054 12/06/24  0827 12/06/24  0849 12/06/24  1110   POCGLU 93 49* 96 152*       Blood Sugar Average: Last 72 hrs:  (P) 103    Past HgA1c's appear to be around 8.0  Managed on insulin glargine (Lantus) 16 units SQ once daily at bedtime  Episode of hypoglycemia in the morning of 12/06/2024  Possibly requiring decrease of nighttime lantus  Patient states she checks her sugars and self administers her insulin     Hypothyroidism  TSH on 04/09/2024 was 10.461  Continue levothyroxine 150 mcg tablet daily  Recommend recheck of TSH and adjust medication  accordingly   Ensure medication adherence and proper administration of medication  Patient utilizes blister packs from pharmacy and facility helps to administer medications    CKD (chronic kidney disease)  Lab Results   Component Value Date    EGFR 30 12/06/2024    EGFR 27 12/05/2024    CREATININE 1.57 (H) 12/06/2024    CREATININE 1.72 (H) 12/05/2024       Depression with anxiety  Mood stable on assessment  Continue home dose of citalopram 20 mg daily  Good family support with  and daughters  Ambulatory dysfunction  Secondary to 2 falls this year, previous fall happening in April 2024 sustained rib fractures  Completed STR at [facility], returned home to [facility] and continued rehab there  PT/OT  Encourage use of assistive device   Uses walker/wheelchair  Continue fall precautions  DEXA scan 09/18/2024-osteopenia   Recommendation at that time was to start vitamin D 1000 units, calcium 1200 mg, weight-bearing exercises  Encourage patient to participate with activities  Provide education for patient, family, and caregivers    Acute pain due to trauma  Geriatric Pain Protocol  Acetaminophen 975 mg Q8 hours scheduled  Oxycodone 2.5 mg Q4 hours prn for moderate pain  Oxycodone 5 mg Q4 hours prn for severe pain  Apply ice to affected area as tolerated    Recommend bowel regimen to prevent and treat constipation due to increased risk with acute pain and opiate pain medications  Current bowel regimen includes: miralax and senna  Last reported BM PTA    Frailty syndrome in geriatric patient  Clinical Frail Scale: 6- Moderately Frail  Secondary to age, falls, and co-morbidities   Need help with all outside activities  Need help with stairs and bathing  May need assistance with dressing   Family noting that patient receives help from independent living facility with medications  Decreased appetite noted from family  Recommend nutritional supplements, drinks glucerna at home  Albumin on 12/05/2024 was  3.7  At risk for delirium  Patient at risk for delirium secondary to age, cognitive decline, poly pharmacy, hospitalization, pain,head trauma, immobility  Identify and treat underlying cause  Provide frequent redirection, reorientation, distraction techniques  Avoid deliriogenic medications such as tramadol, benzodiazepines, anticholinergics,  Benadryl  Treat pain, See geriatric pain protocol  Monitor for constipation and urinary retention  Encourage early and frequent moblization, OOB  Encourage Hydration/ Nutrition  Implement sleep hygiene, limit night time interuptions, group activities  Avoid physical restraints  Use chemical restraint only when other efforts have failed, recommend zyprexa 2.5mg IM q8h prn  Monitor Qtc, if greater than 500 do not use antipsychotic medication  If QTc greater than 460, monitor and replete and deficiency of  K and Mg, recheck EKG  EKG on 12/05/2024   QTc was 461  Bilateral hearing loss  Hearing Impairment  Hearing impairment strongly correlated with depression, cognitive impairment, delirium and falls in the older adult  Use hearing aids or sound amplifier  Speaking face to face  Use clear dictation, enunciation of words  Patient with difficulty using hearing aides due to not having the dexterity to place hearing aides in ear and also to turn them on    Subjective   Lizzie is seen today out of bed, in the recliner.  She is alert and oriented x 3, at baseline.  Has mild pain on right side of forehead at site of hematoma. She states she slept well overnight.  Oxygen in place, currently on 2L NC with oxygen saturations at 100%, denies shortness of breath.  She can not recall when she first started on continuous oxygen.  She states they started her at a time when she was short of breath.  Offers no complaints at this time.      Objective :  Temp:  [97.5 °F (36.4 °C)-98 °F (36.7 °C)] 98 °F (36.7 °C)  HR:  [58-61] 60  BP: (123-191)/(64-90) 154/90  Resp:  [1-29] 16  SpO2:  [97 %-100 %]  100 %  O2 Device: Nasal cannula    Physical Exam  Vitals and nursing note reviewed.   Constitutional:       General: She is not in acute distress.     Appearance: She is well-developed. She is obese.   HENT:      Head: Normocephalic. Contusion present.        Comments: Hematoma, bubble on right side of forehead  Eyes:      Conjunctiva/sclera: Conjunctivae normal.   Cardiovascular:      Rate and Rhythm: Normal rate and regular rhythm.      Pulses: Normal pulses.      Heart sounds: Normal heart sounds. No murmur heard.  Pulmonary:      Effort: Pulmonary effort is normal. No respiratory distress.      Breath sounds: Normal breath sounds.   Abdominal:      General: Bowel sounds are normal.      Palpations: Abdomen is soft.      Tenderness: There is no abdominal tenderness.   Musculoskeletal:         General: No swelling.      Cervical back: Neck supple.   Skin:     General: Skin is warm and dry.      Capillary Refill: Capillary refill takes less than 2 seconds.      Findings: Bruising present.   Neurological:      Mental Status: She is alert and oriented to person, place, and time. Mental status is at baseline.   Psychiatric:         Mood and Affect: Mood normal.         Behavior: Behavior normal.         Thought Content: Thought content normal.           Lab Results: I have reviewed the following results:CBC/BMP:   .     12/06/24  0521   WBC 5.64   HGB 11.6   HCT 39.2      SODIUM 140   K 3.9   CL 97   CO2 36*   BUN 33*   CREATININE 1.57*   GLUC 54*   MG 1.3*   PHOS 3.9      Imaging Results Review: I reviewed radiology reports from this admission including: CT head.  Other Study Results Review: No additional pertinent studies reviewed.    Therapies:   -MANJEET Achieved: 4: Move to chair/commode    VTE Prophylaxis: Sequential compression device (Venodyne)     Code Status: Level 1 - Full Code      Family and Social Support: daughters: Nidia Pennington      I have spent a total time of 45 minutes in caring for this  patient on the day of the visit/encounter including Diagnostic results, Prognosis, Risks and benefits of tx options, Instructions for management, Patient and family education, Importance of tx compliance, Documenting in the medical record, and Reviewing / ordering tests, medicine, procedures  .

## 2024-12-06 NOTE — ASSESSMENT & PLAN NOTE
Lab Results   Component Value Date    HGBA1C 8.9 (H) 12/05/2024       Recent Labs     12/05/24 2054 12/06/24  0827 12/06/24  0849 12/06/24  1110   POCGLU 93 49* 96 152*       Blood Sugar Average: Last 72 hrs:  (P) 103    Past HgA1c's appear to be around 8.0  Managed on insulin glargine (Lantus) 16 units SQ once daily at bedtime  Episode of hypoglycemia in the morning of 12/06/2024  Possibly requiring decrease of nighttime lantus  Patient states she checks her sugars and self administers her insulin

## 2024-12-06 NOTE — ASSESSMENT & PLAN NOTE
-Unwitnessed fall at [facility] [place], positive head strike, unknown loss of consciousness, takes Eliquis for A-fib  -Trauma workup significant for minimal posttraumatic SAH in the right sylvian fissure, right frontal scalp hematoma on CTh, otherwise CT C-spine negative and c-collar cleared in ED  -CXR shows small right sided pleural effusion and old rib fractures, no PTX  -Geriatrics consult  -PT/OT consult

## 2024-12-06 NOTE — ASSESSMENT & PLAN NOTE
Geriatric Pain Protocol  Acetaminophen 975 mg Q8 hours scheduled  Oxycodone 2.5 mg Q4 hours prn for moderate pain  Oxycodone 5 mg Q4 hours prn for severe pain  Apply ice to affected area as tolerated    Recommend bowel regimen to prevent and treat constipation due to increased risk with acute pain and opiate pain medications  Current bowel regimen includes: miralax and senna  Last reported BM PTA

## 2024-12-06 NOTE — PLAN OF CARE
Problem: Prexisting or High Potential for Compromised Skin Integrity  Goal: Skin integrity is maintained or improved  Description: INTERVENTIONS:  - Identify patients at risk for skin breakdown  - Assess and monitor skin integrity  - Assess and monitor nutrition and hydration status  - Monitor labs   - Assess for incontinence   - Turn and reposition patient  - Assist with mobility/ambulation  - Relieve pressure over bony prominences  - Avoid friction and shearing  - Provide appropriate hygiene as needed including keeping skin clean and dry  - Evaluate need for skin moisturizer/barrier cream  - Collaborate with interdisciplinary team   - Patient/family teaching  - Consider wound care consult   Outcome: Progressing     Problem: PAIN - ADULT  Goal: Verbalizes/displays adequate comfort level or baseline comfort level  Description: Interventions:  - Encourage patient to monitor pain and request assistance  - Assess pain using appropriate pain scale  - Administer analgesics based on type and severity of pain and evaluate response  - Implement non-pharmacological measures as appropriate and evaluate response  - Consider cultural and social influences on pain and pain management  - Notify physician/advanced practitioner if interventions unsuccessful or patient reports new pain  Outcome: Progressing     Problem: SAFETY ADULT  Goal: Patient will remain free of falls  Description: INTERVENTIONS:  - Educate patient/family on patient safety including physical limitations  - Instruct patient to call for assistance with activity   - Consult OT/PT to assist with strengthening/mobility   - Keep Call bell within reach  - Keep bed low and locked with side rails adjusted as appropriate  - Keep care items and personal belongings within reach  - Initiate and maintain comfort rounds  - Make Fall Risk Sign visible to staff  - Offer Toileting every  Hours, in advance of need  - Initiate/Maintain alarm  - Obtain necessary fall risk  management equipment:   - Apply yellow socks and bracelet for high fall risk patients  - Consider moving patient to room near nurses station  Outcome: Progressing  Goal: Maintain or return to baseline ADL function  Description: INTERVENTIONS:  -  Assess patient's ability to carry out ADLs; assess patient's baseline for ADL function and identify physical deficits which impact ability to perform ADLs (bathing, care of mouth/teeth, toileting, grooming, dressing, etc.)  - Assess/evaluate cause of self-care deficits   - Assess range of motion  - Assess patient's mobility; develop plan if impaired  - Assess patient's need for assistive devices and provide as appropriate  - Encourage maximum independence but intervene and supervise when necessary  - Involve family in performance of ADLs  - Assess for home care needs following discharge   - Consider OT consult to assist with ADL evaluation and planning for discharge  - Provide patient education as appropriate  Outcome: Progressing  Goal: Maintains/Returns to pre admission functional level  Description: INTERVENTIONS:  - Perform AM-PAC 6 Click Basic Mobility/ Daily Activity assessment daily.  - Set and communicate daily mobility goal to care team and patient/family/caregiver.   - Collaborate with rehabilitation services on mobility goals if consulted  - Perform Range of Motion  times a day.  - Reposition patient every  hours.  - Dangle patient  times a day  - Stand patient times a day  - Ambulate patient  times a day  - Out of bed to chair  times a day   - Out of bed for meals  times a day  - Out of bed for toileting  - Record patient progress and toleration of activity level   Outcome: Progressing     Problem: NEUROSENSORY - ADULT  Goal: Achieves stable or improved neurological status  Description: INTERVENTIONS  - Monitor and report changes in neurological status  - Monitor vital signs such as temperature, blood pressure, glucose, and any other labs ordered   - Initiate  measures to prevent increased intracranial pressure  - Monitor for seizure activity and implement precautions if appropriate      Outcome: Progressing  Goal: Remains free of injury related to seizures activity  Description: INTERVENTIONS  - Maintain airway, patient safety  and administer oxygen as ordered  - Monitor patient for seizure activity, document and report duration and description of seizure to physician/advanced practitioner  - If seizure occurs,  ensure patient safety during seizure  - Reorient patient post seizure  - Seizure pads on all 4 side rails  - Instruct patient/family to notify RN of any seizure activity including if an aura is experienced  - Instruct patient/family to call for assistance with activity based on nursing assessment  - Administer anti-seizure medications if ordered    Outcome: Progressing  Goal: Achieves maximal functionality and self care  Description: INTERVENTIONS  - Monitor swallowing and airway patency with patient fatigue and changes in neurological status  - Encourage and assist patient to increase activity and self care.   - Encourage visually impaired, hearing impaired and aphasic patients to use assistive/communication devices  Outcome: Progressing     Problem: SKIN/TISSUE INTEGRITY - ADULT  Goal: Skin Integrity remains intact(Skin Breakdown Prevention)  Description: Assess:  -Perform Ashish assessment every  -Clean and moisturize skin every   -Inspect skin when repositioning, toileting, and assisting with ADLS  -Assess under medical devices such as  every   -Assess extremities for adequate circulation and sensation     Bed Management:  -Have minimal linens on bed & keep smooth, unwrinkled  -Change linens as needed when moist or perspiring  -Avoid sitting or lying in one position for more than  hours while in bed  -Keep HOB at degrees     Toileting:  -Offer bedside commode  -Assess for incontinence every  -Use incontinent care products after each incontinent episode such  as     Activity:  -Mobilize patient  times a day  -Encourage activity and walks on unit  -Encourage or provide ROM exercises   -Turn and reposition patient every  Hours  -Use appropriate equipment to lift or move patient in bed  -Instruct/ Assist with weight shifting every  when out of bed in chair  -Consider limitation of chair time  hour intervals    Skin Care:  -Avoid use of baby powder, tape, friction and shearing, hot water or constrictive clothing  -Relieve pressure over bony prominences using  -Do not massage red bony areas    Next Steps:  -Teach patient strategies to minimize risks such as    -Consider consults to  interdisciplinary teams such as   Outcome: Progressing  Goal: Incision(s), wounds(s) or drain site(s) healing without S/S of infection  Description: INTERVENTIONS  - Assess and document dressing, incision, wound bed, drain sites and surrounding tissue  - Provide patient and family education  - Perform skin care/dressing changes  Outcome: Progressing  Goal: Pressure injury heals and does not worsen  Description: Interventions:  - Implement low air loss mattress or specialty surface (Criteria met)  - Apply silicone foam dressing  - Instruct/assist with weight shifting every  minutes when in chair   - Limit chair time to  hour intervals  - Use special pressure reducing interventions such as  when in chair   - Apply fecal or urinary incontinence containment device   - Perform passive or active ROM every   - Turn and reposition patient & offload bony prominences every hours   - Utilize friction reducing device or surface for transfers   - Consider consults to  interdisciplinary teams such as   - Use incontinent care products after each incontinent episode such as   - Consider nutrition services referral as needed  Outcome: Progressing

## 2024-12-06 NOTE — ASSESSMENT & PLAN NOTE
"CT head on admission showing \"minimal posttraumatic subarachnoid hemorrhage in the right sylvian fissure.  No other acute intracranial hemorrhage.  Right frontal scalp hematoma\"  Repeat CT head on 12/06 with \"redemonstration of large right frontal scalp hematoma\" \"interval evolution of previously seen small amount of hyperdense subarachnoid hemorrhage in the posterior sylvian fissure, mild interval increase in size\"  Hematoma noted on right side of forehead with bubble  Currently has mild pain on forehead  Eliquis being held, per Neurosurgery will resume around 12/19  Was started on keppra 500 mg BID   "

## 2024-12-06 NOTE — PROGRESS NOTES
"Progress Note - Trauma   Name: Lizzie Whitten 84 y.o. female I MRN: [id number]  Unit/Bed#: [facility] 614-01 I Date of Admission: 12/5/2024   Date of Service: 12/6/2024 I Hospital Day: 0    Assessment & Plan  Fall  -Unwitnessed fall at her facility traditions at [place], positive head strike, unknown loss of consciousness, takes Eliquis for A-fib  -Trauma workup significant for minimal posttraumatic SAH in the right sylvian fissure, right frontal scalp hematoma on CTh, otherwise CT C-spine negative and c-collar cleared in ED  -CXR shows small right sided pleural effusion and old rib fractures, no PTX  -Geriatrics consult  -PT/OT consult  SAH (subarachnoid hemorrhage) ([facility])  -As above  -Neurosurgery consulted, appreciate recs   -HOT protocol,   -Repeat CT head 12/6 AM: \"Interval evolution of previously seen small amount of hyperdense subarachnoid hemorrhage in the posterior sylvian fissure, mild interval increase in size\"  -Keppra for seizure ppx   -Holding Eliquis and other chemical DVT prophylaxis until cleared by NSGY  Primary hypertension  -Continue metoprolol  A-fib ([facility])  -Continue metoprolol  -Holding Eliquis  CHF (congestive heart failure) ([facility])  Wt Readings from Last 3 Encounters:   12/05/24 78.9 kg (173 lb 15.1 oz)   -Holding Lasix  CAD (coronary artery disease)  -Continue statin  COPD (chronic obstructive pulmonary disease) ([facility])  -Continue home meds  DM (diabetes mellitus) ([facility])  Lab Results   Component Value Date    HGBA1C 8.9 (H) 12/05/2024       Recent Labs     12/05/24  1548 12/05/24  1758 12/05/24 2054   POCGLU 118 110 93       Blood Sugar Average: Last 72 hrs:  (P) 107  -Continue glargine at bedtime, SSI while inpatient  -Goal blood glucose less than 180  GERD (gastroesophageal reflux disease)  -Continue pantoprazole  Hypothyroidism  - Continue levothyroxine  CKD (chronic kidney disease)  Lab Results   Component Value Date    EGFR 30 12/06/2024    EGFR 27 12/05/2024    CREATININE 1.57 (H) 12/06/2024 "    CREATININE 1.72 (H) 12/05/2024   -Admit CKD 1.72, continue to monitor  Iron deficiency anemia  -Admit hemoglobin 11.5, continue to monitor  Depression with anxiety  -Continue citalopram  Ambulatory dysfunction  -History of ambulatory dysfunction with history of previous falls  HLD (hyperlipidemia)  -Continue home statin    VTE Prophylaxis: Sequential compression device (Venodyne)      Disposition: pending NSGY eval, PT/OT evaluation     TRAUMA TERTIARY SURVEY  Summary of Diagnosed Injuries: minimal posttraumatic SAH in the R sylvian fissure, right frontal scalp hematoma, old rib fractures    Transfer from: n/a    Mechanism of Injury:Fall     Chief Complaint: mild head pain    24 Hour Events : no acute events overnight  Subjective : patient states she slept well overnight. Endorses very mild pain at right frontal scalp hematoma. Denies any headache, dizziness, vision changes, numbness/tingling/weakness in her extremities, chest pain, worsening SOB, abdominal pain, or pain in any extremities.     Objective :  Temp:  [97.5 °F (36.4 °C)-98.2 °F (36.8 °C)] 97.5 °F (36.4 °C)  HR:  [58-76] 60  BP: (123-191)/(64-94) 133/73  Resp:  [1-29] 14  SpO2:  [91 %-99 %] 99 %  O2 Device: Nasal cannula    I/O         12/04 0701  12/05 0700 12/05 0701  12/06 0700    P.O.  0    Total Intake(mL/kg)  0 (0)    Net  0                  Physical Exam  Vitals and nursing note reviewed.   Constitutional:       General: She is not in acute distress.     Appearance: Normal appearance. She is well-developed. She is not ill-appearing, toxic-appearing or diaphoretic.   HENT:      Head: Normocephalic.      Comments: Right frontal scalp hematoma   Eyes:      Extraocular Movements: Extraocular movements intact.      Conjunctiva/sclera: Conjunctivae normal.      Pupils: Pupils are equal, round, and reactive to light.   Cardiovascular:      Rate and Rhythm: Normal rate and regular rhythm.      Heart sounds: No murmur heard.  Pulmonary:      Effort:  Pulmonary effort is normal. No respiratory distress.      Breath sounds: Normal breath sounds.   Abdominal:      Palpations: Abdomen is soft.      Tenderness: There is no abdominal tenderness.   Musculoskeletal:         General: No swelling or deformity. Normal range of motion.      Cervical back: Normal range of motion and neck supple. No rigidity.   Skin:     General: Skin is warm and dry.      Capillary Refill: Capillary refill takes less than 2 seconds.   Neurological:      General: No focal deficit present.      Mental Status: She is alert and oriented to person, place, and time.      Sensory: No sensory deficit.      Motor: No weakness.   Psychiatric:         Mood and Affect: Mood normal.              Lab Results: I have reviewed the following results:  Recent Labs     12/05/24  1155 12/05/24  1218 12/05/24  1218 12/05/24  1442 12/06/24  0521   WBC  --  6.65   < >  --  5.64   HGB 13.6 11.5   < >  --  11.6   HCT 40 38.1   < >  --  39.2   PLT  --  179   < >  --  175   SODIUM  --  141   < >  --  140   K  --  4.4   < >  --  3.9   CL  --  100   < >  --  97   CO2 34* 35*   < >  --  36*   BUN  --  35*   < >  --  33*   CREATININE  --  1.72*   < >  --  1.57*   GLUC  --  118   < >  --  54*   CAIONIZED 0.90*  --   --   --   --    MG  --   --   --   --  1.3*   PHOS  --   --   --   --  3.9   AST  --  15  --   --   --    ALT  --  8  --   --   --    ALB  --  3.7  --   --   --    TBILI  --  0.93  --   --   --    ALKPHOS  --  84  --   --   --    HSTNI0  --  16  --   --   --    HSTNI2  --   --   --  13  --     < > = values in this interval not displayed.

## 2024-12-06 NOTE — ASSESSMENT & PLAN NOTE
Lab Results   Component Value Date    EGFR 30 12/06/2024    EGFR 27 12/05/2024    CREATININE 1.57 (H) 12/06/2024    CREATININE 1.72 (H) 12/05/2024   -Admit CKD 1.72, continue to monitor

## 2024-12-06 NOTE — ASSESSMENT & PLAN NOTE
"-As above  -Neurosurgery consulted, appreciate recs   -HOT protocol,   -Repeat CT head 12/6 AM: \"Interval evolution of previously seen small amount of hyperdense subarachnoid hemorrhage in the posterior sylvian fissure, mild interval increase in size\"  -Keppra for seizure ppx   -Holding Eliquis and other chemical DVT prophylaxis until cleared by NSGY  "

## 2024-12-06 NOTE — ASSESSMENT & PLAN NOTE
Patient presented s/p unwitnessed fall at her facility.   Reported eliquis use s/p reversal     Imaging:   CT head 12/6/2024: redemonstrated R frontal scalp hematoma. Evolution of previously seen small amount of SAH with trace increase. No parenchymal hemorrhage.     Plan:   ongoing frequent neurological checks.   Recommend STAT CT head for decline in GCS >2 points in 1 hour.   Repeat CT head with trace interval increase  Defer DVT ppx until tomorrow.   No additional imaging required at this time   Can resume eliquis in 2 week from presentation to the hospital- around 12/19  PT/OT evaluation.   Ongoing medical management and pain control per primary team.   CM following for dispo planning.   Neurosurgery will sign off at this time. No additional neurosurgical needs. Call with questions or concerns.

## 2024-12-06 NOTE — PLAN OF CARE
Problem: PHYSICAL THERAPY ADULT  Goal: Performs mobility at highest level of function for planned discharge setting.  See evaluation for individualized goals.  Description: Treatment/Interventions: Functional transfer training, LE strengthening/ROM, Therapeutic exercise, Endurance training, Patient/family training, Equipment eval/education, Gait training, Bed mobility, Spoke to nursing          See flowsheet documentation for full assessment, interventions and recommendations.  Note: Prognosis: Good  Problem List: Decreased strength, Decreased endurance, Impaired balance, Decreased mobility, Decreased safety awareness, Decreased cognition, Impaired judgement, Pain  Assessment: Pt seen for high complexity PT evaluation due to decrease in functional mobility status compared to baseline.  Pt with active PT eval/treat orders at this time.  Pt is a 84 y.o. F who presented to [facility] s/ unwitnessed fall with +head strike resulting in SAH on 15/5/24.  Pt  has no past medical history on file.  Pt resides at [facility].  Pt presents with decreased strength, balance, endurance that contribute to limitations in bed mobility, functional transfers, functional mobility.  Pt requires Min A for bed mobility, Mod A for STS and short distance mobility at this time.  Pt left upright in bedside chair with chair alarm intact with all needs in reach.  Pt will benefit from skilled therapy in order to address current impairments and functional limitations. PT to follow pt and recommending level II.  The patient's AM-PAC Basic Mobility Inpatient Short Form Raw Score is 13. A Raw score of less than or equal to 16 suggests the patient may benefit from discharge to post-acute rehabilitation services. Please also refer to the recommendation of the Physical Therapist for safe discharge planning.        Rehab Resource Intensity Level, PT: II (Moderate Resource Intensity)    See flowsheet documentation for full  assessment.

## 2024-12-06 NOTE — ASSESSMENT & PLAN NOTE
Secondary to 2 falls this year, previous fall happening in April 2024 sustained rib fractures  Completed STR at [facility], returned home to [facility] and continued rehab there  PT/OT  Encourage use of assistive device   Uses walker/wheelchair  Continue fall precautions  DEXA scan 09/18/2024-osteopenia   Recommendation at that time was to start vitamin D 1000 units, calcium 1200 mg, weight-bearing exercises  Encourage patient to participate with activities  Provide education for patient, family, and caregivers     For information on Fall & Injury Prevention, visit: [email or web address] OR  [email or web address] OR  https://www.cdc.gov/steadi/patient.html

## 2024-12-06 NOTE — PROGRESS NOTES
Progress Note - Neurosurgery   Name: Lizzie Whitten 84 y.o. female I MRN: [id number]  Unit/Bed#: [facility] 614-01 I Date of Admission: 12/5/2024   Date of Service: 12/6/2024 I Hospital Day: 0    Assessment & Plan  SAH (subarachnoid hemorrhage) (HCC)  Patient presented s/p unwitnessed fall at her facility.   Reported eliquis use s/p reversal     Imaging:   CT head 12/6/2024: redemonstrated R frontal scalp hematoma. Evolution of previously seen small amount of SAH with trace increase. No parenchymal hemorrhage.     Plan:   ongoing frequent neurological checks.   Recommend STAT CT head for decline in GCS >2 points in 1 hour.   Repeat CT head with trace interval increase  Defer DVT ppx until tomorrow.   No additional imaging required at this time   Can resume eliquis in 2 week from presentation to the hospital- around 12/19  PT/OT evaluation.   Ongoing medical management and pain control per primary team.   CM following for dispo planning.   Neurosurgery will sign off at this time. No additional neurosurgical needs. Call with questions or concerns.       Please contact the SecureChat role for the Neurosurgery service with any questions/concerns.    Subjective   Sitting in bedside chair in NAD. Discussed imaging results and plan with patient. No acute concerns overnight.     Objective :  Temp:  [97.5 °F (36.4 °C)-98.2 °F (36.8 °C)] 98 °F (36.7 °C)  HR:  [58-76] 60  BP: (123-191)/(64-94) 154/90  Resp:  [1-29] 16  SpO2:  [91 %-100 %] 100 %  O2 Device: Nasal cannula    I/O         12/04 0701 12/05 0700 12/05 0701 12/06 0700 12/06 0701  12/07 0700    P.O.  0     Total Intake(mL/kg)  0 (0)     Net  0            Unmeasured Urine Occurrence  1 x           Physical Exam  Constitutional:       Appearance: Normal appearance.   HENT:      Head: Atraumatic.   Eyes:      Extraocular Movements: Extraocular movements intact.   Pulmonary:      Comments: On supplemental O2.  Neurological:      Mental Status: She is alert and oriented to  person, place, and time.      Cranial Nerves: No cranial nerve deficit.      Sensory: No sensory deficit.      Motor: No weakness.      Comments: Hard of hearing   Psychiatric:         Mood and Affect: Mood normal.         Behavior: Behavior normal.          Lab Results: I have reviewed the following results:  Recent Labs     12/05/24  1155 12/05/24  1218 12/05/24  1218 12/05/24  1442 12/06/24  0521   WBC  --  6.65   < >  --  5.64   HGB 13.6 11.5   < >  --  11.6   HCT 40 38.1   < >  --  39.2   PLT  --  179   < >  --  175   SODIUM  --  141   < >  --  140   K  --  4.4   < >  --  3.9   CL  --  100   < >  --  97   CO2 34* 35*   < >  --  36*   BUN  --  35*   < >  --  33*   CREATININE  --  1.72*   < >  --  1.57*   GLUC  --  118   < >  --  54*   CAIONIZED 0.90*  --   --   --   --    MG  --   --   --   --  1.3*   PHOS  --   --   --   --  3.9   AST  --  15  --   --   --    ALT  --  8  --   --   --    ALB  --  3.7  --   --   --    TBILI  --  0.93  --   --   --    ALKPHOS  --  84  --   --   --    HSTNI0  --  16  --   --   --    HSTNI2  --   --   --  13  --     < > = values in this interval not displayed.       Imaging Results Review: I personally reviewed the following image studies in PACS and associated radiology reports: CT head. My interpretation of the radiology images/reports is: See above.  Other Study Results Review: No additional pertinent studies reviewed.    VTE Pharmacologic Prophylaxis: Sequential compression device (Venodyne)

## 2024-12-07 LAB
BACTERIA UR QL AUTO: ABNORMAL /HPF
BACTERIA UR QL AUTO: ABNORMAL /HPF
BILIRUB UR QL STRIP: NEGATIVE
BILIRUB UR QL STRIP: NEGATIVE
CLARITY UR: CLEAR
CLARITY UR: CLEAR
COLOR UR: ABNORMAL
COLOR UR: ABNORMAL
GLUCOSE SERPL-MCNC: 121 MG/DL (ref 65–140)
GLUCOSE SERPL-MCNC: 121 MG/DL (ref 65–140)
GLUCOSE SERPL-MCNC: 129 MG/DL (ref 65–140)
GLUCOSE SERPL-MCNC: 129 MG/DL (ref 65–140)
GLUCOSE SERPL-MCNC: 144 MG/DL (ref 65–140)
GLUCOSE SERPL-MCNC: 144 MG/DL (ref 65–140)
GLUCOSE SERPL-MCNC: 165 MG/DL (ref 65–140)
GLUCOSE SERPL-MCNC: 165 MG/DL (ref 65–140)
GLUCOSE UR STRIP-MCNC: NEGATIVE MG/DL
GLUCOSE UR STRIP-MCNC: NEGATIVE MG/DL
HGB UR QL STRIP.AUTO: ABNORMAL
HGB UR QL STRIP.AUTO: ABNORMAL
HYALINE CASTS #/AREA URNS LPF: ABNORMAL /LPF
HYALINE CASTS #/AREA URNS LPF: ABNORMAL /LPF
KETONES UR STRIP-MCNC: NEGATIVE MG/DL
KETONES UR STRIP-MCNC: NEGATIVE MG/DL
LEUKOCYTE ESTERASE UR QL STRIP: ABNORMAL
LEUKOCYTE ESTERASE UR QL STRIP: ABNORMAL
NITRITE UR QL STRIP: NEGATIVE
NITRITE UR QL STRIP: NEGATIVE
NON-SQ EPI CELLS URNS QL MICRO: ABNORMAL /HPF
NON-SQ EPI CELLS URNS QL MICRO: ABNORMAL /HPF
PH UR STRIP.AUTO: 5 [PH]
PH UR STRIP.AUTO: 5 [PH]
PROT UR STRIP-MCNC: ABNORMAL MG/DL
PROT UR STRIP-MCNC: ABNORMAL MG/DL
RBC #/AREA URNS AUTO: ABNORMAL /HPF
RBC #/AREA URNS AUTO: ABNORMAL /HPF
SP GR UR STRIP.AUTO: 1.01 (ref 1–1.03)
SP GR UR STRIP.AUTO: 1.01 (ref 1–1.03)
UROBILINOGEN UR STRIP-ACNC: <2 MG/DL
UROBILINOGEN UR STRIP-ACNC: <2 MG/DL
WBC #/AREA URNS AUTO: ABNORMAL /HPF
WBC #/AREA URNS AUTO: ABNORMAL /HPF

## 2024-12-07 PROCEDURE — 87081 CULTURE SCREEN ONLY: CPT | Performed by: EMERGENCY MEDICINE

## 2024-12-07 PROCEDURE — 81001 URINALYSIS AUTO W/SCOPE: CPT | Performed by: NURSE PRACTITIONER

## 2024-12-07 PROCEDURE — 82948 REAGENT STRIP/BLOOD GLUCOSE: CPT

## 2024-12-07 RX ORDER — ENOXAPARIN SODIUM 100 MG/ML
30 INJECTION SUBCUTANEOUS
Status: DISCONTINUED | OUTPATIENT
Start: 2024-12-07 | End: 2024-12-09 | Stop reason: HOSPADM

## 2024-12-07 RX ADMIN — ACETAMINOPHEN 650 MG: 325 TABLET, FILM COATED ORAL at 23:29

## 2024-12-07 RX ADMIN — LEVOTHYROXINE SODIUM 150 MCG: 75 TABLET ORAL at 06:02

## 2024-12-07 RX ADMIN — METOPROLOL TARTRATE 25 MG: 25 TABLET, FILM COATED ORAL at 17:58

## 2024-12-07 RX ADMIN — INSULIN GLARGINE 16 UNITS: 100 INJECTION, SOLUTION SUBCUTANEOUS at 21:39

## 2024-12-07 RX ADMIN — ATORVASTATIN CALCIUM 80 MG: 80 TABLET, FILM COATED ORAL at 16:15

## 2024-12-07 RX ADMIN — PANTOPRAZOLE SODIUM 40 MG: 40 TABLET, DELAYED RELEASE ORAL at 06:02

## 2024-12-07 RX ADMIN — INSULIN LISPRO 1 UNITS: 100 INJECTION, SOLUTION INTRAVENOUS; SUBCUTANEOUS at 12:10

## 2024-12-07 RX ADMIN — ACETAMINOPHEN 650 MG: 325 TABLET, FILM COATED ORAL at 17:58

## 2024-12-07 RX ADMIN — LEVETIRACETAM 500 MG: 500 TABLET, FILM COATED ORAL at 10:04

## 2024-12-07 RX ADMIN — METOPROLOL TARTRATE 25 MG: 25 TABLET, FILM COATED ORAL at 10:03

## 2024-12-07 RX ADMIN — LEVETIRACETAM 500 MG: 500 TABLET, FILM COATED ORAL at 17:58

## 2024-12-07 RX ADMIN — CITALOPRAM HYDROBROMIDE 20 MG: 20 TABLET ORAL at 10:03

## 2024-12-07 RX ADMIN — FUROSEMIDE 40 MG: 40 TABLET ORAL at 10:03

## 2024-12-07 RX ADMIN — ACETAMINOPHEN 650 MG: 325 TABLET, FILM COATED ORAL at 12:09

## 2024-12-07 RX ADMIN — ENOXAPARIN SODIUM 30 MG: 30 INJECTION SUBCUTANEOUS at 14:09

## 2024-12-07 RX ADMIN — ACETAMINOPHEN 650 MG: 325 TABLET, FILM COATED ORAL at 06:02

## 2024-12-07 NOTE — PLAN OF CARE
Problem: Prexisting or High Potential for Compromised Skin Integrity  Goal: Skin integrity is maintained or improved  Description: INTERVENTIONS:  - Identify patients at risk for skin breakdown  - Assess and monitor skin integrity  - Assess and monitor nutrition and hydration status  - Monitor labs   - Assess for incontinence   - Turn and reposition patient  - Assist with mobility/ambulation  - Relieve pressure over bony prominences  - Avoid friction and shearing  - Provide appropriate hygiene as needed including keeping skin clean and dry  - Evaluate need for skin moisturizer/barrier cream  - Collaborate with interdisciplinary team   - Patient/family teaching  - Consider wound care consult   Outcome: Progressing     Problem: PAIN - ADULT  Goal: Verbalizes/displays adequate comfort level or baseline comfort level  Description: Interventions:  - Encourage patient to monitor pain and request assistance  - Assess pain using appropriate pain scale  - Administer analgesics based on type and severity of pain and evaluate response  - Implement non-pharmacological measures as appropriate and evaluate response  - Consider cultural and social influences on pain and pain management  - Notify physician/advanced practitioner if interventions unsuccessful or patient reports new pain  Outcome: Progressing     Problem: SAFETY ADULT  Goal: Patient will remain free of falls  Description: INTERVENTIONS:  - Educate patient/family on patient safety including physical limitations  - Instruct patient to call for assistance with activity   - Consult OT/PT to assist with strengthening/mobility   - Keep Call bell within reach  - Keep bed low and locked with side rails adjusted as appropriate  - Keep care items and personal belongings within reach  - Initiate and maintain comfort rounds  - Make Fall Risk Sign visible to staff  - Offer Toileting every 2 Hours, in advance of need  - Initiate/Maintain bed/chair alarm  - Obtain necessary fall  risk management equipment:   - Apply yellow socks and bracelet for high fall risk patients  - Consider moving patient to room near nurses station  Outcome: Progressing  Goal: Maintain or return to baseline ADL function  Description: INTERVENTIONS:  -  Assess patient's ability to carry out ADLs; assess patient's baseline for ADL function and identify physical deficits which impact ability to perform ADLs (bathing, care of mouth/teeth, toileting, grooming, dressing, etc.)  - Assess/evaluate cause of self-care deficits   - Assess range of motion  - Assess patient's mobility; develop plan if impaired  - Assess patient's need for assistive devices and provide as appropriate  - Encourage maximum independence but intervene and supervise when necessary  - Involve family in performance of ADLs  - Assess for home care needs following discharge   - Consider OT consult to assist with ADL evaluation and planning for discharge  - Provide patient education as appropriate  Outcome: Progressing  Goal: Maintains/Returns to pre admission functional level  Description: INTERVENTIONS:  - Perform AM-PAC 6 Click Basic Mobility/ Daily Activity assessment daily.  - Set and communicate daily mobility goal to care team and patient/family/caregiver.   - Collaborate with rehabilitation services on mobility goals if consulted  - Perform Range of Motion 3 times a day.  - Reposition patient every 2 hours.  - Dangle patient 3 times a day  - Stand patient 3 times a day  - Ambulate patient 3 times a day  - Out of bed to chair 3 times a day   - Out of bed for meals 3 times a day  - Out of bed for toileting  - Record patient progress and toleration of activity level   Outcome: Progressing     Problem: DISCHARGE PLANNING  Goal: Discharge to home or other facility with appropriate resources  Description: INTERVENTIONS:  - Identify barriers to discharge w/patient and caregiver  - Arrange for needed discharge resources and transportation as appropriate  -  Identify discharge learning needs (meds, wound care, etc.)  - Arrange for interpretive services to assist at discharge as needed  - Refer to Case Management Department for coordinating discharge planning if the patient needs post-hospital services based on physician/advanced practitioner order or complex needs related to functional status, cognitive ability, or social support system  Outcome: Progressing     Problem: Knowledge Deficit  Goal: Patient/family/caregiver demonstrates understanding of disease process, treatment plan, medications, and discharge instructions  Description: Complete learning assessment and assess knowledge base.  Interventions:  - Provide teaching at level of understanding  - Provide teaching via preferred learning methods  Outcome: Progressing     Problem: NEUROSENSORY - ADULT  Goal: Achieves stable or improved neurological status  Description: INTERVENTIONS  - Monitor and report changes in neurological status  - Monitor vital signs such as temperature, blood pressure, glucose, and any other labs ordered   - Initiate measures to prevent increased intracranial pressure  - Monitor for seizure activity and implement precautions if appropriate      Outcome: Progressing  Goal: Remains free of injury related to seizures activity  Description: INTERVENTIONS  - Maintain airway, patient safety  and administer oxygen as ordered  - Monitor patient for seizure activity, document and report duration and description of seizure to physician/advanced practitioner  - If seizure occurs,  ensure patient safety during seizure  - Reorient patient post seizure  - Seizure pads on all 4 side rails  - Instruct patient/family to notify RN of any seizure activity including if an aura is experienced  - Instruct patient/family to call for assistance with activity based on nursing assessment  - Administer anti-seizure medications if ordered    Outcome: Progressing  Goal: Achieves maximal functionality and self  care  Description: INTERVENTIONS  - Monitor swallowing and airway patency with patient fatigue and changes in neurological status  - Encourage and assist patient to increase activity and self care.   - Encourage visually impaired, hearing impaired and aphasic patients to use assistive/communication devices  Outcome: Progressing     Problem: SKIN/TISSUE INTEGRITY - ADULT  Goal: Skin Integrity remains intact(Skin Breakdown Prevention)  Description: Assess:  -Perform Ashish assessment every shift   -Clean and moisturize skin   -Inspect skin when repositioning, toileting, and assisting with ADLS  -Assess under medical devices   -Assess extremities for adequate circulation and sensation     Bed Management:  -Have minimal linens on bed & keep smooth, unwrinkled  -Change linens as needed when moist or perspiring  -Avoid sitting or lying in one position for more than 2 hours while in bed  -Keep HOB at 30 degrees     Toileting:  -Offer bedside commode  -Assess for incontinence   -Use incontinent care products after each incontinent episode     Activity:  -Mobilize patient 3 times a day  -Encourage activity and walks on unit  -Encourage or provide ROM exercises   -Turn and reposition patient every 2 Hours  -Use appropriate equipment to lift or move patient in bed  -Instruct/ Assist with weight shifting every 20 when out of bed in chair  -Consider limitation of chair time 2 hour intervals    Skin Care:  -Avoid use of baby powder, tape, friction and shearing, hot water or constrictive clothing  -Relieve pressure over bony prominences   -Do not massage red bony areas    Next Steps:  -Teach patient strategies to minimize risks    -Consider consults to  interdisciplinary teams   Outcome: Progressing  Goal: Incision(s), wounds(s) or drain site(s) healing without S/S of infection  Description: INTERVENTIONS  - Assess and document dressing, incision, wound bed, drain sites and surrounding tissue  - Provide patient and family  education  - Perform skin care/dressing changes every shift   Outcome: Progressing  Goal: Pressure injury heals and does not worsen  Description: Interventions:  - Implement low air loss mattress or specialty surface (Criteria met)  - Apply silicone foam dressing  - Instruct/assist with weight shifting every 20 minutes when in chair   - Limit chair time to 2 hour intervals  - Use special pressure reducing interventions  when in chair   - Apply fecal or urinary incontinence containment device   - Perform passive or active ROM   - Turn and reposition patient & offload bony prominences every 2 hours   - Utilize friction reducing device or surface for transfers   - Consider consults to  interdisciplinary teams   - Use incontinent care products after each incontinent episode   - Consider nutrition services referral as needed  Outcome: Progressing     Problem: Nutrition/Hydration-ADULT  Goal: Nutrient/Hydration intake appropriate for improving, restoring or maintaining nutritional needs  Description: Monitor and assess patient's nutrition/hydration status for malnutrition. Collaborate with interdisciplinary team and initiate plan and interventions as ordered.  Monitor patient's weight and dietary intake as ordered or per policy. Utilize nutrition screening tool and intervene as necessary. Determine patient's food preferences and provide high-protein, high-caloric foods as appropriate.     INTERVENTIONS:  - Monitor oral intake, urinary output, labs, and treatment plans  - Assess nutrition and hydration status and recommend course of action  - Evaluate amount of meals eaten  - Assist patient with eating if necessary   - Allow adequate time for meals  - Recommend/ encourage appropriate diets, oral nutritional supplements, and vitamin/mineral supplements  - Order, calculate, and assess calorie counts as needed  - Recommend, monitor, and adjust tube feedings and TPN/PPN based on assessed needs  - Assess need for intravenous  fluids  - Provide specific nutrition/hydration education as appropriate  - Include patient/family/caregiver in decisions related to nutrition  Outcome: Progressing

## 2024-12-07 NOTE — ASSESSMENT & PLAN NOTE
Lab Results   Component Value Date    HGBA1C 8.9 (H) 12/05/2024       Recent Labs     12/06/24  1653 12/06/24 2039 12/07/24  0711 12/07/24  1119   POCGLU 174* 184* 121 165*       Blood Sugar Average: Last 72 hrs:  (P) 126.2  -Continue glargine at bedtime, SSI while inpatient  -Goal blood glucose less than 180

## 2024-12-07 NOTE — ASSESSMENT & PLAN NOTE
-Unwitnessed fall at her [facility] [place], positive head strike, unknown loss of consciousness, takes Eliquis for A-fib  -Trauma workup significant for minimal posttraumatic SAH in the right sylvian fissure, right frontal scalp hematoma on CTh, otherwise CT C-spine negative and c-collar cleared in ED  -CXR shows small right sided pleural effusion and old rib fractures, no PTX  -Geriatrics consult  -PT/OT consult

## 2024-12-07 NOTE — CASE MANAGEMENT
Case Management Assessment & Discharge Planning Note    Patient name Lizzie Whitten  Location [facility]-01 MRN [id number]  : 1940 Date 2024       Current Admission Date: 2024  Current Admission Diagnosis:Fall   Patient Active Problem List    Diagnosis Date Noted Date Diagnosed    Fall 2024     SAH (subarachnoid hemorrhage) ([facility]) 2024     Primary hypertension 2024     A-fib ([facility]) 2024     CHF (congestive heart failure) ([facility]) 2024     CAD (coronary artery disease) 2024     COPD (chronic obstructive pulmonary disease) ([facility]) 2024     MITCH (obstructive sleep apnea) 2024     DM (diabetes mellitus) ([facility]) 2024     GERD (gastroesophageal reflux disease) 2024     Hypothyroidism 2024     CKD (chronic kidney disease) 2024     Iron deficiency anemia 2024     Depression with anxiety 2024     Ambulatory dysfunction 2024     HLD (hyperlipidemia) 2024     Acute pain due to trauma 2024     Frailty syndrome in geriatric patient 2024     At risk for delirium 2024     Bilateral hearing loss 2024     Cognitive impairment 2024       LOS (days): 1  Geometric Mean LOS (GMLOS) (days): 3.3  Days to GMLOS:2.5     OBJECTIVE:    Risk of Unplanned Readmission Score: 16.65         Current admission status: Inpatient       Preferred Pharmacy:   UNKNOWN - FOLLOW UP PRIOR TO DISCHARGE TO E-PRESCRIBE  No address on file      [facility] [address] [address]  Phone: [phone] Fax: [phone]    Primary Care Provider: Kennedy Castellon MD    Primary Insurance: MEDICARE  Secondary Insurance: AARP    ASSESSMENT:  Active Health Care Proxies       [facility] Care Representative - Daughter   Primary Phone: [phone] (Mobile)                 Advance Directives  Does patient have a Health Care POA?: Yes  Does patient have  Advance Directives?: Yes  Advance Directives: Living will, Power of  for health care  Primary Contact: 3 children have POA    Patient Information  Admitted from:: Home ([facility])  Mental Status: Alert  During Assessment patient was accompanied by: Not accompanied during assessment  Assessment information provided by:: Patient, Daughter  Support Systems: Family members, Other (Comment) (private caregivers)  What city do you live in?: [place]  Home entry access options. Select all that apply.: No steps to enter home  Type of Current Residence: Apartment  Floor Level: 1  Upon entering residence, is there a bedroom on the main floor (no further steps)?: Yes  Upon entering residence, is there a bathroom on the main floor (no further steps)?: Yes  Living Arrangements: Lives w/ Spouse/significant other  Is patient a ?: No    Activities of Daily Living Prior to Admission  Functional Status: Assistance  Completes ADLs independently?: No  Level of ADL dependence: Assistance  Ambulates independently?: No  Level of ambulatory dependence: Assistance  Does patient use assisted devices?: Yes  Assisted Devices (DME) used: Walker, Home Oxygen concentrator, Portable Oxygen tanks, Shower Chair  O2 Rate(s): 2  Does patient have a history of Outpatient Therapy (PT/OT)?: Yes (through [facility])  Does the patient have a history of Short-Term Rehab?: Yes ([facility])  Does patient have a history of HHC?: Yes (unsure of agency)    Patient Information Continued  Income Source: Pension/detention  Does patient have prescription coverage?: Yes  Does patient receive dialysis treatments?: No  Does patient have a history of substance abuse?: No  Does patient have a history of Mental Health Diagnosis?: No    Means of Transportation  Means of Transport to Appts:: Family transport    DISCHARGE DETAILS:    Discharge planning discussed with:: Patient  Freedom of Choice: Yes  Comments - Freedom of Choice:  Discussed FOC  CM contacted family/caregiver?: Yes (Daughter, Prema)  Were Treatment Team discharge recommendations reviewed with patient/caregiver?: Yes  Did patient/caregiver verbalize understanding of patient care needs?: Yes  Were patient/caregiver advised of the risks associated with not following Treatment Team discharge recommendations?: Yes    This CM introduced self and role to patient at bedside, and daughterPrema via phone.  Patient lives with spouse at [facility], first floor apartment, no DAMIAN.  Has walker (uses all the time, typically for short distances), WC (for long distances), oxygen set up (2L- uses 24/7), and shower chair.  No additional falls per patient in last 6 months.  Daughter transports patient to appointments.  Per daughter, patient and spouse have paid caregivers who come into their apartment multiple times/ daily (unsure of total hours).  Caregivers assist patient with bathing, dressing, medication management, assisting to the dining area, etc.  This CM discussed level II therapy recommendation, as patient was at [facility] in the past.  Daughter states she would like patient to return to [facility] with caregivers and PT/OT through facility.  Will likely need ambulatory referrals to PT/OT.  CM to follow up with [facility] prior to DC

## 2024-12-07 NOTE — PROGRESS NOTES
"Progress Note - Trauma   Name: Lizzie Whitten 84 y.o. female I MRN: [id number]  [facility] I Date of Admission: 12/5/2024   Date of Service: 12/7/2024 I Hospital Day: 1    Assessment & Plan  Fall  -Unwitnessed fall at [facility] [place], positive head strike, unknown loss of consciousness, takes Eliquis for A-fib  -Trauma workup significant for minimal posttraumatic SAH in the right sylvian fissure, right frontal scalp hematoma on CTh, otherwise CT C-spine negative and c-collar cleared in ED  -CXR shows small right sided pleural effusion and old rib fractures, no PTX  -Geriatrics consult  -PT/OT consult  SAH (subarachnoid hemorrhage) ([facility])  -As above  -Neurosurgery consulted, appreciate recs   -Repeat CT head 12/6 AM: \"Interval evolution of previously seen small amount of hyperdense subarachnoid hemorrhage in the posterior sylvian fissure, mild interval increase in size\"  -Keppra for seizure ppx   -Holding Eliquis unit 12/19 per NSGY  - Will start DVT prophylaxis today per NSGY.  Primary hypertension  -Continue metoprolol  A-fib ([facility])  -Continue metoprolol  -Holding Eliquis  CHF (congestive heart failure) ([facility])  Wt Readings from Last 3 Encounters:   12/05/24 78.9 kg (173 lb 15.1 oz)   -Holding Lasix  CAD (coronary artery disease)  -Continue statin  COPD (chronic obstructive pulmonary disease) ([facility])  -Continue home meds  DM (diabetes mellitus) ([facility])  Lab Results   Component Value Date    HGBA1C 8.9 (H) 12/05/2024       Recent Labs     12/06/24  1653 12/06/24  2039 12/07/24  0711 12/07/24  1119   POCGLU 174* 184* 121 165*       Blood Sugar Average: Last 72 hrs:  (P) 126.2  -Continue glargine at bedtime, SSI while inpatient  -Goal blood glucose less than 180  GERD (gastroesophageal reflux disease)  -Continue pantoprazole  Hypothyroidism  - Continue levothyroxine  CKD (chronic kidney disease)  Lab Results   Component Value Date    EGFR 30 12/06/2024    EGFR 27 12/05/2024    CREATININE " 1.57 (H) 12/06/2024    CREATININE 1.72 (H) 12/05/2024   -Admit CKD 1.72, continue to monitor  Iron deficiency anemia  -Admit hemoglobin 11.5, continue to monitor  Depression with anxiety  -Continue citalopram  Ambulatory dysfunction  -History of ambulatory dysfunction with history of previous falls  HLD (hyperlipidemia)  -Continue home statin  MITCH (obstructive sleep apnea)    Acute pain due to trauma    Frailty syndrome in geriatric patient    At risk for delirium    Bilateral hearing loss    Cognitive impairment      VTE Prophylaxis: Enoxaparin (Lovenox) daily due to Creatinine clearance.     Disposition: Rehab Level II assessed by PT/OT , Family would like patient to return to [facility] does have caregivers and can get PT/OT through facilityTrauma service will follow.    TRAUMA TERTIARY SURVEY  Summary of Diagnosed Injuries: Patient admitted following a fall. Sustained SAH and right frontal scalp hematoma. Patient had an unwitnessed fall and head strike, unknown LOC. Patient does not remember.    Transfer from: Facility [facility].    Mechanism of Injury:Fall     Chief Complaint: Right sided head pain.     24 Hour Events : Patient received repeat CTH yesterday which did show a small interval increase of SAH, however NSGY did not feel this to be significant enough to warrant additional imaging. Patient recommended for no additional CT and may begin DVT prophylaxis today.     Subjective : Doing well. Is alert and oriented. Nonfocal exam. Patient denies any headache, dizziness, lightheadedness, SOB, difficulty breathing, CP or abdominal pain.     Objective :  Temp:  [97.3 °F (36.3 °C)-97.7 °F (36.5 °C)] 97.5 °F (36.4 °C)  HR:  [60-62] 60  BP: (146-156)/(76-90) 150/87  Resp:  [16-18] 18  SpO2:  [95 %-99 %] 95 %  O2 Device: Nasal cannula    I/O         12/05 0701 12/06 0700 12/06 0701 12/07 0700 12/07 0701 12/08 0700    P.O. 0 200 240    Total Intake(mL/kg) 0 (0) 200 (2.5) 240 (3)    Net 0  +200 +240           Unmeasured Urine Occurrence 1 x 4 x 2 x    Unmeasured Stool Occurrence   2 x          Lines/Drains/Airways       Active Status       Name Placement date Placement time Site Days    External Urinary Catheter 12/06/24 2130  -- less than 1                  Physical Exam  Constitutional:       General: She is not in acute distress.     Appearance: Normal appearance. She is not toxic-appearing.   HENT:      Head: Normocephalic.      Comments: Right sided forehead hematoma stable. Some ecchymosis streaking down toward eye.       Nose: Nose normal.      Mouth/Throat:      Mouth: Mucous membranes are moist.   Eyes:      Pupils: Pupils are equal, round, and reactive to light.   Cardiovascular:      Rate and Rhythm: Normal rate.      Pulses: Normal pulses.      Heart sounds: Normal heart sounds. No murmur heard.     No gallop.   Pulmonary:      Effort: Pulmonary effort is normal. No respiratory distress.      Breath sounds: Normal breath sounds. No wheezing or rales.   Abdominal:      General: Bowel sounds are normal. There is no distension.      Palpations: Abdomen is soft.      Tenderness: There is no abdominal tenderness. There is no guarding.   Musculoskeletal:         General: No swelling or tenderness. Normal range of motion.      Cervical back: Normal range of motion.      Right lower leg: No edema.      Left lower leg: Edema present.   Skin:     General: Skin is warm.      Capillary Refill: Capillary refill takes less than 2 seconds.   Neurological:      General: No focal deficit present.      Mental Status: She is alert. She is disoriented.   Psychiatric:         Behavior: Behavior normal.          1. Before the illness or injury that brought you to the Emergency, did you need someone to help you on a regular basis? 1=Yes   2. Since the illness or injury that brought you to the Emergency, have you needed more help than usual to take care of yourself? 1=Yes   3. Have you been hospitalized for one  or more nights during the past 6 months (excluding a stay in the Emergency Department)? 0=No   4. In general, do you see well? 0=Yes   5. In general, do you have serious problems with your memory? 0=No   6. Do you take more than three different medications everyday? 1=Yes   TOTAL   3     Did you order a geriatric consult if the score was 2 or greater?: yes           Lab Results: I have reviewed the following results:  Recent Labs     12/05/24  1155 12/05/24  1218 12/05/24  1218 12/05/24  1442 12/06/24  0521   WBC  --  6.65   < >  --  5.64   HGB 13.6 11.5   < >  --  11.6   HCT 40 38.1   < >  --  39.2   PLT  --  179   < >  --  175   SODIUM  --  141   < >  --  140   K  --  4.4   < >  --  3.9   CL  --  100   < >  --  97   CO2 34* 35*   < >  --  36*   BUN  --  35*   < >  --  33*   CREATININE  --  1.72*   < >  --  1.57*   GLUC  --  118   < >  --  54*   CAIONIZED 0.90*  --   --   --   --    MG  --   --   --   --  1.3*   PHOS  --   --   --   --  3.9   AST  --  15  --   --   --    ALT  --  8  --   --   --    ALB  --  3.7  --   --   --    TBILI  --  0.93  --   --   --    ALKPHOS  --  84  --   --   --    HSTNI0  --  16  --   --   --    HSTNI2  --   --   --  13  --     < > = values in this interval not displayed.     CTH:   Redemonstration of large right frontal scalp hematoma. No calvarial fracture is seen.   Interval evolution of previously seen small amount of hyperdense subarachnoid hemorrhage in the posterior sylvian fissure, mild interval increase in size.   No parenchymal hemorrhage is seen. Intracranial structures otherwise intervally unchanged.   Sinus disease as described, and other findings as above.   Follow-up as clinically warranted.  Other Study Results Review: No additional pertinent studies reviewed.

## 2024-12-07 NOTE — ASSESSMENT & PLAN NOTE
"-As above  -Neurosurgery consulted, appreciate recs   -Repeat CT head 12/6 AM: \"Interval evolution of previously seen small amount of hyperdense subarachnoid hemorrhage in the posterior sylvian fissure, mild interval increase in size\"  -Keppra for seizure ppx   -Holding Eliquis unit 12/19 per NSGY  - Will start DVT prophylaxis today per NSGY.  "

## 2024-12-08 LAB
ANION GAP SERPL CALCULATED.3IONS-SCNC: 6 MMOL/L (ref 4–13)
BUN SERPL-MCNC: 32 MG/DL (ref 5–25)
CALCIUM SERPL-MCNC: 8.7 MG/DL (ref 8.4–10.2)
CHLORIDE SERPL-SCNC: 97 MMOL/L (ref 96–108)
CO2 SERPL-SCNC: 37 MMOL/L (ref 21–32)
CREAT SERPL-MCNC: 1.44 MG/DL (ref 0.6–1.3)
ERYTHROCYTE [DISTWIDTH] IN BLOOD BY AUTOMATED COUNT: 13.9 % (ref 11.6–15.1)
GFR SERPL CREATININE-BSD FRML MDRD: 33 ML/MIN/1.73SQ M
GLUCOSE SERPL-MCNC: 101 MG/DL (ref 65–140)
GLUCOSE SERPL-MCNC: 133 MG/DL (ref 65–140)
GLUCOSE SERPL-MCNC: 176 MG/DL (ref 65–140)
GLUCOSE SERPL-MCNC: 180 MG/DL (ref 65–140)
GLUCOSE SERPL-MCNC: 304 MG/DL (ref 65–140)
GLUCOSE SERPL-MCNC: 34 MG/DL (ref 65–140)
GLUCOSE SERPL-MCNC: 41 MG/DL (ref 65–140)
HCT VFR BLD AUTO: 43.6 % (ref 34.8–46.1)
HGB BLD-MCNC: 12.8 G/DL (ref 11.5–15.4)
MAGNESIUM SERPL-MCNC: 1.3 MG/DL (ref 1.9–2.7)
MCH RBC QN AUTO: 29.1 PG (ref 26.8–34.3)
MCHC RBC AUTO-ENTMCNC: 29.4 G/DL (ref 31.4–37.4)
MCV RBC AUTO: 99 FL (ref 82–98)
MRSA NOSE QL CULT: NORMAL
PHOSPHATE SERPL-MCNC: 4.2 MG/DL (ref 2.3–4.1)
PLATELET # BLD AUTO: 173 THOUSANDS/UL (ref 149–390)
PMV BLD AUTO: 10.4 FL (ref 8.9–12.7)
POTASSIUM SERPL-SCNC: 4 MMOL/L (ref 3.5–5.3)
RBC # BLD AUTO: 4.4 MILLION/UL (ref 3.81–5.12)
SODIUM SERPL-SCNC: 140 MMOL/L (ref 135–147)
WBC # BLD AUTO: 6.32 THOUSAND/UL (ref 4.31–10.16)

## 2024-12-08 PROCEDURE — 83735 ASSAY OF MAGNESIUM: CPT | Performed by: NURSE PRACTITIONER

## 2024-12-08 PROCEDURE — 85027 COMPLETE CBC AUTOMATED: CPT | Performed by: NURSE PRACTITIONER

## 2024-12-08 PROCEDURE — 84100 ASSAY OF PHOSPHORUS: CPT | Performed by: NURSE PRACTITIONER

## 2024-12-08 PROCEDURE — 80048 BASIC METABOLIC PNL TOTAL CA: CPT | Performed by: NURSE PRACTITIONER

## 2024-12-08 PROCEDURE — 82948 REAGENT STRIP/BLOOD GLUCOSE: CPT

## 2024-12-08 RX ORDER — DEXTROSE MONOHYDRATE 25 G/50ML
25 INJECTION, SOLUTION INTRAVENOUS ONCE
Status: COMPLETED | OUTPATIENT
Start: 2024-12-08 | End: 2024-12-08

## 2024-12-08 RX ORDER — DEXTROSE MONOHYDRATE 25 G/50ML
25 INJECTION, SOLUTION INTRAVENOUS ONCE
Status: DISCONTINUED | OUTPATIENT
Start: 2024-12-08 | End: 2024-12-08

## 2024-12-08 RX ORDER — INSULIN GLARGINE 100 [IU]/ML
8 INJECTION, SOLUTION SUBCUTANEOUS
Status: DISCONTINUED | OUTPATIENT
Start: 2024-12-08 | End: 2024-12-09

## 2024-12-08 RX ORDER — MAGNESIUM SULFATE HEPTAHYDRATE 40 MG/ML
2 INJECTION, SOLUTION INTRAVENOUS ONCE
Status: COMPLETED | OUTPATIENT
Start: 2024-12-08 | End: 2024-12-08

## 2024-12-08 RX ADMIN — DEXTROSE MONOHYDRATE 25 ML: 25 INJECTION, SOLUTION INTRAVENOUS at 08:22

## 2024-12-08 RX ADMIN — ATORVASTATIN CALCIUM 80 MG: 80 TABLET, FILM COATED ORAL at 17:05

## 2024-12-08 RX ADMIN — CITALOPRAM HYDROBROMIDE 20 MG: 20 TABLET ORAL at 08:27

## 2024-12-08 RX ADMIN — FUROSEMIDE 40 MG: 40 TABLET ORAL at 08:27

## 2024-12-08 RX ADMIN — POLYETHYLENE GLYCOL 3350 17 G: 17 POWDER, FOR SOLUTION ORAL at 08:26

## 2024-12-08 RX ADMIN — SENNOSIDES 17.2 MG: 8.6 TABLET, FILM COATED ORAL at 08:27

## 2024-12-08 RX ADMIN — INSULIN LISPRO 1 UNITS: 100 INJECTION, SOLUTION INTRAVENOUS; SUBCUTANEOUS at 18:28

## 2024-12-08 RX ADMIN — ACETAMINOPHEN 650 MG: 325 TABLET, FILM COATED ORAL at 17:05

## 2024-12-08 RX ADMIN — LEVETIRACETAM 500 MG: 500 TABLET, FILM COATED ORAL at 08:27

## 2024-12-08 RX ADMIN — METOPROLOL TARTRATE 25 MG: 25 TABLET, FILM COATED ORAL at 17:05

## 2024-12-08 RX ADMIN — PANTOPRAZOLE SODIUM 40 MG: 40 TABLET, DELAYED RELEASE ORAL at 05:47

## 2024-12-08 RX ADMIN — METOPROLOL TARTRATE 25 MG: 25 TABLET, FILM COATED ORAL at 08:28

## 2024-12-08 RX ADMIN — ACETAMINOPHEN 650 MG: 325 TABLET, FILM COATED ORAL at 05:47

## 2024-12-08 RX ADMIN — LEVETIRACETAM 500 MG: 500 TABLET, FILM COATED ORAL at 17:05

## 2024-12-08 RX ADMIN — MAGNESIUM SULFATE HEPTAHYDRATE 2 G: 40 INJECTION, SOLUTION INTRAVENOUS at 18:43

## 2024-12-08 RX ADMIN — ENOXAPARIN SODIUM 30 MG: 30 INJECTION SUBCUTANEOUS at 08:26

## 2024-12-08 RX ADMIN — INSULIN GLARGINE 8 UNITS: 100 INJECTION, SOLUTION SUBCUTANEOUS at 21:25

## 2024-12-08 RX ADMIN — INSULIN LISPRO 4 UNITS: 100 INJECTION, SOLUTION INTRAVENOUS; SUBCUTANEOUS at 12:10

## 2024-12-08 RX ADMIN — ACETAMINOPHEN 650 MG: 325 TABLET, FILM COATED ORAL at 12:09

## 2024-12-08 RX ADMIN — LEVOTHYROXINE SODIUM 150 MCG: 75 TABLET ORAL at 05:47

## 2024-12-08 NOTE — ASSESSMENT & PLAN NOTE
-Continue metoprolol   Consent 3/Introductory Paragraph: I gave the patient a chance to ask questions they had about the procedure.  Following this I explained the Mohs procedure and consent was obtained. The risks, benefits and alternatives to therapy were discussed in detail. Specifically, the risks of infection, scarring, bleeding, prolonged wound healing, incomplete removal, allergy to anesthesia, nerve injury and recurrence were addressed. Prior to the procedure, the treatment site was clearly identified and confirmed by the patient. All components of Universal Protocol/PAUSE Rule completed.

## 2024-12-08 NOTE — ASSESSMENT & PLAN NOTE
Lab Results   Component Value Date    HGBA1C 8.9 (H) 12/05/2024       Recent Labs     12/07/24  0711 12/07/24  1119 12/07/24  1617 12/07/24  2131   POCGLU 121 165* 144* 129       Blood Sugar Average: Last 72 hrs:  (P) 127.6918776059644918  -Continue glargine at bedtime, SSI while inpatient  -Goal blood glucose less than 180

## 2024-12-08 NOTE — ASSESSMENT & PLAN NOTE
-Unwitnessed fall at her [facility] [place], positive head strike, unknown loss of consciousness, takes Eliquis for A-fib  -Trauma workup significant for minimal posttraumatic SAH in the right sylvian fissure, right frontal scalp hematoma on CTh, otherwise CT C-spine negative and c-collar cleared in ED  -CXR shows small right sided pleural effusion and old rib fractures, no PTX  -Geriatrics consult  -PT/OT consult

## 2024-12-08 NOTE — PROGRESS NOTES
"Progress Note - Trauma   Name: Lizzie Whitten 84 y.o. female I MRN: [id number]  [facility] I Date of Admission: 12/5/2024   Date of Service: 12/8/2024 I Hospital Day: 2    Assessment & Plan  Fall  -[facility]witnessed fall at [facility] [place], positive head strike, unknown loss of consciousness, takes Eliquis for A-fib  -Trauma workup significant for minimal posttraumatic SAH in the right sylvian fissure, right frontal scalp hematoma on CTh, otherwise CT C-spine negative and c-collar cleared in ED  -CXR shows small right sided pleural effusion and old rib fractures, no PTX  -Geriatrics consult  -PT/OT consult  SAH (subarachnoid hemorrhage) ([facility])  -As above  -Neurosurgery consulted, appreciate recs   -Repeat CT head 12/6 AM: \"Interval evolution of previously seen small amount of hyperdense subarachnoid hemorrhage in the posterior sylvian fissure, mild interval increase in size\"  -Keppra for seizure ppx   -Holding Eliquis unit 12/19 per NSGY  - Will start DVT prophylaxis today per NSGY.  Primary hypertension  -Continue metoprolol  A-fib ([facility])  -Continue metoprolol  -Holding Eliquis  CHF (congestive heart failure) ([facility])  Wt Readings from Last 3 Encounters:   12/05/24 78.9 kg (173 lb 15.1 oz)   -Holding Lasix  CAD (coronary artery disease)  -Continue statin  COPD (chronic obstructive pulmonary disease) ([facility])  -Continue home meds  DM (diabetes mellitus) ([facility])  Lab Results   Component Value Date    HGBA1C 8.9 (H) 12/05/2024       Recent Labs     12/07/24  0711 12/07/24  1119 12/07/24  1617 12/07/24  2131   POCGLU 121 165* 144* 129       Blood Sugar Average: Last 72 hrs:  (P) 127.2507582577732407  -Continue glargine at bedtime, SSI while inpatient  -Goal blood glucose less than 180  GERD (gastroesophageal reflux disease)  -Continue pantoprazole  Hypothyroidism  - Continue levothyroxine  CKD (chronic kidney disease)  Lab Results   Component Value Date    EGFR 30 12/06/2024    EGFR 27 12/05/2024    " CREATININE 1.57 (H) 12/06/2024    CREATININE 1.72 (H) 12/05/2024   -Admit CKD 1.72, continue to monitor  Iron deficiency anemia  -Admit hemoglobin 11.5, continue to monitor  Depression with anxiety  -Continue citalopram  Ambulatory dysfunction  -History of ambulatory dysfunction with history of previous falls  HLD (hyperlipidemia)  -Continue home statin  MITCH (obstructive sleep apnea)    Acute pain due to trauma    Frailty syndrome in geriatric patient    At risk for delirium    Bilateral hearing loss    Cognitive impairment      Bowel Regimen: Senna, miralax  VTE Prophylaxis:Lovenox, holding eliquis until follow up with NSGY     Disposition: Possible back to assisted living pending progress Trauma service will follow.    24 Hour Events : Patient stable from neuro standpoint. Started DVT prophylaxis.   Updated Daughter. Creatinine down trending 1.57 from 1.72. Will check am labs. Did have some hypoglycemia this am, received D50 1/2 amp. BG up to 300 later. Will decreased Lantus dose from 16 at HS to 8 units.    Subjective : Patient doing well this am. No new complaints. Denies HA, Dizziness, lightheadedness, SOB, CP or abdominal pain.     Objective :  Temp:  [97.5 °F (36.4 °C)-98.2 °F (36.8 °C)] 98 °F (36.7 °C)  HR:  [60] 60  BP: (118-150)/(72-87) 118/72  Resp:  [18-19] 19  SpO2:  [87 %-98 %] 87 %  O2 Device: Nasal cannula  Nasal Cannula O2 Flow Rate (L/min):  [2 L/min] 2 L/min    I/O         12/06 0701 12/07 0700 12/07 0701 12/08 0700 12/08 0701 12/09 0700    P.O. 200 240     Total Intake(mL/kg) 200 (2.5) 240 (3)     Urine (mL/kg/hr)  250 (0.1)     Total Output  250     Net +200 -10            Unmeasured Urine Occurrence 4 x 3 x     Unmeasured Stool Occurrence  2 x           Lines/Drains/Airways       Active Status       Name Placement date Placement time Site Days    External Urinary Catheter 12/06/24 2130  -- 1                  Physical Exam  Constitutional:       General: She is not in acute distress.      Appearance: Normal appearance. She is not toxic-appearing.   HENT:      Head:      Comments: Hematoma forehead left stable.  Eyes:      Pupils: Pupils are equal, round, and reactive to light.   Cardiovascular:      Rate and Rhythm: Normal rate and regular rhythm.      Heart sounds: No murmur heard.     No gallop.   Pulmonary:      Effort: Pulmonary effort is normal. No respiratory distress.      Breath sounds: Normal breath sounds. No wheezing or rales.   Abdominal:      General: Abdomen is flat.      Palpations: Abdomen is soft.   Musculoskeletal:         General: Normal range of motion.   Skin:     General: Skin is warm.      Capillary Refill: Capillary refill takes less than 2 seconds.   Neurological:      General: No focal deficit present.      Mental Status: She is alert and oriented to person, place, and time.   Psychiatric:         Behavior: Behavior normal.               Lab Results: I have reviewed the following results:  Recent Labs     12/05/24  1155 12/05/24  1218 12/05/24  1218 12/05/24  1442 12/06/24  0521   WBC  --  6.65   < >  --  5.64   HGB 13.6 11.5   < >  --  11.6   HCT 40 38.1   < >  --  39.2   PLT  --  179   < >  --  175   SODIUM  --  141   < >  --  140   K  --  4.4   < >  --  3.9   CL  --  100   < >  --  97   CO2 34* 35*   < >  --  36*   BUN  --  35*   < >  --  33*   CREATININE  --  1.72*   < >  --  1.57*   GLUC  --  118   < >  --  54*   CAIONIZED 0.90*  --   --   --   --    MG  --   --   --   --  1.3*   PHOS  --   --   --   --  3.9   AST  --  15  --   --   --    ALT  --  8  --   --   --    ALB  --  3.7  --   --   --    TBILI  --  0.93  --   --   --    ALKPHOS  --  84  --   --   --    HSTNI0  --  16  --   --   --    HSTNI2  --   --   --  13  --     < > = values in this interval not displayed.       Imaging Results Review: No pertinent imaging studies reviewed.  Other Study Results Review: No additional pertinent studies reviewed.

## 2024-12-09 ENCOUNTER — TELEPHONE (OUTPATIENT)
Age: 84
End: 2024-12-09

## 2024-12-09 VITALS
DIASTOLIC BLOOD PRESSURE: 79 MMHG | BODY MASS INDEX: 34.15 KG/M2 | RESPIRATION RATE: 14 BRPM | OXYGEN SATURATION: 97 % | HEART RATE: 60 BPM | WEIGHT: 173.94 LBS | TEMPERATURE: 98 F | SYSTOLIC BLOOD PRESSURE: 133 MMHG | HEIGHT: 60 IN

## 2024-12-09 LAB
ANION GAP SERPL CALCULATED.3IONS-SCNC: 4 MMOL/L (ref 4–13)
BUN SERPL-MCNC: 30 MG/DL (ref 5–25)
CALCIUM SERPL-MCNC: 9.1 MG/DL (ref 8.4–10.2)
CHLORIDE SERPL-SCNC: 96 MMOL/L (ref 96–108)
CO2 SERPL-SCNC: 40 MMOL/L (ref 21–32)
CREAT SERPL-MCNC: 1.25 MG/DL (ref 0.6–1.3)
GFR SERPL CREATININE-BSD FRML MDRD: 40 ML/MIN/1.73SQ M
GLUCOSE SERPL-MCNC: 182 MG/DL (ref 65–140)
GLUCOSE SERPL-MCNC: 248 MG/DL (ref 65–140)
GLUCOSE SERPL-MCNC: 47 MG/DL (ref 65–140)
GLUCOSE SERPL-MCNC: 55 MG/DL (ref 65–140)
GLUCOSE SERPL-MCNC: 60 MG/DL (ref 65–140)
MAGNESIUM SERPL-MCNC: 1.6 MG/DL (ref 1.9–2.7)
POTASSIUM SERPL-SCNC: 3.8 MMOL/L (ref 3.5–5.3)
SODIUM SERPL-SCNC: 140 MMOL/L (ref 135–147)
TSH SERPL DL<=0.05 MIU/L-ACNC: 1.69 UIU/ML (ref 0.45–4.5)

## 2024-12-09 PROCEDURE — 83735 ASSAY OF MAGNESIUM: CPT | Performed by: NURSE PRACTITIONER

## 2024-12-09 PROCEDURE — 84443 ASSAY THYROID STIM HORMONE: CPT | Performed by: STUDENT IN AN ORGANIZED HEALTH CARE EDUCATION/TRAINING PROGRAM

## 2024-12-09 PROCEDURE — 97116 GAIT TRAINING THERAPY: CPT

## 2024-12-09 PROCEDURE — 80048 BASIC METABOLIC PNL TOTAL CA: CPT | Performed by: NURSE PRACTITIONER

## 2024-12-09 PROCEDURE — 97530 THERAPEUTIC ACTIVITIES: CPT

## 2024-12-09 PROCEDURE — 97110 THERAPEUTIC EXERCISES: CPT

## 2024-12-09 PROCEDURE — 82948 REAGENT STRIP/BLOOD GLUCOSE: CPT

## 2024-12-09 RX ORDER — MAGNESIUM SULFATE HEPTAHYDRATE 40 MG/ML
2 INJECTION, SOLUTION INTRAVENOUS ONCE
Status: COMPLETED | OUTPATIENT
Start: 2024-12-09 | End: 2024-12-09

## 2024-12-09 RX ORDER — LEVETIRACETAM 500 MG/1
500 TABLET ORAL 2 TIMES DAILY
Qty: 60 TABLET | Refills: 0 | Status: SHIPPED | OUTPATIENT
Start: 2024-12-09

## 2024-12-09 RX ORDER — OXYCODONE HYDROCHLORIDE 5 MG/1
2.5 TABLET ORAL EVERY 4 HOURS PRN
Qty: 30 TABLET | Refills: 0 | Status: CANCELLED | OUTPATIENT
Start: 2024-12-09 | End: 2024-12-19

## 2024-12-09 RX ORDER — ACETAMINOPHEN 325 MG/1
650 TABLET ORAL EVERY 6 HOURS SCHEDULED
Qty: 240 TABLET | Refills: 0 | Status: CANCELLED | OUTPATIENT
Start: 2024-12-09

## 2024-12-09 RX ADMIN — CITALOPRAM HYDROBROMIDE 20 MG: 20 TABLET ORAL at 08:41

## 2024-12-09 RX ADMIN — INSULIN LISPRO 3 UNITS: 100 INJECTION, SOLUTION INTRAVENOUS; SUBCUTANEOUS at 11:38

## 2024-12-09 RX ADMIN — MAGNESIUM SULFATE HEPTAHYDRATE 2 G: 40 INJECTION, SOLUTION INTRAVENOUS at 10:31

## 2024-12-09 RX ADMIN — LEVOTHYROXINE SODIUM 150 MCG: 75 TABLET ORAL at 06:20

## 2024-12-09 RX ADMIN — LEVETIRACETAM 500 MG: 500 TABLET, FILM COATED ORAL at 08:41

## 2024-12-09 RX ADMIN — PANTOPRAZOLE SODIUM 40 MG: 40 TABLET, DELAYED RELEASE ORAL at 06:20

## 2024-12-09 RX ADMIN — METOPROLOL TARTRATE 25 MG: 25 TABLET, FILM COATED ORAL at 08:41

## 2024-12-09 RX ADMIN — ENOXAPARIN SODIUM 30 MG: 30 INJECTION SUBCUTANEOUS at 08:41

## 2024-12-09 RX ADMIN — ACETAMINOPHEN 650 MG: 325 TABLET, FILM COATED ORAL at 11:31

## 2024-12-09 RX ADMIN — ACETAMINOPHEN 650 MG: 325 TABLET, FILM COATED ORAL at 06:20

## 2024-12-09 RX ADMIN — ACETAMINOPHEN 650 MG: 325 TABLET, FILM COATED ORAL at 00:31

## 2024-12-09 RX ADMIN — FUROSEMIDE 40 MG: 40 TABLET ORAL at 08:41

## 2024-12-09 NOTE — PHYSICAL THERAPY NOTE
PHYSICAL THERAPY TREATMENT  NAME:  Lizzie Whitten  DATE: 12/09/24    AGE:   84 y.o.  Mrn:   [id number]  ADMIT DX:  Multiple injuries [T07.XXXA]  SAH (subarachnoid hemorrhage) (HCC) [I60.9]  Fall, initial encounter [W19.XXXA]    No past medical history on file.  No past surgical history on file.    Length Of Stay: 3     12/09/24 1118   PT Last Visit   PT Visit Date 12/09/24   Note Type   Note Type Treatment   Pain Assessment   Pain Assessment Tool 0-10   Pain Score No Pain   Restrictions/Precautions   Weight Bearing Precautions Per Order No   Other Precautions Cognitive;Chair Alarm;Bed Alarm;Fall Risk;Multiple lines   General   Chart Reviewed Yes   Cognition   Overall Cognitive Status Impaired   Arousal/Participation Alert;Cooperative   Attention Attends with cues to redirect   Orientation Level Oriented to person;Oriented to place;Oriented to situation   Memory Decreased recall of precautions;Decreased recall of recent events   Following Commands Follows one step commands with increased time or repetition   Comments pleasant and cooperative; requiring constant cues for safety w/ treatment/ mobility   Subjective   Subjective pt agreeable to work w/ PT-presents supine in bed.   Bed Mobility   Supine to Sit 4  Minimal assistance   Additional items Assist x 1;Increased time required;Verbal cues;LE management   Additional Comments pt sits EOB for therex + STS for gown/ linen changes 2* incontinence episode of bowel and bladder. pt performed STS x5 total .   Transfers   Sit to Stand 3  Moderate assistance   Additional items Assist x 1;Increased time required;Verbal cues   Stand to Sit 3  Moderate assistance   Additional items Assist x 1;Increased time required;Verbal cues   Additional Comments w/ RW on 2Lo2   Ambulation/Elevation   Gait pattern Excessively slow;Foward flexed;Shuffling;Decreased foot clearance;Forward Flexion;Improper Weight shift   Gait Assistance 4  Minimal assist   Additional items Assist x  1;Verbal cues;Tactile cues   Assistive Device Rolling walker   Distance 12'+7' w/ RW- seated rest b/t trials; unsteady cues for upright posture/ gaze. denies dizziness.   Stair Management Assistance Not tested   Balance   Static Sitting Fair   Dynamic Sitting Fair -   Static Standing Poor +   Dynamic Standing Poor   Ambulatory Poor  (rw)   Activity Tolerance   Activity Tolerance Patient limited by fatigue   Medical Staff Made Aware RN + CM for d/c planning   Nurse Made Aware yes   Exercises   Hip Flexion Sitting;10 reps;Right;Left   Knee AROM Long Arc Quad Sitting;10 reps;Right;Left   Ankle Pumps Sitting;20 reps;Bilateral   Balance training  sitting EOB ~15 mins functional tasks/ pericare and therex + multiple sit<>sstands for pericare   Assessment   Prognosis Good   Problem List Decreased strength;Decreased range of motion;Decreased endurance;Impaired balance;Decreased mobility;Decreased cognition;Impaired judgement;Decreased coordination;Decreased safety awareness;Decreased skin integrity;Pain   Assessment pt seen for PT session as documented. Pt remains somewhat confused; slo to process and requiring Remi for functional mobility w/ use of RW + 2Lo2. pt w/ unsteady gait; requiring cues + assist for posture/ balance. PT continues to recommend Level 2 rehab on d/c. Pt is a high falls risk. Pt seated in recliner w/ all needs in reach post session and alarm intact.   Goals   Patient Goals to go home   STG Expiration Date 12/20/24   PT Treatment Day 1   Plan   Treatment/Interventions ADL retraining;Functional transfer training;LE strengthening/ROM;Elevations;Therapeutic exercise;Endurance training;Cognitive reorientation;Patient/family training;Equipment eval/education;Bed mobility;Gait training;Spoke to nursing;Spoke to case management   Progress Slow progress, decreased activity tolerance   PT Frequency 3-5x/wk   Discharge Recommendation   Rehab Resource Intensity Level, PT II (Moderate Resource Intensity)   AM-PAC  Basic Mobility Inpatient   Turning in Flat Bed Without Bedrails 3   Lying on Back to Sitting on Edge of Flat Bed Without Bedrails 3   Moving Bed to Chair 3   Standing Up From Chair Using Arms 3   Walk in Room 2   Climb 3-5 Stairs With Railing 1   Basic Mobility Inpatient Raw Score 15   Basic Mobility Standardized Score 36.97   [facility] Highest Level Of Mobility   -[facility] Goal 4: Move to chair/commode   -[facility] Achieved 6: Walk 10 steps or more   Education   Education Provided Mobility training;Home exercise program;Other  (fall prevention/ safety)   Patient Reinforcement needed;Demonstrates verbal understanding   End of Consult   Patient Position at End of Consult Bedside chair;Bed/Chair alarm activated;All needs within reach     The patient's AM-PAC Basic Mobility Inpatient Short Form Raw Score is 15. A Raw score of less than or equal to 16 suggests the patient may benefit from discharge to post-acute rehabilitation services. Please also refer to the recommendation of the Physical Therapist for safe discharge planning.            Emilia Avendano, PT

## 2024-12-09 NOTE — PLAN OF CARE
Problem: PHYSICAL THERAPY ADULT  Goal: Performs mobility at highest level of function for planned discharge setting.  See evaluation for individualized goals.  Description: Treatment/Interventions: Functional transfer training, LE strengthening/ROM, Therapeutic exercise, Endurance training, Patient/family training, Equipment eval/education, Gait training, Bed mobility, Spoke to nursing          See flowsheet documentation for full assessment, interventions and recommendations.  Outcome: Progressing  Note: Prognosis: Good  Problem List: Decreased strength, Decreased range of motion, Decreased endurance, Impaired balance, Decreased mobility, Decreased cognition, Impaired judgement, Decreased coordination, Decreased safety awareness, Decreased skin integrity, Pain  Assessment: pt seen for PT session as documented. Pt remains somewhat confused; slo to process and requiring Remi for functional mobility w/ use of RW + 2Lo2. pt w/ unsteady gait; requiring cues + assist for posture/ balance. PT continues to recommend Level 2 rehab on d/c. Pt is a high falls risk. Pt seated in recliner w/ all needs in reach post session and alarm intact.        Rehab Resource Intensity Level, PT: II (Moderate Resource Intensity)    See flowsheet documentation for full assessment.

## 2024-12-09 NOTE — CASE MANAGEMENT
Case Management Discharge Planning Note    Patient name Lizzie Whitten  [facility] MRN [id number]  : 1940 Date 2024       Current Admission Date: 2024  Current Admission Diagnosis:Fall   Patient Active Problem List    Diagnosis Date Noted Date Diagnosed    Fall 2024     SAH (subarachnoid hemorrhage) ([facility]) 2024     Primary hypertension 2024     A-fib ([facility]) 2024     CHF (congestive heart failure) ([facility]) 2024     CAD (coronary artery disease) 2024     COPD (chronic obstructive pulmonary disease) ([facility]) 2024     MITCH (obstructive sleep apnea) 2024     DM (diabetes mellitus) ([facility]) 2024     GERD (gastroesophageal reflux disease) 2024     Hypothyroidism 2024     CKD (chronic kidney disease) 2024     Iron deficiency anemia 2024     Depression with anxiety 2024     Ambulatory dysfunction 2024     HLD (hyperlipidemia) 2024     Acute pain due to trauma 2024     Frailty syndrome in geriatric patient 2024     At risk for delirium 2024     Bilateral hearing loss 2024     Cognitive impairment 2024       LOS (days): 3  Geometric Mean LOS (GMLOS) (days): 3.3  Days to GMLOS:0.4     OBJECTIVE:  Risk of Unplanned Readmission Score: 16.58         Current admission status: Inpatient   Preferred Pharmacy:   UNKNOWN - FOLLOW UP PRIOR TO DISCHARGE TO E-PRESCRIBE  No address on file      [facility] - [place] [address]  Phone: [phone] Fax: [phone]    Primary Care Provider: Kennedy Castellon MD    Primary Insurance: MEDICARE  Secondary Insurance: [facility]    DISCHARGE DETAILS:    Requested Home Health Care         Is the patient interested in HHC at discharge?: Yes  Home Health Discipline requested:: Physical Therapy, Occupational Therapy  Home Health Agency Name:: [facility]  [facility] External Referral Reason (only  applicable if external HHA name selected): Patient has established relationship with provider  Home Health Follow-Up Provider:: PCP  Home Health Services Needed:: Evaluate Functional Status and Safety, Gait/ADL Training, Strengthening/Theraputic Exercises to Improve Function  Homebound Criteria Met:: Uses an Assist Device (i.e. cane, walker, etc)  Supporting Clincal Findings:: Limited Endurance, Fatigues Easliy in Short Distances    DME Referral Provided  Referral made for DME?: No    Other Referral/Resources/Interventions Provided:  Interventions: HHC    Treatment Team Recommendation: Home with Home Health Care  Discharge Destination Plan:: Home with Home Health Care    Pt's dtr aware and in agreement  [facility] can accept  Pt's dtr will transport back today around 1500

## 2024-12-09 NOTE — DISCHARGE SUMMARY
"Discharge Summary - Trauma   Name: Lizzie Whitten 84 y.o. female I MRN: [id number]  Unit/Bed#: [facility] 614-01 I Date of Admission: 12/5/2024   Date of Service: 12/9/2024 I Hospital Day: 3    Admission Date: 12/5/2024 1143  Discharge Date: 12/09/24  Admitting Diagnosis: Multiple injuries [T07.XXXA]  SAH (subarachnoid hemorrhage) (HCC) [I60.9]  Fall, initial encounter [W19.XXXA]  Discharge Diagnosis:   Medical Problems       Resolved Problems  Date Reviewed: 12/9/2024   None         HPI: per  resident JOANN Magaña:  \"Lizzie Whitten is a 84 y.o. female w PMH of depression and HTN, A-fib on Eliquis, CHF, heart block, CAD, COPD, MITCH, hypothyroidism, DM, CKD, depression/anxiety, iron deficiency anemia, ambulatory dysfunction with history of falls, presenting to [facility] ED as a level B trauma for evaluation after suffering a fall at her facility [facility].  Per EMS, she suffered an unwitnessed fall, positive head strike with large bump on the right side of her forehead, unknown loss of consciousness, takes Eliquis for A-fib, patient does not remember the events leading up to the fall.  She does report pain on the right side of her forehead and headache with head strike. Hemodynamically stable on evaluation, GCS 15. \"    Procedures Performed:   Orders Placed This Encounter   Procedures    Fast Ultrasound       Summary of Hospital Course: 83 y/o female s/p fall on Eliquis with a head strike.  Ct scan demonstrated SAH, and a hematoma.  Admitted to Trauma and Neurosurgery consulted for SAH.  Doing well and will hold Eliquis until 12/19/24.  On Keppra for Seizure prevention. Due to episodes of hypoglycemia, Lantus was held and advise patient/family to hold until outpatient followup with PCP or endocrinology. Cleared for discharge to [facility] and will follow up with PCP and Neurosurgery.  Home health care obtained, for details of her stay, please refer to medical records.     Significant Findings, Care, " Treatment and Services Provided: CT head wo contrast  Result Date: 12/6/2024  Impression: Redemonstration of large right frontal scalp hematoma. No calvarial fracture is seen. Interval evolution of previously seen small amount of hyperdense subarachnoid hemorrhage in the posterior sylvian fissure, mild interval increase in size. No parenchymal hemorrhage is seen. Intracranial structures otherwise intervally unchanged. Sinus disease as described, and other findings as above. Follow-up as clinically warranted. Workstation performed: [id number]     XR Trauma multiple (B/RA trauma bay ONLY)  Result Date: 12/5/2024  Impression: Small right pleural effusion.. Computerized Assisted Algorithm (CAA) may have been used to analyze all applicable images. Workstation performed: [id number]     XR chest 1 view  Result Date: 12/5/2024  Impression: Small right pleural effusion.. Computerized Assisted Algorithm (CAA) may have been used to analyze all applicable images. Workstation performed: [id number]     TRAUMA - CT head wo contrast  Result Date: 12/5/2024  Impression: Minimal posttraumatic subarachnoid hemorrhage in the right sylvian fissure. No other acute intracranial hemorrhage. Right frontal scalp hematoma. I personally discussed this study with REED QUESADA on 12/5/2024 12:47 PM. Workstation performed: [id number]     TRAUMA - CT spine cervical wo contrast  Result Date: 12/5/2024  Impression: No cervical spine fracture or traumatic malalignment. Stable severe canal stenosis at C1 likely due to prominent retrodental pannus. Stable degenerative spondylosis with moderate canal stenosis at C3-4 and C4-5. I personally discussed this study with REED QUESADA on 12/5/2024 12:47 PM. Workstation performed: [id number]   '    Complications: none    Condition at Discharge: stable       Discharge instructions/Information to patient and family:   See After Visit Summary (AVS) for information provided to patient and family.       Provisions for Follow-Up Care:  See after visit summary for information related to follow-up care and any pertinent home health orders.      PCP: Kennedy Castellon MD    Disposition: Home    Planned Readmission: No     Discharge Medications:  See after visit summary for reconciled discharge medications provided to patient and family.      Discharge Statement:  I have spent a total time of 30 minutes in caring for this patient on the day of the visit/encounter. .

## 2024-12-09 NOTE — PROGRESS NOTES
Patient:    MRN:  [id number]    Aidin Request ID:  [id number]    Level of care reserved:  Home Health Agency    Partner Reserved:  [facility], [place] [id number] [phone]    Clinical needs requested:    Geography searched:  [id number]    Start of Service:    Request sent:  11:18am EST on 12/9/2024 by Pelon Mclain    Partner reserved:  11:24am EST on 12/9/2024 by Pelon Mclain    Choice list shared:  11:24am EST on 12/9/2024 by Pelon Mclain

## 2024-12-09 NOTE — CASE MANAGEMENT
Case Management Discharge Planning Note    Patient name Lizzie Whitten  Location [facility] 614/[facility] 614-01 MRN [id number]  : 1940 Date 2024       Current Admission Date: 2024  Current Admission Diagnosis:Fall   Patient Active Problem List    Diagnosis Date Noted Date Diagnosed    Fall 2024     SAH (subarachnoid hemorrhage) ([facility]) 2024     Primary hypertension 2024     A-fib ([facility]) 2024     CHF (congestive heart failure) ([facility]) 2024     CAD (coronary artery disease) 2024     COPD (chronic obstructive pulmonary disease) ([facility]) 2024     MITCH (obstructive sleep apnea) 2024     DM (diabetes mellitus) ([facility]) 2024     GERD (gastroesophageal reflux disease) 2024     Hypothyroidism 2024     CKD (chronic kidney disease) 2024     Iron deficiency anemia 2024     Depression with anxiety 2024     Ambulatory dysfunction 2024     HLD (hyperlipidemia) 2024     Acute pain due to trauma 2024     Frailty syndrome in geriatric patient 2024     At risk for delirium 2024     Bilateral hearing loss 2024     Cognitive impairment 2024       LOS (days): 3  Geometric Mean LOS (GMLOS) (days): 3.3  Days to GMLOS:0.4     OBJECTIVE:  Risk of Unplanned Readmission Score: 16.58         Current admission status: Inpatient   Preferred Pharmacy:   UNKNOWN - FOLLOW UP PRIOR TO DISCHARGE TO E-PRESCRIBE  No address on file      [facility] - [place] [address]  Phone: [phone] Fax: [phone]    Primary Care Provider: Kennedy Castellon MD    Primary Insurance: MEDICARE  Secondary Insurance: [facility]    DISCHARGE DETAILS:       CM spoke to [facility]  They can accept back with [facility] through [employer]. CM placed referral and will secure transport back

## 2024-12-09 NOTE — ASSESSMENT & PLAN NOTE
Lab Results   Component Value Date    EGFR 40 12/09/2024    EGFR 33 12/08/2024    EGFR 30 12/06/2024    CREATININE 1.25 12/09/2024    CREATININE 1.44 (H) 12/08/2024    CREATININE 1.57 (H) 12/06/2024

## 2024-12-09 NOTE — ASSESSMENT & PLAN NOTE
Lab Results   Component Value Date    HGBA1C 8.9 (H) 12/05/2024       Recent Labs     12/08/24  2100 12/09/24  0625 12/09/24  0649 12/09/24  0710   POCGLU 133 55* 60* 182*       Blood Sugar Average: Last 72 hrs:  (P) 127.1849794128768548  -Continue glargine at bedtime, SSI while inpatient  -Goal blood glucose less than 180  -Has been hypoglycemic in the AM, endocrinology consulted, appreciate recommendations

## 2024-12-09 NOTE — ASSESSMENT & PLAN NOTE
Lab Results   Component Value Date    EGFR 40 12/09/2024    EGFR 33 12/08/2024    EGFR 30 12/06/2024    CREATININE 1.25 12/09/2024    CREATININE 1.44 (H) 12/08/2024    CREATININE 1.57 (H) 12/06/2024   -Admit CKD 1.72, continue to monitor

## 2024-12-09 NOTE — PROGRESS NOTES
"Progress Note - Trauma   Name: Lizzie Whitten 84 y.o. female I MRN: [id number]  [facility] Date of Admission: 12/5/2024   Date of Service: 12/9/2024 I Hospital Day: 3    Assessment & Plan  Fall  -[facility]ed [facility], positive head strike, unknown loss of consciousness, takes Eliquis for A-fib  -Trauma workup significant for minimal posttraumatic SAH in the right sylvian fissure, right frontal scalp hematoma on CTh, otherwise CT C-spine negative and c-collar cleared in ED  -CXR shows small right sided pleural effusion and old rib fractures, no PTX  -Geriatrics consult  -PT/OT consult  SAH (subarachnoid hemorrhage) ([facility])  -As above  -Neurosurgery consulted, appreciate recs   -Repeat CT head 12/6 AM: \"Interval evolution of previously seen small amount of hyperdense subarachnoid hemorrhage in the posterior sylvian fissure, mild interval increase in size\"  -Keppra for seizure ppx   -Holding Eliquis unit 12/19 per NSGY  - Will start DVT prophylaxis today per NSGY.  Primary hypertension  -Continue metoprolol  A-fib ([facility])  -Continue metoprolol  -Holding Eliquis  CHF (congestive heart failure) ([facility])  Wt Readings from Last 3 Encounters:   12/05/24 78.9 kg (173 lb 15.1 oz)   -Holding Lasix  CAD (coronary artery disease)  -Continue statin  COPD (chronic obstructive pulmonary disease) ([facility])  -Continue home meds  DM (diabetes mellitus) ([facility])  Lab Results   Component Value Date    HGBA1C 8.9 (H) 12/05/2024       Recent Labs     12/08/24  2100 12/09/24  0625 12/09/24  0649 12/09/24  0710   POCGLU 133 55* 60* 182*       Blood Sugar Average: Last 72 hrs:  (P) [id number]  -Continue glargine at bedtime, SSI while inpatient  -Goal blood glucose less than 180  -Has been hypoglycemic in the AM, endocrinology consulted, appreciate recommendations   GERD (gastroesophageal reflux disease)  -Continue pantoprazole  Hypothyroidism  - Continue levothyroxine  CKD (chronic kidney " disease)  Lab Results   Component Value Date    EGFR 40 12/09/2024    EGFR 33 12/08/2024    EGFR 30 12/06/2024    CREATININE 1.25 12/09/2024    CREATININE 1.44 (H) 12/08/2024    CREATININE 1.57 (H) 12/06/2024   -Admit CKD 1.72, continue to monitor  Iron deficiency anemia  -Admit hemoglobin 11.5, continue to monitor  Depression with anxiety  -Continue citalopram  Ambulatory dysfunction  -History of ambulatory dysfunction with history of previous falls  HLD (hyperlipidemia)  -Continue home statin    Bowel Regimen: Senna, Miralax   VTE Prophylaxis:VTE covered by:  enoxaparin, Subcutaneous, 30 mg at 12/09/24 0841         Disposition: Possible back to assisted living pending progress      24 Hour Events : POC glucose was 55 this AM, 47 on BMP. Improved to 60 after chocolate pudding and to 182 after peanut butter and nathan crackers. Magnesium was 1.6 this AM, repleted with 2g magnesium sulfate.   Subjective : Patient has no complaints. Pain well controlled. Tolerating PO intake. Last BM was yesterday.     Objective :  Temp:  [97.2 °F (36.2 °C)-98 °F (36.7 °C)] 97.4 °F (36.3 °C)  HR:  [59-61] 61  BP: (122-145)/(68-95) 134/78  Resp:  [14] 14  SpO2:  [93 %-100 %] 93 %  O2 Device: Nasal cannula  Nasal Cannula O2 Flow Rate (L/min):  [2 L/min] 2 L/min    I/O         12/07 0701 12/08 0700 12/08 0701 12/09 0700 12/09 0701  12/10 0700    P.O. 240 444     Total Intake(mL/kg) 240 (3) 444 (5.6)     Urine (mL/kg/hr) 250 (0.1)      Total Output 250      Net -10 +444            Unmeasured Urine Occurrence 3 x 1 x     Unmeasured Stool Occurrence 2 x 1 x           Lines/Drains/Airways       Active Status       Name Placement date Placement time Site Days    External Urinary Catheter 12/06/24 2130  -- 2                  Physical Exam  Vitals and nursing note reviewed.   Constitutional:       General: She is not in acute distress.     Appearance: Normal appearance. She is well-developed. She is not ill-appearing, toxic-appearing or  diaphoretic.   HENT:      Head: Normocephalic.      Comments: Right forehead hematoma with surrounding ecchymosis   Eyes:      Conjunctiva/sclera: Conjunctivae normal.   Cardiovascular:      Rate and Rhythm: Normal rate and regular rhythm.      Heart sounds: No murmur heard.  Pulmonary:      Effort: Pulmonary effort is normal. No respiratory distress.      Breath sounds: Normal breath sounds.   Abdominal:      Palpations: Abdomen is soft.      Tenderness: There is no abdominal tenderness.   Musculoskeletal:         General: No swelling.      Cervical back: Neck supple.   Skin:     General: Skin is warm and dry.      Capillary Refill: Capillary refill takes less than 2 seconds.   Neurological:      General: No focal deficit present.      Mental Status: She is alert and oriented to person, place, and time.      Sensory: No sensory deficit.      Motor: No weakness.   Psychiatric:         Mood and Affect: Mood normal.              Lab Results: I have reviewed the following results:  Recent Labs     12/08/24  0849 12/08/24  1445 12/08/24  1445 12/09/24  0637   WBC 6.32  --   --   --    HGB 12.8  --   --   --    HCT 43.6  --   --   --      --   --   --    SODIUM  --  140   < > 140   K  --  4.0   < > 3.8   CL  --  97   < > 96   CO2  --  37*   < > 40*   BUN  --  32*   < > 30*   CREATININE  --  1.44*   < > 1.25   GLUC  --  180*   < > 47*   MG  --  1.3*   < > 1.6*   PHOS  --  4.2*  --   --     < > = values in this interval not displayed.

## 2024-12-09 NOTE — CASE MANAGEMENT
Case Management Discharge Planning Note    Patient name Lizzie Whitten  Location [facility]-01 MRN [id number]  : 1940 Date 2024       Current Admission Date: 2024  Current Admission Diagnosis:Fall   Patient Active Problem List    Diagnosis Date Noted Date Diagnosed    Fall 2024     SAH (subarachnoid hemorrhage) ([facility]) 2024     Primary hypertension 2024     A-fib ([facility]) 2024     CHF (congestive heart failure) ([facility]) 2024     CAD (coronary artery disease) 2024     COPD (chronic obstructive pulmonary disease) ([facility]) 2024     MITCH (obstructive sleep apnea) 2024     DM (diabetes mellitus) ([facility]) 2024     GERD (gastroesophageal reflux disease) 2024     Hypothyroidism 2024     CKD (chronic kidney disease) 2024     Iron deficiency anemia 2024     Depression with anxiety 2024     Ambulatory dysfunction 2024     HLD (hyperlipidemia) 2024     Acute pain due to trauma 2024     Frailty syndrome in geriatric patient 2024     At risk for delirium 2024     Bilateral hearing loss 2024     Cognitive impairment 2024       LOS (days): 3  Geometric Mean LOS (GMLOS) (days): 3.3  Days to GMLOS:0.5     OBJECTIVE:  Risk of Unplanned Readmission Score: 16.58         Current admission status: Inpatient   Preferred Pharmacy:   UNKNOWN - FOLLOW UP PRIOR TO DISCHARGE TO E-PRESCRIBE  No address on file      [facility] - [address] [address]  Phone: [phone] Fax: [phone]    Primary Care Provider: Kennedy Castellon MD    Primary Insurance: MEDICARE  Secondary Insurance: [facility]    DISCHARGE DETAILS:    Cm Called [facility] [phone] to discuss pt's d/c plan. Garland left voicemail

## 2024-12-09 NOTE — ASSESSMENT & PLAN NOTE
-Unwitnessed fall at her facility [facility] at [facility], positive head strike, unknown loss of consciousness, takes Eliquis for A-fib  -Trauma workup significant for minimal posttraumatic SAH in the right sylvian fissure, right frontal scalp hematoma on CTh, otherwise CT C-spine negative and c-collar cleared in ED  -CXR shows small right sided pleural effusion and old rib fractures, no PTX  -Geriatrics consult  -PT/OT consult

## 2024-12-09 NOTE — ASSESSMENT & PLAN NOTE
On levothyroxine 150 mcg daily prior to admission  No thyroid function tests available on file.  TSH and free T4 ordered by primary team-currently pending  At this time we will continue levothyroxine 150 mcg daily  Adjust dose based on results of thyroid function testing.

## 2024-12-09 NOTE — ASSESSMENT & PLAN NOTE
Presented after a fall at facility with head strike and resultant small SAH  Management per primary team.

## 2024-12-09 NOTE — ASSESSMENT & PLAN NOTE
Lab Results   Component Value Date    HGBA1C 8.9 (H) 12/05/2024     Recent Labs     12/08/24  2100 12/09/24  0625 12/09/24  0649 12/09/24  0710   POCGLU 133 55* 60* 182*     Blood Sugar Average: Last 72 hrs:  (P) [id number]  Initially started on her home regimen, however after noted to be hypoglycemic in the morning, Lantus was decreased to 8 units nightly on 12/8.  This morning she again noted to have hypoglycemia with serum glucose of 47.  Throughout the day, noted to have postprandial hyperglycemia.    Plan:  Recommend monitoring off of Lantus tonight  Continue correctional scale insulin algorithm 3 before meals and at bedtime  Depending on results, would consider adding mealtime coverage.  Monitor for hypoglycemia, treat per protocol.  Will BG while in the [id number] mg/dL  Discharge recommendations pending clinical course  Endocrinology will continue following.

## 2024-12-09 NOTE — DISCHARGE INSTR - AVS FIRST PAGE
Neurosurgery Discharge Instructions following traumatic head bleed:     Do not take any blood thinning medications (ie. No Advil. No motrin. No ibuprofen. No Aleve. No Aspirin. No fishoil. No heparin. No antiplatelet / no anticoagulation medication). May resume Eliquis on 12/19/2024 per Neurosurgery.  Complete 7-day course of Keppra for seizure prophylaxis.  Refrain from activity that increases chance of trauma to head or falls. Recommend you take fall precautions.  No strenuous activity or sports.  Return to hospital Emergency Room if you experience worsening / new headache, nausea/vomiting, speech/vision change, seizure, confusion / mental status change, weakness, or other neurological changes.      Follow-up as needed with Neurosurgery going forward. Please call with questions or concerns as needed at [phone].      Continue PT and OT evaluation and treatment as indicated.

## 2024-12-10 ENCOUNTER — TRANSITIONAL CARE MANAGEMENT (OUTPATIENT)
Dept: FAMILY MEDICINE CLINIC | Facility: CLINIC | Age: 84
End: 2024-12-10

## 2024-12-12 ENCOUNTER — NURSING HOME VISIT (OUTPATIENT)
Dept: GERIATRICS | Facility: OTHER | Age: 84
End: 2024-12-12
Payer: MEDICARE

## 2024-12-12 DIAGNOSIS — E03.8 OTHER SPECIFIED HYPOTHYROIDISM: ICD-10-CM

## 2024-12-12 DIAGNOSIS — K21.9 GASTROESOPHAGEAL REFLUX DISEASE, UNSPECIFIED WHETHER ESOPHAGITIS PRESENT: ICD-10-CM

## 2024-12-12 DIAGNOSIS — I25.10 CORONARY ARTERY DISEASE INVOLVING NATIVE HEART WITHOUT ANGINA PECTORIS, UNSPECIFIED VESSEL OR LESION TYPE: ICD-10-CM

## 2024-12-12 DIAGNOSIS — R41.89 COGNITIVE IMPAIRMENT: ICD-10-CM

## 2024-12-12 DIAGNOSIS — I48.0 PAROXYSMAL ATRIAL FIBRILLATION (HCC): ICD-10-CM

## 2024-12-12 DIAGNOSIS — J44.9 CHRONIC OBSTRUCTIVE PULMONARY DISEASE, UNSPECIFIED COPD TYPE (HCC): ICD-10-CM

## 2024-12-12 DIAGNOSIS — H91.8X3 OTHER SPECIFIED HEARING LOSS OF BOTH EARS: ICD-10-CM

## 2024-12-12 DIAGNOSIS — I50.32 CHRONIC DIASTOLIC CONGESTIVE HEART FAILURE (HCC): ICD-10-CM

## 2024-12-12 DIAGNOSIS — I60.9 SAH (SUBARACHNOID HEMORRHAGE) (HCC): Primary | ICD-10-CM

## 2024-12-12 DIAGNOSIS — I10 PRIMARY HYPERTENSION: ICD-10-CM

## 2024-12-12 DIAGNOSIS — N18.32 STAGE 3B CHRONIC KIDNEY DISEASE (HCC): ICD-10-CM

## 2024-12-12 DIAGNOSIS — R26.2 AMBULATORY DYSFUNCTION: ICD-10-CM

## 2024-12-12 DIAGNOSIS — E11.65 TYPE 2 DIABETES MELLITUS WITH HYPERGLYCEMIA, WITH LONG-TERM CURRENT USE OF INSULIN (HCC): ICD-10-CM

## 2024-12-12 DIAGNOSIS — E11.3393 MODERATE NONPROLIFERATIVE DIABETIC RETINOPATHY OF BOTH EYES ASSOCIATED WITH TYPE 2 DIABETES MELLITUS, MACULAR EDEMA PRESENCE UNSPECIFIED (HCC): ICD-10-CM

## 2024-12-12 DIAGNOSIS — Z79.4 TYPE 2 DIABETES MELLITUS WITH HYPERGLYCEMIA, WITH LONG-TERM CURRENT USE OF INSULIN (HCC): ICD-10-CM

## 2024-12-12 DIAGNOSIS — F41.8 DEPRESSION WITH ANXIETY: ICD-10-CM

## 2024-12-12 DIAGNOSIS — K44.9 HIATAL HERNIA: ICD-10-CM

## 2024-12-12 DIAGNOSIS — J96.11 CHRONIC HYPOXIC RESPIRATORY FAILURE (HCC): ICD-10-CM

## 2024-12-12 PROCEDURE — 99306 1ST NF CARE HIGH MDM 50: CPT | Performed by: FAMILY MEDICINE

## 2024-12-12 NOTE — PROGRESS NOTES
[facility]  [address]  [place] [address]  [facility]  History and Physical    NAME: Lizzie Whitten  AGE: 84 y.o. SEX: female [phone]    DATE OF ENCOUNTER: 12/12/2024    Pain: none  Rehab Potential: fair  Patient Informed of Medical Condition: yes  Patient is Capable of Understanding Their Right: yes  Prognosis: fair  Discharge Plan: Back to [facility]  Surrogate Decision Maker: daughter, Gorge Mahajan  Advanced Directives: yes  Code status: full code  PCP: Kennedy Castellon MD    Assessment and Plan     SAH (subarachnoid hemorrhage) ([facility])  S/p mechanical fall at [facility] 12/5 showed Minimal posttraumatic subarachnoid hemorrhage in the right sylvian fissure. No other acute intracranial hemorrhage.  Right frontal scalp hematoma.  Continue Keppra 500 mg twice daily for seizure prophylaxis and  holding Eliquis until December 19 as per neurosurgery  Monitor clinically  Follow-up with neurosurgery    A-fib ([facility])  Rate stable. Continue metoprolol titrate 25 mg twice daily  Holding Eliquis until Dec 19 as per neurosurgery  Left  Monitor vitals    CAD (coronary artery disease)  Stable  Continue metoprolol and atorvastatin 80 mg daily  Continue dose reduction for statin when follow-up with PCP or cardiology     Chronic diastolic congestive heart failure (HCC)  Wt Readings from Last 3 Encounters:   12/05/24 78.9 kg (173 lb 15.1 oz)   11/05/24 76.4 kg (168 lb 6.4 oz)   08/01/24 80.4 kg (177 lb 3.2 oz)   No acute exacerbation  Continue home Lasix 40 mg daily and potassium supplement 20 mg daily  Monitor weight, BMP      Hypertension  Continue metoprolol in the setting of A-fib and Lasix in the setting of CHF  Monitor BP    Chronic hypoxic respiratory failure (HCC)  On home oxygen 2L NC    COPD (chronic obstructive pulmonary disease) (HCC)  With chronic home oxygen 2L NC  Not on any inhaler for COPD treatment    Hiatal  hernia  Continue pantoprazole 40 mg daily     GERD (gastroesophageal reflux disease)  Maintained on pantoprazole 40 mg daily    Hypothyroidism  Stable  TSH WNL 1.69 on 12/9/2024  Continue home levothyroxine 150 mcg daily  Monitor TSH and follow-up with PCP    Type 2 diabetes mellitus with hyperglycemia, with long-term current use of insulin ([facility])  Lab Results   Component Value Date    HGBA1C 8.9 (H) 12/05/2024   Home glargine 25U HS and Lispro 6U TID.  Hypoglycemia with BG in the 50s-60s when on glargine 16U HS and Lispro 3U TID while in the hospital  Will do glargine 8U HS and Lispro 2U TID at SNF  Monitor BG and adjust the dose of insulin accordingly.    Stage 3b chronic kidney disease ([facility])  Lab Results   Component Value Date    EGFR 40 12/09/2024    EGFR 33 12/08/2024    EGFR 30 12/06/2024    CREATININE 1.25 12/09/2024    CREATININE 1.44 (H) 12/08/2024    CREATININE 1.57 (H) 12/06/2024   With elevated creatinine 1.72 on hospital admission, trending down to 1.25, eGFR 40 on 12/9/2024   Ensure adequate hydration   Monitor BMP    Depression with anxiety  Continue home dose of citalopram 20 mg daily    Ambulatory dysfunction  Repeatedly mechanical falls this year  Uses of walker for ambulation at baseline  High risk future falls due to age, hx of fall, debility and unfamiliar environment   OT/PT  Fall precautions      All medications and routine orders were reviewed and updated as needed.    Plan discussed with: Patient and daughter. Coordination of care with nursing, OT/PT, SW, dietician.    Chief Complaint     Seen for [facility] at [facility]    History of Present Illness     84 y.o. female with HTN, A-fib on Eliquis, CHF, CAD, COPD, MITCH, hypothyroidism, diabetes, CKD who is seen today, following hospitalization at [facility] from 12/5/2024 to 12/9/2024 for subarachnoid hemorrhage after a fall with head strike.  She was placed on Keppra for seizure prevention while in the  hospital.  Hospital course was complicated by hypoglycemia for which glargine was held.  She was discharged to [facility] for rehab.  She is sitting in recliner, watching TV. She worked with PT this afternoon. She reports normal appetite, good sleep. Had 1 BM yesterday.     HISTORY:  Past Medical History:   Diagnosis Date    Anxiety     Arthritis     CHF (congestive heart failure) ([facility])     Chronic venous insufficiency     COPD (chronic obstructive pulmonary disease) ([facility])     Coronary artery disease     Diabetes mellitus ([facility])     type II    Disease of thyroid gland     hypothyroidism    DVT (deep venous thrombosis) ([facility])     HL (hearing loss)     Hyperlipidemia     Hypertension     Irregular heart beat     Paroxysmal a-fib    Pacemaker     Renal disorder     stage 3    Sleep apnea     Sleep difficulties     Thyroid disease     Urinary incontinence     Uterine leiomyoma     Walker as ambulation aid      Family History   Problem Relation Age of Onset    Coronary artery disease Mother     Hypertension Mother     Heart disease Mother     Hypertension Father     Diabetes Father         Mellitus    No Known Problems Sister     No Known Problems Daughter     No Known Problems Daughter     No Known Problems Maternal Grandmother     No Known Problems Maternal Grandfather     No Known Problems Paternal Grandmother     No Known Problems Paternal Grandfather     Diabetes Brother         Mellitus    No Known Problems Son     Breast cancer Maternal Aunt     Pancreatic cancer Maternal Aunt     Breast cancer Cousin     Breast cancer Cousin     Breast cancer Paternal Aunt     No Known Problems Paternal Aunt     No Known Problems Paternal Aunt      Social History     Socioeconomic History    Marital status: /Civil Union     Spouse name: None    Number of children: 3    Years of education: None    Highest education level: None   Occupational History    Occupation: RETIRED   Tobacco Use    Smoking status: Never      Passive exposure: Never    Smokeless tobacco: Never    Tobacco comments:     Per [employer]: Former smoker   Vaping Use    Vaping status: Never Used   Substance and Sexual Activity    Alcohol use: Not Currently     Comment: rarely. Per [employer]: Denied history of alcohol use    Drug use: No    Sexual activity: Never   Other Topics Concern    None   Social History Narrative    Daily coffee consumption: 2 cups/day     Social Drivers of Health     Financial Resource Strain: Low Risk  (5/4/2023)    Overall Financial Resource Strain (CARDIA)     Difficulty of Paying Living Expenses: Not hard at all   Food Insecurity: No Food Insecurity (8/1/2024)    Nursing - Inadequate Food Risk Classification     Worried About Running Out of Food in the Last Year: Never true     Ran Out of Food in the Last Year: Never true     Ran Out of Food in the Last Year: Not on file   Transportation Needs: No Transportation Needs (8/1/2024)    PRAPARE - Transportation     Lack of Transportation (Medical): No     Lack of Transportation (Non-Medical): No   Physical Activity: Not on file   Stress: Not on file   Social Connections: Not on file   Intimate Partner Violence: Not on file   Housing Stability: Low Risk  (8/1/2024)    Housing Stability Vital Sign     Unable to Pay for Housing in the Last Year: No     Number of Times Moved in the Last Year: 1     Homeless in the Last Year: No       Allergies:  Allergies   Allergen Reactions    Ampicillin Hives     Review of Systems     Review of Systems   Constitutional:  Negative for appetite change and fatigue.   HENT:  Negative for congestion and trouble swallowing.    Eyes:  Positive for visual disturbance (Wears glasses).   Respiratory:  Negative for shortness of breath.    Cardiovascular:  Negative for chest pain and palpitations.   Gastrointestinal:  Negative for abdominal pain.   Genitourinary:  Negative for dysuria.   Musculoskeletal:  Negative for back pain.   Neurological:  Negative for  seizures and headaches.   Psychiatric/Behavioral:  Negative for sleep disturbance.      Medications and orders     All medications reviewed and updated in longterm EMR.    Objective     Physical Exam  Vitals and nursing note in [facility] EHR reviewed.   General: no acute distress.  HENT:      Head: Normocephalic.      Nose: Nose normal.      Mouth: Mucous membranes are moist.   Eyes:       Right eye: No discharge. Periorbital ecchymosis.     Left eye: No discharge.      Conjunctivae normal.   Cardiovascular:      Normal rate and regular rhythm. No murmur heard.  Pulmonary:      Pulmonary effort is normal. No wheezing, rhonchi or rales.   Abdominal:      Bowel sounds are normal. There is no distension.      Abdomen is soft. There is no abdominal tenderness.   Musculoskeletal:      Cervical back: Neck supple.      Right lower leg: No edema.      Left lower leg: No edema.      Hyperpigmentation b/l LE 2/2 venous stasis.  Skin:     General: Skin is warm. Ecchymosis right sided face and neck. Edema right forehead # 3x3  with dry scab.  Neurological: alert.      Oriented to person, place, and time. Cooperative, follow commands      Behavior: normal.     Pertinent Laboratory/Diagnostic Studies:   The following labs/studies were reviewed please see chart or hospital paperwork for details.    Labs & Imaging:  Lab Results   Component Value Date    WBC 6.32 12/08/2024    HGB 12.8 12/08/2024    HCT 43.6 12/08/2024    MCV 99 (H) 12/08/2024     12/08/2024     Lab Results   Component Value Date     12/06/2016    SODIUM 140 12/09/2024    K 3.8 12/09/2024    CL 96 12/09/2024    CO2 40 (H) 12/09/2024    ANIONGAP 8 10/23/2015    AGAP 4 12/09/2024    BUN 30 (H) 12/09/2024    CREATININE 1.25 12/09/2024    GLUC 47 (LL) 12/09/2024    GLUF 49 (L) 01/23/2024    CALCIUM 9.1 12/09/2024    AST 15 12/05/2024    ALT 8 12/05/2024    ALKPHOS 84 12/05/2024    PROT 6.4 09/06/2016    TP 6.4 12/05/2024    BILITOT 0.6 09/06/2016    TBILI  0.93 12/05/2024    EGFR 40 12/09/2024     Lab Results   Component Value Date    HGBA1C 8.9 (H) 12/05/2024     Lab Results   Component Value Date    CHOLESTEROL 127 01/23/2024    CHOLESTEROL 99 09/21/2023    CHOLESTEROL 122 03/01/2023     Lab Results   Component Value Date    HDL 48 (L) 01/23/2024    HDL 41 (L) 09/21/2023    HDL 63 03/01/2023     Lab Results   Component Value Date    TRIG 68 01/23/2024    TRIG 62 09/21/2023    TRIG 67 03/01/2023     Lab Results   Component Value Date    NONHDLC 79 01/23/2024    NONHDLC 58 09/21/2023    NONHDLC 59 03/01/2023     Lab Results   Component Value Date    LDLCALC 65 01/23/2024    LDLCALC 46 09/21/2023     Lab Results   Component Value Date    DZQ3DUROSXUG 1.693 12/09/2024     Lab Results   Component Value Date    [id number] 47.2 02/15/2023      I have spent a total time of 60 minutes in caring for this patient on the day of the visit/encounter including diagnostic results, prognosis, risks and benefits of tx options, instructions for management, patient and family education, counseling / coordination of care, documenting in the medical record, reviewing / ordering tests, medicine, procedures , obtaining and reviewing history, communicating with other healthcare professionals.    Beverly Dixon MD

## 2024-12-15 PROBLEM — S00.83XA TRAUMATIC HEMATOMA OF FOREHEAD: Status: RESOLVED | Noted: 2023-09-13 | Resolved: 2024-12-15

## 2024-12-15 NOTE — ASSESSMENT & PLAN NOTE
Wt Readings from Last 3 Encounters:   12/05/24 78.9 kg (173 lb 15.1 oz)   11/05/24 76.4 kg (168 lb 6.4 oz)   08/01/24 80.4 kg (177 lb 3.2 oz)   No acute exacerbation  Continue home Lasix 40 mg daily and potassium supplement 20 mg daily  Monitor weight, BMP

## 2024-12-15 NOTE — ASSESSMENT & PLAN NOTE
Rate stable. Continue metoprolol titrate 25 mg twice daily  Holding Eliquis until Dec 19 as per neurosurgery  Left  Monitor vitals

## 2024-12-15 NOTE — ASSESSMENT & PLAN NOTE
Stable  TSH WNL 1.69 on 12/9/2024  Continue home levothyroxine 150 mcg daily  Monitor TSH and follow-up with PCP

## 2024-12-15 NOTE — ASSESSMENT & PLAN NOTE
Lab Results   Component Value Date    EGFR 40 12/09/2024    EGFR 33 12/08/2024    EGFR 30 12/06/2024    CREATININE 1.25 12/09/2024    CREATININE 1.44 (H) 12/08/2024    CREATININE 1.57 (H) 12/06/2024   With elevated creatinine 1.72 on hospital admission, trending down to 1.25, eGFR 40 on 12/9/2024   Ensure adequate hydration   Monitor BMP

## 2024-12-15 NOTE — ASSESSMENT & PLAN NOTE
S/p mechanical fall at [facility] 12/5 showed Minimal posttraumatic subarachnoid hemorrhage in the right sylvian fissure. No other acute intracranial hemorrhage.  Right frontal scalp hematoma.  Continue Keppra 500 mg twice daily for seizure prophylaxis and  holding Eliquis until December 19 as per neurosurgery  Monitor clinically  Follow-up with neurosurgery

## 2024-12-15 NOTE — ASSESSMENT & PLAN NOTE
Lab Results   Component Value Date    HGBA1C 8.9 (H) 12/05/2024   Home glargine 25U HS and Lispro 6U TID.  Hypoglycemia with BG in the 50s-60s when on glargine 16U HS and Lispro 3U TID while in the hospital  Will do glargine 8U HS and Lispro 2U TID at SNF  Monitor BG and adjust the dose of insulin accordingly.

## 2024-12-15 NOTE — ASSESSMENT & PLAN NOTE
Repeatedly mechanical falls this year  Uses of walker for ambulation at baseline  High risk future falls due to age, hx of fall, debility and unfamiliar environment   OT/PT  Fall precautions

## 2024-12-15 NOTE — ASSESSMENT & PLAN NOTE
Stable  Continue metoprolol and atorvastatin 80 mg daily  Continue dose reduction for statin when follow-up with PCP or cardiology

## 2024-12-17 ENCOUNTER — NURSING HOME VISIT (OUTPATIENT)
Dept: GERIATRICS | Facility: OTHER | Age: 84
End: 2024-12-17
Payer: MEDICARE

## 2024-12-17 DIAGNOSIS — I48.0 PAROXYSMAL ATRIAL FIBRILLATION (HCC): ICD-10-CM

## 2024-12-17 DIAGNOSIS — J96.11 CHRONIC HYPOXIC RESPIRATORY FAILURE (HCC): ICD-10-CM

## 2024-12-17 DIAGNOSIS — Z79.4 TYPE 2 DIABETES MELLITUS WITH HYPERGLYCEMIA, WITH LONG-TERM CURRENT USE OF INSULIN (HCC): ICD-10-CM

## 2024-12-17 DIAGNOSIS — I60.9 SAH (SUBARACHNOID HEMORRHAGE) (HCC): Primary | ICD-10-CM

## 2024-12-17 DIAGNOSIS — I50.32 CHRONIC DIASTOLIC CONGESTIVE HEART FAILURE (HCC): ICD-10-CM

## 2024-12-17 DIAGNOSIS — J44.9 CHRONIC OBSTRUCTIVE PULMONARY DISEASE, UNSPECIFIED COPD TYPE (HCC): ICD-10-CM

## 2024-12-17 DIAGNOSIS — R53.81 DEBILITY: ICD-10-CM

## 2024-12-17 DIAGNOSIS — E11.65 TYPE 2 DIABETES MELLITUS WITH HYPERGLYCEMIA, WITH LONG-TERM CURRENT USE OF INSULIN (HCC): ICD-10-CM

## 2024-12-17 DIAGNOSIS — N18.32 STAGE 3B CHRONIC KIDNEY DISEASE (HCC): ICD-10-CM

## 2024-12-17 PROCEDURE — 99309 SBSQ NF CARE MODERATE MDM 30: CPT | Performed by: NURSE PRACTITIONER

## 2024-12-17 NOTE — PROGRESS NOTES
Facility: [facility]  POS: 31  Progress Note    Chief Complaint/Reason for visit: STR follow-up visit  Code status: Full code  History of Present Illness: 84-year-old female seen and examined for STR follow-up of acute and chronic medical conditions.  Patient is currently residing at [facility] for rehabilitation.  Received patient seated in chair and she appeared in no distress.  Patient denies having pain or discomfort and no nonverbal signs of pain noted.  Patient expressed to social service that she feels down at times but declined psych services when offered.  Patient's mood appeared stable at time of exam and she did not express feelings of depression..  Physical therapist reports that oxygen cannula is often found not on patient.  Past Medical History: unchanged from history and physical  Past Medical History:   Diagnosis Date    Anxiety     Arthritis     CHF (congestive heart failure) ([facility])     Chronic venous insufficiency     COPD (chronic obstructive pulmonary disease) ([facility])     Coronary artery disease     Diabetes mellitus ([facility])     type II    Disease of thyroid gland     hypothyroidism    DVT (deep venous thrombosis) ([facility])     HL (hearing loss)     Hyperlipidemia     Hypertension     Irregular heart beat     Paroxysmal a-fib    Pacemaker     Renal disorder     stage 3    Sleep apnea     Sleep difficulties     Thyroid disease     Urinary incontinence     Uterine leiomyoma     Walker as ambulation aid      Family History: Unchanged from history and physical  Social History: Unchanged from history and physical  Review of systems: As per review of medical illness, all other systems reviewed and negative.  Medications: All medication and routine orders were reviewed and updated  Allergies: Reviewed and unchanged  Consults reviewed:PT, OT, and Other  Labs/Diagnostics (reviewed by this provider): Copy in Chart  Imaging Reviewed: None today  Physical Exam  Weight: 156.6 pounds temp: 97.9          BP: 118/68  pulse: 62 resp: 16  Orientation:Person and Place     Physical Exam  Vitals and nursing note reviewed.   Constitutional:       General: She is not in acute distress.     Appearance: She is not toxic-appearing or diaphoretic.   HENT:      Head: Normocephalic.      Comments: Right forehead hematoma.      Ears:      Comments: Hard of hearing     Nose: No congestion.      Mouth/Throat:      Mouth: Mucous membranes are moist.      Pharynx: No oropharyngeal exudate.   Eyes:      Comments: Right orbital bruising   Cardiovascular:      Rate and Rhythm: Normal rate.      Comments: Pacemaker in situ  Pulmonary:      Effort: Pulmonary effort is normal. No respiratory distress.      Comments: oxygen on via nasal cannula.  Decreased breath sounds bilateral lung fields.  Abdominal:      General: Bowel sounds are normal. There is no distension.      Palpations: Abdomen is soft.      Tenderness: There is no abdominal tenderness. There is no guarding.   Musculoskeletal:         General: Signs of injury (Facial injuries noted) present.      Cervical back: Neck supple.      Right lower leg: Edema present.      Left lower leg: Edema present.      Comments: Moves all 4 extremities.   Skin:     General: Skin is warm and dry.      Capillary Refill: Capillary refill takes less than 2 seconds.      Findings: Bruising (Left anterior leg ecchymosis) present.   Neurological:      Mental Status: She is alert. Mental status is at baseline.   Psychiatric:         Mood and Affect: Mood normal.         Behavior: Behavior normal.         Thought Content: Thought content normal.       Assessment/Plan:  84-year-old female with:    SAH (subarachnoid hemorrhage) ([facility])  S/p mechanical fall at her place of residence [facility] sustaining minimal posttraumatic subarachnoid hemorrhage in the right sylvian fissure as per imaging.  No other acute intracranial hemorrhage. With right forehead hematoma and a right orbital ecchymosis.  No  complaints of vision changes, headaches, dizziness, or lightheadedness  Continue Keppra 500 mg twice daily for seizure prophylaxis  Continue holding Eliquis until December 19 as per neurosurgery   No appointment noted with neurosurgery; will check with  regarding appointment needed    A-fib ([facility])  Routine follow-up with cardiology heart rate stable  Continue metoprolol tartrate 25 mg twice daily  Eliquis currently on hold since hospitalization due to subarachnoid hemorrhage.  Hospital notes states to resume Eliquis on December 19th    Chronic diastolic congestive heart failure ([facility])  Wt Readings from Last 3 Encounters:   12/05/24 78.9 kg (173 lb 15.1 oz)   11/05/24 76.4 kg (168 lb 6.4 oz)   08/01/24 80.4 kg (177 lb 3.2 oz)   With chronic bilateral lower extremity edema noted  No signs or symptoms of fluid overload noted on exam  Continue Lasix 40 mg daily and potassium supplement 20 mEq daily  Continue CHF pathway/daily weights  Routine follow-up with cardiology outpatient    Chronic obstructive pulmonary disease ([facility])  Stable without exacerbation   Currently not on any maintenance inhalers or nebulizer treatments    Chronic hypoxic respiratory failure ([facility])  Requires continuous oxygen supplementation via nasal cannula at 2 L    Stage 3b chronic kidney disease ([facility])  Lab Results   Component Value Date    EGFR 40 12/09/2024    EGFR 33 12/08/2024    EGFR 30 12/06/2024    CREATININE 1.25 12/09/2024    CREATININE 1.44 (H) 12/08/2024    CREATININE 1.57 (H) 12/06/2024   Baseline creatinine 1.3-1.5  Creatinine elevated to 1.72 with recent hospital admission, trending down  Avoid nephrotoxic medications such as NSAIDS  Renal dose medications  Ensure adequate hydration and avoid hypotension  Monitor BMP trends    Type 2 diabetes mellitus with hyperglycemia, with long-term current use of insulin ([facility])    Lab Results   Component Value Date    HGBA1C 8.9 (H) 12/05/2024   Hypoglycemic with recent hospitalization while  on long-acting insulin 16 units every night and lispro insulin 3 units 3 times a day   Blood glucose 53 last evening, 174 this morning, and 152 at noon  Patient is currently on Lantus insulin 14 units every night with hold parameter and lispro insulin 3 units 3 times daily with meals with hold parameter  Will not make any adjustments in insulin at this time.  Will review all blood glucose readings tomorrow morning and adjust insulin if clinically indicated  Continue Accu-Cheks    Debility  Multifactorial   Continue supportive care at SNF   Continue PT/OT  Continue fall precautions  Ensure adequate hydration and nutrition  Goal is for patient to return to her previous place of residence after [facility] [place]     This note was completed in part utilizing [employer] direct voice recognition software.  Grammatical errors, random word insertion, spelling mistakes, and incomplete sentences may be an occasional consequence of the system secondary to software limitations, ambient noise and hardware issues.  At the time of dictation, efforts were made to edit, clarify and/or correct errors.  Please read the chart carefully and recognize, using context, where substitutions have occurred.  If you have any questions or concerns about the context, text or information contained within the body of this dictation, please contact myself, the provider, for further clarification.    DALILA Martinez  12/17/20248:24 PM

## 2024-12-18 ENCOUNTER — TELEPHONE (OUTPATIENT)
Dept: NEUROSURGERY | Facility: CLINIC | Age: 84
End: 2024-12-18

## 2024-12-18 NOTE — ASSESSMENT & PLAN NOTE
Lab Results   Component Value Date    EGFR 40 12/09/2024    EGFR 33 12/08/2024    EGFR 30 12/06/2024    CREATININE 1.25 12/09/2024    CREATININE 1.44 (H) 12/08/2024    CREATININE 1.57 (H) 12/06/2024   Baseline creatinine 1.3-1.5  Creatinine elevated to 1.72 with recent hospital admission, trending down  Avoid nephrotoxic medications such as NSAIDS  Renal dose medications  Ensure adequate hydration and avoid hypotension  Monitor BMP trends

## 2024-12-18 NOTE — TELEPHONE ENCOUNTER
Ashley from [facility] phoned her call back number is [phone] ext [phone].      Ashley states that one of the [facility] practitioners wanted to clarify about the patient following up with Neurosurgery.  They also wanted to know about the patient restarting the Eliquis and to find out if there was any need for follow up with Neurosurgery.      This RN reviewed notes in patient EMR, patient was admitted under trauma and discharged under trauma. Neurosurgery was consulted during admission. AVS reviewed as well as last note from Neurosurgery and both state to follow up with Neurosurgery as needed.  There RN stated that there is no need unless the patient were to present with change in s/s to follow up with Neurosurgery at this point. AVS and notes also read that  patient to restart Eliqus  12/19/2024.      This RN stated that she would print these documents out for review and Fax to facility.     Fax number at [facility] [phone]

## 2024-12-18 NOTE — ASSESSMENT & PLAN NOTE
Multifactorial   Continue supportive care at SNF   Continue PT/OT  Continue fall precautions  Ensure adequate hydration and nutrition  Goal is for patient to return to her previous place of residence after [facility] [place]

## 2024-12-18 NOTE — ASSESSMENT & PLAN NOTE
Routine follow-up with cardiology heart rate stable  Continue metoprolol tartrate 25 mg twice daily  Eliquis currently on hold since hospitalization due to subarachnoid hemorrhage.  Hospital notes states to resume Eliquis on December 19th

## 2024-12-18 NOTE — ASSESSMENT & PLAN NOTE
Wt Readings from Last 3 Encounters:   12/05/24 78.9 kg (173 lb 15.1 oz)   11/05/24 76.4 kg (168 lb 6.4 oz)   08/01/24 80.4 kg (177 lb 3.2 oz)   With chronic bilateral lower extremity edema noted  No signs or symptoms of fluid overload noted on exam  Continue Lasix 40 mg daily and potassium supplement 20 mEq daily  Continue CHF pathway/daily weights  Routine follow-up with cardiology outpatient

## 2024-12-18 NOTE — ASSESSMENT & PLAN NOTE
Stable without exacerbation   Currently not on any maintenance inhalers or nebulizer treatments

## 2024-12-18 NOTE — ASSESSMENT & PLAN NOTE
Lab Results   Component Value Date    HGBA1C 8.9 (H) 12/05/2024   Hypoglycemic with recent hospitalization while on long-acting insulin 16 units every night and lispro insulin 3 units 3 times a day   Blood glucose 53 last evening, 174 this morning, and 152 at noon  Patient is currently on Lantus insulin 14 units every night with hold parameter and lispro insulin 3 units 3 times daily with meals with hold parameter  Will not make any adjustments in insulin at this time.  Will review all blood glucose readings tomorrow morning and adjust insulin if clinically indicated  Continue Accu-Cheks

## 2024-12-20 ENCOUNTER — NURSING HOME VISIT (OUTPATIENT)
Dept: GERIATRICS | Facility: OTHER | Age: 84
End: 2024-12-20
Payer: MEDICARE

## 2024-12-20 DIAGNOSIS — I50.32 CHRONIC DIASTOLIC CONGESTIVE HEART FAILURE (HCC): ICD-10-CM

## 2024-12-20 DIAGNOSIS — N18.32 STAGE 3B CHRONIC KIDNEY DISEASE (HCC): ICD-10-CM

## 2024-12-20 DIAGNOSIS — E11.65 TYPE 2 DIABETES MELLITUS WITH HYPERGLYCEMIA, WITH LONG-TERM CURRENT USE OF INSULIN (HCC): ICD-10-CM

## 2024-12-20 DIAGNOSIS — J44.9 CHRONIC OBSTRUCTIVE PULMONARY DISEASE, UNSPECIFIED COPD TYPE (HCC): ICD-10-CM

## 2024-12-20 DIAGNOSIS — Z79.4 TYPE 2 DIABETES MELLITUS WITH HYPERGLYCEMIA, WITH LONG-TERM CURRENT USE OF INSULIN (HCC): ICD-10-CM

## 2024-12-20 DIAGNOSIS — I48.91 ATRIAL FIBRILLATION, UNSPECIFIED TYPE (HCC): ICD-10-CM

## 2024-12-20 DIAGNOSIS — I60.9 SAH (SUBARACHNOID HEMORRHAGE) (HCC): Primary | ICD-10-CM

## 2024-12-20 DIAGNOSIS — R53.81 DEBILITY: ICD-10-CM

## 2024-12-20 PROCEDURE — 99316 NF DSCHRG MGMT 30 MIN+: CPT | Performed by: NURSE PRACTITIONER

## 2024-12-20 NOTE — LETTER
December 20, 2024     Kennedy Castellon MD  [address]  [address]  [place] [address]    Patient: Lizzie Whitten   YOB: 1940   Date of Visit: 12/20/2024       Dear Dr. Castellon:    Thank you for referring Lizzie Whitten to me for evaluation. Below are my notes for this consultation.    If you have questions, please do not hesitate to call me. I look forward to following your patient along with you.         Sincerely,        DALILA Martinez        CC: No Recipients    DALILA Martinez  12/20/2024  1:58 PM  [address] [address] [phone]  DISCHARGE SUMMARY  POS: 31  Facility: [facility]    NAME: Lizzie Whitten  AGE: 84 y.o. SEX: female  DATE OF ADMISSION: 12/10/2024 DATE OF DISCHARGE: 12/23/2024  DISCHARGE DISPOSITION: [facility]    Reason for admission: Patient was admitted from [facility] for rehabilitation after hospitalization for s/p fall with head strike sustaining subarachnoid hemorrhage    Additional Problems:   Past Medical History:   Diagnosis Date   • Anxiety    • Arthritis    • CHF (congestive heart failure) ([facility])    • Chronic venous insufficiency    • COPD (chronic obstructive pulmonary disease) ([facility])    • Coronary artery disease    • Diabetes mellitus ([facility])     type II   • Disease of thyroid gland     hypothyroidism   • DVT (deep venous thrombosis) ([facility])    • HL (hearing loss)    • Hyperlipidemia    • Hypertension    • Irregular heart beat     Paroxysmal a-fib   • Pacemaker    • Renal disorder     stage 3   • Sleep apnea    • Sleep difficulties    • Thyroid disease    • Urinary incontinence    • Uterine leiomyoma    • Walker as ambulation aid       Discharge Diagnoses: See problem list follow up recommendations below.    Course of stay: Patient was admitted to [facility] for rehabilitation following hospitalization for  above-mentioned.  At time of examination, patient found seated in chair, and she appeared in no distress.  During the resident's stay at [facility], She received skilled nursing care, PT, OT, social service support, and medical management.  Patient is scheduled to be discharged to [facility] on 12/23/2024.    Labs and testing performed during stay: None    Discharge Medications: See discharge medication list which was reviewed in [facility] and compared to facility orders for accuracy.  Status at time of discharge exam: Stable    Today's Visit: 12/20/20241:28 PM    Subjective: No complaints    Review of systems: As per review of medical illness, all other systems reviewed and negative.    Vitals:Weight: 159.1 lbs   BP: 117/73    HR: 60   Resp: 18    Exam: Physical Exam  Vitals and nursing note reviewed.   Constitutional:       General: She is not in acute distress.     Appearance: She is not toxic-appearing or diaphoretic.   HENT:      Head: Normocephalic.      Nose: No congestion.      Mouth/Throat:      Mouth: Mucous membranes are moist.      Pharynx: No oropharyngeal exudate.   Eyes:      Conjunctiva/sclera: Conjunctivae normal.   Cardiovascular:      Rate and Rhythm: Normal rate and regular rhythm.   Pulmonary:      Effort: Pulmonary effort is normal. No respiratory distress.      Comments: Oxygen on via nasal cannula.  Abdominal:      General: Bowel sounds are normal. There is no distension.      Palpations: Abdomen is soft.      Tenderness: There is no abdominal tenderness. There is no guarding.   Musculoskeletal:         General: Signs of injury (Signs of injury due to ecchymotic areas on face and hematoma right forehead) present.      Cervical back: Neck supple.      Right lower leg: Edema present.      Left lower leg: Edema present.      Comments: Moves all 4 extremities.   Skin:     General: Skin is warm and dry.      Capillary Refill: Capillary refill takes less than 2 seconds.      Findings:  Bruising (Face, left anterior leg) present.   Neurological:      Mental Status: She is alert. Mental status is at baseline.      Motor: Weakness present.      Gait: Gait abnormal.   Psychiatric:         Mood and Affect: Mood normal.         Behavior: Behavior normal.         Thought Content: Thought content normal.       Discussion with patient/family and further instructions:  -Fall precautions  -Bleeding precautions  -Monitor for signs/symptoms of infection  -Medication list was reviewed    Follow-up Recommendations: Please follow-up with your primary care physician within 7-10 days of discharge to review medication changes and current status.     Problem List Follow-up Recommendations:  84-year-old female with:    SAH (subarachnoid hemorrhage) ([facility])  S/p mechanical fall at her place of residence [facility] sustaining minimal posttraumatic subarachnoid hemorrhage in the right sylvian fissure as per imaging.  No other acute intracranial hemorrhage. With right forehead hematoma and a right orbital ecchymosis.  No complaints of vision changes, headaches, dizziness, or lightheadedness  Continue Keppra 500 mg twice daily for seizure prophylaxis  As per neurosurgery, no follow-up appointment needed     A-fib ([facility])  Heart rate stable  Continue metoprolol tartrate 25 mg twice daily  Eliquis held in the hospital and restarted at [facility] on 12/19/2024  Continue Eliquis 5 mg twice daily  Routine follow-up with cardiology outpatient     Chronic diastolic congestive heart failure ([facility])  Wt Readings from Last 3 Encounters:  12/05/24 78.9 kg (173 lb 15.1 oz)  11/05/24 76.4 kg (168 lb 6.4 oz)  08/01/24 80.4 kg (177 lb 3.2 oz)  Stable without exacerbation  With chronic bilateral lower extremity edema noted  Continue Lasix 40 mg daily and potassium supplement 20 mEq daily  Routine follow-up with cardiology outpatient     Chronic obstructive pulmonary disease ([facility])  Stable without exacerbation   Currently not on any  maintenance inhalers or nebulizer treatments  Follow-up with pulmonology as scheduled 1/16/2025     Chronic hypoxic respiratory failure ([facility])  Requires continuous oxygen supplementation via nasal cannula at 2 L     Stage 3b chronic kidney disease ([facility])  Lab Results  Component Value Date    EGFR 40 12/09/2024    EGFR 33 12/08/2024    EGFR 30 12/06/2024    CREATININE 1.25 12/09/2024    CREATININE 1.44 (H) 12/08/2024    CREATININE 1.57 (H) 12/06/2024  Baseline creatinine 1.3-1.5  Creatinine elevated to 1.72 with recent hospital admission, trending down  Avoid nephrotoxic medications such as NSAIDS  Renal dose medications  Ensure adequate hydration and avoid hypotension  Follow-up with PCP for management     Type 2 diabetes mellitus with hyperglycemia, with long-term current use of insulin ([facility])     Lab Results  Component Value Date    HGBA1C 8.9 (H) 12/05/2024  Hypoglycemic with recent hospitalization while on long-acting insulin 16 units every night and lispro insulin 3 units 3 times a day   Preprandial blood glucose 95, postprandial blood glucose 200s -  331   Patient is currently on Lantus insulin 14 units every night with hold parameter and lispro insulin 3 units 3 times daily with meals with hold parameter  Will not make any adjustments in insulin at this time  Continue Accu-Cheks  Follow-up with PCP for management     Debility  Multifactorial   Patient participated in therapy during SNF stay  Continue fall precautions  Planned discharge scheduled for 12/23/2024 to [facility]     I have spent >30 minutes with patient today in which greater than 50% of this time was spent in counseling/coordination of care regarding Diagnostic results, Instructions for management, Importance of tx compliance, Counseling / Coordination of care, Documenting in the medical record, Reviewing / ordering tests, medicine, procedures  , Obtaining or reviewing history  , and Communicating with other healthcare professionals  .    PCP made aware of discharge summary via epic communications.    This note was completed in part utilizing [employer] one voice recognition software.  Grammatical errors, random word insertion, spelling mistakes, and incomplete sentences may be an occasional consequence of the system secondary to software limitations, ambient noise and hardware issues.  At the time of dictation, efforts were made to edit, clarify and/or correct errors.  Please read the chart carefully and recognize, using context, where substitutions have occurred.  If you have any questions or concerns about the context, text or information contained within the body of this dictation, please contact myself, the provider, for further clarification.    DALILA Martinez  12/20/20241:28 PM

## 2024-12-20 NOTE — PROGRESS NOTES
[facility]  [address] [address]  [phone]  DISCHARGE SUMMARY  POS: 31  Facility: [facility]    NAME: Lizzie Whitten  AGE: 84 y.o. SEX: female  DATE OF ADMISSION: 12/10/2024 DATE OF DISCHARGE: 12/23/2024  DISCHARGE DISPOSITION: [facility]    Reason for admission: Patient was admitted from [facility] for rehabilitation after hospitalization for s/p fall with head strike sustaining subarachnoid hemorrhage    Additional Problems:   Past Medical History:   Diagnosis Date    Anxiety     Arthritis     CHF (congestive heart failure) ([facility])     Chronic venous insufficiency     COPD (chronic obstructive pulmonary disease) ([facility])     Coronary artery disease     Diabetes mellitus ([facility])     type II    Disease of thyroid gland     hypothyroidism    DVT (deep venous thrombosis) ([facility])     HL (hearing loss)     Hyperlipidemia     Hypertension     Irregular heart beat     Paroxysmal a-fib    Pacemaker     Renal disorder     stage 3    Sleep apnea     Sleep difficulties     Thyroid disease     Urinary incontinence     Uterine leiomyoma     Walker as ambulation aid       Discharge Diagnoses: See problem list follow up recommendations below.    Course of stay: Patient was admitted to [facility] for rehabilitation following hospitalization for above-mentioned.  At time of examination, patient found seated in chair, and she appeared in no distress.  No complaints of vision changes.  Patient denies shortness of breath, chest pain, headache, dizziness, abdominal pain, nausea, vomiting, diarrhea, or constipation.  During the resident's stay at [facility], she received skilled nursing care, PT, OT, social service support, and medical management.  Patient is scheduled to be discharged to [facility] on 12/23/2024.    Labs and testing performed during stay: None    Discharge Medications: See discharge medication list which was reviewed in epic and  compared to facility orders for accuracy.  Status at time of discharge exam: Stable    Today's Visit: 12/20/20241:28 PM    Subjective: No complaints    Review of systems: As per review of medical illness, all other systems reviewed and negative.    Vitals:Weight: 159.1 lbs   BP: 117/73    HR: 60   Resp: 18    Exam: Physical Exam  Vitals and nursing note reviewed.   Constitutional:       General: She is not in acute distress.     Appearance: She is not toxic-appearing or diaphoretic.   HENT:      Head: Normocephalic.      Nose: No congestion.      Mouth/Throat:      Mouth: Mucous membranes are moist.      Pharynx: No oropharyngeal exudate.   Eyes:      Conjunctiva/sclera: Conjunctivae normal.   Cardiovascular:      Rate and Rhythm: Normal rate and regular rhythm.   Pulmonary:      Effort: Pulmonary effort is normal. No respiratory distress.      Comments: Oxygen on via nasal cannula.  Abdominal:      General: Bowel sounds are normal. There is no distension.      Palpations: Abdomen is soft.      Tenderness: There is no abdominal tenderness. There is no guarding.   Musculoskeletal:         General: Signs of injury (Signs of injury due to ecchymotic areas on face and hematoma right forehead) present.      Cervical back: Neck supple.      Right lower leg: Edema present.      Left lower leg: Edema present.      Comments: Moves all 4 extremities.   Skin:     General: Skin is warm and dry.      Capillary Refill: Capillary refill takes less than 2 seconds.      Findings: Bruising (Face, left anterior leg) present.   Neurological:      Mental Status: She is alert. Mental status is at baseline.      Motor: Weakness present.      Gait: Gait abnormal.   Psychiatric:         Mood and Affect: Mood normal.         Behavior: Behavior normal.         Thought Content: Thought content normal.       Discussion with patient/family and further instructions:  -Fall precautions  -Bleeding precautions  -Monitor for signs/symptoms of  infection  -Medication list was reviewed    Follow-up Recommendations: Please follow-up with your primary care physician within 7-10 days of discharge to review medication changes and current status.     Problem List Follow-up Recommendations:  84-year-old female with:    SAH (subarachnoid hemorrhage) ([facility])  S/p mechanical fall at her place of residence [facility] sustaining minimal posttraumatic subarachnoid hemorrhage in the right sylvian fissure as per imaging.  No other acute intracranial hemorrhage. With right forehead hematoma and a right orbital ecchymosis.  No complaints of vision changes, headaches, dizziness, or lightheadedness  Continue Keppra 500 mg twice daily for seizure prophylaxis  As per neurosurgery, no follow-up appointment needed     A-fib ([facility])  Heart rate stable  Continue metoprolol tartrate 25 mg twice daily  Eliquis held in the hospital and restarted at [facility] on 12/19/2024  Continue Eliquis 5 mg twice daily  Routine follow-up with cardiology outpatient     Chronic diastolic congestive heart failure ([facility])  Wt Readings from Last 3 Encounters:  12/05/24 78.9 kg (173 lb 15.1 oz)  11/05/24 76.4 kg (168 lb 6.4 oz)  08/01/24 80.4 kg (177 lb 3.2 oz)  Stable without exacerbation  With chronic bilateral lower extremity edema noted  Continue Lasix 40 mg daily and potassium supplement 20 mEq daily  Routine follow-up with cardiology outpatient     Chronic obstructive pulmonary disease ([facility])  Stable without exacerbation   Currently not on any maintenance inhalers or nebulizer treatments  Follow-up with pulmonology as scheduled 1/16/2025     Chronic hypoxic respiratory failure ([facility])  Requires continuous oxygen supplementation via nasal cannula at 2 L     Stage 3b chronic kidney disease ([facility])  Lab Results  Component Value Date    EGFR 40 12/09/2024    EGFR 33 12/08/2024    EGFR 30 12/06/2024    CREATININE 1.25 12/09/2024    CREATININE 1.44 (H) 12/08/2024    CREATININE 1.57 (H) 12/06/2024  Baseline  creatinine 1.3-1.5  Creatinine elevated to 1.72 with recent hospital admission, trending down  Avoid nephrotoxic medications such as NSAIDS  Renal dose medications  Ensure adequate hydration and avoid hypotension  Follow-up with PCP for management     Type 2 diabetes mellitus with hyperglycemia, with long-term current use of insulin ([facility])  Lab Results  Component Value Date    HGBA1C 8.9 (H) 12/05/2024  Hypoglycemic with recent hospitalization while on long-acting insulin 16 units every night and lispro insulin 3 units 3 times a day   Preprandial blood glucose 95, postprandial blood glucose 200s -  331   Patient is currently on Lantus insulin 14 units every night with hold parameter and lispro insulin 3 units 3 times daily with meals with hold parameter  Will not make any adjustments in insulin at this time  Continue Accu-Cheks  Follow-up with PCP for management     Debility  Multifactorial   Patient participated in therapy during SNF stay  Continue fall precautions  Planned discharge scheduled for 12/23/2024 to [facility]     I have spent >30 minutes with patient today in which greater than 50% of this time was spent in counseling/coordination of care regarding Diagnostic results, Instructions for management, Importance of tx compliance, Counseling / Coordination of care, Documenting in the medical record, Reviewing / ordering tests, medicine, procedures  , Obtaining or reviewing history  , and Communicating with other healthcare professionals .    PCP made aware of discharge summary via epic communications.    This note was completed in part utilizing Dragon Medical one voice recognition software.  Grammatical errors, random word insertion, spelling mistakes, and incomplete sentences may be an occasional consequence of the system secondary to software limitations, ambient noise and hardware issues.  At the time of dictation, efforts were made to edit, clarify and/or correct errors.  Please read the chart  carefully and recognize, using context, where substitutions have occurred.  If you have any questions or concerns about the context, text or information contained within the body of this dictation, please contact myself, the provider, for further clarification.    DALILA Martinez  12/20/20241:28 PM

## 2024-12-23 ENCOUNTER — NURSING HOME VISIT (OUTPATIENT)
Dept: GERIATRICS | Facility: OTHER | Age: 84
End: 2024-12-23
Payer: MEDICARE
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
## 2024-12-23 PROCEDURE — 99309 SBSQ NF CARE MODERATE MDM 30: CPT | Performed by: NURSE PRACTITIONER

## 2024-12-23 NOTE — ASSESSMENT & PLAN NOTE
Patient has been residing at [facility] for rehabilitation.  Patient is scheduled to be discharged to [facility] today.  Nursing staff reports that patient had an unwitnessed fall on 12/21/2024 with new facial injuries noted.  Ecchymotic area around left eye and new small hematoma left forehead.  Neurochecks within normal limits per nursing staff.  Patient seated in chair, alert and oriented to self, place, and current situation.  Patient denies having pain, change in vision, lightheadedness, headaches, or dizziness  No changes in mental status from baseline and PT reports no changes in functional status  Patient's family did not want patient transferred to the hospital emergency room after fall  Continue holding Eliquis for total of 5 days  Follow-up with PCP for management

## 2024-12-23 NOTE — PROGRESS NOTES
Facility: [facility]  POS: 31  Acute Med Note  Code status: Full code    Assessment/Plan:  84-year-old female with:    Fall with injury  Patient has been residing at [facility] for rehabilitation.  Patient is scheduled to be discharged to [facility] today.  Nursing staff reports that patient had an unwitnessed fall on 12/21/2024 with new facial injuries noted.  Ecchymotic area around left eye and new small hematoma left forehead.  Neurochecks within normal limits per nursing staff.  Patient seated in chair, alert and oriented to self, place, and current situation.  Patient denies having pain, change in vision, lightheadedness, headaches, or dizziness  No changes in mental status from baseline and PT reports no changes in functional status  Patient's family did not want patient transferred to the hospital emergency room after fall  Continue holding Eliquis for total of 5 days  Follow-up with PCP for management     Past Medical History:   Diagnosis Date    Anxiety     Arthritis     CHF (congestive heart failure) ([facility])     Chronic venous insufficiency     COPD (chronic obstructive pulmonary disease) ([facility])     Coronary artery disease     Diabetes mellitus ([facility])     type II    Disease of thyroid gland     hypothyroidism    DVT (deep venous thrombosis) ([facility])     HL (hearing loss)     Hyperlipidemia     Hypertension     Irregular heart beat     Paroxysmal a-fib    Pacemaker     Renal disorder     stage 3    Sleep apnea     Sleep difficulties     Thyroid disease     Urinary incontinence     Uterine leiomyoma     Walker as ambulation aid       Subjective: No complaints     Patient ID: Lizzie Whitten is a 84 y.o. female.    84-year-old female seen and examined for follow-up of fall.  Nursing reports that patient had an unwitnessed fall with no injuries on face on 12/21/2024 where she was found facedown near her recliner chair.  She has a new ecchymosis around the left eye with small hematoma  left forehead.  Family did not want patient transferred to the hospital after unwitnessed fall.  Eliquis placed on hold x 5 days.  Neurochecks were negative per nursing staff.  Patient found seated in chair at time of exam and she appeared in no distress.  Patient is alert and oriented to self and current situation.  She answered appropriately to questions.  No complaints of headache, dizziness, lightheadedness, or vision changes.  Patient may discharge to [facility] today as planned.     Patient has old injuries on right side of face from previous fall and hospitalized with posttraumatic subarachnoid hemorrhage.  Eliquis was held in the hospital and restarted at skilled nursing facility on 12/19/2024.  Eliquis placed on hold again for 5 days after unwitnessed fall on 12/21/2024.  Patient received PT/OT during SNF stay.  Follow-up with neurosurgery if needed.    Patient will be discharged to [facility] as scheduled.  A referral was placed to [facility] by social service to continue PT/OT to increase strength and endurance, fall prevention measures, return to previous level of ADLs.    The following portions of the patient's history were reviewed and updated as appropriate:  Allergies, current medications, Past Family history, past medical history, past social history, past surgical history, and problem list.    Review of Systems   Constitutional:  Negative for chills and fever.   HENT:  Negative for sore throat.    Eyes:  Negative for visual disturbance.   Respiratory:  Negative for cough and shortness of breath.    Cardiovascular:  Negative for chest pain and palpitations.   Gastrointestinal:  Negative for abdominal pain and vomiting.   Musculoskeletal:  Positive for gait problem.   Skin:  Positive for color change.   Neurological:  Negative for dizziness, seizures, syncope, light-headedness and headaches.   Psychiatric/Behavioral:  The patient is not nervous/anxious.    All other  systems reviewed and are negative.        Objective:  Vital signs log reviewed at facility EMR; stable     Physical Exam  Vitals and nursing note reviewed.   Constitutional:       General: She is not in acute distress.     Appearance: She is not toxic-appearing or diaphoretic.   HENT:      Head: Normocephalic.      Nose: No congestion.      Mouth/Throat:      Mouth: Mucous membranes are moist.      Pharynx: No oropharyngeal exudate.   Cardiovascular:      Rate and Rhythm: Normal rate.   Pulmonary:      Effort: Pulmonary effort is normal. No respiratory distress.      Comments: Oxygen on via nasal cannula.  Abdominal:      General: Bowel sounds are normal. There is no distension.      Palpations: Abdomen is soft.      Tenderness: There is no abdominal tenderness. There is no guarding.   Musculoskeletal:         General: Signs of injury (Signs of injury on face) present.      Cervical back: Neck supple. No tenderness.      Right lower leg: Edema present.      Left lower leg: Edema present.      Comments: Moves all 4 extremities.   Lymphadenopathy:      Cervical: No cervical adenopathy.   Skin:     General: Skin is warm and dry.      Capillary Refill: Capillary refill takes less than 2 seconds.      Findings: Bruising (Orbital bruises and hematoma bilateral forehead.  Left lower anterior leg bruise) present.   Neurological:      Mental Status: She is alert. Mental status is at baseline.      Motor: Weakness present.      Gait: Gait abnormal.      Comments: Confusion at times   Psychiatric:         Mood and Affect: Mood normal.         Behavior: Behavior normal.         Thought Content: Thought content normal.         I have spent a total time of 30 minutes in caring for this patient on the day of the visit/encounter including Risks and benefits of tx options, Counseling / Coordination of care, Documenting in the medical record, Reviewing / ordering tests, medicine, procedures  , Obtaining or reviewing history  , and  Communicating with other healthcare professionals .     This note was completed in part utilizing with Dragon medical one voice recognition software.  Grammatical errors,   random word insertion, spelling mistakes, and incomplete sentences may be an occasional consequence of the system secondary to software limitations, ambient noise and hardware issues.  At the time of dictation, efforts were made to edit, clarify and/or correct errors.  Please read the chart carefully and recognize, using context, where substitutions have occurred.  If you have any questions or concerns about the context, text or information contained within the body of this dictation, please contact myself, the provider, for further clarification.

## 2024-12-27 ENCOUNTER — OFFICE VISIT (OUTPATIENT)
Dept: FAMILY MEDICINE CLINIC | Facility: CLINIC | Age: 84
End: 2024-12-27
Payer: MEDICARE

## 2024-12-27 VITALS
SYSTOLIC BLOOD PRESSURE: 136 MMHG | HEART RATE: 61 BPM | TEMPERATURE: 97.6 F | DIASTOLIC BLOOD PRESSURE: 82 MMHG | OXYGEN SATURATION: 99 %

## 2024-12-27 DIAGNOSIS — Z78.9 TRANSITION OF CARE: Primary | ICD-10-CM

## 2024-12-27 DIAGNOSIS — R29.6 MULTIPLE FALLS: ICD-10-CM

## 2024-12-27 DIAGNOSIS — I60.9 SAH (SUBARACHNOID HEMORRHAGE) (HCC): ICD-10-CM

## 2024-12-27 DIAGNOSIS — I10 PRIMARY HYPERTENSION: ICD-10-CM

## 2024-12-27 DIAGNOSIS — Z79.4 TYPE 2 DIABETES MELLITUS WITH OTHER SPECIFIED COMPLICATION, WITH LONG-TERM CURRENT USE OF INSULIN (HCC): ICD-10-CM

## 2024-12-27 DIAGNOSIS — H91.8X3 OTHER SPECIFIED HEARING LOSS OF BOTH EARS: ICD-10-CM

## 2024-12-27 DIAGNOSIS — I48.0 PAF (PAROXYSMAL ATRIAL FIBRILLATION) (HCC): ICD-10-CM

## 2024-12-27 DIAGNOSIS — E11.69 TYPE 2 DIABETES MELLITUS WITH OTHER SPECIFIED COMPLICATION, WITH LONG-TERM CURRENT USE OF INSULIN (HCC): ICD-10-CM

## 2024-12-27 PROCEDURE — 99496 TRANSJ CARE MGMT HIGH F2F 7D: CPT | Performed by: FAMILY MEDICINE

## 2024-12-27 NOTE — PROGRESS NOTES
"Transition of Care Visit  Name: Lizzie Whitten      : 1940      MRN: [id number]  Encounter Provider: Kennedy Castellon MD  Encounter Date: 2024   Encounter department: [facility]    Assessment & Plan  Transition of care         Multiple falls  I reviewed with patient and family.  Patient with slowly worsening cognitive issues, which has contributed toHer multiple falls over the last 6 months.  Interventions have been ineffective, and patient even had a fall after her hospitalization.  Given this, and her present small subarachnoid hemorrhage, I made the recommendation to have patient no longer be anticoagulated with Eliquis.  I explained my concerns and the overall risks patient faces because of her multiple falls.  Family to consider we will call next week with their decision.  Eliquis will be held in the interim       SAH (subarachnoid hemorrhage) (HCC)  No worsening neurologic changes noted during hospitalization or rehab however patient did have another fall with evidence of some mild forehead trauma.  Again, because these multiple falls, and ineffective interventions, I feel patient would be at risk for life-threatening injury if she should remain anticoagulated on Eliquis.  I reviewed fully with family.  Will continue to hold Eliquis for now.  Family will let us know next week if they wish to restart.  I will reach out to cardiology in the interim to obtain their opinion       Other specified hearing loss of both ears  Patient seem to be with slightly slowed responses today.  Unclear if this is related to her poor hearing.  Encouraged use of hearing aids.  Will watch.       Type 2 diabetes mellitus with other specified complication, with long-term current use of insulin (HCC)  Patient with hypoglycemia while in the hospital.  She does not seem to have episodes since returning.  She does not eat well \"because I do not like the food\".  Monitor weight.  Continue " present insulin doses.  Monitor for hypoglycemia.  Recheck 1 month  Lab Results   Component Value Date    HGBA1C 8.9 (H) 12/05/2024            PAF (paroxysmal atrial fibrillation) (HCC)  EKG in the ER showed paced rhythm with underlying atrial flutter.  Patient is at risk for stroke however given her multiple falls, she is also at risk for intracranial injury but could worsen if anticoagulated.  At this point, I will continue to hold Eliquis.  I will reach out to cardiology to discuss however at present I believe that risk of her being on anticoagulation by way any benefits.  Family advised.  They will call next week with their decision regarding restarting medication or not.  Follow-up 1 month       Primary hypertension  Blood pressure stable.  Continue present treatment.            History of Present Illness     Transitional Care Management Review:   Lizzie Whitten is a 84 y.o. female here for TCM follow up.     During the TCM phone call patient stated:  TCM Call     Date and time call was made  12/10/2024 12:01 PM    Hospital care reviewed  Records reviewed    Patient was hospitialized at  [facility]    Date of Admission  12/05/24    Date of discharge  12/09/24    Diagnosis  SAH (subarachnoid hemorrhage)    Disposition  Home    Were the patients medications reviewed and updated  Yes    Current Symptoms  None    Headache pain severity  Moderate    Headache pain onset  Gradual    Headache location / laterality  --  back of head    Headache Radition / Laterality  Not radiating    Parietal Area  Bilateral    Episode pattern  Intermittent    Headache pain level  5    Headache cause / trigger  No known event    Cause certainty  Probably      TCM Call     Modifying factors-feels better  Acetaminophen    Modifying factors-feels worse  Movement    Clinical progress  Unchanged    Headache risk factors  --  none    Headache-pertinent history  --  none    Headache- Family history  --  unknown    Post hospital  "issues  None    Should patient be enrolled in anticoag monitoring?  No    Scheduled for follow up?  Yes    Patients specialists  Other (comment)    Other specialists names  Gastroenterology    Referrals needed  none    Did you obtain your prescribed medications  Yes    Do you need help managing your prescriptions or medications  No    Is transportation to your appointment needed  No    I have advised the patient to call PCP with any new or worsening symptoms  RANDELL Crabtree MA    Living Arrangements  Spouse or Significiant other    Support System  None    Are you recieving any outpatient services  No    Are you recieving home care services  Yes    Types of home care services  Home PT    Are you using any community resources  No    Current waiver services  No    Have you fallen in the last 12 months  Yes    How many times  1    Interperter language line needed  No    Counseling  Patient    Counseling topics  Activities of daily living        Pt here for TCM visit  - 83 yo female with hx of a-fib (on Eliquis), HTN, depression, CD, CHF, COPD, DMII, and ambulatory dysfunction, s/p multiple falls, suffered an unwitnessed fall  with apparent head trauma on 12/5 at her assisted living facility.  Staff called 911 and patient was sent to [facility] ED.  In the ED, initial vitals were stable, and GCS was 15.  Pt was amnestic re: the fall and time immediately after. Exam showed a large forehead hematoma.  CT of the head a \"Minimal posttraumatic subarachnoid hemorrhage in the right sylvian fissure\" and a R frontal hematoma but no fx or other intracranial abnormalities. Pt was admitted to the trauma service, and neuro was consulted.  Pt was placed on neuro check s and started on Keppra for sz prophylaxis. Eliquis was held. Pt had episode of hypoglycemia and Lantus was held. Repeat CT on 12/6 showed SAH to have sl increase in size though neuro checks were unchanged.  By 12/9 pt was found to be stable and was transferred to " [facility] for rehab  - at the rehab facility, pt slowly improved. On 12/21 she had another unwitnessed fall with new small forehead hematoma and ecchymosis noted around L orbit. Neuro check at that time was unchanged, and family did not wish pt to be sent to ED.  Eliquis was continued to be held.  By 12/23 pt was found to be stable and able to return to [facility]. Pt here for TCM visit  - since discharge, pt continue to have amnesia re: her first fall. She does ambulate with a walker, but may try to walk without it?  She has not had any further falls, but continues to show poor balance. She denies HA, change in vision, asymmetric waekness/numbness, or other new neuro symptoms.  PT/OT was ordered to started  - I reviewed available hospital records, rehab records, lab results and study reports with pt and family        Review of Systems   Constitutional:  Positive for activity change, appetite change and fatigue. Negative for chills and fever.   HENT: Negative.     Eyes:  Negative for pain, discharge and visual disturbance.   Respiratory: Negative.     Cardiovascular: Negative.    Gastrointestinal: Negative.    Musculoskeletal:  Positive for arthralgias, back pain, gait problem and myalgias.   Skin:         Facial ecchymosis   Neurological:  Positive for dizziness (imbalance without vertigo) and weakness (?mild legs?). Negative for facial asymmetry, light-headedness and numbness.   Psychiatric/Behavioral:  Positive for confusion (mild at times?). Negative for dysphoric mood. The patient is not nervous/anxious.      Objective   /82   Pulse 61   Temp 97.6 °F (36.4 °C)   SpO2 99%     Physical Exam  Vitals reviewed.   Constitutional:       Comments: Elderly female sitting in wheel chair with obvious facial ecchymoses in NAD   HENT:      Head:      Comments: R>L forehead hematomas. Ecchymosis extending from forehead to periorbital area     Right Ear: Tympanic membrane, ear canal and external ear  normal.      Left Ear: Tympanic membrane, ear canal and external ear normal.      Nose: Nose normal.      Mouth/Throat:      Mouth: Mucous membranes are moist.   Eyes:      Extraocular Movements: Extraocular movements intact.      Conjunctiva/sclera: Conjunctivae normal.      Pupils: Pupils are equal, round, and reactive to light.   Cardiovascular:      Rate and Rhythm: Normal rate and regular rhythm.   Pulmonary:      Effort: Pulmonary effort is normal.      Breath sounds: No wheezing or rales.   Abdominal:      General: There is no distension.      Palpations: There is no mass.      Tenderness: There is no abdominal tenderness.   Musculoskeletal:         General: Deformity (mild, diffuse arthritic changes) present.      Cervical back: Normal range of motion. No tenderness.      Right lower leg: Edema (trace) present.      Left lower leg: Edema (trace) present.   Lymphadenopathy:      Cervical: No cervical adenopathy.   Skin:     Capillary Refill: Capillary refill takes less than 2 seconds.      Findings: Bruising (facial as above) present.   Neurological:      Mental Status: She is alert.      Cranial Nerves: Cranial nerve deficit (decreased hearing) present.      Sensory: No sensory deficit.      Motor: Weakness (mild legs bilat) present.      Gait: Gait abnormal (pt in wheelchair).      Deep Tendon Reflexes: Reflexes abnormal (sl decreased in ankles and knees symmetrically).      Comments: ?sl response to questions due to poor hearing?       Medications have been reviewed by provider in current encounter

## 2024-12-31 PROBLEM — I10 PRIMARY HYPERTENSION: Status: ACTIVE | Noted: 2022-09-03

## 2024-12-31 PROBLEM — S22.41XD CLOSED FRACTURE OF MULTIPLE RIBS OF RIGHT SIDE WITH ROUTINE HEALING: Status: RESOLVED | Noted: 2024-04-13 | Resolved: 2024-12-31

## 2024-12-31 NOTE — ASSESSMENT & PLAN NOTE
I reviewed with patient and family.  Patient with slowly worsening cognitive issues, which has contributed toHer multiple falls over the last 6 months.  Interventions have been ineffective, and patient even had a fall after her hospitalization.  Given this, and her present small subarachnoid hemorrhage, I made the recommendation to have patient no longer be anticoagulated with Eliquis.  I explained my concerns and the overall risks patient faces because of her multiple falls.  Family to consider we will call next week with their decision.  Eliquis will be held in the interim

## 2024-12-31 NOTE — ASSESSMENT & PLAN NOTE
Patient seem to be with slightly slowed responses today.  Unclear if this is related to her poor hearing.  Encouraged use of hearing aids.  Will watch.

## 2024-12-31 NOTE — ASSESSMENT & PLAN NOTE
EKG in the ER showed paced rhythm with underlying atrial flutter.  Patient is at risk for stroke however given her multiple falls, she is also at risk for intracranial injury but could worsen if anticoagulated.  At this point, I will continue to hold Eliquis.  I will reach out to cardiology to discuss however at present I believe that risk of her being on anticoagulation by way any benefits.  Family advised.  They will call next week with their decision regarding restarting medication or not.  Follow-up 1 month

## 2024-12-31 NOTE — ASSESSMENT & PLAN NOTE
"Patient with hypoglycemia while in the hospital.  She does not seem to have episodes since returning.  She does not eat well \"because I do not like the food\".  Monitor weight.  Continue present insulin doses.  Monitor for hypoglycemia.  Recheck 1 month  Lab Results   Component Value Date    HGBA1C 8.9 (H) 12/05/2024          "

## 2025-01-06 DIAGNOSIS — I50.33 ACUTE ON CHRONIC DIASTOLIC CONGESTIVE HEART FAILURE (HCC): ICD-10-CM

## 2025-01-07 ENCOUNTER — OFFICE VISIT (OUTPATIENT)
Dept: CARDIOLOGY CLINIC | Facility: CLINIC | Age: 85
End: 2025-01-07
Payer: MEDICARE

## 2025-01-07 VITALS — OXYGEN SATURATION: 98 % | HEART RATE: 62 BPM | SYSTOLIC BLOOD PRESSURE: 132 MMHG | DIASTOLIC BLOOD PRESSURE: 72 MMHG

## 2025-01-07 DIAGNOSIS — N18.32 STAGE 3B CHRONIC KIDNEY DISEASE (HCC): ICD-10-CM

## 2025-01-07 DIAGNOSIS — I50.32 CHRONIC DIASTOLIC HEART FAILURE (HCC): ICD-10-CM

## 2025-01-07 DIAGNOSIS — E11.69 TYPE 2 DIABETES MELLITUS WITH OTHER SPECIFIED COMPLICATION, WITH LONG-TERM CURRENT USE OF INSULIN (HCC): ICD-10-CM

## 2025-01-07 DIAGNOSIS — N18.31 STAGE 3A CHRONIC KIDNEY DISEASE (HCC): ICD-10-CM

## 2025-01-07 DIAGNOSIS — Z79.4 TYPE 2 DIABETES MELLITUS WITH OTHER SPECIFIED COMPLICATION, WITH LONG-TERM CURRENT USE OF INSULIN (HCC): ICD-10-CM

## 2025-01-07 DIAGNOSIS — I48.0 PAF (PAROXYSMAL ATRIAL FIBRILLATION) (HCC): ICD-10-CM

## 2025-01-07 PROCEDURE — G2211 COMPLEX E/M VISIT ADD ON: HCPCS | Performed by: INTERNAL MEDICINE

## 2025-01-07 PROCEDURE — 99214 OFFICE O/P EST MOD 30 MIN: CPT | Performed by: INTERNAL MEDICINE

## 2025-01-07 RX ORDER — FUROSEMIDE 40 MG/1
40 TABLET ORAL DAILY
Qty: 30 TABLET | Refills: 5 | Status: SHIPPED | OUTPATIENT
Start: 2025-01-07

## 2025-01-07 NOTE — PROGRESS NOTES
Watchman Device Letter    Name: Lizzie Whitten  YOB: 1940  MRN: [id number]    The above referenced patient is being referred to the [facility] program for evaluation for Left Atrial Appendage Closure with Watchman device for management of stroke risk resulting from non-valvular atrial fibrillation.    Based on the patient's past history, it has been determined that he/she is a poor candidate for long-term oral anticoagulation, however may be tolerant of short-term treatment with warfarin as necessary.    Specifically regarding anticoagulation, the patient has demonstrated: Patient has history of SAH.   History of bleeding, specifically:   High risk of recurrent falls     We have discussed the patient's unique stroke and bleeding risk both on and off oral anticoagulation and the rationale for this referral.    Based on the patient history, the patient has:    CHADS 2 VASC Score: 5  HAS BLED Score: 3    The following tool was utilized in the share decision process for this patient:    [facility] shared decision tool    Dr. Ta Mendoza MD

## 2025-01-07 NOTE — PROGRESS NOTES
Cardiology Follow Up    Lizzie Whitten  1940  [id number]  [facility] [place]  [address]  [place] [id number] [phone]    1. Chronic diastolic heart failure (HCC)        2. PAF (paroxysmal atrial fibrillation) (HCC)        3. Type 2 diabetes mellitus with other specified complication, with long-term current use of insulin (HCC)        4. Stage 3b chronic kidney disease (HCC)        5. Stage 3a chronic kidney disease (HCC)            Interval History: Followup for PAF, PPM, Chronic Diastolic CHF.    Patient recently had a fall and suffered SAH and hematoma. This has been an ongoing problem. She lives in assisted living with aids who come in.     Medical Problems       Problem List       Type 2 diabetes mellitus with hyperglycemia, with long-term current use of insulin (HCC)        Lab Results   Component Value Date    HGBA1C 8.9 (H) 12/05/2024         Complete heart block (HCC)    Anemia    S/P cardiac pacemaker procedure    Hypothyroidism (Chronic)    Overview Signed 6/6/2018  9:04 PM by Kennedy Castellon MD   Last Assessment & Plan:   Continue  med         Chronic diastolic congestive heart failure (HCC)    Wt Readings from Last 3 Encounters:   12/05/24 78.9 kg (173 lb 15.1 oz)   11/05/24 76.4 kg (168 lb 6.4 oz)   08/01/24 80.4 kg (177 lb 3.2 oz)                 Chronic venous insufficiency    Stage 3b chronic kidney disease (HCC)    Lab Results   Component Value Date    EGFR 40 12/09/2024    EGFR 33 12/08/2024    EGFR 30 12/06/2024    CREATININE 1.25 12/09/2024    CREATININE 1.44 (H) 12/08/2024    CREATININE 1.57 (H) 12/06/2024         Iron deficiency anemia    Lymphedema of both lower extremities    Vitamin D deficiency    Anxiety disorder    Urinary bladder incontinence    Chronic obstructive pulmonary disease (HCC)    Mixed hyperlipidemia    Overview Signed 6/6/2018  9:04 PM by Kennedy Castellon MD   Last  Assessment & Plan:   Continue med         OAB (overactive bladder)    Presence of permanent cardiac pacemaker    PAF (paroxysmal atrial fibrillation) ([facility])    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 ([facility])    Overview Signed 12/14/2018  3:41 PM by Mally Mina RN   Updated according to CMS/ICD-10 guidelines             Lab Results   Component Value Date    HGBA1C 8.9 (H) 12/05/2024         Intraocular pressure increase    Sleep apnea    Coronary artery disease involving native coronary artery of native heart    Memory difficulties    Imbalance    Secondary hyperparathyroidism of renal origin ([facility])    Fall    Abnormal CT scan, esophagus    Hiatal hernia    Esophageal dysphagia    Assistance needed with transportation    Pharyngeal dysphagia    Chronic cough    Pulmonary nodule    COPD (chronic obstructive pulmonary disease) ([facility])    CHACHA (acute kidney injury) ([facility])    Hemopneumothorax on right    Multiple falls    Acute pain due to trauma    Ambulatory dysfunction    Debility    Hypernatremia    Physical deconditioning    Slow transit constipation    Hematuria    Pleural effusion, right    Chronic hypoxic respiratory failure ([facility])    Mild persistent asthma without complication    Mid back pain    Fall    SAH (subarachnoid hemorrhage) ([facility])    Primary hypertension    CHF (congestive heart failure) ([facility])    Wt Readings from Last 3 Encounters:   12/05/24 78.9 kg (173 lb 15.1 oz)   11/05/24 76.4 kg (168 lb 6.4 oz)   08/01/24 80.4 kg (177 lb 3.2 oz)                 COPD (chronic obstructive pulmonary disease) ([facility])    MITCH (obstructive sleep apnea)    DM (diabetes mellitus) ([facility])        Lab Results   Component Value Date    HGBA1C 8.9 (H) 12/05/2024         GERD (gastroesophageal reflux disease)    Hypothyroidism    CKD (chronic kidney disease)    Lab Results   Component Value Date    EGFR 40 12/09/2024    EGFR 33 12/08/2024    EGFR 30 12/06/2024    CREATININE  1.25 12/09/2024    CREATININE 1.44 (H) 12/08/2024    CREATININE 1.57 (H) 12/06/2024         Iron deficiency anemia    Depression with anxiety    Ambulatory dysfunction    HLD (hyperlipidemia)    Acute pain due to trauma    Frailty syndrome in geriatric patient    At risk for delirium    Bilateral hearing loss    Cognitive impairment    Fall with injury        Past Medical History:   Diagnosis Date    Anxiety     Arthritis     CHF (congestive heart failure) (HCC)     Chronic venous insufficiency     COPD (chronic obstructive pulmonary disease) ([facility])     Coronary artery disease     Diabetes mellitus (HCC)     type II    Disease of thyroid gland     hypothyroidism    DVT (deep venous thrombosis) ([facility])     HL (hearing loss)     Hyperlipidemia     Hypertension     Irregular heart beat     Paroxysmal a-fib    Pacemaker     Renal disorder     stage 3    Sleep apnea     Sleep difficulties     Thyroid disease     Urinary incontinence     Uterine leiomyoma     Walker as ambulation aid      Social History     Socioeconomic History    Marital status: /Civil Union     Spouse name: Not on file    Number of children: 3    Years of education: Not on file    Highest education level: Not on file   Occupational History    Occupation: RETIRED   Tobacco Use    Smoking status: Never     Passive exposure: Never    Smokeless tobacco: Never    Tobacco comments:     Per [employer]: Former smoker   Vaping Use    Vaping status: Never Used   Substance and Sexual Activity    Alcohol use: Not Currently     Comment: rarely. Per [employer]: Denied history of alcohol use    Drug use: No    Sexual activity: Never   Other Topics Concern    Not on file   Social History Narrative    Daily coffee consumption: 2 cups/day     Social Drivers of Health     Financial Resource Strain: Low Risk  (5/4/2023)    Overall Financial Resource Strain (CARDIA)     Difficulty of Paying Living Expenses: Not hard at all   Food Insecurity: No Food Insecurity  (8/1/2024)    Nursing - Inadequate Food Risk Classification     Worried About Running Out of Food in the Last Year: Never true     Ran Out of Food in the Last Year: Never true     Ran Out of Food in the Last Year: Not on file   Transportation Needs: No Transportation Needs (8/1/2024)    PRAPARE - Transportation     Lack of Transportation (Medical): No     Lack of Transportation (Non-Medical): No   Physical Activity: Not on file   Stress: Not on file   Social Connections: Not on file   Intimate Partner Violence: Not on file   Housing Stability: Low Risk  (8/1/2024)    Housing Stability Vital Sign     Unable to Pay for Housing in the Last Year: No     Number of Times Moved in the Last Year: 1     Homeless in the Last Year: No      Family History   Problem Relation Age of Onset    Coronary artery disease Mother     Hypertension Mother     Heart disease Mother     Hypertension Father     Diabetes Father         Mellitus    No Known Problems Sister     No Known Problems Daughter     No Known Problems Daughter     No Known Problems Maternal Grandmother     No Known Problems Maternal Grandfather     No Known Problems Paternal Grandmother     No Known Problems Paternal Grandfather     Diabetes Brother         Mellitus    No Known Problems Son     Breast cancer Maternal Aunt     Pancreatic cancer Maternal Aunt     Breast cancer Cousin     Breast cancer Cousin     Breast cancer Paternal Aunt     No Known Problems Paternal Aunt     No Known Problems Paternal Aunt      Past Surgical History:   Procedure Laterality Date    BREAST SURGERY      For Biopsy    CARDIAC PACEMAKER PLACEMENT      Permanent. Last assessed: 2/19/16    CARDIAC SURGERY      CARPAL TUNNEL RELEASE Bilateral     CATARACT EXTRACTION      CATARACT EXTRACTION W/ INTRAOCULAR LENS  IMPLANT, BILATERAL      CYSTOSCOPY      Diagnostic. Last assessed: 11/8/17    ECTOPIC PREGNANCY SURGERY      HYSTERECTOMY      Total Abdomonal. Resolved: 1982    IR THORACENTESIS   5/17/2024    JOINT REPLACEMENT Right     TKR    OOPHORECTOMY      OK ENDOVEN ABLTJ INCMPTNT VEIN XTR LASER 1ST VEIN Left 2/17/2017    Procedure: GREATER SAPHENOUS VEIN ENDOVASCULAR LASER THERAPY ;  Surgeon: Danielle Phan DO;  Location: [facility];  Service: Vascular    TONSILLECTOMY         Current Outpatient Medications:     acetaminophen (TYLENOL) 325 mg tablet, Take 975 mg by mouth 3 (three) times a day, Disp: , Rfl:     ammonium lactate (LAC-HYDRIN) 12 % lotion, Apply 1 Application topically daily, Disp: , Rfl:     atorvastatin (LIPITOR) 80 mg tablet, TAKE 1 TABLET DAILY IN THE EVENING, Disp: 90 tablet, Rfl: 1    atorvastatin (LIPITOR) 80 mg tablet, Take 80 mg by mouth daily, Disp: , Rfl:     calcium carbonate (OYSTER SHELL,OSCAL) 500 mg, TAKE 1 TABLET DAILY, Disp: 90 tablet, Rfl: 1    citalopram (CeleXA) 20 mg tablet, TAKE 1 TABLET DAILY, Disp: 90 tablet, Rfl: 1    Continuous Blood Gluc  (FREESTYLE TAMIA 14 DAY READER) GARRY, Use to check BG levels 4+times daily and as needed, Disp: 1 Device, Rfl: 0    Continuous Blood Gluc Sensor (FreeStyle Tamia 14 Day Sensor) MISC, Change sensor every 14 days to check BG levels 4+ times daily, Disp: 6 each, Rfl: 3    Diclofenac Sodium (VOLTAREN) 1 %, Apply 2 g topically 4 (four) times a day, Disp: 50 g, Rfl: 1    furosemide (LASIX) 40 mg tablet, TAKE 1 TABLET DAILY, Disp: 30 tablet, Rfl: 5    gabapentin (NEURONTIN) 100 mg capsule, TAKE ONE CAPSULE AT BEDTIME, Disp: 30 capsule, Rfl: 5    insulin glargine (Toujeo Max SoloStar) 300 units/mL CONCENTRATED U-300 injection pen (2-unit dial), Inject 16 Units under the skin daily at bedtime, Disp: 15 mL, Rfl: 1    insulin lispro (HumALOG/ADMELOG) 100 units/mL injection, Inject 3 Units under the skin 3 (three) times a day with meals, Disp: 9 mL, Rfl: 1    Insulin Pen Needle (BD Pen Needle Sasha U/F) 32G X 4 MM MISC, Inject 4 times a day, Disp: 400 each, Rfl: 1    ipratropium-albuterol (DUO-NEB) 0.5-2.5 mg/3 mL nebulizer  solution, Take 3 mL by nebulization every 8 (eight) hours as needed for wheezing or shortness of breath, Disp: 180 mL, Rfl: 0    levETIRAcetam (KEPPRA) 500 mg tablet, Take 1 tablet (500 mg total) by mouth 2 (two) times a day, Disp: 60 tablet, Rfl: 0    levothyroxine 150 mcg tablet, TAKE 1 TABLET DAILY, Disp: 90 tablet, Rfl: 1    lidocaine (Lidoderm) 5 %, Apply 1 patch topically over 12 hours daily Remove & Discard patch within 12 hours or as directed by MD, Disp: 30 patch, Rfl: 5    Magnesium Cl-Calcium Carbonate (SLOW-MAG PO), Take by mouth in the morning, Disp: , Rfl:     metoprolol tartrate (LOPRESSOR) 25 mg tablet, Take 1 tablet (25 mg total) by mouth 2 (two) times a day, Disp: 60 tablet, Rfl: 5    Omega-3 Fatty Acids (Fish Oil) 1200 MG CAPS, TAKE 1 CAPSULE TWICE A DAY, Disp: 90 capsule, Rfl: 1    Oyster Shell 500 MG TABS, Take 1 tablet by mouth daily, Disp: , Rfl:     pantoprazole (PROTONIX) 40 mg tablet, TAKE 1 TABLET DAILY, Disp: 90 tablet, Rfl: 1    potassium chloride (Klor-Con M20) 20 mEq tablet, TAKE 1 TABLET DAILY, Disp: 90 tablet, Rfl: 1    senna-docusate sodium (SENOKOT-S) 8.6-50 mg per tablet, Take 2 tablets by mouth daily, Disp: , Rfl:     sodium chloride (VANNESSA 128) 5 % hypertonic ophthalmic ointment, Administer 1 drop to both eyes 2 (two) times a day, Disp: , Rfl:     apixaban (Eliquis) 5 mg, TAKE 1 TABLET TWICE DAILY (Patient not taking: Reported on 1/7/2025), Disp: 60 tablet, Rfl: 5  Allergies   Allergen Reactions    Ampicillin Hives       Labs:     Chemistry        Component Value Date/Time     12/06/2016 0939    K 3.8 12/09/2024 0637    K 4.6 06/06/2024 1030    K 4.2 05/06/2024 0806    CL 96 12/09/2024 0637     06/06/2024 1030    CL 98 (L) 05/06/2024 0806    CO2 40 (H) 12/09/2024 0637    CO2 34 (H) 12/05/2024 1155    CO2 26 06/06/2024 1030    CO2 37 (H) 05/06/2024 0806    BUN 30 (H) 12/09/2024 0637    BUN 49 (H) 06/06/2024 1030    BUN 48 (H) 05/06/2024 0806    CREATININE 1.25  "12/09/2024 0637    CREATININE 1.50 (H) 05/06/2024 0806        Component Value Date/Time    CALCIUM 9.1 12/09/2024 0637    CALCIUM 9.0 06/06/2024 1030    CALCIUM 9.0 05/06/2024 0806    ALKPHOS 84 12/05/2024 1218    ALKPHOS 73 06/06/2024 1030    ALKPHOS 86 04/23/2024 0824    AST 15 12/05/2024 1218    AST 16 06/06/2024 1030    AST 16 04/23/2024 0824    ALT 8 12/05/2024 1218    ALT 8 06/06/2024 1030    ALT 13 04/23/2024 0824    BILITOT 0.6 09/06/2016 0907            Lab Results   Component Value Date    CHOL 130 09/06/2016     Lab Results   Component Value Date    HDL 48 (L) 01/23/2024    HDL 41 (L) 09/21/2023    HDL 63 03/01/2023     Lab Results   Component Value Date    LDLCALC 65 01/23/2024    LDLCALC 46 09/21/2023    LDLCALC 46 03/01/2023     Lab Results   Component Value Date    TRIG 68 01/23/2024    TRIG 62 09/21/2023    TRIG 67 03/01/2023     No results found for: \"CHOLHDL\"    Imaging: No results found.    EKG: .    Review of Systems   Constitutional: Negative.   HENT: Negative.     Eyes: Negative.    Cardiovascular: Negative.    Respiratory:  Positive for shortness of breath.    Endocrine: Negative.    Hematologic/Lymphatic: Negative.    Skin: Negative.    Musculoskeletal:  Positive for falls.   Gastrointestinal: Negative.    Genitourinary: Negative.    Neurological: Negative.    Psychiatric/Behavioral: Negative.     Allergic/Immunologic: Negative.        Vitals:    01/07/25 1052   BP: 132/72   Pulse: 62   SpO2: 98%           Physical Exam  Vitals and nursing note reviewed.   Constitutional:       Appearance: Normal appearance.   HENT:      Head: Normocephalic.      Nose: Nose normal.      Mouth/Throat:      Mouth: Mucous membranes are moist.   Eyes:      General: No scleral icterus.     Conjunctiva/sclera: Conjunctivae normal.   Cardiovascular:      Rate and Rhythm: Normal rate and regular rhythm.      Heart sounds: No murmur heard.     No gallop.   Pulmonary:      Effort: Pulmonary effort is normal. No " respiratory distress.      Breath sounds: Normal breath sounds. No wheezing or rales.   Abdominal:      General: Abdomen is flat. Bowel sounds are normal. There is no distension.      Palpations: Abdomen is soft.      Tenderness: There is no abdominal tenderness. There is no guarding.   Musculoskeletal:      Cervical back: Normal range of motion and neck supple.      Right lower leg: No edema.      Left lower leg: No edema.   Skin:     General: Skin is warm and dry.   Neurological:      General: No focal deficit present.      Mental Status: She is alert and oriented to person, place, and time.   Psychiatric:         Mood and Affect: Mood normal.         Behavior: Behavior normal.         Discussion/Summary:    Complete Heart Block s/p PPM implant. Continue with device checks 100% V paced. 1 AF noted on last check.      Persistent AF: AF burden on last device check 3.5%. She has remained asymptomatic. She has had frequent falls and had a SAH after a fall at the end of last year. Will consult EP for watchman evaluation. Stop eliquis for now.      HTN: BP is stable on lisinopril. No changes.       I have spent a total time of 25 minutes in caring for this patient on the day of the visit/encounter including Diagnostic results, Prognosis, Risks and benefits of tx options, Instructions for management, Patient and family education, Importance of tx compliance, Risk factor reductions, and Impressions.

## 2025-01-09 ENCOUNTER — TELEPHONE (OUTPATIENT)
Age: 85
End: 2025-01-09
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
## 2025-01-09 DIAGNOSIS — I60.9 SAH (SUBARACHNOID HEMORRHAGE) (HCC): ICD-10-CM

## 2025-01-09 RX ORDER — LEVETIRACETAM 500 MG/1
500 TABLET ORAL 2 TIMES DAILY
Qty: 60 TABLET | Refills: 3 | Status: SHIPPED | OUTPATIENT
Start: 2025-01-09 | End: 2025-01-15 | Stop reason: SDUPTHER

## 2025-01-09 NOTE — TELEPHONE ENCOUNTER
Patient's daughter Nidia is returned call to confirm that patient continues on   levETIRAcetam (KEPPRA) 500 mg tablet taking twice a day for seizure prevention. . Last ordered at hospital on 12/9 for 30-day supply. If patient is to continue on this medication, she will require new order. Eliquis was discontinued. Please follow up with Nidia if there are any additional questions, concerns.

## 2025-01-09 NOTE — TELEPHONE ENCOUNTER
When daughter calls back obtain info below. Spoke to patient's daughter. She doesn't know for sure that Nadege Dela Cruz is not taking the Keppra. I told her we need to know exactly what she is taking. Nidia said the meds are in a lock box that is locked and she is contacting a worker to unlock it and confirm. On a side note. She said her mom is coherent and doing fine now.

## 2025-01-09 NOTE — TELEPHONE ENCOUNTER
"Daughter Nidia called stating that staff at [facility] just phoned her to inform her of an incident at facility today.Patient was working with physical therapy and staff reported to Nidia that patients legs buckled and patient \"went out of it' When patient came to she was able to answer questions right away appropriately. Staff informed Nidia they checked VS which were WNL and a CS which was 78. Staff at facility evaluated patient and deemed not necessary to take pt to ER. Nidia concerned episode could be seizure related vs hypoglycemia related. Is questioning whether she should be on  levETIRAcetam (KEPPRA) 500 mg tablet again. States it was stopped? Please reach out to daughter with provider response.      "

## 2025-01-13 DIAGNOSIS — I50.33 ACUTE ON CHRONIC DIASTOLIC CONGESTIVE HEART FAILURE (HCC): ICD-10-CM

## 2025-01-13 RX ORDER — PANTOPRAZOLE SODIUM 40 MG/1
40 TABLET, DELAYED RELEASE ORAL DAILY
Qty: 30 TABLET | Refills: 0 | Status: SHIPPED | OUTPATIENT
Start: 2025-01-13

## 2025-01-15 ENCOUNTER — TELEPHONE (OUTPATIENT)
Age: 85
End: 2025-01-15

## 2025-01-15 ENCOUNTER — RA CDI HCC (OUTPATIENT)
Dept: OTHER | Facility: HOSPITAL | Age: 85
End: 2025-01-15

## 2025-01-15 DIAGNOSIS — E03.8 OTHER SPECIFIED HYPOTHYROIDISM: Primary | Chronic | ICD-10-CM

## 2025-01-15 DIAGNOSIS — I60.9 SAH (SUBARACHNOID HEMORRHAGE) (HCC): ICD-10-CM
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
## 2025-01-15 PROBLEM — D50.9 IRON DEFICIENCY ANEMIA: Status: ACTIVE | Noted: 2017-10-12

## 2025-01-15 PROBLEM — R26.2 AMBULATORY DYSFUNCTION: Status: ACTIVE | Noted: 2024-04-19

## 2025-01-15 PROBLEM — G89.11 ACUTE PAIN DUE TO TRAUMA: Status: ACTIVE | Noted: 2024-04-17

## 2025-01-15 RX ORDER — LEVETIRACETAM 500 MG/1
500 TABLET ORAL 2 TIMES DAILY
Qty: 60 TABLET | Refills: 3 | Status: SHIPPED | OUTPATIENT
Start: 2025-01-15

## 2025-01-15 NOTE — ASSESSMENT & PLAN NOTE
Likely due to atelectasis, pulmonary edema, right-sided pleural effusion related to recent rib fractures.     May 2024 office visit showed requirement of 2 L at rest but high requirements on exertion.  1 sent to the ED for further evaluation it was found that she had normal SpO2 with exertion on 3 L    I prescribed a portable concentrator today.  She moves around in her facility with long oxygen tubing connected to the main concentrator but the long oxygen tubing kinks often and she may be getting hypoxic from these episodes we will contact DME to try to expedite a portable concentrator delivery.  Follow-up in 6 months  Orders:    Portable Concentrators

## 2025-01-15 NOTE — TELEPHONE ENCOUNTER
[facility] is calling in regarding the status of Eliquis.  It has not been reordered.  Has it been discontinued?    Please advise.

## 2025-01-15 NOTE — PROGRESS NOTES
Name: Lizzie Whitten      : 1940      MRN: [id number]  Encounter Provider: Connor Velazco MD  Encounter Date: 2025   Encounter department: [facility]  :  Assessment & Plan  Chronic hypoxic respiratory failure (HCC)  Likely due to atelectasis, pulmonary edema, right-sided pleural effusion related to recent rib fractures.     May 2024 office visit showed requirement of 2 L at rest but high requirements on exertion.  1 sent to the ED for further evaluation it was found that she had normal SpO2 with exertion on 3 L    I prescribed a portable concentrator today.  She moves around in her facility with long oxygen tubing connected to the main concentrator but the long oxygen tubing kinks often and she may be getting hypoxic from these episodes we will contact [facility] to try to expedite a portable concentrator delivery.  Follow-up in 6 months  Orders:    Portable Concentrators    Pleural effusion  Likely related to rib fractures.  Effusion was not present on  CTA pulm was present on  CT chest.  300 mL removed appears to be partially exudative, lymphocytic predominant.  Cytology and cultures were negative.     2024 CXR - small pleural effusion  Incentive spirometry to try to relieve atelectasis.  No thoracentesis needed       Mild persistent asthma without complication  Has a history of asthma and purported COPD.  Has no personal smoking history but was exposed to secondhand smoke.     Not dyspnea at rest but ambulation has been difficult due to trauma/fractures recently.     Recommended at the rehab to start nebulized DuoNebs 3 times a day as needed.  Unfortunately does not have a nebulizer machine or DuoNeb prescription.  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 Chronic right scapula fracture.        Enlarged pulmonary artery (HCC)  CTA 4/2024 shows enlarged pulmonary artery but echo done at that time demonstrates no RV dysfunction.  RVSP estimated 52 mmHg.       MITCH (obstructive sleep apnea)  Prior history of severe MITCH diagnosed in 2023 home sleep study.  Patient no longer uses CPAP due to intolerance  Not a good candidate for hypoglossal nerve stimulator       Tobacco use disorder, moderate, in sustained remission             History of Present Illness   Lizzie Whitten is a 84 y.o. female with a history of chronic diastolic congestive heart failure, hypertension, paroxysmal atrial fibrillation, MITCH not on CPAP, diabetes mellitus, prior DVT, anxiety, asthma, tobacco use in remission, frequent falls, chronic rib fractures, hypoxic respiratory failure due to atelectasis/pulmonary edema, right-sided pleural effusion due to hemothorax due to fall, who is presenting for follow-up    1/16/25 - Follow up - Had another fall at facility, head strike and admitted to trauma service.  She suffered a subarachnoid hemorrhage and hematoma.  Breathing has relatively been stable.  She is using incentive spirometry and is using 2 L at all times.  She really thinks a portable oxygen concentrator would benefit as her long oxygen tubing when moving around kinks.    5/28/24 - Sent to ED for hypoxia in the office but workup negative for any acute abnormality. No evidence of large pleural effusion that would require drainage at this time. Patient ambulated on 3 L without difficulty and maintain her oxygen saturation.     She presents in a wheelchair with her daughter and her  today.  She currently lives at [facility]. She was admitted to the hospital 4/13 - 4/18 due to traumatic fall with resultant multiple rib fractures, right-sided hemothorax.  She was managed conservatively and discharged with  supplemental oxygen at 2LPM at all times.  She was subsequently discharged to [facility] rehab.  At the rehab she was unable to be weaned off of oxygen and her oxygen requirement actually increased to 3 L at rest.  Over the past week she has not received much physical therapy and has mostly been wheelchair or bedbound.  She was then discharged to [facility].  She was told that she needed a nebulizer machine and portable concentrator but has not been prescribed these     She has purported history of asthma/COPD and was supposed to on DuoNeb with nebulizer machine.  She does not use any inhalers.     Due to persistent hypoxia her nursing home CRNP ordered a chest x-ray that shows right-sided pleural effusion and she was sent for IR thoracentesis on 5/17 with 300 mL of serosanguineous fluid removed.  Cultures and cytology has been negative.     Due to recent trauma she is not able to walk very well.  She is in a wheelchair today.  She has no dyspnea at rest.  She has a chronic regular cough that is nonproductive.  No fevers.  No significant weight changes.  She seems to be at her baseline but she has mostly been wheelchair or bedbound.  Her daughter reports that when she tried to ambulate last week she had a desaturation to 76% and that is why she remains on 3 L.     Rib pain from the rib fractures have decreased.  She is able to take bigger breaths and she uses the incentive spirometry that was given to her at rehab.    Review of Systems  Past Medical History   Past Medical History:   Diagnosis Date    Anxiety     Arthritis     CHF (congestive heart failure) ([facility])     Chronic venous insufficiency     COPD (chronic obstructive pulmonary disease) ([facility])     Coronary artery disease     Diabetes mellitus ([facility])     type II    Disease of thyroid gland     hypothyroidism    DVT (deep venous thrombosis) ([facility])     HL (hearing loss)     Hyperlipidemia     Hypertension     Irregular heart beat     Paroxysmal  a-fib    Pacemaker     Renal disorder     stage 3    Sleep apnea     Sleep difficulties     Thyroid disease     Urinary incontinence     Uterine leiomyoma     Walker as ambulation aid      Past Surgical History:   Procedure Laterality Date    BREAST SURGERY      For Biopsy    CARDIAC PACEMAKER PLACEMENT      Permanent. Last assessed: 2/19/16    CARDIAC SURGERY      CARPAL TUNNEL RELEASE Bilateral     CATARACT EXTRACTION      CATARACT EXTRACTION W/ INTRAOCULAR LENS  IMPLANT, BILATERAL      CYSTOSCOPY      Diagnostic. Last assessed: 11/8/17    ECTOPIC PREGNANCY SURGERY      HYSTERECTOMY      Total Abdomonal. Resolved: 1982    IR THORACENTESIS  5/17/2024    JOINT REPLACEMENT Right     TKR    OOPHORECTOMY      IL ENDOVEN ABLTJ INCMPTNT VEIN XTR LASER 1ST VEIN Left 2/17/2017    Procedure: GREATER SAPHENOUS VEIN ENDOVASCULAR LASER THERAPY ;  Surgeon: Danielle Phan DO;  Location: [facility];  Service: Vascular    TONSILLECTOMY       Family History   Problem Relation Age of Onset    Coronary artery disease Mother     Hypertension Mother     Heart disease Mother     Hypertension Father     Diabetes Father         Mellitus    No Known Problems Sister     No Known Problems Daughter     No Known Problems Daughter     No Known Problems Maternal Grandmother     No Known Problems Maternal Grandfather     No Known Problems Paternal Grandmother     No Known Problems Paternal Grandfather     Diabetes Brother         Mellitus    No Known Problems Son     Breast cancer Maternal Aunt     Pancreatic cancer Maternal Aunt     Breast cancer Cousin     Breast cancer Cousin     Breast cancer Paternal Aunt     No Known Problems Paternal Aunt     No Known Problems Paternal Aunt       reports that she has never smoked. She has never been exposed to tobacco smoke. She has never used smokeless tobacco. She reports that she does not currently use alcohol. She reports that she does not use drugs.  Current Outpatient Medications on File Prior to  Visit   Medication Sig Dispense Refill    acetaminophen (TYLENOL) 325 mg tablet Take 975 mg by mouth 3 (three) times a day      ammonium lactate (LAC-HYDRIN) 12 % lotion Apply 1 Application topically daily      atorvastatin (LIPITOR) 80 mg tablet TAKE 1 TABLET DAILY IN THE EVENING 90 tablet 1    atorvastatin (LIPITOR) 80 mg tablet Take 80 mg by mouth daily      calcium carbonate (OYSTER SHELL,OSCAL) 500 mg TAKE 1 TABLET DAILY 90 tablet 1    citalopram (CeleXA) 20 mg tablet TAKE 1 TABLET DAILY 90 tablet 1    Continuous Blood Gluc  (FREESTYLE RONNI 14 DAY READER) GARRY Use to check BG levels 4+times daily and as needed 1 Device 0    Continuous Blood Gluc Sensor (FreeStyle Ronni 14 Day Sensor) MISC Change sensor every 14 days to check BG levels 4+ times daily 6 each 3    Diclofenac Sodium (VOLTAREN) 1 % Apply 2 g topically 4 (four) times a day 50 g 1    furosemide (LASIX) 40 mg tablet TAKE 1 TABLET DAILY 30 tablet 5    gabapentin (NEURONTIN) 100 mg capsule TAKE ONE CAPSULE AT BEDTIME 30 capsule 5    insulin glargine (Toujeo Max SoloStar) 300 units/mL CONCENTRATED U-300 injection pen (2-unit dial) Inject 16 Units under the skin daily at bedtime 15 mL 1    insulin lispro (HumALOG/ADMELOG) 100 units/mL injection Inject 3 Units under the skin 3 (three) times a day with meals 9 mL 1    ipratropium-albuterol (DUO-NEB) 0.5-2.5 mg/3 mL nebulizer solution Take 3 mL by nebulization every 8 (eight) hours as needed for wheezing or shortness of breath 180 mL 0    levETIRAcetam (KEPPRA) 500 mg tablet Take 1 tablet (500 mg total) by mouth 2 (two) times a day 60 tablet 3    levothyroxine 150 mcg tablet TAKE 1 TABLET DAILY 90 tablet 1    lidocaine (Lidoderm) 5 % Apply 1 patch topically over 12 hours daily Remove & Discard patch within 12 hours or as directed by MD 30 patch 5    Magnesium Cl-Calcium Carbonate (SLOW-MAG PO) Take by mouth in the morning      metoprolol tartrate (LOPRESSOR) 25 mg tablet Take 1 tablet (25 mg total)  by mouth 2 (two) times a day 60 tablet 5    Omega-3 Fatty Acids (Fish Oil) 1200 MG CAPS TAKE 1 CAPSULE TWICE A DAY 90 capsule 1    Oyster Shell 500 MG TABS Take 1 tablet by mouth daily      pantoprazole (PROTONIX) 40 mg tablet TAKE 1 TABLET DAILY 30 tablet 0    potassium chloride (Klor-Con M20) 20 mEq tablet TAKE 1 TABLET DAILY 90 tablet 1    senna-docusate sodium (SENOKOT-S) 8.6-50 mg per tablet Take 2 tablets by mouth daily      sodium chloride (VANNESSA 128) 5 % hypertonic ophthalmic ointment Administer 1 drop to both eyes 2 (two) times a day      Insulin Pen Needle (BD Pen Needle Sasha U/F) 32G X 4 MM MISC Inject 4 times a day (Patient not taking: Reported on 1/16/2025) 400 each 1     No current facility-administered medications on file prior to visit.     Allergies   Allergen Reactions    Ampicillin Hives         Historical Information       Objective   Ht 5' (1.524 m)   BMI 33.97 kg/m²      Physical Exam  Vitals and nursing note reviewed.   Constitutional:       General: She is not in acute distress.     Appearance: She is well-developed. She is not ill-appearing, toxic-appearing or diaphoretic.      Comments: Elderly frail-appearing sitting in wheelchair   HENT:      Head: Normocephalic and atraumatic.      Comments: Right periorbital ecchymosis  Eyes:      General: No scleral icterus.     Extraocular Movements: Extraocular movements intact.      Conjunctiva/sclera: Conjunctivae normal.   Pulmonary:      Effort: Pulmonary effort is normal. No respiratory distress.      Breath sounds: No stridor.   Abdominal:      Tenderness: There is no guarding.   Musculoskeletal:         General: No swelling.      Cervical back: Normal range of motion. No rigidity.   Skin:     General: Skin is warm and dry.      Coloration: Skin is not jaundiced.   Neurological:      Mental Status: She is alert. Mental status is at baseline.   Psychiatric:         Mood and Affect: Mood normal.           Lab Results: I have reviewed pertinent  labs.    Radiology Results Review: I personally reviewed the following image studies in PACS and associated radiology reports: chest xray. My interpretation of the radiology images/reports is: 12/5/2024-multiple right-sided rib fractures with nonunion of the right sixth rib.  Chronic right scapular fracture.  Small right pleural effusion.  Elevated right hemidiaphragm..  Other Study Results: Other Study Results Review : My interpretation of other studies include: 4/24 echo.  Normal LVEF.  Pacer wire present.  Diastolic dysfunction.  RV size and systolic function is normal..  PFT Results Reviewed: NA

## 2025-01-15 NOTE — PROGRESS NOTES
HCC coding opportunities          Chart Reviewed number of suggestions sent to Provider: 3     Patients Insurance  E11.22, I73.9 and E11.3393   Medicare Insurance: Medicare

## 2025-01-15 NOTE — TELEPHONE ENCOUNTER
Spoke to pharmacist. See other message, she is off of the Eliquis and on the Keppra due to fall risk. Previous Keppra rx went to [facility] pharmacy.  Can you please send it to [facility]?

## 2025-01-16 ENCOUNTER — OFFICE VISIT (OUTPATIENT)
Dept: PULMONOLOGY | Facility: CLINIC | Age: 85
End: 2025-01-16
Payer: MEDICARE

## 2025-01-16 VITALS
TEMPERATURE: 97.2 F | DIASTOLIC BLOOD PRESSURE: 64 MMHG | BODY MASS INDEX: 33.97 KG/M2 | SYSTOLIC BLOOD PRESSURE: 100 MMHG | OXYGEN SATURATION: 100 % | HEART RATE: 61 BPM | HEIGHT: 60 IN

## 2025-01-16 DIAGNOSIS — J44.9 CHRONIC OBSTRUCTIVE PULMONARY DISEASE, UNSPECIFIED COPD TYPE (HCC): ICD-10-CM

## 2025-01-16 DIAGNOSIS — J96.11 CHRONIC HYPOXIC RESPIRATORY FAILURE (HCC): Primary | ICD-10-CM

## 2025-01-16 DIAGNOSIS — J45.30 MILD PERSISTENT ASTHMA WITHOUT COMPLICATION: ICD-10-CM

## 2025-01-16 DIAGNOSIS — S22.49XS CLOSED FRACTURE OF MULTIPLE RIBS, UNSPECIFIED LATERALITY, SEQUELA: ICD-10-CM

## 2025-01-16 DIAGNOSIS — I28.8 ENLARGED PULMONARY ARTERY (HCC): ICD-10-CM

## 2025-01-16 DIAGNOSIS — F17.201 TOBACCO USE DISORDER, MODERATE, IN SUSTAINED REMISSION: ICD-10-CM

## 2025-01-16 DIAGNOSIS — G47.33 OSA (OBSTRUCTIVE SLEEP APNEA): ICD-10-CM

## 2025-01-16 DIAGNOSIS — J90 PLEURAL EFFUSION: ICD-10-CM

## 2025-01-16 PROCEDURE — 99214 OFFICE O/P EST MOD 30 MIN: CPT | Performed by: INTERNAL MEDICINE

## 2025-01-16 NOTE — ASSESSMENT & PLAN NOTE
Prior history of severe MITCH diagnosed in 2023 home sleep study.  Patient no longer uses CPAP due to intolerance  Not a good candidate for hypoglossal nerve stimulator

## 2025-01-17 ENCOUNTER — DOCUMENTATION (OUTPATIENT)
Dept: PULMONOLOGY | Facility: CLINIC | Age: 85
End: 2025-01-17

## 2025-01-17 ENCOUNTER — TELEPHONE (OUTPATIENT)
Dept: FAMILY MEDICINE CLINIC | Facility: CLINIC | Age: 85
End: 2025-01-17

## 2025-01-17 ENCOUNTER — TELEPHONE (OUTPATIENT)
Age: 85
End: 2025-01-17

## 2025-01-17 NOTE — TELEPHONE ENCOUNTER
Tried calling pt. Phone connected and I could hear background noise but no one was there. I continued to try to call again, but phone was busy for hours    Called the number for the home where pt resides and asked them to have pt call back so we could reschedule her appointment

## 2025-01-17 NOTE — PROGRESS NOTES
Attempted to place the order for Lizzie's POC   Pt uses [facility] and they only supply a smaller tank instead of  square POC.   I informed the patient and her family and explained that we could place a DC order to Juli and Pt with a DME company that does supply the POC.  Family replied saying they wanted to think abt it and would let me know if they wanted to try the smaller tank supplied by [facility] or switch DME for a poc

## 2025-01-17 NOTE — TELEPHONE ENCOUNTER
Pt has an appointment on 1/21 for a 4 week f/u that needs to be rescheduled. Current appointment is in a same day spot    May we use your same day on 1/23 at 11:30 to reschedule? You have Admin time right after that in case appointment takes longer     Please advise

## 2025-01-17 NOTE — TELEPHONE ENCOUNTER
"Patient's daughter called and wanted to reschedule appointment that was supposed to be 1/21 because of the expected cold. She was questioning purpose of appointment and if if it necessary. Looks like Dr. Salazar wanted to see her even sooner. Appointment type is currently a \"same day\". Rescheduled for next available of 2/4 and added to wait list. Please advise daughter if the appointment is necessary, needs to be / can be moved up or if it can be a virtual.    Please advise patient's daughter with a phone call.   "

## 2025-01-23 ENCOUNTER — TELEPHONE (OUTPATIENT)
Age: 85
End: 2025-01-23

## 2025-01-23 NOTE — TELEPHONE ENCOUNTER
Patient's daughter would like to speak with Arlene about a POC and the famiy's decision please call the daughter back thank you

## 2025-01-24 ENCOUNTER — TELEPHONE (OUTPATIENT)
Age: 85
End: 2025-01-24

## 2025-01-24 DIAGNOSIS — J96.11 CHRONIC HYPOXIC RESPIRATORY FAILURE (HCC): Primary | ICD-10-CM

## 2025-01-24 NOTE — TELEPHONE ENCOUNTER
Hola at [facility] called needs updated medication list faxed to [phone]. Questions can be reached at [phone]

## 2025-01-28 DIAGNOSIS — G89.11 ACUTE PAIN DUE TO TRAUMA: ICD-10-CM

## 2025-01-28 DIAGNOSIS — S22.41XA CLOSED FRACTURE OF MULTIPLE RIBS OF RIGHT SIDE, INITIAL ENCOUNTER: ICD-10-CM

## 2025-01-28 DIAGNOSIS — J94.2 HEMOPNEUMOTHORAX ON RIGHT: ICD-10-CM

## 2025-01-28 DIAGNOSIS — R29.6 MULTIPLE FALLS: ICD-10-CM

## 2025-01-28 NOTE — TELEPHONE ENCOUNTER
Mariah from [employer] called to confirm receipt of fax for request of PFT test results.  I let her know we did not receive the fax yesterday 1/27/25.  I did confirm the fax number she had was correct.  Mariah said she would fax the request again and call back later today or tomorrow to see if we received it.  She left a good fax number of [phone] and asked for the PFT results have Attention: Mariah.  If you have questions, please call her at [phone].  This is a direct phone number.  Thank you.

## 2025-01-29 ENCOUNTER — TELEPHONE (OUTPATIENT)
Age: 85
End: 2025-01-29

## 2025-01-29 RX ORDER — GABAPENTIN 100 MG/1
100 CAPSULE ORAL
Qty: 30 CAPSULE | Refills: 5 | Status: SHIPPED | OUTPATIENT
Start: 2025-01-29

## 2025-01-29 NOTE — TELEPHONE ENCOUNTER
Mariah from [facility] calling asking about if we received forms she has faxed over to the office. She faxed to central fax informed they haven't been received yet. Provided office fax for her as well.

## 2025-01-29 NOTE — TELEPHONE ENCOUNTER
No need to refax. There is a 10 day turn around time for paperwork. I did discuss this with the company previously. When they refax it prolongs the completion because we have to remove all the duplicates.

## 2025-01-29 NOTE — TELEPHONE ENCOUNTER
Lin from [facility] called to inquire about the status of a PT/OT order that was faxed on 1/27.  I was unable to contact the office. The order is going to be faxed again. Lin stated that this is time sensitive and would appreciate the order signed and sent back in a timely fashion.    Please advise    Thankyou

## 2025-01-30 NOTE — TELEPHONE ENCOUNTER
Roxanne calls back and states order has pt to be on 8 lpn which they assume in incorrect because office note states different she is faxing over form again to be filled out also they need a new order with correct lpm

## 2025-01-31 NOTE — TELEPHONE ENCOUNTER
Provider took his folder home to work on orders since he will be out of the office for several days, advised this to Lin. He then asked if I had a turn around time and I advised Dr. Salazar took his paperwork home and is returning Wednesday 2/5.

## 2025-02-04 ENCOUNTER — TELEPHONE (OUTPATIENT)
Age: 85
End: 2025-02-04

## 2025-02-04 NOTE — TELEPHONE ENCOUNTER
Mariah calls back requesting recent visit to be faxed to [phone]. If not would need May visit note to be addend.

## 2025-02-04 NOTE — TELEPHONE ENCOUNTER
Pt's daughter, Gorge, called to cancel pt's appt with Dr Castellon for today.  She and her sister have been sick and they forgot to call earlier.  Informed her that the appt had been canceled previously.  She said pt is in the hospital right now.  Pt had been in ICU for a while.  Daughter said they have a lot of decisions to make, but they wanted to make Dr Castellon aware pt was in the hospital.  She also apologized for canceling at such a late date.

## 2025-02-14 DIAGNOSIS — I50.9 CONGESTIVE HEART FAILURE, UNSPECIFIED HF CHRONICITY, UNSPECIFIED HEART FAILURE TYPE (HCC): Primary | ICD-10-CM

## 2025-02-26 ENCOUNTER — DOCUMENTATION (OUTPATIENT)
Dept: PULMONOLOGY | Facility: CLINIC | Age: 85
End: 2025-02-26

## 2025-02-26 ENCOUNTER — TELEPHONE (OUTPATIENT)
Age: 85
End: 2025-02-26

## 2025-02-26 NOTE — TELEPHONE ENCOUNTER
Spoke to daughter. The most recent hospital visit end of January, she had for a closed neck fracture and they wanted her to follow up with neurology. The hospitalization was in [facility], see Care Everywhere. In addition due to the multiple falls over the past year they would like that evaluated as well. Nidia has no idea what the dosage and directions on the insulin are so she is calling the facility where the patient lives and asking. She will call back.

## 2025-02-26 NOTE — PROGRESS NOTES
Called patient facility to follow up on 6mw. OT I spoke with said it was not done but if I faxed another walk test to facility today it will be completed. I faxed the documents need to complete testing to #[phone]  I will call again tomorrow to follow up

## 2025-02-26 NOTE — TELEPHONE ENCOUNTER
Pt daughter called and they need mom to see a neurologist but she was at [facility] so they need a referral to a neurologist with our network and thought this appt could already be set up for pt through our office. And also pt hasn't been eating that well and because of that her insulin dosage may be too high because she been waking up with a blood sugar of 62 or around that. The home is trying to give her a snack at night but she not really hungry or want it. Please call pt daughter back to advise about referral or appt for neurology and insulin dosage concerns.

## 2025-03-03 ENCOUNTER — TELEPHONE (OUTPATIENT)
Age: 85
End: 2025-03-03

## 2025-03-03 NOTE — TELEPHONE ENCOUNTER
Pt daughter called she stated she was on the phone with Rocio and call was disconnected.  Asking to schedule an appt for f/u rehab.  Due to internal phone issues, unable to warm transfer calls at this time.  Please call patient daughter Nidia to schedule.

## 2025-03-03 NOTE — TELEPHONE ENCOUNTER
Pt daughters called and mom had gotten out of rehab on last Tuesday and no one is aware of how to properly dose her insulin and wants to see her PCP to review correct dosage instructions. Please call to schedule, office was unavailable

## 2025-03-05 ENCOUNTER — APPOINTMENT (EMERGENCY)
Dept: RADIOLOGY | Facility: HOSPITAL | Age: 85
DRG: 309 | End: 2025-03-05
Payer: MEDICARE

## 2025-03-05 ENCOUNTER — HOSPITAL ENCOUNTER (INPATIENT)
Facility: HOSPITAL | Age: 85
LOS: 6 days | Discharge: HOME HEALTH | DRG: 309 | End: 2025-03-11
Attending: EMERGENCY MEDICINE | Admitting: INTERNAL MEDICINE
Payer: MEDICARE

## 2025-03-05 ENCOUNTER — TELEPHONE (OUTPATIENT)
Age: 85
End: 2025-03-05

## 2025-03-05 DIAGNOSIS — R29.6 MULTIPLE FALLS: ICD-10-CM

## 2025-03-05 DIAGNOSIS — E11.649 TYPE 2 DIABETES MELLITUS WITH HYPOGLYCEMIA WITHOUT COMA, WITH LONG-TERM CURRENT USE OF INSULIN (HCC): ICD-10-CM

## 2025-03-05 DIAGNOSIS — E16.2 HYPOGLYCEMIA: Primary | ICD-10-CM

## 2025-03-05 DIAGNOSIS — Z79.4 TYPE 2 DIABETES MELLITUS WITH HYPERGLYCEMIA, WITH LONG-TERM CURRENT USE OF INSULIN (HCC): ICD-10-CM

## 2025-03-05 DIAGNOSIS — S13.120A SUBLUXATION OF C1-C2 VERTEBRAE, INITIAL ENCOUNTER: ICD-10-CM

## 2025-03-05 DIAGNOSIS — R74.01 TRANSAMINITIS: ICD-10-CM

## 2025-03-05 DIAGNOSIS — E11.65 TYPE 2 DIABETES MELLITUS WITH HYPERGLYCEMIA, WITH LONG-TERM CURRENT USE OF INSULIN (HCC): ICD-10-CM

## 2025-03-05 DIAGNOSIS — G89.11 ACUTE PAIN DUE TO TRAUMA: ICD-10-CM

## 2025-03-05 DIAGNOSIS — J94.2 HEMOPNEUMOTHORAX ON RIGHT: ICD-10-CM

## 2025-03-05 DIAGNOSIS — Z79.4 TYPE 2 DIABETES MELLITUS WITH HYPOGLYCEMIA WITHOUT COMA, WITH LONG-TERM CURRENT USE OF INSULIN (HCC): ICD-10-CM

## 2025-03-05 DIAGNOSIS — K64.9 HEMORRHOIDS: ICD-10-CM

## 2025-03-05 DIAGNOSIS — S22.41XA CLOSED FRACTURE OF MULTIPLE RIBS OF RIGHT SIDE, INITIAL ENCOUNTER: ICD-10-CM

## 2025-03-05 PROBLEM — M47.812 CERVICAL SPONDYLOSIS: Status: ACTIVE | Noted: 2025-03-05

## 2025-03-05 PROBLEM — R53.1 GENERALIZED WEAKNESS: Status: ACTIVE | Noted: 2024-04-19

## 2025-03-05 LAB
ALBUMIN SERPL BCG-MCNC: 2.9 G/DL (ref 3.5–5)
ALP SERPL-CCNC: 116 U/L (ref 34–104)
ALT SERPL W P-5'-P-CCNC: 75 U/L (ref 7–52)
ANION GAP SERPL CALCULATED.3IONS-SCNC: 5 MMOL/L (ref 4–13)
AST SERPL W P-5'-P-CCNC: 55 U/L (ref 13–39)
ATRIAL RATE: 67 BPM
BASOPHILS # BLD AUTO: 0.04 THOUSANDS/ÂΜL (ref 0–0.1)
BASOPHILS NFR BLD AUTO: 1 % (ref 0–1)
BILIRUB SERPL-MCNC: 0.75 MG/DL (ref 0.2–1)
BUN SERPL-MCNC: 26 MG/DL (ref 5–25)
CALCIUM ALBUM COR SERPL-MCNC: 9.2 MG/DL (ref 8.3–10.1)
CALCIUM SERPL-MCNC: 8.3 MG/DL (ref 8.4–10.2)
CARDIAC TROPONIN I PNL SERPL HS: 14 NG/L (ref ?–50)
CHLORIDE SERPL-SCNC: 103 MMOL/L (ref 96–108)
CO2 SERPL-SCNC: 29 MMOL/L (ref 21–32)
CREAT SERPL-MCNC: 1.1 MG/DL (ref 0.6–1.3)
EOSINOPHIL # BLD AUTO: 0.15 THOUSAND/ÂΜL (ref 0–0.61)
EOSINOPHIL NFR BLD AUTO: 3 % (ref 0–6)
ERYTHROCYTE [DISTWIDTH] IN BLOOD BY AUTOMATED COUNT: 14.9 % (ref 11.6–15.1)
GFR SERPL CREATININE-BSD FRML MDRD: 46 ML/MIN/1.73SQ M
GLUCOSE SERPL-MCNC: 101 MG/DL (ref 65–140)
GLUCOSE SERPL-MCNC: 110 MG/DL (ref 65–140)
GLUCOSE SERPL-MCNC: 118 MG/DL (ref 65–140)
GLUCOSE SERPL-MCNC: 149 MG/DL (ref 65–140)
GLUCOSE SERPL-MCNC: 57 MG/DL (ref 65–140)
GLUCOSE SERPL-MCNC: 87 MG/DL (ref 65–140)
GLUCOSE SERPL-MCNC: 89 MG/DL (ref 65–140)
HCT VFR BLD AUTO: 37.5 % (ref 34.8–46.1)
HGB BLD-MCNC: 11.6 G/DL (ref 11.5–15.4)
IMM GRANULOCYTES # BLD AUTO: 0.02 THOUSAND/UL (ref 0–0.2)
IMM GRANULOCYTES NFR BLD AUTO: 0 % (ref 0–2)
LYMPHOCYTES # BLD AUTO: 1.53 THOUSANDS/ÂΜL (ref 0.6–4.47)
LYMPHOCYTES NFR BLD AUTO: 26 % (ref 14–44)
MCH RBC QN AUTO: 30.9 PG (ref 26.8–34.3)
MCHC RBC AUTO-ENTMCNC: 30.9 G/DL (ref 31.4–37.4)
MCV RBC AUTO: 100 FL (ref 82–98)
MONOCYTES # BLD AUTO: 0.48 THOUSAND/ÂΜL (ref 0.17–1.22)
MONOCYTES NFR BLD AUTO: 8 % (ref 4–12)
NEUTROPHILS # BLD AUTO: 3.57 THOUSANDS/ÂΜL (ref 1.85–7.62)
NEUTS SEG NFR BLD AUTO: 62 % (ref 43–75)
NRBC BLD AUTO-RTO: 0 /100 WBCS
P AXIS: 233 DEGREES
PLATELET # BLD AUTO: 193 THOUSANDS/UL (ref 149–390)
PMV BLD AUTO: 9.9 FL (ref 8.9–12.7)
POTASSIUM SERPL-SCNC: 4.9 MMOL/L (ref 3.5–5.3)
PROT SERPL-MCNC: 5.3 G/DL (ref 6.4–8.4)
QRS AXIS: 258 DEGREES
QRSD INTERVAL: 172 MS
QT INTERVAL: 488 MS
QTC INTERVAL: 483 MS
RBC # BLD AUTO: 3.76 MILLION/UL (ref 3.81–5.12)
SODIUM SERPL-SCNC: 137 MMOL/L (ref 135–147)
T WAVE AXIS: 82 DEGREES
VENTRICULAR RATE: 59 BPM
WBC # BLD AUTO: 5.79 THOUSAND/UL (ref 4.31–10.16)

## 2025-03-05 PROCEDURE — 84484 ASSAY OF TROPONIN QUANT: CPT | Performed by: EMERGENCY MEDICINE

## 2025-03-05 PROCEDURE — 85025 COMPLETE CBC W/AUTO DIFF WBC: CPT | Performed by: EMERGENCY MEDICINE

## 2025-03-05 PROCEDURE — 99285 EMERGENCY DEPT VISIT HI MDM: CPT

## 2025-03-05 PROCEDURE — 82948 REAGENT STRIP/BLOOD GLUCOSE: CPT

## 2025-03-05 PROCEDURE — 93010 ELECTROCARDIOGRAM REPORT: CPT | Performed by: INTERNAL MEDICINE

## 2025-03-05 PROCEDURE — 99285 EMERGENCY DEPT VISIT HI MDM: CPT | Performed by: EMERGENCY MEDICINE

## 2025-03-05 PROCEDURE — 96365 THER/PROPH/DIAG IV INF INIT: CPT

## 2025-03-05 PROCEDURE — 74176 CT ABD & PELVIS W/O CONTRAST: CPT

## 2025-03-05 PROCEDURE — 96374 THER/PROPH/DIAG INJ IV PUSH: CPT

## 2025-03-05 PROCEDURE — 80053 COMPREHEN METABOLIC PANEL: CPT | Performed by: EMERGENCY MEDICINE

## 2025-03-05 PROCEDURE — 93005 ELECTROCARDIOGRAM TRACING: CPT

## 2025-03-05 PROCEDURE — 99223 1ST HOSP IP/OBS HIGH 75: CPT | Performed by: INTERNAL MEDICINE

## 2025-03-05 PROCEDURE — 99223 1ST HOSP IP/OBS HIGH 75: CPT | Performed by: PHYSICIAN ASSISTANT

## 2025-03-05 PROCEDURE — 72052 X-RAY EXAM NECK SPINE 6/>VWS: CPT

## 2025-03-05 PROCEDURE — 36415 COLL VENOUS BLD VENIPUNCTURE: CPT | Performed by: EMERGENCY MEDICINE

## 2025-03-05 RX ORDER — POLYETHYLENE GLYCOL 3350 17 G/17G
17 POWDER, FOR SOLUTION ORAL DAILY
Qty: 14 EACH | Refills: 0 | Status: SHIPPED | OUTPATIENT
Start: 2025-03-05

## 2025-03-05 RX ORDER — LEVETIRACETAM 500 MG/1
500 TABLET ORAL ONCE
Status: COMPLETED | OUTPATIENT
Start: 2025-03-05 | End: 2025-03-05

## 2025-03-05 RX ORDER — ACETAMINOPHEN 325 MG/1
650 TABLET ORAL ONCE
Status: COMPLETED | OUTPATIENT
Start: 2025-03-05 | End: 2025-03-05

## 2025-03-05 RX ORDER — ATORVASTATIN CALCIUM 80 MG/1
80 TABLET, FILM COATED ORAL ONCE
Status: COMPLETED | OUTPATIENT
Start: 2025-03-05 | End: 2025-03-05

## 2025-03-05 RX ORDER — METOPROLOL TARTRATE 25 MG/1
25 TABLET, FILM COATED ORAL ONCE
Status: COMPLETED | OUTPATIENT
Start: 2025-03-05 | End: 2025-03-05

## 2025-03-05 RX ORDER — CITALOPRAM HYDROBROMIDE 20 MG/1
20 TABLET ORAL ONCE
Status: COMPLETED | OUTPATIENT
Start: 2025-03-05 | End: 2025-03-05

## 2025-03-05 RX ORDER — LEVETIRACETAM 500 MG/1
500 TABLET ORAL 2 TIMES DAILY
Status: DISCONTINUED | OUTPATIENT
Start: 2025-03-06 | End: 2025-03-11 | Stop reason: HOSPADM

## 2025-03-05 RX ORDER — PANTOPRAZOLE SODIUM 40 MG/1
40 TABLET, DELAYED RELEASE ORAL DAILY
Status: DISCONTINUED | OUTPATIENT
Start: 2025-03-06 | End: 2025-03-11 | Stop reason: HOSPADM

## 2025-03-05 RX ORDER — ACETAMINOPHEN 325 MG/1
650 TABLET ORAL EVERY 6 HOURS PRN
Status: DISCONTINUED | OUTPATIENT
Start: 2025-03-05 | End: 2025-03-06

## 2025-03-05 RX ORDER — HEPARIN SODIUM 5000 [USP'U]/ML
5000 INJECTION, SOLUTION INTRAVENOUS; SUBCUTANEOUS EVERY 8 HOURS SCHEDULED
Status: DISCONTINUED | OUTPATIENT
Start: 2025-03-05 | End: 2025-03-11 | Stop reason: HOSPADM

## 2025-03-05 RX ORDER — DEXTROSE MONOHYDRATE 25 G/50ML
25 INJECTION, SOLUTION INTRAVENOUS ONCE
Status: COMPLETED | OUTPATIENT
Start: 2025-03-05 | End: 2025-03-05

## 2025-03-05 RX ORDER — ONDANSETRON 2 MG/ML
4 INJECTION INTRAMUSCULAR; INTRAVENOUS EVERY 6 HOURS PRN
Status: DISCONTINUED | OUTPATIENT
Start: 2025-03-05 | End: 2025-03-11 | Stop reason: HOSPADM

## 2025-03-05 RX ORDER — ACETAMINOPHEN 10 MG/ML
1000 INJECTION, SOLUTION INTRAVENOUS ONCE
Status: COMPLETED | OUTPATIENT
Start: 2025-03-05 | End: 2025-03-05

## 2025-03-05 RX ORDER — HYDROCORTISONE 25 MG/G
CREAM TOPICAL ONCE
Status: COMPLETED | OUTPATIENT
Start: 2025-03-05 | End: 2025-03-05

## 2025-03-05 RX ORDER — GABAPENTIN 100 MG/1
100 CAPSULE ORAL ONCE
Status: COMPLETED | OUTPATIENT
Start: 2025-03-05 | End: 2025-03-05

## 2025-03-05 RX ORDER — GABAPENTIN 100 MG/1
100 CAPSULE ORAL
Status: DISCONTINUED | OUTPATIENT
Start: 2025-03-06 | End: 2025-03-11 | Stop reason: HOSPADM

## 2025-03-05 RX ORDER — ATORVASTATIN CALCIUM 80 MG/1
80 TABLET, FILM COATED ORAL
Status: DISCONTINUED | OUTPATIENT
Start: 2025-03-06 | End: 2025-03-11 | Stop reason: HOSPADM

## 2025-03-05 RX ORDER — INSULIN LISPRO 100 [IU]/ML
1-6 INJECTION, SOLUTION INTRAVENOUS; SUBCUTANEOUS
Status: DISCONTINUED | OUTPATIENT
Start: 2025-03-06 | End: 2025-03-11 | Stop reason: HOSPADM

## 2025-03-05 RX ORDER — LIDOCAINE 50 MG/G
1 PATCH TOPICAL DAILY
Status: DISCONTINUED | OUTPATIENT
Start: 2025-03-06 | End: 2025-03-11 | Stop reason: HOSPADM

## 2025-03-05 RX ORDER — LEVOTHYROXINE SODIUM 75 UG/1
150 TABLET ORAL
Status: DISCONTINUED | OUTPATIENT
Start: 2025-03-06 | End: 2025-03-11 | Stop reason: HOSPADM

## 2025-03-05 RX ORDER — INSULIN LISPRO 100 [IU]/ML
1-5 INJECTION, SOLUTION INTRAVENOUS; SUBCUTANEOUS
Status: DISCONTINUED | OUTPATIENT
Start: 2025-03-05 | End: 2025-03-11 | Stop reason: HOSPADM

## 2025-03-05 RX ORDER — METOPROLOL TARTRATE 25 MG/1
25 TABLET, FILM COATED ORAL 2 TIMES DAILY
Status: DISCONTINUED | OUTPATIENT
Start: 2025-03-06 | End: 2025-03-11 | Stop reason: HOSPADM

## 2025-03-05 RX ORDER — AMOXICILLIN 250 MG
2 CAPSULE ORAL DAILY
Status: DISCONTINUED | OUTPATIENT
Start: 2025-03-06 | End: 2025-03-11 | Stop reason: HOSPADM

## 2025-03-05 RX ORDER — CITALOPRAM HYDROBROMIDE 20 MG/1
20 TABLET ORAL
Status: DISCONTINUED | OUTPATIENT
Start: 2025-03-06 | End: 2025-03-11 | Stop reason: HOSPADM

## 2025-03-05 RX ADMIN — HEPARIN SODIUM 5000 UNITS: 5000 INJECTION, SOLUTION INTRAVENOUS; SUBCUTANEOUS at 22:26

## 2025-03-05 RX ADMIN — GABAPENTIN 100 MG: 100 CAPSULE ORAL at 20:40

## 2025-03-05 RX ADMIN — HYDROCORTISONE: 25 CREAM TOPICAL at 18:40

## 2025-03-05 RX ADMIN — LEVETIRACETAM 500 MG: 500 TABLET, FILM COATED ORAL at 20:40

## 2025-03-05 RX ADMIN — METOPROLOL TARTRATE 25 MG: 25 TABLET, FILM COATED ORAL at 20:40

## 2025-03-05 RX ADMIN — CITALOPRAM HYDROBROMIDE 20 MG: 20 TABLET ORAL at 20:40

## 2025-03-05 RX ADMIN — ACETAMINOPHEN 1000 MG: 10 INJECTION INTRAVENOUS at 12:29

## 2025-03-05 RX ADMIN — ACETAMINOPHEN 650 MG: 325 TABLET, FILM COATED ORAL at 20:40

## 2025-03-05 RX ADMIN — DEXTROSE MONOHYDRATE 25 ML: 25 INJECTION, SOLUTION INTRAVENOUS at 10:10

## 2025-03-05 RX ADMIN — ATORVASTATIN CALCIUM 80 MG: 80 TABLET, FILM COATED ORAL at 20:40

## 2025-03-05 NOTE — ED CARE HANDOFF
Emergency Department Sign Out Note        Sign out and transfer of care from Dr. Robles. See Separate Emergency Department note.     The patient, Lizzie Whitten, was evaluated by the previous provider for hypoglycemia, rectal pain, as well as concern for subacute C1/C2 subluxation for which patient has been in a cervical collar since January.    Workup Completed:  Labs, CT abdomen/pelvis, neurosurgery consultation regarding the aforementioned cervical spine subluxation    ED Course / Workup Pending (followup):  At the time of handoff, patient is awaiting flex ex films of the neck with plan for neurosurgery consultant to review the imaging to see if patient's c-collar could be cleared.  Concerning hypoglycemia, plan for patient's long-acting insulin to be cut in half.  Concerning abdominal/rectal pain, plan for Anusol at home.    CT abdomen pelvis wo contrast   Final Result      1.  No acute findings in the abdomen or pelvis within the limits of unenhanced technique.   2.  Moderate right pleural effusion.   3.  Diverticulosis without diverticulitis.   4.  Cholelithiasis without evidence of cholecystitis      Workstation performed: [id number]         XR spine cervical complete 6+ vw flex/ext/obl    (Results Pending)     Flex ex x-rays of cervical spine performed, neurosurgery consultant evaluated patient and reviewed the imaging, may start de-escalating cervical collar.    On reassessment and further discussion with patient and her family, plan for patient to be observed in the hospital and to undergo PT/OT.       Procedures  Medical Decision Making  Amount and/or Complexity of Data Reviewed  Labs: ordered.  Radiology: ordered.    Risk  OTC drugs.  Prescription drug management.            Disposition  Final diagnoses:   Hypoglycemia   Subluxation of C1-C2 vertebrae, initial encounter   Hemorrhoids     Time reflects when diagnosis was documented in both MDM as applicable and the Disposition within this note        Time User Action Codes Description Comment    3/5/2025 12:26 PM Chemo Robles Add [E16.2] Hypoglycemia     3/5/2025  3:46 PM EleonoraChemo kelley Add [S13.120A] Subluxation of C1-C2 vertebrae, initial encounter     3/5/2025  5:32 PM Chemo Robles Add [K64.9] Hemorrhoids           ED Disposition       None          Follow-up Information       Follow up With Specialties Details Why Contact Info    Kennedy Castellon MD Family Medicine Schedule an appointment as soon as possible for a visit   [address] [place] [address].  [address]  [facility] [address]  [phone]            Patient's Medications   Discharge Prescriptions    HYDROCORTISONE-PRAMOXINE (PROCTOFOAM-HC) 1-1 % FOAM RECTAL FOAM    Insert 1 applicator into the rectum 2 (two) times a day       Start Date: 3/5/2025  End Date: --       Order Dose: 1 applicator       Quantity: 10 g    Refills: 0    POLYETHYLENE GLYCOL (MIRALAX) 17 G PACKET    Take 17 g by mouth daily       Start Date: 3/5/2025  End Date: --       Order Dose: 17 g       Quantity: 14 each    Refills: 0     No discharge procedures on file.       ED Provider  Electronically Signed by     Liza Sotelo MD  03/06/25 [id number]

## 2025-03-05 NOTE — ASSESSMENT & PLAN NOTE
Pt noted to have C1-C2 subluxation that was initially noted s/p fall in January 2025.   Pt has been managed conservatively in Cervical brace.   Pt admitted on this admission for low glucose.    Imaging reviewed personally and by attending. Final results as below  Cervical spine 1/29/25: Mild reversal of the upper cervical lordosis. Anterior subluxation at   C1-C2. The C1 posterior arch sits anteriorly located in relation to the C2   posterior arch.     Plan  Continue regular neurologic checks.  Pain control per primary team.  Eval and mobilize per PT/OT when able to.   DVT PPX:SCDs   Pt denies neck pain. No TTP of the C spine. Will obtain flex/ext xrays of the Cervical spine with open mouth views for further evaluation.   Neurosurgery will review xrays when completed. Please call with any questions or concerns.

## 2025-03-05 NOTE — TELEPHONE ENCOUNTER
Patient's daughter Gorge called in to report patient is located in a facility and have   OV with PCP tomorrow 3/6 for follow up. Gorge is concerned for patient having low blood sugar; caregiver called and reports BS=62 yesterday and this morning is 42. Patient is In bed  and shaky and trying to administer orange juice. RN advised to call 911 for assistance to have help on the way if unable to administer an juice to support her blood sugars. Advised to follow up with provider today.

## 2025-03-05 NOTE — CONSULTS
Consultation - Neurosurgery   Name: Lizzie Whitten 85 y.o. female I MRN: [id number]  Unit/Bed#: ED 17 I Date of Admission: 3/5/2025   Date of Service: 3/5/2025 I Hospital Day: 0   Inpatient consult to Neurosurgery  Consult performed by: Doreen Marcus PA-C  Consult ordered by: Chemo Robles MD        Physician Requesting Evaluation: Chemo Robles MD   Reason for Evaluation / Principal Problem: C1/2 subluxation    Assessment & Plan  Cervical spondylosis  Pt noted to have C1-C2 subluxation that was initially noted s/p fall in January 2025.   Pt has been managed conservatively in Cervical brace.   Pt admitted on this admission for low glucose.    Imaging reviewed personally and by attending. Final results as below  Cervical spine 1/29/25: Mild reversal of the upper cervical lordosis. Anterior subluxation at   C1-C2. The C1 posterior arch sits anteriorly located in relation to the C2   posterior arch.     Plan  Continue regular neurologic checks.  Pain control per primary team.  Eval and mobilize per PT/OT when able to.   DVT PPX:SCDs   Pt denies neck pain. No TTP of the C spine. Will obtain flex/ext xrays of the Cervical spine with open mouth views for further evaluation.   Neurosurgery will review xrays when completed. Please call with any questions or concerns.          History of Present Illness   HPI: Lizzie Whitten is a 85 y.o. year old female Pt noted to have C1-C2 subluxation that was initially noted s/p fall in January 2025. Pt has been managed conservatively in Cervical brace. Pt admitted on this admission for low glucose. Patient was accompanied by her daughter at bedside. They reported that pt had a fall in end of January and in Dec 2024. Patient denies neck pain. She denies radicular pain BUE. She denies BUE numbness. She reports mild intermittent numbness in BLE. She reports she ambulates with a walker at baseline but has been using a wheelchair more often now due to safety  concerns.     Pt reports some discomfort for use of the cervical brace over the last 5-6 weeks.     Review of Systems   Constitutional:  Negative for chills and fever.   HENT:  Negative for hearing loss.    Eyes:  Negative for pain and visual disturbance.   Respiratory:  Negative for chest tightness and shortness of breath.    Cardiovascular:  Negative for chest pain and palpitations.   Gastrointestinal:  Negative for abdominal pain, nausea and vomiting.   Genitourinary:  Negative for dysuria.   Musculoskeletal:  Positive for gait problem. Negative for back pain, neck pain and neck stiffness.   Skin:  Negative for pallor and rash.   Neurological:  Positive for numbness. Negative for dizziness and light-headedness.        Intermittent numbness/tingling in her feet.    Psychiatric/Behavioral:  Negative for agitation, behavioral problems, confusion and decreased concentration.      Historical Information   Past Medical History:   Diagnosis Date    Anxiety     Arthritis     CHF (congestive heart failure) ([facility])     Chronic venous insufficiency     COPD (chronic obstructive pulmonary disease) ([facility])     Coronary artery disease     Diabetes mellitus ([facility])     type II    Disease of thyroid gland     hypothyroidism    DVT (deep venous thrombosis) ([facility])     HL (hearing loss)     Hyperlipidemia     Hypertension     Irregular heart beat     Paroxysmal a-fib    Pacemaker     Renal disorder     stage 3    Sleep apnea     Sleep difficulties     Thyroid disease     Urinary incontinence     Uterine leiomyoma     Walker as ambulation aid      Past Surgical History:   Procedure Laterality Date    BREAST SURGERY      For Biopsy    CARDIAC PACEMAKER PLACEMENT      Permanent. Last assessed: 2/19/16    CARDIAC SURGERY      CARPAL TUNNEL RELEASE Bilateral     CATARACT EXTRACTION      CATARACT EXTRACTION W/ INTRAOCULAR LENS  IMPLANT, BILATERAL      CYSTOSCOPY      Diagnostic. Last assessed: 11/8/17    ECTOPIC PREGNANCY SURGERY       HYSTERECTOMY      Total Abdomonal. Resolved: 1982    IR THORACENTESIS  5/17/2024    JOINT REPLACEMENT Right     TKR    OOPHORECTOMY      OK ENDOVEN ABLTJ INCMPTNT VEIN XTR LASER 1ST VEIN Left 2/17/2017    Procedure: GREATER SAPHENOUS VEIN ENDOVASCULAR LASER THERAPY ;  Surgeon: Danielle Phan DO;  Location: [facility];  Service: Vascular    TONSILLECTOMY       Social History     Tobacco Use    Smoking status: Never     Passive exposure: Never    Smokeless tobacco: Never    Tobacco comments:     Per [employer]: Former smoker   Vaping Use    Vaping status: Never Used   Substance and Sexual Activity    Alcohol use: Not Currently     Comment: rarely. Per [employer]: Denied history of alcohol use    Drug use: No    Sexual activity: Never     E-Cigarette/Vaping    E-Cigarette Use Never User      E-Cigarette/Vaping Substances    Nicotine No     THC No     CBD No     Flavoring No     Other No     Unknown No        Social History     Tobacco Use    Smoking status: Never     Passive exposure: Never    Smokeless tobacco: Never    Tobacco comments:     Per [employer]: Former smoker   Vaping Use    Vaping status: Never Used   Substance and Sexual Activity    Alcohol use: Not Currently     Comment: rarely. Per [employer]: Denied history of alcohol use    Drug use: No    Sexual activity: Never     No current facility-administered medications for this encounter.  Prior to Admission Medications   Prescriptions Last Dose Informant Patient Reported? Taking?   Continuous Blood Gluc  (FREESTYLE TAMIA 14 DAY READER) GARRY  Self No No   Sig: Use to check BG levels 4+times daily and as needed   Continuous Blood Gluc Sensor (FreeStyle Tamia 14 Day Sensor) MISC  Self No No   Sig: Change sensor every 14 days to check BG levels 4+ times daily   Diclofenac Sodium (VOLTAREN) 1 %  Self No No   Sig: Apply 2 g topically 4 (four) times a day   Insulin Pen Needle (BD Pen Needle Sasha U/F) 32G X 4 MM MISC  Self No No   Sig: Inject 4 times a  day   Patient not taking: Reported on 1/16/2025   Magnesium Cl-Calcium Carbonate (SLOW-MAG PO)  Self Yes No   Sig: Take by mouth in the morning   Omega-3 Fatty Acids (Fish Oil) 1200 MG CAPS  Self No No   Sig: TAKE 1 CAPSULE TWICE A DAY   Oyster Shell 500 MG TABS  Self Yes No   Sig: Take 1 tablet by mouth daily   acetaminophen (TYLENOL) 325 mg tablet  Self Yes No   Sig: Take 975 mg by mouth 3 (three) times a day   ammonium lactate (LAC-HYDRIN) 12 % lotion  Self Yes No   Sig: Apply 1 Application topically daily   atorvastatin (LIPITOR) 80 mg tablet  Self No No   Sig: TAKE 1 TABLET DAILY IN THE EVENING   atorvastatin (LIPITOR) 80 mg tablet  Self Yes No   Sig: Take 80 mg by mouth daily   calcium carbonate (OYSTER SHELL,OSCAL) 500 mg  Self No No   Sig: TAKE 1 TABLET DAILY   citalopram (CeleXA) 20 mg tablet  Self No No   Sig: TAKE 1 TABLET DAILY   furosemide (LASIX) 40 mg tablet  Self No No   Sig: TAKE 1 TABLET DAILY   gabapentin (NEURONTIN) 100 mg capsule   No No   Sig: TAKE ONE CAPSULE AT BEDTIME   insulin glargine (Toujeo Max SoloStar) 300 units/mL CONCENTRATED U-300 injection pen (2-unit dial)  Self No No   Sig: Inject 16 Units under the skin daily at bedtime   insulin lispro (HumALOG/ADMELOG) 100 units/mL injection  Self No No   Sig: Inject 3 Units under the skin 3 (three) times a day with meals   ipratropium-albuterol (DUO-NEB) 0.5-2.5 mg/3 mL nebulizer solution  Self No No   Sig: Take 3 mL by nebulization every 8 (eight) hours as needed for wheezing or shortness of breath   levETIRAcetam (KEPPRA) 500 mg tablet  Self No No   Sig: Take 1 tablet (500 mg total) by mouth 2 (two) times a day   levothyroxine 150 mcg tablet  Self No No   Sig: TAKE 1 TABLET DAILY   lidocaine (Lidoderm) 5 %  Self No No   Sig: Apply 1 patch topically over 12 hours daily Remove & Discard patch within 12 hours or as directed by MD   metoprolol tartrate (LOPRESSOR) 25 mg tablet  Self No No   Sig: Take 1 tablet (25 mg total) by mouth 2 (two)  times a day   pantoprazole (PROTONIX) 40 mg tablet  Self No No   Sig: TAKE 1 TABLET DAILY   potassium chloride (Klor-Con M20) 20 mEq tablet  Self No No   Sig: TAKE 1 TABLET DAILY   senna-docusate sodium (SENOKOT-S) 8.6-50 mg per tablet  Self Yes No   Sig: Take 2 tablets by mouth daily   sodium chloride (VANNESSA 128) 5 % hypertonic ophthalmic ointment  Self Yes No   Sig: Administer 1 drop to both eyes 2 (two) times a day      Facility-Administered Medications: None     Ampicillin    Objective :  Temp:  [99.2 °F (37.3 °C)] 99.2 °F (37.3 °C)  HR:  [71] 71  BP: (142)/(98) 142/98  Resp:  [17] 17  SpO2:  [95 %] 95 %  O2 Device: None (Room air)    Physical Exam Neurological Exam    General appearance: alert, appears stated age, cooperative and no distress  Head: Normocephalic, without obvious abnormality, atraumatic  Eyes: EOMI, PERRL  Neck: supple, symmetrical, trachea midline, cervical brace was in place- ill fitting (brace re-adjusted at bedside).  Lungs: non labored breathing  Heart: regular heart rate  Neurologic:   Mental status: Alert, speech is clear and fluent  Cranial nerves: grossly intact (Cranial nerves II-XII)  Sensory: normal to LT X 4  Motor: moving all extremities, Strength BUE/BLE 4+/5.   Reflexes: No andrade's or clonus.   Coordination:no drift bilaterally        Lab Results: I have reviewed the following results:  Recent Labs     03/05/25  1006 03/05/25  1039   WBC 5.79  --    HGB 11.6  --    HCT 37.5  --      --    SODIUM  --  137   K  --  4.9   CL  --  103   CO2  --  29   BUN  --  26*   CREATININE  --  1.10   GLUC  --  110   AST  --  55*   ALT  --  75*   ALB  --  2.9*   TBILI  --  0.75   ALKPHOS  --  116*   HSTNI0  --  14           VTE Pharmacologic Prophylaxis: Sequential compression device (Venodyne)

## 2025-03-05 NOTE — TELEPHONE ENCOUNTER
Pt being transferred to hospital currently. I called to make sure daughter brought med list to the appt. The daughter told me she will keep us updated if Nadege Dela Cruz gets admitted so the appointment can be cancelled until she is discharged. No further action needed right now, see other triage message from today.

## 2025-03-05 NOTE — ED PROVIDER NOTES
Time reflects when diagnosis was documented in both MDM as applicable and the Disposition within this note       Time User Action Codes Description Comment    3/5/2025 12:26 PM Margaret Chemo DANNY Add [E16.2] Hypoglycemia     3/5/2025  3:46 PM Leobardohelen Chemo DELGADO Add [S13.120A] Subluxation of C1-C2 vertebrae, initial encounter     3/5/2025  5:32 PM Chemo Robles Add [K64.9] Hemorrhoids     3/6/2025  4:19 PM Alin Williamsonssica Add [R74.01] Transaminitis               Assessment & Plan       Medical Decision Making  Hypoglycemia likely due to too high of Lantus dosing at bedtime.  Glucose stayed normal after giving one half of an amp of D50 and food/drink.  Would decrease nighttime Lantus in half and monitor glucose level throughout the day.  Patient is scheduled to follow-up with PCP tomorrow.  Informed patient of mildly elevated liver enzymes.  Given lack of symptoms, I do not feel that additional workup is warranted at this time.  Recommend follow-up with repeat labs through PCP.        Amount and/or Complexity of Data Reviewed  Labs: ordered.  Radiology: ordered.    Risk  OTC drugs.  Prescription drug management.             Medications   acetaminophen (Ofirmev) injection 1,000 mg (1,000 mg Intravenous New Bag 3/5/25 1229)   dextrose 50 % IV solution 25 mL (25 mL Intravenous Given 3/5/25 1010)       ED Risk Strat Scores          Patient with sacral/rectal pain in the ED. Normal exam. Family is concerned that pain may be due to constipation. Will check CT scan to r/o constipation/fecal impaction.    CT shows stool in the rectum. Will give enema.     Family asking about follow up for C1-C2 subluxation and cervical collar. They were never given follow up after leaving [facility]. Contacted neurosurgery who will order flex/ex and see patient in the ED.               SBIRT 20yo+      Flowsheet Row Most Recent Value   Initial Alcohol Screen: US AUDIT-C     1. How often do you have a drink containing alcohol? 0 Filed at:  03/05/2025 0948   3b. FEMALE Any Age, or MALE 65+: How often do you have 4 or more drinks on one occassion? 0 Filed at: 03/05/2025 0948   Audit-C Score 0 Filed at: 03/05/2025 0948   KRISTIE: How many times in the past year have you...    Used an illegal drug or used a prescription medication for non-medical reasons? Never Filed at: 03/05/2025 0948                            History of Present Illness       Chief Complaint   Patient presents with    Hypoglycemia - Symptomatic     Pt states has been feeling shaky and nauseated due to hypoglycemia         Past Medical History:   Diagnosis Date    Anxiety     Arthritis     CHF (congestive heart failure) ([facility])     Chronic venous insufficiency     COPD (chronic obstructive pulmonary disease) ([facility])     Coronary artery disease     Diabetes mellitus ([facility])     type II    Disease of thyroid gland     hypothyroidism    DVT (deep venous thrombosis) ([facility])     HL (hearing loss)     Hyperlipidemia     Hypertension     Irregular heart beat     Paroxysmal a-fib    Pacemaker     Renal disorder     stage 3    Sleep apnea     Sleep difficulties     Thyroid disease     Urinary incontinence     Uterine leiomyoma     Walker as ambulation aid       Past Surgical History:   Procedure Laterality Date    BREAST SURGERY      For Biopsy    CARDIAC PACEMAKER PLACEMENT      Permanent. Last assessed: 2/19/16    CARDIAC SURGERY      CARPAL TUNNEL RELEASE Bilateral     CATARACT EXTRACTION      CATARACT EXTRACTION W/ INTRAOCULAR LENS  IMPLANT, BILATERAL      CYSTOSCOPY      Diagnostic. Last assessed: 11/8/17    ECTOPIC PREGNANCY SURGERY      HYSTERECTOMY      Total Abdomonal. Resolved: 1982    IR THORACENTESIS  5/17/2024    JOINT REPLACEMENT Right     TKR    OOPHORECTOMY      GA ENDOVEN ABLTJ INCMPTNT VEIN XTR LASER 1ST VEIN Left 2/17/2017    Procedure: GREATER SAPHENOUS VEIN ENDOVASCULAR LASER THERAPY ;  Surgeon: Danielle Phan DO;  Location: [facility];  Service: Vascular    TONSILLECTOMY         Family History   Problem Relation Age of Onset    Coronary artery disease Mother     Hypertension Mother     Heart disease Mother     Hypertension Father     Diabetes Father         Mellitus    No Known Problems Sister     No Known Problems Daughter     No Known Problems Daughter     No Known Problems Maternal Grandmother     No Known Problems Maternal Grandfather     No Known Problems Paternal Grandmother     No Known Problems Paternal Grandfather     Diabetes Brother         Mellitus    No Known Problems Son     Breast cancer Maternal Aunt     Pancreatic cancer Maternal Aunt     Breast cancer Cousin     Breast cancer Cousin     Breast cancer Paternal Aunt     No Known Problems Paternal Aunt     No Known Problems Paternal Aunt       Social History     Tobacco Use    Smoking status: Never     Passive exposure: Never    Smokeless tobacco: Never    Tobacco comments:     Per [employer]: Former smoker   Vaping Use    Vaping status: Never Used   Substance Use Topics    Alcohol use: Not Currently     Comment: rarely. Per [employer]: Denied history of alcohol use    Drug use: No      E-Cigarette/Vaping    E-Cigarette Use Never User       E-Cigarette/Vaping Substances    Nicotine No     THC No     CBD No     Flavoring No     Other No     Unknown No       I have reviewed and agree with the history as documented.     Patient presents for evaluation after being found to be hypoglycemic.  Patient was reportedly shaky this morning in bed and caregivers attempted to give juice.  Per notes, patient has been having low blood sugar for the last week.  Patient was recently discharged from rehab and according to notes, daughters were not sure how patient was supposed to be taking her insulin.  She had an appointment tomorrow with her pediatrician to help with this.  Patient is uncertain how much and when she takes her insulin.  Additional history is obtained from the daughters who state that patient has been taking 16 units of  insulin at bedtime and 4 units of insulin at lunch and dinner.  She has been having intermittent episodes of hypoglycemia while she was recently hospitalized, in the rehab facility, and this last week since returning home from the facility.  Daughter states that they have been trying to give patient snacks at bedtime to help with this.  They were scheduled to see the PCP tomorrow to help adjust the insulin.  They do state that patient's diet has not been as good as usual.  No additional complaints.      History provided by:  Patient (daughters)  Hypoglycemia - Symptomatic  Initial blood sugar:  57  Blood sugar after intervention:  87  Severity:  Moderate  Onset quality:  Gradual  Duration:  1 week  Timing:  Intermittent  Chronicity:  Recurrent  Diabetic status:  Controlled with insulin  Context: decreased oral intake    Associated symptoms: no vomiting and no weakness        Review of Systems   Constitutional:  Negative for chills and fever.   Gastrointestinal:  Negative for abdominal pain, nausea and vomiting.   Genitourinary:  Negative for dysuria.   Musculoskeletal:  Negative for back pain.   Neurological:  Negative for weakness and numbness.   Psychiatric/Behavioral:  Negative for agitation and confusion.    All other systems reviewed and are negative.          Objective       ED Triage Vitals   Temperature Pulse Blood Pressure Respirations SpO2 Patient Position - Orthostatic VS   03/05/25 0957 03/05/25 0945 03/05/25 0945 03/05/25 0945 03/05/25 0945 03/05/25 0945   99.2 °F (37.3 °C) 71 142/98 17 95 % Lying      Temp Source Heart Rate Source BP Location FiO2 (%) Pain Score    03/05/25 0957 03/05/25 0945 03/05/25 0945 -- --    Oral Monitor Right arm        Vitals      Date and Time Temp Pulse SpO2 Resp BP Pain Score FACES Pain Rating User   03/05/25 0957 99.2 °F (37.3 °C) -- -- -- -- -- -- ST   03/05/25 0945 -- 71 95 % 17 142/98 -- -- ST            Physical Exam  Vitals and nursing note reviewed.   Constitutional:        Appearance: Normal appearance.   HENT:      Head: Normocephalic and atraumatic.      Mouth/Throat:      Mouth: Mucous membranes are moist.      Pharynx: Oropharynx is clear.   Eyes:      Extraocular Movements: Extraocular movements intact.      Pupils: Pupils are equal, round, and reactive to light.   Neck:      Comments: In [place] collar from recent cervical fracture  Cardiovascular:      Rate and Rhythm: Normal rate and regular rhythm.   Pulmonary:      Effort: Pulmonary effort is normal. No respiratory distress.   Abdominal:      General: Abdomen is flat.      Palpations: Abdomen is soft.   Musculoskeletal:         General: No tenderness. Normal range of motion.   Skin:     General: Skin is warm and dry.   Neurological:      General: No focal deficit present.      Mental Status: She is alert.      Sensory: No sensory deficit.      Motor: No weakness.   Psychiatric:         Mood and Affect: Mood normal.         Behavior: Behavior normal.         Results Reviewed       Procedure Component Value Units Date/Time    Fingerstick Glucose (POCT) [[id number]]  (Normal) Collected: 03/05/25 1226    Lab Status: Final result Specimen: Blood Updated: 03/05/25 1227     POC Glucose 118 mg/dl     Fingerstick Glucose (POCT) [[id number]]  (Normal) Collected: 03/05/25 1110    Lab Status: Final result Specimen: Blood Updated: 03/05/25 1111     POC Glucose 87 mg/dl     HS Troponin 0hr (reflex protocol) [[id number]]  (Normal) Collected: 03/05/25 1039    Lab Status: Final result Specimen: Blood from Arm, Right Updated: 03/05/25 1109     hs TnI 0hr 14 ng/L     Comprehensive metabolic panel [[id number]]  (Abnormal) Collected: 03/05/25 1039    Lab Status: Final result Specimen: Blood from Arm, Right Updated: 03/05/25 1109     Sodium 137 mmol/L      Potassium 4.9 mmol/L      Chloride 103 mmol/L      CO2 29 mmol/L      ANION GAP 5 mmol/L      BUN 26 mg/dL      Creatinine 1.10 mg/dL      Glucose 110 mg/dL      Calcium 8.3 mg/dL       Corrected Calcium 9.2 mg/dL      AST 55 U/L      ALT 75 U/L      Alkaline Phosphatase 116 U/L      Total Protein 5.3 g/dL      Albumin 2.9 g/dL      Total Bilirubin 0.75 mg/dL      eGFR 46 ml/min/1.73sq m     Narrative:      National Kidney Disease Foundation guidelines for Chronic Kidney Disease (CKD):     Stage 1 with normal or high GFR (GFR > 90 mL/min/1.73 square meters)    Stage 2 Mild CKD (GFR = 60-89 mL/min/1.73 square meters)    Stage 3A Moderate CKD (GFR = 45-59 mL/min/1.73 square meters)    Stage 3B Moderate CKD (GFR = 30-44 mL/min/1.73 square meters)    Stage 4 Severe CKD (GFR = 15-29 mL/min/1.73 square meters)    Stage 5 End Stage CKD (GFR <15 mL/min/1.73 square meters)  Note: GFR calculation is accurate only with a steady state creatinine    CBC and differential [[id number]]  (Abnormal) Collected: 03/05/25 1006    Lab Status: Final result Specimen: Blood from Arm, Left Updated: 03/05/25 1017     WBC 5.79 Thousand/uL      RBC 3.76 Million/uL      Hemoglobin 11.6 g/dL      Hematocrit 37.5 %       fL      MCH 30.9 pg      MCHC 30.9 g/dL      RDW 14.9 %      MPV 9.9 fL      Platelets 193 Thousands/uL      nRBC 0 /100 WBCs      Segmented % 62 %      Immature Grans % 0 %      Lymphocytes % 26 %      Monocytes % 8 %      Eosinophils Relative 3 %      Basophils Relative 1 %      Absolute Neutrophils 3.57 Thousands/µL      Absolute Immature Grans 0.02 Thousand/uL      Absolute Lymphocytes 1.53 Thousands/µL      Absolute Monocytes 0.48 Thousand/µL      Eosinophils Absolute 0.15 Thousand/µL      Basophils Absolute 0.04 Thousands/µL     Fingerstick Glucose (POCT) [[id number]]  (Abnormal) Collected: 03/05/25 0952    Lab Status: Final result Specimen: Blood Updated: 03/05/25 0953     POC Glucose 57 mg/dl             No orders to display       Procedures  ECG 12 Lead Documentation Only    Date/Time: 3/5/2025 1017  Performed by: Chemo Robles MD  Authorized by: Chemo Robles MD     Indications /  Diagnosis:  Chest pain  Patient location:  ED  Previous ECG:     Previous ECG:  Compared to current    Similarity:  No change  Interpretation:     Interpretation: paced, aflutter    Rate:     ECG rate:  59    ECG rate assessment: normal    Rhythm:     Rhythm: paced, aflutter   Ectopy:     Ectopy: none    QRS:     QRS axis:  Normal  Conduction:     Conduction: normal    ST segments:     ST segments:  Normal  T waves:     T waves: normal      ED Medication and Procedure Management   Prior to Admission Medications   Prescriptions Last Dose Informant Patient Reported? Taking?   Continuous Blood Gluc  (FREESTYLE RONNI 14 DAY READER) GARRY  Self No No   Sig: Use to check BG levels 4+times daily and as needed   Continuous Blood Gluc Sensor (FreeStyle Ronni 14 Day Sensor) MISC  Self No No   Sig: Change sensor every 14 days to check BG levels 4+ times daily   Diclofenac Sodium (VOLTAREN) 1 %  Self No No   Sig: Apply 2 g topically 4 (four) times a day   Insulin Pen Needle (BD Pen Needle Sasha U/F) 32G X 4 MM MISC  Self No No   Sig: Inject 4 times a day   Patient not taking: Reported on 1/16/2025   Magnesium Cl-Calcium Carbonate (SLOW-MAG PO)  Self Yes No   Sig: Take by mouth in the morning   Omega-3 Fatty Acids (Fish Oil) 1200 MG CAPS  Self No No   Sig: TAKE 1 CAPSULE TWICE A DAY   Oyster Shell 500 MG TABS  Self Yes No   Sig: Take 1 tablet by mouth daily   acetaminophen (TYLENOL) 325 mg tablet  Self Yes No   Sig: Take 975 mg by mouth 3 (three) times a day   ammonium lactate (LAC-HYDRIN) 12 % lotion  Self Yes No   Sig: Apply 1 Application topically daily   atorvastatin (LIPITOR) 80 mg tablet  Self No No   Sig: TAKE 1 TABLET DAILY IN THE EVENING   atorvastatin (LIPITOR) 80 mg tablet  Self Yes No   Sig: Take 80 mg by mouth daily   calcium carbonate (OYSTER SHELL,OSCAL) 500 mg  Self No No   Sig: TAKE 1 TABLET DAILY   citalopram (CeleXA) 20 mg tablet  Self No No   Sig: TAKE 1 TABLET DAILY   furosemide (LASIX) 40 mg tablet   Self No No   Sig: TAKE 1 TABLET DAILY   gabapentin (NEURONTIN) 100 mg capsule   No No   Sig: TAKE ONE CAPSULE AT BEDTIME   insulin glargine (Toujeo Max SoloStar) 300 units/mL CONCENTRATED U-300 injection pen (2-unit dial)  Self No No   Sig: Inject 16 Units under the skin daily at bedtime   insulin lispro (HumALOG/ADMELOG) 100 units/mL injection  Self No No   Sig: Inject 3 Units under the skin 3 (three) times a day with meals   ipratropium-albuterol (DUO-NEB) 0.5-2.5 mg/3 mL nebulizer solution  Self No No   Sig: Take 3 mL by nebulization every 8 (eight) hours as needed for wheezing or shortness of breath   levETIRAcetam (KEPPRA) 500 mg tablet  Self No No   Sig: Take 1 tablet (500 mg total) by mouth 2 (two) times a day   levothyroxine 150 mcg tablet  Self No No   Sig: TAKE 1 TABLET DAILY   lidocaine (Lidoderm) 5 %  Self No No   Sig: Apply 1 patch topically over 12 hours daily Remove & Discard patch within 12 hours or as directed by MD   metoprolol tartrate (LOPRESSOR) 25 mg tablet  Self No No   Sig: Take 1 tablet (25 mg total) by mouth 2 (two) times a day   pantoprazole (PROTONIX) 40 mg tablet  Self No No   Sig: TAKE 1 TABLET DAILY   potassium chloride (Klor-Con M20) 20 mEq tablet  Self No No   Sig: TAKE 1 TABLET DAILY   senna-docusate sodium (SENOKOT-S) 8.6-50 mg per tablet  Self Yes No   Sig: Take 2 tablets by mouth daily   sodium chloride (VANNESSA 128) 5 % hypertonic ophthalmic ointment  Self Yes No   Sig: Administer 1 drop to both eyes 2 (two) times a day      Facility-Administered Medications: None     Patient's Medications   Discharge Prescriptions    No medications on file     No discharge procedures on file.  ED SEPSIS DOCUMENTATION   Time reflects when diagnosis was documented in both MDM as applicable and the Disposition within this note       Time User Action Codes Description Comment    3/5/2025 12:26 PM Chemo Robles Add [E16.2] Hypoglycemia                  Chemo Robles MD  03/06/25 1674

## 2025-03-05 NOTE — DISCHARGE INSTRUCTIONS
Since you have not eaten much today, do not take Lantus tonight at all. Tomorrow, we recommend taking only 8 units of your Lantus, although your primary care doctor tomorrow may change this recommendation.    Start taking MiraLAX daily to help with constipation.  You may use the rectal foam to help with your rectal pain that is likely due to hemorrhoids. Take Tylenol for pain as needed as well.    The preliminary review of your c-spine (neck) x-rays by the neurosurgeon suggests degenerative changes (spondylolysis) but no significant instability.   You no longer need cervical brace on a routine basis. You may sleep without it. Whenever you are getting up and walking around or if you are in a car for a ride, please wear the neck brace for the next three days, then gradually decrease the amount of time you are using the brace.  The neurosurgery team will follow up with you.

## 2025-03-06 LAB
ALBUMIN SERPL BCG-MCNC: 3 G/DL (ref 3.5–5)
ALBUMIN SERPL BCG-MCNC: 3.2 G/DL (ref 3.5–5)
ALP SERPL-CCNC: 139 U/L (ref 34–104)
ALP SERPL-CCNC: 164 U/L (ref 34–104)
ALT SERPL W P-5'-P-CCNC: 104 U/L (ref 7–52)
ALT SERPL W P-5'-P-CCNC: 141 U/L (ref 7–52)
AMPHETAMINES SERPL QL SCN: NEGATIVE
ANION GAP SERPL CALCULATED.3IONS-SCNC: 6 MMOL/L (ref 4–13)
APAP SERPL-MCNC: 2 UG/ML (ref 10–20)
APAP SERPL-MCNC: 9 UG/ML (ref 10–20)
AST SERPL W P-5'-P-CCNC: 141 U/L (ref 13–39)
AST SERPL W P-5'-P-CCNC: 66 U/L (ref 13–39)
BACTERIA UR QL AUTO: ABNORMAL /HPF
BARBITURATES UR QL: NEGATIVE
BENZODIAZ UR QL: NEGATIVE
BILIRUB DIRECT SERPL-MCNC: 0.13 MG/DL (ref 0–0.2)
BILIRUB SERPL-MCNC: 0.62 MG/DL (ref 0.2–1)
BILIRUB SERPL-MCNC: 0.8 MG/DL (ref 0.2–1)
BILIRUB UR QL STRIP: NEGATIVE
BUN SERPL-MCNC: 27 MG/DL (ref 5–25)
CALCIUM ALBUM COR SERPL-MCNC: 9.6 MG/DL (ref 8.3–10.1)
CALCIUM SERPL-MCNC: 9 MG/DL (ref 8.4–10.2)
CHLORIDE SERPL-SCNC: 101 MMOL/L (ref 96–108)
CLARITY UR: ABNORMAL
CO2 SERPL-SCNC: 34 MMOL/L (ref 21–32)
COCAINE UR QL: NEGATIVE
COLOR UR: ABNORMAL
CREAT SERPL-MCNC: 1.17 MG/DL (ref 0.6–1.3)
ERYTHROCYTE [DISTWIDTH] IN BLOOD BY AUTOMATED COUNT: 14.8 % (ref 11.6–15.1)
FENTANYL UR QL SCN: NEGATIVE
GFR SERPL CREATININE-BSD FRML MDRD: 43 ML/MIN/1.73SQ M
GGT SERPL-CCNC: 68 U/L (ref 9–64)
GLUCOSE SERPL-MCNC: 137 MG/DL (ref 65–140)
GLUCOSE SERPL-MCNC: 209 MG/DL (ref 65–140)
GLUCOSE SERPL-MCNC: 263 MG/DL (ref 65–140)
GLUCOSE SERPL-MCNC: 86 MG/DL (ref 65–140)
GLUCOSE SERPL-MCNC: 87 MG/DL (ref 65–140)
GLUCOSE UR STRIP-MCNC: NEGATIVE MG/DL
HCT VFR BLD AUTO: 38.2 % (ref 34.8–46.1)
HGB BLD-MCNC: 11.5 G/DL (ref 11.5–15.4)
HGB UR QL STRIP.AUTO: ABNORMAL
HYDROCODONE UR QL SCN: NEGATIVE
INR PPP: 1.11 (ref 0.85–1.19)
INR PPP: 1.39 (ref 0.85–1.19)
KETONES UR STRIP-MCNC: NEGATIVE MG/DL
LEUKOCYTE ESTERASE UR QL STRIP: NEGATIVE
LIPASE SERPL-CCNC: 12 U/L (ref 11–82)
MCH RBC QN AUTO: 30.5 PG (ref 26.8–34.3)
MCHC RBC AUTO-ENTMCNC: 30.1 G/DL (ref 31.4–37.4)
MCV RBC AUTO: 101 FL (ref 82–98)
METHADONE UR QL: NEGATIVE
MUCOUS THREADS UR QL AUTO: ABNORMAL
NITRITE UR QL STRIP: NEGATIVE
NON-SQ EPI CELLS URNS QL MICRO: ABNORMAL /HPF
OPIATES UR QL SCN: NEGATIVE
OXYCODONE+OXYMORPHONE UR QL SCN: NEGATIVE
PCP UR QL: NEGATIVE
PH UR STRIP.AUTO: 8 [PH]
PLATELET # BLD AUTO: 181 THOUSANDS/UL (ref 149–390)
PMV BLD AUTO: 9.8 FL (ref 8.9–12.7)
POTASSIUM SERPL-SCNC: 4.4 MMOL/L (ref 3.5–5.3)
PROT SERPL-MCNC: 5.4 G/DL (ref 6.4–8.4)
PROT SERPL-MCNC: 5.6 G/DL (ref 6.4–8.4)
PROT UR STRIP-MCNC: ABNORMAL MG/DL
PROTHROMBIN TIME: 14.6 SECONDS (ref 12.3–15)
PROTHROMBIN TIME: 17.2 SECONDS (ref 12.3–15)
RBC # BLD AUTO: 3.77 MILLION/UL (ref 3.81–5.12)
RBC #/AREA URNS AUTO: ABNORMAL /HPF
SODIUM SERPL-SCNC: 141 MMOL/L (ref 135–147)
SP GR UR STRIP.AUTO: 1.01 (ref 1–1.03)
THC UR QL: NEGATIVE
TRI-PHOS CRY URNS QL MICRO: ABNORMAL /HPF
UROBILINOGEN UR STRIP-ACNC: <2 MG/DL
WBC # BLD AUTO: 5.7 THOUSAND/UL (ref 4.31–10.16)
WBC #/AREA URNS AUTO: ABNORMAL /HPF

## 2025-03-06 PROCEDURE — 82390 ASSAY OF CERULOPLASMIN: CPT | Performed by: NURSE PRACTITIONER

## 2025-03-06 PROCEDURE — 80076 HEPATIC FUNCTION PANEL: CPT | Performed by: NURSE PRACTITIONER

## 2025-03-06 PROCEDURE — 81001 URINALYSIS AUTO W/SCOPE: CPT | Performed by: NURSE PRACTITIONER

## 2025-03-06 PROCEDURE — 97167 OT EVAL HIGH COMPLEX 60 MIN: CPT

## 2025-03-06 PROCEDURE — 99449 NTRPROF PH1/NTRNET/EHR 31/>: CPT | Performed by: EMERGENCY MEDICINE

## 2025-03-06 PROCEDURE — 80074 ACUTE HEPATITIS PANEL: CPT | Performed by: NURSE PRACTITIONER

## 2025-03-06 PROCEDURE — 97163 PT EVAL HIGH COMPLEX 45 MIN: CPT

## 2025-03-06 PROCEDURE — 86376 MICROSOMAL ANTIBODY EACH: CPT | Performed by: NURSE PRACTITIONER

## 2025-03-06 PROCEDURE — 80053 COMPREHEN METABOLIC PANEL: CPT | Performed by: INTERNAL MEDICINE

## 2025-03-06 PROCEDURE — 85027 COMPLETE CBC AUTOMATED: CPT | Performed by: INTERNAL MEDICINE

## 2025-03-06 PROCEDURE — 85610 PROTHROMBIN TIME: CPT | Performed by: NURSE PRACTITIONER

## 2025-03-06 PROCEDURE — 87086 URINE CULTURE/COLONY COUNT: CPT | Performed by: NURSE PRACTITIONER

## 2025-03-06 PROCEDURE — 82977 ASSAY OF GGT: CPT | Performed by: NURSE PRACTITIONER

## 2025-03-06 PROCEDURE — 99232 SBSQ HOSP IP/OBS MODERATE 35: CPT | Performed by: NURSE PRACTITIONER

## 2025-03-06 PROCEDURE — 83690 ASSAY OF LIPASE: CPT | Performed by: NURSE PRACTITIONER

## 2025-03-06 PROCEDURE — 82948 REAGENT STRIP/BLOOD GLUCOSE: CPT

## 2025-03-06 PROCEDURE — 86015 ACTIN ANTIBODY EACH: CPT | Performed by: NURSE PRACTITIONER

## 2025-03-06 PROCEDURE — 80143 DRUG ASSAY ACETAMINOPHEN: CPT | Performed by: NURSE PRACTITIONER

## 2025-03-06 PROCEDURE — 80307 DRUG TEST PRSMV CHEM ANLYZR: CPT | Performed by: NURSE PRACTITIONER

## 2025-03-06 RX ORDER — POLYETHYLENE GLYCOL 3350 17 G/17G
17 POWDER, FOR SOLUTION ORAL DAILY
Status: DISCONTINUED | OUTPATIENT
Start: 2025-03-06 | End: 2025-03-11 | Stop reason: HOSPADM

## 2025-03-06 RX ADMIN — ACETYLCYSTEINE 7900 MG: 200 INJECTION, SOLUTION INTRAVENOUS at 22:09

## 2025-03-06 RX ADMIN — METOPROLOL TARTRATE 25 MG: 25 TABLET, FILM COATED ORAL at 17:10

## 2025-03-06 RX ADMIN — LEVOTHYROXINE SODIUM 150 MCG: 0.07 TABLET ORAL at 05:40

## 2025-03-06 RX ADMIN — INSULIN LISPRO 2 UNITS: 100 INJECTION, SOLUTION INTRAVENOUS; SUBCUTANEOUS at 17:11

## 2025-03-06 RX ADMIN — ACETYLCYSTEINE 3940 MG: 200 INJECTION, SOLUTION INTRAVENOUS at 18:06

## 2025-03-06 RX ADMIN — ATORVASTATIN CALCIUM 80 MG: 80 TABLET, FILM COATED ORAL at 21:44

## 2025-03-06 RX ADMIN — INSULIN LISPRO 2 UNITS: 100 INJECTION, SOLUTION INTRAVENOUS; SUBCUTANEOUS at 21:49

## 2025-03-06 RX ADMIN — ACETYLCYSTEINE 11840 MG: 200 INJECTION, SOLUTION INTRAVENOUS at 17:01

## 2025-03-06 RX ADMIN — POLYETHYLENE GLYCOL 3350 17 G: 17 POWDER, FOR SOLUTION ORAL at 13:38

## 2025-03-06 RX ADMIN — LEVETIRACETAM 500 MG: 500 TABLET, FILM COATED ORAL at 08:24

## 2025-03-06 RX ADMIN — LEVETIRACETAM 500 MG: 500 TABLET, FILM COATED ORAL at 17:10

## 2025-03-06 RX ADMIN — SENNOSIDES AND DOCUSATE SODIUM 2 TABLET: 50; 8.6 TABLET ORAL at 08:24

## 2025-03-06 RX ADMIN — HEPARIN SODIUM 5000 UNITS: 5000 INJECTION, SOLUTION INTRAVENOUS; SUBCUTANEOUS at 21:44

## 2025-03-06 RX ADMIN — GABAPENTIN 100 MG: 100 CAPSULE ORAL at 21:45

## 2025-03-06 RX ADMIN — METOPROLOL TARTRATE 25 MG: 25 TABLET, FILM COATED ORAL at 08:24

## 2025-03-06 RX ADMIN — HEPARIN SODIUM 5000 UNITS: 5000 INJECTION, SOLUTION INTRAVENOUS; SUBCUTANEOUS at 13:38

## 2025-03-06 RX ADMIN — PANTOPRAZOLE SODIUM 40 MG: 40 TABLET, DELAYED RELEASE ORAL at 08:24

## 2025-03-06 RX ADMIN — HEPARIN SODIUM 5000 UNITS: 5000 INJECTION, SOLUTION INTRAVENOUS; SUBCUTANEOUS at 05:40

## 2025-03-06 RX ADMIN — CITALOPRAM HYDROBROMIDE 20 MG: 20 TABLET ORAL at 21:44

## 2025-03-06 NOTE — ASSESSMENT & PLAN NOTE
Patient presented with hypoglycemia, unfortunately persisted with generalized weakness and did not feel comfortable returning to home assisted living situation at this time  Labs otherwise with mild transaminitis, CT abdomen/pelvis negative for acute intra-abdominal pathology  PT/OT pending

## 2025-03-06 NOTE — ASSESSMENT & PLAN NOTE
Lab Results   Component Value Date    HGBA1C 8.9 (H) 12/05/2024       Recent Labs     03/05/25  1908 03/05/25  2220 03/06/25  0538 03/06/25  1055   POCGLU 89 149* 87 137       Blood Sugar Average: Last 72 hrs:  (P) 103.125  PTA on Lantus 16 units nightly and Humalog 4 units with lunch/dinner, presented with hypoglycemia noted earlier today  C/w holding scheduled insulin for now  C/w SSI coverage with Accu-Cheks.    C/w liberalized diet.    hypoglycemia protocol

## 2025-03-06 NOTE — ASSESSMENT & PLAN NOTE
Lab Results   Component Value Date    HGBA1C 8.9 (H) 12/05/2024       Recent Labs     03/05/25  2220 03/06/25  0538 03/06/25  1055 03/06/25  1625   POCGLU 149* 87 137 209*       Blood Sugar Average: Last 72 hrs:  (P) [id number]

## 2025-03-06 NOTE — ASSESSMENT & PLAN NOTE
Transaminitis AST/ALT 55/75 worsening today to AST//141, previously LFTs have been normal.  avoid hepatotoxins at this time  Check blood work

## 2025-03-06 NOTE — ASSESSMENT & PLAN NOTE
Transaminitis AST/ALT 55/75 noted on admission, previously LFTs have been normal.  Unclear etiology, given relatively mild rise will repeat CMP in a.m. and avoid hepatotoxins at this time.  If worsens consider further workup as appropriate

## 2025-03-06 NOTE — ASSESSMENT & PLAN NOTE
Wt Readings from Last 3 Encounters:   12/05/24 78.9 kg (173 lb 15.1 oz)   11/05/24 76.4 kg (168 lb 6.4 oz)   08/01/24 80.4 kg (177 lb 3.2 oz)

## 2025-03-06 NOTE — TELEMEDICINE
e-Consult (IPC)  - Medical Toxicology   Name: Lizzie Whitten 85 y.o. female I MRN: [id number]  Unit/Bed#: -01 I Date of Admission: 3/5/2025   Date of Service: 3/6/2025 I Hospital Day: 1  Inpatient consult to Toxicology  Consult performed by: Juarez Stanton DO  Consult ordered by: DALILA Peck        Physician Requesting Evaluation: Kyle Leon   Reason for Evaluation / Principal Problem: transaminitis    Assessment & Plan  Transaminitis  Patient received acetaminophen yesterday afternoon and evening in the hospital. Has mild new transaminitis  APAP level obtained this morning at 0542  Please hold acetaminophen and any other hepatotoxic medication  Please check LFTs, INR, acetaminophen level at 9PM tonight, tomorrow, and then at 1PM  Although acetaminophen is not likely the culprit, I believe we should empirically start NAC. Discussed dosing with primary team  Will follow up the labs at 1PM tomorrow to decide if we can DC infusion  Agree with continued medical workup for etiology of abnormal LFTs  Generalized weakness    Type 2 diabetes mellitus with hypoglycemia without coma, with long-term current use of insulin (HCC)  Lab Results   Component Value Date    HGBA1C 8.9 (H) 12/05/2024       Recent Labs     03/05/25  2220 03/06/25  0538 03/06/25  1055 03/06/25  1625   POCGLU 149* 87 137 209*       Blood Sugar Average: Last 72 hrs:  (P) [id number]    Chronic diastolic congestive heart failure (HCC)  Wt Readings from Last 3 Encounters:   12/05/24 78.9 kg (173 lb 15.1 oz)   11/05/24 76.4 kg (168 lb 6.4 oz)   08/01/24 80.4 kg (177 lb 3.2 oz)             Stage 3b chronic kidney disease (HCC)  Lab Results   Component Value Date    EGFR 43 03/06/2025    EGFR 46 03/05/2025    EGFR 40 (L) 02/04/2025    CREATININE 1.17 03/06/2025    CREATININE 1.10 03/05/2025    CREATININE 1.32 (H) 02/04/2025     PAF (paroxysmal atrial fibrillation) (HCC)    COPD (chronic obstructive pulmonary  disease) (HCC)    Cervical spondylosis      Please see additional teaching note below:     Medical Toxicology Teaching Note  [facility]  Acetaminophen Toxicity  Last revised October 2017     Acetaminophen (Tylenol) is a nonopiod analgesic and antipyretic medication found in many over-the-counter and prescription products such as Tylenol PM, Norco, Percocet, Nyquil, Vicks Formula 44-D. The recommended maximum daily dose of acetaminophen for adults is 3g/day, and 75-90mg/kg/day for children. Alcoholics may safely take Tylenol in therapeutic doses, but they may be at increased risk for hepatotoxicity in overdose.     Mechanism of Toxicity: Acetaminophen is primarily metabolized by the liver. In therapeutic doses, about 90% of acetaminophen is conjugated to nontoxic metabolites (glucoronides and sulfates). A small portion (<5%) is conjugated by cytochrome P450 enzyme, subunit CYP2E1, to a toxic metabolite, N-acetyl-p-benzoquinoneimine (NAPQI). This metabolite is further conjugated by glutathione, to nontoxic metabolites eliminated by the kidneys.   Liver Injury:  In toxic doses, the usual metabolic pathways are overwhelmed; acetaminophen is shunted to the cytochrome P450 pathway, creating NAPQI. Glutathione stores are depleted and NAPQI is produced. Cellular injury and hepatic necrosis may occur as NAPQI accumulates.   Renal Injury:  Cytochrome P450 activity in the kidneys is thought to cause direct renal damage. Renal insufficiency may also develop during fulminant hepatic failure due to hepatorenal syndrome. Renal toxicity is usually associated with liver injury.   Pharmacokinetics:  Acetaminophen is rapidly absorbed. Peak levels occur within  minutes with normal doses. Delayed absorption may occur with sustained release products or with co-ingestions that slow the GI tract (opiods, anticholinergics). The elimination half-life is 1-3 hours after therapeutic doses and may extend to 12  hours after overdose.   Toxic Dose:  Toxicity in adults may occur with acute ingestions of 7g, and 200mg/kg in children. Hepatic injury following chronic ingestions may occur at any dose above the daily recommended dose.     Clinical Presentation:    Acute Ingestion: Within 8 hrs of an acute ingestion, there are usually few symptoms. Between 8-30 hours after a toxic, acute ingestion, a transaminitis will develop. Nausea, vomiting, and right upper quadrant pain may occur. Within 12-36 hours, worsening AST/ALT develops with elevated bilirubin and INR. The most severe cases will develop fulminant liver failure with hepatic encephalopathy and acidosis, usually within 3-7 days post overdose. The patient should be evaluated for a liver transplantation.   Repeated Supra-therapeutic Ingestion: Due to a sub-acute course, patients may present anywhere along a spectrum - normal LFTS to asymptomatic elevation of enzymes to hepatic failure.     Diagnosis   Acute Ingestion (Time of Ingestion Known): After an acute ingestion at a known time, obtain a 4-hour post-ingestion serum acetaminophen level and plot the level on the Vitaly-Gennaro’s nomogram (see below). This nomogram is used to predict the likelihood of hepatic toxicity based on the level of acetaminophen between 4 and 24 hours post-ingestion. The nomogram CANNOT be used if the time of ingestion is unknown.   The dotted line (Rumack-Gennaro line), marking a 4-hour level at 200 mcg/ml, is the original line developed from the study above which hepatic toxicity will probably occur. The solid line (Treatment Line), marking a 4-hour level at 150ug/ml. is the treatment line accepted as the standard of care in the [place] and is 25% lower as a safety margin. If the patient’s serum APAP level falls above the treatment line, start treatment with N-acetylcysteine (NAC). (see Treatment below)           Acute Ingestion (Time of Ingestion Unknown) or Repeated Supra-therapeutic  Ingestion An acetaminophen level CANNOT be plotted on the Alexander’s nomogram. Draw an APAP level and AST/ALT at time of presentation. Anyone with an APAP level> 10mcg/ml OR elevated AST/ALT should start NAC. (see Treatment below)     TREATMENT   Emergency and Supportive Care: Treat nausea and vomiting to protect airway and support safe administration of charcoal and NAC, when indicated (see below). Provide standard supportive care for liver and renal failure. Contact liver transplant team if fulminant hepatic failure occurs.   Decontamination:  Administer activated charcoal within 2 hours of ingestion (consider later if extended release preparations). Use antiemetics for nausea. Activated charcoal does bind to NAC, but the effect is not thought to be clinically significant. Gastric emptying is not recommended.   Specific Drugs and Antidotes.   Acute Ingestion Treat with NAC if the APAP level falls above the Treatment Line on the nomogram. The maximal benefit occurs if given within 8 hours of acute ingestion. Therefore, it is recommended to empirically start NAC before a level is obtained if there is a reasonable concern of a toxic ingestion presenting close to 8 hours or beyond. In late presenters (>8hrs), start NAC and treat for a full course or longer if LFTS remain abnormal. Treatment maybe stopped when AST/ALT peak and then downtrend, with an INR <2 and patient is clinically well. If abnormal labs persist, continue NAC and call Toxicology. There are two routes of administration for NAC, oral and IV. The treatment protocols are described below.   Acute Ingestion (Time of Ingestion Unknown) or Repeated Supra-therapeutic Ingestion   The nomogram CANNOT be used to estimate the risk of hepatotoxicity. At presentation, check a serum APAP level and AST/ALT. If the APAP level is above 10 mcg/ml or the AST/ALT are elevated, start NAC treatment for 12 hours. If abnormalities persist, continue NAC treatment and call  toxicology. If the APAP level is undetectable and AST and ALT are downtrending at the end of 12 hours, treatment may be stopped.     Intravenous (Acetadote)   Loading dose- 150mg/kg infused over 15-60 minutes   Maintenance Infusion #1- 50mg/kg (12.5mg/kg/hr) over 4 hours   Maintenance Infusion #2 -100mg/kg (6.25 mg/kg/hr) until treatment endpoint   Treatment Endpoint: 20 hours or more   NAC should be continued for the full course.   NAC can be stopped when APAP is undetectable, AST/ALT have peaked and are downtrending, and patient appears clinically well. Consultation with a medical  /poison center is recommended before changes in the duration of therapy are made.     Acetaminophen Toxicity Do’s and Don’ts   Acute Ingestions   DO give charcoal for decontamination within 2 hours of ingestion if the patient can adequately protect their airway.   DO start NAC empirically, i.e. without an APAP level, if the ingestion is likely a large overdose presenting at 8 hours or more after ingestion.   DO contact the Liver Transplant Team early if liver failure is developing.   DO NOT get a level before 4hrs post-ingestion if the time of ingestion is certain in an acute overdose.   DO NOT stop NAC therapy until full course is finished or truncated therapy is recommended by the Poison Center.   Repeated Supra-Therapeutic Ingestions (RSI)   DO ask patients with pain complaints (toothaches, back pain, cancer) about the amount of acetaminophen they use.   DO NOT use the Rumack-Giang nomogram to determine if the APAP level is toxic.   DO NOT stop NAC therapy until full course is finished or truncated therapy is recommended by the Poison Center.   NAC Protocols   DO stop IV NAC if an anaphylactoid reaction occurs (rare). Treat the reaction appropriately and call the Poison Center for recommendations on continued NAC therapy.   DO give charcoal with oral NAC when charcoal is indicated.   References   Kavita FARMER Acetaminophen.  In Kavita FARMER, Ni EM, Gucci BURROUGHS et al eds. Medical Toxicology 3rd edition. [place]: [employer], 2004: pp.723-737.   Anibal NICOLAS Acetaminophen. In Anibal NICOLAS       For further questions, please contact the medical  on call via SecureChat between 8am and 9pm. If between 9pm and 8am, please reach out to the Poison Center at [phone].     Hx and PE limited by the dynamics of a phone consultation. I have not personally interviewed or evaluated the patient, but only advised based on the information provided to me. Primary provider is responsible for all clinical decisions.     History of Present Illness   Lizzie Whitten is a 85 y.o. year old female who presents with generalized weakness. Found to have mild transaminitis. Does take acetaminophen for chronic neck pain. No ETOH abuse. No upper abdominal pain or N/V. Does live at [facility].     Historical Information   Medical History Review: I have reviewed the patient's PMH, PSH, Social History, Family History, Meds, and Allergies   Social History     Tobacco Use    Smoking status: Never     Passive exposure: Never    Smokeless tobacco: Never    Tobacco comments:     Per [employer]: Former smoker   Vaping Use    Vaping status: Never Used   Substance and Sexual Activity    Alcohol use: Not Currently     Comment: rarely. Per Allscripts: Denied history of alcohol use    Drug use: No    Sexual activity: Never     Family History   Problem Relation Age of Onset    Coronary artery disease Mother     Hypertension Mother     Heart disease Mother     Hypertension Father     Diabetes Father         Mellitus    No Known Problems Sister     No Known Problems Daughter     No Known Problems Daughter     No Known Problems Maternal Grandmother     No Known Problems Maternal Grandfather     No Known Problems Paternal Grandmother     No Known Problems Paternal Grandfather     Diabetes Brother         Mellitus    No Known Problems Son     Breast  cancer Maternal Aunt     Pancreatic cancer Maternal Aunt     Breast cancer Cousin     Breast cancer Cousin     Breast cancer Paternal Aunt     No Known Problems Paternal Aunt     No Known Problems Paternal Aunt        Meds/Allergies   Prior to Admission medications    Medication Sig Start Date End Date Taking? Authorizing Provider   hydrocortisone-pramoxine (PROCTOFOAM-HC) 1-1 % FOAM rectal foam Insert 1 applicator into the rectum 2 (two) times a day 3/5/25  Yes Chemo Robles MD   polyethylene glycol (MIRALAX) 17 g packet Take 17 g by mouth daily 3/5/25  Yes Chemo Robles MD   acetaminophen (TYLENOL) 325 mg tablet Take 975 mg by mouth 3 (three) times a day    Historical Provider, MD   ammonium lactate (LAC-HYDRIN) 12 % lotion Apply 1 Application topically daily    Historical Provider, MD   atorvastatin (LIPITOR) 80 mg tablet TAKE 1 TABLET DAILY IN THE EVENING 12/4/24   Kennedy Castellon MD   atorvastatin (LIPITOR) 80 mg tablet Take 80 mg by mouth daily    Historical Provider, MD   calcium carbonate (OYSTER SHELL,OSCAL) 500 mg TAKE 1 TABLET DAILY 12/4/24   Kennedy Castellon MD   citalopram (CeleXA) 20 mg tablet TAKE 1 TABLET DAILY 12/4/24   Kennedy Castellon MD   Continuous Blood Gluc  (FREESTYLE TAMIA 14 DAY READER) GARRY Use to check BG levels 4+times daily and as needed 12/14/19   Octavio Rico MD   Continuous Blood Gluc Sensor (FreeStyle Tamia 14 Day Sensor) MISC Change sensor every 14 days to check BG levels 4+ times daily 11/7/23   Kennedy Castellon MD   Diclofenac Sodium (VOLTAREN) 1 % Apply 2 g topically 4 (four) times a day 6/29/23   Kennedy Castellon MD   furosemide (LASIX) 40 mg tablet TAKE 1 TABLET DAILY 1/7/25   Kennedy Castellon MD   gabapentin (NEURONTIN) 100 mg capsule TAKE ONE CAPSULE AT BEDTIME 1/29/25   Kennedy Castellon MD   insulin glargine (Toujeo Max SoloStar) 300 units/mL CONCENTRATED U-300 injection pen (2-unit  dial) Inject 16 Units under the skin daily at bedtime 8/19/24   Kennedy Castellon MD   insulin lispro (HumALOG/ADMELOG) 100 units/mL injection Inject 3 Units under the skin 3 (three) times a day with meals 8/19/24   Kennedy Castellon MD   Insulin Pen Needle (BD Pen Needle Sasha U/F) 32G X 4 MM MISC Inject 4 times a day  Patient not taking: Reported on 1/16/2025 10/12/22   Vicki Guevara PA-C   ipratropium-albuterol (DUO-NEB) 0.5-2.5 mg/3 mL nebulizer solution Take 3 mL by nebulization every 8 (eight) hours as needed for wheezing or shortness of breath 5/28/24   Connor Velazco MD   levETIRAcetam (KEPPRA) 500 mg tablet Take 1 tablet (500 mg total) by mouth 2 (two) times a day 1/15/25   Kennedy Castellon MD   levothyroxine 150 mcg tablet TAKE 1 TABLET DAILY 12/4/24   Kennedy Castellon MD   lidocaine (Lidoderm) 5 % Apply 1 patch topically over 12 hours daily Remove & Discard patch within 12 hours or as directed by MD 8/1/24   Kennedy Castellon MD   Magnesium Cl-Calcium Carbonate (SLOW-MAG PO) Take by mouth in the morning    Historical Provider, MD   metoprolol tartrate (LOPRESSOR) 25 mg tablet Take 1 tablet (25 mg total) by mouth 2 (two) times a day 11/20/24   Kennedy Castellon MD   Omega-3 Fatty Acids (Fish Oil) 1200 MG CAPS TAKE 1 CAPSULE TWICE A DAY 10/8/24   Kennedy Castellon MD   Oyster Shell 500 MG TABS Take 1 tablet by mouth daily    Historical Provider, MD   pantoprazole (PROTONIX) 40 mg tablet TAKE 1 TABLET DAILY 1/13/25   Kennedy Castellon MD   potassium chloride (Klor-Con M20) 20 mEq tablet TAKE 1 TABLET DAILY 12/4/24   Kennedy Castellon MD   senna-docusate sodium (SENOKOT-S) 8.6-50 mg per tablet Take 2 tablets by mouth daily    Historical Provider, MD   sodium chloride (VANNESSA 128) 5 % hypertonic ophthalmic ointment Administer 1 drop to both eyes 2 (two) times a day    Historical Provider, MD     Current Facility-Administered Medications:      [COMPLETED] acetylcysteine (ACETADOTE) 11,840 mg in dextrose 5 % 200 mL IVPB, 150 mg/kg, Intravenous, Once, Stopped at 03/06/25 1806 **FOLLOWED BY** acetylcysteine (ACETADOTE) 3,940 mg in dextrose 5 % 500 mL IVPB, 50 mg/kg, Intravenous, Once **FOLLOWED BY** acetylcysteine (ACETADOTE) 7,900 mg in dextrose 5 % 1,000 mL IVPB, 100 mg/kg, Intravenous, Once, DALILA Peck    atorvastatin (LIPITOR) tablet 80 mg, 80 mg, Oral, HS, Otto Cadena DO    citalopram (CeleXA) tablet 20 mg, 20 mg, Oral, HS, Otto Cadena, DO    gabapentin (NEURONTIN) capsule 100 mg, 100 mg, Oral, HS, Otto Cadena, DO    heparin (porcine) subcutaneous injection 5,000 Units, 5,000 Units, Subcutaneous, Q8H PRASHANT, Otto Cadena DO, 5,000 Units at 03/06/25 1338    insulin lispro (HumALOG/ADMELOG) 100 units/mL subcutaneous injection 1-5 Units, 1-5 Units, Subcutaneous, HS, Otto Cadena DO    insulin lispro (HumALOG/ADMELOG) 100 units/mL subcutaneous injection 1-6 Units, 1-6 Units, Subcutaneous, TID AC, 2 Units at 03/06/25 1711 **AND** Fingerstick Glucose (POCT), , , TID AC, Otto Cadena DO    levETIRAcetam (KEPPRA) tablet 500 mg, 500 mg, Oral, BID, Otto Cadena, DO, 500 mg at 03/06/25 1710    levothyroxine tablet 150 mcg, 150 mcg, Oral, Early Morning, Otto Cadena DO, 150 mcg at 03/06/25 0540    lidocaine (LIDODERM) 5 % patch 1 patch, 1 patch, Topical, Daily, Otto Cadena DO    metoprolol tartrate (LOPRESSOR) tablet 25 mg, 25 mg, Oral, BID, Otto Cadena DO, 25 mg at 03/06/25 1710    ondansetron (ZOFRAN) injection 4 mg, 4 mg, Intravenous, Q6H PRN, Otto Cadena DO    pantoprazole (PROTONIX) EC tablet 40 mg, 40 mg, Oral, Daily, Otto Cadena DO, 40 mg at 03/06/25 0824    polyethylene glycol (MIRALAX) packet 17 g, 17 g, Oral, Daily, DALILA Peck, 17 g at 03/06/25 1338    senna-docusate sodium (SENOKOT S) 8.6-50 mg per tablet 2 tablet, 2 tablet, Oral, Daily, Otto Cadena DO, 2 tablet at 03/06/25 0824   Allergies    Allergen Reactions    Ampicillin Hives       Objective :  Temp:  [97.7 °F (36.5 °C)] 97.7 °F (36.5 °C)  HR:  [58-65] 60  BP: (142-169)/(72-90) 162/84  Resp:  [17-21] 18  SpO2:  [93 %-100 %] 100 %  O2 Device: None (Room air)      Intake/Output Summary (Last 24 hours) at 3/6/2025 1806  Last data filed at 3/6/2025 1609  Gross per 24 hour   Intake --   Output 150 ml   Net -150 ml       Physical exam not performed.      Lab Results: I have reviewed the following results:  Results from last 7 days   Lab Units 03/06/25  0542 03/05/25  1006   WBC Thousand/uL 5.70 5.79   HEMOGLOBIN g/dL 11.5 11.6   HEMATOCRIT % 38.2 37.5   PLATELETS Thousands/uL 181 193   SEGS PCT %  --  62   LYMPHO PCT %  --  26   MONO PCT %  --  8   EOS PCT %  --  3      Results from last 7 days   Lab Units 03/06/25  0542   POTASSIUM mmol/L 4.4   CHLORIDE mmol/L 101   CO2 mmol/L 34*   BUN mg/dL 27*   CREATININE mg/dL 1.17   CALCIUM mg/dL 9.0   ALBUMIN g/dL 3.2*   ALK PHOS U/L 164*   ALT U/L 141*   AST U/L 141*      Results from last 7 days   Lab Units 03/06/25  1240   INR  1.11         Results from last 7 days   Lab Units 03/05/25  1039   HS TNI 0HR ng/L 14          Results from last 7 days   Lab Units 03/06/25  0542   ACETAMINOPHEN LVL ug/mL 9*     Imaging Results Review: I reviewed radiology reports from this admission including: CT abdomen/pelvis.  Other Study Results Review: EKG was reviewed.     Administrative Statements   31 + minutes, >50% of the total time devoted to medical consultative verbal/EMR discussion between providers. Written report will be generated in the EMR.    Patient or appropriate family member was verbally informed by DALILA Williamson of this consultative service on their behalf to provide more timely access to specialty care in lieu of an in person consultation. Verbal consent was obtained.

## 2025-03-06 NOTE — ASSESSMENT & PLAN NOTE
Known history of C1-C2 subluxation initially noted following fall January 2025 managed conservatively  Neurosurgery following, flexion/extension x-rays of the cervical spine with open-mouth views were obtained await final reads  PT/OT evaluations as above

## 2025-03-06 NOTE — H&P
H&P - Hospitalist   Name: Lizzie Whitten 85 y.o. female I MRN: [id number]  Unit/Bed#: ED 17 I Date of Admission: 3/5/2025   Date of Service: 3/5/2025 I Hospital Day: 0     Assessment & Plan  Generalized weakness  Patient presenting initially with hypoglycemia improving with management as below, unfortunately persisting with generalized weakness and does not feel comfortable returning to home assisted living situation at this time  Labs otherwise with mild transaminitis, CT abdomen/pelvis negative for acute intra-abdominal pathology  Will have PT/OT evaluate 3/6/2025  Type 2 diabetes mellitus with hypoglycemia without coma, with long-term current use of insulin (HCC)  Lab Results   Component Value Date    HGBA1C 8.9 (H) 12/05/2024       Recent Labs     03/05/25  1226 03/05/25  1500 03/05/25  1908 03/05/25  2220   POCGLU 118 101 89 149*       Blood Sugar Average: Last 72 hrs:  (P) [id number]  Patient normally on Lantus 16 units nightly and Humalog 4 units with lunch/dinner, presenting with hypoglycemia noted earlier today  Glucose improving with D50 and snacks  Will hold scheduled insulin for now and manage overnight with correctional insulin coverage with Accu-Cheks.  Allow liberalization of diet.  Likely resume Lantus tomorrow but at lower dose  Initiate hypoglycemia protocol  Chronic diastolic congestive heart failure (HCC)  Wt Readings from Last 3 Encounters:   12/05/24 78.9 kg (173 lb 15.1 oz)   11/05/24 76.4 kg (168 lb 6.4 oz)   08/01/24 80.4 kg (177 lb 3.2 oz)   Noted in history, CT imaging revealing right pleural effusion however patient otherwise euvolemic and without new oxygen requirement at this time  Continue PTA cardiac medications and monitor volume status closely          Stage 3b chronic kidney disease (HCC)  Lab Results   Component Value Date    EGFR 46 03/05/2025    EGFR 40 (L) 02/04/2025    EGFR 40 (L) 02/03/2025    CREATININE 1.10 03/05/2025    CREATININE 1.32 (H) 02/04/2025     CREATININE 1.32 (H) 02/03/2025   Renal function at baseline, monitor with BMP.  Avoid/limit nephrotoxins and hypotension  PAF (paroxysmal atrial fibrillation) (HCC)  S/p PPM for complete heart block  Continue PTA metoprolol tartrate  Continue anticoagulation with Eliquis  Cervical spondylosis  Known history of C1-C2 subluxation initially noted following fall January 2025 managed conservatively  Neurosurgery following, flexion/extension x-rays of the cervical spine with open-mouth views were obtained await final reads  PT/OT evaluations as above  Transaminitis  Transaminitis AST/ALT 55/75 noted on admission, previously LFTs have been normal.  Unclear etiology, given relatively mild rise will repeat CMP in a.m. and avoid hepatotoxins at this time.  If worsens consider further workup as appropriate      VTE Prophylaxis: Heparin  / sequential compression device   Code Status: Level 1 - Full Code   POLST: POLST form is not discussed and not completed at this time.  Discussion with family: Daughters at bedside    Anticipated Length of Stay:  Patient will be admitted on an Inpatient basis with an anticipated length of stay of greater than 2 midnights.   Justification for Hospital Stay: Please see detailed plans noted above.    Chief Complaint:     Hypoglycemia  History of Present Illness:  Lizzie Whitten is a 85 y.o. female who has a past medical history significant for type 2 diabetes on insulin therapy, chronic HFpEF, paroxysmal atrial fibrillation s/p PPM for complete heart block anticoagulated on Eliquis, and cervical spondylosis with known history of C1-C2 subluxation initially noted following a fall January 2025 which has been managed conservatively who presented after she was found hypoglycemic this morning.  Patient apparently reported feeling shaky at her assisted living this morning, where caretakers attempted to give juice but reportedly unsuccessful which prompted presentation here.  Apparently patient has  been having low blood glucose for the past week particularly after recent discharge from acute rehab, daughter is additionally reporting that she has had intermittent low blood glucoses at her rehab facility previously and at prior hospitalization here.  They do report that patient's diet has not been as good as usual.  The patient herself does note some ongoing rectal pain which has been going for some time but otherwise denied any fever/chills, chest pain/pressure, shortness of breath, abdominal pain/nausea/vomiting/diarrhea, leg swelling, or other systemic symptoms.    During ED evaluation she received snacks and D50 ampule with improvement in her blood glucose.  Currently was noted with ongoing weakness, and given her history of subluxation neurosurgery was consulted who performed flexion/extension x-rays of the cervical spine with open-mouth views for further evaluation which are awaiting final read and final neurosurgical recommendations, and given patient's ongoing weakness and concerns with ongoing hypoglycemia she is admitted for further management of these as above.    Review of Systems:    Constitutional:  Denies fever or chills but reported decreased appetite and generalized weakness  Eyes:  Denies change in visual acuity   HENT:  Denies nasal congestion or sore throat   Respiratory:  Denies cough or shortness of breath   Cardiovascular:  Denies chest pain or edema   GI:  Denies abdominal pain, nausea, vomiting, bloody stools or diarrhea   :  Denies dysuria   Musculoskeletal:  Denies back pain or joint pain   Integument:  Denies rash   Neurologic:  Denies headache, focal weakness but reported sensory changes of intermittent numbness/tingling in feet  Endocrine:  Denies polyuria or polydipsia   Lymphatic:  Denies swollen glands   Psychiatric:  Denies depression or anxiety     Past Medical and Surgical History:   Past Medical History:   Diagnosis Date    Anxiety     Arthritis     CHF (congestive heart  failure) (HCC)     Chronic venous insufficiency     COPD (chronic obstructive pulmonary disease) (HCC)     Coronary artery disease     Diabetes mellitus (HCC)     type II    Disease of thyroid gland     hypothyroidism    DVT (deep venous thrombosis) (HCC)     HL (hearing loss)     Hyperlipidemia     Hypertension     Irregular heart beat     Paroxysmal a-fib    Pacemaker     Renal disorder     stage 3    Sleep apnea     Sleep difficulties     Thyroid disease     Urinary incontinence     Uterine leiomyoma     Walker as ambulation aid      Past Surgical History:   Procedure Laterality Date    BREAST SURGERY      For Biopsy    CARDIAC PACEMAKER PLACEMENT      Permanent. Last assessed: 2/19/16    CARDIAC SURGERY      CARPAL TUNNEL RELEASE Bilateral     CATARACT EXTRACTION      CATARACT EXTRACTION W/ INTRAOCULAR LENS  IMPLANT, BILATERAL      CYSTOSCOPY      Diagnostic. Last assessed: 11/8/17    ECTOPIC PREGNANCY SURGERY      HYSTERECTOMY      Total Abdomonal. Resolved: 1982    IR THORACENTESIS  5/17/2024    JOINT REPLACEMENT Right     TKR    OOPHORECTOMY      WA ENDOVEN ABLTJ INCMPTNT VEIN XTR LASER 1ST VEIN Left 2/17/2017    Procedure: GREATER SAPHENOUS VEIN ENDOVASCULAR LASER THERAPY ;  Surgeon: Danielle Phan DO;  Location: [facility];  Service: Vascular    TONSILLECTOMY         Meds/Allergies:  Current Outpatient Medications   Medication Instructions    acetaminophen (TYLENOL) 975 mg, Oral, 3 times daily    ammonium lactate (LAC-HYDRIN) 12 % lotion 1 Application, Daily    atorvastatin (LIPITOR) 80 mg, Every evening    atorvastatin (LIPITOR) 80 mg, Oral, Daily    calcium carbonate (OYSTER SHELL,OSCAL) 500 mg 1 tablet, Oral, Daily    citalopram (CELEXA) 20 mg, Oral, Daily    Continuous Blood Gluc  (FREESTYLE RONNI 14 DAY READER) GARRY Use to check BG levels 4+times daily and as needed    Continuous Blood Gluc Sensor (FreeStyle Ronni 14 Day Sensor) MISC Change sensor every 14 days to check BG levels 4+ times  daily    Diclofenac Sodium (VOLTAREN) 2 g, Topical, 4 times daily    Fish Oil 1,200 mg, 2 times daily    furosemide (LASIX) 40 mg, Oral, Daily    gabapentin (NEURONTIN) 100 mg, Daily at bedtime    hydrocortisone-pramoxine (PROCTOFOAM-HC) 1-1 % FOAM rectal foam 1 applicator, Rectal, 2 times daily    insulin lispro (HUMALOG/ADMELOG) 3 Units, Subcutaneous, 3 times daily with meals    Insulin Pen Needle (BD Pen Needle Sasha U/F) 32G X 4 MM MISC Inject 4 times a day    ipratropium-albuterol (DUO-NEB) 0.5-2.5 mg/3 mL nebulizer solution 3 mL, Nebulization, Every 8 hours PRN    levETIRAcetam (KEPPRA) 500 mg, Oral, 2 times daily    levothyroxine 150 mcg, Oral, Daily    lidocaine (Lidoderm) 5 % 1 patch, Topical, Daily, Remove & Discard patch within 12 hours or as directed by MD    Magnesium Cl-Calcium Carbonate (SLOW-MAG PO) Oral, Daily    metoprolol tartrate (LOPRESSOR) 25 mg, Oral, 2 times daily    Oyster Shell 500 MG TABS 1 tablet, Oral, Daily    pantoprazole (PROTONIX) 40 mg, Oral, Daily    polyethylene glycol (MIRALAX) 17 g, Oral, Daily    potassium chloride (Klor-Con M20) 20 mEq tablet 20 mEq, Oral, Daily    senna-docusate sodium (SENOKOT-S) 8.6-50 mg per tablet 2 tablets, Oral, Daily    sodium chloride (VANNESSA 128) 5 % hypertonic ophthalmic ointment 1 drop, 2 times daily    Toujeo Max SoloStar 16 Units, Subcutaneous, Daily at bedtime       Allergies:   Allergies   Allergen Reactions    Ampicillin Hives     History:  Marital Status: /Civil Union     Substance Use History:   Social History     Substance and Sexual Activity   Alcohol Use Not Currently    Comment: rarely. Per [employer]: Denied history of alcohol use     Social History     Tobacco Use   Smoking Status Never    Passive exposure: Never   Smokeless Tobacco Never   Tobacco Comments    Per [employer]: Former smoker     Social History     Substance and Sexual Activity   Drug Use No       Family History:  Family History   Problem Relation Age of Onset     Coronary artery disease Mother     Hypertension Mother     Heart disease Mother     Hypertension Father     Diabetes Father         Mellitus    No Known Problems Sister     No Known Problems Daughter     No Known Problems Daughter     No Known Problems Maternal Grandmother     No Known Problems Maternal Grandfather     No Known Problems Paternal Grandmother     No Known Problems Paternal Grandfather     Diabetes Brother         Mellitus    No Known Problems Son     Breast cancer Maternal Aunt     Pancreatic cancer Maternal Aunt     Breast cancer Cousin     Breast cancer Cousin     Breast cancer Paternal Aunt     No Known Problems Paternal Aunt     No Known Problems Paternal Aunt        Physical Exam:     Vitals:   Blood Pressure: 155/90 (03/05/25 2045)  Pulse: 60 (03/05/25 2045)  Temperature: 99.2 °F (37.3 °C) (03/05/25 0957)  Temp Source: Oral (03/05/25 0957)  Respirations: 21 (03/05/25 2045)  SpO2: 97 % (03/05/25 2045)    Constitutional:  Well developed, obese, no acute distress, non-toxic appearance   Eyes:  PERRL, conjunctiva normal   HENT:  Atraumatic, external ears normal, hearing, nose normal, oropharynx moist, no pharyngeal exudates. Neck- normal range of motion, no tenderness, supple   Respiratory:  No respiratory distress, normal breath sounds, no rales, no wheezing   Cardiovascular:  Normal rate, normal rhythm, no murmurs, no gallops, no rubs   GI:  Soft, nondistended, normal bowel sounds, nontender, no organomegaly, no mass, no rebound, no guarding   :  No costovertebral angle tenderness   Musculoskeletal:  No edema, no tenderness, no deformities. Back- no tenderness  Integument:  Well hydrated, no rash   Lymphatic:  No lymphadenopathy noted   Neurologic:  Alert &awake, communicative, CN 2-12 normal, normal motor function, normal sensory function, no focal deficits noted   Psychiatric:  Speech and behavior appropriate       Lab Results: I have reviewed laboratory report/results from this  admission    Results from last 7 days   Lab Units 03/05/25  1006   WBC Thousand/uL 5.79   HEMOGLOBIN g/dL 11.6   HEMATOCRIT % 37.5   PLATELETS Thousands/uL 193   SEGS PCT % 62   LYMPHO PCT % 26   MONO PCT % 8   EOS PCT % 3     Results from last 7 days   Lab Units 03/05/25  1039   SODIUM mmol/L 137   POTASSIUM mmol/L 4.9   CHLORIDE mmol/L 103   CO2 mmol/L 29   BUN mg/dL 26*   CREATININE mg/dL 1.10   ANION GAP mmol/L 5   CALCIUM mg/dL 8.3*   ALBUMIN g/dL 2.9*   TOTAL BILIRUBIN mg/dL 0.75   ALK PHOS U/L 116*   ALT U/L 75*   AST U/L 55*   GLUCOSE RANDOM mg/dL 110         Results from last 7 days   Lab Units 03/05/25  2220 03/05/25  1908 03/05/25  1500 03/05/25  1226 03/05/25  1110 03/05/25  0952   POC GLUCOSE mg/dl 149* 89 101 118 87 57*               EKG: Personally reviewed, ventricular paced rhythm HR 59    Imaging: Results Review Statement: I reviewed radiology reports from this admission including: CT abdomen/pelvis and xray(s).    CT abdomen pelvis wo contrast  Result Date: 3/5/2025  Narrative: CT ABDOMEN AND PELVIS WITHOUT IV CONTRAST INDICATION: rectal pain. Recent falls COMPARISON: 9/2/2022 TECHNIQUE: CT examination of the abdomen and pelvis was performed without intravenous contrast. Multiplanar 2D reformatted images were created from the source data. This examination, like all CT scans performed in the [facility], was performed utilizing techniques to minimize radiation dose exposure, including the use of iterative reconstruction and automated exposure control. Radiation dose length product (DLP) for this visit: 940.08 mGy-cm Enteric Contrast: Not administered. FINDINGS: ABDOMEN LOWER CHEST: Moderate right pleural effusion. Pacemaker leads are partially imaged. There are extensive coronary artery calcifications. LIVER/BILIARY TREE: Unremarkable. GALLBLADDER: Cholelithiasis without findings of acute cholecystitis. SPLEEN: Unremarkable. PANCREAS: Unremarkable. ADRENAL GLANDS: Unremarkable.  KIDNEYS/URETERS: Unremarkable. No hydronephrosis. STOMACH AND BOWEL: Colonic diverticulosis without findings of acute diverticulitis. The metallic density in the cecum on prior exam is no longer visualized. APPENDIX: No findings to suggest appendicitis. ABDOMINOPELVIC CAVITY: No ascites. No pneumoperitoneum. No lymphadenopathy. VESSELS: Atherosclerosis without abdominal aortic aneurysm. PELVIS REPRODUCTIVE ORGANS: Post hysterectomy. URINARY BLADDER: Unremarkable. ABDOMINAL WALL/INGUINAL REGIONS: Body wall edema. BONES: No acute fracture or suspicious osseous lesion. Multilevel thoracic ankylosis is partially imaged. There are multiple chronic right rib fractures.     Impression: 1.  No acute findings in the abdomen or pelvis within the limits of unenhanced technique. 2.  Moderate right pleural effusion. 3.  Diverticulosis without diverticulitis. 4.  Cholelithiasis without evidence of cholecystitis Workstation performed: [id number]     XR femur 2 vw left  Result Date: 2/5/2025  Narrative: Two-view left femur Indication: Trauma AP and crosstable lateral views of the left femur do not reveal a left femur fracture or dislocation. There is narrowing of the left tibiofemoral joint with hypertrophic spurring consistent with left knee osteoarthritis. Arterial calcifications are seen.    Impression: Impression: No left femur fracture identified. Left knee osteoarthritis. Workstation:[id number]        ** Please Note: Dragon 360 Dictation voice to text software was used in the creation of this document. **

## 2025-03-06 NOTE — ASSESSMENT & PLAN NOTE
Lab Results   Component Value Date    EGFR 43 03/06/2025    EGFR 46 03/05/2025    EGFR 40 (L) 02/04/2025    CREATININE 1.17 03/06/2025    CREATININE 1.10 03/05/2025    CREATININE 1.32 (H) 02/04/2025   Renal function at baseline, monitor with BMP.  Avoid/limit nephrotoxins and hypotension

## 2025-03-06 NOTE — ASSESSMENT & PLAN NOTE
Patient received acetaminophen yesterday afternoon and evening in the hospital. Has mild new transaminitis  APAP level obtained this morning at 0542  Please hold acetaminophen and any other hepatotoxic medication  Please check LFTs, INR, acetaminophen level at 9PM tonight, tomorrow, and then at 1PM  Although acetaminophen is not likely the culprit, I believe we should empirically start NAC. Discussed dosing with primary team  Will follow up the labs at 1PM tomorrow to decide if we can DC infusion  Agree with continued medical workup for etiology of abnormal LFTs

## 2025-03-06 NOTE — PLAN OF CARE
Problem: PAIN - ADULT  Goal: Verbalizes/displays adequate comfort level or baseline comfort level  Description: Interventions:  - Encourage patient to monitor pain and request assistance  - Assess pain using appropriate pain scale  - Administer analgesics based on type and severity of pain and evaluate response  - Implement non-pharmacological measures as appropriate and evaluate response  - Consider cultural and social influences on pain and pain management  - Notify physician/advanced practitioner if interventions unsuccessful or patient reports new pain  Outcome: Progressing     Problem: INFECTION - ADULT  Goal: Absence or prevention of progression during hospitalization  Description: INTERVENTIONS:  - Assess and monitor for signs and symptoms of infection  - Monitor lab/diagnostic results  - Monitor all insertion sites, i.e. indwelling lines, tubes, and drains  - Monitor endotracheal if appropriate and nasal secretions for changes in amount and color  - [place] appropriate cooling/warming therapies per order  - Administer medications as ordered  - Instruct and encourage patient and family to use good hand hygiene technique  - Identify and instruct in appropriate isolation precautions for identified infection/condition  Outcome: Progressing  Goal: Absence of fever/infection during neutropenic period  Description: INTERVENTIONS:  - Monitor WBC    Outcome: Progressing     Problem: SAFETY ADULT  Goal: Patient will remain free of falls  Description: INTERVENTIONS:  - Educate patient/family on patient safety including physical limitations  - Instruct patient to call for assistance with activity   - Consult OT/PT to assist with strengthening/mobility   - Keep Call bell within reach  - Keep bed low and locked with side rails adjusted as appropriate  - Keep care items and personal belongings within reach  - Initiate and maintain comfort rounds  - Make Fall Risk Sign visible to staff  - Offer Toileting every 2 Hours,  in advance of need  - Initiate/Maintain bed alarm  - Apply yellow socks and bracelet for high fall risk patients  - Consider moving patient to room near nurses station  Outcome: Progressing  Goal: Maintain or return to baseline ADL function  Description: INTERVENTIONS:  -  Assess patient's ability to carry out ADLs; assess patient's baseline for ADL function and identify physical deficits which impact ability to perform ADLs (bathing, care of mouth/teeth, toileting, grooming, dressing, etc.)  - Assess/evaluate cause of self-care deficits   - Assess range of motion  - Assess patient's mobility; develop plan if impaired  - Assess patient's need for assistive devices and provide as appropriate  - Encourage maximum independence but intervene and supervise when necessary  - Involve family in performance of ADLs  - Assess for home care needs following discharge   - Consider OT consult to assist with ADL evaluation and planning for discharge  - Provide patient education as appropriate  Outcome: Progressing  Goal: Maintains/Returns to pre admission functional level  Description: INTERVENTIONS:  - Perform AM-PAC 6 Click Basic Mobility/ Daily Activity assessment daily.  - Set and communicate daily mobility goal to care team and patient/family/caregiver.   - Collaborate with rehabilitation services on mobility goals if consulted  - Perform Range of Motion 3 times a day.  - Reposition patient every 2 hours.  - Dangle patient 3 times a day  - Stand patient 3 times a day  - Ambulate patient 3 times a day  - Out of bed to chair 3 times a day   - Out of bed for meals 3 times a day  - Out of bed for toileting  - Record patient progress and toleration of activity level   Outcome: Progressing     Problem: DISCHARGE PLANNING  Goal: Discharge to home or other facility with appropriate resources  Description: INTERVENTIONS:  - Identify barriers to discharge w/patient and caregiver  - Arrange for needed discharge resources and  transportation as appropriate  - Identify discharge learning needs (meds, wound care, etc.)  - Arrange for interpretive services to assist at discharge as needed  - Refer to Case Management Department for coordinating discharge planning if the patient needs post-hospital services based on physician/advanced practitioner order or complex needs related to functional status, cognitive ability, or social support system  Outcome: Progressing     Problem: Knowledge Deficit  Goal: Patient/family/caregiver demonstrates understanding of disease process, treatment plan, medications, and discharge instructions  Description: Complete learning assessment and assess knowledge base.  Interventions:  - Provide teaching at level of understanding  - Provide teaching via preferred learning methods  Outcome: Progressing     Problem: Prexisting or High Potential for Compromised Skin Integrity  Goal: Skin integrity is maintained or improved  Description: INTERVENTIONS:  - Identify patients at risk for skin breakdown  - Assess and monitor skin integrity  - Assess and monitor nutrition and hydration status  - Monitor labs   - Assess for incontinence   - Turn and reposition patient  - Assist with mobility/ambulation  - Relieve pressure over bony prominences  - Avoid friction and shearing  - Provide appropriate hygiene as needed including keeping skin clean and dry  - Evaluate need for skin moisturizer/barrier cream  - Collaborate with interdisciplinary team   - Patient/family teaching  - Consider wound care consult   Outcome: Progressing

## 2025-03-06 NOTE — ASSESSMENT & PLAN NOTE
Lab Results   Component Value Date    HGBA1C 8.9 (H) 12/05/2024       Recent Labs     03/05/25  1226 03/05/25  1500 03/05/25  1908 03/05/25  2220   POCGLU 118 101 89 149*       Blood Sugar Average: Last 72 hrs:  (P) [id number]  Patient normally on Lantus 16 units nightly and Humalog 4 units with lunch/dinner, presenting with hypoglycemia noted earlier today  Glucose improving with D50 and snacks  Will hold scheduled insulin for now and manage overnight with correctional insulin coverage with Accu-Cheks.  Allow liberalization of diet.  Likely resume Lantus tomorrow but at lower dose  Initiate hypoglycemia protocol

## 2025-03-06 NOTE — ASSESSMENT & PLAN NOTE
Patient presenting initially with hypoglycemia improving with management as below, unfortunately persisting with generalized weakness and does not feel comfortable returning to home assisted living situation at this time  Labs otherwise with mild transaminitis, CT abdomen/pelvis negative for acute intra-abdominal pathology  Will have PT/OT evaluate 3/6/2025

## 2025-03-06 NOTE — ASSESSMENT & PLAN NOTE
Wt Readings from Last 3 Encounters:   12/05/24 78.9 kg (173 lb 15.1 oz)   11/05/24 76.4 kg (168 lb 6.4 oz)   08/01/24 80.4 kg (177 lb 3.2 oz)   Noted in history, CT imaging revealing right pleural effusion however patient otherwise euvolemic and without new oxygen requirement at this time  Continue PTA cardiac medications and monitor volume status closely

## 2025-03-06 NOTE — PLAN OF CARE
Problem: PHYSICAL THERAPY ADULT  Goal: Performs mobility at highest level of function for planned discharge setting.  See evaluation for individualized goals.  Description: Treatment/Interventions: Functional transfer training, LE strengthening/ROM, Therapeutic exercise, Endurance training, Cognitive reorientation, Patient/family training, Equipment eval/education, Bed mobility, Gait training, Spoke to nursing, OT          See flowsheet documentation for full assessment, interventions and recommendations.  Note: Prognosis: Good  Problem List: Decreased strength, Decreased endurance, Impaired balance, Decreased mobility, Decreased coordination, Decreased cognition, Impaired judgement, Orthopedic restrictions  Assessment: Pt is an 85 y.o. female presenting to [facility] on 3/5/25 for primary medical dx of generalized weakness. Pt  has a past medical history of Anxiety, Arthritis, CHF (congestive heart failure) ([facility]), Chronic venous insufficiency, COPD (chronic obstructive pulmonary disease) ([facility]), Coronary artery disease, Diabetes mellitus ([facility]), Disease of thyroid gland, DVT (deep venous thrombosis) ([facility]), HL (hearing loss), Hyperlipidemia, Hypertension, Irregular heart beat, Pacemaker, Renal disorder, Sleep apnea, Sleep difficulties, Thyroid disease, Urinary incontinence, Uterine leiomyoma, and Walker as ambulation aid. Pt presents as a high complexity evaluation due to Ongoing medical management for primary dx, Increased reliance on more restrictive AD compared to baseline, Decreased activity tolerance compared to baseline, Fall risk, Increased assistance needed from caregiver at current time, Cog status, Trending lab values, Spinal precautions at current time, Diagnostic imaging pending, Continuous pulse oximetry monitoring . Pt typically resides at [facility] with . Pt was using RW PTA and has a supportive daughter. Pt currently requires mod A for bed mobility, min A for transfers with RW and min A  for steps to chair with RW. Pt is limited by deficits in strength, balance, endurance, mobility and activity tolerance limiting their ability to safely return home and be independent at this time. Pt would benefit from continued skilled acute care PT services to address impairments and promote functional independence. Recommend level 2 resources pending progress and support to improve mobility and promote PLOF. The patient's AM-PAC Basic Mobility Inpatient Short Form Raw Score is 14. A Raw score of less than 16 suggests the patient may benefit from discharge to post-acute rehabilitation services. Please also refer to the recommendation of the Physical Therapist for safe discharge planning. Pt left upright in chair with chair alarm donned, call bell and personal items within reach and all needs met.  Barriers to Discharge: Decreased caregiver support     Rehab Resource Intensity Level, PT: II (Moderate Resource Intensity)    See flowsheet documentation for full assessment.

## 2025-03-06 NOTE — ASSESSMENT & PLAN NOTE
Known history of C1-C2 subluxation initially noted following fall January 2025 managed conservatively  Neurosurgery following, flexion/extension x-rays of the cervical spine with open-mouth views were obtained and patient is cleared to have c-collar removed   PT/OT evaluations as above

## 2025-03-06 NOTE — CASE MANAGEMENT
Case Management Assessment & Discharge Planning Note    Patient name Lizzie Whitten  Location /-01 MRN [id number]  : 1940 Date 3/6/2025       Current Admission Date: 3/5/2025  Current Admission Diagnosis:Generalized weakness   Patient Active Problem List    Diagnosis Date Noted Date Diagnosed    Cervical spondylosis 2025     Transaminitis 2025     Fall with injury 2024     SAH (subarachnoid hemorrhage) ([facility]) 2024     CHF (congestive heart failure) ([facility]) 2024     MITCH (obstructive sleep apnea) 2024     DM (diabetes mellitus) ([facility]) 2024     GERD (gastroesophageal reflux disease) 2024     CKD (chronic kidney disease) 2024     Depression with anxiety 2024     HLD (hyperlipidemia) 2024     Frailty syndrome in geriatric patient 2024     At risk for delirium 2024     Bilateral hearing loss 2024     Cognitive impairment 2024     Mid back pain 2024     Mild persistent asthma without complication 2024     Pleural effusion, right 2024     Hematuria 2024     Slow transit constipation 2024     Hypernatremia 2024     Physical deconditioning 2024     Generalized weakness 2024     Ambulatory dysfunction 2024     Multiple falls 2024     Acute pain due to trauma 2024     Hemopneumothorax on right 04/15/2024     CHACHA (acute kidney injury) ([facility]) 2024     Chronic hypoxic respiratory failure ([facility]) 04/10/2024     Pulmonary nodule 2024     Pharyngeal dysphagia 2023     Chronic cough 2023     Assistance needed with transportation 2023     Abnormal CT scan, esophagus 2022     Hiatal hernia 2022     Esophageal dysphagia 2022     Primary hypertension 2022     Fall 2022     Secondary hyperparathyroidism of renal origin ([facility]) 2022     Memory difficulties 2022     Imbalance 2022      Coronary artery disease involving native coronary artery of native heart 03/06/2021     Sleep apnea 05/17/2019     Intraocular pressure increase 06/06/2018     Vitamin D deficiency 04/03/2018     Lymphedema of both lower extremities 03/13/2018     Iron deficiency anemia 10/12/2017     Urinary bladder incontinence 07/26/2017     OAB (overactive bladder) 07/26/2017     Stage 3b chronic kidney disease ([facility]) 06/21/2017     PAF (paroxysmal atrial fibrillation) ([facility]) 10/31/2016     Chronic venous insufficiency 02/19/2016     Presence of permanent cardiac pacemaker 01/28/2016     Chronic diastolic congestive heart failure ([facility]) 01/21/2016     Anemia 01/19/2016     S/P cardiac pacemaker procedure 01/19/2016     Hypothyroidism 01/19/2016     Complete heart block ([facility]) 01/16/2016     Type 2 diabetes mellitus with hypoglycemia without coma, with long-term current use of insulin ([facility]) 01/15/2016     Anxiety disorder 06/26/2013     COPD (chronic obstructive pulmonary disease) ([facility]) 09/24/2012     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 ([facility]) 09/22/2011     Mixed hyperlipidemia 03/14/2011       LOS (days): 1  Geometric Mean LOS (GMLOS) (days): 2.6  Days to GMLOS:1.9     OBJECTIVE:    Risk of Unplanned Readmission Score: 23.55         Current admission status: Inpatient       Preferred Pharmacy:   [facility] - [place], [place] - [address]  [address] [address]  Phone: [phone] Fax: [phone]    [id number] - [place], [place] - [address]  [address]  [place] [address]  Phone: [phone] Fax: [phone]    [facility] [place] - [address]  [address]  [place] [address]  Phone: [phone] Fax: [phone]    Primary Care Provider: Kennedy Castellon MD    Primary Insurance: MEDICARE  Secondary Insurance: AARP    ASSESSMENT:  Active  Health Care Proxies       Gorge Mahajan Health Care Representative - Daughter   Primary Phone: [phone] (Mobile)                 Readmission Root Cause  30 Day Readmission: No    Patient Information  Admitted from:: Home  Mental Status: Alert  During Assessment patient was accompanied by: Daughter  Assessment information provided by:: Patient, Daughter  Primary Caregiver: Family  Caregiver's Telephone Number:: Pt has private caregivers in the home and family members  Support Systems: Spouse/significant other, Children, Friends/neighbors, Mandaen/rissa community, Private Caregivers, Self, Family members, Home care staff  County of Residence: [place]  [place]  Home entry access options. Select all that apply.: No steps to enter home  Type of Current Residence: Facility ([facility])  Upon entering residence, is there a bedroom on the main floor (no further steps)?: Yes  Living Arrangements: Lives w/ Spouse/significant other, Lives in Facility  Is patient a ?: No    Activities of Daily Living Prior to Admission  Functional Status: Assistance  Completes ADLs independently?: No  Level of ADL dependence: Assistance  Ambulates independently?: No  Level of ambulatory dependence: Assistance  Does patient use assisted devices?: Yes  Assisted Devices (DME) used: Home Oxygen concentrator, Portable Oxygen concentrator, Shower Chair, Walker, Wheelchair  DME Company Name (respiratory supplies): [employer]  O2 Rate(s): 2L  Does patient have a history of Outpatient Therapy (PT/OT)?: Yes  Does the patient have a history of Short-Term Rehab?: Yes ([facility])  Does patient have a history of HHC?: Yes ([facility])  Does patient currently have HHC?: Yes ([facility])    Current Home Health Care  Type of Current Home Care Services: Home health aide, Nurse visit, Home OT, Home PT  Home Health Agency Name:: [facility]  Current Home Health Follow-Up  Provider:: PCP    Patient Information Continued  Income Source: Pension/prison  Does patient have prescription coverage?: Yes  Does patient receive dialysis treatments?: No  Does patient have a history of substance abuse?: No  Does patient have a history of Mental Health Diagnosis?: No         Means of Transportation  Means of Transport to Appts:: Family transport    DISCHARGE DETAILS:    Discharge planning discussed with:: Patient and daughter Gorge  Freedom of Choice: Yes  Comments - Freedom of Choice: FOC discussed.  Pt and family wants to go home with [facility] with [facility].  CM contacted family/caregiver?: Yes  Were Treatment Team discharge recommendations reviewed with patient/caregiver?: Yes  Did patient/caregiver verbalize understanding of patient care needs?: Yes       Contacts  Patient Contacts: Gorge Mahajan (Daughter) [phone]  Relationship to Patient:: Family  Contact Method: In Person  Reason/Outcome: Continuity of Care, Emergency Contact, Discharge Planning    Requested Home Health Care         Is the patient interested in HHC at discharge?: Yes  Home Health Discipline requested:: Occupational Therapy, Physical Therapy, Nursing  Home Health Agency Name:: [facility]  HHA External Referral Reason (only applicable if external HHA name selected): Patient has established relationship with provider  Home Health Follow-Up Provider:: PCP  Home Health Services Needed:: Strengthening/Theraputic Exercises to Improve Function, Gait/ADL Training, Evaluate Functional Status and Safety, Diabetes Management  Oxygen LPM Ordered (if applicable based on home health services needed):: 2 LPM  Homebound Criteria Met:: Requires the Assistance of Another Person for Safe Ambulation or to Leave the Home, Uses an Assist Device (i.e. cane, walker, etc)  Supporting Clincal Findings:: Fatigues Easliy in Short Distances, Limited Endurance, Requires Oxygen         Other Referral/Resources/Interventions  Provided:  Interventions: HHC  Referral Comments: Pt admitted with generalized weakness and hypoglycemia.  Pt had HHC with [facility] prior to admit, and they would like [facility] at discharge.  Referral sent in Aidin.  Pt and daughter state if STR is recommended they will decline, pt DC from STR a week ago and they want to continue at home.  Referral sent in Aidin.     CM met with pt and her daughter at bedside, introduced myself and explained my role.  Pt resides at [facility] with spouse and caregivers in the home for both of them.  Pt also has HHC with [facility] and would like [facility].  Referral sent in Aidin.  Pt and family do not want STR, pt DC last week.  Pt needs assistance with ADL, DME includes walker, WC, SC, home O2.  Family for Transport home. Referral sent for [facility].

## 2025-03-06 NOTE — PROGRESS NOTES
Progress Note - Hospitalist   Name: Lizzie Whitten 85 y.o. female I MRN: [id number]  Unit/Bed#: -01 I Date of Admission: 3/5/2025   Date of Service: 3/6/2025 I Hospital Day: 1 1615- reviewing chart, tylenol level was added to am labs given elevated LFTs. Tylenol level was 9, toxicology notified (Dr. Stanton) recommended treatment with acetylcysteine, check LFTs, INR and acetaminophen leve again later tonight. No need for acetaminophen level in am if PM level was 0. Toxicology consult entered. Daughter notified of findings at bedside.  Assessment & Plan  Generalized weakness  Patient presented with hypoglycemia, unfortunately persisted with generalized weakness and did not feel comfortable returning to home assisted living situation at this time  Labs otherwise with mild transaminitis, CT abdomen/pelvis negative for acute intra-abdominal pathology  PT/OT pending   Type 2 diabetes mellitus with hypoglycemia without coma, with long-term current use of insulin (HCC)  Lab Results   Component Value Date    HGBA1C 8.9 (H) 12/05/2024       Recent Labs     03/05/25  1908 03/05/25  2220 03/06/25  0538 03/06/25  1055   POCGLU 89 149* 87 137       Blood Sugar Average: Last 72 hrs:  (P) 103.125  PTA on Lantus 16 units nightly and Humalog 4 units with lunch/dinner, presented with hypoglycemia noted earlier today  C/w holding scheduled insulin for now  C/w SSI coverage with Accu-Cheks.    C/w liberalized diet.    hypoglycemia protocol  Chronic diastolic congestive heart failure (HCC)  Wt Readings from Last 3 Encounters:   12/05/24 78.9 kg (173 lb 15.1 oz)   11/05/24 76.4 kg (168 lb 6.4 oz)   08/01/24 80.4 kg (177 lb 3.2 oz)   Noted in history, CT imaging revealing right pleural effusion however patient otherwise euvolemic and without new oxygen requirement at this time  Continue PTA cardiac medications and monitor volume status closely  Stage 3b chronic kidney disease (HCC)  Lab Results   Component Value Date    EGFR  43 03/06/2025    EGFR 46 03/05/2025    EGFR 40 (L) 02/04/2025    CREATININE 1.17 03/06/2025    CREATININE 1.10 03/05/2025    CREATININE 1.32 (H) 02/04/2025   Renal function at baseline, monitor with BMP.  Avoid/limit nephrotoxins and hypotension  PAF (paroxysmal atrial fibrillation) (HCC)  S/p PPM for complete heart block  Continue PTA metoprolol tartrate  Continue anticoagulation with Eliquis  Cervical spondylosis  Known history of C1-C2 subluxation initially noted following fall January 2025 managed conservatively  Neurosurgery following, flexion/extension x-rays of the cervical spine with open-mouth views were obtained and patient is cleared to have c-collar removed   PT/OT evaluations as above  Transaminitis  Transaminitis AST/ALT 55/75 worsening today to AST//141, previously LFTs have been normal.  avoid hepatotoxins at this time  Check blood work   COPD (chronic obstructive pulmonary disease) (HCC)  Stable without exacerbation   C/w 2LNC     VTE Pharmacologic Prophylaxis: VTE Score: 4 Moderate Risk (Score 3-4) - Pharmacological DVT Prophylaxis Ordered: heparin.    Mobility:   Basic Mobility Inpatient Raw Score: 14  JH-HLM Goal: 4: Move to chair/commode  JH-HLM Achieved: 4: Move to chair/commode  JH-HLM Goal NOT achieved. Continue with multidisciplinary rounding and encourage appropriate mobility to improve upon JH-HLM goals.    Patient Centered Rounds: I performed bedside rounds with nursing staff today.   Discussions with Specialists or Other Care Team Provider: d/w RN and nsx     Education and Discussions with Family / Patient: Updated  (daughter) via phone.    Current Length of Stay: 1 day(s)  Current Patient Status: Inpatient   Certification Statement: The patient will continue to require additional inpatient hospital stay due to eelvated LFts   Discharge Plan: Anticipate discharge in 24-48 hrs to discharge location to be determined pending rehab evaluations.    Code Status: Level 1 -  Full Code    Subjective   Pt sitting oob in the chair eating lunch. Denies any current complaints. Per RN pt has foul smelling urine. Pt denies dysuria, frequency or urgency. Does admit to SP tenderness on exam. Denies neck pain, c-collar removed per NSX clearance.     Objective :  Temp:  [97.7 °F (36.5 °C)] 97.7 °F (36.5 °C)  HR:  [58-65] 65  BP: (142-169)/(72-90) 169/81  Resp:  [17-21] 18  SpO2:  [93 %-98 %] 98 %  O2 Device: None (Room air)    There is no height or weight on file to calculate BMI.     Input and Output Summary (last 24 hours):     Intake/Output Summary (Last 24 hours) at 3/6/2025 1208  Last data filed at 3/5/2025 1323  Gross per 24 hour   Intake 100 ml   Output --   Net 100 ml       Physical Exam  Constitutional:       General: She is not in acute distress.     Appearance: She is well-developed. She is obese.   Cardiovascular:      Rate and Rhythm: Normal rate and regular rhythm.      Heart sounds: Normal heart sounds. No murmur heard.  Pulmonary:      Effort: Pulmonary effort is normal. No respiratory distress.      Breath sounds: No wheezing or rales.      Comments: 2LNC, diminished b/l LL   Abdominal:      General: Bowel sounds are normal. There is no distension.      Palpations: Abdomen is soft.      Tenderness: There is abdominal tenderness (suprapubic tenderness).   Musculoskeletal:         General: No swelling or tenderness.   Skin:     General: Skin is warm and dry.      Findings: No erythema or rash.   Neurological:      Mental Status: She is alert and oriented to person, place, and time. Mental status is at baseline.   Psychiatric:         Mood and Affect: Mood normal.         Lines/Drains:              Lab Results: I have reviewed the following results:   Results from last 7 days   Lab Units 03/06/25  0542 03/05/25  1006   WBC Thousand/uL 5.70 5.79   HEMOGLOBIN g/dL 11.5 11.6   HEMATOCRIT % 38.2 37.5   PLATELETS Thousands/uL 181 193   SEGS PCT %  --  62   LYMPHO PCT %  --  26   MONO PCT  %  --  8   EOS PCT %  --  3     Results from last 7 days   Lab Units 03/06/25  0542   SODIUM mmol/L 141   POTASSIUM mmol/L 4.4   CHLORIDE mmol/L 101   CO2 mmol/L 34*   BUN mg/dL 27*   CREATININE mg/dL 1.17   ANION GAP mmol/L 6   CALCIUM mg/dL 9.0   ALBUMIN g/dL 3.2*   TOTAL BILIRUBIN mg/dL 0.80   ALK PHOS U/L 164*   ALT U/L 141*   AST U/L 141*   GLUCOSE RANDOM mg/dL 86         Results from last 7 days   Lab Units 03/06/25  1055 03/06/25  0538 03/05/25  2220 03/05/25  1908 03/05/25  1500 03/05/25  1226 03/05/25  1110 03/05/25  0952   POC GLUCOSE mg/dl 137 87 149* 89 101 118 87 57*               Recent Cultures (last 7 days):         Imaging Results Review: I reviewed radiology reports from this admission including: CT abdomen/pelvis and xray(s).  Other Study Results Review: No additional pertinent studies reviewed.    Last 24 Hours Medication List:     Current Facility-Administered Medications:     atorvastatin (LIPITOR) tablet 80 mg, HS    citalopram (CeleXA) tablet 20 mg, HS    gabapentin (NEURONTIN) capsule 100 mg, HS    heparin (porcine) subcutaneous injection 5,000 Units, Q8H PRASHANT    insulin lispro (HumALOG/ADMELOG) 100 units/mL subcutaneous injection 1-5 Units, HS    insulin lispro (HumALOG/ADMELOG) 100 units/mL subcutaneous injection 1-6 Units, TID AC **AND** Fingerstick Glucose (POCT), TID AC    levETIRAcetam (KEPPRA) tablet 500 mg, BID    levothyroxine tablet 150 mcg, Early Morning    lidocaine (LIDODERM) 5 % patch 1 patch, Daily    metoprolol tartrate (LOPRESSOR) tablet 25 mg, BID    ondansetron (ZOFRAN) injection 4 mg, Q6H PRN    pantoprazole (PROTONIX) EC tablet 40 mg, Daily    senna-docusate sodium (SENOKOT S) 8.6-50 mg per tablet 2 tablet, Daily    Administrative Statements   Today, Patient Was Seen By: DALILA Peck      **Please Note: This note may have been constructed using a voice recognition system.**

## 2025-03-06 NOTE — ASSESSMENT & PLAN NOTE
Lab Results   Component Value Date    EGFR 43 03/06/2025    EGFR 46 03/05/2025    EGFR 40 (L) 02/04/2025    CREATININE 1.17 03/06/2025    CREATININE 1.10 03/05/2025    CREATININE 1.32 (H) 02/04/2025

## 2025-03-06 NOTE — ASSESSMENT & PLAN NOTE
S/p PPM for complete heart block  Continue PTA metoprolol tartrate  Continue anticoagulation with Eliquis

## 2025-03-06 NOTE — TREATMENT PLAN
Neurosurgery treatment plan     Cervical spondylosis  Pt noted to have C1-C2 subluxation that was initially noted s/p fall in January 2025.   Pt has been managed conservatively in Cervical brace.   Pt admitted on this admission for low glucose.     Imaging reviewed personally and by attending Dr. Yancey. Final results as below  Cervical spine 1/29/25: Mild reversal of the upper cervical lordosis. Anterior subluxation at C1-C2. The C1 posterior arch sits anteriorly located in relation to the C2 posterior arch.   Xray Cervical spine flex/ext 3/5/25: No acute osseous abnormality. Minimal anterolisthesis of C2 on C3 which is approximately 2 mm in the neutral position, increases to 3 mm on flexion, and reduces to 0 mm with extension.       Plan  Continue regular neurologic checks.  Pain control per primary team.  Eval and mobilize per PT/OT when able to.   DVT PPX:SCDs   Pt denies neck pain. No TTP of the C spine. Pt noted with minimal anterolisthesis of C2 on C3. Case and imaging findings reviewed with Dr. Yancey.  At this time, given the minimal anterolisthesis of C1 on C2, C2 on C3 without neck pain or discomfort,  no neurosurgical intervention is anticipated. Patient may begin to wean off the cervical brace.  Neurosurgery will see as needed during the remainder of this hospitalization.  Further follow-up as needed.  Please call with any questions or concerns.

## 2025-03-06 NOTE — PHYSICAL THERAPY NOTE
Physical Therapy Evaluation    Patient Name: Lizzie Whitten    Today's Date: 3/6/2025     Problem List  Principal Problem:    Generalized weakness  Active Problems:    Type 2 diabetes mellitus with hypoglycemia without coma, with long-term current use of insulin (HCC)    Chronic diastolic congestive heart failure (HCC)    Stage 3b chronic kidney disease (HCC)    PAF (paroxysmal atrial fibrillation) (HCC)    COPD (chronic obstructive pulmonary disease) (HCC)    Cervical spondylosis    Transaminitis       Past Medical History  Past Medical History:   Diagnosis Date    Anxiety     Arthritis     CHF (congestive heart failure) (HCC)     Chronic venous insufficiency     COPD (chronic obstructive pulmonary disease) (HCC)     Coronary artery disease     Diabetes mellitus (HCC)     type II    Disease of thyroid gland     hypothyroidism    DVT (deep venous thrombosis) (HCC)     HL (hearing loss)     Hyperlipidemia     Hypertension     Irregular heart beat     Paroxysmal a-fib    Pacemaker     Renal disorder     stage 3    Sleep apnea     Sleep difficulties     Thyroid disease     Urinary incontinence     Uterine leiomyoma     Walker as ambulation aid         Past Surgical History  Past Surgical History:   Procedure Laterality Date    BREAST SURGERY      For Biopsy    CARDIAC PACEMAKER PLACEMENT      Permanent. Last assessed: 2/19/16    CARDIAC SURGERY      CARPAL TUNNEL RELEASE Bilateral     CATARACT EXTRACTION      CATARACT EXTRACTION W/ INTRAOCULAR LENS  IMPLANT, BILATERAL      CYSTOSCOPY      Diagnostic. Last assessed: 11/8/17    ECTOPIC PREGNANCY SURGERY      HYSTERECTOMY      Total Abdomonal. Resolved: 1982    IR THORACENTESIS  5/17/2024    JOINT REPLACEMENT Right     TKR    OOPHORECTOMY      OR ENDOVEN ABLTJ INCMPTNT VEIN XTR LASER 1ST VEIN Left 2/17/2017    Procedure: GREATER SAPHENOUS VEIN ENDOVASCULAR LASER THERAPY ;  Surgeon: Danielle Phan DO;   "[facility]: [facility];  Service: Vascular    TONSILLECTOMY        03/06/25 0924   PT Last Visit   PT Visit Date 03/06/25   Note Type   Note type Evaluation   Pain Assessment   Pain Assessment Tool 0-10   Pain Score No Pain   Restrictions/Precautions   Weight Bearing Precautions Per Order No   Braces or Orthoses C/S Collar  (soft cervical collar. C1-C2 subluxation (Jan 2025) Per most recent neurosx note (3/6) \"pt may begin to wean off the cervical brace\")   Other Precautions Cognitive;Chair Alarm;Bed Alarm;Multiple lines;O2;Fall Risk;Hard of hearing;Spinal precautions  (2.5 L O2)   Home Living   Type of Home Other (Comment)  ([facility])   Home Layout One level;Performs ADLs on one level;Able to live on main level with bedroom/bathroom   Bathroom Shower/Tub Tub/shower unit   Bathroom Toilet Raised   Bathroom Equipment Grab bars in shower;Shower chair   Bathroom Accessibility Accessible   Home Equipment Walker;Wheelchair-manual  (pt reports using RW, chart review shows pt was using WC more often 2* to weakness)   Additional Comments pt resides at [facility] with    Prior Function   Level of [place] Needs assistance with ADLs;Needs assistance with IADLS   Lives With Spouse   Receives Help From Family;Other (Comment)  (facility staff, supportive daughter)   IADLs Family/Friend/Other provides transportation;Family/Friend/Other provides meals;Family/Friend/Other provides medication management  (meals delivered to room vs going to dining lee)   Falls in the last 6 months 1 to 4  (1 fall)   Vocational Retired   Comments pt has assistance from facility staff and daughter at baseline.   General   Family/Caregiver Present No   Cognition   Overall Cognitive Status Impaired   Arousal/Participation Cooperative   Attention Attends with cues to redirect   Orientation Level Oriented to person;Oriented to place;Oriented to time;Disoriented to situation  (correct year)   Memory Decreased short term " memory;Decreased recall of recent events;Decreased recall of precautions   Following Commands Follows one step commands with increased time or repetition   Comments pt pleasant and cooperative   Subjective   Subjective pt agreeable to mobilize   RLE Assessment   RLE Assessment X   Strength RLE   R Knee Extension 4/5   R Ankle Dorsiflexion 4/5   R Ankle Plantar Flexion 4/5   LLE Assessment   LLE Assessment X   Strength LLE   L Knee Extension 4/5   L Ankle Dorsiflexion 4/5   L Ankle Plantar Flexion 4/5   Light Touch   RLE Light Touch Grossly intact  (pt reports intermittent tingling/numbness)   LLE Light Touch Grossly intact  (pt reports intermittent tingling/numbness)   Bed Mobility   Supine to Sit 3  Moderate assistance   Additional items Assist x 1;HOB elevated;Bedrails;Increased time required;Verbal cues   Sit to Supine Unable to assess   Additional Comments pt OOB in chair at end of session   Transfers   Sit to Stand 4  Minimal assistance   Additional items Assist x 1;Increased time required;Verbal cues   Stand to Sit 4  Minimal assistance   Additional items Assist x 1;Armrests;Increased time required;Verbal cues   Additional Comments c RW   Ambulation/Elevation   Gait pattern Improper Weight shift;Decreased foot clearance;Foward flexed;Excessively slow   Gait Assistance 4  Minimal assist   Additional items Assist x 1;Verbal cues   Assistive Device Rolling walker   Distance 3' to chair   Stair Management Assistance Not tested   Balance   Static Sitting Fair   Dynamic Sitting Fair -   Static Standing Poor +   Dynamic Standing Poor +   Ambulatory Poor +   Endurance Deficit   Endurance Deficit Yes   Endurance Deficit Description pt limited by generalized weakness and decreased activity tolerance   Activity Tolerance   Activity Tolerance Patient limited by fatigue   Medical Staff Made Aware OT Rosa   Nurse Made Aware yes-RN cleared   Assessment   Prognosis Good   Problem List Decreased strength;Decreased  endurance;Impaired balance;Decreased mobility;Decreased coordination;Decreased cognition;Impaired judgement;Orthopedic restrictions   Assessment Pt is an 85 y.o. female presenting to [facility] on 3/5/25 for primary medical dx of generalized weakness. Pt  has a past medical history of Anxiety, Arthritis, CHF (congestive heart failure) ([facility]), Chronic venous insufficiency, COPD (chronic obstructive pulmonary disease) ([facility]), Coronary artery disease, Diabetes mellitus (HCC), Disease of thyroid gland, DVT (deep venous thrombosis) ([facility]), HL (hearing loss), Hyperlipidemia, Hypertension, Irregular heart beat, Pacemaker, Renal disorder, Sleep apnea, Sleep difficulties, Thyroid disease, Urinary incontinence, Uterine leiomyoma, and Walker as ambulation aid. Pt presents as a high complexity evaluation due to Ongoing medical management for primary dx, Increased reliance on more restrictive AD compared to baseline, Decreased activity tolerance compared to baseline, Fall risk, Increased assistance needed from caregiver at current time, Cog status, Trending lab values, Spinal precautions at current time, Diagnostic imaging pending, Continuous pulse oximetry monitoring . Pt [facility] resides at [facility] with . Pt was using RW PTA and has a supportive daughter. Pt currently requires mod A for bed mobility, min A for transfers with RW and min A for steps to chair with RW. Pt is limited by deficits in strength, balance, endurance, mobility and activity tolerance limiting their ability to safely return home and be independent at this time. Pt would benefit from continued skilled acute care PT services to address impairments and promote functional independence. Recommend level 2 resources pending progress and support to improve mobility and promote PLOF. The patient's AM-PAC Basic Mobility Inpatient Short Form Raw Score is 14. A Raw score of less than 16 suggests the patient may benefit from discharge to post-acute  rehabilitation services. Please also refer to the recommendation of the Physical Therapist for safe discharge planning. Pt left upright in chair with chair alarm donned, call bell and personal items within reach and all needs met.   Barriers to Discharge Decreased caregiver support   Goals   Patient Goals to get into the chair   STG Expiration Date 03/20/25   Short Term Goal #1 In 14 days pt will complete bed mobility at mod I to promote independence and decrease caregiver burden. Pt will complete transfers at mod I to increase safety and independence. Pt will ambulate 200' with LRAD at mod I to promote independence and decrease falls risk. Pt will improve b/l LE strength by 1 grade to improve efficiency of transfers. Pt will improve dynamic balance by 1/2 grade to increase safety and decrease falls risk.   PT Treatment Day 0   Plan   Treatment/Interventions Functional transfer training;LE strengthening/ROM;Therapeutic exercise;Endurance training;Cognitive reorientation;Patient/family training;Equipment eval/education;Bed mobility;Gait training;Spoke to nursing;OT   PT Frequency 2-3x/wk   Discharge Recommendation   Rehab Resource Intensity Level, PT II (Moderate Resource Intensity)   Additional Comments pending progress and social support   AM-PAC Basic Mobility Inpatient   Turning in Flat Bed Without Bedrails 3   Lying on Back to Sitting on Edge of Flat Bed Without Bedrails 2   Moving Bed to Chair 3   Standing Up From Chair Using Arms 3   Walk in Room 2   Climb 3-5 Stairs With Railing 1   Basic Mobility Inpatient Raw Score 14   Basic Mobility Standardized Score 35.55   [facility] Highest Level Of Mobility   -HLM Goal 4: Move to chair/commode   -HLM Achieved 4: Move to chair/commode   Modified [place] Scale   Modified [place] Scale 4   CECIL LuceroT

## 2025-03-06 NOTE — OCCUPATIONAL THERAPY NOTE
Occupational Therapy Evaluation     Patient Name: Lizzie Whitten  Today's Date: 3/6/2025  Problem List  Principal Problem:    Generalized weakness  Active Problems:    Type 2 diabetes mellitus with hypoglycemia without coma, with long-term current use of insulin (HCC)    Chronic diastolic congestive heart failure (HCC)    Stage 3b chronic kidney disease (HCC)    PAF (paroxysmal atrial fibrillation) (HCC)    COPD (chronic obstructive pulmonary disease) (HCC)    Cervical spondylosis    Transaminitis    Past Medical History  Past Medical History:   Diagnosis Date    Anxiety     Arthritis     CHF (congestive heart failure) (HCC)     Chronic venous insufficiency     COPD (chronic obstructive pulmonary disease) (HCC)     Coronary artery disease     Diabetes mellitus (HCC)     type II    Disease of thyroid gland     hypothyroidism    DVT (deep venous thrombosis) ([facility])     HL (hearing loss)     Hyperlipidemia     Hypertension     Irregular heart beat     Paroxysmal a-fib    Pacemaker     Renal disorder     stage 3    Sleep apnea     Sleep difficulties     Thyroid disease     Urinary incontinence     Uterine leiomyoma     Walker as ambulation aid      Past Surgical History  Past Surgical History:   Procedure Laterality Date    BREAST SURGERY      For Biopsy    CARDIAC PACEMAKER PLACEMENT      Permanent. Last assessed: 2/19/16    CARDIAC SURGERY      CARPAL TUNNEL RELEASE Bilateral     CATARACT EXTRACTION      CATARACT EXTRACTION W/ INTRAOCULAR LENS  IMPLANT, BILATERAL      CYSTOSCOPY      Diagnostic. Last assessed: 11/8/17    ECTOPIC PREGNANCY SURGERY      HYSTERECTOMY      Total Abdomonal. Resolved: 1982    IR THORACENTESIS  5/17/2024    JOINT REPLACEMENT Right     TKR    OOPHORECTOMY      OK ENDOVEN ABLTJ INCMPTNT VEIN XTR LASER 1ST VEIN Left 2/17/2017    Procedure: GREATER SAPHENOUS VEIN ENDOVASCULAR LASER THERAPY ;  Surgeon: Danielle Phan DO;  [facility];  Service: Vascular    TONSILLECTOMY         "     03/06/25 0923   OT Last Visit   OT Visit Date 03/06/25   Note Type   Note type Evaluation   Pain Assessment   Pain Assessment Tool 0-10   Pain Score No Pain   Restrictions/Precautions   Weight Bearing Precautions Per Order No   Braces or Orthoses C/S Collar  (Donned cervical collar when OOB, however per neurosx after IE \"pt may begin to wean off cervical collar\")   Other Precautions Cognitive;Chair Alarm;Bed Alarm;Multiple lines;Fall Risk;Pain;O2;Hard of hearing  (2.5L O2)   Home Living   Type of Home Assisted living  ([facility])   Home Layout One level;Performs ADLs on one level   Bathroom Shower/Tub Tub/shower unit   Bathroom Toilet Raised   Bathroom Equipment Grab bars in shower;Shower chair   Home Equipment Walker;Wheelchair-manual  (Pt reports use of RW, per EMR Pt was using w/c)   Additional Comments Pt reports living w/ spouse in snf apt at [facility]. Pt is a questionable historian. She reports use of RW however chart states use of w/c.   Prior Function   Level of [place] Needs assistance with ADLs;Needs assistance with IADLS;Needs assistance with functional mobility   Lives With Spouse   Receives Help From Family  (facility staff)   IADLs Family/Friend/Other provides transportation;Family/Friend/Other provides meals;Family/Friend/Other provides medication management  (meals delivered vs walk to dining room)   Falls in the last 6 months 1 to 4  (Reports 1 fall leading to last admission and C1/C2 subluxation)   Vocational Retired   Comments Pt reports her and spouse both have assist w/ ADLs and IADLs from either facility staff of their daughter. (-) .   Lifestyle   Autonomy PTA, Pt required assist from staff vs daughter for ADL and IADL. RW vs w/c for functional mobiltiy. Pt is a questionable historian.   Reciprocal Relationships Spouse, daughter, facility staff   Service to Others Retired   General   Family/Caregiver Present No   Subjective   Subjective \"Yeah\"   ADL "   Where Assessed Chair   Eating Assistance 6  Modified independent   Grooming Assistance 5  Supervision/Setup   UB Bathing Assistance 4  Minimal Assistance   LB Bathing Assistance 3  Moderate Assistance   UB Dressing Assistance 4  Minimal Assistance   LB Dressing Assistance 3  Moderate Assistance   Toileting Assistance  3  Moderate Assistance   Functional Assistance 3  Moderate Assistance   Bed Mobility   Supine to Sit 3  Moderate assistance   Additional items Assist x 1;Increased time required;Verbal cues;LE management;HOB elevated   Sit to Supine Unable to assess   Additional Comments Pt greeted supine and left OOB in chair w/ alarm on and all needs within reach   Transfers   Sit to Stand 4  Minimal assistance   Additional items Assist x 1;Increased time required;Verbal cues   Stand to Sit 4  Minimal assistance   Additional items Assist x 1;Increased time required;Verbal cues   Additional Comments w/ RW   Functional Mobility   Functional Mobility 4  Minimal assistance   Additional Comments Pt takes few steps from EOB to chair w/ Min A using RW   Additional items Rolling walker   Balance   Static Sitting Fair   Dynamic Sitting Fair -   Static Standing Poor +   Dynamic Standing Poor +   Ambulatory Poor +   Activity Tolerance   Activity Tolerance Patient limited by fatigue   Medical Staff Made Aware PT jessica Bullock w/ PT due to medical complexity and multiple comorbidities   Nurse Made Aware RN cleared   RUE Assessment   RUE Assessment WFL  (AROM; grossly 3+/5)   LUE Assessment   LUE Assessment WFL  (AROM; grossly 3+/5)   Hand Function   Gross Motor Coordination Functional   Fine Motor Coordination Functional   Cognition   Overall Cognitive Status Impaired   Arousal/Participation Alert;Cooperative   Attention Attends with cues to redirect   Orientation Level Oriented to person;Oriented to place;Oriented to time;Disoriented to situation  (year only)   Memory Decreased recall of precautions;Decreased recall of  recent events;Decreased short term memory   Following Commands Follows one step commands with increased time or repetition   Comments Pt is pleasant and cooperative. Questionable historian at times. Slow to process/respond. Decreased safety and insight   Assessment   Limitation Decreased ADL status;Decreased endurance;Decreased self-care trans;Decreased high-level ADLs;Decreased cognition;Decreased UE strength   Prognosis Fair   Assessment Pt is a 85 y.o. female seen for OT evaluation s/p admission to [facility] on 3/5/2025 due to Generalized weakness. Pt  has a past medical history of Anxiety, Arthritis, CHF (congestive heart failure) ([facility]), Chronic venous insufficiency, COPD (chronic obstructive pulmonary disease) ([facility]), Coronary artery disease, Diabetes mellitus (HCC), Disease of thyroid gland, DVT (deep venous thrombosis) ([facility]), HL (hearing loss), Hyperlipidemia, Hypertension, Irregular heart beat, Pacemaker, Renal disorder, Sleep apnea, Sleep difficulties, Thyroid disease, Urinary incontinence, Uterine leiomyoma, and Walker as ambulation aid.  Pt with active OT evaluation/treatment and activity orders. Pt reports living w/ spouse in [facility] apt w/ elevator. PTA, Pt required assist w/ ADL/IADL and functional mobility, was not driving and was using RW vs w/c at baseline. Pt agreeable and willing to participate in OT evaluation. Pt was greeted supine and left OOB in chair w/ alarm activated and all needs within reach. During evaluation, pt is Min A UB ADLs, transfers, functional mobility; Mod A bed mobility, LB ADLs. Pt currently presents with impairments in the following categories -steps to enter environment, difficulty performing ADLS, and difficulty performing IADLS  activity tolerance, endurance, standing balance/tolerance, sitting balance/tolerance, UE strength, memory, insight, and safety . These impairments, as well as pt's fatigue, SOB, and risk for falls  limit pt's ability to safely engage in all  baseline areas of occupation, includingeating, grooming, bathing, dressing, toileting, functional mobility/transfers, community mobility, social participation , and leisure activities .  Pt would benefit from continued acute OT services throughout hospital course in order to maximize Pt's independence and overall occupational performance. Plan for OT interventions 2-3x per week. From OT standpoint,  recommend Level II (Moderate Resource Intensity) upon d/c when pt medically stable to d/c from acute care. Will continue to follow.   Goals   Patient Goals to get to the chair   [facility] Time Frame 10-14   Long Term Goal See goals below   Plan   Treatment Interventions ADL retraining;Functional transfer training;UE strengthening/ROM;Endurance training;Cognitive reorientation;Patient/family training;Equipment evaluation/education;Compensatory technique education;Activityengagement;Energy conservation;Continued evaluation   Goal Expiration Date 03/20/25   OT Treatment Day 0   OT Frequency 2-3x/wk   Discharge Recommendation   Rehab Resource Intensity Level, OT II (Moderate Resource Intensity)  (pending progress)   Additional Comments  The patient's raw score on the AM-PAC Daily Activity Inpatient Short Form is 16. A raw score of less than 19 suggests the patient may benefit from discharge to post-acute rehabilitation services. Please refer to the recommendation of the Occupational Therapist for safe discharge planning.   AM-PAC Daily Activity Inpatient   Lower Body Dressing 2   Bathing 2   Toileting 2   Upper Body Dressing 3   Grooming 3   Eating 4   Daily Activity Raw Score 16   Daily Activity Standardized Score (Calc for Raw Score >=11) 35.96   AM-PAC Applied Cognition Inpatient   Following a Speech/Presentation 3   Understanding Ordinary Conversation 4   Taking Medications 3   Remembering Where Things Are Placed or Put Away 3   Remembering List of 4-5 Errands 2   Taking Care of Complicated Tasks 2   Applied Cognition Raw  Score 17   Applied Cognition Standardized Score 36.52   End of Consult   Education Provided Yes   Patient Position at End of Consult Bedside chair;Bed/Chair alarm activated;All needs within reach   Nurse Communication Nurse aware of consult     OT Goals:     - Pt will complete LB ADLs w/ Supervision using appropriate AD/DME as needed to maximize independence and return home.     - Pt will complete UB ADLs w/ Supervision using appropriate AD/DME  as needed to maximize independence and return home.     - Pt will complete toileting routine (transfers, hygiene, and clothing management) with Supervision  to maximize independence and return to prior level of function.    - Pt will complete bed mobility supine >< sit w/ Mod I to maximize independence and return home.    - Pt will transfer to bed, chair, and toilet w/ Mod I using AD / DME as needed to maximize independence and reduce burden of care.     - Pt will ambulate household distances w/ Mod I using least restrictive device to maximize independence and return home.     - Pt will increase activity tolerance (and sitting tolerance) by eating all meals OOB in the chair.     - Pt will increase standing tolerance to 5-10 minutes to maximize independence.     - Pt will tolerate therapeutic activities for greater than 30 minutes in order to increase tolerance for functional activities.     - Pt will participate in ongoing OT assessment of cognitive skills to assist with safe d/c planning/recommendations.       Delaney Jay, KRISTINA, OTR/L

## 2025-03-06 NOTE — PLAN OF CARE
Problem: OCCUPATIONAL THERAPY ADULT  Goal: Performs self-care activities at highest level of function for planned discharge setting.  See evaluation for individualized goals.  Description: Treatment Interventions: ADL retraining, Functional transfer training, UE strengthening/ROM, Endurance training, Cognitive reorientation, Patient/family training, Equipment evaluation/education, Compensatory technique education, Activityengagement, Energy conservation, Continued evaluation          See flowsheet documentation for full assessment, interventions and recommendations.   Outcome: Progressing  Note: Limitation: Decreased ADL status, Decreased endurance, Decreased self-care trans, Decreased high-level ADLs, Decreased cognition, Decreased UE strength  Prognosis: Fair  Assessment: Pt is a 85 y.o. female seen for OT evaluation s/p admission to [facility] on 3/5/2025 due to Generalized weakness. Pt  has a past medical history of Anxiety, Arthritis, CHF (congestive heart failure) ([facility]), Chronic venous insufficiency, COPD (chronic obstructive pulmonary disease) ([facility]), Coronary artery disease, Diabetes mellitus ([facility]), Disease of thyroid gland, DVT (deep venous thrombosis) ([facility]), HL (hearing loss), Hyperlipidemia, Hypertension, Irregular heart beat, Pacemaker, Renal disorder, Sleep apnea, Sleep difficulties, Thyroid disease, Urinary incontinence, Uterine leiomyoma, and Walker as ambulation aid.  Pt with active OT evaluation/treatment and activity orders. Pt reports living w/ spouse in custodial apt w/ elevator. PTA, Pt required assist w/ ADL/IADL and functional mobility, was not driving and was using RW vs w/c at baseline. Pt agreeable and willing to participate in OT evaluation. Pt was greeted supine and left OOB in chair w/ alarm activated and all needs within reach. During evaluation, pt is Min A UB ADLs, transfers, functional mobility; Mod A bed mobility, LB ADLs. Pt currently presents with impairments in the following  categories -steps to enter environment, difficulty performing ADLS, and difficulty performing IADLS  activity tolerance, endurance, standing balance/tolerance, sitting balance/tolerance, UE strength, memory, insight, and safety . These impairments, as well as pt's fatigue, SOB, and risk for falls  limit pt's ability to safely engage in all baseline areas of occupation, includingeating, grooming, bathing, dressing, toileting, functional mobility/transfers, community mobility, social participation , and leisure activities .  Pt would benefit from continued acute OT services throughout hospital course in order to maximize Pt's independence and overall occupational performance. Plan for OT interventions 2-3x per week. From OT standpoint,  recommend Level II (Moderate Resource Intensity) upon d/c when pt medically stable to d/c from acute care. Will continue to follow.     Rehab Resource Intensity Level, OT: II (Moderate Resource Intensity) (pending progress)

## 2025-03-07 PROBLEM — R39.89 SUSPECTED UTI: Status: ACTIVE | Noted: 2025-03-07

## 2025-03-07 LAB
ACTIN IGG SERPL-ACNC: 9 UNITS (ref 0–19)
ALBUMIN SERPL BCG-MCNC: 2.8 G/DL (ref 3.5–5)
ALBUMIN SERPL BCG-MCNC: 2.9 G/DL (ref 3.5–5)
ALP SERPL-CCNC: 125 U/L (ref 34–104)
ALP SERPL-CCNC: 135 U/L (ref 34–104)
ALT SERPL W P-5'-P-CCNC: 74 U/L (ref 7–52)
ALT SERPL W P-5'-P-CCNC: 86 U/L (ref 7–52)
ANION GAP SERPL CALCULATED.3IONS-SCNC: 9 MMOL/L (ref 4–13)
APAP SERPL-MCNC: <2 UG/ML (ref 10–20)
AST SERPL W P-5'-P-CCNC: 33 U/L (ref 13–39)
AST SERPL W P-5'-P-CCNC: 49 U/L (ref 13–39)
BILIRUB DIRECT SERPL-MCNC: 0.13 MG/DL (ref 0–0.2)
BILIRUB DIRECT SERPL-MCNC: 0.18 MG/DL (ref 0–0.2)
BILIRUB SERPL-MCNC: 0.54 MG/DL (ref 0.2–1)
BILIRUB SERPL-MCNC: 0.67 MG/DL (ref 0.2–1)
BUN SERPL-MCNC: 25 MG/DL (ref 5–25)
CALCIUM SERPL-MCNC: 8.5 MG/DL (ref 8.4–10.2)
CERULOPLASMIN SERPL-MCNC: 25.5 MG/DL (ref 19–39)
CHLORIDE SERPL-SCNC: 99 MMOL/L (ref 96–108)
CMV DNA SERPL NAA+PROBE-ACNC: NOT DETECTED [IU]/ML
CO2 SERPL-SCNC: 27 MMOL/L (ref 21–32)
CREAT SERPL-MCNC: 1 MG/DL (ref 0.6–1.3)
ERYTHROCYTE [DISTWIDTH] IN BLOOD BY AUTOMATED COUNT: 14.7 % (ref 11.6–15.1)
GFR SERPL CREATININE-BSD FRML MDRD: 52 ML/MIN/1.73SQ M
GLUCOSE SERPL-MCNC: 176 MG/DL (ref 65–140)
GLUCOSE SERPL-MCNC: 204 MG/DL (ref 65–140)
GLUCOSE SERPL-MCNC: 243 MG/DL (ref 65–140)
GLUCOSE SERPL-MCNC: 272 MG/DL (ref 65–140)
GLUCOSE SERPL-MCNC: 313 MG/DL (ref 65–140)
HAV IGM SER QL: NORMAL
HBV CORE IGM SER QL: NORMAL
HBV SURFACE AG SER QL: NORMAL
HCT VFR BLD AUTO: 36.8 % (ref 34.8–46.1)
HCV AB SER QL: NORMAL
HGB BLD-MCNC: 11.3 G/DL (ref 11.5–15.4)
INR PPP: 1.26 (ref 0.85–1.19)
LKM-1 AB SER-ACNC: <20.1 UNITS (ref 0–20)
MCH RBC QN AUTO: 31 PG (ref 26.8–34.3)
MCHC RBC AUTO-ENTMCNC: 30.7 G/DL (ref 31.4–37.4)
MCV RBC AUTO: 101 FL (ref 82–98)
PLATELET # BLD AUTO: 171 THOUSANDS/UL (ref 149–390)
PMV BLD AUTO: 9.9 FL (ref 8.9–12.7)
POTASSIUM SERPL-SCNC: 4.5 MMOL/L (ref 3.5–5.3)
PROT SERPL-MCNC: 5.2 G/DL (ref 6.4–8.4)
PROT SERPL-MCNC: 5.4 G/DL (ref 6.4–8.4)
PROTHROMBIN TIME: 16.1 SECONDS (ref 12.3–15)
RBC # BLD AUTO: 3.65 MILLION/UL (ref 3.81–5.12)
SODIUM SERPL-SCNC: 135 MMOL/L (ref 135–147)
WBC # BLD AUTO: 6 THOUSAND/UL (ref 4.31–10.16)

## 2025-03-07 PROCEDURE — 80076 HEPATIC FUNCTION PANEL: CPT | Performed by: NURSE PRACTITIONER

## 2025-03-07 PROCEDURE — NC001 PR NO CHARGE: Performed by: EMERGENCY MEDICINE

## 2025-03-07 PROCEDURE — 82948 REAGENT STRIP/BLOOD GLUCOSE: CPT

## 2025-03-07 PROCEDURE — 85610 PROTHROMBIN TIME: CPT | Performed by: NURSE PRACTITIONER

## 2025-03-07 PROCEDURE — 85027 COMPLETE CBC AUTOMATED: CPT | Performed by: NURSE PRACTITIONER

## 2025-03-07 PROCEDURE — 80076 HEPATIC FUNCTION PANEL: CPT | Performed by: EMERGENCY MEDICINE

## 2025-03-07 PROCEDURE — 80048 BASIC METABOLIC PNL TOTAL CA: CPT | Performed by: NURSE PRACTITIONER

## 2025-03-07 PROCEDURE — 99232 SBSQ HOSP IP/OBS MODERATE 35: CPT | Performed by: NURSE PRACTITIONER

## 2025-03-07 PROCEDURE — 80143 DRUG ASSAY ACETAMINOPHEN: CPT | Performed by: NURSE PRACTITIONER

## 2025-03-07 RX ORDER — CEPHALEXIN 500 MG/1
500 CAPSULE ORAL EVERY 6 HOURS SCHEDULED
Status: COMPLETED | OUTPATIENT
Start: 2025-03-07 | End: 2025-03-10

## 2025-03-07 RX ORDER — INSULIN GLARGINE 100 [IU]/ML
5 INJECTION, SOLUTION SUBCUTANEOUS
Status: DISCONTINUED | OUTPATIENT
Start: 2025-03-07 | End: 2025-03-11 | Stop reason: HOSPADM

## 2025-03-07 RX ADMIN — GABAPENTIN 100 MG: 100 CAPSULE ORAL at 22:29

## 2025-03-07 RX ADMIN — INSULIN GLARGINE 5 UNITS: 100 INJECTION, SOLUTION SUBCUTANEOUS at 22:30

## 2025-03-07 RX ADMIN — INSULIN LISPRO 2 UNITS: 100 INJECTION, SOLUTION INTRAVENOUS; SUBCUTANEOUS at 17:34

## 2025-03-07 RX ADMIN — LEVETIRACETAM 500 MG: 500 TABLET, FILM COATED ORAL at 17:34

## 2025-03-07 RX ADMIN — SENNOSIDES AND DOCUSATE SODIUM 2 TABLET: 50; 8.6 TABLET ORAL at 08:25

## 2025-03-07 RX ADMIN — LEVOTHYROXINE SODIUM 150 MCG: 0.07 TABLET ORAL at 05:32

## 2025-03-07 RX ADMIN — HEPARIN SODIUM 5000 UNITS: 5000 INJECTION, SOLUTION INTRAVENOUS; SUBCUTANEOUS at 22:31

## 2025-03-07 RX ADMIN — PANTOPRAZOLE SODIUM 40 MG: 40 TABLET, DELAYED RELEASE ORAL at 08:26

## 2025-03-07 RX ADMIN — CEPHALEXIN 500 MG: 500 CAPSULE ORAL at 13:50

## 2025-03-07 RX ADMIN — CITALOPRAM HYDROBROMIDE 20 MG: 20 TABLET ORAL at 22:30

## 2025-03-07 RX ADMIN — HEPARIN SODIUM 5000 UNITS: 5000 INJECTION, SOLUTION INTRAVENOUS; SUBCUTANEOUS at 13:50

## 2025-03-07 RX ADMIN — INSULIN LISPRO 3 UNITS: 100 INJECTION, SOLUTION INTRAVENOUS; SUBCUTANEOUS at 08:26

## 2025-03-07 RX ADMIN — METOPROLOL TARTRATE 25 MG: 25 TABLET, FILM COATED ORAL at 08:25

## 2025-03-07 RX ADMIN — ATORVASTATIN CALCIUM 80 MG: 80 TABLET, FILM COATED ORAL at 22:29

## 2025-03-07 RX ADMIN — CEPHALEXIN 500 MG: 500 CAPSULE ORAL at 23:49

## 2025-03-07 RX ADMIN — METOPROLOL TARTRATE 25 MG: 25 TABLET, FILM COATED ORAL at 17:36

## 2025-03-07 RX ADMIN — HEPARIN SODIUM 5000 UNITS: 5000 INJECTION, SOLUTION INTRAVENOUS; SUBCUTANEOUS at 05:32

## 2025-03-07 RX ADMIN — LIDOCAINE 1 PATCH: 700 PATCH TOPICAL at 08:26

## 2025-03-07 RX ADMIN — POLYETHYLENE GLYCOL 3350 17 G: 17 POWDER, FOR SOLUTION ORAL at 08:26

## 2025-03-07 RX ADMIN — INSULIN LISPRO 1 UNITS: 100 INJECTION, SOLUTION INTRAVENOUS; SUBCUTANEOUS at 22:35

## 2025-03-07 RX ADMIN — CEPHALEXIN 500 MG: 500 CAPSULE ORAL at 17:34

## 2025-03-07 RX ADMIN — INSULIN LISPRO 5 UNITS: 100 INJECTION, SOLUTION INTRAVENOUS; SUBCUTANEOUS at 11:39

## 2025-03-07 RX ADMIN — LEVETIRACETAM 500 MG: 500 TABLET, FILM COATED ORAL at 08:25

## 2025-03-07 NOTE — QUICK NOTE
Toxicology Update:    LFTs continue to downtrend.  INR <2.0    Okay to discontinue NAC infusion.    Discussed with primary team.    Reach out to toxicology with any additional questions or concerns.    Juarez Stanton DO  [facility]

## 2025-03-07 NOTE — ASSESSMENT & PLAN NOTE
Lab Results   Component Value Date    HGBA1C 8.9 (H) 12/05/2024       Recent Labs     03/06/25  1625 03/06/25  2101 03/07/25  0533 03/07/25  1055   POCGLU 209* 263* 243* 313*       Blood Sugar Average: Last 72 hrs:  (P) [id number]  PTA on Lantus 16 units nightly and Humalog 4 units with lunch/dinner, presented with hypoglycemia noted earlier today  Insulin held on admission- now with hyperglycemia. Will change diet to CCD from regular diet and add lantus 5 units at HS with SSI coverage   hypoglycemia protocol

## 2025-03-07 NOTE — PLAN OF CARE
Problem: PAIN - ADULT  Goal: Verbalizes/displays adequate comfort level or baseline comfort level  Description: Interventions:  - Encourage patient to monitor pain and request assistance  - Assess pain using appropriate pain scale  - Administer analgesics based on type and severity of pain and evaluate response  - Implement non-pharmacological measures as appropriate and evaluate response  - Consider cultural and social influences on pain and pain management  - Notify physician/advanced practitioner if interventions unsuccessful or patient reports new pain  Outcome: Progressing     Problem: INFECTION - ADULT  Goal: Absence or prevention of progression during hospitalization  Description: INTERVENTIONS:  - Assess and monitor for signs and symptoms of infection  - Monitor lab/diagnostic results  - Monitor all insertion sites, i.e. indwelling lines, tubes, and drains  - Monitor endotracheal if appropriate and nasal secretions for changes in amount and color  - [place] appropriate cooling/warming therapies per order  - Administer medications as ordered  - Instruct and encourage patient and family to use good hand hygiene technique  - Identify and instruct in appropriate isolation precautions for identified infection/condition  Outcome: Progressing  Goal: Absence of fever/infection during neutropenic period  Description: INTERVENTIONS:  - Monitor WBC    Outcome: Progressing     Problem: SAFETY ADULT  Goal: Patient will remain free of falls  Description: INTERVENTIONS:  - Educate patient/family on patient safety including physical limitations  - Instruct patient to call for assistance with activity   - Consult OT/PT to assist with strengthening/mobility   - Keep Call bell within reach  - Keep bed low and locked with side rails adjusted as appropriate  - Keep care items and personal belongings within reach  - Initiate and maintain comfort rounds  - Make Fall Risk Sign visible to staff  - Offer Toileting every  Hours,  Guide across the lesion to circumflex.  in advance of need  - Initiate/Maintain alarm  - Obtain necessary fall risk management equipment:   - Apply yellow socks and bracelet for high fall risk patients  - Consider moving patient to room near nurses station  Outcome: Progressing  Goal: Maintain or return to baseline ADL function  Description: INTERVENTIONS:  -  Assess patient's ability to carry out ADLs; assess patient's baseline for ADL function and identify physical deficits which impact ability to perform ADLs (bathing, care of mouth/teeth, toileting, grooming, dressing, etc.)  - Assess/evaluate cause of self-care deficits   - Assess range of motion  - Assess patient's mobility; develop plan if impaired  - Assess patient's need for assistive devices and provide as appropriate  - Encourage maximum independence but intervene and supervise when necessary  - Involve family in performance of ADLs  - Assess for home care needs following discharge   - Consider OT consult to assist with ADL evaluation and planning for discharge  - Provide patient education as appropriate  Outcome: Progressing  Goal: Maintains/Returns to pre admission functional level  Description: INTERVENTIONS:  - Perform AM-PAC 6 Click Basic Mobility/ Daily Activity assessment daily.  - Set and communicate daily mobility goal to care team and patient/family/caregiver.   - Collaborate with rehabilitation services on mobility goals if consulted  - Perform Range of Motion  times a day.  - Reposition patient every  hours.  - Dangle patient  times a day  - Stand patient  times a day  - Ambulate patient  times a day  - Out of bed to chair  times a day   - Out of bed for meals  times a day  - Out of bed for toileting  - Record patient progress and toleration of activity level   Outcome: Progressing     Problem: DISCHARGE PLANNING  Goal: Discharge to home or other facility with appropriate resources  Description: INTERVENTIONS:  - Identify barriers to discharge w/patient and caregiver  - Arrange for  needed discharge resources and transportation as appropriate  - Identify discharge learning needs (meds, wound care, etc.)  - Arrange for interpretive services to assist at discharge as needed  - Refer to Case Management Department for coordinating discharge planning if the patient needs post-hospital services based on physician/advanced practitioner order or complex needs related to functional status, cognitive ability, or social support system  Outcome: Progressing     Problem: Knowledge Deficit  Goal: Patient/family/caregiver demonstrates understanding of disease process, treatment plan, medications, and discharge instructions  Description: Complete learning assessment and assess knowledge base.  Interventions:  - Provide teaching at level of understanding  - Provide teaching via preferred learning methods  Outcome: Progressing     Problem: Prexisting or High Potential for Compromised Skin Integrity  Goal: Skin integrity is maintained or improved  Description: INTERVENTIONS:  - Identify patients at risk for skin breakdown  - Assess and monitor skin integrity  - Assess and monitor nutrition and hydration status  - Monitor labs   - Assess for incontinence   - Turn and reposition patient  - Assist with mobility/ambulation  - Relieve pressure over bony prominences  - Avoid friction and shearing  - Provide appropriate hygiene as needed including keeping skin clean and dry  - Evaluate need for skin moisturizer/barrier cream  - Collaborate with interdisciplinary team   - Patient/family teaching  - Consider wound care consult   Outcome: Progressing     Problem: Nutrition/Hydration-ADULT  Goal: Nutrient/Hydration intake appropriate for improving, restoring or maintaining nutritional needs  Description: Monitor and assess patient's nutrition/hydration status for malnutrition. Collaborate with interdisciplinary team and initiate plan and interventions as ordered.  Monitor patient's weight and dietary intake as ordered or  per policy. Utilize nutrition screening tool and intervene as necessary. Determine patient's food preferences and provide high-protein, high-caloric foods as appropriate.     INTERVENTIONS:  - Monitor oral intake, urinary output, labs, and treatment plans  - Assess nutrition and hydration status and recommend course of action  - Evaluate amount of meals eaten  - Assist patient with eating if necessary   - Allow adequate time for meals  - Recommend/ encourage appropriate diets, oral nutritional supplements, and vitamin/mineral supplements  - Order, calculate, and assess calorie counts as needed  - Recommend, monitor, and adjust tube feedings and TPN/PPN based on assessed needs  - Assess need for intravenous fluids  - Provide specific nutrition/hydration education as appropriate  - Include patient/family/caregiver in decisions related to nutrition  Outcome: Progressing

## 2025-03-07 NOTE — CASE MANAGEMENT
Case Management Discharge Planning Note    Patient name Lizzie Whitten  Location /-01 MRN [id number]  : 1940 Date 3/7/2025       Current Admission Date: 3/5/2025  Current Admission Diagnosis:Generalized weakness   Patient Active Problem List    Diagnosis Date Noted Date Diagnosed    Suspected UTI 2025     Cervical spondylosis 2025     Transaminitis 2025     Fall with injury 2024     SAH (subarachnoid hemorrhage) ([facility]) 2024     CHF (congestive heart failure) ([facility]) 2024     MITCH (obstructive sleep apnea) 2024     DM (diabetes mellitus) ([facility]) 2024     GERD (gastroesophageal reflux disease) 2024     CKD (chronic kidney disease) 2024     Depression with anxiety 2024     HLD (hyperlipidemia) 2024     Frailty syndrome in geriatric patient 2024     At risk for delirium 2024     Bilateral hearing loss 2024     Cognitive impairment 2024     Mid back pain 2024     Mild persistent asthma without complication 2024     Pleural effusion, right 2024     Hematuria 2024     Slow transit constipation 2024     Hypernatremia 2024     Physical deconditioning 2024     Generalized weakness 2024     Ambulatory dysfunction 2024     Multiple falls 2024     Acute pain due to trauma 2024     Hemopneumothorax on right 04/15/2024     CHACHA (acute kidney injury) ([facility]) 2024     Chronic hypoxic respiratory failure ([facility]) 04/10/2024     Pulmonary nodule 2024     Pharyngeal dysphagia 2023     Chronic cough 2023     Assistance needed with transportation 2023     Abnormal CT scan, esophagus 2022     Hiatal hernia 2022     Esophageal dysphagia 2022     Primary hypertension 2022     Fall 2022     Secondary hyperparathyroidism of renal origin ([facility]) 2022     Memory difficulties 2022     Imbalance  06/30/2022     Coronary artery disease involving native coronary artery of native heart 03/06/2021     Sleep apnea 05/17/2019     Intraocular pressure increase 06/06/2018     Vitamin D deficiency 04/03/2018     Lymphedema of both lower extremities 03/13/2018     Iron deficiency anemia 10/12/2017     Urinary bladder incontinence 07/26/2017     OAB (overactive bladder) 07/26/2017     Stage 3b chronic kidney disease ([facility]) 06/21/2017     PAF (paroxysmal atrial fibrillation) ([facility]) 10/31/2016     Chronic venous insufficiency 02/19/2016     Presence of permanent cardiac pacemaker 01/28/2016     Chronic diastolic congestive heart failure ([facility]) 01/21/2016     Anemia 01/19/2016     S/P cardiac pacemaker procedure 01/19/2016     Hypothyroidism 01/19/2016     Complete heart block ([facility]) 01/16/2016     Type 2 diabetes mellitus with hypoglycemia without coma, with long-term current use of insulin ([facility]) 01/15/2016     Anxiety disorder 06/26/2013     COPD (chronic obstructive pulmonary disease) ([facility]) 09/24/2012     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 ([facility]) 09/22/2011     Mixed hyperlipidemia 03/14/2011       LOS (days): 2  Geometric Mean LOS (GMLOS) (days): 2.6  Days to GMLOS:0.9     OBJECTIVE:  Risk of Unplanned Readmission Score: 24.69         Current admission status: Inpatient   Preferred Pharmacy:   [facility] - [place], [place] - [address]  [address] [address]  Phone: [phone] Fax: [phone]    [id number] - [place], [place] - [address]  [address]  [place] [address]  Phone: [phone] Fax: [phone]    [facility] - [place] - [address]  [address]  [place] [address]  Phone: [phone] Fax: [phone]    Primary Care Provider: Kennedy Castellon MD    Primary Insurance: MEDICARE  Secondary Insurance: AARP    DISCHARGE  DETAILS:     BARBARA spoke with Deysi from [facility] beds at the [facility] [phone], she stated pt has caregivers in the home to assist, AMY, but they will not be able to come in until Monday.  Anticipated DC on Monday, pending medical clearance.     JOSE FRANCISCO with [employer] confirmed.  Reserved in aidin and updated in Chart.

## 2025-03-07 NOTE — ASSESSMENT & PLAN NOTE
Noted to have foul-smelling urine per nursing staff and urine culture was sent  UA positive for blood RBCs WBC 30-50.  Urine culture pending  Start Keflex as this has been previously used to treat UTIs

## 2025-03-07 NOTE — PROGRESS NOTES
Progress Note - Medical Toxicology   Name: Lizzie Whitten 85 y.o. female I MRN: [id number]  Unit/Bed#: -01 I Date of Admission: 3/5/2025   Date of Service: 3/7/2025 I Hospital Day: 2     Assessment & Plan  Transaminitis  Improving and downtrending  Continue N-acetylcysteine therapy  Please hold acetaminophen and any other hepatotoxic medication  Acetaminophen level not detected this morning, do not need to check any further levels of this   I have ordered LFTs, INR, for today at 1PM  If LFTs continue to downtrend, and INR is less than 2 it is okay to stop N-acetylcysteine infusion  Will follow up the labs at 1PM to decide if we can DC infusion  Agree with continued medical workup for etiology of abnormal LFTs    Communicated with primary team.  I did not evaluate the patient.      Juarez Stanton DO  [facility]

## 2025-03-07 NOTE — ASSESSMENT & PLAN NOTE
Noted to have transaminitis on admission with levels worsening on day 2. Lab work sent and tylenol level was 9. Given recent cervical spondylosis suspected possible overuse of Tylenol although patient and assisted living report she was only using it on a PRN basis. Pt did receive Tylenol on admission.   Toxicology consulted and recommended monitoring labs closely and starting NAC treatment   Pending labs today at 1300- if improved and INR less than 2 can stop NAC treatment   LFTs trending down; Tylenol level less than 2 on repeat labs  Workup: ceruloplasmin 25.5; UDS negative: CMV not detected; acute hepatitis negative; GGT 68  Pending labs: Liver kidney microsomal antibody; anti-smooth muscle antibody IgG  avoid hepatotoxins at this time  Check a.m. CMP

## 2025-03-07 NOTE — PROGRESS NOTES
Progress Note - Hospitalist   Name: Lizzie Whitten 85 y.o. female I MRN: [id number]  Unit/Bed#: -01 I Date of Admission: 3/5/2025   Date of Service: 3/7/2025 I Hospital Day: 2    Assessment & Plan  Generalized weakness  Patient presented with hypoglycemia, unfortunately persisted with generalized weakness and did not feel comfortable returning to home assisted living situation at this time  Labs (+) transaminitis, CT abdomen/pelvis negative for acute intra-abdominal pathology  PT/OT recommending moderate resources  Transaminitis  Noted to have transaminitis on admission with levels worsening on day 2. Lab work sent and tylenol level was 9. Given recent cervical spondylosis suspected possible overuse of Tylenol although patient and assisted living report she was only using it on a PRN basis. Pt did receive Tylenol on admission.   Toxicology consulted and recommended monitoring labs closely and starting NAC treatment   Pending labs today at 1300- if improved and INR less than 2 can stop NAC treatment   LFTs trending down; Tylenol level less than 2 on repeat labs  Workup: ceruloplasmin 25.5; UDS negative: CMV not detected; acute hepatitis negative; GGT 68  Pending labs: Liver kidney microsomal antibody; anti-smooth muscle antibody IgG  avoid hepatotoxins at this time  Check a.m. CMP  Type 2 diabetes mellitus with hypoglycemia without coma, with long-term current use of insulin ([facility])  Lab Results   Component Value Date    HGBA1C 8.9 (H) 12/05/2024       Recent Labs     03/06/25  1625 03/06/25  2101 03/07/25  0533 03/07/25  1055   POCGLU 209* 263* 243* 313*       Blood Sugar Average: Last 72 hrs:  (P) 154.1916029303152814  PTA on Lantus 16 units nightly and Humalog 4 units with lunch/dinner, presented with hypoglycemia noted earlier today  Insulin held on admission- now with hyperglycemia. Will change diet to CCD from regular diet and add lantus 5 units at HS with SSI coverage   hypoglycemia protocol  Suspected  UTI  Noted to have foul-smelling urine per nursing staff and urine culture was sent  UA positive for blood RBCs WBC 30-50.  Urine culture pending  Start Keflex as this has been previously used to treat UTIs  Chronic diastolic congestive heart failure (HCC)  Wt Readings from Last 3 Encounters:   12/05/24 78.9 kg (173 lb 15.1 oz)   11/05/24 76.4 kg (168 lb 6.4 oz)   08/01/24 80.4 kg (177 lb 3.2 oz)   Noted in history, CT imaging revealing right pleural effusion however patient otherwise euvolemic and without new oxygen requirement at this time  Continue PTA cardiac medications and monitor volume status closely  Stage 3b chronic kidney disease ([facility])  Lab Results   Component Value Date    EGFR 52 03/07/2025    EGFR 43 03/06/2025    EGFR 46 03/05/2025    CREATININE 1.00 03/07/2025    CREATININE 1.17 03/06/2025    CREATININE 1.10 03/05/2025   Renal function at baseline, monitor with BMP.  Avoid/limit nephrotoxins and hypotension  PAF (paroxysmal atrial fibrillation) ([facility])  S/p PPM for complete heart block  Continue PTA metoprolol tartrate  Continue anticoagulation with Eliquis  Cervical spondylosis  Known history of C1-C2 subluxation initially noted following fall January 2025 managed conservatively  Neurosurgery following, flexion/extension x-rays of the cervical spine with open-mouth views were obtained and patient was cleared by neurosurgery to have c-collar removed   PT/OT evaluations as above  COPD (chronic obstructive pulmonary disease) ([facility])  Stable without exacerbation   C/w 2LNC     VTE Pharmacologic Prophylaxis: VTE Score: 4 Moderate Risk (Score 3-4) - Pharmacological DVT Prophylaxis Ordered: heparin.    Mobility:   Basic Mobility Inpatient Raw Score: 14  JH-HLM Goal: 4: Move to chair/commode  JH-HLM Achieved: 4: Move to chair/commode  JH-HLM Goal NOT achieved. Continue with multidisciplinary rounding and encourage appropriate mobility to improve upon JH-HLM goals.    Patient Centered Rounds: I performed bedside  rounds with nursing staff today.   Discussions with Specialists or Other Care Team Provider: d/w RN and toxicology     Education and Discussions with Family / Patient: Updated  (daughter) via phone.    Current Length of Stay: 2 day(s)  Current Patient Status: Inpatient   Certification Statement: The patient will continue to require additional inpatient hospital stay due to transaminitis  Discharge Plan: Anticipate discharge in 24-48 hrs to discharge location to be determined pending rehab evaluations.    Code Status: Level 1 - Full Code    Subjective   Patient lying in bed denies any complaints.  No events per RN    Objective :  Temp:  [97.5 °F (36.4 °C)-98 °F (36.7 °C)] 98 °F (36.7 °C)  HR:  [60-63] 60  BP: (124-131)/(64-72) 124/64  SpO2:  [97 %-100 %] 97 %  O2 Device: Nasal cannula  Nasal Cannula O2 Flow Rate (L/min):  [2 L/min] 2 L/min    There is no height or weight on file to calculate BMI.     Input and Output Summary (last 24 hours):     Intake/Output Summary (Last 24 hours) at 3/7/2025 1528  Last data filed at 3/6/2025 1609  Gross per 24 hour   Intake --   Output 150 ml   Net -150 ml       Physical Exam  Constitutional:       General: She is not in acute distress.     Appearance: She is well-developed. She is not ill-appearing.   Cardiovascular:      Rate and Rhythm: Normal rate and regular rhythm.      Heart sounds: Normal heart sounds. No murmur heard.  Pulmonary:      Effort: Pulmonary effort is normal. No respiratory distress.      Breath sounds: Normal breath sounds. No wheezing or rales.      Comments: 2 L nasal cannula  Abdominal:      General: Bowel sounds are normal. There is no distension.      Palpations: Abdomen is soft.      Tenderness: There is no abdominal tenderness.   Musculoskeletal:         General: No swelling or tenderness.   Skin:     General: Skin is warm and dry.      Findings: No erythema or rash.   Neurological:      Mental Status: She is alert and oriented to person,  place, and time. Mental status is at baseline.      Motor: Weakness (Generalized 3/5) present.   Psychiatric:         Mood and Affect: Mood normal.         Lines/Drains:              Lab Results: I have reviewed the following results:   Results from last 7 days   Lab Units 03/07/25  0507 03/06/25  0542 03/05/25  1006   WBC Thousand/uL 6.00   < > 5.79   HEMOGLOBIN g/dL 11.3*   < > 11.6   HEMATOCRIT % 36.8   < > 37.5   PLATELETS Thousands/uL 171   < > 193   SEGS PCT %  --   --  62   LYMPHO PCT %  --   --  26   MONO PCT %  --   --  8   EOS PCT %  --   --  3    < > = values in this interval not displayed.     Results from last 7 days   Lab Units 03/07/25  1232 03/07/25  0506   SODIUM mmol/L  --  135   POTASSIUM mmol/L  --  4.5   CHLORIDE mmol/L  --  99   CO2 mmol/L  --  27   BUN mg/dL  --  25   CREATININE mg/dL  --  1.00   ANION GAP mmol/L  --  9   CALCIUM mg/dL  --  8.5   ALBUMIN g/dL 2.8* 2.9*   TOTAL BILIRUBIN mg/dL 0.54 0.67   ALK PHOS U/L 125* 135*   ALT U/L 74* 86*   AST U/L 33 49*   GLUCOSE RANDOM mg/dL  --  272*     Results from last 7 days   Lab Units 03/06/25  2102   INR  1.39*     Results from last 7 days   Lab Units 03/07/25  1055 03/07/25  0533 03/06/25  2101 03/06/25  1625 03/06/25  1055 03/06/25  0538 03/05/25  2220 03/05/25  1908 03/05/25  1500 03/05/25  1226 03/05/25  1110 03/05/25  0952   POC GLUCOSE mg/dl 313* 243* 263* 209* 137 87 149* 89 101 118 87 57*               Recent Cultures (last 7 days):   Results from last 7 days   Lab Units 03/06/25  1609   URINE CULTURE  Culture too young- will reincubate       Imaging Results Review: I reviewed radiology reports from this admission including: CT abdomen/pelvis and xray(s).  Other Study Results Review: No additional pertinent studies reviewed.    Last 24 Hours Medication List:     Current Facility-Administered Medications:     atorvastatin (LIPITOR) tablet 80 mg, HS    cephalexin (KEFLEX) capsule 500 mg, Q6H PRASHANT    citalopram (CeleXA) tablet 20 mg, HS     gabapentin (NEURONTIN) capsule 100 mg, HS    heparin (porcine) subcutaneous injection 5,000 Units, Q8H PRASHANT    insulin lispro (HumALOG/ADMELOG) 100 units/mL subcutaneous injection 1-5 Units, HS    insulin lispro (HumALOG/ADMELOG) 100 units/mL subcutaneous injection 1-6 Units, TID AC **AND** Fingerstick Glucose (POCT), TID AC    levETIRAcetam (KEPPRA) tablet 500 mg, BID    levothyroxine tablet 150 mcg, Early Morning    lidocaine (LIDODERM) 5 % patch 1 patch, Daily    metoprolol tartrate (LOPRESSOR) tablet 25 mg, BID    ondansetron (ZOFRAN) injection 4 mg, Q6H PRN    pantoprazole (PROTONIX) EC tablet 40 mg, Daily    polyethylene glycol (MIRALAX) packet 17 g, Daily    senna-docusate sodium (SENOKOT S) 8.6-50 mg per tablet 2 tablet, Daily    Administrative Statements   Today, Patient Was Seen By: DALILA Peck      **Please Note: This note may have been constructed using a voice recognition system.**

## 2025-03-07 NOTE — WOUND OSTOMY CARE
Consult Note - Wound   Lizzie Whitten 85 y.o. female MRN: [id number]  Unit/Bed#: MS Gil-01 Encounter: [id number]      History and Present Illness:  [facility] Recently discharged from rehab.Since return home she has been experiencing episodes of hypoglycemia. PMH:Cervial spondylosis, transaminatis, suspected UTI, Type 2 Dm, History of multiple falls.     Assessment Findings:   Wound care consulted for wound on sacrum present on admission.alert and oriented, mod assist to turn and reposition.  1)POA Suspected Evolving DTI vs MASD.full thickness skin loss of sacrum.wound bed pink with surrounding tissue white and non blanchable purple. Wound is decreased purple since yesterday.Pt is incont of bowel and bladder, unable to maintain dry foam. Recommend Calazime and positioning on side.   2)Bilateral heels dry and intact        Skin care plans:  1-Cleanse sacro-buttock with soap and water, pat dry. Apply thin layer of Calazime/Protective Zinc Oxide paste to sacrum and buttocks TID and PRN.  2-Hydraguard/Silicone Cream to bilateral heels BID and PRN.  3-Elevate heels to offload pressure.  4-Ehob cushion when out of bed.  5-Turn/reposition q2h or when medically stable for pressure re-distribution on skin.  6-Moisturize skin daily with skin nourishing cream.      Wound 03/07/25 Pressure Injury Sacrum (Active)   Wound Image   03/07/25 0920   Wound Description Zhao;Slough 03/07/25 0920   Pressure Injury Stage DTPI 03/07/25 0920   Kayla-wound Assessment Purple 03/07/25 0920   Wound Length (cm) 0.5 cm 03/07/25 0920   Wound Width (cm) 0.2 cm 03/07/25 0920   Wound Depth (cm) 0.1 cm 03/07/25 0920   Wound Surface Area (cm^2) 0.1 cm^2 03/07/25 0920   Wound Volume (cm^3) 0.01 cm^3 03/07/25 0920   Calculated Wound Volume (cm^3) 0.01 cm^3 03/07/25 0920   Drainage Amount None 03/07/25 0920   Non-staged Wound Description Full thickness 03/07/25 0920   Treatments Cleansed;Site care 03/07/25 0920   Dressing Protective barrier  03/07/25 0920   Dressing Changed New 03/07/25 0920   Patient Tolerance Tolerated well 03/07/25 0920   Dressing Status Remoistened 03/07/25 0920         Call or Secure Chat with any questions  Wound Care will continue to follow weekly    Ashley Carvajal BSN RN CWON

## 2025-03-07 NOTE — ASSESSMENT & PLAN NOTE
Improving and downtrending  Continue N-acetylcysteine therapy  Please hold acetaminophen and any other hepatotoxic medication  Acetaminophen level not detected this morning, do not need to check any further levels of this   I have ordered LFTs, INR, for today at 1PM  If LFTs continue to downtrend, and INR is less than 2 it is okay to stop N-acetylcysteine infusion  Will follow up the labs at 1PM to decide if we can DC infusion  Agree with continued medical workup for etiology of abnormal LFTs

## 2025-03-07 NOTE — ASSESSMENT & PLAN NOTE
Lab Results   Component Value Date    EGFR 52 03/07/2025    EGFR 43 03/06/2025    EGFR 46 03/05/2025    CREATININE 1.00 03/07/2025    CREATININE 1.17 03/06/2025    CREATININE 1.10 03/05/2025   Renal function at baseline, monitor with BMP.  Avoid/limit nephrotoxins and hypotension

## 2025-03-07 NOTE — ASSESSMENT & PLAN NOTE
Known history of C1-C2 subluxation initially noted following fall January 2025 managed conservatively  Neurosurgery following, flexion/extension x-rays of the cervical spine with open-mouth views were obtained and patient was cleared by neurosurgery to have c-collar removed   PT/OT evaluations as above

## 2025-03-07 NOTE — ASSESSMENT & PLAN NOTE
Patient presented with hypoglycemia, unfortunately persisted with generalized weakness and did not feel comfortable returning to home assisted living situation at this time  Labs (+) transaminitis, CT abdomen/pelvis negative for acute intra-abdominal pathology  PT/OT recommending moderate resources

## 2025-03-08 LAB
ALBUMIN SERPL BCG-MCNC: 2.9 G/DL (ref 3.5–5)
ALP SERPL-CCNC: 115 U/L (ref 34–104)
ALT SERPL W P-5'-P-CCNC: 59 U/L (ref 7–52)
ANION GAP SERPL CALCULATED.3IONS-SCNC: 7 MMOL/L (ref 4–13)
AST SERPL W P-5'-P-CCNC: 22 U/L (ref 13–39)
BACTERIA UR CULT: NORMAL
BILIRUB SERPL-MCNC: 0.59 MG/DL (ref 0.2–1)
BUN SERPL-MCNC: 23 MG/DL (ref 5–25)
CALCIUM ALBUM COR SERPL-MCNC: 9.6 MG/DL (ref 8.3–10.1)
CALCIUM SERPL-MCNC: 8.7 MG/DL (ref 8.4–10.2)
CHLORIDE SERPL-SCNC: 100 MMOL/L (ref 96–108)
CO2 SERPL-SCNC: 31 MMOL/L (ref 21–32)
CREAT SERPL-MCNC: 1.09 MG/DL (ref 0.6–1.3)
ERYTHROCYTE [DISTWIDTH] IN BLOOD BY AUTOMATED COUNT: 14.8 % (ref 11.6–15.1)
GFR SERPL CREATININE-BSD FRML MDRD: 47 ML/MIN/1.73SQ M
GLUCOSE SERPL-MCNC: 129 MG/DL (ref 65–140)
GLUCOSE SERPL-MCNC: 141 MG/DL (ref 65–140)
GLUCOSE SERPL-MCNC: 177 MG/DL (ref 65–140)
GLUCOSE SERPL-MCNC: 195 MG/DL (ref 65–140)
GLUCOSE SERPL-MCNC: 243 MG/DL (ref 65–140)
HCT VFR BLD AUTO: 35.4 % (ref 34.8–46.1)
HGB BLD-MCNC: 10.7 G/DL (ref 11.5–15.4)
MCH RBC QN AUTO: 30.5 PG (ref 26.8–34.3)
MCHC RBC AUTO-ENTMCNC: 30.2 G/DL (ref 31.4–37.4)
MCV RBC AUTO: 101 FL (ref 82–98)
PLATELET # BLD AUTO: 178 THOUSANDS/UL (ref 149–390)
PMV BLD AUTO: 10.2 FL (ref 8.9–12.7)
POTASSIUM SERPL-SCNC: 4 MMOL/L (ref 3.5–5.3)
PROT SERPL-MCNC: 5 G/DL (ref 6.4–8.4)
RBC # BLD AUTO: 3.51 MILLION/UL (ref 3.81–5.12)
SODIUM SERPL-SCNC: 138 MMOL/L (ref 135–147)
WBC # BLD AUTO: 5.24 THOUSAND/UL (ref 4.31–10.16)

## 2025-03-08 PROCEDURE — 80053 COMPREHEN METABOLIC PANEL: CPT | Performed by: NURSE PRACTITIONER

## 2025-03-08 PROCEDURE — 85027 COMPLETE CBC AUTOMATED: CPT | Performed by: NURSE PRACTITIONER

## 2025-03-08 PROCEDURE — 97116 GAIT TRAINING THERAPY: CPT

## 2025-03-08 PROCEDURE — 82948 REAGENT STRIP/BLOOD GLUCOSE: CPT

## 2025-03-08 PROCEDURE — 99233 SBSQ HOSP IP/OBS HIGH 50: CPT | Performed by: INTERNAL MEDICINE

## 2025-03-08 PROCEDURE — 97530 THERAPEUTIC ACTIVITIES: CPT

## 2025-03-08 RX ADMIN — INSULIN LISPRO 1 UNITS: 100 INJECTION, SOLUTION INTRAVENOUS; SUBCUTANEOUS at 17:24

## 2025-03-08 RX ADMIN — CEPHALEXIN 500 MG: 500 CAPSULE ORAL at 17:24

## 2025-03-08 RX ADMIN — POLYETHYLENE GLYCOL 3350 17 G: 17 POWDER, FOR SOLUTION ORAL at 09:28

## 2025-03-08 RX ADMIN — METOPROLOL TARTRATE 25 MG: 25 TABLET, FILM COATED ORAL at 17:24

## 2025-03-08 RX ADMIN — INSULIN LISPRO 3 UNITS: 100 INJECTION, SOLUTION INTRAVENOUS; SUBCUTANEOUS at 11:32

## 2025-03-08 RX ADMIN — HEPARIN SODIUM 5000 UNITS: 5000 INJECTION, SOLUTION INTRAVENOUS; SUBCUTANEOUS at 15:24

## 2025-03-08 RX ADMIN — METOPROLOL TARTRATE 25 MG: 25 TABLET, FILM COATED ORAL at 09:29

## 2025-03-08 RX ADMIN — ATORVASTATIN CALCIUM 80 MG: 80 TABLET, FILM COATED ORAL at 21:37

## 2025-03-08 RX ADMIN — HEPARIN SODIUM 5000 UNITS: 5000 INJECTION, SOLUTION INTRAVENOUS; SUBCUTANEOUS at 06:32

## 2025-03-08 RX ADMIN — HEPARIN SODIUM 5000 UNITS: 5000 INJECTION, SOLUTION INTRAVENOUS; SUBCUTANEOUS at 21:39

## 2025-03-08 RX ADMIN — CEPHALEXIN 500 MG: 500 CAPSULE ORAL at 11:32

## 2025-03-08 RX ADMIN — GABAPENTIN 100 MG: 100 CAPSULE ORAL at 21:38

## 2025-03-08 RX ADMIN — CITALOPRAM HYDROBROMIDE 20 MG: 20 TABLET ORAL at 21:38

## 2025-03-08 RX ADMIN — INSULIN GLARGINE 5 UNITS: 100 INJECTION, SOLUTION SUBCUTANEOUS at 21:39

## 2025-03-08 RX ADMIN — LIDOCAINE 1 PATCH: 700 PATCH TOPICAL at 09:28

## 2025-03-08 RX ADMIN — SENNOSIDES AND DOCUSATE SODIUM 2 TABLET: 50; 8.6 TABLET ORAL at 09:29

## 2025-03-08 RX ADMIN — LEVETIRACETAM 500 MG: 500 TABLET, FILM COATED ORAL at 17:24

## 2025-03-08 RX ADMIN — PANTOPRAZOLE SODIUM 40 MG: 40 TABLET, DELAYED RELEASE ORAL at 09:29

## 2025-03-08 RX ADMIN — LEVETIRACETAM 500 MG: 500 TABLET, FILM COATED ORAL at 09:29

## 2025-03-08 RX ADMIN — LEVOTHYROXINE SODIUM 150 MCG: 0.07 TABLET ORAL at 06:31

## 2025-03-08 RX ADMIN — CEPHALEXIN 500 MG: 500 CAPSULE ORAL at 06:32

## 2025-03-08 RX ADMIN — INSULIN LISPRO 1 UNITS: 100 INJECTION, SOLUTION INTRAVENOUS; SUBCUTANEOUS at 21:41

## 2025-03-08 NOTE — PROGRESS NOTES
Patient:    MRN:  [id number]    Yolanda Request ID:  [id number]    Level of care reserved:  Home Health Agency    Partner Reserved:  [facility], [place] [id number] [phone]    Clinical needs requested:    Geography searched:  [phone]    Start of Service:    Request sent:  4:11pm EST on 3/6/2025 by Kelley Warner    Partner reserved:  8:54am EST on 3/8/2025 by Irish Kong    Choice list shared:  8:15am EST on 3/7/2025 by Kelley Warner

## 2025-03-08 NOTE — ASSESSMENT & PLAN NOTE
Lab Results   Component Value Date    HGBA1C 8.9 (H) 12/05/2024       Recent Labs     03/07/25  1633 03/07/25  2041 03/08/25  0613 03/08/25  1108   POCGLU 204* 176* 141* 243*       Blood Sugar Average: Last 72 hrs:  (P) 163.5625  PTA on Lantus 16 units nightly and Humalog 4 units with lunch/dinner, presented with hypoglycemia noted earlier today  Insulin held on admission- now with hyperglycemia. Will change diet to CCD from regular diet and add lantus 5 units at HS with SSI coverage   hypoglycemia protocol

## 2025-03-08 NOTE — PROGRESS NOTES
Progress Note - Hospitalist   Name: Lizzie Whitten 85 y.o. female I MRN: [id number]  Unit/Bed#: -01 I Date of Admission: 3/5/2025   Date of Service: 3/8/2025 I Hospital Day: 3     Assessment & Plan  Generalized weakness  Patient presented with hypoglycemia, unfortunately persisted with generalized weakness and did not feel comfortable returning to home assisted living situation at this time  Labs (+) transaminitis, CT abdomen/pelvis negative for acute intra-abdominal pathology  PT/OT recommending moderate resources  Transaminitis  Noted to have transaminitis on admission with levels worsening on day 2. Lab work sent and tylenol level was 9. Given recent cervical spondylosis suspected possible overuse of Tylenol although patient and assisted living report she was only using it on a PRN basis. Pt did receive Tylenol on admission.   Toxicology consulted and recommended monitoring labs closely and starting NAC treatment   Pending labs today at 1300- if improved and INR less than 2 can stop NAC treatment   LFTs trending down; Tylenol level less than 2 on repeat labs  Workup: ceruloplasmin 25.5; UDS negative: CMV not detected; acute hepatitis negative; GGT 68  Pending labs: Liver kidney microsomal antibody; anti-smooth muscle antibody IgG  avoid hepatotoxins at this time  Check a.m. CMP  Type 2 diabetes mellitus with hypoglycemia without coma, with long-term current use of insulin ([facility])  Lab Results   Component Value Date    HGBA1C 8.9 (H) 12/05/2024       Recent Labs     03/07/25  1633 03/07/25  2041 03/08/25  0613 03/08/25  1108   POCGLU 204* 176* 141* 243*       Blood Sugar Average: Last 72 hrs:  (P) 163.5625  PTA on Lantus 16 units nightly and Humalog 4 units with lunch/dinner, presented with hypoglycemia noted earlier today  Insulin held on admission- now with hyperglycemia. Will change diet to CCD from regular diet and add lantus 5 units at HS with SSI coverage   hypoglycemia protocol  Suspected UTI  Noted  to have foul-smelling urine per nursing staff and urine culture was sent  UA positive for blood RBCs WBC 30-50.  Urine culture pending  Start Keflex as this has been previously used to treat UTIs  Chronic diastolic congestive heart failure ([facility])  Wt Readings from Last 3 Encounters:   12/05/24 78.9 kg (173 lb 15.1 oz)   11/05/24 76.4 kg (168 lb 6.4 oz)   08/01/24 80.4 kg (177 lb 3.2 oz)   Noted in history, CT imaging revealing right pleural effusion however patient otherwise euvolemic and without new oxygen requirement at this time  Continue PTA cardiac medications and monitor volume status closely  Stage 3b chronic kidney disease ([facility])  Lab Results   Component Value Date    EGFR 47 03/08/2025    EGFR 52 03/07/2025    EGFR 43 03/06/2025    CREATININE 1.09 03/08/2025    CREATININE 1.00 03/07/2025    CREATININE 1.17 03/06/2025   Renal function at baseline, monitor with BMP.  Avoid/limit nephrotoxins and hypotension  PAF (paroxysmal atrial fibrillation) ([facility])  S/p PPM for complete heart block  Continue PTA metoprolol tartrate  Continue anticoagulation with Eliquis  Cervical spondylosis  Known history of C1-C2 subluxation initially noted following fall January 2025 managed conservatively  Neurosurgery following, flexion/extension x-rays of the cervical spine with open-mouth views were obtained and patient was cleared by neurosurgery to have c-collar removed   PT/OT evaluations as above  COPD (chronic obstructive pulmonary disease) ([facility])  Stable without exacerbation   C/w 2LNC   VTE Pharmacologic Prophylaxis:   Pharmacologic: Heparin  Mechanical VTE Prophylaxis in Place: No    Patient Centered Rounds: I have performed bedside rounds with nursing staff today.      Time Spent for Care: 1 hour.  More than 50% of total time spent on counseling and coordination of care as described above.    Current Length of Stay: 3 day(s)    Current Patient Status: Inpatient     Code Status: Level 1 - Full Code      Subjective:    nad    Objective:     Vitals:   Temp (24hrs), Av.6 °F (36.4 °C), Min:97.4 °F (36.3 °C), Max:98 °F (36.7 °C)    Temp:  [97.4 °F (36.3 °C)-98 °F (36.7 °C)] 97.4 °F (36.3 °C)  HR:  [60-62] 60  Resp:  [16-18] 16  BP: (124-141)/(64-72) 131/72  SpO2:  [97 %-99 %] 98 %  There is no height or weight on file to calculate BMI.     Input and Output Summary (last 24 hours):     No intake or output data in the 24 hours ending 25 1155    Physical Exam:     Physical Exam  HENT:      Head: Normocephalic and atraumatic.      Mouth/Throat:      Mouth: Mucous membranes are moist.   Cardiovascular:      Rate and Rhythm: Normal rate and regular rhythm.   Pulmonary:      Effort: Pulmonary effort is normal.      Breath sounds: Normal breath sounds.   Abdominal:      General: Abdomen is flat.   Skin:     General: Skin is warm and dry.   Neurological:      General: No focal deficit present.      Mental Status: She is alert and oriented to person, place, and time.         Additional Data:     Labs:    Results from last 7 days   Lab Units 25  0508 25  0542 25  1006   WBC Thousand/uL 5.24   < > 5.79   HEMOGLOBIN g/dL 10.7*   < > 11.6   HEMATOCRIT % 35.4   < > 37.5   PLATELETS Thousands/uL 178   < > 193   SEGS PCT %  --   --  62   LYMPHO PCT %  --   --  26   MONO PCT %  --   --  8   EOS PCT %  --   --  3    < > = values in this interval not displayed.     Results from last 7 days   Lab Units 25  0508   POTASSIUM mmol/L 4.0   CHLORIDE mmol/L 100   CO2 mmol/L 31   BUN mg/dL 23   CREATININE mg/dL 1.09   CALCIUM mg/dL 8.7   ALK PHOS U/L 115*   ALT U/L 59*   AST U/L 22     Results from last 7 days   Lab Units 25  1627   INR  1.26*         Recent Cultures (last 7 days):     Results from last 7 days   Lab Units 25  1609   URINE CULTURE  Culture too young- will reincubate       Last 24 Hours Medication List:   Current Facility-Administered Medications   Medication Dose Route Frequency Provider Last Rate     atorvastatin  80 mg Oral HS Otto Cadena, DO      cephalexin  500 mg Oral Q6H [facility] DALILA Peck      citalopram  20 mg Oral HS Otto Cadena, DO      gabapentin  100 mg Oral HS Otto Cadena, DO      heparin (porcine)  5,000 Units Subcutaneous Q8H PRASHANT Otto Cadena, DO      insulin glargine  5 Units Subcutaneous HS DALILA Peck      insulin lispro  1-5 Units Subcutaneous HS Otto Cadena, DO      insulin lispro  1-6 Units Subcutaneous TID AC Otto Cadena, DO      levETIRAcetam  500 mg Oral BID Otto Cadena, DO      levothyroxine  150 mcg Oral Early Morning Otto Cadena, DO      lidocaine  1 patch Topical Daily Otto Cadena, DO      metoprolol tartrate  25 mg Oral BID Otto Cadena, DO      ondansetron  4 mg Intravenous Q6H PRN Otto Cadena, DO      pantoprazole  40 mg Oral Daily Otto Cadena, DO      polyethylene glycol  17 g Oral Daily DALILA Peck      senna-docusate sodium  2 tablet Oral Daily Otto Cadena, DO          Today, Patient Was Seen By: Mary Fernandez DO    ** Please Note: Dictation voice to text software may have been used in the creation of this document. **

## 2025-03-08 NOTE — ASSESSMENT & PLAN NOTE
Lab Results   Component Value Date    EGFR 47 03/08/2025    EGFR 52 03/07/2025    EGFR 43 03/06/2025    CREATININE 1.09 03/08/2025    CREATININE 1.00 03/07/2025    CREATININE 1.17 03/06/2025   Renal function at baseline, monitor with BMP.  Avoid/limit nephrotoxins and hypotension

## 2025-03-08 NOTE — PLAN OF CARE
Problem: PHYSICAL THERAPY ADULT  Goal: Performs mobility at highest level of function for planned discharge setting.  See evaluation for individualized goals.  Description: Treatment/Interventions: Functional transfer training, LE strengthening/ROM, Therapeutic exercise, Endurance training, Cognitive reorientation, Patient/family training, Equipment eval/education, Bed mobility, Gait training, Spoke to nursing, OT          See flowsheet documentation for full assessment, interventions and recommendations.  Outcome: Progressing  Note: Prognosis: Fair  Problem List: Decreased strength, Decreased endurance, Impaired balance, Decreased mobility, Decreased coordination, Decreased cognition, Impaired judgement  Assessment: Patient seen for PT treatment session this date. Patient lethargic and difficult to arouse at start of session. Patient reported feeling dizzy upon sitting EOB. /71. As patient continued to sit EOB, she reported decreased dizziness. Of note patient was also not out of bed yesterday per her reports. Patient initially hesitant to stand secondary to feeling that her legs are weak. Patient utilized pushing her knees against the bed and locking her knees in order to stand. Noted deficits in hip extension and quadriceps strength. Patient also with posterior lean. Patient was able to ambulate at increased distance during today's session however excessively slow. Patient would benefit from continue skilled physical therapy to address her deficits in strength, balance and endurance affecting her functional mobility. Recommend level II resource intensity.  Barriers to Discharge: Decreased caregiver support     Rehab Resource Intensity Level, PT: II (Moderate Resource Intensity)    See flowsheet documentation for full assessment.

## 2025-03-08 NOTE — PHYSICAL THERAPY NOTE
PHYSICAL THERAPY NOTE          Patient Name: Lizzie Whitten  Today's Date: 3/8/2025       03/08/25 0930   PT Last Visit   PT Visit Date 03/08/25   Note Type   Note Type Treatment   Pain Assessment   Pain Assessment Tool 0-10   Pain Score No Pain   Restrictions/Precautions   Weight Bearing Precautions Per Order No   Other Precautions Cognitive;Chair Alarm;Bed Alarm;Multiple lines;O2;Fall Risk;Hard of hearing  (2.5 L O2)   General   Response to Previous Treatment Patient with no complaints from previous session.   Family/Caregiver Present No   Cognition   Attention Attends with cues to redirect   Following Commands Follows one step commands with increased time or repetition   Subjective   Subjective pt agreeable to mobilize with PT however difficult to arouse   Bed Mobility   Supine to Sit 3  Moderate assistance   Additional items Assist x 1;HOB elevated;Increased time required;Verbal cues;LE management   Additional Comments Pt OOB in bedside recliner at end of session. Chair alarm on and all needs within reach   Transfers   Sit to Stand 3  Moderate assistance   Additional items Assist x 1;Increased time required   Stand to Sit 4  Minimal assistance   Additional items Assist x 1;Increased time required;Armrests   Additional Comments w/ RW, x 3 throughout the session. Patient with noted use of pushing against bed or chair to perform transfer   Ambulation/Elevation   Gait pattern Improper Weight shift;Decreased foot clearance;Foward flexed;Excessively slow   Gait Assistance 3  Moderate assist   Additional items Assist x 1;Verbal cues   Assistive Device Rolling walker   Distance 15'   Balance   Static Sitting Fair   Dynamic Sitting Fair -   Static Standing Poor +   Dynamic Standing Poor   Ambulatory Poor   Endurance Deficit   Endurance Deficit Yes   Endurance Deficit Description Generalized weakness and deconditioning   Activity  Tolerance   Activity Tolerance Patient limited by fatigue   Nurse Made Aware RN cleared and updated   Assessment   Prognosis Fair   Problem List Decreased strength;Decreased endurance;Impaired balance;Decreased mobility;Decreased coordination;Decreased cognition;Impaired judgement   Assessment Patient seen for PT treatment session this date. Patient lethargic and difficult to arouse at start of session. Patient reported feeling dizzy upon sitting EOB. /71. As patient continued to sit EOB, she reported decreased dizziness. Of note patient was also not out of bed yesterday per her reports. Patient initially hesitant to stand secondary to feeling that her legs are weak. Patient utilized pushing her knees against the bed and locking her knees in order to stand. Noted deficits in hip extension and quadriceps strength. Patient also with posterior lean. Patient was able to ambulate at increased distance during today's session however excessively slow. Patient would benefit from continue skilled physical therapy to address her deficits in strength, balance and endurance affecting her functional mobility. Recommend level II resource intensity.   Barriers to Discharge Decreased caregiver support   Goals   Patient Goals To sleep   STG Expiration Date 03/20/25   PT Treatment Day 1   Plan   Treatment/Interventions Functional transfer training;LE strengthening/ROM;Therapeutic exercise;Endurance training;Cognitive reorientation;Patient/family training;Equipment eval/education;Bed mobility;Gait training;Spoke to nursing;OT   Progress Slow progress, decreased activity tolerance   PT Frequency 2-3x/wk   Discharge Recommendation   Rehab Resource Intensity Level, PT II (Moderate Resource Intensity)   Additional Comments pending progress and social support   AM-PAC Basic Mobility Inpatient   Turning in Flat Bed Without Bedrails 3   Lying on Back to Sitting on Edge of Flat Bed Without Bedrails 3   Moving Bed to Chair 3   Standing  Up From Chair Using Arms 3   Walk in Room 2   Climb 3-5 Stairs With Railing 1   Basic Mobility Inpatient Raw Score 15   Basic Mobility Standardized Score 36.97   [facility] Highest Level Of Mobility   [facility] Goal 4: Move to chair/commode   -HL Achieved 6: Walk 10 steps or more         Negar Reddy, PT

## 2025-03-08 NOTE — PLAN OF CARE
Problem: PAIN - ADULT  Goal: Verbalizes/displays adequate comfort level or baseline comfort level  Description: Interventions:  - Encourage patient to monitor pain and request assistance  - Assess pain using appropriate pain scale  - Administer analgesics based on type and severity of pain and evaluate response  - Implement non-pharmacological measures as appropriate and evaluate response  - Consider cultural and social influences on pain and pain management  - Notify physician/advanced practitioner if interventions unsuccessful or patient reports new pain  Outcome: Progressing     Problem: INFECTION - ADULT  Goal: Absence or prevention of progression during hospitalization  Description: INTERVENTIONS:  - Assess and monitor for signs and symptoms of infection  - Monitor lab/diagnostic results  - Monitor all insertion sites, i.e. indwelling lines, tubes, and drains  - Monitor endotracheal if appropriate and nasal secretions for changes in amount and color  - [place] appropriate cooling/warming therapies per order  - Administer medications as ordered  - Instruct and encourage patient and family to use good hand hygiene technique  - Identify and instruct in appropriate isolation precautions for identified infection/condition  Outcome: Progressing     Problem: DISCHARGE PLANNING  Goal: Discharge to home or other facility with appropriate resources  Description: INTERVENTIONS:  - Identify barriers to discharge w/patient and caregiver  - Arrange for needed discharge resources and transportation as appropriate  - Identify discharge learning needs (meds, wound care, etc.)  - Arrange for interpretive services to assist at discharge as needed  - Refer to Case Management Department for coordinating discharge planning if the patient needs post-hospital services based on physician/advanced practitioner order or complex needs related to functional status, cognitive ability, or social support system  Outcome: Progressing

## 2025-03-09 LAB
GLUCOSE SERPL-MCNC: 140 MG/DL (ref 65–140)
GLUCOSE SERPL-MCNC: 161 MG/DL (ref 65–140)
GLUCOSE SERPL-MCNC: 175 MG/DL (ref 65–140)
GLUCOSE SERPL-MCNC: 186 MG/DL (ref 65–140)

## 2025-03-09 PROCEDURE — 82948 REAGENT STRIP/BLOOD GLUCOSE: CPT

## 2025-03-09 PROCEDURE — 99233 SBSQ HOSP IP/OBS HIGH 50: CPT | Performed by: INTERNAL MEDICINE

## 2025-03-09 RX ADMIN — CEPHALEXIN 500 MG: 500 CAPSULE ORAL at 17:01

## 2025-03-09 RX ADMIN — METOPROLOL TARTRATE 25 MG: 25 TABLET, FILM COATED ORAL at 09:01

## 2025-03-09 RX ADMIN — HEPARIN SODIUM 5000 UNITS: 5000 INJECTION, SOLUTION INTRAVENOUS; SUBCUTANEOUS at 05:20

## 2025-03-09 RX ADMIN — LEVOTHYROXINE SODIUM 150 MCG: 0.07 TABLET ORAL at 05:19

## 2025-03-09 RX ADMIN — LEVETIRACETAM 500 MG: 500 TABLET, FILM COATED ORAL at 09:01

## 2025-03-09 RX ADMIN — CEPHALEXIN 500 MG: 500 CAPSULE ORAL at 05:19

## 2025-03-09 RX ADMIN — SENNOSIDES AND DOCUSATE SODIUM 2 TABLET: 50; 8.6 TABLET ORAL at 09:01

## 2025-03-09 RX ADMIN — HEPARIN SODIUM 5000 UNITS: 5000 INJECTION, SOLUTION INTRAVENOUS; SUBCUTANEOUS at 14:59

## 2025-03-09 RX ADMIN — CEPHALEXIN 500 MG: 500 CAPSULE ORAL at 11:58

## 2025-03-09 RX ADMIN — METOPROLOL TARTRATE 25 MG: 25 TABLET, FILM COATED ORAL at 17:01

## 2025-03-09 RX ADMIN — INSULIN GLARGINE 5 UNITS: 100 INJECTION, SOLUTION SUBCUTANEOUS at 21:34

## 2025-03-09 RX ADMIN — LIDOCAINE 1 PATCH: 700 PATCH TOPICAL at 09:01

## 2025-03-09 RX ADMIN — CITALOPRAM HYDROBROMIDE 20 MG: 20 TABLET ORAL at 21:33

## 2025-03-09 RX ADMIN — ATORVASTATIN CALCIUM 80 MG: 80 TABLET, FILM COATED ORAL at 21:33

## 2025-03-09 RX ADMIN — LEVETIRACETAM 500 MG: 500 TABLET, FILM COATED ORAL at 17:01

## 2025-03-09 RX ADMIN — CEPHALEXIN 500 MG: 500 CAPSULE ORAL at 00:48

## 2025-03-09 RX ADMIN — GABAPENTIN 100 MG: 100 CAPSULE ORAL at 21:33

## 2025-03-09 RX ADMIN — HEPARIN SODIUM 5000 UNITS: 5000 INJECTION, SOLUTION INTRAVENOUS; SUBCUTANEOUS at 21:35

## 2025-03-09 RX ADMIN — POLYETHYLENE GLYCOL 3350 17 G: 17 POWDER, FOR SOLUTION ORAL at 09:01

## 2025-03-09 RX ADMIN — INSULIN LISPRO 1 UNITS: 100 INJECTION, SOLUTION INTRAVENOUS; SUBCUTANEOUS at 21:36

## 2025-03-09 RX ADMIN — INSULIN LISPRO 1 UNITS: 100 INJECTION, SOLUTION INTRAVENOUS; SUBCUTANEOUS at 17:02

## 2025-03-09 RX ADMIN — PANTOPRAZOLE SODIUM 40 MG: 40 TABLET, DELAYED RELEASE ORAL at 09:01

## 2025-03-09 RX ADMIN — INSULIN LISPRO 1 UNITS: 100 INJECTION, SOLUTION INTRAVENOUS; SUBCUTANEOUS at 11:58

## 2025-03-09 NOTE — ASSESSMENT & PLAN NOTE
Lab Results   Component Value Date    HGBA1C 8.9 (H) 12/05/2024       Recent Labs     03/08/25  1108 03/08/25  1618 03/08/25  2105 03/09/25  0606   POCGLU 243* 177* 195* 140       Blood Sugar Average: Last 72 hrs:  (P) [id number]  PTA on Lantus 16 units nightly and Humalog 4 units with lunch/dinner, presented with hypoglycemia noted earlier today  Insulin held on admission- now with hyperglycemia. Will change diet to CCD from regular diet and add lantus 5 units at HS with SSI coverage   hypoglycemia protocol

## 2025-03-09 NOTE — PLAN OF CARE
Problem: DISCHARGE PLANNING  Goal: Discharge to home or other facility with appropriate resources  Description: INTERVENTIONS:  - Identify barriers to discharge w/patient and caregiver  - Arrange for needed discharge resources and transportation as appropriate  - Identify discharge learning needs (meds, wound care, etc.)  - Arrange for interpretive services to assist at discharge as needed  - Refer to Case Management Department for coordinating discharge planning if the patient needs post-hospital services based on physician/advanced practitioner order or complex needs related to functional status, cognitive ability, or social support system  3/8/2025 2301 by Milagro Rawls, RN  Outcome: Progressing  3/8/2025 2259 by Milagro Rawls, RN  Outcome: Progressing

## 2025-03-09 NOTE — PROGRESS NOTES
Progress Note - Hospitalist   Name: Lizzie Whitten 85 y.o. female I MRN: [id number]  Unit/Bed#: -01 I Date of Admission: 3/5/2025   Date of Service: 3/9/2025 I Hospital Day: 4     Assessment & Plan  Generalized weakness  Patient presented with hypoglycemia, unfortunately persisted with generalized weakness and did not feel comfortable returning to home assisted living situation at this time  Labs (+) transaminitis, CT abdomen/pelvis negative for acute intra-abdominal pathology  PT/OT recommending moderate resources  Transaminitis  Noted to have transaminitis on admission with levels worsening on day 2. Lab work sent and tylenol level was 9. Given recent cervical spondylosis suspected possible overuse of Tylenol although patient and assisted living report she was only using it on a PRN basis. Pt did receive Tylenol on admission.   Toxicology consulted and recommended monitoring labs closely and starting NAC treatment   Pending labs today at 1300- if improved and INR less than 2 can stop NAC treatment   LFTs trending down; Tylenol level less than 2 on repeat labs  Workup: ceruloplasmin 25.5; UDS negative: CMV not detected; acute hepatitis negative; GGT 68  Pending labs: Liver kidney microsomal antibody; anti-smooth muscle antibody IgG  avoid hepatotoxins at this time  Check a.m. CMP  Type 2 diabetes mellitus with hypoglycemia without coma, with long-term current use of insulin ([facility])  Lab Results   Component Value Date    HGBA1C 8.9 (H) 12/05/2024       Recent Labs     03/08/25  1108 03/08/25  1618 03/08/25  2105 03/09/25  0606   POCGLU 243* 177* 195* 140       Blood Sugar Average: Last 72 hrs:  (P) [id number]  PTA on Lantus 16 units nightly and Humalog 4 units with lunch/dinner, presented with hypoglycemia noted earlier today  Insulin held on admission- now with hyperglycemia. Will change diet to CCD from regular diet and add lantus 5 units at HS with SSI coverage   hypoglycemia protocol  Suspected  UTI  Noted to have foul-smelling urine per nursing staff and urine culture was sent  UA positive for blood RBCs WBC 30-50.  Urine culture pending  Start Keflex as this has been previously used to treat UTIs  Chronic diastolic congestive heart failure (HCC)  Wt Readings from Last 3 Encounters:   24 78.9 kg (173 lb 15.1 oz)   24 76.4 kg (168 lb 6.4 oz)   24 80.4 kg (177 lb 3.2 oz)   Noted in history, CT imaging revealing right pleural effusion however patient otherwise euvolemic and without new oxygen requirement at this time  Continue PTA cardiac medications and monitor volume status closely  Stage 3b chronic kidney disease ([facility])  Lab Results   Component Value Date    EGFR 47 2025    EGFR 52 2025    EGFR 43 2025    CREATININE 1.09 2025    CREATININE 1.00 2025    CREATININE 1.17 2025   Renal function at baseline, monitor with BMP.  Avoid/limit nephrotoxins and hypotension  PAF (paroxysmal atrial fibrillation) ([facility])  S/p PPM for complete heart block  Continue PTA metoprolol tartrate  Continue anticoagulation with Eliquis  Cervical spondylosis  Known history of C1-C2 subluxation initially noted following fall 2025 managed conservatively  Neurosurgery following, flexion/extension x-rays of the cervical spine with open-mouth views were obtained and patient was cleared by neurosurgery to have c-collar removed   PT/OT evaluations as above  COPD (chronic obstructive pulmonary disease) ([facility])  Stable without exacerbation   C/w 2LNC       Time Spent for Care: 1 hour.  More than 50% of total time spent on counseling and coordination of care as described above.    Current Length of Stay: 4 day(s)    Current Patient Status: Inpatient       Code Status: Level 1 - Full Code      Subjective:   nad    Objective:     Vitals:   Temp (24hrs), Av.9 °F (36.6 °C), Min:97.8 °F (36.6 °C), Max:98 °F (36.7 °C)    Temp:  [97.8 °F (36.6 °C)-98 °F (36.7 °C)] 97.8 °F (36.6 °C)  HR:   [60] 60  Resp:  [18] 18  BP: (131-137)/(70-72) 132/70  SpO2:  [98 %] 98 %  There is no height or weight on file to calculate BMI.     Input and Output Summary (last 24 hours):     No intake or output data in the 24 hours ending 03/09/25 0952    Physical Exam:     Physical Exam  HENT:      Head: Atraumatic.   Cardiovascular:      Rate and Rhythm: Normal rate and regular rhythm.   Pulmonary:      Effort: Pulmonary effort is normal.      Breath sounds: Normal breath sounds.   Abdominal:      General: Abdomen is flat.      Palpations: Abdomen is soft.   Skin:     General: Skin is warm.   Neurological:      General: No focal deficit present.      Mental Status: She is alert and oriented to person, place, and time.   Psychiatric:         Mood and Affect: Mood normal.         Additional Data:     Labs:    Results from last 7 days   Lab Units 03/08/25  0508 03/06/25  0542 03/05/25  1006   WBC Thousand/uL 5.24   < > 5.79   HEMOGLOBIN g/dL 10.7*   < > 11.6   HEMATOCRIT % 35.4   < > 37.5   PLATELETS Thousands/uL 178   < > 193   SEGS PCT %  --   --  62   LYMPHO PCT %  --   --  26   MONO PCT %  --   --  8   EOS PCT %  --   --  3    < > = values in this interval not displayed.     Results from last 7 days   Lab Units 03/08/25  0508   POTASSIUM mmol/L 4.0   CHLORIDE mmol/L 100   CO2 mmol/L 31   BUN mg/dL 23   CREATININE mg/dL 1.09   CALCIUM mg/dL 8.7   ALK PHOS U/L 115*   ALT U/L 59*   AST U/L 22     Results from last 7 days   Lab Units 03/07/25  1627   INR  1.26*         Recent Cultures (last 7 days):     Results from last 7 days   Lab Units 03/06/25  1609   URINE CULTURE  >100,000 cfu/ml       Last 24 Hours Medication List:   Current Facility-Administered Medications   Medication Dose Route Frequency Provider Last Rate    atorvastatin  80 mg Oral HS Ottowyatt Cadena, DO      cephalexin  500 mg Oral Q6H DALILA Osullivan      citalopram  20 mg Oral HS Otto Cadena, DO      gabapentin  100 mg Oral HS Otto Cadena, DO       heparin (porcine)  5,000 Units Subcutaneous Q8H PRASHANT Otto Cadena,       insulin glargine  5 Units Subcutaneous HS DALILA Peck      insulin lispro  1-5 Units Subcutaneous HS Otto Cadena, DO      insulin lispro  1-6 Units Subcutaneous TID AC Otto aCdena, DO      levETIRAcetam  500 mg Oral BID Otto Cadena, DO      levothyroxine  150 mcg Oral Early Morning Otto Cadena, DO      lidocaine  1 patch Topical Daily Otto Cadena, DO      metoprolol tartrate  25 mg Oral BID Otto Cadena, DO      ondansetron  4 mg Intravenous Q6H PRN Otto Cadena DO      pantoprazole  40 mg Oral Daily Otto Cadena, DO      polyethylene glycol  17 g Oral Daily DALILA Peck      senna-docusate sodium  2 tablet Oral Daily Otto Cadena DO          Today, Patient Was Seen By: Mary Fernandez DO    ** Please Note: Dictation voice to text software may have been used in the creation of this document. **

## 2025-03-10 LAB
GLUCOSE SERPL-MCNC: 151 MG/DL (ref 65–140)
GLUCOSE SERPL-MCNC: 162 MG/DL (ref 65–140)
GLUCOSE SERPL-MCNC: 185 MG/DL (ref 65–140)
GLUCOSE SERPL-MCNC: 197 MG/DL (ref 65–140)

## 2025-03-10 PROCEDURE — 99233 SBSQ HOSP IP/OBS HIGH 50: CPT | Performed by: INTERNAL MEDICINE

## 2025-03-10 PROCEDURE — 82948 REAGENT STRIP/BLOOD GLUCOSE: CPT

## 2025-03-10 RX ORDER — SODIUM PHOSPHATE,MONO-DIBASIC 19G-7G/118
1 ENEMA (ML) RECTAL ONCE
Status: COMPLETED | OUTPATIENT
Start: 2025-03-10 | End: 2025-03-10

## 2025-03-10 RX ADMIN — INSULIN LISPRO 1 UNITS: 100 INJECTION, SOLUTION INTRAVENOUS; SUBCUTANEOUS at 06:22

## 2025-03-10 RX ADMIN — PANTOPRAZOLE SODIUM 40 MG: 40 TABLET, DELAYED RELEASE ORAL at 09:51

## 2025-03-10 RX ADMIN — INSULIN LISPRO 1 UNITS: 100 INJECTION, SOLUTION INTRAVENOUS; SUBCUTANEOUS at 22:19

## 2025-03-10 RX ADMIN — GABAPENTIN 100 MG: 100 CAPSULE ORAL at 22:17

## 2025-03-10 RX ADMIN — LEVETIRACETAM 500 MG: 500 TABLET, FILM COATED ORAL at 17:42

## 2025-03-10 RX ADMIN — SODIUM PHOSPHATE, DIBASIC AND SODIUM PHOSPHATE, MONOBASIC 1 ENEMA: 7; 19 ENEMA RECTAL at 15:42

## 2025-03-10 RX ADMIN — METOPROLOL TARTRATE 25 MG: 25 TABLET, FILM COATED ORAL at 09:51

## 2025-03-10 RX ADMIN — SENNOSIDES AND DOCUSATE SODIUM 2 TABLET: 50; 8.6 TABLET ORAL at 09:51

## 2025-03-10 RX ADMIN — POLYETHYLENE GLYCOL 3350 17 G: 17 POWDER, FOR SOLUTION ORAL at 09:51

## 2025-03-10 RX ADMIN — ATORVASTATIN CALCIUM 80 MG: 80 TABLET, FILM COATED ORAL at 22:17

## 2025-03-10 RX ADMIN — CEPHALEXIN 500 MG: 500 CAPSULE ORAL at 00:32

## 2025-03-10 RX ADMIN — LEVETIRACETAM 500 MG: 500 TABLET, FILM COATED ORAL at 09:51

## 2025-03-10 RX ADMIN — LEVOTHYROXINE SODIUM 150 MCG: 0.07 TABLET ORAL at 05:31

## 2025-03-10 RX ADMIN — HEPARIN SODIUM 5000 UNITS: 5000 INJECTION, SOLUTION INTRAVENOUS; SUBCUTANEOUS at 15:42

## 2025-03-10 RX ADMIN — HEPARIN SODIUM 5000 UNITS: 5000 INJECTION, SOLUTION INTRAVENOUS; SUBCUTANEOUS at 05:32

## 2025-03-10 RX ADMIN — INSULIN GLARGINE 5 UNITS: 100 INJECTION, SOLUTION SUBCUTANEOUS at 22:17

## 2025-03-10 RX ADMIN — METOPROLOL TARTRATE 25 MG: 25 TABLET, FILM COATED ORAL at 17:42

## 2025-03-10 RX ADMIN — HEPARIN SODIUM 5000 UNITS: 5000 INJECTION, SOLUTION INTRAVENOUS; SUBCUTANEOUS at 22:17

## 2025-03-10 RX ADMIN — INSULIN LISPRO 2 UNITS: 100 INJECTION, SOLUTION INTRAVENOUS; SUBCUTANEOUS at 11:54

## 2025-03-10 RX ADMIN — CEPHALEXIN 500 MG: 500 CAPSULE ORAL at 05:31

## 2025-03-10 RX ADMIN — INSULIN LISPRO 1 UNITS: 100 INJECTION, SOLUTION INTRAVENOUS; SUBCUTANEOUS at 16:43

## 2025-03-10 RX ADMIN — CITALOPRAM HYDROBROMIDE 20 MG: 20 TABLET ORAL at 22:17

## 2025-03-10 NOTE — PLAN OF CARE
Problem: PAIN - ADULT  Goal: Verbalizes/displays adequate comfort level or baseline comfort level  Description: Interventions:  - Encourage patient to monitor pain and request assistance  - Assess pain using appropriate pain scale  - Administer analgesics based on type and severity of pain and evaluate response  - Implement non-pharmacological measures as appropriate and evaluate response  - Consider cultural and social influences on pain and pain management  - Notify physician/advanced practitioner if interventions unsuccessful or patient reports new pain  Outcome: Progressing     Problem: INFECTION - ADULT  Goal: Absence or prevention of progression during hospitalization  Description: INTERVENTIONS:  - Assess and monitor for signs and symptoms of infection  - Monitor lab/diagnostic results  - Monitor all insertion sites, i.e. indwelling lines, tubes, and drains  - Monitor endotracheal if appropriate and nasal secretions for changes in amount and color  - [place] appropriate cooling/warming therapies per order  - Administer medications as ordered  - Instruct and encourage patient and family to use good hand hygiene technique  - Identify and instruct in appropriate isolation precautions for identified infection/condition  Outcome: Progressing     Problem: SAFETY ADULT  Goal: Patient will remain free of falls  Description: INTERVENTIONS:  - Educate patient/family on patient safety including physical limitations  - Instruct patient to call for assistance with activity   - Consult OT/PT to assist with strengthening/mobility   - Keep Call bell within reach  - Keep bed low and locked with side rails adjusted as appropriate  - Keep care items and personal belongings within reach  - Initiate and maintain comfort rounds  - Make Fall Risk Sign visible to staff  - Offer Toileting every 2 Hours, in advance of need  - Initiate/Maintain bed alarm  - Obtain necessary fall risk management equipment: walker  Problem: DISCHARGE  PLANNING  Goal: Discharge to home or other facility with appropriate resources  Description: INTERVENTIONS:  - Identify barriers to discharge w/patient and caregiver  - Arrange for needed discharge resources and transportation as appropriate  - Identify discharge learning needs (meds, wound care, etc.)  - Arrange for interpretive services to assist at discharge as needed  - Refer to Case Management Department for coordinating discharge planning if the patient needs post-hospital services based on physician/advanced practitioner order or complex needs related to functional status, cognitive ability, or social support system  Outcome: Progressing     Problem: Knowledge Deficit  Goal: Patient/family/caregiver demonstrates understanding of disease process, treatment plan, medications, and discharge instructions  Description: Complete learning assessment and assess knowledge base.  Interventions:  - Provide teaching at level of understanding  - Provide teaching via preferred learning methods  Outcome: Progressing     - Apply yellow socks and bracelet for high fall risk patients  - Consider moving patient to room near nurses station  Outcome: Progressing

## 2025-03-10 NOTE — ASSESSMENT & PLAN NOTE
Noted to have foul-smelling urine per nursing staff and urine culture was sent  Patient received a course of Keflex therapy here.  Cliff SPRINGER.  Urine culture mixed contaminant.

## 2025-03-10 NOTE — PROGRESS NOTES
Progress Note - Hospitalist   Name: Lizzie Whitten 85 y.o. female I MRN: [id number]  Unit/Bed#: -01 I Date of Admission: 3/5/2025   Date of Service: 3/10/2025 I Hospital Day: 5     Assessment & Plan  Generalized weakness  Patient presented with hypoglycemia, unfortunately persisted with generalized weakness and did not feel comfortable returning to home assisted living situation at this time  Labs (+) transaminitis, CT abdomen/pelvis negative for acute intra-abdominal pathology  PT/OT recommending moderate resources  Discussed with case management.  Tentative discharge back to assisted living tomorrow.  Family agreeable to transition.  Family requesting bowel regimen given today with repeat enema.   Tentative discharge in 24 hours.  Family requesting early discharge to facilitate caretaker transition  Transaminitis  Noted to have transaminitis on admission with levels worsening on day 2. Lab work sent and tylenol level was 9. Given recent cervical spondylosis suspected possible overuse of Tylenol although patient and assisted living report she was only using it on a PRN basis. Pt did receive Tylenol on admission.   Toxicology consulted and recommended monitoring labs closely and starting NAC treatment   Pending labs today at 1300- if improved and INR less than 2 can stop NAC treatment   LFTs trending down; Tylenol level less than 2 on repeat labs  Workup: ceruloplasmin 25.5; UDS negative: CMV not detected; acute hepatitis negative; GGT 68  Pending labs: Liver kidney microsomal antibody; anti-smooth muscle antibody IgG  Findings not consistent with Tylenol toxicity.  Note toxicology input.  Type 2 diabetes mellitus with hypoglycemia without coma, with long-term current use of insulin ([facility])  Lab Results   Component Value Date    HGBA1C 8.9 (H) 12/05/2024       Recent Labs     03/09/25  1101 03/09/25  1601 03/09/25  2107 03/10/25  0614   POCGLU 161* 186* 175* 185*       Blood Sugar Average: Last 72 hrs:  (P)  [id number]  PTA on Lantus 16 units nightly and Humalog 4 units with lunch/dinner, presented with hypoglycemia noted earlier today  Insulin held on admission- now with hyperglycemia. Will change diet to CCD from regular diet and add lantus 5 units at HS with SSI coverage   hypoglycemia protocol  Suspected UTI  Noted to have foul-smelling urine per nursing staff and urine culture was sent  Patient received a course of Keflex therapy here.  Will DC.  Urine culture mixed contaminant.  Chronic diastolic congestive heart failure (HCC)  Wt Readings from Last 3 Encounters:   12/05/24 78.9 kg (173 lb 15.1 oz)   11/05/24 76.4 kg (168 lb 6.4 oz)   08/01/24 80.4 kg (177 lb 3.2 oz)   Noted in history, CT imaging revealing right pleural effusion however patient otherwise euvolemic and without new oxygen requirement at this time  Continue PTA cardiac medications and monitor volume status closely  Stage 3b chronic kidney disease (Formerly [facility])  Lab Results   Component Value Date    EGFR 47 03/08/2025    EGFR 52 03/07/2025    EGFR 43 03/06/2025    CREATININE 1.09 03/08/2025    CREATININE 1.00 03/07/2025    CREATININE 1.17 03/06/2025   Renal function at baseline, monitor with BMP.  Avoid/limit nephrotoxins and hypotension  PAF (paroxysmal atrial fibrillation) ([facility])  S/p PPM for complete heart block  Continue PTA metoprolol tartrate  Continue anticoagulation with Eliquis  Cervical spondylosis  Known history of C1-C2 subluxation initially noted following fall January 2025 managed conservatively  Neurosurgery following, flexion/extension x-rays of the cervical spine with open-mouth views were obtained and patient was cleared by neurosurgery to have c-collar removed   PT/OT evaluations as above  COPD (chronic obstructive pulmonary disease) ([facility])  Stable without exacerbation   C/w 2LNC   VTE Pharmacologic Prophylaxis:   Pharmacologic: Heparin  Mechanical VTE Prophylaxis in Place: No    Patient Centered Rounds: I have performed bedside  rounds with nursing staff today.      Time Spent for Care: 1 hour.  More than 50% of total time spent on counseling and coordination of care as described above.    Current Length of Stay: 5 day(s)    Current Patient Status: Inpatient     Code Status: Level 1 - Full Code      Subjective:   Patient comfortable.  No acute distress currently.    Objective:     Vitals:   Temp (24hrs), Av.9 °F (36.6 °C), Min:97.7 °F (36.5 °C), Max:98.1 °F (36.7 °C)    Temp:  [97.7 °F (36.5 °C)-98.1 °F (36.7 °C)] 97.7 °F (36.5 °C)  HR:  [60-63] 60  Resp:  [16-18] 16  BP: (130-131)/(66-67) 131/66  SpO2:  [99 %] 99 %  There is no height or weight on file to calculate BMI.     Input and Output Summary (last 24 hours):       Intake/Output Summary (Last 24 hours) at 3/10/2025 0841  Last data filed at 3/10/2025 0601  Gross per 24 hour   Intake 405 ml   Output --   Net 405 ml       Physical Exam:     Physical Exam  Constitutional:       Appearance: Normal appearance.   HENT:      Head: Normocephalic and atraumatic.   Cardiovascular:      Rate and Rhythm: Normal rate and regular rhythm.   Neurological:      General: No focal deficit present.      Mental Status: She is alert and oriented to person, place, and time.         Additional Data:     Labs:    Results from last 7 days   Lab Units 25  0508 25  0542 25  1006   WBC Thousand/uL 5.24   < > 5.79   HEMOGLOBIN g/dL 10.7*   < > 11.6   HEMATOCRIT % 35.4   < > 37.5   PLATELETS Thousands/uL 178   < > 193   SEGS PCT %  --   --  62   LYMPHO PCT %  --   --  26   MONO PCT %  --   --  8   EOS PCT %  --   --  3    < > = values in this interval not displayed.     Results from last 7 days   Lab Units 25  0508   POTASSIUM mmol/L 4.0   CHLORIDE mmol/L 100   CO2 mmol/L 31   BUN mg/dL 23   CREATININE mg/dL 1.09   CALCIUM mg/dL 8.7   ALK PHOS U/L 115*   ALT U/L 59*   AST U/L 22     Results from last 7 days   Lab Units 25  1627   INR  1.26*         Recent Cultures (last 7 days):      Results from last 7 days   Lab Units 03/06/25  1609   URINE CULTURE  >100,000 cfu/ml       Last 24 Hours Medication List:   Current Facility-Administered Medications   Medication Dose Route Frequency Provider Last Rate    atorvastatin  80 mg Oral HS Otto Cadena, DO      citalopram  20 mg Oral HS Otto Cadena, DO      gabapentin  100 mg Oral HS Otto Cadena, DO      heparin (porcine)  5,000 Units Subcutaneous Q8H PRASHANT Otto Cadena, DO      insulin glargine  5 Units Subcutaneous HS DALILA Peck      insulin lispro  1-5 Units Subcutaneous HS Otto Cadena, DO      insulin lispro  1-6 Units Subcutaneous TID AC Otto Cadena, DO      levETIRAcetam  500 mg Oral BID Otto Cadena, DO      levothyroxine  150 mcg Oral Early Morning Otto Cadena, DO      lidocaine  1 patch Topical Daily Otto Cadena,       metoprolol tartrate  25 mg Oral BID Otto Cadena, DO      ondansetron  4 mg Intravenous Q6H PRN Otto Cadena, DO      pantoprazole  40 mg Oral Daily Otto Cadena, DO      polyethylene glycol  17 g Oral Daily DALILA Peck      senna-docusate sodium  2 tablet Oral Daily Otto Cadena DO          Today, Patient Was Seen By: Mary Fernandez DO    ** Please Note: Dictation voice to text software may have been used in the creation of this document. **

## 2025-03-10 NOTE — PROGRESS NOTES
Father Blake gave a blessing and prayer.    03/10/25 1500   Clinical Encounter Type   Visited With Patient   Mandaeism Encounters   Mandaeism Needs Prayer

## 2025-03-10 NOTE — ASSESSMENT & PLAN NOTE
Noted to have transaminitis on admission with levels worsening on day 2. Lab work sent and tylenol level was 9. Given recent cervical spondylosis suspected possible overuse of Tylenol although patient and assisted living report she was only using it on a PRN basis. Pt did receive Tylenol on admission.   Toxicology consulted and recommended monitoring labs closely and starting NAC treatment   Pending labs today at 1300- if improved and INR less than 2 can stop NAC treatment   LFTs trending down; Tylenol level less than 2 on repeat labs  Workup: ceruloplasmin 25.5; UDS negative: CMV not detected; acute hepatitis negative; GGT 68  Pending labs: Liver kidney microsomal antibody; anti-smooth muscle antibody IgG  Findings not consistent with Tylenol toxicity.  Note toxicology input.

## 2025-03-10 NOTE — CASE MANAGEMENT
Case Management Discharge Planning Note    Patient name Lizzie Whitten  Location /-01 MRN [id number]  : 1940 Date 3/10/2025       Current Admission Date: 3/5/2025  Current Admission Diagnosis:Generalized weakness   Patient Active Problem List    Diagnosis Date Noted Date Diagnosed    Suspected UTI 2025     Cervical spondylosis 2025     Transaminitis 2025     Fall with injury 2024     SAH (subarachnoid hemorrhage) ([facility]) 2024     CHF (congestive heart failure) ([facility]) 2024     MITCH (obstructive sleep apnea) 2024     DM (diabetes mellitus) ([facility]) 2024     GERD (gastroesophageal reflux disease) 2024     CKD (chronic kidney disease) 2024     Depression with anxiety 2024     HLD (hyperlipidemia) 2024     Frailty syndrome in geriatric patient 2024     At risk for delirium 2024     Bilateral hearing loss 2024     Cognitive impairment 2024     Mid back pain 2024     Mild persistent asthma without complication 2024     Pleural effusion, right 2024     Hematuria 2024     Slow transit constipation 2024     Hypernatremia 2024     Physical deconditioning 2024     Generalized weakness 2024     Ambulatory dysfunction 2024     Multiple falls 2024     Acute pain due to trauma 2024     Hemopneumothorax on right 04/15/2024     CHACHA (acute kidney injury) ([facility]) 2024     Chronic hypoxic respiratory failure ([facility]) 04/10/2024     Pulmonary nodule 2024     Pharyngeal dysphagia 2023     Chronic cough 2023     Assistance needed with transportation 2023     Abnormal CT scan, esophagus 2022     Hiatal hernia 2022     Esophageal dysphagia 2022     Primary hypertension 2022     Fall 2022     Secondary hyperparathyroidism of renal origin ([facility]) 2022     Memory difficulties 2022     Imbalance  06/30/2022     Coronary artery disease involving native coronary artery of native heart 03/06/2021     Sleep apnea 05/17/2019     Intraocular pressure increase 06/06/2018     Vitamin D deficiency 04/03/2018     Lymphedema of both lower extremities 03/13/2018     Iron deficiency anemia 10/12/2017     Urinary bladder incontinence 07/26/2017     OAB (overactive bladder) 07/26/2017     Stage 3b chronic kidney disease ([facility]) 06/21/2017     PAF (paroxysmal atrial fibrillation) ([facility]) 10/31/2016     Chronic venous insufficiency 02/19/2016     Presence of permanent cardiac pacemaker 01/28/2016     Chronic diastolic congestive heart failure ([facility]) 01/21/2016     Anemia 01/19/2016     S/P cardiac pacemaker procedure 01/19/2016     Hypothyroidism 01/19/2016     Complete heart block ([facility]) 01/16/2016     Type 2 diabetes mellitus with hypoglycemia without coma, with long-term current use of insulin ([facility]) 01/15/2016     Anxiety disorder 06/26/2013     COPD (chronic obstructive pulmonary disease) ([facility]) 09/24/2012     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 ([facility]) 09/22/2011     Mixed hyperlipidemia 03/14/2011       LOS (days): 5  Geometric Mean LOS (GMLOS) (days): 2.5  Days to GMLOS:-2.3     OBJECTIVE:  Risk of Unplanned Readmission Score: 25.73         Current admission status: Inpatient   Preferred Pharmacy:   [facility] - [place], [place] - [address]  [address] [address]  Phone: [phone] Fax: [phone]    [id number] - [place], [place] - [address]  [address]  [place] [address]  Phone: [phone] Fax: [phone]    [facility] [place] - [address]  [address]  [place] [address]  Phone: [phone] Fax: [phone]    Primary Care Provider: Kennedy Castellon MD    Primary Insurance: MEDICARE  Secondary Insurance: AARP    DISCHARGE  DETAILS:    Requested Home Health Care         Is the patient interested in HHC at discharge?: Yes  Home Health Discipline requested:: Nursing  Home Health Agency Name:: [facility]  HHA External Referral Reason (only applicable if external HHA name selected): Patient has established relationship with provider  Home Health Follow-Up Provider:: PCP  Home Health Services Needed:: Oxygen Via Nasal Cannula, Strengthening/Theraputic Exercises to Improve Function  Homebound Criteria Met:: Requires Medical Transportation, Requires the Assistance of Another Person for Safe Ambulation or to Leave the Home  Supporting Clincal Findings:: Requires Oxygen, Limited Endurance    Other Referral/Resources/Interventions Provided:  Referral Comments: Per family, patient had [facility] for SN, [facility] for PT/OT (which [facility] sets up).  Will likely need ambulatory referrals to PT/OT on DC instructions.  This CM spoke with Deysi at [facility] at [phone], who states that patient has AMY (caregivers in the home) multiple times daily.  Patient uses walker in apartment, but WC for long distances. This CM requested that PT see patient early am to assess functional status/ ability to return to [facility].  Message sent to PT and OT regarding same.

## 2025-03-10 NOTE — ASSESSMENT & PLAN NOTE
Patient presented with hypoglycemia, unfortunately persisted with generalized weakness and did not feel comfortable returning to home assisted living situation at this time  Labs (+) transaminitis, CT abdomen/pelvis negative for acute intra-abdominal pathology  PT/OT recommending moderate resources  Discussed with case management.  Tentative discharge back to assisted living tomorrow.  Family agreeable to transition.  Family requesting bowel regimen given today with repeat enema.   Tentative discharge in 24 hours.  Family requesting early discharge to facilitate caretaker transition

## 2025-03-10 NOTE — ASSESSMENT & PLAN NOTE
Lab Results   Component Value Date    HGBA1C 8.9 (H) 12/05/2024       Recent Labs     03/09/25  1101 03/09/25  1601 03/09/25  2107 03/10/25  0614   POCGLU 161* 186* 175* 185*       Blood Sugar Average: Last 72 hrs:  (P) [id number]  PTA on Lantus 16 units nightly and Humalog 4 units with lunch/dinner, presented with hypoglycemia noted earlier today  Insulin held on admission- now with hyperglycemia. Will change diet to CCD from regular diet and add lantus 5 units at HS with SSI coverage   hypoglycemia protocol

## 2025-03-11 VITALS
DIASTOLIC BLOOD PRESSURE: 76 MMHG | TEMPERATURE: 97.3 F | RESPIRATION RATE: 18 BRPM | SYSTOLIC BLOOD PRESSURE: 136 MMHG | OXYGEN SATURATION: 99 % | HEART RATE: 60 BPM

## 2025-03-11 LAB
ALBUMIN SERPL BCG-MCNC: 2.8 G/DL (ref 3.5–5)
ALP SERPL-CCNC: 97 U/L (ref 34–104)
ALT SERPL W P-5'-P-CCNC: 24 U/L (ref 7–52)
ANION GAP SERPL CALCULATED.3IONS-SCNC: 3 MMOL/L (ref 4–13)
AST SERPL W P-5'-P-CCNC: 11 U/L (ref 13–39)
BASOPHILS # BLD AUTO: 0.05 THOUSANDS/ÂΜL (ref 0–0.1)
BASOPHILS NFR BLD AUTO: 1 % (ref 0–1)
BILIRUB SERPL-MCNC: 0.55 MG/DL (ref 0.2–1)
BUN SERPL-MCNC: 30 MG/DL (ref 5–25)
CALCIUM ALBUM COR SERPL-MCNC: 9.7 MG/DL (ref 8.3–10.1)
CALCIUM SERPL-MCNC: 8.7 MG/DL (ref 8.4–10.2)
CHLORIDE SERPL-SCNC: 100 MMOL/L (ref 96–108)
CO2 SERPL-SCNC: 34 MMOL/L (ref 21–32)
CREAT SERPL-MCNC: 0.92 MG/DL (ref 0.6–1.3)
EOSINOPHIL # BLD AUTO: 0.14 THOUSAND/ÂΜL (ref 0–0.61)
EOSINOPHIL NFR BLD AUTO: 2 % (ref 0–6)
ERYTHROCYTE [DISTWIDTH] IN BLOOD BY AUTOMATED COUNT: 14.7 % (ref 11.6–15.1)
GFR SERPL CREATININE-BSD FRML MDRD: 57 ML/MIN/1.73SQ M
GLUCOSE SERPL-MCNC: 131 MG/DL (ref 65–140)
GLUCOSE SERPL-MCNC: 145 MG/DL (ref 65–140)
GLUCOSE SERPL-MCNC: 191 MG/DL (ref 65–140)
HCT VFR BLD AUTO: 34.4 % (ref 34.8–46.1)
HGB BLD-MCNC: 10.6 G/DL (ref 11.5–15.4)
IMM GRANULOCYTES # BLD AUTO: 0.01 THOUSAND/UL (ref 0–0.2)
IMM GRANULOCYTES NFR BLD AUTO: 0 % (ref 0–2)
LYMPHOCYTES # BLD AUTO: 1.81 THOUSANDS/ÂΜL (ref 0.6–4.47)
LYMPHOCYTES NFR BLD AUTO: 29 % (ref 14–44)
MCH RBC QN AUTO: 31.2 PG (ref 26.8–34.3)
MCHC RBC AUTO-ENTMCNC: 30.8 G/DL (ref 31.4–37.4)
MCV RBC AUTO: 101 FL (ref 82–98)
MONOCYTES # BLD AUTO: 0.4 THOUSAND/ÂΜL (ref 0.17–1.22)
MONOCYTES NFR BLD AUTO: 6 % (ref 4–12)
NEUTROPHILS # BLD AUTO: 3.82 THOUSANDS/ÂΜL (ref 1.85–7.62)
NEUTS SEG NFR BLD AUTO: 62 % (ref 43–75)
NRBC BLD AUTO-RTO: 0 /100 WBCS
PLATELET # BLD AUTO: 171 THOUSANDS/UL (ref 149–390)
PMV BLD AUTO: 10.3 FL (ref 8.9–12.7)
POTASSIUM SERPL-SCNC: 4.1 MMOL/L (ref 3.5–5.3)
PROT SERPL-MCNC: 5.1 G/DL (ref 6.4–8.4)
RBC # BLD AUTO: 3.4 MILLION/UL (ref 3.81–5.12)
SODIUM SERPL-SCNC: 137 MMOL/L (ref 135–147)
WBC # BLD AUTO: 6.23 THOUSAND/UL (ref 4.31–10.16)

## 2025-03-11 PROCEDURE — 82948 REAGENT STRIP/BLOOD GLUCOSE: CPT

## 2025-03-11 PROCEDURE — 97535 SELF CARE MNGMENT TRAINING: CPT

## 2025-03-11 PROCEDURE — 97112 NEUROMUSCULAR REEDUCATION: CPT

## 2025-03-11 PROCEDURE — 97530 THERAPEUTIC ACTIVITIES: CPT

## 2025-03-11 PROCEDURE — 85025 COMPLETE CBC W/AUTO DIFF WBC: CPT | Performed by: INTERNAL MEDICINE

## 2025-03-11 PROCEDURE — 80053 COMPREHEN METABOLIC PANEL: CPT | Performed by: INTERNAL MEDICINE

## 2025-03-11 PROCEDURE — 97116 GAIT TRAINING THERAPY: CPT

## 2025-03-11 PROCEDURE — 99239 HOSP IP/OBS DSCHRG MGMT >30: CPT | Performed by: INTERNAL MEDICINE

## 2025-03-11 RX ORDER — INSULIN LISPRO 100 [IU]/ML
1-6 INJECTION, SOLUTION INTRAVENOUS; SUBCUTANEOUS
Qty: 6 ML | Refills: 0 | Status: SHIPPED | OUTPATIENT
Start: 2025-03-11

## 2025-03-11 RX ORDER — INSULIN GLARGINE 300 U/ML
6 INJECTION, SOLUTION SUBCUTANEOUS
Start: 2025-03-11

## 2025-03-11 RX ADMIN — PANTOPRAZOLE SODIUM 40 MG: 40 TABLET, DELAYED RELEASE ORAL at 08:53

## 2025-03-11 RX ADMIN — LEVETIRACETAM 500 MG: 500 TABLET, FILM COATED ORAL at 08:53

## 2025-03-11 RX ADMIN — INSULIN LISPRO 2 UNITS: 100 INJECTION, SOLUTION INTRAVENOUS; SUBCUTANEOUS at 11:23

## 2025-03-11 RX ADMIN — LEVOTHYROXINE SODIUM 150 MCG: 0.07 TABLET ORAL at 06:20

## 2025-03-11 RX ADMIN — SENNOSIDES AND DOCUSATE SODIUM 2 TABLET: 50; 8.6 TABLET ORAL at 08:53

## 2025-03-11 RX ADMIN — HEPARIN SODIUM 5000 UNITS: 5000 INJECTION, SOLUTION INTRAVENOUS; SUBCUTANEOUS at 14:19

## 2025-03-11 RX ADMIN — HEPARIN SODIUM 5000 UNITS: 5000 INJECTION, SOLUTION INTRAVENOUS; SUBCUTANEOUS at 06:20

## 2025-03-11 RX ADMIN — METOPROLOL TARTRATE 25 MG: 25 TABLET, FILM COATED ORAL at 08:53

## 2025-03-11 NOTE — OCCUPATIONAL THERAPY NOTE
Occupational Therapy Progress Note     Patient Name: Lizzie Whitten  Today's Date: 3/11/2025  Problem List  Principal Problem:    Generalized weakness  Active Problems:    Type 2 diabetes mellitus with hypoglycemia without coma, with long-term current use of insulin (HCC)    Chronic diastolic congestive heart failure (HCC)    Stage 3b chronic kidney disease (HCC)    PAF (paroxysmal atrial fibrillation) (HCC)    COPD (chronic obstructive pulmonary disease) (HCC)    Cervical spondylosis    Transaminitis    Suspected UTI       03/11/25 1042   OT Last Visit   OT Visit Date 03/11/25   Pain Assessment   Pain Assessment Tool 0-10   Pain Score No Pain   Restrictions/Precautions   Weight Bearing Precautions Per Order No   Braces or Orthoses C/S Collar  (not wearing upon therapist arrival, per neurosx Pt may wean off collar)   Other Precautions Cognitive;Chair Alarm;Bed Alarm;Multiple lines;O2;Fall Risk;Pain   Lifestyle   Autonomy PTA, Pt required assist from staff vs daughter for ADL and IADL. RW vs w/c for functional mobiltiy. Pt is a questionable historian.   Reciprocal Relationships Spouse, daughter, facility staff   Service to Others Retired   ADL   Where Assessed Chair   Grooming Assistance 4  Minimal Assistance   Grooming Deficit Setup;Verbal cueing;Supervision/safety;Increased time to complete;Wash/dry face;Teeth care;Brushing hair   Grooming Comments Requires Min A and VC for thoroughness   UB Bathing Assistance 3  Moderate Assistance   UB Bathing Deficit Setup;Supervision/safety;Verbal cueing;Increased time to complete;Chest;Right arm;Left arm;Abdomen   UB Bathing Comments Requires Mod A to wash UB, VC for sequencing and thorughness   LB Bathing Assistance 2  Maximal Assistance   LB Bathing Deficit Setup;Verbal cueing;Supervision/safety;Increased time to complete;Buttocks;Perineal area;Right upper leg;Left upper leg;Right lower leg including foot;Left lower leg including foot   LB Bathing Comments Requires  "Max A to wash BLE, feet and perineal area seated in chair. Buttocks in stance w/ RW for support   UB Dressing Assistance 4  Minimal Assistance   UB Dressing Deficit Setup;Verbal cueing;Supervision/safety;Increased time to complete;Thread RUE;Thread LUE;Pull around back   UB Dressing Comments Requires Min  A to pull sleeves over shoulder/back   LB Dressing Assistance 2  Maximal Assistance   LB Dressing Deficit Don/doff R sock;Don/doff L sock   LB Dressing Comments Requires Max A to doff/don socks seated in chair   Functional Standing Tolerance   Time 1 minute   Comments Pt tolerates standing up to 1 minute w/ Mod A for postural control/balance + use of RW for support. Pt w/ significant retropulsion and toes lifting off floor requiring VC and assist to correct   Bed Mobility   Additional Comments Pt greeted and left OOB in chair w/ alarm on and all needs within reach   Transfers   Sit to Stand 3  Moderate assistance   Additional items Assist x 1;Increased time required;Verbal cues   Stand to Sit 3  Moderate assistance   Additional items Assist x 1;Increased time required;Verbal cues   Additional Comments w/ RW. Presents w/ significant retropulsion and knee stiffness requiring Mod A to correct in stance w/ RW.   Functional Mobility   Functional Mobility 3  Moderate assistance   Additional Comments Pt takes few steps w/ Mod  A using RW   Subjective   Subjective \"Okay\"   Cognition   Overall Cognitive Status Impaired   Arousal/Participation Alert;Cooperative   Attention Attends with cues to redirect   Orientation Level Oriented to person;Oriented to place;Oriented to situation;Disoriented to time   Memory Decreased recall of precautions;Decreased recall of recent events;Decreased short term memory   Following Commands Follows one step commands with increased time or repetition   Comments Pt is pleasant and cooperative. Pt able to follow 1-step directions w/ increased time. Flat affect. Requires VC for " thoroughness/cleanliness for self care tasks.   Activity Tolerance   Activity Tolerance Patient limited by fatigue   Medical Staff Made Aware RN cleared   Assessment   Assessment Pt seen for skilled OT treatment session on this date w/ interventions focusing on ADL participation, activity tolerance, and transfer skills. Pt was agreeable and willing to participate in session. Pt engaged in the following tasks: Min A grooming; Mod A UB bathing/dressing, transfers, functional mobility; Max A LB bathing/dressing. In comparison to previous session, pt demonstrated improvements in activity tolerance. Pt required frequent re direction, verbal cues for correct technique, verbal cues for pacing thru activity steps, and one step directives. Pt continues to be functioning below baseline level as occupational performance remains limited by decreased ADL status, decreased activity tolerance, decreased endurance, decreased sitting tolerance, decreased sitting balance, decreased standing tolerance, decreased standing balance, decreased transfer skills, decreased fxnl mobility, decreased safety awareness, decreased insight into deficits, and generalized weakness . From OT standpoint, recommend Level II (Moderate Resource Intensity) at time of d/c. Pt will benefit from continued OT treatment while in acute care to address deficits as defined above and maximize level of functional independence with ADLs and functional mobility. Pt left OOB in chair w/ alarm on and all needs within reach at end of session.   Plan   Treatment Interventions ADL retraining;Functional transfer training;UE strengthening/ROM;Endurance training;Cognitive reorientation;Patient/family training;Equipment evaluation/education;Compensatory technique education;Continued evaluation;Energy conservation;Activityengagement   Goal Expiration Date 03/20/25   OT Treatment Day 1   OT Frequency 2-3x/wk   Discharge Recommendation   Rehab Resource Intensity Level, OT II  (Moderate Resource Intensity)   Additional Comments  The patient's raw score on the AM-PAC Daily Activity Inpatient Short Form is 14. A raw score of less than 19 suggests the patient may benefit from discharge to post-acute rehabilitation services. Please refer to the recommendation of the Occupational Therapist for safe discharge planning.   AM-PAC Daily Activity Inpatient   Lower Body Dressing 2   Bathing 2   Toileting 2   Upper Body Dressing 2   Grooming 3   Eating 3   Daily Activity Raw Score 14   Daily Activity Standardized Score (Calc for Raw Score >=11) 33.39   AM-PAC Applied Cognition Inpatient   Following a Speech/Presentation 3   Understanding Ordinary Conversation 4   Taking Medications 3   Remembering Where Things Are Placed or Put Away 3   Remembering List of 4-5 Errands 2   Taking Care of Complicated Tasks 2   Applied Cognition Raw Score 17   Applied Cognition Standardized Score 36.52   End of Consult   Education Provided Yes   Patient Position at End of Consult Bed/Chair alarm activated;Bedside chair;All needs within reach   Nurse Communication Nurse aware of consult     KRISTINA Hatfield, OTR/L

## 2025-03-11 NOTE — ASSESSMENT & PLAN NOTE
Noted in history, CT imaging revealing right pleural effusion however patient otherwise euvolemic and without new oxygen requirement at this time  Continue PTA cardiac medications and monitor volume status closely

## 2025-03-11 NOTE — PLAN OF CARE
Problem: OCCUPATIONAL THERAPY ADULT  Goal: Performs self-care activities at highest level of function for planned discharge setting.  See evaluation for individualized goals.  Description: Treatment Interventions: ADL retraining, Functional transfer training, UE strengthening/ROM, Endurance training, Cognitive reorientation, Patient/family training, Equipment evaluation/education, Compensatory technique education, Activityengagement, Energy conservation, Continued evaluation          See flowsheet documentation for full assessment, interventions and recommendations.   Outcome: Progressing  Note: Limitation: Decreased ADL status, Decreased endurance, Decreased self-care trans, Decreased high-level ADLs, Decreased cognition, Decreased UE strength  Prognosis: Fair  Assessment: Pt seen for skilled OT treatment session on this date w/ interventions focusing on ADL participation, activity tolerance, and transfer skills. Pt was agreeable and willing to participate in session. Pt engaged in the following tasks: Min A grooming; Mod A UB bathing/dressing, transfers, functional mobility; Max A LB bathing/dressing. In comparison to previous session, pt demonstrated improvements in activity tolerance. Pt required frequent re direction, verbal cues for correct technique, verbal cues for pacing thru activity steps, and one step directives. Pt continues to be functioning below baseline level as occupational performance remains limited by decreased ADL status, decreased activity tolerance, decreased endurance, decreased sitting tolerance, decreased sitting balance, decreased standing tolerance, decreased standing balance, decreased transfer skills, decreased fxnl mobility, decreased safety awareness, decreased insight into deficits, and generalized weakness . From OT standpoint, recommend Level II (Moderate Resource Intensity) at time of d/c. Pt will benefit from continued OT treatment while in acute care to address deficits as  defined above and maximize level of functional independence with ADLs and functional mobility. Pt left OOB in chair w/ alarm on and all needs within reach at end of session.     Rehab Resource Intensity Level, OT: II (Moderate Resource Intensity)

## 2025-03-11 NOTE — PHYSICAL THERAPY NOTE
Physical Therapy Progress Note       03/11/25 0930   PT Last Visit   PT Visit Date 03/11/25   Note Type   Note Type Treatment   Pain Assessment   Pain Assessment Tool 0-10   Pain Score No Pain   Restrictions/Precautions   Braces or Orthoses C/S Collar  (Pt. may wean off of the collar, and not wearing upon entering.)   Other Precautions Cognitive;Chair Alarm;Bed Alarm;Fall Risk  (Alarm active post session.)   Subjective   Subjective The patient denies any pain, and she expresses a fear of falling. She notes that she is feeling better.   Bed Mobility   Supine to Sit 3  Moderate assistance   Additional items Assist x 1;Increased time required;Verbal cues;LE management   Transfers   Sit to Stand 3  Moderate assistance   Additional items Assist x 1;Increased time required;Verbal cues   Stand to Sit 4  Minimal assistance   Additional items Assist x 1;Increased time required;Verbal cues   Ambulation/Elevation   Gait pattern Excessively slow;Step to;Short stride;Inconsistent stefnay;Decreased foot clearance;Retropulsion   Gait Assistance 3  Moderate assist   Additional items Assist x 1;Tactile cues;Verbal cues   Assistive Device Rolling walker   Distance 8 feet, 14 feet, 6 feet.   Balance   Static Sitting Fair -   Dynamic Sitting Poor +   Static Standing Poor   Dynamic Standing Poor   Ambulatory Poor   Activity Tolerance   Activity Tolerance Patient tolerated treatment well;Patient limited by fatigue   Nurse Made Aware RODGER Manuel.   Exercises   Knee AROM Long Arc Quad Sitting;5 reps;Bilateral;AROM   Ankle Pumps Supine;Bilateral;AROM;20 reps   Assessment   Prognosis Fair   Problem List Decreased strength;Decreased endurance;Impaired balance;Decreased mobility;Decreased coordination;Decreased cognition;Impaired judgement   Assessment The patient continues to require assistance for mobility, but there was some improvement during the session. Initially she was significantly retropulsive with slight improvement with ambulation.  This persisted with subsequent transfer trials with gradual improvement, but retropulsion remained. Her steps did demonstrate improvement as she progressed, and there was no noted dyspnea on 2L O2 NC. She was able to transfer five more times with the goal to improve transfer technique as well as her retropulsion. Turned on the television, and the patient sitting out in the chair on an EHOB cushion with bilateral arms elevated on pillows. The alarm was active, and all needs within reach.   Barriers to Discharge Decreased caregiver support   Goals   Patient Goals To keep feeling better.   STG Expiration Date 03/20/25   PT Treatment Day 2   Plan   Treatment/Interventions LE strengthening/ROM;Functional transfer training;Therapeutic exercise;Endurance training;Patient/family training;Bed mobility;Gait training   Progress Progressing toward goals   PT Frequency 2-3x/wk   Discharge Recommendation   Rehab Resource Intensity Level, PT II (Moderate Resource Intensity)   Equipment Recommended Walker   Walker Package Recommended Wheeled walker   AM-PAC Basic Mobility Inpatient   Turning in Flat Bed Without Bedrails 3   Lying on Back to Sitting on Edge of Flat Bed Without Bedrails 2   Moving Bed to Chair 2   Standing Up From Chair Using Arms 2   Walk in Room 2   Climb 3-5 Stairs With Railing 1   Basic Mobility Inpatient Raw Score 12   Basic Mobility Standardized Score 32.23   [facility] Highest Level Of Mobility   -[facility] Goal 4: Move to chair/commode   -HLM Achieved 7: Walk 25 feet or more         An AM-PAC Basic Mobility raw score less than 16 suggests the patient may benefit from discharge to post-acute rehab services.    Duc Doshi, PTA

## 2025-03-11 NOTE — ASSESSMENT & PLAN NOTE
Noted to have foul-smelling urine per nursing staff and urine culture was sent  Urine culture ultimately grew mixed contaminants  Completed a short course of Keflex while inpatient

## 2025-03-11 NOTE — PLAN OF CARE
Problem: PAIN - ADULT  Goal: Verbalizes/displays adequate comfort level or baseline comfort level  Description: Interventions:  - Encourage patient to monitor pain and request assistance  - Assess pain using appropriate pain scale  - Administer analgesics based on type and severity of pain and evaluate response  - Implement non-pharmacological measures as appropriate and evaluate response  - Consider cultural and social influences on pain and pain management  - Notify physician/advanced practitioner if interventions unsuccessful or patient reports new pain  Outcome: Progressing     Problem: INFECTION - ADULT  Goal: Absence or prevention of progression during hospitalization  Description: INTERVENTIONS:  - Assess and monitor for signs and symptoms of infection  - Monitor lab/diagnostic results  - Monitor all insertion sites, i.e. indwelling lines, tubes, and drains  - Monitor endotracheal if appropriate and nasal secretions for changes in amount and color  - [place] appropriate cooling/warming therapies per order  - Administer medications as ordered  - Instruct and encourage patient and family to use good hand hygiene technique  - Identify and instruct in appropriate isolation precautions for identified infection/condition  Outcome: Progressing     Problem: SAFETY ADULT  Goal: Patient will remain free of falls  Description: INTERVENTIONS:  - Educate patient/family on patient safety including physical limitations  - Instruct patient to call for assistance with activity   - Consult OT/PT to assist with strengthening/mobility   - Keep Call bell within reach  - Keep bed low and locked with side rails adjusted as appropriate  - Keep care items and personal belongings within reach  - Initiate and maintain comfort rounds  - Make Fall Risk Sign visible to staff  - Offer Toileting every 2 Hours, in advance of need  - Initiate/Maintain bed/chair alarm  - Obtain necessary fall risk management equipment: walker  Problem:  DISCHARGE PLANNING  Goal: Discharge to home or other facility with appropriate resources  Description: INTERVENTIONS:  - Identify barriers to discharge w/patient and caregiver  - Arrange for needed discharge resources and transportation as appropriate  - Identify discharge learning needs (meds, wound care, etc.)  - Arrange for interpretive services to assist at discharge as needed  - Refer to Case Management Department for coordinating discharge planning if the patient needs post-hospital services based on physician/advanced practitioner order or complex needs related to functional status, cognitive ability, or social support system  Outcome: Progressing     Problem: Knowledge Deficit  Goal: Patient/family/caregiver demonstrates understanding of disease process, treatment plan, medications, and discharge instructions  Description: Complete learning assessment and assess knowledge base.  Interventions:  - Provide teaching at level of understanding  - Provide teaching via preferred learning methods  Outcome: Progressing     - Apply yellow socks and bracelet for high fall risk patients  - Consider moving patient to room near nurses station  Outcome: Progressing

## 2025-03-11 NOTE — ASSESSMENT & PLAN NOTE
Noted to have transaminitis on admission with levels worsening on day 2. Lab work sent and tylenol level was 9. Given recent cervical spondylosis suspected possible overuse of Tylenol although patient and assisted living report she was only using it on a PRN basis. Pt did receive Tylenol on admission.   Toxicology consulted and patient was treated with NAC for Tylenol toxicity  LFTs gradually returned to normal  Appreciate serology workup from GI  Workup: ceruloplasmin 25.5; UDS negative: CMV not detected; acute hepatitis negative; GGT 68  Pending labs: Liver kidney microsomal antibody; anti-smooth muscle antibody IgG  Findings not consistent with Tylenol toxicity.  Note toxicology input.

## 2025-03-11 NOTE — DISCHARGE SUMMARY
Discharge Summary - Hospitalist   Name: Lizzie Whitten 85 y.o. female I MRN: [id number]  Unit/Bed#: -01 I Date of Admission: 3/5/2025   Date of Service: 3/11/2025 I Hospital Day: 6     Assessment & Plan  Generalized weakness  Multifactorial in the setting of generalized deconditioning, hypoglycemia, transaminitis, and suspected UTI  Above diagnoses were treated as below  Returning back to independent living facility with home health care and PT/OT  Transaminitis  Noted to have transaminitis on admission with levels worsening on day 2. Lab work sent and tylenol level was 9. Given recent cervical spondylosis suspected possible overuse of Tylenol although patient and assisted living report she was only using it on a PRN basis. Pt did receive Tylenol on admission.   Toxicology consulted and patient was treated with NAC for Tylenol toxicity  LFTs gradually returned to normal  Appreciate serology workup from GI  Workup: ceruloplasmin 25.5; UDS negative: CMV not detected; acute hepatitis negative; GGT 68  Pending labs: Liver kidney microsomal antibody; anti-smooth muscle antibody IgG  Findings not consistent with Tylenol toxicity.  Note toxicology input.  Type 2 diabetes mellitus with hypoglycemia without coma, with long-term current use of insulin (HCC)  Last hemoglobin A1c 8.6%  Significantly hyperglycemic on admission.  Blood glucose management had been an issue prior and family was concerned about this.  Had not yet had a chance to follow-up with PCP prior to this hospitalization.  Previous regimen was Lantus 16 units nightly and Humalog 4 units with meals.  Based on recent weight loss, and ongoing poor oral intake seems as though she is probably just taking too much insulin at home.  Patient was well controlled on Lantus 5 units nightly and sliding scale only during hospitalization.    Discussed extensively with the patient's daughter.  Will plan to discharge on reduced dose of her home Toujeo 6 units  nightly.  Will discontinue scheduled mealtime insulin  Continue with sliding scale 3 times daily with meals  Close outpatient follow-up with PCP  Suspected UTI  Noted to have foul-smelling urine per nursing staff and urine culture was sent  Urine culture ultimately grew mixed contaminants  Completed a short course of Keflex while inpatient  Chronic diastolic congestive heart failure (HCC)  Noted in history, CT imaging revealing right pleural effusion however patient otherwise euvolemic and without new oxygen requirement at this time  Continue PTA cardiac medications and monitor volume status closely  Stage 3b chronic kidney disease ([facility])  Renal function at baseline.  Continue to monitor as an outpatient  PAF (paroxysmal atrial fibrillation) ([facility])  S/p PPM for complete heart block  Continue PTA metoprolol tartrate  Continue anticoagulation with Eliquis  Cervical spondylosis  Known history of C1-C2 subluxation initially noted following fall January 2025 managed conservatively  Neurosurgery following, flexion/extension x-rays of the cervical spine with open-mouth views were obtained and patient was cleared by neurosurgery to have c-collar removed   PT/OT evaluations as above  COPD (chronic obstructive pulmonary disease) ([facility])  Stable without exacerbation   C/w 2LNC      Medical Problems       Resolved Problems  Date Reviewed: 3/11/2025   None       Discharging Physician / Practitioner: Clarence Simmons DO  PCP: Kennedy Castellon MD  Admission Date:   Admission Orders (From admission, onward)       Ordered        03/05/25 2211  Inpatient Admission  Once                          Discharge Date: 03/11/25    Consultations During Hospital Stay:  Neurosurgery  Toxicology    Procedures Performed:   None    Significant Findings / Test Results:   Transaminitis; self-limiting and underlying etiology unclear at the time of discharge.  Generalized weakness; multifactorial in nature  Hypoglycemia; insulin regimen  adjusted on discharge    Incidental Findings:   none    Test Results Pending at Discharge (will require follow up):   none     Outpatient Tests Requested:  none    Complications:  none    Reason for Admission: Hypoglycemia and generalized weakness    Hospital Course:   Lizzie Whitten is a 85 y.o. female patient who originally presented to the hospital on 3/5/2025 due to hypoglycemia after being found hypoglycemic by her caretakers at the independent living facility where she resides.  Apparently per patient's family that she had been having low blood glucose measurements ever since being discharged from rehab after her recent hospitalization at [facility].  In the emergency room she was administered D5 with improvement in her blood sugar though remained generally weak.  She was admitted to the hospital service for ongoing management.  The patient's insulin regimen previously had consisted of Toujeo 16 units nightly, and Humalog 4 units 3 times daily with meals.  However patient has recently experienced a good amount of weight loss and has had poor p.o. intake over the last several weeks/months.  This seems most likely scenario is that she was simply on too much insulin.  While hospitalized she was treated with Lantus 5 units nightly and sliding scale only with adequate blood glucose management ranging between 140s and 200.  She had no significant hypoglycemic events on this regimen.  Therefore we will continue this on discharge.  She was also treated for a suspected UTI however urine culture ultimately grew mixed contaminants.  She also had a transient elevation in LFTs which was initially thought to possibly been from Tylenol toxicity but ultimately this was ruled out.  Serologic workup was performed with some of the labs pending at the time of discharge.  LFTs were at baseline prior to discharge.  Otherwise patient will need continued monitoring as an outpatient.  Home health care was arranged with PT/OT as well.   Discharge plan discussed at length with the patient's daughter via phone call.  All questions and concerns addressed.      Please see above list of diagnoses and related plan for additional information.     Condition at Discharge: good    Discharge Day Visit / Exam:   Subjective: Patient seen and examined on the day of discharge.  She has no complaints today.  No events reported overnight.  She is afebrile.  Stable on 2 L.  Vitals: Blood Pressure: 136/76 (03/11/25 0705)  Pulse: 60 (03/11/25 0705)  Temperature: (!) 97.3 °F (36.3 °C) (03/11/25 0705)  Temp Source: Oral (03/09/25 0732)  Respirations: 18 (03/10/25 1545)  SpO2: 99 % (03/11/25 0705)    PHYSICAL EXAM:    Vitals signs reviewed  Constitutional   Awake and cooperative. NAD.   Head/Neck   Normocephalic. Atraumatic.   HEENT   No scleral icterus. EOMI.   Heart   Regular rate and rhythm. No murmurs.   Lungs   Clear to auscultation bilaterally. Respirations unlaboured.   Abdomen   Soft. Nontender. Nondistended.    Skin   Skin color normal. No rashes.   Extremities   No deformities. No peripheral edema.   Neuro   Alert and oriented. No new deficits.   Psych   Mood stable. Affect normal.         Discussion with Family: Updated  (daughter) via phone.    Discharge instructions/Information to patient and family:   See after visit summary for information provided to patient and family.      Provisions for Follow-Up Care:  See after visit summary for information related to follow-up care and any pertinent home health orders.      Mobility at time of Discharge:   Basic Mobility Inpatient Raw Score: 12  JH-HLM Goal: 4: Move to chair/commode  JH-HLM Achieved: 7: Walk 25 feet or more  HLM Goal achieved. Continue to encourage appropriate mobility.     Disposition:   Home with VNA Services (Reminder: Complete face to face encounter)    Planned Readmission: No    Discharge Medications:  See after visit summary for reconciled discharge medications provided to  patient and/or family.      Administrative Statements   Discharge Statement:  I have spent a total time of 50 minutes in caring for this patient on the day of the visit/encounter. >30 minutes of time was spent on: Diagnostic results, Prognosis, Risks and benefits of tx options, Instructions for management, Patient and family education, Importance of tx compliance, Risk factor reductions, Impressions, Counseling / Coordination of care, Documenting in the medical record, Reviewing / ordering tests, medicine, procedures  , and Communicating with other healthcare professionals .    **Please Note: This note may have been constructed using a voice recognition system**

## 2025-03-11 NOTE — ASSESSMENT & PLAN NOTE
Multifactorial in the setting of generalized deconditioning, hypoglycemia, transaminitis, and suspected UTI  Above diagnoses were treated as below  Returning back to independent living facility with home health care and PT/OT

## 2025-03-11 NOTE — ASSESSMENT & PLAN NOTE
Last hemoglobin A1c 8.6%  Significantly hyperglycemic on admission.  Blood glucose management had been an issue prior and family was concerned about this.  Had not yet had a chance to follow-up with PCP prior to this hospitalization.  Previous regimen was Lantus 16 units nightly and Humalog 4 units with meals.  Based on recent weight loss, and ongoing poor oral intake seems as though she is probably just taking too much insulin at home.  Patient was well controlled on Lantus 5 units nightly and sliding scale only during hospitalization.    Discussed extensively with the patient's daughter.  Will plan to discharge on reduced dose of her home Toujeo 6 units nightly.  Will discontinue scheduled mealtime insulin  Continue with sliding scale 3 times daily with meals  Close outpatient follow-up with PCP

## 2025-03-11 NOTE — DISCHARGE INSTR - AVS FIRST PAGE
Ms. Whitten,    You are admitted to the hospital for a number of reasons including low blood sugar levels, UTI, and elevated liver tests.  The elevated liver tests were likely secondary to Tylenol overuse, but your numbers normalized prior to discharge.  Please limit your Tylenol use on discharge.  As far as your UTI, you completed a course of antibiotics while you are hospitalized.  Finally, and more importantly we evaluated your low blood sugar levels.  Given your recent weight loss and decreased oral intake, I think that your insulin prescription prior to hospitalization was just too high.  We decreased your long-acting insulin (Toujeo) from 15 units to only 6 units at night.  We also discontinued your mealtime insulin that you had been previously taking.  Going forward, you will follow what is called a sliding scale for mealtime insulin usage.  Using this method you only receive insulin based on your blood sugar level.  You should check your blood sugar with each meal, cross-referenced to the scale provided on your prescription, and inject the appropriate amount of insulin.  This should protect you from receiving too much insulin and causing low blood sugar levels.  Please be sure to follow-up with your family doctor with any further questions or concerns, and schedule a follow-up appointment with him in the next week.    Otherwise it was a pleasure to care for you during your hospital stay,  Dr. Clarence PittmanNell J. [facility] internal medicine  [phone]

## 2025-03-11 NOTE — CASE MANAGEMENT
Case Management Discharge Planning Note    Patient name Lizzie Whitten  Location /-01 MRN [id number]  : 1940 Date 3/11/2025       Current Admission Date: 3/5/2025  Current Admission Diagnosis:Generalized weakness   Patient Active Problem List    Diagnosis Date Noted Date Diagnosed    Suspected UTI 2025     Cervical spondylosis 2025     Transaminitis 2025     Fall with injury 2024     SAH (subarachnoid hemorrhage) ([facility]) 2024     CHF (congestive heart failure) ([facility]) 2024     MITCH (obstructive sleep apnea) 2024     DM (diabetes mellitus) ([facility]) 2024     GERD (gastroesophageal reflux disease) 2024     CKD (chronic kidney disease) 2024     Depression with anxiety 2024     HLD (hyperlipidemia) 2024     Frailty syndrome in geriatric patient 2024     At risk for delirium 2024     Bilateral hearing loss 2024     Cognitive impairment 2024     Mid back pain 2024     Mild persistent asthma without complication 2024     Pleural effusion, right 2024     Hematuria 2024     Slow transit constipation 2024     Hypernatremia 2024     Physical deconditioning 2024     Generalized weakness 2024     Ambulatory dysfunction 2024     Multiple falls 2024     Acute pain due to trauma 2024     Hemopneumothorax on right 04/15/2024     CHACHA (acute kidney injury) ([facility]) 2024     Chronic hypoxic respiratory failure ([facility]) 04/10/2024     Pulmonary nodule 2024     Pharyngeal dysphagia 2023     Chronic cough 2023     Assistance needed with transportation 2023     Abnormal CT scan, esophagus 2022     Hiatal hernia 2022     Esophageal dysphagia 2022     Primary hypertension 2022     Fall 2022     Secondary hyperparathyroidism of renal origin ([facility]) 2022     Memory difficulties 2022     Imbalance  06/30/2022     Coronary artery disease involving native coronary artery of native heart 03/06/2021     Sleep apnea 05/17/2019     Intraocular pressure increase 06/06/2018     Vitamin D deficiency 04/03/2018     Lymphedema of both lower extremities 03/13/2018     Iron deficiency anemia 10/12/2017     Urinary bladder incontinence 07/26/2017     OAB (overactive bladder) 07/26/2017     Stage 3b chronic kidney disease ([facility]) 06/21/2017     PAF (paroxysmal atrial fibrillation) ([facility]) 10/31/2016     Chronic venous insufficiency 02/19/2016     Presence of permanent cardiac pacemaker 01/28/2016     Chronic diastolic congestive heart failure ([facility]) 01/21/2016     Anemia 01/19/2016     S/P cardiac pacemaker procedure 01/19/2016     Hypothyroidism 01/19/2016     Complete heart block ([facility]) 01/16/2016     Type 2 diabetes mellitus with hypoglycemia without coma, with long-term current use of insulin ([facility]) 01/15/2016     Anxiety disorder 06/26/2013     COPD (chronic obstructive pulmonary disease) ([facility]) 09/24/2012     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 ([facility]) 09/22/2011     Mixed hyperlipidemia 03/14/2011       LOS (days): 6  Geometric Mean LOS (GMLOS) (days): 2.5  Days to GMLOS:-3.2     OBJECTIVE:  Risk of Unplanned Readmission Score: 25.99         Current admission status: Inpatient   Preferred Pharmacy:   [facility] - [place], [place] - [address]  [address] [address]  Phone: [phone] Fax: [phone]    [id number] - [place], [place] - [address]  [address]  [place] [address]  Phone: [phone] Fax: [phone]    [facility] - [place] - [address]  [address]  [place] [address]  Phone: [phone] Fax: [phone]    Primary Care Provider: Kennedy Castellon MD    Primary Insurance: MEDICARE  Secondary Insurance: AARP    DISCHARGE  DETAILS:    Requested Home Health Care         Is the patient interested in HHC at discharge?: Yes  Home Health Discipline requested:: Occupational Therapy, Physical Therapy  Home Health Agency Name:: [facility]  HHA External Referral Reason (only applicable if external HHA name selected): Patient has established relationship with provider  Home Health Follow-Up Provider:: PCP  Home Health Services Needed:: Evaluate Functional Status and Safety, Gait/ADL Training, Strengthening/Theraputic Exercises to Improve Function  Homebound Criteria Met:: Requires the Assistance of Another Person for Safe Ambulation or to Leave the Home, Uses an Assist Device (i.e. cane, walker, etc)  Supporting Clincal Findings:: Fatigues Easliy in Short Distances, Limited Endurance    Other Referral/Resources/Interventions Provided:  Referral Comments: referral entered in [facility] for Pt[facility]OT with [facility].  [facility].  Deysi states that she will reach out to Iva with patient's in home care regarding possibly increasing assistance for patient in the home.  TC to daughter, Gorge, regarding patient's mobility, in agreement with plan to return to [facility] with [facility]/OT.  Aware of risks of patient not going to [facility].      1420- TC to daughter, Prema, who states that she and her sister are on their way to the hospital to [facility].  Gorge states that patient does not need oxygen for transport, as this CM offered to set up transportation home.  TC to bernice oJ, informing her that patient should be arriving home between 3-3:30.  Deysi states she will make AMY (in home care) aware of time frame.

## 2025-03-11 NOTE — PLAN OF CARE
Problem: PHYSICAL THERAPY ADULT  Goal: Performs mobility at highest level of function for planned discharge setting.  See evaluation for individualized goals.  Description: Treatment/Interventions: Functional transfer training, LE strengthening/ROM, Therapeutic exercise, Endurance training, Cognitive reorientation, Patient/family training, Equipment eval/education, Bed mobility, Gait training, Spoke to nursing, OT          See flowsheet documentation for full assessment, interventions and recommendations.  Outcome: Progressing  Note: Prognosis: Fair  Problem List: Decreased strength, Decreased endurance, Impaired balance, Decreased mobility, Decreased coordination, Decreased cognition, Impaired judgement  Assessment: The patient continues to require assistance for mobility, but there was some improvement during the session. Initially she was significantly retropulsive with slight improvement with ambulation. This persisted with subsequent transfer trials with gradual improvement, but retropulsion remained. Her steps did demonstrate improvement as she progressed, and there was no noted dyspnea on 2L O2 NC. She was able to transfer five more times with the goal to improve transfer technique as well as her retropulsion. Turned on the television, and the patient sitting out in the chair on an EHOB cushion with bilateral arms elevated on pillows. The alarm was active, and all needs within reach.  Barriers to Discharge: Decreased caregiver support     Rehab Resource Intensity Level, PT: II (Moderate Resource Intensity)    See flowsheet documentation for full assessment.

## 2025-03-12 ENCOUNTER — PATIENT OUTREACH (OUTPATIENT)
Dept: CASE MANAGEMENT | Facility: OTHER | Age: 85
End: 2025-03-12

## 2025-03-12 ENCOUNTER — TELEPHONE (OUTPATIENT)
Age: 85
End: 2025-03-12

## 2025-03-12 NOTE — PROGRESS NOTES
Chart reviewed.Pt admitted to [facility] 3/5-3/11 for generalized weakness, hypoglycemia, suspected UTI.  [facility] at D/C, pt a resident at [facility].   Call placed to pt and message left requesting return call back for care management. Will place reminder for second call attempt

## 2025-03-12 NOTE — TELEPHONE ENCOUNTER
Bibi from [facility] called to confirm Dr Castellon will be signing the pts home care orders. Please advise.

## 2025-03-13 ENCOUNTER — TRANSITIONAL CARE MANAGEMENT (OUTPATIENT)
Dept: FAMILY MEDICINE CLINIC | Facility: CLINIC | Age: 85
End: 2025-03-13

## 2025-03-13 NOTE — PROGRESS NOTES
First attempt-LM for a return call. Nidia her daughter usually make the appointments. For this visit she needs to bring the med list from the facility not the one from the hospital.

## 2025-03-14 ENCOUNTER — PATIENT OUTREACH (OUTPATIENT)
Dept: CASE MANAGEMENT | Facility: OTHER | Age: 85
End: 2025-03-14

## 2025-03-14 NOTE — PROGRESS NOTES
Second call placed to pt w/ message left then called healthcare representative, pts daughter Prema. She reports that the family has signed with hospice care today for pt. She has declined in health since discharge and appreciated the call for follow up,.

## 2025-03-21 ENCOUNTER — TELEPHONE (OUTPATIENT)
Dept: ENDOCRINOLOGY | Facility: CLINIC | Age: 85
End: 2025-03-21

## 2025-04-16 NOTE — ASSESSMENT & PLAN NOTE
How Severe Are Your Spot(S)?: mild S/p mechanical fall at her place of residence [facility] [place] sustaining minimal posttraumatic subarachnoid hemorrhage in the right sylvian fissure as per imaging.  No other acute intracranial hemorrhage. With right forehead hematoma and a right orbital ecchymosis.  No complaints of vision changes, headaches, dizziness, or lightheadedness  Continue Keppra 500 mg twice daily for seizure prophylaxis  Continue holding Eliquis until December 19 as per neurosurgery   No appointment noted with neurosurgery; will check with  regarding appointment needed     Have Your Spot(S) Been Treated In The Past?: has not been treated Hpi Title: Evaluation of a Skin Lesion

## 2025-04-23 ENCOUNTER — TELEPHONE (OUTPATIENT)
Age: 85
End: 2025-04-23

## 2025-04-23 NOTE — TELEPHONE ENCOUNTER
PT's daughter called to let Dr Salazar and the office know that PT passed away. She wanted me to extend her heartfelt gratitute to everyone in the office.    Ankit

## 2025-06-21 NOTE — TELEPHONE ENCOUNTER
Called patient. She adamantly denies hitting her head. She says she was getting out of the car and she stood up. Within her first step she became dizzy. Patient fell. She states she must have fallen on her elbow and she is bruised. She is having pain from the elbow to the shoulder and she is also having pain in her underarm, however she feels that this may be from when she was hauled to upright position. No

## (undated) DEVICE — GLOVE SRG BIOGEL 7.5

## (undated) DEVICE — DRAPE SHEET THREE QUARTER

## (undated) DEVICE — ADHESIVE SKN CLSR HISTOACRYL FLEX 0.5ML LF

## (undated) DEVICE — LIGHT HANDLE COVER SLEEVE DISP BLUE STELLAR

## (undated) DEVICE — KERLIX BANDAGE ROLL: Brand: KERLIX

## (undated) DEVICE — GLOVE INDICATOR PI UNDERGLOVE SZ 8 BLUE

## (undated) DEVICE — DRAPE SHEET X-LG

## (undated) DEVICE — ACE WRAP 4 IN STERILE

## (undated) DEVICE — TONGUE DEPRESSOR STERILE

## (undated) DEVICE — CHLORAPREP HI-LITE 26ML ORANGE

## (undated) DEVICE — COVER PROBE INTRAOPERATIVE 6 X 96 IN

## (undated) DEVICE — COBAN 4 IN STERILE

## (undated) DEVICE — VSI MICRO-INTRODUCER KITS ARE INTENDED FOR USE IN PERCUTANEOUS INTRODUCTION OF UP TO A 0.018 INCH OR 0.038 INCH GUIDEWIRE OR CATHETER INTO THE VASCULAR SYSTEM FOLLOWING A SMALL GAUGE NEEDLE STICK.: Brand: VSI MICRO-INTRODUCER KIT

## (undated) DEVICE — BAG DECANTER

## (undated) DEVICE — TIBURON SPLIT SHEET: Brand: CONVERTORS

## (undated) DEVICE — GAUZE SPONGES,16 PLY: Brand: CURITY

## (undated) DEVICE — THE VARI-LASE PLATINUM BRIGHT TIP LASER FIBER IS INDICATED FOR THE TREATMENT OF VARICOSE VEINS AND VARICOSITIES ASSOCIATED WITH SUPERFICIAL REFLUX OF THE GREAT SAPHENOUS VEIN AND FOR THE TREATMENT OF INCOMPETENCE AND REFLUX OF SUPERFICIAL VEINS IN THE LOWER EXTREMITY.: Brand: VARI-LASE® PLATINUM BRIGHT TIP LASER FIBER AND PROCEDURE KITS

## (undated) DEVICE — STRL UNIVERSAL MINOR GENERAL: Brand: CARDINAL HEALTH

## (undated) DEVICE — Device

## (undated) DEVICE — ACE WRAP 6 IN STERILE

## (undated) DEVICE — INTENDED FOR TISSUE SEPARATION, AND OTHER PROCEDURES THAT REQUIRE A SHARP SURGICAL BLADE TO PUNCTURE OR CUT.: Brand: BARD-PARKER SAFETY BLADES SIZE 11, STERILE

## (undated) DEVICE — ULTRASOUND GEL STERILE FOIL PK